# Patient Record
Sex: MALE | Race: WHITE | Employment: OTHER | URBAN - NONMETROPOLITAN AREA
[De-identification: names, ages, dates, MRNs, and addresses within clinical notes are randomized per-mention and may not be internally consistent; named-entity substitution may affect disease eponyms.]

---

## 2017-02-20 ENCOUNTER — APPOINTMENT (OUTPATIENT)
Dept: MRI IMAGING | Age: 72
DRG: 065 | End: 2017-02-20
Payer: MEDICARE

## 2017-02-20 ENCOUNTER — APPOINTMENT (OUTPATIENT)
Dept: GENERAL RADIOLOGY | Age: 72
DRG: 065 | End: 2017-02-20
Payer: MEDICARE

## 2017-02-20 ENCOUNTER — APPOINTMENT (OUTPATIENT)
Dept: CT IMAGING | Age: 72
DRG: 065 | End: 2017-02-20
Payer: MEDICARE

## 2017-02-20 ENCOUNTER — HOSPITAL ENCOUNTER (INPATIENT)
Age: 72
LOS: 2 days | Discharge: HOME | DRG: 065 | End: 2017-02-22
Attending: EMERGENCY MEDICINE | Admitting: FAMILY MEDICINE
Payer: MEDICARE

## 2017-02-20 DIAGNOSIS — I63.9 CEREBROVASCULAR ACCIDENT (CVA), UNSPECIFIED MECHANISM (HCC): Primary | ICD-10-CM

## 2017-02-20 LAB
ACETAMINOPHEN LEVEL: <15 UG/ML
ALBUMIN SERPL-MCNC: 4.3 G/DL (ref 3.5–5.2)
ALP BLD-CCNC: 95 U/L (ref 40–130)
ALT SERPL-CCNC: 18 U/L (ref 5–41)
AMPHETAMINE SCREEN, URINE: NEGATIVE
ANION GAP SERPL CALCULATED.3IONS-SCNC: 11 MMOL/L (ref 7–19)
AST SERPL-CCNC: 16 U/L (ref 5–40)
BACTERIA: NEGATIVE /HPF
BARBITURATE SCREEN URINE: NEGATIVE
BENZODIAZEPINE SCREEN, URINE: NEGATIVE
BILIRUB SERPL-MCNC: <0.2 MG/DL (ref 0.2–1.2)
BILIRUBIN URINE: NEGATIVE
BLOOD, URINE: ABNORMAL
BUN BLDV-MCNC: 26 MG/DL (ref 8–23)
CALCIUM SERPL-MCNC: 9.6 MG/DL (ref 8.8–10.2)
CANNABINOID SCREEN URINE: NEGATIVE
CHLORIDE BLD-SCNC: 101 MMOL/L (ref 98–111)
CLARITY: CLEAR
CO2: 26 MMOL/L (ref 22–29)
COCAINE METABOLITE SCREEN URINE: NEGATIVE
COLOR: YELLOW
CREAT SERPL-MCNC: 1.3 MG/DL (ref 0.5–1.2)
EPITHELIAL CELLS, UA: 0 /HPF (ref 0–5)
ETHANOL: <10 MG/DL (ref 0–0.08)
GFR NON-AFRICAN AMERICAN: 54
GLOBULIN: 2.8 G/DL
GLUCOSE BLD-MCNC: 138 MG/DL
GLUCOSE BLD-MCNC: 138 MG/DL (ref 74–109)
GLUCOSE BLD-MCNC: 85 MG/DL (ref 70–99)
GLUCOSE URINE: >=1000 MG/DL
HCT VFR BLD CALC: 43.9 % (ref 42–52)
HCT VFR BLD CALC: 44 % (ref 42–52)
HEMOGLOBIN: 14.7 G/DL (ref 14–18)
HYALINE CASTS: 0 /HPF (ref 0–8)
INR BLD: 1 (ref 0.88–1.18)
KETONES, URINE: NEGATIVE MG/DL
LEUKOCYTE ESTERASE, URINE: NEGATIVE
Lab: ABNORMAL
MCH RBC QN AUTO: 28.4 PG (ref 27–31)
MCHC RBC AUTO-ENTMCNC: 33.4 G/DL (ref 33–37)
MCV RBC AUTO: 84.9 FL (ref 80–94)
NITRITE, URINE: NEGATIVE
OPIATE SCREEN URINE: POSITIVE
P2Y12 RESULT: 129 PRU (ref 194–418)
PDW BLD-RTO: 14.5 % (ref 11.5–14.5)
PERFORMED ON: NORMAL
PH UA: 6
PLATELET # BLD: 200 K/UL (ref 130–400)
PLATELET # BLD: 212 K/UL (ref 130–400)
PMV BLD AUTO: 11 FL (ref 7.4–10.4)
POTASSIUM SERPL-SCNC: 4.7 MMOL/L (ref 3.5–5)
PROTEIN UA: NEGATIVE MG/DL
PROTHROMBIN TIME: 13.2 SEC (ref 12–14.6)
RBC # BLD: 5.18 M/UL (ref 4.7–6.1)
RBC UA: 0 /HPF (ref 0–4)
SALICYLATE, SERUM: <3 MG/DL (ref 3–10)
SODIUM BLD-SCNC: 138 MMOL/L (ref 136–145)
SPECIFIC GRAVITY UA: 1.03
TOTAL PROTEIN: 7.1 G/DL (ref 6.6–8.7)
UROBILINOGEN, URINE: 0.2 E.U./DL
WBC # BLD: 6.7 K/UL (ref 4.8–10.8)
WBC UA: 3 /HPF (ref 0–5)

## 2017-02-20 PROCEDURE — 85014 HEMATOCRIT: CPT

## 2017-02-20 PROCEDURE — 6370000000 HC RX 637 (ALT 250 FOR IP): Performed by: INTERNAL MEDICINE

## 2017-02-20 PROCEDURE — 80307 DRUG TEST PRSMV CHEM ANLYZR: CPT

## 2017-02-20 PROCEDURE — 36415 COLL VENOUS BLD VENIPUNCTURE: CPT

## 2017-02-20 PROCEDURE — 99284 EMERGENCY DEPT VISIT MOD MDM: CPT | Performed by: EMERGENCY MEDICINE

## 2017-02-20 PROCEDURE — 93005 ELECTROCARDIOGRAM TRACING: CPT

## 2017-02-20 PROCEDURE — 99223 1ST HOSP IP/OBS HIGH 75: CPT | Performed by: INTERNAL MEDICINE

## 2017-02-20 PROCEDURE — 85027 COMPLETE CBC AUTOMATED: CPT

## 2017-02-20 PROCEDURE — 85576 BLOOD PLATELET AGGREGATION: CPT

## 2017-02-20 PROCEDURE — 85049 AUTOMATED PLATELET COUNT: CPT

## 2017-02-20 PROCEDURE — 85610 PROTHROMBIN TIME: CPT

## 2017-02-20 PROCEDURE — G0480 DRUG TEST DEF 1-7 CLASSES: HCPCS

## 2017-02-20 PROCEDURE — 70450 CT HEAD/BRAIN W/O DYE: CPT

## 2017-02-20 PROCEDURE — 96374 THER/PROPH/DIAG INJ IV PUSH: CPT

## 2017-02-20 PROCEDURE — 71010 XR CHEST PORTABLE: CPT

## 2017-02-20 PROCEDURE — 80053 COMPREHEN METABOLIC PANEL: CPT

## 2017-02-20 PROCEDURE — 6360000002 HC RX W HCPCS: Performed by: EMERGENCY MEDICINE

## 2017-02-20 PROCEDURE — 82948 REAGENT STRIP/BLOOD GLUCOSE: CPT

## 2017-02-20 PROCEDURE — 70551 MRI BRAIN STEM W/O DYE: CPT

## 2017-02-20 PROCEDURE — 81001 URINALYSIS AUTO W/SCOPE: CPT

## 2017-02-20 PROCEDURE — 6370000000 HC RX 637 (ALT 250 FOR IP): Performed by: PHYSICIAN ASSISTANT

## 2017-02-20 PROCEDURE — 99285 EMERGENCY DEPT VISIT HI MDM: CPT

## 2017-02-20 PROCEDURE — 1210000000 HC MED SURG R&B

## 2017-02-20 RX ORDER — FLUOXETINE HYDROCHLORIDE 20 MG/1
40 CAPSULE ORAL DAILY
Status: DISCONTINUED | OUTPATIENT
Start: 2017-02-20 | End: 2017-02-22 | Stop reason: HOSPADM

## 2017-02-20 RX ORDER — DEXTROSE MONOHYDRATE 50 MG/ML
100 INJECTION, SOLUTION INTRAVENOUS PRN
Status: DISCONTINUED | OUTPATIENT
Start: 2017-02-20 | End: 2017-02-22 | Stop reason: HOSPADM

## 2017-02-20 RX ORDER — ASPIRIN 81 MG/1
81 TABLET, CHEWABLE ORAL DAILY
Status: DISCONTINUED | OUTPATIENT
Start: 2017-02-20 | End: 2017-02-21

## 2017-02-20 RX ORDER — NITROGLYCERIN 0.4 MG/1
0.4 TABLET SUBLINGUAL EVERY 5 MIN PRN
Status: DISCONTINUED | OUTPATIENT
Start: 2017-02-20 | End: 2017-02-22 | Stop reason: HOSPADM

## 2017-02-20 RX ORDER — TAMSULOSIN HYDROCHLORIDE 0.4 MG/1
0.4 CAPSULE ORAL DAILY
Status: DISCONTINUED | OUTPATIENT
Start: 2017-02-20 | End: 2017-02-22 | Stop reason: HOSPADM

## 2017-02-20 RX ORDER — INSULIN GLARGINE 100 [IU]/ML
30 INJECTION, SOLUTION SUBCUTANEOUS NIGHTLY
Status: DISCONTINUED | OUTPATIENT
Start: 2017-02-20 | End: 2017-02-22 | Stop reason: HOSPADM

## 2017-02-20 RX ORDER — NICOTINE POLACRILEX 4 MG
15 LOZENGE BUCCAL PRN
Status: DISCONTINUED | OUTPATIENT
Start: 2017-02-20 | End: 2017-02-22 | Stop reason: HOSPADM

## 2017-02-20 RX ORDER — CLOPIDOGREL BISULFATE 75 MG/1
75 TABLET ORAL DAILY
Status: DISCONTINUED | OUTPATIENT
Start: 2017-02-20 | End: 2017-02-22 | Stop reason: HOSPADM

## 2017-02-20 RX ORDER — FAMOTIDINE 20 MG/1
40 TABLET, FILM COATED ORAL 2 TIMES DAILY
Status: DISCONTINUED | OUTPATIENT
Start: 2017-02-20 | End: 2017-02-22 | Stop reason: HOSPADM

## 2017-02-20 RX ORDER — NALOXONE HYDROCHLORIDE 0.4 MG/ML
0.4 INJECTION, SOLUTION INTRAMUSCULAR; INTRAVENOUS; SUBCUTANEOUS ONCE
Status: COMPLETED | OUTPATIENT
Start: 2017-02-20 | End: 2017-02-20

## 2017-02-20 RX ORDER — DEXTROSE MONOHYDRATE 25 G/50ML
12.5 INJECTION, SOLUTION INTRAVENOUS PRN
Status: DISCONTINUED | OUTPATIENT
Start: 2017-02-20 | End: 2017-02-22 | Stop reason: HOSPADM

## 2017-02-20 RX ORDER — DULOXETIN HYDROCHLORIDE 60 MG/1
60 CAPSULE, DELAYED RELEASE ORAL NIGHTLY
Status: DISCONTINUED | OUTPATIENT
Start: 2017-02-20 | End: 2017-02-20

## 2017-02-20 RX ADMIN — ASPIRIN 81 MG 81 MG: 81 TABLET ORAL at 20:37

## 2017-02-20 RX ADMIN — FLUOXETINE 40 MG: 20 CAPSULE ORAL at 20:37

## 2017-02-20 RX ADMIN — TAMSULOSIN HYDROCHLORIDE 0.4 MG: 0.4 CAPSULE ORAL at 20:37

## 2017-02-20 RX ADMIN — CLOPIDOGREL BISULFATE 75 MG: 75 TABLET, FILM COATED ORAL at 20:37

## 2017-02-20 RX ADMIN — NALOXONE HYDROCHLORIDE 0.4 MG: 0.4 INJECTION, SOLUTION INTRAMUSCULAR; INTRAVENOUS; SUBCUTANEOUS at 16:10

## 2017-02-20 RX ADMIN — FAMOTIDINE 40 MG: 20 TABLET ORAL at 20:37

## 2017-02-21 PROBLEM — G54.0 THORACIC OUTLET SYNDROME: Status: ACTIVE | Noted: 2017-02-21

## 2017-02-21 PROBLEM — R41.0 CONFUSION: Status: ACTIVE | Noted: 2017-02-21

## 2017-02-21 LAB
ANION GAP SERPL CALCULATED.3IONS-SCNC: 15 MMOL/L (ref 7–19)
BUN BLDV-MCNC: 22 MG/DL (ref 8–23)
CALCIUM SERPL-MCNC: 8.9 MG/DL (ref 8.8–10.2)
CHLORIDE BLD-SCNC: 101 MMOL/L (ref 98–111)
CHOLESTEROL, TOTAL: 239 MG/DL (ref 160–199)
CO2: 23 MMOL/L (ref 22–29)
CREAT SERPL-MCNC: 1.1 MG/DL (ref 0.5–1.2)
GFR NON-AFRICAN AMERICAN: >60
GLUCOSE BLD-MCNC: 124 MG/DL (ref 70–99)
GLUCOSE BLD-MCNC: 201 MG/DL (ref 70–99)
GLUCOSE BLD-MCNC: 61 MG/DL (ref 74–109)
GLUCOSE BLD-MCNC: 69 MG/DL (ref 70–99)
GLUCOSE BLD-MCNC: 76 MG/DL (ref 70–99)
GLUCOSE BLD-MCNC: 81 MG/DL (ref 70–99)
HBA1C MFR BLD: 6.4 %
HCT VFR BLD CALC: 46.5 % (ref 42–52)
HDLC SERPL-MCNC: 29 MG/DL (ref 55–121)
HEMOGLOBIN: 14.7 G/DL (ref 14–18)
LDL CHOLESTEROL CALCULATED: 166 MG/DL
MCH RBC QN AUTO: 27.7 PG (ref 27–31)
MCHC RBC AUTO-ENTMCNC: 31.6 G/DL (ref 33–37)
MCV RBC AUTO: 87.7 FL (ref 80–94)
PDW BLD-RTO: 14.4 % (ref 11.5–14.5)
PERFORMED ON: ABNORMAL
PERFORMED ON: NORMAL
PERFORMED ON: NORMAL
PLATELET # BLD: 189 K/UL (ref 130–400)
PMV BLD AUTO: 10.8 FL (ref 7.4–10.4)
POTASSIUM SERPL-SCNC: 3.8 MMOL/L (ref 3.5–5)
RBC # BLD: 5.3 M/UL (ref 4.7–6.1)
SODIUM BLD-SCNC: 139 MMOL/L (ref 136–145)
TRIGL SERPL-MCNC: 222 MG/DL (ref 150–199)
TSH SERPL DL<=0.05 MIU/L-ACNC: 1.11 UIU/ML (ref 0.27–4.2)
WBC # BLD: 6.6 K/UL (ref 4.8–10.8)

## 2017-02-21 PROCEDURE — 36415 COLL VENOUS BLD VENIPUNCTURE: CPT

## 2017-02-21 PROCEDURE — 2580000003 HC RX 258: Performed by: INTERNAL MEDICINE

## 2017-02-21 PROCEDURE — 6370000000 HC RX 637 (ALT 250 FOR IP): Performed by: HOSPITALIST

## 2017-02-21 PROCEDURE — 6370000000 HC RX 637 (ALT 250 FOR IP): Performed by: PHYSICIAN ASSISTANT

## 2017-02-21 PROCEDURE — 6370000000 HC RX 637 (ALT 250 FOR IP): Performed by: INTERNAL MEDICINE

## 2017-02-21 PROCEDURE — 99223 1ST HOSP IP/OBS HIGH 75: CPT | Performed by: PSYCHIATRY & NEUROLOGY

## 2017-02-21 PROCEDURE — 82948 REAGENT STRIP/BLOOD GLUCOSE: CPT

## 2017-02-21 PROCEDURE — 93880 EXTRACRANIAL BILAT STUDY: CPT

## 2017-02-21 PROCEDURE — 93306 TTE W/DOPPLER COMPLETE: CPT

## 2017-02-21 PROCEDURE — 83036 HEMOGLOBIN GLYCOSYLATED A1C: CPT

## 2017-02-21 PROCEDURE — 1210000000 HC MED SURG R&B

## 2017-02-21 PROCEDURE — 85027 COMPLETE CBC AUTOMATED: CPT

## 2017-02-21 PROCEDURE — 80061 LIPID PANEL: CPT

## 2017-02-21 PROCEDURE — 6360000002 HC RX W HCPCS: Performed by: PSYCHIATRY & NEUROLOGY

## 2017-02-21 PROCEDURE — 99233 SBSQ HOSP IP/OBS HIGH 50: CPT | Performed by: HOSPITALIST

## 2017-02-21 PROCEDURE — 80048 BASIC METABOLIC PNL TOTAL CA: CPT

## 2017-02-21 PROCEDURE — 84443 ASSAY THYROID STIM HORMONE: CPT

## 2017-02-21 RX ORDER — ASPIRIN 81 MG/1
324 TABLET, CHEWABLE ORAL DAILY
Status: DISCONTINUED | OUTPATIENT
Start: 2017-02-22 | End: 2017-02-22 | Stop reason: HOSPADM

## 2017-02-21 RX ORDER — ALPRAZOLAM 1 MG/1
1 TABLET ORAL NIGHTLY PRN
Status: DISCONTINUED | OUTPATIENT
Start: 2017-02-22 | End: 2017-02-21

## 2017-02-21 RX ORDER — ALPRAZOLAM 1 MG/1
1 TABLET ORAL NIGHTLY PRN
Status: DISCONTINUED | OUTPATIENT
Start: 2017-02-21 | End: 2017-02-22 | Stop reason: HOSPADM

## 2017-02-21 RX ORDER — ATORVASTATIN CALCIUM 20 MG/1
20 TABLET, FILM COATED ORAL NIGHTLY
Status: DISCONTINUED | OUTPATIENT
Start: 2017-02-21 | End: 2017-02-22 | Stop reason: HOSPADM

## 2017-02-21 RX ADMIN — FLUOXETINE 40 MG: 20 CAPSULE ORAL at 08:48

## 2017-02-21 RX ADMIN — ALPRAZOLAM 1 MG: 1 TABLET ORAL at 22:17

## 2017-02-21 RX ADMIN — TAMSULOSIN HYDROCHLORIDE 0.4 MG: 0.4 CAPSULE ORAL at 08:48

## 2017-02-21 RX ADMIN — DEXTROSE MONOHYDRATE 12.5 G: 25 INJECTION, SOLUTION INTRAVENOUS at 04:35

## 2017-02-21 RX ADMIN — DEXTROSE MONOHYDRATE 12.5 G: 25 INJECTION, SOLUTION INTRAVENOUS at 11:32

## 2017-02-21 RX ADMIN — FAMOTIDINE 40 MG: 20 TABLET ORAL at 08:48

## 2017-02-21 RX ADMIN — ASPIRIN 81 MG 81 MG: 81 TABLET ORAL at 08:48

## 2017-02-21 RX ADMIN — CLOPIDOGREL BISULFATE 75 MG: 75 TABLET, FILM COATED ORAL at 08:48

## 2017-02-21 RX ADMIN — ATORVASTATIN CALCIUM 20 MG: 20 TABLET, FILM COATED ORAL at 21:17

## 2017-02-21 RX ADMIN — FAMOTIDINE 40 MG: 20 TABLET ORAL at 21:17

## 2017-02-21 RX ADMIN — ENOXAPARIN SODIUM 40 MG: 40 INJECTION SUBCUTANEOUS at 08:48

## 2017-02-21 RX ADMIN — INSULIN GLARGINE 30 UNITS: 100 INJECTION, SOLUTION SUBCUTANEOUS at 22:17

## 2017-02-21 RX ADMIN — INSULIN LISPRO 5 UNITS: 100 INJECTION, SOLUTION INTRAVENOUS; SUBCUTANEOUS at 23:36

## 2017-02-21 ASSESSMENT — PAIN SCALES - GENERAL: PAINLEVEL_OUTOF10: 0

## 2017-02-22 VITALS
WEIGHT: 265 LBS | OXYGEN SATURATION: 92 % | TEMPERATURE: 97.7 F | DIASTOLIC BLOOD PRESSURE: 87 MMHG | RESPIRATION RATE: 16 BRPM | HEIGHT: 75 IN | BODY MASS INDEX: 32.95 KG/M2 | SYSTOLIC BLOOD PRESSURE: 143 MMHG | HEART RATE: 85 BPM

## 2017-02-22 LAB
ANION GAP SERPL CALCULATED.3IONS-SCNC: 16 MMOL/L (ref 7–19)
BUN BLDV-MCNC: 22 MG/DL (ref 8–23)
CALCIUM SERPL-MCNC: 9 MG/DL (ref 8.8–10.2)
CHLORIDE BLD-SCNC: 100 MMOL/L (ref 98–111)
CO2: 23 MMOL/L (ref 22–29)
CREAT SERPL-MCNC: 1.2 MG/DL (ref 0.5–1.2)
GFR NON-AFRICAN AMERICAN: 59
GLUCOSE BLD-MCNC: 120 MG/DL (ref 70–99)
GLUCOSE BLD-MCNC: 234 MG/DL (ref 70–99)
GLUCOSE BLD-MCNC: 89 MG/DL (ref 74–109)
PERFORMED ON: ABNORMAL
PERFORMED ON: ABNORMAL
POTASSIUM SERPL-SCNC: 3.8 MMOL/L (ref 3.5–5)
SODIUM BLD-SCNC: 139 MMOL/L (ref 136–145)
TOTAL CK: 82 U/L (ref 39–308)

## 2017-02-22 PROCEDURE — 6360000002 HC RX W HCPCS: Performed by: PSYCHIATRY & NEUROLOGY

## 2017-02-22 PROCEDURE — 6370000000 HC RX 637 (ALT 250 FOR IP): Performed by: INTERNAL MEDICINE

## 2017-02-22 PROCEDURE — 6370000000 HC RX 637 (ALT 250 FOR IP): Performed by: HOSPITALIST

## 2017-02-22 PROCEDURE — G8997 SWALLOW GOAL STATUS: HCPCS

## 2017-02-22 PROCEDURE — 87070 CULTURE OTHR SPECIMN AEROBIC: CPT

## 2017-02-22 PROCEDURE — 36415 COLL VENOUS BLD VENIPUNCTURE: CPT

## 2017-02-22 PROCEDURE — 6370000000 HC RX 637 (ALT 250 FOR IP): Performed by: PHYSICIAN ASSISTANT

## 2017-02-22 PROCEDURE — G8978 MOBILITY CURRENT STATUS: HCPCS

## 2017-02-22 PROCEDURE — 82550 ASSAY OF CK (CPK): CPT

## 2017-02-22 PROCEDURE — 92610 EVALUATE SWALLOWING FUNCTION: CPT

## 2017-02-22 PROCEDURE — 82948 REAGENT STRIP/BLOOD GLUCOSE: CPT

## 2017-02-22 PROCEDURE — 87205 SMEAR GRAM STAIN: CPT

## 2017-02-22 PROCEDURE — 99233 SBSQ HOSP IP/OBS HIGH 50: CPT | Performed by: PSYCHIATRY & NEUROLOGY

## 2017-02-22 PROCEDURE — G8996 SWALLOW CURRENT STATUS: HCPCS

## 2017-02-22 PROCEDURE — G8979 MOBILITY GOAL STATUS: HCPCS

## 2017-02-22 PROCEDURE — 80048 BASIC METABOLIC PNL TOTAL CA: CPT

## 2017-02-22 PROCEDURE — G8980 MOBILITY D/C STATUS: HCPCS

## 2017-02-22 PROCEDURE — 99239 HOSP IP/OBS DSCHRG MGMT >30: CPT | Performed by: HOSPITALIST

## 2017-02-22 PROCEDURE — 97163 PT EVAL HIGH COMPLEX 45 MIN: CPT

## 2017-02-22 RX ORDER — LISINOPRIL 5 MG/1
5 TABLET ORAL DAILY
Status: DISCONTINUED | OUTPATIENT
Start: 2017-02-22 | End: 2017-02-22 | Stop reason: HOSPADM

## 2017-02-22 RX ORDER — ATORVASTATIN CALCIUM 20 MG/1
20 TABLET, FILM COATED ORAL NIGHTLY
Qty: 30 TABLET | Refills: 0 | Status: SHIPPED | OUTPATIENT
Start: 2017-02-22 | End: 2017-02-22

## 2017-02-22 RX ORDER — ASPIRIN 325 MG
325 TABLET ORAL DAILY
COMMUNITY
Start: 2017-02-22 | End: 2018-01-02 | Stop reason: ALTCHOICE

## 2017-02-22 RX ORDER — LISINOPRIL 5 MG/1
5 TABLET ORAL DAILY
Qty: 30 TABLET | Refills: 0 | Status: SHIPPED | OUTPATIENT
Start: 2017-02-22 | End: 2017-02-22

## 2017-02-22 RX ORDER — ATORVASTATIN CALCIUM 20 MG/1
20 TABLET, FILM COATED ORAL NIGHTLY
Qty: 30 TABLET | Refills: 0 | Status: ON HOLD | OUTPATIENT
Start: 2017-02-22 | End: 2017-03-27

## 2017-02-22 RX ORDER — LISINOPRIL 5 MG/1
5 TABLET ORAL DAILY
Qty: 30 TABLET | Refills: 0 | Status: ON HOLD | OUTPATIENT
Start: 2017-02-22 | End: 2017-03-28 | Stop reason: HOSPADM

## 2017-02-22 RX ADMIN — FLUOXETINE 40 MG: 20 CAPSULE ORAL at 09:39

## 2017-02-22 RX ADMIN — INSULIN LISPRO 6 UNITS: 100 INJECTION, SOLUTION INTRAVENOUS; SUBCUTANEOUS at 12:27

## 2017-02-22 RX ADMIN — TAMSULOSIN HYDROCHLORIDE 0.4 MG: 0.4 CAPSULE ORAL at 09:39

## 2017-02-22 RX ADMIN — FAMOTIDINE 40 MG: 20 TABLET ORAL at 09:39

## 2017-02-22 RX ADMIN — ASPIRIN 81 MG 324 MG: 81 TABLET ORAL at 09:39

## 2017-02-22 RX ADMIN — ENOXAPARIN SODIUM 40 MG: 40 INJECTION SUBCUTANEOUS at 09:39

## 2017-02-22 RX ADMIN — CLOPIDOGREL BISULFATE 75 MG: 75 TABLET, FILM COATED ORAL at 09:39

## 2017-02-23 LAB
EKG P AXIS: 26 DEGREES
EKG P-R INTERVAL: 154 MS
EKG Q-T INTERVAL: 432 MS
EKG QRS DURATION: 98 MS
EKG QTC CALCULATION (BAZETT): 435 MS
EKG T AXIS: 81 DEGREES

## 2017-02-24 LAB
CULTURE, RESPIRATORY: NORMAL
GRAM STAIN RESULT: NORMAL

## 2017-03-27 ENCOUNTER — HOSPITAL ENCOUNTER (OUTPATIENT)
Age: 72
Setting detail: OBSERVATION
Discharge: HOME | End: 2017-03-28
Attending: EMERGENCY MEDICINE | Admitting: HOSPITALIST
Payer: MEDICARE

## 2017-03-27 ENCOUNTER — APPOINTMENT (OUTPATIENT)
Dept: GENERAL RADIOLOGY | Age: 72
End: 2017-03-27
Payer: MEDICARE

## 2017-03-27 ENCOUNTER — APPOINTMENT (OUTPATIENT)
Dept: CT IMAGING | Age: 72
End: 2017-03-27
Payer: MEDICARE

## 2017-03-27 DIAGNOSIS — R55 SYNCOPE AND COLLAPSE: Primary | ICD-10-CM

## 2017-03-27 PROBLEM — N17.9 AKI (ACUTE KIDNEY INJURY) (HCC): Status: ACTIVE | Noted: 2017-03-27

## 2017-03-27 PROBLEM — I95.9 HYPOTENSION: Status: ACTIVE | Noted: 2017-03-27

## 2017-03-27 PROBLEM — R42 DIZZINESS: Status: ACTIVE | Noted: 2017-03-27

## 2017-03-27 PROBLEM — R26.89 IMBALANCE: Status: ACTIVE | Noted: 2017-03-27

## 2017-03-27 PROBLEM — I95.1 SYNCOPE DUE TO ORTHOSTATIC HYPOTENSION: Status: ACTIVE | Noted: 2017-03-27

## 2017-03-27 LAB
ALBUMIN SERPL-MCNC: 4.1 G/DL (ref 3.5–5.2)
ALP BLD-CCNC: 88 U/L (ref 40–130)
ALT SERPL-CCNC: 15 U/L (ref 5–41)
ANION GAP SERPL CALCULATED.3IONS-SCNC: 11 MMOL/L (ref 7–19)
APTT: 29.9 SEC (ref 26–36.2)
AST SERPL-CCNC: 17 U/L (ref 5–40)
BILIRUB SERPL-MCNC: 0.3 MG/DL (ref 0.2–1.2)
BILIRUBIN URINE: NEGATIVE
BLOOD, URINE: NEGATIVE
BUN BLDV-MCNC: 27 MG/DL (ref 8–23)
CALCIUM SERPL-MCNC: 9 MG/DL (ref 8.8–10.2)
CHLORIDE BLD-SCNC: 102 MMOL/L (ref 98–111)
CLARITY: CLEAR
CO2: 23 MMOL/L (ref 22–29)
COLOR: YELLOW
CREAT SERPL-MCNC: 1.5 MG/DL (ref 0.5–1.2)
GFR NON-AFRICAN AMERICAN: 46
GLOBULIN: 2.6 G/DL
GLUCOSE BLD-MCNC: 137 MG/DL (ref 70–99)
GLUCOSE BLD-MCNC: 144 MG/DL (ref 70–99)
GLUCOSE BLD-MCNC: 154 MG/DL (ref 74–109)
GLUCOSE URINE: >=1000 MG/DL
HBA1C MFR BLD: 6.1 %
HCT VFR BLD CALC: 42.9 % (ref 42–52)
HEMOGLOBIN: 13.6 G/DL (ref 14–18)
INR BLD: 1 (ref 0.88–1.18)
KETONES, URINE: NEGATIVE MG/DL
LEUKOCYTE ESTERASE, URINE: NEGATIVE
MCH RBC QN AUTO: 28 PG (ref 27–31)
MCHC RBC AUTO-ENTMCNC: 31.7 G/DL (ref 33–37)
MCV RBC AUTO: 88.3 FL (ref 80–94)
NITRITE, URINE: NEGATIVE
PDW BLD-RTO: 15.1 % (ref 11.5–14.5)
PERFORMED ON: ABNORMAL
PERFORMED ON: ABNORMAL
PERFORMED ON: NORMAL
PH UA: 5.5
PLATELET # BLD: 206 K/UL (ref 130–400)
PMV BLD AUTO: 11.1 FL (ref 7.4–10.4)
POC TROPONIN I: 0 NG/ML (ref 0–0.08)
POTASSIUM SERPL-SCNC: 4.8 MMOL/L (ref 3.5–5)
PROTEIN UA: NEGATIVE MG/DL
PROTHROMBIN TIME: 13.2 SEC (ref 12–14.6)
RBC # BLD: 4.86 M/UL (ref 4.7–6.1)
SODIUM BLD-SCNC: 136 MMOL/L (ref 136–145)
SPECIFIC GRAVITY UA: 1.03
TOTAL PROTEIN: 6.7 G/DL (ref 6.6–8.7)
UROBILINOGEN, URINE: 0.2 E.U./DL
WBC # BLD: 7.7 K/UL (ref 4.8–10.8)

## 2017-03-27 PROCEDURE — 70450 CT HEAD/BRAIN W/O DYE: CPT

## 2017-03-27 PROCEDURE — 6360000002 HC RX W HCPCS: Performed by: NURSE PRACTITIONER

## 2017-03-27 PROCEDURE — 99220 PR INITIAL OBSERVATION CARE/DAY 70 MINUTES: CPT | Performed by: HOSPITALIST

## 2017-03-27 PROCEDURE — 99284 EMERGENCY DEPT VISIT MOD MDM: CPT | Performed by: EMERGENCY MEDICINE

## 2017-03-27 PROCEDURE — G0378 HOSPITAL OBSERVATION PER HR: HCPCS

## 2017-03-27 PROCEDURE — 84484 ASSAY OF TROPONIN QUANT: CPT

## 2017-03-27 PROCEDURE — 71020 XR CHEST STANDARD TWO VW: CPT

## 2017-03-27 PROCEDURE — 83036 HEMOGLOBIN GLYCOSYLATED A1C: CPT

## 2017-03-27 PROCEDURE — 96372 THER/PROPH/DIAG INJ SC/IM: CPT

## 2017-03-27 PROCEDURE — 82948 REAGENT STRIP/BLOOD GLUCOSE: CPT

## 2017-03-27 PROCEDURE — 81003 URINALYSIS AUTO W/O SCOPE: CPT

## 2017-03-27 PROCEDURE — 6370000000 HC RX 637 (ALT 250 FOR IP): Performed by: NURSE PRACTITIONER

## 2017-03-27 PROCEDURE — 80053 COMPREHEN METABOLIC PANEL: CPT

## 2017-03-27 PROCEDURE — 99285 EMERGENCY DEPT VISIT HI MDM: CPT

## 2017-03-27 PROCEDURE — 36415 COLL VENOUS BLD VENIPUNCTURE: CPT

## 2017-03-27 PROCEDURE — 2580000003 HC RX 258: Performed by: NURSE PRACTITIONER

## 2017-03-27 PROCEDURE — 85730 THROMBOPLASTIN TIME PARTIAL: CPT

## 2017-03-27 PROCEDURE — 85027 COMPLETE CBC AUTOMATED: CPT

## 2017-03-27 PROCEDURE — 93005 ELECTROCARDIOGRAM TRACING: CPT

## 2017-03-27 PROCEDURE — 73560 X-RAY EXAM OF KNEE 1 OR 2: CPT

## 2017-03-27 PROCEDURE — 85610 PROTHROMBIN TIME: CPT

## 2017-03-27 PROCEDURE — 2580000003 HC RX 258: Performed by: HOSPITALIST

## 2017-03-27 RX ORDER — MELOXICAM 15 MG/1
15 TABLET ORAL DAILY
COMMUNITY
End: 2018-01-10 | Stop reason: SDUPTHER

## 2017-03-27 RX ORDER — ALPRAZOLAM 0.5 MG/1
0.5 TABLET ORAL 3 TIMES DAILY PRN
Status: DISCONTINUED | OUTPATIENT
Start: 2017-03-27 | End: 2017-03-28 | Stop reason: HOSPADM

## 2017-03-27 RX ORDER — OXYCODONE HCL 10 MG/1
20 TABLET, FILM COATED, EXTENDED RELEASE ORAL EVERY 12 HOURS
Status: DISCONTINUED | OUTPATIENT
Start: 2017-03-27 | End: 2017-03-28 | Stop reason: HOSPADM

## 2017-03-27 RX ORDER — HYDROCODONE BITARTRATE AND ACETAMINOPHEN 10; 325 MG/1; MG/1
1 TABLET ORAL EVERY 6 HOURS PRN
COMMUNITY
End: 2017-07-20 | Stop reason: SDUPTHER

## 2017-03-27 RX ORDER — GABAPENTIN 300 MG/1
300 CAPSULE ORAL 3 TIMES DAILY
COMMUNITY
End: 2017-11-17 | Stop reason: ALTCHOICE

## 2017-03-27 RX ORDER — TAMSULOSIN HYDROCHLORIDE 0.4 MG/1
0.4 CAPSULE ORAL 2 TIMES DAILY
Status: DISCONTINUED | OUTPATIENT
Start: 2017-03-27 | End: 2017-03-28 | Stop reason: HOSPADM

## 2017-03-27 RX ORDER — SODIUM CHLORIDE 9 MG/ML
INJECTION, SOLUTION INTRAVENOUS CONTINUOUS
Status: DISCONTINUED | OUTPATIENT
Start: 2017-03-27 | End: 2017-03-28

## 2017-03-27 RX ORDER — NICOTINE POLACRILEX 4 MG
15 LOZENGE BUCCAL PRN
Status: DISCONTINUED | OUTPATIENT
Start: 2017-03-27 | End: 2017-03-28 | Stop reason: HOSPADM

## 2017-03-27 RX ORDER — DEXTROSE MONOHYDRATE 25 G/50ML
12.5 INJECTION, SOLUTION INTRAVENOUS PRN
Status: DISCONTINUED | OUTPATIENT
Start: 2017-03-27 | End: 2017-03-28 | Stop reason: HOSPADM

## 2017-03-27 RX ORDER — INSULIN GLARGINE 100 [IU]/ML
20 INJECTION, SOLUTION SUBCUTANEOUS
Status: DISCONTINUED | OUTPATIENT
Start: 2017-03-27 | End: 2017-03-27

## 2017-03-27 RX ORDER — OXYCODONE HCL 20 MG/1
20 TABLET, FILM COATED, EXTENDED RELEASE ORAL EVERY 12 HOURS
COMMUNITY
End: 2017-08-25 | Stop reason: ALTCHOICE

## 2017-03-27 RX ORDER — DULOXETIN HYDROCHLORIDE 60 MG/1
60 CAPSULE, DELAYED RELEASE ORAL NIGHTLY
Status: DISCONTINUED | OUTPATIENT
Start: 2017-03-27 | End: 2017-03-28 | Stop reason: HOSPADM

## 2017-03-27 RX ORDER — SODIUM CHLORIDE 0.9 % (FLUSH) 0.9 %
10 SYRINGE (ML) INJECTION PRN
Status: DISCONTINUED | OUTPATIENT
Start: 2017-03-27 | End: 2017-03-28 | Stop reason: HOSPADM

## 2017-03-27 RX ORDER — INSULIN GLARGINE 100 [IU]/ML
80 INJECTION, SOLUTION SUBCUTANEOUS NIGHTLY
Status: DISCONTINUED | OUTPATIENT
Start: 2017-03-27 | End: 2017-03-28 | Stop reason: HOSPADM

## 2017-03-27 RX ORDER — AZITHROMYCIN 250 MG/1
250 TABLET, FILM COATED ORAL DAILY
Status: ON HOLD | COMMUNITY
End: 2017-03-27

## 2017-03-27 RX ORDER — MECLIZINE HYDROCHLORIDE 25 MG/1
25 TABLET ORAL 3 TIMES DAILY
Status: DISCONTINUED | OUTPATIENT
Start: 2017-03-27 | End: 2017-03-28 | Stop reason: HOSPADM

## 2017-03-27 RX ORDER — ONDANSETRON 2 MG/ML
4 INJECTION INTRAMUSCULAR; INTRAVENOUS EVERY 6 HOURS PRN
Status: DISCONTINUED | OUTPATIENT
Start: 2017-03-27 | End: 2017-03-28 | Stop reason: HOSPADM

## 2017-03-27 RX ORDER — SODIUM CHLORIDE 0.9 % (FLUSH) 0.9 %
10 SYRINGE (ML) INJECTION EVERY 12 HOURS SCHEDULED
Status: DISCONTINUED | OUTPATIENT
Start: 2017-03-27 | End: 2017-03-28 | Stop reason: HOSPADM

## 2017-03-27 RX ORDER — GLIPIZIDE 5 MG/1
10 TABLET ORAL
Status: DISCONTINUED | OUTPATIENT
Start: 2017-03-28 | End: 2017-03-27

## 2017-03-27 RX ORDER — INSULIN GLARGINE 100 [IU]/ML
60 INJECTION, SOLUTION SUBCUTANEOUS NIGHTLY
Status: DISCONTINUED | OUTPATIENT
Start: 2017-03-27 | End: 2017-03-27

## 2017-03-27 RX ORDER — FAMOTIDINE 20 MG/1
40 TABLET, FILM COATED ORAL 2 TIMES DAILY
Status: DISCONTINUED | OUTPATIENT
Start: 2017-03-27 | End: 2017-03-28 | Stop reason: HOSPADM

## 2017-03-27 RX ORDER — BUTALBITAL, ACETAMINOPHEN AND CAFFEINE 50; 325; 40 MG/1; MG/1; MG/1
1 TABLET ORAL DAILY
Status: DISCONTINUED | OUTPATIENT
Start: 2017-03-27 | End: 2017-03-28 | Stop reason: HOSPADM

## 2017-03-27 RX ORDER — DEXTROSE MONOHYDRATE 50 MG/ML
100 INJECTION, SOLUTION INTRAVENOUS PRN
Status: DISCONTINUED | OUTPATIENT
Start: 2017-03-27 | End: 2017-03-28 | Stop reason: HOSPADM

## 2017-03-27 RX ORDER — 0.9 % SODIUM CHLORIDE 0.9 %
500 INTRAVENOUS SOLUTION INTRAVENOUS ONCE
Status: COMPLETED | OUTPATIENT
Start: 2017-03-27 | End: 2017-03-27

## 2017-03-27 RX ORDER — OXYCODONE HYDROCHLORIDE AND ACETAMINOPHEN 5; 325 MG/1; MG/1
1 TABLET ORAL 2 TIMES DAILY PRN
COMMUNITY
End: 2017-08-25 | Stop reason: ALTCHOICE

## 2017-03-27 RX ORDER — GABAPENTIN 300 MG/1
300 CAPSULE ORAL 3 TIMES DAILY
Status: DISCONTINUED | OUTPATIENT
Start: 2017-03-27 | End: 2017-03-28 | Stop reason: HOSPADM

## 2017-03-27 RX ORDER — ACETAMINOPHEN 325 MG/1
650 TABLET ORAL EVERY 4 HOURS PRN
Status: DISCONTINUED | OUTPATIENT
Start: 2017-03-27 | End: 2017-03-28 | Stop reason: HOSPADM

## 2017-03-27 RX ORDER — NITROGLYCERIN 0.4 MG/1
0.4 TABLET SUBLINGUAL EVERY 5 MIN PRN
Status: DISCONTINUED | OUTPATIENT
Start: 2017-03-27 | End: 2017-03-28 | Stop reason: HOSPADM

## 2017-03-27 RX ORDER — CLOPIDOGREL BISULFATE 75 MG/1
75 TABLET ORAL DAILY
Status: DISCONTINUED | OUTPATIENT
Start: 2017-03-27 | End: 2017-03-28 | Stop reason: HOSPADM

## 2017-03-27 RX ORDER — INSULIN GLARGINE 100 [IU]/ML
INJECTION, SOLUTION SUBCUTANEOUS NIGHTLY
Status: ON HOLD | COMMUNITY
End: 2017-03-28 | Stop reason: HOSPADM

## 2017-03-27 RX ORDER — FLUOXETINE HYDROCHLORIDE 20 MG/1
40 CAPSULE ORAL DAILY
Status: DISCONTINUED | OUTPATIENT
Start: 2017-03-27 | End: 2017-03-28 | Stop reason: HOSPADM

## 2017-03-27 RX ORDER — ASPIRIN 325 MG
325 TABLET ORAL DAILY
Status: DISCONTINUED | OUTPATIENT
Start: 2017-03-27 | End: 2017-03-28 | Stop reason: HOSPADM

## 2017-03-27 RX ADMIN — SODIUM CHLORIDE: 9 INJECTION, SOLUTION INTRAVENOUS at 17:16

## 2017-03-27 RX ADMIN — GABAPENTIN 300 MG: 300 CAPSULE ORAL at 20:23

## 2017-03-27 RX ADMIN — FLUOXETINE 40 MG: 20 CAPSULE ORAL at 20:22

## 2017-03-27 RX ADMIN — TAMSULOSIN HYDROCHLORIDE 0.4 MG: 0.4 CAPSULE ORAL at 20:23

## 2017-03-27 RX ADMIN — Medication 10 ML: at 20:24

## 2017-03-27 RX ADMIN — INSULIN GLARGINE 80 UNITS: 100 INJECTION, SOLUTION SUBCUTANEOUS at 20:36

## 2017-03-27 RX ADMIN — DULOXETINE HYDROCHLORIDE 60 MG: 60 CAPSULE, DELAYED RELEASE ORAL at 20:23

## 2017-03-27 RX ADMIN — MECLIZINE HYDROCHLORIDE 25 MG: 25 TABLET ORAL at 20:23

## 2017-03-27 RX ADMIN — CLOPIDOGREL BISULFATE 75 MG: 75 TABLET, FILM COATED ORAL at 20:22

## 2017-03-27 RX ADMIN — FAMOTIDINE 40 MG: 20 TABLET ORAL at 20:23

## 2017-03-27 RX ADMIN — OXYCODONE HYDROCHLORIDE 20 MG: 10 TABLET, FILM COATED, EXTENDED RELEASE ORAL at 20:23

## 2017-03-27 RX ADMIN — METOPROLOL TARTRATE 12.5 MG: 25 TABLET ORAL at 20:23

## 2017-03-27 RX ADMIN — SODIUM CHLORIDE 500 ML: 9 INJECTION, SOLUTION INTRAVENOUS at 10:49

## 2017-03-27 RX ADMIN — ENOXAPARIN SODIUM 40 MG: 40 INJECTION SUBCUTANEOUS at 20:24

## 2017-03-27 RX ADMIN — ASPIRIN 325 MG ORAL TABLET 325 MG: 325 PILL ORAL at 20:22

## 2017-03-27 RX ADMIN — ALPRAZOLAM 0.5 MG: 0.5 TABLET ORAL at 22:11

## 2017-03-27 ASSESSMENT — ENCOUNTER SYMPTOMS: RESPIRATORY NEGATIVE: 1

## 2017-03-27 ASSESSMENT — PAIN SCALES - GENERAL
PAINLEVEL_OUTOF10: 6
PAINLEVEL_OUTOF10: 5

## 2017-03-28 VITALS
HEART RATE: 68 BPM | SYSTOLIC BLOOD PRESSURE: 118 MMHG | DIASTOLIC BLOOD PRESSURE: 72 MMHG | HEIGHT: 76 IN | TEMPERATURE: 98.2 F | RESPIRATION RATE: 16 BRPM | WEIGHT: 260 LBS | BODY MASS INDEX: 31.66 KG/M2 | OXYGEN SATURATION: 92 %

## 2017-03-28 LAB
ANION GAP SERPL CALCULATED.3IONS-SCNC: 13 MMOL/L (ref 7–19)
BUN BLDV-MCNC: 26 MG/DL (ref 8–23)
CALCIUM SERPL-MCNC: 8.6 MG/DL (ref 8.8–10.2)
CHLORIDE BLD-SCNC: 103 MMOL/L (ref 98–111)
CO2: 22 MMOL/L (ref 22–29)
CREAT SERPL-MCNC: 1.2 MG/DL (ref 0.5–1.2)
EKG P AXIS: 45 DEGREES
EKG P-R INTERVAL: 158 MS
EKG Q-T INTERVAL: 432 MS
EKG QRS DURATION: 94 MS
EKG QTC CALCULATION (BAZETT): 425 MS
EKG T AXIS: 81 DEGREES
GFR NON-AFRICAN AMERICAN: 59
GLUCOSE BLD-MCNC: 200 MG/DL (ref 70–99)
GLUCOSE BLD-MCNC: 202 MG/DL (ref 70–99)
GLUCOSE BLD-MCNC: 90 MG/DL (ref 70–99)
GLUCOSE BLD-MCNC: 97 MG/DL (ref 74–109)
PERFORMED ON: ABNORMAL
PERFORMED ON: ABNORMAL
PERFORMED ON: NORMAL
POTASSIUM SERPL-SCNC: 4.2 MMOL/L (ref 3.5–5)
SODIUM BLD-SCNC: 138 MMOL/L (ref 136–145)
VITAMIN B-12: 780 PG/ML (ref 211–946)

## 2017-03-28 PROCEDURE — 36415 COLL VENOUS BLD VENIPUNCTURE: CPT

## 2017-03-28 PROCEDURE — 80048 BASIC METABOLIC PNL TOTAL CA: CPT

## 2017-03-28 PROCEDURE — 96372 THER/PROPH/DIAG INJ SC/IM: CPT

## 2017-03-28 PROCEDURE — G8989 SELF CARE D/C STATUS: HCPCS

## 2017-03-28 PROCEDURE — 6360000002 HC RX W HCPCS: Performed by: NURSE PRACTITIONER

## 2017-03-28 PROCEDURE — G0378 HOSPITAL OBSERVATION PER HR: HCPCS

## 2017-03-28 PROCEDURE — 82607 VITAMIN B-12: CPT

## 2017-03-28 PROCEDURE — G8987 SELF CARE CURRENT STATUS: HCPCS

## 2017-03-28 PROCEDURE — 82948 REAGENT STRIP/BLOOD GLUCOSE: CPT

## 2017-03-28 PROCEDURE — G8988 SELF CARE GOAL STATUS: HCPCS

## 2017-03-28 PROCEDURE — 99217 PR OBSERVATION CARE DISCHARGE MANAGEMENT: CPT | Performed by: HOSPITALIST

## 2017-03-28 PROCEDURE — 97166 OT EVAL MOD COMPLEX 45 MIN: CPT

## 2017-03-28 PROCEDURE — 2580000003 HC RX 258: Performed by: NURSE PRACTITIONER

## 2017-03-28 PROCEDURE — 6370000000 HC RX 637 (ALT 250 FOR IP): Performed by: NURSE PRACTITIONER

## 2017-03-28 PROCEDURE — 99215 OFFICE O/P EST HI 40 MIN: CPT | Performed by: PSYCHIATRY & NEUROLOGY

## 2017-03-28 RX ORDER — MECLIZINE HYDROCHLORIDE 25 MG/1
25 TABLET ORAL 3 TIMES DAILY
Qty: 30 TABLET | Refills: 0 | Status: SHIPPED | OUTPATIENT
Start: 2017-03-28 | End: 2017-04-07

## 2017-03-28 RX ADMIN — MECLIZINE HYDROCHLORIDE 25 MG: 25 TABLET ORAL at 15:10

## 2017-03-28 RX ADMIN — BUTALBITAL, ACETAMINOPHEN, AND CAFFEINE 1 TABLET: 50; 325; 40 TABLET ORAL at 10:16

## 2017-03-28 RX ADMIN — INSULIN LISPRO 2 UNITS: 100 INJECTION, SOLUTION INTRAVENOUS; SUBCUTANEOUS at 13:00

## 2017-03-28 RX ADMIN — OXYCODONE HYDROCHLORIDE 20 MG: 10 TABLET, FILM COATED, EXTENDED RELEASE ORAL at 10:15

## 2017-03-28 RX ADMIN — ASPIRIN 325 MG ORAL TABLET 325 MG: 325 PILL ORAL at 10:16

## 2017-03-28 RX ADMIN — FAMOTIDINE 40 MG: 20 TABLET ORAL at 10:18

## 2017-03-28 RX ADMIN — CLOPIDOGREL BISULFATE 75 MG: 75 TABLET, FILM COATED ORAL at 10:16

## 2017-03-28 RX ADMIN — MECLIZINE HYDROCHLORIDE 25 MG: 25 TABLET ORAL at 10:17

## 2017-03-28 RX ADMIN — ENOXAPARIN SODIUM 40 MG: 40 INJECTION SUBCUTANEOUS at 10:14

## 2017-03-28 RX ADMIN — METOPROLOL TARTRATE 12.5 MG: 25 TABLET ORAL at 10:17

## 2017-03-28 RX ADMIN — Medication 10 ML: at 10:18

## 2017-03-28 RX ADMIN — TAMSULOSIN HYDROCHLORIDE 0.4 MG: 0.4 CAPSULE ORAL at 10:18

## 2017-03-28 RX ADMIN — FLUOXETINE 40 MG: 20 CAPSULE ORAL at 10:18

## 2017-03-28 RX ADMIN — GABAPENTIN 300 MG: 300 CAPSULE ORAL at 10:16

## 2017-03-28 RX ADMIN — GABAPENTIN 300 MG: 300 CAPSULE ORAL at 15:10

## 2017-03-28 ASSESSMENT — ENCOUNTER SYMPTOMS
EYES NEGATIVE: 1
GASTROINTESTINAL NEGATIVE: 1
ALLERGIC/IMMUNOLOGIC NEGATIVE: 1

## 2017-03-28 ASSESSMENT — PAIN SCALES - GENERAL
PAINLEVEL_OUTOF10: 2
PAINLEVEL_OUTOF10: 5

## 2017-07-20 RX ORDER — INSULIN GLARGINE 100 [IU]/ML
INJECTION, SOLUTION SUBCUTANEOUS
COMMUNITY
Start: 2017-06-08 | End: 2017-07-20 | Stop reason: SDUPTHER

## 2017-07-21 RX ORDER — INSULIN GLARGINE 100 [IU]/ML
80 INJECTION, SOLUTION SUBCUTANEOUS NIGHTLY
Qty: 5 PEN | Refills: 3 | Status: SHIPPED | OUTPATIENT
Start: 2017-07-21 | End: 2018-02-26 | Stop reason: SDUPTHER

## 2017-07-21 RX ORDER — ALPRAZOLAM 0.5 MG/1
0.5 TABLET ORAL NIGHTLY PRN
Qty: 30 TABLET | Refills: 2 | Status: SHIPPED | OUTPATIENT
Start: 2017-07-21 | End: 2017-07-21 | Stop reason: SDUPTHER

## 2017-07-21 RX ORDER — ALPRAZOLAM 0.5 MG/1
0.5 TABLET ORAL NIGHTLY PRN
Qty: 30 TABLET | Refills: 2 | Status: SHIPPED | OUTPATIENT
Start: 2017-07-21 | End: 2017-07-24 | Stop reason: SDUPTHER

## 2017-07-21 RX ORDER — HYDROCODONE BITARTRATE AND ACETAMINOPHEN 10; 325 MG/1; MG/1
1 TABLET ORAL EVERY 6 HOURS PRN
Qty: 90 TABLET | Refills: 0 | Status: SHIPPED | OUTPATIENT
Start: 2017-07-21 | End: 2017-07-21 | Stop reason: SDUPTHER

## 2017-07-21 RX ORDER — HYDROCODONE BITARTRATE AND ACETAMINOPHEN 10; 325 MG/1; MG/1
1 TABLET ORAL EVERY 6 HOURS PRN
Qty: 90 TABLET | Refills: 0 | Status: SHIPPED | OUTPATIENT
Start: 2017-07-21 | End: 2017-07-24 | Stop reason: SDUPTHER

## 2017-07-24 RX ORDER — HYDROCODONE BITARTRATE AND ACETAMINOPHEN 10; 325 MG/1; MG/1
1 TABLET ORAL EVERY 6 HOURS PRN
Qty: 90 TABLET | Refills: 0 | Status: SHIPPED | OUTPATIENT
Start: 2017-07-24 | End: 2017-09-11 | Stop reason: SDUPTHER

## 2017-07-24 RX ORDER — ALPRAZOLAM 0.5 MG/1
0.5 TABLET ORAL NIGHTLY PRN
Qty: 30 TABLET | Refills: 2 | Status: SHIPPED | OUTPATIENT
Start: 2017-07-24 | End: 2017-10-23

## 2017-08-13 ENCOUNTER — HOSPITAL ENCOUNTER (EMERGENCY)
Facility: HOSPITAL | Age: 72
Discharge: HOME | End: 2017-08-14
Attending: EMERGENCY MEDICINE | Admitting: EMERGENCY MEDICINE

## 2017-08-13 ENCOUNTER — APPOINTMENT (OUTPATIENT)
Dept: CT IMAGING | Facility: HOSPITAL | Age: 72
End: 2017-08-13

## 2017-08-13 DIAGNOSIS — IMO0002 WEAKNESS DUE TO CEREBROVASCULAR ACCIDENT: Primary | ICD-10-CM

## 2017-08-13 RX ORDER — TAMSULOSIN HYDROCHLORIDE 0.4 MG/1
1 CAPSULE ORAL 2 TIMES DAILY
COMMUNITY
End: 2022-12-16

## 2017-08-13 RX ORDER — MELOXICAM 15 MG/1
15 TABLET ORAL DAILY
COMMUNITY
End: 2017-08-17 | Stop reason: HOSPADM

## 2017-08-13 RX ORDER — FEXOFENADINE HCL 180 MG/1
180 TABLET ORAL DAILY
COMMUNITY
End: 2017-08-21 | Stop reason: HOSPADM

## 2017-08-13 RX ORDER — BUTALBITAL, ACETAMINOPHEN AND CAFFEINE 50; 325; 40 MG/1; MG/1; MG/1
1 TABLET ORAL 2 TIMES DAILY PRN
COMMUNITY
End: 2017-08-17 | Stop reason: HOSPADM

## 2017-08-13 RX ORDER — INSULIN GLARGINE 100 [IU]/ML
70 INJECTION, SOLUTION SUBCUTANEOUS NIGHTLY
COMMUNITY

## 2017-08-13 RX ORDER — GABAPENTIN 300 MG/1
300 CAPSULE ORAL 3 TIMES DAILY
COMMUNITY
End: 2020-01-27

## 2017-08-13 RX ORDER — DIPHENHYDRAMINE HCL 25 MG
25 CAPSULE ORAL EVERY 6 HOURS PRN
COMMUNITY
End: 2017-08-21 | Stop reason: HOSPADM

## 2017-08-13 RX ORDER — DULOXETIN HYDROCHLORIDE 60 MG/1
60 CAPSULE, DELAYED RELEASE ORAL DAILY
COMMUNITY
End: 2020-01-27

## 2017-08-13 RX ORDER — SODIUM CHLORIDE 0.9 % (FLUSH) 0.9 %
10 SYRINGE (ML) INJECTION AS NEEDED
Status: DISCONTINUED | OUTPATIENT
Start: 2017-08-13 | End: 2017-08-14 | Stop reason: HOSPADM

## 2017-08-13 RX ORDER — CLOPIDOGREL BISULFATE 75 MG/1
75 TABLET ORAL DAILY
COMMUNITY

## 2017-08-13 RX ORDER — FLUOXETINE HYDROCHLORIDE 20 MG/1
40 CAPSULE ORAL DAILY
COMMUNITY
End: 2017-08-17 | Stop reason: HOSPADM

## 2017-08-13 RX ORDER — ALPRAZOLAM 0.5 MG/1
0.5 TABLET ORAL NIGHTLY PRN
COMMUNITY
End: 2017-08-21 | Stop reason: HOSPADM

## 2017-08-13 RX ORDER — RANITIDINE 150 MG/1
300 TABLET ORAL NIGHTLY
COMMUNITY
End: 2020-09-16

## 2017-08-13 RX ORDER — ATORVASTATIN CALCIUM 40 MG/1
40 TABLET, FILM COATED ORAL DAILY
COMMUNITY
End: 2017-08-17 | Stop reason: HOSPADM

## 2017-08-13 RX ORDER — LEVETIRACETAM 500 MG/1
500 TABLET ORAL 2 TIMES DAILY
Status: ON HOLD | COMMUNITY
End: 2017-08-21

## 2017-08-13 RX ORDER — TRIAMCINOLONE ACETONIDE 55 UG/1
2 SPRAY, METERED NASAL DAILY
COMMUNITY
End: 2022-12-16

## 2017-08-14 ENCOUNTER — OFFICE VISIT (OUTPATIENT)
Dept: FAMILY MEDICINE CLINIC | Age: 72
End: 2017-08-14
Payer: MEDICARE

## 2017-08-14 ENCOUNTER — APPOINTMENT (OUTPATIENT)
Dept: MRI IMAGING | Facility: HOSPITAL | Age: 72
End: 2017-08-14

## 2017-08-14 ENCOUNTER — HOSPITAL ENCOUNTER (INPATIENT)
Facility: HOSPITAL | Age: 72
LOS: 3 days | Discharge: HOME | End: 2017-08-17
Attending: EMERGENCY MEDICINE | Admitting: FAMILY MEDICINE

## 2017-08-14 ENCOUNTER — APPOINTMENT (OUTPATIENT)
Dept: CT IMAGING | Facility: HOSPITAL | Age: 72
End: 2017-08-14

## 2017-08-14 ENCOUNTER — APPOINTMENT (OUTPATIENT)
Dept: GENERAL RADIOLOGY | Facility: HOSPITAL | Age: 72
End: 2017-08-14

## 2017-08-14 VITALS
OXYGEN SATURATION: 97 % | TEMPERATURE: 97 F | RESPIRATION RATE: 17 BRPM | WEIGHT: 260 LBS | SYSTOLIC BLOOD PRESSURE: 178 MMHG | BODY MASS INDEX: 32.33 KG/M2 | HEART RATE: 90 BPM | HEIGHT: 75 IN | DIASTOLIC BLOOD PRESSURE: 90 MMHG

## 2017-08-14 VITALS
HEART RATE: 54 BPM | OXYGEN SATURATION: 98 % | TEMPERATURE: 98.2 F | HEIGHT: 75 IN | SYSTOLIC BLOOD PRESSURE: 132 MMHG | WEIGHT: 260 LBS | BODY MASS INDEX: 32.33 KG/M2 | DIASTOLIC BLOOD PRESSURE: 74 MMHG

## 2017-08-14 DIAGNOSIS — Z78.9 DECREASED ACTIVITIES OF DAILY LIVING (ADL): ICD-10-CM

## 2017-08-14 DIAGNOSIS — I63.411 CEREBROVASCULAR ACCIDENT (CVA) DUE TO EMBOLISM OF RIGHT MIDDLE CEREBRAL ARTERY (HCC): ICD-10-CM

## 2017-08-14 DIAGNOSIS — I63.9 CEREBROVASCULAR ACCIDENT (CVA), UNSPECIFIED MECHANISM (HCC): Primary | ICD-10-CM

## 2017-08-14 DIAGNOSIS — R29.898 LEFT HAND WEAKNESS: ICD-10-CM

## 2017-08-14 DIAGNOSIS — R26.89 IMBALANCE: ICD-10-CM

## 2017-08-14 DIAGNOSIS — R42 DIZZINESS: ICD-10-CM

## 2017-08-14 DIAGNOSIS — R53.83 FATIGUE, UNSPECIFIED TYPE: ICD-10-CM

## 2017-08-14 DIAGNOSIS — R26.89 BALANCE PROBLEM: ICD-10-CM

## 2017-08-14 DIAGNOSIS — R00.2 PALPITATIONS: Primary | ICD-10-CM

## 2017-08-14 LAB
ABO GROUP BLD: NORMAL
ALBUMIN SERPL-MCNC: 3.9 G/DL (ref 3.5–5)
ALBUMIN SERPL-MCNC: 4.2 G/DL (ref 3.5–5)
ALBUMIN/GLOB SERPL: 1.4 G/DL (ref 1.1–2.5)
ALBUMIN/GLOB SERPL: 1.4 G/DL (ref 1.1–2.5)
ALP SERPL-CCNC: 88 U/L (ref 24–120)
ALP SERPL-CCNC: 92 U/L (ref 24–120)
ALT SERPL W P-5'-P-CCNC: 34 U/L (ref 0–54)
ALT SERPL W P-5'-P-CCNC: 40 U/L (ref 0–54)
ANION GAP SERPL CALCULATED.3IONS-SCNC: 10 MMOL/L (ref 4–13)
ANION GAP SERPL CALCULATED.3IONS-SCNC: 12 MMOL/L (ref 4–13)
APTT PPP: 27.5 SECONDS (ref 24.1–34.8)
APTT PPP: 28.6 SECONDS (ref 24.1–34.8)
AST SERPL-CCNC: 23 U/L (ref 7–45)
AST SERPL-CCNC: 24 U/L (ref 7–45)
BASOPHILS # BLD AUTO: 0.03 10*3/MM3 (ref 0–0.2)
BASOPHILS # BLD AUTO: 0.04 10*3/MM3 (ref 0–0.2)
BASOPHILS NFR BLD AUTO: 0.5 % (ref 0–2)
BASOPHILS NFR BLD AUTO: 0.7 % (ref 0–2)
BILIRUB SERPL-MCNC: 0.4 MG/DL (ref 0.1–1)
BILIRUB SERPL-MCNC: 0.6 MG/DL (ref 0.1–1)
BLD GP AB SCN SERPL QL: NEGATIVE
BUN BLD-MCNC: 18 MG/DL (ref 5–21)
BUN BLD-MCNC: 19 MG/DL (ref 5–21)
BUN/CREAT SERPL: 16.7 (ref 7–25)
BUN/CREAT SERPL: 17.5 (ref 7–25)
CALCIUM SPEC-SCNC: 9 MG/DL (ref 8.4–10.4)
CALCIUM SPEC-SCNC: 9.2 MG/DL (ref 8.4–10.4)
CHLORIDE SERPL-SCNC: 102 MMOL/L (ref 98–110)
CHLORIDE SERPL-SCNC: 103 MMOL/L (ref 98–110)
CK MB SERPL-CCNC: 4.88 NG/ML (ref 0–5)
CO2 SERPL-SCNC: 24 MMOL/L (ref 24–31)
CO2 SERPL-SCNC: 25 MMOL/L (ref 24–31)
CREAT BLD-MCNC: 1.03 MG/DL (ref 0.5–1.4)
CREAT BLD-MCNC: 1.14 MG/DL (ref 0.5–1.4)
DEPRECATED RDW RBC AUTO: 43.9 FL (ref 40–54)
DEPRECATED RDW RBC AUTO: 44.5 FL (ref 40–54)
EOSINOPHIL # BLD AUTO: 0.19 10*3/MM3 (ref 0–0.7)
EOSINOPHIL # BLD AUTO: 0.32 10*3/MM3 (ref 0–0.7)
EOSINOPHIL NFR BLD AUTO: 3.2 % (ref 0–4)
EOSINOPHIL NFR BLD AUTO: 4.9 % (ref 0–4)
ERYTHROCYTE [DISTWIDTH] IN BLOOD BY AUTOMATED COUNT: 14.4 % (ref 12–15)
ERYTHROCYTE [DISTWIDTH] IN BLOOD BY AUTOMATED COUNT: 14.7 % (ref 12–15)
GFR SERPL CREATININE-BSD FRML MDRD: 63 ML/MIN/1.73
GFR SERPL CREATININE-BSD FRML MDRD: 71 ML/MIN/1.73
GLOBULIN UR ELPH-MCNC: 2.8 GM/DL
GLOBULIN UR ELPH-MCNC: 3 GM/DL
GLUCOSE BLD-MCNC: 211 MG/DL (ref 70–100)
GLUCOSE BLD-MCNC: 297 MG/DL (ref 70–100)
GLUCOSE BLDC GLUCOMTR-MCNC: 186 MG/DL (ref 70–130)
GLUCOSE BLDC GLUCOMTR-MCNC: 205 MG/DL (ref 70–130)
HCT VFR BLD AUTO: 42.4 % (ref 40–52)
HCT VFR BLD AUTO: 45.1 % (ref 40–52)
HGB BLD-MCNC: 14.2 G/DL (ref 14–18)
HGB BLD-MCNC: 14.9 G/DL (ref 14–18)
HOLD SPECIMEN: NORMAL
IMM GRANULOCYTES # BLD: 0.01 10*3/MM3 (ref 0–0.03)
IMM GRANULOCYTES # BLD: 0.01 10*3/MM3 (ref 0–0.03)
IMM GRANULOCYTES NFR BLD: 0.2 % (ref 0–5)
IMM GRANULOCYTES NFR BLD: 0.2 % (ref 0–5)
INR PPP: 0.89 (ref 0.91–1.09)
INR PPP: 0.97 (ref 0.91–1.09)
LYMPHOCYTES # BLD AUTO: 1.75 10*3/MM3 (ref 0.72–4.86)
LYMPHOCYTES # BLD AUTO: 2.26 10*3/MM3 (ref 0.72–4.86)
LYMPHOCYTES NFR BLD AUTO: 29.8 % (ref 15–45)
LYMPHOCYTES NFR BLD AUTO: 34.3 % (ref 15–45)
MCH RBC QN AUTO: 27.5 PG (ref 28–32)
MCH RBC QN AUTO: 27.8 PG (ref 28–32)
MCHC RBC AUTO-ENTMCNC: 33 G/DL (ref 33–36)
MCHC RBC AUTO-ENTMCNC: 33.5 G/DL (ref 33–36)
MCV RBC AUTO: 83 FL (ref 82–95)
MCV RBC AUTO: 83.4 FL (ref 82–95)
MONOCYTES # BLD AUTO: 0.51 10*3/MM3 (ref 0.19–1.3)
MONOCYTES # BLD AUTO: 0.61 10*3/MM3 (ref 0.19–1.3)
MONOCYTES NFR BLD AUTO: 8.7 % (ref 4–12)
MONOCYTES NFR BLD AUTO: 9.3 % (ref 4–12)
NEUTROPHILS # BLD AUTO: 3.36 10*3/MM3 (ref 1.87–8.4)
NEUTROPHILS # BLD AUTO: 3.37 10*3/MM3 (ref 1.87–8.4)
NEUTROPHILS NFR BLD AUTO: 50.8 % (ref 39–78)
NEUTROPHILS NFR BLD AUTO: 57.4 % (ref 39–78)
NRBC BLD MANUAL-RTO: 0 /100 WBC (ref 0–0)
PLATELET # BLD AUTO: 154 10*3/MM3 (ref 130–400)
PLATELET # BLD AUTO: 173 10*3/MM3 (ref 130–400)
PMV BLD AUTO: 10.7 FL (ref 6–12)
PMV BLD AUTO: 11.2 FL (ref 6–12)
POTASSIUM BLD-SCNC: 4.2 MMOL/L (ref 3.5–5.3)
POTASSIUM BLD-SCNC: 4.6 MMOL/L (ref 3.5–5.3)
PROT SERPL-MCNC: 6.7 G/DL (ref 6.3–8.7)
PROT SERPL-MCNC: 7.2 G/DL (ref 6.3–8.7)
PROTHROMBIN TIME: 12.3 SECONDS (ref 11.9–14.6)
PROTHROMBIN TIME: 13.2 SECONDS (ref 11.9–14.6)
RBC # BLD AUTO: 5.11 10*6/MM3 (ref 4.8–5.9)
RBC # BLD AUTO: 5.41 10*6/MM3 (ref 4.8–5.9)
RH BLD: POSITIVE
SODIUM BLD-SCNC: 137 MMOL/L (ref 135–145)
SODIUM BLD-SCNC: 139 MMOL/L (ref 135–145)
TROPONIN I SERPL-MCNC: 0.04 NG/ML (ref 0–0.03)
WBC NRBC COR # BLD: 5.87 10*3/MM3 (ref 4.8–10.8)
WBC NRBC COR # BLD: 6.59 10*3/MM3 (ref 4.8–10.8)
WHOLE BLOOD HOLD SPECIMEN: NORMAL

## 2017-08-14 PROCEDURE — 70498 CT ANGIOGRAPHY NECK: CPT

## 2017-08-14 PROCEDURE — 99215 OFFICE O/P EST HI 40 MIN: CPT | Performed by: FAMILY MEDICINE

## 2017-08-14 PROCEDURE — 93005 ELECTROCARDIOGRAM TRACING: CPT | Performed by: EMERGENCY MEDICINE

## 2017-08-14 PROCEDURE — 93010 ELECTROCARDIOGRAM REPORT: CPT | Performed by: INTERNAL MEDICINE

## 2017-08-14 PROCEDURE — 4040F PNEUMOC VAC/ADMIN/RCVD: CPT | Performed by: FAMILY MEDICINE

## 2017-08-14 PROCEDURE — 82962 GLUCOSE BLOOD TEST: CPT

## 2017-08-14 PROCEDURE — 3017F COLORECTAL CA SCREEN DOC REV: CPT | Performed by: FAMILY MEDICINE

## 2017-08-14 PROCEDURE — G8427 DOCREV CUR MEDS BY ELIG CLIN: HCPCS | Performed by: FAMILY MEDICINE

## 2017-08-14 PROCEDURE — 85730 THROMBOPLASTIN TIME PARTIAL: CPT | Performed by: EMERGENCY MEDICINE

## 2017-08-14 PROCEDURE — 0 IOPAMIDOL PER 1 ML: Performed by: EMERGENCY MEDICINE

## 2017-08-14 PROCEDURE — 80053 COMPREHEN METABOLIC PANEL: CPT | Performed by: EMERGENCY MEDICINE

## 2017-08-14 PROCEDURE — 85025 COMPLETE CBC W/AUTO DIFF WBC: CPT | Performed by: EMERGENCY MEDICINE

## 2017-08-14 PROCEDURE — 1036F TOBACCO NON-USER: CPT | Performed by: FAMILY MEDICINE

## 2017-08-14 PROCEDURE — 99284 EMERGENCY DEPT VISIT MOD MDM: CPT

## 2017-08-14 PROCEDURE — 70551 MRI BRAIN STEM W/O DYE: CPT

## 2017-08-14 PROCEDURE — G8417 CALC BMI ABV UP PARAM F/U: HCPCS | Performed by: FAMILY MEDICINE

## 2017-08-14 PROCEDURE — 85610 PROTHROMBIN TIME: CPT | Performed by: EMERGENCY MEDICINE

## 2017-08-14 PROCEDURE — 70496 CT ANGIOGRAPHY HEAD: CPT

## 2017-08-14 PROCEDURE — 1123F ACP DISCUSS/DSCN MKR DOCD: CPT | Performed by: FAMILY MEDICINE

## 2017-08-14 PROCEDURE — G8598 ASA/ANTIPLAT THER USED: HCPCS | Performed by: FAMILY MEDICINE

## 2017-08-14 RX ORDER — SODIUM CHLORIDE 0.9 % (FLUSH) 0.9 %
10 SYRINGE (ML) INJECTION AS NEEDED
Status: DISCONTINUED | OUTPATIENT
Start: 2017-08-14 | End: 2017-08-17 | Stop reason: HOSPADM

## 2017-08-14 RX ORDER — ASPIRIN 325 MG
325 TABLET ORAL ONCE
Status: COMPLETED | OUTPATIENT
Start: 2017-08-14 | End: 2017-08-14

## 2017-08-14 RX ADMIN — ASPIRIN 325 MG: 325 TABLET, COATED ORAL at 22:33

## 2017-08-14 RX ADMIN — IOPAMIDOL 122 ML: 755 INJECTION, SOLUTION INTRAVENOUS at 21:54

## 2017-08-14 NOTE — ED PROVIDER NOTES
Subjective   HPI Comments: The patient is brought to the return tremor by the family with a complaint of left-sided weakness.  They said he was in [place] on Friday and had a stroke and was given TPA.  He is In the hospital for couple days then but they do not report any other testing.  They let him go home and since he is been home he is been weak on his left side.  He has weak legs and cannot walk.  He has areas where he cannot use his left hand they have noticed left facial droop.  This gets worse and better at times.  He was seen here last night and had a negative CT scan of his head at that time but is had another episode today and has seen his family physician and then sent back here.  They are very concerned something else still going on even though none of these studies have shown a bleed.    Patient is a 72 y.o. male presenting with neurologic complaint.   History provided by:  Patient, relative and spouse   used: No    Neurologic Problem   The patient's primary symptoms include focal weakness. This is a recurrent problem. The current episode started today. The neurological problem developed suddenly. The last time the patient was known to be well was 8/14/2017 3:00 PM.  The problem is unchanged. There was left-sided focality noted. Pertinent negatives include no abdominal pain, auditory change, aura, back pain, bladder incontinence, bowel incontinence, chest pain, confusion, diaphoresis, dizziness, fatigue, fever, headaches, light-headedness, nausea, neck pain, palpitations, shortness of breath, vertigo or vomiting. Past treatments include nothing. His past medical history is significant for a CVA. There is no history of a bleeding disorder, a clotting disorder, dementia, head trauma, liver disease, mood changes or seizures.       Review of Systems   Constitutional: Negative.  Negative for diaphoresis, fatigue and fever.   HENT: Negative.    Respiratory: Negative.  Negative for  shortness of breath.    Cardiovascular: Negative.  Negative for chest pain and palpitations.   Gastrointestinal: Negative.  Negative for abdominal pain, bowel incontinence, nausea and vomiting.   Genitourinary: Negative.  Negative for bladder incontinence.   Musculoskeletal: Negative.  Negative for back pain and neck pain.   Skin: Negative.    Neurological: Positive for focal weakness. Negative for dizziness, vertigo, light-headedness and headaches.   Hematological: Negative.    Psychiatric/Behavioral: Negative.  Negative for confusion.   All other systems reviewed and are negative.      Past Medical History:   Diagnosis Date   • Arthritis    • Depression    • Diabetes mellitus    • Hyperlipidemia    • Hypertension    • Myocardial infarction    • Stroke        No Known Allergies    Past Surgical History:   Procedure Laterality Date   • APPENDECTOMY     • BACK SURGERY     • CHOLECYSTECTOMY     • CORONARY STENT PLACEMENT      X8   • REPLACEMENT TOTAL KNEE         History reviewed. No pertinent family history.    Social History     Social History   • Marital status:      Spouse name: N/A   • Number of children: N/A   • Years of education: N/A     Social History Main Topics   • Smoking status: Never Smoker   • Smokeless tobacco: None   • Alcohol use Yes      Comment: OCCASIONALLY   • Drug use: No   • Sexual activity: Not Asked     Other Topics Concern   • None     Social History Narrative       Prior to Admission medications    Medication Sig Start Date End Date Taking? Authorizing Provider   ALPRAZolam (XANAX) 0.5 MG tablet Take 0.5 mg by mouth At Night As Needed for Anxiety.    Historical Provider, MD   atorvastatin (LIPITOR) 40 MG tablet Take 40 mg by mouth Daily.    Historical Provider, MD   butalbital-acetaminophen-caffeine (FIORICET, ESGIC) -40 MG per tablet Take 1 tablet by mouth 2 (Two) Times a Day As Needed for Headache.    Historical Provider, MD   clopidogrel (PLAVIX) 75 MG tablet Take 75 mg by  mouth Daily.    Historical Provider, MD   diphenhydrAMINE (BENADRYL) 25 mg capsule Take 25 mg by mouth Every 6 (Six) Hours As Needed for Itching.    Historical Provider, MD   DULoxetine (CYMBALTA) 60 MG capsule Take 60 mg by mouth Daily.    Historical Provider, MD   fexofenadine (ALLEGRA) 180 MG tablet Take 180 mg by mouth Daily.    Historical Provider, MD   FLUoxetine (PROzac) 20 MG capsule Take 40 mg by mouth Daily.    Historical Provider, MD   gabapentin (NEURONTIN) 300 MG capsule Take 300 mg by mouth 3 (Three) Times a Day.    Historical Provider, MD   insulin glargine (LANTUS) 100 UNIT/ML injection Inject 80 Units under the skin Every Night.    Historical Provider, MD   levETIRAcetam (KEPPRA) 500 MG tablet Take 500 mg by mouth 2 (Two) Times a Day.    Historical Provider, MD   meloxicam (MOBIC) 15 MG tablet Take 15 mg by mouth Daily.    Historical Provider, MD   metoprolol tartrate (LOPRESSOR) 25 MG tablet Take 25 mg by mouth 2 (Two) Times a Day.    Historical Provider, MD   raNITIdine (ZANTAC) 150 MG tablet Take 300 mg by mouth Every Night.    Historical Provider, MD   tamsulosin (FLOMAX) 0.4 MG capsule 24 hr capsule Take 1 capsule by mouth Every Night.    Historical Provider, MD   Triamcinolone Acetonide (NASACORT) 55 MCG/ACT nasal inhaler 2 sprays into each nostril Daily.    Historical Provider, MD       Medications   sodium chloride 0.9 % flush 10 mL (not administered)   aspirin tablet 325 mg (not administered)   iopamidol (ISOVUE-370) 76 % injection 150 mL (122 mL Intravenous Given 8/14/17 2154)       Vitals:    08/14/17 2217   BP: 115/50   Pulse: 58   Resp:    Temp:    SpO2: 93%         Objective   Physical Exam   Constitutional: He appears well-developed and well-nourished.   HENT:   Head: Normocephalic and atraumatic.   Mouth/Throat: Oropharynx is clear and moist.   Eyes: EOM are normal. Pupils are equal, round, and reactive to light.   Neck: Normal range of motion. Neck supple.   Cardiovascular: Normal  rate.    Pulmonary/Chest: Effort normal and breath sounds normal.   Abdominal: Soft. Bowel sounds are normal.   Musculoskeletal: Normal range of motion.   Neurological:   Patient is lethargic and does not answer questions but does seem to move his extremities as requested.  He has weakness on his left  as compared to the right and weakness along the left side of his face as compared to his right but not a huge difference.   Skin: Skin is warm and dry.   Psychiatric: He has a normal mood and affect. His behavior is normal.   The patient is somnolent.   Nursing note and vitals reviewed.      Critical Care  Performed by: OLGA MEJIAS JR  Authorized by: OLGA MEJIAS JR   Total critical care time: 30 minutes  Critical care time was exclusive of separately billable procedures and treating other patients.  Critical care was necessary to treat or prevent imminent or life-threatening deterioration of the following conditions: CNS failure or compromise.  Critical care was time spent personally by me on the following activities: blood draw for specimens, discussions with consultants, discussions with primary provider, interpretation of cardiac output measurements, evaluation of patient's response to treatment, examination of patient, obtaining history from patient or surrogate, ordering and performing treatments and interventions, ordering and review of laboratory studies, ordering and review of radiographic studies, pulse oximetry, re-evaluation of patient's condition and review of old charts.               Lab Results (last 24 hours)     Procedure Component Value Units Date/Time    CBC & Differential [[id number]] Collected:  08/13/17 2338    Specimen:  Blood Updated:  08/14/17 0006    Narrative:       The following orders were created for panel order CBC & Differential.  Procedure                               Abnormality         Status                     ---------                               -----------          ------                     CBC Auto Differential[[id number]]        Abnormal            Final result                 Please view results for these tests on the individual orders.    Comprehensive Metabolic Panel [[id number]]  (Abnormal) Collected:  08/13/17 2338    Specimen:  Blood Updated:  08/14/17 0009     Glucose 297 (H) mg/dL      BUN 19 mg/dL      Creatinine 1.14 mg/dL      Sodium 139 mmol/L      Potassium 4.2 mmol/L      Chloride 103 mmol/L      CO2 24.0 mmol/L      Calcium 9.0 mg/dL      Total Protein 6.7 g/dL      Albumin 3.90 g/dL      ALT (SGPT) 34 U/L      AST (SGOT) 24 U/L      Alkaline Phosphatase 88 U/L      Total Bilirubin 0.4 mg/dL      eGFR Non African Amer 63 mL/min/1.73      Globulin 2.8 gm/dL      A/G Ratio 1.4 g/dL      BUN/Creatinine Ratio 16.7     Anion Gap 12.0 mmol/L     Narrative:       The MDRD GFR formula is only valid for adults with stable renal function between ages 18 and 70.    Protime-INR [[id number]]  (Abnormal) Collected:  08/13/17 2338    Specimen:  Blood Updated:  08/14/17 0012     Protime 12.3 Seconds      INR 0.89 (L)    aPTT [[id number]]  (Normal) Collected:  08/13/17 2338    Specimen:  Blood Updated:  08/14/17 0012     PTT 28.6 seconds     Troponin [[id number]]  (Abnormal) Collected:  08/13/17 2338    Specimen:  Blood Updated:  08/14/17 0020     Troponin I 0.037 (H) ng/mL     CBC Auto Differential [[id number]]  (Abnormal) Collected:  08/13/17 2338    Specimen:  Blood Updated:  08/14/17 0006     WBC 5.87 10*3/mm3      RBC 5.11 10*6/mm3      Hemoglobin 14.2 g/dL      Hematocrit 42.4 %      MCV 83.0 fL      MCH 27.8 (L) pg      MCHC 33.5 g/dL      RDW 14.4 %      RDW-SD 43.9 fl      MPV 10.7 fL      Platelets 154 10*3/mm3      Neutrophil % 57.4 %      Lymphocyte % 29.8 %      Monocyte % 8.7 %      Eosinophil % 3.2 %      Basophil % 0.7 %      Immature Grans % 0.2 %      Neutrophils, Absolute 3.37 10*3/mm3      Lymphocytes, Absolute 1.75 10*3/mm3      Monocytes, Absolute 0.51  10*3/mm3      Eosinophils, Absolute 0.19 10*3/mm3      Basophils, Absolute 0.04 10*3/mm3      Immature Grans, Absolute 0.01 10*3/mm3      nRBC 0.0 /100 WBC     CK-MB [[id number]]  (Normal) Collected:  08/13/17 2338    Specimen:  Blood Updated:  08/14/17 0022     CKMB 4.88 ng/mL     Narrative:       CKMB Index not indicated    POC Glucose Fingerstick [[id number]]  (Abnormal) Collected:  08/14/17 1740    Specimen:  Blood Updated:  08/14/17 1755     Glucose 186 (H) mg/dL       : [id number] [employer] Crystal LMeter ID: [id number]       POC Glucose Fingerstick [[id number]]  (Abnormal) Collected:  08/14/17 1743    Specimen:  Blood Updated:  08/14/17 1755     Glucose 205 (H) mg/dL       : [id number] [employer] Crystal LMeter ID: [id number]       Protime-INR [[id number]]  (Normal) Collected:  08/14/17 1746    Specimen:  Blood Updated:  08/14/17 1816     Protime 13.2 Seconds      INR 0.97    aPTT [[id number]]  (Normal) Collected:  08/14/17 1746    Specimen:  Blood Updated:  08/14/17 1816     PTT 27.5 seconds     CBC & Differential [[id number]] Collected:  08/14/17 1746    Specimen:  Blood Updated:  08/14/17 1817    Narrative:       The following orders were created for panel order CBC & Differential.  Procedure                               Abnormality         Status                     ---------                               -----------         ------                     CBC Auto Differential[[id number]]        Abnormal            Final result                 Please view results for these tests on the individual orders.    Comprehensive Metabolic Panel [[id number]]  (Abnormal) Collected:  08/14/17 1746    Specimen:  Blood Updated:  08/14/17 1836     Glucose 211 (H) mg/dL      BUN 18 mg/dL      Creatinine 1.03 mg/dL      Sodium 137 mmol/L      Potassium 4.6 mmol/L      Chloride 102 mmol/L      CO2 25.0 mmol/L      Calcium 9.2 mg/dL      Total Protein 7.2 g/dL      Albumin 4.20 g/dL      ALT (SGPT) 40 U/L      AST (SGOT) 23 U/L       Alkaline Phosphatase 92 U/L      Total Bilirubin 0.6 mg/dL      eGFR Non African Amer 71 mL/min/1.73      Globulin 3.0 gm/dL      A/G Ratio 1.4 g/dL      BUN/Creatinine Ratio 17.5     Anion Gap 10.0 mmol/L     Narrative:       The MDRD GFR formula is only valid for adults with stable renal function between ages 18 and 70.    CBC Auto Differential [[id number]]  (Abnormal) Collected:  08/14/17 1746    Specimen:  Blood Updated:  08/14/17 1817     WBC 6.59 10*3/mm3      RBC 5.41 10*6/mm3      Hemoglobin 14.9 g/dL      Hematocrit 45.1 %      MCV 83.4 fL      MCH 27.5 (L) pg      MCHC 33.0 g/dL      RDW 14.7 %      RDW-SD 44.5 fl      MPV 11.2 fL      Platelets 173 10*3/mm3      Neutrophil % 50.8 %      Lymphocyte % 34.3 %      Monocyte % 9.3 %      Eosinophil % 4.9 (H) %      Basophil % 0.5 %      Immature Grans % 0.2 %      Neutrophils, Absolute 3.36 10*3/mm3      Lymphocytes, Absolute 2.26 10*3/mm3      Monocytes, Absolute 0.61 10*3/mm3      Eosinophils, Absolute 0.32 10*3/mm3      Basophils, Absolute 0.03 10*3/mm3      Immature Grans, Absolute 0.01 10*3/mm3           CT Angiogram Head With & Without Contrast   Final Result   Impression:    1. Negative CT angiogram of the level of the [place].            This report was finalized on 08/14/2017 22:17 by Dr. Aman Mcknight MD.      CT Angiogram Neck With & Without Contrast   Final Result   Impression:    1. 50% or less stenosis associated the right internal carotid artery.   2. 50% or less stenosis associated left internal carotid artery.   3. Flow is demonstrated in both vertebrals and they form the basilar   artery.       This report was finalized on 08/14/2017 22:12 by Dr. Aman Mcknight MD.      MRI Brain Without Contrast   Final Result   1. Ischemic infarction right side of the sergio       These results were called to Dr. Da Silva at approximately 7:50 PM    This report was finalized on 08/14/2017 19:50 by Dr. Aman Mcknight MD.          ED Course  ED  Course   Comment By Time   Patient is much more alert at the present time him much more talkative and appropriately responsive and moves all extremities.  His MRI did show a new acute stroke.  I spoke with Dr. Weiner and with Dr. Mckeon.  He is not a TPA candidate because of recent TPA and fluctuating symptoms.  I spoke with Dr. Calvo and we will put the patient the hospital for further care and stabilization.  We will add an aspirin tonight but the family tells me he takes aspirin every so often.  His CTAs do not show any occlusion of arteries that need acute intervention elsewhere that we did talk about that with the family.  I am a family affair aware all these findings.  He is admitted in stable condition at the present time. Per Da Silva Jr., MD 08/14 2230          MDM  Number of Diagnoses or Management Options  Cerebrovascular accident (CVA), unspecified mechanism: new and requires workup     Amount and/or Complexity of Data Reviewed  Clinical lab tests: ordered and reviewed  Tests in the radiology section of CPT®: ordered and reviewed  Tests in the medicine section of CPT®: ordered and reviewed  Decide to obtain previous medical records or to obtain history from someone other than the patient: yes  Review and summarize past medical records: yes  Discuss the patient with other providers: yes    Risk of Complications, Morbidity, and/or Mortality  Presenting problems: high  Diagnostic procedures: high  Management options: high    Critical Care  Total time providing critical care: 30-74 minutes    Patient Progress  Patient progress: stable      Final diagnoses:   Cerebrovascular accident (CVA), unspecified mechanism          Per Da Silva Jr., MD  08/14/17 4133

## 2017-08-14 NOTE — ED PROVIDER NOTES
Subjective   HPI Comments: For concern of the worsening weakness and left facial droop.  2 days ago patient had a CVA at an outside facility.  They were driving back from vacation when the wife noticed that patient was not acting appropriately and home unable to move his left side.  Patient was given TPA and admitted for further evaluation.  Patient improved while in the outside facility where he got to the point that he could shower on his own while standing.  He then traveled home today and around up approximately 2100 the family felt that patient had worsening facial droop.  He was not able to walk and needed assistance which led family to believe patient had another CVA.      History provided by:  Patient, relative and spouse      Review of Systems   Constitutional: Negative for activity change, appetite change, chills, diaphoresis, fatigue and fever.   HENT: Negative for congestion, ear pain, nosebleeds, postnasal drip and sinus pressure.    Eyes: Negative for photophobia and pain.   Respiratory: Negative for cough, chest tightness, shortness of breath and wheezing.    Cardiovascular: Negative for chest pain.   Gastrointestinal: Negative for abdominal pain, blood in stool, diarrhea, nausea and vomiting.   Endocrine: Negative for cold intolerance and heat intolerance.   Genitourinary: Negative for difficulty urinating, dysuria and flank pain.   Musculoskeletal: Negative for arthralgias, back pain, neck pain and neck stiffness.   Skin: Negative for color change and rash.   Neurological: Positive for facial asymmetry, weakness, numbness and headaches. Negative for dizziness, tremors, seizures, syncope, speech difficulty and light-headedness.   Hematological: Negative for adenopathy. Does not bruise/bleed easily.   Psychiatric/Behavioral: Negative for confusion and sleep disturbance. The patient is not nervous/anxious.        Past Medical History:   Diagnosis Date   • Arthritis    • Depression    • Diabetes  mellitus    • Hyperlipidemia    • Hypertension    • Myocardial infarction    • Stroke        No Known Allergies    Past Surgical History:   Procedure Laterality Date   • APPENDECTOMY     • BACK SURGERY     • CHOLECYSTECTOMY     • CORONARY STENT PLACEMENT      X8   • REPLACEMENT TOTAL KNEE         History reviewed. No pertinent family history.    Social History     Social History   • Marital status:      Spouse name: N/A   • Number of children: N/A   • Years of education: N/A     Social History Main Topics   • Smoking status: Never Smoker   • Smokeless tobacco: None   • Alcohol use Yes      Comment: OCCASIONALLY   • Drug use: No   • Sexual activity: Not Asked     Other Topics Concern   • None     Social History Narrative   • None           Objective   Physical Exam   Constitutional: He is oriented to person, place, and time. He appears well-developed and well-nourished. No distress.   HENT:   Head: Normocephalic and atraumatic.   Mouth/Throat: Oropharynx is clear and moist. No oropharyngeal exudate.   Eyes: Conjunctivae and EOM are normal. Pupils are equal, round, and reactive to light.   Neck: Normal range of motion. Neck supple. No JVD present.   Cardiovascular: Normal rate, regular rhythm and normal heart sounds.  Exam reveals no friction rub.    No murmur heard.  Pulmonary/Chest: Effort normal and breath sounds normal. He has no wheezes. He has no rales.   Abdominal: Soft. Bowel sounds are normal. He exhibits no distension. There is no tenderness. There is no rebound and no guarding.   Musculoskeletal: Normal range of motion. He exhibits no edema or tenderness.   Neurological: He is alert and oriented to person, place, and time. He is not disoriented. A cranial nerve deficit is present. No sensory deficit. He exhibits abnormal muscle tone. Coordination normal. GCS eye subscore is 4. GCS verbal subscore is 5. GCS motor subscore is 6.   Patient has obvious left facial droop.  Extraocular movement is intact.   Pupils are equal round and reactive.  Patient has good nose to finger coordination.  He is able to follow commands easily.  He speaks to me with little to no speech deficit or slurred.  Patient is able to hold both hands out right with palms up and no drift to the left side.  He is also able to hold the left leg up with no drift.  Patient has good flexion and extension at the ankle.  DTRs are intact.  Patient has weakness with flexion of the left arm and some weakness with the extension of that arm, but I am unsure as to what he looked like this morning and if this weakness is excessively new.  Based on exam patient does not appear to had a repeat CVA.  Overall he has some residual neurologic damage from the initial CVA, but there does not appear to be a significant change.   Skin: Skin is warm and dry. No rash noted.   Psychiatric: He has a normal mood and affect. His behavior is normal. Judgment and thought content normal.   Nursing note and vitals reviewed.      Procedures         ED Course  ED Course   Value Comment By Time   ECG 12 Lead Normal sinus rhythm with a rate of 92, normal axis, no acute ST elevations or depressions. Sarabjit Egan MD 08/14 0031   XR Chest 1 View Normal cardiac silhouette, clear lung fields bilaterally, no pneumonia or pneumothorax, no acute cardiopulmonary process. Sarabjit Egan MD 08/14 0031    CT of the head when compared with the MRI of his brain on 12/22/2015 does not show any acute changes.  There is no hemorrhage.   There is some age-related atrophy and chronic white matter ischemic changes with compensatory ventricular dilation. Sarabjit Egan MD 08/14 0037   Troponin I: (!) 0.037 (Reviewed) Sarabjit Egan MD 08/14 0138    Troponin level was 0.08 on his evaluation 2 days ago. Sarabjit Egan MD 08/14 0138      Lab Results (last 24 hours)     Procedure Component Value Units Date/Time    CBC & Differential [[id number]] Collected:  08/13/17 2989    Specimen:  Blood  Updated:  08/14/17 0006    Narrative:       The following orders were created for panel order CBC & Differential.  Procedure                               Abnormality         Status                     ---------                               -----------         ------                     CBC Auto Differential[[id number]]        Abnormal            Final result                 Please view results for these tests on the individual orders.    Comprehensive Metabolic Panel [[id number]]  (Abnormal) Collected:  08/13/17 2338    Specimen:  Blood Updated:  08/14/17 0009     Glucose 297 (H) mg/dL      BUN 19 mg/dL      Creatinine 1.14 mg/dL      Sodium 139 mmol/L      Potassium 4.2 mmol/L      Chloride 103 mmol/L      CO2 24.0 mmol/L      Calcium 9.0 mg/dL      Total Protein 6.7 g/dL      Albumin 3.90 g/dL      ALT (SGPT) 34 U/L      AST (SGOT) 24 U/L      Alkaline Phosphatase 88 U/L      Total Bilirubin 0.4 mg/dL      eGFR Non African Amer 63 mL/min/1.73      Globulin 2.8 gm/dL      A/G Ratio 1.4 g/dL      BUN/Creatinine Ratio 16.7     Anion Gap 12.0 mmol/L     Narrative:       The MDRD GFR formula is only valid for adults with stable renal function between ages 18 and 70.    Protime-INR [[id number]]  (Abnormal) Collected:  08/13/17 2338    Specimen:  Blood Updated:  08/14/17 0012     Protime 12.3 Seconds      INR 0.89 (L)    aPTT [[id number]]  (Normal) Collected:  08/13/17 2338    Specimen:  Blood Updated:  08/14/17 0012     PTT 28.6 seconds     Troponin [[id number]]  (Abnormal) Collected:  08/13/17 2338    Specimen:  Blood Updated:  08/14/17 0020     Troponin I 0.037 (H) ng/mL     CBC Auto Differential [[id number]]  (Abnormal) Collected:  08/13/17 2338    Specimen:  Blood Updated:  08/14/17 0006     WBC 5.87 10*3/mm3      RBC 5.11 10*6/mm3      Hemoglobin 14.2 g/dL      Hematocrit 42.4 %      MCV 83.0 fL      MCH 27.8 (L) pg      MCHC 33.5 g/dL      RDW 14.4 %      RDW-SD 43.9 fl      MPV 10.7 fL      Platelets 154 10*3/mm3       Neutrophil % 57.4 %      Lymphocyte % 29.8 %      Monocyte % 8.7 %      Eosinophil % 3.2 %      Basophil % 0.7 %      Immature Grans % 0.2 %      Neutrophils, Absolute 3.37 10*3/mm3      Lymphocytes, Absolute 1.75 10*3/mm3      Monocytes, Absolute 0.51 10*3/mm3      Eosinophils, Absolute 0.19 10*3/mm3      Basophils, Absolute 0.04 10*3/mm3      Immature Grans, Absolute 0.01 10*3/mm3      nRBC 0.0 /100 WBC     CK-MB [[id number]]  (Normal) Collected:  08/13/17 2338    Specimen:  Blood Updated:  08/14/17 0022     CKMB 4.88 ng/mL     Narrative:       CKMB Index not indicated                MDM  Number of Diagnoses or Management Options  Weakness due to cerebrovascular accident: new and requires workup  Diagnosis management comments: I do not feel that patient has had a new stroke.  I suspect that patient's overall weakness is a combination of the hospital stay, the long travel back, and his new level of function.  Outside of standing to take a shower while in the hospital prior to his discharge, patient had no other ambulatory evaluation actually did not know how weak he actually was following the CVA.  I think this led to the family being hypervigilant about his condition.  The lab work and imaging did not show any acute change.  I feel that patient needs to be evaluated by physical therapy in an attempt to try to get him some strength back in his lower extremity and the left arm.  I will refer him over to a physical therapy.  He will need to see his primary care we will which he is going to try to set up an appointment tomorrow.  He would prefer to go home tonight as with the family that I think this is appropriate.  Family and patient are in agreement with discharge planning.  Have them return to the ED if patient has any further issues or new complaints.       Amount and/or Complexity of Data Reviewed  Clinical lab tests: ordered and reviewed  Tests in the radiology section of CPT®: ordered and reviewed  Tests in  the medicine section of CPT®: ordered and reviewed  Review and summarize past medical records: yes  Independent visualization of images, tracings, or specimens: yes    Risk of Complications, Morbidity, and/or Mortality  Presenting problems: moderate  Diagnostic procedures: moderate  Management options: moderate    Patient Progress  Patient progress: stable      Final diagnoses:   Weakness due to cerebrovascular accident            Sarabjit Egan MD  08/14/17 1487

## 2017-08-15 ENCOUNTER — APPOINTMENT (OUTPATIENT)
Dept: CARDIOLOGY | Facility: HOSPITAL | Age: 72
End: 2017-08-15
Attending: FAMILY MEDICINE

## 2017-08-15 LAB
ALBUMIN SERPL-MCNC: 3.7 G/DL (ref 3.5–5)
ALBUMIN/GLOB SERPL: 1.3 G/DL (ref 1.1–2.5)
ALP SERPL-CCNC: 84 U/L (ref 24–120)
ALT SERPL W P-5'-P-CCNC: 39 U/L (ref 0–54)
ANION GAP SERPL CALCULATED.3IONS-SCNC: 10 MMOL/L (ref 4–13)
ARTICHOKE IGE QN: 164 MG/DL (ref 0–99)
AST SERPL-CCNC: 21 U/L (ref 7–45)
BASOPHILS # BLD AUTO: 0.04 10*3/MM3 (ref 0–0.2)
BASOPHILS NFR BLD AUTO: 0.6 % (ref 0–2)
BH CV ECHO MEAS - AI DEC SLOPE: 113 CM/SEC^2
BH CV ECHO MEAS - AI MAX PG: 15.7 MMHG
BH CV ECHO MEAS - AI MAX VEL: 198 CM/SEC
BH CV ECHO MEAS - AI P1/2T: 513.2 MSEC
BH CV ECHO MEAS - AO MAX PG (FULL): 4.8 MMHG
BH CV ECHO MEAS - AO MAX PG: 8 MMHG
BH CV ECHO MEAS - AO MEAN PG (FULL): 4 MMHG
BH CV ECHO MEAS - AO MEAN PG: 6 MMHG
BH CV ECHO MEAS - AO ROOT AREA (BSA CORRECTED): 1.5
BH CV ECHO MEAS - AO ROOT AREA: 10.8 CM^2
BH CV ECHO MEAS - AO ROOT DIAM: 3.7 CM
BH CV ECHO MEAS - AO V2 MAX: 141 CM/SEC
BH CV ECHO MEAS - AO V2 MEAN: 113 CM/SEC
BH CV ECHO MEAS - AO V2 VTI: 39.6 CM
BH CV ECHO MEAS - AVA(I,A): 2.5 CM^2
BH CV ECHO MEAS - AVA(I,D): 2.5 CM^2
BH CV ECHO MEAS - AVA(V,A): 2.6 CM^2
BH CV ECHO MEAS - AVA(V,D): 2.6 CM^2
BH CV ECHO MEAS - BSA(HAYCOCK): 2.5 M^2
BH CV ECHO MEAS - BSA: 2.4 M^2
BH CV ECHO MEAS - BZI_BMI: 31.4 KILOGRAMS/M^2
BH CV ECHO MEAS - BZI_METRIC_HEIGHT: 190.5 CM
BH CV ECHO MEAS - BZI_METRIC_WEIGHT: 113.9 KG
BH CV ECHO MEAS - CONTRAST EF 4CH: 52 ML/M^2
BH CV ECHO MEAS - EDV(CUBED): 166.4 ML
BH CV ECHO MEAS - EDV(MOD-SP4): 153 ML
BH CV ECHO MEAS - EDV(TEICH): 147.4 ML
BH CV ECHO MEAS - EF(CUBED): 74.2 %
BH CV ECHO MEAS - EF(MOD-SP4): 52 %
BH CV ECHO MEAS - EF(TEICH): 65.5 %
BH CV ECHO MEAS - ESV(CUBED): 42.9 ML
BH CV ECHO MEAS - ESV(MOD-SP4): 73.5 ML
BH CV ECHO MEAS - ESV(TEICH): 50.9 ML
BH CV ECHO MEAS - FS: 36.4 %
BH CV ECHO MEAS - IVS/LVPW: 1
BH CV ECHO MEAS - IVSD: 1.4 CM
BH CV ECHO MEAS - LA DIMENSION: 4.4 CM
BH CV ECHO MEAS - LA/AO: 1.2
BH CV ECHO MEAS - LV DIASTOLIC VOL/BSA (35-75): 63.3 ML/M^2
BH CV ECHO MEAS - LV MASS(C)D: 337.9 GRAMS
BH CV ECHO MEAS - LV MASS(C)DI: 139.8 GRAMS/M^2
BH CV ECHO MEAS - LV MAX PG: 3.2 MMHG
BH CV ECHO MEAS - LV MEAN PG: 2 MMHG
BH CV ECHO MEAS - LV SYSTOLIC VOL/BSA (12-30): 30.4 ML/M^2
BH CV ECHO MEAS - LV V1 MAX: 88.9 CM/SEC
BH CV ECHO MEAS - LV V1 MEAN: 59.3 CM/SEC
BH CV ECHO MEAS - LV V1 VTI: 24.3 CM
BH CV ECHO MEAS - LVIDD: 5.5 CM
BH CV ECHO MEAS - LVIDS: 3.5 CM
BH CV ECHO MEAS - LVLD AP4: 8.3 CM
BH CV ECHO MEAS - LVLS AP4: 7.6 CM
BH CV ECHO MEAS - LVOT AREA (M): 4.2 CM^2
BH CV ECHO MEAS - LVOT AREA: 4.2 CM^2
BH CV ECHO MEAS - LVOT DIAM: 2.3 CM
BH CV ECHO MEAS - LVPWD: 1.4 CM
BH CV ECHO MEAS - MV A MAX VEL: 71.6 CM/SEC
BH CV ECHO MEAS - MV DEC TIME: 0.26 SEC
BH CV ECHO MEAS - MV E MAX VEL: 81.6 CM/SEC
BH CV ECHO MEAS - MV E/A: 1.1
BH CV ECHO MEAS - SI(AO): 176.2 ML/M^2
BH CV ECHO MEAS - SI(CUBED): 51.1 ML/M^2
BH CV ECHO MEAS - SI(LVOT): 41.8 ML/M^2
BH CV ECHO MEAS - SI(MOD-SP4): 32.9 ML/M^2
BH CV ECHO MEAS - SI(TEICH): 40 ML/M^2
BH CV ECHO MEAS - SV(AO): 425.8 ML
BH CV ECHO MEAS - SV(CUBED): 123.5 ML
BH CV ECHO MEAS - SV(LVOT): 101 ML
BH CV ECHO MEAS - SV(MOD-SP4): 79.5 ML
BH CV ECHO MEAS - SV(TEICH): 96.6 ML
BILIRUB SERPL-MCNC: 0.4 MG/DL (ref 0.1–1)
BUN BLD-MCNC: 19 MG/DL (ref 5–21)
BUN/CREAT SERPL: 19.2 (ref 7–25)
CALCIUM SPEC-SCNC: 9 MG/DL (ref 8.4–10.4)
CHLORIDE SERPL-SCNC: 102 MMOL/L (ref 98–110)
CHOLEST SERPL-MCNC: 235 MG/DL (ref 130–200)
CO2 SERPL-SCNC: 23 MMOL/L (ref 24–31)
CREAT BLD-MCNC: 0.99 MG/DL (ref 0.5–1.4)
DEPRECATED RDW RBC AUTO: 43.9 FL (ref 40–54)
E/E' RATIO: 13.4
EOSINOPHIL # BLD AUTO: 0.25 10*3/MM3 (ref 0–0.7)
EOSINOPHIL NFR BLD AUTO: 3.9 % (ref 0–4)
ERYTHROCYTE [DISTWIDTH] IN BLOOD BY AUTOMATED COUNT: 14.4 % (ref 12–15)
GFR SERPL CREATININE-BSD FRML MDRD: 74 ML/MIN/1.73
GLOBULIN UR ELPH-MCNC: 2.8 GM/DL
GLUCOSE BLD-MCNC: 190 MG/DL (ref 70–100)
GLUCOSE BLDC GLUCOMTR-MCNC: 134 MG/DL (ref 70–130)
GLUCOSE BLDC GLUCOMTR-MCNC: 165 MG/DL (ref 70–130)
GLUCOSE BLDC GLUCOMTR-MCNC: 266 MG/DL (ref 70–130)
GLUCOSE BLDC GLUCOMTR-MCNC: 271 MG/DL (ref 70–130)
GLUCOSE BLDC GLUCOMTR-MCNC: 291 MG/DL (ref 70–130)
HBA1C MFR BLD: 7.1 %
HCT VFR BLD AUTO: 43.1 % (ref 40–52)
HDLC SERPL-MCNC: 24 MG/DL
HGB BLD-MCNC: 14.2 G/DL (ref 14–18)
IMM GRANULOCYTES # BLD: 0.01 10*3/MM3 (ref 0–0.03)
IMM GRANULOCYTES NFR BLD: 0.2 % (ref 0–5)
LDLC/HDLC SERPL: 6.21 {RATIO}
LEFT ATRIUM VOLUME INDEX: 24.8 ML/M2
LEFT ATRIUM VOLUME: 60 CM3
LYMPHOCYTES # BLD AUTO: 2.23 10*3/MM3 (ref 0.72–4.86)
LYMPHOCYTES NFR BLD AUTO: 35.1 % (ref 15–45)
MAGNESIUM SERPL-MCNC: 1.7 MG/DL (ref 1.4–2.2)
MCH RBC QN AUTO: 27.4 PG (ref 28–32)
MCHC RBC AUTO-ENTMCNC: 32.9 G/DL (ref 33–36)
MCV RBC AUTO: 83 FL (ref 82–95)
MONOCYTES # BLD AUTO: 0.61 10*3/MM3 (ref 0.19–1.3)
MONOCYTES NFR BLD AUTO: 9.6 % (ref 4–12)
NEUTROPHILS # BLD AUTO: 3.22 10*3/MM3 (ref 1.87–8.4)
NEUTROPHILS NFR BLD AUTO: 50.6 % (ref 39–78)
PHOSPHATE SERPL-MCNC: 3.9 MG/DL (ref 2.5–4.5)
PLATELET # BLD AUTO: 154 10*3/MM3 (ref 130–400)
PMV BLD AUTO: 11.2 FL (ref 6–12)
POTASSIUM BLD-SCNC: 4 MMOL/L (ref 3.5–5.3)
PROT SERPL-MCNC: 6.5 G/DL (ref 6.3–8.7)
RBC # BLD AUTO: 5.19 10*6/MM3 (ref 4.8–5.9)
SODIUM BLD-SCNC: 135 MMOL/L (ref 135–145)
TRIGL SERPL-MCNC: 310 MG/DL (ref 0–149)
TROPONIN I SERPL-MCNC: 0.02 NG/ML (ref 0–0.03)
TROPONIN I SERPL-MCNC: 0.05 NG/ML (ref 0–0.03)
WBC NRBC COR # BLD: 6.36 10*3/MM3 (ref 4.8–10.8)

## 2017-08-15 PROCEDURE — G8979 MOBILITY GOAL STATUS: HCPCS | Performed by: PHYSICAL THERAPIST

## 2017-08-15 PROCEDURE — 94762 N-INVAS EAR/PLS OXIMTRY CONT: CPT

## 2017-08-15 PROCEDURE — 80061 LIPID PANEL: CPT | Performed by: FAMILY MEDICINE

## 2017-08-15 PROCEDURE — C8929 TTE W OR WO FOL WCON,DOPPLER: HCPCS

## 2017-08-15 PROCEDURE — G8988 SELF CARE GOAL STATUS: HCPCS | Performed by: OCCUPATIONAL THERAPIST

## 2017-08-15 PROCEDURE — G8987 SELF CARE CURRENT STATUS: HCPCS | Performed by: OCCUPATIONAL THERAPIST

## 2017-08-15 PROCEDURE — 63710000001 INSULIN LISPRO (HUMAN) PER 5 UNITS: Performed by: FAMILY MEDICINE

## 2017-08-15 PROCEDURE — 97165 OT EVAL LOW COMPLEX 30 MIN: CPT | Performed by: OCCUPATIONAL THERAPIST

## 2017-08-15 PROCEDURE — 63710000001 INSULIN DETEMIR PER 5 UNITS: Performed by: FAMILY MEDICINE

## 2017-08-15 PROCEDURE — 84100 ASSAY OF PHOSPHORUS: CPT | Performed by: FAMILY MEDICINE

## 2017-08-15 PROCEDURE — 85025 COMPLETE CBC W/AUTO DIFF WBC: CPT | Performed by: FAMILY MEDICINE

## 2017-08-15 PROCEDURE — 97161 PT EVAL LOW COMPLEX 20 MIN: CPT

## 2017-08-15 PROCEDURE — 83735 ASSAY OF MAGNESIUM: CPT | Performed by: FAMILY MEDICINE

## 2017-08-15 PROCEDURE — 25010000002 PERFLUTREN (DEFINITY) 8.476 MG IN SODIUM CHLORIDE 10 ML INJECTION: Performed by: INTERNAL MEDICINE

## 2017-08-15 PROCEDURE — 99223 1ST HOSP IP/OBS HIGH 75: CPT | Performed by: PSYCHIATRY & NEUROLOGY

## 2017-08-15 PROCEDURE — 82962 GLUCOSE BLOOD TEST: CPT

## 2017-08-15 PROCEDURE — 80053 COMPREHEN METABOLIC PANEL: CPT | Performed by: FAMILY MEDICINE

## 2017-08-15 PROCEDURE — 93306 TTE W/DOPPLER COMPLETE: CPT | Performed by: INTERNAL MEDICINE

## 2017-08-15 PROCEDURE — 25010000002 THIAMINE PER 100 MG: Performed by: PSYCHIATRY & NEUROLOGY

## 2017-08-15 PROCEDURE — 84484 ASSAY OF TROPONIN QUANT: CPT | Performed by: FAMILY MEDICINE

## 2017-08-15 PROCEDURE — G8978 MOBILITY CURRENT STATUS: HCPCS | Performed by: PHYSICAL THERAPIST

## 2017-08-15 PROCEDURE — 83036 HEMOGLOBIN GLYCOSYLATED A1C: CPT | Performed by: FAMILY MEDICINE

## 2017-08-15 PROCEDURE — 25010000002 HEPARIN (PORCINE) PER 1000 UNITS: Performed by: FAMILY MEDICINE

## 2017-08-15 RX ORDER — NICOTINE POLACRILEX 4 MG
15 LOZENGE BUCCAL
Status: DISCONTINUED | OUTPATIENT
Start: 2017-08-15 | End: 2017-08-17 | Stop reason: HOSPADM

## 2017-08-15 RX ORDER — ATORVASTATIN CALCIUM 40 MG/1
40 TABLET, FILM COATED ORAL DAILY
Status: DISCONTINUED | OUTPATIENT
Start: 2017-08-15 | End: 2017-08-15

## 2017-08-15 RX ORDER — HEPARIN SODIUM 5000 [USP'U]/ML
5000 INJECTION, SOLUTION INTRAVENOUS; SUBCUTANEOUS EVERY 8 HOURS SCHEDULED
Status: DISCONTINUED | OUTPATIENT
Start: 2017-08-15 | End: 2017-08-17 | Stop reason: HOSPADM

## 2017-08-15 RX ORDER — SODIUM CHLORIDE 0.9 % (FLUSH) 0.9 %
1-10 SYRINGE (ML) INJECTION AS NEEDED
Status: DISCONTINUED | OUTPATIENT
Start: 2017-08-15 | End: 2017-08-17 | Stop reason: HOSPADM

## 2017-08-15 RX ORDER — GABAPENTIN 300 MG/1
300 CAPSULE ORAL 3 TIMES DAILY
Status: DISCONTINUED | OUTPATIENT
Start: 2017-08-15 | End: 2017-08-17 | Stop reason: HOSPADM

## 2017-08-15 RX ORDER — CLOPIDOGREL BISULFATE 75 MG/1
75 TABLET ORAL DAILY
Status: DISCONTINUED | OUTPATIENT
Start: 2017-08-15 | End: 2017-08-17 | Stop reason: HOSPADM

## 2017-08-15 RX ORDER — FAMOTIDINE 20 MG/1
40 TABLET, FILM COATED ORAL NIGHTLY
Status: DISCONTINUED | OUTPATIENT
Start: 2017-08-15 | End: 2017-08-17 | Stop reason: HOSPADM

## 2017-08-15 RX ORDER — LEVETIRACETAM 500 MG/1
500 TABLET ORAL EVERY 12 HOURS SCHEDULED
Status: DISCONTINUED | OUTPATIENT
Start: 2017-08-15 | End: 2017-08-17 | Stop reason: HOSPADM

## 2017-08-15 RX ORDER — ATORVASTATIN CALCIUM 40 MG/1
80 TABLET, FILM COATED ORAL DAILY
Status: DISCONTINUED | OUTPATIENT
Start: 2017-08-16 | End: 2017-08-15

## 2017-08-15 RX ORDER — TAMSULOSIN HYDROCHLORIDE 0.4 MG/1
0.4 CAPSULE ORAL NIGHTLY
Status: DISCONTINUED | OUTPATIENT
Start: 2017-08-15 | End: 2017-08-17 | Stop reason: HOSPADM

## 2017-08-15 RX ORDER — DEXTROSE MONOHYDRATE 25 G/50ML
25 INJECTION, SOLUTION INTRAVENOUS
Status: DISCONTINUED | OUTPATIENT
Start: 2017-08-15 | End: 2017-08-17 | Stop reason: HOSPADM

## 2017-08-15 RX ORDER — ASPIRIN 325 MG
325 TABLET ORAL DAILY
Status: DISCONTINUED | OUTPATIENT
Start: 2017-08-15 | End: 2017-08-16

## 2017-08-15 RX ORDER — HYDROCODONE BITARTRATE AND ACETAMINOPHEN 10; 325 MG/1; MG/1
1 TABLET ORAL EVERY 6 HOURS PRN
COMMUNITY
End: 2017-08-21 | Stop reason: HOSPADM

## 2017-08-15 RX ADMIN — METOPROLOL TARTRATE 25 MG: 25 TABLET, FILM COATED ORAL at 22:26

## 2017-08-15 RX ADMIN — INSULIN LISPRO 2 UNITS: 100 INJECTION, SOLUTION INTRAVENOUS; SUBCUTANEOUS at 09:02

## 2017-08-15 RX ADMIN — CLOPIDOGREL 75 MG: 75 TABLET, FILM COATED ORAL at 09:01

## 2017-08-15 RX ADMIN — ASPIRIN 325 MG: 325 TABLET, COATED ORAL at 09:01

## 2017-08-15 RX ADMIN — THIAMINE HYDROCHLORIDE 100 MG: 100 INJECTION, SOLUTION INTRAMUSCULAR; INTRAVENOUS at 22:26

## 2017-08-15 RX ADMIN — LEVETIRACETAM 500 MG: 500 TABLET, FILM COATED ORAL at 09:01

## 2017-08-15 RX ADMIN — HEPARIN SODIUM 5000 UNITS: 5000 INJECTION, SOLUTION INTRAVENOUS; SUBCUTANEOUS at 14:48

## 2017-08-15 RX ADMIN — INSULIN LISPRO 6 UNITS: 100 INJECTION, SOLUTION INTRAVENOUS; SUBCUTANEOUS at 18:10

## 2017-08-15 RX ADMIN — TAMSULOSIN HYDROCHLORIDE 0.4 MG: 0.4 CAPSULE ORAL at 22:22

## 2017-08-15 RX ADMIN — HEPARIN SODIUM 5000 UNITS: 5000 INJECTION, SOLUTION INTRAVENOUS; SUBCUTANEOUS at 22:26

## 2017-08-15 RX ADMIN — METOPROLOL TARTRATE 25 MG: 25 TABLET, FILM COATED ORAL at 09:01

## 2017-08-15 RX ADMIN — HEPARIN SODIUM 5000 UNITS: 5000 INJECTION, SOLUTION INTRAVENOUS; SUBCUTANEOUS at 09:01

## 2017-08-15 RX ADMIN — INSULIN DETEMIR 80 UNITS: 100 INJECTION, SOLUTION SUBCUTANEOUS at 22:27

## 2017-08-15 RX ADMIN — LEVETIRACETAM 500 MG: 500 TABLET, FILM COATED ORAL at 22:25

## 2017-08-15 RX ADMIN — INSULIN LISPRO 7 UNITS: 100 INJECTION, SOLUTION INTRAVENOUS; SUBCUTANEOUS at 22:26

## 2017-08-15 RX ADMIN — DESMOPRESSIN ACETATE 40 MG: 0.2 TABLET ORAL at 09:01

## 2017-08-15 RX ADMIN — GABAPENTIN 300 MG: 300 CAPSULE ORAL at 22:22

## 2017-08-15 RX ADMIN — INSULIN LISPRO 6 UNITS: 100 INJECTION, SOLUTION INTRAVENOUS; SUBCUTANEOUS at 12:43

## 2017-08-15 RX ADMIN — GABAPENTIN 300 MG: 300 CAPSULE ORAL at 10:49

## 2017-08-15 RX ADMIN — SODIUM CHLORIDE 5 ML: 9 INJECTION INTRAMUSCULAR; INTRAVENOUS; SUBCUTANEOUS at 15:07

## 2017-08-15 RX ADMIN — GABAPENTIN 300 MG: 300 CAPSULE ORAL at 18:09

## 2017-08-15 NOTE — H&P
[facility] Medicine Services  HISTORY AND PHYSICAL    Date of Admission: 8/14/2017  Primary Care Physician: Joao Vergara MD    Subjective     Chief Complaint: Left-sided weakness with facial droop    History of Present Illness  72-year-old male with past medical history of arthritis, depression, diabetes mellitus type II, hyperlipidemia, hypertension, myocardial infarction comes into the ER with worsening of left-sided weakness and facial droop.  Patient was recently visiting family at [place] where his initial symptoms started.  He started feeling lethargic as well as left-sided weakness with slurred speech.  His wife noticed his symptoms and immediately took him to nearby emergency department.  At the ER, patient was diagnosed with acute ischemic stroke and was started on TPA treatment.  The TPA patient's symptoms significantly improved and after 2 days of hospitalization he was discharged home with outpatient rehabilitation.  One day after his discharge he started noticing recurrence of his symptoms with some slurry speech.  Today patient started having worsening of his symptoms so he decided to come to the ER.  At the ER patient had an MRI which was positive for acute stroke of right sergio.  Neurology was consulted by ER physician and was decided that patient is not a candidate for TPA due to fluctuating symptoms and recent TPA treatment.  Patient received a dose of aspirin and currently on Plavix at home.  In the ER patient also received a CT angiogram of the head and neck which was negative for any acute disease.  Decision was made to admit patient for further evaluation by neurology.        Review of Systems     Otherwise complete ROS reviewed and negative except as mentioned in the HPI.      Past Medical History:   Past Medical History:   Diagnosis Date   • Arthritis    • Depression    • Diabetes mellitus    • Hyperlipidemia    • Hypertension    • Myocardial  infarction    • Stroke        Past Surgical History:  Past Surgical History:   Procedure Laterality Date   • APPENDECTOMY     • BACK SURGERY     • CHOLECYSTECTOMY     • CORONARY STENT PLACEMENT      X8   • REPLACEMENT TOTAL KNEE         Social History:  reports that he has never smoked. He does not have any smokeless tobacco history on file. He reports that he drinks alcohol. He reports that he does not use illicit drugs.    Family History: family history is not on file.       Allergies:  No Known Allergies    Medications:  Prior to Admission medications    Medication Sig Start Date End Date Taking? Authorizing Provider   ALPRAZolam (XANAX) 0.5 MG tablet Take 0.5 mg by mouth At Night As Needed for Anxiety.    Historical Provider, MD   atorvastatin (LIPITOR) 40 MG tablet Take 40 mg by mouth Daily.    Historical Provider, MD   butalbital-acetaminophen-caffeine (FIORICET, ESGIC) -40 MG per tablet Take 1 tablet by mouth 2 (Two) Times a Day As Needed for Headache.    Historical Provider, MD   clopidogrel (PLAVIX) 75 MG tablet Take 75 mg by mouth Daily.    Historical Provider, MD   diphenhydrAMINE (BENADRYL) 25 mg capsule Take 25 mg by mouth Every 6 (Six) Hours As Needed for Itching.    Historical Provider, MD   DULoxetine (CYMBALTA) 60 MG capsule Take 60 mg by mouth Daily.    Historical Provider, MD   fexofenadine (ALLEGRA) 180 MG tablet Take 180 mg by mouth Daily.    Historical Provider, MD   FLUoxetine (PROzac) 20 MG capsule Take 40 mg by mouth Daily.    Historical Provider, MD   gabapentin (NEURONTIN) 300 MG capsule Take 300 mg by mouth 3 (Three) Times a Day.    Historical Provider, MD   insulin glargine (LANTUS) 100 UNIT/ML injection Inject 80 Units under the skin Every Night.    Historical Provider, MD   levETIRAcetam (KEPPRA) 500 MG tablet Take 500 mg by mouth 2 (Two) Times a Day.    Historical Provider, MD   meloxicam (MOBIC) 15 MG tablet Take 15 mg by mouth Daily.    Historical Provider, MD   metoprolol  "tartrate (LOPRESSOR) 25 MG tablet Take 25 mg by mouth 2 (Two) Times a Day.    Historical Provider, MD   raNITIdine (ZANTAC) 150 MG tablet Take 300 mg by mouth Every Night.    Historical Provider, MD   tamsulosin (FLOMAX) 0.4 MG capsule 24 hr capsule Take 1 capsule by mouth Every Night.    Historical Provider, MD   Triamcinolone Acetonide (NASACORT) 55 MCG/ACT nasal inhaler 2 sprays into each nostril Daily.    Historical Provider, MD       Objective     Vital Signs: /88 (BP Location: Left arm, Patient Position: Lying)  Pulse 64  Temp 97.7 °F (36.5 °C) (Oral)   Resp 18  Ht 75\" (190.5 cm)  Wt 251 lb 3 oz (114 kg)  SpO2 93%  BMI 30.58 kg/m2  Physical Exam   Constitutional: He is oriented to person, place, and time. He appears well-developed and well-nourished.   HENT:   Head: Normocephalic and atraumatic.   Eyes: EOM are normal. Pupils are equal, round, and reactive to light.   Neck: Normal range of motion. Neck supple.   Cardiovascular: Normal rate, regular rhythm and normal heart sounds.    Pulmonary/Chest: Effort normal and breath sounds normal.   Abdominal: Soft. Bowel sounds are normal.   Neurological: He is alert and oriented to person, place, and time.   Left-sided motor deficit noted during examination.  Left upper extremity and lower extremity motor strength 4 out of 5.  Left facial droop noticed during cranial nerve exam.           Results Reviewed:  Lab Results (last 24 hours)     Procedure Component Value Units Date/Time    POC Glucose Fingerstick [[id number]]  (Abnormal) Collected:  08/14/17 1740    Specimen:  Blood Updated:  08/14/17 1755     Glucose 186 (H) mg/dL       : [id number] Sony Crystal LMeter ID: [id number]       POC Glucose Fingerstick [[id number]]  (Abnormal) Collected:  08/14/17 1743    Specimen:  Blood Updated:  08/14/17 1755     Glucose 205 (H) mg/dL       : [id number] Sony Crystal LMeter ID: [id number]       Protime-INR [[id number]]  (Normal) Collected:  08/14/17 " 1746    Specimen:  Blood Updated:  08/14/17 1816     Protime 13.2 Seconds      INR 0.97    aPTT [[id number]]  (Normal) Collected:  08/14/17 1746    Specimen:  Blood Updated:  08/14/17 1816     PTT 27.5 seconds     CBC & Differential [[id number]] Collected:  08/14/17 1746    Specimen:  Blood Updated:  08/14/17 1817    Narrative:       The following orders were created for panel order CBC & Differential.  Procedure                               Abnormality         Status                     ---------                               -----------         ------                     CBC Auto Differential[[id number]]        Abnormal            Final result                 Please view results for these tests on the individual orders.    CBC Auto Differential [[id number]]  (Abnormal) Collected:  08/14/17 1746    Specimen:  Blood Updated:  08/14/17 1817     WBC 6.59 10*3/mm3      RBC 5.41 10*6/mm3      Hemoglobin 14.9 g/dL      Hematocrit 45.1 %      MCV 83.4 fL      MCH 27.5 (L) pg      MCHC 33.0 g/dL      RDW 14.7 %      RDW-SD 44.5 fl      MPV 11.2 fL      Platelets 173 10*3/mm3      Neutrophil % 50.8 %      Lymphocyte % 34.3 %      Monocyte % 9.3 %      Eosinophil % 4.9 (H) %      Basophil % 0.5 %      Immature Grans % 0.2 %      Neutrophils, Absolute 3.36 10*3/mm3      Lymphocytes, Absolute 2.26 10*3/mm3      Monocytes, Absolute 0.61 10*3/mm3      Eosinophils, Absolute 0.32 10*3/mm3      Basophils, Absolute 0.03 10*3/mm3      Immature Grans, Absolute 0.01 10*3/mm3     Comprehensive Metabolic Panel [[id number]]  (Abnormal) Collected:  08/14/17 1746    Specimen:  Blood Updated:  08/14/17 1836     Glucose 211 (H) mg/dL      BUN 18 mg/dL      Creatinine 1.03 mg/dL      Sodium 137 mmol/L      Potassium 4.6 mmol/L      Chloride 102 mmol/L      CO2 25.0 mmol/L      Calcium 9.2 mg/dL      Total Protein 7.2 g/dL      Albumin 4.20 g/dL      ALT (SGPT) 40 U/L      AST (SGOT) 23 U/L      Alkaline Phosphatase 92 U/L      Total Bilirubin 0.6  mg/dL      eGFR Non African Amer 71 mL/min/1.73      Globulin 3.0 gm/dL      A/G Ratio 1.4 g/dL      BUN/Creatinine Ratio 17.5     Anion Gap 10.0 mmol/L     Narrative:       The MDRD GFR formula is only valid for adults with stable renal function between ages 18 and 70.    Green Top (Gel) [[id number]] Collected:  08/14/17 1746    Specimen:  Blood Updated:  08/14/17 1901     Extra Tube Hold for add-ons.      Auto resulted.       Lavender Top [[id number]] Collected:  08/14/17 1746    Specimen:  Blood Updated:  08/14/17 1901     Extra Tube hold for add-on      Auto resulted       [place] Draw [[id number]] Collected:  08/14/17 1746    Specimen:  Blood Updated:  08/14/17 1901    Narrative:       The following orders were created for panel order [place] Draw.  Procedure                               Abnormality         Status                     ---------                               -----------         ------                     Light Blue Top[[id number]]                                   Final result               Green Top (Gel)[[id number]]                                  Final result               Lavender Top[[id number]]                                     Final result               Red Top[[id number]]                                          Final result                 Please view results for these tests on the individual orders.    Light Blue Top [[id number]] Collected:  08/14/17 1746    Specimen:  Blood Updated:  08/14/17 1901     Extra Tube hold for add-on      Auto resulted       Red Top [[id number]] Collected:  08/14/17 1746    Specimen:  Blood Updated:  08/14/17 1901     Extra Tube Hold for add-ons.      Auto resulted.       POC Glucose Fingerstick [[id number]]  (Abnormal) Collected:  08/14/17 2346    Specimen:  Blood Updated:  08/15/17 0016     Glucose 134 (H) mg/dL       : [id number] María KristaMeter ID: [id number]           Imaging Results (last 24 hours)     Procedure Component Value Units Date/Time    MRI  Brain Without Contrast [[id number]] Collected:  08/14/17 1947     Updated:  08/14/17 1953    Narrative:       EXAMINATION:   MRI BRAIN WO CONTRAST-  8/14/2017 7:47 PM CDT     HISTORY: MRI BRAIN WO CONTRAST- 8/14/2017 7:03 AM CDT     HISTORY: left sided weakness     COMPARISON: 12/22/2015       TECHNIQUE: Multiplanar imaging of the brain was performed in a routine  fashion without contrast.     FINDINGS:   Diffusion: Restricted diffusion is noted in the right side of the sergio.  This is an ischemic infarction.     Midline structures: Nondisplaced.     Ventricles: Normal in configuration and symmetric in size.     Masses: No masses or mass effect.     Basilar cisterns: Maintained.     Extra axial space: No abnormal extra-axial fluid.     Gray-white matter signal: Increased MR signal the paraventricular white  matter is noted and at the juxtacortical gray-white junction consistent  with chronic microvascular ischemic change     Cerebellum: Normal.     Brainstem: Ischemic infarction is noted the right side of the sergio     Other: Proximal cervical spinal cord is normal. Bilateral globes and  orbits are normal in appearance. Normal cerebrovascular flow voids  noted. No abnormal signal in the mastoid air cells or paranasal sinuses.       Impression:       1. Ischemic infarction right side of the sergio     These results were called to Dr. Da Silva at approximately 7:50 PM   This report was finalized on 08/14/2017 19:50 by Dr. Aman Mcknight MD.    CT Angiogram Neck With & Without Contrast [[id number]] Collected:  08/14/17 2207     Updated:  08/14/17 2215    Narrative:       EXAMINATION:   CT ANGIOGRAM NECK W WO CONTRAST-  8/14/2017 10:07 PM CDT     HISTORY: CT ANGIOGRAM OF THE NECK 8/14/2017 9:11 PM CDT     HISTORY: cva     COMPARISON: None      DLP: 2217 mGy cm     In order to have a CT radiation dose as low as reasonably achievable,  Automated Exposure Control was utilized for adjustment of the mA and/or  KV according to  patient size.     TECHNIQUE: Following the uneventful administration of Isovue contrast,  serial helical tomographic images of the neck were obtained following  angiogram protocol. Multiplanar MIP and 3D reconstructions were provided  for review.       FINDINGS:      Angiogram:   The aortic arch and visualized great vessels are patent, without  evidence of aneurysm, dissection, vessel cutoff, or flow limiting  stenosis.      The right common carotid artery is visualized in its bifurcation is  unremarkable. Calcified plaque is present in the right internal carotid  artery there is 50% or less associated stenosis.     The left common carotid artery demonstrates normal bifurcation mild  plaque is present in the left internal carotid artery with 50% or less  associated stenosis.      Posteriorly, the vertebral arteries and visualized basilar artery  demonstrate no aneurysm, dissection, vessel cutoff, or flow-limiting  stenosis.      Other findings: The airway is patent. There is no mass or significant  lymphadenopathy. The lung apices are clear. Degenerative changes noted  cervical spine at the C5-6 level.. The visualized paranasal sinuses,  mastoid air cells and middle ear cavities are clear.       Impression:       Impression:   1. 50% or less stenosis associated the right internal carotid artery.  2. 50% or less stenosis associated left internal carotid artery.  3. Flow is demonstrated in both vertebrals and they form the basilar  artery.     This report was finalized on 08/14/2017 22:12 by Dr. Aman Mckinght MD.    CT Angiogram Head With & Without Contrast [[id number]] Collected:  08/14/17 2215     Updated:  08/14/17 2220    Narrative:       EXAMINATION:   CT ANGIOGRAM HEAD W WO CONTRAST-  8/14/2017 10:15 PM CDT     HISTORY: CT ANGIOGRAM HEAD W WO CONTRAST- 8/14/2017 9:11 PM CDT     HISTORY: CVA     COMPARISON: None      DOSE LENGTH PRODUCT: 2217 mGy cm. Automated exposure control was also  utilized to decrease  patient radiation dose.     TECHNIQUE: Following the uneventful administration of Isovue contrast,  serial helical tomographic images of the brain were obtained following  angiogram protocol. Multiplanar MIP and 3D reconstructions were also  obtained.      FINDINGS:      Angiogram:   The visualized extracranial carotid vasculature is patent bilaterally,  without evidence of aneurysm, dissection, vessel cutoff, or  flow-limiting stenosis. Bilateral intracranial portions of the ICA's  demonstrate no evidence of aneurysm, dissection, vessel cutoff, or  flow-limiting stenosis.      Posteriorly, the visualized vertebral arteries and basilar arteries  demonstrate no aneurysm, dissection, vessel cutoff, or flow-limiting  stenosis.      Within the brain, the [place] is intact, without abnormality.      Other findings:  The osseous calvarium is intact. The surrounding soft tissues are  unremarkable. The paranasal sinuses and bilateral mastoid air cells are  clear. The imaged portions of the bilateral globes and orbits are  unremarkable.        Impression:       Impression:   1. Negative CT angiogram of the level of the [place].         This report was finalized on 08/14/2017 22:17 by Dr. Aman Mcknight MD.          I have personally reviewed and interpreted the radiology studies and ECG obtained at time of admission.     Assessment / Plan     Assessment & Plan  Hospital Problem List     CVA (cerebral vascular accident)        Assessment:    1.  CVA-Ischemic infarction right side of the sergio  2.  Mildly elevated troponin  3.  Diabetes mellitus type II  4.  Hypertension  5.  Hyperlipidemia   6.  Depression    Plan:    -Admit to telemetry  -Continue cardiac monitoring  -Continuous pulse ox  -Follow-up neurology consult  -Follow-up echocardiogram  -Follow-up occupational therapy consult  -Follow-up physical therapy consult  -MRI noted for acute infarct at the right sergio  -CT angiogram of head and neck noted for  no acute pathology  -Continue aspirin and Plavix  -Continue statin  -Patient's troponin was mildly elevated yesterday-repeat troponin  -Follow-up a.m. lipid panel, hemoglobin A1c and BMP  -Continue to monitor vitals  -Not a candidate for TPA given recent TPA treatment and fluctuating symptoms  -Continue with neuro check  -Continue fall and aspiration precaution  -Maintain blood pressure and optimal range  -Strict blood glucose control  -Patient is on multiple antidepressants-will hold for now-reconfirm with PCP/psychiatrist before continuing  -Continue insulin therapy              Code Status: Full code     I discussed the patients findings and my recommendations with patient    Estimated length of stay 3 days    Mellissa Saravia MD   08/15/17   4:41 AM

## 2017-08-15 NOTE — CONSULTS
"    Neurology Consult Note    Patient:  Morris Yousif   1945  [id number]    Referring Provider: Mellissa Saravia MD   Reason for Consultation: Ischemic stroke of right sergio    History of present illness:     This is a 72 y.o. year old right handed male with a H/O DM. Hypertension, hyperlipidemia, and reportedly 2 previous strokes one in 2016.  Patient is a poor historian.  Records, history is obtained from reviewing the records at [facility] and [facility]    Family reported 3 previous episodes of left sided weakness associated with facial droop, lethargy. Of note patient is a chronic opioid user for chronic low back pain and records at [facility] indicate that he drinks alcohol 2 to 3 times a week.   .   Patient was seen at [facility] ED on 08/11/2017 with onset of dysarthria at noon that progressed to left sided weakness, left facial droop,  and inability to get out of the car. He was falling. Upon arrival he was given and NIH stroke scale of 10. Glucose was 144. Head CT showed SVID and no acute changes. He was  given TPA. And transferred to  [facility] [place] [facility] [place] [facility] where he was seen by Dr Jayy Porter who is a neuro interventionalist.    There at discharge on 08/13/2017 where he was diagnosed with \"Complex Partial Seizure versus acute ischemic stroke likely lacunar type s/p TPA.\"    Work up there included:  CT head: The study does not show an acute infarct, hemorrhage, mass, or hydrocephalus.  CT angio: No arterial occlusion or stenosis is noted.   CT perfusion: normal   \"MRI negative but cannot rule out a small lacunar infarct.\"    Echo complete, report pending at discharge  Tele NSR    HGB A1C 7.1    Patient discharged on Plavix, aspirin, and Lipitor as well as Keppra.       Sunday night that is August 13 while getting out of car from ride home from [place], son noted left facial droop and patient was diffusely weak including " "his left side.he was brought to ED here and Head CT did not show any changes. He was released at 3;30 AM and went home apparently better but this is not clear. He saw his primary care physician yesterday who noted the patient falling as he tried to walk out of his office and patient was brought back to ED here.  He was found to have a right pontine stroke on MRI and was admitted.     Of note patient has CAD and is s/p multiple coronary stents placed in the past. Per son, patient refuses to take Plavix as well as Lipitor and all lipid lowering agents so it is not clear what he actually took at the time of discharge.  He did refuse any lipid-lowering agent here at the hospital.      Per son the patient refuses to take Plavix as it causes diffuse pain so he will not take it.  So claimed to the son the patient refuses any lipid-lowering agent because it \"messes him up\"  Here at his laboratory studies indicate inadequate control for someone who is at risk for stroke and who has a history of heart disease:  Total Cholesterol 235 (H) mg/dL   Triglycerides 310 (H) mg/dL   HDL Cholesterol 24 (L) mg/dL   LDL Cholesterol  164 (H) mg/dL       Of note the patient has obstructive sleep apnea and uses his CPAP    Previous strokes/hospitalizations this year:    Per patient he was at [facility] twice with stroke before the 08/11/2017 event.     1. Records from [facility] show he was diagnosed with \"Acute ischemic stroke - thalamic\" at [facility] 02/20/2017  \"He was admitted with  altered mental status occurring when playing cards with friends in [facility], when one of his friends noticed he seemed confused and lethargic.  He had a similar episode 2 years ago. His examination appeared relatively non-focal with mild L  weakness and apraxia, no numbness, or ataxia\"    \"MRI Brain 2/20/17  Summary:  1. Non-hemorrhagic 1.2 cm acute lacunar infarct within the left thalamus.    Signed by Dr Avinash Montemayor  on 02/20/2017 17:18  CT Brain " "2/20/17  Impression  Mild cerebral and cerebellar volume loss with chronic microvascular disease and some chronic changes   but no definite evidence of acute intracranial process.  Signed by Dr Jef Diaz  on 02/20/2017 16:13    Carotid US 2/21/17  Impression   There is smooth plaque visualized in the bilateral internal carotid  artery(ies).   There is less than 50% stenosis in the right internal carotid artery.   There is less than 50% stenosis of the left internal carotid artery.   There is normal antegrade flow in the left vertebral artery(ies).   The right vertebral artery is not visualized and may be occluded.    ECHOCARDIOGRAM 2/21/17  1. Technically difficult study.  2. Normal left ventricular systolic function with an ejection fraction of 65%.  3. Left ventricular diastolic dysfunction.  4. Mild aortic insufficiency.  5. Moderate pulmonic insufficiency.     A1c 6.4 2/21/17\"    2. Readmitted to [facility] with Loss of consciousness on 03/27/2017 but had noted dizziness before with room spinning sensations. He had adjustment to meds of Flomax, and antihypertensives.    Considered neuropsych testing and treatment with Namenda.    Work up at [facility]:  CT head  3/27/2017  1. No acute intracranial abnormality identified. Mild to moderate atrophy with chronic small vessel ischemia.      He has had significant CAD and per records from Cardiology had multiple stents:  • Coronary angioplasty with stent placement jan 11 [place]   stent x 3   • Coronary angioplasty with stent placement   01/01/11   • Cardiac catheterization [id number]   primary stent placement to the first circumflex marginal branch, balloon angioplasty to the third left anterior descending diagonal branch, intracoronary nitroglycerin administration   • Cardiac craterization 12/12/12   with angioplasty, stent       Past Medical History:   Diagnosis Date   • Arthritis    • Depression    • Diabetes mellitus    • Hyperlipidemia    • Hypertension    • " Myocardial infarction    • Stroke        Past Surgical History:   Procedure Laterality Date   • APPENDECTOMY     • BACK SURGERY     • CHOLECYSTECTOMY     • CORONARY STENT PLACEMENT      X8   • REPLACEMENT TOTAL KNEE     · Bilateral hemilaminotomy foraminotomy at L3-L4.  · Bilateral hemilaminotomy, laminoforaminotomy, and partial facetectomy at L4-L5.  · Posterior lateral spinal fusion, L3-L4 and L4-L5.  · Placement of interspinous Aspen clamp, L3-L4 and L4-L5.    Prior to Admission medications    Medication Sig Start Date End Date Taking? Authorizing Provider   ALPRAZolam (XANAX) 0.5 MG tablet Take 0.5 mg by mouth At Night As Needed for Anxiety.    Historical Provider, MD   atorvastatin (LIPITOR) 40 MG tablet Take 40 mg by mouth Daily.    Historical Provider, MD   butalbital-acetaminophen-caffeine (FIORICET, ESGIC) -40 MG per tablet Take 1 tablet by mouth 2 (Two) Times a Day As Needed for Headache.    Historical Provider, MD   clopidogrel (PLAVIX) 75 MG tablet Take 75 mg by mouth Daily.    Historical Provider, MD   diphenhydrAMINE (BENADRYL) 25 mg capsule Take 25 mg by mouth Every 6 (Six) Hours As Needed for Itching.    Historical Provider, MD   DULoxetine (CYMBALTA) 60 MG capsule Take 60 mg by mouth Daily.    Historical Provider, MD   fexofenadine (ALLEGRA) 180 MG tablet Take 180 mg by mouth Daily.    Historical Provider, MD   FLUoxetine (PROzac) 20 MG capsule Take 40 mg by mouth Daily.    Historical Provider, MD   gabapentin (NEURONTIN) 300 MG capsule Take 300 mg by mouth 3 (Three) Times a Day.    Historical Provider, MD   insulin glargine (LANTUS) 100 UNIT/ML injection Inject 80 Units under the skin Every Night.    Historical Provider, MD   levETIRAcetam (KEPPRA) 500 MG tablet Take 500 mg by mouth 2 (Two) Times a Day.    Historical Provider, MD   meloxicam (MOBIC) 15 MG tablet Take 15 mg by mouth Daily.    Historical Provider, MD   metoprolol tartrate (LOPRESSOR) 25 MG tablet Take 25 mg by mouth 2 (Two)  Times a Day.    Historical Provider, MD   raNITIdine (ZANTAC) 150 MG tablet Take 300 mg by mouth Every Night.    Historical Provider, MD   tamsulosin (FLOMAX) 0.4 MG capsule 24 hr capsule Take 1 capsule by mouth Every Night.    Historical Provider, MD   Triamcinolone Acetonide (NASACORT) 55 MCG/ACT nasal inhaler 2 sprays into each nostril Daily.    Historical Provider, MD       Hospital scheduled medications:     aspirin 325 mg Oral Daily   atorvastatin 40 mg Oral Daily   clopidogrel 75 mg Oral Daily   famotidine 40 mg Oral Nightly   gabapentin 300 mg Oral TID   heparin (porcine) 5,000 Units Subcutaneous Q8H   insulin detemir 80 Units Subcutaneous Nightly   insulin lispro 0-9 Units Subcutaneous 4x Daily With Meals & Nightly   levETIRAcetam 500 mg Oral Q12H   metoprolol tartrate 25 mg Oral Q12H   tamsulosin 0.4 mg Oral Nightly     Hospital PRN medications:  dextrose  •  dextrose  •  glucagon (human recombinant)  •  sodium chloride  •  Insert peripheral IV **AND** sodium chloride    No Known Allergies    Social History     Social History   • Marital status:      Spouse name: N/A   • Number of children: N/A   • Years of education: N/A     Occupational History   • Not on file.     Social History Main Topics   • Smoking status: Never Smoker   • Smokeless tobacco: Not on file   • Alcohol use Yes      Comment: OCCASIONALLY   • Drug use: No   • Sexual activity: Not on file     Other Topics Concern   • Not on file     Social History Narrative     History reviewed. No pertinent family history.    Review of Systems  A 14 point review of systems was reviewed and was negative except for pain in the bilateral thighs that will cause him to fall.     Vital Signs   Temp:  [97.2 °F (36.2 °C)-98.6 °F (37 °C)] 97.8 °F (36.6 °C)  Heart Rate:  [51-64] 60  Resp:  [18-21] 20  BP: (103-163)/(49-88) 143/60    General Exam:  Head:  Normal cephalic, atraumatic  HEENT:  Neck supple  CVS:  Regular rate and rhythm.  No murmurs  Carotid  Examination:  No bruits  Lungs:  Clear to auscultation  Abdomen:  Non-tender, Non-distended  Extremities:  No signs of peripheral edema  Skin:  No rashes    Neurologic Exam:    Mental Status:    -Awake, Alert, Poor historian  -No aphasia  -No dysarthria  -Follows simple and complex commands    CN II:  Visual fields full.  Pupils equally reactive to light  CN III, IV, VI:  Extraocular Muscles full with no signs of nystagmus  CN V:  Facial sensory is symmetric with no .  CN VII:  Facial motor symmetric  CN VIII:  Gross hearing intact bilaterally  CN IX/X:  Palate elevates symmetrically  CN XI:  Shoulder shrug symmetric  CN XII:  Tongue is midline on protrusion    Motor: (strength out of 5:  1= minimal movement, 2 = movement in plane of gravity, 3 = movement against gravity, 4 = movement against some resistance, 5 = full strength)    -Right Upper Ext: Proximal: 5 Distal: 5  -Left Upper Ext: Proximal: 5 Distal: 5 except mild weakness of interossei    -Right Lower Ext: Proximal: 5 Distal: 5  -Left Lower Ext: Proximal: 5 Distal: 5    DTR:  -Right   Bicep: 2 Triceps: 2 Brachioradialis: diminished   Patella: 2 Ankle: 2 Neg Babinski  -Left   Bicep: 2 Triceps: 2 Brachioradialis: diminished   Patella: 2 Ankle: 2 Neg Babinski    Sensory:  -Intact to light touch    Coordination:  -Finger to nose intact  -Heel to shin intact  -No ataxia    Gait  -Not tested for safety reasons.       Results Review:  Lab Results (last 7 days)     Procedure Component Value Units Date/Time    POC Glucose Fingerstick [[id number]]  (Abnormal) Collected:  08/14/17 1740    Specimen:  Blood Updated:  08/14/17 1755     Glucose 186 (H) mg/dL       : [id number] [employer] MediCard LMeter ID: [id number]       POC Glucose Fingerstick [[id number]]  (Abnormal) Collected:  08/14/17 1743    Specimen:  Blood Updated:  08/14/17 1755     Glucose 205 (H) mg/dL       : [id number] [employer] LMeter ID: [id number]       Protime-INR [[id number]]  (Normal)  Collected:  08/14/17 1746    Specimen:  Blood Updated:  08/14/17 1816     Protime 13.2 Seconds      INR 0.97    aPTT [[id number]]  (Normal) Collected:  08/14/17 1746    Specimen:  Blood Updated:  08/14/17 1816     PTT 27.5 seconds     CBC & Differential [[id number]] Collected:  08/14/17 1746    Specimen:  Blood Updated:  08/14/17 1817    Narrative:       The following orders were created for panel order CBC & Differential.  Procedure                               Abnormality         Status                     ---------                               -----------         ------                     CBC Auto Differential[[id number]]        Abnormal            Final result                 Please view results for these tests on the individual orders.    CBC Auto Differential [[id number]]  (Abnormal) Collected:  08/14/17 1746    Specimen:  Blood Updated:  08/14/17 1817     WBC 6.59 10*3/mm3      RBC 5.41 10*6/mm3      Hemoglobin 14.9 g/dL      Hematocrit 45.1 %      MCV 83.4 fL      MCH 27.5 (L) pg      MCHC 33.0 g/dL      RDW 14.7 %      RDW-SD 44.5 fl      MPV 11.2 fL      Platelets 173 10*3/mm3      Neutrophil % 50.8 %      Lymphocyte % 34.3 %      Monocyte % 9.3 %      Eosinophil % 4.9 (H) %      Basophil % 0.5 %      Immature Grans % 0.2 %      Neutrophils, Absolute 3.36 10*3/mm3      Lymphocytes, Absolute 2.26 10*3/mm3      Monocytes, Absolute 0.61 10*3/mm3      Eosinophils, Absolute 0.32 10*3/mm3      Basophils, Absolute 0.03 10*3/mm3      Immature Grans, Absolute 0.01 10*3/mm3     Comprehensive Metabolic Panel [[id number]]  (Abnormal) Collected:  08/14/17 1746    Specimen:  Blood Updated:  08/14/17 1836     Glucose 211 (H) mg/dL      BUN 18 mg/dL      Creatinine 1.03 mg/dL      Sodium 137 mmol/L      Potassium 4.6 mmol/L      Chloride 102 mmol/L      CO2 25.0 mmol/L      Calcium 9.2 mg/dL      Total Protein 7.2 g/dL      Albumin 4.20 g/dL      ALT (SGPT) 40 U/L      AST (SGOT) 23 U/L      Alkaline Phosphatase 92 U/L       Total Bilirubin 0.6 mg/dL      eGFR Non African Amer 71 mL/min/1.73      Globulin 3.0 gm/dL      A/G Ratio 1.4 g/dL      BUN/Creatinine Ratio 17.5     Anion Gap 10.0 mmol/L       Auto resulted.       POC Glucose Fingerstick [[id number]]  (Abnormal) Collected:  08/14/17 2346    Specimen:  Blood Updated:  08/15/17 0016     Glucose 134 (H) mg/dL       : [id number] María KristaMeter ID: [id number]       CBC Auto Differential [[id number]]  (Abnormal) Collected:  08/15/17 0555    Specimen:  Blood Updated:  08/15/17 0624     WBC 6.36 10*3/mm3      RBC 5.19 10*6/mm3      Hemoglobin 14.2 g/dL      Hematocrit 43.1 %      MCV 83.0 fL      MCH 27.4 (L) pg      MCHC 32.9 (L) g/dL      RDW 14.4 %      RDW-SD 43.9 fl      MPV 11.2 fL      Platelets 154 10*3/mm3      Neutrophil % 50.6 %      Lymphocyte % 35.1 %      Monocyte % 9.6 %      Eosinophil % 3.9 %      Basophil % 0.6 %      Immature Grans % 0.2 %      Neutrophils, Absolute 3.22 10*3/mm3      Lymphocytes, Absolute 2.23 10*3/mm3      Monocytes, Absolute 0.61 10*3/mm3      Eosinophils, Absolute 0.25 10*3/mm3      Basophils, Absolute 0.04 10*3/mm3      Immature Grans, Absolute 0.01 10*3/mm3     Comprehensive Metabolic Panel [[id number]]  (Abnormal) Collected:  08/15/17 0555    Specimen:  Blood Updated:  08/15/17 0637     Glucose 190 (H) mg/dL      BUN 19 mg/dL      Creatinine 0.99 mg/dL      Sodium 135 mmol/L      Potassium 4.0 mmol/L      Chloride 102 mmol/L      CO2 23.0 (L) mmol/L      Calcium 9.0 mg/dL      Total Protein 6.5 g/dL      Albumin 3.70 g/dL      ALT (SGPT) 39 U/L      AST (SGOT) 21 U/L      Alkaline Phosphatase 84 U/L      Total Bilirubin 0.4 mg/dL      eGFR Non African Amer 74 mL/min/1.73      Globulin 2.8 gm/dL      A/G Ratio 1.3 g/dL      BUN/Creatinine Ratio 19.2     Anion Gap 10.0 mmol/L     Narrative:       The MDRD GFR formula is only valid for adults with stable renal function between ages 18 and 70.    Magnesium [[id number]]  (Normal) Collected:   08/15/17 0555    Specimen:  Blood Updated:  08/15/17 0637     Magnesium 1.7 mg/dL     Phosphorus [[id number]]  (Normal) Collected:  08/15/17 0555    Specimen:  Blood Updated:  08/15/17 0637     Phosphorus 3.9 mg/dL     Troponin [[id number]]  (Abnormal) Collected:  08/15/17 0555    Specimen:  Blood Updated:  08/15/17 0647     Troponin I 0.046 (H) ng/mL     Lipid Panel [[id number]]  (Abnormal) Collected:  08/15/17 0555    Specimen:  Blood Updated:  08/15/17 0648     Total Cholesterol 235 (H) mg/dL      Triglycerides 310 (H) mg/dL      HDL Cholesterol 24 (L) mg/dL      LDL Cholesterol  164 (H) mg/dL      LDL/HDL Ratio 6.21    POC Glucose Fingerstick [[id number]]  (Abnormal) Collected:  08/15/17 0724    Specimen:  Blood Updated:  08/15/17 0756     Glucose 165 (H) mg/dL       : [id number] Hard KatyMeter ID: [id number]       Hemoglobin A1c [[id number]] Collected:  08/15/17 0555    Specimen:  Blood Updated:  08/15/17 0817     Hemoglobin A1C 7.1 %     Narrative:       Less than 6.0           Non-Diabetic Range  6.0-7.0                 ADA Therapeutic Target  Greater than 7.0        Action Suggested          .  Imaging Results (last 24 hours)     Procedure Component Value Units Date/Time    MRI Brain Without Contrast [[id number]] Collected:  08/14/17 1947     Updated:  08/14/17 1953    Narrative:       EXAMINATION:   MRI BRAIN WO CONTRAST-  8/14/2017 7:47 PM CDT     HISTORY: MRI BRAIN WO CONTRAST- 8/14/2017 7:03 AM CDT     HISTORY: left sided weakness     COMPARISON: 12/22/2015       TECHNIQUE: Multiplanar imaging of the brain was performed in a routine  fashion without contrast.     FINDINGS:   Diffusion: Restricted diffusion is noted in the right side of the sergio.  This is an ischemic infarction.     Midline structures: Nondisplaced.     Ventricles: Normal in configuration and symmetric in size.     Masses: No masses or mass effect.     Basilar cisterns: Maintained.     Extra axial space: No abnormal extra-axial fluid.      Gray-white matter signal: Increased MR signal the paraventricular white  matter is noted and at the juxtacortical gray-white junction consistent  with chronic microvascular ischemic change     Cerebellum: Normal.     Brainstem: Ischemic infarction is noted the right side of the sergio     Other: Proximal cervical spinal cord is normal. Bilateral globes and  orbits are normal in appearance. Normal cerebrovascular flow voids  noted. No abnormal signal in the mastoid air cells or paranasal sinuses.       Impression:       1. Ischemic infarction right side of the sergio     These results were called to Dr. Da Silva at approximately 7:50 PM   This report was finalized on 08/14/2017 19:50 by Dr. Aman Mcknight MD.    CT Angiogram Neck With & Without Contrast [[id number]] Collected:  08/14/17 2207     Updated:  08/14/17 2215    Narrative:       EXAMINATION:   CT ANGIOGRAM NECK W WO CONTRAST-  8/14/2017 10:07 PM CDT     HISTORY: CT ANGIOGRAM OF THE NECK 8/14/2017 9:11 PM CDT     HISTORY: cva     COMPARISON: None      DLP: 2217 mGy cm     In order to have a CT radiation dose as low as reasonably achievable,  Automated Exposure Control was utilized for adjustment of the mA and/or  KV according to patient size.     TECHNIQUE: Following the uneventful administration of Isovue contrast,  serial helical tomographic images of the neck were obtained following  angiogram protocol. Multiplanar MIP and 3D reconstructions were provided  for review.       FINDINGS:      Angiogram:   The aortic arch and visualized great vessels are patent, without  evidence of aneurysm, dissection, vessel cutoff, or flow limiting  stenosis.      The right common carotid artery is visualized in its bifurcation is  unremarkable. Calcified plaque is present in the right internal carotid  artery there is 50% or less associated stenosis.     The left common carotid artery demonstrates normal bifurcation mild  plaque is present in the left internal carotid artery  with 50% or less  associated stenosis.      Posteriorly, the vertebral arteries and visualized basilar artery  demonstrate no aneurysm, dissection, vessel cutoff, or flow-limiting  stenosis.      Other findings: The airway is patent. There is no mass or significant  lymphadenopathy. The lung apices are clear. Degenerative changes noted  cervical spine at the C5-6 level.. The visualized paranasal sinuses,  mastoid air cells and middle ear cavities are clear.       Impression:       Impression:   1. 50% or less stenosis associated the right internal carotid artery.  2. 50% or less stenosis associated left internal carotid artery.  3. Flow is demonstrated in both vertebrals and they form the basilar  artery.     This report was finalized on 08/14/2017 22:12 by Dr. Aman Mcknight MD.    CT Angiogram Head With & Without Contrast [[id number]] Collected:  08/14/17 2215     Updated:  08/14/17 2220    Narrative:       EXAMINATION:   CT ANGIOGRAM HEAD W WO CONTRAST-  8/14/2017 10:15 PM CDT     HISTORY: CT ANGIOGRAM HEAD W WO CONTRAST- 8/14/2017 9:11 PM CDT     HISTORY: CVA     COMPARISON: None      DOSE LENGTH PRODUCT: 2217 mGy cm. Automated exposure control was also  utilized to decrease patient radiation dose.     TECHNIQUE: Following the uneventful administration of Isovue contrast,  serial helical tomographic images of the brain were obtained following  angiogram protocol. Multiplanar MIP and 3D reconstructions were also  obtained.      FINDINGS:      Angiogram:   The visualized extracranial carotid vasculature is patent bilaterally,  without evidence of aneurysm, dissection, vessel cutoff, or  flow-limiting stenosis. Bilateral intracranial portions of the ICA's  demonstrate no evidence of aneurysm, dissection, vessel cutoff, or  flow-limiting stenosis.      Posteriorly, the visualized vertebral arteries and basilar arteries  demonstrate no aneurysm, dissection, vessel cutoff, or flow-limiting  stenosis.      Within the  "brain, the [place] of [place] is intact, without abnormality.      Other findings:  The osseous calvarium is intact. The surrounding soft tissues are  unremarkable. The paranasal sinuses and bilateral mastoid air cells are  clear. The imaged portions of the bilateral globes and orbits are  unremarkable.        Impression:       Impression:   1. Negative CT angiogram of the level of the [place] of [place].         This report was finalized on 08/14/2017 22:17 by Dr. Aman Mcknight MD.              Impression    1. Right pontine stroke  2. Previous left thalamic ischemic event in Feb 2107.  This would not cause left sided weakness which was not his presentation (so wasn't one of the left sided weakness event).   3. Chronic opioid use which cn cause cognitive impairment at his age especially when taken with xanax.   4. Cognitive impairment  5. ? Seizures. Episodes of confusion and recurrent left sided weakness episodes does raise the issue of possible seizure.   6. Headaches. On Butalbital. Very poor choice for this individual.   Risk factors  For stroke  2. HTN  3. DM  4. Hyperlipidemia  5. LM--Will need to make sure he uses this daily.  Sleep apnea may be worsened with opioids plus xanax and his 2 to 3 times a week alcohol intake (if this is correct--obtained from records) .   6. Noncompliance with antiplatelet and lipid lowering meds due to intolerance (if son is correct)     7. H/O syncope due to orthostasis March 2017. Patient having bilateral lower extremity weakness and \"legs like jelly\" may be due to orthostasis.   8. Cerebral microangiopathy    Plan  1.  EEG  2.  Hemoglobin A1c goal of <7.   3.  Wife to bring home CPAP and start tonight.  4.  Telemetry  5.   Nutritionist to discuss diet with patient and wife  If he is not going to take lipid lowering agent he must be on a strict diet to help his diabetes and cholesterol.  6.  Orthostase patient for autonomic orthostasis  7. Check B12, folate, magnesium. TSH,   8. " Give thiamin 100 mg IV today and tomorrow  then PO thereafter.   9. D/C benadryl as this causes cognitive impairment.   10. . Will need to speak with family when they come to discuss the multiple factors contributing to this patient's recurrent events.     I discussed the patients findings and my recommendations with patient and family    Tamica Brewer MD  08/15/17  12:06 PM

## 2017-08-15 NOTE — PLAN OF CARE
Problem: Patient Care Overview (Adult)  Goal: Plan of Care Review  Outcome: Ongoing (interventions implemented as appropriate)    08/15/17 1107   Coping/Psychosocial Response Interventions   Plan Of Care Reviewed With patient   Patient Care Overview   Progress no change   Outcome Evaluation   Outcome Summary/Follow up Plan Physical therapy evaluation completed. Pt was CGA to min assist with gait secondary to one LOB. Pt was very impulsive and displayed decreased insight into safety and did not follow verbal cues for safety. Pt will benefit from continued skilled therapy for dynamic balance training in order to decrease his risk for falls. Therapy recommend pt to d/c home with OP PT at d/c.          Problem: Inpatient Physical Therapy  Goal: Gait Training Goal LTG- PT  Outcome: Ongoing (interventions implemented as appropriate)    08/15/17 1107   Gait Training PT LTG   Gait Training Goal PT LTG, Date Established 08/15/17   Gait Training Goal PT LTG, Time to Achieve by discharge   Gait Training Goal PT LTG, [place] Level independent   Gait Training Goal PT LTG, Distance to Achieve Pt to ambualte > 500 ft while displaying adequate dorsiflexion to clear foot on the left        Goal: Static Standing Balance Goal LTG- PT  Outcome: Ongoing (interventions implemented as appropriate)    08/15/17 1107 08/15/17 1503   Static Standing Balance PT LTG   Static Standing Balance PT LTG, Date Established 08/15/17 --    Static Standing Balance PT LTG, Time to Achieve by discharge --    Static Standing Balance PT LTG, [place] Level --  supervision required       Goal: Dynamic Standing Balance Goal LTG- PT  Outcome: Ongoing (interventions implemented as appropriate)    08/15/17 1107 08/15/17 1503   Dynamic Standing Balance PT LTG   Dynamic Standing Balance PT LTG, Date Established 08/15/17 --    Dynamic Standing Balance PT LTG, Time to Achieve by discharge --    Dynamic Standing Balance PT LTG, [place] Level contact  guard assist --    Dynamic Standing Balance PT LTG, Additional Goal --  Pt to step up and down onto a dynamic surface without LOB.

## 2017-08-15 NOTE — PLAN OF CARE
Problem: Patient Care Overview (Adult)  Goal: Plan of Care Review  Outcome: Ongoing (interventions implemented as appropriate)    08/15/17 3976   Coping/Psychosocial Response Interventions   Plan Of Care Reviewed With patient   Patient Care Overview   Progress progress toward functional goals as expected   Outcome Evaluation   Outcome Summary/Follow up Plan Pt still has slight facial droop on the left side. Speech hasn't been slurred. Left hand  is weaker than the right. Hasn't c/o any pain, no new neuro changes.        Goal: Adult Individualization and Mutuality  Outcome: Ongoing (interventions implemented as appropriate)  Goal: Discharge Needs Assessment  Outcome: Ongoing (interventions implemented as appropriate)    Problem: Stroke (Ischemic) (Adult)  Goal: Signs and Symptoms of Listed Potential Problems Will be Absent or Manageable (Stroke)  Outcome: Ongoing (interventions implemented as appropriate)    Problem: Fall Risk (Adult)  Goal: Absence of Falls  Outcome: Ongoing (interventions implemented as appropriate)

## 2017-08-15 NOTE — THERAPY EVALUATION
Acute Care - Occupational Therapy Initial Evaluation  [facility]     Patient Name: Morris Yousif  : 1945  MRN: [id number]  Today's Date: 8/15/2017  Onset of Illness/Injury or Date of Surgery Date: (P) 17  Date of Referral to OT: 08/15/17  Referring Physician: Dr. Saravia    Admit Date: 2017       ICD-10-CM ICD-9-CM   1. Cerebrovascular accident (CVA), unspecified mechanism I63.9 434.91     Patient Active Problem List   Diagnosis   • CVA (cerebral vascular accident)     Past Medical History:   Diagnosis Date   • Arthritis    • Depression    • Diabetes mellitus    • Hyperlipidemia    • Hypertension    • Myocardial infarction    • Stroke      Past Surgical History:   Procedure Laterality Date   • APPENDECTOMY     • BACK SURGERY     • CHOLECYSTECTOMY     • CORONARY STENT PLACEMENT      X8   • REPLACEMENT TOTAL KNEE            OT ASSESSMENT FLOWSHEET (last 72 hours)      OT Evaluation       08/15/17 1010 08/15/17 0806 17 2314 17 2310       Rehab Evaluation    Document Type (P)  evaluation  -RZ evaluation  -       Subjective Information  agree to therapy;complains of;weakness;dizziness;nausea/vomiting;numbness   chronic numbness/tingling bilat feet  -       General Information    Patient Profile Review (P)  yes  -RZ yes  -       Onset of Illness/Injury or Date of Surgery Date (P)  17  -RZ 17  -       Referring Physician  Dr. Saravia  -       General Observations  in supported sitting in bed eating breakfast  -       Pertinent History Of Current Problem  Pt. had recent CVA & was treated at an outside facility with TPA.  Family noticed decline in fxl performance & worsening facial droop.  MRI which was positive for acute stroke of right sergio.   -CH       Precautions/Limitations  fall precautions  -CH       Prior Level of Function  independent:;all household mobility;community mobility;ADL's;driving  -CH       Equipment Currently Used at Home  none  -CH  none  -GG      Plans/Goals Discussed With  patient;agreed upon  -       Risks Reviewed  patient:;LOB;increased discomfort  -       Benefits Reviewed  patient:;improve function;increase independence;increase strength;increase balance  -       Barriers to Rehab  medically complex  -       Living Environment    Lives With  spouse  - spouse  -      Living Arrangements  house  - house  -GG      Home Accessibility  stairs to enter home   walk in shower  - no concerns  -      Number of Stairs to Enter Home  2  -       Stair Railings at Home   none  -GG      Type of Financial/Environmental Concern   none  -GG      Transportation Available   car  -      Clinical Impression    Date of Referral to OT  08/15/17  -       OT Diagnosis  minor strength deficits, fall risk  -       Prognosis  good  -       Impairments Found (describe specific impairments)  gait, locomotion, and balance;muscle performance;motor function  -       Patient/Family Goals Statement  to return home, increase leg strength  -       Criteria for Skilled Therapeutic Interventions Met  yes;treatment indicated  -       Rehab Potential  good, to achieve stated therapy goals  -       Therapy Frequency  3-5 times/wk  -       Predicted Duration of Therapy Intervention (days/wks)  until DE  -       Anticipated Discharge Disposition  home with assist;home with outpatient services  -       Functional Level Prior    Ambulation    0-->independent  -GG     Transferring    0-->independent  -     Toileting    0-->independent  -GG     Bathing    0-->independent  -GG     Dressing    0-->independent  -GG     Eating    0-->independent  -     Communication    0-->understands/communicates without difficulty  -     Swallowing    0-->swallows foods/liquids without difficulty  -     Pain Assessment    Pain Assessment  No/denies pain  -       Vision Assessment/Intervention    Visual Impairment  WNL  -       Visual Impairment Comment  tracking,  visual perception appropriate, pt. verbalizes no visual disturbance  -       Cognitive Assessment/Intervention    Current Cognitive/Communication Assessment  functional  -       Orientation Status  oriented x 4  -       Follows Commands/Answers Questions  100% of the time;able to follow multi-step instructions  -       Personal Safety  at risk behaviors demonstrated;impulsive;decreased awareness, need for assist;decreased awareness, need for safety  -       Personal Safety Interventions  fall prevention program maintained;gait belt;muscle strengthening facilitated;nonskid shoes/slippers when out of bed;supervised activity  -       ROM (Range of Motion)    General ROM Detail  NO UE ROM deficits identified  -       MMT (Manual Muscle Testing)    General MMT Assessment Detail  L hand  strength slightly impaired 4/5, all other jts 5/5  -       Bed Mobility, Assessment/Treatment    Bed Mobility, Assistive Device  head of bed elevated  -       Bed Mob, Supine to Sit, [place]  supervision required  -       Transfer Assessment/Treatment    Transfers, Sit-Stand [place]  stand by assist;contact guard assist  -       Transfers, Stand-Sit [place]  stand by assist  -       Transfer, Safety Issues  balance decreased during turns  -       Transfer, Impairments  impaired balance  -       Transfer, Comment  1 episode of LOB during sit to stand to the R side   -       Functional Mobility    Functional Mobility- Ind. Level  supervision required;contact guard assist  -       Functional Mobility- Safety Issues  balance decreased during turns  -       Functional Mobility- Comment  HHA offered which pt. reported he did not require, noted decrease in safety & impulsivity  -       Upper Body Dressing Assessment/Training    UB Dressing Assess/Train, Clothing Type  doffing:;hospital gown;donning:  -       UB Dressing Assess/Train, Position  sitting  -       UB Dressing Assess/Train,  [place]  set up required  -       UB Dressing Assess/Train, Comment  Pt. able to untie but not able to tie gown at nape of neck  -       Lower Body Dressing Assessment/Training    LB Dressing Assess/Train, Clothing Type  donning:   sandals with velcro straps  -       LB Dressing Assess/Train, Position  supported sitting  -       LB Dressing Assess/Train, [place]  set up required  -       LB Dressing Assess/Train, Impairments  coordination impaired  -       LB Dressing Assess/Train, Comment  Pt. required extra time  -       Motor Skills/Interventions    Additional Documentation  Balance Skills Training (Group);Fine Motor Coordination Training (Group);Gross Motor Coordination Training (Group)  -       Balance Skills Training    Sitting-Level of Assistance  Independent  -       Sitting-Balance Support  Feet supported  -       Standing-Level of Assistance  Close supervision  -       Gait Balance-Level of Assistance  Contact guard;Close supervision  -       Gross Motor Coordination Training    Gross Motor Skill, Impairments Detail  Pt. performed finger to nose with eyes open WNL bilaterally.  No significant lag or decreased coordination at either UE  -CH       Fine Motor Coordination Training    Fine Motor Tool Usage  Pt. able to open all packages & feed self using bilaterally techniques to cut, spread, & scoop food.  Pt. able to sign name & manage straps on shoes.  -CH       Opposition  Right:;Left:;intact  -       Sensory Assessment/Intervention    Sensory Impairment  numbness;tingling   chronic in bilat feet 2' neuropathy  -CH       Light Touch  LUE;RUE  -CH       LUE Light Touch  WNL  -CH       RUE Light Touch  WNL  -CH       Deep Pressure  LUE;RUE  -CH       LUE Deep Pressure  WNL  -CH       RUE Deep Pressure  WNL  -CH       Pain Sensation  LUE;RUE  -CH       LUE Pain Sensation  WNL  -CH       RUE Pain Sensation  WNL  -CH       General Therapy Interventions    Planned Therapy  Interventions  strengthening;balance training;ADL retraining;home exercise program  -       Positioning and Restraints    Pre-Treatment Position  in bed  -       Post Treatment Position  chair  -CH       In Chair  sitting;call light within reach;encouraged to call for assist;notified [facility]  -         User Key  (r) = Recorded By, (t) = Taken By, (c) = Cosigned By    Initials Name Effective Dates     Rosette Chun OTR/L 08/02/16 -     KARINA Landon RN 08/02/16 -     CARIDAD Hudson, PT Student 05/08/17 -            Occupational Therapy Education     Title: PT OT SLP Therapies (Active)     Topic: Occupational Therapy (Active)     Point: ADL training (Done)    Description: Instruct learner(s) on proper safety adaptation and remediation techniques during self care or transfers.   Instruct in proper use of assistive devices.    Learning Progress Summary    Learner Readiness Method Response Comment Documented by Status   Patient Acceptance E VU,NR Purpose & benefits of OT eval & tx, HEP that would be beneficial for pt. recovery, safety during fxl mobility & ADLs, obs strength/balance/fxl deficits, benefits of activity, risks of prolonged immobility  08/15/17 1020 Done               Point: Home exercise program (Done)    Description: Instruct learner(s) on appropriate technique for monitoring, assisting and/or progressing therapeutic exercises/activities.    Learning Progress Summary    Learner Readiness Method Response Comment Documented by Status   Patient Acceptance E VU,NR Purpose & benefits of OT eval & tx, HEP that would be beneficial for pt. recovery, safety during fxl mobility & ADLs, obs strength/balance/fxl deficits, benefits of activity, risks of prolonged immobility  08/15/17 1020 Done                      User Key     Initials Effective Dates Name Provider Type Discipline     08/02/16 -  Rosette Chun, OTR/L Occupational Therapist OT                  OT Recommendation and  Plan  Anticipated Discharge Disposition: home with assist, home with outpatient services  Planned Therapy Interventions: strengthening, balance training, ADL retraining, home exercise program  Therapy Frequency: 3-5 times/wk  Plan of Care Review  Plan Of Care Reviewed With: patient  Progress: progress toward functional goals as expected  Outcome Summary/Follow up Plan: OT krystin completed.  Pt. demo's deficits in dynamic standing balance, L  strength, & impulsivity with decreased safety awareness.  Pt. requires CGA-S for ADLs & fxl mobility.  Mr. Yousif would benefit from cont'd OT tx to improve his indep and safety.  He is interested in outpatient therapy at [facility].            OT Goals       08/15/17 0806          Transfer Training OT LTG    Transfer Training OT LTG, Date Established 08/15/17  -      Transfer Training OT LTG, Time to Achieve by discharge  -      Transfer Training OT LTG, Activity Type all transfers  -      Transfer Training OT LTG, [place] Level conditional independence  -      Patient Education OT LTG    Patient Education OT LTG, Date Established 08/15/17  -      Patient Education OT LTG, Time to Achieve by discharge  -      Patient Education OT LTG, Education Type HEP;written program  -      Patient Education OT LTG, Education Understanding independent;verbalizes understanding  -      ADL OT LTG    ADL OT LTG, Date Established 08/15/17  -      ADL OT LTG, Time to Achieve by discharge  -      ADL OT LTG, Activity Type ADL skills  -      ADL OT LTG, [place] Level modified independent  -        User Key  (r) = Recorded By, (t) = Taken By, (c) = Cosigned By    Initials Name Provider Type     Rosette Chun, OTR/L Occupational Therapist                Outcome Measures       08/15/17 0806          How much help from another is currently needed...    Putting on and taking off regular lower body clothing? 3  -CH      Bathing (including washing, rinsing, and drying) 3  -CH       Toileting (which includes using toilet bed pan or urinal) 3  -CH      Putting on and taking off regular upper body clothing 4  -CH      Taking care of personal grooming (such as brushing teeth) 4  -CH      Eating meals 4  -CH      Score 21  -CH      Functional Assessment    Outcome Measure Options AM-PAC 6 Clicks Daily Activity (OT)  -        User Key  (r) = Recorded By, (t) = Taken By, (c) = Cosigned By    Initials Name Provider Type     Rosette Chun OTR/L Occupational Therapist          Time Calculation:        Therapy Charges for Today     Code Description Service Date Service Provider Modifiers Qty    [id number]  OT SELFCARE CURRENT 8/15/2017 Rosette Chun OTR/L GO, CJ 1    [id number]  OT SELFCARE PROJECTED 8/15/2017 GABRIELLE Damian/L GO, CI 1          OT G-codes  OT Professional Judgement Used?: Yes  OT Functional Scales Options: AM-PAC 6 Clicks Daily Activity (OT)  Score: 21  Functional Limitation: Self care  Self Care Current Status (): At least 20 percent but less than 40 percent impaired, limited or restricted  Self Care Goal Status (): At least 1 percent but less than 20 percent impaired, limited or restricted    GABRIELLE Vidal/KATTY  8/15/2017

## 2017-08-15 NOTE — SIGNIFICANT NOTE
Pt passed swallow screen. Attempted to see pt for cognitive-linguistic eval, however pt was sleeping and his wife requested that SLP not wake him. She stated that he did still have some trouble with word-finding which was residual from a stroke 6 months ago, but that he had not received any therapy. She was agreeable to SLP evaluating pt tomorrow.     08/15/17 1508   Time Calculation- SLP   SLP Start Time 1451   SLP Stop Time 1505   SLP Time Calculation (min) 14 min   SLP Non-Billable Time (min) 14 min   SLP Received On 08/15/17   Trev Goldsmith CCC-SLP 8/15/2017 3:10 PM

## 2017-08-15 NOTE — PLAN OF CARE
Problem: Patient Care Overview (Adult)  Goal: Plan of Care Review  Outcome: Ongoing (interventions implemented as appropriate)    08/15/17 0452   Coping/Psychosocial Response Interventions   Plan Of Care Reviewed With patient   Patient Care Overview   Progress no change   Outcome Evaluation   Outcome Summary/Follow up Plan Pt. admitted with CVA. Left side facial droop, some slurring speech. Left  is weaker than right slightly, though no arm drift. No neuro changes since admission.        Goal: Adult Individualization and Mutuality  Outcome: Ongoing (interventions implemented as appropriate)    Problem: Stroke (Ischemic) (Adult)  Goal: Signs and Symptoms of Listed Potential Problems Will be Absent or Manageable (Stroke)  Outcome: Ongoing (interventions implemented as appropriate)    Problem: Fall Risk (Adult)  Goal: Identify Related Risk Factors and Signs and Symptoms  Outcome: Outcome(s) achieved Date Met:  08/15/17  Goal: Absence of Falls  Outcome: Ongoing (interventions implemented as appropriate)

## 2017-08-15 NOTE — THERAPY EVALUATION
Acute Care - Physical Therapy Initial Evaluation  [facility]     Patient Name: Morris Yousif  : 1945  MRN: [id number]  Today's Date: 8/15/2017   Onset of Illness/Injury or Date of Surgery Date: 17  Date of Referral to PT: 08/15/17  Referring Physician: Dr. Saravia      Admit Date: 2017     Visit Dx:    ICD-10-CM ICD-9-CM   1. Cerebrovascular accident (CVA), unspecified mechanism I63.9 434.91   2. Left hand weakness M62.81 728.87   3. Balance problem R26.89 781.99     Patient Active Problem List   Diagnosis   • CVA (cerebral vascular accident)     Past Medical History:   Diagnosis Date   • Arthritis    • Depression    • Diabetes mellitus    • Hyperlipidemia    • Hypertension    • Myocardial infarction    • Stroke      Past Surgical History:   Procedure Laterality Date   • APPENDECTOMY     • BACK SURGERY     • CHOLECYSTECTOMY     • CORONARY STENT PLACEMENT      X8   • REPLACEMENT TOTAL KNEE            PT ASSESSMENT (last 72 hours)      PT Evaluation       08/15/17 1436 08/15/17 1010    Rehab Evaluation    Document Type therapy note (daily note)  -MS (r) RZ (t) MS (c) evaluation  -MS (r) RZ (t) MS (c)    Subjective Information  agree to therapy;nausea/vomiting;weakness   B feet numbness chronic, Weakness left   -MS (r) RZ (t) MS (c)    Patient Effort, Rehab Treatment  good  -MS (r) RZ (t) MS (c)    Symptoms Noted Comment  Pt is very IMPULSIVE! Does not follow verbal cues for safety and directions   -MS (r) RZ (t) MS (c)    General Information    Patient Profile Review  yes  -MS (r) RZ (t) MS (c)    Onset of Illness/Injury or Date of Surgery Date  17  -MS (r) RZ (t) MS (c)    Referring Physician  Dr. Saravia  -MS (r) RZ (t) MS (c)    General Observations  Pt standing in tee telemetry not donned   -MS (r) RZ (t) MS (c)    Pertinent History Of Current Problem  Recent hx of CVA and was treated at an outside facility. Pt has worsening of sx on  and came to [facility]. MRI: acute stroke of sergio.  "Angiogram 50% or less stenosis of R and L  internal carotid. Dx: CVA (ischemica) mildly elevated troponin, DM type 2, HTN, hyperlipidemia and depression   -MS (r) RZ (t) MS (c)    Precautions/Limitations  fall precautions  -MS (r) RZ (t) MS (c)    Prior Level of Function  independent:;community mobility;all household mobility;gait;transfer;bed mobility;driving   Pt reports multiple falls at home   -MS (r) RZ (t) MS (c)    Equipment Currently Used at Home  none   Pt declines use of cane  -MS (r) RZ (t) MS (c)    Plans/Goals Discussed With  patient;agreed upon  -MS (r) RZ (t) MS (c)    Risks Reviewed  patient:;LOB;nausea/vomiting;dizziness;increased discomfort  -MS (r) RZ (t) MS (c)    Benefits Reviewed  patient:;improve function;increase independence;increase strength;increase balance;increase knowledge  -MS (r) RZ (t) MS (c)    Barriers to Rehab  medically complex   impulsive   -MS (r) RZ (t) MS (c)    Living Environment    Lives With  spouse  -MS (r) RZ (t) MS (c)    Living Arrangements  house  -MS (r) RZ (t) MS (c)    Home Accessibility  stairs to enter home  -MS (r) RZ (t) MS (c)    Number of Stairs to Enter Home  2  -MS (r) RZ (t) MS (c)    Clinical Impression    Date of Referral to PT  08/15/17  -MS (r) RZ (t) MS (c)    PT Diagnosis  Pt is very impulsive with decreased insight into safety. Pt present with decreased  strength L UE and impaired dynamic balance.   -MS (r) RZ (t) MS (c)    Prognosis  Good  -MS (r) RZ (t) MS (c)    Functional Level At Time Of Evaluation  standing in hallway   -MS (r) RZ (t) MS (c)    Patient/Family Goals Statement  \" I just want to see the doctor so that I can go home\"  -MS (r) RZ (t) MS (c)    Criteria for Skilled Therapeutic Interventions Met  yes;treatment indicated  -MS (r) RZ (t) MS (c)    Pathology/Pathophysiology Noted (Describe Specifically for Each System)  endocrine/metabolic;neuromuscular  -MS (r) RZ (t) MS (c)    Impairments Found (describe specific impairments)  " gait, locomotion, and balance   impaired coordination   -MS (r) RZ (t) MS (c)    Rehab Potential  good, to achieve stated therapy goals  -MS (r) RZ (t) MS (c)    Predicted Duration of Therapy Intervention (days/wks)  until discharge  -MS (r) RZ (t) MS (c)    Pain Assessment    Pain Assessment  No/denies pain  -MS (r) RZ (t) MS (c)    Vision Assessment/Intervention    Visual Impairment  WNL  -MS (r) RZ (t) MS (c)    Visual Impairment Comment  visual tracking, scanning and convergence intact   -MS (r) RZ (t) MS (c)    Cognitive Assessment/Intervention    Current Cognitive/Communication Assessment  functional  -MS (r) RZ (t) MS (c)    Orientation Status  oriented x 4  -MS (r) RZ (t) MS (c)    Follows Commands/Answers Questions  100% of the time;able to follow multi-step instructions;able to follow single-step instructions  -MS (r) RZ (t) MS (c)    Personal Safety  at risk behaviors demonstrated;decreased awareness, need for safety;decreased awareness, need for assist;impulsive;mild impairment  -MS (r) RZ (t) MS (c)    Personal Safety Interventions  elopement precautions initiated;fall prevention program maintained;gait belt;muscle strengthening facilitated;nonskid shoes/slippers when out of bed;supervised activity;other (see comments)   Pt educated to call for assist and get up with staff  -MS (r) RZ (t) MS (c)    ROM (Range of Motion)    General ROM  no range of motion deficits identified  -MS (r) RZ (t) MS (c)    MMT (Manual Muscle Testing)    General MMT Assessment Detail  L  strength 4/5, L dorsiflexion 4/5. all other MMT 5/5  -MS (r) RZ (t) MS (c)    Bed Mobility, Assessment/Treatment    Bed Mobility, Comment  standing in tee at start of eval. sitting EOB with nsg at end of eval   -MS (r) RZ (t) MS (c)    Transfer Assessment/Treatment    Transfers, Sit-Stand [place]  stand by assist;contact guard assist  -MS (r) RZ (t) MS (c)    Transfers, Stand-Sit [place]  stand by assist  -MS (r) RZ (t) MS (c)     Transfer, Safety Issues  loses balance backward;balance decreased during turns;impulsivity   Impulsive. Does not follow verbal cues for safety   -MS (r) RZ (t) MS (c)    Transfer, Impairments  impaired balance  -MS (r) RZ (t) MS (c)    Gait Assessment/Treatment    Gait, [place] Level  contact guard assist;minimum assist (75% patient effort)   Min assist for one LOB  -MS (r) RZ (t) MS (c)    Gait, Distance (Feet)  350  -MS (r) RZ (t) MS (c)    Gait, Gait Pattern Analysis  swing-through gait  -MS (r) RZ (t) MS (c)    Gait, Gait Deviations  left:;toe-to-floor clearance decreased;decreased heel strike  -MS (r) RZ (t) MS (c)    Gait, Safety Issues  loses balance backward  -MS (r) RZ (t) MS (c)    Gait, Impairments  impaired balance;coordination impaired  -MS (r) RZ (t) MS (c)    Gait, Comment  Pt demonstrates decrease toe clearance with swing phase on  the L. He denied this when asked if he felt his foot catching,  -MS (r) RZ (t) MS (c)    Stairs Assessment/Treatment    Number of Stairs  10  -MS (r) RZ (t) MS (c)    Stairs, Handrail Location  right side (ascending)  -MS (r) RZ (t) MS (c)    Stairs, [place] Level  contact guard assist;verbal cues required  -MS (r) RZ (t) MS (c)    Stairs, Technique Used  step over step (ascending);step to step (descending)  -MS (r) RZ (t) MS (c)    Stairs, Impairments  impaired balance;coordination impaired  -MS (r) RZ (t) MS (c)    Motor Skills/Interventions    Additional Documentation  Balance Skills Training (Group)  -MS (r) RZ (t) MS (c)    Balance Skills Training    Sitting-Level of Assistance  Independent  -MS (r) RZ (t) MS (c)    Sitting-Balance Support  Feet supported  -MS (r) RZ (t) MS (c)    Standing-Level of Assistance  Close supervision  -MS (r) RZ (t) MS (c)    Static Standing Balance Support  No upper extremity supported  -MS (r) RZ (t) MS (c)    Standing-Balance Activities  Retrieve object from floor   LOB when reaching toward floor against PT verbal cues  -MS (r)  RZ (t) MS (c)    Gait Balance-Level of Assistance  Close supervision;Contact guard;Minimum assistance   min assist due to LOB   -MS (r) RZ (t) MS (c)    Gait Balance Activities  stepping over object;side-stepping;scanning environment R/L  -MS (r) RZ (t) MS (c)    Ankle Strategy Assessment (Balance Skills)  impaired   -MS (r) RZ (t) MS (c)    Fine Motor Coordination Training    Opposition  Right:;Left:;intact  -MS (r) RZ (t) MS (c)    Gross Motor Coordination Training    Gross Motor Skill, Impairments Detail  B FNF intact, L UE EDENILSON slowed. L LE EDENILSON slow and uncoordinated   -MS (r) RZ (t) MS (c)    Sensory Assessment/Intervention    Sensory Impairment  numbness   B feet   -MS (r) RZ (t) MS (c)    Light Touch  RLE;LLE;RUE;LUE  -MS (r) RZ (t) MS (c)    LUE Light Touch  WNL  -MS (r) RZ (t) MS (c)    RUE Light Touch  WNL  -MS (r) RZ (t) MS (c)    LLE Light Touch  WNL  -MS (r) RZ (t) MS (c)    RLE Light Touch  WNL  -MS (r) RZ (t) MS (c)    Sharp/Dull Discrimination  --  -MS (r) RZ (t) MS (c)    Deep Pressure  LLE;LUE  -MS (r) RZ (t) MS (c)    LUE Deep Pressure  WNL  -MS (r) RZ (t) MS (c)    RUE Deep Pressure  WNL  -MS (r) RZ (t) MS (c)    Positioning and Restraints    Pre-Treatment Position  other (comment)   standing in tee   -MS (r) RZ (t) MS (c)    Post Treatment Position  bed  -MS (r) RZ (t) MS (c)    In Bed  sitting EOB;notified nsg;with nsg   with [facility] staff preparing for his shower  -MS (r) RZ (t) MS (c)      08/15/17 0806 08/14/17 5417    Rehab Evaluation    Document Type evaluation  -CH     Subjective Information agree to therapy;complains of;weakness;dizziness;nausea/vomiting;numbness   chronic numbness/tingling bilat feet  -     General Information    Patient Profile Review yes  -CH     Onset of Illness/Injury or Date of Surgery Date 08/14/17  -     Referring Physician Dr. Saravia  -     General Observations in supported sitting in bed eating breakfast  -     Pertinent History Of Current Problem Pt. had  recent CVA & was treated at an outside facility with TPA.  Family noticed decline in fxl performance & worsening facial droop.  MRI which was positive for acute stroke of right sergio.   -     Precautions/Limitations fall precautions  -     Prior Level of Function independent:;all household mobility;community mobility;ADL's;driving  -     Equipment Currently Used at Home none  -     Plans/Goals Discussed With patient;agreed upon  -     Risks Reviewed patient:;LOB;increased discomfort  -     Benefits Reviewed patient:;improve function;increase independence;increase strength;increase balance  -     Barriers to Rehab medically complex  -     Living Environment    Lives With spouse  - spouse  -GG    Living Arrangements house  - house  -GG    Home Accessibility stairs to enter home   walk in shower  - no concerns  -    Number of Stairs to Enter Home 2  -     Stair Railings at Home  none  -    Type of Financial/Environmental Concern  none  -    Transportation Available  car  -    Pain Assessment    Pain Assessment No/denies pain  -     Vision Assessment/Intervention    Visual Impairment WNL  -     Visual Impairment Comment tracking, visual perception appropriate, pt. verbalizes no visual disturbance  -     Cognitive Assessment/Intervention    Current Cognitive/Communication Assessment functional  -     Orientation Status oriented x 4  -     Follows Commands/Answers Questions 100% of the time;able to follow multi-step instructions  -     Personal Safety at risk behaviors demonstrated;impulsive;decreased awareness, need for assist;decreased awareness, need for safety  -     Personal Safety Interventions fall prevention program maintained;gait belt;muscle strengthening facilitated;nonskid shoes/slippers when out of bed;supervised activity  -     ROM (Range of Motion)    General ROM Detail NO UE ROM deficits identified  -     MMT (Manual Muscle Testing)    General MMT Assessment  Detail L hand  strength slightly impaired 4/5, all other jts 5/5  -     Bed Mobility, Assessment/Treatment    Bed Mobility, Assistive Device head of bed elevated  -     Bed Mob, Supine to Sit, Love supervision required  -     Transfer Assessment/Treatment    Transfers, Sit-Stand Love stand by assist;contact guard assist  -     Transfers, Stand-Sit Love stand by assist  -     Transfer, Safety Issues balance decreased during turns  -     Transfer, Impairments impaired balance  -     Transfer, Comment 1 episode of LOB during sit to stand to the R side   -     Motor Skills/Interventions    Additional Documentation Balance Skills Training (Group);Fine Motor Coordination Training (Group);Gross Motor Coordination Training (Group)  -     Balance Skills Training    Sitting-Level of Assistance Independent  -     Sitting-Balance Support Feet supported  -     Standing-Level of Assistance Close supervision  -     Gait Balance-Level of Assistance Contact guard;Close supervision  -     Fine Motor Coordination Training    Fine Motor Tool Usage Pt. able to open all packages & feed self using bilaterally techniques to cut, spread, & scoop food.  Pt. able to sign name & manage straps on shoes.  -     Opposition Right:;Left:;intact  -     Gross Motor Coordination Training    Gross Motor Skill, Impairments Detail Pt. performed finger to nose with eyes open WNL bilaterally.  No significant lag or decreased coordination at either UE  -     Sensory Assessment/Intervention    Sensory Impairment numbness;tingling   chronic in bilat feet 2' neuropathy  -     Light Touch LUE;RUE  -CH     LUE Light Touch WNL  -     RUE Light Touch WNL  -     Deep Pressure LUE;RUE  -CH     LUE Deep Pressure WNL  -     RUE Deep Pressure WNL  -     Pain Sensation LUE;RUE  -CH     LUE Pain Sensation WNL  -     RUE Pain Sensation WNL  -     Positioning and Restraints    Pre-Treatment Position  in bed  -     Post Treatment Position chair  -     In Chair sitting;call light within reach;encouraged to call for assist;[place] [facility]  -       08/14/17 9050       General Information    Equipment Currently Used at Home none  -GG       User Key  (r) = Recorded By, (t) = Taken By, (c) = Cosigned By    Initials Name Provider Type    CH Rosette Chun, OTR/L Occupational Therapist    MS Lilliana Moctezuma, PT DPT Physical Therapist    KARINA Landon, RN Registered Nurse    CARIDAD Hudson, PT Student PT Student          Physical Therapy Education     Title: PT OT SLP Therapies (Active)     Topic: Physical Therapy (Active)     Point: Mobility training (Active)    Learning Progress Summary    Learner Readiness Method Response Comment Documented by Status   Patient Acceptance E NR,NL Patient educated in fall precautions. Pt educated in gait and balance training  08/15/17 1115 Active               Point: Precautions (Active)    Learning Progress Summary    Learner Readiness Method Response Comment Documented by Status   Patient Acceptance E NR,NL Patient educated in fall precautions. Pt educated in gait and balance training  08/15/17 1115 Active                      User Key     Initials Effective Dates Name Provider Type Discipline     05/08/17 -  Lety Hudson, PT Student PT Student PT                PT Recommendation and Plan  Anticipated Equipment Needs At Discharge: standard cane (encourage pt to use, he may decline )  Anticipated Discharge Disposition: home with outpatient services (OP PT to work on balance deficits )  Planned Therapy Interventions: gait training, balance training, transfer training, patient/family education  PT Frequency: daily, 2 times/day, per priority policy  Plan of Care Review  Plan Of Care Reviewed With: patient  Progress: no change  Outcome Summary/Follow up Plan: Physical therapy evaluation completed. Pt was CGA to min assist with gait secondary to one LOB. Pt was very  impulsive and displayed decreased insight into safety and did not follow verbal cues for safety. Pt will benefit from continued skilled therapy for dynamic balance training in order to decrease his risk for falls. Therapy recommend pt to d/c home with OP PT at d/c.           IP PT Goals       08/15/17 1503 08/15/17 1107       Gait Training PT LTG    Gait Training Goal PT LTG, Date Established  08/15/17  -MS (r) RZ (t) MS (c)     Gait Training Goal PT LTG, Time to Achieve  by discharge  -MS (r) RZ (t) MS (c)     Gait Training Goal PT LTG, [place] Level  independent  -MS (r) RZ (t) MS (c)     Gait Training Goal PT LTG, Distance to Achieve  Pt to ambualte > 500 ft while displaying adequate dorsiflexion to clear foot on the left   -MS (r) RZ (t) MS (c)     Static Standing Balance PT LTG    Static Standing Balance PT LTG, Date Established  08/15/17  -MS (r) RZ (t) MS (c)     Static Standing Balance PT LTG, Time to Achieve  by discharge  -MS (r) RZ (t) MS (c)     Static Standing Balance PT LTG, [place] Level supervision required  -MS (r) RZ (t) MS (c)      Dynamic Standing Balance PT LTG    Dynamic Standing Balance PT LTG, Date Established  08/15/17  -MS (r) RZ (t) MS (c)     Dynamic Standing Balance PT LTG, Time to Achieve  by discharge  -MS (r) RZ (t) MS (c)     Dynamic Standing Balance PT LTG, [place] Level  contact guard assist  -MS (r) RZ (t) MS (c)     Dynamic Standing Balance PT LTG, Additional Goal Pt to step up and down onto a dynamic surface without LOB.   -MS (r) RZ (t) MS (c)        User Key  (r) = Recorded By, (t) = Taken By, (c) = Cosigned By    Initials Name Provider Type    MS Lilliana Moctezuma, PT DPT Physical Therapist    CARIDAD Hudson, PT Student PT Student                Outcome Measures       08/15/17 1010 08/15/17 0806       How much help from another person do you currently need...    Turning from your back to your side while in flat bed without using bedrails? 4  -MS (r) RZ (t)  MS (c)      Moving from lying on back to sitting on the side of a flat bed without bedrails? 4  -MS (r) RZ (t) MS (c)      Moving to and from a bed to a chair (including a wheelchair)? 4  -MS (r) RZ (t) MS (c)      Standing up from a chair using your arms (e.g., wheelchair, bedside chair)? 4  -MS (r) RZ (t) MS (c)      Climbing 3-5 steps with a railing? 3  -MS (r) RZ (t) MS (c)      To walk in hospital room? 3  -MS (r) RZ (t) MS (c)      AM-PAC 6 Clicks Score 22  -MS (r) RZ (t)      How much help from another is currently needed...    Putting on and taking off regular lower body clothing?  3  -CH     Bathing (including washing, rinsing, and drying)  3  -CH     Toileting (which includes using toilet bed pan or urinal)  3  -CH     Putting on and taking off regular upper body clothing  4  -CH     Taking care of personal grooming (such as brushing teeth)  4  -CH     Eating meals  4  -CH     Score  21  -CH     Timed Up and Go (TUG)    TUG Test 1 11.7 seconds  -MS (r) RZ (t) MS (c)      TUG Test 2 12.8 seconds  -MS (r) RZ (t) MS (c)      Timed Up and Go Comments Pt was able to complete TUG with CGA to supervision. He was very impulsive during turning around object turing test. Pt was educated on TUG results and he is within noramtive values for a community dewling adult. Educated pt that with his history of multiple falls he may benefit from an AD. Pt declined at this time.   -MS (r) RZ (t) MS (c)      Functional Assessment    Outcome Measure Options AM-PAC 6 Clicks Basic Mobility (PT);Timed Up and Go (TUG)  -MS (r) RZ (t) MS (c) AM-PAC 6 Clicks Daily Activity (OT)  -CH       User Key  (r) = Recorded By, (t) = Taken By, (c) = Cosigned By    Initials Name Provider Type    CH Rosette Chun, OTR/L Occupational Therapist    MS Lilliana Moctezuma, PT DPT Physical Therapist    CARIDAD Hudson, PT Student PT Student           Time Calculation:         PT Charges       08/15/17 1114          Time Calculation    Start Time 1007   -MS (r) RZ (t) MS (c)      Stop Time 1037  -MS (r) RZ (t) MS (c)      Time Calculation (min) 30 min  -MS (r) RZ (t)      PT Received On 08/15/17  -MS (r) RZ (t) MS (c)      PT Goal Re-Cert Due Date 08/25/17  -MS (r) RZ (t) MS (c)        User Key  (r) = Recorded By, (t) = Taken By, (c) = Cosigned By    Initials Name Provider Type    MS Lilliana UMANZOR Jose Elias, PT DPT Physical Therapist    CARIDAD Hudson, PT Student PT Student          Therapy Charges for Today     Code Description Service Date Service Provider Modifiers Qty    [id number] HC PT EVAL LOW COMPLEXITY 2 8/15/2017 Lety Hudson, PT Student GP, KX 1          PT G-Codes  PT Professional Judgement Used?: Yes  Outcome Measure Options: AM-PAC 6 Clicks Basic Mobility (PT), Timed Up and Go (TUG)  Score: 22  Functional Limitation: Mobility: Walking and moving around  Mobility: Walking and Moving Around Current Status (): At least 20 percent but less than 40 percent impaired, limited or restricted  Mobility: Walking and Moving Around Goal Status (): At least 1 percent but less than 20 percent impaired, limited or restricted      Lety Hudson, PT Student  8/15/2017

## 2017-08-15 NOTE — PROGRESS NOTES
Discharge Planning Assessment   [facility]     Patient Name: Morris Yousif  MRN: [id number]  Today's Date: 8/15/2017    Admit Date: 8/14/2017          Discharge Needs Assessment       08/15/17 1533    Living Environment    Lives With spouse    Living Arrangements house    Home Accessibility no concerns    Transportation Available family or friend will provide;car    Living Environment    Primary Care Provided By spouse/significant other    Quality Of Family Relationships supportive;helpful;involved    Able to Return to Prior Living Arrangements yes    Discharge Needs Assessment    Concerns To Be Addressed no discharge needs identified;denies needs/concerns at this time    Readmission Within The Last 30 Days no previous admission in last 30 days    Equipment Currently Used at Home none    Equipment Needed After Discharge none    Discharge Planning Comments Patient lives with spouse and plans same at time of discharge. Patient does not want to use any DME (even a straight cane as recommended by therapy). Discharge planning disposition is home.             Discharge Plan     None        Discharge Placement     No information found                Demographic Summary     None            Functional Status     None            Psychosocial     None            Abuse/Neglect     None            Legal     None            Substance Abuse     None            Patient Forms     None          RASHEL Liao

## 2017-08-15 NOTE — PLAN OF CARE
Problem: Patient Care Overview (Adult)  Goal: Plan of Care Review  Outcome: Ongoing (interventions implemented as appropriate)    08/15/17 0806   Coping/Psychosocial Response Interventions   Plan Of Care Reviewed With patient   Patient Care Overview   Progress progress toward functional goals as expected   Outcome Evaluation   Outcome Summary/Follow up Plan OT manjital completed. Pt. demo's deficits in dynamic standing balance, L  strength, & impulsivity with decreased safety awareness. Pt. requires CGA-S for ADLs & fxl mobility. Mr. Yousif would benefit from cont'd OT tx to improve his indep and safety. He is interested in outpatient therapy at [facility].          Problem: Inpatient Occupational Therapy  Goal: Transfer Training Goal 1 LTG- OT  Outcome: Ongoing (interventions implemented as appropriate)    08/15/17 0806   Transfer Training OT LTG   Transfer Training OT LTG, Date Established 08/15/17   Transfer Training OT LTG, Time to Achieve by discharge   Transfer Training OT LTG, Activity Type all transfers   Transfer Training OT LTG, [place] Level conditional independence       Goal: Patient Education Goal LTG- OT  Outcome: Ongoing (interventions implemented as appropriate)    08/15/17 0806   Patient Education OT LTG   Patient Education OT LTG, Date Established 08/15/17   Patient Education OT LTG, Time to Achieve by discharge   Patient Education OT LTG, Education Type HEP;written program   Patient Education OT LTG, Education Understanding independent;verbalizes understanding       Goal: ADL Goal LTG- OT  Outcome: Ongoing (interventions implemented as appropriate)    08/15/17 0806   ADL OT LTG   ADL OT LTG, Date Established 08/15/17   ADL OT LTG, Time to Achieve by discharge   ADL OT LTG, Activity Type ADL skills   ADL OT LTG, [place] Level modified independent

## 2017-08-16 LAB
FOLATE SERPL-MCNC: 4.97 NG/ML
GLUCOSE BLDC GLUCOMTR-MCNC: 140 MG/DL (ref 70–130)
GLUCOSE BLDC GLUCOMTR-MCNC: 206 MG/DL (ref 70–130)
GLUCOSE BLDC GLUCOMTR-MCNC: 212 MG/DL (ref 70–130)
GLUCOSE BLDC GLUCOMTR-MCNC: 233 MG/DL (ref 70–130)
IRON 24H UR-MRATE: 67 MCG/DL (ref 42–180)
IRON SATN MFR SERPL: 21 % (ref 20–45)
TIBC SERPL-MCNC: 316 MCG/DL (ref 225–420)
TSH SERPL DL<=0.05 MIU/L-ACNC: 1.66 MIU/ML (ref 0.47–4.68)
VIT B12 BLD-MCNC: 340 PG/ML (ref 239–931)

## 2017-08-16 PROCEDURE — 25010000002 THIAMINE PER 100 MG: Performed by: PSYCHIATRY & NEUROLOGY

## 2017-08-16 PROCEDURE — 93005 ELECTROCARDIOGRAM TRACING: CPT | Performed by: FAMILY MEDICINE

## 2017-08-16 PROCEDURE — 82962 GLUCOSE BLOOD TEST: CPT

## 2017-08-16 PROCEDURE — 63710000001 INSULIN LISPRO (HUMAN) PER 5 UNITS: Performed by: FAMILY MEDICINE

## 2017-08-16 PROCEDURE — 93010 ELECTROCARDIOGRAM REPORT: CPT | Performed by: INTERNAL MEDICINE

## 2017-08-16 PROCEDURE — 63710000001 INSULIN DETEMIR PER 5 UNITS: Performed by: FAMILY MEDICINE

## 2017-08-16 PROCEDURE — 82746 ASSAY OF FOLIC ACID SERUM: CPT | Performed by: PSYCHIATRY & NEUROLOGY

## 2017-08-16 PROCEDURE — 25010000002 MAGNESIUM SULFATE 2 GM/50ML SOLUTION: Performed by: PSYCHIATRY & NEUROLOGY

## 2017-08-16 PROCEDURE — 84443 ASSAY THYROID STIM HORMONE: CPT | Performed by: PSYCHIATRY & NEUROLOGY

## 2017-08-16 PROCEDURE — 83540 ASSAY OF IRON: CPT | Performed by: PSYCHIATRY & NEUROLOGY

## 2017-08-16 PROCEDURE — 25010000002 HEPARIN (PORCINE) PER 1000 UNITS: Performed by: FAMILY MEDICINE

## 2017-08-16 PROCEDURE — 97110 THERAPEUTIC EXERCISES: CPT

## 2017-08-16 PROCEDURE — 99233 SBSQ HOSP IP/OBS HIGH 50: CPT | Performed by: PSYCHIATRY & NEUROLOGY

## 2017-08-16 PROCEDURE — 83550 IRON BINDING TEST: CPT | Performed by: PSYCHIATRY & NEUROLOGY

## 2017-08-16 PROCEDURE — 97535 SELF CARE MNGMENT TRAINING: CPT

## 2017-08-16 PROCEDURE — 97116 GAIT TRAINING THERAPY: CPT

## 2017-08-16 PROCEDURE — 25010000002 CYANOCOBALAMIN PER 1000 MCG: Performed by: PSYCHIATRY & NEUROLOGY

## 2017-08-16 PROCEDURE — 82607 VITAMIN B-12: CPT | Performed by: PSYCHIATRY & NEUROLOGY

## 2017-08-16 RX ORDER — NITROGLYCERIN 0.4 MG/1
0.4 TABLET SUBLINGUAL
Status: DISCONTINUED | OUTPATIENT
Start: 2017-08-16 | End: 2017-08-17 | Stop reason: HOSPADM

## 2017-08-16 RX ORDER — CYANOCOBALAMIN 1000 UG/ML
1000 INJECTION, SOLUTION INTRAMUSCULAR; SUBCUTANEOUS ONCE
Status: COMPLETED | OUTPATIENT
Start: 2017-08-16 | End: 2017-08-16

## 2017-08-16 RX ORDER — FOLIC ACID 1 MG/1
5 TABLET ORAL ONCE
Status: COMPLETED | OUTPATIENT
Start: 2017-08-16 | End: 2017-08-16

## 2017-08-16 RX ORDER — MAGNESIUM SULFATE HEPTAHYDRATE 40 MG/ML
2 INJECTION, SOLUTION INTRAVENOUS ONCE
Status: COMPLETED | OUTPATIENT
Start: 2017-08-16 | End: 2017-08-16

## 2017-08-16 RX ORDER — ASPIRIN 81 MG/1
81 TABLET ORAL DAILY
Status: DISCONTINUED | OUTPATIENT
Start: 2017-08-17 | End: 2017-08-17 | Stop reason: HOSPADM

## 2017-08-16 RX ORDER — NITROGLYCERIN 0.4 MG/1
TABLET SUBLINGUAL
Status: COMPLETED
Start: 2017-08-16 | End: 2017-08-16

## 2017-08-16 RX ADMIN — INSULIN LISPRO 4 UNITS: 100 INJECTION, SOLUTION INTRAVENOUS; SUBCUTANEOUS at 11:03

## 2017-08-16 RX ADMIN — ASPIRIN 325 MG: 325 TABLET, COATED ORAL at 08:47

## 2017-08-16 RX ADMIN — FOLIC ACID 5 MG: 1 TABLET ORAL at 21:23

## 2017-08-16 RX ADMIN — INSULIN LISPRO 4 UNITS: 100 INJECTION, SOLUTION INTRAVENOUS; SUBCUTANEOUS at 20:49

## 2017-08-16 RX ADMIN — MAGNESIUM SULFATE HEPTAHYDRATE 2 G: 40 INJECTION, SOLUTION INTRAVENOUS at 21:22

## 2017-08-16 RX ADMIN — METOPROLOL TARTRATE 25 MG: 25 TABLET, FILM COATED ORAL at 08:47

## 2017-08-16 RX ADMIN — METOPROLOL TARTRATE 25 MG: 25 TABLET, FILM COATED ORAL at 21:01

## 2017-08-16 RX ADMIN — GABAPENTIN 300 MG: 300 CAPSULE ORAL at 21:01

## 2017-08-16 RX ADMIN — HEPARIN SODIUM 5000 UNITS: 5000 INJECTION, SOLUTION INTRAVENOUS; SUBCUTANEOUS at 21:01

## 2017-08-16 RX ADMIN — LEVETIRACETAM 500 MG: 500 TABLET, FILM COATED ORAL at 08:47

## 2017-08-16 RX ADMIN — HEPARIN SODIUM 5000 UNITS: 5000 INJECTION, SOLUTION INTRAVENOUS; SUBCUTANEOUS at 13:57

## 2017-08-16 RX ADMIN — LEVETIRACETAM 500 MG: 500 TABLET, FILM COATED ORAL at 21:01

## 2017-08-16 RX ADMIN — GABAPENTIN 300 MG: 300 CAPSULE ORAL at 08:47

## 2017-08-16 RX ADMIN — NITROGLYCERIN 0.4 MG: 0.4 TABLET SUBLINGUAL at 23:58

## 2017-08-16 RX ADMIN — THIAMINE HYDROCHLORIDE 100 MG: 100 INJECTION, SOLUTION INTRAMUSCULAR; INTRAVENOUS at 08:47

## 2017-08-16 RX ADMIN — INSULIN DETEMIR 80 UNITS: 100 INJECTION, SOLUTION SUBCUTANEOUS at 20:49

## 2017-08-16 RX ADMIN — FAMOTIDINE 40 MG: 20 TABLET, FILM COATED ORAL at 21:01

## 2017-08-16 RX ADMIN — HEPARIN SODIUM 5000 UNITS: 5000 INJECTION, SOLUTION INTRAVENOUS; SUBCUTANEOUS at 06:35

## 2017-08-16 RX ADMIN — INSULIN LISPRO 4 UNITS: 100 INJECTION, SOLUTION INTRAVENOUS; SUBCUTANEOUS at 17:07

## 2017-08-16 RX ADMIN — TAMSULOSIN HYDROCHLORIDE 0.4 MG: 0.4 CAPSULE ORAL at 21:01

## 2017-08-16 RX ADMIN — CYANOCOBALAMIN 1000 MCG: 1000 INJECTION, SOLUTION INTRAMUSCULAR at 21:22

## 2017-08-16 RX ADMIN — CLOPIDOGREL 75 MG: 75 TABLET, FILM COATED ORAL at 08:47

## 2017-08-16 RX ADMIN — GABAPENTIN 300 MG: 300 CAPSULE ORAL at 16:14

## 2017-08-16 NOTE — THERAPY TREATMENT NOTE
Acute Care - Physical Therapy Treatment Note  [facility]     Patient Name: Morris Yousif  : 1945  MRN: [id number]  Today's Date: 2017  Onset of Illness/Injury or Date of Surgery Date: 17  Date of Referral to PT: 08/15/17  Referring Physician: Dr. Saravia    Admit Date: 2017    Visit Dx:    ICD-10-CM ICD-9-CM   1. Cerebrovascular accident (CVA), unspecified mechanism I63.9 434.91   2. Left hand weakness M62.81 728.87   3. Balance problem R26.89 781.99   4. Decreased activities of daily living (ADL) Z78.9 V49.89     Patient Active Problem List   Diagnosis   • CVA (cerebral vascular accident)               Adult Rehabilitation Note       17 1354 17 1014 08/15/17 1436    Rehab Assessment/Intervention    Discipline physical therapy assistant  - occupational therapy assistant  -TS physical therapist  -MS,RZ,MS2    Document Type therapy note (daily note)  - therapy note (daily note)  - therapy note (daily note)  -MS,RZ,MS2    Subjective Information agree to therapy;no complaints  - agree to therapy;no complaints  -TS     Treatment Not Performed   patient/family decline treatment, pt not feeling well  -MS,RZ,MS2    Treatment Not Performed, Comment   Pt has just returned from [place] and reports he is fatigued and feels SOB. Pt would like therapy to return later in PM  -MS,RZ,MS2    Precautions/Limitations fall precautions  - fall precautions  -TS     Recorded by [] Iris Daniel, PTA [TS] JUSTYN SotoA/L [MS,RZ,MS2] Lilliana Moctezuma, PT DPT (r) Lety Hudson, PT Student (t) Lilliana Moctezuma, PT DPT (c)    Pain Assessment    Pain Assessment No/denies pain  - No/denies pain  -TS     Recorded by [] Iris Daniel, PTA [TS] Maricel Sultana, CABRERA/L     Cognitive Assessment/Intervention    Personal Safety Interventions fall prevention program maintained;gait belt;nonskid shoes/slippers when out of bed  - fall prevention program maintained;gait belt;nonskid  shoes/slippers when out of bed  -TS     Recorded by [] Iris Daniel, PTA [TS] JUSTYN SotoA/L     Bed Mobility, Assessment/Treatment    Bed Mob, Supine to Sit, [place] independent  -AH independent  -TS     Bed Mob, Sit to Supine, [place] independent  -AH      Recorded by [] Iris Daniel PTA [TS] Maricel Sultana, CABRERA/L     Transfer Assessment/Treatment    Transfers, Sit-Stand [place] stand by assist  -AH stand by assist  -TS     Transfers, Stand-Sit [place] stand by assist  - stand by assist  -TS     Walk-In Shower Transfer, [place]  --   SBA  -TS     Walk-In Shower Transfer, Assist Device  --   grab bar  -TS     Recorded by [] Iris Daniel PTA [TS] JUSTYN SotoA/L     Gait Assessment/Treatment    Gait, [place] Level contact guard assist;stand by assist  -      Gait, Distance (Feet) 400  -      Gait, Comment one LOB with pt scissoring, pt able to correct  -      Recorded by [] Iris Daniel PTA      Functional Mobility    Functional Mobility- Ind. Level  contact guard assist   SBA  -TS     Functional Mobility- Comment  in room, in bathroom  -TS     Recorded by  [TS] RICK Soto/L     Upper Body Bathing Assessment/Training    UB Bathing Assess/Train Assistive Device  shower chair with back;hand-held shower head  -TS     UB Bathing Assess/Train, Position  sitting  -TS     UB Bathing Assess/Train, [place] Level  conditional independence  -TS     Recorded by  [TS] RICK Soto/L     Lower Body Bathing Assessment/Training    LB Bathing Assess/Train Assistive Device  hand-held shower head;shower chair with back  -TS     LB Bathing Assess/Train, Position  sitting;standing  -TS     LB Bathing Assess/Train, [place] Level  conditional independence  -TS     Recorded by  [TS] RICK Soto/L     Upper Body Dressing Assessment/Training    UB Dressing Assess/Train, Clothing Type   doffing:;donning:;hospital gown  -TS     UB Dressing Assess/Train, Position  sitting;standing  -TS     UB Dressing Assess/Train, [place]  independent;set up required  -TS     Recorded by  [] IVETT Soto     Lower Body Dressing Assessment/Training    LB Dressing Assess/Train, Clothing Type  doffing:;donning:;socks;shorts  -TS     LB Dressing Assess/Train, Position  sitting;standing  -TS     LB Dressing Assess/Train, [place]  set up required;verbal cues required;supervision required  -TS     Recorded by  [] IVETT Soto     Grooming Assessment/Training    Grooming Assess/Train, Position  sink side;standing  -TS     Grooming Assess/Train, [place]ep[place] Level  supervision required   SBA  -TS     Recorded by  [] IVETT Soto     Motor Skills/Interventions    Additional Documentation Balance Skills Training (Group)  -      Recorded by [] Iris Daniel PTA      Balance Skills Training    Gait Balance-Level of Assistance Close supervision;Contact guard  -      Gait Balance Support shelley bar  -      Gait Balance Activities backwards;side-stepping;tandem  -      Recorded by [] Iris Daniel PTA      Therapy Exercises    Bilateral Lower Extremities standing;AROM:;10 reps;heel raises;hip abduction/adduction;hip extension;hip flexion   marching  -      Recorded by [] Iris Daniel PTA      Positioning and Restraints    Pre-Treatment Position in bed  -AH in bed  -     Post Treatment Position bed  - bed  -TS     In Bed sitting EOB;call light within reach;encouraged to call for assist;with family/caregiver  - sitting EOB;call light within reach;encouraged to call for assist;side rails up x2  -TS     Recorded by [] Iris Daniel PTA [TS] IVETT Soto       User Key  (r) = Recorded By, (t) = Taken By, (c) = Cosigned By    Initials Name Effective Dates     Iris Daniel PTA 08/02/16 -      IVETT Soto 08/02/16 -      MS Lilliana UMANZOR Jose Elias, PT DPT 08/02/16 -     CARIDAD Hudson, PT Student 05/08/17 -                 IP PT Goals       08/15/17 1503 08/15/17 1107       Gait Training PT LTG    Gait Training Goal PT LTG, Date Established  08/15/17  -MS (r) RZ (t) MS (c)     Gait Training Goal PT LTG, Time to Achieve  by discharge  -MS (r) RZ (t) MS (c)     Gait Training Goal PT LTG, [place] Level  independent  -MS (r) RZ (t) MS (c)     Gait Training Goal PT LTG, Distance to Achieve  Pt to ambualte > 500 ft while displaying adequate dorsiflexion to clear foot on the left   -MS (r) RZ (t) MS (c)     Static Standing Balance PT LTG    Static Standing Balance PT LTG, Date Established  08/15/17  -MS (r) RZ (t) MS (c)     Static Standing Balance PT LTG, Time to Achieve  by discharge  -MS (r) RZ (t) MS (c)     Static Standing Balance PT LTG, [place] Level supervision required  -MS (r) RZ (t) MS (c)      Dynamic Standing Balance PT LTG    Dynamic Standing Balance PT LTG, Date Established  08/15/17  -MS (r) RZ (t) MS (c)     Dynamic Standing Balance PT LTG, Time to Achieve  by discharge  -MS (r) RZ (t) MS (c)     Dynamic Standing Balance PT LTG, [place] Level  contact guard assist  -MS (r) RZ (t) MS (c)     Dynamic Standing Balance PT LTG, Additional Goal Pt to step up and down onto a dynamic surface without LOB.   -MS (r) RZ (t) MS (c)        User Key  (r) = Recorded By, (t) = Taken By, (c) = Cosigned By    Initials Name Provider Type    MS Lilliana UMANZOR Jose Elias, PT DPT Physical Therapist    CARIDAD Hudson, PT Student PT Student          Physical Therapy Education     Title: PT OT SLP Therapies (Active)     Topic: Physical Therapy (Active)     Point: Mobility training (Done)    Learning Progress Summary    Learner Readiness Method Response Comment Documented by Status   Patient Acceptance E VU safety with trans AH 08/16/17 142 Done    Acceptance E NR,NL Patient educated in fall precautions. Pt educated in gait and balance  training  08/15/17 1115 Active               Point: Precautions (Active)    Learning Progress Summary    Learner Readiness Method Response Comment Documented by Status   Patient Acceptance E NR,NL Patient educated in fall precautions. Pt educated in gait and balance training  08/15/17 1115 Active                      User Key     Initials Effective Dates Name Provider Type Discipline     08/02/16 -  Iris Daniel, PTA Physical Therapy Assistant PT     05/08/17 -  Lety Hudson, PT Student PT Student PT                    PT Recommendation and Plan  Anticipated Equipment Needs At Discharge: standard cane (encourage pt to use, he may decline )  Anticipated Discharge Disposition: home with outpatient services (OP PT to work on balance deficits )  Planned Therapy Interventions: gait training, balance training, transfer training, patient/family education  PT Frequency: daily, 2 times/day, per priority policy  Plan of Care Review  Plan Of Care Reviewed With: patient  Progress: progress toward functional goals as expected  Outcome Summary/Follow up Plan: Pt trans sba, amb 400 feet cga-sba, pt did have one LOB with scissoring, pt was able to correct. Pt also performed balance exercises at [facility]. Pt would benefit from outpatient therapy          Outcome Measures       08/16/17 1400 08/16/17 1100 08/15/17 1010    How much help from another person do you currently need...    Turning from your back to your side while in flat bed without using bedrails? 4  -  4  -MS (r) RZ (t) MS (c)    Moving from lying on back to sitting on the side of a flat bed without bedrails? 4  -  4  -MS (r) RZ (t) MS (c)    Moving to and from a bed to a chair (including a wheelchair)? 4  -  4  -MS (r) RZ (t) MS (c)    Standing up from a chair using your arms (e.g., wheelchair, bedside chair)? 4  -  4  -MS (r) RZ (t) MS (c)    Climbing 3-5 steps with a railing? 3  -  3  -MS (r) RZ (t) MS (c)    To walk in hospital room? 3  -  3   -MS (r) RZ (t) MS (c)    AM-PAC 6 Clicks Score 22  -AH  22  -MS (r) RZ (t)    How much help from another is currently needed...    Putting on and taking off regular lower body clothing?  3  -TS     Bathing (including washing, rinsing, and drying)  3  -TS     Toileting (which includes using toilet bed pan or urinal)  4  -TS     Putting on and taking off regular upper body clothing  4  -TS     Taking care of personal grooming (such as brushing teeth)  4  -TS     Eating meals  4  -TS     Score  22  -TS     Timed Up and Go (TUG)    TUG Test 1   11.7 seconds  -MS (r) RZ (t) MS (c)    TUG Test 2   12.8 seconds  -MS (r) RZ (t) MS (c)    Timed Up and Go Comments   Pt was able to complete TUG with CGA to supervision. He was very impulsive during turning around object turing test. Pt was educated on TUG results and he is within noramtive values for a community dewling adult. Educated pt that with his history of multiple falls he may benefit from an AD. Pt declined at this time.   -MS (r) RZ (t) MS (c)    Functional Assessment    Outcome Measure Options AM-PAC 6 Clicks Basic Mobility (PT)  - AM-PAC 6 Clicks Daily Activity (OT)  -TS AM-PAC 6 Clicks Basic Mobility (PT);Timed Up and Go (TUG)  -MS (r) RZ (t) MS (c)      08/15/17 0806          How much help from another is currently needed...    Putting on and taking off regular lower body clothing? 3  -CH      Bathing (including washing, rinsing, and drying) 3  -CH      Toileting (which includes using toilet bed pan or urinal) 3  -CH      Putting on and taking off regular upper body clothing 4  -CH      Taking care of personal grooming (such as brushing teeth) 4  -CH      Eating meals 4  -CH      Score 21  -CH      Functional Assessment    Outcome Measure Options AM-PAC 6 Clicks Daily Activity (OT)  -        User Key  (r) = Recorded By, (t) = Taken By, (c) = Cosigned By    Initials Name Provider Type     Iris Daniel, PTA Physical Therapy Assistant    MARIA R Chun,  OTR/L Occupational Therapist    BORIS Sultana, CABRERA/L Occupational Therapy Assistant    MS Lilliana Moctezuma, PT DPT Physical Therapist    CARIDAD Hudson, PT Student PT Student           Time Calculation:         PT Charges       08/16/17 1432          Time Calculation    Start Time 1354  -AH      Stop Time 1420  -AH      Time Calculation (min) 26 min  -AH      Time Calculation- PT    Total Timed Code Minutes- PT 26 minute(s)  -AH        User Key  (r) = Recorded By, (t) = Taken By, (c) = Cosigned By    Initials Name Provider Type     Iris Daniel PTA Physical Therapy Assistant          Therapy Charges for Today     Code Description Service Date Service Provider Modifiers Qty    72276196825 HC GAIT TRAINING EA 15 MIN 8/16/2017 Iris Daniel PTA GP, KX 1    50483066434 HC PT THER PROC EA 15 MIN 8/16/2017 Iris Daniel PTA GP, KX 1          PT G-Codes  PT Professional Judgement Used?: Yes  Outcome Measure Options: AM-PAC 6 Clicks Basic Mobility (PT)  Score: 22  Functional Limitation: Mobility: Walking and moving around  Mobility: Walking and Moving Around Current Status (): At least 20 percent but less than 40 percent impaired, limited or restricted  Mobility: Walking and Moving Around Goal Status (): At least 1 percent but less than 20 percent impaired, limited or restricted    Iris Daneil PTA  8/16/2017

## 2017-08-16 NOTE — THERAPY TREATMENT NOTE
Acute Care - Occupational Therapy Treatment Note  [facility]     Patient Name: Morris Yousif  : 1945  MRN: [id number]  Today's Date: 2017  Onset of Illness/Injury or Date of Surgery Date: 17  Date of Referral to OT: 08/15/17  Referring Physician: Dr. Saravia      Admit Date: 2017    Visit Dx:     ICD-10-CM ICD-9-CM   1. Cerebrovascular accident (CVA), unspecified mechanism I63.9 434.91   2. Left hand weakness M62.81 728.87   3. Balance problem R26.89 781.99   4. Decreased activities of daily living (ADL) Z78.9 V49.89     Patient Active Problem List   Diagnosis   • CVA (cerebral vascular accident)             Adult Rehabilitation Note       17 1014 08/15/17 1436       Rehab Assessment/Intervention    Discipline occupational therapy assistant  -TS physical therapist  -MS,RZ,MS2     Document Type therapy note (daily note)  -TS therapy note (daily note)  -MS,RZ,MS2     Subjective Information agree to therapy;no complaints  -TS      Treatment Not Performed  patient/family decline treatment, pt not feeling well  -MS,RZ,MS2     Treatment Not Performed, Comment  Pt has just returned from [place] and reports he is fatigued and feels SOB. Pt would like therapy to return later in PM  -MS,RZ,MS2     Precautions/Limitations fall precautions  -TS      Recorded by [TS] Maricel Sultana CABRERA/L [MS,RZ,MS2] Lilliana Moctezuma, PT DPT (r) Lety Hudson, PT Student (t) Lilliana Moctezuma, PT DPT (c)     Pain Assessment    Pain Assessment No/denies pain  -TS      Recorded by [TS] Maricel uSltana, CABRERA/L      Cognitive Assessment/Intervention    Personal Safety Interventions fall prevention program maintained;gait belt;nonskid shoes/slippers when out of bed  -TS      Recorded by [TS] Maricel Sultana, CABRERA/L      Bed Mobility, Assessment/Treatment    Bed Mob, Supine to Sit, [place] independent  -TS      Recorded by [TS] Maricel Sultana CABRERA/L      Transfer Assessment/Treatment    Transfers,  Sit-Stand [place] stand by assist  -TS      Transfers, Stand-Sit [place] stand by assist  -TS      Walk-In Shower Transfer, [place] --   SBA  -TS      Walk-In Shower Transfer, Assist Device --   grab bar  -TS      Recorded by [TS] IVETT Soto      Functional Mobility    Functional Mobility- Ind. Level contact guard assist   SBA  -TS      Functional Mobility- Comment in room, in bathroom  -TS      Recorded by [TS] IVETT Soto      Upper Body Bathing Assessment/Training    UB Bathing Assess/Train Assistive Device shower chair with back;hand-held shower head  -TS      UB Bathing Assess/Train, Position sitting  -TS      UB Bathing Assess/Train, [place] Level conditional independence  -TS      Recorded by [TS] IVETT Soto      Lower Body Bathing Assessment/Training    LB Bathing Assess/Train Assistive Device hand-held shower head;shower chair with back  -TS      LB Bathing Assess/Train, Position sitting;standing  -TS      LB Bathing Assess/Train, [place] Level conditional independence  -TS      Recorded by [TS] IVETT Soto      Upper Body Dressing Assessment/Training    UB Dressing Assess/Train, Clothing Type doffing:;donning:;hospital gown  -TS      UB Dressing Assess/Train, Position sitting;standing  -TS      UB Dressing Assess/Train, [place] independent;set up required  -TS      Recorded by [TS] RICK Soto/L      Lower Body Dressing Assessment/Training    LB Dressing Assess/Train, Clothing Type doffing:;donning:;socks;shorts  -TS      LB Dressing Assess/Train, Position sitting;standing  -TS      LB Dressing Assess/Train, [place] set up required;verbal cues required;supervision required  -TS      Recorded by [TS] IVETT Soto      Grooming Assessment/Training    Grooming Assess/Train, Position sink side;standing  -TS      Grooming Assess/Train, [place] Level supervision required   SBA  -TS       Recorded by [TS] IVETT Soto      Positioning and Restraints    Pre-Treatment Position in bed  -TS      Post Treatment Position bed  -TS      In Bed sitting EOB;call light within reach;encouraged to call for assist;side rails up x2  -TS      Recorded by [TS] RICK Soto/L        User Key  (r) = Recorded By, (t) = Taken By, (c) = Cosigned By    Initials Name Effective Dates    TS IVETT Soto 08/02/16 -     MS Lilliana UMANZOR Jose Elias, PT DPT 08/02/16 -     RCHEYENNE Hudson, PT Student 05/08/17 -                 OT Goals       08/16/17 1123 08/15/17 0806       Transfer Training OT LTG    Transfer Training OT LTG, Date Established  08/15/17  -CH     Transfer Training OT LTG, Time to Achieve  by discharge  -     Transfer Training OT LTG, Activity Type  all transfers  -     Transfer Training OT LTG, [place] Level  conditional independence  -     Patient Education OT LTG    Patient Education OT LTG, Date Established  08/15/17  -CH     Patient Education OT LTG, Time to Achieve  by discharge  -     Patient Education OT LTG, Education Type  HEP;written program  -     Patient Education OT LTG, Education Understanding  independent;verbalizes understanding  -     ADL OT LTG    ADL OT LTG, Date Established  08/15/17  -     ADL OT LTG, Time to Achieve  by discharge  -     ADL OT LTG, Activity Type  ADL skills  -     ADL OT LTG, [place] Level  modified independent  -     ADL OT LTG, Date Goal Reviewed 08/16/17  -TS      ADL OT LTG, Outcome goal met  -TS        User Key  (r) = Recorded By, (t) = Taken By, (c) = Cosigned By    Initials Name Provider Type    CH Rosette Chun OTNOÉ/L Occupational Therapist     RICK Soto/L Occupational Therapy Assistant          Occupational Therapy Education     Title: PT OT SLP Therapies (Active)     Topic: Occupational Therapy (Active)     Point: ADL training (Done)    Description: Instruct learner(s) on proper  safety adaptation and remediation techniques during self care or transfers.   Instruct in proper use of assistive devices.    Learning Progress Summary    Learner Readiness Method Response Comment Documented by Status   Patient Acceptance E VU home safety, transfers, progression of POC  08/16/17 1123 Done    Acceptance E VU,NR Purpose & benefits of OT eval & tx, HEP that would be beneficial for pt. recovery, safety during fxl mobility & ADLs, obs strength/balance/fxl deficits, benefits of activity, risks of prolonged immobility  08/15/17 1020 Done               Point: Home exercise program (Done)    Description: Instruct learner(s) on appropriate technique for monitoring, assisting and/or progressing therapeutic exercises/activities.    Learning Progress Summary    Learner Readiness Method Response Comment Documented by Status   Patient Acceptance E VU,NR Purpose & benefits of OT eval & tx, HEP that would be beneficial for pt. recovery, safety during fxl mobility & ADLs, obs strength/balance/fxl deficits, benefits of activity, risks of prolonged immobility  08/15/17 1020 Done                      User Key     Initials Effective Dates Name Provider Type Discipline     08/02/16 -  Rosette Chun OTR/L Occupational Therapist OT     08/02/16 -  JUSTYN SotoA/L Occupational Therapy Assistant OT                  OT Recommendation and Plan  Anticipated Discharge Disposition: home with assist, home with outpatient services  Planned Therapy Interventions: strengthening, balance training, ADL retraining, home exercise program  Therapy Frequency: 3-5 times/wk  Plan of Care Review  Plan Of Care Reviewed With: patient  Progress: improving  Outcome Summary/Follow up Plan: Pt transfers SBA/S. Ambulates in room SBA. Completed TB shower with HH shower head and shower seat mod I. Pt S for LB dressinig and I with grooming at side sink. Pt would benefit from outpatient. Continue OT POC        Outcome Measures        08/16/17 1100 08/15/17 1010 08/15/17 0806    How much help from another person do you currently need...    Turning from your back to your side while in flat bed without using bedrails?  4  -MS (r) RZ (t) MS (c)     Moving from lying on back to sitting on the side of a flat bed without bedrails?  4  -MS (r) RZ (t) MS (c)     Moving to and from a bed to a chair (including a wheelchair)?  4  -MS (r) RZ (t) MS (c)     Standing up from a chair using your arms (e.g., wheelchair, bedside chair)?  4  -MS (r) RZ (t) MS (c)     Climbing 3-5 steps with a railing?  3  -MS (r) RZ (t) MS (c)     To walk in hospital room?  3  -MS (r) RZ (t) MS (c)     AM-PAC 6 Clicks Score  22  -MS (r) RZ (t)     How much help from another is currently needed...    Putting on and taking off regular lower body clothing? 3  -TS  3  -CH    Bathing (including washing, rinsing, and drying) 3  -TS  3  -CH    Toileting (which includes using toilet bed pan or urinal) 4  -TS  3  -CH    Putting on and taking off regular upper body clothing 4  -TS  4  -CH    Taking care of personal grooming (such as brushing teeth) 4  -TS  4  -CH    Eating meals 4  -TS  4  -CH    Score 22  -TS  21  -CH    Timed Up and Go (TUG)    TUG Test 1  11.7 seconds  -MS (r) RZ (t) MS (c)     TUG Test 2  12.8 seconds  -MS (r) RZ (t) MS (c)     Timed Up and Go Comments  Pt was able to complete TUG with CGA to supervision. He was very impulsive during turning around object turing test. Pt was educated on TUG results and he is within noramtive values for a community dewling adult. Educated pt that with his history of multiple falls he may benefit from an AD. Pt declined at this time.   -MS (r) RZ (t) MS (c)     Functional Assessment    Outcome Measure Options AM-PAC 6 Clicks Daily Activity (OT)  -TS AM-PAC 6 Clicks Basic Mobility (PT);Timed Up and Go (TUG)  -MS (r) RZ (t) MS (c) AM-[facility] 6 Clicks Daily Activity (OT)  -      User Key  (r) = Recorded By, (t) = Taken By, (c) = Cosigned By     Initials Name Provider Type     Rosette Chun, OTR/L Occupational Therapist    TS Maricel Sultana, CABRERA/L Occupational Therapy Assistant    MS Lilliana Moctezuma, PT DPT Physical Therapist    CARIDAD Hudson, PT Student PT Student           Time Calculation:         Time Calculation- OT       08/16/17 1125          Time Calculation- OT    OT Start Time 1014  -TS      OT Stop Time 1114  -TS      OT Time Calculation (min) 60 min  -TS      Total Timed Code Minutes- OT 60 minute(s)  -TS      OT Received On 08/16/17  -TS        User Key  (r) = Recorded By, (t) = Taken By, (c) = Cosigned By    Initials Name Provider Type     Maricel Sultana CABRERA/L Occupational Therapy Assistant           Therapy Charges for Today     Code Description Service Date Service Provider Modifiers Qty    99799180256 HC OT SELF CARE/MGMT/TRAIN EA 15 MIN 8/16/2017 JUSTYN SotoA/L GO, KX 4          OT G-codes  OT Professional Judgement Used?: Yes  OT Functional Scales Options: AM-PAC 6 Clicks Daily Activity (OT)  Score: 21  Functional Limitation: Self care  Self Care Current Status (): At least 20 percent but less than 40 percent impaired, limited or restricted  Self Care Goal Status (): At least 1 percent but less than 20 percent impaired, limited or restricted    RICK Sher/KATTY  8/16/2017

## 2017-08-16 NOTE — PLAN OF CARE
Problem: Patient Care Overview (Adult)  Goal: Plan of Care Review  Outcome: Ongoing (interventions implemented as appropriate)    08/16/17 0436   Coping/Psychosocial Response Interventions   Plan Of Care Reviewed With patient   Patient Care Overview   Progress no change   Outcome Evaluation   Outcome Summary/Follow up Plan A/O, [place], gait unsteady, slight left facial droop, left hand  moderate, voiding per bathroom w/ 1 assist, NIH 1, no c/o pain, overnight sleep study completed w/ CPAP, orthostatic BP performed x1, BS elevated, sinus to sinus bennett on telemetry, no new neuro changes, EKG ordered for today.          Problem: Stroke (Ischemic) (Adult)  Goal: Signs and Symptoms of Listed Potential Problems Will be Absent or Manageable (Stroke)  Outcome: Ongoing (interventions implemented as appropriate)    Problem: Fall Risk (Adult)  Goal: Absence of Falls  Outcome: Ongoing (interventions implemented as appropriate)

## 2017-08-16 NOTE — PLAN OF CARE
Problem: Patient Care Overview (Adult)  Goal: Plan of Care Review  Outcome: Ongoing (interventions implemented as appropriate)    08/16/17 1123   Coping/Psychosocial Response Interventions   Plan Of Care Reviewed With patient   Patient Care Overview   Progress improving   Outcome Evaluation   Outcome Summary/Follow up Plan Pt transfers SBA/S. Ambulates in room SBA. Completed TB shower with HH shower head and shower seat mod I. Pt S for LB dressinig and I with grooming at side sink. Pt would benefit from outpatient. Continue OT POC         Problem: Inpatient Occupational Therapy  Goal: ADL Goal LTG- OT  Outcome: Outcome(s) achieved Date Met:  08/16/17    08/15/17 0806 08/16/17 1123   ADL OT LTG   ADL OT LTG, Date Established 08/15/17 --    ADL OT LTG, Time to Achieve by discharge --    ADL OT LTG, Activity Type ADL skills --    ADL OT LTG, [place] Level modified independent --    ADL OT LTG, Date Goal Reviewed --  08/16/17   ADL OT LTG, Outcome --  goal met

## 2017-08-16 NOTE — PLAN OF CARE
Problem: Patient Care Overview (Adult)  Goal: Plan of Care Review  Outcome: Ongoing (interventions implemented as appropriate)    08/16/17 1430   Coping/Psychosocial Response Interventions   Plan Of Care Reviewed With patient   Patient Care Overview   Progress progress toward functional goals as expected   Outcome Evaluation   Outcome Summary/Follow up Plan Pt trans sba, amb 400 feet cga-sba, pt did have one LOB with scissoring, pt was able to correct. Pt also performed balance exercises at rail. Pt would benefit from outpatient therapy

## 2017-08-16 NOTE — PLAN OF CARE
Problem: Patient Care Overview (Adult)  Goal: Plan of Care Review  Outcome: Ongoing (interventions implemented as appropriate)    08/16/17 2320   Coping/Psychosocial Response Interventions   Plan Of Care Reviewed With patient   Patient Care Overview   Progress improving   Outcome Evaluation   Outcome Summary/Follow up Plan No c/o pain, no neuro changes, VSS. Patient rested well today.        Goal: Adult Individualization and Mutuality  Outcome: Ongoing (interventions implemented as appropriate)  Goal: Discharge Needs Assessment  Outcome: Ongoing (interventions implemented as appropriate)    Problem: Stroke (Ischemic) (Adult)  Goal: Signs and Symptoms of Listed Potential Problems Will be Absent or Manageable (Stroke)  Outcome: Ongoing (interventions implemented as appropriate)    Problem: Fall Risk (Adult)  Goal: Absence of Falls  Outcome: Ongoing (interventions implemented as appropriate)

## 2017-08-17 ENCOUNTER — APPOINTMENT (OUTPATIENT)
Dept: GENERAL RADIOLOGY | Facility: HOSPITAL | Age: 72
End: 2017-08-17

## 2017-08-17 ENCOUNTER — APPOINTMENT (OUTPATIENT)
Dept: CARDIOLOGY | Facility: HOSPITAL | Age: 72
End: 2017-08-17

## 2017-08-17 ENCOUNTER — APPOINTMENT (OUTPATIENT)
Dept: CT IMAGING | Facility: HOSPITAL | Age: 72
End: 2017-08-17

## 2017-08-17 ENCOUNTER — APPOINTMENT (OUTPATIENT)
Dept: NEUROLOGY | Facility: HOSPITAL | Age: 72
End: 2017-08-17
Attending: PSYCHIATRY & NEUROLOGY

## 2017-08-17 VITALS
RESPIRATION RATE: 18 BRPM | HEIGHT: 76 IN | WEIGHT: 249.44 LBS | HEART RATE: 74 BPM | TEMPERATURE: 97.7 F | SYSTOLIC BLOOD PRESSURE: 142 MMHG | BODY MASS INDEX: 30.37 KG/M2 | OXYGEN SATURATION: 97 % | DIASTOLIC BLOOD PRESSURE: 84 MMHG

## 2017-08-17 PROBLEM — I95.1 SYNCOPE DUE TO ORTHOSTATIC HYPOTENSION: Status: RESOLVED | Noted: 2017-03-27 | Resolved: 2017-08-17

## 2017-08-17 LAB
ALBUMIN SERPL-MCNC: 4.4 G/DL (ref 3.5–5)
ALBUMIN/GLOB SERPL: 1.5 G/DL (ref 1.1–2.5)
ALP SERPL-CCNC: 100 U/L (ref 24–120)
ALT SERPL W P-5'-P-CCNC: 41 U/L (ref 0–54)
ANION GAP SERPL CALCULATED.3IONS-SCNC: 10 MMOL/L (ref 4–13)
ANION GAP SERPL CALCULATED.3IONS-SCNC: 9 MMOL/L (ref 4–13)
AST SERPL-CCNC: 24 U/L (ref 7–45)
BH CV ECHO MEAS - BSA(HAYCOCK): 2.5 M^2
BH CV ECHO MEAS - BSA: 2.4 M^2
BH CV ECHO MEAS - BZI_BMI: 30.3 KILOGRAMS/M^2
BH CV ECHO MEAS - BZI_METRIC_HEIGHT: 193 CM
BH CV ECHO MEAS - BZI_METRIC_WEIGHT: 112.9 KG
BH CV STRESS BP STAGE 1: NORMAL
BH CV STRESS BP STAGE 2: NORMAL
BH CV STRESS BP STAGE 3: NORMAL
BH CV STRESS BP STAGE 4: NORMAL
BH CV STRESS BP STAGE 5: NORMAL
BH CV STRESS DOB - ATROPINE STAGE 3: 1
BH CV STRESS DOSE DOBUTAMINE STAGE 1: 10
BH CV STRESS DOSE DOBUTAMINE STAGE 2: 20
BH CV STRESS DOSE DOBUTAMINE STAGE 3: 30
BH CV STRESS DOSE DOBUTAMINE STAGE 4: 40
BH CV STRESS DURATION MIN STAGE 1: 3
BH CV STRESS DURATION MIN STAGE 2: 3
BH CV STRESS DURATION MIN STAGE 3: 3
BH CV STRESS DURATION MIN STAGE 4: 3
BH CV STRESS DURATION MIN STAGE 5: 1
BH CV STRESS DURATION SEC STAGE 1: 0
BH CV STRESS DURATION SEC STAGE 2: 0
BH CV STRESS DURATION SEC STAGE 3: 0
BH CV STRESS DURATION SEC STAGE 4: 0
BH CV STRESS DURATION SEC STAGE 5: 6
BH CV STRESS ECHO POST STRESS EJECTION FRACTION EF: 65 %
BH CV STRESS HR STAGE 1: 64
BH CV STRESS HR STAGE 2: 67
BH CV STRESS HR STAGE 3: 96
BH CV STRESS HR STAGE 4: 127
BH CV STRESS HR STAGE 5: 130
BH CV STRESS PROTOCOL 1: NORMAL
BH CV STRESS RECOVERY BP: NORMAL MMHG
BH CV STRESS RECOVERY HR: 95 BPM
BH CV STRESS STAGE 1: 1
BH CV STRESS STAGE 2: 2
BH CV STRESS STAGE 3: 3
BH CV STRESS STAGE 4: 4
BH CV STRESS STAGE 5: 5
BILIRUB SERPL-MCNC: 0.5 MG/DL (ref 0.1–1)
BUN BLD-MCNC: 15 MG/DL (ref 5–21)
BUN BLD-MCNC: 16 MG/DL (ref 5–21)
BUN/CREAT SERPL: 14.4 (ref 7–25)
BUN/CREAT SERPL: 16.2 (ref 7–25)
CALCIUM SPEC-SCNC: 9.3 MG/DL (ref 8.4–10.4)
CALCIUM SPEC-SCNC: 9.4 MG/DL (ref 8.4–10.4)
CHLORIDE SERPL-SCNC: 103 MMOL/L (ref 98–110)
CHLORIDE SERPL-SCNC: 103 MMOL/L (ref 98–110)
CK MB SERPL-CCNC: 3.2 NG/ML (ref 0–5)
CK SERPL-CCNC: 80 U/L (ref 0–203)
CO2 SERPL-SCNC: 25 MMOL/L (ref 24–31)
CO2 SERPL-SCNC: 29 MMOL/L (ref 24–31)
CREAT BLD-MCNC: 0.99 MG/DL (ref 0.5–1.4)
CREAT BLD-MCNC: 1.04 MG/DL (ref 0.5–1.4)
DEPRECATED RDW RBC AUTO: 42.4 FL (ref 40–54)
ERYTHROCYTE [DISTWIDTH] IN BLOOD BY AUTOMATED COUNT: 14.2 % (ref 12–15)
GFR SERPL CREATININE-BSD FRML MDRD: 70 ML/MIN/1.73
GFR SERPL CREATININE-BSD FRML MDRD: 74 ML/MIN/1.73
GLOBULIN UR ELPH-MCNC: 3 GM/DL
GLUCOSE BLD-MCNC: 157 MG/DL (ref 70–100)
GLUCOSE BLD-MCNC: 84 MG/DL (ref 70–100)
GLUCOSE BLDC GLUCOMTR-MCNC: 103 MG/DL (ref 70–130)
GLUCOSE BLDC GLUCOMTR-MCNC: 169 MG/DL (ref 70–130)
GLUCOSE BLDC GLUCOMTR-MCNC: 88 MG/DL (ref 70–130)
HCT VFR BLD AUTO: 45.3 % (ref 40–52)
HGB BLD-MCNC: 15.2 G/DL (ref 14–18)
LV EF 2D ECHO EST: 55 %
MAGNESIUM SERPL-MCNC: 2.2 MG/DL (ref 1.4–2.2)
MAXIMAL PREDICTED HEART RATE: 147 BPM
MCH RBC QN AUTO: 27.5 PG (ref 28–32)
MCHC RBC AUTO-ENTMCNC: 33.6 G/DL (ref 33–36)
MCV RBC AUTO: 82.1 FL (ref 82–95)
PERCENT MAX PREDICTED HR: 88.44 %
PHOSPHATE SERPL-MCNC: 3.5 MG/DL (ref 2.5–4.5)
PLATELET # BLD AUTO: 169 10*3/MM3 (ref 130–400)
PMV BLD AUTO: 10.9 FL (ref 6–12)
POTASSIUM BLD-SCNC: 4 MMOL/L (ref 3.5–5.3)
POTASSIUM BLD-SCNC: 4.3 MMOL/L (ref 3.5–5.3)
PROT SERPL-MCNC: 7.4 G/DL (ref 6.3–8.7)
RBC # BLD AUTO: 5.52 10*6/MM3 (ref 4.8–5.9)
SODIUM BLD-SCNC: 138 MMOL/L (ref 135–145)
SODIUM BLD-SCNC: 141 MMOL/L (ref 135–145)
STRESS BASELINE BP: NORMAL MMHG
STRESS BASELINE HR: 58 BPM
STRESS PERCENT HR: 104 %
STRESS POST EXERCISE DUR MIN: 13 MIN
STRESS POST EXERCISE DUR SEC: 6 SEC
STRESS POST PEAK BP: NORMAL MMHG
STRESS POST PEAK HR: 130 BPM
STRESS TARGET HR: 125 BPM
TROPONIN I SERPL-MCNC: <0.012 NG/ML (ref 0–0.03)
TROPONIN I SERPL-MCNC: <0.012 NG/ML (ref 0–0.03)
WBC NRBC COR # BLD: 6.38 10*3/MM3 (ref 4.8–10.8)

## 2017-08-17 PROCEDURE — 85027 COMPLETE CBC AUTOMATED: CPT | Performed by: FAMILY MEDICINE

## 2017-08-17 PROCEDURE — 71275 CT ANGIOGRAPHY CHEST: CPT

## 2017-08-17 PROCEDURE — 84484 ASSAY OF TROPONIN QUANT: CPT | Performed by: FAMILY MEDICINE

## 2017-08-17 PROCEDURE — 95816 EEG AWAKE AND DROWSY: CPT | Performed by: PSYCHIATRY & NEUROLOGY

## 2017-08-17 PROCEDURE — 99232 SBSQ HOSP IP/OBS MODERATE 35: CPT | Performed by: PSYCHIATRY & NEUROLOGY

## 2017-08-17 PROCEDURE — 25010000002 HEPARIN (PORCINE) PER 1000 UNITS: Performed by: FAMILY MEDICINE

## 2017-08-17 PROCEDURE — 82962 GLUCOSE BLOOD TEST: CPT

## 2017-08-17 PROCEDURE — 84100 ASSAY OF PHOSPHORUS: CPT | Performed by: FAMILY MEDICINE

## 2017-08-17 PROCEDURE — 93352 ADMIN ECG CONTRAST AGENT: CPT | Performed by: INTERNAL MEDICINE

## 2017-08-17 PROCEDURE — 0 IOPAMIDOL PER 1 ML: Performed by: INTERNAL MEDICINE

## 2017-08-17 PROCEDURE — C8928 TTE W OR W/O FOL W/CON,STRES: HCPCS

## 2017-08-17 PROCEDURE — 25010000002 DOBUTAMINE PER 250 MG: Performed by: INTERNAL MEDICINE

## 2017-08-17 PROCEDURE — 93350 STRESS TTE ONLY: CPT | Performed by: INTERNAL MEDICINE

## 2017-08-17 PROCEDURE — 82550 ASSAY OF CK (CPK): CPT | Performed by: FAMILY MEDICINE

## 2017-08-17 PROCEDURE — 95816 EEG AWAKE AND DROWSY: CPT

## 2017-08-17 PROCEDURE — 80053 COMPREHEN METABOLIC PANEL: CPT | Performed by: FAMILY MEDICINE

## 2017-08-17 PROCEDURE — 25010000002 PERFLUTREN 6.52 MG/ML SUSPENSION: Performed by: INTERNAL MEDICINE

## 2017-08-17 PROCEDURE — 93018 CV STRESS TEST I&R ONLY: CPT | Performed by: INTERNAL MEDICINE

## 2017-08-17 PROCEDURE — 71010 HC CHEST PA OR AP: CPT

## 2017-08-17 PROCEDURE — 25010000002 THIAMINE PER 100 MG: Performed by: PSYCHIATRY & NEUROLOGY

## 2017-08-17 PROCEDURE — 93017 CV STRESS TEST TRACING ONLY: CPT

## 2017-08-17 PROCEDURE — 82553 CREATINE MB FRACTION: CPT | Performed by: FAMILY MEDICINE

## 2017-08-17 PROCEDURE — 99222 1ST HOSP IP/OBS MODERATE 55: CPT | Performed by: INTERNAL MEDICINE

## 2017-08-17 PROCEDURE — 83735 ASSAY OF MAGNESIUM: CPT | Performed by: FAMILY MEDICINE

## 2017-08-17 RX ORDER — ASPIRIN 81 MG/1
81 TABLET ORAL DAILY
Start: 2017-08-17 | End: 2020-01-27

## 2017-08-17 RX ORDER — ASPIRIN 325 MG
325 TABLET ORAL ONCE
Status: DISCONTINUED | OUTPATIENT
Start: 2017-08-17 | End: 2017-08-17 | Stop reason: HOSPADM

## 2017-08-17 RX ORDER — ASPIRIN 325 MG
TABLET ORAL
Status: DISPENSED
Start: 2017-08-17 | End: 2017-08-17

## 2017-08-17 RX ORDER — SODIUM CHLORIDE 9 MG/ML
75 INJECTION, SOLUTION INTRAVENOUS CONTINUOUS
Status: DISCONTINUED | OUTPATIENT
Start: 2017-08-17 | End: 2017-08-17 | Stop reason: HOSPADM

## 2017-08-17 RX ORDER — DOBUTAMINE HYDROCHLORIDE 100 MG/100ML
10-50 INJECTION INTRAVENOUS
Status: DISCONTINUED | OUTPATIENT
Start: 2017-08-17 | End: 2017-08-17 | Stop reason: HOSPADM

## 2017-08-17 RX ORDER — HYDROCODONE BITARTRATE AND ACETAMINOPHEN 10; 325 MG/1; MG/1
1 TABLET ORAL ONCE
Status: COMPLETED | OUTPATIENT
Start: 2017-08-17 | End: 2017-08-17

## 2017-08-17 RX ADMIN — LEVETIRACETAM 500 MG: 500 TABLET, FILM COATED ORAL at 09:41

## 2017-08-17 RX ADMIN — HYDROCODONE BITARTRATE AND ACETAMINOPHEN 1 TABLET: 10; 325 TABLET ORAL at 17:15

## 2017-08-17 RX ADMIN — THIAMINE HYDROCHLORIDE 100 MG: 100 INJECTION, SOLUTION INTRAMUSCULAR; INTRAVENOUS at 11:28

## 2017-08-17 RX ADMIN — HEPARIN SODIUM 5000 UNITS: 5000 INJECTION, SOLUTION INTRAVENOUS; SUBCUTANEOUS at 14:36

## 2017-08-17 RX ADMIN — DOBUTAMINE IN DEXTROSE 40 MCG/KG/MIN: 100 INJECTION, SOLUTION INTRAVENOUS at 13:55

## 2017-08-17 RX ADMIN — ASPIRIN 81 MG: 81 TABLET ORAL at 09:41

## 2017-08-17 RX ADMIN — IOPAMIDOL 116 ML: 755 INJECTION, SOLUTION INTRAVENOUS at 01:30

## 2017-08-17 RX ADMIN — SODIUM CHLORIDE 75 ML/HR: 9 INJECTION, SOLUTION INTRAVENOUS at 11:19

## 2017-08-17 RX ADMIN — CLOPIDOGREL 75 MG: 75 TABLET, FILM COATED ORAL at 09:41

## 2017-08-17 RX ADMIN — PERFLUTREN 8.48 MG: 6.52 INJECTION, SUSPENSION INTRAVENOUS at 13:10

## 2017-08-17 RX ADMIN — HEPARIN SODIUM 5000 UNITS: 5000 INJECTION, SOLUTION INTRAVENOUS; SUBCUTANEOUS at 06:17

## 2017-08-17 RX ADMIN — ATROPINE SULFATE 1 MG: 0.1 INJECTION PARENTERAL at 13:56

## 2017-08-17 ASSESSMENT — ENCOUNTER SYMPTOMS
ANAL BLEEDING: 0
ABDOMINAL PAIN: 0
BLOOD IN STOOL: 0
COUGH: 0
SHORTNESS OF BREATH: 0
BACK PAIN: 0
ABDOMINAL DISTENTION: 0
RHINORRHEA: 0
CONSTIPATION: 0
CHEST TIGHTNESS: 0
RECTAL PAIN: 0
NAUSEA: 0
DIARRHEA: 0
FACIAL SWELLING: 0
CHOKING: 0
WHEEZING: 0
STRIDOR: 0

## 2017-08-17 NOTE — PLAN OF CARE
Problem: Patient Care Overview (Adult)  Goal: Plan of Care Review  Outcome: Ongoing (interventions implemented as appropriate)    08/17/17 7370   Coping/Psychosocial Response Interventions   Plan Of Care Reviewed With patient;spouse;family   Patient Care Overview   Progress no change   Outcome Evaluation   Outcome Summary/Follow up Plan Initial RDN assessment. Pt with good appetite, eating 100% of meals on Cardiac Consistent CHO diet. RDN provided approximately 1 hour of diet instruction with Pt, Pt's spouse, son, and sister. See RDN eduction/evaluation flowsheet for edu details. Provided RDN contact info if further questions arise.

## 2017-08-17 NOTE — PROGRESS NOTES
Neurology Progress Note      Chief Complaint: Transient left sided weakness    Subjective     Subjective:    Moved to unit overnight secondary to chest pain.  Chest pain is mid-chest and does not radiate.  No associated shortness of breath.  No further neuro symptoms.    Medications:  Current Facility-Administered Medications   Medication Dose Route Frequency Provider Last Rate Last Dose   • aspirin EC tablet 81 mg  81 mg Oral Daily Charbel Cabrales DO   81 mg at 08/17/17 0941   • aspirin tablet 325 mg  325 mg Oral Once Mellissa Saravia MD       • clopidogrel (PLAVIX) tablet 75 mg  75 mg Oral Daily Mellissa Saravia MD   75 mg at 08/17/17 0941   • dextrose (D50W) solution 25 g  25 g Intravenous Q15 Min PRN Mellissa Saravia MD       • dextrose (GLUTOSE) oral gel 15 g  15 g Oral Q15 Min PRN Mellissa Saravia MD       • famotidine (PEPCID) tablet 40 mg  40 mg Oral Nightly Mellissa Saravia MD   40 mg at 08/16/17 2101   • gabapentin (NEURONTIN) capsule 300 mg  300 mg Oral TID Mellissa Saravia MD   300 mg at 08/16/17 2101   • glucagon (human recombinant) (GLUCAGEN DIAGNOSTIC) injection 1 mg  1 mg Subcutaneous Q15 Min PRN Mellissa Saravia MD       • heparin (porcine) 5000 UNIT/ML injection 5,000 Units  5,000 Units Subcutaneous Q8H Mellissa Saravia MD   5,000 Units at 08/17/17 0617   • insulin detemir (LEVEMIR) injection 80 Units  80 Units Subcutaneous Nightly Mellissa Saravia MD   80 Units at 08/16/17 2049   • insulin lispro (humaLOG) injection 0-9 Units  0-9 Units Subcutaneous 4x Daily With Meals & Nightly Mellissa Saravia MD   4 Units at 08/16/17 2049   • levETIRAcetam (KEPPRA) tablet 500 mg  500 mg Oral Q12H Mellissa Saravia MD   500 mg at 08/17/17 0941   • metoprolol tartrate (LOPRESSOR) tablet 25 mg  25 mg Oral Q12H Mellissa Saravia MD   25 mg at 08/16/17 2101   • nitroglycerin (NITROSTAT) SL tablet 0.4 mg  0.4 mg Sublingual Q5 Min PRN Mellissa Saravia MD   0.4 mg at 08/16/17 2012   • sodium chloride 0.9 %  flush 1-10 mL  1-10 mL Intravenous PRN Mellissa Saravia MD       • sodium chloride 0.9 % flush 10 mL  10 mL Intravenous PRN Per Da Silva Jr., MD       • sodium chloride 0.9 % infusion  75 mL/hr Intravenous Continuous Charbel Cabrales DO       • tamsulosin (FLOMAX) 24 hr capsule 0.4 mg  0.4 mg Oral Nightly Mellissa Saravia MD   0.4 mg at 08/16/17 2101   • thiamine (B-1) 100 mg in sodium chloride 0.9 % 100 mL IVPB  100 mg Intravenous Daily Tamica Brewer  mL/hr at 08/16/17 0847 100 mg at 08/16/17 0847       Review of Systems:   -A 14 point review of systems is completed and is negative.    Objective     Objective      Vital Signs  Temp:  [97.6 °F (36.4 °C)-98.2 °F (36.8 °C)] 98 °F (36.7 °C)  Heart Rate:  [46-60] 55  Resp:  [14-20] 16  BP: (101-181)/(49-86) 101/76    Physical Exam:    HEENT:  Neck supple  CVS:  Regular rate and rhythm.  No murmurs  Carotid Examination:  No bruits  Lungs:  Clear to auscultation  Abdomen:  Non-tender, Non-distended  Extremities:  No signs of peripheral edema    Neurologic Exam:    -Awake, Alert, Oriented X 3  -No word finding difficulties  -No aphasia  -No dysarthria  -Follows simple and complex commands    Cranial nerves II through XII intact except occasional  intermittent left esotropia  No visual field cuts.      Motor: (strength out of 5:  1= minimal movement, 2 = movement in plane of gravity, 3 = movement against gravity, 4 = movement against some resistance, 5 = full strength)    -Right Upper Ext: Proximal: 5 Distal: 5  -Left Upper Ext: Proximal: 5 Distal: 5    -Right Lower Ext: Proximal: 5 Distal: 5  -Left Lower Ext: Proximal: 5 Distal: 5         Results Review:    I reviewed the patient's new clinical results.    Lab Results (last 24 hours)     Procedure Component Value Units Date/Time    POC Glucose Fingerstick [[id number]]  (Abnormal) Collected:  08/16/17 1023    Specimen:  Blood Updated:  08/16/17 1034     Glucose 206 (H) mg/dL       : [id number]  Salomon ShelbyMeter ID: [id number]       POC Glucose Fingerstick [[id number]]  (Abnormal) Collected:  08/16/17 1643    Specimen:  Blood Updated:  08/16/17 1656     Glucose 212 (H) mg/dL       : [id number] Salomon ShelbyMeter ID: [id number]       POC Glucose Fingerstick [[id number]]  (Abnormal) Collected:  08/16/17 2042    Specimen:  Blood Updated:  08/16/17 2112     Glucose 233 (H) mg/dL       : [id number] Emmanuel Vicente LMeter ID: [id number]       CBC (No Diff) [[id number]]  (Abnormal) Collected:  08/17/17 0011    Specimen:  Blood Updated:  08/17/17 0017     WBC 6.38 10*3/mm3      RBC 5.52 10*6/mm3      Hemoglobin 15.2 g/dL      Hematocrit 45.3 %      MCV 82.1 fL      MCH 27.5 (L) pg      MCHC 33.6 g/dL      RDW 14.2 %      RDW-SD 42.4 fl      MPV 10.9 fL      Platelets 169 10*3/mm3     POC Glucose Fingerstick [[id number]]  (Abnormal) Collected:  08/16/17 2351    Specimen:  Blood Updated:  08/17/17 0020     Glucose 169 (H) mg/dL       : [id number] Emmanuel Vicente LMmarquise ID: [id number]       Comprehensive Metabolic Panel [[id number]]  (Abnormal) Collected:  08/17/17 0011    Specimen:  Blood Updated:  08/17/17 0030     Glucose 157 (H) mg/dL      BUN 16 mg/dL      Creatinine 0.99 mg/dL      Sodium 138 mmol/L      Potassium 4.3 mmol/L      Chloride 103 mmol/L      CO2 25.0 mmol/L      Calcium 9.3 mg/dL      Total Protein 7.4 g/dL      Albumin 4.40 g/dL      ALT (SGPT) 41 U/L      AST (SGOT) 24 U/L      Alkaline Phosphatase 100 U/L      Total Bilirubin 0.5 mg/dL      eGFR Non African Amer 74 mL/min/1.73      Globulin 3.0 gm/dL      A/G Ratio 1.5 g/dL      BUN/Creatinine Ratio 16.2     Anion Gap 10.0 mmol/L     Narrative:       The MDRD GFR formula is only valid for adults with stable renal function between ages 18 and 70.    Phosphorus [[id number]]  (Normal) Collected:  08/17/17 0011    Specimen:  Blood Updated:  08/17/17 0030     Phosphorus 3.5 mg/dL     Troponin [[id number]]  (Normal) Collected:  08/17/17 0011     Specimen:  Blood Updated:  08/17/17 0044     Troponin I <0.012 ng/mL     CK [[id number]]  (Normal) Collected:  08/17/17 0011    Specimen:  Blood Updated:  08/17/17 0044     Creatine Kinase 80 U/L     CK-MB [[id number]]  (Normal) Collected:  08/17/17 0011    Specimen:  Blood Updated:  08/17/17 0044     CKMB 3.20 ng/mL     Narrative:       CKMB Index not indicated    Magnesium [[id number]]  (Normal) Collected:  08/17/17 0011    Specimen:  Blood Updated:  08/17/17 0051     Magnesium 2.2 mg/dL     POC Glucose Fingerstick [[id number]]  (Normal) Collected:  08/17/17 0620    Specimen:  Blood Updated:  08/17/17 0632     Glucose 88 mg/dL       : [id number] Farrar KaitlinMeter ID: [id number]       Basic Metabolic Panel [[id number]]  (Normal) Collected:  08/17/17 0515    Specimen:  Blood Updated:  08/17/17 0642     Glucose 84 mg/dL      BUN 15 mg/dL      Creatinine 1.04 mg/dL      Sodium 141 mmol/L      Potassium 4.0 mmol/L      Chloride 103 mmol/L      CO2 29.0 mmol/L      Calcium 9.4 mg/dL      eGFR Non African Amer 70 mL/min/1.73      BUN/Creatinine Ratio 14.4     Anion Gap 9.0 mmol/L     Narrative:       The MDRD GFR formula is only valid for adults with stable renal function between ages 18 and 70.    Troponin [[id number]]  (Normal) Collected:  08/17/17 0515    Specimen:  Blood Updated:  08/17/17 0649     Troponin I <0.012 ng/mL           Assessment/Plan     Hospital Problem List    Active Problems:    CVA (cerebral vascular accident)    Impression:  1.  Right pontine infarct  2.  Multiple risk factors which are not adequately controlled including diabetes, hyperlipidemia.  3.  Recurrent episodes of left-sided weakness that then resolves.  He did have a right pontine infarct that may have contributed to this but he has had multiple episodes in the past of this that is also resolved and it raised the issue of seizures.  He is on Keppra but states he has never had an EEG.  His alcohol use may lower his seizure threshold if  "in fact he is having seizures.   4.  The episodes of slurred speech and confusion that he sometimes experiences could be seizures but it could also be him drinking and mixing Xanax and opioids  What is of concern is that he will drink the alcohol (4 jiggers of whiskey) with water at the same time he is taking the Xanax and opioids.  He does use his CPAP but is quite possible that he is still not breathing over that as this can cause central sleep apnea  5. Cerebral microvascular disease  6. Chest pain      Plan:  1.  Patient should not mix Xanax and opioids and alcohol.  Ideally the opioids and Xanax were discontinued since he does not seem convinced to be doing this  2.  He should be followed and monitored by a sleep specialist to make sure that is having no issues with his CPAP and with central sleep apnea  3.  He appeared to not be open to taking statins and is not open to changing his diet.  He is aware that he needs to be but he also seems to be to need education about what is a \"good diet\"  4.  He seems to be tolerating the Plavix and aspirin.   5.  He has a normal level of B12 but going ahead and give him an injection as the alternative is to check a methylmalonic acid and see if that is abnormal and then treat him.  6.  He will definitely need a follow-up with neurology at both to assess his cognitive capabilities and the follow-up encouraged him in the right diet since he will not take a statin.  7.  EEG completed this morning  8.  Patient refusing rehab  9.  Chest pain workup by primary team    Jef Malagon MD  08/17/17  9:44 AM    "

## 2017-08-17 NOTE — PROGRESS NOTES
[facility] Medicine Services  INPATIENT PROGRESS NOTE    Length of Stay: 3  Date of Admission: 8/14/2017  Primary Care Physician: Joao Vergara MD    Subjective   Chief Complaint: chest pain   HPI   He developed some left-sided chest discomfort and left arm pain last night.  A cardiac alert was called and he ultimately ended up in the CCU.  All workup this far regarding this and negative.  Cardiology has seen the patient and currently recommends a dobutamine stress echocardiogram.  He sees Dr. Ethan tidwell at [facility] for history of coronary disease.    He currently has no discomfort.  He hopes to go home setting.    Review of Systems   All pertinent negatives and positives are as above. All other systems have been reviewed and are negative unless otherwise stated.     Objective    Temp:  [97.6 °F (36.4 °C)-98.2 °F (36.8 °C)] 98 °F (36.7 °C)  Heart Rate:  [46-60] 51  Resp:  [14-20] 16  BP: (104-181)/(49-86) 139/71  Physical Exam  Constitutional: He is oriented to person, place, and time. He appears well-developed and well-nourished.   Up in bed.  No distress. No family present. Discussed with his nurse, Morales.  Head: Normocephalic and atraumatic.   Eyes: Conjunctivae and EOM are normal. Pupils are equal, round, and reactive to light.   Neck: Neck supple. No JVD present.   Cardiovascular: Normal rate, regular rhythm, normal heart sounds and intact distal pulses.  Exam reveals no gallop and no friction rub.    No murmur heard. Telemetry currently shows NSR at 53.     Pulmonary/Chest: Effort normal and breath sounds normal. No respiratory distress. He has no wheezes. He has no rales. He exhibits no tenderness.   Abdominal: Soft. Bowel sounds are normal. He exhibits no distension. There is no tenderness. There is no rebound and no guarding.   Musculoskeletal: Normal range of motion. He exhibits no edema, tenderness or deformity.   Neurological: He is alert and oriented to  person, place, and time. He displays normal reflexes. No cranial nerve deficit. He exhibits normal muscle tone.   The left-sided facial droop seen yesterday is less noticeable today.  He does not speak with any dysarthria.  No aphasic problems.  He seems to have equal strength in all extremities today.   Skin: Skin is warm and dry. No rash noted.   Psychiatric: He has a normal mood and affect. His behavior is normal. Judgment and thought content normal.     Results Review:  I have reviewed the labs, radiology results, and diagnostic studies.    Laboratory Data:     Results from last 7 days  Lab Units 08/17/17  0011 08/15/17  0555 08/14/17  1746   WBC 10*3/mm3 6.38 6.36 6.59   HEMOGLOBIN g/dL 15.2 14.2 14.9   HEMATOCRIT % 45.3 43.1 45.1   PLATELETS 10*3/mm3 169 154 173          Results from last 7 days  Lab Units 08/17/17  0515 08/17/17  0011 08/15/17  0555 08/14/17  1746   SODIUM mmol/L 141 138 135 137   POTASSIUM mmol/L 4.0 4.3 4.0 4.6   CHLORIDE mmol/L 103 103 102 102   CO2 mmol/L 29.0 25.0 23.0* 25.0   BUN mg/dL 15 16 19 18   CREATININE mg/dL 1.04 0.99 0.99 1.03   CALCIUM mg/dL 9.4 9.3 9.0 9.2   BILIRUBIN mg/dL  --  0.5 0.4 0.6   ALK PHOS U/L  --  100 84 92   ALT (SGPT) U/L  --  41 39 40   AST (SGOT) U/L  --  24 21 23   GLUCOSE mg/dL 84 157* 190* 211*     Radiology Data:   Imaging Results (last 24 hours)     Procedure Component Value Units Date/Time    CT Angiogram Chest With Contrast [[id number]] Collected:  08/17/17 0707     Updated:  08/17/17 0712    Narrative:       CT ANGIOGRAM CHEST W CONTRAST- 8/17/2017 12:20 AM CDT      HISTORY: chest pain; I63.9-Cerebral infarction, unspecified;  M62.81-Muscle weakness (generalized); R26.89-Other abnormalities of gait  and mobility; Z78.9-Other specified health status      COMPARISON: Chest x-ray 08/16/2017.      DOSE LENGTH PRODUCT: 432 mGy cm. Automated exposure control was also  utilized to decrease patient radiation dose.     TECHNIQUE: Helical tomographic images of  the chest were obtained after  the administration of intravenous contrast following angiogram protocol.  Additionally, 3D and multiplanar reformatted images were provided.        FINDINGS:    Pulmonary arteries: There is adequate enhancement of the pulmonary  arteries to evaluate for central and segmental pulmonary emboli. There  are no filling defects within the main, lobar, segmental or visualized  subsegmental pulmonary arteries. The pulmonary arteries are within  normal limits for size.      Aorta and great vessels: There is atherosclerosis in the aorta without  aneurysm or dissection. The great vessels are normal in appearance.     Visualized neck base: The imaged portion of the base of the neck appears  unremarkable.      Lungs: Dependent changes are noted in bilateral lung bases. The lungs  are otherwise clear. There is no pleural effusion. The trachea and  bronchial tree are patent.      Heart: Coronary artery calcifications and stents are present. There is  mild, diffuse enlargement of the heart. There is no pericardial  effusion.      Mediastinum and lymph nodes: No enlarged mediastinal, hilar, or axillary  lymph nodes are present.      Skeletal and soft tissues: The osseous structures of the thorax and  surrounding soft tissues demonstrate no acute process.     Upper abdomen: The imaged portion of the upper abdomen demonstrates no  acute process.        Impression:       1. No evidence of pulmonary embolus or other acute cardiopulmonary  process.     A preliminary report was provided by [facility]. I  agree with the preliminary interpretation.        This report was finalized on 08/17/2017 07:09 by Dr. Horacio Marshall MD.    XR Chest 1 View [[id number]] Collected:  08/17/17 0709     Updated:  08/17/17 0713    Narrative:       XR CHEST 1 VW- 8/17/2017 12:07 AM CDT     HISTORY: chest pain; I63.9-Cerebral infarction, unspecified;  M62.81-Muscle weakness (generalized); R26.89-Other  abnormalities of gait  and mobility; Z78.9-Other specified health status       COMPARISON: 08/14/2017.     FINDINGS:   The lungs are clear. The heart is stable in size. A coronary artery  stent is visualized.      The osseous structures and surrounding soft tissues demonstrate no acute  abnormality.       Impression:       1. No radiographic evidence of acute cardiopulmonary process.        This report was finalized on 08/17/2017 07:09 by Dr. Horacio Marshall MD.        I have reviewed the patient current medications.     Assessment/Plan   Assessment:   1.  Recurrent left-sided weakness and facial drooping in the setting of recent right pontine stroke.  2.  Slight troponin elevation, normalized, likely secondary to cerebrovascular accident.  3.  Hyperlipidemia, intolerant of statins.  4.  Type II diabetes mellitus with hemoglobin A1c of 7.1.   5.  Systemic arterial hypertension.  6.  Obstructive sleep apnea with home CPAP.  7.  Alcohol intake.  8.  Diastolic dysfunction.  9.  Coronary artery disease with history of stent placement, Dr. Borja.   10. Chest pain.      Plan:   After his episode of chest discomfort last night he had a negative enzymes and EKG. CTA of the chest was negative.  Cardiology has seen him this morning and plan for a dobutamine stress echocardiogram.    No carotid disease seen on CTA.  Echocardiogram is normal with the exception of diastolic dysfunction.  No atrial fibrillation on telemetry.  His hemoglobin A1c is essentially at goal.  He does have a history of hyperlipidemia, but has been intolerant to multiple statins through the years.  Apparently, Dr. Vergara has been looking into him having Repatha as an outpatient, but this will require precertification from him.  His blood pressure has been much more well controlled today so far.  He needs to continue on his CPAP for his obstructive sleep apnea, his wife did bring it in.     Continue on aspirin and Plavix.  His aspirin is currently 325  mg.  I doubt that there is any added benefit to this and it has been decreased to 81 mg daily.       EEG done this morning.  He is on Keppra at present.     Continue physical and occupational therapy consultation.     Discharge Planning: I expect patient to be discharged to home with home health tomorrow.    Charbel Cabrales,    08/17/17   9:27 AM

## 2017-08-17 NOTE — PLAN OF CARE
Problem: Patient Care Overview (Adult)  Goal: Plan of Care Review  Outcome: Ongoing (interventions implemented as appropriate)    08/17/17 7590   Coping/Psychosocial Response Interventions   Plan Of Care Reviewed With patient;spouse   Patient Care Overview   Progress improving   Outcome Evaluation   Outcome Summary/Follow up Plan Prn pain med request once with relief noted for chronic back pain. Alert and orientated. GARCIA. Perrla. Slight left facial droop. Up with standy by assist. Family at bedside. NIH = 1. No neuro changes. Stress test performed today. Safety maintained.        Goal: Adult Individualization and Mutuality  Outcome: Ongoing (interventions implemented as appropriate)  Goal: Discharge Needs Assessment  Outcome: Ongoing (interventions implemented as appropriate)    Problem: Stroke (Ischemic) (Adult)  Goal: Signs and Symptoms of Listed Potential Problems Will be Absent or Manageable (Stroke)  Outcome: Ongoing (interventions implemented as appropriate)    Problem: Fall Risk (Adult)  Goal: Absence of Falls  Outcome: Ongoing (interventions implemented as appropriate)    Problem: Acute Coronary Syndrome (ACS) (Adult)  Goal: Signs and Symptoms of Listed Potential Problems Will be Absent or Manageable (Acute Coronary Syndrome)  Outcome: Ongoing (interventions implemented as appropriate)

## 2017-08-17 NOTE — PROGRESS NOTES
Neurology Progress Note      Chief Complaint: Transient left sided weakness    Subjective     Subjective:    Patient doing well and wants to go home. No further events.  He admits to drinking at least 4 jiggers of whiskey on the days that he drinks and he states he is not drinking as much anymore.  2 large glasses of whiskey and water may have this 2 times a week or habits at ×3 times a week.   He reports that he eats well and describes that he eats toasts sausage cotto potatoes and eggs for breakfast and for supper may have meat and gravy and mashed potatoes and I pointed out to him that those of the things that will contribute to have poor control of his diabetes as well as poor control of his lipids.     He does admit to poor memory.      Medications:  Current Facility-Administered Medications   Medication Dose Route Frequency Provider Last Rate Last Dose   • [START ON 8/17/2017] aspirin EC tablet 81 mg  81 mg Oral Daily Charbel Cabrales, DO       • clopidogrel (PLAVIX) tablet 75 mg  75 mg Oral Daily Mellissa Saravia MD   75 mg at 08/16/17 0847   • dextrose (D50W) solution 25 g  25 g Intravenous Q15 Min PRN Mellissa Saravia MD       • dextrose (GLUTOSE) oral gel 15 g  15 g Oral Q15 Min PRN Mellissa Saravia MD       • famotidine (PEPCID) tablet 40 mg  40 mg Oral Nightly Mellissa Saravia MD       • gabapentin (NEURONTIN) capsule 300 mg  300 mg Oral TID Mellissa Saravia MD   300 mg at 08/16/17 1614   • glucagon (human recombinant) (GLUCAGEN DIAGNOSTIC) injection 1 mg  1 mg Subcutaneous Q15 Min PRN Mellissa Saravia MD       • heparin (porcine) 5000 UNIT/ML injection 5,000 Units  5,000 Units Subcutaneous Q8H Mellissa Saravia MD   5,000 Units at 08/16/17 1357   • insulin detemir (LEVEMIR) injection 80 Units  80 Units Subcutaneous Nightly Mellissa Saravia MD   80 Units at 08/15/17 2227   • insulin lispro (humaLOG) injection 0-9 Units  0-9 Units Subcutaneous 4x Daily With Meals & Nightly Mellissa Saravia MD   4  Units at 08/16/17 1707   • levETIRAcetam (KEPPRA) tablet 500 mg  500 mg Oral Q12H Mellissa Saravia MD   500 mg at 08/16/17 0847   • metoprolol tartrate (LOPRESSOR) tablet 25 mg  25 mg Oral Q12H Mellissa Saravia MD   25 mg at 08/16/17 0847   • sodium chloride 0.9 % flush 1-10 mL  1-10 mL Intravenous PRN Mellissa Saravia MD       • sodium chloride 0.9 % flush 10 mL  10 mL Intravenous PRN Per Da Silva Jr., MD       • tamsulosin (FLOMAX) 24 hr capsule 0.4 mg  0.4 mg Oral Nightly Mellissa Saravia MD   0.4 mg at 08/15/17 2222   • thiamine (B-1) 100 mg in sodium chloride 0.9 % 100 mL IVPB  100 mg Intravenous Daily Tamica Brewer  mL/hr at 08/16/17 0847 100 mg at 08/16/17 0847       Review of Systems:   -A 14 point review of systems is completed and is negative.    Objective     Objective      Vital Signs  Temp:  [97.6 °F (36.4 °C)-98.3 °F (36.8 °C)] 97.6 °F (36.4 °C)  Heart Rate:  [58-72] 59  Resp:  [18-20] 18  BP: (122-168)/(52-83) 130/63    Physical Exam:    HEENT:  Neck supple  CVS:  Regular rate and rhythm.  No murmurs  Carotid Examination:  No bruits  Lungs:  Clear to auscultation  Abdomen:  Non-tender, Non-distended  Extremities:  No signs of peripheral edema    Neurologic Exam:    -Awake, Alert, Oriented X 3  -No word finding difficulties  -No aphasia  -No dysarthria  -Follows simple and complex commands    Cranial nerves II through XII intact except occasional  intermittent left esotropia    Motor: (strength out of 5:  1= minimal movement, 2 = movement in plane of gravity, 3 = movement against gravity, 4 = movement against some resistance, 5 = full strength)    -Right Upper Ext: Proximal: 5 Distal: 5  -Left Upper Ext: Proximal: 5 Distal: 5    -Right Lower Ext: Proximal: 5 Distal: 5  -Left Lower Ext: Proximal: 5 Distal: 5         Results Review:    I reviewed the patient's new clinical results.    Lab Results (last 24 hours)     Procedure Component Value Units Date/Time    POC Glucose  Fingerstick [[id number]]  (Abnormal) Collected:  08/15/17 2213    Specimen:  Blood Updated:  08/15/17 2235     Glucose 271 (H) mg/dL       : [id number] Patricio QuezadabethMeter ID: [id number]       Iron Profile [[id number]]  (Normal) Collected:  08/16/17 0450    Specimen:  Blood Updated:  08/16/17 0520     Iron 67 mcg/dL      TIBC 316 mcg/dL      Iron Saturation 21 %     TSH [[id number]]  (Normal) Collected:  08/16/17 0450    Specimen:  Blood Updated:  08/16/17 0543     TSH 1.660 mIU/mL     Vitamin B12 [[id number]]  (Normal) Collected:  08/16/17 0450    Specimen:  Blood Updated:  08/16/17 0618     Vitamin B-12 340 pg/mL     Folate [[id number]] Collected:  08/16/17 0450    Specimen:  Blood Updated:  08/16/17 0618     Folate 4.97 ng/mL     POC Glucose Fingerstick [[id number]]  (Abnormal) Collected:  08/16/17 0757    Specimen:  Blood Updated:  08/16/17 0808     Glucose 140 (H) mg/dL       : [id number] [employer] ShelbyMeter ID: [id number]       POC Glucose Fingerstick [[id number]]  (Abnormal) Collected:  08/16/17 1023    Specimen:  Blood Updated:  08/16/17 1034     Glucose 206 (H) mg/dL       : [id number] [employer] ShelbyMeter ID: [id number]       POC Glucose Fingerstick [[id number]]  (Abnormal) Collected:  08/16/17 1643    Specimen:  Blood Updated:  08/16/17 1656     Glucose 212 (H) mg/dL       : [id number] Rollings ShelbyMeter ID: [id number]             Assessment/Plan     Hospital Problem List    Active Problems:    CVA (cerebral vascular accident)    Impression:  1.  Right pontine infarct  2.  Multiple risk factors which are not adequately controlled including diabetes, hyperlipidemia.  3.  Recurrent episodes of left-sided weakness that then resolves.  He did have a right pontine infarct that may have contributed to this but he has had multiple episodes in the past of this that is also resolved and it raised the issue of seizures.  He is on Keppra but states he has never had an EEG.  His alcohol use may lower  "his seizure threshold if in fact he is having seizures.   4.  The episodes of slurred speech and confusion that he sometimes experiences could be seizures but it could also be him drinking and mixing Xanax and opioids  What is of concern is that he will drink the alcohol (4 jiggers of whiskey) with water at the same time he is taking the Xanax and opioids.  He does use his CPAP but is quite possible that he is still not breathing over that as this can cause central sleep apnea  5. Cerebral microvascular disease  6. Cognitive Impairment      Plan:  1.  Patient should not mix Xanax and opioids and alcohol.  Ideally the opioids and Xanax were discontinued since he does not seem convinced to be doing this  2.  He should be followed and monitored by a sleep specialist to make sure that is having no issues with his CPAP and with central sleep apnea  3.  He appeared to not be open to taking statins and is not open to changing his diet.  He is aware that he needs to be but he also seems to be to need education about what is a \"good diet\"  4.  He seems to be tolerating the Plavix and aspirin.   5.  He has a normal level of B12 but going ahead and give him an injection as the alternative is to check a methylmalonic acid and see if that is abnormal and then treat him.  6.  He will definitely need a follow-up with neurology at both to assess his cognitive capabilities and the follow-up encouraged him in the right diet since he will not take a statin.  7.  Await EEG.  8.  If EEG is okay the patient can be discharged to home.      ADDENDUM:    Wife and sister came back to visit after 9 PM  and had a lot of questions regarding his diagnosis and in particular his diet. Would be best if nutritionist provided education to wife. She will be here at 10 AM and would like to talk with dietician before patient gets discharged. .      Tamica Brewer MD  08/16/17  7:58 PM    "

## 2017-08-17 NOTE — SIGNIFICANT NOTE
Cardiac alert was called to room 333, CCU nurses and Dr. Saravia arrived at 2350. Pt was complaining of chest pain that radiated to back. EKG done at 2352. EKG showed bradycardia, otherwise normal. Blood sugar was 169 at 2350. At 2358, sublingual nitro was given, and pain went away at 0002. We did a repeat EKG that again, was normal other than bradycardia. Cynthia was called at 0005, he called back at 0008 and ordered us to get blood draws and a CT then transfer to CCU bed 12. Blood was drawn 0010 and we left for CT at 0014. After CT we brought patient to CCU bed 12. Had no complaints of pain from time nitro was given to when we arrived on unit

## 2017-08-17 NOTE — PLAN OF CARE
Problem: Patient Care Overview (Adult)  Goal: Plan of Care Review  Outcome: Ongoing (interventions implemented as appropriate)    08/17/17 0239   Coping/Psychosocial Response Interventions   Plan Of Care Reviewed With patient   Patient Care Overview   Progress declining   Outcome Evaluation   Outcome Summary/Follow up Plan Cardiac Alert called on patient r/t complaints of chest pain. See charting for interventions. Consult called to Dr. Marie. Patient moved to CCU to be monitored more closley. Patient alert and oriented.  R>L. Lt facial droop, baseline. Lungs clear on home CPap. Sinus bennett to NSR. BP WNL. Pulses palpable. No edema noticed. No complaints of chest pain noted. Will continue to monitor.          Problem: Stroke (Ischemic) (Adult)  Goal: Signs and Symptoms of Listed Potential Problems Will be Absent or Manageable (Stroke)  Outcome: Ongoing (interventions implemented as appropriate)    Problem: Fall Risk (Adult)  Goal: Absence of Falls  Outcome: Ongoing (interventions implemented as appropriate)    Problem: Acute Coronary Syndrome (ACS) (Adult)  Goal: Signs and Symptoms of Listed Potential Problems Will be Absent or Manageable (Acute Coronary Syndrome)  Outcome: Ongoing (interventions implemented as appropriate)

## 2017-08-17 NOTE — SIGNIFICANT NOTE
Attempted to see pt in CCU for a cognitive-linguistic eval, however pt had been moved back to floor. SLP then spoke with pt's wife who reported the pt was down for a stress test and may potentially be discharged home today.     08/17/17 1259   Time Calculation- SLP   SLP Start Time 1253   SLP Stop Time 1259   SLP Time Calculation (min) 6 min   SLP Non-Billable Time (min) 6 min   SLP Received On 08/17/17   Trev Goldsmith CCC-SLP 8/17/2017 1:00 PM

## 2017-08-17 NOTE — PROGRESS NOTES
Rapid response was called regarding patient having sudden onset of substernal chest pain.  Patient states it is 6 out of 10 intensity, pressure-like in quality, no alleviating factors, and pain increases with inspiration.  Patient states the pain started after receiving IV magnesium.  During examination states the pain is also radiating toward his back.    Vitals: Heart rate 90s, respiration 18, temperature afebrile, blood pressure right arm 180/90, left arm 130/85.  General: Alert and oriented ×3  CVS: S1, S2, normal heart sound no murmur  Respiration: Clear to auscultation  Abdomen: Soft, nontender, nondistended, positive bowel sound  Extremity: No edema    Assessment and plan:    #Sudden onset of chest pain radiating to the back rule out myocardial infarction versus aortic dissection  -Stat nitroglycerin sublingually  -Continue oxygen therapy with nasal cannula  -Discussed case with on-call cardiologist- recommended CTA and lab works to rule out aortic dissection versus myocardial infarction and will see patient in the a.m.  -Stat EKG-noted for no significant changes from prior EKGs  -Stat CTA-negative for any acute cardiopulmonary pathology-negative for PE-negative for aortic dissection  -Transfer patient to ICU  -Stat labs-troponin, electrolyte and other labs within normal limits  -We will continue to monitor patient for any acute changes

## 2017-08-17 NOTE — CONSULTS
[facility] HEART GROUP CONSULT NOTE    Referring Provider: Mellissa Saravia MD    Reason for Consultation: chest pain    Chief Complaint   Patient presents with   • Neuro Deficit(s)       Subjective .     History of present illness:  Morris Yousif is a 72 y.o. male with a known PMH significant for coronary artery disease (status post multiple stent placements, patient at [facility]), essential hypertension, hyperlipidemia, diabetes mellitus, and chronic back pain.  The patient also was recently diagnosed with CVA after an episode of left sided weakness and facial droop last week.  The patient was seen in [facility] when symptoms presented while traveling home from vacation.  The patient was administered TPA, flown to another hospital in the [place] area for higher level of care.  Subsequently, symptoms resolved, imaging was consistent for CVA and patient was discharged home after about 2 days there.  His symptoms returned after arriving home on his day of discharge, with facial droop and left sided weakness. He presented to [facility] ED (08.13.17) patient had negative CTA of Head and Neck for acute changes. He was discharged home. He presented back to ED next day with symptoms present again (08.14.17)  MRI obtained and consistent with findings from [place], right pontine stroke.  The patient was admitted to the hospital for further workup.  Last night during and after receiving a dose of IV Magnesium, the patient had sudden onset of left arm pain, originating at the IV site up into the shoulder and into his back.  He was given nitroglycerin, EKG was obtained with no acute changes, and troponin was checked with 2 negative results.  The patient was transferred to the CCU.  Since that episode no further left arm or back pain and no chest pain. We were asked to see the patient in consult due to the episode of chest pain.    History  Past Medical History:   Diagnosis Date   • Arthritis    •  Depression    • Diabetes mellitus    • Hyperlipidemia    • Hypertension    • Myocardial infarction    • Stroke    ,   Past Surgical History:   Procedure Laterality Date   • APPENDECTOMY     • BACK SURGERY     • CHOLECYSTECTOMY     • CORONARY STENT PLACEMENT      X8   • REPLACEMENT TOTAL KNEE     ,   History reviewed. No pertinent family history.,   Social History   Substance Use Topics   • Smoking status: Never Smoker   • Smokeless tobacco: None   • Alcohol use Yes      Comment: OCCASIONALLY   ,     Medications  Current Facility-Administered Medications   Medication Dose Route Frequency Provider Last Rate Last Dose   • aspirin EC tablet 81 mg  81 mg Oral Daily Charbel Cabrales DO       • aspirin tablet 325 mg  325 mg Oral Once Mellissa Saravia MD       • clopidogrel (PLAVIX) tablet 75 mg  75 mg Oral Daily Mellissa Saravia MD   75 mg at 08/16/17 0847   • dextrose (D50W) solution 25 g  25 g Intravenous Q15 Min PRN Mellissa Saravia MD       • dextrose (GLUTOSE) oral gel 15 g  15 g Oral Q15 Min PRN Mellissa Saravia MD       • famotidine (PEPCID) tablet 40 mg  40 mg Oral Nightly Mellissa Saravia MD   40 mg at 08/16/17 2101   • gabapentin (NEURONTIN) capsule 300 mg  300 mg Oral TID Mellissa Saravia MD   300 mg at 08/16/17 2101   • glucagon (human recombinant) (GLUCAGEN DIAGNOSTIC) injection 1 mg  1 mg Subcutaneous Q15 Min PRN Mellissa Saravia MD       • heparin (porcine) 5000 UNIT/ML injection 5,000 Units  5,000 Units Subcutaneous Q8H Mellissa Saravia MD   5,000 Units at 08/17/17 0617   • insulin detemir (LEVEMIR) injection 80 Units  80 Units Subcutaneous Nightly Mellissa Saravia MD   80 Units at 08/16/17 2049   • insulin lispro (humaLOG) injection 0-9 Units  0-9 Units Subcutaneous 4x Daily With Meals & Nightly Mellissa Saravia MD   4 Units at 08/16/17 2049   • levETIRAcetam (KEPPRA) tablet 500 mg  500 mg Oral Q12H Mellissa Saravia MD   500 mg at 08/16/17 2101   • metoprolol tartrate (LOPRESSOR) tablet 25 mg  25  mg Oral Q12H Mellissa Saravia MD   25 mg at 08/16/17 2101   • nitroglycerin (NITROSTAT) SL tablet 0.4 mg  0.4 mg Sublingual Q5 Min PRN Mellissa Saravia MD   0.4 mg at 08/16/17 2358   • sodium chloride 0.9 % flush 1-10 mL  1-10 mL Intravenous PRN Mellissa Saravia MD       • sodium chloride 0.9 % flush 10 mL  10 mL Intravenous PRN Per Da Silva Jr., MD       • tamsulosin (FLOMAX) 24 hr capsule 0.4 mg  0.4 mg Oral Nightly Mellissa Saravia MD   0.4 mg at 08/16/17 2101   • thiamine (B-1) 100 mg in sodium chloride 0.9 % 100 mL IVPB  100 mg Intravenous Daily Tamica Brewer  mL/hr at 08/16/17 0847 100 mg at 08/16/17 0847       Allergies:  Review of patient's allergies indicates no known allergies.    Review of Systems  Review of Systems   Constitution: Negative for chills, diaphoresis and fever.   Cardiovascular: Negative for chest pain, claudication, cyanosis, dyspnea on exertion, irregular heartbeat, leg swelling, near-syncope, orthopnea, palpitations, paroxysmal nocturnal dyspnea and syncope.   Respiratory: Negative for cough, hemoptysis, shortness of breath and wheezing.    Hematologic/Lymphatic: Negative for bleeding problem.   Musculoskeletal: Positive for back pain (chronic).   Gastrointestinal: Negative for bloating, abdominal pain, constipation, diarrhea and heartburn.   Neurological: Negative for dizziness, focal weakness (left sided weakness resolved), light-headedness and numbness.       Objective     Physical Exam:  Patient Vitals for the past 24 hrs:   BP Temp Temp src Pulse Resp SpO2 Height Weight   08/17/17 0805 139/71 98 °F (36.7 °C) Temporal Art 51 16 98 % - -   08/17/17 0715 126/63 - - 52 18 98 % - -   08/17/17 0635 129/57 - - (!) 49 - 100 % - -   08/17/17 0620 108/51 - - 51 - 98 % - -   08/17/17 0605 108/50 - - (!) 46 15 98 % - -   08/17/17 0550 104/49 - - (!) 47 - 97 % - -   08/17/17 0535 122/57 - - (!) 46 - 98 % - -   08/17/17 0520 114/71 - - (!) 49 - 96 % - -   08/17/17 0505  "112/58 - - (!) 46 14 97 % - -   08/17/17 0450 121/58 - - (!) 47 - 98 % - -   08/17/17 0435 112/52 - - (!) 46 - 99 % - -   08/17/17 0420 112/55 - - (!) 49 - 98 % - -   08/17/17 0405 119/63 - - (!) 49 - 99 % - -   08/17/17 0354 - 98.2 °F (36.8 °C) Temporal Art - - - - -   08/17/17 0350 105/55 - - (!) 48 - 97 % - -   08/17/17 0335 - - - (!) 48 - 97 % - -   08/17/17 0320 133/64 - - (!) 48 15 98 % - -   08/17/17 0305 169/75 - - (!) 48 - 98 % - -   08/17/17 0250 142/86 - - (!) 49 - 98 % - -   08/17/17 0235 143/73 - - (!) 49 - 99 % - -   08/17/17 0220 148/71 - - (!) 49 - 99 % - -   08/17/17 0205 155/75 - - (!) 47 - 95 % - -   08/17/17 0150 153/77 - - 52 14 100 % - -   08/17/17 0135 147/79 - - 50 - 98 % - -   08/17/17 0132 147/79 - - 51 - 97 % - -   08/17/17 0120 - - - 51 - 97 % - -   08/17/17 0112 - - - 52 - 97 % - -   08/17/17 0105 - - - 53 - 98 % - -   08/17/17 0050 - - - 53 - 98 % - -   08/17/17 0040 - - - 58 - 100 % - -   08/17/17 0035 - 97.9 °F (36.6 °C) Temporal Art - - - 74\" (188 cm) 250 lb 11.2 oz (114 kg)   08/16/17 2249 171/81 98 °F (36.7 °C) Oral 58 16 92 % - -   08/16/17 2048 (!) 181/83 98 °F (36.7 °C) Oral 58 18 96 % - -   08/16/17 1533 130/63 97.6 °F (36.4 °C) Oral 59 18 97 % - -   08/16/17 1200 130/69 98.1 °F (36.7 °C) Oral 60 20 97 % - -     Physical Exam   Constitutional: He is oriented to person, place, and time. He appears well-developed and well-nourished. He is active. No distress.   HENT:   Head: Normocephalic and atraumatic.   Right Ear: External ear normal.   Left Ear: External ear normal.   Nose: Nose normal.   Mouth/Throat: Oropharynx is clear and moist. No oropharyngeal exudate.   Eyes: Conjunctivae and EOM are normal. Pupils are equal, round, and reactive to light. Right eye exhibits no discharge. Left eye exhibits no discharge. No scleral icterus.   Neck: Normal range of motion. Neck supple. No thyromegaly present.   Cardiovascular: Regular rhythm, normal heart sounds and intact distal pulses.  " Bradycardia present.  Exam reveals no gallop and no friction rub.    No murmur heard.  Pulmonary/Chest: No accessory muscle usage. No respiratory distress.   Resting in bed with CPAP in use   Abdominal: Soft. Normal appearance and bowel sounds are normal. He exhibits no distension. There is no tenderness.   Musculoskeletal: Normal range of motion.   Neurological: He is alert and oriented to person, place, and time.   Skin: Skin is warm, dry and intact. No rash noted. He is not diaphoretic. No erythema. No pallor.   Psychiatric: He has a normal mood and affect. His behavior is normal. Judgment and thought content normal.   Vitals reviewed.      Results Review:   I reviewed the patient's new clinical results.    Lab Results (last 72 hours)     Procedure Component Value Units Date/Time    POC Glucose Fingerstick [[id number]]  (Abnormal) Collected:  08/14/17 1740    Specimen:  Blood Updated:  08/14/17 1755     Glucose 186 (H) mg/dL       : [id number] Sony Crystal LMeter ID: [id number]       POC Glucose Fingerstick [[id number]]  (Abnormal) Collected:  08/14/17 1743    Specimen:  Blood Updated:  08/14/17 1755     Glucose 205 (H) mg/dL       : [id number] Sony Crystal LMeter ID: [id number]       Protime-INR [[id number]]  (Normal) Collected:  08/14/17 1746    Specimen:  Blood Updated:  08/14/17 1816     Protime 13.2 Seconds      INR 0.97    aPTT [[id number]]  (Normal) Collected:  08/14/17 1746    Specimen:  Blood Updated:  08/14/17 1816     PTT 27.5 seconds     CBC & Differential [[id number]] Collected:  08/14/17 1746    Specimen:  Blood Updated:  08/14/17 1817    Narrative:       The following orders were created for panel order CBC & Differential.  Procedure                               Abnormality         Status                     ---------                               -----------         ------                     CBC Auto Differential[[id number]]        Abnormal            Final result                  Please view results for these tests on the individual orders.    CBC Auto Differential [[id number]]  (Abnormal) Collected:  08/14/17 1746    Specimen:  Blood Updated:  08/14/17 1817     WBC 6.59 10*3/mm3      RBC 5.41 10*6/mm3      Hemoglobin 14.9 g/dL      Hematocrit 45.1 %      MCV 83.4 fL      MCH 27.5 (L) pg      MCHC 33.0 g/dL      RDW 14.7 %      RDW-SD 44.5 fl      MPV 11.2 fL      Platelets 173 10*3/mm3      Neutrophil % 50.8 %      Lymphocyte % 34.3 %      Monocyte % 9.3 %      Eosinophil % 4.9 (H) %      Basophil % 0.5 %      Immature Grans % 0.2 %      Neutrophils, Absolute 3.36 10*3/mm3      Lymphocytes, Absolute 2.26 10*3/mm3      Monocytes, Absolute 0.61 10*3/mm3      Eosinophils, Absolute 0.32 10*3/mm3      Basophils, Absolute 0.03 10*3/mm3      Immature Grans, Absolute 0.01 10*3/mm3     Comprehensive Metabolic Panel [[id number]]  (Abnormal) Collected:  08/14/17 1746    Specimen:  Blood Updated:  08/14/17 1836     Glucose 211 (H) mg/dL      BUN 18 mg/dL      Creatinine 1.03 mg/dL      Sodium 137 mmol/L      Potassium 4.6 mmol/L      Chloride 102 mmol/L      CO2 25.0 mmol/L      Calcium 9.2 mg/dL      Total Protein 7.2 g/dL      Albumin 4.20 g/dL      ALT (SGPT) 40 U/L      AST (SGOT) 23 U/L      Alkaline Phosphatase 92 U/L      Total Bilirubin 0.6 mg/dL      eGFR Non African Amer 71 mL/min/1.73      Globulin 3.0 gm/dL      A/G Ratio 1.4 g/dL      BUN/Creatinine Ratio 17.5     Anion Gap 10.0 mmol/L     Narrative:       The MDRD GFR formula is only valid for adults with stable renal function between ages 18 and 70.    POC Glucose Fingerstick [[id number]]  (Abnormal) Collected:  08/14/17 2346    Specimen:  Blood Updated:  08/15/17 0016     Glucose 134 (H) mg/dL       : [id number] Daniel KristaMeter ID: [id number]       CBC Auto Differential [[id number]]  (Abnormal) Collected:  08/15/17 0555    Specimen:  Blood Updated:  08/15/17 0624     WBC 6.36 10*3/mm3      RBC 5.19 10*6/mm3      Hemoglobin 14.2  g/dL      Hematocrit 43.1 %      MCV 83.0 fL      MCH 27.4 (L) pg      MCHC 32.9 (L) g/dL      RDW 14.4 %      RDW-SD 43.9 fl      MPV 11.2 fL      Platelets 154 10*3/mm3      Neutrophil % 50.6 %      Lymphocyte % 35.1 %      Monocyte % 9.6 %      Eosinophil % 3.9 %      Basophil % 0.6 %      Immature Grans % 0.2 %      Neutrophils, Absolute 3.22 10*3/mm3      Lymphocytes, Absolute 2.23 10*3/mm3      Monocytes, Absolute 0.61 10*3/mm3      Eosinophils, Absolute 0.25 10*3/mm3      Basophils, Absolute 0.04 10*3/mm3      Immature Grans, Absolute 0.01 10*3/mm3     Comprehensive Metabolic Panel [[id number]]  (Abnormal) Collected:  08/15/17 0555    Specimen:  Blood Updated:  08/15/17 0637     Glucose 190 (H) mg/dL      BUN 19 mg/dL      Creatinine 0.99 mg/dL      Sodium 135 mmol/L      Potassium 4.0 mmol/L      Chloride 102 mmol/L      CO2 23.0 (L) mmol/L      Calcium 9.0 mg/dL      Total Protein 6.5 g/dL      Albumin 3.70 g/dL      ALT (SGPT) 39 U/L      AST (SGOT) 21 U/L      Alkaline Phosphatase 84 U/L      Total Bilirubin 0.4 mg/dL      eGFR Non African Amer 74 mL/min/1.73      Globulin 2.8 gm/dL      A/G Ratio 1.3 g/dL      BUN/Creatinine Ratio 19.2     Anion Gap 10.0 mmol/L     Narrative:       The MDRD GFR formula is only valid for adults with stable renal function between ages 18 and 70.    Magnesium [[id number]]  (Normal) Collected:  08/15/17 0555    Specimen:  Blood Updated:  08/15/17 0637     Magnesium 1.7 mg/dL     Phosphorus [[id number]]  (Normal) Collected:  08/15/17 0555    Specimen:  Blood Updated:  08/15/17 0637     Phosphorus 3.9 mg/dL     Troponin [[id number]]  (Abnormal) Collected:  08/15/17 0555    Specimen:  Blood Updated:  08/15/17 0647     Troponin I 0.046 (H) ng/mL     Lipid Panel [[id number]]  (Abnormal) Collected:  08/15/17 0555    Specimen:  Blood Updated:  08/15/17 0648     Total Cholesterol 235 (H) mg/dL      Triglycerides 310 (H) mg/dL      HDL Cholesterol 24 (L) mg/dL      LDL Cholesterol  164  (H) mg/dL      LDL/HDL Ratio 6.21    POC Glucose Fingerstick [[id number]]  (Abnormal) Collected:  08/15/17 0724    Specimen:  Blood Updated:  08/15/17 0756     Glucose 165 (H) mg/dL       : [id number] Hard KatyMeter ID: [id number]       Hemoglobin A1c [[id number]] Collected:  08/15/17 0555    Specimen:  Blood Updated:  08/15/17 0817     Hemoglobin A1C 7.1 %     Narrative:       Less than 6.0           Non-Diabetic Range  6.0-7.0                 ADA Therapeutic Target  Greater than 7.0        Action Suggested    POC Glucose Fingerstick [[id number]]  (Abnormal) Collected:  08/15/17 1214    Specimen:  Blood Updated:  08/15/17 1227     Glucose 266 (H) mg/dL       : [id number] Ariel AmandaMeter ID: [id number]       Troponin [[id number]]  (Normal) Collected:  08/15/17 1124    Specimen:  Blood Updated:  08/15/17 1244     Troponin I 0.020 ng/mL     POC Glucose Fingerstick [[id number]]  (Abnormal) Collected:  08/15/17 1700    Specimen:  Blood Updated:  08/15/17 1712     Glucose 291 (H) mg/dL       : [id number] Gabriele WhitneyMeter ID: [id number]       POC Glucose Fingerstick [[id number]]  (Abnormal) Collected:  08/15/17 2213    Specimen:  Blood Updated:  08/15/17 2235     Glucose 271 (H) mg/dL       : [id number] Patricio FergusonzabethMeter ID: [id number]       Iron Profile [[id number]]  (Normal) Collected:  08/16/17 0450    Specimen:  Blood Updated:  08/16/17 0520     Iron 67 mcg/dL      TIBC 316 mcg/dL      Iron Saturation 21 %     TSH [[id number]]  (Normal) Collected:  08/16/17 0450    Specimen:  Blood Updated:  08/16/17 0543     TSH 1.660 mIU/mL     Vitamin B12 [[id number]]  (Normal) Collected:  08/16/17 0450    Specimen:  Blood Updated:  08/16/17 0618     Vitamin B-12 340 pg/mL     Folate [[id number]] Collected:  08/16/17 0450    Specimen:  Blood Updated:  08/16/17 0618     Folate 4.97 ng/mL     POC Glucose Fingerstick [[id number]]  (Abnormal) Collected:  08/16/17 0757    Specimen:  Blood Updated:  08/16/17 0808      Glucose 140 (H) mg/dL       : [id number] Rollings ShelbyMeter ID: [id number]       POC Glucose Fingerstick [[id number]]  (Abnormal) Collected:  08/16/17 1023    Specimen:  Blood Updated:  08/16/17 1034     Glucose 206 (H) mg/dL       : [id number] Rollings ShelbyMeter ID: [id number]       POC Glucose Fingerstick [[id number]]  (Abnormal) Collected:  08/16/17 1643    Specimen:  Blood Updated:  08/16/17 1656     Glucose 212 (H) mg/dL       : [id number] Rollings ShelbyMeter ID: [id number]       POC Glucose Fingerstick [[id number]]  (Abnormal) Collected:  08/16/17 2042    Specimen:  Blood Updated:  08/16/17 2112     Glucose 233 (H) mg/dL       : [id number] Emmanuel Vicente LMeter ID: [id number]       CBC (No Diff) [[id number]]  (Abnormal) Collected:  08/17/17 0011    Specimen:  Blood Updated:  08/17/17 0017     WBC 6.38 10*3/mm3      RBC 5.52 10*6/mm3      Hemoglobin 15.2 g/dL      Hematocrit 45.3 %      MCV 82.1 fL      MCH 27.5 (L) pg      MCHC 33.6 g/dL      RDW 14.2 %      RDW-SD 42.4 fl      MPV 10.9 fL      Platelets 169 10*3/mm3     POC Glucose Fingerstick [[id number]]  (Abnormal) Collected:  08/16/17 2351    Specimen:  Blood Updated:  08/17/17 0020     Glucose 169 (H) mg/dL       : [id number] Emmanuel Vicente LMeter ID: [id number]       Comprehensive Metabolic Panel [[id number]]  (Abnormal) Collected:  08/17/17 0011    Specimen:  Blood Updated:  08/17/17 0030     Glucose 157 (H) mg/dL      BUN 16 mg/dL      Creatinine 0.99 mg/dL      Sodium 138 mmol/L      Potassium 4.3 mmol/L      Chloride 103 mmol/L      CO2 25.0 mmol/L      Calcium 9.3 mg/dL      Total Protein 7.4 g/dL      Albumin 4.40 g/dL      ALT (SGPT) 41 U/L      AST (SGOT) 24 U/L      Alkaline Phosphatase 100 U/L      Total Bilirubin 0.5 mg/dL      eGFR Non African Amer 74 mL/min/1.73      Globulin 3.0 gm/dL      A/G Ratio 1.5 g/dL      BUN/Creatinine Ratio 16.2     Anion Gap 10.0 mmol/L     Narrative:       The MDRD GFR formula is only valid for  adults with stable renal function between ages 18 and 70.    Phosphorus [[id number]]  (Normal) Collected:  08/17/17 0011    Specimen:  Blood Updated:  08/17/17 0030     Phosphorus 3.5 mg/dL     Troponin [[id number]]  (Normal) Collected:  08/17/17 0011    Specimen:  Blood Updated:  08/17/17 0044     Troponin I <0.012 ng/mL     CK [[id number]]  (Normal) Collected:  08/17/17 0011    Specimen:  Blood Updated:  08/17/17 0044     Creatine Kinase 80 U/L     CK-MB [[id number]]  (Normal) Collected:  08/17/17 0011    Specimen:  Blood Updated:  08/17/17 0044     CKMB 3.20 ng/mL     Narrative:       CKMB Index not indicated    Magnesium [[id number]]  (Normal) Collected:  08/17/17 0011    Specimen:  Blood Updated:  08/17/17 0051     Magnesium 2.2 mg/dL     POC Glucose Fingerstick [[id number]]  (Normal) Collected:  08/17/17 0620    Specimen:  Blood Updated:  08/17/17 0632     Glucose 88 mg/dL       : [id number] Farrar KaitlinMeter ID: [id number]       Basic Metabolic Panel [[id number]]  (Normal) Collected:  08/17/17 0515    Specimen:  Blood Updated:  08/17/17 0642     Glucose 84 mg/dL      BUN 15 mg/dL      Creatinine 1.04 mg/dL      Sodium 141 mmol/L      Potassium 4.0 mmol/L      Chloride 103 mmol/L      CO2 29.0 mmol/L      Calcium 9.4 mg/dL      eGFR Non African Amer 70 mL/min/1.73      BUN/Creatinine Ratio 14.4     Anion Gap 9.0 mmol/L     Narrative:       The MDRD GFR formula is only valid for adults with stable renal function between ages 18 and 70.    Troponin [[id number]]  (Normal) Collected:  08/17/17 0515    Specimen:  Blood Updated:  08/17/17 0649     Troponin I <0.012 ng/mL           2D echocardiogram Interpretation Summary 08.14.2017  · Left ventricular systolic function is normal. Estimated ejection fraction is 51-55%.  · Left ventricular diastolic dysfunction (grade II) consistent with pseudonormalization.  · Left atrial cavity size is mildly dilated.  · Mild aortic and pulmonic valve regurgitation is  present.  · There is no evidence of intracardiac mass or thrombus.  · There is no evidence of right to left shunt on bubble study.         Imaging Results (last 72 hours)     Procedure Component Value Units Date/Time    MRI Brain Without Contrast [[id number]] Collected:  08/14/17 1947     Updated:  08/14/17 1953    Narrative:       EXAMINATION:   MRI BRAIN WO CONTRAST-  8/14/2017 7:47 PM CDT     HISTORY: MRI BRAIN WO CONTRAST- 8/14/2017 7:03 AM CDT     HISTORY: left sided weakness     COMPARISON: 12/22/2015       TECHNIQUE: Multiplanar imaging of the brain was performed in a routine  fashion without contrast.     FINDINGS:   Diffusion: Restricted diffusion is noted in the right side of the sergio.  This is an ischemic infarction.     Midline structures: Nondisplaced.     Ventricles: Normal in configuration and symmetric in size.     Masses: No masses or mass effect.     Basilar cisterns: Maintained.     Extra axial space: No abnormal extra-axial fluid.     Gray-white matter signal: Increased MR signal the paraventricular white  matter is noted and at the juxtacortical gray-white junction consistent  with chronic microvascular ischemic change     Cerebellum: Normal.     Brainstem: Ischemic infarction is noted the right side of the sergio     Other: Proximal cervical spinal cord is normal. Bilateral globes and  orbits are normal in appearance. Normal cerebrovascular flow voids  noted. No abnormal signal in the mastoid air cells or paranasal sinuses.       Impression:       1. Ischemic infarction right side of the sergio     These results were called to Dr. Da Silva at approximately 7:50 PM   This report was finalized on 08/14/2017 19:50 by Dr. Aman Mcknight MD.    CT Angiogram Neck With & Without Contrast [[id number]] Collected:  08/14/17 2207     Updated:  08/14/17 2215    Narrative:       EXAMINATION:   CT ANGIOGRAM NECK W WO CONTRAST-  8/14/2017 10:07 PM CDT     HISTORY: CT ANGIOGRAM OF THE NECK 8/14/2017 9:11 PM CDT      HISTORY: cva     COMPARISON: None      DLP: 2217 mGy cm     In order to have a CT radiation dose as low as reasonably achievable,  Automated Exposure Control was utilized for adjustment of the mA and/or  KV according to patient size.     TECHNIQUE: Following the uneventful administration of Isovue contrast,  serial helical tomographic images of the neck were obtained following  angiogram protocol. Multiplanar MIP and 3D reconstructions were provided  for review.       FINDINGS:      Angiogram:   The aortic arch and visualized great vessels are patent, without  evidence of aneurysm, dissection, vessel cutoff, or flow limiting  stenosis.      The right common carotid artery is visualized in its bifurcation is  unremarkable. Calcified plaque is present in the right internal carotid  artery there is 50% or less associated stenosis.     The left common carotid artery demonstrates normal bifurcation mild  plaque is present in the left internal carotid artery with 50% or less  associated stenosis.      Posteriorly, the vertebral arteries and visualized basilar artery  demonstrate no aneurysm, dissection, vessel cutoff, or flow-limiting  stenosis.      Other findings: The airway is patent. There is no mass or significant  lymphadenopathy. The lung apices are clear. Degenerative changes noted  cervical spine at the C5-6 level.. The visualized paranasal sinuses,  mastoid air cells and middle ear cavities are clear.       Impression:       Impression:   1. 50% or less stenosis associated the right internal carotid artery.  2. 50% or less stenosis associated left internal carotid artery.  3. Flow is demonstrated in both vertebrals and they form the basilar  artery.     This report was finalized on 08/14/2017 22:12 by Dr. Aman Mcknight MD.    CT Angiogram Head With & Without Contrast [[id number]] Collected:  08/14/17 2215     Updated:  08/14/17 2220    Narrative:       EXAMINATION:   CT ANGIOGRAM HEAD W WO CONTRAST-  8/14/2017  10:15 PM CDT     HISTORY: CT ANGIOGRAM HEAD W WO CONTRAST- 8/14/2017 9:11 PM CDT     HISTORY: CVA     COMPARISON: None      DOSE LENGTH PRODUCT: 2217 mGy cm. Automated exposure control was also  utilized to decrease patient radiation dose.     TECHNIQUE: Following the uneventful administration of Isovue contrast,  serial helical tomographic images of the brain were obtained following  angiogram protocol. Multiplanar MIP and 3D reconstructions were also  obtained.      FINDINGS:      Angiogram:   The visualized extracranial carotid vasculature is patent bilaterally,  without evidence of aneurysm, dissection, vessel cutoff, or  flow-limiting stenosis. Bilateral intracranial portions of the ICA's  demonstrate no evidence of aneurysm, dissection, vessel cutoff, or  flow-limiting stenosis.      Posteriorly, the visualized vertebral arteries and basilar arteries  demonstrate no aneurysm, dissection, vessel cutoff, or flow-limiting  stenosis.      Within the brain, the [place] is intact, without abnormality.      Other findings:  The osseous calvarium is intact. The surrounding soft tissues are  unremarkable. The paranasal sinuses and bilateral mastoid air cells are  clear. The imaged portions of the bilateral globes and orbits are  unremarkable.        Impression:       Impression:   1. Negative CT angiogram of the level of the [place].         This report was finalized on 08/14/2017 22:17 by Dr. Aman Mcknight MD.    CT Angiogram Chest With Contrast [[id number]] Collected:  08/17/17 0707     Updated:  08/17/17 0712    Narrative:       CT ANGIOGRAM CHEST W CONTRAST- 8/17/2017 12:20 AM CDT      HISTORY: chest pain; I63.9-Cerebral infarction, unspecified;  M62.81-Muscle weakness (generalized); R26.89-Other abnormalities of gait  and mobility; Z78.9-Other specified health status      COMPARISON: Chest x-ray 08/16/2017.      DOSE LENGTH PRODUCT: 432 mGy cm. Automated exposure control was also  utilized to  decrease patient radiation dose.     TECHNIQUE: Helical tomographic images of the chest were obtained after  the administration of intravenous contrast following angiogram protocol.  Additionally, 3D and multiplanar reformatted images were provided.        FINDINGS:    Pulmonary arteries: There is adequate enhancement of the pulmonary  arteries to evaluate for central and segmental pulmonary emboli. There  are no filling defects within the main, lobar, segmental or visualized  subsegmental pulmonary arteries. The pulmonary arteries are within  normal limits for size.      Aorta and great vessels: There is atherosclerosis in the aorta without  aneurysm or dissection. The great vessels are normal in appearance.     Visualized neck base: The imaged portion of the base of the neck appears  unremarkable.      Lungs: Dependent changes are noted in bilateral lung bases. The lungs  are otherwise clear. There is no pleural effusion. The trachea and  bronchial tree are patent.      Heart: Coronary artery calcifications and stents are present. There is  mild, diffuse enlargement of the heart. There is no pericardial  effusion.      Mediastinum and lymph nodes: No enlarged mediastinal, hilar, or axillary  lymph nodes are present.      Skeletal and soft tissues: The osseous structures of the thorax and  surrounding soft tissues demonstrate no acute process.     Upper abdomen: The imaged portion of the upper abdomen demonstrates no  acute process.        Impression:       1. No evidence of pulmonary embolus or other acute cardiopulmonary  process.     A preliminary report was provided by [facility]. I  agree with the preliminary interpretation.        This report was finalized on 08/17/2017 07:09 by Dr. Horacio Marshall MD.    XR Chest 1 View [[id number]] Collected:  08/17/17 0709     Updated:  08/17/17 0713    Narrative:       XR CHEST 1 VW- 8/17/2017 12:07 AM CDT     HISTORY: chest pain; I63.9-Cerebral  infarction, unspecified;  M62.81-Muscle weakness (generalized); R26.89-Other abnormalities of gait  and mobility; Z78.9-Other specified health status       COMPARISON: 08/14/2017.     FINDINGS:   The lungs are clear. The heart is stable in size. A coronary artery  stent is visualized.      The osseous structures and surrounding soft tissues demonstrate no acute  abnormality.       Impression:       1. No radiographic evidence of acute cardiopulmonary process.        This report was finalized on 08/17/2017 07:09 by Dr. Horacio Marshall MD.          Assessment     1. Chest pain: negative enzymes and EKG with no STT changes   2. Right pontine stroke; per MRI imaging  3. Coronary artery disease; Follows with [facility] cardiology on aspirin and plavix  4. Essential hypertension: on beta blocker  5. Hyperlipidemia: intolerant of statins, has recently spoken with PCP regarding initiation of psk9  6. Diabetes Mellitus     Plan      - NPO effective now  - Dobutamine stress echo today  - Discussed with Dr. Marie    Cardiac alert was called last night with complaints of chest pain.  CTA of chest was obtained negative for PE, negative for aortic dissection, troponin negative as well as no acute STT changes.  Due to patient history of coronary artery disease and risk factor patient will have dobutamine stress echo today.  Thank you for the consultation, cardiology will gladly continue to follow.     PAVAN Lomeli  8/17/2017  8:52 AM      Please note this cardiology consultation note is the result of a face to face consultation with the patient, in addition to reviewing medical records at length by myself, PAVAN Lomeli.       Time: approximately 40 minutes

## 2017-08-17 NOTE — DISCHARGE SUMMARY
[facility]  DISCHARGE SUMMARY       Date of Admission: 8/14/2017  Date of Discharge:  8/17/2017  Primary Care Physician: Joao Vergara MD    Presenting Problem/History of Present Illness:  Left-sided weakness and facial droop.      Final Discharge Diagnoses:  1.  Recurrent left-sided weakness and facial drooping in the setting of recent right pontine stroke.  2.  Slight troponin elevation, normalized, likely secondary to cerebrovascular accident.  3.  Hyperlipidemia, intolerant of statins.  4.  Type II diabetes mellitus with hemoglobin A1c of 7.1.   5.  Systemic arterial hypertension.  6.  Obstructive sleep apnea with home CPAP.  7.  Alcohol intake.  8.  Diastolic dysfunction.  9.  Coronary artery disease with history of stent placement, Dr. Borja.   10. Chest pain with negative dobutamine stress echocardiogram.     Consults:   1. Dr. Keenan and Dr. Malagon with neurology.   2. Dr. Marie with cardiology.     Procedures Performed:   Dobutamine Stress Echocardiogram Interpretation Summary   · Left ventricular systolic function is normal. Estimated EF = 55%.  · Normal stress echo with no significant echocardiographic evidence for myocardial ischemia         Pertinent Test Results:   Imaging Results (last 7 days)     Procedure Component Value Units Date/Time    MRI Brain Without Contrast [[id number]] Collected:  08/14/17 1947     Updated:  08/14/17 1953    Narrative:       EXAMINATION:   MRI BRAIN WO CONTRAST-  8/14/2017 7:47 PM CDT     HISTORY: MRI BRAIN WO CONTRAST- 8/14/2017 7:03 AM CDT     HISTORY: left sided weakness     COMPARISON: 12/22/2015       TECHNIQUE: Multiplanar imaging of the brain was performed in a routine  fashion without contrast.     FINDINGS:   Diffusion: Restricted diffusion is noted in the right side of the sergio.  This is an ischemic infarction.     Midline structures: Nondisplaced.     Ventricles: Normal in configuration  and symmetric in size.     Masses: No masses or mass effect.     Basilar cisterns: Maintained.     Extra axial space: No abnormal extra-axial fluid.     Gray-white matter signal: Increased MR signal the paraventricular white  matter is noted and at the juxtacortical gray-white junction consistent  with chronic microvascular ischemic change     Cerebellum: Normal.     Brainstem: Ischemic infarction is noted the right side of the sergio     Other: Proximal cervical spinal cord is normal. Bilateral globes and  orbits are normal in appearance. Normal cerebrovascular flow voids  noted. No abnormal signal in the mastoid air cells or paranasal sinuses.       Impression:       1. Ischemic infarction right side of the sergio     These results were called to Dr. Da Silva at approximately 7:50 PM   This report was finalized on 08/14/2017 19:50 by Dr. Aman Mcknight MD.    CT Angiogram Neck With & Without Contrast [[id number]] Collected:  08/14/17 2207     Updated:  08/14/17 2215    Narrative:       EXAMINATION:   CT ANGIOGRAM NECK W WO CONTRAST-  8/14/2017 10:07 PM CDT     HISTORY: CT ANGIOGRAM OF THE NECK 8/14/2017 9:11 PM CDT     HISTORY: cva     COMPARISON: None      DLP: 2217 mGy cm     In order to have a CT radiation dose as low as reasonably achievable,  Automated Exposure Control was utilized for adjustment of the mA and/or  KV according to patient size.     TECHNIQUE: Following the uneventful administration of Isovue contrast,  serial helical tomographic images of the neck were obtained following  angiogram protocol. Multiplanar MIP and 3D reconstructions were provided  for review.       FINDINGS:      Angiogram:   The aortic arch and visualized great vessels are patent, without  evidence of aneurysm, dissection, vessel cutoff, or flow limiting  stenosis.      The right common carotid artery is visualized in its bifurcation is  unremarkable. Calcified plaque is present in the right internal carotid  artery there is 50% or  less associated stenosis.     The left common carotid artery demonstrates normal bifurcation mild  plaque is present in the left internal carotid artery with 50% or less  associated stenosis.      Posteriorly, the vertebral arteries and visualized basilar artery  demonstrate no aneurysm, dissection, vessel cutoff, or flow-limiting  stenosis.      Other findings: The airway is patent. There is no mass or significant  lymphadenopathy. The lung apices are clear. Degenerative changes noted  cervical spine at the C5-6 level.. The visualized paranasal sinuses,  mastoid air cells and middle ear cavities are clear.       Impression:       Impression:   1. 50% or less stenosis associated the right internal carotid artery.  2. 50% or less stenosis associated left internal carotid artery.  3. Flow is demonstrated in both vertebrals and they form the basilar  artery.     This report was finalized on 08/14/2017 22:12 by Dr. Aman Mcknight MD.    CT Angiogram Head With & Without Contrast [[id number]] Collected:  08/14/17 2215     Updated:  08/14/17 2220    Narrative:       EXAMINATION:   CT ANGIOGRAM HEAD W WO CONTRAST-  8/14/2017 10:15 PM CDT     HISTORY: CT ANGIOGRAM HEAD W WO CONTRAST- 8/14/2017 9:11 PM CDT     HISTORY: CVA     COMPARISON: None      DOSE LENGTH PRODUCT: 2217 mGy cm. Automated exposure control was also  utilized to decrease patient radiation dose.     TECHNIQUE: Following the uneventful administration of Isovue contrast,  serial helical tomographic images of the brain were obtained following  angiogram protocol. Multiplanar MIP and 3D reconstructions were also  obtained.      FINDINGS:      Angiogram:   The visualized extracranial carotid vasculature is patent bilaterally,  without evidence of aneurysm, dissection, vessel cutoff, or  flow-limiting stenosis. Bilateral intracranial portions of the ICA's  demonstrate no evidence of aneurysm, dissection, vessel cutoff, or  flow-limiting stenosis.      Posteriorly,  the visualized vertebral arteries and basilar arteries  demonstrate no aneurysm, dissection, vessel cutoff, or flow-limiting  stenosis.      Within the brain, the [place] is intact, without abnormality.      Other findings:  The osseous calvarium is intact. The surrounding soft tissues are  unremarkable. The paranasal sinuses and bilateral mastoid air cells are  clear. The imaged portions of the bilateral globes and orbits are  unremarkable.        Impression:       Impression:   1. Negative CT angiogram of the level of the [place].         This report was finalized on 08/14/2017 22:17 by Dr. Aman Mcknight MD.    CT Angiogram Chest With Contrast [[id number]] Collected:  08/17/17 0707     Updated:  08/17/17 0712    Narrative:       CT ANGIOGRAM CHEST W CONTRAST- 8/17/2017 12:20 AM CDT      HISTORY: chest pain; I63.9-Cerebral infarction, unspecified;  M62.81-Muscle weakness (generalized); R26.89-Other abnormalities of gait  and mobility; Z78.9-Other specified health status      COMPARISON: Chest x-ray 08/16/2017.      DOSE LENGTH PRODUCT: 432 mGy cm. Automated exposure control was also  utilized to decrease patient radiation dose.     TECHNIQUE: Helical tomographic images of the chest were obtained after  the administration of intravenous contrast following angiogram protocol.  Additionally, 3D and multiplanar reformatted images were provided.        FINDINGS:    Pulmonary arteries: There is adequate enhancement of the pulmonary  arteries to evaluate for central and segmental pulmonary emboli. There  are no filling defects within the main, lobar, segmental or visualized  subsegmental pulmonary arteries. The pulmonary arteries are within  normal limits for size.      Aorta and great vessels: There is atherosclerosis in the aorta without  aneurysm or dissection. The great vessels are normal in appearance.     Visualized neck base: The imaged portion of the base of the neck appears  unremarkable.       Lungs: Dependent changes are noted in bilateral lung bases. The lungs  are otherwise clear. There is no pleural effusion. The trachea and  bronchial tree are patent.      Heart: Coronary artery calcifications and stents are present. There is  mild, diffuse enlargement of the heart. There is no pericardial  effusion.      Mediastinum and lymph nodes: No enlarged mediastinal, hilar, or axillary  lymph nodes are present.      Skeletal and soft tissues: The osseous structures of the thorax and  surrounding soft tissues demonstrate no acute process.     Upper abdomen: The imaged portion of the upper abdomen demonstrates no  acute process.        Impression:       1. No evidence of pulmonary embolus or other acute cardiopulmonary  process.     A preliminary report was provided by [facility]. I  agree with the preliminary interpretation.        This report was finalized on 08/17/2017 07:09 by Dr. Horacio Marshall MD.    XR Chest 1 View [[id number]] Collected:  08/17/17 0709     Updated:  08/17/17 0713    Narrative:       XR CHEST 1 VW- 8/17/2017 12:07 AM CDT     HISTORY: chest pain; I63.9-Cerebral infarction, unspecified;  M62.81-Muscle weakness (generalized); R26.89-Other abnormalities of gait  and mobility; Z78.9-Other specified health status       COMPARISON: 08/14/2017.     FINDINGS:   The lungs are clear. The heart is stable in size. A coronary artery  stent is visualized.      The osseous structures and surrounding soft tissues demonstrate no acute  abnormality.       Impression:       1. No radiographic evidence of acute cardiopulmonary process.        This report was finalized on 08/17/2017 07:09 by Dr. Horacio Marshall MD.        ECHO Interpretation Summary   · Left ventricular systolic function is normal. Estimated ejection fraction is 51-55%.  · Left ventricular diastolic dysfunction (grade II) consistent with pseudonormalization.  · Left atrial cavity size is mildly dilated.  · Mild aortic and  pulmonic valve regurgitation is present.  · There is no evidence of intracardiac mass or thrombus.  · There is no evidence of right to left shunt on bubble study.     Lab Results (last 7 days)     Procedure Component Value Units Date/Time    POC Glucose Fingerstick [[id number]]  (Abnormal) Collected:  08/14/17 1740    Specimen:  Blood Updated:  08/14/17 1755     Glucose 186 (H) mg/dL       : [id number] Sony Crystal LMeter ID: [id number]       POC Glucose Fingerstick [[id number]]  (Abnormal) Collected:  08/14/17 1743    Specimen:  Blood Updated:  08/14/17 1755     Glucose 205 (H) mg/dL       : [id number] Sony Crystal LMeter ID: [id number]       Protime-INR [[id number]]  (Normal) Collected:  08/14/17 1746    Specimen:  Blood Updated:  08/14/17 1816     Protime 13.2 Seconds      INR 0.97    aPTT [[id number]]  (Normal) Collected:  08/14/17 1746    Specimen:  Blood Updated:  08/14/17 1816     PTT 27.5 seconds     CBC Auto Differential [[id number]]  (Abnormal) Collected:  08/14/17 1746    Specimen:  Blood Updated:  08/14/17 1817     WBC 6.59 10*3/mm3      RBC 5.41 10*6/mm3      Hemoglobin 14.9 g/dL      Hematocrit 45.1 %      MCV 83.4 fL      MCH 27.5 (L) pg      MCHC 33.0 g/dL      RDW 14.7 %      RDW-SD 44.5 fl      MPV 11.2 fL      Platelets 173 10*3/mm3      Neutrophil % 50.8 %      Lymphocyte % 34.3 %      Monocyte % 9.3 %      Eosinophil % 4.9 (H) %      Basophil % 0.5 %      Immature Grans % 0.2 %      Neutrophils, Absolute 3.36 10*3/mm3      Lymphocytes, Absolute 2.26 10*3/mm3      Monocytes, Absolute 0.61 10*3/mm3      Eosinophils, Absolute 0.32 10*3/mm3      Basophils, Absolute 0.03 10*3/mm3      Immature Grans, Absolute 0.01 10*3/mm3     Comprehensive Metabolic Panel [[id number]]  (Abnormal) Collected:  08/14/17 1746    Specimen:  Blood Updated:  08/14/17 1836     Glucose 211 (H) mg/dL      BUN 18 mg/dL      Creatinine 1.03 mg/dL      Sodium 137 mmol/L      Potassium 4.6 mmol/L      Chloride 102  mmol/L      CO2 25.0 mmol/L      Calcium 9.2 mg/dL      Total Protein 7.2 g/dL      Albumin 4.20 g/dL      ALT (SGPT) 40 U/L      AST (SGOT) 23 U/L      Alkaline Phosphatase 92 U/L      Total Bilirubin 0.6 mg/dL      eGFR Non African Amer 71 mL/min/1.73      Globulin 3.0 gm/dL      A/G Ratio 1.4 g/dL      BUN/Creatinine Ratio 17.5     Anion Gap 10.0 mmol/L     Narrative:       The MDRD GFR formula is only valid for adults with stable renal function between ages 18 and 70.    POC Glucose Fingerstick [[id number]]  (Abnormal) Collected:  08/14/17 2346    Specimen:  Blood Updated:  08/15/17 0016     Glucose 134 (H) mg/dL       : [id number] Daniel KristaMeter ID: [id number]       CBC Auto Differential [[id number]]  (Abnormal) Collected:  08/15/17 0555    Specimen:  Blood Updated:  08/15/17 0624     WBC 6.36 10*3/mm3      RBC 5.19 10*6/mm3      Hemoglobin 14.2 g/dL      Hematocrit 43.1 %      MCV 83.0 fL      MCH 27.4 (L) pg      MCHC 32.9 (L) g/dL      RDW 14.4 %      RDW-SD 43.9 fl      MPV 11.2 fL      Platelets 154 10*3/mm3      Neutrophil % 50.6 %      Lymphocyte % 35.1 %      Monocyte % 9.6 %      Eosinophil % 3.9 %      Basophil % 0.6 %      Immature Grans % 0.2 %      Neutrophils, Absolute 3.22 10*3/mm3      Lymphocytes, Absolute 2.23 10*3/mm3      Monocytes, Absolute 0.61 10*3/mm3      Eosinophils, Absolute 0.25 10*3/mm3      Basophils, Absolute 0.04 10*3/mm3      Immature Grans, Absolute 0.01 10*3/mm3     Comprehensive Metabolic Panel [[id number]]  (Abnormal) Collected:  08/15/17 0555    Specimen:  Blood Updated:  08/15/17 0637     Glucose 190 (H) mg/dL      BUN 19 mg/dL      Creatinine 0.99 mg/dL      Sodium 135 mmol/L      Potassium 4.0 mmol/L      Chloride 102 mmol/L      CO2 23.0 (L) mmol/L      Calcium 9.0 mg/dL      Total Protein 6.5 g/dL      Albumin 3.70 g/dL      ALT (SGPT) 39 U/L      AST (SGOT) 21 U/L      Alkaline Phosphatase 84 U/L      Total Bilirubin 0.4 mg/dL      eGFR Non  Amer 74  mL/min/1.73      Globulin 2.8 gm/dL      A/G Ratio 1.3 g/dL      BUN/Creatinine Ratio 19.2     Anion Gap 10.0 mmol/L     Narrative:       The MDRD GFR formula is only valid for adults with stable renal function between ages 18 and 70.    Magnesium [[id number]]  (Normal) Collected:  08/15/17 0555    Specimen:  Blood Updated:  08/15/17 0637     Magnesium 1.7 mg/dL     Phosphorus [[id number]]  (Normal) Collected:  08/15/17 0555    Specimen:  Blood Updated:  08/15/17 0637     Phosphorus 3.9 mg/dL     Troponin [[id number]]  (Abnormal) Collected:  08/15/17 0555    Specimen:  Blood Updated:  08/15/17 0647     Troponin I 0.046 (H) ng/mL     Lipid Panel [[id number]]  (Abnormal) Collected:  08/15/17 0555    Specimen:  Blood Updated:  08/15/17 0648     Total Cholesterol 235 (H) mg/dL      Triglycerides 310 (H) mg/dL      HDL Cholesterol 24 (L) mg/dL      LDL Cholesterol  164 (H) mg/dL      LDL/HDL Ratio 6.21    POC Glucose Fingerstick [[id number]]  (Abnormal) Collected:  08/15/17 0724    Specimen:  Blood Updated:  08/15/17 0756     Glucose 165 (H) mg/dL       : [id number] Hard KatyMeter ID: [id number]       Hemoglobin A1c [[id number]] Collected:  08/15/17 0555    Specimen:  Blood Updated:  08/15/17 0817     Hemoglobin A1C 7.1 %     Narrative:       Less than 6.0           Non-Diabetic Range  6.0-7.0                 ADA Therapeutic Target  Greater than 7.0        Action Suggested    POC Glucose Fingerstick [[id number]]  (Abnormal) Collected:  08/15/17 1214    Specimen:  Blood Updated:  08/15/17 1227     Glucose 266 (H) mg/dL       : [id number] Ariel AmandaMeter ID: [id number]       Troponin [[id number]]  (Normal) Collected:  08/15/17 1124    Specimen:  Blood Updated:  08/15/17 1244     Troponin I 0.020 ng/mL     POC Glucose Fingerstick [[id number]]  (Abnormal) Collected:  08/15/17 1700    Specimen:  Blood Updated:  08/15/17 1712     Glucose 291 (H) mg/dL       : [id number] Gab[facility]le [facility] ID: [id number]       POC  Glucose Fingerstick [[id number]]  (Abnormal) Collected:  08/15/17 2213    Specimen:  Blood Updated:  08/15/17 2235     Glucose 271 (H) mg/dL       : [id number] Patricio QuezadabethMeter ID: [id number]       Iron Profile [[id number]]  (Normal) Collected:  08/16/17 0450    Specimen:  Blood Updated:  08/16/17 0520     Iron 67 mcg/dL      TIBC 316 mcg/dL      Iron Saturation 21 %     TSH [[id number]]  (Normal) Collected:  08/16/17 0450    Specimen:  Blood Updated:  08/16/17 0543     TSH 1.660 mIU/mL     Vitamin B12 [[id number]]  (Normal) Collected:  08/16/17 0450    Specimen:  Blood Updated:  08/16/17 0618     Vitamin B-12 340 pg/mL     Folate [[id number]] Collected:  08/16/17 0450    Specimen:  Blood Updated:  08/16/17 0618     Folate 4.97 ng/mL     POC Glucose Fingerstick [[id number]]  (Abnormal) Collected:  08/16/17 0757    Specimen:  Blood Updated:  08/16/17 0808     Glucose 140 (H) mg/dL       : [id number] Rollings ShelbyMeter ID: [id number]       POC Glucose Fingerstick [[id number]]  (Abnormal) Collected:  08/16/17 1023    Specimen:  Blood Updated:  08/16/17 1034     Glucose 206 (H) mg/dL       : [id number] Rollings ShelbyMeter ID: [id number]       POC Glucose Fingerstick [[id number]]  (Abnormal) Collected:  08/16/17 1643    Specimen:  Blood Updated:  08/16/17 1656     Glucose 212 (H) mg/dL       : [id number] Rollings ShelbyMeter ID: [id number]       POC Glucose Fingerstick [[id number]]  (Abnormal) Collected:  08/16/17 2042    Specimen:  Blood Updated:  08/16/17 2112     Glucose 233 (H) mg/dL       : [id number] Emmanuel Elizalde ID: [id number]       CBC (No Diff) [[id number]]  (Abnormal) Collected:  08/17/17 0011    Specimen:  Blood Updated:  08/17/17 0017     WBC 6.38 10*3/mm3      RBC 5.52 10*6/mm3      Hemoglobin 15.2 g/dL      Hematocrit 45.3 %      MCV 82.1 fL      MCH 27.5 (L) pg      MCHC 33.6 g/dL      RDW 14.2 %      RDW-SD 42.4 fl      MPV 10.9 fL      Platelets 169 10*3/mm3     POC Glucose  Fingerstick [[id number]]  (Abnormal) Collected:  08/16/17 2351    Specimen:  Blood Updated:  08/17/17 0020     Glucose 169 (H) mg/dL       : [id number] Emmanuel Elizalde ID: [id number]       Comprehensive Metabolic Panel [[id number]]  (Abnormal) Collected:  08/17/17 0011    Specimen:  Blood Updated:  08/17/17 0030     Glucose 157 (H) mg/dL      BUN 16 mg/dL      Creatinine 0.99 mg/dL      Sodium 138 mmol/L      Potassium 4.3 mmol/L      Chloride 103 mmol/L      CO2 25.0 mmol/L      Calcium 9.3 mg/dL      Total Protein 7.4 g/dL      Albumin 4.40 g/dL      ALT (SGPT) 41 U/L      AST (SGOT) 24 U/L      Alkaline Phosphatase 100 U/L      Total Bilirubin 0.5 mg/dL      eGFR Non African Amer 74 mL/min/1.73      Globulin 3.0 gm/dL      A/G Ratio 1.5 g/dL      BUN/Creatinine Ratio 16.2     Anion Gap 10.0 mmol/L     Narrative:       The MDRD GFR formula is only valid for adults with stable renal function between ages 18 and 70.    Phosphorus [[id number]]  (Normal) Collected:  08/17/17 0011    Specimen:  Blood Updated:  08/17/17 0030     Phosphorus 3.5 mg/dL     Troponin [[id number]]  (Normal) Collected:  08/17/17 0011    Specimen:  Blood Updated:  08/17/17 0044     Troponin I <0.012 ng/mL     CK [[id number]]  (Normal) Collected:  08/17/17 0011    Specimen:  Blood Updated:  08/17/17 0044     Creatine Kinase 80 U/L     CK-MB [[id number]]  (Normal) Collected:  08/17/17 0011    Specimen:  Blood Updated:  08/17/17 0044     CKMB 3.20 ng/mL     Narrative:       CKMB Index not indicated    Magnesium [[id number]]  (Normal) Collected:  08/17/17 0011    Specimen:  Blood Updated:  08/17/17 0051     Magnesium 2.2 mg/dL     POC Glucose Fingerstick [[id number]]  (Normal) Collected:  08/17/17 0620    Specimen:  Blood Updated:  08/17/17 0632     Glucose 88 mg/dL       : [id number] VelazquezMeter ID: [id number]       Basic Metabolic Panel [[id number]]  (Normal) Collected:  08/17/17 0515    Specimen:  Blood Updated:  08/17/17 0642      Glucose 84 mg/dL      BUN 15 mg/dL      Creatinine 1.04 mg/dL      Sodium 141 mmol/L      Potassium 4.0 mmol/L      Chloride 103 mmol/L      CO2 29.0 mmol/L      Calcium 9.4 mg/dL      eGFR Non African Amer 70 mL/min/1.73      BUN/Creatinine Ratio 14.4     Anion Gap 9.0 mmol/L     Narrative:       The MDRD GFR formula is only valid for adults with stable renal function between ages 18 and 70.    Troponin [[id number]]  (Normal) Collected:  08/17/17 0515    Specimen:  Blood Updated:  08/17/17 0649     Troponin I <0.012 ng/mL     POC Glucose Fingerstick [[id number]]  (Normal) Collected:  08/17/17 1206    Specimen:  Blood Updated:  08/17/17 1221     Glucose 103 mg/dL       : [id number] Demarco Timmons DestinyMeter ID: [id number]           Hospital Course:  The patient is a 72 y.o. male who presented to [facility] with Recurrent left-sided weakness, slurred speech, and left facial droop after recently having a right pontine cerebrovascular accident while traveling home from a trip to [place].  Last week, he and his wife were coming home from a trip to [place] and had made it to [place], [place].  He developed left-sided weakness and they ultimately pulled off the highway to a smaller hospital there.  They decided to give him TPA and [facility] to [facility] in [place], [place].  He did well there and ultimately was able to be dismissed home.  Upon returning home, he had return of his symptoms and he thus presented back to the emergency department.  His symptoms slowly dissipated over the next 24 hours and he returned to baseline.  He has also been started on Keppra as a precaution.    No carotid disease seen on CTA.  Echocardiogram is normal with the exception of diastolic dysfunction.  No atrial fibrillation on telemetry.  His hemoglobin A1c is essentially at goal.  He does have a history of hyperlipidemia, but has been intolerant to multiple statins through the years.  Apparently,   "Ursula has been looking into him having Repatha as an outpatient, but this will require precertification from him.  His blood pressure has been much more well controlled recently.  He needs to continue on his CPAP for his obstructive sleep apnea, his wife did bring it in and he used it while here.      Continue on aspirin and Plavix.  He was aspirin 325 mg.  I doubt that there is any added benefit to this and it has been decreased to 81 mg daily.      The patient had been on Prozac and Cymbalta in conjunction with one another.  The patient's family wants to stop the Prozac.  He also takes Xanax and Norco at home and does have a history of drinking alcohol.  Neurology was very concerned about this and he has been counseled on the possible additive side effects of using these medications and alcoholic conjunction with one another.  He certainly needs to abstain from this at night given his history of obstructive sleep apnea.    He had an episode of chest discomfort last night with negative enzymes and EKG. CTA of the chest was negative.  Cardiology saw him this morning and set him up for a dobutamine stress echocardiogram, which was read as negative this afternoon.  He is currently asymptomatic.  He can follow with Dr. Borja, his regular cardiologist, after discharge.    He has been counseled on diet several times.  His wife, his sister, and his son wanted to talk to the dietitian again before going home today.  We have set that up.    Condition on Discharge: Good.     Physical Exam on Discharge:  /84 (BP Location: Right arm, Patient Position: Lying)  Pulse 74  Temp 97.7 °F (36.5 °C) (Oral)   Resp 18  Ht 76\" (193 cm)  Wt 249 lb 7 oz (113 kg)  SpO2 97%  BMI 30.36 kg/m2  Physical Exam  Constitutional: He is oriented to person, place, and time. He appears well-developed and well-nourished.   Up in bed.  No distress. His wife, son, and sister are present.   Head: Normocephalic and atraumatic.   Eyes: " Conjunctivae and EOM are normal. Pupils are equal, round, and reactive to light.   Neck: Neck supple. No JVD present.   Cardiovascular: Normal rate, regular rhythm, normal heart sounds and intact distal pulses.  Exam reveals no gallop and no friction rub.    No murmur heard. Telemetry continues to show NSR.      Pulmonary/Chest: Effort normal and breath sounds normal. No respiratory distress. He has no wheezes. He has no rales. He exhibits no tenderness.   Abdominal: Soft. Bowel sounds are normal. He exhibits no distension. There is no tenderness. There is no rebound and no guarding.   Musculoskeletal: Normal range of motion. He exhibits no edema, tenderness or deformity.   Neurological: He is alert and oriented to person, place, and time. He displays normal reflexes. No cranial nerve deficit. He exhibits normal muscle tone.   The left-sided facial droop seen yesterday is less noticeable today.  He does not speak with any dysarthria.  No aphasic problems.  He seems to have equal strength in all extremities today.   Skin: Skin is warm and dry. No rash noted.   Psychiatric: He has a normal mood and affect. His behavior is normal. Judgment and thought content normal.     Discharge Disposition:  Home or Self Care    Discharge Medications:   Morris Yousif   Home Medication Instructions JESU:[id number]    Printed on:08/17/17 9982   Medication Information                      ALPRAZolam (XANAX) 0.5 MG tablet  Take 0.5 mg by mouth At Night As Needed for Anxiety.             aspirin 81 MG EC tablet  Take 1 tablet by mouth Daily.             clopidogrel (PLAVIX) 75 MG tablet  Take 75 mg by mouth Daily.             diphenhydrAMINE (BENADRYL) 25 mg capsule  Take 25 mg by mouth Every 6 (Six) Hours As Needed for Itching.             DULoxetine (CYMBALTA) 60 MG capsule  Take 60 mg by mouth Daily.             fexofenadine (ALLEGRA) 180 MG tablet  Take 180 mg by mouth Daily.             gabapentin (NEURONTIN) 300 MG capsule  Take  300 mg by mouth 3 (Three) Times a Day.             HYDROcodone-acetaminophen (NORCO)  MG per tablet  Take 1 tablet by mouth Every 6 (Six) Hours As Needed for Moderate Pain .             insulin glargine (LANTUS) 100 UNIT/ML injection  Inject 80 Units under the skin Every Night.             levETIRAcetam (KEPPRA) 500 MG tablet  Take 500 mg by mouth 2 (Two) Times a Day.             metoprolol tartrate (LOPRESSOR) 25 MG tablet  Take 25 mg by mouth 2 (Two) Times a Day.             raNITIdine (ZANTAC) 150 MG tablet  Take 300 mg by mouth Every Night.             tamsulosin (FLOMAX) 0.4 MG capsule 24 hr capsule  Take 2 capsules by mouth Every Night.             Triamcinolone Acetonide (NASACORT) 55 MCG/ACT nasal inhaler  2 sprays into each nostril Daily.               Discharge Diet:   Diet Instructions     Diet: Consistent Carbohydrate, Cardiac; Thin       Discharge Diet:   Consistent Carbohydrate  Cardiac      Fluid Consistency:  Thin               Activity at Discharge:   Activity Instructions     Activity as Tolerated                   Follow-up Appointments:   1. elizabeth Vergara in 1 week. Need to talk further about Repatha.   2. Dr. Weiner in 1 week.   3. Dr. Borja as scheduled.     Test Results Pending at Discharge: None.     Charbel Cabrales DO  08/17/17  4:19 PM    Time: 35 minutes.

## 2017-08-18 NOTE — THERAPY DISCHARGE NOTE
Acute Care - Occupational Therapy Discharge Summary  [facility]     Patient Name: Morris Yousif  : 1945  MRN: [id number]    Today's Date: 2017  Onset of Illness/Injury or Date of Surgery Date: 17    Date of Referral to OT: 08/15/17  Referring Physician: Dr. Saravia      Admit Date: 2017        OT Recommendation and Plan    Visit Dx:    ICD-10-CM ICD-9-CM   1. Cerebrovascular accident (CVA), unspecified mechanism I63.9 434.91   2. Left hand weakness M62.81 728.87   3. Balance problem R26.89 781.99   4. Decreased activities of daily living (ADL) Z78.9 V49.89                     OT Goals       17 0729 17 1123 08/15/17 0806    Transfer Training OT LTG    Transfer Training OT LTG, Date Established   08/15/17  -    Transfer Training OT LTG, Time to Achieve   by discharge  -CH    Transfer Training OT LTG, Activity Type   all transfers  -    Transfer Training OT LTG, [place] Level   conditional independence  -CH    Transfer Training OT LTG, Date Goal Reviewed 17  -      Transfer Training OT LTG, Outcome goal not met  -      Transfer Training OT LTG, Reason Goal Not Met discharged from facility  -      Patient Education OT LTG    Patient Education OT LTG, Date Established   08/15/17  -    Patient Education OT LTG, Time to Achieve   by discharge  -    Patient Education OT LTG, Education Type   HEP;written program  -    Patient Education OT LTG, Education Understanding   independent;verbalizes understanding  -    Patient Education OT LTG, Date Goal Reviewed 17  -      Patient Education OT LTG Outcome goal not met  -      Patient Education OT LTG, Reason Goal Not Met discharged from facility  -      ADL OT LTG    ADL OT LTG, Date Established   08/15/17  -    ADL OT LTG, Time to Achieve   by discharge  -    ADL OT LTG, Activity Type   ADL skills  -CH    ADL OT LTG, [place] Level   modified independent  -    ADL OT LTG, Date Goal Reviewed   08/16/17  -TS     ADL OT LTG, Outcome  goal met  -TS       User Key  (r) = Recorded By, (t) = Taken By, (c) = Cosigned By    Initials Name Provider Type    MARIA R Chun OTR/L Occupational Therapist    RICK Byrd/L Occupational Therapy Assistant                Outcome Measures       08/16/17 1400 08/16/17 1100 08/15/17 1010    How much help from another person do you currently need...    Turning from your back to your side while in flat bed without using bedrails? 4  -AH  4  -MS (r) RZ (t) MS (c)    Moving from lying on back to sitting on the side of a flat bed without bedrails? 4  -AH  4  -MS (r) RZ (t) MS (c)    Moving to and from a bed to a chair (including a wheelchair)? 4  -AH  4  -MS (r) RZ (t) MS (c)    Standing up from a chair using your arms (e.g., wheelchair, bedside chair)? 4  -AH  4  -MS (r) RZ (t) MS (c)    Climbing 3-5 steps with a railing? 3  -AH  3  -MS (r) RZ (t) MS (c)    To walk in hospital room? 3  -AH  3  -MS (r) RZ (t) MS (c)    AM-PAC 6 Clicks Score 22  -AH  22  -MS (r) RZ (t)    How much help from another is currently needed...    Putting on and taking off regular lower body clothing?  3  -TS     Bathing (including washing, rinsing, and drying)  3  -TS     Toileting (which includes using toilet bed pan or urinal)  4  -TS     Putting on and taking off regular upper body clothing  4  -TS     Taking care of personal grooming (such as brushing teeth)  4  -TS     Eating meals  4  -TS     Score  22  -TS     Timed Up and Go (TUG)    TUG Test 1   11.7 seconds  -MS (r) RZ (t) MS (c)    TUG Test 2   12.8 seconds  -MS (r) RZ (t) MS (c)    Timed Up and Go Comments   Pt was able to complete TUG with CGA to supervision. He was very impulsive during turning around object turing test. Pt was educated on TUG results and he is within noramtive values for a community dewling adult. Educated pt that with his history of multiple falls he may benefit from an AD. Pt declined at this time.    -MS (r) RZ (t) MS (c)    Functional Assessment    Outcome Measure Options AM-PAC 6 Clicks Basic Mobility (PT)  - AM-PAC 6 Clicks Daily Activity (OT)  - AM-PAC 6 Clicks Basic Mobility (PT);Timed Up and Go (TUG)  -MS (r) RZ (t) MS (c)      08/15/17 0806          How much help from another is currently needed...    Putting on and taking off regular lower body clothing? 3  -CH      Bathing (including washing, rinsing, and drying) 3  -CH      Toileting (which includes using toilet bed pan or urinal) 3  -CH      Putting on and taking off regular upper body clothing 4  -CH      Taking care of personal grooming (such as brushing teeth) 4  -CH      Eating meals 4  -CH      Score 21  -CH      Functional Assessment    Outcome Measure Options AM-PAC 6 Clicks Daily Activity (OT)  -        User Key  (r) = Recorded By, (t) = Taken By, (c) = Cosigned By    Initials Name Provider Type     Iris Daniel, PTA Physical Therapy Assistant     Rosette hCun, OTR/L Occupational Therapist    TS RICK Soto/L Occupational Therapy Assistant    MS Lilliana Moctezuma, PT DPT Physical Therapist    RCHEYENNE Hudson, PT Student PT Student              OT Discharge Summary  Reason for Discharge: Discharge from facility  Outcomes Achieved: Refer to plan of care for updates on goals achieved  Discharge Destination: Home with assist      RICK Sehr/KATTY  8/18/2017

## 2017-08-18 NOTE — THERAPY DISCHARGE NOTE
Acute Care - Physical Therapy Discharge Summary  [facility]       Patient Name: Morris Yousif  : 1945  MRN: [id number]    Today's Date: 2017  Onset of Illness/Injury or Date of Surgery Date: 17    Date of Referral to PT: 08/15/17  Referring Physician: Dr. Saravia      Admit Date: 2017      PT Recommendation and Plan    Visit Dx:    ICD-10-CM ICD-9-CM   1. Cerebrovascular accident (CVA), unspecified mechanism I63.9 434.91   2. Left hand weakness M62.81 728.87   3. Balance problem R26.89 781.99   4. Decreased activities of daily living (ADL) Z78.9 V49.89             Outcome Measures       17 1400 17 1100 08/15/17 1010    How much help from another person do you currently need...    Turning from your back to your side while in flat bed without using bedrails? 4  -  4  -MS (r) RZ (t) MS (c)    Moving from lying on back to sitting on the side of a flat bed without bedrails? 4  -AH  4  -MS (r) RZ (t) MS (c)    Moving to and from a bed to a chair (including a wheelchair)? 4  -AH  4  -MS (r) RZ (t) MS (c)    Standing up from a chair using your arms (e.g., wheelchair, bedside chair)? 4  -  4  -MS (r) RZ (t) MS (c)    Climbing 3-5 steps with a railing? 3  -  3  -MS (r) RZ (t) MS (c)    To walk in hospital room? 3  -  3  -MS (r) RZ (t) MS (c)    AM-PAC 6 Clicks Score 22  -AH  22  -MS (r) RZ (t)    How much help from another is currently needed...    Putting on and taking off regular lower body clothing?  3  -TS     Bathing (including washing, rinsing, and drying)  3  -TS     Toileting (which includes using toilet bed pan or urinal)  4  -TS     Putting on and taking off regular upper body clothing  4  -TS     Taking care of personal grooming (such as brushing teeth)  4  -TS     Eating meals  4  -TS     Score  22  -TS     Timed Up and Go (TUG)    TUG Test 1   11.7 seconds  -MS (r) RZ (t) MS (c)    TUG Test 2   12.8 seconds  -MS (r) RZ (t) MS (c)    Timed Up and Go Comments   Pt was  able to complete TUG with CGA to supervision. He was very impulsive during turning around object turing test. Pt was educated on TUG results and he is within noramtive values for a community dewling adult. Educated pt that with his history of multiple falls he may benefit from an AD. Pt declined at this time.   -MS (r) RZ (t) MS (c)    Functional Assessment    Outcome Measure Options AM-PAC 6 Clicks Basic Mobility (PT)  - AM-PAC 6 Clicks Daily Activity (OT)  -TS AM-PAC 6 Clicks Basic Mobility (PT);Timed Up and Go (TUG)  -MS (r) RZ (t) MS (c)      User Key  (r) = Recorded By, (t) = Taken By, (c) = Cosigned By    Initials Name Provider Type     Iris Daniel, PTA Physical Therapy Assistant     Maricel Sultana, CABRERA/L Occupational Therapy Assistant    MS Lilliana Moctezuma, PT DPT Physical Therapist    RCHEYENNE Hudson, PT Student PT Student                      IP PT Goals       08/18/17 0938 08/15/17 1503 08/15/17 1107    Gait Training PT LTG    Gait Training Goal PT LTG, Date Established   08/15/17  -MS (r) RZ (t) MS (c)    Gait Training Goal PT LTG, Time to Achieve   by discharge  -MS (r) RZ (t) MS (c)    Gait Training Goal PT LTG, [place] Level   independent  -MS (r) RZ (t) MS (c)    Gait Training Goal PT LTG, Distance to Achieve   Pt to ambualte > 500 ft while displaying adequate dorsiflexion to clear foot on the left   -MS (r) RZ (t) MS (c)    Gait Training Goal PT LTG, Date Goal Reviewed 08/18/17  -NW      Gait Training Goal PT LTG, Outcome goal not met  -NW      Gait Training Goal PT LTG, Reason Goal Not Met discharged from facility  -NW      Static Standing Balance PT LTG    Static Standing Balance PT LTG, Date Established   08/15/17  -MS (r) RZ (t) MS (c)    Static Standing Balance PT LTG, Time to Achieve   by discharge  -MS (r) RZ (t) MS (c)    Static Standing Balance PT LTG, [place] Level  supervision required  -MS (r) RZ (t) MS (c)     Static Standing Balance PT LTG, Date Goal  Reviewed 08/18/17  -NW      Static Standing Balance PT LTG, Outcome goal met  -NW      Dynamic Standing Balance PT LTG    Dynamic Standing Balance PT LTG, Date Established   08/15/17  -MS (r) RZ (t) MS (c)    Dynamic Standing Balance PT LTG, Time to Achieve   by discharge  -MS (r) RZ (t) MS (c)    Dynamic Standing Balance PT LTG, [place] Level   contact guard assist  -MS (r) RZ (t) MS (c)    Dynamic Standing Balance PT LTG, Additional Goal  Pt to step up and down onto a dynamic surface without LOB.   -MS (r) RZ (t) MS (c)     Dynamic Standing Balance PT LTG, Date Goal Reviewed 08/18/17  -NW      Dynamic Standing Balance PT LTG, Outcome goal not met  -NW      Dynamic Standing Balance PT LTG, Reason Goal Not Met discharged from facility  -NW        User Key  (r) = Recorded By, (t) = Taken By, (c) = Cosigned By    Initials Name Provider Type    MS Lilliana Moctezuma, PT DPT Physical Therapist    NW Mirlande Sheets PTA Physical Therapy Assistant    CARIDAD Hudson, PT Student PT Student              PT Discharge Summary  Anticipated Discharge Disposition: home  Reason for Discharge: Discharge from facility  Outcomes Achieved: Refer to plan of care for updates on goals achieved  Discharge Destination: Home      Mirlande Sheets PTA   8/18/2017

## 2017-08-18 NOTE — PLAN OF CARE
Problem: Inpatient Occupational Therapy  Goal: Transfer Training Goal 1 LTG- OT  Outcome: Unable to achieve outcome(s) by discharge Date Met:  08/18/17    08/15/17 0806 08/18/17 0729   Transfer Training OT LTG   Transfer Training OT LTG, Date Established 08/15/17 --    Transfer Training OT LTG, Time to Achieve by discharge --    Transfer Training OT LTG, Activity Type all transfers --    Transfer Training OT LTG, [place] Level conditional independence --    Transfer Training OT LTG, Date Goal Reviewed --  08/18/17   Transfer Training OT LTG, Outcome --  goal not met   Transfer Training OT LTG, Reason Goal Not Met --  discharged from facility       Goal: Patient Education Goal LTG- OT  Outcome: Unable to achieve outcome(s) by discharge Date Met:  08/18/17    08/15/17 0806 08/18/17 0729   Patient Education OT LTG   Patient Education OT LTG, Date Established 08/15/17 --    Patient Education OT LTG, Time to Achieve by discharge --    Patient Education OT LTG, Education Type HEP;written program --    Patient Education OT LTG, Education Understanding independent;verbalizes understanding --    Patient Education OT LTG, Date Goal Reviewed --  08/18/17   Patient Education OT LTG Outcome --  goal not met   Patient Education OT LTG, Reason Goal Not Met --  discharged from facility

## 2017-08-18 NOTE — PLAN OF CARE
Problem: Inpatient Physical Therapy  Goal: Gait Training Goal LTG- PT  Outcome: Unable to achieve outcome(s) by discharge Date Met:  08/18/17    08/15/17 1107 08/18/17 0938   Gait Training PT LTG   Gait Training Goal PT LTG, Date Established 08/15/17 --    Gait Training Goal PT LTG, Time to Achieve by discharge --    Gait Training Goal PT LTG, [place] Level independent --    Gait Training Goal PT LTG, Distance to Achieve Pt to ambualte > 500 ft while displaying adequate dorsiflexion to clear foot on the left  --    Gait Training Goal PT LTG, Date Goal Reviewed --  08/18/17   Gait Training Goal PT LTG, Outcome --  goal not met   Gait Training Goal PT LTG, Reason Goal Not Met --  discharged from facility       Goal: Static Standing Balance Goal LTG- PT  Outcome: Outcome(s) achieved Date Met:  08/18/17    08/15/17 1107 08/15/17 1503 08/18/17 0938   Static Standing Balance PT LTG   Static Standing Balance PT LTG, Date Established 08/15/17 --  --    Static Standing Balance PT LTG, Time to Achieve by discharge --  --    Static Standing Balance PT LTG, [place] Level --  supervision required --    Static Standing Balance PT LTG, Date Goal Reviewed --  --  08/18/17   Static Standing Balance PT LTG, Outcome --  --  goal met       Goal: Dynamic Standing Balance Goal LTG- PT  Outcome: Unable to achieve outcome(s) by discharge Date Met:  08/18/17    08/15/17 1107 08/15/17 1503 08/18/17 0938   Dynamic Standing Balance PT LTG   Dynamic Standing Balance PT LTG, Date Established 08/15/17 --  --    Dynamic Standing Balance PT LTG, Time to Achieve by discharge --  --    Dynamic Standing Balance PT LTG, [place] Level contact guard assist --  --    Dynamic Standing Balance PT LTG, Additional Goal --  Pt to step up and down onto a dynamic surface without LOB.  --    Dynamic Standing Balance PT LTG, Date Goal Reviewed --  --  08/18/17   Dynamic Standing Balance PT LTG, Outcome --  --  goal not met   Dynamic Standing Balance  PT LTG, Reason Goal Not Met --  --  discharged from facility

## 2017-08-20 ENCOUNTER — APPOINTMENT (OUTPATIENT)
Dept: CT IMAGING | Facility: HOSPITAL | Age: 72
End: 2017-08-20

## 2017-08-20 ENCOUNTER — HOSPITAL ENCOUNTER (OUTPATIENT)
Facility: HOSPITAL | Age: 72
Setting detail: OBSERVATION
Discharge: HOME HEALTH | End: 2017-08-21
Attending: FAMILY MEDICINE | Admitting: FAMILY MEDICINE

## 2017-08-20 DIAGNOSIS — N28.9 RENAL INSUFFICIENCY: ICD-10-CM

## 2017-08-20 DIAGNOSIS — R26.89 BALANCE PROBLEM: ICD-10-CM

## 2017-08-20 DIAGNOSIS — G45.8 OTHER SPECIFIED TRANSIENT CEREBRAL ISCHEMIAS: Primary | ICD-10-CM

## 2017-08-20 DIAGNOSIS — R13.10 DYSPHAGIA, UNSPECIFIED TYPE: ICD-10-CM

## 2017-08-20 DIAGNOSIS — R00.1 BRADYCARDIA: ICD-10-CM

## 2017-08-20 DIAGNOSIS — R41.82 ALTERED MENTAL STATUS, UNSPECIFIED ALTERED MENTAL STATUS TYPE: ICD-10-CM

## 2017-08-20 PROBLEM — G45.9 TRANSIENT CEREBRAL ISCHEMIA: Status: ACTIVE | Noted: 2017-08-20

## 2017-08-20 LAB
ALBUMIN SERPL-MCNC: 4.4 G/DL (ref 3.5–5)
ALBUMIN/GLOB SERPL: 1.4 G/DL (ref 1.1–2.5)
ALP SERPL-CCNC: 87 U/L (ref 24–120)
ALT SERPL W P-5'-P-CCNC: 57 U/L (ref 0–54)
ANION GAP SERPL CALCULATED.3IONS-SCNC: 10 MMOL/L (ref 4–13)
AST SERPL-CCNC: 33 U/L (ref 7–45)
BASOPHILS # BLD AUTO: 0.05 10*3/MM3 (ref 0–0.2)
BASOPHILS NFR BLD AUTO: 0.7 % (ref 0–2)
BILIRUB SERPL-MCNC: 0.6 MG/DL (ref 0.1–1)
BILIRUB UR QL STRIP: NEGATIVE
BUN BLD-MCNC: 27 MG/DL (ref 5–21)
BUN/CREAT SERPL: 18.4 (ref 7–25)
CALCIUM SPEC-SCNC: 9.2 MG/DL (ref 8.4–10.4)
CHLORIDE SERPL-SCNC: 100 MMOL/L (ref 98–110)
CK MB SERPL-CCNC: 2.71 NG/ML (ref 0–5)
CK SERPL-CCNC: 65 U/L (ref 0–203)
CLARITY UR: CLEAR
CO2 SERPL-SCNC: 25 MMOL/L (ref 24–31)
COLOR UR: YELLOW
CREAT BLD-MCNC: 1.47 MG/DL (ref 0.5–1.4)
DEPRECATED RDW RBC AUTO: 44.3 FL (ref 40–54)
EOSINOPHIL # BLD AUTO: 0.32 10*3/MM3 (ref 0–0.7)
EOSINOPHIL NFR BLD AUTO: 4.5 % (ref 0–4)
ERYTHROCYTE [DISTWIDTH] IN BLOOD BY AUTOMATED COUNT: 14.5 % (ref 12–15)
ETHANOL UR QL: <0.01 %
GFR SERPL CREATININE-BSD FRML MDRD: 47 ML/MIN/1.73
GLOBULIN UR ELPH-MCNC: 3.1 GM/DL
GLUCOSE BLD-MCNC: 114 MG/DL (ref 70–100)
GLUCOSE BLDC GLUCOMTR-MCNC: 121 MG/DL (ref 70–130)
GLUCOSE UR STRIP-MCNC: NEGATIVE MG/DL
HCT VFR BLD AUTO: 45 % (ref 40–52)
HGB BLD-MCNC: 14.9 G/DL (ref 14–18)
HGB UR QL STRIP.AUTO: NEGATIVE
HOLD SPECIMEN: NORMAL
HOLD SPECIMEN: NORMAL
IMM GRANULOCYTES # BLD: 0.02 10*3/MM3 (ref 0–0.03)
IMM GRANULOCYTES NFR BLD: 0.3 % (ref 0–5)
KETONES UR QL STRIP: NEGATIVE
LEUKOCYTE ESTERASE UR QL STRIP.AUTO: NEGATIVE
LYMPHOCYTES # BLD AUTO: 2.9 10*3/MM3 (ref 0.72–4.86)
LYMPHOCYTES NFR BLD AUTO: 40.7 % (ref 15–45)
MCH RBC QN AUTO: 27.9 PG (ref 28–32)
MCHC RBC AUTO-ENTMCNC: 33.1 G/DL (ref 33–36)
MCV RBC AUTO: 84.1 FL (ref 82–95)
MONOCYTES # BLD AUTO: 0.7 10*3/MM3 (ref 0.19–1.3)
MONOCYTES NFR BLD AUTO: 9.8 % (ref 4–12)
MYOGLOBIN SERPL-MCNC: 65.7 NG/ML (ref 0–110)
NEUTROPHILS # BLD AUTO: 3.13 10*3/MM3 (ref 1.87–8.4)
NEUTROPHILS NFR BLD AUTO: 44 % (ref 39–78)
NITRITE UR QL STRIP: NEGATIVE
PH UR STRIP.AUTO: <=5 [PH] (ref 5–8)
PLATELET # BLD AUTO: 201 10*3/MM3 (ref 130–400)
PMV BLD AUTO: 11.2 FL (ref 6–12)
POTASSIUM BLD-SCNC: 4.8 MMOL/L (ref 3.5–5.3)
PROT SERPL-MCNC: 7.5 G/DL (ref 6.3–8.7)
PROT UR QL STRIP: NEGATIVE
RBC # BLD AUTO: 5.35 10*6/MM3 (ref 4.8–5.9)
SODIUM BLD-SCNC: 135 MMOL/L (ref 135–145)
SP GR UR STRIP: 1.01 (ref 1–1.03)
TROPONIN I SERPL-MCNC: <0.012 NG/ML (ref 0–0.03)
UROBILINOGEN UR QL STRIP: NORMAL
WBC NRBC COR # BLD: 7.12 10*3/MM3 (ref 4.8–10.8)
WHOLE BLOOD HOLD SPECIMEN: NORMAL
WHOLE BLOOD HOLD SPECIMEN: NORMAL

## 2017-08-20 PROCEDURE — 93005 ELECTROCARDIOGRAM TRACING: CPT | Performed by: FAMILY MEDICINE

## 2017-08-20 PROCEDURE — 85576 BLOOD PLATELET AGGREGATION: CPT | Performed by: FAMILY MEDICINE

## 2017-08-20 PROCEDURE — 82962 GLUCOSE BLOOD TEST: CPT

## 2017-08-20 PROCEDURE — 36415 COLL VENOUS BLD VENIPUNCTURE: CPT | Performed by: FAMILY MEDICINE

## 2017-08-20 PROCEDURE — G0378 HOSPITAL OBSERVATION PER HR: HCPCS

## 2017-08-20 PROCEDURE — 25010000002 HEPARIN (PORCINE) PER 1000 UNITS: Performed by: FAMILY MEDICINE

## 2017-08-20 PROCEDURE — 96361 HYDRATE IV INFUSION ADD-ON: CPT

## 2017-08-20 PROCEDURE — 81003 URINALYSIS AUTO W/O SCOPE: CPT | Performed by: FAMILY MEDICINE

## 2017-08-20 PROCEDURE — 99285 EMERGENCY DEPT VISIT HI MDM: CPT

## 2017-08-20 PROCEDURE — 96372 THER/PROPH/DIAG INJ SC/IM: CPT

## 2017-08-20 PROCEDURE — 83874 ASSAY OF MYOGLOBIN: CPT | Performed by: FAMILY MEDICINE

## 2017-08-20 PROCEDURE — 80053 COMPREHEN METABOLIC PANEL: CPT | Performed by: FAMILY MEDICINE

## 2017-08-20 PROCEDURE — 70450 CT HEAD/BRAIN W/O DYE: CPT

## 2017-08-20 PROCEDURE — 82553 CREATINE MB FRACTION: CPT | Performed by: FAMILY MEDICINE

## 2017-08-20 PROCEDURE — 82550 ASSAY OF CK (CPK): CPT | Performed by: FAMILY MEDICINE

## 2017-08-20 PROCEDURE — 84484 ASSAY OF TROPONIN QUANT: CPT | Performed by: FAMILY MEDICINE

## 2017-08-20 PROCEDURE — 93010 ELECTROCARDIOGRAM REPORT: CPT | Performed by: INTERNAL MEDICINE

## 2017-08-20 PROCEDURE — 96360 HYDRATION IV INFUSION INIT: CPT

## 2017-08-20 PROCEDURE — 80307 DRUG TEST PRSMV CHEM ANLYZR: CPT | Performed by: FAMILY MEDICINE

## 2017-08-20 PROCEDURE — 85025 COMPLETE CBC W/AUTO DIFF WBC: CPT | Performed by: FAMILY MEDICINE

## 2017-08-20 RX ORDER — SODIUM CHLORIDE 0.9 % (FLUSH) 0.9 %
1-10 SYRINGE (ML) INJECTION AS NEEDED
Status: DISCONTINUED | OUTPATIENT
Start: 2017-08-20 | End: 2017-08-21 | Stop reason: HOSPADM

## 2017-08-20 RX ORDER — ASPIRIN 81 MG/1
81 TABLET ORAL DAILY
Status: DISCONTINUED | OUTPATIENT
Start: 2017-08-21 | End: 2017-08-21 | Stop reason: HOSPADM

## 2017-08-20 RX ORDER — DEXTROSE MONOHYDRATE 25 G/50ML
25 INJECTION, SOLUTION INTRAVENOUS
Status: DISCONTINUED | OUTPATIENT
Start: 2017-08-20 | End: 2017-08-21 | Stop reason: HOSPADM

## 2017-08-20 RX ORDER — GABAPENTIN 300 MG/1
300 CAPSULE ORAL 3 TIMES DAILY
Status: DISCONTINUED | OUTPATIENT
Start: 2017-08-20 | End: 2017-08-21 | Stop reason: HOSPADM

## 2017-08-20 RX ORDER — CLOPIDOGREL BISULFATE 75 MG/1
75 TABLET ORAL DAILY
Status: DISCONTINUED | OUTPATIENT
Start: 2017-08-21 | End: 2017-08-21 | Stop reason: HOSPADM

## 2017-08-20 RX ORDER — NICOTINE POLACRILEX 4 MG
15 LOZENGE BUCCAL
Status: DISCONTINUED | OUTPATIENT
Start: 2017-08-20 | End: 2017-08-21 | Stop reason: HOSPADM

## 2017-08-20 RX ORDER — DULOXETIN HYDROCHLORIDE 30 MG/1
60 CAPSULE, DELAYED RELEASE ORAL DAILY
Status: DISCONTINUED | OUTPATIENT
Start: 2017-08-21 | End: 2017-08-21 | Stop reason: HOSPADM

## 2017-08-20 RX ORDER — SODIUM CHLORIDE 9 MG/ML
125 INJECTION, SOLUTION INTRAVENOUS CONTINUOUS
Status: DISCONTINUED | OUTPATIENT
Start: 2017-08-20 | End: 2017-08-21 | Stop reason: HOSPADM

## 2017-08-20 RX ORDER — FAMOTIDINE 20 MG/1
40 TABLET, FILM COATED ORAL NIGHTLY
Status: DISCONTINUED | OUTPATIENT
Start: 2017-08-20 | End: 2017-08-21 | Stop reason: HOSPADM

## 2017-08-20 RX ORDER — TAMSULOSIN HYDROCHLORIDE 0.4 MG/1
0.8 CAPSULE ORAL NIGHTLY
Status: DISCONTINUED | OUTPATIENT
Start: 2017-08-20 | End: 2017-08-21 | Stop reason: HOSPADM

## 2017-08-20 RX ORDER — HEPARIN SODIUM 5000 [USP'U]/ML
5000 INJECTION, SOLUTION INTRAVENOUS; SUBCUTANEOUS EVERY 8 HOURS SCHEDULED
Status: DISCONTINUED | OUTPATIENT
Start: 2017-08-20 | End: 2017-08-21 | Stop reason: HOSPADM

## 2017-08-20 RX ORDER — LEVETIRACETAM 500 MG/1
500 TABLET ORAL EVERY 12 HOURS SCHEDULED
Status: DISCONTINUED | OUTPATIENT
Start: 2017-08-20 | End: 2017-08-21

## 2017-08-20 RX ADMIN — HEPARIN SODIUM 5000 UNITS: 5000 INJECTION, SOLUTION INTRAVENOUS; SUBCUTANEOUS at 22:32

## 2017-08-20 RX ADMIN — SODIUM CHLORIDE 125 ML/HR: 9 INJECTION, SOLUTION INTRAVENOUS at 17:01

## 2017-08-20 NOTE — ED NOTES
Lab contacted regarding redraw.  Redraw needed due to machine error.  Request for phlebotomist.      Shea An RN  08/20/17 1800

## 2017-08-20 NOTE — ED PROVIDER NOTES
Subjective   Patient is a 72 y.o. male presenting with strokes.   Stroke   Presenting symptoms: confusion and weakness    Presenting symptoms: no headaches    Onset quality:  Unable to specify  Last known well:  Over 1 day ago  Timing:  Constant  Progression:  Worsening  Similar to previous episodes: yes    Associated symptoms: dizziness and nausea    Associated symptoms: no chest pain, no fever, no neck pain and no vomiting        Review of Systems   Constitutional: Negative for activity change, appetite change, chills, diaphoresis, fatigue and fever.   HENT: Negative for congestion, ear pain, nosebleeds, postnasal drip and sinus pressure.    Eyes: Negative for photophobia and pain.   Respiratory: Negative for cough, chest tightness, shortness of breath and wheezing.    Cardiovascular: Negative for chest pain.   Gastrointestinal: Positive for nausea. Negative for abdominal pain, blood in stool, diarrhea and vomiting.   Endocrine: Negative for cold intolerance and heat intolerance.   Genitourinary: Negative for difficulty urinating, dysuria and flank pain.   Musculoskeletal: Negative for arthralgias, back pain, neck pain and neck stiffness.   Skin: Negative for color change and rash.   Neurological: Positive for dizziness and weakness. Negative for headaches.   Hematological: Negative for adenopathy. Does not bruise/bleed easily.   Psychiatric/Behavioral: Positive for confusion. Negative for sleep disturbance. The patient is not nervous/anxious.        Past Medical History:   Diagnosis Date   • Arthritis    • Depression    • Diabetes mellitus    • Hyperlipidemia    • Hypertension    • Myocardial infarction    • Stroke        No Known Allergies    Past Surgical History:   Procedure Laterality Date   • APPENDECTOMY     • BACK SURGERY     • CARDIAC CATHETERIZATION     • CHOLECYSTECTOMY     • CORONARY STENT PLACEMENT      X8   • REPLACEMENT TOTAL KNEE         History reviewed. No pertinent family history.    Social  History     Social History   • Marital status:      Spouse name: N/A   • Number of children: N/A   • Years of education: N/A     Social History Main Topics   • Smoking status: Never Smoker   • Smokeless tobacco: None   • Alcohol use Yes      Comment: OCCASIONALLY   • Drug use: No   • Sexual activity: Defer     Other Topics Concern   • None     Social History Narrative           Objective   Physical Exam   Constitutional: He is oriented to person, place, and time. He appears well-developed and well-nourished. No distress.   HENT:   Head: Atraumatic.   Nose: Nose normal.   Mouth/Throat: Oropharynx is clear and moist.   Eyes: Conjunctivae are normal. Pupils are equal, round, and reactive to light. No scleral icterus.   Neck: Normal range of motion. Neck supple. No JVD present. No thyromegaly present.   Cardiovascular: Regular rhythm, normal heart sounds and intact distal pulses.    No murmur heard.  Pulmonary/Chest: Effort normal and breath sounds normal. No respiratory distress. He has no wheezes. He has no rales. He exhibits no tenderness.   Abdominal: Soft. Bowel sounds are normal. He exhibits no distension and no mass. There is no tenderness. There is no rebound and no guarding.   Musculoskeletal: Normal range of motion. He exhibits no edema.   Lymphadenopathy:     He has no cervical adenopathy.   Neurological: He is alert and oriented to person, place, and time. He has normal reflexes. A cranial nerve deficit is present. He exhibits abnormal muscle tone.   Left facial and left lower ext weakness     Skin: Skin is warm and dry. No rash noted. He is not diaphoretic. No erythema. No pallor.   Psychiatric: He has a normal mood and affect. His behavior is normal. Judgment and thought content normal. His speech is slurred. He is inattentive.   Nursing note and vitals reviewed.      Procedures         ED Course  ED Course   Comment By Time   Patient still with left sided weakness, family describe even some  variation in symptoms while in the er.  Specifically improving and the getting worse again.  His left facial droop looks worse than before he cam in. Asher Munson MD 08/20 4853                  MDM  Number of Diagnoses or Management Options  Altered mental status, unspecified altered mental status type: established and worsening  Bradycardia: new and requires workup  Other specified transient cerebral ischemias: established and worsening  Renal insufficiency: new and requires workup     Amount and/or Complexity of Data Reviewed  Clinical lab tests: ordered and reviewed  Tests in the radiology section of CPT®: ordered and reviewed  Decide to obtain previous medical records or to obtain history from someone other than the patient: yes  Obtain history from someone other than the patient: yes  Review and summarize past medical records: yes  Discuss the patient with other providers: yes  Independent visualization of images, tracings, or specimens: yes    Risk of Complications, Morbidity, and/or Mortality  Presenting problems: high  Diagnostic procedures: high  Management options: high    Patient Progress  Patient progress: stable      Final diagnoses:   Other specified transient cerebral ischemias   Bradycardia   Renal insufficiency   Altered mental status, unspecified altered mental status type            Asher Munson MD  08/20/17 4810

## 2017-08-20 NOTE — ED NOTES
Pt presents to ER with slurred speech & left sided facial drooping.  Family reports last normal yesterday.  Family further reports that pt has TPA last Friday.  Reports that pt has stroke in DR office this last week, which he was admitted in hospital for.  Family reports that pt has improved since left home.  Reports that these symptoms have been intermittent since yesterday.  Pt is alert & oriented x3, but drowsy.  Follows commands & answers questions approp.         Shea An RN  08/20/17 4768

## 2017-08-21 ENCOUNTER — APPOINTMENT (OUTPATIENT)
Dept: MRI IMAGING | Facility: HOSPITAL | Age: 72
End: 2017-08-21

## 2017-08-21 VITALS
WEIGHT: 241 LBS | TEMPERATURE: 98 F | DIASTOLIC BLOOD PRESSURE: 73 MMHG | OXYGEN SATURATION: 97 % | HEART RATE: 63 BPM | SYSTOLIC BLOOD PRESSURE: 144 MMHG | HEIGHT: 76 IN | RESPIRATION RATE: 20 BRPM | BODY MASS INDEX: 29.35 KG/M2

## 2017-08-21 LAB
ALBUMIN SERPL-MCNC: 3.8 G/DL (ref 3.5–5)
ALBUMIN/GLOB SERPL: 1.3 G/DL (ref 1.1–2.5)
ALP SERPL-CCNC: 86 U/L (ref 24–120)
ALT SERPL W P-5'-P-CCNC: 39 U/L (ref 0–54)
ANION GAP SERPL CALCULATED.3IONS-SCNC: 7 MMOL/L (ref 4–13)
ARTICHOKE IGE QN: 150 MG/DL (ref 0–99)
AST SERPL-CCNC: 30 U/L (ref 7–45)
BASOPHILS # BLD AUTO: 0.04 10*3/MM3 (ref 0–0.2)
BASOPHILS NFR BLD AUTO: 0.6 % (ref 0–2)
BILIRUB SERPL-MCNC: 0.6 MG/DL (ref 0.1–1)
BUN BLD-MCNC: 23 MG/DL (ref 5–21)
BUN/CREAT SERPL: 20.5 (ref 7–25)
CALCIUM SPEC-SCNC: 9 MG/DL (ref 8.4–10.4)
CHLORIDE SERPL-SCNC: 104 MMOL/L (ref 98–110)
CHOLEST SERPL-MCNC: 212 MG/DL (ref 130–200)
CO2 SERPL-SCNC: 28 MMOL/L (ref 24–31)
CREAT BLD-MCNC: 1.12 MG/DL (ref 0.5–1.4)
DEPRECATED RDW RBC AUTO: 43.6 FL (ref 40–54)
EOSINOPHIL # BLD AUTO: 0.25 10*3/MM3 (ref 0–0.7)
EOSINOPHIL NFR BLD AUTO: 4 % (ref 0–4)
ERYTHROCYTE [DISTWIDTH] IN BLOOD BY AUTOMATED COUNT: 14.3 % (ref 12–15)
GFR SERPL CREATININE-BSD FRML MDRD: 64 ML/MIN/1.73
GLOBULIN UR ELPH-MCNC: 2.9 GM/DL
GLUCOSE BLD-MCNC: 71 MG/DL (ref 70–100)
GLUCOSE BLDC GLUCOMTR-MCNC: 108 MG/DL (ref 70–130)
GLUCOSE BLDC GLUCOMTR-MCNC: 158 MG/DL (ref 70–130)
GLUCOSE BLDC GLUCOMTR-MCNC: 72 MG/DL (ref 70–130)
GLUCOSE BLDC GLUCOMTR-MCNC: 75 MG/DL (ref 70–130)
GLUCOSE BLDC GLUCOMTR-MCNC: 85 MG/DL (ref 70–130)
HCT VFR BLD AUTO: 43.7 % (ref 40–52)
HCT VFR BLD AUTO: 45 % (ref 40–52)
HDLC SERPL-MCNC: 23 MG/DL
HGB BLD-MCNC: 14.3 G/DL (ref 14–18)
IMM GRANULOCYTES # BLD: 0.02 10*3/MM3 (ref 0–0.03)
IMM GRANULOCYTES NFR BLD: 0.3 % (ref 0–5)
LDLC/HDLC SERPL: 6.08 {RATIO}
LYMPHOCYTES # BLD AUTO: 2.21 10*3/MM3 (ref 0.72–4.86)
LYMPHOCYTES NFR BLD AUTO: 35 % (ref 15–45)
MAGNESIUM SERPL-MCNC: 1.8 MG/DL (ref 1.4–2.2)
MCH RBC QN AUTO: 27.3 PG (ref 28–32)
MCHC RBC AUTO-ENTMCNC: 32.7 G/DL (ref 33–36)
MCV RBC AUTO: 83.4 FL (ref 82–95)
MONOCYTES # BLD AUTO: 0.55 10*3/MM3 (ref 0.19–1.3)
MONOCYTES NFR BLD AUTO: 8.7 % (ref 4–12)
NEUTROPHILS # BLD AUTO: 3.24 10*3/MM3 (ref 1.87–8.4)
NEUTROPHILS NFR BLD AUTO: 51.4 % (ref 39–78)
PA ADP PRP-ACNC: 143 PRU
PHOSPHATE SERPL-MCNC: 3.8 MG/DL (ref 2.5–4.5)
PLATELET # BLD AUTO: 168 10*3/MM3 (ref 130–400)
PLATELET # BLD AUTO: 201 10*3/MM3 (ref 130–400)
PMV BLD AUTO: 11 FL (ref 6–12)
POTASSIUM BLD-SCNC: 4.1 MMOL/L (ref 3.5–5.3)
PROT SERPL-MCNC: 6.7 G/DL (ref 6.3–8.7)
RBC # BLD AUTO: 5.24 10*6/MM3 (ref 4.8–5.9)
SODIUM BLD-SCNC: 139 MMOL/L (ref 135–145)
TRIGL SERPL-MCNC: 246 MG/DL (ref 0–149)
WBC NRBC COR # BLD: 6.31 10*3/MM3 (ref 4.8–10.8)

## 2017-08-21 PROCEDURE — 82962 GLUCOSE BLOOD TEST: CPT

## 2017-08-21 PROCEDURE — G8998 SWALLOW D/C STATUS: HCPCS | Performed by: SPEECH-LANGUAGE PATHOLOGIST

## 2017-08-21 PROCEDURE — 84100 ASSAY OF PHOSPHORUS: CPT | Performed by: FAMILY MEDICINE

## 2017-08-21 PROCEDURE — 80061 LIPID PANEL: CPT | Performed by: FAMILY MEDICINE

## 2017-08-21 PROCEDURE — 97161 PT EVAL LOW COMPLEX 20 MIN: CPT | Performed by: PHYSICAL THERAPIST

## 2017-08-21 PROCEDURE — G0378 HOSPITAL OBSERVATION PER HR: HCPCS

## 2017-08-21 PROCEDURE — 63710000001 INSULIN LISPRO (HUMAN) PER 5 UNITS: Performed by: FAMILY MEDICINE

## 2017-08-21 PROCEDURE — G8988 SELF CARE GOAL STATUS: HCPCS | Performed by: OCCUPATIONAL THERAPIST

## 2017-08-21 PROCEDURE — 25010000002 HEPARIN (PORCINE) PER 1000 UNITS: Performed by: FAMILY MEDICINE

## 2017-08-21 PROCEDURE — 80053 COMPREHEN METABOLIC PANEL: CPT | Performed by: FAMILY MEDICINE

## 2017-08-21 PROCEDURE — G8989 SELF CARE D/C STATUS: HCPCS | Performed by: OCCUPATIONAL THERAPIST

## 2017-08-21 PROCEDURE — 96361 HYDRATE IV INFUSION ADD-ON: CPT

## 2017-08-21 PROCEDURE — 99215 OFFICE O/P EST HI 40 MIN: CPT | Performed by: PSYCHIATRY & NEUROLOGY

## 2017-08-21 PROCEDURE — G8979 MOBILITY GOAL STATUS: HCPCS | Performed by: PHYSICAL THERAPIST

## 2017-08-21 PROCEDURE — G8987 SELF CARE CURRENT STATUS: HCPCS | Performed by: OCCUPATIONAL THERAPIST

## 2017-08-21 PROCEDURE — G8978 MOBILITY CURRENT STATUS: HCPCS | Performed by: PHYSICAL THERAPIST

## 2017-08-21 PROCEDURE — G8997 SWALLOW GOAL STATUS: HCPCS | Performed by: SPEECH-LANGUAGE PATHOLOGIST

## 2017-08-21 PROCEDURE — 93010 ELECTROCARDIOGRAM REPORT: CPT | Performed by: INTERNAL MEDICINE

## 2017-08-21 PROCEDURE — 93005 ELECTROCARDIOGRAM TRACING: CPT | Performed by: FAMILY MEDICINE

## 2017-08-21 PROCEDURE — 96372 THER/PROPH/DIAG INJ SC/IM: CPT

## 2017-08-21 PROCEDURE — 83735 ASSAY OF MAGNESIUM: CPT | Performed by: FAMILY MEDICINE

## 2017-08-21 PROCEDURE — 92610 EVALUATE SWALLOWING FUNCTION: CPT | Performed by: SPEECH-LANGUAGE PATHOLOGIST

## 2017-08-21 PROCEDURE — 97165 OT EVAL LOW COMPLEX 30 MIN: CPT | Performed by: OCCUPATIONAL THERAPIST

## 2017-08-21 PROCEDURE — 70551 MRI BRAIN STEM W/O DYE: CPT

## 2017-08-21 PROCEDURE — G8996 SWALLOW CURRENT STATUS: HCPCS | Performed by: SPEECH-LANGUAGE PATHOLOGIST

## 2017-08-21 PROCEDURE — G8980 MOBILITY D/C STATUS: HCPCS | Performed by: PHYSICAL THERAPIST

## 2017-08-21 PROCEDURE — 85025 COMPLETE CBC W/AUTO DIFF WBC: CPT | Performed by: FAMILY MEDICINE

## 2017-08-21 RX ORDER — LEVETIRACETAM 500 MG/1
1000 TABLET ORAL EVERY 12 HOURS SCHEDULED
Status: DISCONTINUED | OUTPATIENT
Start: 2017-08-21 | End: 2017-08-21 | Stop reason: HOSPADM

## 2017-08-21 RX ORDER — LEVETIRACETAM 500 MG/1
1000 TABLET ORAL 2 TIMES DAILY
Qty: 60 TABLET | Refills: 1 | Status: SHIPPED | OUTPATIENT
Start: 2017-08-21 | End: 2020-01-27

## 2017-08-21 RX ADMIN — GABAPENTIN 300 MG: 300 CAPSULE ORAL at 17:09

## 2017-08-21 RX ADMIN — METOPROLOL TARTRATE 25 MG: 25 TABLET, FILM COATED ORAL at 11:40

## 2017-08-21 RX ADMIN — LEVETIRACETAM 500 MG: 500 TABLET, FILM COATED ORAL at 11:42

## 2017-08-21 RX ADMIN — DULOXETINE HYDROCHLORIDE 60 MG: 30 CAPSULE, DELAYED RELEASE ORAL at 11:42

## 2017-08-21 RX ADMIN — INSULIN LISPRO 2 UNITS: 100 INJECTION, SOLUTION INTRAVENOUS; SUBCUTANEOUS at 17:09

## 2017-08-21 RX ADMIN — HEPARIN SODIUM 5000 UNITS: 5000 INJECTION, SOLUTION INTRAVENOUS; SUBCUTANEOUS at 06:59

## 2017-08-21 RX ADMIN — SODIUM CHLORIDE 125 ML/HR: 9 INJECTION, SOLUTION INTRAVENOUS at 02:05

## 2017-08-21 RX ADMIN — CLOPIDOGREL 75 MG: 75 TABLET, FILM COATED ORAL at 11:41

## 2017-08-21 RX ADMIN — HEPARIN SODIUM 5000 UNITS: 5000 INJECTION, SOLUTION INTRAVENOUS; SUBCUTANEOUS at 15:24

## 2017-08-21 RX ADMIN — ASPIRIN 81 MG: 81 TABLET ORAL at 11:42

## 2017-08-21 RX ADMIN — GABAPENTIN 300 MG: 300 CAPSULE ORAL at 11:42

## 2017-08-21 NOTE — H&P
[facility] Medicine Services  HISTORY AND PHYSICAL    Date of Admission: 8/20/2017  Primary Care Physician: Joao Vergara MD    Subjective     Chief Complaint: Left-sided weakness and facial droop    History of Present Illness  72-year-old male with past medical history arthritis, depression, diabetes mellitus type II, hyperlipidemia, hypertension, myocardial infarction, recent stroke status post TPA therapy comes into the ER with the recurrent left-sided weakness and facial droop.  Patient was recently admitted with similar symptoms about a week ago and was diagnosed with ischemic infarct right side of the sergio.  Patient was offered rehabilitation but he refused before discharge.  After last admission patient was discharged home.  He was brought in today by his family due to inability to walk and similar left-sided weakness that he had with his prior stroke.  Patient had a CT scan today at the ER showed no acute cortical infarction, hemorrhage, or mass.  During examination patient states his weakness has improved but did not resolve completely.  Patient has home medication of benzodiazepine and opioids.  Patient denied use of recent alcohol use while on benzodiazepine and or opioids.  Discussed case with neurologist Dr. Maalgon stated patient has no indication for acute neurological workup at the moment given that his symptoms are more likely from prior CVA rather needs outpatient follow-up and possible rehabilitation after discharge.  In the ER patient's renal panel was elevated from his baseline and he was found to be bradycardic. Although he denies any symptoms of bradycardia.  Denies any prior episodes of bradycardia.        Review of Systems     Otherwise complete ROS reviewed and negative except as mentioned in the HPI.      Past Medical History:   Past Medical History:   Diagnosis Date   • Arthritis    • Depression    • Diabetes mellitus    • Hyperlipidemia     • Hypertension    • Myocardial infarction    • Stroke        Past Surgical History:  Past Surgical History:   Procedure Laterality Date   • APPENDECTOMY     • BACK SURGERY     • CARDIAC CATHETERIZATION     • CHOLECYSTECTOMY     • CORONARY STENT PLACEMENT      X8   • REPLACEMENT TOTAL KNEE         Social History:  reports that he has never smoked. He does not have any smokeless tobacco history on file. He reports that he drinks alcohol. He reports that he does not use illicit drugs.    Family History: family history is not on file.      Allergies:  No Known Allergies    Medications:  Prior to Admission medications    Medication Sig Start Date End Date Taking? Authorizing Provider   ALPRAZolam (XANAX) 0.5 MG tablet Take 0.5 mg by mouth At Night As Needed for Anxiety.    Historical Provider, MD   aspirin 81 MG EC tablet Take 1 tablet by mouth Daily. 8/17/17   Charbel Cabrales,    clopidogrel (PLAVIX) 75 MG tablet Take 75 mg by mouth Daily.    Historical Provider, MD   diphenhydrAMINE (BENADRYL) 25 mg capsule Take 25 mg by mouth Every 6 (Six) Hours As Needed for Itching.    Historical Provider, MD   DULoxetine (CYMBALTA) 60 MG capsule Take 60 mg by mouth Daily.    Historical Provider, MD   fexofenadine (ALLEGRA) 180 MG tablet Take 180 mg by mouth Daily.    Historical Provider, MD   gabapentin (NEURONTIN) 300 MG capsule Take 300 mg by mouth 3 (Three) Times a Day.    Historical Provider, MD   HYDROcodone-acetaminophen (NORCO)  MG per tablet Take 1 tablet by mouth Every 6 (Six) Hours As Needed for Moderate Pain .    Historical Provider, MD   insulin glargine (LANTUS) 100 UNIT/ML injection Inject 80 Units under the skin Every Night.    Historical Provider, MD   levETIRAcetam (KEPPRA) 500 MG tablet Take 500 mg by mouth 2 (Two) Times a Day.    Historical Provider, MD   metoprolol tartrate (LOPRESSOR) 25 MG tablet Take 25 mg by mouth 2 (Two) Times a Day.    Historical Provider, MD   raNITIdine (ZANTAC) 150 MG tablet  "Take 300 mg by mouth Every Night.    Historical Provider, MD   tamsulosin (FLOMAX) 0.4 MG capsule 24 hr capsule Take 2 capsules by mouth Every Night.    Historical Provider, MD   Triamcinolone Acetonide (NASACORT) 55 MCG/ACT nasal inhaler 2 sprays into each nostril Daily.    Historical Provider, MD       Objective     Vital Signs: /57 (BP Location: Right arm, Patient Position: Lying)  Pulse 53  Temp 97.8 °F (36.6 °C) (Oral)   Resp 16  Ht 76\" (193 cm)  Wt 260 lb (118 kg)  SpO2 97%  BMI 31.65 kg/m2  Physical Exam   Constitutional: He is oriented to person, place, and time. He appears well-developed and well-nourished.   HENT:   Head: Normocephalic and atraumatic.   Eyes: EOM are normal. Pupils are equal, round, and reactive to light.   Neck: Normal range of motion. Neck supple.   Cardiovascular: Normal rate, regular rhythm and normal heart sounds.    Pulmonary/Chest: Effort normal and breath sounds normal.   Abdominal: Soft. Bowel sounds are normal.   Musculoskeletal: Normal range of motion.   Neurological: He is alert and oriented to person, place, and time.   Mild left-sided facial droop with left upper extremity motor strength of 4 out of 5 and left lower extremity motor strength of 4 out of 5.  No sensory deficit noted.   Skin: Skin is warm and dry.   Psychiatric: He has a normal mood and affect. His behavior is normal. Judgment and thought content normal.           Results Reviewed:  Lab Results (last 24 hours)     Procedure Component Value Units Date/Time    POC Glucose Fingerstick [[id number]]  (Normal) Collected:  08/20/17 1626    Specimen:  Blood Updated:  08/20/17 1637     Glucose 121 mg/dL       : [id number] Valerie CelestinaMeter ID: [id number]       Comprehensive Metabolic Panel [[id number]]  (Abnormal) Collected:  08/20/17 1644    Specimen:  Blood Updated:  08/20/17 1714     Glucose 114 (H) mg/dL      BUN 27 (H) mg/dL      Creatinine 1.47 (H) mg/dL      Sodium 135 mmol/L      Potassium " 4.8 mmol/L      Chloride 100 mmol/L      CO2 25.0 mmol/L      Calcium 9.2 mg/dL      Total Protein 7.5 g/dL      Albumin 4.40 g/dL      ALT (SGPT) 57 (H) U/L      AST (SGOT) 33 U/L      Alkaline Phosphatase 87 U/L      Total Bilirubin 0.6 mg/dL      eGFR Non African Amer 47 (L) mL/min/1.73      Globulin 3.1 gm/dL      A/G Ratio 1.4 g/dL      BUN/Creatinine Ratio 18.4     Anion Gap 10.0 mmol/L     Narrative:       The MDRD GFR formula is only valid for adults with stable renal function between ages 18 and 70.    CK-MB [[id number]]  (Normal) Collected:  08/20/17 1644    Specimen:  Blood Updated:  08/20/17 1727     CKMB 2.71 ng/mL     Narrative:       CKMB Index not indicated    CK [[id number]]  (Normal) Collected:  08/20/17 1644    Specimen:  Blood Updated:  08/20/17 1727     Creatine Kinase 65 U/L     Myoglobin, Serum [[id number]]  (Normal) Collected:  08/20/17 1644    Specimen:  Blood Updated:  08/20/17 1727     Myoglobin 65.7 ng/mL     Troponin [[id number]]  (Normal) Collected:  08/20/17 1644    Specimen:  Blood Updated:  08/20/17 1727     Troponin I <0.012 ng/mL     CBC & Differential [[id number]] Collected:  08/20/17 1644    Specimen:  Blood Updated:  08/20/17 1734    Narrative:       The following orders were created for panel order CBC & Differential.  Procedure                               Abnormality         Status                     ---------                               -----------         ------                     CBC Auto Differential[[id number]]        Abnormal            Final result                 Please view results for these tests on the individual orders.    CBC Auto Differential [[id number]]  (Abnormal) Collected:  08/20/17 1644    Specimen:  Blood Updated:  08/20/17 1734     WBC 7.12 10*3/mm3      RBC 5.35 10*6/mm3      Hemoglobin 14.9 g/dL      Hematocrit 45.0 %      MCV 84.1 fL      MCH 27.9 (L) pg      MCHC 33.1 g/dL      RDW 14.5 %      RDW-SD 44.3 fl      MPV 11.2 fL      Platelets 201 10*3/mm3       Neutrophil % 44.0 %      Lymphocyte % 40.7 %      Monocyte % 9.8 %      Eosinophil % 4.5 (H) %      Basophil % 0.7 %      Immature Grans % 0.3 %      Neutrophils, Absolute 3.13 10*3/mm3      Lymphocytes, Absolute 2.90 10*3/mm3      Monocytes, Absolute 0.70 10*3/mm3      Eosinophils, Absolute 0.32 10*3/mm3      Basophils, Absolute 0.05 10*3/mm3      Immature Grans, Absolute 0.02 10*3/mm3     [place] Draw [[id number]] Collected:  08/20/17 1644    Specimen:  Blood Updated:  08/20/17 1801    Narrative:       The following orders were created for panel order [place] Draw.  Procedure                               Abnormality         Status                     ---------                               -----------         ------                     Light Blue Top[[id number]]                                   Final result               Green Top (Gel)[[id number]]                                  Final result               Lavender Top[[id number]]                                     Final result               Red Top[[id number]]                                          Final result                 Please view results for these tests on the individual orders.    Light Blue Top [[id number]] Collected:  08/20/17 1644    Specimen:  Blood Updated:  08/20/17 1801     Extra Tube hold for add-on      Auto resulted       Green Top (Gel) [[id number]] Collected:  08/20/17 1644    Specimen:  Blood Updated:  08/20/17 1801     Extra Tube Hold for add-ons.      Auto resulted.       Lavender Top [[id number]] Collected:  08/20/17 1644    Specimen:  Blood Updated:  08/20/17 1801     Extra Tube hold for add-on      Auto resulted       Red Top [[id number]] Collected:  08/20/17 1644    Specimen:  Blood Updated:  08/20/17 1801     Extra Tube Hold for add-ons.      Auto resulted.       P2Y12 Platelet Inhibition [[id number]] Collected:  08/20/17 1856    Specimen:  Blood Updated:  08/20/17 1916    Urinalysis With / Culture If Indicated [[id number]]  (Normal)  Collected:  08/20/17 1911    Specimen:  Urine from Urine, Clean Catch Updated:  08/20/17 1919     Color, UA Yellow     Appearance, UA Clear     pH, UA <=5.0     Specific Gravity, UA 1.011     Glucose, UA Negative     Ketones, UA Negative     Bilirubin, UA Negative     Blood, UA Negative     Protein, UA Negative     Leuk Esterase, UA Negative     Nitrite, UA Negative     Urobilinogen, UA 0.2 E.U./dL    Narrative:       Urine microscopic not indicated.    Ethanol [[id number]]  (Normal) Collected:  08/20/17 1644    Specimen:  Blood Updated:  08/20/17 1928     Ethanol % <0.010 %     Narrative:       Not for legal purposes. Chain of Custody not followed.         Imaging Results (last 24 hours)     Procedure Component Value Units Date/Time    CT Head Without Contrast [[id number]] Collected:  08/20/17 1713     Updated:  08/20/17 1741    Narrative:       History:  72-year-old male evaluated for stroke. Strokelike symptoms.     Reference:  CT head 08/13/2017. MRI brain December 2015. MRI brain 08/14/2017.     Technique:  Unenhanced head/brain CT was performed. Automated exposure control was  utilized to limit radiation dose. Total exam DLP 1033 mGy-cm     Findings:  There is no acute cortical infarction, hemorrhage, or mass. Previous  described acute infarction the right aspect of the sergio is not apparent  on this study, probably obscured by beam hardening artifact. There is a  small remote cortical infarct involving the left frontal lobe. There is  also small remote cortical infarct involving the medial left parietal  lobe. There is a very small remote infarct involving the medial left  occipital lobe. These were all present on 2000 There are remote lacunar  infarctions in the right lentiform nucleus, external capsules, left  thalamus, and caudate heads. No hydrocephalus. The basal cisterns are  clear. There is no midline shift.     The calvarium is intact. The visualized paranasal sinuses and mastoid  air cells are clear.           Impression:       Impression:  No acute cortical infarction, hemorrhage, or mass. Remote infarctions as  discussed.  This report was finalized on 08/20/2017 17:38 by  Ed Coles, .          I have personally reviewed and interpreted the radiology studies and ECG obtained at time of admission.     Assessment / Plan     Assessment & Plan  Hospital Problem List     Transient cerebral ischemia        Assessment:    1.  Left-sided weakness with history of recent stroke  2.  Acute renal failure  3.  Bradycardia  4.  Elevated ALT  5.  Diabetes mellitus type II with hyperglycemia  6.  Coronary artery disease    Plan:    -Admit to telemetry for observation  -Continue cardiac monitoring  -Continuous pulse ox  -Follow-up neurology consult  -CT head in the ER noted for no acute findings  -Consider MRI of brain if indicated by neurology  -Continue aspirin and Plavix   -Failed dysphagia screening  -Keep nothing by mouth for now  -Follow-up speech and swallow consult  -Echocardiogram noted from recent admission from 08/15/2017  -Repeat echo if indicated by neurology   -Hold Norco and Xanax for better neurological evaluation  -Neuro checks  -Alcohol level noted to be negative  -Consider carotid Doppler if indicated by neurology  -Follow-up social work, physical therapy and occupational therapy for evaluation for rehabilitation  -Continue IV fluid  -Follow-up a.m. renal panel  -Consider nephrology consult if renal panel persistently elevated  -Hold renally toxic medication for now  -Monitor heart rate on telemetry  -If symptomatic consider atropine for symptomatically bradycardia  -Follow-up a.m. EKG  -Consider cardiology consult if bradycardia persists  -Hold cardiac medication if indicated for symptomatically bradycardia   -Will continue beta blocker at the moment given patient is asymptomatic and bradycardia is borderline  -Avoid hepatotoxic medications  -Follow-up a.m. ALT level  -Consider abdominal ultrasound if  persistently elevated or worsening of hepatic panel  -Strict glycemic control  -Continue sliding scale insulin  -Continue home dose of insulin  -Continue all cardiac medication          Code Status: Full code     I discussed the patients findings and my recommendations with patient    Estimated length of stay 2-3 days    Mellissa Saravia MD   08/20/17   8:30 PM

## 2017-08-21 NOTE — DISCHARGE SUMMARY
[facility] Medicine Services  DISCHARGE SUMMARY       Date of Admission: 8/20/2017  Date of Discharge:  8/21/2017  Primary Care Physician: Joao Vergara MD    Presenting Problem/History of Present Illness:  Other specified transient cerebral ischemias [G45.8]     Final Discharge Diagnoses:  Hospital Problem List     Transient cerebral ischemia          Consults:Neurology    Pertinent Test Results:     Findings: Normal awake and asleep EEG    MRI the head  1. Redemonstration of an area of ischemia in the right sergio, unchanged  from the previous exam.  2. Sequela of chronic microvascular ischemia.  3. No significant change when compared to the most recent examination  from one week prior.    Findings: CT scan of the head  There is no acute cortical infarction, hemorrhage, or mass. Previous  described acute infarction the right aspect of the sergio is not apparent  on this study, probably obscured by beam hardening artifact. There is a  small remote cortical infarct involving the left frontal lobe. There is  also small remote cortical infarct involving the medial left parietal  lobe. There is a very small remote infarct involving the medial left  occipital lobe. These were all present on 2000 There are remote lacunar  infarctions in the right lentiform nucleus, external capsules, left  thalamus, and caudate heads. No hydrocephalus. The basal cisterns are  clear. There is no midline shift.      The calvarium is intact. The visualized paranasal sinuses and mastoid  air cells are clear.    Chief Complaint on Day of Discharge: none    History of Present Illness on Day of Discharge:   72-year-old male with past medical history arthritis, depression, diabetes mellitus type II, hyperlipidemia, hypertension, myocardial infarction, recent stroke status post TPA therapy comes into the ER with the recurrent left-sided weakness and facial droop.  Patient was recently admitted with  "similar symptoms about a week ago and was diagnosed with ischemic infarct right side of the sergio.  Patient was offered rehabilitation but he refused before discharge.  After last admission patient was discharged home.  He was brought in today by his family due to inability to walk and similar left-sided weakness that he had with his prior stroke.  Patient had a CT scan today at the ER showed no acute cortical infarction, hemorrhage, or mass.  During examination patient states his weakness has improved but did not resolve completely.  Patient has home medication of benzodiazepine and opioids.  Patient denied use of recent alcohol use while on benzodiazepine and or opioids.  Discussed case with neurologist Dr. Malagon stated patient has no indication for acute neurological workup at the moment given that his symptoms are more likely from prior CVA rather needs outpatient follow-up and possible rehabilitation after discharge.  In the ER patient's renal panel was elevated from his baseline and he was found to be bradycardic. Although he denies any symptoms of bradycardia.  Denies any prior episodes of bradycardia.        Hospital Course:  The patient is a 72 y.o. male who [facility] to [facility] with possible CVA.   Patient was evaluated by Dr. Malagon.  Most study shows no acute changes.  Most the patient's symptoms had resolved.  Patient is back to his baseline.  Patient denies any chest pain, shortness breath today.  Vital sign has been stable, afebrile.  Patient was cleared by Dr. Graff to go home.  Patient follow with neurology clinic tomorrow.    Condition on Discharge:  stable    Physical Exam on Discharge:  /73 (BP Location: Right arm, Patient Position: Sitting)  Pulse 63  Temp 98 °F (36.7 °C) (Oral)   Resp 20  Ht 76\" (193 cm)  Wt 241 lb (109 kg)  SpO2 97%  BMI 29.34 kg/m2  Physical Exam   Constitutional: He is oriented to person, place, and time. He appears well-developed and " well-nourished.   HENT:   Head: Normocephalic and atraumatic.   Eyes: Conjunctivae and EOM are normal. Pupils are equal, round, and reactive to light.   Neck: Neck supple. No JVD present. No thyromegaly present.   Cardiovascular: Normal rate, regular rhythm, normal heart sounds and intact distal pulses.  Exam reveals no gallop and no friction rub.    No murmur heard.  Pulmonary/Chest: Effort normal and breath sounds normal. No respiratory distress. He has no wheezes. He has no rales. He exhibits no tenderness.   Abdominal: Soft. Bowel sounds are normal. He exhibits no distension. There is no tenderness. There is no rebound and no guarding.   Musculoskeletal: Normal range of motion. He exhibits no edema, tenderness or deformity.   Lymphadenopathy:     He has no cervical adenopathy.   Neurological: He is alert and oriented to person, place, and time. He displays normal reflexes. No cranial nerve deficit. He exhibits normal muscle tone.   Skin: Skin is warm and dry. No rash noted.   Psychiatric: He has a normal mood and affect. His behavior is normal. Judgment and thought content normal.         Discharge Disposition:  Home or Self Care    Discharge Medications:   Morris Yousif   Home Medication Instructions JESU:[id number]    Printed on:08/21/17 2309   Medication Information                      aspirin 81 MG EC tablet  Take 1 tablet by mouth Daily.             clopidogrel (PLAVIX) 75 MG tablet  Take 75 mg by mouth Daily.             DULoxetine (CYMBALTA) 60 MG capsule  Take 60 mg by mouth Daily.             gabapentin (NEURONTIN) 300 MG capsule  Take 300 mg by mouth 3 (Three) Times a Day.             insulin glargine (LANTUS) 100 UNIT/ML injection  Inject 80 Units under the skin Every Night.             levETIRAcetam (KEPPRA) 500 MG tablet  Take 2 tablets by mouth 2 (Two) Times a Day.             metoprolol tartrate (LOPRESSOR) 25 MG tablet  Take 25 mg by mouth 2 (Two) Times a Day.             raNITIdine (ZANTAC)  150 MG tablet  Take 300 mg by mouth Every Night.             tamsulosin (FLOMAX) 0.4 MG capsule 24 hr capsule  Take 2 capsules by mouth Every Night.             Triamcinolone Acetonide (NASACORT) 55 MCG/ACT nasal inhaler  2 sprays into each nostril Daily.                 Discharge Diet:   Diet Instructions     Advance Diet As Tolerated                     Activity at Discharge:   Activity Instructions     Activity as Tolerated                     Discharge Care Plan/Instructions:     Follow-up Appointments:   Future Appointments  Date Time Provider Department Center   8/22/2017 11:20 AM [facility] None   Patient to follow-up in neurology clinic tomorrow.    Test Results Pending at Discharge:     Abhi Bernal MD  08/21/17  5:34 PM    Time: Greater than 30 minutes

## 2017-08-21 NOTE — PLAN OF CARE
Problem: Patient Care Overview (Adult)  Goal: Plan of Care Review  Outcome: Ongoing (interventions implemented as appropriate)    08/21/17 1407   Coping/Psychosocial Response Interventions   Plan Of Care Reviewed With patient   Patient Care Overview   Progress improving   Outcome Evaluation   Outcome Summary/Follow up Plan Bedside swallow evaluation completed. Patient does have some left labial weakness but doess not appear to be impacting safety with swallow. No other overt s/s of aspition seen with the full range of consistencies. Rec 1) Ok for regular diet with thin liquids. 2) ST not warratned. Reconsult if any change.

## 2017-08-21 NOTE — THERAPY DISCHARGE NOTE
Acute Care - Physical Therapy Initial Eval/Discharge  [facility]     Patient Name: Morris Yousif  : 1945  MRN: [id number]  Today's Date: 2017   Onset of Illness/Injury or Date of Surgery Date: 17  Date of Referral to PT: 17  Referring Physician: Dr. Saravia      Admit Date: 2017    Visit Dx:    ICD-10-CM ICD-9-CM   1. Other specified transient cerebral ischemias G45.8 435.8   2. Bradycardia R00.1 427.89   3. Renal insufficiency N28.9 593.9   4. Altered mental status, unspecified altered mental status type R41.82 780.97   5. Dysphagia, unspecified type R13.10 787.20   6. Balance problem R26.89 781.99     Patient Active Problem List   Diagnosis   • CVA (cerebral vascular accident)   • Transient cerebral ischemia     Past Medical History:   Diagnosis Date   • Arthritis    • Depression    • Diabetes mellitus    • Hyperlipidemia    • Hypertension    • Myocardial infarction    • Stroke      Past Surgical History:   Procedure Laterality Date   • APPENDECTOMY     • BACK SURGERY     • CARDIAC CATHETERIZATION     • CHOLECYSTECTOMY     • CORONARY STENT PLACEMENT      X8   • REPLACEMENT TOTAL KNEE            PT ASSESSMENT (last 72 hours)      PT Evaluation       17 1030 17 1012    Rehab Evaluation    Document Type (P)  evaluation  -KW     Subjective Information (P)  no complaints  -KW     General Information    Equipment Currently Used at Home  none  -    Living Environment    Lives With  spouse  -    Living Arrangements  house  -    Home Accessibility  no concerns  -    Transportation Available  family or friend will provide;car  -    Pain Assessment    Pain Assessment (P)  No/denies pain  -KW       17 1008 17 0950    Rehab Evaluation    Document Type evaluation   see MAR  -MM evaluation  -MS    Subjective Information agree to therapy;complains of   hungry and thirsty  -MM agree to therapy;complains of   hungry  -MS    General Information    Patient Profile  Review yes  -MM yes  -MS    Onset of Illness/Injury or Date of Surgery Date 08/20/17  -MM 08/20/17  -MS    Referring Physician Dr. Saravia  -MM Dr. Saravia  -MS    General Observations pt lying in bed asleep, but easy to wake. No apparent distress  -MM pt laying in bed, asleep but easily woke with verbal stim. Pt in no apparent distress.  -MS    Pertinent History Of Current Problem TIA, L side weakness and facial droop, admit a week ago with diagnosis of ischemic infarct of R side of sergio  -MM TIA, L side weakness and facial droop, admit a week ago with diagnosis of ischemic infarct of R side of sergio  -MS    Precautions/Limitations other (see comments)   [place]  -MM --   [place]  -MS    Prior Level of Function independent:;all household mobility;community mobility;transfer;bed mobility;ADL's  -MM independent:;all household mobility;community mobility;ADL's  -MS    Equipment Currently Used at Home none  -MM     Plans/Goals Discussed With patient and family;agreed upon  -MM patient;agreed upon  -MS    Risks Reviewed patient and family:;LOB;nausea/vomiting;dizziness;change in vital signs;increased discomfort;increased drainage;lines disloged  -MM patient:;LOB;dizziness;increased discomfort  -MS    Benefits Reviewed patient and family:;improve function;increase independence;increase balance;increase strength;decrease pain;decrease risk of DVT;improve skin integrity;increase knowledge  -MM patient:;improve function;increase independence;increase strength;increase balance;increase knowledge  -MS    Barriers to Rehab previous functional deficit  -MM previous functional deficit  -MS    Living Environment    Lives With spouse  -MM     Living Arrangements house  -MM     Home Accessibility bed and bath on same level;stairs to enter home   walk in shower  -MM stairs to enter home   walk in shower  -MS    Number of Stairs to Enter Home 2  -MM 2  -MS    Stair Railings at Home none  -MM none  -MS    Clinical Impression    Date of  Referral to PT  08/20/17  -MS    Patient/Family Goals Statement  go home and stop having spells  -MS    Criteria for Skilled Therapeutic Interventions Met  current level of function same as previous level of function  -MS    Pain Assessment    Pain Assessment No/denies pain  -MM No/denies pain  -MS    Vision Assessment/Intervention    Visual Impairment WFL   occasionally blurriess  -MM WFL  -MS    Visual Impairment Comment  pt reports blurry vision at times, however he reports he can still read when he is having blurry vision.  -MS    Cognitive Assessment/Intervention    Current Cognitive/Communication Assessment functional  -MM functional  -MS    Orientation Status oriented x 4  -MM oriented x 4  -MS    Follows Commands/Answers Questions 100% of the time;able to follow single-step instructions;needs cueing  -MM able to follow single-step instructions;100% of the time;needs cueing;needs increased time;needs repetition  -MS    Personal Safety WNL/WFL  -MM WNL/WFL  -MS    Personal Safety Interventions fall prevention program maintained;gait belt;muscle strengthening facilitated;nonskid shoes/slippers when out of bed;supervised activity  -MM fall prevention program maintained;gait belt;nonskid shoes/slippers when out of bed;supervised activity  -MS    ROM (Range of Motion)    General ROM  no range of motion deficits identified  -MS    General ROM Detail BUE AROM WFL, see PT note for BLE  -MM     MMT (Manual Muscle Testing)    General MMT Assessment  no strength deficits identified  -MS    General MMT Assessment Detail BUE 4+/5, See PT note for BLE  -MM     Bed Mobility, Assessment/Treatment    Bed Mobility, Assistive Device  bed rails  -MS    Bed Mob, Supine to Sit, [place] conditional independence  -MM conditional independence  -MS    Bed Mob, Sit to Supine, [place] not tested   sitting EOB  -MM     Transfer Assessment/Treatment    Transfers, Sit-Stand [place] conditional independence  -MM independent   -MS    Transfers, Stand-Sit [place] conditional independence  -MM independent  -MS    Motor Skills/Interventions    Additional Documentation Fine Motor Coordination Training (Group);Gross Motor Coordination Training (Group)  -MM Balance Skills Training (Group);Fine Motor Coordination Training (Group);Gross Motor Coordination Training (Group)  -MS    Balance Skills Training    Sitting-Level of Assistance  Independent  -MS    Sitting-Balance Support  Feet supported  -MS    Standing-Level of Assistance  Independent  -MS    Standing Balance # of Minutes  pt unable to maintain tandem stance  -MS    Gait Balance-Level of Assistance  Distant supervision  -MS    Gait Balance # of Minutes  pt was able to walk forward, backward, side to side, around objects, over objects with slight loss of balance but corrected on his own  -MS    Fine Motor Coordination Training    Opposition Right:;Left:;intact  -MM Right:;Left:;intact  -MS    Gross Motor Coordination Training    Gross Motor Skill, Impairments Detail finger to nose intact BUE   -MM finger to nose and LE gross corrdination intact  -MS    Sensory Assessment/Intervention    Sensory Impairment  --   WNL  -MS    Light Touch LUE;RUE  -MM     LUE Light Touch WNL  -MM     RUE Light Touch WNL  -MM     Positioning and Restraints    Pre-Treatment Position in bed  -MM     Post Treatment Position bed  -MM bed  -MS    In Bed sitting EOB;notified nsg;call light within reach;encouraged to call for assist;with family/caregiver;side rails up x2  -MM sitting EOB;call light within reach;encouraged to call for assist;with family/caregiver;side rails up x2  -MS      08/20/17 2121 08/20/17 2118    General Information    Equipment Currently Used at Home  none  -AR    Living Environment    Lives With spouse  -AR     Living Arrangements house  -AR     Home Accessibility other (see comments)   pt wife wanting walker or something to help him walk, also interested in home health   -AR     Stair  Railings at Home none  -AR     Type of Financial/Environmental Concern none  -AR     Transportation Available car  -AR       User Key  (r) = Recorded By, (t) = Taken By, (c) = Cosigned By    Initials Name Provider Type    MS Lilliana Moctezuma, PT DPT Physical Therapist    EARLE Rob, MS CCC-SLP Speech and Language Pathologist    JUSTINA Landers, RN Registered Nurse    AMANDA Hong, LBW     MM Mateo Nava, OTR/L Occupational Therapist              PT Recommendation and Plan  Anticipated Discharge Disposition: home with assist (follow up with outpt PT if falls begin to occur)  PT Frequency: evaluation only  Plan of Care Review  Plan Of Care Reviewed With: patient  Progress: progress toward functional goals as expected  Outcome Summary/Follow up Plan: PT evaluation completed. The patient is at his previous baseline with slight balance impairement duirng high level gait activities. He is being discharged home today per Dr. Malagon. PT educated the patient on home safety.               Outcome Measures       08/21/17 1200 08/21/17 0950       How much help from another person do you currently need...    Turning from your back to your side while in flat bed without using bedrails?  4  -MS     Moving from lying on back to sitting on the side of a flat bed without bedrails?  4  -MS     Moving to and from a bed to a chair (including a wheelchair)?  4  -MS     Standing up from a chair using your arms (e.g., wheelchair, bedside chair)?  4  -MS     Climbing 3-5 steps with a railing?  3  -MS     To walk in hospital room?  4  -MS     AM-PAC 6 Clicks Score  23  -MS     How much help from another is currently needed...    Putting on and taking off regular lower body clothing? 4  -MM      Bathing (including washing, rinsing, and drying) 4  -MM      Toileting (which includes using toilet bed pan or urinal) 4  -MM      Putting on and taking off regular upper body clothing 4  -MM      Taking care of  personal grooming (such as brushing teeth) 4  -MM      Eating meals 4  -MM      Score 24  -MM      Functional Assessment    Outcome Measure Options AM-PAC 6 Clicks Daily Activity (OT)  -MM AM-PAC 6 Clicks Basic Mobility (PT)  -MS       User Key  (r) = Recorded By, (t) = Taken By, (c) = Cosigned By    Initials Name Provider Type    MS Lilliana Moctezuma, PT DPT Physical Therapist    MM Mateo Nava, OTR/L Occupational Therapist           Time Calculation:         PT Charges       08/21/17 1401          Time Calculation    Start Time 0950  -MS      Stop Time 1038  -MS      Time Calculation (min) 48 min  -MS      PT Received On 08/21/17  -MS        User Key  (r) = Recorded By, (t) = Taken By, (c) = Cosigned By    Initials Name Provider Type    MS Lilliana Moctezuma, PT DPT Physical Therapist          Therapy Charges for Today     Code Description Service Date Service Provider Modifiers Qty    38916249348 HC PT MOBILITY CURRENT 8/21/2017 Lilliana Moctezuma, PT DPT GP, CI 1    62731899428 HC PT MOBILITY PROJECTED 8/21/2017 Lilliana Moctezuma, PT DPT GP, CI 1    84069286940 HC PT MOBILITY DISCHARGE 8/21/2017 Lilliana Moctezuma, PT DPT GP, CI 1    93966682514 HC PT EVAL LOW COMPLEXITY 4 8/21/2017 Lilliana Moctezuma, PT DPT GP, KX 1          PT G-Codes  Outcome Measure Options: AM-PAC 6 Clicks Daily Activity (OT)  Score: 23  Functional Limitation: Mobility: Walking and moving around  Mobility: Walking and Moving Around Current Status (): At least 1 percent but less than 20 percent impaired, limited or restricted  Mobility: Walking and Moving Around Goal Status (): At least 1 percent but less than 20 percent impaired, limited or restricted  Mobility: Walking and Moving Around Discharge Status (): At least 1 percent but less than 20 percent impaired, limited or restricted    PT Discharge Summary  Anticipated Discharge Disposition: home with assist (follow up with outpt PT if falls begin to occur)  Reason for Discharge: At  baseline function  Outcomes Achieved: Refer to plan of care for updates on goals achieved  Discharge Destination: Home with assist    Lilliana Moctezuma, PT DPT  8/21/2017

## 2017-08-21 NOTE — PLAN OF CARE
Problem: Patient Care Overview (Adult)  Goal: Plan of Care Review  Outcome: Ongoing (interventions implemented as appropriate)    08/21/17 1400   Coping/Psychosocial Response Interventions   Plan Of Care Reviewed With patient   Patient Care Overview   Progress progress toward functional goals as expected   Outcome Evaluation   Outcome Summary/Follow up Plan PT evaluation completed. The patient is at his previous baseline with slight balance impairement duirng high level gait activities. He is being discharged home today per Dr. Malagon. PT educated the patient on home safety.

## 2017-08-21 NOTE — PLAN OF CARE
Problem: Patient Care Overview (Adult)  Goal: Plan of Care Review  Outcome: Ongoing (interventions implemented as appropriate)    08/21/17 1214   Coping/Psychosocial Response Interventions   Plan Of Care Reviewed With patient;family   Patient Care Overview   Progress progress toward functional goals as expected   Outcome Evaluation   Outcome Summary/Follow up Plan OT eval completed. Pt was independent in all mobility and ADLs tested this date. Skilled OT is not warranted at this time d/t independence. Pt educated on home safety, d/c needs/concerns and POC. From an OT standpoint pt is safe to d/c home with assist. OT to sign off at this time.

## 2017-08-21 NOTE — PROGRESS NOTES
Spoke with patient about plans for [facility] and he is agreeable to admission. Cinthia Enriquez RN, [facility]

## 2017-08-21 NOTE — CONSULTS
Visit to initiate conversation for Advance Care Planning.  Pt declines further information and states things ar fine the way they are.

## 2017-08-21 NOTE — PROGRESS NOTES
Discharge Planning Assessment  [facility]     Patient Name: Morris Yousif  MRN: [id number]  Today's Date: 8/21/2017    Admit Date: 8/20/2017          Discharge Needs Assessment       08/21/17 1012    Living Environment    Lives With spouse    Living Arrangements house    Home Accessibility no concerns    Transportation Available family or friend will provide;car    Living Environment    Primary Care Provided By spouse/significant other    Quality Of Family Relationships helpful;supportive;involved    Able to Return to Prior Living Arrangements yes    Discharge Needs Assessment    Concerns To Be Addressed patient refuses services;discharge planning concerns;home safety concerns    Readmission Within The Last 30 Days previous discharge plan unsuccessful   Patient is actually observation status so is not yet a readmit from last admission    Outpatient/Agency/Support Group Needs homecare agency (specify level of care)    Equipment Currently Used at Home none    Equipment Needed After Discharge walker, rolling    Discharge Planning Comments Patient lives with spouse and plan is same at time of discharge. Patient's wife has requested home health care and a walker at time of discharge. SW requested orders from Dr. Bernal. Will follow for orders / discharge and will make discharge planning arrangements.             Discharge Plan     None        Discharge Placement     No information found                Demographic Summary     None            Functional Status     None            Psychosocial     None            Abuse/Neglect     None            Legal     None            Substance Abuse     None            Patient Forms     None          RASHEL Liao

## 2017-08-21 NOTE — CONSULTS
Neurology Consult Note    Referring Provider: Dr. Kobi Bernal  Reason for Consultation: left sided weakness      History of present illness:      72-year-old male who was admitted overnight for yet another episode of slurred speech and left-sided weakness.  His wife and sister are at the bedside and assist in the history.  For further details of previous admissions please see the history and physical and discharge summary from his last admission as they are quite complete in their summary of his previous admissions as well.  The history is dated August 15 in the discharge summary is the 17th.  In brief this Vladimir has had multiple episodes of left-sided weakness.  He received TPA at an outside hospital.  He was readmitted on the 15th to our facility as a third admission in a one month time span.  His symptoms resolved within 24 hours.  He was found to have a right pontine infarct.  It seems somewhat subacute in its nature from a radiologic standpoint.  He was discharged home with no acute needs.  Yesterday he woke up and was having some difficulties with speech.  His left arm and leg became weak again.  This worsened throughout the day.  His wife also endorses that he had very little speech.  It was similar previous spells a little somewhat more severe.  This worsened throughout the day.  He arrived our emergency department last night.  Head CT without contrast was performed and unremarkable.  He was found to have some mild bradycardia.  He was admitted.  After being admitted to the floor his symptoms resolve.  This morning he remains in his baseline state.  Past Medical History:   Diagnosis Date   • Arthritis    • Depression    • Diabetes mellitus    • Hyperlipidemia    • Hypertension    • Myocardial infarction    • Stroke        No Known Allergies  No current facility-administered medications on file prior to encounter.      Current Outpatient Prescriptions on File Prior to Encounter   Medication Sig   • ALPRAZolam  (XANAX) 0.5 MG tablet Take 0.5 mg by mouth At Night As Needed for Anxiety.   • aspirin 81 MG EC tablet Take 1 tablet by mouth Daily.   • clopidogrel (PLAVIX) 75 MG tablet Take 75 mg by mouth Daily.   • diphenhydrAMINE (BENADRYL) 25 mg capsule Take 25 mg by mouth Every 6 (Six) Hours As Needed for Itching.   • DULoxetine (CYMBALTA) 60 MG capsule Take 60 mg by mouth Daily.   • fexofenadine (ALLEGRA) 180 MG tablet Take 180 mg by mouth Daily.   • gabapentin (NEURONTIN) 300 MG capsule Take 300 mg by mouth 3 (Three) Times a Day.   • HYDROcodone-acetaminophen (NORCO)  MG per tablet Take 1 tablet by mouth Every 6 (Six) Hours As Needed for Moderate Pain .   • insulin glargine (LANTUS) 100 UNIT/ML injection Inject 80 Units under the skin Every Night.   • levETIRAcetam (KEPPRA) 500 MG tablet Take 500 mg by mouth 2 (Two) Times a Day.   • metoprolol tartrate (LOPRESSOR) 25 MG tablet Take 25 mg by mouth 2 (Two) Times a Day.   • raNITIdine (ZANTAC) 150 MG tablet Take 300 mg by mouth Every Night.   • tamsulosin (FLOMAX) 0.4 MG capsule 24 hr capsule Take 2 capsules by mouth Every Night.   • Triamcinolone Acetonide (NASACORT) 55 MCG/ACT nasal inhaler 2 sprays into each nostril Daily.       Social History     Social History   • Marital status:      Spouse name: N/A   • Number of children: N/A   • Years of education: N/A     Occupational History   • Not on file.     Social History Main Topics   • Smoking status: Never Smoker   • Smokeless tobacco: Not on file   • Alcohol use Yes      Comment: OCCASIONALLY   • Drug use: No   • Sexual activity: Defer     Other Topics Concern   • Not on file     Social History Narrative     History reviewed. No pertinent family history.    Review of Systems  A 14 point review of systems was reviewed and was negative except for Left-sided weakness    Vital Signs   Temp:  [97.1 °F (36.2 °C)-98 °F (36.7 °C)] 97.4 °F (36.3 °C)  Heart Rate:  [45-58] 58  Resp:  [16-20] 20  BP: (103-138)/(48-78)  138/70    General Exam:  Head:  Normal cephalic, atraumatic  HEENT:  Neck supple  Fundoscopic Exam:  No signs of disc edema  CVS:  Regular rate and rhythm.  No murmurs  Carotid Examination:  No bruits  Lungs:  Clear to auscultation  Abdomen:  Non-tender, Non-distended  Extremities:  No signs of peripheral edema  Skin:  No rashes    Neurologic Exam:    Mental Status:    -Awake, Alert, Oriented X 3  -No word finding difficulties  -No aphasia  -No dysarthria  -Follows simple and complex commands    CN II:  Visual fields full.  Pupils equally reactive to light  CN III, IV, VI:  Extraocular Muscles full with no signs of nystagmus  CN V:  Facial sensory is symmetric with no asymetries.  CN VII:  Facial motor symmetric  CN VIII:  Gross hearing intact bilaterally  CN IX:  Palate elevates symmetrically  CN X:  Palate elevates symmetrically  CN XI:  Shoulder shrug symmetric  CN XII:  Tongue is midline on protrusion    Motor: (strength out of 5:  1= minimal movement, 2 = movement in plane of gravity, 3 = movement against gravity, 4 = movement against some resistance, 5 = full strength)    -Right Upper Ext: Proximal: 5 Distal: 5  -Left Upper Ext: Proximal: 5 Distal: 5    -Right Lower Ext: Proximal: 5 Distal: 5  -Left Lower Ext: Proximal: 5 Distal: 5    DTR:  -Right   Bicep: 2+ Tricep: 2+ Brachoradialis: 2+   Patella: 2+ Ankle: 2+ Neg Babinski  -Left   Bicep: 2+ Tricep: 2+ Brachoradialis: 2+   Patella: 2+ Ankle: 2+ Neg Babinski    Sensory:  -Intact to light touch, pinprick, temperature, pain, and proprioception    Coordination:  -Finger to nose intact  -Heel to shin intact  -No ataxia    Gait  -No signs of ataxia  -ambulates unassisted      Results Review:  Lab Results (last 24 hours)     Procedure Component Value Units Date/Time    POC Glucose Fingerstick [[id number]]  (Normal) Collected:  08/20/17 1626    Specimen:  Blood Updated:  08/20/17 1637     Glucose 121 mg/dL       : [id number] Valerie BruceiaMeter ID: [id number]        Comprehensive Metabolic Panel [[id number]]  (Abnormal) Collected:  08/20/17 1644    Specimen:  Blood Updated:  08/20/17 1714     Glucose 114 (H) mg/dL      BUN 27 (H) mg/dL      Creatinine 1.47 (H) mg/dL      Sodium 135 mmol/L      Potassium 4.8 mmol/L      Chloride 100 mmol/L      CO2 25.0 mmol/L      Calcium 9.2 mg/dL      Total Protein 7.5 g/dL      Albumin 4.40 g/dL      ALT (SGPT) 57 (H) U/L      AST (SGOT) 33 U/L      Alkaline Phosphatase 87 U/L      Total Bilirubin 0.6 mg/dL      eGFR Non African Amer 47 (L) mL/min/1.73      Globulin 3.1 gm/dL      A/G Ratio 1.4 g/dL      BUN/Creatinine Ratio 18.4     Anion Gap 10.0 mmol/L     Narrative:       The MDRD GFR formula is only valid for adults with stable renal function between ages 18 and 70.    CK-MB [[id number]]  (Normal) Collected:  08/20/17 1644    Specimen:  Blood Updated:  08/20/17 1727     CKMB 2.71 ng/mL     Narrative:       CKMB Index not indicated    CK [[id number]]  (Normal) Collected:  08/20/17 1644    Specimen:  Blood Updated:  08/20/17 1727     Creatine Kinase 65 U/L     Myoglobin, Serum [[id number]]  (Normal) Collected:  08/20/17 1644    Specimen:  Blood Updated:  08/20/17 1727     Myoglobin 65.7 ng/mL     Troponin [[id number]]  (Normal) Collected:  08/20/17 1644    Specimen:  Blood Updated:  08/20/17 1727     Troponin I <0.012 ng/mL     CBC & Differential [[id number]] Collected:  08/20/17 1644    Specimen:  Blood Updated:  08/20/17 1734    Narrative:       The following orders were created for panel order CBC & Differential.  Procedure                               Abnormality         Status                     ---------                               -----------         ------                     CBC Auto Differential[[id number]]        Abnormal            Final result                 Please view results for these tests on the individual orders.    CBC Auto Differential [[id number]]  (Abnormal) Collected:  08/20/17 1644    Specimen:  Blood Updated:   08/20/17 1734     WBC 7.12 10*3/mm3      RBC 5.35 10*6/mm3      Hemoglobin 14.9 g/dL      Hematocrit 45.0 %      MCV 84.1 fL      MCH 27.9 (L) pg      MCHC 33.1 g/dL      RDW 14.5 %      RDW-SD 44.3 fl      MPV 11.2 fL      Platelets 201 10*3/mm3      Neutrophil % 44.0 %      Lymphocyte % 40.7 %      Monocyte % 9.8 %      Eosinophil % 4.5 (H) %      Basophil % 0.7 %      Immature Grans % 0.3 %      Neutrophils, Absolute 3.13 10*3/mm3      Lymphocytes, Absolute 2.90 10*3/mm3      Monocytes, Absolute 0.70 10*3/mm3      Eosinophils, Absolute 0.32 10*3/mm3      Basophils, Absolute 0.05 10*3/mm3      Immature Grans, Absolute 0.02 10*3/mm3     [place] Draw [[id number]] Collected:  08/20/17 1644    Specimen:  Blood Updated:  08/20/17 1801    Narrative:       The following orders were created for panel order [place] Draw.  Procedure                               Abnormality         Status                     ---------                               -----------         ------                     Light Blue Top[[id number]]                                   Final result               Green Top (Gel)[[id number]]                                  Final result               Lavender Top[[id number]]                                     Final result               Red Top[[id number]]                                          Final result                 Please view results for these tests on the individual orders.    Light Blue Top [[id number]] Collected:  08/20/17 1644    Specimen:  Blood Updated:  08/20/17 1801     Extra Tube hold for add-on      Auto resulted       Green Top (Gel) [[id number]] Collected:  08/20/17 1644    Specimen:  Blood Updated:  08/20/17 1801     Extra Tube Hold for add-ons.      Auto resulted.       Lavender Top [[id number]] Collected:  08/20/17 1644    Specimen:  Blood Updated:  08/20/17 1801     Extra Tube hold for add-on      Auto resulted       Red Top [[id number]] Collected:  08/20/17 1644    Specimen:  Blood  Updated:  08/20/17 1801     Extra Tube Hold for add-ons.      Auto resulted.       Urinalysis With / Culture If Indicated [[id number]]  (Normal) Collected:  08/20/17 1911    Specimen:  Urine from Urine, Clean Catch Updated:  08/20/17 1919     Color, UA Yellow     Appearance, UA Clear     pH, UA <=5.0     Specific Gravity, UA 1.011     Glucose, UA Negative     Ketones, UA Negative     Bilirubin, UA Negative     Blood, UA Negative     Protein, UA Negative     Leuk Esterase, UA Negative     Nitrite, UA Negative     Urobilinogen, UA 0.2 E.U./dL    Narrative:       Urine microscopic not indicated.    Ethanol [[id number]]  (Normal) Collected:  08/20/17 1644    Specimen:  Blood Updated:  08/20/17 1928     Ethanol % <0.010 %     Narrative:       Not for legal purposes. Chain of Custody not followed.     POC Glucose Fingerstick [[id number]]  (Normal) Collected:  08/21/17 0126    Specimen:  Blood Updated:  08/21/17 0141     Glucose 75 mg/dL       : [id number] GuevarathiaMeter ID: [id number]       P2Y12 Platelet Inhibition [[id number]] Collected:  08/20/17 1856    Specimen:  Blood Updated:  08/21/17 0604     Hematocrit 45.0 %      Platelets 201 10*3/mm3      P2Y12 Reactivity Unit 143 PRU     Narrative:       PRU reference range 194-418 is for patients not receiving P2Y12 drug. Post Drug results: Lower PRU levels are associated with expected antiplatelet effect. Values may be below the stated reference range above. The post-drug PRU values reported are .        CBC Auto Differential [[id number]]  (Abnormal) Collected:  08/21/17 0516    Specimen:  Blood Updated:  08/21/17 0614     WBC 6.31 10*3/mm3      RBC 5.24 10*6/mm3      Hemoglobin 14.3 g/dL      Hematocrit 43.7 %      MCV 83.4 fL      MCH 27.3 (L) pg      MCHC 32.7 (L) g/dL      RDW 14.3 %      RDW-SD 43.6 fl      MPV 11.0 fL      Platelets 168 10*3/mm3      Neutrophil % 51.4 %      Lymphocyte % 35.0 %      Monocyte % 8.7 %      Eosinophil % 4.0 %      Basophil %  0.6 %      Immature Grans % 0.3 %      Neutrophils, Absolute 3.24 10*3/mm3      Lymphocytes, Absolute 2.21 10*3/mm3      Monocytes, Absolute 0.55 10*3/mm3      Eosinophils, Absolute 0.25 10*3/mm3      Basophils, Absolute 0.04 10*3/mm3      Immature Grans, Absolute 0.02 10*3/mm3     Comprehensive Metabolic Panel [[id number]]  (Abnormal) Collected:  08/21/17 0516    Specimen:  Blood Updated:  08/21/17 0624     Glucose 71 mg/dL      BUN 23 (H) mg/dL      Creatinine 1.12 mg/dL      Sodium 139 mmol/L      Potassium 4.1 mmol/L      Chloride 104 mmol/L      CO2 28.0 mmol/L      Calcium 9.0 mg/dL      Total Protein 6.7 g/dL      Albumin 3.80 g/dL      ALT (SGPT) 39 U/L      AST (SGOT) 30 U/L      Alkaline Phosphatase 86 U/L      Total Bilirubin 0.6 mg/dL      eGFR Non African Amer 64 mL/min/1.73      Globulin 2.9 gm/dL      A/G Ratio 1.3 g/dL      BUN/Creatinine Ratio 20.5     Anion Gap 7.0 mmol/L     Narrative:       The MDRD GFR formula is only valid for adults with stable renal function between ages 18 and 70.    Magnesium [[id number]]  (Normal) Collected:  08/21/17 0516    Specimen:  Blood Updated:  08/21/17 0624     Magnesium 1.8 mg/dL     Phosphorus [[id number]]  (Normal) Collected:  08/21/17 0516    Specimen:  Blood Updated:  08/21/17 0624     Phosphorus 3.8 mg/dL     Lipid Panel [[id number]]  (Abnormal) Collected:  08/21/17 0516    Specimen:  Blood Updated:  08/21/17 0635     Total Cholesterol 212 (H) mg/dL      Triglycerides 246 (H) mg/dL      HDL Cholesterol 23 (L) mg/dL      LDL Cholesterol  150 (H) mg/dL      LDL/HDL Ratio 6.08    POC Glucose Fingerstick [[id number]]  (Normal) Collected:  08/20/17 2231    Specimen:  Blood Updated:  08/21/17 0800     Glucose 85 mg/dL       : [id number] Emmanuel Vicente LMeter ID: [id number]       POC Glucose Fingerstick [[id number]]  (Normal) Collected:  08/21/17 0748    Specimen:  Blood Updated:  08/21/17 0800     Glucose 72 mg/dL       : [id number] Gabriele [facility] ID:  [id number]             .  Imaging Results (last 24 hours)     Procedure Component Value Units Date/Time    CT Head Without Contrast [[id number]] Collected:  08/20/17 1713     Updated:  08/20/17 1741    Narrative:       History:  72-year-old male evaluated for stroke. Strokelike symptoms.     Reference:  CT head 08/13/2017. MRI brain December 2015. MRI brain 08/14/2017.     Technique:  Unenhanced head/brain CT was performed. Automated exposure control was  utilized to limit radiation dose. Total exam DLP 1033 mGy-cm     Findings:  There is no acute cortical infarction, hemorrhage, or mass. Previous  described acute infarction the right aspect of the sergio is not apparent  on this study, probably obscured by beam hardening artifact. There is a  small remote cortical infarct involving the left frontal lobe. There is  also small remote cortical infarct involving the medial left parietal  lobe. There is a very small remote infarct involving the medial left  occipital lobe. These were all present on 2000 There are remote lacunar  infarctions in the right lentiform nucleus, external capsules, left  thalamus, and caudate heads. No hydrocephalus. The basal cisterns are  clear. There is no midline shift.     The calvarium is intact. The visualized paranasal sinuses and mastoid  air cells are clear.          Impression:       Impression:  No acute cortical infarction, hemorrhage, or mass. Remote infarctions as  discussed.  This report was finalized on 08/20/2017 17:38 by  Ed Coles, .        CT head reviewed by me and shows no acute findings      Impression    1.  Recurrent episodes of left sided weakness now resolved  2.  Acute Kidney Injury  3.  Bradycardia  4.  Statin intolerance  5.  Diabetes Mellitus type II  6.  Coronary Artery disease    Discussion: It is difficult to say the etiology behind these episodes.  The fact that they were identical in character to previous spells would be suggestive of exacerbation of his  previous stroke symptoms versus accomplish partial seizure with a Nelson's paralysis.  I am going to repeat an MR of the brain to make sure there are no new lesions.  In addition when increase his Keppra to 1000 mg twice per day.  Once these are completed he can be discharged home today.  He expresses understanding in this.  Moving Weeks if the spells would occur again in a similar fashion as long as they improved within 24 hours they can likely be managed as an outpatient.  I did inform both him and his wife that if a change in her care continuation performed that she presented medially back to the emergency department.    Plan    --Increase keppra to 1000mg Q12 (script electronically sent)  --repeat MRI Brain    I discussed the patients findings and my recommendations with patient and family    Jef Malagon MD  08/21/17  10:19 AM

## 2017-08-21 NOTE — THERAPY DISCHARGE NOTE
Acute Care - Speech Language Pathology   Swallow Eval/Discharge [facility]     Patient Name: Morris Yousif  : 1945  MRN: [id number]  Today's Date: 2017  Onset of Illness/Injury or Date of Surgery Date: 17            Admit Date: 2017    SPEECH-LANGUAGE PATHOLOGY EVALUATION - SWALLOW  Subjective: The patient was seen on this date for a Clinical Swallow evaluation.  Patient was alert and cooperative.    The patient's history is significant for failed dysphagia screening.   Objective: Textures given included thin liquid, nectar thick liquid, honey thick liquid, puree consistency and regular consistency.  Assessment: Difficulties were noted with none of the above consistencies, characterized by no overt s/s of aspiration.  SLP Findings:  Patient presents with functional swallow, without esophageal component.   Recommendations: Diet Textures: thin liquid, regular consistency food.  Medications should be taken whole with thin liquids. May have water and ice between meals after oral care, under staff or family supervision and with the recommended strategies for safe swallowing.   Recommended Strategies: Upright for PO and small bites and sips. Oral care before breakfast, after all meals and PRN.  Other Recommended Evaluations: n/a    Dysphagia therapy is not recommended.   Layne Rob, MS CCC-SLP 2017 2:17 PM    Visit Dx:    ICD-10-CM ICD-9-CM   1. Other specified transient cerebral ischemias G45.8 435.8   2. Bradycardia R00.1 427.89   3. Renal insufficiency N28.9 593.9   4. Altered mental status, unspecified altered mental status type R41.82 780.97   5. Dysphagia, unspecified type R13.10 787.20   6. Balance problem R26.89 781.99     Patient Active Problem List   Diagnosis   • CVA (cerebral vascular accident)   • Transient cerebral ischemia     Past Medical History:   Diagnosis Date   • Arthritis    • Depression    • Diabetes mellitus    • Hyperlipidemia    • Hypertension    • Myocardial  infarction    • Stroke      Past Surgical History:   Procedure Laterality Date   • APPENDECTOMY     • BACK SURGERY     • CARDIAC CATHETERIZATION     • CHOLECYSTECTOMY     • CORONARY STENT PLACEMENT      X8   • REPLACEMENT TOTAL KNEE            SWALLOW EVALUATION (last 72 hours)      Swallow Evaluation       08/21/17 1030                Rehab Evaluation    Document Type evaluation  -KW        Subjective Information no complaints  -KW        General Information    Patient Profile Review yes  -KW        Pertinent History Of Current Problem Failed dysphagia screening  -KW        Current Diet Limitations NPO  -KW        Precautions/Limitations, Vision WFL  -KW        Precautions/Limitations, Hearing WFL  -KW        Prior Level of Function- Communication functional in all spheres  -KW        Prior Level of Function- Swallowing no diet consistency restrictions  -KW        Plans/Goals Discussed With patient;family  -KW        Barriers to Rehab none identified  -KW        Clinical Impression    Patient's Goals For Discharge return to all previous roles/activities  -KW        Family Goals For Discharge patient able to return to all previous activities/roles  -KW        Criteria for Skilled Therapeutic Interventions Met no problems identified which require skilled intervention  -KW        FCM, Swallowing 7-->Level 7  -KW        Therapy Frequency evaluation only  -KW        SLP Diet Recommendation regular textures;thin liquids  -KW        Recommended Feeding/Eating Techniques maintain upright posture during/after eating for 30 mins;small sips/bites  -KW        SLP Rec. for Method of Medication Administration meds whole with thin liquid  -KW        Anticipated Discharge Disposition home  -KW        Pain Assessment    Pain Assessment No/denies pain  -KW        Oral Motor Structure and Function    Oral Motor Anatomy and Physiology patient demonstrates anatomy and physiology that is WNL  -KW        Dentition Assessment present and  adequate  -KW        Secretion Management WNL/WFL  -KW        Mucosal Quality moist, healthy  -KW        Velar Elevation WFL (within functional limits)  -KW        Volitional Swallow no difficulties initiating volitional swallow  -KW        Volitional Cough no difficulties initiating volitional cough  -KW        Oral Musculature General Assessment oral labial impairment  -KW        Oral Motor Assessment Comment mild left labial weakness  -KW        General Feeding/Swallowing Observations    Current Feeding Method NPO  -KW        Clinical Swallow Exam    Mode of Presentation fed by clinician;spoon;self fed;straw  -KW        Oral Phase Results intact oral phase without signs of dysfunction  -KW        Pharyngeal Phase Results no signs/symptoms of pharyngeal impairment  -        Summary of Clinical Exam Bedside swallow evaluation completed.  Patient does have some left labial weakness but doess not appear to be impacting safety with swallow.  No other overt s/s of aspition seen with the full range of consistencies. Rec 1) Ok for regular diet with thin liquids.  2) ST not warratned.  Reconsult if any change.  -KW        Swallow Recommendations    Recommended Diet regular textures;thin liquids  -          User Key  (r) = Recorded By, (t) = Taken By, (c) = Cosigned By    Initials Name Effective Dates     Layne Rob MS [facility]-SLP 08/02/16 -         EDUCATION  The patient has been educated in the following areas:   Dysphagia (Swallowing Impairment).    SLP Recommendation and Plan     SLP Diet Recommendation: regular textures, thin liquids  Recommended Feeding/Eating Techniques: maintain upright posture during/after eating for 30 mins, small sips/bites  SLP Rec. for Method of Medication Administration: meds whole with thin liquid        Criteria for Skilled Therapeutic Interventions Met: no problems identified which require skilled intervention  Anticipated Discharge Disposition: home     Therapy Frequency:  evaluation only             Plan of Care Review  Plan Of Care Reviewed With: patient  Progress: improving  Outcome Summary/Follow up Plan: Bedside swallow evaluation completed. Patient does have some left labial weakness but doess not appear to be impacting safety with swallow. No other overt s/s of aspition seen with the full range of consistencies. Rec 1) Ok for regular diet with thin liquids. 2) ST not warratned. Reconsult if any change.           SLP Outcome Measures (last 72 hours)      SLP Outcome Measures       08/21/17 1400          SLP Outcome Measures    Outcome Measure Used? Adult NOMS  -      FCM Scores    FCM Chosen Swallowing  -      Swallowing FCM Score 7  -KW        User Key  (r) = Recorded By, (t) = Taken By, (c) = Cosigned By    Initials Name Effective Dates    EARLE Rob MS CCC-RASHID 08/02/16 -            Time Calculation:         Time Calculation- SLP       08/21/17 1411          Time Calculation- SLP    SLP Start Time 1030  -KW      SLP Stop Time 1115  -KW      SLP Time Calculation (min) 45 min  -KW      SLP Received On 08/21/17  -        User Key  (r) = Recorded By, (t) = Taken By, (c) = Cosigned By    Initials Name Provider Type    EARLE Rob MS CCC-SLP Speech and Language Pathologist          Therapy Charges for Today     Code Description Service Date Service Provider Modifiers Qty    71721363885 HC ST SWALLOWING CURRENT STATUS 8/21/2017 Layne Rob MS CCC-SLP GN, CH 1    17163990455 HC ST SWALLOWING PROJECTED 8/21/2017 Layne Rob MS CCC-SLP GN, CH 1    59610750332 HC ST SWALLOWING DISCHARGE 8/21/2017 Layne Rob MS CCC-SLP GN, CH 1    55564290329 HC ST EVAL ORAL PHARYNG SWALLOW 3 8/21/2017 Layne Rob MS CCC-SLP GN, KX 1          SLP G-Codes  SLP NOMS Used?: Yes  Functional Limitations: Swallowing  Swallow Current Status (): 0 percent impaired, limited or restricted  Swallow Goal Status (): 0 percent impaired, limited or restricted  Swallow  Discharge Status (): 0 percent impaired, limited or restricted    SLP Discharge Summary  Anticipated Discharge Disposition: home  Reason for Discharge: At baseline function  Outcomes Achieved: Able to achieve all goals within established timeline  Discharge Destination: Home    Layne Rob MS CCC-SLP  8/21/2017

## 2017-08-21 NOTE — PLAN OF CARE
Problem: Patient Care Overview (Adult)  Goal: Plan of Care Review  Outcome: Ongoing (interventions implemented as appropriate)    08/21/17 0141   Coping/Psychosocial Response Interventions   Plan Of Care Reviewed With patient   Patient Care Overview   Progress no change       Goal: Adult Individualization and Mutuality  Outcome: Ongoing (interventions implemented as appropriate)  Goal: Discharge Needs Assessment  Outcome: Ongoing (interventions implemented as appropriate)    Problem: Stroke (Ischemic) (Adult)  Goal: Signs and Symptoms of Listed Potential Problems Will be Absent or Manageable (Stroke)  Outcome: Ongoing (interventions implemented as appropriate)

## 2017-08-21 NOTE — THERAPY DISCHARGE NOTE
Acute Care - Occupational Therapy Initial Eval/Discharge  [facility]     Patient Name: Morris Yousif  : 1945  MRN: [id number]  Today's Date: 2017  Onset of Illness/Injury or Date of Surgery Date: 17  Date of Referral to OT: 17  Referring Physician: Dr. Saravia      Admit Date: 2017       ICD-10-CM ICD-9-CM   1. Other specified transient cerebral ischemias G45.8 435.8   2. Bradycardia R00.1 427.89   3. Renal insufficiency N28.9 593.9   4. Altered mental status, unspecified altered mental status type R41.82 780.97     Patient Active Problem List   Diagnosis   • CVA (cerebral vascular accident)   • Transient cerebral ischemia     Past Medical History:   Diagnosis Date   • Arthritis    • Depression    • Diabetes mellitus    • Hyperlipidemia    • Hypertension    • Myocardial infarction    • Stroke      Past Surgical History:   Procedure Laterality Date   • APPENDECTOMY     • BACK SURGERY     • CARDIAC CATHETERIZATION     • CHOLECYSTECTOMY     • CORONARY STENT PLACEMENT      X8   • REPLACEMENT TOTAL KNEE            OT ASSESSMENT FLOWSHEET (last 72 hours)      OT Evaluation       17 1217 17 1012 17 1008 17 0950 17 2121    Rehab Evaluation    Document Type   evaluation   see MAR  -MM evaluation  -MS     Subjective Information   agree to therapy;complains of   hungry and thirsty  -MM agree to therapy;complains of   hungry  -MS     General Information    Patient Profile Review   yes  -MM yes  -MS     Onset of Illness/Injury or Date of Surgery Date   17  -MM 17  -MS     Referring Physician   Dr. Saravia  -MM Dr. Saravia  -MS     General Observations   pt lying in bed asleep, but easy to wake. No apparent distress  -MM pt laying in bed, asleep but easily woke with verbal stim. Pt in no apparent distress.  -MS     Pertinent History Of Current Problem   TIA, L side weakness and facial droop, admit a week ago with diagnosis of ischemic infarct of R side of  sergio  -MM TIA, L side weakness and facial droop, admit a week ago with diagnosis of ischemic infarct of R side of sergio  -MS     Precautions/Limitations   other (see comments)   [place]  -MM --   [place]  -MS     Prior Level of Function   independent:;all household mobility;community mobility;transfer;bed mobility;ADL's  -MM      Equipment Currently Used at Home  none  -KP none  -MM      Plans/Goals Discussed With   patient and family;agreed upon  -MM      Risks Reviewed   patient and family:;LOB;nausea/vomiting;dizziness;change in vital signs;increased discomfort;increased drainage;lines disloged  -MM      Benefits Reviewed   patient and family:;improve function;increase independence;increase balance;increase strength;decrease pain;decrease risk of DVT;improve skin integrity;increase knowledge  -MM      Barriers to Rehab   previous functional deficit  -MM      Living Environment    Lives With  spouse  -KP spouse  -MM  spouse  -AR    Living Arrangements  house  -KP house  -MM  house  -AR    Home Accessibility  no concerns  -KP bed and bath on same level;stairs to enter home   walk in shower  -MM stairs to enter home   walk in shower  -MS other (see comments)   pt wife wanting walker or something to help him walk, also interested in home health   -AR    Number of Stairs to Enter Home   2  -MM 2  -MS     Stair Railings at Home   none  -MM none  -MS none  -AR    Type of Financial/Environmental Concern     none  -AR    Transportation Available  family or friend will provide;car  -KP   car  -AR    Clinical Impression    Date of Referral to OT   08/20/17  -MM      Impairments Found (describe specific impairments)   gait, locomotion, and balance  -MM      Patient/Family Goals Statement   return home  -MM      Criteria for Skilled Therapeutic Interventions Met   no;no problems identified which require skilled intervention;current level of function same as previous level of function  -MM      Therapy Frequency   evaluation only  -MM       Anticipated Discharge Disposition home with assist  -MM  home with assist  -MM      Pain Assessment    Pain Assessment   No/denies pain  -MM      Vision Assessment/Intervention    Visual Impairment   WFL   occasionally blurriess  -MM      Cognitive Assessment/Intervention    Current Cognitive/Communication Assessment   functional  -MM functional  -MS     Orientation Status   oriented x 4  -MM oriented x 4  -MS     Follows Commands/Answers Questions   100% of the time;able to follow single-step instructions;needs cueing  -MM      Personal Safety   WNL/WFL  -MM      Personal Safety Interventions   fall prevention program maintained;gait belt;muscle strengthening facilitated;nonskid shoes/slippers when out of bed;supervised activity  -MM      ROM (Range of Motion)    General ROM Detail   BUE AROM WFL, see PT note for BLE  -MM      MMT (Manual Muscle Testing)    General MMT Assessment Detail   BUE 4+/5, See PT note for BLE  -MM      Bed Mobility, Assessment/Treatment    Bed Mob, Supine to Sit, [place]   conditional independence  -MM      Bed Mob, Sit to Supine, [place]   not tested   sitting EOB  -MM      Transfer Assessment/Treatment    Transfers, Sit-Stand [place]   conditional independence  -MM      Transfers, Stand-Sit [place]   conditional independence  -MM      Functional Mobility    Functional Mobility- Ind. Level   conditional independence  -MM      Functional Mobility- Comment   Pt ambulated down tee and back to room  -MM      Lower Body Dressing Assessment/Training    LB Dressing Assess/Train, Clothing Type   doffing:;donning:;slipper socks  -MM      LB Dressing Assess/Train, Position   edge of bed;sitting  -MM      LB Dressing Assess/Train, [place]   independent  -MM      Motor Skills/Interventions    Additional Documentation   Fine Motor Coordination Training (Group);Gross Motor Coordination Training (Group)  -MM      Gross Motor Coordination Training    Gross Motor Skill,  Impairments Detail   finger to nose intact BUE   -MM      Fine Motor Coordination Training    Opposition   Right:;Left:;intact  -MM      Sensory Assessment/Intervention    Light Touch   LUE;RUE  -MM      LUE Light Touch   WNL  -MM      RUE Light Touch   WNL  -MM      Positioning and Restraints    Pre-Treatment Position   in bed  -MM      Post Treatment Position   bed  -MM      In Bed   sitting EOB;notified nsg;call light within reach;encouraged to call for assist;with family/caregiver;side rails up x2  -MM        08/20/17 7363                General Information    Equipment Currently Used at Home none  -AR        Functional Level Prior    Ambulation 0-->independent  -AR        Transferring 0-->independent  -AR        Toileting 0-->independent  -AR        Bathing 0-->independent  -AR        Dressing 0-->independent  -AR        Eating 0-->independent  -AR        Communication 0-->understands/communicates without difficulty  -AR        Swallowing 0-->swallows foods/liquids without difficulty  -AR          User Key  (r) = Recorded By, (t) = Taken By, (c) = Cosigned By    Initials Name Effective Dates    MS Lilliana Moctezuma, PT DPT 08/02/16 -     AR Evangelina Landers, RN 08/02/16 -     AMANDA Hong BSJACKY 09/15/16 -     MM GABRIELLE Ho/KATTY 10/21/16 -           Occupational Therapy Education     Title: PT OT SLP Therapies (Done)     Topic: Occupational Therapy (Done)     Point: ADL training (Done)    Description: Instruct learner(s) on proper safety adaptation and remediation techniques during self care or transfers.   Instruct in proper use of assistive devices.    Learning Progress Summary    Learner Readiness Method Response Comment Documented by Status   Patient Acceptance E VU pt educated on OT role, benefits,POC, and d/c needs/concerns.  08/21/17 1214 Done                      User Key     Initials Effective Dates Name Provider Type Discipline     10/21/16 -  GABRIELLE Ho/L Occupational Therapist  OT                OT Recommendation and Plan  Anticipated Discharge Disposition: home with assist  Therapy Frequency: evaluation only  Plan of Care Review  Plan Of Care Reviewed With: patient, family  Progress: progress toward functional goals as expected  Outcome Summary/Follow up Plan: OT krystin completed. Pt was independent in all mobility and ADLs tested this date. Skilled OT is not warranted at this time d/t independence. Pt educated on home safety, d/c needs/concerns and POC. From an OT standpoint pt is safe to d/c home with assist. OT to sign off at this time.               Outcome Measures       08/21/17 1200          How much help from another is currently needed...    Putting on and taking off regular lower body clothing? 4  -MM      Bathing (including washing, rinsing, and drying) 4  -MM      Toileting (which includes using toilet bed pan or urinal) 4  -MM      Putting on and taking off regular upper body clothing 4  -MM      Taking care of personal grooming (such as brushing teeth) 4  -MM      Eating meals 4  -MM      Score 24  -MM      Functional Assessment    Outcome Measure Options AM-PAC 6 Clicks Daily Activity (OT)  -MM        User Key  (r) = Recorded By, (t) = Taken By, (c) = Cosigned By    Initials Name Provider Type    GABRIELLE Ramos/L Occupational Therapist          Time Calculation:         Time Calculation- OT       08/21/17 1217          Time Calculation- OT    OT Start Time 1008  -MM      OT Stop Time 1042   19 minutes chart review time  -MM      OT Time Calculation (min) 34 min  -MM      OT Received On 08/21/17  -MM        User Key  (r) = Recorded By, (t) = Taken By, (c) = Cosigned By    Initials Name Provider Type    GABRIELLE Ramos/L Occupational Therapist          Therapy Charges for Today     Code Description Service Date Service Provider Modifiers Qty    60590308544  OT SELFCARE CURRENT 8/21/2017 GABRIELLE Ho/L ,  1    [id number]  OT SELFCARE  PROJECTED 8/21/2017 Mateo Nava OTR/L GO, CH 1    07374421837 HC OT SELFCARE DISCHARGE 8/21/2017 GABRIELLE Ho/L GO, CH 1    27051980674 HC OT EVAL LOW COMPLEXITY 4 8/21/2017 Mateo Nava OTR/L GO, KX 1          OT G-codes  OT Professional Judgement Used?: Yes  OT Functional Scales Options: AM-PAC 6 Clicks Daily Activity (OT)  Score: 24  Functional Limitation: Self care  Self Care Current Status (): 0 percent impaired, limited or restricted  Self Care Goal Status (): 0 percent impaired, limited or restricted  Self Care Discharge Status (): 0 percent impaired, limited or restricted    OT Discharge Summary  Anticipated Discharge Disposition: home with assist  Reason for Discharge: Independent, At baseline function  Outcomes Achieved: Refer to plan of care for updates on goals achieved  Discharge Destination: Home with assist    GABRIELLE Kiran/KATTY  8/21/2017

## 2017-08-21 NOTE — PROGRESS NOTES
Continued Stay Note   [facility]     Patient Name: Morris Yousif  MRN: [id number]  Today's Date: 8/21/2017    Admit Date: 8/20/2017          Discharge Plan       08/21/17 1532    Case Management/Social Work Plan    Plan [facility] / Walker from [facility]    Additional Comments Received orders for home health and rolling walker. Patient has selected [facility] and [facility]. LYLE spoke to Cinthia Enriquez with [facility] to notify of referral. LYLE faxed referral for walker to [facility] at [phone]. LYLE requested that walker be delivered to patient's room.              Discharge Codes     None            RASHEL Liao

## 2017-08-21 NOTE — PLAN OF CARE
Problem: Patient Care Overview (Adult)  Goal: Plan of Care Review  Outcome: Outcome(s) achieved Date Met:  08/21/17 08/21/17 181   Coping/Psychosocial Response Interventions   Plan Of Care Reviewed With patient;spouse   Patient Care Overview   Progress improving   Outcome Evaluation   Outcome Summary/Follow up Plan Pt discharged home with wife via car. Dc instructions given to pt. Stable at discharge.        Goal: Adult Individualization and Mutuality  Outcome: Outcome(s) achieved Date Met:  08/21/17  Goal: Discharge Needs Assessment  Outcome: Outcome(s) achieved Date Met:  08/21/17    Problem: Stroke (Ischemic) (Adult)  Goal: Signs and Symptoms of Listed Potential Problems Will be Absent or Manageable (Stroke)  Outcome: Outcome(s) achieved Date Met:  08/21/17

## 2017-08-22 ENCOUNTER — OFFICE VISIT (OUTPATIENT)
Dept: NEUROLOGY | Facility: CLINIC | Age: 72
End: 2017-08-22

## 2017-08-22 VITALS
BODY MASS INDEX: 30.81 KG/M2 | SYSTOLIC BLOOD PRESSURE: 132 MMHG | DIASTOLIC BLOOD PRESSURE: 80 MMHG | HEIGHT: 76 IN | WEIGHT: 253 LBS | HEART RATE: 72 BPM

## 2017-08-22 DIAGNOSIS — I63.02 CEREBROVASCULAR ACCIDENT (CVA) DUE TO THROMBOSIS OF BASILAR ARTERY (HCC): Primary | ICD-10-CM

## 2017-08-22 DIAGNOSIS — G45.1 HEMISPHERIC CAROTID ARTERY SYNDROME: ICD-10-CM

## 2017-08-22 DIAGNOSIS — I67.9 CEREBROVASCULAR SMALL VESSEL DISEASE: ICD-10-CM

## 2017-08-22 PROCEDURE — 99214 OFFICE O/P EST MOD 30 MIN: CPT | Performed by: PHYSICIAN ASSISTANT

## 2017-08-23 NOTE — PROGRESS NOTES
Subjective   Morris Yousif is a 72 y.o. male is here today for follow-up.    Stroke   This is a recurrent problem. The current episode started 1 to 4 weeks ago. The problem occurs daily. The problem has been unchanged. Associated symptoms include arthralgias, fatigue and weakness. Pertinent negatives include no chills or fever. The symptoms are aggravated by exertion and stress. He has tried rest and relaxation (Adaptive measures, antiplatelet therapy, physical and speech therapy) for the symptoms. The treatment provided mild relief.       The following portions of the patient's history were reviewed and updated as appropriate: allergies, current medications, past family history, past medical history, past social history, past surgical history and problem list.    Review of Systems   Constitutional: Positive for fatigue. Negative for chills and fever.   HENT: Negative for drooling, nosebleeds and trouble swallowing.    Eyes: Negative.    Respiratory: Negative.    Cardiovascular: Negative.    Gastrointestinal: Negative.    Endocrine: Negative.    Genitourinary: Negative.    Musculoskeletal: Positive for arthralgias, back pain and gait problem.   Skin: Negative.    Allergic/Immunologic: Negative.    Neurological: Positive for speech difficulty and weakness. Negative for facial asymmetry.   Hematological: Negative.    Psychiatric/Behavioral: Positive for dysphoric mood and sleep disturbance. The patient is nervous/anxious.        Objective   Physical Exam   Constitutional: He is oriented to person, place, and time. Vital signs are normal. He appears well-developed and well-nourished. No distress.   HENT:   Head: Normocephalic and atraumatic.   Right Ear: Tympanic membrane, external ear and ear canal normal. Decreased hearing is noted.   Left Ear: Tympanic membrane, external ear and ear canal normal. Decreased hearing is noted.   Nose: Nose normal.   Mouth/Throat: Uvula is midline, oropharynx is clear and moist and  mucous membranes are normal.   Eyes: Conjunctivae, EOM and lids are normal. Pupils are equal, round, and reactive to light. No scleral icterus.   Neck: Normal range of motion and phonation normal. No spinous process tenderness and no muscular tenderness present. Carotid bruit is not present. Normal range of motion present. No thyroid mass and no thyromegaly present.   Cardiovascular: Normal rate, regular rhythm, S1 normal, S2 normal and normal heart sounds.    No murmur heard.  Pulmonary/Chest: Effort normal and breath sounds normal. No stridor. No respiratory distress. He has no wheezes. He has no rales.   Musculoskeletal: Normal range of motion. He exhibits no edema or tenderness.        Lumbar back: Normal.   Lymphadenopathy:     He has no cervical adenopathy.   Neurological: He is alert and oriented to person, place, and time. He displays no atrophy and no tremor. No cranial nerve deficit or sensory deficit. He exhibits normal muscle tone. Gait abnormal. Coordination normal. GCS eye subscore is 4. GCS verbal subscore is 5. GCS motor subscore is 6.   Reflex Scores:       Tricep reflexes are 2+ on the right side and 2+ on the left side.       Bicep reflexes are 2+ on the right side and 2+ on the left side.       Brachioradialis reflexes are 2+ on the right side and 2+ on the left side.       Patellar reflexes are 2+ on the right side and 2+ on the left side.       Achilles reflexes are 2+ on the right side and 2+ on the left side.  Mild dysarthria, mild left hemiapraxia, walks unassisted   Skin: Skin is warm and dry. No ecchymosis, no lesion and no rash noted. No cyanosis. Nails show no clubbing.   Psychiatric: He has a normal mood and affect. His speech is normal and behavior is normal. Judgment and thought content normal. He is not actively hallucinating. Cognition and memory are normal. He is attentive.   Nursing note and vitals reviewed.        Assessment/Plan   Morris was seen today for stroke.    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    We have told him that he can take his Benadryl at at bedtime if needed.    Reviewed recommendations for speech and physical therapy, reviewed safety issues and activity issues.    20 minutes of a 25 minute outpatient visit was spent in counseling and coordination of care.        EMR Dragon transcription disclaimer:  Much of this encounter note is an electronic transcription/translation of spoken language to printed text.  The electronic translation of spoken language may permit erroneous, or at times, nonsensical words or phrases to be inadvertently transcribed.  The author has reviewed the note for such errors, however some may still exist.

## 2017-08-25 ENCOUNTER — OFFICE VISIT (OUTPATIENT)
Dept: FAMILY MEDICINE CLINIC | Age: 72
End: 2017-08-25
Payer: MEDICARE

## 2017-08-25 VITALS
HEIGHT: 76 IN | HEART RATE: 64 BPM | TEMPERATURE: 98.2 F | RESPIRATION RATE: 20 BRPM | BODY MASS INDEX: 31.05 KG/M2 | WEIGHT: 255 LBS | DIASTOLIC BLOOD PRESSURE: 80 MMHG | OXYGEN SATURATION: 96 % | SYSTOLIC BLOOD PRESSURE: 128 MMHG

## 2017-08-25 DIAGNOSIS — I10 ESSENTIAL HYPERTENSION: ICD-10-CM

## 2017-08-25 DIAGNOSIS — F41.8 DEPRESSION WITH ANXIETY: ICD-10-CM

## 2017-08-25 DIAGNOSIS — G89.29 CHRONIC BILATERAL LOW BACK PAIN WITHOUT SCIATICA: ICD-10-CM

## 2017-08-25 DIAGNOSIS — Z79.4 TYPE 2 DIABETES MELLITUS WITH COMPLICATION, WITH LONG-TERM CURRENT USE OF INSULIN (HCC): ICD-10-CM

## 2017-08-25 DIAGNOSIS — F51.04 CHRONIC INSOMNIA: ICD-10-CM

## 2017-08-25 DIAGNOSIS — G45.1 HEMISPHERIC CAROTID ARTERY SYNDROME: Primary | ICD-10-CM

## 2017-08-25 DIAGNOSIS — I63.411 CEREBROVASCULAR ACCIDENT (CVA) DUE TO EMBOLISM OF RIGHT MIDDLE CEREBRAL ARTERY (HCC): ICD-10-CM

## 2017-08-25 DIAGNOSIS — M54.50 CHRONIC BILATERAL LOW BACK PAIN WITHOUT SCIATICA: ICD-10-CM

## 2017-08-25 DIAGNOSIS — E11.8 TYPE 2 DIABETES MELLITUS WITH COMPLICATION, WITH LONG-TERM CURRENT USE OF INSULIN (HCC): ICD-10-CM

## 2017-08-25 PROCEDURE — 99214 OFFICE O/P EST MOD 30 MIN: CPT | Performed by: FAMILY MEDICINE

## 2017-08-25 PROCEDURE — 1036F TOBACCO NON-USER: CPT | Performed by: FAMILY MEDICINE

## 2017-08-25 PROCEDURE — G8427 DOCREV CUR MEDS BY ELIG CLIN: HCPCS | Performed by: FAMILY MEDICINE

## 2017-08-25 PROCEDURE — G8598 ASA/ANTIPLAT THER USED: HCPCS | Performed by: FAMILY MEDICINE

## 2017-08-25 PROCEDURE — 1123F ACP DISCUSS/DSCN MKR DOCD: CPT | Performed by: FAMILY MEDICINE

## 2017-08-25 PROCEDURE — G8417 CALC BMI ABV UP PARAM F/U: HCPCS | Performed by: FAMILY MEDICINE

## 2017-08-25 PROCEDURE — 4040F PNEUMOC VAC/ADMIN/RCVD: CPT | Performed by: FAMILY MEDICINE

## 2017-08-25 PROCEDURE — 3017F COLORECTAL CA SCREEN DOC REV: CPT | Performed by: FAMILY MEDICINE

## 2017-08-25 PROCEDURE — 3046F HEMOGLOBIN A1C LEVEL >9.0%: CPT | Performed by: FAMILY MEDICINE

## 2017-08-25 RX ORDER — DULOXETIN HYDROCHLORIDE 30 MG/1
60 CAPSULE, DELAYED RELEASE ORAL NIGHTLY
Qty: 60 CAPSULE | Refills: 5 | Status: SHIPPED | OUTPATIENT
Start: 2017-08-25 | End: 2017-08-25 | Stop reason: SDUPTHER

## 2017-08-25 RX ORDER — DULOXETIN HYDROCHLORIDE 30 MG/1
30 CAPSULE, DELAYED RELEASE ORAL NIGHTLY
Qty: 30 CAPSULE | Refills: 5 | Status: SHIPPED | OUTPATIENT
Start: 2017-08-25 | End: 2017-08-25 | Stop reason: SDUPTHER

## 2017-08-25 RX ORDER — DULOXETIN HYDROCHLORIDE 30 MG/1
60 CAPSULE, DELAYED RELEASE ORAL NIGHTLY
Qty: 30 CAPSULE | Refills: 5 | Status: SHIPPED | OUTPATIENT
Start: 2017-08-25 | End: 2017-08-25 | Stop reason: SDUPTHER

## 2017-08-25 RX ORDER — CYCLOBENZAPRINE HCL 5 MG
5 TABLET ORAL 3 TIMES DAILY PRN
COMMUNITY
End: 2017-10-23

## 2017-08-25 RX ORDER — DULOXETIN HYDROCHLORIDE 30 MG/1
30 CAPSULE, DELAYED RELEASE ORAL NIGHTLY
Qty: 30 CAPSULE | Refills: 5 | Status: ON HOLD | OUTPATIENT
Start: 2017-08-25 | End: 2017-10-31 | Stop reason: ALTCHOICE

## 2017-08-25 RX ORDER — FLUOXETINE HYDROCHLORIDE 20 MG/1
20 CAPSULE ORAL DAILY
Qty: 30 CAPSULE | Refills: 0 | Status: SHIPPED | OUTPATIENT
Start: 2017-08-25 | End: 2017-09-11 | Stop reason: ALTCHOICE

## 2017-08-28 ENCOUNTER — TELEPHONE (OUTPATIENT)
Dept: FAMILY MEDICINE CLINIC | Age: 72
End: 2017-08-28

## 2017-08-30 ENCOUNTER — TELEPHONE (OUTPATIENT)
Dept: FAMILY MEDICINE CLINIC | Age: 72
End: 2017-08-30

## 2017-08-31 ENCOUNTER — TELEPHONE (OUTPATIENT)
Dept: FAMILY MEDICINE CLINIC | Age: 72
End: 2017-08-31

## 2017-08-31 DIAGNOSIS — I25.10 CORONARY ARTERY DISEASE INVOLVING NATIVE CORONARY ARTERY OF NATIVE HEART WITHOUT ANGINA PECTORIS: Primary | ICD-10-CM

## 2017-09-11 ENCOUNTER — OFFICE VISIT (OUTPATIENT)
Dept: FAMILY MEDICINE CLINIC | Age: 72
End: 2017-09-11
Payer: MEDICARE

## 2017-09-11 VITALS
TEMPERATURE: 97.5 F | HEART RATE: 80 BPM | OXYGEN SATURATION: 97 % | DIASTOLIC BLOOD PRESSURE: 90 MMHG | SYSTOLIC BLOOD PRESSURE: 152 MMHG | RESPIRATION RATE: 18 BRPM

## 2017-09-11 DIAGNOSIS — I10 ESSENTIAL HYPERTENSION: ICD-10-CM

## 2017-09-11 DIAGNOSIS — F33.0 MILD EPISODE OF RECURRENT MAJOR DEPRESSIVE DISORDER (HCC): ICD-10-CM

## 2017-09-11 DIAGNOSIS — I63.411 CEREBROVASCULAR ACCIDENT (CVA) DUE TO EMBOLISM OF RIGHT MIDDLE CEREBRAL ARTERY (HCC): ICD-10-CM

## 2017-09-11 DIAGNOSIS — Z79.4 TYPE 2 DIABETES MELLITUS WITH COMPLICATION, WITH LONG-TERM CURRENT USE OF INSULIN (HCC): Primary | ICD-10-CM

## 2017-09-11 DIAGNOSIS — E78.2 MIXED HYPERLIPIDEMIA: ICD-10-CM

## 2017-09-11 DIAGNOSIS — R27.0 ATAXIA: ICD-10-CM

## 2017-09-11 DIAGNOSIS — E11.8 TYPE 2 DIABETES MELLITUS WITH COMPLICATION, WITH LONG-TERM CURRENT USE OF INSULIN (HCC): Primary | ICD-10-CM

## 2017-09-11 PROBLEM — R41.0 CONFUSION: Status: RESOLVED | Noted: 2017-02-21 | Resolved: 2017-09-11

## 2017-09-11 PROCEDURE — G8598 ASA/ANTIPLAT THER USED: HCPCS | Performed by: FAMILY MEDICINE

## 2017-09-11 PROCEDURE — 99214 OFFICE O/P EST MOD 30 MIN: CPT | Performed by: FAMILY MEDICINE

## 2017-09-11 PROCEDURE — 3017F COLORECTAL CA SCREEN DOC REV: CPT | Performed by: FAMILY MEDICINE

## 2017-09-11 PROCEDURE — 3046F HEMOGLOBIN A1C LEVEL >9.0%: CPT | Performed by: FAMILY MEDICINE

## 2017-09-11 PROCEDURE — 1123F ACP DISCUSS/DSCN MKR DOCD: CPT | Performed by: FAMILY MEDICINE

## 2017-09-11 PROCEDURE — G8417 CALC BMI ABV UP PARAM F/U: HCPCS | Performed by: FAMILY MEDICINE

## 2017-09-11 PROCEDURE — 4040F PNEUMOC VAC/ADMIN/RCVD: CPT | Performed by: FAMILY MEDICINE

## 2017-09-11 PROCEDURE — G8427 DOCREV CUR MEDS BY ELIG CLIN: HCPCS | Performed by: FAMILY MEDICINE

## 2017-09-11 PROCEDURE — 1036F TOBACCO NON-USER: CPT | Performed by: FAMILY MEDICINE

## 2017-09-11 RX ORDER — HYDROCODONE BITARTRATE AND ACETAMINOPHEN 10; 325 MG/1; MG/1
1 TABLET ORAL EVERY 6 HOURS PRN
Qty: 90 TABLET | Refills: 0 | Status: SHIPPED | OUTPATIENT
Start: 2017-09-11 | End: 2017-11-16 | Stop reason: SDUPTHER

## 2017-09-25 ENCOUNTER — HOSPITAL ENCOUNTER (OUTPATIENT)
Dept: PHYSICAL THERAPY | Age: 72
Setting detail: THERAPIES SERIES
Discharge: HOME | End: 2017-09-25
Payer: MEDICARE

## 2017-09-25 PROCEDURE — G8982 BODY POS GOAL STATUS: HCPCS

## 2017-09-25 PROCEDURE — 97162 PT EVAL MOD COMPLEX 30 MIN: CPT

## 2017-09-25 PROCEDURE — G8981 BODY POS CURRENT STATUS: HCPCS

## 2017-09-25 ASSESSMENT — PAIN DESCRIPTION - ONSET: ONSET: ON-GOING

## 2017-09-25 ASSESSMENT — PAIN DESCRIPTION - DESCRIPTORS: DESCRIPTORS: CRAMPING;TIGHTNESS;ACHING

## 2017-09-25 ASSESSMENT — PAIN DESCRIPTION - LOCATION: LOCATION: BACK;OTHER (COMMENT)

## 2017-09-25 ASSESSMENT — PAIN SCALES - GENERAL: PAINLEVEL_OUTOF10: 6

## 2017-09-25 ASSESSMENT — PAIN DESCRIPTION - PAIN TYPE: TYPE: CHRONIC PAIN

## 2017-09-25 ASSESSMENT — PAIN DESCRIPTION - FREQUENCY: FREQUENCY: CONTINUOUS

## 2017-09-25 ASSESSMENT — PAIN DESCRIPTION - ORIENTATION: ORIENTATION: RIGHT;LEFT;LOWER

## 2017-09-28 ENCOUNTER — HOSPITAL ENCOUNTER (OUTPATIENT)
Dept: PHYSICAL THERAPY | Age: 72
Setting detail: THERAPIES SERIES
Discharge: HOME | End: 2017-09-28
Payer: MEDICARE

## 2017-09-28 PROCEDURE — G0283 ELEC STIM OTHER THAN WOUND: HCPCS

## 2017-09-28 PROCEDURE — 97110 THERAPEUTIC EXERCISES: CPT

## 2017-09-28 ASSESSMENT — PAIN SCALES - GENERAL: PAINLEVEL_OUTOF10: 10

## 2017-10-03 ENCOUNTER — HOSPITAL ENCOUNTER (OUTPATIENT)
Dept: PHYSICAL THERAPY | Age: 72
Setting detail: THERAPIES SERIES
Discharge: HOME | End: 2017-10-03
Payer: MEDICARE

## 2017-10-03 PROCEDURE — 97110 THERAPEUTIC EXERCISES: CPT

## 2017-10-03 PROCEDURE — G0283 ELEC STIM OTHER THAN WOUND: HCPCS

## 2017-10-03 ASSESSMENT — PAIN DESCRIPTION - DESCRIPTORS: DESCRIPTORS: SHOOTING

## 2017-10-03 ASSESSMENT — PAIN DESCRIPTION - FREQUENCY: FREQUENCY: CONTINUOUS

## 2017-10-03 ASSESSMENT — PAIN DESCRIPTION - LOCATION: LOCATION: BACK

## 2017-10-03 ASSESSMENT — PAIN DESCRIPTION - PAIN TYPE: TYPE: CHRONIC PAIN

## 2017-10-03 ASSESSMENT — PAIN SCALES - GENERAL: PAINLEVEL_OUTOF10: 10

## 2017-10-03 ASSESSMENT — PAIN DESCRIPTION - ORIENTATION: ORIENTATION: MID;RIGHT;LEFT

## 2017-10-05 ENCOUNTER — HOSPITAL ENCOUNTER (OUTPATIENT)
Dept: PHYSICAL THERAPY | Age: 72
Setting detail: THERAPIES SERIES
Discharge: HOME | End: 2017-10-05
Payer: MEDICARE

## 2017-10-05 PROCEDURE — G0283 ELEC STIM OTHER THAN WOUND: HCPCS

## 2017-10-05 PROCEDURE — 97110 THERAPEUTIC EXERCISES: CPT

## 2017-10-05 ASSESSMENT — PAIN DESCRIPTION - LOCATION: LOCATION: BACK

## 2017-10-05 ASSESSMENT — PAIN SCALES - GENERAL: PAINLEVEL_OUTOF10: 7

## 2017-10-05 ASSESSMENT — PAIN DESCRIPTION - PAIN TYPE: TYPE: CHRONIC PAIN

## 2017-10-05 NOTE — PROGRESS NOTES
Physical Therapy  Daily Treatment Note  Date: 10/5/2017  Patient Name: Mikki Rivera  MRN: [id number]     :   1945    Subjective:   General  Additional Pertinent Hx: 66 y/o M who presents s/p CVA with balance deficits. PMH includes LLIF, MI, DM  Response To Previous Treatment: Patient with no complaints from previous session. Referring Practitioner: Robin Stoddard MD  PT Visit Information  PT Insurance Information: Medicare (Primary), [facility] (Secondary)- no pre-certs req'd  Total # of Visits Approved:  (- anticipated)  Total # of Visits to Date: 4  Plan of Care/Certification Expiration Date: 17  Progress Note Due Date: 10/24/17  Subjective  Subjective: My back always hurts. States his balance is improving and we are helping his back. Pain Screening  Patient Currently in Pain: Yes  Pain Assessment  Pain Assessment: 0-10  Pain Level: 7 (7/10 to start and 4/10 to finish visit)  Pain Type: Chronic pain  Pain Location: Back  Vital Signs  Patient Currently in Pain: Yes       Treatment Activities:                    Exercises  Exercise 1: Box steps cw/ccw  x 10 ea  Exercise 2: Sidestepping on line  x 5 ea  Exercise 3: Cone weaves  x 5----NOt today  Exercise 4: Tandem gait on line  x 5  Exercise 5: SLS  x 3  with  3' ea--NOT today  Exercise 6: Tandem stance  x 15\" ea---NOT today  Exercise 7: Stance on green foam pad EO/EC  x 1' ea  Exercise 8: Stance on green foam pad (narrow JESSICA) x 1'  Exercise 9: Stance on green foam pad (with balance perturbations) 1 minute  Exercise 10: TA contraction (alone 5\" x 10), (UE, LE, UE/LE)  x 10  Exercise 11: Pelvic tilts  x 10  Exercise 12: Hooklying lumbar rotation  x 10  Exercise 13: Supine piriformis stretch  10\" x 4 ea  Exercise 14: Supine quadratus stretch  10\" x 4 ea  Exercise 15: Bridging  x 10  Exercise 16: SKTC  5\" x 5  Exercise 17: DKTC  5\" x 5  Exercise 18: Contract/relax HS stretch  x 4 ea  Exercise 19:  E-stim + HP to low back  (IFC) x 20'  Exercise 20:

## 2017-10-16 ENCOUNTER — OFFICE VISIT (OUTPATIENT)
Dept: FAMILY MEDICINE CLINIC | Age: 72
End: 2017-10-16
Payer: MEDICARE

## 2017-10-16 VITALS
RESPIRATION RATE: 18 BRPM | HEART RATE: 88 BPM | OXYGEN SATURATION: 98 % | SYSTOLIC BLOOD PRESSURE: 136 MMHG | DIASTOLIC BLOOD PRESSURE: 78 MMHG | HEIGHT: 76 IN | BODY MASS INDEX: 32.51 KG/M2 | TEMPERATURE: 98.1 F | WEIGHT: 267 LBS

## 2017-10-16 DIAGNOSIS — F51.04 CHRONIC INSOMNIA: ICD-10-CM

## 2017-10-16 DIAGNOSIS — R53.83 FATIGUE, UNSPECIFIED TYPE: ICD-10-CM

## 2017-10-16 DIAGNOSIS — M65.332 TRIGGER MIDDLE FINGER OF LEFT HAND: ICD-10-CM

## 2017-10-16 DIAGNOSIS — M54.50 CHRONIC BILATERAL LOW BACK PAIN WITHOUT SCIATICA: ICD-10-CM

## 2017-10-16 DIAGNOSIS — F33.42 RECURRENT MAJOR DEPRESSIVE DISORDER, IN FULL REMISSION (HCC): ICD-10-CM

## 2017-10-16 DIAGNOSIS — I63.411 CEREBROVASCULAR ACCIDENT (CVA) DUE TO EMBOLISM OF RIGHT MIDDLE CEREBRAL ARTERY (HCC): ICD-10-CM

## 2017-10-16 DIAGNOSIS — E78.2 MIXED HYPERLIPIDEMIA: ICD-10-CM

## 2017-10-16 DIAGNOSIS — Z79.4 TYPE 2 DIABETES MELLITUS WITH COMPLICATION, WITH LONG-TERM CURRENT USE OF INSULIN (HCC): Primary | ICD-10-CM

## 2017-10-16 DIAGNOSIS — G89.29 CHRONIC BILATERAL LOW BACK PAIN WITHOUT SCIATICA: ICD-10-CM

## 2017-10-16 DIAGNOSIS — R42 DIZZINESS: ICD-10-CM

## 2017-10-16 DIAGNOSIS — E11.8 TYPE 2 DIABETES MELLITUS WITH COMPLICATION, WITH LONG-TERM CURRENT USE OF INSULIN (HCC): Primary | ICD-10-CM

## 2017-10-16 DIAGNOSIS — R00.2 PALPITATIONS: ICD-10-CM

## 2017-10-16 DIAGNOSIS — G62.9 PERIPHERAL POLYNEUROPATHY: ICD-10-CM

## 2017-10-16 DIAGNOSIS — I25.10 CORONARY ARTERY DISEASE INVOLVING NATIVE CORONARY ARTERY OF NATIVE HEART WITHOUT ANGINA PECTORIS: ICD-10-CM

## 2017-10-16 DIAGNOSIS — I10 ESSENTIAL HYPERTENSION: ICD-10-CM

## 2017-10-16 PROBLEM — I95.9 HYPOTENSION: Status: RESOLVED | Noted: 2017-03-27 | Resolved: 2017-10-16

## 2017-10-16 PROBLEM — N17.9 AKI (ACUTE KIDNEY INJURY) (HCC): Status: RESOLVED | Noted: 2017-03-27 | Resolved: 2017-10-16

## 2017-10-16 PROCEDURE — G8417 CALC BMI ABV UP PARAM F/U: HCPCS | Performed by: FAMILY MEDICINE

## 2017-10-16 PROCEDURE — 20550 NJX 1 TENDON SHEATH/LIGAMENT: CPT | Performed by: FAMILY MEDICINE

## 2017-10-16 PROCEDURE — 1123F ACP DISCUSS/DSCN MKR DOCD: CPT | Performed by: FAMILY MEDICINE

## 2017-10-16 PROCEDURE — 1036F TOBACCO NON-USER: CPT | Performed by: FAMILY MEDICINE

## 2017-10-16 PROCEDURE — G8484 FLU IMMUNIZE NO ADMIN: HCPCS | Performed by: FAMILY MEDICINE

## 2017-10-16 PROCEDURE — G8427 DOCREV CUR MEDS BY ELIG CLIN: HCPCS | Performed by: FAMILY MEDICINE

## 2017-10-16 PROCEDURE — 4040F PNEUMOC VAC/ADMIN/RCVD: CPT | Performed by: FAMILY MEDICINE

## 2017-10-16 PROCEDURE — 3046F HEMOGLOBIN A1C LEVEL >9.0%: CPT | Performed by: FAMILY MEDICINE

## 2017-10-16 PROCEDURE — 99215 OFFICE O/P EST HI 40 MIN: CPT | Performed by: FAMILY MEDICINE

## 2017-10-16 PROCEDURE — G8598 ASA/ANTIPLAT THER USED: HCPCS | Performed by: FAMILY MEDICINE

## 2017-10-16 PROCEDURE — 3017F COLORECTAL CA SCREEN DOC REV: CPT | Performed by: FAMILY MEDICINE

## 2017-10-16 NOTE — PROGRESS NOTES
Am[facility] [address] [place], [place] [address] [facility]  [address] [facility] [address]  Dept: [phone]  Dept Fax: [phone]  Loc: [phone]    Fanny Robledo is a 67 y.o. male who presents today for his medical conditions/complaints as noted below. Fanny Robledo is here for 1 Month Follow-Up        HPI:   CC: Here today to discuss the following:    Diabetes Mellitus  Has been compliant with medications. No side effects of medications since last visit. No polyuria, polydipsia, or vision changes since last visit. No symptomatic episodes of hypoglycemia. Hypertension  Compliant with medications. No adverse effects from medication. No lightheadedness, palpitations, or chest pain. His insurance is still not responding to coverage for Repatha. Lower Back Pain, Chronic  Compliant with medications. No adverse effects from medication. Symptoms continue to be stable. Lower back pain is described as a dull ache and occasionally sharp and stabbing. No radiation. Relieved with his current pain medication. No numbness or weakness in lower extremities. Currently satisfied with treatment regimen as it provides some relief. States he has not taken any pain medication today. States overall his pain level has improved. Compliant with his current opioid pain reliever. He states he takes his medication as needed. His pain is typically moderate in nature but can become severe on occasion. he abstains from alcohol while taking his pain medication. he denies drowsiness and impairment on his medication. Coronary Artery Disease  Currently no active angina symptoms. No chest pain with exertion. No orthopnea. Depression  Compliant with medications. No adverse effects from medication. Depression symptoms are stable today. No suicidal or homicidal ideation. Energy, concentration, and sleep are stable. No issues with apathy.       Overall states that his Due    Diabetic foot exam  04/14/1954    Diabetic retinal exam  04/14/1954    Diabetic microalbuminuria test  04/14/1962    DTaP/Tdap/Td vaccine (1 - Tdap) 04/14/1963    Colon cancer screen colonoscopy  04/14/1994    Zostavax vaccine  04/14/2004    Pneumococcal low/med risk (1 of 2 - PCV13) 04/14/2009    Lipid screen  02/21/2018    Diabetic hemoglobin A1C test  03/27/2018    Flu vaccine  Completed       Subjective:      Review of Systems   Constitutional: Positive for fatigue. Negative for chills and fever. HENT: Negative for congestion. Eyes: Negative for pain. Respiratory: Negative for cough, chest tightness and shortness of breath. Cardiovascular: Positive for palpitations. Negative for chest pain and leg swelling. Gastrointestinal: Negative for abdominal pain, anal bleeding, constipation, diarrhea and nausea. Endocrine: Negative for cold intolerance, heat intolerance, polydipsia and polyphagia. Genitourinary: Negative for difficulty urinating, dysuria and enuresis. Musculoskeletal: Positive for back pain, joint swelling and myalgias. Negative for gait problem. Skin: Negative for rash. Neurological: Positive for weakness and headaches. Negative for dizziness, tremors, seizures, syncope, facial asymmetry, speech difficulty and numbness. Psychiatric/Behavioral: Negative. Negative for agitation, behavioral problems, confusion, decreased concentration, dysphoric mood and suicidal ideas. The patient is not nervous/anxious. See HPI for visit specific review of symptoms. All others negative      Objective:   /78 Comment: 2nd bp reading  Pulse 88   Temp 98.1 °F (36.7 °C) (Temporal)   Resp 18   Ht 6' 4\" (1.93 m)   Wt 267 lb (121.1 kg)   SpO2 98%   BMI 32.50 kg/m²   Physical Exam  Physical Exam   Constitutional: appears well-developed. does not appear ill. Eyes: Pupils are equal, round, and reactive to light. Neck: Normal range of motion. Neck supple. Cardiovascular: Normal rate and regular rhythm. Exam reveals no friction rub. No murmur heard. Pulmonary/Chest: Effort normal and breath sounds normal. No respiratory distress. He has no wheezes. no rales. Abdominal: Soft. Bowel sounds are normal. He exhibits no distension. There is no tenderness. There is no rebound and no guarding. Musculoskeletal: exhibits no edema. tenderness to palpation along the tendon of the left middle finger on the palmar surface. Middle finger catches when flexed. Neurological: alert. Psychiatric: normal mood and affect. behavior is normal.       No results found for this or any previous visit (from the past 672 hour(s)). No visits with results within 2 Month(s) from this visit.    Latest known visit with results is:   Admission on 03/27/2017, Discharged on 03/28/2017   Component Date Value    P-R Interval 03/28/2017 158     QRS Duration 03/28/2017 94     Q-T Interval 03/28/2017 432     QTc Calculation (Bazett) 03/28/2017 425     P Axis 03/28/2017 45     T Axis 03/28/2017 81     WBC 03/27/2017 7.7     RBC 03/27/2017 4.86     Hemoglobin 03/27/2017 13.6*    Hematocrit 03/27/2017 42.9     MCV 03/27/2017 88.3     MCH 03/27/2017 28.0     MCHC 03/27/2017 31.7*    RDW 03/27/2017 15.1*    Platelets 46/21/6340 206     MPV 03/27/2017 11.1*    Sodium 03/27/2017 136     Potassium 03/27/2017 4.8     Chloride 03/27/2017 102     CO2 03/27/2017 23     Anion Gap 03/27/2017 11     Glucose 03/27/2017 154*    BUN 03/27/2017 27*    CREATININE 03/27/2017 1.5*    GFR Non- 03/27/2017 46*    Calcium 03/27/2017 9.0     Total Protein 03/27/2017 6.7     Alb 03/27/2017 4.1     Total Bilirubin 03/27/2017 0.3     Alkaline Phosphatase 03/27/2017 88     ALT 03/27/2017 15     AST 03/27/2017 17     Globulin 03/27/2017 2.6     aPTT 03/27/2017 29.9     Protime 03/27/2017 13.2     INR 03/27/2017 1.00     Color, UA 03/27/2017 YELLOW     Clarity, UA 03/27/2017 Clear     Glucose, Ur 03/27/2017 >=1000*    Bilirubin Urine 03/27/2017 Negative     Ketones, Urine 03/27/2017 Negative     Specific Gravity, UA 03/27/2017 1.034     Blood, Urine 03/27/2017 Negative     pH, UA 03/27/2017 5.5     Protein, UA 03/27/2017 Negative     Urobilinogen, Urine 03/27/2017 0.2     Nitrite, Urine 03/27/2017 Negative     Leukocyte Esterase, Urine 03/27/2017 Negative     POC Troponin I 03/27/2017 0.00     Performed on 03/27/2017 i-Stat     POC Glucose 03/27/2017 144*    Performed on 03/27/2017 AccuChek     Sodium 03/28/2017 138     Potassium 03/28/2017 4.2     Chloride 03/28/2017 103     CO2 03/28/2017 22     Anion Gap 03/28/2017 13     Glucose 03/28/2017 97     BUN 03/28/2017 26*    CREATININE 03/28/2017 1.2     GFR Non- 03/28/2017 59*    Calcium 03/28/2017 8.6*    Hemoglobin A1C 03/27/2017 6.1*    POC Glucose 03/27/2017 137*    Performed on 03/27/2017 AccuChek     POC Glucose 03/28/2017 90     Performed on 03/28/2017 AccuChek     Vitamin B-12 03/28/2017 780     POC Glucose 03/28/2017 202*    Performed on 03/28/2017 AccuChek     POC Glucose 03/28/2017 200*    Performed on 03/28/2017 AccuChek              Assessment & Plan:      Reviewed report from Dr. Phoebe Hart MD neurology at [facility] with patient today. The following diagnoses and conditions are stable with no further orders unless indicated:  1. Type 2 diabetes mellitus with complication, with long-term current use of insulin ([facility])  Reviewed glucometer readings. Glucose average over the past 2 weeks was 148. No hypoglycemia. We'll check the following lab studies prior to next visit  - Hemoglobin A1C; Future  - Microalbumin / Creatinine Urine Ratio; Future    2. Cerebrovascular accident (CVA) due to embolism of right middle cerebral artery ([facility] [address]  Symptoms appear to be improving. His ataxia and balance issues have improved  Reviewed records from neurology in [place].  They recommended an event monitor. We'll arrange. Reviewing report from [facility] neurologist recommends:  - [address]; Future  - Protein, CSF; Future  - Glucose, CSF; Future  - CSF Cell Count with Differential; Future    - RPR Reflex to Titer and TPPA; Future    3. Chronic bilateral low back pain without sciatica  Continues to take hydrocodone sparingly. States did not have to take it today. Some days, however, he has to take it 3 times a day    Discussed risk and benefits of taking opioids. Risks include addiction and tolerance. If develops impairment or over sedation with medication, discontinue and contact me. Do not take medication with alcohol. Advised to take sparingly. Advised to keep medication hidden and locked up as risk of theft is high with these medications as well. 4. Mixed hyperlipidemia  We'll check the following lab studies. We'll see if his insurance will cover alternative medication to statins. Trying to get coverage for Repatha. - Lipid Panel; Future  - Comprehensive Metabolic Panel; Future    5. Essential hypertension  Controlled    6. Coronary artery disease involving native coronary artery of native heart without angina pectoris  No chest pain or palpitations today. 7. Trigger middle finger of left hand  Discussed risks and benefits of proceeding with tendon sheath injection. Risks include pain, bleeding, or infection and no guarantee on response was given. He agreed to proceed. Prepared the area of the palmar surface of the left hand with alcohol swab and injected 20mg of depomedrol and 1cc of 1% xylocaine into the tendon sheath of the left middle finger. He tolerated procedure well. Placed a  bandaid and advised to return if symptoms worsen or fail to respond. 8. Dizziness  Symptoms appear to have improved since discontinuing Prozac. Recommended physical therapy which he is currently participating in    9.  Recurrent major depressive disorder, in full

## 2017-10-17 ENCOUNTER — HOSPITAL ENCOUNTER (OUTPATIENT)
Dept: PHYSICAL THERAPY | Age: 72
Setting detail: THERAPIES SERIES
Discharge: HOME | End: 2017-10-17
Payer: MEDICARE

## 2017-10-17 PROBLEM — M65.332 TRIGGER MIDDLE FINGER OF LEFT HAND: Status: ACTIVE | Noted: 2017-10-17

## 2017-10-17 PROBLEM — F51.04 CHRONIC INSOMNIA: Status: ACTIVE | Noted: 2017-10-17

## 2017-10-17 PROBLEM — G62.9 PERIPHERAL POLYNEUROPATHY: Status: ACTIVE | Noted: 2017-10-17

## 2017-10-17 PROBLEM — R00.2 PALPITATIONS: Status: ACTIVE | Noted: 2017-10-17

## 2017-10-17 PROBLEM — F33.42 RECURRENT MAJOR DEPRESSIVE DISORDER, IN FULL REMISSION (HCC): Status: ACTIVE | Noted: 2017-10-17

## 2017-10-17 PROCEDURE — 97110 THERAPEUTIC EXERCISES: CPT

## 2017-10-17 PROCEDURE — G0283 ELEC STIM OTHER THAN WOUND: HCPCS

## 2017-10-17 RX ORDER — METHYLPREDNISOLONE ACETATE 80 MG/ML
20 INJECTION, SUSPENSION INTRA-ARTICULAR; INTRALESIONAL; INTRAMUSCULAR; SOFT TISSUE ONCE
Status: DISCONTINUED | OUTPATIENT
Start: 2017-10-17 | End: 2017-10-31 | Stop reason: ALTCHOICE

## 2017-10-17 ASSESSMENT — ENCOUNTER SYMPTOMS
CONSTIPATION: 0
COUGH: 0
DIARRHEA: 0
BACK PAIN: 1
NAUSEA: 0
SHORTNESS OF BREATH: 0
ANAL BLEEDING: 0
EYE PAIN: 0
ABDOMINAL PAIN: 0
CHEST TIGHTNESS: 0

## 2017-10-17 ASSESSMENT — PAIN DESCRIPTION - LOCATION: LOCATION: BACK

## 2017-10-17 ASSESSMENT — PAIN SCALES - GENERAL: PAINLEVEL_OUTOF10: 5

## 2017-10-17 ASSESSMENT — PAIN DESCRIPTION - PAIN TYPE: TYPE: CHRONIC PAIN

## 2017-10-17 NOTE — PROGRESS NOTES
Physical Therapy  Daily Treatment Note  Date: 10/17/2017  Patient Name: Gato Meeks  MRN: [id number]     :   1945    Subjective:   General  Additional Pertinent Hx: 66 y/o M who presents s/p CVA with balance deficits. PMH includes LLIF, MI, DM  Response To Previous Treatment: Patient with no complaints from previous session. Referring Practitioner: Maryann Laird MD  PT Visit Information  PT Insurance Information: Medicare (Primary), [facility] (Secondary)- no pre-certs req'd  Total # of Visits Approved:  (- anticipated)  Total # of Visits to Date: 5  Plan of Care/Certification Expiration Date: 17  Progress Note Due Date: 10/24/17  Subjective  Subjective: States \"I'm doing better. \"  Just back from trip to [place] and says he managed the 10 hr drive fairly well.   General Comment  Comments: pt scheduled for CT scan 10/25 then will followup with Dr Ysabel Blancas and says he is going to [facility] in December to followup from CVA  Pain Screening  Patient Currently in Pain: Yes  Pain Assessment  Pain Assessment: 0-10  Pain Level: 5 (5/10 to start, denies pain at end of IFC)  Pain Type: Chronic pain  Pain Location: Back  Vital Signs  Patient Currently in Pain: Yes       Treatment Activities:                                      Exercises  Exercise 1: Box steps cw/ccw  x 10 ea  Exercise 2: Sidestepping on line  x 5 ea  Exercise 3: Cone weaves  x 5----NOt today  Exercise 4: Tandem gait on line  x 4  Exercise 5: SLS  1 min ea leg, difficulty maintaining balance  Exercise 6: Tandem stance  x 1 min ea, mild-mod LOB  Exercise 7: Stance on green foam pad EO/EC  x 1' ea---NOT today  Exercise 8: Stance on green foam pad (narrow JESSICA) x 1'  Exercise 9: Stance on green foam pad (with balance perturbations) 1 minute---NOT today  Exercise 10: TA contraction (alone 5\" x 10), (UE, LE, UE/LE)  x 10  Exercise 11: Pelvic tilts  x 10  Exercise 12: Hooklying lumbar rotation  x 10  Exercise 13: Supine piriformis stretch  10\" x 4 Improve Oswestry score to less than 40% impairment.   Patient Goals   Patient goals : improve balance and reduce low back pain    Plan:    Plan  Times per week: 2x  Plan weeks: 4-6 weeks  Current Treatment Recommendations: Strengthening, ROM, Balance Training, Patient/Caregiver Education & Training, Safety Education & Training, Home Exercise Program, Modalities, Equipment Evaluation, Education, & procurement, Endurance Training  Timed Code Treatment Minutes: 62 Minutes     Therapy Time   Individual Concurrent Group Co-treatment   Time In 1304         Time Out 1421         Minutes 77         Timed Code Treatment Minutes: [id number], PTA         Electronically signed by Julia Gilbert PTA on 10/17/2017 at 2:21 PM

## 2017-10-19 ENCOUNTER — HOSPITAL ENCOUNTER (OUTPATIENT)
Dept: PHYSICAL THERAPY | Age: 72
Setting detail: THERAPIES SERIES
Discharge: HOME | End: 2017-10-19
Payer: MEDICARE

## 2017-10-19 PROCEDURE — 97530 THERAPEUTIC ACTIVITIES: CPT

## 2017-10-19 PROCEDURE — G0283 ELEC STIM OTHER THAN WOUND: HCPCS

## 2017-10-19 ASSESSMENT — PAIN DESCRIPTION - PAIN TYPE: TYPE: CHRONIC PAIN

## 2017-10-19 ASSESSMENT — PAIN DESCRIPTION - LOCATION: LOCATION: BACK

## 2017-10-19 ASSESSMENT — PAIN SCALES - GENERAL: PAINLEVEL_OUTOF10: 6

## 2017-10-19 NOTE — PROGRESS NOTES
Physical Therapy  Daily Treatment Note  Date: 10/19/2017  Patient Name: Brandon Naik  MRN: [id number]     :   1945    Subjective:   General  Additional Pertinent Hx: 66 y/o M who presents s/p CVA with balance deficits. PMH includes LLIF, MI, DM  Response To Previous Treatment: Patient with no complaints from previous session. Referring Practitioner: Savage Santos MD  PT Visit Information  PT Insurance Information: Medicare (Primary), [facility] (Secondary)- no pre-certs req'd  Total # of Visits Approved:  (8-12 anticipated)  Total # of Visits to Date: 6  Plan of Care/Certification Expiration Date: 17  Progress Note Due Date: 10/24/17  Subjective  Subjective: \"I did something I probably shouldn't have. Ruel Jerel Ruel Jerel I weedeated\". Pt explains that his back pain has been increased since then but is starting to come down.   General Comment  Comments: pt scheduled for CT scan 10/25 then will followup with Dr Kevin Bean and [facility]  Pain Screening  Patient Currently in Pain: Yes  Pain Assessment  Pain Assessment: 0-10  Pain Level: 6 (6/10 to start and denies pain at end of session)  Pain Type: Chronic pain  Pain Location: Back  Vital Signs  Patient Currently in Pain: Yes       Treatment Activities:              Exercises  Exercise 1: Box steps cw/ccw  x 10 ea---NOT today  Exercise 2: Sidestepping on line  x 5 ea---NOT today  Exercise 3: Cone weaves  x 5----NOt today  Exercise 4: Tandem gait on line  x 4---NOT today  Exercise 5: SLS  1 min ea leg, difficulty maintaining balance (signifcant bal loss and need for UE support)  Exercise 6: Tandem stance  x 1 min ea, mild LOB  Exercise 7: Stance on green foam pad EO/EC  x 1' ea---NOT today  Exercise 8: Stance on green foam pad (narrow JESSICA) x 1'  Exercise 9: Stance on green foam pad (with balance perturbations) 1 minute seemed most disturbed with pertebations to posterior  Exercise 10: TA contraction (alone 10\" x 10), (UE, goals : 4-6 weeks  Long term goal 1: Reduce muscle tension throughout lumbar spine  Long term goal 2: Improve HS length to at least 75 degrees bilaterally--progress  Long term goal 3: Improve Miller Balance score to at least 53/56. Long term goal 4: Improve Oswestry score to less than 40% impairment. Patient Goals   Patient goals : improve balance and reduce low back pain    Plan:    Plan  Times per week: 2x  Plan weeks: 4-6 weeks  Specific instructions for Next Treatment: ASSESS goals, discharge if pt/therapist in agreement.   Current Treatment Recommendations: Strengthening, ROM, Balance Training, Patient/Caregiver Education & Training, Safety Education & Training, Home Exercise Program, Modalities, Equipment Evaluation, Education, & procurement, Endurance Training  Timed Code Treatment Minutes: 54 Minutes (20 min for IFC)     Therapy Time   Individual Concurrent Group Co-treatment   Time In 1303         Time Out 1417         Minutes 74         Timed Code Treatment Minutes: 54 Minutes (20 min for IFC)       Isabel Verduzco PTA     Electronically signed by Isabel Verduzco PTA on 10/19/2017 at 2:20 PM

## 2017-10-23 ENCOUNTER — OFFICE VISIT (OUTPATIENT)
Dept: CARDIOLOGY | Age: 72
End: 2017-10-23
Payer: MEDICARE

## 2017-10-23 VITALS
BODY MASS INDEX: 32.03 KG/M2 | HEART RATE: 62 BPM | WEIGHT: 263 LBS | DIASTOLIC BLOOD PRESSURE: 82 MMHG | SYSTOLIC BLOOD PRESSURE: 130 MMHG | HEIGHT: 76 IN

## 2017-10-23 DIAGNOSIS — I25.10 ARTERIOSCLEROTIC HEART DISEASE: Primary | ICD-10-CM

## 2017-10-23 PROCEDURE — 1036F TOBACCO NON-USER: CPT | Performed by: INTERNAL MEDICINE

## 2017-10-23 PROCEDURE — G8417 CALC BMI ABV UP PARAM F/U: HCPCS | Performed by: INTERNAL MEDICINE

## 2017-10-23 PROCEDURE — 99213 OFFICE O/P EST LOW 20 MIN: CPT | Performed by: INTERNAL MEDICINE

## 2017-10-23 PROCEDURE — G8484 FLU IMMUNIZE NO ADMIN: HCPCS | Performed by: INTERNAL MEDICINE

## 2017-10-23 PROCEDURE — 4040F PNEUMOC VAC/ADMIN/RCVD: CPT | Performed by: INTERNAL MEDICINE

## 2017-10-23 PROCEDURE — G8598 ASA/ANTIPLAT THER USED: HCPCS | Performed by: INTERNAL MEDICINE

## 2017-10-23 PROCEDURE — G8427 DOCREV CUR MEDS BY ELIG CLIN: HCPCS | Performed by: INTERNAL MEDICINE

## 2017-10-23 PROCEDURE — 3017F COLORECTAL CA SCREEN DOC REV: CPT | Performed by: INTERNAL MEDICINE

## 2017-10-23 PROCEDURE — 1123F ACP DISCUSS/DSCN MKR DOCD: CPT | Performed by: INTERNAL MEDICINE

## 2017-10-23 NOTE — PATIENT INSTRUCTIONS
[place] at the [address] [facility] and [address] [facility] located on the first floor of [place] [address] through hospital main entrance and turn immediately to your left. Patient's contact number:  [phone] (home)      Date and Time:  10/31/17 arrive at noon for 1:30 procedure. You will be in the center for approximately 3-4 hours. You will need someone to drive you home after the procedure. Battery will last approximately three years. If for any reason you are unable to keep this appointment, please contact [facility], [phone], to reschedule. Implantable Loop Recorder        An implantable loop recorder is a small device that is implanted under the skin to help identify the causes of fainting. Syncope (or fainting) is a temporary loss of consciousness. Certain heart disorders can cause fainting, such as abnormal heartbeats called arrhythmias. An implantable loop recorder is a small device that is inserted under the skin below the collar bone (usually on the patients left side). The procedure to implant the device is simple. Local anesthetic is injected into the area. A small incision is made and device is inserted. The skin is then sutured closed. The device continuously records heart activity similar to an ECG for up to 2 years. If the patient experiences an episode of fainting the device is activated to save the recording before, during, and after the episode. The recordings can then be evaluated by a physician to help determine the cause of fainting.

## 2017-10-24 ENCOUNTER — HOSPITAL ENCOUNTER (OUTPATIENT)
Dept: PHYSICAL THERAPY | Age: 72
Setting detail: THERAPIES SERIES
Discharge: HOME | End: 2017-10-24
Payer: MEDICARE

## 2017-10-24 PROCEDURE — G8981 BODY POS CURRENT STATUS: HCPCS

## 2017-10-24 PROCEDURE — G8983 BODY POS D/C STATUS: HCPCS

## 2017-10-24 PROCEDURE — G8982 BODY POS GOAL STATUS: HCPCS

## 2017-10-24 PROCEDURE — G0283 ELEC STIM OTHER THAN WOUND: HCPCS

## 2017-10-24 PROCEDURE — 97110 THERAPEUTIC EXERCISES: CPT

## 2017-10-24 NOTE — PROGRESS NOTES
Physical Therapy  Daily Treatment Note  Date: 10/24/2017  Patient Name: Markel Hong  MRN: [id number]     :   1945    Subjective:   General  Chart Reviewed: Yes  Additional Pertinent Hx: 68 y/o M who presents s/p CVA with balance deficits. PMH includes LLIF, MI, DM  Response To Previous Treatment: Patient with no complaints from previous session. Family / Caregiver Present: No  Referring Practitioner: Shaila Herrera MD  PT Visit Information  PT Insurance Information: Medicare (Primary), [facility] (Secondary)- no pre-certs req'd  Total # of Visits to Date: 7  Plan of Care/Certification Expiration Date: 17  Progress Note Due Date: 17  Subjective  Subjective: \"The back is really bothering me today. \"  Pt has requested that today be his last visit.   Pain Screening  Patient Currently in Pain: Yes  Pain Assessment  Pain Assessment: 0-10 (2-3 at rest, 7-8/10 in standing)  Vital Signs  Patient Currently in Pain: Yes       Treatment Activities:                                      Exercises  Exercise 1: Box steps cw/ccw  x 10 ea---NOT today  Exercise 2: Sidestepping on line  x 5 ea---NOT today  Exercise 3: Cone weaves  x 5----NOt today  Exercise 4: Tandem gait on line  x 4---NOT today  Exercise 5: SLS  1 min ea leg, difficulty maintaining balance (signifcant bal loss and need for UE support)  Exercise 6: Tandem stance  x 1 min ea, mild LOB  Exercise 7: Stance on green foam pad EO/EC  x 1' ea---NOT today  Exercise 8: Stance on green foam pad (narrow JESSICA) x 1'  Exercise 9: Stance on green foam pad (with balance perturbations) 1 minute seemed most disturbed with pertebations to posterior  Exercise 10: TA contraction (alone 10\" x 10), (UE, LE, UE/LE)  x 10  Exercise 11: Pelvic tilts  x 10  Exercise 12: Hooklying lumbar rotation  x 10  Exercise 13: Supine piriformis stretch  10\" x 3 ea  Exercise 14: Supine quadratus stretch  10\" x 3 ea  Exercise 16: SKTC  5\" x 5  Exercise 17: DKTC  5\" x 5  Exercise 18:

## 2017-10-25 ENCOUNTER — HOSPITAL ENCOUNTER (OUTPATIENT)
Dept: CT IMAGING | Age: 72
Discharge: HOME | End: 2017-10-25
Payer: MEDICARE

## 2017-10-25 DIAGNOSIS — M54.16 LUMBAR RADICULOPATHY: ICD-10-CM

## 2017-10-25 PROCEDURE — 72131 CT LUMBAR SPINE W/O DYE: CPT

## 2017-10-26 ENCOUNTER — HOSPITAL ENCOUNTER (OUTPATIENT)
Dept: PHYSICAL THERAPY | Age: 72
Setting detail: THERAPIES SERIES
Discharge: HOME | End: 2017-10-26
Payer: MEDICARE

## 2017-10-26 NOTE — PROGRESS NOTES
[address] [facility], [address] [place] [address], [address] [place] [address], [address] [place]  The following was transcribed by Akilah You M.T. Mylene Gabriel : [id number], 67 y.o. Male        Office Visit:  10/23/2017    Chief Complaint   Patient presents with    1 Year Follow Up     pt states soa     Coronary Artery Disease     Reason for visit:   1. Follow up of coronary artery disease. 2.  Follow up for possible arrhythmia associated with stroke. History of Present Illness:   Mr. Mylene Gabriel had a stroke 6 to 8 weeks ago while on the way back here from [place]. He has been evaluated at [facility] in [place]. Among other things, a 30-day cardiac monitor has been recommended. The patient apparently has had multiple events. He has no chest discomfort or dyspnea and has not felt any palpitations, nor has he had any syncope or near syncope.           Patient Active Problem List   Diagnosis Code    DM [facility]diabetes mellitus) [facility] E11.9    HTN (hypertension) I10    CAD (coronary artery disease) I25.10    Fatigue R53.83    MI (myocardial infarction) I21.9    Hyperlipidemia E78.5    Preoperative cardiovascular examination Z01.810    Chronic bilateral low back pain without sciatica M54.5, G89.29    DDD (degenerative disc disease), lumbar M51.36    Spondylosis of lumbar region without myelopathy or radiculopathy M47.816    Acute ischemic stroke (HCC) I63.9    Thoracic outlet syndrome G54.0    Dizziness R42    Imbalance R26.89    Cerebrovascular accident (CVA) due to embolism of right middle cerebral artery (HCC) I63.411    Recurrent major depressive disorder, in full remission ([facility] F33.42    Trigger middle finger of left hand M65.332    Peripheral polyneuropathy (HCC) G62.9    Palpitations R00.2    Chronic insomnia F51.04     Past Medical History:   Diagnosis Date    Atherosclerotic heart disease     s/p [place], [place]ing [place] 3 [place]    CAD (coronary artery disease)     Cerebral artery occlusion with cerebral infarction ([facility]     Chronic bilateral low back pain without sciatica 11/10/2016    DDD (degenerative disc disease), lumbar 11/15/2016    Diabetes mellitus ([facility]     type II    Diabetes mellitus, type 2 ([address]     DM (diabetes mellitus) ([facility] 2011    Glaucoma     Hyperlipidemia     Cholesterol management per pcp.  Macular degeneration     MI (myocardial infarction)     MI (myocardial infarction)     Osteoarthritis     Spondylosis of lumbar region without myelopathy or radiculopathy 11/15/2016    TIA (transient ischemic attack)      Past Surgical History:   Procedure Laterality Date    CARDIAC CATHETERIZATION  12    with angioplasty, stent    CARDIAC CATHETERIZATION  3/29/15  MDL    stent to distal RCA.  EF 60%    CORONARY ANGIOPLASTY WITH STENT PLACEMENT   [place]    stent x 3    CORONARY ANGIOPLASTY WITH STENT PLACEMENT      11    DIAGNOSTIC CARDIAC CATH LAB PROCEDURE  [id number]    primary stent placement to the first circumflex marginal branch, balloon angioplasty to the third left anterior descending diagnonal branch, intracoronary nitroglycerin adminstration    LUMBAR FUSION Left 11/15/2016    LUMBAR INTERBODY FUSION LATERAL L3-4 L4-5 WITH LATERAL PLATE  performed by Nicholas Persaud MD at [facility] OR      Family History   Problem Relation Age of Onset    Coronary Art Dis Mother     Coronary Art Dis Sister    [address] [place] [address] Cancer Sister      uterine    Kidney Disease Sister     Coronary Art Dis Brother       in his 46s    Cancer Father      colon     Social History   Substance Use Topics    Smoking status: Never Smoker    Smokeless tobacco: Never Used    Alcohol use Yes      Comment: 2-3 x weekly wine or whisky and water      Allergies   Allergen Reactions    Statins Other (See Comments)     Intolerable leg cramps     Outpatient Prescriptions Marked as Taking for the 10/23/17 encounter (Office Visit) with BAYRON abnormalities. SKIN:  Warm, dry, intact. No dermatitis or ulcers. NEUROLOGIC:  Intact cranial nerves II through XII and no focal weakness. Assessment / Plan:   1. Coronary artery disease - this appears optimally managed on current therapy as listed which we will continue. 2.  Multiple strokes, rule out cardioembolic events - the source of his strokes has not been determined. They are therefore cryptogenic strokes. Accordingly, we will place a loop recorder as an outpatient in the near future. That way we will be able to tell if he has any episodes of paroxysmal atrial fibrillation which could be responsible for his neurologic events. 3.  The patient will return here in six months.       _____________________________________________________  Electronically Signed by:   C. Rosalene Kanner, M.D., F.AMoraCMoraC.    [facility]  _____________________________________________________  Copy:  Ramos Adkins MD

## 2017-10-30 NOTE — H&P
Travon Jeronimo MD Physician Signed   Progress Notes Date of Service: 10/26/2017 10:58 AM   Related encounter: Office Visit from 10/23/2017 in [facility] [address] Collapse All    []Hide copied text  []Chuckyver for attribution information  [address] [place], [address] [place] [address] Drive, [address] [place] [address], [address] [place]  The following was transcribed by Adarsh Nino M.T.      Holger Saucedo : [birth year]9, 67 y.o. Male         Office Visit:  10/23/2017          Chief Complaint   Patient presents with    1 Year Follow Up       pt states soa     Coronary Artery Disease      Reason for visit:   1. Follow up of coronary artery disease. 2.  Follow up for possible arrhythmia associated with stroke.         History of Present Illness:   Mr. Holger Saucedo had a stroke 6 to 8 weeks ago while on the way back here from [place]. He has been evaluated at [facility] in [address] [facility]. Among other things, a 30-day cardiac monitor has been recommended. The patient apparently has had multiple events.   He has no chest discomfort or dyspnea and has not felt any palpitations, nor has he had any syncope or near syncope.                Patient Active Problem List   Diagnosis Code    DM (diabetes mellitus) ([facility] E11.9    HTN (hypertension) I10    CAD (coronary artery disease) I25.10    Fatigue R53.83    MI (myocardial infarction) I21.9    Hyperlipidemia E78.5    Preoperative cardiovascular examination Z01.810    Chronic bilateral low back pain without sciatica M54.5, G89.29    DDD (degenerative disc disease), lumbar M51.36    Spondylosis of lumbar region without myelopathy or radiculopathy M47.816    Acute ischemic stroke ([address] I63.9    Thoracic outlet syndrome G54.0    Dizziness R42    Imbalance R26.89    Cerebrovascular accident (CVA) due to embolism of right middle cerebral artery ([address] I63.411    Recurrent major depressive disorder, in full remission ([facility]) F33.42    Trigger middle finger of left hand M65.332    Peripheral polyneuropathy ([facility]) G62.9    Palpitations R00.2    Chronic insomnia F51.04      Past Medical History        Past Medical History:   Diagnosis Date    Atherosclerotic heart disease       s/p MI, stenting x 3 jan 2011([place])    CAD (coronary artery disease)      Cerebral artery occlusion with cerebral infarction [facility])      Chronic bilateral low back pain without sciatica 11/10/2016    DDD (degenerative disc disease), lumbar 11/15/2016    Diabetes mellitus ([facility]       type II    Diabetes mellitus, type 2 ([facility]      DM (diabetes mellitus) ([facility] 7/12/2011    Glaucoma      Hyperlipidemia       Cholesterol management per pcp.  Macular degeneration      MI (myocardial infarction)      MI (myocardial infarction)      Osteoarthritis      Spondylosis of lumbar region without myelopathy or radiculopathy 11/15/2016    TIA (transient ischemic attack)           Past Surgical History         Past Surgical History:   Procedure Laterality Date    CARDIAC CATHETERIZATION   12/12/12     with angioplasty, stent    CARDIAC CATHETERIZATION   3/29/15  MDL     stent to distal RCA.  EF 60%    CORONARY ANGIOPLASTY WITH STENT PLACEMENT   jan 11 [place]     stent x 3    CORONARY ANGIOPLASTY WITH STENT PLACEMENT         01/01/11    DIAGNOSTIC CARDIAC CATH LAB PROCEDURE   [id number]     primary stent placement to the first circumflex marginal branch, balloon angioplasty to the third left anterior descending diagnonal branch, intracoronary nitroglycerin adminstration    LUMBAR FUSION Left 11/15/2016     LUMBAR INTERBODY FUSION LATERAL L3-4 L4-5 WITH LATERAL PLATE  performed by Dino Muse [facility] at [facility] OR         Family History          Family History   Problem Relation Age of Onset    Coronary Art Dis Mother      Coronary Art Dis Sister      Cancer Sister         uterine    Kidney Disease Sister      Coronary Art Dis Brother          in his 46s    Cancer Father         colon                 Social History    Substance Use Topics     Smoking status: Never Smoker     Smokeless tobacco: Never Used     Alcohol use Yes         Comment: 2-3 x weekly wine or whisky and water             Allergies   Allergen Reactions    Statins Other (See Comments)       Intolerable leg cramps      Active Medications          Outpatient Prescriptions Marked as Taking for the 10/23/17 encounter (Office Visit) with BAYRON Castelan MD   Medication Sig Dispense Refill    HYDROcodone-acetaminophen (NORCO)  MG per tablet Take 1 tablet by mouth every 6 hours as needed for Pain .  90 tablet 0    DULoxetine (CYMBALTA) 30 MG extended release capsule Take 1 capsule by mouth nightly 30 capsule 5    LANTUS SOLOSTAR 100 UNIT/ML injection pen Inject 80 Units into the skin nightly 5 Pen 3    ULTICARE SHORT PEN NEEDLES 31G X 8 MM MISC Inject 1 each as directed daily 100 each 3    metoprolol tartrate (LOPRESSOR) 25 MG tablet Take 0.5 tablets by mouth 2 times daily 60 tablet 3    meloxicam (MOBIC) 15 MG tablet Take 15 mg by mouth daily        gabapentin (NEURONTIN) 300 MG capsule Take 300 mg by mouth 3 times daily        aspirin 325 MG tablet Take 1 tablet by mouth daily        clopidogrel (PLAVIX) 75 MG tablet Take 1 tablet by mouth daily 1 tablet 0    tamsulosin (FLOMAX) 0.4 MG capsule Take 0.4 mg by mouth 2 times daily         butalbital-acetaminophen-caffeine (FIORICET, ESGIC) per tablet Take 1 tablet by mouth daily.        nitroGLYCERIN (NITROSTAT) 0.4 MG SL tablet Place 0.4 mg under the tongue every 5 minutes as needed.               I have reviewed and confirm the Past Medical History, Allergies, and Medications sections above and have updated the computerized patient record.      Data:       BP Readings from Last 3 Encounters:   10/23/17 130/82   10/16/17 136/78   17 (!) 152/90         Pulse Readings from Last 3 Encounters:   10/23/17 62 effort. CARDIOVASCULAR:   The heart's rhythm is regular with normal rate. No audible murmurs or gallop sounds. ABDOMEN:  The abdomen is soft without tenderness or masses. UPPER EXTREMITY EVALUATION:  Radial pulses palpable bilaterally. LOWER EXTREMITY EVALUATION:  Negative for peripheral edema. Femoral, popliteal, dorsalis pedis, and posterior tibialis pulses 2+ to palpation bilaterally. No cyanosis or clubbing. MUSCULOSKELETAL:  Normal muscle strength and tone. No atrophy or abnormalities. SKIN:  Warm, dry, intact. No dermatitis or ulcers. NEUROLOGIC:  Intact cranial nerves II through XII and no focal weakness.        Assessment / Plan:   1. Coronary artery disease - this appears optimally managed on current therapy as listed which we will continue. 2.  Multiple strokes, rule out cardioembolic events - the source of his strokes has not been determined. They are therefore cryptogenic strokes. Accordingly, we will place a loop recorder as an outpatient in the near future. That way we will be able to tell if he has any episodes of paroxysmal atrial fibrillation which could be responsible for his neurologic events. 3.  The patient will return here in six months.       _____________________________________________________  Electronically Signed by:   BAYRON Escalante M.D., F.A.C.C.    [facility]  _____________________________________________________  Copy:  Moshe Dodd MD         Revision History

## 2017-10-31 ENCOUNTER — HOSPITAL ENCOUNTER (OUTPATIENT)
Dept: CARDIAC CATH/INVASIVE PROCEDURES | Age: 72
Discharge: HOME | End: 2017-10-31
Attending: INTERNAL MEDICINE | Admitting: INTERNAL MEDICINE
Payer: MEDICARE

## 2017-10-31 VITALS
SYSTOLIC BLOOD PRESSURE: 137 MMHG | OXYGEN SATURATION: 97 % | BODY MASS INDEX: 31.66 KG/M2 | WEIGHT: 260 LBS | TEMPERATURE: 98 F | DIASTOLIC BLOOD PRESSURE: 61 MMHG | HEIGHT: 76 IN | RESPIRATION RATE: 25 BRPM | HEART RATE: 81 BPM

## 2017-10-31 PROBLEM — Z95.818 STATUS POST PLACEMENT OF IMPLANTABLE LOOP RECORDER: Status: ACTIVE | Noted: 2017-10-31

## 2017-10-31 PROCEDURE — 6360000002 HC RX W HCPCS

## 2017-10-31 PROCEDURE — C1764 EVENT RECORDER, CARDIAC: HCPCS

## 2017-10-31 PROCEDURE — 99024 POSTOP FOLLOW-UP VISIT: CPT | Performed by: INTERNAL MEDICINE

## 2017-10-31 PROCEDURE — 33282 PR IMPLANTATION PT-ACTIVATED CARDIAC EVENT RECORDER: CPT | Performed by: INTERNAL MEDICINE

## 2017-10-31 PROCEDURE — 2580000003 HC RX 258: Performed by: INTERNAL MEDICINE

## 2017-10-31 PROCEDURE — 33282 HC IMPANTABLE LOOP RECORDER: CPT | Performed by: INTERNAL MEDICINE

## 2017-10-31 PROCEDURE — 2500000003 HC RX 250 WO HCPCS

## 2017-10-31 RX ORDER — CHLORHEXIDINE GLUCONATE 4 G/100ML
SOLUTION TOPICAL ONCE
Status: DISCONTINUED | OUTPATIENT
Start: 2017-10-31 | End: 2017-10-31 | Stop reason: HOSPADM

## 2017-10-31 RX ORDER — SODIUM CHLORIDE 9 MG/ML
INJECTION, SOLUTION INTRAVENOUS CONTINUOUS
Status: DISCONTINUED | OUTPATIENT
Start: 2017-10-31 | End: 2017-10-31 | Stop reason: HOSPADM

## 2017-10-31 RX ADMIN — SODIUM CHLORIDE: 9 INJECTION, SOLUTION INTRAVENOUS at 13:46

## 2017-10-31 ASSESSMENT — PAIN DESCRIPTION - DESCRIPTORS: DESCRIPTORS: ACHING

## 2017-10-31 ASSESSMENT — PAIN DESCRIPTION - PAIN TYPE: TYPE: CHRONIC PAIN

## 2017-10-31 ASSESSMENT — PAIN DESCRIPTION - LOCATION: LOCATION: BACK

## 2017-10-31 ASSESSMENT — PAIN SCALES - GENERAL: PAINLEVEL_OUTOF10: 1

## 2017-10-31 NOTE — PROCEDURES
Operation: Loop implant    Patient: Estrada Gregory    : [birth year]    MRN: [id number]    Procedure date: 10/31/17    Indication:  cva/episodic atrial fibrillation    Complications: None noted    Estimated blood loss: Less than 5 mL     Procedure: Estrada Gregory was brought to the cardiac cath lab in a fasting state, was prepped and draped in the usual sterile fashion and 2%xylocaine was used for local anesthesia. A incision was made medial to the left breast and the insertion tool was used to insert the device. Hemostasis was obtained with manual compression and the wound was closed with steristrips. There were no apparent complications. Device data:  -[employer], Model # [id number], Serial [id number]    Conclusion:  1 Successful implantation loop recorder. Piper Schultz.  Loli Mcmahan M.D.

## 2017-10-31 NOTE — DISCHARGE SUMMARY
Medication List           Details   LANTUS SOLOSTAR 100 UNIT/ML injection pen Inject 80 Units into the skin nightly  Qty: 5 Pen, Refills: 3      metoprolol tartrate (LOPRESSOR) 25 MG tablet Take 0.5 tablets by mouth 2 times daily  Qty: 60 tablet, Refills: 3      meloxicam (MOBIC) 15 MG tablet Take 15 mg by mouth daily      aspirin 325 MG tablet Take 1 tablet by mouth daily      tamsulosin (FLOMAX) 0.4 MG capsule Take 0.4 mg by mouth 2 times daily       HYDROcodone-acetaminophen (NORCO)  MG per tablet Take 1 tablet by mouth every 6 hours as needed for Pain . Qty: 90 tablet, Refills: 0      ULTICARE SHORT PEN NEEDLES 31G X 8 MM MISC Inject 1 each as directed daily  Qty: 100 each, Refills: 3      gabapentin (NEURONTIN) 300 MG capsule Take 300 mg by mouth 3 times daily      clopidogrel (PLAVIX) 75 MG tablet Take 1 tablet by mouth daily  Qty: 1 tablet, Refills: 0    Associated Diagnoses: CAD (coronary artery disease)      butalbital-acetaminophen-caffeine (FIORICET, ESGIC) per tablet Take 1 tablet by mouth daily. nitroGLYCERIN (NITROSTAT) 0.4 MG SL tablet Place 0.4 mg under the tongue every 5 minutes as needed. Michael Paredes.  Nael Rincon MD

## 2017-10-31 NOTE — H&P
Note deleted by Pedro Eugene RN 10/31/2017  2:04 PM   Pedro Eugene RN Registered Nurse Deleted Cardiology  H&P Date of Service: 10/30/2017  1:44 PM         []Hide copied text            BAYRON Khalil MD Physician Signed    Progress Notes Date of Service: 10/26/2017 10:58 AM    Related encounter: Office Visit from 10/23/2017 in [facility] [address], [address] All Collapse All    []Hide copied text  []Hover for attribution information  [address] [facility], [address] [place] [address], [id number]  The following was transcribed by RONAK Reyes [employer] : , 67 y.o. Male         Office Visit:  10/23/2017             Chief Complaint   Patient presents with    1 Year Follow Up       pt states soa     Coronary Artery Disease      Reason for visit:   1.  Follow up of coronary artery disease.    2.  Follow up for possible arrhythmia associated with stroke.         History of Present Illness:   Mr. Clifton Phan had a stroke 6 to 8 weeks ago while on the way back here from [address] [place] [address]. [address] has been evaluated at [facility] in [facility].  Among other things, a 30-day cardiac monitor has been recommended.  The patient apparently has had multiple events. Theodora Santillan has no chest discomfort or dyspnea and has not felt any palpitations, nor has he had any syncope or near syncope.                   Patient Active Problem List   Diagnosis Code    DM (diabetes mellitus) ([facility] E11.9    HTN (hypertension) I10    CAD (coronary artery disease) I25.10    Fatigue R53.83    MI (myocardial infarction) I21.9    Hyperlipidemia E78.5    Preoperative cardiovascular examination Z01.810    Chronic bilateral low back pain without sciatica M54.5, G89.29    DDD (degenerative disc disease), lumbar M51.36    Spondylosis of lumbar region without myelopathy or radiculopathy M47.816    Acute ischemic stroke [facility]) I63.9    Thoracic  No jugular venous distention.  No carotid bruits.  No thyromegaly.            CHEST:  Clear bilateral breath sounds without wheezes or rhonchi.    RESPIRATORY:  The lungs clear to auscultation bilaterally with normal respiratory effort. CARDIOVASCULAR:   The heart's rhythm is regular with normal rate.  No audible murmurs or gallop sounds. ABDOMEN:  The abdomen is soft without tenderness or masses.        UPPER EXTREMITY EVALUATION:  Radial pulses palpable bilaterally.      LOWER EXTREMITY EVALUATION:  Negative for peripheral edema.  Femoral, popliteal, dorsalis pedis, and posterior tibialis pulses 2+ to palpation bilaterally.  No cyanosis or clubbing. MUSCULOSKELETAL:  Normal muscle strength and tone.  No atrophy or abnormalities. SKIN:  Warm, dry, intact.  No dermatitis or ulcers. NEUROLOGIC:  Intact cranial nerves II through XII and no focal weakness.        Assessment / Plan:   1.  Coronary artery disease - this appears optimally managed on current therapy as listed which we will continue.    2.  Multiple strokes, rule out cardioembolic events - the source of his strokes has not been determined. Serenity Roque are therefore cryptogenic strokes.  Accordingly, we will place a loop recorder as an outpatient in the near future.  That way we will be able to tell if he has any episodes of paroxysmal atrial fibrillation which could be responsible for his neurologic events. 3.  The patient will return here in six months.       _____________________________________________________  Electronically Signed by: Snehal Austin M.D., F.A.C.C. [facility]   _____________________________________________________  Copy: Brooke Cortes MD         Revision History                                                      Deleted by: Júnior Bean RN at 10/31/2017  2:04 PM   Chart Correction History                  Routing History                  Pt seen and examined. Will proceed with loop recorder

## 2017-11-07 ENCOUNTER — HOSPITAL ENCOUNTER (OUTPATIENT)
Dept: GENERAL RADIOLOGY | Age: 72
Setting detail: SPECIMEN
Discharge: HOME | End: 2017-11-07
Payer: MEDICARE

## 2017-11-07 VITALS
HEART RATE: 76 BPM | SYSTOLIC BLOOD PRESSURE: 121 MMHG | WEIGHT: 267 LBS | RESPIRATION RATE: 17 BRPM | TEMPERATURE: 97.6 F | DIASTOLIC BLOOD PRESSURE: 58 MMHG | HEIGHT: 76 IN | BODY MASS INDEX: 32.51 KG/M2 | OXYGEN SATURATION: 97 %

## 2017-11-07 DIAGNOSIS — E11.8 TYPE 2 DIABETES MELLITUS WITH COMPLICATION, WITH LONG-TERM CURRENT USE OF INSULIN (HCC): ICD-10-CM

## 2017-11-07 DIAGNOSIS — M54.50 CHRONIC BILATERAL LOW BACK PAIN WITHOUT SCIATICA: ICD-10-CM

## 2017-11-07 DIAGNOSIS — E78.2 MIXED HYPERLIPIDEMIA: ICD-10-CM

## 2017-11-07 DIAGNOSIS — Z79.4 TYPE 2 DIABETES MELLITUS WITH COMPLICATION, WITH LONG-TERM CURRENT USE OF INSULIN (HCC): ICD-10-CM

## 2017-11-07 DIAGNOSIS — I63.411 CEREBROVASCULAR ACCIDENT (CVA) DUE TO EMBOLISM OF RIGHT MIDDLE CEREBRAL ARTERY (HCC): ICD-10-CM

## 2017-11-07 DIAGNOSIS — G62.9 PERIPHERAL POLYNEUROPATHY: ICD-10-CM

## 2017-11-07 DIAGNOSIS — G89.29 CHRONIC BILATERAL LOW BACK PAIN WITHOUT SCIATICA: ICD-10-CM

## 2017-11-07 DIAGNOSIS — R53.83 FATIGUE, UNSPECIFIED TYPE: ICD-10-CM

## 2017-11-07 LAB
ALBUMIN SERPL-MCNC: 3.8 G/DL (ref 3.5–5.2)
ALP BLD-CCNC: 96 U/L (ref 40–130)
ALT SERPL-CCNC: 21 U/L (ref 5–41)
ANION GAP SERPL CALCULATED.3IONS-SCNC: 18 MMOL/L (ref 7–19)
APPEARANCE CSF: CLEAR
APTT: 30.4 SEC (ref 26–36.2)
AST SERPL-CCNC: 23 U/L (ref 5–40)
BILIRUB SERPL-MCNC: 0.4 MG/DL (ref 0.2–1.2)
BUN BLDV-MCNC: 20 MG/DL (ref 8–23)
CALCIUM SERPL-MCNC: 9.3 MG/DL (ref 8.8–10.2)
CHLORIDE BLD-SCNC: 104 MMOL/L (ref 98–111)
CHOLESTEROL, TOTAL: 227 MG/DL (ref 160–199)
CLOT EVALUATION CSF: NORMAL
CO2: 22 MMOL/L (ref 22–29)
COLOR CSF: COLORLESS
CREAT SERPL-MCNC: 1.2 MG/DL (ref 0.5–1.2)
GFR NON-AFRICAN AMERICAN: 59
GLUCOSE BLD-MCNC: 103 MG/DL (ref 74–109)
GLUCOSE, CSF: 60 MG/DL (ref 40–70)
HBA1C MFR BLD: 7.2 %
HCT VFR BLD CALC: 43.5 % (ref 42–52)
HDLC SERPL-MCNC: 30 MG/DL (ref 55–121)
HEMOGLOBIN: 14.3 G/DL (ref 14–18)
INR BLD: 0.98 (ref 0.88–1.18)
LDL CHOLESTEROL CALCULATED: 132 MG/DL
MCH RBC QN AUTO: 27.8 PG (ref 27–31)
MCHC RBC AUTO-ENTMCNC: 32.9 G/DL (ref 33–37)
MCV RBC AUTO: 84.6 FL (ref 80–94)
NO DIFFERENTIAL CSF: NORMAL
PDW BLD-RTO: 14.1 % (ref 11.5–14.5)
PLATELET # BLD: 159 K/UL (ref 130–400)
PMV BLD AUTO: 11.2 FL (ref 9.4–12.4)
POTASSIUM SERPL-SCNC: 4.5 MMOL/L (ref 3.5–5)
PROTEIN CSF: 62 MG/DL (ref 15–45)
PROTHROMBIN TIME: 12.9 SEC (ref 12–14.6)
RBC # BLD: 5.14 M/UL (ref 4.7–6.1)
RBC CSF: 3 /CUMM (ref 0–5)
SEDIMENTATION RATE, ERYTHROCYTE: 7 MM/HR (ref 0–15)
SODIUM BLD-SCNC: 144 MMOL/L (ref 136–145)
T4 FREE: 1.2 NG/DL (ref 0.9–1.7)
TOTAL PROTEIN: 7.1 G/DL (ref 6.6–8.7)
TRIGL SERPL-MCNC: 326 MG/DL (ref 0–149)
TSH SERPL DL<=0.05 MIU/L-ACNC: 1.38 UIU/ML (ref 0.27–4.2)
TUBE NUMBER CSF: NORMAL
VITAMIN B-12: 376 PG/ML (ref 211–946)
WBC # BLD: 7 K/UL (ref 4.8–10.8)
WBC CSF: 1 /CUMM (ref 0–8)

## 2017-11-07 PROCEDURE — 82784 ASSAY IGA/IGD/IGG/IGM EACH: CPT

## 2017-11-07 PROCEDURE — 62270 DX LMBR SPI PNXR: CPT

## 2017-11-07 PROCEDURE — 80053 COMPREHEN METABOLIC PANEL: CPT

## 2017-11-07 PROCEDURE — 89050 BODY FLUID CELL COUNT: CPT

## 2017-11-07 PROCEDURE — 85027 COMPLETE CBC AUTOMATED: CPT

## 2017-11-07 PROCEDURE — 87529 HSV DNA AMP PROBE: CPT

## 2017-11-07 PROCEDURE — 83916 OLIGOCLONAL BANDS: CPT

## 2017-11-07 PROCEDURE — 86618 LYME DISEASE ANTIBODY: CPT

## 2017-11-07 PROCEDURE — 80061 LIPID PANEL: CPT

## 2017-11-07 PROCEDURE — 82945 GLUCOSE OTHER FLUID: CPT

## 2017-11-07 PROCEDURE — 82042 OTHER SOURCE ALBUMIN QUAN EA: CPT

## 2017-11-07 PROCEDURE — 84443 ASSAY THYROID STIM HORMONE: CPT

## 2017-11-07 PROCEDURE — 83873 ASSAY OF CSF PROTEIN: CPT

## 2017-11-07 PROCEDURE — 86789 WEST NILE VIRUS ANTIBODY: CPT

## 2017-11-07 PROCEDURE — 86592 SYPHILIS TEST NON-TREP QUAL: CPT

## 2017-11-07 PROCEDURE — 85652 RBC SED RATE AUTOMATED: CPT

## 2017-11-07 PROCEDURE — 87205 SMEAR GRAM STAIN: CPT

## 2017-11-07 PROCEDURE — 87075 CULTR BACTERIA EXCEPT BLOOD: CPT

## 2017-11-07 PROCEDURE — 82607 VITAMIN B-12: CPT

## 2017-11-07 PROCEDURE — 82040 ASSAY OF SERUM ALBUMIN: CPT

## 2017-11-07 PROCEDURE — 85730 THROMBOPLASTIN TIME PARTIAL: CPT

## 2017-11-07 PROCEDURE — 87070 CULTURE OTHR SPECIMN AEROBIC: CPT

## 2017-11-07 PROCEDURE — 88112 CYTOPATH CELL ENHANCE TECH: CPT

## 2017-11-07 PROCEDURE — 84439 ASSAY OF FREE THYROXINE: CPT

## 2017-11-07 PROCEDURE — 36415 COLL VENOUS BLD VENIPUNCTURE: CPT

## 2017-11-07 PROCEDURE — 86788 WEST NILE VIRUS AB IGM: CPT

## 2017-11-07 PROCEDURE — 85610 PROTHROMBIN TIME: CPT

## 2017-11-07 PROCEDURE — 84157 ASSAY OF PROTEIN OTHER: CPT

## 2017-11-07 PROCEDURE — 83036 HEMOGLOBIN GLYCOSYLATED A1C: CPT

## 2017-11-07 ASSESSMENT — PAIN - FUNCTIONAL ASSESSMENT
PAIN_FUNCTIONAL_ASSESSMENT: 0-10
PAIN_FUNCTIONAL_ASSESSMENT: 0-10

## 2017-11-09 LAB — B BURGDORFERI AB,CSF: 0.28 LIV

## 2017-11-10 LAB
ALBUMIN CSF: 40 MG/DL (ref 0–35)
ALBUMIN INDEX: 10.1 RATIO (ref 0–9)
ALBUMIN, SERUM BY NEPHELOMETRY: 3980 MG/DL (ref 3500–5200)
HERPES SIMPLEX VIRUS BY PCR: NOT DETECTED
HSV SOURCE: NORMAL
IGG CSF: 4.6 MG/DL (ref 0–6)
IGG INDEX: 0.45 RATIO (ref 0.28–0.66)
IGG SYNTHESIS RATE CSF: <0 MG/D
IGG/ALBUMIN CSF: 0.12 RATIO (ref 0.09–0.25)
IGG: 1010 MG/DL (ref 768–1632)
MULTIPLE SCLEROSIS PANEL: ABNORMAL
OLIGOCLONAL BANDS CSF: NEGATIVE
OLIGOCLONAL BANDS NUMBER: 0 BANDS (ref 0–1)
RPR: NORMAL
WEST NILE IGG, CSF: 3.46 IV
WEST NILE IGM ANTIBODY CSF: 0.03 IV

## 2017-11-11 LAB — MYELIN BASIC PROTEIN, CSF: 3.87 NG/ML (ref 0–5.5)

## 2017-11-12 LAB — VDRL CSF SCREEN: NON REACTIVE

## 2017-11-14 LAB
ANAEROBIC CULTURE: NORMAL
CSF CULTURE: NORMAL
GRAM STAIN RESULT: NORMAL

## 2017-11-16 ENCOUNTER — OFFICE VISIT (OUTPATIENT)
Dept: FAMILY MEDICINE CLINIC | Age: 72
End: 2017-11-16
Payer: MEDICARE

## 2017-11-16 VITALS
OXYGEN SATURATION: 98 % | WEIGHT: 266 LBS | BODY MASS INDEX: 32.38 KG/M2 | HEART RATE: 58 BPM | DIASTOLIC BLOOD PRESSURE: 82 MMHG | SYSTOLIC BLOOD PRESSURE: 126 MMHG | RESPIRATION RATE: 18 BRPM | TEMPERATURE: 98.9 F

## 2017-11-16 DIAGNOSIS — E78.2 MIXED HYPERLIPIDEMIA: ICD-10-CM

## 2017-11-16 DIAGNOSIS — M54.50 CHRONIC BILATERAL LOW BACK PAIN WITHOUT SCIATICA: ICD-10-CM

## 2017-11-16 DIAGNOSIS — E11.8 TYPE 2 DIABETES MELLITUS WITH COMPLICATION, WITH LONG-TERM CURRENT USE OF INSULIN (HCC): Primary | ICD-10-CM

## 2017-11-16 DIAGNOSIS — J01.00 ACUTE NON-RECURRENT MAXILLARY SINUSITIS: ICD-10-CM

## 2017-11-16 DIAGNOSIS — A92.30: ICD-10-CM

## 2017-11-16 DIAGNOSIS — I10 ESSENTIAL HYPERTENSION: ICD-10-CM

## 2017-11-16 DIAGNOSIS — I25.10 CORONARY ARTERY DISEASE INVOLVING NATIVE CORONARY ARTERY OF NATIVE HEART WITHOUT ANGINA PECTORIS: ICD-10-CM

## 2017-11-16 DIAGNOSIS — G89.29 CHRONIC BILATERAL LOW BACK PAIN WITHOUT SCIATICA: ICD-10-CM

## 2017-11-16 DIAGNOSIS — G44.229 CHRONIC TENSION-TYPE HEADACHE, NOT INTRACTABLE: ICD-10-CM

## 2017-11-16 DIAGNOSIS — Z79.4 TYPE 2 DIABETES MELLITUS WITH COMPLICATION, WITH LONG-TERM CURRENT USE OF INSULIN (HCC): Primary | ICD-10-CM

## 2017-11-16 DIAGNOSIS — N40.0 BENIGN PROSTATIC HYPERPLASIA WITHOUT LOWER URINARY TRACT SYMPTOMS: ICD-10-CM

## 2017-11-16 PROCEDURE — G8427 DOCREV CUR MEDS BY ELIG CLIN: HCPCS | Performed by: FAMILY MEDICINE

## 2017-11-16 PROCEDURE — 99214 OFFICE O/P EST MOD 30 MIN: CPT | Performed by: FAMILY MEDICINE

## 2017-11-16 PROCEDURE — 4040F PNEUMOC VAC/ADMIN/RCVD: CPT | Performed by: FAMILY MEDICINE

## 2017-11-16 PROCEDURE — G8417 CALC BMI ABV UP PARAM F/U: HCPCS | Performed by: FAMILY MEDICINE

## 2017-11-16 PROCEDURE — G8484 FLU IMMUNIZE NO ADMIN: HCPCS | Performed by: FAMILY MEDICINE

## 2017-11-16 PROCEDURE — 96372 THER/PROPH/DIAG INJ SC/IM: CPT | Performed by: FAMILY MEDICINE

## 2017-11-16 PROCEDURE — 3017F COLORECTAL CA SCREEN DOC REV: CPT | Performed by: FAMILY MEDICINE

## 2017-11-16 PROCEDURE — 1123F ACP DISCUSS/DSCN MKR DOCD: CPT | Performed by: FAMILY MEDICINE

## 2017-11-16 PROCEDURE — 1036F TOBACCO NON-USER: CPT | Performed by: FAMILY MEDICINE

## 2017-11-16 PROCEDURE — 3045F PR MOST RECENT HEMOGLOBIN A1C LEVEL 7.0-9.0%: CPT | Performed by: FAMILY MEDICINE

## 2017-11-16 PROCEDURE — G8598 ASA/ANTIPLAT THER USED: HCPCS | Performed by: FAMILY MEDICINE

## 2017-11-16 RX ORDER — AZITHROMYCIN 250 MG/1
TABLET, FILM COATED ORAL
Qty: 1 PACKET | Refills: 0 | Status: SHIPPED | OUTPATIENT
Start: 2017-11-16 | End: 2017-11-26

## 2017-11-16 RX ORDER — BUTALBITAL, ACETAMINOPHEN AND CAFFEINE 50; 325; 40 MG/1; MG/1; MG/1
1 TABLET ORAL DAILY
Qty: 30 TABLET | Refills: 2 | Status: SHIPPED | OUTPATIENT
Start: 2017-11-16 | End: 2018-08-03 | Stop reason: SDUPTHER

## 2017-11-16 RX ORDER — KETOROLAC TROMETHAMINE 30 MG/ML
30 INJECTION, SOLUTION INTRAMUSCULAR; INTRAVENOUS ONCE
Status: COMPLETED | OUTPATIENT
Start: 2017-11-16 | End: 2017-11-16

## 2017-11-16 RX ORDER — HYDROCODONE BITARTRATE AND ACETAMINOPHEN 10; 325 MG/1; MG/1
1 TABLET ORAL EVERY 6 HOURS PRN
Qty: 90 TABLET | Refills: 0 | Status: SHIPPED | OUTPATIENT
Start: 2017-11-16 | End: 2018-01-02 | Stop reason: SDUPTHER

## 2017-11-16 RX ADMIN — KETOROLAC TROMETHAMINE 30 MG: 30 INJECTION, SOLUTION INTRAMUSCULAR; INTRAVENOUS at 16:55

## 2017-11-16 NOTE — PROGRESS NOTES
purulent nasal discharge. Benign Prostatic Hypertrophy  Tolerating medication without adverse effects. Symptoms of hesitancy, nocturia, and dribbling are adequately controlled. No hematuria or dysuria      HPI    Past Medical History:   Diagnosis Date    Acute ischemic stroke ([address] 2/20/2017    Atherosclerotic heart disease     s/p MI, stenting x 3 jan 2011([place])    CAD (coronary artery disease)     Cerebral artery occlusion with cerebral infarction [facility])     Cerebrovascular accident (CVA) due to embolism of right middle cerebral artery ([address] 8/14/2017    Chronic bilateral low back pain without sciatica 11/10/2016    DDD (degenerative disc disease), lumbar 11/15/2016    Diabetes mellitus ([address]     type II    Diabetes mellitus, type 2 ([address]     DM (diabetes mellitus) ([address] 7/12/2011    Glaucoma     Hyperlipidemia     Cholesterol management per pcp.  Macular degeneration     MI (myocardial infarction)     MI (myocardial infarction)     Osteoarthritis     Spondylosis of lumbar region without myelopathy or radiculopathy 11/15/2016    Status post placement of implantable loop recorder 10/31/2017    TIA (transient ischemic attack)       Past Surgical History:   Procedure Laterality Date    CARDIAC CATHETERIZATION  12/12/12    with angioplasty, stent    CARDIAC CATHETERIZATION  3/29/15  MDL    stent to distal RCA.  EF 60%    CORONARY ANGIOPLASTY WITH STENT PLACEMENT  jan 11 [place]    stent x 3    CORONARY ANGIOPLASTY WITH STENT PLACEMENT      01/01/11    DIAGNOSTIC CARDIAC CATH LAB PROCEDURE  [id number]    primary stent placement to the first circumflex marginal branch, balloon angioplasty to the third left anterior descending diagnonal branch, intracoronary nitroglycerin adminstration    LUMBAR FUSION Left 11/15/2016    LUMBAR INTERBODY FUSION LATERAL L3-4 L4-5 WITH LATERAL PLATE  performed by Cortney Brasher MD at [facility] OR       Family History   Problem Relation Age of Onset    Coronary Art Dis Mother     Coronary Art Dis Sister    Summer Reddish Cancer Sister      uterine    Kidney Disease Sister     Coronary Art Dis Brother       in his 46s    Cancer Father      colon       Social History   Substance Use Topics    Smoking status: Never Smoker    Smokeless tobacco: Never Used    Alcohol use Yes      Comment: 2-3 x weekly wine or whisky and water      Current Outpatient Prescriptions   Medication Sig Dispense Refill    HYDROcodone-acetaminophen (NORCO)  MG per tablet Take 1 tablet by mouth every 6 hours as needed for Pain . 90 tablet 0    butalbital-acetaminophen-caffeine (FIORICET, ESGIC) -40 MG per tablet Take 1 tablet by mouth daily 30 tablet 2    azithromycin (ZITHROMAX) 250 MG tablet Take 2 tabs (500 mg) on Day 1, and take 1 tab (250 mg) on days 2 through 5. 1 packet 0    LANTUS SOLOSTAR 100 UNIT/ML injection pen Inject 80 Units into the skin nightly 5 Pen 3    ULTICARE SHORT PEN NEEDLES 31G X 8 MM MISC Inject 1 each as directed daily 100 each 3    metoprolol tartrate (LOPRESSOR) 25 MG tablet Take 0.5 tablets by mouth 2 times daily 60 tablet 3    meloxicam (MOBIC) 15 MG tablet Take 15 mg by mouth daily      aspirin 325 MG tablet Take 1 tablet by mouth daily      clopidogrel (PLAVIX) 75 MG tablet Take 1 tablet by mouth daily 1 tablet 0    tamsulosin (FLOMAX) 0.4 MG capsule Take 0.4 mg by mouth 2 times daily       nitroGLYCERIN (NITROSTAT) 0.4 MG SL tablet Place 0.4 mg under the tongue every 5 minutes as needed.  pregabalin (LYRICA) 50 MG capsule Take 1 capsule by mouth 2 times daily . 60 capsule 5     No current facility-administered medications for this visit.       Allergies   Allergen Reactions    Statins Other (See Comments)     Intolerable leg cramps       Health Maintenance   Topic Date Due    Diabetic foot exam  1955    Diabetic retinal exam  1955    Diabetic microalbuminuria test  1962    DTaP/Tdap/Td vaccine (1 - Tdap) 1963    Colon cancer screen colonoscopy  04/14/1994    Diabetic hemoglobin A1C test  11/07/2018    Lipid screen  11/07/2018    Zostavax vaccine  Completed    Flu vaccine  Completed    Pneumococcal low/med risk  Completed       Subjective:      Review of Systems   Constitutional: Negative for chills and fever. HENT: Negative for congestion. Respiratory: Negative for cough, chest tightness and shortness of breath. Cardiovascular: Negative for chest pain, palpitations and leg swelling. Gastrointestinal: Negative for abdominal pain, anal bleeding, constipation, diarrhea and nausea. Genitourinary: Negative for difficulty urinating. Neurological: Positive for light-headedness and headaches. Psychiatric/Behavioral: Negative. See HPI for visit specific review of symptoms. All others negative      Objective:   /82   Pulse 58   Temp 98.9 °F (37.2 °C) (Temporal)   Resp 18   Wt 266 lb (120.7 kg)   SpO2 98%   BMI 32.38 kg/m²   Physical Exam   Constitutional: He appears well-developed. He does not appear ill. Eyes: Pupils are equal, round, and reactive to light. Neck: Normal range of motion. Neck supple. Cardiovascular: Normal rate and regular rhythm. Exam reveals no friction rub. No murmur heard. Pulmonary/Chest: Effort normal and breath sounds normal. No respiratory distress. He has no wheezes. He has no rales. Abdominal: Soft. Bowel sounds are normal. He exhibits no distension. There is no tenderness. There is no rebound and no guarding. Musculoskeletal: He exhibits no edema. Neurological: He is alert. Psychiatric: He has a normal mood and affect.  His behavior is normal.         Recent Results (from the past 672 hour(s))   Hemoglobin A1C    Collection Time: 11/07/17 10:25 AM   Result Value Ref Range    Hemoglobin A1C 7.2 (H) See comment %   T4, Free    Collection Time: 11/07/17 10:25 AM   Result Value Ref Range    T4 Free 1.2 0.9 - 1.7 ng/dL   Sedimentation Rate Collection Time: 11/07/17 10:25 AM   Result Value Ref Range    Sed Rate 7 0 - 15 mm/Hr   Lipid Panel    Collection Time: 11/07/17 10:25 AM   Result Value Ref Range    Cholesterol, Total 227 (H) 160 - 199 mg/dL    Triglycerides 326 (H) 0 - 149 mg/dL    HDL 30 (L) 55 - 121 mg/dL    LDL Calculated 132 <100 mg/dL   Comprehensive Metabolic Panel    Collection Time: 11/07/17 10:25 AM   Result Value Ref Range    Sodium 144 136 - 145 mmol/L    Potassium 4.5 3.5 - 5.0 mmol/L    Chloride 104 98 - 111 mmol/L    CO2 22 22 - 29 mmol/L    Anion Gap 18 7 - 19 mmol/L    Glucose 103 74 - 109 mg/dL    BUN 20 8 - 23 mg/dL    CREATININE 1.2 0.5 - 1.2 mg/dL    GFR Non- 59 (A) >60    Calcium 9.3 8.8 - 10.2 mg/dL    Total Protein 7.1 6.6 - 8.7 g/dL    Alb 3.8 3.5 - 5.2 g/dL    Total Bilirubin 0.4 0.2 - 1.2 mg/dL    Alkaline Phosphatase 96 40 - 130 U/L    ALT 21 5 - 41 U/L    AST 23 5 - 40 U/L   RPR Reflex to Titer and TPPA    Collection Time: 11/07/17 10:25 AM   Result Value Ref Range    RPR Non-reactive Non-reactive   TSH without Reflex    Collection Time: 11/07/17 10:25 AM   Result Value Ref Range    TSH 1.380 0.270 - 4.200 uIU/mL   Vitamin B12    Collection Time: 11/07/17 10:25 AM   Result Value Ref Range    Vitamin B-12 376 211 - 946 pg/mL   CBC    Collection Time: 11/07/17 10:25 AM   Result Value Ref Range    WBC 7.0 4.8 - 10.8 K/uL    RBC 5.14 4.70 - 6.10 M/uL    Hemoglobin 14.3 14.0 - 18.0 g/dL    Hematocrit 43.5 42.0 - 52.0 %    MCV 84.6 80.0 - 94.0 fL    MCH 27.8 27.0 - 31.0 pg    MCHC 32.9 (L) 33.0 - 37.0 g/dL    RDW 14.1 11.5 - 14.5 %    Platelets 122 007 - 401 K/uL    MPV 11.2 9.4 - 12.4 fL   Protime-INR    Collection Time: 11/07/17 10:25 AM   Result Value Ref Range    Protime 12.9 12.0 - 14.6 sec    INR 0.98 0.88 - 1.18   APTT    Collection Time: 11/07/17 10:25 AM   Result Value Ref Range    aPTT 30.4 26.0 - 36.2 sec   Protein, CSF    Collection Time: 11/07/17 11:45 AM   Result Value Ref Range    Protein, CSF 62 (H) 15 - 45 mg/dL   Glucose, CSF    Collection Time: 11/07/17 11:45 AM   Result Value Ref Range    Glucose, CSF 60 40 - 70 mg/dL   CSF Cell Count with Differential    Collection Time: 11/07/17 11:45 AM   Result Value Ref Range    Color, CSF Colorless Colorless    Appearance, CSF Clear Clear    Tube Number + CELL CT + DIFF-CSF Tube 2     Clot Evaluation CSF see below     WBC, CSF 1 0 - 8 /cumm    RBC, CSF 3 0 - 5 /cumm    No differential CSF see below    Spinal fluid culture    Collection Time: 11/07/17 11:45 AM   Result Value Ref Range    CSF Culture No growth after 7 days of incubation.      Gram Stain Result No WBCs or organisms seen     Anaerobic Culture No anaerobes isolated  7 days    Oligoclonal banding    Collection Time: 11/07/17 11:45 AM   Result Value Ref Range    Albumin, CSF 40 (H) 0 - 35 mg/dL    IgG, CSF 4.6 0.0 - 6.0 mg/dL    ALBUMIN, SERUM BY NEPHELOMETRY 3980 3500 - 5200 mg/dL    Igg 1010 768 - 1632 mg/dL    IgG Index 0.45 0.28 - 0.66 ratio    IgG Synthesis Rate, CSF <0.0 <=8.0 mg/d    IgG/Albumin CSF 0.12 0.09 - 0.25 ratio    Albumin Index 10.1 (H) 0.0 - 9.0 ratio    Oligoclonal Bands, CSF Negative Negative    Multiple Sclerosis Panel See Note     Oligoclonal Bands Number 0 0 - 1 Bands   Myelin Basic Protein, CSF    Collection Time: 11/07/17 11:45 AM   Result Value Ref Range    Myelin Basic Protein, CSF 3.87 0.00 - 5.50 ng/mL   Lyme Disease AB, CSF    Collection Time: 11/07/17 11:45 AM   Result Value Ref Range    B burgdorferi Ab,CSF 0.28 <=0.99 MACKENZIE   West Nile Virus, CSF    Collection Time: 11/07/17 11:45 AM   Result Value Ref Range    WEST NILE AB IGG CSF 3.46 (H) <=1.29 IV    WEST NILE AB IGM CSF 0.03 <=0.89 IV   VDRL CSF    Collection Time: 11/07/17 11:45 AM   Result Value Ref Range    VDRL, CSF Screen Non Reactive Non Reactive   Herpes simplex virus PCR    Collection Time: 11/07/17 11:45 AM   Result Value Ref Range    HERPES SIMPLEX VIRUS BY PCR Not Detected     HSV Source CSF Assessment & Plan: The following diagnoses and conditions are stable with no further orders unless indicated:  1. Coronary artery disease involving native coronary artery of native heart without angina pectoris  No chest pain or palpitations. Discussed cardiovascular risk reduction. 2. Chronic bilateral low back pain without sciatica  Discussed risk and benefits of taking opioids. Risks include addiction and tolerance. If develops impairment or over sedation with medication, discontinue and contact me. Do not take medication with alcohol. Advised to take sparingly. Advised to keep medication hidden and locked up as risk of theft is high with these medications as well. Given a Toradol injection today for the acute pain most likely triggered by his current sinus infection  - HYDROcodone-acetaminophen (NORCO)  MG per tablet; Take 1 tablet by mouth every 6 hours as needed for Pain . Dispense: 90 tablet; Refill: 0  - ketorolac (TORADOL) injection 30 mg; Infuse 1 mL intravenously once    3. Mixed hyperlipidemia  He is unable to tolerate statin therapy. We've tried Lipitor, Crestor, Zocor, Pravachol, Mevacor and all of them cause severe myalgia and generalized muscle weakness and wasting    4. Essential hypertension  Controlled    5. Type 2 diabetes mellitus with complication, with long-term current use of insulin (HCC)  A1c controlled encouraged ADA diet  - Comprehensive Metabolic Panel; Future  - Lipid Panel; Future  - Microalbumin / Creatinine Urine Ratio; Future  - Hemoglobin A1C; Future    6. Chronic tension-type headache, not intractable  Overall his headaches have been better controlled he is requesting a refill of Fioricet. In light of his sinus infection today hopefully the Toradol shot will improve his discomfort and the antibiotic will help resolve his sinus symptoms.     Recommend he continue with nasal steroids  - butalbital-acetaminophen-caffeine (FIORICET, ESGIC)

## 2017-11-16 NOTE — PATIENT INSTRUCTIONS
Check to see if you have gabapentin (neurontin) 300mg. See if you are taking it three times a day. If not, start.

## 2017-11-17 ENCOUNTER — TELEPHONE (OUTPATIENT)
Dept: FAMILY MEDICINE CLINIC | Age: 72
End: 2017-11-17

## 2017-11-17 RX ORDER — PREGABALIN 50 MG/1
50 CAPSULE ORAL 2 TIMES DAILY
Qty: 60 CAPSULE | Refills: 5 | Status: SHIPPED | OUTPATIENT
Start: 2017-11-17 | End: 2018-08-03 | Stop reason: CLARIF

## 2017-11-22 PROBLEM — A92.30: Status: ACTIVE | Noted: 2017-11-22

## 2017-11-22 ASSESSMENT — ENCOUNTER SYMPTOMS
ABDOMINAL PAIN: 0
ANAL BLEEDING: 0
CHEST TIGHTNESS: 0
SHORTNESS OF BREATH: 0
DIARRHEA: 0
NAUSEA: 0
CONSTIPATION: 0
COUGH: 0

## 2017-11-30 DIAGNOSIS — R00.2 PALPITATIONS: ICD-10-CM

## 2017-11-30 DIAGNOSIS — I63.411 CEREBROVASCULAR ACCIDENT (CVA) DUE TO EMBOLISM OF RIGHT MIDDLE CEREBRAL ARTERY (HCC): ICD-10-CM

## 2017-11-30 DIAGNOSIS — Z95.818 STATUS POST PLACEMENT OF IMPLANTABLE LOOP RECORDER: Primary | ICD-10-CM

## 2017-11-30 PROCEDURE — 93298 REM INTERROG DEV EVAL SCRMS: CPT | Performed by: NURSE PRACTITIONER

## 2017-11-30 PROCEDURE — 93299 PR REM INTERROG ICPMS/SCRMS <30 D TECH REVIEW: CPT | Performed by: NURSE PRACTITIONER

## 2017-12-13 ENCOUNTER — HOSPITAL ENCOUNTER (OUTPATIENT)
Dept: NEUROLOGY | Age: 72
Discharge: HOME | End: 2017-12-13
Payer: MEDICARE

## 2017-12-13 PROCEDURE — 95909 NRV CNDJ TST 5-6 STUDIES: CPT | Performed by: PSYCHIATRY & NEUROLOGY

## 2017-12-13 PROCEDURE — 95886 MUSC TEST DONE W/N TEST COMP: CPT | Performed by: PSYCHIATRY & NEUROLOGY

## 2017-12-13 PROCEDURE — 95909 NRV CNDJ TST 5-6 STUDIES: CPT

## 2017-12-13 PROCEDURE — 95886 MUSC TEST DONE W/N TEST COMP: CPT

## 2017-12-27 ENCOUNTER — TELEPHONE (OUTPATIENT)
Dept: FAMILY MEDICINE CLINIC | Age: 72
End: 2017-12-27

## 2017-12-29 NOTE — TELEPHONE ENCOUNTER
I agree. He needs an appmt next week to be seen and rechecked here in office with Dr Jaden Hagan or one of the NPs.

## 2017-12-29 NOTE — TELEPHONE ENCOUNTER
Noted. As long as symptoms fully resolved, there really is not anything we can do that this point.   If he does not have an upcoming follow up, it might not hurt to have him come in next week for follow up and make sure we have all his medications in order to prevent further strokes, etc.

## 2018-01-02 ENCOUNTER — OFFICE VISIT (OUTPATIENT)
Dept: FAMILY MEDICINE CLINIC | Age: 73
End: 2018-01-02
Payer: MEDICARE

## 2018-01-02 VITALS
RESPIRATION RATE: 16 BRPM | HEART RATE: 100 BPM | BODY MASS INDEX: 32.74 KG/M2 | SYSTOLIC BLOOD PRESSURE: 142 MMHG | TEMPERATURE: 97 F | OXYGEN SATURATION: 95 % | WEIGHT: 269 LBS | DIASTOLIC BLOOD PRESSURE: 82 MMHG

## 2018-01-02 DIAGNOSIS — I25.10 CORONARY ARTERY DISEASE INVOLVING NATIVE CORONARY ARTERY OF NATIVE HEART WITHOUT ANGINA PECTORIS: Primary | ICD-10-CM

## 2018-01-02 DIAGNOSIS — F41.8 DEPRESSION WITH ANXIETY: ICD-10-CM

## 2018-01-02 DIAGNOSIS — G89.29 CHRONIC BILATERAL LOW BACK PAIN WITHOUT SCIATICA: ICD-10-CM

## 2018-01-02 DIAGNOSIS — M54.50 CHRONIC BILATERAL LOW BACK PAIN WITHOUT SCIATICA: ICD-10-CM

## 2018-01-02 DIAGNOSIS — E78.2 MIXED HYPERLIPIDEMIA: ICD-10-CM

## 2018-01-02 DIAGNOSIS — N40.0 BENIGN PROSTATIC HYPERPLASIA WITHOUT LOWER URINARY TRACT SYMPTOMS: ICD-10-CM

## 2018-01-02 DIAGNOSIS — E11.8 TYPE 2 DIABETES MELLITUS WITH COMPLICATION, WITH LONG-TERM CURRENT USE OF INSULIN (HCC): ICD-10-CM

## 2018-01-02 DIAGNOSIS — I10 ESSENTIAL HYPERTENSION: ICD-10-CM

## 2018-01-02 DIAGNOSIS — Z79.4 TYPE 2 DIABETES MELLITUS WITH COMPLICATION, WITH LONG-TERM CURRENT USE OF INSULIN (HCC): ICD-10-CM

## 2018-01-02 DIAGNOSIS — G44.229 CHRONIC TENSION-TYPE HEADACHE, NOT INTRACTABLE: ICD-10-CM

## 2018-01-02 DIAGNOSIS — J01.00 ACUTE NON-RECURRENT MAXILLARY SINUSITIS: ICD-10-CM

## 2018-01-02 PROCEDURE — G8598 ASA/ANTIPLAT THER USED: HCPCS | Performed by: FAMILY MEDICINE

## 2018-01-02 PROCEDURE — 3017F COLORECTAL CA SCREEN DOC REV: CPT | Performed by: FAMILY MEDICINE

## 2018-01-02 PROCEDURE — 93299 PR REM INTERROG ICPMS/SCRMS <30 D TECH REVIEW: CPT | Performed by: CLINICAL NURSE SPECIALIST

## 2018-01-02 PROCEDURE — 1123F ACP DISCUSS/DSCN MKR DOCD: CPT | Performed by: FAMILY MEDICINE

## 2018-01-02 PROCEDURE — 1036F TOBACCO NON-USER: CPT | Performed by: FAMILY MEDICINE

## 2018-01-02 PROCEDURE — G8484 FLU IMMUNIZE NO ADMIN: HCPCS | Performed by: FAMILY MEDICINE

## 2018-01-02 PROCEDURE — G8427 DOCREV CUR MEDS BY ELIG CLIN: HCPCS | Performed by: FAMILY MEDICINE

## 2018-01-02 PROCEDURE — 3046F HEMOGLOBIN A1C LEVEL >9.0%: CPT | Performed by: FAMILY MEDICINE

## 2018-01-02 PROCEDURE — 99215 OFFICE O/P EST HI 40 MIN: CPT | Performed by: FAMILY MEDICINE

## 2018-01-02 PROCEDURE — 4040F PNEUMOC VAC/ADMIN/RCVD: CPT | Performed by: FAMILY MEDICINE

## 2018-01-02 PROCEDURE — 93298 REM INTERROG DEV EVAL SCRMS: CPT | Performed by: CLINICAL NURSE SPECIALIST

## 2018-01-02 PROCEDURE — G8417 CALC BMI ABV UP PARAM F/U: HCPCS | Performed by: FAMILY MEDICINE

## 2018-01-02 RX ORDER — AZELASTINE 1 MG/ML
2 SPRAY, METERED NASAL 2 TIMES DAILY
Qty: 1 BOTTLE | Refills: 3 | Status: SHIPPED | OUTPATIENT
Start: 2018-01-02 | End: 2018-01-02 | Stop reason: SDUPTHER

## 2018-01-02 RX ORDER — AZELASTINE 1 MG/ML
2 SPRAY, METERED NASAL 2 TIMES DAILY
Qty: 2 BOTTLE | Refills: 3 | Status: SHIPPED | OUTPATIENT
Start: 2018-01-02 | End: 2018-12-14 | Stop reason: SDUPTHER

## 2018-01-02 RX ORDER — AZITHROMYCIN 250 MG/1
TABLET, FILM COATED ORAL
Qty: 1 PACKET | Refills: 0 | Status: SHIPPED | OUTPATIENT
Start: 2018-01-02 | End: 2018-01-12

## 2018-01-02 RX ORDER — HYDROCODONE BITARTRATE AND ACETAMINOPHEN 10; 325 MG/1; MG/1
1 TABLET ORAL EVERY 6 HOURS PRN
Qty: 120 TABLET | Refills: 0 | Status: SHIPPED | OUTPATIENT
Start: 2018-01-02 | End: 2018-02-01

## 2018-01-02 NOTE — PROGRESS NOTES
[facility]  [address] [place] [address]  [address] [place] [address]  Dept: [phone]  Dept Fax: [phone]  Loc: [phone]    Carmelina Gallegos is a 67 y.o. male who presents today for his medical conditions/complaints as noted below. Carmelina Gallegos is here for Other (pt reports recent stroke) and Discuss Medications        HPI:   CC: Here today to discuss the following:    Was seen in [address] [place] [address] at his neurologist's office on December 27. Was given \"clean bill of health\". States 30 minutes after leaving the office he had another TIA. Symptoms were slurred speech and facial droop and transient confusion. Symptoms resolved and that he did not seek medical attention. They did call my office and he was instructed to go to the emergency department. Since that episode he has had no further spells. He has an implantable loop recorder which has been reported as \"normal\"  By his cardiologist.    He has been having some increased headaches and sinus pressure. No fever or chills. Has had postnasal drainage. Occasional cough. Continues to have severe lower back pain. Also has chronic cervical spine pain. Pain worse with flexion and extension of both the neck and lower back. No radiculopathy. Tries to take 1 hydrocodone per day but sometimes has to take 2. He will be going to [place] for the winter and is requesting enough to last him for the next 2 months. He was having adverse effects to the combination of Prozac and Cymbalta. We have since discontinued the Prozac and Cymbalta . No suicidal or homicidal ideation symptoms include apathy and irritability.  He is also having difficulty sleeping at night     HPI    Past Medical History:   Diagnosis Date    Acute ischemic stroke ([facility] 2/20/2017    Atherosclerotic heart disease     s/p MI, stenting x 3 jan 2011([place])    CAD (coronary artery disease)     Cerebral artery occlusion with cerebral infarction ([facility]     Constitutional: Negative for chills and fever. HENT: Negative for congestion. Respiratory: Negative for cough, chest tightness and shortness of breath. Cardiovascular: Negative for chest pain, palpitations and leg swelling. Gastrointestinal: Negative for abdominal pain, anal bleeding, constipation, diarrhea and nausea. Genitourinary: Negative for difficulty urinating. Psychiatric/Behavioral: Negative. See HPI for visit specific review of symptoms. All others negative      Objective:   BP (!) 142/82   Pulse 100   Temp 97 °F (36.1 °C)   Resp 16   Wt 269 lb (122 kg)   SpO2 95%   BMI 32.74 kg/m²   Physical Exam    Physical Exam   Constitutional: appears well-developed. does not appear ill. Eyes: Pupils are equal, round, and reactive to light. Neck: Normal range of motion. Neck supple. Cardiovascular: Normal rate and regular rhythm. Exam reveals no friction rub. No murmur heard. Pulmonary/Chest: Effort normal and breath sounds normal. No respiratory distress. He has no wheezes. no rales. Abdominal: Soft. Bowel sounds are normal. He exhibits no distension. There is no tenderness. There is no rebound and no guarding. Musculoskeletal: exhibits no edema. Neurological: alert. Psychiatric: normal mood and affect. behavior is normal.     No results found for this or any previous visit (from the past 672 hour(s)). Assessment & Plan: Today's office visit was 45 minutes face-to-face time. Reviewed his records from [address] [place] [address]. Discussed symptoms he developed shortly after leaving his neurologists office. Reviewed his current medications. He did not bring his medications in with him today. Discussed my concerns for polypharmacy. The need to discard any medications not on his medication list currently. Discussed management options for his chronic pain. Discussed treatments for his hyperlipidemia.  We have had issues with insurance coverage for the new cholesterol twice a day  · Nasal saline as needed for congestion and drainage     Days 4 - 10: STOP AFRIN  · Continue nasal steroid 1 spray each nostril twice a day  · Nasal saline as needed    If no relief after 10 days, contact us.     - azithromycin (ZITHROMAX) 250 MG tablet; Take 2 tabs (500 mg) on Day 1, and take 1 tab (250 mg) on days 2 through 5. Dispense: 1 packet; Refill: 0    8. Benign prostatic hyperplasia without lower urinary tract symptoms  Stable    9. Depression with anxiety  Discussed risks and benefits of this new medication. Discussed potential side effects. He voiced understanding.     - sertraline (ZOLOFT) 50 MG tablet; Take 1 tablet by mouth daily  Dispense: 30 tablet; Refill: 3        Return in about 3 months (around 4/2/2018) for Routine follow up. Discussed use, benefit, and side effects of prescribed medications. All patient questions answered. Pt voiced understanding. Reviewed health maintenance. Instructed to continue current medications, diet and exercise. Patient agreed with treatment plan. Follow up as directed.

## 2018-01-03 DIAGNOSIS — Z95.818 STATUS POST PLACEMENT OF IMPLANTABLE LOOP RECORDER: Primary | ICD-10-CM

## 2018-01-03 DIAGNOSIS — R00.2 PALPITATIONS: ICD-10-CM

## 2018-01-10 DIAGNOSIS — I25.10 CAD (CORONARY ARTERY DISEASE): ICD-10-CM

## 2018-01-10 RX ORDER — CLOPIDOGREL BISULFATE 75 MG/1
TABLET ORAL
Qty: 90 TABLET | Refills: 0 | Status: SHIPPED | OUTPATIENT
Start: 2018-01-10 | End: 2018-05-03 | Stop reason: SDUPTHER

## 2018-01-10 RX ORDER — TAMSULOSIN HYDROCHLORIDE 0.4 MG/1
CAPSULE ORAL
Qty: 180 CAPSULE | Refills: 0 | Status: SHIPPED | OUTPATIENT
Start: 2018-01-10 | End: 2018-02-26 | Stop reason: SDUPTHER

## 2018-01-10 RX ORDER — LISINOPRIL 20 MG/1
TABLET ORAL
Qty: 45 TABLET | Refills: 0 | Status: SHIPPED | OUTPATIENT
Start: 2018-01-10 | End: 2018-04-06 | Stop reason: SDUPTHER

## 2018-01-10 RX ORDER — MELOXICAM 15 MG/1
TABLET ORAL
Qty: 90 TABLET | Refills: 0 | Status: SHIPPED | OUTPATIENT
Start: 2018-01-10 | End: 2018-05-23 | Stop reason: SDUPTHER

## 2018-02-13 DIAGNOSIS — I63.411 CEREBROVASCULAR ACCIDENT (CVA) DUE TO EMBOLISM OF RIGHT MIDDLE CEREBRAL ARTERY (HCC): ICD-10-CM

## 2018-02-13 DIAGNOSIS — R00.2 PALPITATIONS: ICD-10-CM

## 2018-02-13 DIAGNOSIS — Z95.818 STATUS POST PLACEMENT OF IMPLANTABLE LOOP RECORDER: Primary | ICD-10-CM

## 2018-02-13 PROCEDURE — 93298 REM INTERROG DEV EVAL SCRMS: CPT | Performed by: CLINICAL NURSE SPECIALIST

## 2018-02-13 PROCEDURE — 93299 PR REM INTERROG ICPMS/SCRMS <30 D TECH REVIEW: CPT | Performed by: CLINICAL NURSE SPECIALIST

## 2018-03-05 ENCOUNTER — OFFICE VISIT (OUTPATIENT)
Dept: FAMILY MEDICINE CLINIC | Age: 73
End: 2018-03-05
Payer: MEDICARE

## 2018-03-05 VITALS
HEART RATE: 82 BPM | WEIGHT: 271 LBS | DIASTOLIC BLOOD PRESSURE: 80 MMHG | BODY MASS INDEX: 32.99 KG/M2 | SYSTOLIC BLOOD PRESSURE: 132 MMHG | TEMPERATURE: 97.2 F | OXYGEN SATURATION: 94 % | RESPIRATION RATE: 18 BRPM

## 2018-03-05 DIAGNOSIS — F41.8 DEPRESSION WITH ANXIETY: ICD-10-CM

## 2018-03-05 DIAGNOSIS — I10 ESSENTIAL HYPERTENSION: ICD-10-CM

## 2018-03-05 DIAGNOSIS — Z79.4 TYPE 2 DIABETES MELLITUS WITH COMPLICATION, WITH LONG-TERM CURRENT USE OF INSULIN (HCC): ICD-10-CM

## 2018-03-05 DIAGNOSIS — E11.8 TYPE 2 DIABETES MELLITUS WITH COMPLICATION, WITH LONG-TERM CURRENT USE OF INSULIN (HCC): ICD-10-CM

## 2018-03-05 DIAGNOSIS — I25.10 CORONARY ARTERY DISEASE INVOLVING NATIVE CORONARY ARTERY OF NATIVE HEART WITHOUT ANGINA PECTORIS: ICD-10-CM

## 2018-03-05 DIAGNOSIS — E11.42 TYPE 2 DIABETES MELLITUS WITH DIABETIC POLYNEUROPATHY, WITH LONG-TERM CURRENT USE OF INSULIN (HCC): ICD-10-CM

## 2018-03-05 DIAGNOSIS — M54.50 CHRONIC BILATERAL LOW BACK PAIN WITHOUT SCIATICA: ICD-10-CM

## 2018-03-05 DIAGNOSIS — G44.229 CHRONIC TENSION-TYPE HEADACHE, NOT INTRACTABLE: ICD-10-CM

## 2018-03-05 DIAGNOSIS — N40.0 BENIGN PROSTATIC HYPERPLASIA WITHOUT LOWER URINARY TRACT SYMPTOMS: ICD-10-CM

## 2018-03-05 DIAGNOSIS — E78.2 MIXED HYPERLIPIDEMIA: ICD-10-CM

## 2018-03-05 DIAGNOSIS — L30.9 DERMATITIS: ICD-10-CM

## 2018-03-05 DIAGNOSIS — Z79.4 TYPE 2 DIABETES MELLITUS WITH DIABETIC POLYNEUROPATHY, WITH LONG-TERM CURRENT USE OF INSULIN (HCC): ICD-10-CM

## 2018-03-05 DIAGNOSIS — B35.1 ONYCHOMYCOSIS: ICD-10-CM

## 2018-03-05 DIAGNOSIS — Z00.00 ANNUAL PHYSICAL EXAM: Primary | ICD-10-CM

## 2018-03-05 DIAGNOSIS — G89.29 CHRONIC BILATERAL LOW BACK PAIN WITHOUT SCIATICA: ICD-10-CM

## 2018-03-05 DIAGNOSIS — M65.332 TRIGGER MIDDLE FINGER OF LEFT HAND: ICD-10-CM

## 2018-03-05 LAB
ALBUMIN SERPL-MCNC: 4.2 G/DL (ref 3.5–5.2)
ALP BLD-CCNC: 88 U/L (ref 40–130)
ALT SERPL-CCNC: 18 U/L (ref 5–41)
ANION GAP SERPL CALCULATED.3IONS-SCNC: 10 MMOL/L (ref 7–19)
AST SERPL-CCNC: 17 U/L (ref 5–40)
BILIRUB SERPL-MCNC: 0.3 MG/DL (ref 0.2–1.2)
BUN BLDV-MCNC: 21 MG/DL (ref 8–23)
CALCIUM SERPL-MCNC: 9.2 MG/DL (ref 8.8–10.2)
CHLORIDE BLD-SCNC: 100 MMOL/L (ref 98–111)
CHOLESTEROL, TOTAL: 269 MG/DL (ref 160–199)
CO2: 27 MMOL/L (ref 22–29)
CREAT SERPL-MCNC: 1.1 MG/DL (ref 0.5–1.2)
CREATININE URINE: 158.3 MG/DL (ref 4.2–622)
GFR NON-AFRICAN AMERICAN: >60
GLUCOSE BLD-MCNC: 142 MG/DL (ref 74–109)
HBA1C MFR BLD: 7.2 %
HDLC SERPL-MCNC: 31 MG/DL (ref 55–121)
LDL CHOLESTEROL CALCULATED: 167 MG/DL
MICROALBUMIN UR-MCNC: 2.5 MG/DL (ref 0–19)
MICROALBUMIN/CREAT UR-RTO: 15.8 MG/G
POTASSIUM SERPL-SCNC: 4.1 MMOL/L (ref 3.5–5)
SODIUM BLD-SCNC: 137 MMOL/L (ref 136–145)
TOTAL PROTEIN: 7.4 G/DL (ref 6.6–8.7)
TRIGL SERPL-MCNC: 357 MG/DL (ref 0–149)

## 2018-03-05 PROCEDURE — 99214 OFFICE O/P EST MOD 30 MIN: CPT | Performed by: FAMILY MEDICINE

## 2018-03-05 PROCEDURE — 3017F COLORECTAL CA SCREEN DOC REV: CPT | Performed by: FAMILY MEDICINE

## 2018-03-05 PROCEDURE — G8598 ASA/ANTIPLAT THER USED: HCPCS | Performed by: FAMILY MEDICINE

## 2018-03-05 PROCEDURE — G8484 FLU IMMUNIZE NO ADMIN: HCPCS | Performed by: FAMILY MEDICINE

## 2018-03-05 PROCEDURE — 1036F TOBACCO NON-USER: CPT | Performed by: FAMILY MEDICINE

## 2018-03-05 PROCEDURE — G8427 DOCREV CUR MEDS BY ELIG CLIN: HCPCS | Performed by: FAMILY MEDICINE

## 2018-03-05 PROCEDURE — G8417 CALC BMI ABV UP PARAM F/U: HCPCS | Performed by: FAMILY MEDICINE

## 2018-03-05 PROCEDURE — 3045F PR MOST RECENT HEMOGLOBIN A1C LEVEL 7.0-9.0%: CPT | Performed by: FAMILY MEDICINE

## 2018-03-05 PROCEDURE — 1123F ACP DISCUSS/DSCN MKR DOCD: CPT | Performed by: FAMILY MEDICINE

## 2018-03-05 PROCEDURE — 4040F PNEUMOC VAC/ADMIN/RCVD: CPT | Performed by: FAMILY MEDICINE

## 2018-03-05 PROCEDURE — G0439 PPPS, SUBSEQ VISIT: HCPCS | Performed by: FAMILY MEDICINE

## 2018-03-05 RX ORDER — TRIAMCINOLONE ACETONIDE 1 MG/G
CREAM TOPICAL
Qty: 45 G | Refills: 1 | Status: SHIPPED | OUTPATIENT
Start: 2018-03-05 | End: 2018-08-03 | Stop reason: CLARIF

## 2018-03-05 RX ORDER — HYDROCODONE BITARTRATE AND ACETAMINOPHEN 10; 325 MG/1; MG/1
1 TABLET ORAL EVERY 6 HOURS PRN
Qty: 90 TABLET | Refills: 0 | Status: SHIPPED | OUTPATIENT
Start: 2018-03-05 | End: 2018-04-04

## 2018-03-05 RX ORDER — TERBINAFINE HYDROCHLORIDE 250 MG/1
250 TABLET ORAL DAILY
Qty: 30 TABLET | Refills: 2 | Status: SHIPPED | OUTPATIENT
Start: 2018-03-05 | End: 2018-05-03 | Stop reason: SDUPTHER

## 2018-03-05 ASSESSMENT — ENCOUNTER SYMPTOMS
COUGH: 0
DIARRHEA: 0
CONSTIPATION: 0
SHORTNESS OF BREATH: 0
ANAL BLEEDING: 0
BACK PAIN: 1
NAUSEA: 0
CHEST TIGHTNESS: 0
ABDOMINAL PAIN: 0

## 2018-03-05 ASSESSMENT — LIFESTYLE VARIABLES
HOW OFTEN DURING THE LAST YEAR HAVE YOU FAILED TO DO WHAT WAS NORMALLY EXPECTED FROM YOU BECAUSE OF DRINKING: 0
AUDIT-C TOTAL SCORE: 3
HOW OFTEN DO YOU HAVE A DRINK CONTAINING ALCOHOL: 3
HOW OFTEN DURING THE LAST YEAR HAVE YOU HAD A FEELING OF GUILT OR REMORSE AFTER DRINKING: 0
HOW OFTEN DURING THE LAST YEAR HAVE YOU NEEDED AN ALCOHOLIC DRINK FIRST THING IN THE MORNING TO GET YOURSELF GOING AFTER A NIGHT OF HEAVY DRINKING: 0
AUDIT TOTAL SCORE: 3
HOW OFTEN DURING THE LAST YEAR HAVE YOU BEEN UNABLE TO REMEMBER WHAT HAPPENED THE NIGHT BEFORE BECAUSE YOU HAD BEEN DRINKING: 0
HOW OFTEN DURING THE LAST YEAR HAVE YOU FOUND THAT YOU WERE NOT ABLE TO STOP DRINKING ONCE YOU HAD STARTED: 0
HAVE YOU OR SOMEONE ELSE BEEN INJURED AS A RESULT OF YOUR DRINKING: 0
HAS A RELATIVE, FRIEND, DOCTOR, OR ANOTHER HEALTH PROFESSIONAL EXPRESSED CONCERN ABOUT YOUR DRINKING OR SUGGESTED YOU CUT DOWN: 0
HOW MANY STANDARD DRINKS CONTAINING ALCOHOL DO YOU HAVE ON A TYPICAL DAY: 0
HOW OFTEN DO YOU HAVE SIX OR MORE DRINKS ON ONE OCCASION: 0

## 2018-03-05 ASSESSMENT — ANXIETY QUESTIONNAIRES: GAD7 TOTAL SCORE: 0

## 2018-03-05 ASSESSMENT — PATIENT HEALTH QUESTIONNAIRE - PHQ9: SUM OF ALL RESPONSES TO PHQ QUESTIONS 1-9: 0

## 2018-03-05 NOTE — PROGRESS NOTES
Coronary Art Dis Sister     Cancer Sister      uterine    Kidney Disease Sister     Coronary Art Dis Brother       in his 46s    Cancer Father      colon       CareTeam (Including outside providers/suppliers regularly involved in providing care):   Patient Care Team:  Cotrez Deng MD as PCP - General (Family Medicine)  BAYRON To MD (Cardiology)    Wt Readings from Last 3 Encounters:   18 271 lb (122.9 kg)   18 269 lb (122 kg)   17 266 lb (120.7 kg)     Vitals:    18 1309   BP: 132/80   Pulse: 82   Resp: 18   Temp: 97.2 °F (36.2 °C)   SpO2: 94%   Weight: 271 lb (122.9 kg)           The following problems were reviewed today and where indicated follow up appointments were made and/or referrals ordered. Risk Factor Screenings with Interventions     Fall Risk:  Timed Up and Go Test > 12 seconds?: no  2 or more falls in past year?: (!) yes  Fall with injury in past year?: no  Fall Risk Interventions:    · Home safety tips provided    Depression:  PHQ-2 Score: 0  Depression Interventions:  · None indicated    Anxiety:  Anxiety Score: 0  Anxiety Interventions:  · None indicated    Cognitive:   Words recalled: 2  Clock Drawing Test (CDT) Score: Normal  Cognitive Impairment Interventions:  · None indicated    Substance Abuse:  Social History     Social History Main Topics    Smoking status: Never Smoker    Smokeless tobacco: Never Used    Alcohol use Yes      Comment: 2-3 x weekly wine or whisky and water    Drug use: No    Sexual activity: Not on file     Audit Questionnaire: Screen for Alcohol Misuse  How often do you have a drink containing alcohol?: Two to three times a week  How many standard drinks containing alcohol do you have on a typical day when drinking?: One or two  How often do you have six or more drinks on one occasion?: Never  Audit-C Score: 3  During the past year, how often have you found that you were not able to stop drinking once you had started?: cardiologist if his hearing loss is worsening    Safety  Do you have working smoke detectors?: Yes  Have all throw rugs been removed or fastened?: (!) No  Do you have non-slip mats in all bathtubs?: (!) No  Do all of your stairways have a railing or banister?: Yes  Are your doorways, halls and stairs free of clutter?: Yes  Do you always fasten your seatbelt when you are in a car?: Yes  Safety Interventions:  · Home safety tips provided    ADLs  In the past 7 days, did you need help from others to perform any of the following everyday activities?: None  In the past 7 days, did you need help from others to take care of any of the following?: None  ADL Interventions:  · None indicated    Personalized Preventive Plan   Current Health Maintenance Status  Immunization History   Administered Date(s) Administered    Influenza Vaccine, unspecified formulation 10/01/2017    Influenza Virus Vaccine 12/22/2006, 10/29/2007, 10/14/2008, 10/09/2009, 10/13/2010, 10/24/2011, 09/20/2012, 09/23/2013, 11/04/2014, 11/03/2015    Influenza, High Dose 09/21/2016    Pneumococcal 13-valent Conjugate ([id number]) 11/03/2015    Pneumococcal Polysaccharide ([id number]) 10/14/2008, 10/09/2009    Zoster Live (Zostavax) 10/24/2012        Health Maintenance   Topic Date Due    Diabetic foot exam  04/14/1955    Diabetic retinal exam  04/14/1954    Diabetic microalbuminuria test  04/14/1962    DTaP/Tdap/Td vaccine (1 - Tdap) 04/14/1963    Shingles Vaccine (1 of 2 - 2 Dose Series) 04/14/1994    Colon cancer screen colonoscopy  04/14/1994    A1C test (Diabetic or Prediabetic)  11/07/2018    Lipid screen  11/07/2018    Potassium monitoring  11/07/2018    Creatinine monitoring  11/07/2018    Flu vaccine  Completed    Pneumococcal low/med risk  Completed       Recommendations for Preventive Services Due: see orders.   Recommended screening schedule for the next 5-10 years is provided to the patient in written form: see Patient

## 2018-03-05 NOTE — PATIENT INSTRUCTIONS
family can't agree on what should be in your living will. · You can change your living will at any time. Some people find that their wishes about end-of-life care change as their health changes. · In addition to making a living will, think about completing a medical power of  form. This form lets you name the person you want to make end-of-life treatment decisions for you (your \"health care agent\") if you're not able to. Many hospitals and nursing homes will give you the forms you need to complete a living will and a medical power of . · Your living will is used only if you can't make or communicate decisions for yourself anymore. If you become able to make decisions again, you can accept or refuse any treatment, no matter what you wrote in your living will. · Your state may offer an online registry. This is a place where you can store your living will online so the doctors and nurses who need to treat you can find it right away. What should you think about when creating a living will? Talk about your end-of-life wishes with your family members and your doctor. Let them know what you want. That way the people making decisions for you won't be surprised by your choices. Think about these questions as you make your living will:  · Do you know enough about life support methods that might be used? If not, talk to your doctor so you know what might be done if you can't breathe on your own, your heart stops, or you're unable to swallow. · What things would you still want to be able to do after you receive life-support methods? Would you want to be able to walk? To speak? To eat on your own? To live without the help of machines? · If you have a choice, where do you want to be cared for? In your home? At a hospital or nursing home? · Do you want certain Scientologist practices performed if you become very ill? · If you have a choice at the end of your life, where would you prefer to die? At home?  In a care agent is. Keep your forms in a safe place. But make sure that your loved ones know where the forms are. This could be in your desk where you keep other important papers. Make sure your doctor has a copy of your forms. Where can you learn more? Go to [email or web address] org and sign in to your [employer] account. Enter [id number] in the Ghost box to learn more about \"Learning About Durable Power of  for Health Care. \"     If you do not have an account, please click on the \"Sign Up Now\" link. Current as of: September 24, 2016  Content Version: 11.5  © 2417-5633 Healthwise, Incorporated. Care instructions adapted under license by [facility] ([facility]). If you have questions about a medical condition or this instruction, always ask your healthcare professional. [address] any warranty or liability for your use of this information.

## 2018-03-20 ENCOUNTER — TELEPHONE (OUTPATIENT)
Dept: FAMILY MEDICINE CLINIC | Age: 73
End: 2018-03-20

## 2018-03-21 DIAGNOSIS — H91.90 HEARING LOSS, UNSPECIFIED HEARING LOSS TYPE, UNSPECIFIED LATERALITY: Primary | ICD-10-CM

## 2018-03-22 ENCOUNTER — TELEPHONE (OUTPATIENT)
Dept: FAMILY MEDICINE CLINIC | Age: 73
End: 2018-03-22

## 2018-03-28 DIAGNOSIS — I63.411 CEREBROVASCULAR ACCIDENT (CVA) DUE TO EMBOLISM OF RIGHT MIDDLE CEREBRAL ARTERY (HCC): ICD-10-CM

## 2018-03-28 DIAGNOSIS — Z95.818 STATUS POST PLACEMENT OF IMPLANTABLE LOOP RECORDER: Primary | ICD-10-CM

## 2018-03-28 PROCEDURE — 93298 REM INTERROG DEV EVAL SCRMS: CPT | Performed by: CLINICAL NURSE SPECIALIST

## 2018-03-28 PROCEDURE — 93299 PR REM INTERROG ICPMS/SCRMS <30 D TECH REVIEW: CPT | Performed by: CLINICAL NURSE SPECIALIST

## 2018-04-06 RX ORDER — LISINOPRIL 20 MG/1
TABLET ORAL
Qty: 90 TABLET | Refills: 0 | Status: SHIPPED | OUTPATIENT
Start: 2018-04-06 | End: 2018-06-26 | Stop reason: SDUPTHER

## 2018-05-03 ENCOUNTER — OFFICE VISIT (OUTPATIENT)
Dept: FAMILY MEDICINE CLINIC | Age: 73
End: 2018-05-03
Payer: MEDICARE

## 2018-05-03 VITALS
DIASTOLIC BLOOD PRESSURE: 68 MMHG | SYSTOLIC BLOOD PRESSURE: 110 MMHG | WEIGHT: 270.5 LBS | TEMPERATURE: 97.8 F | RESPIRATION RATE: 18 BRPM | BODY MASS INDEX: 32.93 KG/M2 | HEART RATE: 59 BPM | OXYGEN SATURATION: 95 %

## 2018-05-03 DIAGNOSIS — F41.8 DEPRESSION WITH ANXIETY: ICD-10-CM

## 2018-05-03 DIAGNOSIS — E78.2 MIXED HYPERLIPIDEMIA: ICD-10-CM

## 2018-05-03 DIAGNOSIS — R06.02 SHORTNESS OF BREATH: ICD-10-CM

## 2018-05-03 DIAGNOSIS — G47.33 OSA ON CPAP: ICD-10-CM

## 2018-05-03 DIAGNOSIS — M54.50 CHRONIC BILATERAL LOW BACK PAIN WITHOUT SCIATICA: ICD-10-CM

## 2018-05-03 DIAGNOSIS — B35.1 ONYCHOMYCOSIS: ICD-10-CM

## 2018-05-03 DIAGNOSIS — G44.229 CHRONIC TENSION-TYPE HEADACHE, NOT INTRACTABLE: ICD-10-CM

## 2018-05-03 DIAGNOSIS — J01.00 ACUTE NON-RECURRENT MAXILLARY SINUSITIS: ICD-10-CM

## 2018-05-03 DIAGNOSIS — J30.89 CHRONIC NONSEASONAL ALLERGIC RHINITIS DUE TO POLLEN: ICD-10-CM

## 2018-05-03 DIAGNOSIS — Z91.81 AT HIGH RISK FOR FALLS: ICD-10-CM

## 2018-05-03 DIAGNOSIS — Z79.4 TYPE 2 DIABETES MELLITUS WITH COMPLICATION, WITH LONG-TERM CURRENT USE OF INSULIN (HCC): Primary | ICD-10-CM

## 2018-05-03 DIAGNOSIS — Z99.89 OSA ON CPAP: ICD-10-CM

## 2018-05-03 DIAGNOSIS — G89.29 CHRONIC BILATERAL LOW BACK PAIN WITHOUT SCIATICA: ICD-10-CM

## 2018-05-03 DIAGNOSIS — I25.10 CORONARY ARTERY DISEASE INVOLVING NATIVE CORONARY ARTERY OF NATIVE HEART WITHOUT ANGINA PECTORIS: ICD-10-CM

## 2018-05-03 DIAGNOSIS — E11.8 TYPE 2 DIABETES MELLITUS WITH COMPLICATION, WITH LONG-TERM CURRENT USE OF INSULIN (HCC): Primary | ICD-10-CM

## 2018-05-03 DIAGNOSIS — I10 ESSENTIAL HYPERTENSION: ICD-10-CM

## 2018-05-03 PROCEDURE — 96372 THER/PROPH/DIAG INJ SC/IM: CPT | Performed by: FAMILY MEDICINE

## 2018-05-03 PROCEDURE — 3017F COLORECTAL CA SCREEN DOC REV: CPT | Performed by: FAMILY MEDICINE

## 2018-05-03 PROCEDURE — G8417 CALC BMI ABV UP PARAM F/U: HCPCS | Performed by: FAMILY MEDICINE

## 2018-05-03 PROCEDURE — G8427 DOCREV CUR MEDS BY ELIG CLIN: HCPCS | Performed by: FAMILY MEDICINE

## 2018-05-03 PROCEDURE — 99214 OFFICE O/P EST MOD 30 MIN: CPT | Performed by: FAMILY MEDICINE

## 2018-05-03 PROCEDURE — 1123F ACP DISCUSS/DSCN MKR DOCD: CPT | Performed by: FAMILY MEDICINE

## 2018-05-03 PROCEDURE — 1036F TOBACCO NON-USER: CPT | Performed by: FAMILY MEDICINE

## 2018-05-03 PROCEDURE — G8598 ASA/ANTIPLAT THER USED: HCPCS | Performed by: FAMILY MEDICINE

## 2018-05-03 PROCEDURE — 3045F PR MOST RECENT HEMOGLOBIN A1C LEVEL 7.0-9.0%: CPT | Performed by: FAMILY MEDICINE

## 2018-05-03 PROCEDURE — 4040F PNEUMOC VAC/ADMIN/RCVD: CPT | Performed by: FAMILY MEDICINE

## 2018-05-03 PROCEDURE — 2022F DILAT RTA XM EVC RTNOPTHY: CPT | Performed by: FAMILY MEDICINE

## 2018-05-03 RX ORDER — TERBINAFINE HYDROCHLORIDE 250 MG/1
250 TABLET ORAL DAILY
Qty: 30 TABLET | Refills: 0 | Status: SHIPPED | OUTPATIENT
Start: 2018-05-03 | End: 2018-08-03 | Stop reason: CLARIF

## 2018-05-03 RX ORDER — DEXAMETHASONE SODIUM PHOSPHATE 10 MG/ML
10 INJECTION INTRAMUSCULAR; INTRAVENOUS ONCE
Status: COMPLETED | OUTPATIENT
Start: 2018-05-03 | End: 2018-05-03

## 2018-05-03 RX ORDER — CLOPIDOGREL BISULFATE 75 MG/1
TABLET ORAL
Qty: 90 TABLET | Refills: 0 | Status: SHIPPED | OUTPATIENT
Start: 2018-05-03 | End: 2018-08-03 | Stop reason: SDUPTHER

## 2018-05-03 RX ORDER — AZITHROMYCIN 250 MG/1
TABLET, FILM COATED ORAL
Qty: 1 PACKET | Refills: 0 | Status: SHIPPED | OUTPATIENT
Start: 2018-05-03 | End: 2018-08-03 | Stop reason: CLARIF

## 2018-05-03 RX ADMIN — DEXAMETHASONE SODIUM PHOSPHATE 10 MG: 10 INJECTION INTRAMUSCULAR; INTRAVENOUS at 11:58

## 2018-05-03 ASSESSMENT — ENCOUNTER SYMPTOMS
ANAL BLEEDING: 0
ABDOMINAL PAIN: 0
SHORTNESS OF BREATH: 0
SINUS PAIN: 1
SORE THROAT: 0
DIARRHEA: 0
BACK PAIN: 1
COUGH: 0
NAUSEA: 0
CONSTIPATION: 0
CHEST TIGHTNESS: 0
SINUS PRESSURE: 1

## 2018-05-10 DIAGNOSIS — Z95.818 STATUS POST PLACEMENT OF IMPLANTABLE LOOP RECORDER: Primary | ICD-10-CM

## 2018-05-10 DIAGNOSIS — I63.411 CEREBROVASCULAR ACCIDENT (CVA) DUE TO EMBOLISM OF RIGHT MIDDLE CEREBRAL ARTERY (HCC): ICD-10-CM

## 2018-05-10 PROCEDURE — 93298 REM INTERROG DEV EVAL SCRMS: CPT | Performed by: CLINICAL NURSE SPECIALIST

## 2018-05-10 PROCEDURE — 93299 PR REM INTERROG ICPMS/SCRMS <30 D TECH REVIEW: CPT | Performed by: CLINICAL NURSE SPECIALIST

## 2018-05-24 RX ORDER — MELOXICAM 15 MG/1
TABLET ORAL
Qty: 90 TABLET | Refills: 0 | Status: SHIPPED | OUTPATIENT
Start: 2018-05-24 | End: 2018-09-07 | Stop reason: SDUPTHER

## 2018-06-21 DIAGNOSIS — Z95.818 STATUS POST PLACEMENT OF IMPLANTABLE LOOP RECORDER: Primary | ICD-10-CM

## 2018-06-21 DIAGNOSIS — I63.411 CEREBROVASCULAR ACCIDENT (CVA) DUE TO EMBOLISM OF RIGHT MIDDLE CEREBRAL ARTERY (HCC): ICD-10-CM

## 2018-06-21 PROCEDURE — 93298 REM INTERROG DEV EVAL SCRMS: CPT | Performed by: CLINICAL NURSE SPECIALIST

## 2018-06-21 PROCEDURE — 93299 PR REM INTERROG ICPMS/SCRMS <30 D TECH REVIEW: CPT | Performed by: CLINICAL NURSE SPECIALIST

## 2018-06-30 ENCOUNTER — OFFICE VISIT (OUTPATIENT)
Dept: URGENT CARE | Age: 73
End: 2018-06-30
Payer: MEDICARE

## 2018-06-30 VITALS
HEART RATE: 64 BPM | HEIGHT: 76 IN | RESPIRATION RATE: 20 BRPM | TEMPERATURE: 97.8 F | DIASTOLIC BLOOD PRESSURE: 72 MMHG | BODY MASS INDEX: 32.51 KG/M2 | OXYGEN SATURATION: 97 % | WEIGHT: 267 LBS | SYSTOLIC BLOOD PRESSURE: 149 MMHG

## 2018-06-30 DIAGNOSIS — H66.004 RECURRENT ACUTE SUPPURATIVE OTITIS MEDIA OF RIGHT EAR WITHOUT SPONTANEOUS RUPTURE OF TYMPANIC MEMBRANE: Primary | ICD-10-CM

## 2018-06-30 DIAGNOSIS — J06.9 UPPER RESPIRATORY TRACT INFECTION, UNSPECIFIED TYPE: ICD-10-CM

## 2018-06-30 PROCEDURE — 3017F COLORECTAL CA SCREEN DOC REV: CPT | Performed by: FAMILY MEDICINE

## 2018-06-30 PROCEDURE — G8598 ASA/ANTIPLAT THER USED: HCPCS | Performed by: FAMILY MEDICINE

## 2018-06-30 PROCEDURE — 1036F TOBACCO NON-USER: CPT | Performed by: FAMILY MEDICINE

## 2018-06-30 PROCEDURE — G8428 CUR MEDS NOT DOCUMENT: HCPCS | Performed by: FAMILY MEDICINE

## 2018-06-30 PROCEDURE — 4040F PNEUMOC VAC/ADMIN/RCVD: CPT | Performed by: FAMILY MEDICINE

## 2018-06-30 PROCEDURE — 99213 OFFICE O/P EST LOW 20 MIN: CPT | Performed by: FAMILY MEDICINE

## 2018-06-30 PROCEDURE — 1123F ACP DISCUSS/DSCN MKR DOCD: CPT | Performed by: FAMILY MEDICINE

## 2018-06-30 PROCEDURE — G8417 CALC BMI ABV UP PARAM F/U: HCPCS | Performed by: FAMILY MEDICINE

## 2018-06-30 RX ORDER — AMOXICILLIN AND CLAVULANATE POTASSIUM 875; 125 MG/1; MG/1
1 TABLET, FILM COATED ORAL 2 TIMES DAILY
Qty: 20 TABLET | Refills: 0 | Status: SHIPPED | OUTPATIENT
Start: 2018-06-30 | End: 2018-07-10

## 2018-06-30 RX ORDER — OXYCODONE AND ACETAMINOPHEN 10; 325 MG/1; MG/1
TABLET ORAL
COMMUNITY
Start: 2018-06-05 | End: 2018-08-03 | Stop reason: CLARIF

## 2018-06-30 ASSESSMENT — ENCOUNTER SYMPTOMS
COUGH: 0
RHINORRHEA: 1
DIARRHEA: 0
SORE THROAT: 1

## 2018-07-12 ENCOUNTER — TELEPHONE (OUTPATIENT)
Dept: FAMILY MEDICINE CLINIC | Age: 73
End: 2018-07-12

## 2018-07-12 NOTE — TELEPHONE ENCOUNTER
Dr Patrizia Barth calling to discuss patient. He has been in the weight loss program at their office and is actually gaining weight. Pt has asked Dr Carloz Nagy if he would speak to you about changing some of the medications. He states that he has told patient that the only thing he can recommend at this point to help him lose weight is decrease insulin dose and try trulicity or [place], this would have to be ok'd by Dr Monica Bustamante. He is just wanting to let you know what they discussed in clinic.  And he said if you would like to talk please call him anytime, he gave me his personal cell to pass along if you would like it

## 2018-07-13 NOTE — TELEPHONE ENCOUNTER
I can discuss options with . Elizabeth Bee in the office. I can try something different for diabetes despite it being well controlled currently and having difficulty dosing the medication in the past. Complicating his medications has only led to more confusion in the past for him.

## 2018-07-26 DIAGNOSIS — E11.8 TYPE 2 DIABETES MELLITUS WITH COMPLICATION, WITH LONG-TERM CURRENT USE OF INSULIN (HCC): ICD-10-CM

## 2018-07-26 DIAGNOSIS — R53.83 FATIGUE, UNSPECIFIED TYPE: Primary | ICD-10-CM

## 2018-07-26 DIAGNOSIS — R53.83 FATIGUE, UNSPECIFIED TYPE: ICD-10-CM

## 2018-07-26 DIAGNOSIS — Z79.4 TYPE 2 DIABETES MELLITUS WITH COMPLICATION, WITH LONG-TERM CURRENT USE OF INSULIN (HCC): ICD-10-CM

## 2018-07-26 LAB
ALBUMIN SERPL-MCNC: 4.1 G/DL (ref 3.5–5.2)
ALP BLD-CCNC: 73 U/L (ref 40–130)
ALT SERPL-CCNC: 17 U/L (ref 5–41)
ANION GAP SERPL CALCULATED.3IONS-SCNC: 15 MMOL/L (ref 7–19)
AST SERPL-CCNC: 18 U/L (ref 5–40)
BASOPHILS ABSOLUTE: 0.1 K/UL (ref 0–0.2)
BASOPHILS RELATIVE PERCENT: 0.8 % (ref 0–1)
BILIRUB SERPL-MCNC: 0.3 MG/DL (ref 0.2–1.2)
BUN BLDV-MCNC: 19 MG/DL (ref 8–23)
CALCIUM SERPL-MCNC: 9 MG/DL (ref 8.8–10.2)
CHLORIDE BLD-SCNC: 100 MMOL/L (ref 98–111)
CHOLESTEROL, TOTAL: 219 MG/DL (ref 160–199)
CO2: 23 MMOL/L (ref 22–29)
CREAT SERPL-MCNC: 1.1 MG/DL (ref 0.5–1.2)
CREATININE URINE: 62.5 MG/DL (ref 4.2–622)
EOSINOPHILS ABSOLUTE: 0.2 K/UL (ref 0–0.6)
EOSINOPHILS RELATIVE PERCENT: 2.7 % (ref 0–5)
GFR NON-AFRICAN AMERICAN: >60
GLUCOSE BLD-MCNC: 105 MG/DL (ref 74–109)
HBA1C MFR BLD: 6.2 % (ref 4–6)
HCT VFR BLD CALC: 40.5 % (ref 42–52)
HDLC SERPL-MCNC: 29 MG/DL (ref 55–121)
HEMOGLOBIN: 12.9 G/DL (ref 14–18)
LDL CHOLESTEROL CALCULATED: 137 MG/DL
LYMPHOCYTES ABSOLUTE: 2 K/UL (ref 1.1–4.5)
LYMPHOCYTES RELATIVE PERCENT: 31.1 % (ref 20–40)
MCH RBC QN AUTO: 28.2 PG (ref 27–31)
MCHC RBC AUTO-ENTMCNC: 31.9 G/DL (ref 33–37)
MCV RBC AUTO: 88.4 FL (ref 80–94)
MICROALBUMIN UR-MCNC: <1.2 MG/DL (ref 0–19)
MICROALBUMIN/CREAT UR-RTO: NORMAL MG/G
MONOCYTES ABSOLUTE: 0.5 K/UL (ref 0–0.9)
MONOCYTES RELATIVE PERCENT: 8.2 % (ref 0–10)
NEUTROPHILS ABSOLUTE: 3.6 K/UL (ref 1.5–7.5)
NEUTROPHILS RELATIVE PERCENT: 56.9 % (ref 50–65)
PDW BLD-RTO: 14 % (ref 11.5–14.5)
PLATELET # BLD: 172 K/UL (ref 130–400)
PMV BLD AUTO: 11.3 FL (ref 9.4–12.4)
POTASSIUM SERPL-SCNC: 4.5 MMOL/L (ref 3.5–5)
RBC # BLD: 4.58 M/UL (ref 4.7–6.1)
SODIUM BLD-SCNC: 138 MMOL/L (ref 136–145)
TOTAL PROTEIN: 6.8 G/DL (ref 6.6–8.7)
TRIGL SERPL-MCNC: 265 MG/DL (ref 0–149)
WBC # BLD: 6.4 K/UL (ref 4.8–10.8)

## 2018-08-02 DIAGNOSIS — R00.2 PALPITATIONS: ICD-10-CM

## 2018-08-02 DIAGNOSIS — Z95.818 STATUS POST PLACEMENT OF IMPLANTABLE LOOP RECORDER: Primary | ICD-10-CM

## 2018-08-02 DIAGNOSIS — I63.411 CEREBROVASCULAR ACCIDENT (CVA) DUE TO EMBOLISM OF RIGHT MIDDLE CEREBRAL ARTERY (HCC): ICD-10-CM

## 2018-08-02 PROCEDURE — 93299 PR REM INTERROG ICPMS/SCRMS <30 D TECH REVIEW: CPT | Performed by: CLINICAL NURSE SPECIALIST

## 2018-08-02 PROCEDURE — 93298 REM INTERROG DEV EVAL SCRMS: CPT | Performed by: CLINICAL NURSE SPECIALIST

## 2018-08-03 ENCOUNTER — OFFICE VISIT (OUTPATIENT)
Dept: FAMILY MEDICINE CLINIC | Age: 73
End: 2018-08-03
Payer: MEDICARE

## 2018-08-03 VITALS
WEIGHT: 266 LBS | BODY MASS INDEX: 32.38 KG/M2 | TEMPERATURE: 96.8 F | HEART RATE: 70 BPM | RESPIRATION RATE: 18 BRPM | SYSTOLIC BLOOD PRESSURE: 138 MMHG | OXYGEN SATURATION: 95 % | DIASTOLIC BLOOD PRESSURE: 68 MMHG

## 2018-08-03 DIAGNOSIS — E11.8 TYPE 2 DIABETES MELLITUS WITH COMPLICATION, WITH LONG-TERM CURRENT USE OF INSULIN (HCC): ICD-10-CM

## 2018-08-03 DIAGNOSIS — G44.229 CHRONIC TENSION-TYPE HEADACHE, NOT INTRACTABLE: ICD-10-CM

## 2018-08-03 DIAGNOSIS — M79.642 PAIN IN BOTH HANDS: ICD-10-CM

## 2018-08-03 DIAGNOSIS — E78.2 MIXED HYPERLIPIDEMIA: ICD-10-CM

## 2018-08-03 DIAGNOSIS — F41.8 DEPRESSION WITH ANXIETY: ICD-10-CM

## 2018-08-03 DIAGNOSIS — G47.33 OSA ON CPAP: ICD-10-CM

## 2018-08-03 DIAGNOSIS — F33.42 RECURRENT MAJOR DEPRESSIVE DISORDER, IN FULL REMISSION (HCC): ICD-10-CM

## 2018-08-03 DIAGNOSIS — Z79.4 TYPE 2 DIABETES MELLITUS WITH COMPLICATION, WITH LONG-TERM CURRENT USE OF INSULIN (HCC): ICD-10-CM

## 2018-08-03 DIAGNOSIS — Z99.89 OSA ON CPAP: ICD-10-CM

## 2018-08-03 DIAGNOSIS — M79.641 PAIN IN BOTH HANDS: ICD-10-CM

## 2018-08-03 DIAGNOSIS — Z23 NEED FOR PROPHYLACTIC VACCINATION OR INOCULATION AGAINST DIPHTHERIA AND TETANUS: Primary | ICD-10-CM

## 2018-08-03 DIAGNOSIS — N40.0 BENIGN PROSTATIC HYPERPLASIA WITHOUT LOWER URINARY TRACT SYMPTOMS: ICD-10-CM

## 2018-08-03 DIAGNOSIS — G89.4 CHRONIC PAIN SYNDROME: ICD-10-CM

## 2018-08-03 DIAGNOSIS — I10 ESSENTIAL HYPERTENSION: ICD-10-CM

## 2018-08-03 DIAGNOSIS — I25.10 CORONARY ARTERY DISEASE INVOLVING NATIVE CORONARY ARTERY OF NATIVE HEART WITHOUT ANGINA PECTORIS: ICD-10-CM

## 2018-08-03 PROCEDURE — 99215 OFFICE O/P EST HI 40 MIN: CPT | Performed by: FAMILY MEDICINE

## 2018-08-03 PROCEDURE — G8417 CALC BMI ABV UP PARAM F/U: HCPCS | Performed by: FAMILY MEDICINE

## 2018-08-03 PROCEDURE — 3017F COLORECTAL CA SCREEN DOC REV: CPT | Performed by: FAMILY MEDICINE

## 2018-08-03 PROCEDURE — G8598 ASA/ANTIPLAT THER USED: HCPCS | Performed by: FAMILY MEDICINE

## 2018-08-03 PROCEDURE — G8427 DOCREV CUR MEDS BY ELIG CLIN: HCPCS | Performed by: FAMILY MEDICINE

## 2018-08-03 PROCEDURE — 4040F PNEUMOC VAC/ADMIN/RCVD: CPT | Performed by: FAMILY MEDICINE

## 2018-08-03 PROCEDURE — 1101F PT FALLS ASSESS-DOCD LE1/YR: CPT | Performed by: FAMILY MEDICINE

## 2018-08-03 PROCEDURE — 1036F TOBACCO NON-USER: CPT | Performed by: FAMILY MEDICINE

## 2018-08-03 PROCEDURE — 1123F ACP DISCUSS/DSCN MKR DOCD: CPT | Performed by: FAMILY MEDICINE

## 2018-08-03 PROCEDURE — 2022F DILAT RTA XM EVC RTNOPTHY: CPT | Performed by: FAMILY MEDICINE

## 2018-08-03 PROCEDURE — 3044F HG A1C LEVEL LT 7.0%: CPT | Performed by: FAMILY MEDICINE

## 2018-08-03 RX ORDER — TAMSULOSIN HYDROCHLORIDE 0.4 MG/1
CAPSULE ORAL
Qty: 180 CAPSULE | Refills: 3 | Status: SHIPPED | OUTPATIENT
Start: 2018-08-03 | End: 2019-01-22 | Stop reason: SDUPTHER

## 2018-08-03 RX ORDER — CLOPIDOGREL BISULFATE 75 MG/1
TABLET ORAL
Qty: 90 TABLET | Refills: 3 | Status: SHIPPED | OUTPATIENT
Start: 2018-08-03 | End: 2019-01-18 | Stop reason: SDUPTHER

## 2018-08-03 RX ORDER — BUTALBITAL, ACETAMINOPHEN AND CAFFEINE 50; 325; 40 MG/1; MG/1; MG/1
1 TABLET ORAL DAILY
Qty: 30 TABLET | Refills: 2 | Status: SHIPPED | OUTPATIENT
Start: 2018-08-03 | End: 2019-04-15

## 2018-08-03 RX ORDER — LISINOPRIL 20 MG/1
TABLET ORAL
Qty: 90 TABLET | Refills: 3 | Status: SHIPPED | OUTPATIENT
Start: 2018-08-03 | End: 2019-01-18 | Stop reason: SDUPTHER

## 2018-08-03 ASSESSMENT — ENCOUNTER SYMPTOMS
DIARRHEA: 0
CHEST TIGHTNESS: 0
ANAL BLEEDING: 0
SHORTNESS OF BREATH: 0
ABDOMINAL PAIN: 0
COUGH: 0
CONSTIPATION: 0
BACK PAIN: 1
NAUSEA: 0

## 2018-08-03 NOTE — PROGRESS NOTES
[place]  MEDICINE  [facility] [facility]  [address] [facility] [address]  Dept: [phone]  Dept Fax: [phone]  Loc: [phone]    Camille Mercado is a 75 y.o. male who presents today for his medical conditions/complaints as noted below. Camille Mercado is here for 3 Month Follow-Up and Discuss Medications (Mobic and zoloft)        HPI:   CC: Here today to discuss the following:     he had enrolled in a weight loss program and had failed to lose any significant weight. Diabetes Mellitus  Has been compliant with medications. No side effects of medications since last visit. No polyuria, polydipsia, or vision changes since last visit. No symptomatic episodes of hypoglycemia. Benign Prostatic Hypertrophy  Tolerating medication without adverse effects. Symptoms of hesitancy, nocturia, and dribbling are adequately controlled. No hematuria or dysuria    Depression with Anxiety  Compliant with medications. No adverse effects from medication. Depression and anxiety symptoms are stable today. No suicidal or homicidal ideation. Excessive worry, insomnia, and anxiousness are stable. Energy, concentration, and apathy are stable. Gastroesophageal Reflux Disease  Symptoms currently under control. Medication adequately controls his symptoms. No hematochezia or melena. Hypertension  Compliant with medications. No adverse effects from medication. No lightheadedness, palpitations, or chest pain. Complains of bilateral hand pain related to posture arthritis. Does experience some stiffness as well. No recent injuries. No numbness. Coronary Artery Disease  Currently no active angina symptoms. No chest pain with exertion. No orthopnea. He has not taken any nitroglycerin since his last visit    Migraine Headaches  Headaches are currently stable. Satisfied with current medication without side effects.      Allergies  Allergies are currently stable. His primary issue continues to be pain. He is currently seeing a pain specialist and receiving steroid injections. He is also receiving oxycodone from them. Continues to complain of neck shoulder lower back and hip discomfort. HPI    Past Medical History:   Diagnosis Date    Acute ischemic stroke ([address] 2/20/2017    Atherosclerotic heart disease     s/p MI, stenting x 3 jan 2011([place]    CAD (coronary artery disease)     Cerebral artery occlusion with cerebral infarction [facility])     Cerebrovascular accident (CVA) due to embolism of right middle cerebral artery ([address] 8/14/2017    Chronic bilateral low back pain without sciatica 11/10/2016    Chronic kidney disease     DDD (degenerative disc disease), lumbar 11/15/2016    Depression     Diabetes mellitus ([address]     type II    Diabetes mellitus, type 2 ([address]     DM (diabetes mellitus) ([address] 7/12/2011    Glaucoma     Headache     Hyperlipidemia     Cholesterol management per pcp.  Hypertension     Macular degeneration     MI (myocardial infarction)     MI (myocardial infarction)     Neuropathy ([address]     Osteoarthritis     Sleep apnea     Spondylosis of lumbar region without myelopathy or radiculopathy 11/15/2016    Status post placement of implantable loop recorder 10/31/2017    TIA (transient ischemic attack)       Past Surgical History:   Procedure Laterality Date    CARDIAC CATHETERIZATION  12/12/12    with angioplasty, stent    CARDIAC CATHETERIZATION  3/29/15  MDL    stent to distal RCA.  EF 60%    CORONARY ANGIOPLASTY WITH STENT PLACEMENT  jan 11 [place]    stent x 3    CORONARY ANGIOPLASTY WITH STENT PLACEMENT      01/01/11    DIAGNOSTIC CARDIAC CATH LAB PROCEDURE  [id number]    primary stent placement to the first circumflex marginal branch, balloon angioplasty to the third left anterior descending diagnonal branch, intracoronary nitroglycerin adminstration    LUMBAR FUSION Left 11/15/2016    LUMBAR INTERBODY FUSION LATERAL L3-4 L4-5 WITH LATERAL PLATE  performed by León Caba MD at [facility] History   Problem Relation Age of Onset    Coronary Art Dis Mother     Coronary Art Dis Sister    [facility] Cancer Sister         uterine    Kidney Disease Sister     Coronary Art Dis Brother          in his 46s    Cancer Father         colon       Social History   Substance Use Topics    Smoking status: Never Smoker    Smokeless tobacco: Never Used    Alcohol use Yes      Comment: 2-3 x weekly wine or whisky and water      Current Outpatient Prescriptions   Medication Sig Dispense Refill    clopidogrel (PLAVIX) 75 MG tablet TAKE 1 TABLET DAILY 90 tablet 3    butalbital-acetaminophen-caffeine (FIORICET, ESGIC) -40 MG per tablet Take 1 tablet by mouth daily 30 tablet 2    insulin glargine (LANTUS SOLOSTAR) 100 UNIT/ML injection pen INJECT 80 UNITS SUB-Q DAILY. 25 pen 3    lisinopril (PRINIVIL;ZESTRIL) 20 MG tablet TAKE 1/2 TABLET BY MOUTH DAILY 90 tablet 3    metoprolol tartrate (LOPRESSOR) 25 MG tablet TAKE 1/2 TABLET TWICE DAILY FOR HIGH BLOOD PRESSURE 90 tablet 3    tamsulosin (FLOMAX) 0.4 MG capsule TAKE 2 CAPSULES AT BEDTIME 180 capsule 3    diclofenac sodium (VOLTAREN) 1 % GEL Apply 4 g topically 4 times daily 5 Tube 0    meloxicam (MOBIC) 15 MG tablet TAKE 1 TABLET DAILY 90 tablet 0    sertraline (ZOLOFT) 50 MG tablet Take 1 tablet by mouth daily 30 tablet 3    ranitidine (ZANTAC) 300 MG tablet TAKE 1 TABLET TWICE DAILY FOR STOMACH. 180 tablet 3    azelastine (ASTELIN) 0.1 % nasal spray 2 sprays by Nasal route 2 times daily Use in each nostril as directed 2 Bottle 3    ULTICARE SHORT PEN NEEDLES 31G X 8 MM MISC Inject 1 each as directed daily 100 each 3    nitroGLYCERIN (NITROSTAT) 0.4 MG SL tablet Place 0.4 mg under the tongue every 5 minutes as needed. No current facility-administered medications for this visit.       Allergies   Allergen Reactions    Statins Other (See Comments) Auto Differential    Collection Time: 07/26/18 11:12 AM   Result Value Ref Range    WBC 6.4 4.8 - 10.8 K/uL    RBC 4.58 (L) 4.70 - 6.10 M/uL    Hemoglobin 12.9 (L) 14.0 - 18.0 g/dL    Hematocrit 40.5 (L) 42.0 - 52.0 %    MCV 88.4 80.0 - 94.0 fL    MCH 28.2 27.0 - 31.0 pg    MCHC 31.9 (L) 33.0 - 37.0 g/dL    RDW 14.0 11.5 - 14.5 %    Platelets 103 239 - 314 K/uL    MPV 11.3 9.4 - 12.4 fL    Neutrophils % 56.9 50.0 - 65.0 %    Lymphocytes % 31.1 20.0 - 40.0 %    Monocytes % 8.2 0.0 - 10.0 %    Eosinophils % 2.7 0.0 - 5.0 %    Basophils % 0.8 0.0 - 1.0 %    Neutrophils # 3.6 1.5 - 7.5 K/uL    Lymphocytes # 2.0 1.1 - 4.5 K/uL    Monocytes # 0.50 0.00 - 0.90 K/uL    Eosinophils # 0.20 0.00 - 0.60 K/uL    Basophils # 0.10 0.00 - 0.20 K/uL               Assessment & Plan: The following diagnoses and conditions are stable with no further orders unless indicated:  1. Chronic tension-type headache, not intractable  Headaches overall. Be stable. He continues to take Fioricet as needed. We'll continue with this medication at this point. Advised to discuss further pain treatment options with his pain specialist  - butalbital-acetaminophen-caffeine (FIORICET, ESGIC) -40 MG per tablet; Take 1 tablet by mouth daily  Dispense: 30 tablet; Refill: 2    2. Need for prophylactic vaccination or inoculation against diphtheria and tetanus    - Tdap (age 6y and older) IM ([address])    3. Depression with anxiety  Overall satisfied with Zoloft. Will continue current medication. No suicidal or homicidal ideation    4. Type 2 diabetes mellitus with complication, with long-term current use of insulin ([address]  Long discussion with the patient regarding his diabetes today. We had excellent improvement of his blood sugar and his A1c is now well controlled. He is requesting to be taken off of insulin at this point due to his concerns for his difficulty in losing weight.  I voiced to him my concern is he has multiple cardiovascular risk

## 2018-08-30 ENCOUNTER — TELEPHONE (OUTPATIENT)
Dept: FAMILY MEDICINE CLINIC | Age: 73
End: 2018-08-30

## 2018-09-07 DIAGNOSIS — F41.8 DEPRESSION WITH ANXIETY: ICD-10-CM

## 2018-09-07 DIAGNOSIS — Z79.4 TYPE 2 DIABETES MELLITUS WITH COMPLICATION, WITH LONG-TERM CURRENT USE OF INSULIN (HCC): ICD-10-CM

## 2018-09-07 DIAGNOSIS — E11.8 TYPE 2 DIABETES MELLITUS WITH COMPLICATION, WITH LONG-TERM CURRENT USE OF INSULIN (HCC): ICD-10-CM

## 2018-09-07 RX ORDER — MELOXICAM 15 MG/1
15 TABLET ORAL DAILY
Qty: 30 TABLET | Refills: 0 | Status: SHIPPED | OUTPATIENT
Start: 2018-09-07 | End: 2018-10-04 | Stop reason: SDUPTHER

## 2018-09-12 DIAGNOSIS — R00.2 PALPITATIONS: ICD-10-CM

## 2018-09-12 DIAGNOSIS — Z95.818 STATUS POST PLACEMENT OF IMPLANTABLE LOOP RECORDER: Primary | ICD-10-CM

## 2018-09-12 PROCEDURE — 93299 PR REM INTERROG ICPMS/SCRMS <30 D TECH REVIEW: CPT | Performed by: CLINICAL NURSE SPECIALIST

## 2018-09-12 PROCEDURE — 93298 REM INTERROG DEV EVAL SCRMS: CPT | Performed by: CLINICAL NURSE SPECIALIST

## 2018-10-09 ENCOUNTER — TELEPHONE (OUTPATIENT)
Dept: CARDIOLOGY | Age: 73
End: 2018-10-09

## 2018-10-16 DIAGNOSIS — E11.8 TYPE 2 DIABETES MELLITUS WITH COMPLICATION, WITH LONG-TERM CURRENT USE OF INSULIN (HCC): ICD-10-CM

## 2018-10-16 DIAGNOSIS — Z79.4 TYPE 2 DIABETES MELLITUS WITH COMPLICATION, WITH LONG-TERM CURRENT USE OF INSULIN (HCC): ICD-10-CM

## 2018-10-19 RX ORDER — PEN NEEDLE, DIABETIC 32GX 5/32"
NEEDLE, DISPOSABLE MISCELLANEOUS
Qty: 100 EACH | Refills: 3 | Status: SHIPPED | OUTPATIENT
Start: 2018-10-19 | End: 2019-01-18 | Stop reason: SDUPTHER

## 2018-10-19 RX ORDER — INSULIN GLARGINE 100 [IU]/ML
INJECTION, SOLUTION SUBCUTANEOUS
Qty: 24 PEN | Refills: 3 | Status: SHIPPED | OUTPATIENT
Start: 2018-10-19 | End: 2019-01-18 | Stop reason: SDUPTHER

## 2018-10-24 DIAGNOSIS — R00.2 PALPITATIONS: ICD-10-CM

## 2018-10-24 DIAGNOSIS — Z95.818 STATUS POST PLACEMENT OF IMPLANTABLE LOOP RECORDER: Primary | ICD-10-CM

## 2018-10-24 DIAGNOSIS — I63.411 CEREBROVASCULAR ACCIDENT (CVA) DUE TO EMBOLISM OF RIGHT MIDDLE CEREBRAL ARTERY (HCC): ICD-10-CM

## 2018-10-24 PROCEDURE — 93298 REM INTERROG DEV EVAL SCRMS: CPT | Performed by: NURSE PRACTITIONER

## 2018-10-24 PROCEDURE — 93299 PR REM INTERROG ICPMS/SCRMS <30 D TECH REVIEW: CPT | Performed by: NURSE PRACTITIONER

## 2018-11-01 DIAGNOSIS — Z79.4 TYPE 2 DIABETES MELLITUS WITH COMPLICATION, WITH LONG-TERM CURRENT USE OF INSULIN (HCC): ICD-10-CM

## 2018-11-01 DIAGNOSIS — E11.8 TYPE 2 DIABETES MELLITUS WITH COMPLICATION, WITH LONG-TERM CURRENT USE OF INSULIN (HCC): ICD-10-CM

## 2018-11-01 DIAGNOSIS — E78.2 MIXED HYPERLIPIDEMIA: ICD-10-CM

## 2018-11-01 DIAGNOSIS — I10 ESSENTIAL HYPERTENSION: ICD-10-CM

## 2018-11-01 LAB
ALBUMIN SERPL-MCNC: 3.9 G/DL (ref 3.5–5.2)
ALP BLD-CCNC: 82 U/L (ref 40–130)
ALT SERPL-CCNC: 17 U/L (ref 5–41)
ANION GAP SERPL CALCULATED.3IONS-SCNC: 12 MMOL/L (ref 7–19)
AST SERPL-CCNC: 15 U/L (ref 5–40)
BASOPHILS ABSOLUTE: 0 K/UL (ref 0–0.2)
BASOPHILS RELATIVE PERCENT: 0.8 % (ref 0–1)
BILIRUB SERPL-MCNC: <0.2 MG/DL (ref 0.2–1.2)
BUN BLDV-MCNC: 18 MG/DL (ref 8–23)
CALCIUM SERPL-MCNC: 9 MG/DL (ref 8.8–10.2)
CHLORIDE BLD-SCNC: 102 MMOL/L (ref 98–111)
CHOLESTEROL, TOTAL: 240 MG/DL (ref 160–199)
CO2: 24 MMOL/L (ref 22–29)
CREAT SERPL-MCNC: 1.1 MG/DL (ref 0.5–1.2)
EOSINOPHILS ABSOLUTE: 0.2 K/UL (ref 0–0.6)
EOSINOPHILS RELATIVE PERCENT: 3.1 % (ref 0–5)
GFR NON-AFRICAN AMERICAN: >60
GLUCOSE BLD-MCNC: 212 MG/DL (ref 74–109)
HBA1C MFR BLD: 7.3 % (ref 4–6)
HCT VFR BLD CALC: 42.7 % (ref 42–52)
HDLC SERPL-MCNC: 31 MG/DL (ref 55–121)
HEMOGLOBIN: 14.2 G/DL (ref 14–18)
LDL CHOLESTEROL CALCULATED: ABNORMAL MG/DL
LDL CHOLESTEROL DIRECT: 167 MG/DL
LYMPHOCYTES ABSOLUTE: 1.6 K/UL (ref 1.1–4.5)
LYMPHOCYTES RELATIVE PERCENT: 30.9 % (ref 20–40)
MCH RBC QN AUTO: 27.6 PG (ref 27–31)
MCHC RBC AUTO-ENTMCNC: 33.3 G/DL (ref 33–37)
MCV RBC AUTO: 83.1 FL (ref 80–94)
MONOCYTES ABSOLUTE: 0.4 K/UL (ref 0–0.9)
MONOCYTES RELATIVE PERCENT: 7.9 % (ref 0–10)
NEUTROPHILS ABSOLUTE: 3 K/UL (ref 1.5–7.5)
NEUTROPHILS RELATIVE PERCENT: 56.9 % (ref 50–65)
PDW BLD-RTO: 14 % (ref 11.5–14.5)
PLATELET # BLD: 158 K/UL (ref 130–400)
PMV BLD AUTO: 10.9 FL (ref 9.4–12.4)
POTASSIUM SERPL-SCNC: 4.6 MMOL/L (ref 3.5–5)
RBC # BLD: 5.14 M/UL (ref 4.7–6.1)
SODIUM BLD-SCNC: 138 MMOL/L (ref 136–145)
TOTAL PROTEIN: 6.6 G/DL (ref 6.6–8.7)
TRIGL SERPL-MCNC: 415 MG/DL (ref 0–149)
WBC # BLD: 5.2 K/UL (ref 4.8–10.8)

## 2018-11-06 ENCOUNTER — OFFICE VISIT (OUTPATIENT)
Dept: FAMILY MEDICINE CLINIC | Age: 73
End: 2018-11-06
Payer: MEDICARE

## 2018-11-06 VITALS
DIASTOLIC BLOOD PRESSURE: 66 MMHG | BODY MASS INDEX: 32.87 KG/M2 | HEART RATE: 61 BPM | SYSTOLIC BLOOD PRESSURE: 124 MMHG | TEMPERATURE: 97.5 F | OXYGEN SATURATION: 95 % | WEIGHT: 270 LBS

## 2018-11-06 DIAGNOSIS — E11.8 TYPE 2 DIABETES MELLITUS WITH COMPLICATION, WITH LONG-TERM CURRENT USE OF INSULIN (HCC): Primary | ICD-10-CM

## 2018-11-06 DIAGNOSIS — H91.90 HEARING LOSS, UNSPECIFIED HEARING LOSS TYPE, UNSPECIFIED LATERALITY: ICD-10-CM

## 2018-11-06 DIAGNOSIS — G89.29 CHRONIC MIDLINE LOW BACK PAIN WITHOUT SCIATICA: ICD-10-CM

## 2018-11-06 DIAGNOSIS — G47.33 OSA ON CPAP: ICD-10-CM

## 2018-11-06 DIAGNOSIS — F41.8 DEPRESSION WITH ANXIETY: ICD-10-CM

## 2018-11-06 DIAGNOSIS — N40.0 BENIGN PROSTATIC HYPERPLASIA WITHOUT LOWER URINARY TRACT SYMPTOMS: ICD-10-CM

## 2018-11-06 DIAGNOSIS — Z99.89 OSA ON CPAP: ICD-10-CM

## 2018-11-06 DIAGNOSIS — I10 ESSENTIAL HYPERTENSION: ICD-10-CM

## 2018-11-06 DIAGNOSIS — I25.10 CORONARY ARTERY DISEASE INVOLVING NATIVE CORONARY ARTERY OF NATIVE HEART WITHOUT ANGINA PECTORIS: ICD-10-CM

## 2018-11-06 DIAGNOSIS — G89.4 CHRONIC PAIN SYNDROME: ICD-10-CM

## 2018-11-06 DIAGNOSIS — E78.2 MIXED HYPERLIPIDEMIA: ICD-10-CM

## 2018-11-06 DIAGNOSIS — Z79.4 TYPE 2 DIABETES MELLITUS WITH COMPLICATION, WITH LONG-TERM CURRENT USE OF INSULIN (HCC): Primary | ICD-10-CM

## 2018-11-06 DIAGNOSIS — M54.50 CHRONIC MIDLINE LOW BACK PAIN WITHOUT SCIATICA: ICD-10-CM

## 2018-11-06 DIAGNOSIS — G44.229 CHRONIC TENSION-TYPE HEADACHE, NOT INTRACTABLE: ICD-10-CM

## 2018-11-06 DIAGNOSIS — G62.9 PERIPHERAL POLYNEUROPATHY: ICD-10-CM

## 2018-11-06 DIAGNOSIS — Z23 NEEDS FLU SHOT: ICD-10-CM

## 2018-11-06 PROCEDURE — G0008 ADMIN INFLUENZA VIRUS VAC: HCPCS | Performed by: FAMILY MEDICINE

## 2018-11-06 PROCEDURE — 99214 OFFICE O/P EST MOD 30 MIN: CPT | Performed by: FAMILY MEDICINE

## 2018-11-06 PROCEDURE — 90662 IIV NO PRSV INCREASED AG IM: CPT | Performed by: FAMILY MEDICINE

## 2018-11-06 NOTE — PROGRESS NOTES
cerebral infarction [facility])     Cerebrovascular accident (CVA) due to embolism of right middle cerebral artery [facility] 2017    Chronic bilateral low back pain without sciatica 11/10/2016    Chronic kidney disease     DDD (degenerative disc disease), lumbar 11/15/2016    Depression     Depression with anxiety     Diabetes mellitus ([facility]     type II    Diabetes mellitus, type 2 ([address]     DM (diabetes mellitus) ([facility] 2011    Glaucoma     Headache     Hyperlipidemia     Cholesterol management per pcp.  Hypertension     Macular degeneration     MI (myocardial infarction) [facility]     MI (myocardial infarction) [facility]     Neuropathy     Osteoarthritis     Sleep apnea     Spondylosis of lumbar region without myelopathy or radiculopathy 11/15/2016    Status post placement of implantable loop recorder 10/31/2017    TIA (transient ischemic attack)       Past Surgical History:   Procedure Laterality Date    CARDIAC CATHETERIZATION  12    with angioplasty, stent    CARDIAC CATHETERIZATION  3/29/15  MDL    stent to distal RCA.  EF 60%    CORONARY ANGIOPLASTY WITH STENT PLACEMENT   [place]    stent x 3    CORONARY ANGIOPLASTY WITH STENT PLACEMENT      11    DIAGNOSTIC CARDIAC CATH LAB PROCEDURE  [id number]    primary stent placement to the first circumflex marginal branch, balloon angioplasty to the third left anterior descending diagnonal branch, intracoronary nitroglycerin adminstration    LUMBAR FUSION Left 11/15/2016    LUMBAR INTERBODY FUSION LATERAL L3-4 L4-5 WITH LATERAL PLATE  performed by Shiv Frias MD at [facility] OR       Family History   Problem Relation Age of Onset    Coronary Art Dis Mother     Coronary Art Dis Sister     Cancer Sister         uterine    Kidney Disease Sister     Coronary Art Dis Brother          in his 46s    Cancer Father         colon       Social History   Substance Use Topics    Smoking status: Never Smoker    Smokeless tobacco: Never

## 2018-11-12 DIAGNOSIS — F41.8 DEPRESSION WITH ANXIETY: ICD-10-CM

## 2018-11-12 ASSESSMENT — ENCOUNTER SYMPTOMS
ABDOMINAL PAIN: 0
ANAL BLEEDING: 0
NAUSEA: 0
SINUS PAIN: 0
DIARRHEA: 0
CONSTIPATION: 0
SINUS PRESSURE: 0
SHORTNESS OF BREATH: 0
BACK PAIN: 1
CHEST TIGHTNESS: 0
COUGH: 0

## 2018-12-05 DIAGNOSIS — Z95.818 STATUS POST PLACEMENT OF IMPLANTABLE LOOP RECORDER: Primary | ICD-10-CM

## 2018-12-05 DIAGNOSIS — I63.411 CEREBROVASCULAR ACCIDENT (CVA) DUE TO EMBOLISM OF RIGHT MIDDLE CEREBRAL ARTERY (HCC): ICD-10-CM

## 2018-12-05 PROCEDURE — 93298 REM INTERROG DEV EVAL SCRMS: CPT | Performed by: NURSE PRACTITIONER

## 2018-12-05 PROCEDURE — 93299 PR REM INTERROG ICPMS/SCRMS <30 D TECH REVIEW: CPT | Performed by: NURSE PRACTITIONER

## 2018-12-14 ENCOUNTER — OFFICE VISIT (OUTPATIENT)
Dept: URGENT CARE | Age: 73
End: 2018-12-14
Payer: MEDICARE

## 2018-12-14 VITALS
HEIGHT: 76 IN | BODY MASS INDEX: 33.24 KG/M2 | WEIGHT: 273 LBS | HEART RATE: 60 BPM | RESPIRATION RATE: 20 BRPM | OXYGEN SATURATION: 98 % | SYSTOLIC BLOOD PRESSURE: 118 MMHG | TEMPERATURE: 97.1 F | DIASTOLIC BLOOD PRESSURE: 60 MMHG

## 2018-12-14 DIAGNOSIS — R52 BODY ACHES: ICD-10-CM

## 2018-12-14 DIAGNOSIS — J02.9 SORE THROAT: ICD-10-CM

## 2018-12-14 DIAGNOSIS — J01.90 ACUTE SINUSITIS, RECURRENCE NOT SPECIFIED, UNSPECIFIED LOCATION: Primary | ICD-10-CM

## 2018-12-14 LAB
INFLUENZA A ANTIBODY: NEGATIVE
INFLUENZA B ANTIBODY: NEGATIVE
S PYO AG THROAT QL: NORMAL

## 2018-12-14 PROCEDURE — 87804 INFLUENZA ASSAY W/OPTIC: CPT | Performed by: NURSE PRACTITIONER

## 2018-12-14 PROCEDURE — 99213 OFFICE O/P EST LOW 20 MIN: CPT | Performed by: NURSE PRACTITIONER

## 2018-12-14 PROCEDURE — 87880 STREP A ASSAY W/OPTIC: CPT | Performed by: NURSE PRACTITIONER

## 2018-12-14 PROCEDURE — 96372 THER/PROPH/DIAG INJ SC/IM: CPT | Performed by: NURSE PRACTITIONER

## 2018-12-14 RX ORDER — METHYLPREDNISOLONE 4 MG/1
TABLET ORAL
Qty: 1 KIT | Refills: 0 | Status: SHIPPED | OUTPATIENT
Start: 2018-12-14 | End: 2019-02-25 | Stop reason: ALTCHOICE

## 2018-12-14 RX ORDER — BENZONATATE 100 MG/1
100 CAPSULE ORAL 3 TIMES DAILY PRN
Qty: 21 CAPSULE | Refills: 0 | Status: SHIPPED | OUTPATIENT
Start: 2018-12-14 | End: 2018-12-21

## 2018-12-14 RX ORDER — DEXAMETHASONE SODIUM PHOSPHATE 10 MG/ML
5 INJECTION INTRAMUSCULAR; INTRAVENOUS ONCE
Status: COMPLETED | OUTPATIENT
Start: 2018-12-14 | End: 2018-12-14

## 2018-12-14 RX ORDER — OXYCODONE AND ACETAMINOPHEN 10; 325 MG/1; MG/1
TABLET ORAL
Refills: 0 | COMMUNITY
Start: 2018-11-27 | End: 2019-04-15 | Stop reason: DRUGHIGH

## 2018-12-14 RX ORDER — AMOXICILLIN AND CLAVULANATE POTASSIUM 875; 125 MG/1; MG/1
1 TABLET, FILM COATED ORAL 2 TIMES DAILY
Qty: 20 TABLET | Refills: 0 | Status: SHIPPED | OUTPATIENT
Start: 2018-12-14 | End: 2018-12-24

## 2018-12-14 RX ORDER — AZELASTINE 1 MG/ML
2 SPRAY, METERED NASAL 2 TIMES DAILY
Qty: 2 BOTTLE | Refills: 0 | Status: SHIPPED | OUTPATIENT
Start: 2018-12-14 | End: 2019-01-31 | Stop reason: SDUPTHER

## 2018-12-14 RX ADMIN — DEXAMETHASONE SODIUM PHOSPHATE 5 MG: 10 INJECTION INTRAMUSCULAR; INTRAVENOUS at 14:00

## 2018-12-14 ASSESSMENT — ENCOUNTER SYMPTOMS
SORE THROAT: 1
VOMITING: 0
NAUSEA: 0
ABDOMINAL PAIN: 0
SINUS PRESSURE: 1
DIARRHEA: 0
SHORTNESS OF BREATH: 0
CONSTIPATION: 0
RHINORRHEA: 0
COUGH: 1

## 2018-12-14 NOTE — PATIENT INSTRUCTIONS

## 2018-12-14 NOTE — PROGRESS NOTES
CATHETERIZATION  3/29/15  MDL    stent to distal RCA. EF 60%    CORONARY ANGIOPLASTY WITH STENT PLACEMENT   [place]    stent x 3    CORONARY ANGIOPLASTY WITH STENT PLACEMENT      11    DIAGNOSTIC CARDIAC CATH LAB PROCEDURE  [id number]    primary stent placement to the first circumflex marginal branch, balloon angioplasty to the third left anterior descending diagnonal branch, intracoronary nitroglycerin adminstration    LUMBAR FUSION Left 11/15/2016    LUMBAR INTERBODY FUSION LATERAL L3-4 L4-5 WITH LATERAL PLATE  performed by Vito Negrete MD at [facility] OR       Family History   Problem Relation Age of Onset    Coronary Art Dis Mother     Coronary Art Dis Sister     Cancer Sister         uterine    Kidney Disease Sister     Coronary Art Dis Brother          in his 46s    Cancer Father         colon       Social History   Substance Use Topics    Smoking status: Never Smoker    Smokeless tobacco: Never Used    Alcohol use Yes      Comment: 2-3 x weekly wine or whisky and water      Current Outpatient Prescriptions   Medication Sig Dispense Refill    methylPREDNISolone (MEDROL DOSEPACK) 4 MG tablet Take by mouth. 1 kit 0    amoxicillin-clavulanate (AUGMENTIN) 875-125 MG per tablet Take 1 tablet by mouth 2 times daily for 10 days 20 tablet 0    benzonatate (TESSALON) 100 MG capsule Take 1 capsule by mouth 3 times daily as needed for Cough 21 capsule 0    azelastine (ASTELIN) 0.1 % nasal spray 2 sprays by Nasal route 2 times daily 2 Bottle 0    sertraline (ZOLOFT) 50 MG tablet Take 1 tablet by mouth daily 90 tablet 3    zoster recombinant adjuvanted vaccine [facility]) 50 MCG/0.5ML SUSR injection 1 vaccine now and repeat in 2-6 months.  0.5 mL 1    LANTUS SOLOSTAR 100 UNIT/ML injection pen INJECT 80 UNITS INTO THE SKIN NIGHTLY 24 pen 3    BD PEN NEEDLE DANIELLE U/F 32G X 4 MM MISC ONE DAILY 100 each 3    meloxicam (MOBIC) 15 MG tablet TAKE 1 TABLET BY MOUTH EVERY DAY 30 tablet 3    clopidogrel symptoms may last for several weeks. Sometimes antibiotics are needed. Follow-up care is a key part of your treatment and safety. Be sure to make and go to all appointments, and call your doctor if you are having problems. It's also a good idea to know your test results and keep a list of the medicines you take. How can you care for yourself at home? · Take an over-the-counter pain medicine, such as acetaminophen (Tylenol), ibuprofen (Advil, Motrin), or naproxen (Aleve). Read and follow all instructions on the label. · If the doctor prescribed antibiotics, take them as directed. Do not stop taking them just because you feel better. You need to take the full course of antibiotics. · Be careful when taking over-the-counter cold or flu medicines and Tylenol at the same time. Many of these medicines have acetaminophen, which is Tylenol. Read the labels to make sure that you are not taking more than the recommended dose. Too much acetaminophen (Tylenol) can be harmful. · Breathe warm, moist air from a steamy shower, a hot bath, or a sink filled with hot water. Avoid cold, dry air. Using a humidifier in your home may help. Follow the directions for cleaning the machine. · Use saline (saltwater) nasal washes to help keep your nasal passages open and wash out mucus and bacteria. You can buy saline nose drops at a grocery store or drugstore. Or you can make your own at home by adding 1 teaspoon of salt and 1 teaspoon of baking soda to 2 cups of distilled water. If you make your own, fill a bulb syringe with the solution, insert the tip into your nostril, and squeeze gently. Delia Locks your nose. · Put a hot, wet towel or a warm gel pack on your face 3 or 4 times a day for 5 to 10 minutes each time. · Try a decongestant nasal spray like oxymetazoline (Afrin). Do not use it for more than 3 days in a row. Using it for more than 3 days can make your congestion worse. When should you call for help?   Call your doctor now or

## 2019-01-16 DIAGNOSIS — Z95.818 STATUS POST PLACEMENT OF IMPLANTABLE LOOP RECORDER: Primary | ICD-10-CM

## 2019-01-16 DIAGNOSIS — R00.2 PALPITATIONS: ICD-10-CM

## 2019-01-16 DIAGNOSIS — I63.411 CEREBROVASCULAR ACCIDENT (CVA) DUE TO EMBOLISM OF RIGHT MIDDLE CEREBRAL ARTERY (HCC): ICD-10-CM

## 2019-01-16 PROCEDURE — 93299 PR REM INTERROG ICPMS/SCRMS <30 D TECH REVIEW: CPT | Performed by: CLINICAL NURSE SPECIALIST

## 2019-01-16 PROCEDURE — 93298 REM INTERROG DEV EVAL SCRMS: CPT | Performed by: CLINICAL NURSE SPECIALIST

## 2019-01-18 DIAGNOSIS — I25.10 CORONARY ARTERY DISEASE INVOLVING NATIVE CORONARY ARTERY OF NATIVE HEART WITHOUT ANGINA PECTORIS: ICD-10-CM

## 2019-01-18 DIAGNOSIS — I10 ESSENTIAL HYPERTENSION: ICD-10-CM

## 2019-01-18 DIAGNOSIS — Z79.4 TYPE 2 DIABETES MELLITUS WITH COMPLICATION, WITH LONG-TERM CURRENT USE OF INSULIN (HCC): ICD-10-CM

## 2019-01-18 DIAGNOSIS — E11.8 TYPE 2 DIABETES MELLITUS WITH COMPLICATION, WITH LONG-TERM CURRENT USE OF INSULIN (HCC): ICD-10-CM

## 2019-01-18 RX ORDER — CLOPIDOGREL BISULFATE 75 MG/1
TABLET ORAL
Qty: 90 TABLET | Refills: 3 | Status: SHIPPED | OUTPATIENT
Start: 2019-01-18 | End: 2019-09-10 | Stop reason: SDUPTHER

## 2019-01-18 RX ORDER — MELOXICAM 15 MG/1
TABLET ORAL
Qty: 90 TABLET | Refills: 3 | Status: SHIPPED | OUTPATIENT
Start: 2019-01-18 | End: 2019-03-12 | Stop reason: ALTCHOICE

## 2019-01-18 RX ORDER — ISOPROPYL ALCOHOL 0.75 G/1
1 SWAB TOPICAL DAILY
Qty: 100 EACH | Refills: 3 | Status: SHIPPED | OUTPATIENT
Start: 2019-01-18 | End: 2019-04-15

## 2019-01-18 RX ORDER — LISINOPRIL 20 MG/1
TABLET ORAL
Qty: 90 TABLET | Refills: 3 | Status: SHIPPED | OUTPATIENT
Start: 2019-01-18 | End: 2019-04-15 | Stop reason: DRUGHIGH

## 2019-01-18 RX ORDER — BLOOD-GLUCOSE CONTROL, NORMAL
1 EACH MISCELLANEOUS DAILY
Qty: 3 EACH | Refills: 3 | Status: SHIPPED | OUTPATIENT
Start: 2019-01-18 | End: 2019-04-15

## 2019-01-22 DIAGNOSIS — I10 ESSENTIAL HYPERTENSION: ICD-10-CM

## 2019-01-22 DIAGNOSIS — N40.0 BENIGN PROSTATIC HYPERPLASIA WITHOUT LOWER URINARY TRACT SYMPTOMS: ICD-10-CM

## 2019-01-22 RX ORDER — TAMSULOSIN HYDROCHLORIDE 0.4 MG/1
CAPSULE ORAL
Qty: 180 CAPSULE | Refills: 3 | Status: SHIPPED | OUTPATIENT
Start: 2019-01-22 | End: 2019-09-10 | Stop reason: SDUPTHER

## 2019-01-22 RX ORDER — RANITIDINE 300 MG/1
TABLET ORAL
Qty: 180 TABLET | Refills: 3 | Status: SHIPPED | OUTPATIENT
Start: 2019-01-22 | End: 2019-04-15

## 2019-01-30 ENCOUNTER — TELEPHONE (OUTPATIENT)
Dept: FAMILY MEDICINE CLINIC | Age: 74
End: 2019-01-30

## 2019-01-30 DIAGNOSIS — J01.90 ACUTE SINUSITIS, RECURRENCE NOT SPECIFIED, UNSPECIFIED LOCATION: ICD-10-CM

## 2019-01-31 RX ORDER — AZELASTINE 1 MG/ML
2 SPRAY, METERED NASAL 2 TIMES DAILY
Qty: 3 BOTTLE | Refills: 3 | Status: SHIPPED | OUTPATIENT
Start: 2019-01-31 | End: 2019-04-15 | Stop reason: DRUGHIGH

## 2019-02-12 ENCOUNTER — TELEPHONE (OUTPATIENT)
Dept: CARDIOLOGY | Age: 74
End: 2019-02-12

## 2019-02-19 DIAGNOSIS — Z79.4 TYPE 2 DIABETES MELLITUS WITH COMPLICATION, WITH LONG-TERM CURRENT USE OF INSULIN (HCC): ICD-10-CM

## 2019-02-19 DIAGNOSIS — I10 ESSENTIAL HYPERTENSION: ICD-10-CM

## 2019-02-19 DIAGNOSIS — E11.8 TYPE 2 DIABETES MELLITUS WITH COMPLICATION, WITH LONG-TERM CURRENT USE OF INSULIN (HCC): ICD-10-CM

## 2019-02-19 DIAGNOSIS — E78.2 MIXED HYPERLIPIDEMIA: ICD-10-CM

## 2019-02-19 LAB
ALBUMIN SERPL-MCNC: 4.2 G/DL (ref 3.5–5.2)
ALP BLD-CCNC: 79 U/L (ref 40–130)
ALT SERPL-CCNC: 16 U/L (ref 5–41)
ANION GAP SERPL CALCULATED.3IONS-SCNC: 14 MMOL/L (ref 7–19)
AST SERPL-CCNC: 14 U/L (ref 5–40)
BASOPHILS ABSOLUTE: 0.1 K/UL (ref 0–0.2)
BASOPHILS RELATIVE PERCENT: 0.8 % (ref 0–1)
BILIRUB SERPL-MCNC: 0.3 MG/DL (ref 0.2–1.2)
BUN BLDV-MCNC: 21 MG/DL (ref 8–23)
CALCIUM SERPL-MCNC: 8.7 MG/DL (ref 8.8–10.2)
CHLORIDE BLD-SCNC: 103 MMOL/L (ref 98–111)
CHOLESTEROL, TOTAL: 197 MG/DL (ref 160–199)
CO2: 25 MMOL/L (ref 22–29)
CREAT SERPL-MCNC: 1.1 MG/DL (ref 0.5–1.2)
CREATININE URINE: 191.1 MG/DL (ref 4.2–622)
EOSINOPHILS ABSOLUTE: 0.2 K/UL (ref 0–0.6)
EOSINOPHILS RELATIVE PERCENT: 4 % (ref 0–5)
GFR NON-AFRICAN AMERICAN: >60
GLUCOSE BLD-MCNC: 158 MG/DL (ref 74–109)
HBA1C MFR BLD: 7.2 % (ref 4–6)
HCT VFR BLD CALC: 42.5 % (ref 42–52)
HDLC SERPL-MCNC: 27 MG/DL (ref 55–121)
HEMOGLOBIN: 13.5 G/DL (ref 14–18)
LDL CHOLESTEROL CALCULATED: 115 MG/DL
LYMPHOCYTES ABSOLUTE: 2.3 K/UL (ref 1.1–4.5)
LYMPHOCYTES RELATIVE PERCENT: 38.8 % (ref 20–40)
MCH RBC QN AUTO: 27.6 PG (ref 27–31)
MCHC RBC AUTO-ENTMCNC: 31.8 G/DL (ref 33–37)
MCV RBC AUTO: 86.7 FL (ref 80–94)
MICROALBUMIN UR-MCNC: 3.4 MG/DL (ref 0–19)
MICROALBUMIN/CREAT UR-RTO: 17.8 MG/G
MONOCYTES ABSOLUTE: 0.5 K/UL (ref 0–0.9)
MONOCYTES RELATIVE PERCENT: 8.7 % (ref 0–10)
NEUTROPHILS ABSOLUTE: 2.8 K/UL (ref 1.5–7.5)
NEUTROPHILS RELATIVE PERCENT: 47.2 % (ref 50–65)
PDW BLD-RTO: 13.8 % (ref 11.5–14.5)
PLATELET # BLD: 153 K/UL (ref 130–400)
PMV BLD AUTO: 11.7 FL (ref 9.4–12.4)
POTASSIUM SERPL-SCNC: 4.5 MMOL/L (ref 3.5–5)
RBC # BLD: 4.9 M/UL (ref 4.7–6.1)
SODIUM BLD-SCNC: 142 MMOL/L (ref 136–145)
TOTAL PROTEIN: 6.9 G/DL (ref 6.6–8.7)
TRIGL SERPL-MCNC: 277 MG/DL (ref 0–149)
WBC # BLD: 6 K/UL (ref 4.8–10.8)

## 2019-02-25 ENCOUNTER — OFFICE VISIT (OUTPATIENT)
Dept: FAMILY MEDICINE CLINIC | Age: 74
End: 2019-02-25
Payer: MEDICARE

## 2019-02-25 VITALS
HEART RATE: 64 BPM | TEMPERATURE: 97.2 F | BODY MASS INDEX: 33.72 KG/M2 | DIASTOLIC BLOOD PRESSURE: 84 MMHG | SYSTOLIC BLOOD PRESSURE: 128 MMHG | WEIGHT: 277 LBS | OXYGEN SATURATION: 99 %

## 2019-02-25 DIAGNOSIS — M79.89 LOCALIZED SWELLING OF BOTH LOWER EXTREMITIES: ICD-10-CM

## 2019-02-25 DIAGNOSIS — G47.33 OSA ON CPAP: ICD-10-CM

## 2019-02-25 DIAGNOSIS — I10 ESSENTIAL HYPERTENSION: ICD-10-CM

## 2019-02-25 DIAGNOSIS — G89.29 CHRONIC MIDLINE LOW BACK PAIN WITHOUT SCIATICA: ICD-10-CM

## 2019-02-25 DIAGNOSIS — M19.042 PRIMARY OSTEOARTHRITIS OF BOTH HANDS: Primary | ICD-10-CM

## 2019-02-25 DIAGNOSIS — E11.8 TYPE 2 DIABETES MELLITUS WITH COMPLICATION, WITH LONG-TERM CURRENT USE OF INSULIN (HCC): ICD-10-CM

## 2019-02-25 DIAGNOSIS — E78.2 MIXED HYPERLIPIDEMIA: ICD-10-CM

## 2019-02-25 DIAGNOSIS — Z99.89 OSA ON CPAP: ICD-10-CM

## 2019-02-25 DIAGNOSIS — M54.50 CHRONIC MIDLINE LOW BACK PAIN WITHOUT SCIATICA: ICD-10-CM

## 2019-02-25 DIAGNOSIS — Z79.4 TYPE 2 DIABETES MELLITUS WITH COMPLICATION, WITH LONG-TERM CURRENT USE OF INSULIN (HCC): ICD-10-CM

## 2019-02-25 DIAGNOSIS — N40.0 BENIGN PROSTATIC HYPERPLASIA WITHOUT LOWER URINARY TRACT SYMPTOMS: ICD-10-CM

## 2019-02-25 DIAGNOSIS — G44.229 CHRONIC TENSION-TYPE HEADACHE, NOT INTRACTABLE: ICD-10-CM

## 2019-02-25 DIAGNOSIS — I73.9 INTERMITTENT CLAUDICATION (HCC): ICD-10-CM

## 2019-02-25 DIAGNOSIS — R60.0 LOWER EXTREMITY EDEMA: ICD-10-CM

## 2019-02-25 DIAGNOSIS — F41.8 DEPRESSION WITH ANXIETY: ICD-10-CM

## 2019-02-25 DIAGNOSIS — I25.10 CORONARY ARTERY DISEASE INVOLVING NATIVE CORONARY ARTERY OF NATIVE HEART WITHOUT ANGINA PECTORIS: ICD-10-CM

## 2019-02-25 DIAGNOSIS — M19.041 PRIMARY OSTEOARTHRITIS OF BOTH HANDS: Primary | ICD-10-CM

## 2019-02-25 PROCEDURE — 1123F ACP DISCUSS/DSCN MKR DOCD: CPT | Performed by: FAMILY MEDICINE

## 2019-02-25 PROCEDURE — 99214 OFFICE O/P EST MOD 30 MIN: CPT | Performed by: FAMILY MEDICINE

## 2019-02-25 PROCEDURE — 3017F COLORECTAL CA SCREEN DOC REV: CPT | Performed by: FAMILY MEDICINE

## 2019-02-25 PROCEDURE — 3045F PR MOST RECENT HEMOGLOBIN A1C LEVEL 7.0-9.0%: CPT | Performed by: FAMILY MEDICINE

## 2019-02-25 PROCEDURE — 2022F DILAT RTA XM EVC RTNOPTHY: CPT | Performed by: FAMILY MEDICINE

## 2019-02-25 PROCEDURE — 4040F PNEUMOC VAC/ADMIN/RCVD: CPT | Performed by: FAMILY MEDICINE

## 2019-02-25 PROCEDURE — G8427 DOCREV CUR MEDS BY ELIG CLIN: HCPCS | Performed by: FAMILY MEDICINE

## 2019-02-25 PROCEDURE — 1101F PT FALLS ASSESS-DOCD LE1/YR: CPT | Performed by: FAMILY MEDICINE

## 2019-02-25 PROCEDURE — G8482 FLU IMMUNIZE ORDER/ADMIN: HCPCS | Performed by: FAMILY MEDICINE

## 2019-02-25 PROCEDURE — G8598 ASA/ANTIPLAT THER USED: HCPCS | Performed by: FAMILY MEDICINE

## 2019-02-25 PROCEDURE — 1036F TOBACCO NON-USER: CPT | Performed by: FAMILY MEDICINE

## 2019-02-25 PROCEDURE — G8417 CALC BMI ABV UP PARAM F/U: HCPCS | Performed by: FAMILY MEDICINE

## 2019-02-25 RX ORDER — FUROSEMIDE 40 MG/1
40 TABLET ORAL DAILY
Qty: 30 TABLET | Refills: 5 | Status: SHIPPED | OUTPATIENT
Start: 2019-02-25 | End: 2019-04-15 | Stop reason: DRUGHIGH

## 2019-02-25 RX ORDER — TIZANIDINE 2 MG/1
2 TABLET ORAL NIGHTLY PRN
Qty: 30 TABLET | Refills: 5 | Status: SHIPPED | OUTPATIENT
Start: 2019-02-25 | End: 2019-03-12 | Stop reason: SDUPTHER

## 2019-02-25 RX ORDER — SERTRALINE HYDROCHLORIDE 100 MG/1
100 TABLET, FILM COATED ORAL DAILY
Qty: 30 TABLET | Refills: 5 | Status: SHIPPED | OUTPATIENT
Start: 2019-02-25 | End: 2019-03-07 | Stop reason: SDUPTHER

## 2019-02-27 DIAGNOSIS — R42 DIZZINESS: ICD-10-CM

## 2019-02-27 DIAGNOSIS — Z95.818 STATUS POST PLACEMENT OF IMPLANTABLE LOOP RECORDER: Primary | ICD-10-CM

## 2019-02-27 DIAGNOSIS — R00.2 PALPITATIONS: ICD-10-CM

## 2019-02-27 PROCEDURE — 93298 REM INTERROG DEV EVAL SCRMS: CPT | Performed by: CLINICAL NURSE SPECIALIST

## 2019-02-27 PROCEDURE — 93299 PR REM INTERROG ICPMS/SCRMS <30 D TECH REVIEW: CPT | Performed by: CLINICAL NURSE SPECIALIST

## 2019-02-28 ENCOUNTER — APPOINTMENT (OUTPATIENT)
Dept: GENERAL RADIOLOGY | Age: 74
End: 2019-02-28
Payer: MEDICARE

## 2019-02-28 ENCOUNTER — APPOINTMENT (OUTPATIENT)
Dept: CT IMAGING | Age: 74
End: 2019-02-28
Payer: MEDICARE

## 2019-02-28 ENCOUNTER — TELEPHONE (OUTPATIENT)
Dept: CARDIOLOGY | Age: 74
End: 2019-02-28

## 2019-02-28 ENCOUNTER — HOSPITAL ENCOUNTER (OUTPATIENT)
Age: 74
Setting detail: OBSERVATION
Discharge: HOME | End: 2019-03-01
Attending: EMERGENCY MEDICINE | Admitting: INTERNAL MEDICINE
Payer: MEDICARE

## 2019-02-28 DIAGNOSIS — Z79.4 TYPE 2 DIABETES MELLITUS WITH DIABETIC POLYNEUROPATHY, WITH LONG-TERM CURRENT USE OF INSULIN (HCC): ICD-10-CM

## 2019-02-28 DIAGNOSIS — E11.42 TYPE 2 DIABETES MELLITUS WITH DIABETIC POLYNEUROPATHY, WITH LONG-TERM CURRENT USE OF INSULIN (HCC): ICD-10-CM

## 2019-02-28 DIAGNOSIS — I25.119 CORONARY ARTERY DISEASE INVOLVING NATIVE CORONARY ARTERY OF NATIVE HEART WITH ANGINA PECTORIS (HCC): ICD-10-CM

## 2019-02-28 DIAGNOSIS — I25.9 CHEST PAIN DUE TO MYOCARDIAL ISCHEMIA, UNSPECIFIED ISCHEMIC CHEST PAIN TYPE: Primary | ICD-10-CM

## 2019-02-28 PROBLEM — R07.89 CHEST PRESSURE: Status: ACTIVE | Noted: 2019-02-28

## 2019-02-28 PROBLEM — R07.9 CHEST PAIN: Status: ACTIVE | Noted: 2019-02-28

## 2019-02-28 LAB
ALBUMIN SERPL-MCNC: 4.3 G/DL (ref 3.5–5.2)
ALP BLD-CCNC: 90 U/L (ref 40–130)
ALT SERPL-CCNC: 18 U/L (ref 5–41)
ANION GAP SERPL CALCULATED.3IONS-SCNC: 12 MMOL/L (ref 7–19)
APTT: 30.9 SEC (ref 26–36.2)
AST SERPL-CCNC: 14 U/L (ref 5–40)
BASOPHILS ABSOLUTE: 0 K/UL (ref 0–0.2)
BASOPHILS RELATIVE PERCENT: 0.4 % (ref 0–1)
BILIRUB SERPL-MCNC: 0.3 MG/DL (ref 0.2–1.2)
BUN BLDV-MCNC: 18 MG/DL (ref 8–23)
CALCIUM SERPL-MCNC: 9.9 MG/DL (ref 8.8–10.2)
CHLORIDE BLD-SCNC: 98 MMOL/L (ref 98–111)
CO2: 25 MMOL/L (ref 22–29)
CREAT SERPL-MCNC: 1.1 MG/DL (ref 0.5–1.2)
D DIMER: 0.53 UG/ML FEU (ref 0–0.48)
EKG P AXIS: 49 DEGREES
EKG P-R INTERVAL: 142 MS
EKG Q-T INTERVAL: 364 MS
EKG QRS DURATION: 90 MS
EKG QTC CALCULATION (BAZETT): 399 MS
EKG T AXIS: 69 DEGREES
EOSINOPHILS ABSOLUTE: 0 K/UL (ref 0–0.6)
EOSINOPHILS RELATIVE PERCENT: 0.3 % (ref 0–5)
GFR NON-AFRICAN AMERICAN: >60
GLUCOSE BLD-MCNC: 146 MG/DL (ref 70–99)
GLUCOSE BLD-MCNC: 199 MG/DL
GLUCOSE BLD-MCNC: 199 MG/DL (ref 70–99)
GLUCOSE BLD-MCNC: 229 MG/DL (ref 70–99)
GLUCOSE BLD-MCNC: 348 MG/DL (ref 74–109)
HCT VFR BLD CALC: 43.7 % (ref 42–52)
HEMOGLOBIN: 14.4 G/DL (ref 14–18)
INR BLD: 1.11 (ref 0.88–1.18)
LYMPHOCYTES ABSOLUTE: 1.4 K/UL (ref 1.1–4.5)
LYMPHOCYTES RELATIVE PERCENT: 18.3 % (ref 20–40)
MCH RBC QN AUTO: 27.6 PG (ref 27–31)
MCHC RBC AUTO-ENTMCNC: 33 G/DL (ref 33–37)
MCV RBC AUTO: 83.9 FL (ref 80–94)
MONOCYTES ABSOLUTE: 0.5 K/UL (ref 0–0.9)
MONOCYTES RELATIVE PERCENT: 6.9 % (ref 0–10)
NEUTROPHILS ABSOLUTE: 5.4 K/UL (ref 1.5–7.5)
NEUTROPHILS RELATIVE PERCENT: 73.7 % (ref 50–65)
PDW BLD-RTO: 13.5 % (ref 11.5–14.5)
PERFORMED ON: ABNORMAL
PLATELET # BLD: 173 K/UL (ref 130–400)
PMV BLD AUTO: 11.2 FL (ref 9.4–12.4)
POTASSIUM SERPL-SCNC: 4.4 MMOL/L (ref 3.5–5)
PRO-BNP: 220 PG/ML (ref 0–900)
PROTHROMBIN TIME: 13.7 SEC (ref 12–14.6)
RBC # BLD: 5.21 M/UL (ref 4.7–6.1)
SODIUM BLD-SCNC: 135 MMOL/L (ref 136–145)
TOTAL PROTEIN: 7.2 G/DL (ref 6.6–8.7)
TROPONIN: 0.02 NG/ML (ref 0–0.03)
TROPONIN: <0.01 NG/ML (ref 0–0.03)
WBC # BLD: 7.4 K/UL (ref 4.8–10.8)

## 2019-02-28 PROCEDURE — 83880 ASSAY OF NATRIURETIC PEPTIDE: CPT

## 2019-02-28 PROCEDURE — 96360 HYDRATION IV INFUSION INIT: CPT

## 2019-02-28 PROCEDURE — 85730 THROMBOPLASTIN TIME PARTIAL: CPT

## 2019-02-28 PROCEDURE — 6370000000 HC RX 637 (ALT 250 FOR IP): Performed by: PHYSICIAN ASSISTANT

## 2019-02-28 PROCEDURE — G0378 HOSPITAL OBSERVATION PER HR: HCPCS

## 2019-02-28 PROCEDURE — 85610 PROTHROMBIN TIME: CPT

## 2019-02-28 PROCEDURE — 6370000000 HC RX 637 (ALT 250 FOR IP): Performed by: INTERNAL MEDICINE

## 2019-02-28 PROCEDURE — 99285 EMERGENCY DEPT VISIT HI MDM: CPT

## 2019-02-28 PROCEDURE — 71275 CT ANGIOGRAPHY CHEST: CPT

## 2019-02-28 PROCEDURE — 2580000003 HC RX 258: Performed by: PHYSICIAN ASSISTANT

## 2019-02-28 PROCEDURE — 84484 ASSAY OF TROPONIN QUANT: CPT

## 2019-02-28 PROCEDURE — 99284 EMERGENCY DEPT VISIT MOD MDM: CPT | Performed by: EMERGENCY MEDICINE

## 2019-02-28 PROCEDURE — 96361 HYDRATE IV INFUSION ADD-ON: CPT

## 2019-02-28 PROCEDURE — 80053 COMPREHEN METABOLIC PANEL: CPT

## 2019-02-28 PROCEDURE — 2580000003 HC RX 258: Performed by: INTERNAL MEDICINE

## 2019-02-28 PROCEDURE — 85379 FIBRIN DEGRADATION QUANT: CPT

## 2019-02-28 PROCEDURE — 85025 COMPLETE CBC W/AUTO DIFF WBC: CPT

## 2019-02-28 PROCEDURE — 71046 X-RAY EXAM CHEST 2 VIEWS: CPT

## 2019-02-28 PROCEDURE — 36415 COLL VENOUS BLD VENIPUNCTURE: CPT

## 2019-02-28 PROCEDURE — 6360000004 HC RX CONTRAST MEDICATION: Performed by: EMERGENCY MEDICINE

## 2019-02-28 PROCEDURE — 82948 REAGENT STRIP/BLOOD GLUCOSE: CPT

## 2019-02-28 PROCEDURE — 99220 PR INITIAL OBSERVATION CARE/DAY 70 MINUTES: CPT | Performed by: INTERNAL MEDICINE

## 2019-02-28 PROCEDURE — 93005 ELECTROCARDIOGRAM TRACING: CPT

## 2019-02-28 RX ORDER — SODIUM CHLORIDE 0.9 % (FLUSH) 0.9 %
10 SYRINGE (ML) INJECTION EVERY 12 HOURS SCHEDULED
Status: DISCONTINUED | OUTPATIENT
Start: 2019-02-28 | End: 2019-03-01 | Stop reason: HOSPADM

## 2019-02-28 RX ORDER — DEXTROSE MONOHYDRATE 50 MG/ML
100 INJECTION, SOLUTION INTRAVENOUS PRN
Status: DISCONTINUED | OUTPATIENT
Start: 2019-02-28 | End: 2019-03-01 | Stop reason: HOSPADM

## 2019-02-28 RX ORDER — TAMSULOSIN HYDROCHLORIDE 0.4 MG/1
0.4 CAPSULE ORAL 2 TIMES DAILY
Status: DISCONTINUED | OUTPATIENT
Start: 2019-02-28 | End: 2019-03-01 | Stop reason: HOSPADM

## 2019-02-28 RX ORDER — NITROGLYCERIN 0.4 MG/1
0.4 TABLET SUBLINGUAL EVERY 5 MIN PRN
Status: DISCONTINUED | OUTPATIENT
Start: 2019-02-28 | End: 2019-03-01 | Stop reason: HOSPADM

## 2019-02-28 RX ORDER — AZELASTINE 1 MG/ML
2 SPRAY, METERED NASAL 2 TIMES DAILY
Status: DISCONTINUED | OUTPATIENT
Start: 2019-02-28 | End: 2019-02-28 | Stop reason: CLARIF

## 2019-02-28 RX ORDER — OXYCODONE AND ACETAMINOPHEN 10; 325 MG/1; MG/1
1 TABLET ORAL 2 TIMES DAILY PRN
Status: DISCONTINUED | OUTPATIENT
Start: 2019-02-28 | End: 2019-03-01 | Stop reason: HOSPADM

## 2019-02-28 RX ORDER — SODIUM CHLORIDE 0.9 % (FLUSH) 0.9 %
10 SYRINGE (ML) INJECTION PRN
Status: DISCONTINUED | OUTPATIENT
Start: 2019-02-28 | End: 2019-03-01 | Stop reason: HOSPADM

## 2019-02-28 RX ORDER — BLOOD-GLUCOSE CONTROL, NORMAL
1 EACH MISCELLANEOUS DAILY
Status: DISCONTINUED | OUTPATIENT
Start: 2019-03-01 | End: 2019-02-28 | Stop reason: CLARIF

## 2019-02-28 RX ORDER — ONDANSETRON 2 MG/ML
4 INJECTION INTRAMUSCULAR; INTRAVENOUS EVERY 6 HOURS PRN
Status: DISCONTINUED | OUTPATIENT
Start: 2019-02-28 | End: 2019-03-01 | Stop reason: HOSPADM

## 2019-02-28 RX ORDER — NICOTINE POLACRILEX 4 MG
15 LOZENGE BUCCAL PRN
Status: DISCONTINUED | OUTPATIENT
Start: 2019-02-28 | End: 2019-03-01 | Stop reason: HOSPADM

## 2019-02-28 RX ORDER — FUROSEMIDE 40 MG/1
40 TABLET ORAL DAILY
Status: DISCONTINUED | OUTPATIENT
Start: 2019-03-01 | End: 2019-03-01 | Stop reason: HOSPADM

## 2019-02-28 RX ORDER — DEXTROSE MONOHYDRATE 25 G/50ML
12.5 INJECTION, SOLUTION INTRAVENOUS PRN
Status: DISCONTINUED | OUTPATIENT
Start: 2019-02-28 | End: 2019-03-01 | Stop reason: HOSPADM

## 2019-02-28 RX ORDER — LISINOPRIL 20 MG/1
20 TABLET ORAL DAILY
Status: DISCONTINUED | OUTPATIENT
Start: 2019-03-01 | End: 2019-03-01 | Stop reason: HOSPADM

## 2019-02-28 RX ORDER — CLOPIDOGREL BISULFATE 75 MG/1
75 TABLET ORAL DAILY
Status: DISCONTINUED | OUTPATIENT
Start: 2019-03-01 | End: 2019-03-01 | Stop reason: HOSPADM

## 2019-02-28 RX ORDER — ASPIRIN 81 MG/1
81 TABLET, CHEWABLE ORAL DAILY
Status: DISCONTINUED | OUTPATIENT
Start: 2019-02-28 | End: 2019-03-01 | Stop reason: HOSPADM

## 2019-02-28 RX ORDER — FAMOTIDINE 20 MG/1
40 TABLET, FILM COATED ORAL 2 TIMES DAILY
Status: DISCONTINUED | OUTPATIENT
Start: 2019-02-28 | End: 2019-03-01 | Stop reason: HOSPADM

## 2019-02-28 RX ORDER — 0.9 % SODIUM CHLORIDE 0.9 %
500 INTRAVENOUS SOLUTION INTRAVENOUS ONCE
Status: COMPLETED | OUTPATIENT
Start: 2019-02-28 | End: 2019-02-28

## 2019-02-28 RX ORDER — ISOPROPYL ALCOHOL 0.75 G/1
1 SWAB TOPICAL DAILY
Status: DISCONTINUED | OUTPATIENT
Start: 2019-03-01 | End: 2019-02-28 | Stop reason: CLARIF

## 2019-02-28 RX ORDER — TIZANIDINE 2 MG/1
2 TABLET ORAL NIGHTLY PRN
Status: DISCONTINUED | OUTPATIENT
Start: 2019-02-28 | End: 2019-03-01 | Stop reason: HOSPADM

## 2019-02-28 RX ORDER — ASPIRIN 81 MG/1
81 TABLET, CHEWABLE ORAL DAILY
Status: DISCONTINUED | OUTPATIENT
Start: 2019-03-01 | End: 2019-03-01 | Stop reason: HOSPADM

## 2019-02-28 RX ORDER — INSULIN GLARGINE 100 [IU]/ML
80 INJECTION, SOLUTION SUBCUTANEOUS NIGHTLY
Status: DISCONTINUED | OUTPATIENT
Start: 2019-02-28 | End: 2019-03-01 | Stop reason: HOSPADM

## 2019-02-28 RX ORDER — BUTALBITAL, ACETAMINOPHEN AND CAFFEINE 50; 325; 40 MG/1; MG/1; MG/1
1 TABLET ORAL DAILY
Status: DISCONTINUED | OUTPATIENT
Start: 2019-03-01 | End: 2019-03-01 | Stop reason: HOSPADM

## 2019-02-28 RX ORDER — SERTRALINE HYDROCHLORIDE 100 MG/1
100 TABLET, FILM COATED ORAL DAILY
Status: DISCONTINUED | OUTPATIENT
Start: 2019-03-01 | End: 2019-03-01 | Stop reason: HOSPADM

## 2019-02-28 RX ORDER — MELOXICAM 7.5 MG/1
15 TABLET ORAL DAILY
Status: DISCONTINUED | OUTPATIENT
Start: 2019-03-01 | End: 2019-03-01 | Stop reason: HOSPADM

## 2019-02-28 RX ADMIN — METOPROLOL TARTRATE 12.5 MG: 25 TABLET ORAL at 22:55

## 2019-02-28 RX ADMIN — LIDOCAINE HYDROCHLORIDE: 20 SOLUTION ORAL; TOPICAL at 11:47

## 2019-02-28 RX ADMIN — ASPIRIN 325 MG: 325 TABLET, COATED ORAL at 11:47

## 2019-02-28 RX ADMIN — NITROGLYCERIN 0.4 MG: 0.4 TABLET, ORALLY DISINTEGRATING SUBLINGUAL at 11:47

## 2019-02-28 RX ADMIN — Medication 10 ML: at 23:00

## 2019-02-28 RX ADMIN — IOPAMIDOL 90 ML: 755 INJECTION, SOLUTION INTRAVENOUS at 14:15

## 2019-02-28 RX ADMIN — TAMSULOSIN HYDROCHLORIDE 0.4 MG: 0.4 CAPSULE ORAL at 22:54

## 2019-02-28 RX ADMIN — OXYCODONE HYDROCHLORIDE AND ACETAMINOPHEN 1 TABLET: 10; 325 TABLET ORAL at 22:54

## 2019-02-28 RX ADMIN — INSULIN GLARGINE 80 UNITS: 100 INJECTION, SOLUTION SUBCUTANEOUS at 22:57

## 2019-02-28 RX ADMIN — SODIUM CHLORIDE 500 ML: 9 INJECTION, SOLUTION INTRAVENOUS at 13:49

## 2019-02-28 RX ADMIN — FAMOTIDINE 40 MG: 20 TABLET ORAL at 22:54

## 2019-02-28 ASSESSMENT — PAIN DESCRIPTION - PAIN TYPE: TYPE: CHRONIC PAIN

## 2019-02-28 ASSESSMENT — ENCOUNTER SYMPTOMS
COLOR CHANGE: 0
CHEST TIGHTNESS: 0
NAUSEA: 0
BACK PAIN: 1
CHOKING: 0
WHEEZING: 0
STRIDOR: 0
SHORTNESS OF BREATH: 0
ABDOMINAL PAIN: 0
DIARRHEA: 0
SHORTNESS OF BREATH: 1
CONSTIPATION: 0
DIARRHEA: 0
ANAL BLEEDING: 0
COUGH: 0
COUGH: 0
NAUSEA: 0
ABDOMINAL PAIN: 0
VOMITING: 0
CHEST TIGHTNESS: 1
APNEA: 0

## 2019-02-28 ASSESSMENT — PAIN SCALES - GENERAL
PAINLEVEL_OUTOF10: 2
PAINLEVEL_OUTOF10: 5
PAINLEVEL_OUTOF10: 2
PAINLEVEL_OUTOF10: 2

## 2019-03-01 ENCOUNTER — APPOINTMENT (OUTPATIENT)
Dept: NUCLEAR MEDICINE | Age: 74
End: 2019-03-01
Payer: MEDICARE

## 2019-03-01 VITALS
DIASTOLIC BLOOD PRESSURE: 70 MMHG | BODY MASS INDEX: 32.47 KG/M2 | TEMPERATURE: 97.2 F | WEIGHT: 266.6 LBS | HEIGHT: 76 IN | SYSTOLIC BLOOD PRESSURE: 123 MMHG | RESPIRATION RATE: 16 BRPM | OXYGEN SATURATION: 90 % | HEART RATE: 61 BPM

## 2019-03-01 LAB
ALBUMIN SERPL-MCNC: 3.8 G/DL (ref 3.5–5.2)
ALP BLD-CCNC: 77 U/L (ref 40–130)
ALT SERPL-CCNC: 16 U/L (ref 5–41)
ANION GAP SERPL CALCULATED.3IONS-SCNC: 14 MMOL/L (ref 7–19)
AST SERPL-CCNC: 15 U/L (ref 5–40)
BILIRUB SERPL-MCNC: <0.2 MG/DL (ref 0.2–1.2)
BUN BLDV-MCNC: 16 MG/DL (ref 8–23)
CALCIUM SERPL-MCNC: 9.4 MG/DL (ref 8.8–10.2)
CHLORIDE BLD-SCNC: 102 MMOL/L (ref 98–111)
CHOLESTEROL, TOTAL: 205 MG/DL (ref 160–199)
CO2: 25 MMOL/L (ref 22–29)
CREAT SERPL-MCNC: 1.1 MG/DL (ref 0.5–1.2)
EKG P AXIS: 37 DEGREES
EKG P AXIS: 41 DEGREES
EKG P-R INTERVAL: 118 MS
EKG P-R INTERVAL: 170 MS
EKG Q-T INTERVAL: 412 MS
EKG Q-T INTERVAL: 438 MS
EKG QRS DURATION: 100 MS
EKG QRS DURATION: 94 MS
EKG QTC CALCULATION (BAZETT): 412 MS
EKG QTC CALCULATION (BAZETT): 426 MS
EKG T AXIS: 76 DEGREES
EKG T AXIS: 84 DEGREES
GFR NON-AFRICAN AMERICAN: >60
GLUCOSE BLD-MCNC: 104 MG/DL (ref 70–99)
GLUCOSE BLD-MCNC: 130 MG/DL (ref 74–109)
GLUCOSE BLD-MCNC: 228 MG/DL (ref 70–99)
GLUCOSE BLD-MCNC: 93 MG/DL (ref 70–99)
HCT VFR BLD CALC: 41.6 % (ref 42–52)
HDLC SERPL-MCNC: 28 MG/DL (ref 55–121)
HEMOGLOBIN: 13.5 G/DL (ref 14–18)
LDL CHOLESTEROL CALCULATED: 137 MG/DL
MCH RBC QN AUTO: 27.5 PG (ref 27–31)
MCHC RBC AUTO-ENTMCNC: 32.5 G/DL (ref 33–37)
MCV RBC AUTO: 84.7 FL (ref 80–94)
PDW BLD-RTO: 13.7 % (ref 11.5–14.5)
PERFORMED ON: ABNORMAL
PERFORMED ON: ABNORMAL
PERFORMED ON: NORMAL
PLATELET # BLD: 139 K/UL (ref 130–400)
PMV BLD AUTO: 11.9 FL (ref 9.4–12.4)
POTASSIUM REFLEX MAGNESIUM: 3.9 MMOL/L (ref 3.5–5)
RBC # BLD: 4.91 M/UL (ref 4.7–6.1)
SODIUM BLD-SCNC: 141 MMOL/L (ref 136–145)
TOTAL PROTEIN: 6.6 G/DL (ref 6.6–8.7)
TRIGL SERPL-MCNC: 202 MG/DL (ref 0–149)
TROPONIN: 0.03 NG/ML (ref 0–0.03)
TROPONIN: 0.04 NG/ML (ref 0–0.03)
TROPONIN: 0.05 NG/ML (ref 0–0.03)
WBC # BLD: 7.3 K/UL (ref 4.8–10.8)

## 2019-03-01 PROCEDURE — 85027 COMPLETE CBC AUTOMATED: CPT

## 2019-03-01 PROCEDURE — 6360000002 HC RX W HCPCS: Performed by: INTERNAL MEDICINE

## 2019-03-01 PROCEDURE — 78452 HT MUSCLE IMAGE SPECT MULT: CPT

## 2019-03-01 PROCEDURE — 3430000000 HC RX DIAGNOSTIC RADIOPHARMACEUTICAL: Performed by: INTERNAL MEDICINE

## 2019-03-01 PROCEDURE — 93005 ELECTROCARDIOGRAM TRACING: CPT

## 2019-03-01 PROCEDURE — A9500 TC99M SESTAMIBI: HCPCS | Performed by: INTERNAL MEDICINE

## 2019-03-01 PROCEDURE — 2580000003 HC RX 258: Performed by: INTERNAL MEDICINE

## 2019-03-01 PROCEDURE — 6370000000 HC RX 637 (ALT 250 FOR IP): Performed by: INTERNAL MEDICINE

## 2019-03-01 PROCEDURE — 80053 COMPREHEN METABOLIC PANEL: CPT

## 2019-03-01 PROCEDURE — 96372 THER/PROPH/DIAG INJ SC/IM: CPT

## 2019-03-01 PROCEDURE — G0378 HOSPITAL OBSERVATION PER HR: HCPCS

## 2019-03-01 PROCEDURE — 84484 ASSAY OF TROPONIN QUANT: CPT

## 2019-03-01 PROCEDURE — 82948 REAGENT STRIP/BLOOD GLUCOSE: CPT

## 2019-03-01 PROCEDURE — 36415 COLL VENOUS BLD VENIPUNCTURE: CPT

## 2019-03-01 PROCEDURE — 99238 HOSP IP/OBS DSCHRG MGMT 30/<: CPT | Performed by: INTERNAL MEDICINE

## 2019-03-01 PROCEDURE — 80061 LIPID PANEL: CPT

## 2019-03-01 RX ORDER — FAMOTIDINE 40 MG/1
40 TABLET, FILM COATED ORAL 2 TIMES DAILY
Qty: 60 TABLET | Refills: 3 | Status: SHIPPED | OUTPATIENT
Start: 2019-03-01 | End: 2019-04-15

## 2019-03-01 RX ORDER — FLUTICASONE PROPIONATE 50 MCG
1 SPRAY, SUSPENSION (ML) NASAL DAILY
Status: DISCONTINUED | OUTPATIENT
Start: 2019-03-01 | End: 2019-03-01 | Stop reason: HOSPADM

## 2019-03-01 RX ORDER — ASPIRIN 81 MG/1
81 TABLET, CHEWABLE ORAL DAILY
Qty: 30 TABLET | Refills: 3 | Status: SHIPPED | OUTPATIENT
Start: 2019-03-02 | End: 2019-04-15 | Stop reason: ALTCHOICE

## 2019-03-01 RX ORDER — NITROGLYCERIN 0.4 MG/1
TABLET SUBLINGUAL
Qty: 25 TABLET | Refills: 3 | Status: SHIPPED | OUTPATIENT
Start: 2019-03-01 | End: 2019-09-10 | Stop reason: SDUPTHER

## 2019-03-01 RX ADMIN — Medication 10 ML: at 08:54

## 2019-03-01 RX ADMIN — TAMSULOSIN HYDROCHLORIDE 0.4 MG: 0.4 CAPSULE ORAL at 08:54

## 2019-03-01 RX ADMIN — TETRAKIS(2-METHOXYISOBUTYLISOCYANIDE)COPPER(I) TETRAFLUOROBORATE 10 MILLICURIE: 1 INJECTION, POWDER, LYOPHILIZED, FOR SOLUTION INTRAVENOUS at 14:57

## 2019-03-01 RX ADMIN — LISINOPRIL 20 MG: 20 TABLET ORAL at 08:54

## 2019-03-01 RX ADMIN — SERTRALINE HYDROCHLORIDE 100 MG: 100 TABLET ORAL at 08:54

## 2019-03-01 RX ADMIN — CLOPIDOGREL BISULFATE 75 MG: 75 TABLET ORAL at 08:54

## 2019-03-01 RX ADMIN — FAMOTIDINE 40 MG: 20 TABLET ORAL at 08:54

## 2019-03-01 RX ADMIN — TETRAKIS(2-METHOXYISOBUTYLISOCYANIDE)COPPER(I) TETRAFLUOROBORATE 30 MILLICURIE: 1 INJECTION, POWDER, LYOPHILIZED, FOR SOLUTION INTRAVENOUS at 14:57

## 2019-03-01 RX ADMIN — MELOXICAM 15 MG: 7.5 TABLET ORAL at 08:54

## 2019-03-01 RX ADMIN — BUTALBITAL, ACETAMINOPHEN, AND CAFFEINE 1 TABLET: 50; 325; 40 TABLET ORAL at 08:53

## 2019-03-01 RX ADMIN — REGADENOSON 0.4 MG: 0.08 INJECTION, SOLUTION INTRAVENOUS at 10:40

## 2019-03-01 RX ADMIN — FLUTICASONE PROPIONATE 1 SPRAY: 50 SPRAY, METERED NASAL at 11:58

## 2019-03-01 RX ADMIN — OXYCODONE HYDROCHLORIDE AND ACETAMINOPHEN 1 TABLET: 10; 325 TABLET ORAL at 11:58

## 2019-03-01 RX ADMIN — METOPROLOL TARTRATE 12.5 MG: 25 TABLET ORAL at 08:54

## 2019-03-01 RX ADMIN — ASPIRIN 81 MG 81 MG: 81 TABLET ORAL at 08:58

## 2019-03-01 RX ADMIN — FUROSEMIDE 40 MG: 40 TABLET ORAL at 08:53

## 2019-03-01 RX ADMIN — ENOXAPARIN SODIUM 40 MG: 40 INJECTION SUBCUTANEOUS at 08:54

## 2019-03-01 ASSESSMENT — PAIN DESCRIPTION - LOCATION: LOCATION: HEAD;BACK

## 2019-03-01 ASSESSMENT — PAIN DESCRIPTION - DESCRIPTORS: DESCRIPTORS: ACHING

## 2019-03-01 ASSESSMENT — PAIN DESCRIPTION - FREQUENCY: FREQUENCY: INTERMITTENT

## 2019-03-01 ASSESSMENT — PAIN SCALES - GENERAL
PAINLEVEL_OUTOF10: 0
PAINLEVEL_OUTOF10: 4
PAINLEVEL_OUTOF10: 2
PAINLEVEL_OUTOF10: 5

## 2019-03-01 ASSESSMENT — PAIN DESCRIPTION - PAIN TYPE: TYPE: CHRONIC PAIN

## 2019-03-01 ASSESSMENT — PAIN DESCRIPTION - ONSET: ONSET: GRADUAL

## 2019-03-01 ASSESSMENT — PAIN DESCRIPTION - PROGRESSION: CLINICAL_PROGRESSION: GRADUALLY IMPROVING

## 2019-03-04 ENCOUNTER — TELEPHONE (OUTPATIENT)
Dept: INTERNAL MEDICINE | Age: 74
End: 2019-03-04

## 2019-03-05 ENCOUNTER — TELEPHONE (OUTPATIENT)
Dept: CARDIOLOGY | Age: 74
End: 2019-03-05

## 2019-03-07 ENCOUNTER — OFFICE VISIT (OUTPATIENT)
Dept: FAMILY MEDICINE CLINIC | Age: 74
End: 2019-03-07
Payer: MEDICARE

## 2019-03-07 VITALS
RESPIRATION RATE: 16 BRPM | HEIGHT: 76 IN | WEIGHT: 276 LBS | HEART RATE: 66 BPM | BODY MASS INDEX: 33.61 KG/M2 | OXYGEN SATURATION: 97 % | SYSTOLIC BLOOD PRESSURE: 124 MMHG | DIASTOLIC BLOOD PRESSURE: 62 MMHG

## 2019-03-07 DIAGNOSIS — Z00.00 ROUTINE GENERAL MEDICAL EXAMINATION AT A HEALTH CARE FACILITY: Primary | ICD-10-CM

## 2019-03-07 DIAGNOSIS — F41.8 DEPRESSION WITH ANXIETY: ICD-10-CM

## 2019-03-07 PROCEDURE — G8598 ASA/ANTIPLAT THER USED: HCPCS | Performed by: FAMILY MEDICINE

## 2019-03-07 PROCEDURE — G8482 FLU IMMUNIZE ORDER/ADMIN: HCPCS | Performed by: FAMILY MEDICINE

## 2019-03-07 PROCEDURE — 4040F PNEUMOC VAC/ADMIN/RCVD: CPT | Performed by: FAMILY MEDICINE

## 2019-03-07 PROCEDURE — G0439 PPPS, SUBSEQ VISIT: HCPCS | Performed by: FAMILY MEDICINE

## 2019-03-07 RX ORDER — SERTRALINE HYDROCHLORIDE 100 MG/1
100 TABLET, FILM COATED ORAL DAILY
Qty: 90 TABLET | Refills: 2 | Status: SHIPPED | OUTPATIENT
Start: 2019-03-07 | End: 2019-03-12 | Stop reason: SDUPTHER

## 2019-03-07 ASSESSMENT — PATIENT HEALTH QUESTIONNAIRE - PHQ9
SUM OF ALL RESPONSES TO PHQ QUESTIONS 1-9: 2
SUM OF ALL RESPONSES TO PHQ QUESTIONS 1-9: 2

## 2019-03-07 ASSESSMENT — ANXIETY QUESTIONNAIRES: GAD7 TOTAL SCORE: 0

## 2019-03-07 ASSESSMENT — LIFESTYLE VARIABLES
HAS A RELATIVE, FRIEND, DOCTOR, OR ANOTHER HEALTH PROFESSIONAL EXPRESSED CONCERN ABOUT YOUR DRINKING OR SUGGESTED YOU CUT DOWN: 0
HOW OFTEN DO YOU HAVE A DRINK CONTAINING ALCOHOL: 1
HOW OFTEN DURING THE LAST YEAR HAVE YOU BEEN UNABLE TO REMEMBER WHAT HAPPENED THE NIGHT BEFORE BECAUSE YOU HAD BEEN DRINKING: 0
HOW OFTEN DURING THE LAST YEAR HAVE YOU FOUND THAT YOU WERE NOT ABLE TO STOP DRINKING ONCE YOU HAD STARTED: 0
HOW OFTEN DO YOU HAVE SIX OR MORE DRINKS ON ONE OCCASION: 0
HAVE YOU OR SOMEONE ELSE BEEN INJURED AS A RESULT OF YOUR DRINKING: 0
AUDIT TOTAL SCORE: 1
HOW OFTEN DURING THE LAST YEAR HAVE YOU FAILED TO DO WHAT WAS NORMALLY EXPECTED FROM YOU BECAUSE OF DRINKING: 0
HOW MANY STANDARD DRINKS CONTAINING ALCOHOL DO YOU HAVE ON A TYPICAL DAY: 0
HOW OFTEN DURING THE LAST YEAR HAVE YOU NEEDED AN ALCOHOLIC DRINK FIRST THING IN THE MORNING TO GET YOURSELF GOING AFTER A NIGHT OF HEAVY DRINKING: 0
HOW OFTEN DURING THE LAST YEAR HAVE YOU HAD A FEELING OF GUILT OR REMORSE AFTER DRINKING: 0
AUDIT-C TOTAL SCORE: 1

## 2019-03-12 ENCOUNTER — OFFICE VISIT (OUTPATIENT)
Dept: FAMILY MEDICINE CLINIC | Age: 74
End: 2019-03-12
Payer: MEDICARE

## 2019-03-12 VITALS
BODY MASS INDEX: 32.74 KG/M2 | WEIGHT: 269 LBS | SYSTOLIC BLOOD PRESSURE: 140 MMHG | DIASTOLIC BLOOD PRESSURE: 80 MMHG | OXYGEN SATURATION: 98 % | HEART RATE: 83 BPM | TEMPERATURE: 97 F

## 2019-03-12 DIAGNOSIS — R10.13 EPIGASTRIC PAIN: Primary | ICD-10-CM

## 2019-03-12 DIAGNOSIS — G89.29 CHRONIC MIDLINE LOW BACK PAIN WITHOUT SCIATICA: ICD-10-CM

## 2019-03-12 DIAGNOSIS — M54.50 CHRONIC MIDLINE LOW BACK PAIN WITHOUT SCIATICA: ICD-10-CM

## 2019-03-12 DIAGNOSIS — R10.13 EPIGASTRIC PAIN: ICD-10-CM

## 2019-03-12 DIAGNOSIS — F41.8 DEPRESSION WITH ANXIETY: ICD-10-CM

## 2019-03-12 LAB
ALBUMIN SERPL-MCNC: 4.4 G/DL (ref 3.5–5.2)
ALP BLD-CCNC: 94 U/L (ref 40–130)
ALT SERPL-CCNC: 20 U/L (ref 5–41)
AMYLASE: 26 U/L (ref 28–100)
ANION GAP SERPL CALCULATED.3IONS-SCNC: 12 MMOL/L (ref 7–19)
AST SERPL-CCNC: 21 U/L (ref 5–40)
BASOPHILS ABSOLUTE: 0.1 K/UL (ref 0–0.2)
BASOPHILS RELATIVE PERCENT: 0.8 % (ref 0–1)
BILIRUB SERPL-MCNC: 0.3 MG/DL (ref 0.2–1.2)
BUN BLDV-MCNC: 20 MG/DL (ref 8–23)
CALCIUM SERPL-MCNC: 9.5 MG/DL (ref 8.8–10.2)
CHLORIDE BLD-SCNC: 104 MMOL/L (ref 98–111)
CO2: 25 MMOL/L (ref 22–29)
CREAT SERPL-MCNC: 1.3 MG/DL (ref 0.5–1.2)
EOSINOPHILS ABSOLUTE: 0.2 K/UL (ref 0–0.6)
EOSINOPHILS RELATIVE PERCENT: 3.6 % (ref 0–5)
GFR NON-AFRICAN AMERICAN: 54
GLUCOSE BLD-MCNC: 143 MG/DL (ref 74–109)
HCT VFR BLD CALC: 46.2 % (ref 42–52)
HEMOGLOBIN: 14.6 G/DL (ref 14–18)
LIPASE: 20 U/L (ref 13–60)
LYMPHOCYTES ABSOLUTE: 2.3 K/UL (ref 1.1–4.5)
LYMPHOCYTES RELATIVE PERCENT: 35.4 % (ref 20–40)
MCH RBC QN AUTO: 27.5 PG (ref 27–31)
MCHC RBC AUTO-ENTMCNC: 31.6 G/DL (ref 33–37)
MCV RBC AUTO: 87 FL (ref 80–94)
MONOCYTES ABSOLUTE: 0.6 K/UL (ref 0–0.9)
MONOCYTES RELATIVE PERCENT: 9 % (ref 0–10)
NEUTROPHILS ABSOLUTE: 3.3 K/UL (ref 1.5–7.5)
NEUTROPHILS RELATIVE PERCENT: 51 % (ref 50–65)
PDW BLD-RTO: 13.5 % (ref 11.5–14.5)
PLATELET # BLD: 192 K/UL (ref 130–400)
PMV BLD AUTO: 11.2 FL (ref 9.4–12.4)
POTASSIUM SERPL-SCNC: 4.4 MMOL/L (ref 3.5–5)
RBC # BLD: 5.31 M/UL (ref 4.7–6.1)
SODIUM BLD-SCNC: 141 MMOL/L (ref 136–145)
TOTAL PROTEIN: 7.6 G/DL (ref 6.6–8.7)
WBC # BLD: 6.4 K/UL (ref 4.8–10.8)

## 2019-03-12 PROCEDURE — 99495 TRANSJ CARE MGMT MOD F2F 14D: CPT | Performed by: FAMILY MEDICINE

## 2019-03-12 RX ORDER — PANTOPRAZOLE SODIUM 40 MG/1
40 TABLET, DELAYED RELEASE ORAL
Qty: 30 TABLET | Refills: 5 | Status: SHIPPED | OUTPATIENT
Start: 2019-03-12 | End: 2019-04-15

## 2019-03-12 RX ORDER — SUCRALFATE 1 G/1
1 TABLET ORAL 4 TIMES DAILY
Qty: 60 TABLET | Refills: 5 | Status: SHIPPED | OUTPATIENT
Start: 2019-03-12 | End: 2019-04-15

## 2019-03-12 RX ORDER — SERTRALINE HYDROCHLORIDE 100 MG/1
100 TABLET, FILM COATED ORAL DAILY
Qty: 30 TABLET | Refills: 5 | Status: SHIPPED | OUTPATIENT
Start: 2019-03-12 | End: 2019-09-10 | Stop reason: SDUPTHER

## 2019-03-12 RX ORDER — TIZANIDINE 2 MG/1
2 TABLET ORAL NIGHTLY PRN
Qty: 30 TABLET | Refills: 5 | Status: SHIPPED | OUTPATIENT
Start: 2019-03-12 | End: 2019-08-06

## 2019-03-15 ENCOUNTER — HOSPITAL ENCOUNTER (OUTPATIENT)
Dept: GENERAL RADIOLOGY | Age: 74
Discharge: HOME | End: 2019-03-15
Payer: MEDICARE

## 2019-03-15 ENCOUNTER — HOSPITAL ENCOUNTER (OUTPATIENT)
Dept: CT IMAGING | Age: 74
Discharge: HOME | End: 2019-03-15
Payer: MEDICARE

## 2019-03-15 VITALS
WEIGHT: 269 LBS | OXYGEN SATURATION: 95 % | TEMPERATURE: 98.3 F | HEART RATE: 57 BPM | RESPIRATION RATE: 16 BRPM | DIASTOLIC BLOOD PRESSURE: 40 MMHG | BODY MASS INDEX: 32.76 KG/M2 | SYSTOLIC BLOOD PRESSURE: 101 MMHG | HEIGHT: 76 IN

## 2019-03-15 DIAGNOSIS — M54.5 LOW BACK PAIN, UNSPECIFIED BACK PAIN LATERALITY, UNSPECIFIED CHRONICITY, WITH SCIATICA PRESENCE UNSPECIFIED: ICD-10-CM

## 2019-03-15 PROCEDURE — 6360000004 HC RX CONTRAST MEDICATION

## 2019-03-15 PROCEDURE — 72265 MYELOGRAPHY L-S SPINE: CPT

## 2019-03-15 PROCEDURE — 72132 CT LUMBAR SPINE W/DYE: CPT

## 2019-03-15 RX ADMIN — IOPAMIDOL 15 ML: 408 INJECTION, SOLUTION INTRATHECAL at 13:00

## 2019-03-16 ASSESSMENT — ENCOUNTER SYMPTOMS
COUGH: 0
DIARRHEA: 0
BACK PAIN: 1
CHEST TIGHTNESS: 0
CONSTIPATION: 0
NAUSEA: 0
ABDOMINAL PAIN: 0
ANAL BLEEDING: 0
SHORTNESS OF BREATH: 0

## 2019-03-19 LAB
LV EF: 50 %
LVEF MODALITY: NORMAL

## 2019-04-15 ENCOUNTER — OFFICE VISIT (OUTPATIENT)
Dept: CARDIOLOGY | Age: 74
End: 2019-04-15
Payer: MEDICARE

## 2019-04-15 VITALS
DIASTOLIC BLOOD PRESSURE: 64 MMHG | SYSTOLIC BLOOD PRESSURE: 132 MMHG | HEIGHT: 76 IN | HEART RATE: 58 BPM | BODY MASS INDEX: 33.61 KG/M2 | WEIGHT: 276 LBS

## 2019-04-15 DIAGNOSIS — E78.2 MIXED HYPERLIPIDEMIA: ICD-10-CM

## 2019-04-15 DIAGNOSIS — I25.10 CORONARY ARTERY DISEASE INVOLVING NATIVE CORONARY ARTERY OF NATIVE HEART WITHOUT ANGINA PECTORIS: Primary | ICD-10-CM

## 2019-04-15 DIAGNOSIS — I10 ESSENTIAL HYPERTENSION: ICD-10-CM

## 2019-04-15 DIAGNOSIS — Z95.818 STATUS POST PLACEMENT OF IMPLANTABLE LOOP RECORDER: ICD-10-CM

## 2019-04-15 DIAGNOSIS — R06.02 CHRONIC SHORTNESS OF BREATH: ICD-10-CM

## 2019-04-15 PROCEDURE — 1123F ACP DISCUSS/DSCN MKR DOCD: CPT | Performed by: NURSE PRACTITIONER

## 2019-04-15 PROCEDURE — 99214 OFFICE O/P EST MOD 30 MIN: CPT | Performed by: NURSE PRACTITIONER

## 2019-04-15 PROCEDURE — 4040F PNEUMOC VAC/ADMIN/RCVD: CPT | Performed by: NURSE PRACTITIONER

## 2019-04-15 PROCEDURE — G8417 CALC BMI ABV UP PARAM F/U: HCPCS | Performed by: NURSE PRACTITIONER

## 2019-04-15 PROCEDURE — G8427 DOCREV CUR MEDS BY ELIG CLIN: HCPCS | Performed by: NURSE PRACTITIONER

## 2019-04-15 PROCEDURE — 3017F COLORECTAL CA SCREEN DOC REV: CPT | Performed by: NURSE PRACTITIONER

## 2019-04-15 PROCEDURE — 1036F TOBACCO NON-USER: CPT | Performed by: NURSE PRACTITIONER

## 2019-04-15 PROCEDURE — 93291 INTERROG DEV EVAL SCRMS IP: CPT | Performed by: NURSE PRACTITIONER

## 2019-04-15 PROCEDURE — G8598 ASA/ANTIPLAT THER USED: HCPCS | Performed by: NURSE PRACTITIONER

## 2019-04-15 RX ORDER — RANOLAZINE 500 MG/1
500 TABLET, EXTENDED RELEASE ORAL 2 TIMES DAILY
Qty: 60 TABLET | Refills: 5 | Status: SHIPPED | OUTPATIENT
Start: 2019-04-15 | End: 2019-04-19

## 2019-04-15 RX ORDER — AZELASTINE 1 MG/ML
1 SPRAY, METERED NASAL PRN
COMMUNITY
End: 2020-05-19

## 2019-04-15 RX ORDER — BUTALBITAL, ACETAMINOPHEN AND CAFFEINE 50; 325; 40 MG/1; MG/1; MG/1
1 TABLET ORAL PRN
Status: ON HOLD | COMMUNITY
End: 2019-06-13 | Stop reason: SDUPTHER

## 2019-04-15 RX ORDER — OXYCODONE AND ACETAMINOPHEN 10; 325 MG/1; MG/1
1 TABLET ORAL 3 TIMES DAILY PRN
Status: ON HOLD | COMMUNITY
End: 2019-06-06 | Stop reason: SDUPTHER

## 2019-04-15 RX ORDER — LISINOPRIL 20 MG/1
TABLET ORAL
Status: ON HOLD | COMMUNITY
End: 2019-06-13 | Stop reason: HOSPADM

## 2019-04-15 RX ORDER — FUROSEMIDE 40 MG/1
40 TABLET ORAL PRN
Status: ON HOLD | COMMUNITY
End: 2019-06-13 | Stop reason: HOSPADM

## 2019-04-15 NOTE — PATIENT INSTRUCTIONS
Start Ranexa 500 mg (1) tab twice daily. Call the office to report status of shortness of breath in 2 weeks at [phone]. Continue other current medications as prescribed. Continue to follow up with primary care provider for non cardiac medical problems. Call the office with any problems, questions or concerns at [phone]. Follow up as scheduled with your cardiologist. 6 months Dr. Denice Aguilar. The following educational material has been included in this after visit summary for your review: Life simple 7.     Additional instructions:  Coronary artery disease risk factors you can control: Smoking, high blood pressure, high cholesterol, diabetes, being overweight, lack of exercise and stress. Continue heart healthy diet. Take medications as directed. Exercise as tolerated. Strive for 15 minutes of exercise most days of the week. If asked to keep a blood pressure log, do so for 2 weeks. Call the office to report readings at [phone]. Blood pressure goal  is less than 120/70. Elevated blood pressure at 120-129/80 or less. High blood pressure at 130-139/80-89. If you are taking cholesterol lowering medications, it is recommended that lab work be checked annually. Always keep a current medication list. Bring your medications to every office visit. Life simple 7  1) Manage blood pressure - high blood pressure is a major risk factor for heart disease and stroke. Keeping blood pressure in health range reduces strain on your heart, arteries and kidneys. 2) Control cholesterol - contributes to plaque, which can clog arteries and lead to heart disease and stroke. When you control your cholesterol you are giving your arteries their best chance to remain clear. 3) Reduce blood sugar - most of the food we eat is turning into glucose or blood sugar that our body uses for energy. Over time, high levels of blood sugar can damage your heart, kidneys, eyes and nerves.   4) Get active - living an active life is one of the most rewarding gifts you can give yourself and those you love. Simply put, daily physical activity increases your length and quality of life. 5)  Eat better - A healthy diet is one of your best weapons for fighting cardiovascular disease. When you eat a heart healthy diet, you improve your chances for feeling good and staying healthy for life. 6)  Lose weight - when you shed extra fat an unnecessary pounds, you reduce the burden on your hear, lungs, blood vessels and skeleton. You give yourself the gift of active living, you lower your blood pressure and help yourself feel better. 7) Stop smoking - cigarette smokers have a higher risk of developing cardiovascular disease. If  You smoke, quitting is the best thing you can do for your health. Check American Heart Association on line for more information on Life's Simple 7 and tips for healthy living.     How to take:  NITROGLYCERIN (Nitrostat) 0.4 mg tablets, sublingual.  Nitroglycerin is in a group of drugs called nitrates. Nitroglycerin dilates (widens) blood vessels, making it easier for blood to flow through them and easier for the heart to pump. Dosing Guidelines for Nitroglycerin Tablets  · At the start of an angina (chest pain) attack, place one tablet under the tongue or between the cheek and gum. Do not swallow or chew the tablet; let it dissolve on its own. If necessary, a second and third tablet may be used, with five minutes between using each tablet. If you use a third tablet and your chest pain continues, it is time to seek immediate medical attention. Call 911 immediately and have someone drive you to the emergency room. You may be having a heart attack or other serious heart problem. · To prevent angina from exercise or stress, use 1 tablet 5 to 10 minutes before the activity.       Patient Education        A Healthy Heart: Care Instructions  Your Care Instructions    Heart disease occurs when a substance called plaque builds up in the vessels that supply oxygen-rich blood to your heart. This can narrow the blood vessels and reduce blood flow. A heart attack happens when blood flow is completely blocked. A high-fat diet, smoking, and other factors increase the risk of heart disease. Your doctor has found that you have a chance of having heart disease. You can do lots of things to keep your heart healthy. It may not be easy, but you can change your diet, exercise more, and quit smoking. These steps really work to lower your chance of heart disease. Follow-up care is a key part of your treatment and safety. Be sure to make and go to all appointments, and call your doctor if you are having problems. It's also a good idea to know your test results and keep a list of the medicines you take. How can you care for yourself at home? Diet    · Use less salt when you cook and eat. This helps lower your blood pressure. Taste food before salting. Add only a little salt when you think you need it. With time, your taste buds will adjust to less salt.     · Eat fewer snack items, fast foods, canned soups, and other high-salt, high-fat, processed foods.     · Read food labels and try to avoid saturated and trans fats. They increase your risk of heart disease by raising cholesterol levels.     · Limit the amount of solid fat-butter, margarine, and shortening-you eat. Use olive, peanut, or canola oil when you cook. Bake, broil, and steam foods instead of frying them.     · Eating fish can lower your risk for heart disease. Eat at least 2 servings of fish a week. [place], mackerel, herring, sardines, and chunk light tuna are very good choices. These fish contain omega-3 fatty acids.     · Eat a variety of fruit and vegetables every day. Dark green, deep orange, red, or yellow fruits and vegetables are especially good for you.  Examples include spinach, carrots, peaches, and berries.     · Foods high in fiber can reduce your cholesterol and

## 2019-04-15 NOTE — PROGRESS NOTES
[facility], [place]. [address] [address] [address] [facility]  [phone] office  [phone] fax      OFFICE VISIT:  4/15/2019    Tiny White - :     Reason For Visit:  Laly Mittal is a 75 y.o. male who is here for Follow-Up from Hospital (Patient presents for cardiology follow up and loop recorder check. Reports some dizziness and SOA.); Coronary Artery Disease; Hypertension; Hyperlipidemia; and Shortness of Breath    The patient presents today for cardiology follow up and loop recorder interrogation. The patient reports chronic shortness of breath. He states \"I have seen a lung doctor and was told all okay with my lungs. \"  The patient reports no angina. BP is well controlled on current regimen. The patient's PCP monitors cholesterol. Hazle Halls denies exertional chest pain, shortness of breath, orthopnea, paroxysmal nocturnal dyspnea, syncope, presyncope, sustained arrythmia, edema and fatigue. The patient denies numbness or weakness to suggest cerebrovascular accident or transient ischemic attack.       Tiny White has the following history as recorded in [facility]:    Patient Active Problem List   Diagnosis Code    Type 2 diabetes mellitus with diabetic polyneuropathy, with long-term current use of insulin (HCC) E11.42, Z79.4    HTN (hypertension) I10    CAD (coronary artery disease) I25.10    Fatigue R53.83    MI (myocardial infarction) ([address] I21.9    Hyperlipidemia E78.5    Chronic bilateral low back pain without sciatica M54.5, G89.29    DDD (degenerative disc disease), lumbar M51.36    Spondylosis of lumbar region without myelopathy or radiculopathy M47.816    Acute ischemic stroke (HCC) I63.9    Thoracic outlet syndrome G54.0    Dizziness R42    Imbalance R26.89    Cerebrovascular accident (CVA) due to embolism of right middle cerebral artery ([address] I63.411    Recurrent major depressive disorder, in full remission ([address] F33.42    Trigger middle finger of left hand M65.332    Peripheral polyneuropathy G62.9    Palpitations R00.2    Chronic insomnia F51.04    Status post placement of implantable loop recorder Z95.818    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    Dermatitis L30.9    Depression with anxiety F41.8    Benign prostatic hyperplasia without lower urinary tract symptoms N40.0    Chronic tension-type headache, not intractable G44.229    MOO on CPAP G47.33, Z99.89    Chest pain R07.9    Chest pressure R07.89    Chronic shortness of breath R06.02     Past Medical History:   Diagnosis Date    Acute ischemic stroke ([address] 2/20/2017    Atherosclerotic heart disease     s/p [place], [place]ing x 3 jan [place]    CAD (coronary artery disease)     Cerebral artery occlusion with cerebral infarction [facility])     Cerebrovascular accident (CVA) due to embolism of right middle cerebral artery ([address] 8/14/2017    Chronic bilateral low back pain without sciatica 11/10/2016    Chronic kidney disease     DDD (degenerative disc disease), lumbar 11/15/2016    Depression     Depression with anxiety     Diabetes mellitus ([address]     type II    Diabetes mellitus, type 2 ([address]     DM (diabetes mellitus) ([address] 7/12/2011    Glaucoma     Headache     Hyperlipidemia     Cholesterol management per pcp.      Hypertension     Macular degeneration     MI (myocardial infarction) ([address]     MI (myocardial infarction) ([address]     Neuromuscular disorder ([address]     Neuropathy     Osteoarthritis     Sleep apnea     Spondylosis of lumbar region without myelopathy or radiculopathy 11/15/2016    Status post placement of implantable loop recorder 10/31/2017    TIA (transient ischemic attack)      Past Surgical History:   Procedure Laterality Date    APPENDECTOMY      BACK SURGERY      CARDIAC CATHETERIZATION  12/12/12    with angioplasty, stent    CARDIAC CATHETERIZATION  3/29/15  MDL    stent to relief after 1 dose, call 911. 25 tablet 3    metoprolol tartrate (LOPRESSOR) 25 MG tablet TAKE 1/2 TABLET TWICE DAILY FOR HIGH BLOOD PRESSURE 90 tablet 3    tamsulosin (FLOMAX) 0.4 MG capsule TAKE 2 CAPSULES AT BEDTIME 180 capsule 3    insulin glargine (LANTUS SOLOSTAR) 100 UNIT/ML injection pen INJECT 80 UNITS INTO THE SKIN NIGHTLY 24 pen 3    clopidogrel (PLAVIX) 75 MG tablet TAKE 1 TABLET DAILY 90 tablet 3    zoster recombinant adjuvanted vaccine (SHINGRIX) 50 MCG/0.5ML SUSR injection 1 vaccine now and repeat in 2-6 months. 0.5 mL 1     No current facility-administered medications for this visit. Allergies: Statins and Crestor [rosuvastatin calcium]    Review of Systems  Constitutional - no appetite change, or unexpected weight change. No fever, chills or diaphoresis. No significant change in activity level or new onset of fatigue. HEENT - no significant rhinorrhea or epistaxis. No tinnitus or significant hearing loss. Eyes - no sudden vision change or amaurosis. No corneal arcus, xantholasma, subconjunctival hemorrhage or discharge. Respiratory - no significant wheezing, stridor, apnea or cough. No dyspnea on exertion or shortness of air. Cardiovascular - no exertional chest pain to suggest myocardial ischemia. No orthopnea or PND. No sensation of sustained arrythmia. No occurrence of slow heart rate. No palpitations. No claudication. No leg edema. Gastrointestinal - no abdominal swelling or pain. No blood in stool. No severe constipation, diarrhea, nausea, or vomiting. Genitourinary - no dysuria, frequency, or urgency. No flank pain or hematuria. Musculoskeletal - no back pain or myalgia. No problems with gait. Extremities - no clubbing, cyanosis or edema. Skin - no color change or rash. No pallor. No new surgical incision. Neurologic - no speech difficulty, facial asymmetry or lateralizing weakness. No seizures, presyncope or syncope.   No significant dizziness. Hematologic - no easy bruising or excessive bleeding. Psychiatric - no severe anxiety or insomnia. No confusion. All other review of systems are negative. Objective  Vital Signs - /64   Pulse 58   Ht 6' 4\" (1.93 m)   Wt 276 lb (125.2 kg)   BMI 33.60 kg/m²   General - Karon Arm is alert, cooperative, and pleasant. Well groomed. No acute distress. Body habitus - Body mass index is 33.6 kg/m². HEENT - Head is normocephalic. No circumoral cyanosis. Dentition is normal.  EYES -   Lids normal without ptosis. No discharge, edema or subconjunctival hemorrhage. Neck - Symmetrical without apparent mass or lymphadenopathy. Respiratory - Normal respiratory effort without use of accessory muscles. Ausculatation reveals vesicular breath sounds without crackles, wheezes, rub or rhonchi. Cardiovascular - No jugular venous distention. Auscultation reveals regular rate and rhythm. No audible clicks, gallop or rub. No murmur. No lower extremity varicosities. No carotid bruits. Abdominal -  No visible distention, mass or pulsations. Extremities - No clubbing or cyanosis. No statis dermatitis or ulcers. No edema. Musculoskeletal -   No Osler's nodes. No kyphosis or scoliosis. Gait is even and regular without limp or shuffle. Ambulates without assistance. Skin -  Warm and dry; no rash or pallor. No new surgical wound. Neurological - No focal neurological deficits. Thought processes coherent. No apparent tremor. Oriented to person, place and time. Psychiatric -  Appropriate affect and mood. Assessment:     Diagnosis Orders   1. Coronary artery disease involving native coronary artery of native heart without angina pectoris     2. Essential hypertension     3. Mixed hyperlipidemia     4. Status post placement of implantable loop recorder     5. Chronic shortness of breath       Data reviewed:  3/1/19 echo  Findings:   1.  Analysis of the stress and rest images reveals no obvious areas of   ischemia or infarction. 2. Analysis of the gated images reveals grossly normal left   ventricular function with a calculated ejection fraction of 50 %. Conclusions:   Grossly negative Cardiolyte for the presence of ischemia or infarction   with normal wall motion and ejection fraction at rest.     Loop recorder interrogation. Battery status good. No symptom, tachy, pause, desiree or AT/AF. Time corrected and device cleared. Stable CV status without symptoms of overt heart failure, arrhythmia or angina. Chronic SOA, will try adding Ranexa 500 mg (1) BID. Patient will call back in 2 weeks to report status of symptoms. CAD medical management includes Lopressor, Lisinopril and Plavix. BP well controlled. PCP follows lipids. Patient is compliant with medication regimen.     BP Readings from Last 3 Encounters:   04/15/19 132/64   03/15/19 (!) 101/40   03/12/19 (!) 140/80    Pulse Readings from Last 3 Encounters:   04/15/19 58   03/15/19 57   03/12/19 83        Wt Readings from Last 3 Encounters:   04/15/19 276 lb (125.2 kg)   03/15/19 269 lb (122 kg)   03/12/19 269 lb (122 kg)       Recent Results (from the past 1008 hour(s))   CBC Auto Differential    Collection Time: 03/12/19 11:17 AM   Result Value Ref Range    WBC 6.4 4.8 - 10.8 K/uL    RBC 5.31 4.70 - 6.10 M/uL    Hemoglobin 14.6 14.0 - 18.0 g/dL    Hematocrit 46.2 42.0 - 52.0 %    MCV 87.0 80.0 - 94.0 fL    MCH 27.5 27.0 - 31.0 pg    MCHC 31.6 (L) 33.0 - 37.0 g/dL    RDW 13.5 11.5 - 14.5 %    Platelets 605 081 - 973 K/uL    MPV 11.2 9.4 - 12.4 fL    Neutrophils % 51.0 50.0 - 65.0 %    Lymphocytes % 35.4 20.0 - 40.0 %    Monocytes % 9.0 0.0 - 10.0 %    Eosinophils % 3.6 0.0 - 5.0 %    Basophils % 0.8 0.0 - 1.0 %    Neutrophils # 3.3 1.5 - 7.5 K/uL    Lymphocytes # 2.3 1.1 - 4.5 K/uL    Monocytes # 0.60 0.00 - 0.90 K/uL    Eosinophils # 0.20 0.00 - 0.60 K/uL    Basophils # 0.10 0.00 - 0.20 K/uL   Lipase    Collection Time: 03/12/19 11:17 AM   Result Value Ref Range    Lipase 20 13 - 60 U/L   Comprehensive Metabolic Panel    Collection Time: 03/12/19 11:17 AM   Result Value Ref Range    Sodium 141 136 - 145 mmol/L    Potassium 4.4 3.5 - 5.0 mmol/L    Chloride 104 98 - 111 mmol/L    CO2 25 22 - 29 mmol/L    Anion Gap 12 7 - 19 mmol/L    Glucose 143 (H) 74 - 109 mg/dL    BUN 20 8 - 23 mg/dL    CREATININE 1.3 (H) 0.5 - 1.2 mg/dL    GFR Non-African American 54 (A) >60    Calcium 9.5 8.8 - 10.2 mg/dL    Total Protein 7.6 6.6 - 8.7 g/dL    Alb 4.4 3.5 - 5.2 g/dL    Total Bilirubin 0.3 0.2 - 1.2 mg/dL    Alkaline Phosphatase 94 40 - 130 U/L    ALT 20 5 - 41 U/L    AST 21 5 - 40 U/L   Amylase    Collection Time: 03/12/19 11:17 AM   Result Value Ref Range    Amylase 26 (L) 28 - 100 U/L     Plan  Previous cardiac history and records reviewed. Start Ranexa 500 mg (1) tab twice daily. Call the office to report status of shortness of breath in 2 weeks at [phone]. Continue other current medications as prescribed. Continue to follow up with primary care provider for non cardiac medical problems. Call the office with any problems, questions or concerns at [phone]. Follow up as scheduled with your cardiologist. 6 months Dr. Juvenal Mayo. The following educational material has been included in this after visit summary for your review: Life simple 7.     Additional instructions:  Coronary artery disease risk factors you can control: Smoking, high blood pressure, high cholesterol, diabetes, being overweight, lack of exercise and stress. Continue heart healthy diet. Take medications as directed. Exercise as tolerated. Strive for 15 minutes of exercise most days of the week. If asked to keep a blood pressure log, do so for 2 weeks. Call the office to report readings at [phone]. Blood pressure goal  is less than 120/70. Elevated blood pressure at 120-129/80 or less. High blood pressure at 130-139/80-89.   If you are taking cholesterol lowering medications, it is recommended that lab work be checked annually. Always keep a current medication list. Bring your medications to every office visit. Life simple 7  1) Manage blood pressure - high blood pressure is a major risk factor for heart disease and stroke. Keeping blood pressure in health range reduces strain on your heart, arteries and kidneys. 2) Control cholesterol - contributes to plaque, which can clog arteries and lead to heart disease and stroke. When you control your cholesterol you are giving your arteries their best chance to remain clear. 3) Reduce blood sugar - most of the food we eat is turning into glucose or blood sugar that our body uses for energy. Over time, high levels of blood sugar can damage your heart, kidneys, eyes and nerves. 4) Get active - living an active life is one of the most rewarding gifts you can give yourself and those you love. Simply put, daily physical activity increases your length and quality of life. 5)  Eat better - A healthy diet is one of your best weapons for fighting cardiovascular disease. When you eat a heart healthy diet, you improve your chances for feeling good and staying healthy for life. 6)  Lose weight - when you shed extra fat an unnecessary pounds, you reduce the burden on your hear, lungs, blood vessels and skeleton. You give yourself the gift of active living, you lower your blood pressure and help yourself feel better. 7) Stop smoking - cigarette smokers have a higher risk of developing cardiovascular disease. If  You smoke, quitting is the best thing you can do for your health. Check American Heart Association on line for more information on Life's Simple 7 and tips for healthy living.     How to take:  NITROGLYCERIN (Nitrostat) 0.4 mg tablets, sublingual.  Nitroglycerin is in a group of drugs called nitrates.  Nitroglycerin dilates (widens) blood vessels, making it easier for blood to flow through them and easier for the heart to pump. Dosing Guidelines for Nitroglycerin Tablets  · At the start of an angina (chest pain) attack, place one tablet under the tongue or between the cheek and gum. Do not swallow or chew the tablet; let it dissolve on its own. If necessary, a second and third tablet may be used, with five minutes between using each tablet. If you use a third tablet and your chest pain continues, it is time to seek immediate medical attention. Call 911 immediately and have someone drive you to the emergency room. You may be having a heart attack or other serious heart problem. · To prevent angina from exercise or stress, use 1 tablet 5 to 10 minutes before the activity.      TEGAN Bentley

## 2019-04-19 ENCOUNTER — TELEPHONE (OUTPATIENT)
Dept: CARDIOLOGY | Age: 74
End: 2019-04-19

## 2019-04-19 RX ORDER — ISOSORBIDE MONONITRATE 30 MG/1
30 TABLET, EXTENDED RELEASE ORAL DAILY
Qty: 30 TABLET | Refills: 5 | Status: SHIPPED | OUTPATIENT
Start: 2019-04-19 | End: 2019-09-10 | Stop reason: SDUPTHER

## 2019-04-19 NOTE — TELEPHONE ENCOUNTER
Patient's pharmacy left message that patient could not afford Ranexa, I tried to get a PA and it was denied.

## 2019-05-10 DIAGNOSIS — R42 DIZZINESS: ICD-10-CM

## 2019-05-10 DIAGNOSIS — I63.411 CEREBROVASCULAR ACCIDENT (CVA) DUE TO EMBOLISM OF RIGHT MIDDLE CEREBRAL ARTERY (HCC): ICD-10-CM

## 2019-05-10 DIAGNOSIS — Z95.818 STATUS POST PLACEMENT OF IMPLANTABLE LOOP RECORDER: Primary | ICD-10-CM

## 2019-05-20 ENCOUNTER — NURSE TRIAGE (OUTPATIENT)
Dept: OTHER | Facility: CLINIC | Age: 74
End: 2019-05-20

## 2019-05-20 NOTE — TELEPHONE ENCOUNTER
Reason for Disposition   MODERATE difficulty breathing (e.g., speaks in phrases, SOB even at rest, pulse 100-120) of new onset or worse than normal    Protocols used: BREATHING DIFFICULTY-ADULT-[place]

## 2019-05-20 NOTE — TELEPHONE ENCOUNTER
Call rec'd from [address] [place] [address] 2 weeks progressively worsening sob  Wheezing  Cannot walk from couch to chair r/t gut and chest pain.   10/10 with activity, at rest 4/10  Speaking in broken sentences  BP today 120/60, HR 72 just now taken by person at his home  Has not taken O2 sats -does not wear O2  Has an abscessed tooth-states root canal pending    Hx of CAD    States has not taken his lasix for past 2-3 months  On plavix    No LE swelling    recc go to ED now per triage guidelines, he says someone can take him to [place] he was just seen in the ED    Patient suddenly ended call with  \"just forget it, I'll take care of it\"

## 2019-05-24 ENCOUNTER — APPOINTMENT (OUTPATIENT)
Dept: GENERAL RADIOLOGY | Age: 74
DRG: 234 | End: 2019-05-24
Payer: MEDICARE

## 2019-05-24 ENCOUNTER — HOSPITAL ENCOUNTER (INPATIENT)
Age: 74
LOS: 13 days | Discharge: NURSING, REHAB OR LONG-TERM CARE | DRG: 234 | End: 2019-06-06
Attending: FAMILY MEDICINE | Admitting: INTERNAL MEDICINE
Payer: MEDICARE

## 2019-05-24 DIAGNOSIS — R07.89 OTHER CHEST PAIN: Primary | ICD-10-CM

## 2019-05-24 DIAGNOSIS — R77.8 ELEVATED TROPONIN: ICD-10-CM

## 2019-05-24 PROBLEM — R79.89 ELEVATED TROPONIN: Status: ACTIVE | Noted: 2019-05-24

## 2019-05-24 LAB
ALBUMIN SERPL-MCNC: 3.3 G/DL (ref 3.5–5.2)
ALP BLD-CCNC: 74 U/L (ref 40–130)
ALT SERPL-CCNC: 19 U/L (ref 5–41)
AMYLASE: 24 U/L (ref 28–100)
ANION GAP SERPL CALCULATED.3IONS-SCNC: 10 MMOL/L (ref 7–19)
APTT: 34.8 SEC (ref 26–36.2)
AST SERPL-CCNC: 15 U/L (ref 5–40)
BASOPHILS ABSOLUTE: 0.1 K/UL (ref 0–0.2)
BASOPHILS RELATIVE PERCENT: 0.7 % (ref 0–1)
BILIRUB SERPL-MCNC: 0.3 MG/DL (ref 0.2–1.2)
BILIRUBIN URINE: NEGATIVE
BLOOD, URINE: NEGATIVE
BUN BLDV-MCNC: 17 MG/DL (ref 8–23)
C-REACTIVE PROTEIN: 5.18 MG/DL (ref 0–0.5)
CALCIUM SERPL-MCNC: 9.1 MG/DL (ref 8.8–10.2)
CHLORIDE BLD-SCNC: 103 MMOL/L (ref 98–111)
CLARITY: CLEAR
CO2: 24 MMOL/L (ref 22–29)
COLOR: YELLOW
CREAT SERPL-MCNC: 1.1 MG/DL (ref 0.5–1.2)
D DIMER: 1.44 UG/ML FEU (ref 0–0.48)
EOSINOPHILS ABSOLUTE: 0.4 K/UL (ref 0–0.6)
EOSINOPHILS RELATIVE PERCENT: 5.1 % (ref 0–5)
GFR NON-AFRICAN AMERICAN: >60
GLUCOSE BLD-MCNC: 102 MG/DL (ref 70–99)
GLUCOSE BLD-MCNC: 178 MG/DL (ref 74–109)
GLUCOSE BLD-MCNC: 208 MG/DL (ref 70–99)
GLUCOSE URINE: NEGATIVE MG/DL
HCT VFR BLD CALC: 37.7 % (ref 42–52)
HEMOGLOBIN: 11.6 G/DL (ref 14–18)
INR BLD: 1.21 (ref 0.88–1.18)
KETONES, URINE: NEGATIVE MG/DL
LACTIC ACID: 1 MMOL/L (ref 0.5–1.9)
LEUKOCYTE ESTERASE, URINE: NEGATIVE
LIPASE: 18 U/L (ref 13–60)
LYMPHOCYTES ABSOLUTE: 1.6 K/UL (ref 1.1–4.5)
LYMPHOCYTES RELATIVE PERCENT: 22.8 % (ref 20–40)
MCH RBC QN AUTO: 26.7 PG (ref 27–31)
MCHC RBC AUTO-ENTMCNC: 30.8 G/DL (ref 33–37)
MCV RBC AUTO: 86.7 FL (ref 80–94)
MONOCYTES ABSOLUTE: 0.7 K/UL (ref 0–0.9)
MONOCYTES RELATIVE PERCENT: 10.5 % (ref 0–10)
NEUTROPHILS ABSOLUTE: 4.3 K/UL (ref 1.5–7.5)
NEUTROPHILS RELATIVE PERCENT: 60.5 % (ref 50–65)
NITRITE, URINE: NEGATIVE
PDW BLD-RTO: 14.2 % (ref 11.5–14.5)
PERFORMED ON: ABNORMAL
PERFORMED ON: ABNORMAL
PH UA: 7.5 (ref 5–8)
PLATELET # BLD: 189 K/UL (ref 130–400)
PMV BLD AUTO: 11.3 FL (ref 9.4–12.4)
POTASSIUM SERPL-SCNC: 5 MMOL/L (ref 3.5–5)
PRO-BNP: 2030 PG/ML (ref 0–1800)
PROTEIN UA: NEGATIVE MG/DL
PROTHROMBIN TIME: 14.7 SEC (ref 12–14.6)
RBC # BLD: 4.35 M/UL (ref 4.7–6.1)
SODIUM BLD-SCNC: 137 MMOL/L (ref 136–145)
SPECIFIC GRAVITY UA: 1.01 (ref 1–1.03)
T4 TOTAL: 6 UG/DL (ref 4.5–11.7)
TOTAL CK: 105 U/L (ref 39–308)
TOTAL PROTEIN: 7.1 G/DL (ref 6.6–8.7)
TROPONIN: 1.33 NG/ML (ref 0–0.03)
TROPONIN: 1.37 NG/ML (ref 0–0.03)
TROPONIN: 1.42 NG/ML (ref 0–0.03)
TROPONIN: 1.47 NG/ML (ref 0–0.03)
TSH SERPL DL<=0.05 MIU/L-ACNC: 1.81 UIU/ML (ref 0.27–4.2)
URINE REFLEX TO CULTURE: NORMAL
UROBILINOGEN, URINE: 0.2 E.U./DL
WBC # BLD: 7.1 K/UL (ref 4.8–10.8)

## 2019-05-24 PROCEDURE — 85610 PROTHROMBIN TIME: CPT

## 2019-05-24 PROCEDURE — 83690 ASSAY OF LIPASE: CPT

## 2019-05-24 PROCEDURE — 6370000000 HC RX 637 (ALT 250 FOR IP): Performed by: FAMILY MEDICINE

## 2019-05-24 PROCEDURE — 85025 COMPLETE CBC W/AUTO DIFF WBC: CPT

## 2019-05-24 PROCEDURE — 83605 ASSAY OF LACTIC ACID: CPT

## 2019-05-24 PROCEDURE — 6370000000 HC RX 637 (ALT 250 FOR IP): Performed by: INTERNAL MEDICINE

## 2019-05-24 PROCEDURE — 84436 ASSAY OF TOTAL THYROXINE: CPT

## 2019-05-24 PROCEDURE — 93005 ELECTROCARDIOGRAM TRACING: CPT

## 2019-05-24 PROCEDURE — 99223 1ST HOSP IP/OBS HIGH 75: CPT | Performed by: INTERNAL MEDICINE

## 2019-05-24 PROCEDURE — 99285 EMERGENCY DEPT VISIT HI MDM: CPT | Performed by: FAMILY MEDICINE

## 2019-05-24 PROCEDURE — 2100000000 HC CCU R&B

## 2019-05-24 PROCEDURE — 94640 AIRWAY INHALATION TREATMENT: CPT

## 2019-05-24 PROCEDURE — 81003 URINALYSIS AUTO W/O SCOPE: CPT

## 2019-05-24 PROCEDURE — 85730 THROMBOPLASTIN TIME PARTIAL: CPT

## 2019-05-24 PROCEDURE — 99285 EMERGENCY DEPT VISIT HI MDM: CPT

## 2019-05-24 PROCEDURE — 87040 BLOOD CULTURE FOR BACTERIA: CPT

## 2019-05-24 PROCEDURE — 6360000002 HC RX W HCPCS: Performed by: INTERNAL MEDICINE

## 2019-05-24 PROCEDURE — 82150 ASSAY OF AMYLASE: CPT

## 2019-05-24 PROCEDURE — 2580000003 HC RX 258: Performed by: INTERNAL MEDICINE

## 2019-05-24 PROCEDURE — 74022 RADEX COMPL AQT ABD SERIES: CPT

## 2019-05-24 PROCEDURE — 80053 COMPREHEN METABOLIC PANEL: CPT

## 2019-05-24 PROCEDURE — 83880 ASSAY OF NATRIURETIC PEPTIDE: CPT

## 2019-05-24 PROCEDURE — 82550 ASSAY OF CK (CPK): CPT

## 2019-05-24 PROCEDURE — 84484 ASSAY OF TROPONIN QUANT: CPT

## 2019-05-24 PROCEDURE — 84443 ASSAY THYROID STIM HORMONE: CPT

## 2019-05-24 PROCEDURE — 82948 REAGENT STRIP/BLOOD GLUCOSE: CPT

## 2019-05-24 PROCEDURE — 86140 C-REACTIVE PROTEIN: CPT

## 2019-05-24 PROCEDURE — 85379 FIBRIN DEGRADATION QUANT: CPT

## 2019-05-24 PROCEDURE — 36415 COLL VENOUS BLD VENIPUNCTURE: CPT

## 2019-05-24 RX ORDER — METOPROLOL SUCCINATE 25 MG/1
25 TABLET, EXTENDED RELEASE ORAL DAILY
Status: DISCONTINUED | OUTPATIENT
Start: 2019-05-24 | End: 2019-05-24

## 2019-05-24 RX ORDER — ISOSORBIDE MONONITRATE 30 MG/1
30 TABLET, EXTENDED RELEASE ORAL DAILY
Status: DISCONTINUED | OUTPATIENT
Start: 2019-05-24 | End: 2019-05-30

## 2019-05-24 RX ORDER — SODIUM CHLORIDE 0.9 % (FLUSH) 0.9 %
10 SYRINGE (ML) INJECTION PRN
Status: DISCONTINUED | OUTPATIENT
Start: 2019-05-24 | End: 2019-05-25

## 2019-05-24 RX ORDER — TAMSULOSIN HYDROCHLORIDE 0.4 MG/1
0.8 CAPSULE ORAL NIGHTLY
Status: DISCONTINUED | OUTPATIENT
Start: 2019-05-24 | End: 2019-05-30

## 2019-05-24 RX ORDER — FUROSEMIDE 40 MG/1
40 TABLET ORAL DAILY PRN
Status: DISCONTINUED | OUTPATIENT
Start: 2019-05-24 | End: 2019-05-30

## 2019-05-24 RX ORDER — ALBUTEROL SULFATE 2.5 MG/3ML
2.5 SOLUTION RESPIRATORY (INHALATION) EVERY 4 HOURS
Status: DISCONTINUED | OUTPATIENT
Start: 2019-05-24 | End: 2019-05-29

## 2019-05-24 RX ORDER — CLOPIDOGREL BISULFATE 75 MG/1
75 TABLET ORAL DAILY
Status: DISCONTINUED | OUTPATIENT
Start: 2019-05-25 | End: 2019-05-25

## 2019-05-24 RX ORDER — ASPIRIN 81 MG/1
324 TABLET, CHEWABLE ORAL ONCE
Status: COMPLETED | OUTPATIENT
Start: 2019-05-24 | End: 2019-05-24

## 2019-05-24 RX ORDER — BUTALBITAL, ACETAMINOPHEN AND CAFFEINE 50; 325; 40 MG/1; MG/1; MG/1
1 TABLET ORAL EVERY 4 HOURS PRN
Status: DISCONTINUED | OUTPATIENT
Start: 2019-05-24 | End: 2019-05-30

## 2019-05-24 RX ORDER — ASPIRIN 81 MG/1
81 TABLET, CHEWABLE ORAL DAILY
Status: DISCONTINUED | OUTPATIENT
Start: 2019-05-24 | End: 2019-05-30

## 2019-05-24 RX ORDER — LISINOPRIL 10 MG/1
10 TABLET ORAL 2 TIMES DAILY
Status: DISCONTINUED | OUTPATIENT
Start: 2019-05-24 | End: 2019-05-25

## 2019-05-24 RX ORDER — ACETAMINOPHEN 325 MG/1
650 TABLET ORAL EVERY 4 HOURS PRN
Status: DISCONTINUED | OUTPATIENT
Start: 2019-05-24 | End: 2019-05-30

## 2019-05-24 RX ORDER — TIZANIDINE 2 MG/1
4 TABLET ORAL NIGHTLY PRN
Status: DISCONTINUED | OUTPATIENT
Start: 2019-05-24 | End: 2019-05-30

## 2019-05-24 RX ORDER — SODIUM CHLORIDE 0.9 % (FLUSH) 0.9 %
10 SYRINGE (ML) INJECTION EVERY 12 HOURS SCHEDULED
Status: DISCONTINUED | OUTPATIENT
Start: 2019-05-24 | End: 2019-05-25

## 2019-05-24 RX ORDER — INSULIN GLARGINE 100 [IU]/ML
80 INJECTION, SOLUTION SUBCUTANEOUS NIGHTLY
Status: DISCONTINUED | OUTPATIENT
Start: 2019-05-24 | End: 2019-05-26

## 2019-05-24 RX ORDER — METOPROLOL SUCCINATE 25 MG/1
12.5 TABLET, EXTENDED RELEASE ORAL 2 TIMES DAILY
Status: DISCONTINUED | OUTPATIENT
Start: 2019-05-25 | End: 2019-05-30

## 2019-05-24 RX ORDER — OXYCODONE HYDROCHLORIDE AND ACETAMINOPHEN 5; 325 MG/1; MG/1
2 TABLET ORAL 2 TIMES DAILY PRN
Status: DISCONTINUED | OUTPATIENT
Start: 2019-05-24 | End: 2019-05-25

## 2019-05-24 RX ORDER — SERTRALINE HYDROCHLORIDE 100 MG/1
100 TABLET, FILM COATED ORAL DAILY
Status: DISCONTINUED | OUTPATIENT
Start: 2019-05-25 | End: 2019-05-30

## 2019-05-24 RX ORDER — ONDANSETRON 2 MG/ML
4 INJECTION INTRAMUSCULAR; INTRAVENOUS EVERY 6 HOURS PRN
Status: DISCONTINUED | OUTPATIENT
Start: 2019-05-24 | End: 2019-05-30

## 2019-05-24 RX ORDER — NITROGLYCERIN 0.4 MG/1
0.4 TABLET SUBLINGUAL EVERY 5 MIN PRN
Status: DISCONTINUED | OUTPATIENT
Start: 2019-05-24 | End: 2019-05-30

## 2019-05-24 RX ADMIN — ALBUTEROL SULFATE 2.5 MG: 2.5 SOLUTION RESPIRATORY (INHALATION) at 22:48

## 2019-05-24 RX ADMIN — TIZANIDINE 4 MG: 2 TABLET ORAL at 23:55

## 2019-05-24 RX ADMIN — ISOSORBIDE MONONITRATE 30 MG: 30 TABLET, EXTENDED RELEASE ORAL at 23:55

## 2019-05-24 RX ADMIN — TAMSULOSIN HYDROCHLORIDE 0.8 MG: 0.4 CAPSULE ORAL at 23:55

## 2019-05-24 RX ADMIN — ASPIRIN 81 MG 324 MG: 81 TABLET ORAL at 15:02

## 2019-05-24 RX ADMIN — LISINOPRIL 10 MG: 10 TABLET ORAL at 23:55

## 2019-05-24 RX ADMIN — ENOXAPARIN SODIUM 40 MG: 40 INJECTION SUBCUTANEOUS at 20:17

## 2019-05-24 RX ADMIN — Medication 10 ML: at 20:17

## 2019-05-24 RX ADMIN — LIDOCAINE HYDROCHLORIDE: 20 SOLUTION ORAL; TOPICAL at 14:18

## 2019-05-24 RX ADMIN — METOPROLOL SUCCINATE 25 MG: 25 TABLET, EXTENDED RELEASE ORAL at 20:17

## 2019-05-24 RX ADMIN — BUTALBITAL, ACETAMINOPHEN, AND CAFFEINE 1 TABLET: 50; 325; 40 TABLET ORAL at 23:55

## 2019-05-24 ASSESSMENT — PAIN SCALES - GENERAL
PAINLEVEL_OUTOF10: 0
PAINLEVEL_OUTOF10: 3
PAINLEVEL_OUTOF10: 0
PAINLEVEL_OUTOF10: 6

## 2019-05-24 ASSESSMENT — ENCOUNTER SYMPTOMS
SHORTNESS OF BREATH: 1
NAUSEA: 0
COUGH: 0
COLOR CHANGE: 0
WHEEZING: 0
ABDOMINAL PAIN: 1
SORE THROAT: 0
VOMITING: 0
BACK PAIN: 0
DIARRHEA: 0

## 2019-05-24 NOTE — ED PROVIDER NOTES
[facility]  eMERGENCY dEPARTMENT eNCOUnter      Pt Name: Ottoniel Stanley  MRN: [id number]  [place] [birth year]  Date of evaluation: 5/24/2019  Provider: Evette Chen MD    [address] [place] [address]       Chief Complaint   Patient presents with    Chest Pain         HISTORY OF PRESENT ILLNESS   (Location/Symptom, Timing/Onset,Context/Setting, Quality, Duration, Modifying Factors, Severity)  Note limiting factors. Ottoniel Stanley is a 75 y.o. male who presents to the emergency department      Patient presents today complaining of epigastric pain with difficulty breathing with ambulation and increased epigastric pain. He relates that he eats he feels as if that makes it difficult to breathe and it can make the epigastric pain worse. Review shows that 2 months ago the patient had a normal Lexiscan by Dr. Elyse Rinne. Patient reports that as long as he has seated in bed had a slightly upright position his discomfort is a 1 out of 10. NursingNotes were reviewed. REVIEW OF SYSTEMS    (2-9 systems for level 4, 10 or more for level 5)     Review of Systems   Constitutional: Positive for activity change and fatigue. Negative for appetite change, chills and fever. HENT: Negative for nosebleeds, postnasal drip and sore throat. Respiratory: Positive for shortness of breath. Negative for cough and wheezing. Cardiovascular: Positive for chest pain (Patient indicates the epigastric region is the area of his chest pain). Gastrointestinal: Positive for abdominal pain (The epigastric abdominal pain is what he refers to as his chest pain also). Negative for diarrhea, nausea and vomiting. Genitourinary: Negative for dysuria. Musculoskeletal: Negative for back pain. Skin: Negative for color change. Neurological: Negative for weakness and headaches.             PAST MEDICALHISTORY     Past Medical History:   Diagnosis Date    Acute ischemic stroke ([facility] 2/20/2017    Atherosclerotic heart disease     s/p MI, stenting x 3 jan 2011([place])    CAD (coronary artery disease)     Cerebral artery occlusion with cerebral infarction [facility])     Cerebrovascular accident (CVA) due to embolism of right middle cerebral artery ([address] 8/14/2017    Chronic bilateral low back pain without sciatica 11/10/2016    Chronic kidney disease     DDD (degenerative disc disease), lumbar 11/15/2016    Depression     Depression with anxiety     Diabetes mellitus ([address]     type II    Diabetes mellitus, type 2 ([address]     DM (diabetes mellitus) ([address] 7/12/2011    Glaucoma     Headache     Hyperlipidemia     Cholesterol management per pcp.  Hypertension     Macular degeneration     MI (myocardial infarction) ([address]     MI (myocardial infarction) ([address]     Neuromuscular disorder ([address]     Neuropathy     Osteoarthritis     Sleep apnea     Spondylosis of lumbar region without myelopathy or radiculopathy 11/15/2016    Status post placement of implantable loop recorder 10/31/2017    TIA (transient ischemic attack)          SURGICAL HISTORY       Past Surgical History:   Procedure Laterality Date    APPENDECTOMY      BACK SURGERY      CARDIAC CATHETERIZATION  12/12/12    with angioplasty, stent    CARDIAC CATHETERIZATION  3/29/15  MDL    stent to distal RCA.  EF 60%    CHOLECYSTECTOMY      COLONOSCOPY      CORONARY ANGIOPLASTY WITH STENT PLACEMENT  jan 11 [place]    stent x 3    CORONARY ANGIOPLASTY WITH STENT PLACEMENT      01/01/11    DIAGNOSTIC CARDIAC CATH LAB PROCEDURE  [id number]    primary stent placement to the first circumflex marginal branch, balloon angioplasty to the third left anterior descending diagnonal branch, intracoronary nitroglycerin adminstration    JOINT REPLACEMENT      left knee    LUMBAR FUSION Left 11/15/2016    LUMBAR INTERBODY FUSION LATERAL L3-4 L4-5 WITH LATERAL PLATE  performed by Lynn Oliiva MD at [address]     Previous Medications    AZELASTINE (ASTELIN) 0.1 % NASAL SPRAY    1 spray by Nasal route as needed for Rhinitis Use in each nostril as directed    BUTALBITAL-ACETAMINOPHEN-CAFFEINE (FIORICET, ESGIC) -40 MG PER TABLET    Take 1 tablet by mouth as needed for Headaches    CLOPIDOGREL (PLAVIX) 75 MG TABLET    TAKE 1 TABLET DAILY    FUROSEMIDE (LASIX) 40 MG TABLET    Take 40 mg by mouth as needed    INSULIN GLARGINE (LANTUS SOLOSTAR) 100 UNIT/ML INJECTION PEN    INJECT 80 UNITS INTO THE SKIN NIGHTLY    ISOSORBIDE MONONITRATE (IMDUR) 30 MG EXTENDED RELEASE TABLET    Take 1 tablet by mouth daily    LISINOPRIL (PRINIVIL;ZESTRIL) 20 MG TABLET    Take 1/2 tablet twice daily    METOPROLOL TARTRATE (LOPRESSOR) 25 MG TABLET    TAKE 1/2 TABLET TWICE DAILY FOR HIGH BLOOD PRESSURE    NITROGLYCERIN (NITROSTAT) 0.4 MG SL TABLET    up to max of 3 total doses. If no relief after 1 dose, call 911. OXYCODONE-ACETAMINOPHEN (PERCOCET)  MG PER TABLET    Take 1 tablet by mouth 2 times daily. SERTRALINE (ZOLOFT) 100 MG TABLET    Take 1 tablet by mouth daily    TAMSULOSIN (FLOMAX) 0.4 MG CAPSULE    TAKE 2 CAPSULES AT BEDTIME    TIZANIDINE (ZANAFLEX) 2 MG TABLET    Take 1 tablet by mouth nightly as needed (muscle spasm)    ZOSTER RECOMBINANT ADJUVANTED VACCINE (SHINGRIX) 50 MCG/0.5ML SUSR INJECTION    1 vaccine now and repeat in 2-6 months.        ALLERGIES     Statins and Crestor [rosuvastatin calcium]    FAMILY HISTORY       Family History   Problem Relation Age of Onset    Coronary Art Dis Mother     Coronary Art Dis Sister     Cancer Sister         uterine    Kidney Disease Sister     Coronary Art Dis Brother          in his 46s    Cancer Father         colon          SOCIAL HISTORY       Social History     Socioeconomic History    Marital status:      Spouse name: Leonora Low Number of children: Not on file    Years of education: Not on file    Highest education level: Not on file   Occupational History    Occupation:    Social Needs    Financial - Abnormal; Notable for the following components:    CRP 5.18 (*)     All other components within normal limits   D-DIMER, QUANTITATIVE - Abnormal; Notable for the following components:    D-Dimer, Quant 1.44 (*)     All other components within normal limits   CULTURE BLOOD #1   CULTURE BLOOD #2   APTT   CK   LIPASE   TSH WITHOUT REFLEX   T4   TROPONIN   LACTIC ACID, PLASMA   URINE RT REFLEX TO CULTURE       All other labs were within normal range or not returned as of this dictation. EMERGENCY DEPARTMENT COURSE and DIFFERENTIAL DIAGNOSIS/MDM:   Vitals:    Vitals:    05/24/19 1329 05/24/19 1333 05/24/19 1340 05/24/19 1437   BP: (!) 145/78 (!) 140/79  124/65   Pulse:  78  72   Resp:       Temp:   98.3 °F (36.8 °C)    SpO2: 95% 95%  93%   Weight:           MDM    Reassessment  2:32 PM the troponin is now come back positive the patient states the GI cocktail did relieve his symptoms he states he is having no pain at this time. In light of the reproducible exertional chest pain EKG changes and the positive troponin I have called Dr. Cloyde Spurling. 2:37 PM Dr. Cloyde Spurling wishes to admit the patient to the hospital, full report given patient is pain free at this time no further orders have been given. CONSULTS:  IP CONSULT TO CARDIOLOGY    PROCEDURES:  Unless otherwise noted below, none     Procedures    FINAL IMPRESSION      1. Other chest pain    2.  Elevated troponin          DISPOSITION/PLAN   DISPOSITION Admitted 05/24/2019 04:33:43 PM      PATIENT REFERRED TO:  Rita Ellis MD  [address] [facility]  [phone]            DISCHARGE MEDICATIONS:  New Prescriptions    No medications on file          (Please note that portions of this note were completed with a voice recognition program.  Efforts were made to edit thedictations but occasionally words are mis-transcribed.)    Gustavo Jaquez MD (electronically signed)  Attending Emergency Physician          Jeniffer Mercado MD  05/24/19 8545

## 2019-05-25 LAB
ALBUMIN SERPL-MCNC: 3 G/DL (ref 3.5–5.2)
ALP BLD-CCNC: 69 U/L (ref 40–130)
ALT SERPL-CCNC: 15 U/L (ref 5–41)
ANION GAP SERPL CALCULATED.3IONS-SCNC: 11 MMOL/L (ref 7–19)
AST SERPL-CCNC: 10 U/L (ref 5–40)
BILIRUB SERPL-MCNC: <0.2 MG/DL (ref 0.2–1.2)
BUN BLDV-MCNC: 17 MG/DL (ref 8–23)
CALCIUM SERPL-MCNC: 8.4 MG/DL (ref 8.8–10.2)
CHLORIDE BLD-SCNC: 102 MMOL/L (ref 98–111)
CHOLESTEROL, TOTAL: 157 MG/DL (ref 160–199)
CO2: 23 MMOL/L (ref 22–29)
CREAT SERPL-MCNC: 1.2 MG/DL (ref 0.5–1.2)
GFR NON-AFRICAN AMERICAN: 59
GLUCOSE BLD-MCNC: 104 MG/DL (ref 70–99)
GLUCOSE BLD-MCNC: 122 MG/DL (ref 70–99)
GLUCOSE BLD-MCNC: 127 MG/DL (ref 70–99)
GLUCOSE BLD-MCNC: 186 MG/DL (ref 70–99)
GLUCOSE BLD-MCNC: 326 MG/DL (ref 74–109)
HCT VFR BLD CALC: 33.6 % (ref 42–52)
HDLC SERPL-MCNC: 20 MG/DL (ref 55–121)
HEMOGLOBIN: 10.4 G/DL (ref 14–18)
LDL CHOLESTEROL CALCULATED: 116 MG/DL
LV EF: 38 %
LVEF MODALITY: NORMAL
MCH RBC QN AUTO: 26.5 PG (ref 27–31)
MCHC RBC AUTO-ENTMCNC: 31 G/DL (ref 33–37)
MCV RBC AUTO: 85.7 FL (ref 80–94)
PDW BLD-RTO: 14.2 % (ref 11.5–14.5)
PERFORMED ON: ABNORMAL
PLATELET # BLD: 181 K/UL (ref 130–400)
PMV BLD AUTO: 11.9 FL (ref 9.4–12.4)
POTASSIUM REFLEX MAGNESIUM: 4.7 MMOL/L (ref 3.5–5)
RBC # BLD: 3.92 M/UL (ref 4.7–6.1)
SODIUM BLD-SCNC: 136 MMOL/L (ref 136–145)
TOTAL PROTEIN: 6 G/DL (ref 6.6–8.7)
TRIGL SERPL-MCNC: 103 MG/DL (ref 0–149)
WBC # BLD: 7 K/UL (ref 4.8–10.8)

## 2019-05-25 PROCEDURE — C1894 INTRO/SHEATH, NON-LASER: HCPCS

## 2019-05-25 PROCEDURE — 99152 MOD SED SAME PHYS/QHP 5/>YRS: CPT | Performed by: INTERNAL MEDICINE

## 2019-05-25 PROCEDURE — B2151ZZ FLUOROSCOPY OF LEFT HEART USING LOW OSMOLAR CONTRAST: ICD-10-PCS | Performed by: THORACIC SURGERY (CARDIOTHORACIC VASCULAR SURGERY)

## 2019-05-25 PROCEDURE — 93459 L HRT ART/GRFT ANGIO: CPT | Performed by: INTERNAL MEDICINE

## 2019-05-25 PROCEDURE — 2100000000 HC CCU R&B

## 2019-05-25 PROCEDURE — 82948 REAGENT STRIP/BLOOD GLUCOSE: CPT

## 2019-05-25 PROCEDURE — 85027 COMPLETE CBC AUTOMATED: CPT

## 2019-05-25 PROCEDURE — 2709999900 HC NON-CHARGEABLE SUPPLY

## 2019-05-25 PROCEDURE — 36415 COLL VENOUS BLD VENIPUNCTURE: CPT

## 2019-05-25 PROCEDURE — 6360000004 HC RX CONTRAST MEDICATION: Performed by: INTERNAL MEDICINE

## 2019-05-25 PROCEDURE — C1760 CLOSURE DEV, VASC: HCPCS

## 2019-05-25 PROCEDURE — 6360000002 HC RX W HCPCS: Performed by: INTERNAL MEDICINE

## 2019-05-25 PROCEDURE — 93005 ELECTROCARDIOGRAM TRACING: CPT

## 2019-05-25 PROCEDURE — 94640 AIRWAY INHALATION TREATMENT: CPT

## 2019-05-25 PROCEDURE — 80053 COMPREHEN METABOLIC PANEL: CPT

## 2019-05-25 PROCEDURE — 4A023N7 MEASUREMENT OF CARDIAC SAMPLING AND PRESSURE, LEFT HEART, PERCUTANEOUS APPROACH: ICD-10-PCS | Performed by: THORACIC SURGERY (CARDIOTHORACIC VASCULAR SURGERY)

## 2019-05-25 PROCEDURE — 2580000003 HC RX 258: Performed by: INTERNAL MEDICINE

## 2019-05-25 PROCEDURE — 99233 SBSQ HOSP IP/OBS HIGH 50: CPT | Performed by: INTERNAL MEDICINE

## 2019-05-25 PROCEDURE — 6370000000 HC RX 637 (ALT 250 FOR IP): Performed by: INTERNAL MEDICINE

## 2019-05-25 PROCEDURE — C8929 TTE W OR WO FOL WCON,DOPPLER: HCPCS

## 2019-05-25 PROCEDURE — 80061 LIPID PANEL: CPT

## 2019-05-25 RX ORDER — MORPHINE SULFATE 2 MG/ML
2 INJECTION, SOLUTION INTRAMUSCULAR; INTRAVENOUS
Status: DISCONTINUED | OUTPATIENT
Start: 2019-05-25 | End: 2019-05-27

## 2019-05-25 RX ORDER — SODIUM CHLORIDE 0.9 % (FLUSH) 0.9 %
10 SYRINGE (ML) INJECTION EVERY 12 HOURS SCHEDULED
Status: DISCONTINUED | OUTPATIENT
Start: 2019-05-25 | End: 2019-05-25

## 2019-05-25 RX ORDER — CLINDAMYCIN HYDROCHLORIDE 300 MG/1
300 CAPSULE ORAL EVERY 8 HOURS
COMMUNITY
Start: 2019-05-22 | End: 2019-06-08

## 2019-05-25 RX ORDER — CLINDAMYCIN HYDROCHLORIDE 300 MG/1
300 CAPSULE ORAL EVERY 8 HOURS
Status: DISCONTINUED | OUTPATIENT
Start: 2019-05-25 | End: 2019-05-30

## 2019-05-25 RX ORDER — CLINDAMYCIN HYDROCHLORIDE 300 MG/1
300 CAPSULE ORAL EVERY 8 HOURS
Status: DISCONTINUED | OUTPATIENT
Start: 2019-05-25 | End: 2019-05-25

## 2019-05-25 RX ORDER — SODIUM CHLORIDE 0.9 % (FLUSH) 0.9 %
10 SYRINGE (ML) INJECTION PRN
Status: DISCONTINUED | OUTPATIENT
Start: 2019-05-25 | End: 2019-05-25

## 2019-05-25 RX ORDER — SODIUM CHLORIDE 0.9 % (FLUSH) 0.9 %
10 SYRINGE (ML) INJECTION PRN
Status: DISCONTINUED | OUTPATIENT
Start: 2019-05-25 | End: 2019-05-30

## 2019-05-25 RX ORDER — SODIUM CHLORIDE 9 MG/ML
INJECTION, SOLUTION INTRAVENOUS CONTINUOUS
Status: DISCONTINUED | OUTPATIENT
Start: 2019-05-25 | End: 2019-05-26 | Stop reason: ALTCHOICE

## 2019-05-25 RX ORDER — OXYCODONE HYDROCHLORIDE AND ACETAMINOPHEN 5; 325 MG/1; MG/1
2 TABLET ORAL 3 TIMES DAILY PRN
Status: DISCONTINUED | OUTPATIENT
Start: 2019-05-25 | End: 2019-05-30

## 2019-05-25 RX ORDER — LISINOPRIL 5 MG/1
5 TABLET ORAL 2 TIMES DAILY
Status: DISCONTINUED | OUTPATIENT
Start: 2019-05-25 | End: 2019-05-26

## 2019-05-25 RX ORDER — SODIUM CHLORIDE 0.9 % (FLUSH) 0.9 %
10 SYRINGE (ML) INJECTION EVERY 12 HOURS SCHEDULED
Status: DISCONTINUED | OUTPATIENT
Start: 2019-05-25 | End: 2019-05-30

## 2019-05-25 RX ADMIN — METOPROLOL SUCCINATE 12.5 MG: 25 TABLET, EXTENDED RELEASE ORAL at 20:53

## 2019-05-25 RX ADMIN — ALBUTEROL SULFATE 2.5 MG: 2.5 SOLUTION RESPIRATORY (INHALATION) at 22:30

## 2019-05-25 RX ADMIN — OXYCODONE HYDROCHLORIDE AND ACETAMINOPHEN 2 TABLET: 5; 325 TABLET ORAL at 23:35

## 2019-05-25 RX ADMIN — ALBUTEROL SULFATE 2.5 MG: 2.5 SOLUTION RESPIRATORY (INHALATION) at 18:48

## 2019-05-25 RX ADMIN — INSULIN GLARGINE 80 UNITS: 100 INJECTION, SOLUTION SUBCUTANEOUS at 20:56

## 2019-05-25 RX ADMIN — IOPAMIDOL 131 ML: 612 INJECTION, SOLUTION INTRAVENOUS at 11:22

## 2019-05-25 RX ADMIN — ISOSORBIDE MONONITRATE 30 MG: 30 TABLET, EXTENDED RELEASE ORAL at 09:45

## 2019-05-25 RX ADMIN — ALBUTEROL SULFATE 2.5 MG: 2.5 SOLUTION RESPIRATORY (INHALATION) at 14:45

## 2019-05-25 RX ADMIN — SODIUM CHLORIDE: 9 INJECTION, SOLUTION INTRAVENOUS at 08:00

## 2019-05-25 RX ADMIN — Medication 10 ML: at 08:00

## 2019-05-25 RX ADMIN — ASPIRIN 81 MG 81 MG: 81 TABLET ORAL at 09:43

## 2019-05-25 RX ADMIN — MORPHINE SULFATE 2 MG: 2 INJECTION, SOLUTION INTRAMUSCULAR; INTRAVENOUS at 21:02

## 2019-05-25 RX ADMIN — Medication 10 ML: at 21:12

## 2019-05-25 RX ADMIN — ALBUTEROL SULFATE 2.5 MG: 2.5 SOLUTION RESPIRATORY (INHALATION) at 02:23

## 2019-05-25 RX ADMIN — SERTRALINE HYDROCHLORIDE 100 MG: 100 TABLET ORAL at 09:44

## 2019-05-25 RX ADMIN — CLINDAMYCIN HYDROCHLORIDE 300 MG: 300 CAPSULE ORAL at 21:11

## 2019-05-25 RX ADMIN — TAMSULOSIN HYDROCHLORIDE 0.8 MG: 0.4 CAPSULE ORAL at 20:53

## 2019-05-25 RX ADMIN — OXYCODONE HYDROCHLORIDE AND ACETAMINOPHEN 2 TABLET: 5; 325 TABLET ORAL at 13:43

## 2019-05-25 RX ADMIN — METOPROLOL SUCCINATE 12.5 MG: 25 TABLET, EXTENDED RELEASE ORAL at 09:43

## 2019-05-25 RX ADMIN — ALBUTEROL SULFATE 2.5 MG: 2.5 SOLUTION RESPIRATORY (INHALATION) at 06:14

## 2019-05-25 RX ADMIN — INSULIN GLARGINE 60 UNITS: 100 INJECTION, SOLUTION SUBCUTANEOUS at 00:41

## 2019-05-25 RX ADMIN — TIZANIDINE 4 MG: 2 TABLET ORAL at 23:34

## 2019-05-25 RX ADMIN — LISINOPRIL 10 MG: 10 TABLET ORAL at 09:43

## 2019-05-25 RX ADMIN — OXYCODONE HYDROCHLORIDE AND ACETAMINOPHEN 2 TABLET: 5; 325 TABLET ORAL at 02:28

## 2019-05-25 RX ADMIN — LISINOPRIL 5 MG: 5 TABLET ORAL at 20:52

## 2019-05-25 ASSESSMENT — PAIN SCALES - GENERAL
PAINLEVEL_OUTOF10: 8
PAINLEVEL_OUTOF10: 0
PAINLEVEL_OUTOF10: 2
PAINLEVEL_OUTOF10: 7
PAINLEVEL_OUTOF10: 0
PAINLEVEL_OUTOF10: 0
PAINLEVEL_OUTOF10: 4
PAINLEVEL_OUTOF10: 5
PAINLEVEL_OUTOF10: 6
PAINLEVEL_OUTOF10: 0
PAINLEVEL_OUTOF10: 4

## 2019-05-25 ASSESSMENT — PAIN DESCRIPTION - PAIN TYPE
TYPE: CHRONIC PAIN
TYPE: ACUTE PAIN
TYPE: ACUTE PAIN

## 2019-05-25 ASSESSMENT — PAIN DESCRIPTION - LOCATION
LOCATION: BACK
LOCATION: CHEST
LOCATION: HEAD

## 2019-05-25 NOTE — PROCEDURES
Cardiac Risk Profile -       Risk Factor Yes / No / Unknown         Gender Male   Cigarette Use No   Family History of Cardiovascular Disease Yes: coronary artery disease,    Diabetes Mellitus yes   Hypercholesteremia yes   Hypertension yes       Prior Cardiac History -    1/2011 stents x 3 [facility])  5/4/2011  Cath  Patent stents in the mid LAD, stent to OM1, PTCA to the D2, normal LVFX  12/12/12  Cath patent stents in the mid LAD, PTCA to the D3, stent to distal LAD, normal LVFX  3/29/2015  Cath  Stent to the PLOM, normal LVFX  11/1/2019  Loop placed  3/1/19 lexiscan negative for myocardial ischemia, EF 50  %   5/24/19 ACS PARKER RISK Score 5 (angina, + troponin, CAD>50, age>65, plavix ), AUC indication 3, AUC score 9   5/25/19  Cath 50% distal LM, 100% mid LAD, 80% diag, 100% mid RCA, inferior hypokinesis, EF 45%      Indications for Cardiac Catheterization -  5/24/19 ACS PARKER RISK Score 5 (angina, + troponin, CAD>50, age>65, plavix ), AUC indication 3, AUC score 9 , Diagnostic Catheterization Appropriate Use Criteria Description, [facility] 2012;[id number]      Conscious Sedation Protocol Used During this Procedure -        Anesthesia: Moderate   Sedation: 2 mg Midazolam (Versed)  50 mcg Fentanyl   Start time: 10:59   Stop time: 11:17   ASA Class: III   Estimated blood loss < 5 ml           After obtaining informed written consent, the patient was brought to the catheterization laboratory where the right groin was prepared in the usual sterile fashion. The patient is monitored continuously with ECG, pulse oximetry, blood pressure monitoring and direct observation. Additionally, please review the \"[facility]ha Hemodynamic Procedure Report\", which is generated electronically through the [employer]. This report includes additional details regarding this procedure including, but not limited to:     1. Patient Data,   2. Admission,   3. Procedure,   4. Hemodynamics,   5. Vital Signs,   6.  Medications, including conscious sedation medications given during the procedure (as noted above),   7. Procedure Log,   8. Device Usage,   9. Signature Audit Spencer, and,   10. Signatures. It should be noted, that I sign the \"Signatures\" line electronically through the \"HPF Def Portals\" tab on my computer. 2% lidocaine was injected above the common femoral artery. Utilizing ultrasound assistance and the Seldinger technique, the common femoral artery was cannulated and bright red pulsatile blood returned. Using fluoroscopy guidance, the J-tip wire was placed in the common femoral artery. A 6-Fr sheath was inserted. Utilizing 6-British Portia catheters, selective coronary arteriography was performed followed by left ventriculography. At this stage, the equipment was removed. A right femoral arteriogram was performed to ensure patency of the vessel so that a vascular access closure device could be deployed. A 6-British Mynx vascular access closure device was then inserted. Hemostasis was achieved. A sterile dressing was applied. He was taken to his room in stable and satisfactory condition. The results of the procedure were explained to the family in the cardiac catheterization laboratories consult / waiting room      Complications:  none      Results:    Hemodynamics:      Site Pressure mmHg        Left ventricular pressure 96/8 mmHg   Left ventricular end-diastolic pressure (LVEDP) 26 mmHg   Aortic pressure 95/50 mmHg   Mean aortic pressure 71 mmHg       Angiography:    Coronary Arteries:    Left Main Coronary Artery:  A large vessel which arises from the left sinus of Valsalva. It divides into the left anterior descending coronary artery and the left circumflex. There is a 50% stenosis in the distal portion of this vessel. Left Anterior Coronary Artery:  The LAD is a moderate sized vessel with several diagonal branches.    There is a 100% stenosis in the mid portion of this vessel prior to the diagonal.  The diagonal vessel itself has a 80% stenosis. Left Circumflex Coronary Artery:  The LCx is a moderate sized vessel with several marginal branches. There is mild diffuse disease throughout the entire length of the vessel. Right Coronary Artery:  The RCA is a moderate sized dominant vessel which arises from the right sinus of Valsalva. There is a 100% stenosis in the mid portion of this vessel. The distal vessel fills from left to right collaterals      Internal Mammary Artery Angiography    Left internal mammary artery angiography:  The left MALCOLM is widely patent and ungrafted. It appears to be of suitable caliber for the use in bypass surgery in the future should the need arise. It was injected for surgical evaluation. Right internal mammary artery angiography:  The right MALCOLM is widely patent and ungrafted. It appears to be of suitable caliber for the use in bypass surgery in the future should the need arise. It was injected for surgical evaluation. Left Ventriculogram:  The left ventriculogram is obtained in the right oblique projection. There is inferior basilar and diaphragmatic akinesis. All other regional wall segments move appropriately. The estimated visual ejection fraction is 40%. There is no mitral regurgitation nor is there a pull back gradient seen across the aortic valve. Summary:      1. Successful femoral artery ultrasound  2. Successful femoral artery arteriogram  3. Supervision of the administration of moderate conscious sedation  4.  50% Left main coronary coronary artery stenosis  5.  100% occlusion of the mid LAD (after the diagonal), 80% diagonal, and 100% mid RCA, with left to right collaterals to the distal branches of the RCA  5. Left ventricular function is impaired in the inferior basilar and diaphragmatic segments with a visually estimate ejection of 40%. 6.  Patent right MALCOLM, un grafted  7. Patent left MALCOLM, un grafted      RECOMMENDATIONS:    1. Risk Factor Modification  2. On-going Medical Therapy  3.   Consideration for open heart surgery    Electronically signed by Samantha Mandel MD on 5/25/19

## 2019-05-25 NOTE — PROGRESS NOTES
[address]    Daily HOSPITAL Progress Note                            Date:  5/25/19  Patient: Herson Conner  Admission:  5/24/2019  1:26 PM  Admit DX: Elevated troponin [R74.8]  Age:  75 y.o., 1945        Date of Admission 5/24/2019  1:26 PM   Hospital Length of Stay  LOS: 1 day            I personally saw the patient and rounded with:  [facility] RN on 5/25/19      The observations documented in this note, including the assessment and plan are mine         Reason for initial evaluation or the patient's initial complaint    Chest pain      SUBJECTIVE:      Chief Complaint / Reason for the Visit   Follow up of:  Chest pain and CAD and systemic arterial hypertension    Family present and in room during examination:  Yes: wife      Specialty Problems        Cardiology Problems    CAD (coronary artery disease)        HTN (hypertension)        MI (myocardial infarction) ([address]        Acute ischemic stroke ([address]        Cerebrovascular accident (CVA) due to embolism of right middle cerebral artery ([address]              Current Status Today According to the patient:  \"ok\"    Subjective:  Mr. Herson Conner is generally feeling unchanged. Cath with diffuse disease and depressed LVFX    Mr. Herson Conner has the following cardiac complaints / symptoms today:    1. Chest pain, none today    2. CAD, cath results as noted    3.  Hypertension    The blood pressure for the lastr 36 hours has been:  Systolic (04WSW), OIK:524 , Min:96 , RVL:206    Diastolic (68TKM), NOL:75, Min:41, Max:104        Herson Conner is a 75 y.o. male with the following history as recorded in EpicCare:    Patient Active Problem List    Diagnosis Date Noted    Chest pressure 02/28/2019     Priority: High    Elevated troponin 05/24/2019     Priority: Low    Chronic shortness of breath 04/15/2019     Priority: Low    Chest pain 02/28/2019     Priority: Low    MOO on CPAP 05/03/2018     Priority: Low    Dermatitis 03/05/2018 Priority: Low    Depression with anxiety 03/05/2018     Priority: Low    Benign prostatic hyperplasia without lower urinary tract symptoms 03/05/2018     Priority: Low    Chronic tension-type headache, not intractable 03/05/2018     Priority: Low    West Nile virus seropositivity 11/22/2017     Priority: Low    Status post placement of implantable loop recorder 10/31/2017     Priority: Low    Recurrent major depressive disorder, in full remission ([facility] 10/17/2017     Priority: Low    Trigger middle finger of left hand 10/17/2017     Priority: Low    Peripheral polyneuropathy 10/17/2017     Priority: Low    Palpitations 10/17/2017     Priority: Low    Chronic insomnia 10/17/2017     Priority: Low    Cerebrovascular accident (CVA) due to embolism of right middle cerebral artery ([facility] 08/14/2017     Priority: Low    Dizziness 03/27/2017     Priority: Low    Imbalance 03/27/2017     Priority: Low    Thoracic outlet syndrome 02/21/2017     Priority: Low    Acute ischemic stroke ([facility] 02/20/2017     Priority: Low    DDD (degenerative disc disease), lumbar 11/15/2016     Priority: Low    Spondylosis of lumbar region without myelopathy or radiculopathy 11/15/2016     Priority: Low    Chronic bilateral low back pain without sciatica 11/10/2016     Priority: Low    Hyperlipidemia 07/23/2012     Priority: Low    MI (myocardial infarction) ([facility]      Priority: Low    Type 2 diabetes mellitus with diabetic polyneuropathy, with long-term current use of insulin ([facility] 07/12/2011     Priority: Low    HTN (hypertension) 07/12/2011     Priority: Low    CAD (coronary artery disease) 07/12/2011     Priority: Low    Fatigue 07/12/2011     Priority: Low     Current Facility-Administered Medications   Medication Dose Route Frequency Provider Last Rate Last Dose    0.9 % sodium chloride infusion   Intravenous Continuous Samantha Mandel MD 75 mL/hr at 05/25/19 0800      lisinopril (PRINIVIL;ZESTRIL) tablet 5 mg 5 mg Oral BID Jonatan Godoy MD        sodium chloride flush 0.9 % injection 10 mL  10 mL Intravenous 2 times per day Jonatan Godoy MD   10 mL at 05/25/19 0800    sodium chloride flush 0.9 % injection 10 mL  10 mL Intravenous PRN Jonatan Godoy MD        magnesium hydroxide (MILK OF MAGNESIA) 400 MG/5ML suspension 30 mL  30 mL Oral Daily PRN Jonatan Godoy MD        ondansetron [facility]) injection 4 mg  4 mg Intravenous Q6H PRN Jonatan Godoy MD        enoxaparin (LOVENOX) injection 40 mg  40 mg Subcutaneous Daily Jonatan Godoy MD   Stopped at 05/25/19 7568    aspirin chewable tablet 81 mg  81 mg Oral Daily Jonatan Godoy MD   81 mg at 05/25/19 0943    albuterol (PROVENTIL) nebulizer solution 2.5 mg  2.5 mg Nebulization Q4H Jonatan Godoy MD   2.5 mg at 05/25/19 6801    acetaminophen (TYLENOL) tablet 650 mg  650 mg Oral Q4H PRN Jonatan Godoy MD        nitroGLYCERIN (NITROSTAT) SL tablet 0.4 mg  0.4 mg Sublingual Q5 Min PRN Jonatan Godoy MD        metoprolol succinate (TOPROL XL) extended release tablet 12.5 mg  12.5 mg Oral BID Jonatan Godoy MD   12.5 mg at 05/25/19 0943    insulin glargine (LANTUS) injection vial 80 Units  80 Units Subcutaneous Nightly Jonatan Godoy MD   60 Units at 05/25/19 0041    tamsulosin (FLOMAX) capsule 0.8 mg  0.8 mg Oral Nightly Jonatan Godoy MD   0.8 mg at 05/24/19 2355    sertraline (ZOLOFT) tablet 100 mg  100 mg Oral Daily Jonatan Godoy MD   100 mg at 05/25/19 0944    tiZANidine (ZANAFLEX) tablet 4 mg  4 mg Oral Nightly PRN Jonatan Godoy MD   4 mg at 05/24/19 2355    butalbital-acetaminophen-caffeine (FIORICET, ESGIC) per tablet 1 tablet  1 tablet Oral Q4H PRN Jonatan Godoy MD   1 tablet at 05/24/19 2355    furosemide (LASIX) tablet 40 mg  40 mg Oral Daily PRN Jonatan Godoy MD        oxyCODONE-acetaminophen (PERCOCET) 5-325 MG per tablet 2 tablet  2 tablet Oral BID PRN Jonatan Godoy MD   2 tablet at 05/25/19 0228    isosorbide mononitrate (IMDUR) extended nitroglycerin adminstration    JOINT REPLACEMENT      left knee    LUMBAR FUSION Left 11/15/2016    LUMBAR INTERBODY FUSION LATERAL L3-4 L4-5 WITH LATERAL PLATE  performed by Johnathan Pérez MD at [facility] OR     Family History   Problem Relation Age of Onset    Coronary Art Dis Mother     Coronary Art Dis Sister     Cancer Sister         uterine    Kidney Disease Sister     Coronary Art Dis Brother          in his 46s    Cancer Father         colon     Social History     Tobacco Use    Smoking status: Never Smoker    Smokeless tobacco: Never Used   Substance Use Topics    Alcohol use: Yes     Alcohol/week: 1.2 oz     Types: 2 Shots of liquor per week          Review of Systems:    General:      Complaint / Symptom Yes / No / Description if Yes       Fatigue Yes:  chronic   Weight gain NA   Insomnia NA       Respiratory:        Complaint / Symptom Yes / No / Description if Yes       Cough No   Horseness NA       Cardiovascular:    Complaint / Symptom Yes / No / Description if Yes       Chest Pain No   Shortness of Air / Orthopnea Yes: chronic and stable   Presyncope / Syncope No   Palpitations No         Objective:    /74   Pulse 71   Temp 97.6 °F (36.4 °C) (Temporal)   Resp 17   Ht 6' 4\" (1.93 m)   Wt 256 lb 12.8 oz (116.5 kg)   SpO2 97%   BMI 31.26 kg/m² ,     Intake/Output Summary (Last 24 hours) at 2019 1147  Last data filed at 2019 1000  Gross per 24 hour   Intake 1310 ml   Output 1475 ml   Net -165 ml       GENERAL - well developed and well nourished, is an active participant in this examination  HEENT -  PERRLA, Hearing appears normal, conjunctiva and lids are normal, ears and nose appear normal  NECK - no thyromegaly, no JVD, trachea is in the midline  CARDIOVASCULAR - PMI is in the left mid line clavicular position, Normal S1 and S2 with a grade 1/6 systolic murmur. No S3 or S4    PULMONARY - No respiratory distress. scattered wheezes and rales.   Breath sounds in both  lung PLANS:    1. Continue present medications except for changes as noted above  2. Continue to monitor rhythm  3. Further orders per clinical course. 4. Consideration for OHS       Discussed with patient and spouse and nursing.     Electronically signed by Cecilia Vicente MD on 5/25/19        [facility]

## 2019-05-25 NOTE — PROGRESS NOTES
Re-assessment complete, patient has no signs of distress and is resting comfortably.   Chlorhexidine bath completed and bilateral groins shaved for heart cath in the AM.  Electronically signed by Sunshine Yanez RN on 5/25/2019 at 4:30 AM

## 2019-05-25 NOTE — PROGRESS NOTES
Pt transferred to cardiac cath lab on portable telemetry in sinus rhythm in 60's. Pt warm, dry, and denies chest pain. Pt still reports headache, but denies other needs or complaints. Pt transferred to cath table and cath lab monitors and care transferred to cath lab staff.

## 2019-05-25 NOTE — PROGRESS NOTES
4 Eyes Skin Assessment    Shira Shah is being assessed upon: Admission    I agree that I, David Gil, along with Arcelia Garcia RN (either 2 RN's or 1 LPN and 1 RN) have performed a thorough Head to Toe Skin Assessment on the patient. ALL assessment sites listed below have been assessed. Areas assessed by both nurses:     [x]   Head, Face, and Ears   [x]   Shoulders, Back, and Chest  [x]   Arms, Elbows, and Hands   [x]   Coccyx, Sacrum, and Ischium  [x]   Legs, Feet, and Heels    Does the Patient have Skin Breakdown?  No    Dylan Prevention initiated: No  Wound Care Orders initiated: No    WOC nurse consulted for Pressure Injury (Stage 3,4, Unstageable, DTI, NWPT, and Complex wounds) and New or Established Ostomies: No        Primary Nurse eSignature: David Gil RN on 5/24/2019 at 8:00 PM      Co-Signer eSignature: {Esignature:[id number]}

## 2019-05-25 NOTE — PROGRESS NOTES
Pt arrived to unit from ED and denies CP. Pt warm, dry, and VSS. MD notified of arrival to unit.  Wife notified via phone per pt's request.

## 2019-05-25 NOTE — H&P
[facility]      History and Physical           I personally saw the patient and rounded with:  Felipe Carvalho, on  19      The observations documented in this note, including the assessment and plan are mine              Date of Admission:  2019  1:26 PM    Date of Initially Being Seen / Consultation:  19    Cardiologist:  Samantha Mandel MD     Cardiology AttendinRachell Vu Attending: RAULITO     PCP:  Rita Ellis MD    Reason for Consultation or Admission / Chief Complaint:  Chest discomfort    SUBJECTIVE AND HISTORY OF PRESENT ILLNESS:    Source of the history:  Patient, family, previous inpatient and outpatient records in [address] is a 75 y.o. male who presents to [facility] ED /  with symptoms / signs / problem or diagnosis of chest discomfort. The symptoms began over the last several days. The chest discomfort began with eating and lasted several minutes. It was worse with activity. There was partial relief with rest.  The chest discomfort was described as pressure, fullness and tightness. It was not crushing. It was it located in the central chest with radiation to the back, throat and left shoulder. It was described as mild. There were no associated manifestations such as nausea, vomiting, diaphoresis, presyncope, syncope, dizzyness, weakness, cold sweats, or shortness of air. It was partially relieved with a GI cocktail. At times,it seems epigastic. When being examined this evening, he has had no symptoms of exertional chest discomfort, unusual or change in shortness of air, presyncope or syncope.         Family present:  Yes: wife      CARDIAC RISK PROFILE:    Risk Factor Yes / No / Unknown       Gender Male   Cigarette Use No   Family History of Cardiovascular Disease Yes: coronary artery disease,    Diabetes Mellitus yes   Hypercholesteremia yes   Hypertension yes          Cardiac Specific Problems:    Specialty Problems Cardiology Problems    CAD (coronary artery disease)        HTN (hypertension)        MI (myocardial infarction) ([address]        Acute ischemic stroke ([address]        Cerebrovascular accident (CVA) due to embolism of right middle cerebral artery ([address]                PRIOR CARDIAC PROBLEM LIST  (IF APPLICABLE):    5/7415 stents x 3 [facility] [place])  5/4/2011  Cath  Patent stents in the mid LAD, stent to OM1, PTCA to the D2, normal LVFX  12/12/12  Cath patent stents in the mid LAD, PTCA to the D3, stent to distal LAD, normal LVFX  3/29/2015  Cath  Stent to the PLOM, normal LVFX  11/1/2019  Loop placed  3/1/19 lexiscan negative for myocardial ischemia, EF 50  %   5/24/19 ACS PARKER RISK Score 5 (angina, + troponin, CAD>50, age>65, plavix ), AUC indication 3, AUC score 9       Past Medical History:    Past Medical History:   Diagnosis Date    Acute ischemic stroke ([address] 2/20/2017    Atherosclerotic heart disease     s/p MI, stenting x 3 jan 2011([place]    CAD (coronary artery disease)     Cerebral artery occlusion with cerebral infarction ([address]     Cerebrovascular accident (CVA) due to embolism of right middle cerebral artery ([address] 8/14/2017    Chronic bilateral low back pain without sciatica 11/10/2016    Chronic kidney disease     DDD (degenerative disc disease), lumbar 11/15/2016    Depression     Depression with anxiety     Diabetes mellitus ([address]     type II    Diabetes mellitus, type 2 ([address]     DM (diabetes mellitus) ([address] 7/12/2011    Glaucoma     Headache     Hyperlipidemia     Cholesterol management per pcp.      Hypertension     Macular degeneration     MI (myocardial infarction) ([address]     MI (myocardial infarction) ([address]     Neuromuscular disorder ([address]     Neuropathy     Osteoarthritis     Sleep apnea     Spondylosis of lumbar region without myelopathy or radiculopathy 11/15/2016    Status post placement of implantable loop recorder 10/31/2017    TIA (transient ischemic attack) Past Surgical History:    Past Surgical History:   Procedure Laterality Date    APPENDECTOMY      BACK SURGERY      CARDIAC CATHETERIZATION  12/12/12    with angioplasty, stent    CARDIAC CATHETERIZATION  3/29/15  MDL    stent to distal RCA. EF 60%    CHOLECYSTECTOMY      COLONOSCOPY      CORONARY ANGIOPLASTY WITH STENT PLACEMENT  jan 11 [place]    stent x 3    CORONARY ANGIOPLASTY WITH STENT PLACEMENT      01/01/11    DIAGNOSTIC CARDIAC CATH LAB PROCEDURE  [id number]    primary stent placement to the first circumflex marginal branch, balloon angioplasty to the third left anterior descending diagnonal branch, intracoronary nitroglycerin adminstration    JOINT REPLACEMENT      left knee    LUMBAR FUSION Left 11/15/2016    LUMBAR INTERBODY FUSION LATERAL L3-4 L4-5 WITH LATERAL PLATE  performed by Inga Balderrama MD at [address] Medications:   Prior to Admission medications    Medication Sig Start Date End Date Taking? Authorizing Provider   isosorbide mononitrate (IMDUR) 30 MG extended release tablet Take 1 tablet by mouth daily 4/19/19  Yes TEGAN Enriquez   azelastine (ASTELIN) 0.1 % nasal spray 1 spray by Nasal route as needed for Rhinitis Use in each nostril as directed   Yes Historical Provider, MD   butalbital-acetaminophen-caffeine (FIORICET, ESGIC) -40 MG per tablet Take 1 tablet by mouth as needed for Headaches   Yes Historical Provider, MD   lisinopril (PRINIVIL;ZESTRIL) 20 MG tablet Take 1/2 tablet twice daily   Yes Historical Provider, MD   oxyCODONE-acetaminophen (PERCOCET)  MG per tablet Take 1 tablet by mouth 2 times daily.    Yes Historical Provider, MD   sertraline (ZOLOFT) 100 MG tablet Take 1 tablet by mouth daily 3/12/19  Yes Vonnie Austin MD   metoprolol tartrate (LOPRESSOR) 25 MG tablet TAKE 1/2 TABLET TWICE DAILY FOR HIGH BLOOD PRESSURE 1/22/19  Yes Vonnie Austin MD   tamsulosin (FLOMAX) 0.4 MG capsule TAKE 2 CAPSULES AT BEDTIME 1/22/19  Yes Debby Turner Cameron Lemus MD   insulin glargine (LANTUS SOLOSTAR) 100 UNIT/ML injection pen INJECT 80 UNITS INTO THE SKIN NIGHTLY 1/18/19  Yes Ирина Ambrocio MD   clopidogrel (PLAVIX) 75 MG tablet TAKE 1 TABLET DAILY 1/18/19  Yes Ирина Ambrocio MD   zoster recombinant adjuvanted vaccine [facility]) 50 MCG/0.5ML SUSR injection 1 vaccine now and repeat in 2-6 months. 11/6/18  Yes Ирина Ambrocio MD   furosemide (LASIX) 40 MG tablet Take 40 mg by mouth as needed    Historical Provider, MD   tiZANidine (ZANAFLEX) 2 MG tablet Take 1 tablet by mouth nightly as needed (muscle spasm) 3/12/19   Ирина Ambrocio MD   nitroGLYCERIN (NITROSTAT) 0.4 MG SL tablet up to max of 3 total doses. If no relief after 1 dose, call 911. 3/1/19   Sally Rodas MD        Facility Administered Medications:    sodium chloride flush  10 mL Intravenous 2 times per day    enoxaparin  40 mg Subcutaneous Daily    aspirin  81 mg Oral Daily    albuterol  2.5 mg Nebulization Q4H    metoprolol succinate  25 mg Oral Daily       Allergies:  Statins and Crestor [rosuvastatin calcium]     Social History:       Social History     Socioeconomic History    Marital status:      Spouse name: Shari Garcia Number of children: Not on file    Years of education: Not on file    Highest education level: Not on file   Occupational History    Occupation:    Social Needs    Financial resource strain: Not on file    Food insecurity:     Worry: Not on file     Inability: Not on file   [employer] needs:     Medical: Not on file     Non-medical: Not on file   Tobacco Use    Smoking status: Never Smoker    Smokeless tobacco: Never Used   Substance and Sexual Activity    Alcohol use:  Yes     Alcohol/week: 1.2 oz     Types: 2 Shots of liquor per week    Drug use: No    Sexual activity: Not on file   Lifestyle    Physical activity:     Days per week: Not on file     Minutes per session: Not on file    Stress: Not on file   Relationships    Social connections:     Talks on phone: Not on file     Gets together: Not on file     Attends Rastafarian service: Not on file     Active member of club or organization: Not on file     Attends meetings of clubs or organizations: Not on file     Relationship status: Not on file    Intimate partner violence:     Fear of current or ex partner: Not on file     Emotionally abused: Not on file     Physically abused: Not on file     Forced sexual activity: Not on file   Other Topics Concern    Not on file   Social History Narrative    Not on file       Family History:     Family History   Problem Relation Age of Onset    Coronary Art Dis Mother     Coronary Art Dis Sister     Cancer Sister         uterine    Kidney Disease Sister     Coronary Art Dis Brother          in his 46s    Cancer Father         colon         REVIEW OF SYSTEMS:     Except as noted in the HPI, all other systems are negative        PHYSICAL EXAMINATION:     BP (!) 128/96   Pulse 82   Temp 98.3 °F (36.8 °C) (Temporal)   Resp 22   Ht 6' 4\" (1.93 m)   Wt 259 lb 9.6 oz (117.8 kg)   SpO2 96%   BMI 31.60 kg/m²     GENERAL - well developed and well nourished, in mild amount of generalized distress; is an active participant in this examination  HEENT -  PERRLA, Hearing appears normal, conjunctiva and lids are normal, ears and nose appear normal  NECK - no thyromegaly, no JVD, trachea is in the midline  CARDIOVASCULAR - PMI is in the left mid line clavicular position, Normal S1 and S2 with a grade 1/6 systolic murmur. No S3 or S4    PULMONARY - No respiratory distress. scattered wheezes and rales.   Breath sounds in both  lung fields are Decreased  ABDOMEN  - soft, non tender, no rebound, no hepatomegaly or splenomegaly  MUSCULOSKELETAL  - Prone/Supine, digitals and nails are without clubbing or cyanosis  EXTREMITIES - trace edema  NEUROLOGIC - cranial nerves, II-XII, are normal  SKIN - turgor is normal, no rash  PSYCHIATRIC - normal mood and affect, alert and orientated x 3, judgement and insight appear appropriate      LABORATORY EVALUATION & TESTING:    I have personally reviewed and interpreted the results of the following diagnostic testing      EKG and or Telemetry:  which was personally reviewed me:  Sinus rhythm,   Pulse Readings from Last 1 Encounters:   05/24/19 82    bpm,  without Acute changes    Troponin:  positive for  myocardial necrosis (  1.47); the creatinine is normal    CBC:   Recent Labs     05/24/19  1334   WBC 7.1   HGB 11.6*   HCT 37.7*   MCV 86.7        BMP:   Recent Labs     05/24/19  1334      K 5.0      CO2 24   BUN 17   CREATININE 1.1     Cardiac Enzymes:   Recent Labs     05/24/19  1334 05/24/19  1550 05/24/19  1753   CKTOTAL 105  --   --    TROPONINI 1.47* 1.37* 1.42*     PT/INR:   Recent Labs     05/24/19  1334   PROTIME 14.7*   INR 1.21*     APTT:   Recent Labs     05/24/19  1334   APTT 34.8     Liver Profile:  Lab Results   Component Value Date    AST 15 05/24/2019    ALT 19 05/24/2019    BILITOT 0.3 05/24/2019    ALKPHOS 74 05/24/2019    ALKPHOS 55 05/24/2011     Lab Results   Component Value Date    CHOL 205 03/01/2019    HDL 28 03/01/2019    TRIG 202 03/01/2019     TSH:  Lab Results   Component Value Date    TSH 1.810 05/24/2019     UA:   Lab Results   Component Value Date    COLORU YELLOW 05/24/2019    PHUR 7.5 05/24/2019    WBCUA 3 02/20/2017    RBCUA 0 02/20/2017    BACTERIA NEGATIVE 02/20/2017    CLARITYU Clear 05/24/2019    SPECGRAV 1.012 05/24/2019    LEUKOCYTESUR Negative 05/24/2019    UROBILINOGEN 0.2 05/24/2019    BILIRUBINUR Negative 05/24/2019    BLOODU Negative 05/24/2019    GLUCOSEU Negative 05/24/2019             ALL THE CARDIOLOGY PROBLEMS ARE LISTED ABOVE; HOWEVER, THE FOLLOWING SPECIFIC CARDIAC PROBLEMS WERE ADDRESSED AND TREATED DURING THE HOSPITAL VISIT TODAY: MEDICAL DECISION MAKING             Cardiac Specific Problem / Diagnosis  Discussion and Data Reviewed Diagnostic Procedures Ordered Management Options Selected           1. Presenting problem / symptom    Chest discomfort of ? etiology  are worsening   The chest discomfort is was exertional and does  appear to represent myocardial ischemia. Nonetheless, He has the following risk factors for the presence of coronary artery disease:    Risk Factor Yes / No / Unknown       Gender Male   Cigarette Use No   Family History of Cardiovascular Disease Yes: coronary artery disease,    Diabetes Mellitus yes   Hypercholesteremia yes   Hypertension yes         He also has the following cardiac history:    Specialty Problems        Cardiology Problems    CAD (coronary artery disease)        HTN (hypertension)        MI (myocardial infarction) ([address]        Ac[facility] ischemic stroke [address]        Cerebrovascular accident (CVA) due to embolism of right middle cerebral artery ([facility]           Yes: cardiac catheterization Continue current medications:     Yes:            2.  Coronary artery disease Prior evaluation   Review and summation of old records:    1/2011 stents x 3 [facility])  5/4/2011  Cath  Patent stents in the mid LAD, stent to OM1, PTCA to the D2, normal LVFX  12/12/12  Cath patent stents in the mid LAD, PTCA to the D3, stent to distal LAD, normal LVFX  3/29/2015  Cath  Stent to the PLOM, normal LVFX  11/1/2019  Loop placed  3/1/19 lexiscan negative for myocardial ischemia, EF 50  %   5/24/19 ACS PARKER RISK Score 5 (angina, + troponin, CAD>50, age>65, plavix ), AUC indication 3, AUC score 9    Yes: as per the cardiac catheterization Continue current medications:    Yes:            3. Systemic arterial hypertension Prior evaluation Systolic (62RNN), LMO:930 , Min:124 , HIZ:694    Diastolic (77SZX), NKM:02, Min:65, Max:96   No Continue current medications:       yes         DISCUSSION AND PLAN:    1. I had a detailed discussion with the patient and / or family regarding the historical points, physical examination findings, and any diagnostic results supporting the admission diagnosis. The patient was educated on care and need for admission. questions were invited and answered. Patient and / or family shows understanding of admission information and agrees to follow. 2. Continue present medications except for changes as noted above    3. Continue to monitor rhythm    4. Further orders per clinical course. Date of the Proposed Procedure:  05/25/19      Proposed Procedure:  Selective left heart and coronary arteriography with possible percutaneous coronary interventon, (femoral approach), 05/25/19      :  CIRA Levine MD    Indications:  5/24/19 ACS PARKER RISK Score 5 (angina, + troponin, CAD>50, age>65, plavix ), AUC indication 3, AUC score 9     American Society of Anesthesiologists (ASA) Classification:  III    Plan of Sedation:  Moderate Sedation    Mallampati Classification:  II    I have examined this patient on 05/24/19  in CCU # [id number] in the presence of Yahaira Wadsworth RN and find no interval changes since the original History and Physical / Consult as noted written by myself, on 05/24/19     With the constellation of symptoms and these findings, I recommend cardiac catheterization and possibility of percutaneous coronary intervention. I discussed with him  in detail  the risks, benefits and alternatives to this procedure. The risks mentioned to him include but are not limited to:  vascular complications in ~ 3%, stroke <1%, renal dysfunction <5%, myocardial infarction <1%, coronary dissection <1%, need for emergency open heart surgery <1, and death <1% . He appeared to understand, had no questions, and agreed to proceed with this plan.     Additionally, there are risks associated with moderate

## 2019-05-25 NOTE — PROGRESS NOTES
Medication list completed per wife and patient, he is on an antibiotic for an abscess tooth but they do not know which one. Wife said she would bring the bottle when she comes back, but will pass on to day shift to try and get accurate list from patient's two pharmacies.   Electronically signed by Oleg Lr RN on 5/24/2019 at 7:37 PM

## 2019-05-25 NOTE — PROGRESS NOTES
Per pt and wife, pt is being treated with antibiotic for tooth abscess and has appt with dentist on Thurs, May 30 to have root canal.  [place] to verify info and pt had Clindamycin 300 mg filled (prescribed by Dr. Janessa Damon) on 5/22. Sig: Take 1 capsule every 8 hours. Quant filled #21. Entered this info in home meds. Message sent to Dr. Leopoldo Patch to notify of tooth abscess and prescription info since pt needs CABG.

## 2019-05-25 NOTE — PROGRESS NOTES
Assumed care of patient at 200, patient was visiting with family with no needs expressed and showing no signs of distress. Assessment and admission completed and medications given as ordered. Explained to patient he was going to be NPO after midnight and we would prep him in the morning with a bath/shave, he states his understanding and has no questions about heart cath procedure, his consents were signed and placed in the soft chart. Call light is within reach and bed alarm is set, will continue to monitor.   Electronically signed by Radha Cole RN on 5/24/2019 at 11:41 PM

## 2019-05-26 LAB
ANION GAP SERPL CALCULATED.3IONS-SCNC: 9 MMOL/L (ref 7–19)
BUN BLDV-MCNC: 15 MG/DL (ref 8–23)
CALCIUM SERPL-MCNC: 8.5 MG/DL (ref 8.8–10.2)
CHLORIDE BLD-SCNC: 106 MMOL/L (ref 98–111)
CO2: 24 MMOL/L (ref 22–29)
CREAT SERPL-MCNC: 1.1 MG/DL (ref 0.5–1.2)
GFR NON-AFRICAN AMERICAN: >60
GLUCOSE BLD-MCNC: 105 MG/DL (ref 70–99)
GLUCOSE BLD-MCNC: 119 MG/DL (ref 74–109)
GLUCOSE BLD-MCNC: 135 MG/DL (ref 70–99)
GLUCOSE BLD-MCNC: 152 MG/DL (ref 70–99)
GLUCOSE BLD-MCNC: 209 MG/DL (ref 70–99)
HBA1C MFR BLD: 7 % (ref 4–6)
HCT VFR BLD CALC: 32.2 % (ref 42–52)
HEMOGLOBIN: 10.2 G/DL (ref 14–18)
MCH RBC QN AUTO: 27.3 PG (ref 27–31)
MCHC RBC AUTO-ENTMCNC: 31.7 G/DL (ref 33–37)
MCV RBC AUTO: 86.1 FL (ref 80–94)
PDW BLD-RTO: 14.2 % (ref 11.5–14.5)
PERFORMED ON: ABNORMAL
PLATELET # BLD: 186 K/UL (ref 130–400)
PMV BLD AUTO: 11.4 FL (ref 9.4–12.4)
POTASSIUM REFLEX MAGNESIUM: 4.6 MMOL/L (ref 3.5–5)
PRO-BNP: 1273 PG/ML (ref 0–1800)
RBC # BLD: 3.74 M/UL (ref 4.7–6.1)
SODIUM BLD-SCNC: 139 MMOL/L (ref 136–145)
TROPONIN: 1.17 NG/ML (ref 0–0.03)
WBC # BLD: 5.7 K/UL (ref 4.8–10.8)

## 2019-05-26 PROCEDURE — 99222 1ST HOSP IP/OBS MODERATE 55: CPT | Performed by: INTERNAL MEDICINE

## 2019-05-26 PROCEDURE — 83880 ASSAY OF NATRIURETIC PEPTIDE: CPT

## 2019-05-26 PROCEDURE — 6360000002 HC RX W HCPCS: Performed by: INTERNAL MEDICINE

## 2019-05-26 PROCEDURE — 6360000004 HC RX CONTRAST MEDICATION: Performed by: INTERNAL MEDICINE

## 2019-05-26 PROCEDURE — 94640 AIRWAY INHALATION TREATMENT: CPT

## 2019-05-26 PROCEDURE — 99223 1ST HOSP IP/OBS HIGH 75: CPT | Performed by: THORACIC SURGERY (CARDIOTHORACIC VASCULAR SURGERY)

## 2019-05-26 PROCEDURE — 80048 BASIC METABOLIC PNL TOTAL CA: CPT

## 2019-05-26 PROCEDURE — 36415 COLL VENOUS BLD VENIPUNCTURE: CPT

## 2019-05-26 PROCEDURE — 83036 HEMOGLOBIN GLYCOSYLATED A1C: CPT

## 2019-05-26 PROCEDURE — 2580000003 HC RX 258: Performed by: INTERNAL MEDICINE

## 2019-05-26 PROCEDURE — 6370000000 HC RX 637 (ALT 250 FOR IP): Performed by: INTERNAL MEDICINE

## 2019-05-26 PROCEDURE — 99233 SBSQ HOSP IP/OBS HIGH 50: CPT | Performed by: INTERNAL MEDICINE

## 2019-05-26 PROCEDURE — 82948 REAGENT STRIP/BLOOD GLUCOSE: CPT

## 2019-05-26 PROCEDURE — 84484 ASSAY OF TROPONIN QUANT: CPT

## 2019-05-26 PROCEDURE — 2700000000 HC OXYGEN THERAPY PER DAY

## 2019-05-26 PROCEDURE — 85027 COMPLETE CBC AUTOMATED: CPT

## 2019-05-26 PROCEDURE — 2100000000 HC CCU R&B

## 2019-05-26 RX ORDER — HYDROXYZINE HYDROCHLORIDE 10 MG/1
10 TABLET, FILM COATED ORAL 3 TIMES DAILY PRN
Status: DISCONTINUED | OUTPATIENT
Start: 2019-05-26 | End: 2019-05-26

## 2019-05-26 RX ORDER — HYDROXYZINE HYDROCHLORIDE 10 MG/1
10 TABLET, FILM COATED ORAL ONCE
Status: COMPLETED | OUTPATIENT
Start: 2019-05-26 | End: 2019-05-26

## 2019-05-26 RX ORDER — INSULIN GLARGINE 100 [IU]/ML
50 INJECTION, SOLUTION SUBCUTANEOUS NIGHTLY
Status: DISCONTINUED | OUTPATIENT
Start: 2019-05-26 | End: 2019-05-27

## 2019-05-26 RX ORDER — HYDROXYZINE HYDROCHLORIDE 25 MG/1
25 TABLET, FILM COATED ORAL 3 TIMES DAILY PRN
Status: DISCONTINUED | OUTPATIENT
Start: 2019-05-26 | End: 2019-05-30

## 2019-05-26 RX ORDER — LISINOPRIL 2.5 MG/1
2.5 TABLET ORAL DAILY
Status: DISCONTINUED | OUTPATIENT
Start: 2019-05-27 | End: 2019-05-27

## 2019-05-26 RX ORDER — FUROSEMIDE 10 MG/ML
40 INJECTION INTRAMUSCULAR; INTRAVENOUS ONCE
Status: COMPLETED | OUTPATIENT
Start: 2019-05-26 | End: 2019-05-26

## 2019-05-26 RX ORDER — DEXTROSE MONOHYDRATE 50 MG/ML
100 INJECTION, SOLUTION INTRAVENOUS PRN
Status: DISCONTINUED | OUTPATIENT
Start: 2019-05-26 | End: 2019-05-30

## 2019-05-26 RX ORDER — FUROSEMIDE 40 MG/1
40 TABLET ORAL 2 TIMES DAILY
Status: DISCONTINUED | OUTPATIENT
Start: 2019-05-26 | End: 2019-05-30

## 2019-05-26 RX ORDER — NICOTINE POLACRILEX 4 MG
15 LOZENGE BUCCAL PRN
Status: DISCONTINUED | OUTPATIENT
Start: 2019-05-26 | End: 2019-05-30

## 2019-05-26 RX ORDER — DEXTROSE MONOHYDRATE 25 G/50ML
12.5 INJECTION, SOLUTION INTRAVENOUS PRN
Status: DISCONTINUED | OUTPATIENT
Start: 2019-05-26 | End: 2019-05-30

## 2019-05-26 RX ADMIN — CLINDAMYCIN HYDROCHLORIDE 300 MG: 300 CAPSULE ORAL at 21:04

## 2019-05-26 RX ADMIN — INSULIN LISPRO 1 UNITS: 100 INJECTION, SOLUTION INTRAVENOUS; SUBCUTANEOUS at 21:22

## 2019-05-26 RX ADMIN — ALBUTEROL SULFATE 2.5 MG: 2.5 SOLUTION RESPIRATORY (INHALATION) at 06:15

## 2019-05-26 RX ADMIN — ISOSORBIDE MONONITRATE 30 MG: 30 TABLET, EXTENDED RELEASE ORAL at 10:16

## 2019-05-26 RX ADMIN — OXYCODONE HYDROCHLORIDE AND ACETAMINOPHEN 2 TABLET: 5; 325 TABLET ORAL at 21:10

## 2019-05-26 RX ADMIN — HYDROXYZINE HYDROCHLORIDE 25 MG: 25 TABLET, FILM COATED ORAL at 21:11

## 2019-05-26 RX ADMIN — ENOXAPARIN SODIUM 40 MG: 40 INJECTION SUBCUTANEOUS at 10:15

## 2019-05-26 RX ADMIN — ALBUTEROL SULFATE 2.5 MG: 2.5 SOLUTION RESPIRATORY (INHALATION) at 22:46

## 2019-05-26 RX ADMIN — ALBUTEROL SULFATE 2.5 MG: 2.5 SOLUTION RESPIRATORY (INHALATION) at 10:04

## 2019-05-26 RX ADMIN — HYDROXYZINE HYDROCHLORIDE 10 MG: 10 TABLET, FILM COATED ORAL at 12:21

## 2019-05-26 RX ADMIN — ALBUTEROL SULFATE 2.5 MG: 2.5 SOLUTION RESPIRATORY (INHALATION) at 14:08

## 2019-05-26 RX ADMIN — ASPIRIN 81 MG 81 MG: 81 TABLET ORAL at 10:17

## 2019-05-26 RX ADMIN — TIZANIDINE 4 MG: 2 TABLET ORAL at 21:11

## 2019-05-26 RX ADMIN — METOPROLOL SUCCINATE 12.5 MG: 25 TABLET, EXTENDED RELEASE ORAL at 21:00

## 2019-05-26 RX ADMIN — FUROSEMIDE 40 MG: 10 INJECTION, SOLUTION INTRAMUSCULAR; INTRAVENOUS at 17:25

## 2019-05-26 RX ADMIN — Medication 10 ML: at 08:00

## 2019-05-26 RX ADMIN — PERFLUTREN 2.2 MG: 6.52 INJECTION, SUSPENSION INTRAVENOUS at 09:06

## 2019-05-26 RX ADMIN — TAMSULOSIN HYDROCHLORIDE 0.8 MG: 0.4 CAPSULE ORAL at 21:02

## 2019-05-26 RX ADMIN — CLINDAMYCIN HYDROCHLORIDE 300 MG: 300 CAPSULE ORAL at 05:46

## 2019-05-26 RX ADMIN — METOPROLOL SUCCINATE 12.5 MG: 25 TABLET, EXTENDED RELEASE ORAL at 10:16

## 2019-05-26 RX ADMIN — SERTRALINE HYDROCHLORIDE 100 MG: 100 TABLET ORAL at 10:17

## 2019-05-26 RX ADMIN — SODIUM CHLORIDE: 9 INJECTION, SOLUTION INTRAVENOUS at 05:46

## 2019-05-26 RX ADMIN — HYDROXYZINE HYDROCHLORIDE 10 MG: 10 TABLET, FILM COATED ORAL at 10:18

## 2019-05-26 RX ADMIN — CLINDAMYCIN HYDROCHLORIDE 300 MG: 300 CAPSULE ORAL at 17:00

## 2019-05-26 RX ADMIN — INSULIN GLARGINE 50 UNITS: 100 INJECTION, SOLUTION SUBCUTANEOUS at 21:06

## 2019-05-26 RX ADMIN — LISINOPRIL 5 MG: 5 TABLET ORAL at 10:17

## 2019-05-26 RX ADMIN — ALBUTEROL SULFATE 2.5 MG: 2.5 SOLUTION RESPIRATORY (INHALATION) at 18:05

## 2019-05-26 ASSESSMENT — PAIN SCALES - GENERAL
PAINLEVEL_OUTOF10: 0
PAINLEVEL_OUTOF10: 6
PAINLEVEL_OUTOF10: 0
PAINLEVEL_OUTOF10: 0
PAINLEVEL_OUTOF10: 1

## 2019-05-26 ASSESSMENT — PAIN DESCRIPTION - LOCATION: LOCATION: BACK

## 2019-05-26 ASSESSMENT — PAIN DESCRIPTION - PAIN TYPE: TYPE: CHRONIC PAIN

## 2019-05-26 NOTE — CONSULTS
catheterization (12/12/12); Cardiac catheterization (3/29/15  [facility]); lumbar fusion (Left, 11/15/2016); Colonoscopy; back surgery; joint replacement; Appendectomy; and Cholecystectomy. Home Medications:   Prior to Admission medications    Medication Sig Start Date End Date Taking? Authorizing Provider   clindamycin (CLEOCIN) 300 MG capsule Take 300 mg by mouth every 8 hours Indications: Abscess For a quantity #21 prior to procedure scheduled for 5/30 for tooth abscess. 5/22/19  Yes Historical Provider, MD   isosorbide mononitrate (IMDUR) 30 MG extended release tablet Take 1 tablet by mouth daily 4/19/19  Yes TEGAN Cardozo   azelastine (ASTELIN) 0.1 % nasal spray 1 spray by Nasal route as needed for Rhinitis Use in each nostril as directed   Yes Historical Provider, MD   butalbital-acetaminophen-caffeine (FIORICET, ESGIC) -40 MG per tablet Take 1 tablet by mouth as needed for Headaches   Yes Historical Provider, MD   lisinopril (PRINIVIL;ZESTRIL) 20 MG tablet Take 1/2 tablet twice daily   Yes Historical Provider, MD   oxyCODONE-acetaminophen (PERCOCET)  MG per tablet Take 1 tablet by mouth 3 times daily as needed for Pain. Yes Historical Provider, MD   sertraline (ZOLOFT) 100 MG tablet Take 1 tablet by mouth daily 3/12/19  Yes Donis Lewis MD   metoprolol tartrate (LOPRESSOR) 25 MG tablet TAKE 1/2 TABLET TWICE DAILY FOR HIGH BLOOD PRESSURE 1/22/19  Yes Donis Lewis MD   tamsulosin (FLOMAX) 0.4 MG capsule TAKE 2 CAPSULES AT BEDTIME 1/22/19  Yes Donis Lewis MD   insulin glargine (LANTUS SOLOSTAR) 100 UNIT/ML injection pen INJECT 80 UNITS INTO THE SKIN NIGHTLY 1/18/19  Yes Donis Lewis MD   clopidogrel (PLAVIX) 75 MG tablet TAKE 1 TABLET DAILY 1/18/19  Yes Donis Lewis MD   zoster recombinant adjuvanted vaccine [facility]) 50 MCG/0.5ML SUSR injection 1 vaccine now and repeat in 2-6 months.  11/6/18  Yes Donis Lewis MD   furosemide (LASIX) 40 MG tablet Take 40 mg by mouth as needed    Historical Provider, MD   tiZANidine (ZANAFLEX) 2 MG tablet Take 1 tablet by mouth nightly as needed (muscle spasm) 3/12/19   Gerard Rojas MD   nitroGLYCERIN (NITROSTAT) 0.4 MG SL tablet up to max of 3 total doses. If no relief after 1 dose, call 911. 3/1/19   Cecilia Vicente MD        Facility Administered Medications:    furosemide  40 mg Oral BID    [START ON 5/27/2019] lisinopril  2.5 mg Oral Daily    sodium chloride flush  10 mL Intravenous 2 times per day    clindamycin  300 mg Oral Q8H    enoxaparin  40 mg Subcutaneous Daily    aspirin  81 mg Oral Daily    albuterol  2.5 mg Nebulization Q4H    metoprolol succinate  12.5 mg Oral BID    insulin glargine  80 Units Subcutaneous Nightly    tamsulosin  0.8 mg Oral Nightly    sertraline  100 mg Oral Daily    isosorbide mononitrate  30 mg Oral Daily       Allergies:  Statins and Crestor [rosuvastatin calcium]     Social History:       Social History     Socioeconomic History    Marital status:      Spouse name: Darlyn Lino Number of children: Not on file    Years of education: Not on file    Highest education level: Not on file   Occupational History    Occupation:    Social Needs    Financial resource strain: Not on file    Food insecurity:     Worry: Not on file     Inability: Not on file   MMIS needs:     Medical: Not on file     Non-medical: Not on file   Tobacco Use    Smoking status: Never Smoker    Smokeless tobacco: Never Used   Substance and Sexual Activity    Alcohol use:  Yes     Alcohol/week: 1.2 oz     Types: 2 Shots of liquor per week    Drug use: No    Sexual activity: Not on file   Lifestyle    Physical activity:     Days per week: Not on file     Minutes per session: Not on file    Stress: Not on file   Relationships    Social connections:     Talks on phone: Not on file     Gets together: Not on file     Attends Adventism service: Not on file     Active member of club or CBC:   Recent Labs     05/26/19 0515   WBC 5.7   HGB 10.2*   HCT 32.2*   MCV 86.1        BMP:   Recent Labs     05/26/19 0515      K 4.6      CO2 24   BUN 15   CREATININE 1.1     Cardiac Enzymes:   Recent Labs     05/24/19  1334  05/24/19  1753 05/24/19 2024 05/26/19  0515   CKTOTAL 105  --   --   --   --    TROPONINI 1.47*   < > 1.42* 1.33* 1.17*    < > = values in this interval not displayed.      PT/INR:   Recent Labs     05/24/19  1334   PROTIME 14.7*   INR 1.21*     APTT:   Recent Labs     05/24/19 1334   APTT 34.8     Liver Profile:  Lab Results   Component Value Date    AST 10 05/25/2019    ALT 15 05/25/2019    BILITOT <0.2 05/25/2019    ALKPHOS 69 05/25/2019    ALKPHOS 55 05/24/2011     Lab Results   Component Value Date    CHOL 157 05/25/2019    HDL 20 05/25/2019    TRIG 103 05/25/2019     TSH:  Lab Results   Component Value Date    TSH 1.810 05/24/2019     UA:   Lab Results   Component Value Date    COLORU YELLOW 05/24/2019    PHUR 7.5 05/24/2019    WBCUA 3 02/20/2017    RBCUA 0 02/20/2017    BACTERIA NEGATIVE 02/20/2017    CLARITYU Clear 05/24/2019    SPECGRAV 1.012 05/24/2019    LEUKOCYTESUR Negative 05/24/2019    UROBILINOGEN 0.2 05/24/2019    BILIRUBINUR Negative 05/24/2019    BLOODU Negative 05/24/2019    GLUCOSEU Negative 05/24/2019       Impression:    74 yo male with multiple medical comorbidities    S/P PTCA and stents in the past    Unstable angina    L main CAD, totally occluded mid LAD, totally occluded RCA    Mild-moderately depressed EF (35-40%)    No significant valvular disease    Plan:    Son knows Dr. Lesia Goldberg and would like for him to be the surgeon   I will continue to check on him and will take care of things until Dr. Steven Heaton returns on Wednesday     I have discussed the plan with the patient and his son and they are   in agreement        Analy Chavez MD  5/26/2019  6:44 PM

## 2019-05-26 NOTE — PROGRESS NOTES
[facility] consult called to Dr. Jayme Hwang. Advised that family was requesting to wait for Dr. Meryl Knight but that Dr. Juvenal Mayo wanted to make them aware.

## 2019-05-26 NOTE — PROGRESS NOTES
Order rcv'd for one time dose of atarax 10 mg to give in addition to the 10 mg given at 10 am as pt itching not resolved. Order also rcv'd to change previous 10 mg order to 25 mg TID prn.   See eMar.

## 2019-05-26 NOTE — PROGRESS NOTES
Telephone orders rcvd from Dr. James Mail to stop IV fluids. Pt oral intake sufficient and fluids were pre-cath.

## 2019-05-26 NOTE — PROGRESS NOTES
Pt reports itching on abdomen and legs. There is some slight redness and rash on abdomen and slightly on legs. Pt reports feeling terrible today--he denies pain, but says he just feels terrible all over and wants to \"get stuff over with\" so he can get up and move around. Pt refuses to get up to chair for breakfast.  Vital signs stable. Telephone orders rcv'd from Dr. Christopher Maravilla for atarax for itching/rash.

## 2019-05-26 NOTE — PROGRESS NOTES
Shift assessment completed. Pt reports that chest pain has subsided. Dr. Jeremías Salter on unit and was informed of chest pain and EKG. Dr. Jeremías Salter did speak with patient at bedside. Verified appropriate dose of Lovenox with Dr. Jeremías Salter.

## 2019-05-26 NOTE — PROGRESS NOTES
[address]    Daily HOSPITAL Progress Note                            Date:  5/26/19  Patient: Gildardo Arredondo  Admission:  5/24/2019  1:26 PM  Admit DX: Elevated troponin [R74.8]  Age:  75 y.o., 1945        Date of Admission 5/24/2019  1:26 PM   Hospital Length of Stay  LOS: 2 days            I personally saw the patient and rounded with:  Natalia Nicholson RN on 5/26/19      The observations documented in this note, including the assessment and plan are mine         Reason for initial evaluation or the patient's initial complaint    Chest and abd discomfort      SUBJECTIVE:      Chief Complaint / Reason for the Visit   Follow up of:  Chest and abdominal discomfort and CAD and systemic arterial hypertension    Family present and in room during examination:  Yes: son and daughter in law and wife      Specialty Problems        Cardiology Problems    ACS (acute coronary syndrome) ([address]        CAD (coronary artery disease)        HTN (hypertension)        MI (myocardial infarction) ([address]        Acute ischemic stroke ([address]        Cerebrovascular accident (CVA) due to embolism of right middle cerebral artery ([address]              Current Status Today According to the patient:  \"ok\"    Subjective:  Mr. Gildardo Arredondo is generally feeling unchanged. Son wishes for Dr. Karan Ronquillo to be consulted, locums on today, but will consult none the less    Mr. Gildardo Arredondo has the following cardiac complaints / symptoms today:    1. Chest and abd discomfort, stable    2. CAD, cath yesterday, 100% mid RCA and mid LAD    3.  Hypertension    The blood pressure for the lastr 36 hours has been:  Systolic (83DQI), SWX:334 , Min:99 , QSU:464    Diastolic (26FZK), ZBM:55, Min:48, Max:74        Gildardo Arredondo is a 75 y.o. male with the following history as recorded in [facility]:    Patient Active Problem List    Diagnosis Date Noted    ACS (acute coronary syndrome) [facility])      Priority: High    Chest pressure 02/28/2019 Priority: High    Elevated troponin 05/24/2019     Priority: Low    Chronic shortness of breath 04/15/2019     Priority: Low    Chest pain 02/28/2019     Priority: Low    MOO on CPAP 05/03/2018     Priority: Low    Dermatitis 03/05/2018     Priority: Low    Depression with anxiety 03/05/2018     Priority: Low    Benign prostatic hyperplasia without lower urinary tract symptoms 03/05/2018     Priority: Low    Chronic tension-type headache, not intractable 03/05/2018     Priority: Low    West Nile virus seropositivity 11/22/2017     Priority: Low    Status post placement of implantable loop recorder 10/31/2017     Priority: Low    Recurrent major depressive disorder, in full remission ([facility] 10/17/2017     Priority: Low    Trigger middle finger of left hand 10/17/2017     Priority: Low    Peripheral polyneuropathy 10/17/2017     Priority: Low    Palpitations 10/17/2017     Priority: Low    Chronic insomnia 10/17/2017     Priority: Low    Cerebrovascular accident (CVA) due to embolism of right middle cerebral artery ([facility] 08/14/2017     Priority: Low    Dizziness 03/27/2017     Priority: Low    Imbalance 03/27/2017     Priority: Low    Thoracic outlet syndrome 02/21/2017     Priority: Low    Acute ischemic stroke ([facility] 02/20/2017     Priority: Low    DDD (degenerative disc disease), lumbar 11/15/2016     Priority: Low    Spondylosis of lumbar region without myelopathy or radiculopathy 11/15/2016     Priority: Low    Chronic bilateral low back pain without sciatica 11/10/2016     Priority: Low    Hyperlipidemia 07/23/2012     Priority: Low    MI (myocardial infarction) ([facility]      Priority: Low    Type 2 diabetes mellitus with diabetic polyneuropathy, with long-term current use of insulin ([facility] 07/12/2011     Priority: Low    HTN (hypertension) 07/12/2011     Priority: Low    CAD (coronary artery disease) 07/12/2011     Priority: Low    Fatigue 07/12/2011     Priority: Low     Current Facility-Administered Medications   Medication Dose Route Frequency Provider Last Rate Last Dose    hydrOXYzine (ATARAX) tablet 25 mg  25 mg Oral TID PRN Aletha Helm MD        furosemide (LASIX) injection 40 mg  40 mg Intravenous Once Aletha Helm MD        furosemide (LASIX) tablet 40 mg  40 mg Oral BID Aletha Helm MD        [START ON 5/27/2019] lisinopril (PRINIVIL;ZESTRIL) tablet 2.5 mg  2.5 mg Oral Daily Aletha Helm MD        sodium chloride flush 0.9 % injection 10 mL  10 mL Intravenous 2 times per day Aletha Helm MD   10 mL at 05/26/19 0800    sodium chloride flush 0.9 % injection 10 mL  10 mL Intravenous PRN Aletha Helm MD        oxyCODONE-acetaminophen (PERCOCET) 5-325 MG per tablet 2 tablet  2 tablet Oral TID PRN Aletha Helm MD   2 tablet at 05/25/19 2335    clindamycin (CLEOCIN) capsule 300 mg  300 mg Oral Q8H Aletha Helm MD   300 mg at 05/26/19 0546    morphine (PF) injection 2 mg  2 mg Intravenous Q2H PRN Aletha Helm MD   2 mg at 05/25/19 2102    magnesium hydroxide (MILK OF MAGNESIA) 400 MG/5ML suspension 30 mL  30 mL Oral Daily PRN Aletha Helm MD        ondansetron [facility]) injection 4 mg  4 mg Intravenous Q6H PRN Aletha Helm MD        enoxaparin (LOVENOX) injection 40 mg  40 mg Subcutaneous Daily Aletha Helm MD   40 mg at 05/26/19 1015    aspirin chewable tablet 81 mg  81 mg Oral Daily Aletha Helm MD   81 mg at 05/26/19 1017    albuterol (PROVENTIL) nebulizer solution 2.5 mg  2.5 mg Nebulization Q4H Aletha Helm MD   2.5 mg at 05/26/19 1408    acetaminophen (TYLENOL) tablet 650 mg  650 mg Oral Q4H PRN Aletha Helm MD        nitroGLYCERIN (NITROSTAT) SL tablet 0.4 mg  0.4 mg Sublingual Q5 Min PRN Aletha Helm MD        metoprolol succinate (TOPROL XL) extended release tablet 12.5 mg  12.5 mg Oral BID Aletha Helm MD   12.5 mg at 05/26/19 1016    insulin glargine (LANTUS) injection vial 80 Units  80 Units Subcutaneous Nightly Aletha Helm MD   80 Units at 05/25/19 2056    tamsulosin (FLOMAX) capsule 0.8 mg  0.8 mg Oral Nightly Reino Fabiana, MD   0.8 mg at 05/25/19 2053    sertraline (ZOLOFT) tablet 100 mg  100 mg Oral Daily Reino Fabiana, MD   100 mg at 05/26/19 1017    tiZANidine (ZANAFLEX) tablet 4 mg  4 mg Oral Nightly PRN Nasho Fabiana, MD   4 mg at 05/25/19 2334    butalbital-acetaminophen-caffeine (FIORICET, ESGIC) per tablet 1 tablet  1 tablet Oral Q4H PRN Nasho Fabiana, MD   1 tablet at 05/24/19 5280    furosemide (LASIX) tablet 40 mg  40 mg Oral Daily PRN Reino Fabiana, MD        isosorbide mononitrate (IMDUR) extended release tablet 30 mg  30 mg Oral Daily Reino Fabiana, MD   30 mg at 05/26/19 1016     Allergies: Statins and Crestor [rosuvastatin calcium]  Past Medical History:   Diagnosis Date    Acute ischemic stroke ([address] 2/20/2017    Atherosclerotic heart disease     s/p MI, stenting x 3 jan 2011([place]    CAD (coronary artery disease)     Cerebral artery occlusion with cerebral infarction [facility])     Cerebrovascular accident (CVA) due to embolism of right middle cerebral artery ([address] 8/14/2017    CH[facility]congestive heart failure) [facility]     Chronic bilateral low back pain without sciatica 11/10/2016    Chronic kidney disease     DDD (degenerative disc disease), lumbar 11/15/2016    Depression     Depression with anxiety     Diabetes mellitus ([address]     type II    Diabetes mellitus, type 2 ([address]     DM (diabetes mellitus) ([address] 7/12/2011    Glaucoma     Headache     Hyperlipidemia     Cholesterol management per pcp.      Hypertension     Macular degeneration     MI (myocardial infarction) ([address]     MI (myocardial infarction) ([address]     Neuromuscular disorder ([address]     Neuropathy     Osteoarthritis     Sleep apnea     Spondylosis of lumbar region without myelopathy or radiculopathy 11/15/2016    Status post placement of implantable loop recorder 10/31/2017    TIA (transient ischemic attack)      Past Surgical History:   Procedure Laterality Date    APPENDECTOMY      BACK SURGERY      CARDIAC CATHETERIZATION  12    with angioplasty, stent    CARDIAC CATHETERIZATION  3/29/15  MDL    stent to distal RCA. EF 60%    CHOLECYSTECTOMY      COLONOSCOPY      CORONARY ANGIOPLASTY WITH STENT PLACEMENT      stent x 3    CORONARY ANGIOPLASTY WITH STENT PLACEMENT      11    DIAGNOSTIC CARDIAC CATH LAB PROCEDURE  [id number]    primary stent placement to the first circumflex marginal branch, balloon angioplasty to the third left anterior descending diagnonal branch, intracoronary nitroglycerin adminstration    JOINT REPLACEMENT      left knee    LUMBAR FUSION Left 11/15/2016    LUMBAR INTERBODY FUSION LATERAL L3-4 L4-5 WITH LATERAL PLATE  performed by Kristin Melo MD at [facility] OR     Family History   Problem Relation Age of Onset    Coronary Art Dis Mother     Coronary Art Dis Sister     Cancer Sister         uterine    Kidney Disease Sister     Coronary Art Dis Brother          in his 46s    Cancer Father         colon     Social History     Tobacco Use    Smoking status: Never Smoker    Smokeless tobacco: Never Used   Substance Use Topics    Alcohol use:  Yes     Alcohol/week: 1.2 oz     Types: 2 Shots of liquor per week          Review of Systems:    General:      Complaint / Symptom Yes / No / Description if Yes       Fatigue Yes:  chronic   Weight gain NA   Insomnia NA       Respiratory:        Complaint / Symptom Yes / No / Description if Yes       Cough No   Horseness NA       Cardiovascular:    Complaint / Symptom Yes / No / Description if Yes       Chest Pain No   Shortness of Air / Orthopnea Yes: chronic and stable   Presyncope / Syncope No   Palpitations No         Objective:    BP (!) 133/59   Pulse 75   Temp 97.7 °F (36.5 °C) (Temporal)   Resp 18   Ht 6' 4\" (1.93 m)   Wt 268 lb 8 oz (121.8 kg)   SpO2 96%   BMI 32.68 kg/m² ,     Intake/Output Summary (Last 24 hours) at 5/26/2019 1612  Last data filed at 5/26/2019 1400  Gross per 24 hour   Intake 2450 ml   Output 2475 ml   Net -25 ml       GENERAL - well developed and well nourished, is an active participant in this examination  HEENT -  PERRLA, Hearing appears normal, conjunctiva and lids are normal, ears and nose appear normal  NECK - no thyromegaly, no JVD, trachea is in the midline  CARDIOVASCULAR - PMI is in the left mid line clavicular position, Normal S1 and S2 with a grade 1/6 systolic murmur. No S3 or S4    PULMONARY - No respiratory distress. scattered wheezes and rales. Breath sounds in both  lung fields are Decreased  ABDOMEN  - soft, non tender, no rebound, no hepatomegaly or splenomegaly  MUSCULOSKELETAL  - Prone/Supine, digitals and nails are without clubbing or cyanosis  EXTREMITIES - trace edema  NEUROLOGIC - cranial nerves, II-XII, are normal  SKIN - turgor is normal, no rash  PSYCHIATRIC - normal mood and affect, alert and orientated x 3, judgement and insight appear appropriate      ASSESSMENT:    ALL THE CARDIOLOGY PROBLEMS ARE LISTED ABOVE; HOWEVER, THE FOLLOWING SPECIFIC CARDIAC PROBLEMS / CONDITIONS WERE ADDRESSED AND TREATED DURING THE OFFICE VISIT TODAY:                                                                                            MEDICAL DECISION MAKING                   Cardiac Specific Problem / Diagnosis   Discussion and Data Reviewed Diagnostic Procedures Ordered Management Options Selected                 1. ACS On admission Cath results as noted                     2.  Coronary artery disease Prior evaluation    Review and summation of old records:     1/2011 stents x 3 ([place])  5/4/2011  Cath  Patent stents in the mid LAD, stent to OM1, PTCA to the D2, normal LVFX  12/12/12  Cath patent stents in the mid LAD, PTCA to the D3, stent to distal LAD, normal LVFX  3/29/2015  Cath  Stent to the [facility], normal LVFX  11/1/2019  Loop placed  3/1/19 lexiscan negative for myocardial ischemia, EF 50  %   19 ACS PARKER RISK Score 5 (angina, + troponin, CAD>50, age>65, plavix ), AUC indication 3, AUC score 9      19  Cath 50% distal LM, 100% mid LAD, 80% diag, 100% mid RCA, inferior hypokinesis, EF 45% Yes: as per the cardiac catheterization Continue current medications:     Yes:                  3. Systemic arterial hypertension Prior evaluation The blood pressure for the lastr 36 hours has been:  Systolic (58SRS), FJU:676 , Min:99 , VX    Diastolic (96WLG), FTX:39, Min:48, Max:74          No Continue current medications:        yes         CONSIDERATIONS, THOUGHTS, AND PLANS:    1. Continue present medications except for changes as noted above  2. Continue to monitor rhythm  3. Further orders per clinical course. 4. meds adjusted  5. SOA but bnp is normal  6. 100% lesions in the mid RCA and LAD, inferior hypo with an EF of 40%  7. Consult to CVT           Discussed with patient and family and nursing.     Electronically signed by Tutu Sequeira MD on 19        [facility]

## 2019-05-27 LAB
EKG P AXIS: 16 DEGREES
EKG P AXIS: 21 DEGREES
EKG P AXIS: 26 DEGREES
EKG P AXIS: 39 DEGREES
EKG P-R INTERVAL: 130 MS
EKG P-R INTERVAL: 130 MS
EKG P-R INTERVAL: 134 MS
EKG P-R INTERVAL: 134 MS
EKG Q-T INTERVAL: 410 MS
EKG Q-T INTERVAL: 420 MS
EKG Q-T INTERVAL: 434 MS
EKG Q-T INTERVAL: 450 MS
EKG QRS DURATION: 104 MS
EKG QRS DURATION: 108 MS
EKG QTC CALCULATION (BAZETT): 439 MS
EKG QTC CALCULATION (BAZETT): 451 MS
EKG QTC CALCULATION (BAZETT): 458 MS
EKG QTC CALCULATION (BAZETT): 474 MS
EKG T AXIS: -18 DEGREES
EKG T AXIS: -21 DEGREES
EKG T AXIS: 13 DEGREES
EKG T AXIS: 29 DEGREES
GLUCOSE BLD-MCNC: 125 MG/DL (ref 70–99)
GLUCOSE BLD-MCNC: 143 MG/DL (ref 70–99)
GLUCOSE BLD-MCNC: 197 MG/DL (ref 70–99)
GLUCOSE BLD-MCNC: 210 MG/DL (ref 70–99)
GLUCOSE BLD-MCNC: 40 MG/DL (ref 70–99)
GLUCOSE BLD-MCNC: 58 MG/DL (ref 70–99)
GLUCOSE BLD-MCNC: 69 MG/DL (ref 70–99)
PERFORMED ON: ABNORMAL

## 2019-05-27 PROCEDURE — 2700000000 HC OXYGEN THERAPY PER DAY

## 2019-05-27 PROCEDURE — 99232 SBSQ HOSP IP/OBS MODERATE 35: CPT | Performed by: HOSPITALIST

## 2019-05-27 PROCEDURE — 6370000000 HC RX 637 (ALT 250 FOR IP): Performed by: INTERNAL MEDICINE

## 2019-05-27 PROCEDURE — 2100000000 HC CCU R&B

## 2019-05-27 PROCEDURE — 2580000003 HC RX 258: Performed by: INTERNAL MEDICINE

## 2019-05-27 PROCEDURE — 6360000002 HC RX W HCPCS: Performed by: INTERNAL MEDICINE

## 2019-05-27 PROCEDURE — 87081 CULTURE SCREEN ONLY: CPT

## 2019-05-27 PROCEDURE — 94640 AIRWAY INHALATION TREATMENT: CPT

## 2019-05-27 PROCEDURE — 82948 REAGENT STRIP/BLOOD GLUCOSE: CPT

## 2019-05-27 PROCEDURE — 99232 SBSQ HOSP IP/OBS MODERATE 35: CPT | Performed by: INTERNAL MEDICINE

## 2019-05-27 PROCEDURE — 99233 SBSQ HOSP IP/OBS HIGH 50: CPT | Performed by: THORACIC SURGERY (CARDIOTHORACIC VASCULAR SURGERY)

## 2019-05-27 RX ORDER — INSULIN GLARGINE 100 [IU]/ML
40 INJECTION, SOLUTION SUBCUTANEOUS NIGHTLY
Status: DISCONTINUED | OUTPATIENT
Start: 2019-05-28 | End: 2019-05-28

## 2019-05-27 RX ADMIN — ALBUTEROL SULFATE 2.5 MG: 2.5 SOLUTION RESPIRATORY (INHALATION) at 22:21

## 2019-05-27 RX ADMIN — FUROSEMIDE 40 MG: 40 TABLET ORAL at 09:14

## 2019-05-27 RX ADMIN — CLINDAMYCIN HYDROCHLORIDE 300 MG: 300 CAPSULE ORAL at 13:19

## 2019-05-27 RX ADMIN — LISINOPRIL 2.5 MG: 2.5 TABLET ORAL at 09:14

## 2019-05-27 RX ADMIN — TIZANIDINE 4 MG: 2 TABLET ORAL at 20:50

## 2019-05-27 RX ADMIN — BUTALBITAL, ACETAMINOPHEN, AND CAFFEINE 1 TABLET: 50; 325; 40 TABLET ORAL at 13:20

## 2019-05-27 RX ADMIN — ISOSORBIDE MONONITRATE 30 MG: 30 TABLET, EXTENDED RELEASE ORAL at 09:14

## 2019-05-27 RX ADMIN — SERTRALINE HYDROCHLORIDE 100 MG: 100 TABLET ORAL at 09:14

## 2019-05-27 RX ADMIN — ALBUTEROL SULFATE 2.5 MG: 2.5 SOLUTION RESPIRATORY (INHALATION) at 06:24

## 2019-05-27 RX ADMIN — TAMSULOSIN HYDROCHLORIDE 0.8 MG: 0.4 CAPSULE ORAL at 20:50

## 2019-05-27 RX ADMIN — CLINDAMYCIN HYDROCHLORIDE 300 MG: 300 CAPSULE ORAL at 05:59

## 2019-05-27 RX ADMIN — INSULIN GLARGINE 50 UNITS: 100 INJECTION, SOLUTION SUBCUTANEOUS at 20:45

## 2019-05-27 RX ADMIN — HYDROXYZINE HYDROCHLORIDE 25 MG: 25 TABLET, FILM COATED ORAL at 20:50

## 2019-05-27 RX ADMIN — INSULIN LISPRO 2 UNITS: 100 INJECTION, SOLUTION INTRAVENOUS; SUBCUTANEOUS at 13:20

## 2019-05-27 RX ADMIN — CLINDAMYCIN HYDROCHLORIDE 300 MG: 300 CAPSULE ORAL at 20:54

## 2019-05-27 RX ADMIN — METOPROLOL SUCCINATE 12.5 MG: 25 TABLET, EXTENDED RELEASE ORAL at 09:14

## 2019-05-27 RX ADMIN — ALBUTEROL SULFATE 2.5 MG: 2.5 SOLUTION RESPIRATORY (INHALATION) at 10:19

## 2019-05-27 RX ADMIN — ALBUTEROL SULFATE 2.5 MG: 2.5 SOLUTION RESPIRATORY (INHALATION) at 14:19

## 2019-05-27 RX ADMIN — Medication 10 ML: at 20:53

## 2019-05-27 RX ADMIN — METOPROLOL SUCCINATE 12.5 MG: 25 TABLET, EXTENDED RELEASE ORAL at 20:50

## 2019-05-27 RX ADMIN — OXYCODONE HYDROCHLORIDE AND ACETAMINOPHEN 2 TABLET: 5; 325 TABLET ORAL at 20:49

## 2019-05-27 RX ADMIN — Medication 10 ML: at 09:14

## 2019-05-27 RX ADMIN — ENOXAPARIN SODIUM 40 MG: 40 INJECTION SUBCUTANEOUS at 09:14

## 2019-05-27 RX ADMIN — ASPIRIN 81 MG 81 MG: 81 TABLET ORAL at 09:14

## 2019-05-27 RX ADMIN — FUROSEMIDE 40 MG: 40 TABLET ORAL at 17:00

## 2019-05-27 RX ADMIN — ALBUTEROL SULFATE 2.5 MG: 2.5 SOLUTION RESPIRATORY (INHALATION) at 18:10

## 2019-05-27 ASSESSMENT — PAIN SCALES - GENERAL
PAINLEVEL_OUTOF10: 0
PAINLEVEL_OUTOF10: 0
PAINLEVEL_OUTOF10: 1
PAINLEVEL_OUTOF10: 0
PAINLEVEL_OUTOF10: 4
PAINLEVEL_OUTOF10: 5
PAINLEVEL_OUTOF10: 0

## 2019-05-27 NOTE — PROGRESS NOTES
Bath completed and linen changed. Patient sitting up in chair. Denies any complaints or needs. Wife in room visiting.

## 2019-05-27 NOTE — PROGRESS NOTES
Progress Note        Subjective: No complaints    Objective:    Physical Examination:    BP (!) 131/56   Pulse 69   Temp 97.8 °F (36.6 °C) (Temporal)   Resp 16   Ht 6' 4\" (1.93 m)   Wt 268 lb 9.6 oz (121.8 kg)   SpO2 94%   BMI 32.70 kg/m²        General: in no acute distress, comfortable  HEENT: PERRLA, EOMI, anicteric sclerae, NC/AT, no nasal septal deviation, mucus membranes moist  Pulm: CTA b/l, no rales, rhonchi, wheezing  Cardio: S1S2+, regular rate and rhythm, no murmurs/rubs/gallops/thrills  Abdomen: S/NT/ND/NABS  Extrems: 5/5 strength throughout, no cyanosis, clubbing, edema  Skin: no rashes, lesions, ulcers  Neuro: alert and oriented to person, place, time, situation, cranial nerves 2-12 intact  Psych: pleasant, cooperative, recent and remote memory intact, no anxiety/hallucinations/agitation    Labs:   CBC:   Recent Labs     05/26/19  0515   WBC 5.7   HGB 10.2*   HCT 32.2*   MCV 86.1        BMP:   Recent Labs     05/26/19  0515      K 4.6      CO2 24   BUN 15   CREATININE 1.1     Liver Profile:  Lab Results   Component Value Date    AST 10 05/25/2019    ALT 15 05/25/2019    BILITOT <0.2 05/25/2019    ALKPHOS 69 05/25/2019    ALKPHOS 55 05/24/2011     Lab Results   Component Value Date    CHOL 157 05/25/2019    HDL 20 05/25/2019    TRIG 103 05/25/2019       Impression:  L main CAD    3v CAD    Stable angina    EF 35%    Multiple medical comorbidities    Tooth abscess (has been on cleocin for 10 days)    Plan:  Family wishes to wait for Dr. Karan Ronquillo to evaluate   Will get carotid US and vein mapping tomorrow   Have Oral surgery or dental evaluate abscess and clear for surgery   Continue current management   Dr. Karan Ronquillo to evaluate on Wednesday             Raúl Parish MD  5/27/2019  1:13 PM

## 2019-05-27 NOTE — CONSULTS
Hospital Medicine Consult    Patient:  Gina Mendieta  MRN: [id number]    Consulting Physician: Kendall Vences MD  Reason for Consult: Medical Management  Primacy Care Physician: Kvng Juarez MD    HISTORY OF PRESENT ILLNESS:   The patient is a 75 y.o. male w/ hx of IDDM, CAD, CHF, HTN, Hyperlipidemia who presented to [facility] back on 5/24 due to substernal epigastric pain with associated sob and worsens with exertion/walking. Pt was admitted and taken to the cath lab on 5/25 where he was found to have L Main disease w/ occluded LAD and RCA. No intervention performed and CT sx consulted for possible CABG. Pt currently feels well. Only complaint is itchy rash on his belly which began last night but he isn't sure if it was related to any one medicine. Has not worsened. Denies any chest pain, sob, abd pain, n/v/d, dysuria, or focal weakness at this time. Past Medical History:        Diagnosis Date    Acute ischemic stroke ([address] 2/20/2017    Atherosclerotic heart disease     s/p MI, stenting x 3 jan 2011([place])    CAD (coronary artery disease)     Cerebral artery occlusion with cerebral infarction [facility])     Cerebrovascular accident (CVA) due to embolism of right middle cerebral artery ([address] 8/14/2017    [facility]est[facility]     Chronic bilateral low back pain without sciatica 11/10/2016    Chronic kidney disease     DDD (degenerative disc disease), lumbar 11/15/2016    Depression     Depression with anxiety     Diabetes mellitus ([address]     type II    Diabetes mellitus, type 2 ([address]     DM (diabetes mellitus) ([address] 7/12/2011    Glaucoma     Headache     Hyperlipidemia     Cholesterol management per pcp.      Hypertension     Macular degeneration     MI (myocardial infarction) ([address]     MI (myocardial infarction) ([address]     Neuromuscular disorder ([address]     Neuropathy     Osteoarthritis     Sleep apnea     Spondylosis of lumbar region without myelopathy or radiculopathy 11/15/2016  Status post placement of implantable loop recorder 10/31/2017    TIA (transient ischemic attack)        Past Surgical History:        Procedure Laterality Date    APPENDECTOMY      BACK SURGERY      CARDIAC CATHETERIZATION  12/12/12    with angioplasty, stent    CARDIAC CATHETERIZATION  3/29/15  MDL    stent to distal RCA.  EF 60%    CHOLECYSTECTOMY      COLONOSCOPY      CORONARY ANGIOPLASTY WITH STENT PLACEMENT  jan 11 [place]    stent x 3    CORONARY ANGIOPLASTY WITH STENT PLACEMENT      01/01/11    DIAGNOSTIC CARDIAC CATH LAB PROCEDURE  [id number]    primary stent placement to the first circumflex marginal branch, balloon angioplasty to the third left anterior descending diagnonal branch, intracoronary nitroglycerin adminstration    JOINT REPLACEMENT      left knee    LUMBAR FUSION Left 11/15/2016    LUMBAR INTERBODY FUSION LATERAL L3-4 L4-5 WITH LATERAL PLATE  performed by Juani Echols [facility] at [facility] OR       Medications: Scheduled Meds:   furosemide  40 mg Oral BID    [START ON 5/27/2019] lisinopril  2.5 mg Oral Daily    insulin lispro  0-6 Units Subcutaneous TID WC    insulin lispro  0-3 Units Subcutaneous Nightly    sodium chloride flush  10 mL Intravenous 2 times per day    clindamycin  300 mg Oral Q8H    enoxaparin  40 mg Subcutaneous Daily    aspirin  81 mg Oral Daily    albuterol  2.5 mg Nebulization Q4H    metoprolol succinate  12.5 mg Oral BID    insulin glargine  80 Units Subcutaneous Nightly    tamsulosin  0.8 mg Oral Nightly    sertraline  100 mg Oral Daily    isosorbide mononitrate  30 mg Oral Daily     Continuous Infusions:   dextrose       PRN Meds:.hydrOXYzine, glucose, dextrose, glucagon (rDNA), dextrose, sodium chloride flush, oxyCODONE-acetaminophen, morphine, magnesium hydroxide, ondansetron, acetaminophen, nitroGLYCERIN, tiZANidine, butalbital-acetaminophen-caffeine, furosemide    Allergies:  Statins and Crestor [rosuvastatin calcium]    Social History:   TOBACCO: -----------------------------------------------------------------   XR Acute Abd Series Chest 1 VW [[id number]] Resulted: 05/24/19 1441     Order Status: Completed Updated: 05/24/19 1443     Narrative:       EXAMINATION:  XR ACUTE ABD SERIES CHEST 1 VW  5/24/2019 2:39 PM  HISTORY: Abdominal distention. Diabetes. Coronary artery disease. COMPARISON: 2/28/2019. CHEST X-RAY: There is no infiltrate or effusion. There is mild  bronchial wall thickening, stable. Heart size is upper limits of  normal. There may be at least one coronary stent. There is a loop  recorder. ABDOMEN IMAGES: There is moderate stool in the colon. There is no  small bowel distention. There has been prior cholecystectomy. There  has been prior lumbar surgery. There are degenerative changes of the  spine.      Impression:       1. No acute appearing infiltrate or effusion. Stable bronchial wall  thickening. 2. Heart size upper limits of normal. No CHF. Loop recorder. Probable  coronary stent. 3. Moderate stool in the colon.  The bowel gas pattern is normal.  Signed by Dr Leena Hamilton on 5/24/2019 2:41 PM         Echocardiogram complete 2D with doppler with color   Order: [id number]   Status:  Final result   Visible to patient:  No (Not Released) Next appt:  07/11/2019 at 01:00 PM in Family Medicine (SCHEDULE, [facility])   Details     Reading Physician Reading Date Result Priority   Kiara Syed MD 5/25/2019       Narrative     Transthoracic Echocardiography Report (TTE)     Demographics      Patient Name   NATHALIA Mcdermott of Study           05/25/2019      MRN            [id number]          Gender                  Male      Date of Birth  1945      Room Number             [id number]      Age            74 year(s)      Height:        76 inches       Referring Physician      Weight:        256 pounds      Sonographer      BSA:           2.46 m^2        Interpreting Physician Cici Laurent MD      BMI:           31.16

## 2019-05-28 ENCOUNTER — LAB REQUISITION (OUTPATIENT)
Dept: LAB | Facility: HOSPITAL | Age: 74
End: 2019-05-28

## 2019-05-28 DIAGNOSIS — Z00.00 ROUTINE GENERAL MEDICAL EXAMINATION AT A HEALTH CARE FACILITY: ICD-10-CM

## 2019-05-28 LAB
GLUCOSE BLD-MCNC: 136 MG/DL (ref 70–99)
GLUCOSE BLD-MCNC: 172 MG/DL (ref 70–99)
GLUCOSE BLD-MCNC: 190 MG/DL (ref 70–99)
GLUCOSE BLD-MCNC: 193 MG/DL (ref 70–99)
GLUCOSE BLD-MCNC: 279 MG/DL (ref 70–99)
HCT VFR BLD AUTO: 36.3 % (ref 37.5–51)
HCT VFR BLD CALC: 36.3 % (ref 42–52)
P2Y12 RESULT: 299 PRU (ref 194–418)
PA ADP PRP-ACNC: 299 PRU
PERFORMED ON: ABNORMAL
PLATELET # BLD AUTO: 241 10*3/MM3 (ref 140–450)
PLATELET # BLD: 241 K/UL (ref 130–400)

## 2019-05-28 PROCEDURE — 85049 AUTOMATED PLATELET COUNT: CPT

## 2019-05-28 PROCEDURE — 94640 AIRWAY INHALATION TREATMENT: CPT

## 2019-05-28 PROCEDURE — 85014 HEMATOCRIT: CPT

## 2019-05-28 PROCEDURE — 6360000002 HC RX W HCPCS: Performed by: INTERNAL MEDICINE

## 2019-05-28 PROCEDURE — 85576 BLOOD PLATELET AGGREGATION: CPT

## 2019-05-28 PROCEDURE — 2100000000 HC CCU R&B

## 2019-05-28 PROCEDURE — 2580000003 HC RX 258: Performed by: INTERNAL MEDICINE

## 2019-05-28 PROCEDURE — 6370000000 HC RX 637 (ALT 250 FOR IP): Performed by: INTERNAL MEDICINE

## 2019-05-28 PROCEDURE — 36415 COLL VENOUS BLD VENIPUNCTURE: CPT

## 2019-05-28 PROCEDURE — 82948 REAGENT STRIP/BLOOD GLUCOSE: CPT

## 2019-05-28 PROCEDURE — 99291 CRITICAL CARE FIRST HOUR: CPT | Performed by: HOSPITALIST

## 2019-05-28 PROCEDURE — 93880 EXTRACRANIAL BILAT STUDY: CPT

## 2019-05-28 PROCEDURE — 6370000000 HC RX 637 (ALT 250 FOR IP): Performed by: HOSPITALIST

## 2019-05-28 PROCEDURE — 93970 EXTREMITY STUDY: CPT

## 2019-05-28 PROCEDURE — 99232 SBSQ HOSP IP/OBS MODERATE 35: CPT | Performed by: INTERNAL MEDICINE

## 2019-05-28 RX ORDER — INSULIN GLARGINE 100 [IU]/ML
30 INJECTION, SOLUTION SUBCUTANEOUS EVERY MORNING
Status: DISCONTINUED | OUTPATIENT
Start: 2019-05-29 | End: 2019-05-30

## 2019-05-28 RX ORDER — INSULIN GLARGINE 100 [IU]/ML
20 INJECTION, SOLUTION SUBCUTANEOUS NIGHTLY
Status: DISCONTINUED | OUTPATIENT
Start: 2019-05-28 | End: 2019-05-30

## 2019-05-28 RX ADMIN — Medication 10 ML: at 21:08

## 2019-05-28 RX ADMIN — METOPROLOL SUCCINATE 12.5 MG: 25 TABLET, EXTENDED RELEASE ORAL at 08:51

## 2019-05-28 RX ADMIN — BUTALBITAL, ACETAMINOPHEN, AND CAFFEINE 1 TABLET: 50; 325; 40 TABLET ORAL at 20:28

## 2019-05-28 RX ADMIN — HYDROXYZINE HYDROCHLORIDE 25 MG: 25 TABLET, FILM COATED ORAL at 21:07

## 2019-05-28 RX ADMIN — CLINDAMYCIN HYDROCHLORIDE 300 MG: 300 CAPSULE ORAL at 13:51

## 2019-05-28 RX ADMIN — TAMSULOSIN HYDROCHLORIDE 0.8 MG: 0.4 CAPSULE ORAL at 21:07

## 2019-05-28 RX ADMIN — HYDROXYZINE HYDROCHLORIDE 25 MG: 25 TABLET, FILM COATED ORAL at 08:51

## 2019-05-28 RX ADMIN — OXYCODONE HYDROCHLORIDE AND ACETAMINOPHEN 2 TABLET: 5; 325 TABLET ORAL at 21:07

## 2019-05-28 RX ADMIN — ALBUTEROL SULFATE 2.5 MG: 2.5 SOLUTION RESPIRATORY (INHALATION) at 22:33

## 2019-05-28 RX ADMIN — FUROSEMIDE 40 MG: 40 TABLET ORAL at 17:10

## 2019-05-28 RX ADMIN — Medication 10 ML: at 08:55

## 2019-05-28 RX ADMIN — INSULIN LISPRO 2 UNITS: 100 INJECTION, SOLUTION INTRAVENOUS; SUBCUTANEOUS at 21:10

## 2019-05-28 RX ADMIN — ASPIRIN 81 MG 81 MG: 81 TABLET ORAL at 08:51

## 2019-05-28 RX ADMIN — METOPROLOL SUCCINATE 12.5 MG: 25 TABLET, EXTENDED RELEASE ORAL at 21:07

## 2019-05-28 RX ADMIN — ALBUTEROL SULFATE 2.5 MG: 2.5 SOLUTION RESPIRATORY (INHALATION) at 18:20

## 2019-05-28 RX ADMIN — CLINDAMYCIN HYDROCHLORIDE 300 MG: 300 CAPSULE ORAL at 06:08

## 2019-05-28 RX ADMIN — FUROSEMIDE 40 MG: 40 TABLET ORAL at 08:51

## 2019-05-28 RX ADMIN — CLINDAMYCIN HYDROCHLORIDE 300 MG: 300 CAPSULE ORAL at 21:07

## 2019-05-28 RX ADMIN — ISOSORBIDE MONONITRATE 30 MG: 30 TABLET, EXTENDED RELEASE ORAL at 08:51

## 2019-05-28 RX ADMIN — ALBUTEROL SULFATE 2.5 MG: 2.5 SOLUTION RESPIRATORY (INHALATION) at 07:09

## 2019-05-28 RX ADMIN — ENOXAPARIN SODIUM 40 MG: 40 INJECTION SUBCUTANEOUS at 08:54

## 2019-05-28 RX ADMIN — SERTRALINE HYDROCHLORIDE 100 MG: 100 TABLET ORAL at 08:51

## 2019-05-28 RX ADMIN — ALBUTEROL SULFATE 2.5 MG: 2.5 SOLUTION RESPIRATORY (INHALATION) at 10:30

## 2019-05-28 RX ADMIN — INSULIN GLARGINE 20 UNITS: 100 INJECTION, SOLUTION SUBCUTANEOUS at 21:10

## 2019-05-28 ASSESSMENT — PAIN SCALES - GENERAL
PAINLEVEL_OUTOF10: 0
PAINLEVEL_OUTOF10: 4
PAINLEVEL_OUTOF10: 0
PAINLEVEL_OUTOF10: 9

## 2019-05-28 NOTE — PROGRESS NOTES
Repeat blood sugar 58, pt stronger in appearance and speech. Continued with parker jolly. Will continue to monitor.

## 2019-05-28 NOTE — PROGRESS NOTES
Called to patients room per patient. \" I think my blood sugar is low\" pt was weak in appearance and diaphoretic. Blood sugar 40 per monitor. Dr. Smith Come in unit and was at bedside. Pt alert and able to drink 2 grape juices along with eating parker crackers and peanut butter.  Will monitor

## 2019-05-28 NOTE — PROGRESS NOTES
Follow-Up Consult Note    Subjective:   Critical Care Daily Progress Note: 5/27/2019 9:54 PM    Interval History:   MrMora Rhodes had adjustment of his insulin regimen yesterday. When seen this evening he stated that he was having low blood sugar, and that this has not happened in weeks. His RN gave him a cup of grape juice (highest sugar concentration we have) and thereafter got him a second cup to work on. He chugged the first. His FSBG, obtained just prior to the first FSBG was reported to be 40. He states that he was feeling a hot flash, and light headed, and sweaty, which is how he always feels when this happens. As per his RN he usually has his wife by the bedside and they snack together for a few hours. He did not have his wife hang out with him tonight however. She states she has had him the last few nights. Review of Systems  Denies: chills, weakness, fatigue, malaise, chest pain and nausea    Scheduled Meds:   furosemide  40 mg Oral BID    insulin lispro  0-6 Units Subcutaneous TID WC    insulin lispro  0-3 Units Subcutaneous Nightly    insulin glargine  50 Units Subcutaneous Nightly    sodium chloride flush  10 mL Intravenous 2 times per day    clindamycin  300 mg Oral Q8H    enoxaparin  40 mg Subcutaneous Daily    aspirin  81 mg Oral Daily    albuterol  2.5 mg Nebulization Q4H    metoprolol succinate  12.5 mg Oral BID    tamsulosin  0.8 mg Oral Nightly    sertraline  100 mg Oral Daily    isosorbide mononitrate  30 mg Oral Daily     Continuous Infusions:   dextrose       PRN Meds:hydrOXYzine, glucose, dextrose, glucagon (rDNA), dextrose, sodium chloride flush, oxyCODONE-acetaminophen, magnesium hydroxide, ondansetron, acetaminophen, nitroGLYCERIN, tiZANidine, butalbital-acetaminophen-caffeine, furosemide        Objective:     I/O last 3 completed shifts:   In: 1440 [P.O.:1440]  Out: 2200 [Urine:2200]  I/O this shift:  In: 240 [P.O.:240]  Out: 350 [Urine:350]    /61   Pulse 75   Temp 97.9 °F (36.6 °C) (Temporal)   Resp 20   Ht 6' 4\" (1.93 m)   Wt 268 lb 9.6 oz (121.8 kg)   SpO2 95%   BMI 32.70 kg/m²     PHYSICAL EXAM:  VSS as per above  GA: alert, NAD  Head: NC, AT  Neck: Supple, Trachea appears midline  COR: RRR, No M/R/G  ABD: normal Bowel sounds, No G/R/T  MSK: no wasting of msucle or fat stores, no edema  Skin: warm, nondiaphoretic  PSY: appropriate affect, answers and asks questions appropriately    LABS:   Results for Elnora Hatchet \"BILL\" (MRN [id number]) as of 5/27/2019 22:03   Ref. Range 5/26/2019 05:15   Sodium Latest Ref Range: 136 - 145 mmol/L 139   Potassium Latest Ref Range: 3.5 - 5.0 mmol/L 4.6   Chloride Latest Ref Range: 98 - 111 mmol/L 106   CO2 Latest Ref Range: 22 - 29 mmol/L 24   BUN Latest Ref Range: 8 - 23 mg/dL 15   Creatinine Latest Ref Range: 0.5 - 1.2 mg/dL 1.1   Anion Gap Latest Ref Range: 7 - 19 mmol/L 9   GFR Non- Latest Ref Range: >60  >60   Glucose Latest Ref Range: 74 - 109 mg/dL 119 (H)   Calcium Latest Ref Range: 8.8 - 10.2 mg/dL 8.5 (L)   Pro-BNP Latest Ref Range: 0 - 1,800 pg/mL 1,273   Troponin Latest Ref Range: 0.00 - 0.03 ng/mL 1.17 (HH)   WBC Latest Ref Range: 4.8 - 10.8 K/uL 5.7   RBC Latest Ref Range: 4.70 - 6.10 M/uL 3.74 (L)   Hemoglobin Quant Latest Ref Range: 14.0 - 18.0 g/dL 10.2 (L)   Hematocrit Latest Ref Range: 42.0 - 52.0 % 32.2 (L)   MCV Latest Ref Range: 80.0 - 94.0 fL 86.1   MCH Latest Ref Range: 27.0 - 31.0 pg 27.3   MCHC Latest Ref Range: 33.0 - 37.0 g/dL 31.7 (L)   MPV Latest Ref Range: 9.4 - 12.4 fL 11.4   RDW Latest Ref Range: 11.5 - 14.5 % 14.2   Platelet Count Latest Ref Range: 130 - 400 K/uL 186   Results for Elnora Hatchet \"BILL\" (MRN [id number]) as of 5/27/2019 22:03   Ref. Range 5/26/2019 19:28   Hemoglobin A1C Latest Ref Range: 4.0 - 6.0 % 7.0 (H)   Results for Elnora Hatchet \"BILL\" (MRN [id number]) as of 5/27/2019 22:03   Ref.  Range 5/26/2019 21:00 5/27/2019 09:16 5/27/2019 13:11 5/27/2019 17:01 5/27/2019 20:44

## 2019-05-28 NOTE — PROGRESS NOTES
Bilateral Carotid Study and Bilateral Lower Extremity Vein Mapping was performed on 05/28/2019    Less than 50% Stenosis was noted in Bilateral ICA's at this time. Antegrade flow noted in Bilateral Vertebral Arteries at this time. Right GSV Z1: 4.9MM  Left GSV Z1: 5.6mm  Right GSV Z2: 4.3mm  Left GSV Z2: 4.8mm  Right GSV Z3: 4.0mm  Left GSV Z3: 4.4mm  Right GSV Z4: 4.7mm  Left GSV Z4: 3.8mm  Right GSV Z5: 4.4mm  Left GSV Z5: 3.9mm  Right GSV Z6: 3.8mm  Left GSV Z6: 3.4mm  Right GSV Z7: 3.3mm  Left GSV Z7: 3.8mm  Right GSV Z8: 3.4mm  Left GSV Z8: 4.3mm    Final Reports Pending.

## 2019-05-28 NOTE — PROGRESS NOTES
Breakfast here. Sat self up on side of bed stated have been sitting on side of bed all morning.  Requested atarax for rash on ABD

## 2019-05-28 NOTE — PROGRESS NOTES
Follow up consult Note    Subjective:   Critical Care Daily Progress Note: 5/28/2019 6:18 PM    Interval History:   [id number]:   Mr. Daria Lamar was seen at the bedside this evening with his son and grandson present. His case was discussed with him and his family in detail. They are agreeable with dividing his lantus dose in to an AM and PM portion. He states that he has not had any further episodes of low blood sugar. [id number]:   Mr. Courtney Gregory had adjustment of his insulin regimen yesterday. When seen this evening he stated that he was having low blood sugar, and that this has not happened in weeks. His RN gave him a cup of grape juice (highest sugar concentration we have) and thereafter got him a second cup to work on. He chugged the first. His FSBG, obtained just prior to the first FSBG was reported to be 40. He states that he was feeling a hot flash, and light headed, and sweaty, which is how he always feels when this happens. As per his RN he usually has his wife by the bedside and they snack together for a few hours. He did not have his wife hang out with him tonight however. She states she has had him the last few nights. Review of Systems  Has dyspnea which is at his baseline. Denies any further near syncope or diaphoresis since yesterday evening. He denies: fevers, chills, night sweats, weakness, fatigue, malaise, chest pain, dyspnea, confusion, and nausea.     Scheduled Meds:   insulin glargine  40 Units Subcutaneous Nightly    furosemide  40 mg Oral BID    insulin lispro  0-6 Units Subcutaneous TID     insulin lispro  0-3 Units Subcutaneous Nightly    sodium chloride flush  10 mL Intravenous 2 times per day    clindamycin  300 mg Oral Q8H    enoxaparin  40 mg Subcutaneous Daily    aspirin  81 mg Oral Daily    albuterol  2.5 mg Nebulization Q4H    metoprolol succinate  12.5 mg Oral BID    tamsulosin  0.8 mg Oral Nightly    sertraline  100 mg Oral Daily    isosorbide mononitrate  30 mg Oral Daily     Continuous Infusions:   dextrose       PRN Meds:hydrOXYzine, glucose, dextrose, glucagon (rDNA), dextrose, sodium chloride flush, oxyCODONE-acetaminophen, magnesium hydroxide, ondansetron, acetaminophen, nitroGLYCERIN, tiZANidine, butalbital-acetaminophen-caffeine, furosemide        Objective:     I/O last 3 completed shifts: In: 1938 [P.O.:1560; I.V.:20]  Out: 2500 [Urine:2500]  No intake/output data recorded. /63   Pulse 77   Temp 98.6 °F (37 °C) (Temporal)   Resp 18   Ht 6' 4\" (1.93 m)   Wt 260 lb 6.4 oz (118.1 kg)   SpO2 96%   BMI 31.70 kg/m²     PHYSICAL EXAM:  VSS as per above, class 1 obesity  GA: alert, NAD  Head: NC, AT, No LAD  Neck: Supple, Trachea appears midline, no bruit, No LAD  PUL: CTA anterolaterally, No W/R/R nor rubs, no use of accessory MM, speaking in full sentences  COR: RRR, No M/R/G  ABD: normal Bowel sounds, No G/R/T  MSK: no wasting of msucle stores, no edema, increased fat stores  Skin: warm, nondiaphoretic, has markings from vein mapping  PSY: appropriate affect, answers and asks questions appropriately    LABS:  Results for Bia Keith \"BILL\" (MRN [id number]) as of 5/28/2019 18:22   Ref. Range 5/27/2019 13:11 5/27/2019 17:01 5/27/2019 20:44 5/27/2019 21:47  5/27/2019 22:04 5/27/2019 22:57 5/28/2019 03:49 5/28/2019 08:50  5/28/2019 12:57 5/28/2019 17:04   POC Glucose Latest Ref Range: 70 - 99 mg/dl 210 (H) 143 (H) 197 (H) 40 (LL)  58 (L) 69 (L) 172 (H) 136 (H)  190 (H) 193 (H)   Results for Bia Keith \"BILL\" (MRN [id number]) as of 5/28/2019 18:22   Ref.  Range 5/28/2019 09:42   Hematocrit Latest Ref Range: 42.0 - 52.0 % 36.3 (L)   Platelet Count Latest Ref Range: 130 - 400 K/uL 241       Assessment:     Principal Problem:    ACS (acute coronary syndrome) ([facility])  Active Problems:    Type 2 diabetes mellitus with diabetic polyneuropathy, with long-term current use of insulin (HCC)    HTN (hypertension)    Elevated troponin    Rash  Resolved Problems:    * No resolved hospital problems.  *      Plan:     DM Type 2 with Polyneuropathy and ongoing Hypoglycemic Episodes:  Hypoglycemic orders set to be continued  Lantus was decreased from 80units SQ QHS to 50units SQ QHS last night, changing to 30 units QAM and 20 units SQ QHS now  Low Dose ISS to be continued     Dental Infection:  Had been on Ampicillin before  Will continue on Clindamycin    Pruritic Rash:  Suspect Morphine >>> PCN > Clinda    ACS: worked up & managed by primary team and found to be multivessel CAD for which he has been referred to CT Sx, possible surgery on Thursday by Dr. Rita Merritt  HTN: well controlled on current cardiac medications by primary team        CC time of >45 minutes

## 2019-05-29 ENCOUNTER — OUTSIDE FACILITY SERVICE (OUTPATIENT)
Dept: PULMONOLOGY | Facility: CLINIC | Age: 74
End: 2019-05-29

## 2019-05-29 LAB
ABO/RH: NORMAL
ANTIBODY SCREEN: NORMAL
BLOOD CULTURE, ROUTINE: NORMAL
CULTURE, BLOOD 2: NORMAL
GLUCOSE BLD-MCNC: 144 MG/DL (ref 70–99)
GLUCOSE BLD-MCNC: 211 MG/DL (ref 70–99)
GLUCOSE BLD-MCNC: 259 MG/DL (ref 70–99)
GLUCOSE BLD-MCNC: 260 MG/DL (ref 70–99)
LV EF: 50 %
LVEF MODALITY: NORMAL
MRSA CULTURE ONLY: NORMAL
PERFORMED ON: ABNORMAL

## 2019-05-29 PROCEDURE — 86923 COMPATIBILITY TEST ELECTRIC: CPT

## 2019-05-29 PROCEDURE — 36415 COLL VENOUS BLD VENIPUNCTURE: CPT

## 2019-05-29 PROCEDURE — 82948 REAGENT STRIP/BLOOD GLUCOSE: CPT

## 2019-05-29 PROCEDURE — 94010 BREATHING CAPACITY TEST: CPT | Performed by: INTERNAL MEDICINE

## 2019-05-29 PROCEDURE — 99232 SBSQ HOSP IP/OBS MODERATE 35: CPT | Performed by: INTERNAL MEDICINE

## 2019-05-29 PROCEDURE — 99233 SBSQ HOSP IP/OBS HIGH 50: CPT | Performed by: THORACIC SURGERY (CARDIOTHORACIC VASCULAR SURGERY)

## 2019-05-29 PROCEDURE — 6360000002 HC RX W HCPCS: Performed by: INTERNAL MEDICINE

## 2019-05-29 PROCEDURE — 6360000002 HC RX W HCPCS: Performed by: HOSPITALIST

## 2019-05-29 PROCEDURE — 6370000000 HC RX 637 (ALT 250 FOR IP): Performed by: HOSPITALIST

## 2019-05-29 PROCEDURE — 94375 RESPIRATORY FLOW VOLUME LOOP: CPT

## 2019-05-29 PROCEDURE — 86900 BLOOD TYPING SEROLOGIC ABO: CPT

## 2019-05-29 PROCEDURE — 6370000000 HC RX 637 (ALT 250 FOR IP): Performed by: INTERNAL MEDICINE

## 2019-05-29 PROCEDURE — 86850 RBC ANTIBODY SCREEN: CPT

## 2019-05-29 PROCEDURE — 2580000003 HC RX 258: Performed by: INTERNAL MEDICINE

## 2019-05-29 PROCEDURE — 99291 CRITICAL CARE FIRST HOUR: CPT | Performed by: HOSPITALIST

## 2019-05-29 PROCEDURE — 2100000000 HC CCU R&B

## 2019-05-29 PROCEDURE — 94640 AIRWAY INHALATION TREATMENT: CPT

## 2019-05-29 PROCEDURE — 86901 BLOOD TYPING SEROLOGIC RH(D): CPT

## 2019-05-29 RX ORDER — SODIUM CHLORIDE 0.9 % (FLUSH) 0.9 %
10 SYRINGE (ML) INJECTION PRN
Status: CANCELLED | OUTPATIENT
Start: 2019-05-29

## 2019-05-29 RX ORDER — DOCUSATE SODIUM 100 MG/1
100 CAPSULE, LIQUID FILLED ORAL 2 TIMES DAILY
Status: DISCONTINUED | OUTPATIENT
Start: 2019-05-29 | End: 2019-05-30

## 2019-05-29 RX ORDER — CHLORHEXIDINE GLUCONATE 0.12 MG/ML
15 RINSE ORAL ONCE
Status: CANCELLED | OUTPATIENT
Start: 2019-05-29 | End: 2019-05-29

## 2019-05-29 RX ORDER — BISACODYL 10 MG
10 SUPPOSITORY, RECTAL RECTAL DAILY PRN
Status: DISCONTINUED | OUTPATIENT
Start: 2019-05-29 | End: 2019-05-30

## 2019-05-29 RX ORDER — CHLORHEXIDINE GLUCONATE 4 G/100ML
SOLUTION TOPICAL SEE ADMIN INSTRUCTIONS
Status: CANCELLED | OUTPATIENT
Start: 2019-05-29

## 2019-05-29 RX ORDER — ALBUTEROL SULFATE 2.5 MG/3ML
2.5 SOLUTION RESPIRATORY (INHALATION)
Status: DISCONTINUED | OUTPATIENT
Start: 2019-05-29 | End: 2019-05-30

## 2019-05-29 RX ORDER — SODIUM CHLORIDE 0.9 % (FLUSH) 0.9 %
10 SYRINGE (ML) INJECTION EVERY 12 HOURS SCHEDULED
Status: CANCELLED | OUTPATIENT
Start: 2019-05-29

## 2019-05-29 RX ADMIN — OXYCODONE HYDROCHLORIDE AND ACETAMINOPHEN 2 TABLET: 5; 325 TABLET ORAL at 04:24

## 2019-05-29 RX ADMIN — HYDROXYZINE HYDROCHLORIDE 25 MG: 25 TABLET, FILM COATED ORAL at 21:13

## 2019-05-29 RX ADMIN — METOPROLOL SUCCINATE 12.5 MG: 25 TABLET, EXTENDED RELEASE ORAL at 08:25

## 2019-05-29 RX ADMIN — DOCUSATE SODIUM 100 MG: 100 CAPSULE, LIQUID FILLED ORAL at 21:13

## 2019-05-29 RX ADMIN — ASPIRIN 81 MG 81 MG: 81 TABLET ORAL at 08:24

## 2019-05-29 RX ADMIN — CLINDAMYCIN HYDROCHLORIDE 300 MG: 300 CAPSULE ORAL at 14:30

## 2019-05-29 RX ADMIN — OXYCODONE HYDROCHLORIDE AND ACETAMINOPHEN 2 TABLET: 5; 325 TABLET ORAL at 21:13

## 2019-05-29 RX ADMIN — OXYCODONE HYDROCHLORIDE AND ACETAMINOPHEN 2 TABLET: 5; 325 TABLET ORAL at 12:02

## 2019-05-29 RX ADMIN — ALBUTEROL SULFATE 2.5 MG: 2.5 SOLUTION RESPIRATORY (INHALATION) at 06:45

## 2019-05-29 RX ADMIN — FUROSEMIDE 40 MG: 40 TABLET ORAL at 18:46

## 2019-05-29 RX ADMIN — TAMSULOSIN HYDROCHLORIDE 0.8 MG: 0.4 CAPSULE ORAL at 21:12

## 2019-05-29 RX ADMIN — ISOSORBIDE MONONITRATE 30 MG: 30 TABLET, EXTENDED RELEASE ORAL at 08:24

## 2019-05-29 RX ADMIN — INSULIN GLARGINE 30 UNITS: 100 INJECTION, SOLUTION SUBCUTANEOUS at 08:25

## 2019-05-29 RX ADMIN — METOPROLOL SUCCINATE 12.5 MG: 25 TABLET, EXTENDED RELEASE ORAL at 21:13

## 2019-05-29 RX ADMIN — ALBUTEROL SULFATE 2.5 MG: 2.5 SOLUTION RESPIRATORY (INHALATION) at 18:09

## 2019-05-29 RX ADMIN — INSULIN GLARGINE 10 UNITS: 100 INJECTION, SOLUTION SUBCUTANEOUS at 21:15

## 2019-05-29 RX ADMIN — CLINDAMYCIN HYDROCHLORIDE 300 MG: 300 CAPSULE ORAL at 21:13

## 2019-05-29 RX ADMIN — CLINDAMYCIN HYDROCHLORIDE 300 MG: 300 CAPSULE ORAL at 07:10

## 2019-05-29 RX ADMIN — ALBUTEROL SULFATE 2.5 MG: 2.5 SOLUTION RESPIRATORY (INHALATION) at 14:32

## 2019-05-29 RX ADMIN — SERTRALINE HYDROCHLORIDE 100 MG: 100 TABLET ORAL at 08:24

## 2019-05-29 RX ADMIN — ALBUTEROL SULFATE 2.5 MG: 2.5 SOLUTION RESPIRATORY (INHALATION) at 10:20

## 2019-05-29 RX ADMIN — FUROSEMIDE 40 MG: 40 TABLET ORAL at 08:24

## 2019-05-29 RX ADMIN — Medication 10 ML: at 21:14

## 2019-05-29 RX ADMIN — ENOXAPARIN SODIUM 40 MG: 40 INJECTION SUBCUTANEOUS at 08:24

## 2019-05-29 ASSESSMENT — PAIN SCALES - GENERAL
PAINLEVEL_OUTOF10: 0
PAINLEVEL_OUTOF10: 5
PAINLEVEL_OUTOF10: 9
PAINLEVEL_OUTOF10: 0
PAINLEVEL_OUTOF10: 0
PAINLEVEL_OUTOF10: 5

## 2019-05-29 NOTE — PROGRESS NOTES
Sheets changed to triple rinse r/t rash.    Electronically signed by Albert Nevarez RN on 5/28/2019 at 10:04 PM

## 2019-05-29 NOTE — PROGRESS NOTES
Subjective:   Critical Care Daily Progress Note: 5/29/2019 4:33 PM    Interval History:   [id number]:  Case was discussed in detail with Dr. Chilo Smith at the bedside along with family and patient, input appreciated, all questions were answered to the satisfaction of all parties. Mr. Mireya Gonzalez. \"Bill\" Meme Aguilar is a pleasant 75year old  [place] gentleman from home. He has inoperable disease to the RCA and LAD however a number of collaterals are providing circulation to the heart. He is planned for a salvage CABG procedure tomorrow at 07:30am. He denies any: fever, chills, night sweats, weakness, fatigue, maise, chest pain, dyspnea, diaphoresis, nausea, near syncope, and confusion. He does endorse having had pain all over, and relates that this is his usual pain and that he has had this for a long time. He state that is from his back and neck where he has had multiple interventions including two fusions. [id number]:   Mr. Chun Olivares was seen at the bedside this evening with his son and grandson present. His case was discussed with him and his family in detail. They are agreeable with dividing his lantus dose in to an AM and PM portion. He states that he has not had any further episodes of low blood sugar. [id number]:   Mr. Meme Aguilar had adjustment of his insulin regimen yesterday. When seen this evening he stated that he was having low blood sugar, and that this has not happened in weeks. His RN gave him a cup of grape juice (highest sugar concentration we have) and thereafter got him a second cup to work on. He chugged the first. His FSBG, obtained just prior to the first FSBG was reported to be 40. He states that he was feeling a hot flash, and light headed, and sweaty, which is how he always feels when this happens. As per his RN he usually has his wife by the bedside and they snack together for a few hours. He did not have his wife hang out with him tonight however.  She states she has had him the last few

## 2019-05-29 NOTE — PROGRESS NOTES
Progress Note        Subjective:  No angina overnight    Sitting up in chair resting    Complaining of back and neck pain    Objective:    Physical Examination:    BP (!) 91/43   Pulse 75   Temp 96.9 °F (36.1 °C) (Temporal)   Resp 20   Ht 6' 4\" (1.93 m)   Wt 260 lb 6.4 oz (118.1 kg)   SpO2 93%   BMI 31.70 kg/m²        General: in no acute distress, comfortable  HEENT: PERRLA, EOMI, anicteric sclerae, NC/AT, no nasal septal deviation, mucus membranes moist  Pulm: CTA b/l, no rales, rhonchi, wheezing  Cardio: S1S2+, regular rate and rhythm, no murmurs/rubs/gallops/thrills  Abdomen: S/NT/ND/NABS  Extrems: 5/5 strength throughout, no cyanosis, clubbing, edema  Skin: no rashes, lesions, ulcers  Neuro: alert and oriented to person, place, time, situation, cranial nerves 2-12 intact  Psych: pleasant, cooperative, recent and remote memory intact, no anxiety/hallucinations/agitation    Labs:   CBC:   Recent Labs     05/28/19  0942   HCT 36.3*        BMP: No results for input(s): NA, K, CL, CO2, PHOS, BUN, CREATININE in the last 72 hours.     Invalid input(s): CA  Liver Profile:  Lab Results   Component Value Date    AST 10 05/25/2019    ALT 15 05/25/2019    BILITOT <0.2 05/25/2019    ALKPHOS 69 05/25/2019    ALKPHOS 55 05/24/2011     Lab Results   Component Value Date    CHOL 157 05/25/2019    HDL 20 05/25/2019    TRIG 103 05/25/2019       Impression:    L main CAD    3v CAD including totally occluded mid LAD, mid RCA    Stable angina    Carotid duplex completed yesterday showing no hemodynamically significant stenoses    Vein mapping completed shows usable GSV's b/l    Plan:  Dr. Rita Merritt to evaluate this afternoon and he will speak with patient and family          Javi Woods MD  5/29/2019  10:32 AM

## 2019-05-29 NOTE — PROGRESS NOTES
As per Dr. Ly Adams. Pt  is comfortable now. Discussed Salvage CABG with Pt and family. He is high risk due to severe LV dysfunction. Mostly inoperable vessels. . All questions answered.  Plan to proceed tomorrow AM.

## 2019-05-29 NOTE — PROGRESS NOTES
Sitting up in chair eyes closed but open at voice. States woke up about 4 aching back and neck does this about 2 X a week.  Waiting for breakfast.

## 2019-05-29 NOTE — PROGRESS NOTES
PM assessment completed. POC reviewed with patient. VSS. Urinal and call light within reach.  Will continue to monitor  Electronically signed by Pan Obrien RN on 5/29/2019 at 6:54 PM

## 2019-05-30 ENCOUNTER — APPOINTMENT (OUTPATIENT)
Dept: GENERAL RADIOLOGY | Age: 74
DRG: 234 | End: 2019-05-30
Payer: MEDICARE

## 2019-05-30 ENCOUNTER — ANESTHESIA EVENT (OUTPATIENT)
Dept: OPERATING ROOM | Age: 74
DRG: 234 | End: 2019-05-30
Payer: MEDICARE

## 2019-05-30 ENCOUNTER — ANESTHESIA (OUTPATIENT)
Dept: OPERATING ROOM | Age: 74
DRG: 234 | End: 2019-05-30
Payer: MEDICARE

## 2019-05-30 VITALS
DIASTOLIC BLOOD PRESSURE: 62 MMHG | SYSTOLIC BLOOD PRESSURE: 130 MMHG | OXYGEN SATURATION: 100 % | RESPIRATION RATE: 12 BRPM | TEMPERATURE: 98.1 F

## 2019-05-30 LAB
ANION GAP SERPL CALCULATED.3IONS-SCNC: 11 MMOL/L (ref 7–19)
ANION GAP SERPL CALCULATED.3IONS-SCNC: 13 MMOL/L (ref 7–19)
ANION GAP SERPL CALCULATED.3IONS-SCNC: 14 MMOL/L (ref 7–19)
APTT: 31.6 SEC (ref 26–36.2)
APTT: 35.6 SEC (ref 26–36.2)
BASE EXCESS ARTERIAL: 0.8 MMOL/L (ref -2–2)
BASE EXCESS ARTERIAL: 1 (ref -3–3)
BASE EXCESS ARTERIAL: 1 (ref -3–3)
BASE EXCESS ARTERIAL: 3 (ref -3–3)
BASE EXCESS ARTERIAL: 3.5 MMOL/L (ref -2–2)
BASE EXCESS ARTERIAL: 4 (ref -3–3)
BASOPHILS ABSOLUTE: 0.1 K/UL (ref 0–0.2)
BASOPHILS RELATIVE PERCENT: 0.7 % (ref 0–1)
BUN BLDV-MCNC: 22 MG/DL (ref 8–23)
BUN BLDV-MCNC: 22 MG/DL (ref 8–23)
BUN BLDV-MCNC: 25 MG/DL (ref 8–23)
CALCIUM IONIZED: 1.06 MMOL/L (ref 1.1–1.3)
CALCIUM IONIZED: 1.07 MMOL/L (ref 1.1–1.3)
CALCIUM IONIZED: 1.12 MMOL/L (ref 1.1–1.3)
CALCIUM IONIZED: 1.15 MMOL/L (ref 1.1–1.3)
CALCIUM SERPL-MCNC: 9.2 MG/DL (ref 8.8–10.2)
CALCIUM SERPL-MCNC: 9.4 MG/DL (ref 8.8–10.2)
CALCIUM SERPL-MCNC: 9.5 MG/DL (ref 8.8–10.2)
CARBOXYHEMOGLOBIN ARTERIAL: 0 % (ref 0–5)
CARBOXYHEMOGLOBIN ARTERIAL: 0 % (ref 0–5)
CHLORIDE BLD-SCNC: 104 MMOL/L (ref 98–111)
CHLORIDE BLD-SCNC: 105 MMOL/L (ref 98–111)
CHLORIDE BLD-SCNC: 98 MMOL/L (ref 98–111)
CO2: 23 MMOL/L (ref 22–29)
CO2: 23 MMOL/L (ref 22–29)
CO2: 25 MEQ/L (ref 21–32)
CO2: 26 MEQ/L (ref 21–32)
CO2: 27 MMOL/L (ref 22–29)
CO2: 28 MEQ/L (ref 21–32)
CO2: 28 MEQ/L (ref 21–32)
CREAT SERPL-MCNC: 1.3 MG/DL (ref 0.5–1.2)
CREAT SERPL-MCNC: 1.3 MG/DL (ref 0.5–1.2)
CREAT SERPL-MCNC: 1.4 MG/DL (ref 0.5–1.2)
EOSINOPHILS ABSOLUTE: 0.4 K/UL (ref 0–0.6)
EOSINOPHILS RELATIVE PERCENT: 5.4 % (ref 0–5)
GFR NON-AFRICAN AMERICAN: 42
GFR NON-AFRICAN AMERICAN: 49
GFR NON-AFRICAN AMERICAN: 49
GFR NON-AFRICAN AMERICAN: 54
GFR NON-AFRICAN AMERICAN: 59
GLUCOSE BLD-MCNC: 111 MG/DL (ref 70–99)
GLUCOSE BLD-MCNC: 113 MG/DL (ref 70–99)
GLUCOSE BLD-MCNC: 132 MG/DL (ref 70–99)
GLUCOSE BLD-MCNC: 136 MG/DL (ref 70–99)
GLUCOSE BLD-MCNC: 137 MG/DL (ref 74–109)
GLUCOSE BLD-MCNC: 140 MG/DL (ref 74–109)
GLUCOSE BLD-MCNC: 151 MG/DL (ref 70–99)
GLUCOSE BLD-MCNC: 156 MG/DL (ref 70–99)
GLUCOSE BLD-MCNC: 163 MG/DL (ref 70–99)
GLUCOSE BLD-MCNC: 166 MG/DL (ref 70–99)
GLUCOSE BLD-MCNC: 167 MG/DL (ref 70–99)
GLUCOSE BLD-MCNC: 170 MG/DL (ref 70–99)
GLUCOSE BLD-MCNC: 172 MG/DL (ref 70–99)
GLUCOSE BLD-MCNC: 172 MG/DL (ref 74–109)
GLUCOSE BLD-MCNC: 197 MG/DL (ref 70–99)
HCO3 ARTERIAL: 25.3 MMOL/L (ref 22–26)
HCO3 ARTERIAL: 27.8 MMOL/L (ref 22–26)
HCT VFR BLD CALC: 34.2 % (ref 42–52)
HCT VFR BLD CALC: 37.1 % (ref 42–52)
HCT VFR BLD CALC: 40.8 % (ref 42–52)
HEMOGLOBIN, ART, EXTENDED: 11.3 G/DL (ref 14–18)
HEMOGLOBIN, ART, EXTENDED: 12.1 G/DL (ref 14–18)
HEMOGLOBIN: 10.9 G/DL (ref 14–18)
HEMOGLOBIN: 11 GM/DL (ref 12–18)
HEMOGLOBIN: 11.5 GM/DL (ref 12–18)
HEMOGLOBIN: 11.8 G/DL (ref 14–18)
HEMOGLOBIN: 12.7 G/DL (ref 14–18)
HEMOGLOBIN: 13 GM/DL (ref 12–18)
HEMOGLOBIN: 14.4 GM/DL (ref 12–18)
INR BLD: 1.22 (ref 0.88–1.18)
INR BLD: 1.46 (ref 0.88–1.18)
LYMPHOCYTES ABSOLUTE: 2.2 K/UL (ref 1.1–4.5)
LYMPHOCYTES RELATIVE PERCENT: 27 % (ref 20–40)
MAGNESIUM: 1.8 MG/DL (ref 1.6–2.4)
MAGNESIUM: 1.8 MG/DL (ref 1.6–2.4)
MCH RBC QN AUTO: 26.1 PG (ref 27–31)
MCH RBC QN AUTO: 27.3 PG (ref 27–31)
MCH RBC QN AUTO: 27.3 PG (ref 27–31)
MCHC RBC AUTO-ENTMCNC: 31.1 G/DL (ref 33–37)
MCHC RBC AUTO-ENTMCNC: 31.8 G/DL (ref 33–37)
MCHC RBC AUTO-ENTMCNC: 31.9 G/DL (ref 33–37)
MCV RBC AUTO: 84 FL (ref 80–94)
MCV RBC AUTO: 85.5 FL (ref 80–94)
MCV RBC AUTO: 85.9 FL (ref 80–94)
METHEMOGLOBIN ARTERIAL: 0 %
METHEMOGLOBIN ARTERIAL: 0.1 %
MONOCYTES ABSOLUTE: 0.6 K/UL (ref 0–0.9)
MONOCYTES RELATIVE PERCENT: 7.7 % (ref 0–10)
NEUTROPHILS ABSOLUTE: 4.7 K/UL (ref 1.5–7.5)
NEUTROPHILS RELATIVE PERCENT: 58.8 % (ref 50–65)
O2 CONTENT ARTERIAL: 16.1 ML/DL
O2 CONTENT ARTERIAL: 16.9 ML/DL
O2 SAT, ARTERIAL: 100 % (ref 93–100)
O2 SAT, ARTERIAL: 97.4 %
O2 SAT, ARTERIAL: 97.5 %
O2 THERAPY: ABNORMAL
O2 THERAPY: ABNORMAL
PCO2 ARTERIAL: 39 MM HG (ref 35–48)
PCO2 ARTERIAL: 39 MM HG (ref 35–48)
PCO2 ARTERIAL: 39 MMHG (ref 35–45)
PCO2 ARTERIAL: 40 MM HG (ref 35–48)
PCO2 ARTERIAL: 40 MMHG (ref 35–45)
PCO2 ARTERIAL: 42 MM HG (ref 35–48)
PDW BLD-RTO: 13.8 % (ref 11.5–14.5)
PDW BLD-RTO: 13.8 % (ref 11.5–14.5)
PDW BLD-RTO: 14 % (ref 11.5–14.5)
PERFORMED ON: ABNORMAL
PH ARTERIAL: 7.4 (ref 7.3–7.5)
PH ARTERIAL: 7.42 (ref 7.35–7.45)
PH ARTERIAL: 7.43 (ref 7.3–7.5)
PH ARTERIAL: 7.45 (ref 7.35–7.45)
PH ARTERIAL: 7.45 (ref 7.3–7.5)
PH ARTERIAL: 7.46 (ref 7.3–7.5)
PLATELET # BLD: 214 K/UL (ref 130–400)
PLATELET # BLD: 232 K/UL (ref 130–400)
PLATELET # BLD: 244 K/UL (ref 130–400)
PMV BLD AUTO: 11 FL (ref 9.4–12.4)
PMV BLD AUTO: 11 FL (ref 9.4–12.4)
PMV BLD AUTO: 11.1 FL (ref 9.4–12.4)
PO2 ARTERIAL: 144 MMHG (ref 80–100)
PO2 ARTERIAL: 240 MMHG (ref 80–100)
PO2 ARTERIAL: 297 MM HG (ref 83–108)
PO2 ARTERIAL: 330 MM HG (ref 83–108)
PO2 ARTERIAL: 545 MM HG (ref 83–108)
PO2 ARTERIAL: 597 MM HG (ref 83–108)
POC ANION GAP: 10
POC ANION GAP: 12
POC ANION GAP: 12
POC ANION GAP: 6
POC CHLORIDE: 101 MEQ/L (ref 99–110)
POC CHLORIDE: 102 MEQ/L (ref 99–110)
POC CHLORIDE: 103 MEQ/L (ref 99–110)
POC CHLORIDE: 107 MEQ/L (ref 99–110)
POC CREATININE: 1.2 MG/DL (ref 0.3–1.3)
POC CREATININE: 1.3 MG/DL (ref 0.3–1.3)
POC CREATININE: 1.4 MG/DL (ref 0.3–1.3)
POC CREATININE: 1.6 MG/DL (ref 0.3–1.3)
POC HEMATOCRIT: 32 % (ref 37–52)
POC HEMATOCRIT: 34 % (ref 37–52)
POC HEMATOCRIT: 38 % (ref 37–52)
POC HEMATOCRIT: 42 % (ref 37–52)
POC POTASSIUM: 4.1 MEQ/L (ref 3.5–5.1)
POC POTASSIUM: 4.3 MEQ/L (ref 3.5–5.1)
POC SAMPLE TYPE: ABNORMAL
POC SODIUM: 139 MEQ/L (ref 136–145)
POC SODIUM: 140 MEQ/L (ref 136–145)
POC SODIUM: 140 MEQ/L (ref 136–145)
POC SODIUM: 141 MEQ/L (ref 136–145)
POTASSIUM REFLEX MAGNESIUM: 4.4 MMOL/L (ref 3.5–5)
POTASSIUM REFLEX MAGNESIUM: 5 MMOL/L (ref 3.5–5)
POTASSIUM SERPL-SCNC: 4 MMOL/L (ref 3.5–5)
POTASSIUM, WHOLE BLOOD: 3.8
POTASSIUM, WHOLE BLOOD: 4.6
PROTHROMBIN TIME: 14.8 SEC (ref 12–14.6)
PROTHROMBIN TIME: 17.1 SEC (ref 12–14.6)
RBC # BLD: 4 M/UL (ref 4.7–6.1)
RBC # BLD: 4.32 M/UL (ref 4.7–6.1)
RBC # BLD: 4.86 M/UL (ref 4.7–6.1)
SODIUM BLD-SCNC: 138 MMOL/L (ref 136–145)
SODIUM BLD-SCNC: 138 MMOL/L (ref 136–145)
SODIUM BLD-SCNC: 142 MMOL/L (ref 136–145)
TCO2 ARTERIAL: 27 MMOL/L
TCO2 ARTERIAL: 28 MMOL/L
TCO2 ARTERIAL: 29 MMOL/L
TCO2 ARTERIAL: 29 MMOL/L
WBC # BLD: 13.1 K/UL (ref 4.8–10.8)
WBC # BLD: 16.8 K/UL (ref 4.8–10.8)
WBC # BLD: 8 K/UL (ref 4.8–10.8)

## 2019-05-30 PROCEDURE — 82800 BLOOD PH: CPT

## 2019-05-30 PROCEDURE — 82374 ASSAY BLOOD CARBON DIOXIDE: CPT

## 2019-05-30 PROCEDURE — 2580000003 HC RX 258: Performed by: THORACIC SURGERY (CARDIOTHORACIC VASCULAR SURGERY)

## 2019-05-30 PROCEDURE — 84132 ASSAY OF SERUM POTASSIUM: CPT

## 2019-05-30 PROCEDURE — 6370000000 HC RX 637 (ALT 250 FOR IP): Performed by: THORACIC SURGERY (CARDIOTHORACIC VASCULAR SURGERY)

## 2019-05-30 PROCEDURE — 6370000000 HC RX 637 (ALT 250 FOR IP): Performed by: HOSPITALIST

## 2019-05-30 PROCEDURE — 06BP4ZZ EXCISION OF RIGHT SAPHENOUS VEIN, PERCUTANEOUS ENDOSCOPIC APPROACH: ICD-10-PCS | Performed by: THORACIC SURGERY (CARDIOTHORACIC VASCULAR SURGERY)

## 2019-05-30 PROCEDURE — 80048 BASIC METABOLIC PNL TOTAL CA: CPT

## 2019-05-30 PROCEDURE — 2000000000 HC ICU R&B

## 2019-05-30 PROCEDURE — 3700000000 HC ANESTHESIA ATTENDED CARE: Performed by: THORACIC SURGERY (CARDIOTHORACIC VASCULAR SURGERY)

## 2019-05-30 PROCEDURE — 71045 X-RAY EXAM CHEST 1 VIEW: CPT

## 2019-05-30 PROCEDURE — 82803 BLOOD GASES ANY COMBINATION: CPT

## 2019-05-30 PROCEDURE — 85610 PROTHROMBIN TIME: CPT

## 2019-05-30 PROCEDURE — 37799 UNLISTED PX VASCULAR SURGERY: CPT

## 2019-05-30 PROCEDURE — 2580000003 HC RX 258: Performed by: NURSE ANESTHETIST, CERTIFIED REGISTERED

## 2019-05-30 PROCEDURE — 82330 ASSAY OF CALCIUM: CPT

## 2019-05-30 PROCEDURE — 85347 COAGULATION TIME ACTIVATED: CPT | Performed by: THORACIC SURGERY (CARDIOTHORACIC VASCULAR SURGERY)

## 2019-05-30 PROCEDURE — 83735 ASSAY OF MAGNESIUM: CPT

## 2019-05-30 PROCEDURE — 84295 ASSAY OF SERUM SODIUM: CPT

## 2019-05-30 PROCEDURE — 2720000010 HC SURG SUPPLY STERILE: Performed by: THORACIC SURGERY (CARDIOTHORACIC VASCULAR SURGERY)

## 2019-05-30 PROCEDURE — 02100Z9 BYPASS CORONARY ARTERY, ONE ARTERY FROM LEFT INTERNAL MAMMARY, OPEN APPROACH: ICD-10-PCS | Performed by: THORACIC SURGERY (CARDIOTHORACIC VASCULAR SURGERY)

## 2019-05-30 PROCEDURE — 32551 INSERTION OF CHEST TUBE: CPT

## 2019-05-30 PROCEDURE — 82948 REAGENT STRIP/BLOOD GLUCOSE: CPT

## 2019-05-30 PROCEDURE — 6360000002 HC RX W HCPCS: Performed by: HOSPITALIST

## 2019-05-30 PROCEDURE — 33518 CABG ARTERY-VEIN TWO: CPT | Performed by: THORACIC SURGERY (CARDIOTHORACIC VASCULAR SURGERY)

## 2019-05-30 PROCEDURE — 36415 COLL VENOUS BLD VENIPUNCTURE: CPT

## 2019-05-30 PROCEDURE — 02HV33Z INSERTION OF INFUSION DEVICE INTO SUPERIOR VENA CAVA, PERCUTANEOUS APPROACH: ICD-10-PCS | Performed by: THORACIC SURGERY (CARDIOTHORACIC VASCULAR SURGERY)

## 2019-05-30 PROCEDURE — 021109W BYPASS CORONARY ARTERY, TWO ARTERIES FROM AORTA WITH AUTOLOGOUS VENOUS TISSUE, OPEN APPROACH: ICD-10-PCS | Performed by: THORACIC SURGERY (CARDIOTHORACIC VASCULAR SURGERY)

## 2019-05-30 PROCEDURE — 99291 CRITICAL CARE FIRST HOUR: CPT | Performed by: HOSPITALIST

## 2019-05-30 PROCEDURE — 86923 COMPATIBILITY TEST ELECTRIC: CPT

## 2019-05-30 PROCEDURE — 6360000002 HC RX W HCPCS: Performed by: THORACIC SURGERY (CARDIOTHORACIC VASCULAR SURGERY)

## 2019-05-30 PROCEDURE — 3600000090 HC PERFUSION PUMP ON: Performed by: THORACIC SURGERY (CARDIOTHORACIC VASCULAR SURGERY)

## 2019-05-30 PROCEDURE — 94002 VENT MGMT INPAT INIT DAY: CPT

## 2019-05-30 PROCEDURE — 36620 INSERTION CATHETER ARTERY: CPT

## 2019-05-30 PROCEDURE — 85530 HEPARIN-PROTAMINE TOLERANCE: CPT | Performed by: THORACIC SURGERY (CARDIOTHORACIC VASCULAR SURGERY)

## 2019-05-30 PROCEDURE — L8612 AQUEOUS SHUNT PROSTHESIS: HCPCS | Performed by: THORACIC SURGERY (CARDIOTHORACIC VASCULAR SURGERY)

## 2019-05-30 PROCEDURE — 3700000001 HC ADD 15 MINUTES (ANESTHESIA): Performed by: THORACIC SURGERY (CARDIOTHORACIC VASCULAR SURGERY)

## 2019-05-30 PROCEDURE — 94640 AIRWAY INHALATION TREATMENT: CPT

## 2019-05-30 PROCEDURE — 3600000008 HC SURGERY OHS BASE: Performed by: THORACIC SURGERY (CARDIOTHORACIC VASCULAR SURGERY)

## 2019-05-30 PROCEDURE — C1751 CATH, INF, PER/CENT/MIDLINE: HCPCS | Performed by: THORACIC SURGERY (CARDIOTHORACIC VASCULAR SURGERY)

## 2019-05-30 PROCEDURE — 85730 THROMBOPLASTIN TIME PARTIAL: CPT

## 2019-05-30 PROCEDURE — C1894 INTRO/SHEATH, NON-LASER: HCPCS | Performed by: THORACIC SURGERY (CARDIOTHORACIC VASCULAR SURGERY)

## 2019-05-30 PROCEDURE — 2780000010 HC IMPLANT OTHER: Performed by: THORACIC SURGERY (CARDIOTHORACIC VASCULAR SURGERY)

## 2019-05-30 PROCEDURE — 82565 ASSAY OF CREATININE: CPT

## 2019-05-30 PROCEDURE — 2500000003 HC RX 250 WO HCPCS: Performed by: NURSE ANESTHETIST, CERTIFIED REGISTERED

## 2019-05-30 PROCEDURE — 85014 HEMATOCRIT: CPT

## 2019-05-30 PROCEDURE — 2580000003 HC RX 258: Performed by: HOSPITALIST

## 2019-05-30 PROCEDURE — 33508 ENDOSCOPIC VEIN HARVEST: CPT | Performed by: THORACIC SURGERY (CARDIOTHORACIC VASCULAR SURGERY)

## 2019-05-30 PROCEDURE — 33533 CABG ARTERIAL SINGLE: CPT | Performed by: THORACIC SURGERY (CARDIOTHORACIC VASCULAR SURGERY)

## 2019-05-30 PROCEDURE — 85025 COMPLETE CBC W/AUTO DIFF WBC: CPT

## 2019-05-30 PROCEDURE — 82810 BLOOD GASES O2 SAT ONLY: CPT

## 2019-05-30 PROCEDURE — 2700000000 HC OXYGEN THERAPY PER DAY

## 2019-05-30 PROCEDURE — B246ZZ4 ULTRASONOGRAPHY OF RIGHT AND LEFT HEART, TRANSESOPHAGEAL: ICD-10-PCS | Performed by: THORACIC SURGERY (CARDIOTHORACIC VASCULAR SURGERY)

## 2019-05-30 PROCEDURE — 85027 COMPLETE CBC AUTOMATED: CPT

## 2019-05-30 PROCEDURE — 82435 ASSAY OF BLOOD CHLORIDE: CPT

## 2019-05-30 PROCEDURE — 6370000000 HC RX 637 (ALT 250 FOR IP): Performed by: NURSE ANESTHETIST, CERTIFIED REGISTERED

## 2019-05-30 PROCEDURE — C1729 CATH, DRAINAGE: HCPCS | Performed by: THORACIC SURGERY (CARDIOTHORACIC VASCULAR SURGERY)

## 2019-05-30 PROCEDURE — 6360000002 HC RX W HCPCS: Performed by: NURSE ANESTHETIST, CERTIFIED REGISTERED

## 2019-05-30 PROCEDURE — 2709999900 HC NON-CHARGEABLE SUPPLY: Performed by: THORACIC SURGERY (CARDIOTHORACIC VASCULAR SURGERY)

## 2019-05-30 PROCEDURE — 3600000018 HC SURGERY OHS ADDTL 15MIN: Performed by: THORACIC SURGERY (CARDIOTHORACIC VASCULAR SURGERY)

## 2019-05-30 DEVICE — DISK-SHAPED STYLE, SILICONE (1 PER STERILE PKG)
Type: IMPLANTABLE DEVICE | Site: AORTA | Status: FUNCTIONAL
Brand: SCANLAN® RADIOMARK® GRAFT MARKERS

## 2019-05-30 DEVICE — Z INACTIVE USE 2535480 CLIP LIG M BLU TI HRT SHP WIRE HORZ 180 PER BX: Type: IMPLANTABLE DEVICE | Status: FUNCTIONAL

## 2019-05-30 DEVICE — CLIP INT SM TI EZ LD LIG SYS WECK HORZ: Type: IMPLANTABLE DEVICE | Status: FUNCTIONAL

## 2019-05-30 RX ORDER — 0.9 % SODIUM CHLORIDE 0.9 %
500 INTRAVENOUS SOLUTION INTRAVENOUS CONTINUOUS PRN
Status: DISCONTINUED | OUTPATIENT
Start: 2019-05-30 | End: 2019-05-31

## 2019-05-30 RX ORDER — MEPERIDINE HYDROCHLORIDE 50 MG/ML
25 INJECTION INTRAMUSCULAR; INTRAVENOUS; SUBCUTANEOUS
Status: COMPLETED | OUTPATIENT
Start: 2019-05-30 | End: 2019-05-30

## 2019-05-30 RX ORDER — OXYCODONE HYDROCHLORIDE 5 MG/1
5 TABLET ORAL EVERY 4 HOURS PRN
Status: DISCONTINUED | OUTPATIENT
Start: 2019-05-30 | End: 2019-05-31

## 2019-05-30 RX ORDER — MEPERIDINE HYDROCHLORIDE 50 MG/ML
12.5 INJECTION INTRAMUSCULAR; INTRAVENOUS; SUBCUTANEOUS EVERY 5 MIN PRN
Status: DISCONTINUED | OUTPATIENT
Start: 2019-05-30 | End: 2019-05-30 | Stop reason: HOSPADM

## 2019-05-30 RX ORDER — SODIUM CHLORIDE 0.9 % (FLUSH) 0.9 %
10 SYRINGE (ML) INJECTION PRN
Status: DISCONTINUED | OUTPATIENT
Start: 2019-05-30 | End: 2019-06-06 | Stop reason: HOSPADM

## 2019-05-30 RX ORDER — SCOLOPAMINE TRANSDERMAL SYSTEM 1 MG/1
1 PATCH, EXTENDED RELEASE TRANSDERMAL ONCE
Status: DISCONTINUED | OUTPATIENT
Start: 2019-05-30 | End: 2019-05-30 | Stop reason: HOSPADM

## 2019-05-30 RX ORDER — NICOTINE POLACRILEX 4 MG
15 LOZENGE BUCCAL PRN
Status: DISCONTINUED | OUTPATIENT
Start: 2019-05-30 | End: 2019-05-31 | Stop reason: SDUPTHER

## 2019-05-30 RX ORDER — SENNA PLUS 8.6 MG/1
1 TABLET ORAL 2 TIMES DAILY
Status: DISCONTINUED | OUTPATIENT
Start: 2019-05-30 | End: 2019-05-31

## 2019-05-30 RX ORDER — INSULIN GLARGINE 100 [IU]/ML
0.15 INJECTION, SOLUTION SUBCUTANEOUS NIGHTLY
Status: DISCONTINUED | OUTPATIENT
Start: 2019-05-31 | End: 2019-05-31

## 2019-05-30 RX ORDER — FENTANYL CITRATE 50 UG/ML
INJECTION, SOLUTION INTRAMUSCULAR; INTRAVENOUS PRN
Status: DISCONTINUED | OUTPATIENT
Start: 2019-05-30 | End: 2019-05-30 | Stop reason: SDUPTHER

## 2019-05-30 RX ORDER — MIDAZOLAM HYDROCHLORIDE 1 MG/ML
INJECTION INTRAMUSCULAR; INTRAVENOUS PRN
Status: DISCONTINUED | OUTPATIENT
Start: 2019-05-30 | End: 2019-05-30 | Stop reason: SDUPTHER

## 2019-05-30 RX ORDER — IPRATROPIUM BROMIDE AND ALBUTEROL SULFATE 2.5; .5 MG/3ML; MG/3ML
1 SOLUTION RESPIRATORY (INHALATION)
Status: DISCONTINUED | OUTPATIENT
Start: 2019-05-30 | End: 2019-06-06 | Stop reason: HOSPADM

## 2019-05-30 RX ORDER — DOBUTAMINE HYDROCHLORIDE 100 MG/100ML
INJECTION INTRAVENOUS CONTINUOUS PRN
Status: DISCONTINUED | OUTPATIENT
Start: 2019-05-30 | End: 2019-05-30 | Stop reason: SDUPTHER

## 2019-05-30 RX ORDER — ACETAMINOPHEN 325 MG/1
650 TABLET ORAL EVERY 4 HOURS PRN
Status: DISCONTINUED | OUTPATIENT
Start: 2019-05-30 | End: 2019-06-06 | Stop reason: HOSPADM

## 2019-05-30 RX ORDER — DOCUSATE SODIUM 100 MG/1
100 CAPSULE, LIQUID FILLED ORAL 2 TIMES DAILY
Status: DISCONTINUED | OUTPATIENT
Start: 2019-05-30 | End: 2019-06-06 | Stop reason: HOSPADM

## 2019-05-30 RX ORDER — FENTANYL CITRATE 50 UG/ML
50 INJECTION, SOLUTION INTRAMUSCULAR; INTRAVENOUS
Status: DISCONTINUED | OUTPATIENT
Start: 2019-05-30 | End: 2019-05-30 | Stop reason: HOSPADM

## 2019-05-30 RX ORDER — POTASSIUM CHLORIDE 29.8 MG/ML
10 INJECTION INTRAVENOUS PRN
Status: DISCONTINUED | OUTPATIENT
Start: 2019-05-30 | End: 2019-05-31

## 2019-05-30 RX ORDER — MIDAZOLAM HYDROCHLORIDE 1 MG/ML
2 INJECTION INTRAMUSCULAR; INTRAVENOUS
Status: DISCONTINUED | OUTPATIENT
Start: 2019-05-30 | End: 2019-05-30 | Stop reason: HOSPADM

## 2019-05-30 RX ORDER — PROMETHAZINE HYDROCHLORIDE 25 MG/ML
6.25 INJECTION, SOLUTION INTRAMUSCULAR; INTRAVENOUS
Status: DISCONTINUED | OUTPATIENT
Start: 2019-05-30 | End: 2019-05-30 | Stop reason: HOSPADM

## 2019-05-30 RX ORDER — PROTAMINE SULFATE 10 MG/ML
INJECTION, SOLUTION INTRAVENOUS PRN
Status: DISCONTINUED | OUTPATIENT
Start: 2019-05-30 | End: 2019-05-30 | Stop reason: SDUPTHER

## 2019-05-30 RX ORDER — HYDRALAZINE HYDROCHLORIDE 20 MG/ML
5 INJECTION INTRAMUSCULAR; INTRAVENOUS EVERY 10 MIN PRN
Status: DISCONTINUED | OUTPATIENT
Start: 2019-05-30 | End: 2019-05-30 | Stop reason: HOSPADM

## 2019-05-30 RX ORDER — ASPIRIN 81 MG/1
81 TABLET ORAL DAILY
Status: DISCONTINUED | OUTPATIENT
Start: 2019-05-30 | End: 2019-06-06 | Stop reason: HOSPADM

## 2019-05-30 RX ORDER — SODIUM CHLORIDE, SODIUM LACTATE, POTASSIUM CHLORIDE, CALCIUM CHLORIDE 600; 310; 30; 20 MG/100ML; MG/100ML; MG/100ML; MG/100ML
INJECTION, SOLUTION INTRAVENOUS CONTINUOUS
Status: DISCONTINUED | OUTPATIENT
Start: 2019-05-30 | End: 2019-05-30

## 2019-05-30 RX ORDER — SODIUM CHLORIDE 9 MG/ML
INJECTION, SOLUTION INTRAVENOUS CONTINUOUS PRN
Status: DISCONTINUED | OUTPATIENT
Start: 2019-05-30 | End: 2019-05-30 | Stop reason: SDUPTHER

## 2019-05-30 RX ORDER — ZOLPIDEM TARTRATE 5 MG/1
5 TABLET ORAL NIGHTLY PRN
Status: DISCONTINUED | OUTPATIENT
Start: 2019-05-31 | End: 2019-06-01

## 2019-05-30 RX ORDER — CLOPIDOGREL BISULFATE 75 MG/1
75 TABLET ORAL DAILY
Status: DISCONTINUED | OUTPATIENT
Start: 2019-05-31 | End: 2019-06-06 | Stop reason: HOSPADM

## 2019-05-30 RX ORDER — LIDOCAINE HYDROCHLORIDE 10 MG/ML
1 INJECTION, SOLUTION EPIDURAL; INFILTRATION; INTRACAUDAL; PERINEURAL
Status: DISCONTINUED | OUTPATIENT
Start: 2019-05-30 | End: 2019-05-30 | Stop reason: HOSPADM

## 2019-05-30 RX ORDER — FAMOTIDINE 20 MG/1
20 TABLET, FILM COATED ORAL 2 TIMES DAILY
Status: DISCONTINUED | OUTPATIENT
Start: 2019-05-30 | End: 2019-06-06 | Stop reason: HOSPADM

## 2019-05-30 RX ORDER — METOCLOPRAMIDE HYDROCHLORIDE 5 MG/ML
10 INJECTION INTRAMUSCULAR; INTRAVENOUS
Status: DISCONTINUED | OUTPATIENT
Start: 2019-05-30 | End: 2019-05-30 | Stop reason: HOSPADM

## 2019-05-30 RX ORDER — HEPARIN SODIUM 1000 [USP'U]/ML
INJECTION, SOLUTION INTRAVENOUS; SUBCUTANEOUS PRN
Status: DISCONTINUED | OUTPATIENT
Start: 2019-05-30 | End: 2019-05-30 | Stop reason: SDUPTHER

## 2019-05-30 RX ORDER — METHYLENE BLUE 10 MG/ML
INJECTION INTRAVENOUS PRN
Status: DISCONTINUED | OUTPATIENT
Start: 2019-05-30 | End: 2019-05-30 | Stop reason: ALTCHOICE

## 2019-05-30 RX ORDER — DEXTROSE MONOHYDRATE 25 G/50ML
12.5 INJECTION, SOLUTION INTRAVENOUS PRN
Status: DISCONTINUED | OUTPATIENT
Start: 2019-05-30 | End: 2019-05-31 | Stop reason: SDUPTHER

## 2019-05-30 RX ORDER — NITROGLYCERIN 20 MG/100ML
INJECTION INTRAVENOUS PRN
Status: DISCONTINUED | OUTPATIENT
Start: 2019-05-30 | End: 2019-05-30 | Stop reason: SDUPTHER

## 2019-05-30 RX ORDER — PROPOFOL 10 MG/ML
INJECTION, EMULSION INTRAVENOUS PRN
Status: DISCONTINUED | OUTPATIENT
Start: 2019-05-30 | End: 2019-05-30 | Stop reason: SDUPTHER

## 2019-05-30 RX ORDER — SODIUM CHLORIDE 0.9 % (FLUSH) 0.9 %
10 SYRINGE (ML) INJECTION EVERY 12 HOURS SCHEDULED
Status: DISCONTINUED | OUTPATIENT
Start: 2019-05-30 | End: 2019-06-06 | Stop reason: HOSPADM

## 2019-05-30 RX ORDER — OXYCODONE HYDROCHLORIDE 5 MG/1
10 TABLET ORAL EVERY 4 HOURS PRN
Status: DISCONTINUED | OUTPATIENT
Start: 2019-05-30 | End: 2019-05-31

## 2019-05-30 RX ORDER — SODIUM CHLORIDE 0.9 % (FLUSH) 0.9 %
10 SYRINGE (ML) INJECTION PRN
Status: DISCONTINUED | OUTPATIENT
Start: 2019-05-30 | End: 2019-05-30 | Stop reason: HOSPADM

## 2019-05-30 RX ORDER — ROCURONIUM BROMIDE 10 MG/ML
INJECTION, SOLUTION INTRAVENOUS PRN
Status: DISCONTINUED | OUTPATIENT
Start: 2019-05-30 | End: 2019-05-30 | Stop reason: SDUPTHER

## 2019-05-30 RX ORDER — PROTAMINE SULFATE 10 MG/ML
50 INJECTION, SOLUTION INTRAVENOUS
Status: ACTIVE | OUTPATIENT
Start: 2019-05-30 | End: 2019-05-30

## 2019-05-30 RX ORDER — SODIUM CHLORIDE 9 MG/ML
INJECTION, SOLUTION INTRAVENOUS CONTINUOUS
Status: DISCONTINUED | OUTPATIENT
Start: 2019-05-30 | End: 2019-06-01

## 2019-05-30 RX ORDER — DEXTROSE MONOHYDRATE 50 MG/ML
100 INJECTION, SOLUTION INTRAVENOUS PRN
Status: DISCONTINUED | OUTPATIENT
Start: 2019-05-30 | End: 2019-05-31 | Stop reason: SDUPTHER

## 2019-05-30 RX ORDER — ONDANSETRON 2 MG/ML
4 INJECTION INTRAMUSCULAR; INTRAVENOUS EVERY 8 HOURS PRN
Status: DISCONTINUED | OUTPATIENT
Start: 2019-05-30 | End: 2019-06-06 | Stop reason: HOSPADM

## 2019-05-30 RX ORDER — DIPHENHYDRAMINE HYDROCHLORIDE 50 MG/ML
12.5 INJECTION INTRAMUSCULAR; INTRAVENOUS
Status: DISCONTINUED | OUTPATIENT
Start: 2019-05-30 | End: 2019-05-30 | Stop reason: HOSPADM

## 2019-05-30 RX ORDER — SODIUM CHLORIDE 0.9 % (FLUSH) 0.9 %
10 SYRINGE (ML) INJECTION EVERY 12 HOURS SCHEDULED
Status: DISCONTINUED | OUTPATIENT
Start: 2019-05-30 | End: 2019-05-30 | Stop reason: HOSPADM

## 2019-05-30 RX ORDER — LABETALOL 20 MG/4 ML (5 MG/ML) INTRAVENOUS SYRINGE
5 EVERY 10 MIN PRN
Status: DISCONTINUED | OUTPATIENT
Start: 2019-05-30 | End: 2019-05-30 | Stop reason: HOSPADM

## 2019-05-30 RX ORDER — SUCCINYLCHOLINE CHLORIDE 20 MG/ML
INJECTION INTRAMUSCULAR; INTRAVENOUS PRN
Status: DISCONTINUED | OUTPATIENT
Start: 2019-05-30 | End: 2019-05-30 | Stop reason: SDUPTHER

## 2019-05-30 RX ORDER — LIDOCAINE HYDROCHLORIDE 10 MG/ML
INJECTION, SOLUTION EPIDURAL; INFILTRATION; INTRACAUDAL; PERINEURAL PRN
Status: DISCONTINUED | OUTPATIENT
Start: 2019-05-30 | End: 2019-05-30 | Stop reason: SDUPTHER

## 2019-05-30 RX ADMIN — SODIUM CHLORIDE: 9 INJECTION, SOLUTION INTRAVENOUS at 10:04

## 2019-05-30 RX ADMIN — DEXTROSE MONOHYDRATE 1.5 G: 5 INJECTION INTRAVENOUS at 07:50

## 2019-05-30 RX ADMIN — PHENYLEPHRINE HYDROCHLORIDE 100 MCG: 10 INJECTION INTRAVENOUS at 10:46

## 2019-05-30 RX ADMIN — FENTANYL CITRATE 100 MCG: 50 INJECTION INTRAMUSCULAR; INTRAVENOUS at 11:29

## 2019-05-30 RX ADMIN — Medication 10 ML: at 19:43

## 2019-05-30 RX ADMIN — FENTANYL CITRATE 100 MCG: 50 INJECTION INTRAMUSCULAR; INTRAVENOUS at 08:10

## 2019-05-30 RX ADMIN — OXYCODONE HYDROCHLORIDE 10 MG: 5 TABLET ORAL at 19:40

## 2019-05-30 RX ADMIN — HYDROMORPHONE HYDROCHLORIDE 1 MG: 1 INJECTION, SOLUTION INTRAMUSCULAR; INTRAVENOUS; SUBCUTANEOUS at 20:53

## 2019-05-30 RX ADMIN — HYDROMORPHONE HYDROCHLORIDE 0.5 MG: 1 INJECTION, SOLUTION INTRAMUSCULAR; INTRAVENOUS; SUBCUTANEOUS at 11:08

## 2019-05-30 RX ADMIN — HYDROMORPHONE HYDROCHLORIDE 0.5 MG: 1 INJECTION, SOLUTION INTRAMUSCULAR; INTRAVENOUS; SUBCUTANEOUS at 11:24

## 2019-05-30 RX ADMIN — Medication 10 ML: at 20:54

## 2019-05-30 RX ADMIN — MEPERIDINE HYDROCHLORIDE 25 MG: 50 INJECTION, SOLUTION INTRAMUSCULAR; INTRAVENOUS; SUBCUTANEOUS at 12:56

## 2019-05-30 RX ADMIN — SODIUM CHLORIDE 500 ML: 9 INJECTION, SOLUTION INTRAVENOUS at 17:39

## 2019-05-30 RX ADMIN — DEXTROSE MONOHYDRATE 1.5 G: 5 INJECTION INTRAVENOUS at 16:55

## 2019-05-30 RX ADMIN — MIDAZOLAM 2 MG: 1 INJECTION INTRAMUSCULAR; INTRAVENOUS at 07:32

## 2019-05-30 RX ADMIN — FENTANYL CITRATE 100 MCG: 50 INJECTION INTRAMUSCULAR; INTRAVENOUS at 08:12

## 2019-05-30 RX ADMIN — FENTANYL CITRATE 100 MCG: 50 INJECTION INTRAMUSCULAR; INTRAVENOUS at 08:18

## 2019-05-30 RX ADMIN — ROCURONIUM BROMIDE 50 MG: 10 INJECTION INTRAVENOUS at 07:58

## 2019-05-30 RX ADMIN — PHENYLEPHRINE HYDROCHLORIDE 100 MCG: 10 INJECTION INTRAVENOUS at 08:04

## 2019-05-30 RX ADMIN — PROPOFOL 50 MG: 10 INJECTION, EMULSION INTRAVENOUS at 07:31

## 2019-05-30 RX ADMIN — ROCURONIUM BROMIDE 20 MG: 10 INJECTION INTRAVENOUS at 11:05

## 2019-05-30 RX ADMIN — PROTAMINE SULFATE 250 MG: 10 INJECTION, SOLUTION INTRAVENOUS at 10:40

## 2019-05-30 RX ADMIN — SUCCINYLCHOLINE CHLORIDE 120 MG: 20 INJECTION, SOLUTION INTRAMUSCULAR; INTRAVENOUS; PARENTERAL at 07:24

## 2019-05-30 RX ADMIN — MIDAZOLAM 2 MG: 1 INJECTION INTRAMUSCULAR; INTRAVENOUS at 11:05

## 2019-05-30 RX ADMIN — HYDROMORPHONE HYDROCHLORIDE 1 MG: 1 INJECTION, SOLUTION INTRAMUSCULAR; INTRAVENOUS; SUBCUTANEOUS at 23:05

## 2019-05-30 RX ADMIN — Medication 8.6 MG: at 23:06

## 2019-05-30 RX ADMIN — DEXTROSE MONOHYDRATE 1.5 G: 5 INJECTION INTRAVENOUS at 23:06

## 2019-05-30 RX ADMIN — PHENYLEPHRINE HYDROCHLORIDE 100 MCG: 10 INJECTION INTRAVENOUS at 10:49

## 2019-05-30 RX ADMIN — SODIUM CHLORIDE: 9 INJECTION, SOLUTION INTRAVENOUS at 07:38

## 2019-05-30 RX ADMIN — FENTANYL CITRATE 50 MCG: 50 INJECTION INTRAMUSCULAR; INTRAVENOUS at 11:24

## 2019-05-30 RX ADMIN — DOCUSATE SODIUM 100 MG: 100 CAPSULE, LIQUID FILLED ORAL at 19:40

## 2019-05-30 RX ADMIN — FENTANYL CITRATE 100 MCG: 50 INJECTION INTRAMUSCULAR; INTRAVENOUS at 07:55

## 2019-05-30 RX ADMIN — HYDROMORPHONE HYDROCHLORIDE 1 MG: 1 INJECTION, SOLUTION INTRAMUSCULAR; INTRAVENOUS; SUBCUTANEOUS at 13:02

## 2019-05-30 RX ADMIN — PHENYLEPHRINE HYDROCHLORIDE 100 MCG: 10 INJECTION INTRAVENOUS at 08:02

## 2019-05-30 RX ADMIN — SODIUM CHLORIDE: 9 INJECTION, SOLUTION INTRAVENOUS at 11:54

## 2019-05-30 RX ADMIN — HYDROMORPHONE HYDROCHLORIDE 1 MG: 1 INJECTION, SOLUTION INTRAMUSCULAR; INTRAVENOUS; SUBCUTANEOUS at 16:07

## 2019-05-30 RX ADMIN — MIDAZOLAM 2 MG: 1 INJECTION INTRAMUSCULAR; INTRAVENOUS at 07:18

## 2019-05-30 RX ADMIN — PHENYLEPHRINE HYDROCHLORIDE 100 MCG: 10 INJECTION INTRAVENOUS at 10:41

## 2019-05-30 RX ADMIN — INSULIN LISPRO 1 UNITS: 100 INJECTION, SOLUTION INTRAVENOUS; SUBCUTANEOUS at 12:53

## 2019-05-30 RX ADMIN — HYDROMORPHONE HYDROCHLORIDE 1 MG: 1 INJECTION, SOLUTION INTRAMUSCULAR; INTRAVENOUS; SUBCUTANEOUS at 18:37

## 2019-05-30 RX ADMIN — ROCURONIUM BROMIDE 20 MG: 10 INJECTION INTRAVENOUS at 10:03

## 2019-05-30 RX ADMIN — LIDOCAINE HYDROCHLORIDE 50 MG: 10 INJECTION, SOLUTION EPIDURAL; INFILTRATION; INTRACAUDAL; PERINEURAL at 07:24

## 2019-05-30 RX ADMIN — HEPARIN SODIUM 35000 UNITS: 1000 INJECTION, SOLUTION INTRAVENOUS; SUBCUTANEOUS at 08:38

## 2019-05-30 RX ADMIN — NITROGLYCERIN 50 MCG: 20 INJECTION INTRAVENOUS at 08:20

## 2019-05-30 RX ADMIN — FAMOTIDINE 20 MG: 20 TABLET ORAL at 19:40

## 2019-05-30 RX ADMIN — ROCURONIUM BROMIDE 50 MG: 10 INJECTION INTRAVENOUS at 09:04

## 2019-05-30 RX ADMIN — INSULIN HUMAN 5 UNITS: 100 INJECTION, SOLUTION PARENTERAL at 09:28

## 2019-05-30 RX ADMIN — DOBUTAMINE HYDROCHLORIDE 5 MCG/KG/MIN: 100 INJECTION INTRAVENOUS at 10:38

## 2019-05-30 RX ADMIN — IPRATROPIUM BROMIDE AND ALBUTEROL SULFATE 1 AMPULE: .5; 3 SOLUTION RESPIRATORY (INHALATION) at 14:34

## 2019-05-30 RX ADMIN — ROCURONIUM BROMIDE 50 MG: 10 INJECTION INTRAVENOUS at 07:35

## 2019-05-30 RX ADMIN — MIDAZOLAM 2 MG: 1 INJECTION INTRAMUSCULAR; INTRAVENOUS at 10:19

## 2019-05-30 RX ADMIN — SODIUM CHLORIDE: 9 INJECTION, SOLUTION INTRAVENOUS at 07:16

## 2019-05-30 RX ADMIN — FENTANYL CITRATE 250 MCG: 50 INJECTION INTRAMUSCULAR; INTRAVENOUS at 07:32

## 2019-05-30 RX ADMIN — FENTANYL CITRATE 100 MCG: 50 INJECTION INTRAMUSCULAR; INTRAVENOUS at 08:03

## 2019-05-30 RX ADMIN — SODIUM CHLORIDE 3.18 UNITS/HR: 9 INJECTION, SOLUTION INTRAVENOUS at 14:17

## 2019-05-30 RX ADMIN — PHENYLEPHRINE HYDROCHLORIDE 100 MCG: 10 INJECTION INTRAVENOUS at 07:35

## 2019-05-30 RX ADMIN — Medication 10 ML: at 23:06

## 2019-05-30 RX ADMIN — PROPOFOL 120 MG: 10 INJECTION, EMULSION INTRAVENOUS at 07:24

## 2019-05-30 RX ADMIN — FENTANYL CITRATE 100 MCG: 50 INJECTION INTRAMUSCULAR; INTRAVENOUS at 11:08

## 2019-05-30 RX ADMIN — PHENYLEPHRINE HYDROCHLORIDE 100 MCG: 10 INJECTION INTRAVENOUS at 07:28

## 2019-05-30 ASSESSMENT — PAIN DESCRIPTION - LOCATION: LOCATION: STERNUM

## 2019-05-30 ASSESSMENT — PAIN SCALES - GENERAL
PAINLEVEL_OUTOF10: 8
PAINLEVEL_OUTOF10: 5
PAINLEVEL_OUTOF10: 8
PAINLEVEL_OUTOF10: 4
PAINLEVEL_OUTOF10: 0
PAINLEVEL_OUTOF10: 0
PAINLEVEL_OUTOF10: 6
PAINLEVEL_OUTOF10: 4
PAINLEVEL_OUTOF10: 6
PAINLEVEL_OUTOF10: 10
PAINLEVEL_OUTOF10: 1
PAINLEVEL_OUTOF10: 4
PAINLEVEL_OUTOF10: 4

## 2019-05-30 ASSESSMENT — PULMONARY FUNCTION TESTS
PIF_VALUE: 36.4
PIF_VALUE: 47.5
PIF_VALUE: 32.2
PIF_VALUE: .9
PIF_VALUE: 32.4
PIF_VALUE: 29.1
PIF_VALUE: 7.7
PIF_VALUE: 32.2
PIF_VALUE: 38.5
PIF_VALUE: 32.3
PIF_VALUE: 36.7

## 2019-05-30 ASSESSMENT — PAIN DESCRIPTION - PAIN TYPE: TYPE: ACUTE PAIN

## 2019-05-30 ASSESSMENT — PAIN SCALES - WONG BAKER: WONGBAKER_NUMERICALRESPONSE: 4

## 2019-05-30 ASSESSMENT — ENCOUNTER SYMPTOMS: SHORTNESS OF BREATH: 1

## 2019-05-30 ASSESSMENT — LIFESTYLE VARIABLES: SMOKING_STATUS: 0

## 2019-05-30 NOTE — PROGRESS NOTES
Ref Range & Units Status Collected Lab    pH, Arterial 7.450  7.350 - 7.450 Final 05/30/2019  2:46 PM [address] [place] [address] Lab   pCO2, Arterial 40.0  35.0 - 45.0 mmHg Final 05/30/2019  2:46 PM [facility] Lab   pO2, Arterial 240. 0High   80.0 - 100.0 mmHg Final 05/30/2019  2:46 PM [facility] Lab   HCO3, Arterial 27. 8High   22.0 - 26.0 mmol/L Final 05/30/2019  2:46 PM [facility] Excess, Arterial 3.5High   -2.0 - 2.0 mmol/L Final 05/30/2019  2:46 PM [address] [place] [address] Lab   Hemoglobin, Art, Extended 11.3Low   14.0 - 18.0 g/dL Final 05/30/2019  2:46 PM [address] [place] [address] Lab   O2 Sat, Arterial 97.4  >92 % Final 05/30/2019  2:46 PM [facility] Lab   Carboxyhgb, Arterial 0.0  0.0 - 5.0 % Final 05/30/2019  2:46 PM [facility] Lab        0.0-1.5   (Smokers 1.5-5.0)    Methemoglobin, Arterial 0.1  <1.5 % Final 05/30/2019  2:46 PM [address] [place] [address] Lab   O2 Content, Arterial 16.1  Not Established mL/dL Final 05/30/2019  2:46 PM [address] [place] [address] Lab   O2 Therapy Unknown   Final 05/30/2019  2:46 PM [address] [place] [address] Lab   Testing Performed By     [facility] Name Director Address Valid Date Range   [id number] - [phone] LAB Tana Cabral  Diego Yanes,[address]  [address] [address] 08/[birth year] 0733-Present   Lab and Collection     Blood gas, arterial - 5/30/2019   Result History     Pt on simv 10 700 +5 100%  Art line

## 2019-05-30 NOTE — PROGRESS NOTES
[address]    Daily HOSPITAL Progress Note                            Date:  5/29/19  Patient: Nelly Arce  Admission:  5/24/2019  1:26 PM  Admit DX: Elevated troponin [R74.8]  Age:  75 y.o., 1945        Date of Admission 5/24/2019  1:26 PM   Hospital Length of Stay  LOS: 5 days            I personally saw the patient and rounded with:  Betty MONTERO on 5/29/19      The observations documented in this note, including the assessment and plan are mine         Reason for initial evaluation or the patient's initial complaint    Chest and abdominal discomfort      SUBJECTIVE:      Chief Complaint / Reason for the Visit   Follow up of:  Chest and abdominal discomfort and CAD and systemic arterial hypertension    Family present and in room during examination:  Yes: wife and others      Specialty Problems        Cardiology Problems    * (Principal) ACS (acute coronary syndrome) ([address]        CAD (coronary artery disease)        HTN (hypertension)        MI (myocardial infarction) ([address]        Acute ischemic stroke ([address]        Cerebrovascular accident (CVA) due to embolism of right middle cerebral artery ([address]              Current Status Today According to the patient:  \"ok\"    Subjective:  Mr. Nelly Arce is generally feeling unchanged. For surgery tomorrow with Dr. Dick Guaman    Mr. Nelly Arce has the following cardiac complaints / symptoms today:    1. Chest and abdominal discomfort, discomfort was relieved with a GI cocktail in the ED    2. CAD, the inferior wall is akinetic and the anterior wall, which moves, is supplied by a very small LAD.     3. Hypertension    The blood pressure for the lastr 36 hours has been:  Systolic (31SBS), NCB:928 , Min:91 , OHL:645    Diastolic (57LYO), MXZ:93, Min:43, Max:80        Nelly Arce is a 75 y.o. male with the following history as recorded in [facility]:    Patient Active Problem List    Diagnosis Date Noted    ACS (acute coronary syndrome) [facility]      Priority: High    Chest pressure 02/28/2019     Priority: High    Rash      Priority: Low    Elevated troponin 05/24/2019     Priority: Low    Chronic shortness of breath 04/15/2019     Priority: Low    Chest pain 02/28/2019     Priority: Low    MOO on CPAP 05/03/2018     Priority: Low    Dermatitis 03/05/2018     Priority: Low    Depression with anxiety 03/05/2018     Priority: Low    Benign prostatic hyperplasia without lower urinary tract symptoms 03/05/2018     Priority: Low    Chronic tension-type headache, not intractable 03/05/2018     Priority: Low    West Nile virus seropositivity 11/22/2017     Priority: Low    Status post placement of implantable loop recorder 10/31/2017     Priority: Low    Recurrent major depressive disorder, in full remission ([facility] 10/17/2017     Priority: Low    Trigger middle finger of left hand 10/17/2017     Priority: Low    Peripheral polyneuropathy 10/17/2017     Priority: Low    Palpitations 10/17/2017     Priority: Low    Chronic insomnia 10/17/2017     Priority: Low    Cerebrovascular accident (CVA) due to embolism of right middle cerebral artery ([facility] 08/14/2017     Priority: Low    Dizziness 03/27/2017     Priority: Low    Imbalance 03/27/2017     Priority: Low    Thoracic outlet syndrome 02/21/2017     Priority: Low    Acute ischemic stroke ([facility] 02/20/2017     Priority: Low    DDD (degenerative disc disease), lumbar 11/15/2016     Priority: Low    Spondylosis of lumbar region without myelopathy or radiculopathy 11/15/2016     Priority: Low    Chronic bilateral low back pain without sciatica 11/10/2016     Priority: Low    Hyperlipidemia 07/23/2012     Priority: Low    MI (myocardial infarction) ([facility]      Priority: Low    Type 2 diabetes mellitus with diabetic polyneuropathy, with long-term current use of insulin ([facility] 07/12/2011     Priority: Low    HTN (hypertension) 07/12/2011     Priority: Low    CAD (coronary artery disease) tablet 2 tablet  2 tablet Oral TID PRN Diamond Ramon MD   2 tablet at 05/29/19 2113    clindamycin (CLEOCIN) capsule 300 mg  300 mg Oral Q8H Aletha Helm MD   300 mg at 05/29/19 2113    ondansetron (ZOFRAN) injection 4 mg  4 mg Intravenous Q6H PRN Aletha Helm MD        aspirin chewable tablet 81 mg  81 mg Oral Daily Aletha Helm MD   81 mg at 05/29/19 2015    acetaminophen (TYLENOL) tablet 650 mg  650 mg Oral Q4H PRN Aletha Helm MD        nitroGLYCERIN (NITROSTAT) SL tablet 0.4 mg  0.4 mg Sublingual Q5 Min PRN Aletha Helm MD        metoprolol succinate (TOPROL XL) extended release tablet 12.5 mg  12.5 mg Oral BID Aletha Helm MD   12.5 mg at 05/29/19 2113    tamsulosin (FLOMAX) capsule 0.8 mg  0.8 mg Oral Nightly Aletha Helm MD   0.8 mg at 05/29/19 2112    sertraline (ZOLOFT) tablet 100 mg  100 mg Oral Daily Aletha Helm MD   100 mg at 05/29/19 7702    tiZANidine (ZANAFLEX) tablet 4 mg  4 mg Oral Nightly PRN Aletha Helm MD   4 mg at 05/27/19 2050    butalbital-acetaminophen-caffeine (FIORICET, ESGIC) per tablet 1 tablet  1 tablet Oral Q4H PRN Aletha Helm MD   1 tablet at 05/28/19 2028    furosemide (LASIX) tablet 40 mg  40 mg Oral Daily PRN Aletha Helm MD        isosorbide mononitrate (IMDUR) extended release tablet 30 mg  30 mg Oral Daily Aletha Helm MD   30 mg at 05/29/19 6605     Allergies: Statins; Crestor [rosuvastatin calcium]; and Morphine  Past Medical History:   Diagnosis Date    [facility] 2/20/2017    Atherosclerotic heart disease     s/p MI, stenting x 3 jan 2011([place]    CAD (coronary artery disease)     Cerebral artery occlusion with cerebral infarction [facility])     Cerebrovascular accident (CVA) due to embolism of right middle cerebral artery ([facility] 8/14/2017    CHF (congestive heart failure) ([facility]     Chronic bilateral low back pain without sciatica 11/10/2016    Chronic kidney disease     DDD (degenerative disc disease), lumbar 11/15/2016    Depression     Depression with anxiety     Diabetes mellitus ([facility]     type II    [facility] mellitus[facility] 2 [facility]     DM (diabetes mellitus) ([facility] 2011    Glaucoma     Headache     Hyperlipidemia     Cholesterol management per pcp.  Hypertension     Macular degeneration     [facility]myocardial infarction) [facility]     MI [facility]myocardial infarction) [facility]     Neuromuscular disorder ([facility]     Neuropathy     Osteoarthritis     Sleep apnea     Spondylosis of lumbar region without myelopathy or radiculopathy 11/15/2016    Status post placement of implantable loop recorder 10/31/2017    TIA (transient ischemic attack)      Past Surgical History:   Procedure Laterality Date    APPENDECTOMY      BACK SURGERY      CARDIAC CATHETERIZATION  12    with angioplasty, stent    CARDIAC CATHETERIZATION  3/29/15  MDL    stent to distal RCA. EF 60%    CHOLECYSTECTOMY      COLONOSCOPY      CORONARY ANGIOPLASTY WITH STENT PLACEMENT   [place]    stent x 3    CORONARY ANGIOPLASTY WITH STENT PLACEMENT      11    DIAGNOSTIC CARDIAC CATH LAB PROCEDURE  [id number]    primary stent placement to the first circumflex marginal branch, balloon angioplasty to the third left anterior descending diagnonal branch, intracoronary nitroglycerin adminstration    JOINT REPLACEMENT      left knee    LUMBAR FUSION Left 11/15/2016    LUMBAR INTERBODY FUSION LATERAL L3-4 L4-5 WITH LATERAL PLATE  performed by Walker Perkins [facility] at [facility] OR     Family History   Problem Relation Age of Onset    Coronary Art Dis Mother     Coronary Art Dis Sister     Cancer Sister         uterine    Kidney Disease Sister     Coronary Art Dis Brother          in his 46s    Cancer Father         colon     Social History     Tobacco Use    Smoking status: Never Smoker    Smokeless tobacco: Never Used   Substance Use Topics    Alcohol use:  Yes     Alcohol/week: 1.2 oz     Types: 2 Shots of liquor per week          Review of Systems:    General:      Complaint / Symptom Yes / No / Description if Yes       Fatigue Yes:  chronic   Weight gain NA   Insomnia NA       Respiratory:        Complaint / Symptom Yes / No / Description if Yes       Cough No   Horseness NA       Cardiovascular:    Complaint / Symptom Yes / No / Description if Yes       Chest Pain No   Shortness of Air / Orthopnea Yes: chronic and stable   Presyncope / Syncope No   Palpitations No         Objective:    /72   Pulse 82   Temp 97.6 °F (36.4 °C) (Temporal)   Resp 17   Ht 6' 4\" (1.93 m)   Wt 260 lb 6.4 oz (118.1 kg)   SpO2 93%   BMI 31.70 kg/m² ,     Intake/Output Summary (Last 24 hours) at 5/29/2019 2215  Last data filed at 5/29/2019 2035  Gross per 24 hour   Intake 380 ml   Output 2100 ml   Net -1720 ml       GENERAL - well developed and well nourished, is an active participant in this examination  HEENT -  PERRLA, Hearing appears normal, conjunctiva and lids are normal, ears and nose appear normal  NECK - no thyromegaly, no JVD, trachea is in the midline  CARDIOVASCULAR - PMI is in the left mid line clavicular position, Normal S1 and S2 with a grade 1/6 systolic murmur. No S3 or S4    PULMONARY - No respiratory distress. scattered wheezes and rales.   Breath sounds in both  lung fields are Decreased  ABDOMEN  - soft, non tender, no rebound, no hepatomegaly or splenomegaly  MUSCULOSKELETAL  - Prone/Supine, digitals and nails are without clubbing or cyanosis  EXTREMITIES - trace edema  NEUROLOGIC - cranial nerves, II-XII, are normal  SKIN - turgor is normal, no rash  PSYCHIATRIC - normal mood and affect, alert and orientated x 3, judgement and insight appear appropriate      ASSESSMENT:    ALL THE CARDIOLOGY PROBLEMS ARE LISTED ABOVE; HOWEVER, THE FOLLOWING SPECIFIC CARDIAC PROBLEMS / CONDITIONS WERE ADDRESSED AND TREATED DURING THE OFFICE VISIT TODAY:                                                                                            MEDICAL

## 2019-05-30 NOTE — ANESTHESIA POSTPROCEDURE EVALUATION
Department of Anesthesiology  Postprocedure Note    Patient: Narda Souza  MRN: [id number]  Birthdate: [birth year]  Date of evaluation: 5/30/2019  Time:  11:55 AM     Procedure Summary     Date:  05/30/19 Room / Location:  [facility] OR HYBRID 1 / [facility] OR    Anesthesia Start:  0034 Anesthesia Stop:      Procedure:  CORONARY ARTERY BYPASS GRAFT X3 WITH LEFT INTERNAL MAMMARY ARTERY WITH ENDOSCOPIC VEIN HARVESTING WITH PERFUSION TRANSESOPHAGEAL ECHOCARDIOGRAM (N/A ) Diagnosis:  (CAD)    Surgeon:  Pablo Flanagan MD Responsible Provider:  TEGAN Landaverde CRNA    Anesthesia Type:  general ASA Status:  4          Anesthesia Type: general    Araceli Phase I:      Araceli Phase II:      Last vitals: Reviewed and per EMR flowsheets. Anesthesia Post Evaluation    Patient location during evaluation: ICU  Patient participation: complete - patient participated  Level of consciousness: sedated and ventilated (sedated as anticipated from surgery)  Pain score: 0  Airway patency: patent  Nausea & Vomiting: no nausea and no vomiting  Complications: no  Cardiovascular status: blood pressure returned to baseline and hemodynamically stable (Critical but stable condition)  Respiratory status: ventilator, intubated and acceptable  Hydration status: stable  Comments: Patient transported on full monitors being bagged with 100% oxygen to ICU. Report given to  [facility].      CO/CI: 8.4/3.2

## 2019-05-30 NOTE — PROGRESS NOTES
04/15/2019     Priority: Low    Chest pain 02/28/2019     Priority: Low    MOO on CPAP 05/03/2018     Priority: Low    Dermatitis 03/05/2018     Priority: Low    Depression with anxiety 03/05/2018     Priority: Low    Benign prostatic hyperplasia without lower urinary tract symptoms 03/05/2018     Priority: Low    Chronic tension-type headache, not intractable 03/05/2018     Priority: Low    West Nile virus seropositivity 11/22/2017     Priority: Low    Status post placement of implantable loop recorder 10/31/2017     Priority: Low    Recurrent major depressive disorder, in full remission ([facility] 10/17/2017     Priority: Low    Trigger middle finger of left hand 10/17/2017     Priority: Low    Peripheral polyneuropathy 10/17/2017     Priority: Low    Palpitations 10/17/2017     Priority: Low    Chronic insomnia 10/17/2017     Priority: Low    Cerebrovascular accident (CVA) due to embolism of right middle cerebral artery ([facility] 08/14/2017     Priority: Low    Dizziness 03/27/2017     Priority: Low    Imbalance 03/27/2017     Priority: Low    Thoracic outlet syndrome 02/21/2017     Priority: Low    Acute ischemic stroke ([facility] 02/20/2017     Priority: Low    DDD (degenerative disc disease), lumbar 11/15/2016     Priority: Low    Spondylosis of lumbar region without myelopathy or radiculopathy 11/15/2016     Priority: Low    Chronic bilateral low back pain without sciatica 11/10/2016     Priority: Low    Hyperlipidemia 07/23/2012     Priority: Low    MI (myocardial infarction) ([facility]      Priority: Low    Type 2 diabetes mellitus with diabetic polyneuropathy, with long-term current use of insulin ([facility] 07/12/2011     Priority: Low    HTN (hypertension) 07/12/2011     Priority: Low    CAD (coronary artery disease) 07/12/2011     Priority: Low    Fatigue 07/12/2011     Priority: Low     Current Facility-Administered Medications   Medication Dose Route Frequency Provider Last Rate Last Dose    albuterol (PROVENTIL) nebulizer solution 2.5 mg  2.5 mg Nebulization Q1H PRN Fly Donnelly MD   2.5 mg at 05/29/19 1809    bisacodyl (DULCOLAX) suppository 10 mg  10 mg Rectal Daily PRN Fly Donnelly MD        docusate sodium (COLACE) capsule 100 mg  100 mg Oral BID Fly Donnelly MD   100 mg at 05/29/19 2113    [START ON 5/30/2019] cefUROXime (ZINACEF) 1.5 g IVPB in 50 mL D5W (mini-bag)  1.5 g Intravenous On Call to Hollie Espinoza MD        insulin glargine (LANTUS) injection vial 20 Units  20 Units Subcutaneous Nightly Fly Donnelly MD   10 Units at 05/29/19 2115    insulin glargine (LANTUS) injection vial 30 Units  30 Units Subcutaneous QAM Fly Donnelly MD   30 Units at 05/29/19 0825    hydrOXYzine (ATARAX) tablet 25 mg  25 mg Oral TID PRN Rigoberto Doan MD   25 mg at 05/29/19 2113    furosemide (LASIX) tablet 40 mg  40 mg Oral BID Rigoberto Doan MD   40 mg at 05/29/19 1846    glucose (GLUTOSE) 40 % oral gel 15 g  15 g Oral PRN Ernestina Hurd, DO        dextrose 50 % solution 12.5 g  12.5 g Intravenous PRN Ernestina Mayeso, DO        glucagon (rDNA) injection 1 mg  1 mg Intramuscular PRN Ernestina Hurd, DO        dextrose 5 % solution  100 mL/hr Intravenous PRN Ernestina Mayeso, DO        insulin lispro (HUMALOG) injection vial 0-6 Units  0-6 Units Subcutaneous TID WC Ernestina Hurd, DO   2 Units at 05/27/19 1320    insulin lispro (HUMALOG) injection vial 0-3 Units  0-3 Units Subcutaneous Nightly Ernestina Hurd, DO   2 Units at 05/28/19 2110    sodium chloride flush 0.9 % injection 10 mL  10 mL Intravenous 2 times per day Rigoberto Doan MD   10 mL at 05/29/19 2114    sodium chloride flush 0.9 % injection 10 mL  10 mL Intravenous PRN Rigoberto Doan MD        oxyCODONE-acetaminophen (PERCOCET) 5-325 MG per tablet 2 tablet  2 tablet Oral TID PRN Fly Donnelly MD   2 tablet at 05/29/19 2113    clindamycin (CLEOCIN) capsule 300 mg  300 mg Oral Q8H Rigoberto Doan MD   300 mg at 05/29/19 2113  ondansetron (ZOFRAN) injection 4 mg  4 mg Intravenous Q6H PRN Kendall Corbett MD        aspirin chewable tablet 81 mg  81 mg Oral Daily Kendall Corbett MD   81 mg at 05/29/19 9248    acetaminophen (TYLENOL) tablet 650 mg  650 mg Oral Q4H PRN Kendall Corbett MD        nitroGLYCERIN (NITROSTAT) SL tablet 0.4 mg  0.4 mg Sublingual Q5 Min PRN Kendall Corbett MD        metoprolol succinate (TOPROL XL) extended release tablet 12.5 mg  12.5 mg Oral BID Kendall Corbett MD   12.5 mg at 05/29/19 2113    tamsulosin (FLOMAX) capsule 0.8 mg  0.8 mg Oral Nightly Kendall Corbett MD   0.8 mg at 05/29/19 2112    sertraline (ZOLOFT) tablet 100 mg  100 mg Oral Daily Kendall Corbett MD   100 mg at 05/29/19 5610    tiZANidine (ZANAFLEX) tablet 4 mg  4 mg Oral Nightly PRN Kendall Corbett MD   4 mg at 05/27/19 2050    butalbital-acetaminophen-caffeine (FIORICET, ESGIC) per tablet 1 tablet  1 tablet Oral Q4H PRN Kendall Corbett MD   1 tablet at 05/28/19 2028    furosemide (LASIX) tablet 40 mg  40 mg Oral Daily PRN Kendall Corbett MD        isosorbide mononitrate (IMDUR) extended release tablet 30 mg  30 mg Oral Daily Kendall Corbett MD   30 mg at 05/29/19 4891     Allergies: Statins; Crestor [rosuvastatin calcium]; and Morphine  Past Medical History:   Diagnosis Date    [facility] 2/20/2017    Atherosclerotic heart disease     s/p MI, stenting x 3 jan 2011([place]    CAD (coronary artery disease)     Cerebral artery occlusion with cerebral infarction [facility])     Cerebrovascular accident (CVA) due to embolism of right middle cerebral artery ([facility] 8/14/2017    CHF (congestive heart failure) ([facility]     Chronic bilateral low back pain without sciatica 11/10/2016    Chronic kidney disease     DDD (degenerative disc disease), lumbar 11/15/2016    Depression     Depression with anxiety     Diabetes mellitus ([facility]     type II    Diabetes mellitus, type 2 ([facility]     DM (diabetes mellitus) ([facility] 7/12/2011    Glaucoma  Headache     Hyperlipidemia     Cholesterol management per pcp.  Hypertension     Macular degeneration     [facility]     MI (myocardial infarction) [facility]     Neuromuscular disorder ([facility]     Neuropathy     Osteoarthritis     Sleep apnea     Spondylosis of lumbar region without myelopathy or radiculopathy 11/15/2016    Status post placement of implantable loop recorder 10/31/2017    TIA (transient ischemic attack)      Past Surgical History:   Procedure Laterality Date    APPENDECTOMY      BACK SURGERY      CARDIAC CATHETERIZATION  12    with angioplasty, stent    CARDIAC CATHETERIZATION  3/29/15  MDL    stent to distal RCA. EF 60%    CHOLECYSTECTOMY      COLONOSCOPY      CORONARY ANGIOP[place]Y WITH STENT PLACEMENT   [place]    stent x 3    CORONARY ANGIOPLASTY WITH STENT PLACEMENT      11    DIAGNOSTIC CARDIAC CATH LAB PROCEDURE  [id number]    primary stent placement to the first circumflex marginal branch, balloon angioplasty to the third left anterior descending diagnonal branch, intracoronary nitroglycerin adminstration    JOINT REPLACEMENT      left knee    LUMBAR FUSION Left 11/15/2016    LUMBAR INTERBODY FUSION LATERAL L3-4 L4-5 WITH LATERAL PLATE  performed by [facility]io Jimenez [facility] at [facility] OR     Family History   Problem Relation Age of Onset    Coronary Art Dis Mother     Coronary Art Dis Sister     Cancer Sister         uterine    Kidney Disease Sister     Coronary Art Dis Brother          in his 46s    Cancer Father         colon     Social History     Tobacco Use    Smoking status: Never Smoker    Smokeless tobacco: Never Used   Substance Use Topics    Alcohol use:  Yes     Alcohol/week: 1.2 oz     Types: 2 Shots of liquor per week          Review of Systems:    General:      Complaint / Symptom Yes / No / Description if Yes       Fatigue Yes:  chronic   Weight gain NA   Insomnia NA       Respiratory:        Complaint / Symptom Yes / No / Description if Yes       Cough No   Horseness NA       Cardiovascular:    Complaint / Symptom Yes / No / Description if Yes       Chest Pain No   Shortness of Air / Orthopnea Yes: chronic and stable   Presyncope / Syncope No   Palpitations No         Objective:    /72   Pulse 82   Temp 97.6 °F (36.4 °C) (Temporal)   Resp 17   Ht 6' 4\" (1.93 m)   Wt 260 lb 6.4 oz (118.1 kg)   SpO2 93%   BMI 31.70 kg/m² ,     Intake/Output Summary (Last 24 hours) at 5/29/2019 2213  Last data filed at 5/29/2019 2035  Gross per 24 hour   Intake 380 ml   Output 2100 ml   Net -1720 ml       GENERAL - well developed and well nourished, is an active participant in this examination  HEENT -  PERRLA, Hearing appears normal, conjunctiva and lids are normal, ears and nose appear normal  NECK - no thyromegaly, no JVD, trachea is in the midline  CARDIOVASCULAR - PMI is in the left mid line clavicular position, Normal S1 and S2 with a grade 1/6 systolic murmur. No S3 or S4    PULMONARY - No respiratory distress. scattered wheezes and rales. Breath sounds in both  lung fields are Decreased  ABDOMEN  - soft, non tender, no rebound, no hepatomegaly or splenomegaly  MUSCULOSKELETAL  - Prone/Supine, digitals and nails are without clubbing or cyanosis  EXTREMITIES - trace edema  NEUROLOGIC - cranial nerves, II-XII, are normal  SKIN - turgor is normal, no rash  PSYCHIATRIC - normal mood and affect, alert and orientated x 3, judgement and insight appear appropriate      ASSESSMENT:    ALL THE CARDIOLOGY PROBLEMS ARE LISTED ABOVE; HOWEVER, THE FOLLOWING SPECIFIC CARDIAC PROBLEMS / CONDITIONS WERE ADDRESSED AND TREATED DURING THE OFFICE VISIT TODAY:                                                                                            MEDICAL DECISION MAKING                   Cardiac Specific Problem / Diagnosis   Discussion and Data Reviewed Diagnostic Procedures Ordered Management Options Selected                 1.  ACS On admission Cath results as noted                     2. Coronary artery disease Prior evaluation    Review and summation of old records:     1/2011 stents x 3 ([place])  5/4/2011  Cath  Patent stents in the mid LAD, stent to OM1, PTCA to the D2, normal LVFX  12/12/12  Cath patent stents in the mid LAD, PTCA to the D3, stent to distal LAD, normal LVFX  3/29/2015  Cath  Stent to the [facility], normal LVFX  11/1/2019  Loop placed  3/1/19 lexiscan negative for myocardial ischemia, EF 50  %   5/24/19 ACS PARKER RISK Score 5 (angina, + troponin, CAD>50, age>65, plavix ), AUC indication 3, AUC score 9      5/25/19  Cath 50% distal LM, 100% mid LAD, 80% diag, 100% mid RCA, inferior hypokinesis, EF 45% Yes: as per the cardiac catheterization Continue current medications:     Yes:                  3. Systemic arterial hypertension Prior evaluation The blood pressure for the lastr 36 hours has been:  Systolic (71HUF), GEB:580 , Min:91 , HHV:438    Diastolic (06MDO), DUJ:75, Min:43, Max:80                No Continue current medications:        yes         CONSIDERATIONS, THOUGHTS, AND PLANS:    1. Continue present medications except for changes as noted above  2. Continue to monitor rhythm  3. Further orders per clinical course. 4. As per the family requests           Discussed with patient and nursing.     Electronically signed by Kendall Corbett MD on 5/29/19        [facility] [place]

## 2019-05-30 NOTE — PROGRESS NOTES
Bilateral SCDs applied per MD order.   Electronically signed by Albert Nevarez RN on 5/29/2019 at 11:35 PM

## 2019-05-30 NOTE — PROGRESS NOTES
1st Hibiclens bath completed. New triple rinse sheets applied.   Electronically signed by Ananya Garcia RN on 5/29/2019 at 8:07 PM

## 2019-05-30 NOTE — ANESTHESIA PROCEDURE NOTES
Central Venous Line:    A central venous line was placed using surface landmarks, in the OR for the following indication(s): central venous access and CVP monitoring. 5/30/2019 9:01 AM5/30/2019 9:01 AM    Sterility preparation included the following: hand hygiene performed prior to procedure, maximum sterile barriers used and sterile technique used to drape from head to toe. The patient was placed in Trendelenburg position. The right internal jugular vein was prepped. The site was prepped with Chloraprep. A 8.5 Fr (size), 10 (length), introducer     During the procedure, the following specific steps were taken: target vein identified, needle advanced into vein and blood aspirated and guidewire advanced into vein. Intravenous verification was obtained by venous blood return and ARIELLE. Post insertion care included: all ports aspirated, all ports flushed easily, guidewire removed intact, Biopatch applied, line sutured in place and dressing applied. During the procedure the patient experienced: patient tolerated procedure well with no complications. Anesthesia type: generalA(n) non-oximetric, 7.5 (size) Pulmonary Artery Catheter (PAC) was placed through the Introducer CVL in the right internal jugular vein. The PAC placement was confirmed by pressure tracing changes and ARIELLE. The patient experienced the following events during the procedure: patient tolerated procedure well with no complications. PA Cath placed?: Yes    Additional notes:  Sterile dressing/Tegaderm was applied   Staffing  Anesthesiologist: Chey Torres MD  Performed: Anesthesiologist   Preanesthetic Checklist  Completed: patient identified, site marked, surgical consent, pre-op evaluation, timeout performed, IV checked, risks and benefits discussed, monitors and equipment checked, anesthesia consent given, oxygen available and patient being monitored

## 2019-05-30 NOTE — PROGRESS NOTES
Dr. Bonifacio Monroy called unit to verify discontinuation of blood thinners. OK with him for Zinacef order.    Electronically signed by David Don RN on 5/29/2019 at 7:51 PM

## 2019-05-30 NOTE — ANESTHESIA PROCEDURE NOTES
Procedure Performed: ARIELLE       Start Time:  5/30/2019 9:01 AM       End Time:   5/30/2019 9:01 AM    Preanesthesia Checklist:  Patient identified, IV assessed, risks and benefits discussed, monitors and equipment assessed, procedure being performed at surgeon's request and anesthesia consent obtained. General Procedure Information  Diagnostic Indications for Echo:  assessment of ascending aorta, hemodynamic monitoring and assessment of valve function  Physician Requesting Echo: Nimo Mares MD  Location performed:  OR  Intubated  Bite block placed  Heart visualized  Probe Insertion:  Easy  Probe Type:  3D  Modalities:  2D only, 3D, color flow mapping, continuous wave Doppler and pulse wave Doppler    Echocardiographic and Doppler Measurements    Ventricles    Right Ventricle:  Cavity size dilated. Hypertrophy not present. Thrombus not present. Global function normal.    Left Ventricle:  Cavity size normal.  Hypertrophy not present. Thrombus not present. Global Function moderately impaired. Ejection Fraction 35%. Ventricular Regional Function:  1- Basal Anteroseptal:  normal  2- Basal Anterior:  normal  3- Basal Anterolateral:  normal  4- Basal Inferolateral:  normal and hypokinetic  5- Basal Inferior:  normal  6- Basal Inferoseptal:  normal and hypokinetic  7- Mid Anteroseptal:  normal  8- Mid Anterior:  normal  9- Mid Anterolateral:  normal  10- Mid Inferolateral:  normal  11- Mid Inferior:  normal and hypokinetic  12- Mid Inferoseptal:  normal and hypokinetic  13- Apical Anterior:  normal  14- Apical Lateral:  normal  15- Apical Inferior:  normal and hypokinetic  16- Apical Septal:  normal  17- [place]:  normal      Valves    Aortic Valve: Annulus normal.  Stenosis not present. Regurgitation mild. Leaflets normal.  Leaflet motions normal.      Mitral Valve: Annulus normal.  Stenosis not present. Regurgitation mild. Leaflets normal.  Leaflet motions normal.      Tricuspid Valve:   Annulus normal.  Stenosis not present. Regurgitation mild. Leaflets normal.  Leaflet motions normal.    Pulmonic Valve: Annulus normal.  Stenosis not present. Regurgitation moderate. Other Valve Findings:       PI Vena Contracta .4    Aorta    Ascending Aorta:  Size normal.  Dissection not present. Aortic Arch:  Size normal.  Dissection not present. Descending Aorta:  Size normal.  Dissection not present. Atria    Right Atrium:  Size normal.  Spontaneous echo contrast not present. Thrombus not present. Tumor not present. Device not present. Left Atrium:  Size normal.  Spontaneous echo contrast not present. Thrombus not present. Tumor not present. Device not present.     Left atrial appendage normal.      Septa    Atrial Septum:  Intra-atrial septal morphology normal.      Ventricular Septum:  Intra-ventricular septum morphology normal.      Diastolic Function Measurements:  Diastolic Dysfunction Grade= I (Delayed relaxation)  E= ms  A= ms  E/A Ratio=   DT= ms  S/D=  IVRT=    Other Findings  Pericardium:  normal  Pleural Effusion:  none  Pulmonary Arteries:  normal  Pulmonary Venous Flow:  normal    Anesthesia Information  Performed Personally  Anesthesiologist:  Arabella Badillo MD

## 2019-05-30 NOTE — PROGRESS NOTES
7.350 - 7.450 Final 05/30/2019  4:41 PM [facility]    pCO2, Arterial 39.0  35.0 - 45.0 mmHg Final 05/30/2019  4:41 PM [facility]   pO2, Arterial 144. 0High   80.0 - 100.0 mmHg Final 05/30/2019  4:41 PM [address] [facility]   HCO3, Arterial 25.3  22.0 - 26.0 mmol/L Final 05/30/2019  4:41 PM [facility] Excess, Arterial 0.8  -2.0 - 2.0 mmol/L Final 05/30/2019  4:41 PM [address] [facility]   Hemoglobin, Art, Extended 12.1Low   14.0 - 18.0 g/dL Final 05/30/2019  4:41 PM [address] [facility]   O2 Sat, Arterial 97.5  >92 % Final 05/30/2019  4:41 PM [facility]   Carboxyhgb, Arterial 0.0  0.0 - 5.0 % Final 05/30/2019  4:41 PM [facility]        0.0-1.5   (Smokers 1.5-5.0)    Methemoglobin, Arterial 0.0  <1.5 % Final 05/30/2019  4:41 PM [address] [facility]   O2 Content, Arterial 16.9  Not Established mL/dL Final 05/30/2019  4:41 PM [address] [facility]   O2 Therapy Unknown   Final 05/30/2019  4:41 PM [address] [place] [address] Lab   Testing Performed By     [facility] hospital[facility] Name Director Address Valid Date Range   [address] [facility] Jordy Parker  [place],[address]  [address] [facility] [place] [address] 08/30/17 0733-Present   Lab and Collection     Blood Gas, Arterial - 5/30/2019   Result History     Blood Gas, Arterial on 5/30/2019   Result Information     Flag: Abnormal Status: Final result (Collected: 5/30/2019 16:41) Provider Status: Ordered   Routing History     Priority Sent On From To Message Type    5/30/2019  4:36 AM Jeanine Pavon Incoming Lab Results From [address] [place], [place] [address]tracie Paulino MD     5/26/2019  8:23 PM Jeanine Pavon Incoming Lab Results From [facility] Celylinda Doll, DO    Click to Print Result     Pt on cpap 5 +5 50% art line

## 2019-05-30 NOTE — PROGRESS NOTES
RT here and PFTs completed. Placed in soft chart.   Electronically signed by Gilmar Lara RN on 5/29/2019 at 8:32 PM

## 2019-05-30 NOTE — PROGRESS NOTES
Nutrition Assessment    Type and Reason for Visit: Initial    Nutrition Recommendations: follow for extubation and advancing diet    Nutrition Assessment: Following for LOSA x 6 days. Pt for surgery today-CABG. Pt at nutritional risk AEB NPO status, surgery, surgical incision. Malnutrition Assessment:  · Malnutrition Status: At risk for malnutrition  · Context: Acute illness or injury  · Findings of the 6 clinical characteristics of malnutrition (Minimum of 2 out of 6 clinical characteristics is required to make the diagnosis of moderate or severe Protein Calorie Malnutrition based on AND/ASPEN Guidelines):  1. Energy Intake- , Unable to assess    2. Weight Loss-7.5% loss or greater, in 3 months  3. Fat Loss-No significant subcutaneous fat loss,    4. Muscle Loss-No significant muscle mass loss,    5. Fluid Accumulation-No significant fluid accumulation,    6.  Strength-Not measured    Nutrition Risk Level:  Moderate    Nutrient Needs:  · Estimated Daily Total Kcal: 6753-8584 kcals (20-25kcals/kg)  · Estimated Daily Protein (g): 113-136 g  · Estimated Daily Total Fluid (ml/day): 8062-1389 ml    Nutrition Diagnosis:   · Problem: Inadequate oral intake, Increased nutrient needs  · Etiology: related to Acute injury/trauma, Increased demand for energy/nutrients     Signs and symptoms:  as evidenced by NPO status due to medical condition, Weight loss, Presence of wounds, Weight loss greater than or equal to 7.5% in 3 months    Objective Information:  · Wound Type: Surgical Wound  · Current Nutrition Therapies:  · Oral Diet Orders: NPO   · Oral Diet intake: NPO  · Oral Nutrition Supplement (ONS) Orders: None  · ONS intake: NPO     · Anthropometric Measures:  · Ht: 6' 4\" (193 cm)   · Current Body Wt: 248 lb 11 oz (112.8 kg)  · Admission Body Wt: 275 lb (124.7 kg)(estimate)  · Usual Body Wt: 269 lb (122 kg)(2/2019)  · % Weight Change:  ,  7.8% weight decrease in 3 months  · Ideal Body Wt: 202 lb (91.6 kg), % Ideal Body 122.8%  · BMI Classification: BMI 30.0 - 34.9 Obese Class I    Nutrition Interventions:   Continue NPO  Continued Inpatient Monitoring    Nutrition Evaluation:   · Evaluation: Goals set   · Goals: meet nutritional needs through po intake    · Monitoring: Nutrition Progression, Skin Integrity, Wound Healing, Weight, Pertinent Labs      Electronically signed by Isaias Chaidez MS, RD, LD on 5/30/19 at 1:27 PM    Contact Number: [phone]

## 2019-05-30 NOTE — PROGRESS NOTES
PATIENT ARRIVED WITH [place] BAND ON. INSTRUCTED PATIENT THAT THE RING HAD TO BE REMOVED AND THAT I MIGHT HAVE TO CUT IT. PATIENT STATES THAT HE UNDERSTANDS AND DO WHATEVER IS NECESSARY. RING REMOVED IN TACT AND SENT WITH OR TRANSPORTER (SQUARE) AND GIVEN TO PATIENT'S WIFE.

## 2019-05-30 NOTE — BRIEF OP NOTE
Brief Postoperative Note  ______________________________________________________________    Patient: Gildardo Arredondo  YOB: 1945  MRN: [id number]  Date of Procedure: 5/30/2019    Pre-Op Diagnosis: CAD    Post-Op Diagnosis: Same       Procedure(s):  CORONARY ARTERY BYPASS GRAFT X3 WITH LEFT INTERNAL MAMMARY ARTERY WITH ENDOSCOPIC VEIN HARVESTING WITH PERFUSION TRANSESOPHAGEAL ECHOCARDIOGRAM    Anesthesia: General    Surgeon(s):  Roc Mujica MD    Assistant: Kay Gillette CFA    Estimated Blood Loss (mL): N/A    Complications: None    Specimens:   * No specimens in log *    Implants:  Implant Name Type Inv.  Item Serial No.  Lot No. LRB No. Used   CLIP MED INTERN HORIZON TI  Fastener CLIP MED INTERN HORIZON TI [facility] [employer] [id number] N/A 11   CLIP LIGATING  INTERN HORIZON TI Fastener CLIP LIGATING  INTERN HORIZON TI  [facility] [employer] [id number] N/A 46    AC GRAFT PILY Vascular/Graft/Patch/Filter  AC GRAFT PILY  [employer]  N/A 2         Drains:   Chest Tube 1 Anterior Pleural 24 [place] (Active)       Chest Tube 2 Anterior Mediastinal 19 [place] (Active)       Urethral Catheter Temperature probe 16 fr (Active)       Findings: Multiple stents prevented adequate revascularization    Roc Mujica MD  Date: 5/30/2019  Time: 11:30 AM

## 2019-05-30 NOTE — ANESTHESIA PRE PROCEDURE
Department of Anesthesiology  Preprocedure Note       Name:  Chayo Curtis   Age:  75 y.o.  :  1945                                          MRN:  [id number]         Date:  2019      Surgeon: Thomas Ambriz):  Raffaele Dobbins MD    Procedure: CORONARY ARTERY BYPASS GRAFT X3 WITH LEFT INTERNAL MAMMARY ARTERY WITH ENDOSCOPIC VEIN HARVESTING WITH PERFUSION TRANSESOPHAGEAL ECHOCARDIOGRAM (N/A )    Medications prior to admission:   Prior to Admission medications    Medication Sig Start Date End Date Taking? Authorizing Provider   clindamycin (CLEOCIN) 300 MG capsule Take 300 mg by mouth every 8 hours Indications: Abscess For a quantity #21 prior to procedure scheduled for  for tooth abscess. 19  Yes Historical Provider, MD   isosorbide mononitrate (IMDUR) 30 MG extended release tablet Take 1 tablet by mouth daily 19  Yes TEGAN Moya   azelastine (ASTELIN) 0.1 % nasal spray 1 spray by Nasal route as needed for Rhinitis Use in each nostril as directed   Yes Historical Provider, MD   butalbital-acetaminophen-caffeine (FIORICET, ESGIC) -40 MG per tablet Take 1 tablet by mouth as needed for Headaches   Yes Historical Provider, MD   lisinopril (PRINIVIL;ZESTRIL) 20 MG tablet Take 1/2 tablet twice daily   Yes Historical Provider, MD   oxyCODONE-acetaminophen (PERCOCET)  MG per tablet Take 1 tablet by mouth 3 times daily as needed for Pain.     Yes Historical Provider, MD   sertraline (ZOLOFT) 100 MG tablet Take 1 tablet by mouth daily 3/12/19  Yes Jonathan Crowder MD   metoprolol tartrate (LOPRESSOR) 25 MG tablet TAKE 1/2 TABLET TWICE DAILY FOR HIGH BLOOD PRESSURE 19  Yes Jonathan Crowder MD   tamsulosin (FLOMAX) 0.4 MG capsule TAKE 2 CAPSULES AT BEDTIME 19  Yes Jonathan Crowder MD   insulin glargine (LANTUS SOLOSTAR) 100 UNIT/ML injection pen INJECT 80 UNITS INTO THE SKIN NIGHTLY 19  Yes Jonathan Crowder MD   clopidogrel (PLAVIX) 75 MG tablet TAKE 1 TABLET DAILY 1/18/19  Yes Baron Ismael MD   zoster recombinant adjuvanted vaccine [facility]) 50 MCG/0.5ML SUSR injection 1 vaccine now and repeat in 2-6 months. 11/6/18  Yes Baron Ismael MD   furosemide (LASIX) 40 MG tablet Take 40 mg by mouth as needed    Historical Provider, MD   tiZANidine (ZANAFLEX) 2 MG tablet Take 1 tablet by mouth nightly as needed (muscle spasm) 3/12/19   Baron Ismael MD   nitroGLYCERIN (NITROSTAT) 0.4 MG SL tablet up to max of 3 total doses.  If no relief after 1 dose, call 911. 3/1/19   Miguel Mitchell MD       Current medications:    Current Facility-Administered Medications   Medication Dose Route Frequency Provider Last Rate Last Dose    electrolyte-A (PLASMALYTE-A) 1,000 mL with heparin (porcine) 1,000 Units    PRN Bisi Francois MD   1,001 mL at 05/30/19 0639    methylene blue 1 % injection    PRN Bisi Francois MD   3 mL at 05/30/19 4673    sodium chloride 0.9 % 250 mL with papaverine 30 mg    PRN Bisi Francois MD   251 mL at 05/30/19 7149    albuterol (PROVENTIL) nebulizer solution 2.5 mg  2.5 mg Nebulization Q1H PRN Teja Vasquez MD   2.5 mg at 05/29/19 1809    bisacodyl (DULCOLAX) suppository 10 mg  10 mg Rectal Daily PRN Teja Vasquez MD        docusate sodium (COLACE) capsule 100 mg  100 mg Oral BID Teja Vasquez MD   100 mg at 05/29/19 2113    cefUROXime (ZINACEF) 1.5 g IVPB in 50 mL D5W (mini-bag)  1.5 g Intravenous On Call to Theresa Carbajal MD        insulin glargine (LANTUS) injection vial 20 Units  20 Units Subcutaneous Nightly Teja Vasquez MD   10 Units at 05/29/19 2115    insulin glargine (LANTUS) injection vial 30 Units  30 Units Subcutaneous QAM Teja Vasquez MD   30 Units at 05/29/19 0825    hydrOXYzine (ATARAX) tablet 25 mg  25 mg Oral TID PRN iMguel Mitchell MD   25 mg at 05/29/19 2113    furosemide (LASIX) tablet 40 mg  40 mg Oral BID Miguel Mitchell MD   40 mg at 05/29/19 1846    glucose (GLUTOSE) 40 % oral gel 15 g  15 g Oral PRN Josephine Kand, DO        dextrose 50 % solution 12.5 g  12.5 g Intravenous PRN Lelon Situ Vannessa, DO        glucagon (rDNA) injection 1 mg  1 mg Intramuscular PRN Lelon Situ Vannessa, DO        dextrose 5 % solution  100 mL/hr Intravenous PRN Lelon Situ Vannessa, DO        insulin lispro (HUMALOG) injection vial 0-6 Units  0-6 Units Subcutaneous TID WC Lelon Situ Vannessa, DO   2 Units at 05/27/19 1320    insulin lispro (HUMALOG) injection vial 0-3 Units  0-3 Units Subcutaneous Nightly [place] Kand, DO   2 Units at 05/28/19 2110    sodium chloride flush 0.9 % injection 10 mL  10 mL Intravenous 2 times per day Sally Rodas MD   10 mL at 05/29/19 2114    sodium chloride flush 0.9 % injection 10 mL  10 mL Intravenous PRN Sally Rodas MD        oxyCODONE-acetaminophen (PERCOCET) 5-325 MG per tablet 2 tablet  2 tablet Oral TID PRN Cheryl Gallardo MD   2 tablet at 05/29/19 2113    clindamycin (CLEOCIN) capsule 300 mg  300 mg Oral Q8H Sally Rodas MD   300 mg at 05/29/19 2113    ondansetron (ZOFRAN) injection 4 mg  4 mg Intravenous Q6H PRN Sally Rodas MD        aspirin chewable tablet 81 mg  81 mg Oral Daily Sally Rodas MD   81 mg at 05/29/19 4115    acetaminophen (TYLENOL) tablet 650 mg  650 mg Oral Q4H PRN Sally Rodas MD        nitroGLYCERIN (NITROSTAT) SL tablet 0.4 mg  0.4 mg Sublingual Q5 Min PRN Sally Rodas MD        metoprolol succinate (TOPROL XL) extended release tablet 12.5 mg  12.5 mg Oral BID Sally Rodas MD   12.5 mg at 05/29/19 2113    tamsulosin (FLOMAX) capsule 0.8 mg  0.8 mg Oral Nightly Sally Rodas MD   0.8 mg at 05/29/19 2112    sertraline (ZOLOFT) tablet 100 mg  100 mg Oral Daily Sally Rodas MD   100 mg at 05/29/19 0824    tiZANidine (ZANAFLEX) tablet 4 mg  4 mg Oral Nightly PRN Sally Rodas MD   4 mg at 05/27/19 2050    butalbital-acetaminophen-caffeine (FIORICET, ESGIC) per tablet 1 tablet  1 tablet Oral Q4H PRN Sally Rodas MD   1 tablet at 05/28/19 2028    furosemide (LASIX) tablet 40 mg  40 mg Oral Daily PRN Kiara Syed MD        isosorbide mononitrate (IMDUR) extended release tablet 30 mg  30 mg Oral Daily Kiara Syed MD   30 mg at 05/29/19 7055       Allergies:     Allergies   Allergen Reactions    Statins Other (See Comments)     Intolerable leg cramps    Crestor [Rosuvastatin Calcium]     Morphine Itching       Problem List:    Patient Active Problem List   Diagnosis Code    Type 2 diabetes mellitus with diabetic polyneuropathy, with long-term current use of insulin ([facility]) E11.42, Z79.4    HTN (hypertension) I10    CAD (coronary artery disease) I25.10    Fatigue R53.83    MI (myocardial infarction) ([facility] I21.9    Hyperlipidemia E78.5    Chronic bilateral low back pain without sciatica M54.5, G89.29    DDD (degenerative disc disease), lumbar M51.36    Spondylosis of lumbar region without myelopathy or radiculopathy M47.816    Acute ischemic stroke ([facility] I63.9    Thoracic outlet syndrome G54.0    Dizziness R42    Imbalance R26.89    Cerebrovascular accident (CVA) due to embolism of right middle cerebral artery ([facility]) I63.411    Recurrent major depressive disorder, in full remission ([facility] F33.42    Trigger middle finger of left hand M65.332    Peripheral polyneuropathy G62.9    Palpitations R00.2    Chronic insomnia F51.04    Status post placement of implantable loop recorder Z95.818    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    Dermatitis L30.9    Depression with anxiety F41.8    Benign prostatic hyperplasia without lower urinary tract symptoms N40.0    Chronic tension-type headache, not intractable G44.229    MOO on CPAP G47.33, Z99.89    Chest pain R07.9    Chest pressure R07.89    Chronic shortness of breath R06.02    Elevated troponin R74.8    ACS (acute coronary syndrome) ([facility]) I24.9    Rash R21       Past Medical History:        Diagnosis Date    Acute ischemic stroke ([facility] 2/20/2017    Atherosclerotic heart disease s/p MI, stenting x 3 jan 2011([place])    CAD (coronary artery disease)     Cerebral artery occlusion with cerebral infarction [facility])     Cerebrovascular accident (CVA) due to embolism of right middle cerebral artery ([address] 8/14/2017    CHF (congestive heart failure) ([facility])     Chronic bilateral low back pain without sciatica 11/10/2016    Chronic kidney disease     DDD (degenerative disc disease), lumbar 11/15/2016    Depression     Depression with anxiety     Diabetes mellitus ([address]     type II    Diabetes mellitus, type 2 ([address]     DM (diabetes mellitus) ([address] 7/12/2011    Glaucoma     Headache     Hyperlipidemia     Cholesterol management per pcp.  Hypertension     Macular degeneration     MI (myocardial infarction) ([address]     MI (myocardial infarction) ([address]     Neuromuscular disorder ([address]     Neuropathy     Osteoarthritis     Sleep apnea     Spondylosis of lumbar region without myelopathy or radiculopathy 11/15/2016    Status post placement of implantable loop recorder 10/31/2017    TIA (transient ischemic attack)        Past Surgical History:        Procedure Laterality Date    APPENDECTOMY      BACK SURGERY      CARDIAC CATHETERIZATION  12/12/12    with angioplasty, stent    CARDIAC CATHETERIZATION  3/29/15  MDL    stent to distal RCA.  EF 60%    CHOLECYSTECTOMY      COLONOSCOPY      CORONARY ANGIOPLASTY WITH STENT PLACEMENT  jan 11 [place]    stent x 3    CORONARY ANGIOPLASTY WITH STENT PLACEMENT      01/01/11    DIAGNOSTIC CARDIAC CATH LAB PROCEDURE  833141    primary stent placement to the first circumflex marginal branch, balloon angioplasty to the third left anterior descending diagnonal branch, intracoronary nitroglycerin adminstration    JOINT REPLACEMENT      left knee    LUMBAR FUSION Left 11/15/2016    LUMBAR INTERBODY FUSION LATERAL L3-4 L4-5 WITH LATERAL PLATE  performed by Claudia Spaulding MD at [place] [address] History:    Social History     Tobacco Use    Smoking status: Never Smoker    Smokeless tobacco: Never Used   Substance Use Topics    Alcohol use: Yes     Alcohol/week: 1.2 oz     Types: 2 Shots of liquor per week                                Counseling given: Not Answered      Vital Signs (Current):   Vitals:    05/30/19 0402 05/30/19 0431 05/30/19 0502 05/30/19 0605   BP: 133/67  124/63 132/71   Pulse: 71  68 69   Resp: 16  17 16   Temp: 98 °F (36.7 °C)      TempSrc: Temporal      SpO2: 94%  94% 94%   Weight:  248 lb 11.2 oz (112.8 kg)     Height:  6' 4\" (1.93 m)                                                BP Readings from Last 3 Encounters:   05/30/19 132/71   04/15/19 132/64   03/15/19 (!) 101/40       NPO Status: Time of last liquid consumption: 2300                        Time of last solid consumption: 2000                        Date of last liquid consumption: 05/29/19                        Date of last solid food consumption: 05/29/19    BMI:   Wt Readings from Last 3 Encounters:   05/30/19 248 lb 11.2 oz (112.8 kg)   04/15/19 276 lb (125.2 kg)   03/15/19 269 lb (122 kg)     Body mass index is 30.27 kg/m².     CBC:   Lab Results   Component Value Date    WBC 8.0 05/30/2019    RBC 4.86 05/30/2019    HGB 12.7 05/30/2019    HCT 40.8 05/30/2019    HCT 41.6 05/25/2011    MCV 84.0 05/30/2019    RDW 13.8 05/30/2019     05/30/2019     05/25/2011       CMP:   Lab Results   Component Value Date     05/30/2019     05/25/2011    K 4.4 05/30/2019    K 4.0 05/25/2011    CL 98 05/30/2019     05/25/2011    CO2 27 05/30/2019    BUN 22 05/30/2019    CREATININE 1.3 05/30/2019    CREATININE 1.0 05/25/2011    LABGLOM 54 05/30/2019    GLUCOSE 172 05/30/2019    PROT 6.0 05/25/2019    PROT 6.4 12/12/2012    CALCIUM 9.4 05/30/2019    BILITOT <0.2 05/25/2019    ALKPHOS 69 05/25/2019    ALKPHOS 55 05/24/2011    AST 10 05/25/2019    ALT 15 05/25/2019       POC Tests:   Recent Labs     05/29/19 2112   POCGLU 260*       Coags:   Lab Results Component Value Date    PROTIME 14.8 05/30/2019    PROTIME 11.12 05/25/2011    INR 1.22 05/30/2019    APTT 35.6 05/30/2019       HCG (If Applicable): No results found for: PREGTESTUR, PREGSERUM, HCG, HCGQUANT     ABGs: No results found for: PHART, PO2ART, UEF4GOT, NUK1ZSW, BEART, H8ORKPHC     Type & Screen (If Applicable):  No results found for: [facility]    Anesthesia Evaluation  Patient summary reviewed and Nursing notes reviewed no history of anesthetic complications:   Airway: Mallampati: II  TM distance: >3 FB   Neck ROM: full  Mouth opening: > = 3 FB Dental: normal exam   (+) caps      Pulmonary:Negative Pulmonary ROS and normal exam  breath sounds clear to auscultation  (+) shortness of breath:  sleep apnea:      (-) not a current smoker          Patient did not smoke on day of surgery. Cardiovascular:    (+) hypertension:, past MI:, CAD:, CHF:,     (-)  angina    NYHA Classification: I  ECG reviewed  Rhythm: regular  Rate: normal           Beta Blocker:  Not on Beta Blocker      ROS comment:   Angiography:    Coronary Arteries:    Left Main Coronary Artery:  A large vessel which arises from the   left sinus of Valsalva.  It divides into the left anterior   descending coronary artery and the left circumflex.  There is a   50% stenosis in the distal portion of this vessel. Left Anterior Coronary Artery:  The LAD is a moderate sized   vessel with several diagonal branches.   There is a 100% stenosis   in the mid portion of this vessel prior to the diagonal.  The   diagonal vessel itself has a 80% stenosis. Left Circumflex Coronary Artery:  The LCx is a moderate sized   vessel with several marginal branches.   There is mild diffuse   disease throughout the entire length of the vessel.      Right Coronary Artery:  The RCA is a moderate sized dominant   vessel which arises from the right sinus of Valsalva.   There is   a 100% stenosis in the mid portion of this vessel.  The distal vessel fills from left to right collaterals      Internal Mammary Artery Angiography    Left internal mammary artery angiography:  The left MALCOLM is widely   patent and ungrafted.  It appears to be of suitable caliber for   the use in bypass surgery in the future should the need arise.    It was injected for surgical evaluation. Right internal mammary artery angiography:  The right MALCOLM is   widely patent and ungrafted.  It appears to be of suitable   caliber for the use in bypass surgery in the future should the   need arise.  It was injected for surgical evaluation. Left Ventriculogram:  The left ventriculogram is obtained in the   right oblique projection.  There is inferior basilar and   diaphragmatic akinesis.  All other regional wall segments move   appropriately. The estimated visual ejection fraction is 40%.    There is no mitral regurgitation nor is there a pull back   gradient seen across the aortic valve. Summary:      1.  Successful femoral artery ultrasound  2.  Successful femoral artery arteriogram  3.  Supervision of the administration of moderate conscious   sedation  4.  50% Left main coronary coronary artery stenosis  5.  100% occlusion of the mid LAD (after the diagonal), 80%   diagonal, and 100% mid RCA, with left to right collaterals to the   distal branches of the RCA  5.  Left ventricular function is impaired in the inferior basilar   and diaphragmatic segments with a visually estimate ejection of   40%.    6.  Patent right MALCOLM, un grafted  7.  Patent left MALCOLM, un grafted      RECOMMENDATIONS:    1.  Risk Factor Modification  2.  On-going Medical Therapy  3.  Consideration for open heart surgery    Electronically signed by Cesilia Alonzo MD on 5/25/19           Last Resulted: 05/29/19 13:31 Order Details View Encounter Lab and Collection Details Routing Result History      Scans on Order [id number]       Scan on 5/29/2019  1:31 PM by [facility] on 5/29/2019  1:31 PM by SCANNING, HPF      Findings      Mitral Valve   Structurally normal mitral valve.   Mild mitral regurgitation is present.      Aortic Valve   Aortic valve appears to be tricuspid.   Mild aortic regurgitation is noted.      Tricuspid Valve   Tricuspid valve was not well visualized.   Trivial tricuspid regurgitation.   Cannot estimate RVSP.      Pulmonic Valve   The pulmonic valve was not well visualized .   Mild pulmonic regurgitation present.      Left Atrium   Mildly dilated left atrium.      Left Ventricle   Mild concentric left ventricular hypertrophy noted.   Left ventricular size is mildly increased .   Left ventricular ejection fraction is visually estimated at 35-40%.   inferior akinesis   Lateral wall appears to be hypokinetic      Right Atrium   Normal right atrial dimension with no evidence of thrombus or mass noted.      Right Ventricle   Normal right ventricular size with preserved RV function.      Pericardial Effusion   No evidence of significant pericardial effusion is noted.      Miscellaneous   Aortic root dimension within normal limits.        Neuro/Psych:   Negative Neuro/Psych ROS  (+) CVA:, TIA, headaches:, psychiatric history:depression/anxiety    (-) seizures           GI/Hepatic/Renal: Neg GI/Hepatic/Renal ROS       (-) hiatal hernia and GERD       Endo/Other: Negative Endo/Other ROS   (+) DiabetesType II DM, , .          Pt had PAT visit. Abdominal:       Abdomen: soft. Vascular:                                      Anesthesia Plan      general     ASA 4       Induction: intravenous. BIS, arterial line, PA catheter and ARIELLE    Anesthetic plan and risks discussed with patient. Use of blood products discussed with patient whom. Plan discussed with CRNA.     Attending anesthesiologist reviewed and agrees with Pre Eval content              Yeison Quarles MD   5/30/2019

## 2019-05-31 ENCOUNTER — APPOINTMENT (OUTPATIENT)
Dept: GENERAL RADIOLOGY | Age: 74
DRG: 234 | End: 2019-05-31
Payer: MEDICARE

## 2019-05-31 PROBLEM — N18.30 STAGE 3 CHRONIC KIDNEY DISEASE (HCC): Chronic | Status: ACTIVE | Noted: 2019-05-31

## 2019-05-31 PROBLEM — I21.19 ACUTE MI INFERIOR LATERAL FIRST EPISODE CARE (HCC): Status: ACTIVE | Noted: 2019-05-31

## 2019-05-31 PROBLEM — Z99.89 OSA ON CPAP: Chronic | Status: ACTIVE | Noted: 2018-05-03

## 2019-05-31 PROBLEM — G47.33 OSA ON CPAP: Chronic | Status: ACTIVE | Noted: 2018-05-03

## 2019-05-31 PROBLEM — R07.89 CHEST PRESSURE: Status: RESOLVED | Noted: 2019-02-28 | Resolved: 2019-05-31

## 2019-05-31 LAB
ANION GAP SERPL CALCULATED.3IONS-SCNC: 12 MMOL/L (ref 7–19)
ANION GAP SERPL CALCULATED.3IONS-SCNC: 12 MMOL/L (ref 7–19)
BASE EXCESS ARTERIAL: -2.9 MMOL/L (ref -2–2)
BUN BLDV-MCNC: 28 MG/DL (ref 8–23)
BUN BLDV-MCNC: 30 MG/DL (ref 8–23)
CALCIUM SERPL-MCNC: 8.6 MG/DL (ref 8.8–10.2)
CALCIUM SERPL-MCNC: 9.1 MG/DL (ref 8.8–10.2)
CARBOXYHEMOGLOBIN ARTERIAL: 0.4 % (ref 0–5)
CHLORIDE BLD-SCNC: 102 MMOL/L (ref 98–111)
CHLORIDE BLD-SCNC: 98 MMOL/L (ref 98–111)
CO2: 21 MMOL/L (ref 22–29)
CO2: 22 MMOL/L (ref 22–29)
CREAT SERPL-MCNC: 1.2 MG/DL (ref 0.5–1.2)
CREAT SERPL-MCNC: 1.4 MG/DL (ref 0.5–1.2)
GFR NON-AFRICAN AMERICAN: 49
GFR NON-AFRICAN AMERICAN: 59
GLUCOSE BLD-MCNC: 176 MG/DL (ref 70–99)
GLUCOSE BLD-MCNC: 193 MG/DL (ref 70–99)
GLUCOSE BLD-MCNC: 197 MG/DL (ref 74–109)
GLUCOSE BLD-MCNC: 200 MG/DL (ref 70–99)
GLUCOSE BLD-MCNC: 214 MG/DL (ref 70–99)
GLUCOSE BLD-MCNC: 240 MG/DL (ref 70–99)
GLUCOSE BLD-MCNC: 244 MG/DL (ref 70–99)
GLUCOSE BLD-MCNC: 276 MG/DL (ref 74–109)
HBA1C MFR BLD: 7.2 % (ref 4–6)
HCO3 ARTERIAL: 22.6 MMOL/L (ref 22–26)
HCT VFR BLD CALC: 36.7 % (ref 42–52)
HEMOGLOBIN, ART, EXTENDED: 11.7 G/DL (ref 14–18)
HEMOGLOBIN: 11.4 G/DL (ref 14–18)
MAGNESIUM: 1.8 MG/DL (ref 1.6–2.4)
MCH RBC QN AUTO: 26.6 PG (ref 27–31)
MCHC RBC AUTO-ENTMCNC: 31.1 G/DL (ref 33–37)
MCV RBC AUTO: 85.7 FL (ref 80–94)
METHEMOGLOBIN ARTERIAL: 0 %
O2 CONTENT ARTERIAL: 15.9 ML/DL
O2 SAT, ARTERIAL: 95.9 %
O2 THERAPY: ABNORMAL
PCO2 ARTERIAL: 41 MMHG (ref 35–45)
PDW BLD-RTO: 14.4 % (ref 11.5–14.5)
PERFORMED ON: ABNORMAL
PH ARTERIAL: 7.35 (ref 7.35–7.45)
PLATELET # BLD: 209 K/UL (ref 130–400)
PMV BLD AUTO: 11.7 FL (ref 9.4–12.4)
PO2 ARTERIAL: 85 MMHG (ref 80–100)
POTASSIUM SERPL-SCNC: 4.9 MMOL/L (ref 3.5–5)
POTASSIUM SERPL-SCNC: 5.5 MMOL/L (ref 3.5–5)
POTASSIUM SERPL-SCNC: 5.6 MMOL/L (ref 3.5–5)
POTASSIUM, WHOLE BLOOD: 5.2
RBC # BLD: 4.28 M/UL (ref 4.7–6.1)
REASON FOR REJECTION: NORMAL
REJECTED TEST: NORMAL
SODIUM BLD-SCNC: 131 MMOL/L (ref 136–145)
SODIUM BLD-SCNC: 136 MMOL/L (ref 136–145)
WBC # BLD: 18.4 K/UL (ref 4.8–10.8)

## 2019-05-31 PROCEDURE — 85027 COMPLETE CBC AUTOMATED: CPT

## 2019-05-31 PROCEDURE — 99291 CRITICAL CARE FIRST HOUR: CPT | Performed by: HOSPITALIST

## 2019-05-31 PROCEDURE — 6360000002 HC RX W HCPCS: Performed by: NURSE PRACTITIONER

## 2019-05-31 PROCEDURE — 6360000002 HC RX W HCPCS: Performed by: HOSPITALIST

## 2019-05-31 PROCEDURE — 6360000002 HC RX W HCPCS: Performed by: THORACIC SURGERY (CARDIOTHORACIC VASCULAR SURGERY)

## 2019-05-31 PROCEDURE — 6370000000 HC RX 637 (ALT 250 FOR IP): Performed by: THORACIC SURGERY (CARDIOTHORACIC VASCULAR SURGERY)

## 2019-05-31 PROCEDURE — 37799 UNLISTED PX VASCULAR SURGERY: CPT

## 2019-05-31 PROCEDURE — 2000000000 HC ICU R&B

## 2019-05-31 PROCEDURE — 83036 HEMOGLOBIN GLYCOSYLATED A1C: CPT

## 2019-05-31 PROCEDURE — 82948 REAGENT STRIP/BLOOD GLUCOSE: CPT

## 2019-05-31 PROCEDURE — 6360000002 HC RX W HCPCS

## 2019-05-31 PROCEDURE — 84132 ASSAY OF SERUM POTASSIUM: CPT

## 2019-05-31 PROCEDURE — 2500000003 HC RX 250 WO HCPCS: Performed by: THORACIC SURGERY (CARDIOTHORACIC VASCULAR SURGERY)

## 2019-05-31 PROCEDURE — 80048 BASIC METABOLIC PNL TOTAL CA: CPT

## 2019-05-31 PROCEDURE — 2700000000 HC OXYGEN THERAPY PER DAY

## 2019-05-31 PROCEDURE — 71045 X-RAY EXAM CHEST 1 VIEW: CPT

## 2019-05-31 PROCEDURE — 2580000003 HC RX 258: Performed by: THORACIC SURGERY (CARDIOTHORACIC VASCULAR SURGERY)

## 2019-05-31 PROCEDURE — 94640 AIRWAY INHALATION TREATMENT: CPT

## 2019-05-31 PROCEDURE — 6370000000 HC RX 637 (ALT 250 FOR IP): Performed by: NURSE PRACTITIONER

## 2019-05-31 PROCEDURE — 93005 ELECTROCARDIOGRAM TRACING: CPT

## 2019-05-31 PROCEDURE — 82803 BLOOD GASES ANY COMBINATION: CPT

## 2019-05-31 PROCEDURE — 83735 ASSAY OF MAGNESIUM: CPT

## 2019-05-31 RX ORDER — CARVEDILOL 12.5 MG/1
12.5 TABLET ORAL 2 TIMES DAILY WITH MEALS
Status: DISCONTINUED | OUTPATIENT
Start: 2019-05-31 | End: 2019-06-06 | Stop reason: HOSPADM

## 2019-05-31 RX ORDER — DEXTROSE MONOHYDRATE 50 MG/ML
100 INJECTION, SOLUTION INTRAVENOUS PRN
Status: DISCONTINUED | OUTPATIENT
Start: 2019-05-31 | End: 2019-06-06 | Stop reason: HOSPADM

## 2019-05-31 RX ORDER — FENTANYL 25 UG/H
1 PATCH TRANSDERMAL
Status: DISCONTINUED | OUTPATIENT
Start: 2019-05-31 | End: 2019-06-01

## 2019-05-31 RX ORDER — AMIODARONE HYDROCHLORIDE 50 MG/ML
INJECTION, SOLUTION INTRAVENOUS
Status: COMPLETED
Start: 2019-05-31 | End: 2019-05-31

## 2019-05-31 RX ORDER — AMIODARONE HYDROCHLORIDE 50 MG/ML
INJECTION, SOLUTION INTRAVENOUS
Status: DISCONTINUED
Start: 2019-05-31 | End: 2019-06-01

## 2019-05-31 RX ORDER — OXYCODONE HYDROCHLORIDE AND ACETAMINOPHEN 5; 325 MG/1; MG/1
1 TABLET ORAL EVERY 4 HOURS PRN
Status: DISCONTINUED | OUTPATIENT
Start: 2019-05-31 | End: 2019-06-01

## 2019-05-31 RX ORDER — SODIUM POLYSTYRENE SULFONATE 15 G/60ML
30 SUSPENSION ORAL; RECTAL ONCE
Status: COMPLETED | OUTPATIENT
Start: 2019-05-31 | End: 2019-05-31

## 2019-05-31 RX ORDER — DILTIAZEM HYDROCHLORIDE 5 MG/ML
12.5 INJECTION INTRAVENOUS ONCE
Status: COMPLETED | OUTPATIENT
Start: 2019-05-31 | End: 2019-05-31

## 2019-05-31 RX ORDER — NICOTINE POLACRILEX 4 MG
15 LOZENGE BUCCAL PRN
Status: DISCONTINUED | OUTPATIENT
Start: 2019-05-31 | End: 2019-06-06 | Stop reason: HOSPADM

## 2019-05-31 RX ORDER — INSULIN GLARGINE 100 [IU]/ML
20 INJECTION, SOLUTION SUBCUTANEOUS NIGHTLY
Status: DISCONTINUED | OUTPATIENT
Start: 2019-05-31 | End: 2019-06-01

## 2019-05-31 RX ORDER — DEXTROSE MONOHYDRATE 25 G/50ML
12.5 INJECTION, SOLUTION INTRAVENOUS PRN
Status: DISCONTINUED | OUTPATIENT
Start: 2019-05-31 | End: 2019-06-06 | Stop reason: HOSPADM

## 2019-05-31 RX ADMIN — OXYCODONE HYDROCHLORIDE AND ACETAMINOPHEN 1 TABLET: 5; 325 TABLET ORAL at 09:59

## 2019-05-31 RX ADMIN — HYDROMORPHONE HYDROCHLORIDE 1 MG: 1 INJECTION, SOLUTION INTRAMUSCULAR; INTRAVENOUS; SUBCUTANEOUS at 23:42

## 2019-05-31 RX ADMIN — IPRATROPIUM BROMIDE AND ALBUTEROL SULFATE 1 AMPULE: .5; 3 SOLUTION RESPIRATORY (INHALATION) at 18:37

## 2019-05-31 RX ADMIN — SODIUM POLYSTYRENE SULFONATE 30 G: 15 SUSPENSION ORAL; RECTAL at 08:23

## 2019-05-31 RX ADMIN — Medication 10 ML: at 08:24

## 2019-05-31 RX ADMIN — INSULIN LISPRO 3 UNITS: 100 INJECTION, SOLUTION INTRAVENOUS; SUBCUTANEOUS at 21:34

## 2019-05-31 RX ADMIN — SODIUM CHLORIDE: 9 INJECTION, SOLUTION INTRAVENOUS at 08:19

## 2019-05-31 RX ADMIN — DEXTROSE MONOHYDRATE 1.5 G: 5 INJECTION INTRAVENOUS at 23:24

## 2019-05-31 RX ADMIN — IPRATROPIUM BROMIDE AND ALBUTEROL SULFATE 1 AMPULE: .5; 3 SOLUTION RESPIRATORY (INHALATION) at 10:47

## 2019-05-31 RX ADMIN — OXYCODONE HYDROCHLORIDE AND ACETAMINOPHEN 1 TABLET: 5; 325 TABLET ORAL at 19:05

## 2019-05-31 RX ADMIN — Medication 10 ML: at 20:22

## 2019-05-31 RX ADMIN — CARVEDILOL 12.5 MG: 12.5 TABLET, FILM COATED ORAL at 16:24

## 2019-05-31 RX ADMIN — INSULIN GLARGINE 20 UNITS: 100 INJECTION, SOLUTION SUBCUTANEOUS at 21:34

## 2019-05-31 RX ADMIN — INSULIN LISPRO 6 UNITS: 100 INJECTION, SOLUTION INTRAVENOUS; SUBCUTANEOUS at 19:14

## 2019-05-31 RX ADMIN — DEXTROSE MONOHYDRATE 150 MG: 50 INJECTION, SOLUTION INTRAVENOUS at 14:15

## 2019-05-31 RX ADMIN — HYDROMORPHONE HYDROCHLORIDE 1 MG: 1 INJECTION, SOLUTION INTRAMUSCULAR; INTRAVENOUS; SUBCUTANEOUS at 15:00

## 2019-05-31 RX ADMIN — AMIODARONE HYDROCHLORIDE 0.5 MG/MIN: 50 INJECTION, SOLUTION INTRAVENOUS at 21:38

## 2019-05-31 RX ADMIN — IPRATROPIUM BROMIDE AND ALBUTEROL SULFATE 1 AMPULE: .5; 3 SOLUTION RESPIRATORY (INHALATION) at 06:20

## 2019-05-31 RX ADMIN — DEXTROSE MONOHYDRATE 1.5 G: 5 INJECTION INTRAVENOUS at 08:19

## 2019-05-31 RX ADMIN — AMIODARONE HYDROCHLORIDE 150 MG: 50 INJECTION, SOLUTION INTRAVENOUS at 14:45

## 2019-05-31 RX ADMIN — FAMOTIDINE 20 MG: 20 TABLET ORAL at 21:34

## 2019-05-31 RX ADMIN — HYDROMORPHONE HYDROCHLORIDE 1 MG: 1 INJECTION, SOLUTION INTRAMUSCULAR; INTRAVENOUS; SUBCUTANEOUS at 04:01

## 2019-05-31 RX ADMIN — CARVEDILOL 12.5 MG: 12.5 TABLET, FILM COATED ORAL at 08:23

## 2019-05-31 RX ADMIN — CLOPIDOGREL BISULFATE 75 MG: 75 TABLET ORAL at 08:23

## 2019-05-31 RX ADMIN — DOCUSATE SODIUM 100 MG: 100 CAPSULE, LIQUID FILLED ORAL at 21:34

## 2019-05-31 RX ADMIN — Medication 10 ML: at 23:42

## 2019-05-31 RX ADMIN — DEXTROSE MONOHYDRATE 1.5 G: 5 INJECTION INTRAVENOUS at 16:24

## 2019-05-31 RX ADMIN — DOCUSATE SODIUM 100 MG: 100 CAPSULE, LIQUID FILLED ORAL at 08:23

## 2019-05-31 RX ADMIN — INSULIN LISPRO 6 UNITS: 100 INJECTION, SOLUTION INTRAVENOUS; SUBCUTANEOUS at 06:31

## 2019-05-31 RX ADMIN — HYDROMORPHONE HYDROCHLORIDE 1 MG: 1 INJECTION, SOLUTION INTRAMUSCULAR; INTRAVENOUS; SUBCUTANEOUS at 07:09

## 2019-05-31 RX ADMIN — Medication 10 ML: at 01:35

## 2019-05-31 RX ADMIN — HYDROMORPHONE HYDROCHLORIDE 1 MG: 1 INJECTION, SOLUTION INTRAMUSCULAR; INTRAVENOUS; SUBCUTANEOUS at 01:35

## 2019-05-31 RX ADMIN — AMIODARONE HYDROCHLORIDE 1 MG/MIN: 50 INJECTION, SOLUTION INTRAVENOUS at 15:41

## 2019-05-31 RX ADMIN — ASPIRIN 81 MG: 81 TABLET ORAL at 08:23

## 2019-05-31 RX ADMIN — INSULIN LISPRO 6 UNITS: 100 INJECTION, SOLUTION INTRAVENOUS; SUBCUTANEOUS at 11:55

## 2019-05-31 RX ADMIN — DILTIAZEM HYDROCHLORIDE 10 MG/HR: 5 INJECTION INTRAVENOUS at 19:06

## 2019-05-31 RX ADMIN — HYDROMORPHONE HYDROCHLORIDE 1 MG: 1 INJECTION, SOLUTION INTRAMUSCULAR; INTRAVENOUS; SUBCUTANEOUS at 20:22

## 2019-05-31 RX ADMIN — OXYCODONE HYDROCHLORIDE 10 MG: 5 TABLET ORAL at 00:41

## 2019-05-31 RX ADMIN — Medication 10 ML: at 21:34

## 2019-05-31 RX ADMIN — ZOLPIDEM TARTRATE 5 MG: 5 TABLET ORAL at 21:33

## 2019-05-31 RX ADMIN — DILTIAZEM HYDROCHLORIDE 12.5 MG: 5 INJECTION INTRAVENOUS at 19:06

## 2019-05-31 RX ADMIN — DEXTROSE MONOHYDRATE 150 MG: 50 INJECTION, SOLUTION INTRAVENOUS at 14:36

## 2019-05-31 RX ADMIN — HYDROMORPHONE HYDROCHLORIDE 1 MG: 1 INJECTION, SOLUTION INTRAMUSCULAR; INTRAVENOUS; SUBCUTANEOUS at 11:54

## 2019-05-31 RX ADMIN — OXYCODONE HYDROCHLORIDE 10 MG: 5 TABLET ORAL at 05:45

## 2019-05-31 RX ADMIN — OXYCODONE HYDROCHLORIDE AND ACETAMINOPHEN 1 TABLET: 5; 325 TABLET ORAL at 14:00

## 2019-05-31 RX ADMIN — FAMOTIDINE 20 MG: 20 TABLET ORAL at 08:23

## 2019-05-31 ASSESSMENT — PAIN SCALES - GENERAL
PAINLEVEL_OUTOF10: 8
PAINLEVEL_OUTOF10: 4
PAINLEVEL_OUTOF10: 9
PAINLEVEL_OUTOF10: 9
PAINLEVEL_OUTOF10: 2
PAINLEVEL_OUTOF10: 4
PAINLEVEL_OUTOF10: 10
PAINLEVEL_OUTOF10: 2
PAINLEVEL_OUTOF10: 2
PAINLEVEL_OUTOF10: 10
PAINLEVEL_OUTOF10: 9
PAINLEVEL_OUTOF10: 8
PAINLEVEL_OUTOF10: 7
PAINLEVEL_OUTOF10: 4
PAINLEVEL_OUTOF10: 10
PAINLEVEL_OUTOF10: 8
PAINLEVEL_OUTOF10: 10
PAINLEVEL_OUTOF10: 8
PAINLEVEL_OUTOF10: 4

## 2019-05-31 ASSESSMENT — PAIN SCALES - WONG BAKER
WONGBAKER_NUMERICALRESPONSE: 4
WONGBAKER_NUMERICALRESPONSE: 2

## 2019-05-31 NOTE — PROGRESS NOTES
Nutrition Assessment    Type and Reason for Visit: Reassess    Nutrition Assessment: Pt is improving from a nutritional standpoint AEB diet progression. Pt is at nutritional risk with inadequate oral intake. Will monitor intake and determine whether an ONS is appropriate. Malnutrition Assessment:  · Malnutrition Status: At risk for malnutrition  · Context: Acute illness or injury  · Findings of the 6 clinical characteristics of malnutrition (Minimum of 2 out of 6 clinical characteristics is required to make the diagnosis of moderate or severe Protein Calorie Malnutrition based on AND/ASPEN Guidelines):  1. Energy Intake-Less than or equal to 50% of estimated energy requirement, Unable to assess    2. Weight Loss-7.5% loss or greater, in 1 month  3. Fat Loss-No significant subcutaneous fat loss,    4. Muscle Loss-No significant muscle mass loss,    5. Fluid Accumulation-No significant fluid accumulation,    6.  Strength-Not measured    Nutrition Risk Level:  Moderate    Nutrient Needs:  · Estimated Daily Total Kcal: 8052-0833 kcals/day  · Estimated Daily Protein (g): 110-184 g/PRO/day  · Estimated Daily Total Fluid (ml/day): 1105-7295 mL/day    Nutrition Diagnosis:   · Problem: Inadequate oral intake  · Etiology: related to Acute injury/trauma     Signs and symptoms:  as evidenced by Intake 25-50%    Objective Information:  · Wound Type: Surgical Wound  · Current Nutrition Therapies:  · Oral Diet Orders: Carb Control 4 Carbs/Meal   · Oral Diet intake: 26-50%  · Anthropometric Measures:  · Ht: 6' 4\" (193 cm)   · Current Body Wt: 255 lb 8 oz (115.9 kg)  · % Weight Change:  7.6% loss in 1 month  · Ideal Body Wt: 202 lb (91.6 kg)   · BMI Classification: BMI 30.0 - 34.9 Obese Class I    Nutrition Interventions:   Continue current diet  Continued Inpatient Monitoring    Nutrition Evaluation:   · Evaluation: Goals set   · Goals: meet nutritional needs through po intake    · Monitoring: Nutrition Progression, Skin Integrity, Wound Healing, Weight, Pertinent Labs      Electronically signed by Lakisha Ogden RD, LD on 5/31/19 at 10:36 AM    Contact Number: [phone]

## 2019-05-31 NOTE — PROGRESS NOTES
/31/2019  5:01 AM - Jeanine Pavon Incoming Lab Results From [facility]     Component Value Ref Range & Units Status Collected Lab   pH, Arterial 7.350  7.350 - 7.450 Final 05/31/2019  4:59 AM [facility]   pCO2, Arterial 41.0  35.0 - 45.0 mmHg Final 05/31/2019  4:59 AM [facility] Lab   pO2, Arterial 85.0  80.0 - 100.0 mmHg Final 05/31/2019  4:59 AM [facility]   HCO3, Arterial 22.6  22.0 - 26.0 mmol/L Final 05/31/2019  4:59 AM [facility] Excess, Arterial -2.9Low   -2.0 - 2.0 mmol/L Final 05/31/2019  4:59 AM [facility]   Hemoglobin, Art, Extended 11.7Low   14.0 - 18.0 g/dL Final 05/31/2019  4:59 AM [facility]   O2 Sat, Arterial 95.9  >92 % Final 05/31/2019  4:59 AM [facility]   Carboxyhgb, Arterial 0.4  0.0 - 5.0 % Final 05/31/2019  4:59 AM [facility]        0.0-1.5   (Smokers 1.5-5.0)    Methemoglobin, Arterial 0.0  <1.5 % Final 05/31/2019  4:59 AM [facility]   O2 Content, Arterial 15.9  Not Established mL/dL Final 05/31/2019  4:59 AM [facility]   O2 Therapy Unknown   Final 05/31/2019  4:59 AM [facility]   Testing Performed By     Kathi Yanes Name Director Address Valid Date Range   [address] LAB Saira Nguyen  [place],[address]  [address] [place] [place] [address] 08/30/17 0733-Present     A- Line, 4 LPM  NC

## 2019-05-31 NOTE — PROGRESS NOTES
Subjective:   Critical Care Daily Progress Note: 5/31/2019 1:16 PM    Interval History:   [id number]:    \"Willy\" Durga High is a pleasant 75year old gent s/p CABG surgery. He has had much improved pain control since being started on a fentanyl patch by his sugical team. He has been up today and has been walking in the uni with help today. [id number]:  Mr. Fahad Gonzalez. \"Willy\" Durga High is a pleasant 75year old  [place] gentleman from home. He has had cardiothoracic surgery this AM, he states that he has been in pain during the last hour between his Dilaudid doses. His surgeon was seen this AM after the CT Sx when he arrived in the ICU bed 146. He related the procedure and findings in detail with me, advice and comanagement greatly appreciated. [id number]:  Case was discussed in detail with Dr. Taras Turner at the bedside along with family and patient, input appreciated, all questions were answered to the satisfaction of all parties. Mr. Fahad Gonzalez. \"Willy\" Durga High is a pleasant 75year old  [place] gentleman from home. He has inoperable disease to the RCA and LAD however a number of collaterals are providing circulation to the heart. He is planned for a salvage CABG procedure tomorrow at 07:30am. He denies any: fever, chills, night sweats, weakness, fatigue, maise, chest pain, dyspnea, diaphoresis, nausea, near syncope, and confusion. He does endorse having had pain all over, and relates that this is his usual pain and that he has had this for a long time. He state that is from his back and neck where he has had multiple interventions including two fusions.   [id number]:   Mr. Carmen Giordano was seen at the bedside this evening with his son and grandson present. His case was discussed with him and his family in detail. They are agreeable with dividing his lantus dose in to an AM and PM portion. He states that he has not had any further episodes of low blood sugar.    [id number]:   Mr. Durga High had adjustment of his insulin regimen yesterday. When seen this evening he stated that he was having low blood sugar, and that this has not happened in weeks. His RN gave him a cup of grape juice (highest sugar concentration we have) and thereafter got him a second cup to work on. He chugged the first. His FSBG, obtained just prior to the first FSBG was reported to be 40. He states that he was feeling a hot flash, and light headed, and sweaty, which is how he always feels when this happens. As per his RN he usually has his wife by the bedside and they snack together for a few hours. He did not have his wife hang out with him tonight however. She states she has had him the last few nights. Review of Systems  He endorses: weakness, cough, and chest pain. He denies: fever, chills, night sweats, malaise, dyspnea, diaphoresis, near syncope, and nausea. Scheduled Meds:   insulin lispro  0-18 Units Subcutaneous TID WC    insulin lispro  0-9 Units Subcutaneous Nightly    insulin glargine  20 Units Subcutaneous Nightly    fentaNYL  1 patch Transdermal Q72H    carvedilol  12.5 mg Oral BID     sodium chloride flush  10 mL Intravenous 2 times per day    docusate sodium  100 mg Oral BID    famotidine  20 mg Oral BID    aspirin  81 mg Oral Daily    clopidogrel  75 mg Oral Daily    ipratropium-albuterol  1 ampule Inhalation Q4H WA    cefUROXime (ZINACEF) IV  1.5 g Intravenous Q8H     Continuous Infusions:   dextrose      sodium chloride 75 mL/hr at 05/31/19 0819     PRN Meds:glucose, dextrose, glucagon (rDNA), dextrose, HYDROmorphone, oxyCODONE-acetaminophen, sodium chloride flush, acetaminophen, zolpidem, ondansetron        Objective:     I/O last 3 completed shifts: In: 3770.6 [I.V.:2770.6; Blood:400; IV Piggyback:600]  Out: 2441 [ICRVA:3297; Chest Tube:478]  I/O this shift: In: 26 [P.O.:120;  I.V.:300; IV Piggyback:50]  Out: 230 [Urine:230]    Blood pressure 136/69, pulse 99, temperature 97 °F (36.1 °C), temperature source Temporal,

## 2019-05-31 NOTE — PROGRESS NOTES
POST OP CARDIOTHORACIC SURGERY PROGRESS NOTE    Post op day 1    SUBJECTIVE:  Sitting up in chair, having increased complaints of pain. Extubated yesterday afternoon. /78   Pulse 102   Temp 99.5 °F (37.5 °C) (Temporal)   Resp 20   Ht 6' 4\" (1.93 m)   Wt 255 lb 8 oz (115.9 kg)   SpO2 95%   BMI 31.10 kg/m²   Average, Min, and Max for last 24 hours Vitals:  TEMPERATURE:  Temp  Av.1 °F (36.7 °C)  Min: 94.7 °F (34.8 °C)  Max: 99.5 °F (37.5 °C)  RESPIRATIONS RANGE: Resp  Av.3  Min: 1  Max: 41  PULSE RANGE: Pulse  Av.7  Min: 98  Max: 116  BLOOD PRESSURE RANGE:  Systolic (59QKF), OJJ:850 , Min:97 , AS   ; Diastolic (41RHF), OJP:31, Min:46, Max:105    PULSE OXIMETRY RANGE: SpO2  Av.3 %  Min: 94 %  Max: 100 %    I/O last 3 completed shifts: In: 3770.6 [I.V.:2770.6; Blood:400; IV Piggyback:600]  Out:  [EACYC:0633; Chest Tube:478]    CHEST: Clear bilateral breath sounds without wheezes or rhonchi. CARDIOVASCULAR: Normal HT with RRR. No murmurs, gallops or rubs. INCISION: Sternal incision with Mepilex dressing dry and intact. Right EVH incision - open to air. No drainage or erythema. DRAINS: MCT to 20 cm suction with 330 ml serosanguinous drainage since OR. No air leak. Left Pleural CT to 20 cm suction with 160 ml serosanguinous drainage since OR. No air leak.      LABS:  CBC:   Lab Results   Component Value Date    WBC 18.4 2019    RBC 4.28 2019    HGB 11.4 2019    HCT 36.7 2019    MCV 85.7 2019    MCH 26.6 2019    MCHC 31.1 2019    RDW 14.4 2019     2019    MPV 11.7 2019     BMP:    Lab Results   Component Value Date     2019    K 5.5 2019     2019    CO2 22 2019    BUN 28 2019    CREATININE 1.4 2019    CALCIUM 9.1 2019    LABGLOM 49 2019    GLUCOSE 197 2019     ABG:    pH, Arterial 7.350  7.350 - 7.450 05/31/2019  4:59 AM   pCO2, Arterial 41.0  35.0 - 45.0 mmHg 05/31/2019  4:59 AM   pO2, Arterial 85.0  80.0 - 100.0 mmHg 05/31/2019  4:59 AM   HCO3, Arterial 22.6  22.0 - 26.0 mmol/L 05/31/2019  4:59 AM   Base Excess, Arterial -2.9Low   -2.0 - 2.0 mmol/L 05/31/2019  4:59 AM   Hemoglobin, Art, Extended 11.7Low   14.0 - 18.0 g/dL 05/31/2019  4:59 AM   O2 Sat, Arterial 95.9  >92 % 05/31/2019  4:59 AM   Carboxyhgb, Arterial 0.4  0.0 - 5.0 % 05/31/2019  4:59 AM   Methemoglobin, Arterial 0.0  <1.5 % 05/31/2019  4:59 AM   O2 Content, Arterial 15.9  Not Established mL/dL 05/31/2019  4:59 AM   O2 Therapy Unknown    05/31/2019  4:59 AM       CHEST XRAY: Normal postoperative changes. ASSESSMENT:     1. Acute Coronary Syndrome with non-ST segment elevation MI Associated with Severe Left Main and Multivessel CAD - S/P 3V PACABG on 05/30/2019  2. Severe LV Systolic Dysfunction  3. Essential HTN  4. CKD, Stage 3  5. DM, Type 2  6. Mixed Hyperlipidemia  7. Chronic Back Pain on Chronic Narcotics  8. Obstructive Sleep Apnea on CPAP    PLAN:     1. D/C MCT and Left Pleural CT  2. Add Coreg  3. Adjust Pain Medication - Resume Home Pain Medications, Add Fentanyl Patch  4.  Keep in ICU       Electronically signed by TEGAN Milligan on 5/31/19 at 7:02 AM

## 2019-05-31 NOTE — PROGRESS NOTES
kg/m²     PHYSICAL EXAM:  VSS as per above, class 1 obesity  GA: alert, in moderate painful distress pre-dilaudid but sleeping comfortably after  Head: NC, AT  Neck: Supple, Trachea appears midline  PUL: CTA anterolaterally, No W/R/R nor rubs, no use of accessory MM, speaking in succinct answers  COR: RRR, No M/R/G, has pristine appearing dressing overlaying midline of anterior chest wall  ABD: normal Bowel sounds, No G/R/T  MSK: no wasting of muscle stores, no edema, increased fat stores, RLE has compressive bandaging overlying it  Skin: warm, nondiaphoretic, has markings from vein mapping seen on left leg still (right leg wrapped)  PSY: appropriate affect, answers questions appropriately     LABS:  Results for Lora Castillo \"BILL\" (MRN [id number]) as of 5/30/2019 21:27   Ref.  Range 5/30/2019 03:52   Sodium Latest Ref Range: 136 - 145 mmol/L 138   Potassium Latest Ref Range: 3.5 - 5.0 mmol/L 4.4   Chloride Latest Ref Range: 98 - 111 mmol/L 98   CO2 Latest Ref Range: 22 - 29 mmol/L 27   BUN Latest Ref Range: 8 - 23 mg/dL 22   Creatinine Latest Ref Range: 0.5 - 1.2 mg/dL 1.3 (H)   Anion Gap Latest Ref Range: 7 - 19 mmol/L 13   GFR Non- Latest Ref Range: >60  54 (A)   Magnesium Latest Ref Range: 1.6 - 2.4 mg/dL 1.8   Glucose Latest Ref Range: 74 - 109 mg/dL 172 (H)   Calcium Latest Ref Range: 8.8 - 10.2 mg/dL 9.4   WBC Latest Ref Range: 4.8 - 10.8 K/uL 8.0   RBC Latest Ref Range: 4.70 - 6.10 M/uL 4.86   Hemoglobin Quant Latest Ref Range: 14.0 - 18.0 g/dL 12.7 (L)   Hematocrit Latest Ref Range: 42.0 - 52.0 % 40.8 (L)   MCV Latest Ref Range: 80.0 - 94.0 fL 84.0   MCH Latest Ref Range: 27.0 - 31.0 pg 26.1 (L)   MCHC Latest Ref Range: 33.0 - 37.0 g/dL 31.1 (L)   MPV Latest Ref Range: 9.4 - 12.4 fL 11.0   RDW Latest Ref Range: 11.5 - 14.5 % 13.8   Platelet Count Latest Ref Range: 130 - 400 K/uL 244   Neutrophils % Latest Ref Range: 50.0 - 65.0 % 58.8   Lymphocyte % Latest Ref Range: 20.0 - 40.0 % 27.0 Monocytes % Latest Ref Range: 0.0 - 10.0 % 7.7   Eosinophils % Latest Ref Range: 0.0 - 5.0 % 5.4 (H)   Basophils % Latest Ref Range: 0.0 - 1.0 % 0.7   Neutrophils # Latest Ref Range: 1.5 - 7.5 K/uL 4.7   Lymphocytes # Latest Ref Range: 1.1 - 4.5 K/uL 2.2   Monocytes # Latest Ref Range: 0.00 - 0.90 K/uL 0.60   Eosinophils # Latest Ref Range: 0.00 - 0.60 K/uL 0.40   Basophils # Latest Ref Range: 0.00 - 0.20 K/uL 0.10   Prothrombin Time Latest Ref Range: 12.0 - 14.6 sec 14.8 (H)   INR Latest Ref Range: 0.88 - 1.18  1.22 (H)   aPTT Latest Ref Range: 26.0 - 36.2 sec 35.6   Results for Dimas Loera \"BILL\" (MRN [id number]) as of 5/30/2019 21:27   Ref.  Range 5/30/2019 17:56 5/30/2019 18:15   Sodium Latest Ref Range: 136 - 145 mmol/L 138    Potassium Latest Ref Range: 3.5 - 5.0 mmol/L 5.0    Chloride Latest Ref Range: 98 - 111 mmol/L 104    CO2 Latest Ref Range: 22 - 29 mmol/L 23    BUN Latest Ref Range: 8 - 23 mg/dL 25 (H)    Creatinine Latest Ref Range: 0.5 - 1.2 mg/dL 1.4 (H)    Anion Gap Latest Ref Range: 7 - 19 mmol/L 11    GFR Non- Latest Ref Range: >60  49 (A)    Glucose Latest Ref Range: 74 - 109 mg/dL 137 (H)    Calcium Latest Ref Range: 8.8 - 10.2 mg/dL 9.2    WBC Latest Ref Range: 4.8 - 10.8 K/uL  16.8 (H)   RBC Latest Ref Range: 4.70 - 6.10 M/uL  4.32 (L)   Hemoglobin Quant Latest Ref Range: 14.0 - 18.0 g/dL  11.8 (L)   Hematocrit Latest Ref Range: 42.0 - 52.0 %  37.1 (L)   MCV Latest Ref Range: 80.0 - 94.0 fL  85.9   MCH Latest Ref Range: 27.0 - 31.0 pg  27.3   MCHC Latest Ref Range: 33.0 - 37.0 g/dL  31.8 (L)   MPV Latest Ref Range: 9.4 - 12.4 fL  11.0   RDW Latest Ref Range: 11.5 - 14.5 %  14.0   Platelet Count Latest Ref Range: 130 - 400 K/uL  232         Assessment:     Principal Problem:    ACS (acute coronary syndrome) ([facility])  Active Problems:    Type 2 diabetes mellitus with diabetic polyneuropathy, with long-term current use of insulin ([facility])    HTN (hypertension)    Elevated troponin Rash  Resolved Problems:    * No resolved hospital problems.  *      Plan:     DM Type 2 with Polyneuropathy and ongoing Hypoglycemic Episodes:  Hypoglycemic orders set to be continued  Lantus was changed by CT Sx to 17units QHS  Low Dose ISS to be continued  Insulin GGT as per CT Sx   `  Dental Infection:  Had been on Ampicillin before  Then was on Clindamycin  Now on cefuroxime as per CT Sx     Pruritic Rash:  Suspect Morphine >>> PCN > Clinda     ACS: worked up & managed by primary team and found to be multivessel CAD for which he has been referred to CT Sx, surgery on [id number] y by Dr. Gemini Covington  HTN: well controlled on current cardiac medications by primary team  DVT PPx: Pharmacoprophylaxis as per CT Sx  PRN Biscadoyl Supp  straight dosed Docusate 100mg PO BiD  Senna 1 tab PO BiD  Dilaudid was increased by me from Q3h to Q2h as his pain control wears off within 2 hours  Plavix 75 as per CT Sx  Famotidine 20mg PO BiD as per CT Sx  Duoneb Q4h as per CT Sx  Tylenol and Demerol and Oxycodone as per CT Sx  Potassium Porotocol as per CT Sx  Ambien 5mg PO QHS PRN as per CT Sx  BMP and CBC PO QDay  Potassium level Q6h   Vent as per CT Sx     CC time of >30 minutes

## 2019-06-01 ENCOUNTER — APPOINTMENT (OUTPATIENT)
Dept: GENERAL RADIOLOGY | Age: 74
DRG: 234 | End: 2019-06-01
Payer: MEDICARE

## 2019-06-01 PROBLEM — Z95.1 S/P CABG X 3: Status: ACTIVE | Noted: 2019-06-01

## 2019-06-01 LAB
ANION GAP SERPL CALCULATED.3IONS-SCNC: 11 MMOL/L (ref 7–19)
BUN BLDV-MCNC: 28 MG/DL (ref 8–23)
CALCIUM SERPL-MCNC: 9 MG/DL (ref 8.8–10.2)
CHLORIDE BLD-SCNC: 99 MMOL/L (ref 98–111)
CO2: 22 MMOL/L (ref 22–29)
CREAT SERPL-MCNC: 1.2 MG/DL (ref 0.5–1.2)
GFR NON-AFRICAN AMERICAN: 59
GLUCOSE BLD-MCNC: 124 MG/DL (ref 70–99)
GLUCOSE BLD-MCNC: 182 MG/DL (ref 70–99)
GLUCOSE BLD-MCNC: 192 MG/DL (ref 70–99)
GLUCOSE BLD-MCNC: 226 MG/DL (ref 70–99)
GLUCOSE BLD-MCNC: 229 MG/DL (ref 74–109)
GLUCOSE BLD-MCNC: 243 MG/DL (ref 70–99)
HCT VFR BLD CALC: 32.3 % (ref 42–52)
HEMOGLOBIN: 10.3 G/DL (ref 14–18)
MCH RBC QN AUTO: 27.4 PG (ref 27–31)
MCHC RBC AUTO-ENTMCNC: 31.9 G/DL (ref 33–37)
MCV RBC AUTO: 85.9 FL (ref 80–94)
PDW BLD-RTO: 14.6 % (ref 11.5–14.5)
PERFORMED ON: ABNORMAL
PLATELET # BLD: 230 K/UL (ref 130–400)
PMV BLD AUTO: 11.6 FL (ref 9.4–12.4)
POTASSIUM SERPL-SCNC: 4.6 MMOL/L (ref 3.5–5)
RBC # BLD: 3.76 M/UL (ref 4.7–6.1)
SODIUM BLD-SCNC: 132 MMOL/L (ref 136–145)
WBC # BLD: 17.2 K/UL (ref 4.8–10.8)

## 2019-06-01 PROCEDURE — 2700000000 HC OXYGEN THERAPY PER DAY

## 2019-06-01 PROCEDURE — 6370000000 HC RX 637 (ALT 250 FOR IP): Performed by: NURSE PRACTITIONER

## 2019-06-01 PROCEDURE — 2500000003 HC RX 250 WO HCPCS: Performed by: THORACIC SURGERY (CARDIOTHORACIC VASCULAR SURGERY)

## 2019-06-01 PROCEDURE — 80048 BASIC METABOLIC PNL TOTAL CA: CPT

## 2019-06-01 PROCEDURE — 6360000002 HC RX W HCPCS: Performed by: THORACIC SURGERY (CARDIOTHORACIC VASCULAR SURGERY)

## 2019-06-01 PROCEDURE — 94640 AIRWAY INHALATION TREATMENT: CPT

## 2019-06-01 PROCEDURE — 2580000003 HC RX 258: Performed by: THORACIC SURGERY (CARDIOTHORACIC VASCULAR SURGERY)

## 2019-06-01 PROCEDURE — 6370000000 HC RX 637 (ALT 250 FOR IP): Performed by: THORACIC SURGERY (CARDIOTHORACIC VASCULAR SURGERY)

## 2019-06-01 PROCEDURE — 71045 X-RAY EXAM CHEST 1 VIEW: CPT

## 2019-06-01 PROCEDURE — 99232 SBSQ HOSP IP/OBS MODERATE 35: CPT | Performed by: HOSPITALIST

## 2019-06-01 PROCEDURE — 2000000000 HC ICU R&B

## 2019-06-01 PROCEDURE — 6360000002 HC RX W HCPCS: Performed by: NURSE PRACTITIONER

## 2019-06-01 PROCEDURE — 99024 POSTOP FOLLOW-UP VISIT: CPT | Performed by: THORACIC SURGERY (CARDIOTHORACIC VASCULAR SURGERY)

## 2019-06-01 PROCEDURE — 85027 COMPLETE CBC AUTOMATED: CPT

## 2019-06-01 PROCEDURE — 82948 REAGENT STRIP/BLOOD GLUCOSE: CPT

## 2019-06-01 RX ORDER — AMIODARONE HYDROCHLORIDE 200 MG/1
200 TABLET ORAL 2 TIMES DAILY
Status: DISCONTINUED | OUTPATIENT
Start: 2019-06-01 | End: 2019-06-06 | Stop reason: HOSPADM

## 2019-06-01 RX ORDER — TAMSULOSIN HYDROCHLORIDE 0.4 MG/1
0.4 CAPSULE ORAL DAILY
Status: DISCONTINUED | OUTPATIENT
Start: 2019-06-01 | End: 2019-06-06 | Stop reason: HOSPADM

## 2019-06-01 RX ORDER — TIZANIDINE 2 MG/1
2 TABLET ORAL NIGHTLY
Status: DISCONTINUED | OUTPATIENT
Start: 2019-06-01 | End: 2019-06-06 | Stop reason: HOSPADM

## 2019-06-01 RX ORDER — OXYCODONE AND ACETAMINOPHEN 10; 325 MG/1; MG/1
1 TABLET ORAL EVERY 4 HOURS PRN
Status: DISCONTINUED | OUTPATIENT
Start: 2019-06-01 | End: 2019-06-06 | Stop reason: HOSPADM

## 2019-06-01 RX ORDER — INSULIN GLARGINE 100 [IU]/ML
40 INJECTION, SOLUTION SUBCUTANEOUS NIGHTLY
Status: DISCONTINUED | OUTPATIENT
Start: 2019-06-01 | End: 2019-06-02

## 2019-06-01 RX ADMIN — TIZANIDINE 2 MG: 2 TABLET ORAL at 20:58

## 2019-06-01 RX ADMIN — Medication 10 ML: at 02:55

## 2019-06-01 RX ADMIN — DILTIAZEM HYDROCHLORIDE 10 MG/HR: 5 INJECTION INTRAVENOUS at 05:31

## 2019-06-01 RX ADMIN — Medication 10 ML: at 08:29

## 2019-06-01 RX ADMIN — DOCUSATE SODIUM 100 MG: 100 CAPSULE, LIQUID FILLED ORAL at 20:58

## 2019-06-01 RX ADMIN — ASPIRIN 81 MG: 81 TABLET ORAL at 08:17

## 2019-06-01 RX ADMIN — AMIODARONE HYDROCHLORIDE 0.5 MG/MIN: 50 INJECTION, SOLUTION INTRAVENOUS at 10:45

## 2019-06-01 RX ADMIN — AMIODARONE HYDROCHLORIDE 200 MG: 200 TABLET ORAL at 20:58

## 2019-06-01 RX ADMIN — IPRATROPIUM BROMIDE AND ALBUTEROL SULFATE 1 AMPULE: .5; 3 SOLUTION RESPIRATORY (INHALATION) at 10:06

## 2019-06-01 RX ADMIN — IPRATROPIUM BROMIDE AND ALBUTEROL SULFATE 1 AMPULE: .5; 3 SOLUTION RESPIRATORY (INHALATION) at 14:14

## 2019-06-01 RX ADMIN — HYDROMORPHONE HYDROCHLORIDE 1 MG: 1 INJECTION, SOLUTION INTRAMUSCULAR; INTRAVENOUS; SUBCUTANEOUS at 02:54

## 2019-06-01 RX ADMIN — INSULIN GLARGINE 40 UNITS: 100 INJECTION, SOLUTION SUBCUTANEOUS at 20:58

## 2019-06-01 RX ADMIN — OXYCODONE HYDROCHLORIDE AND ACETAMINOPHEN 1 TABLET: 5; 325 TABLET ORAL at 08:17

## 2019-06-01 RX ADMIN — Medication 10 ML: at 06:14

## 2019-06-01 RX ADMIN — INSULIN LISPRO 2 UNITS: 100 INJECTION, SOLUTION INTRAVENOUS; SUBCUTANEOUS at 20:59

## 2019-06-01 RX ADMIN — Medication 10 ML: at 20:58

## 2019-06-01 RX ADMIN — FAMOTIDINE 20 MG: 20 TABLET ORAL at 20:58

## 2019-06-01 RX ADMIN — OXYCODONE AND ACETAMINOPHEN 1 TABLET: 10; 325 TABLET ORAL at 17:23

## 2019-06-01 RX ADMIN — CLOPIDOGREL BISULFATE 75 MG: 75 TABLET ORAL at 08:17

## 2019-06-01 RX ADMIN — AMIODARONE HYDROCHLORIDE 200 MG: 200 TABLET ORAL at 10:46

## 2019-06-01 RX ADMIN — OXYCODONE AND ACETAMINOPHEN 1 TABLET: 10; 325 TABLET ORAL at 21:33

## 2019-06-01 RX ADMIN — FAMOTIDINE 20 MG: 20 TABLET ORAL at 08:17

## 2019-06-01 RX ADMIN — HYDROMORPHONE HYDROCHLORIDE 1 MG: 1 INJECTION, SOLUTION INTRAMUSCULAR; INTRAVENOUS; SUBCUTANEOUS at 06:13

## 2019-06-01 RX ADMIN — CARVEDILOL 12.5 MG: 12.5 TABLET, FILM COATED ORAL at 17:15

## 2019-06-01 RX ADMIN — INSULIN LISPRO 6 UNITS: 100 INJECTION, SOLUTION INTRAVENOUS; SUBCUTANEOUS at 08:27

## 2019-06-01 RX ADMIN — SERTRALINE HYDROCHLORIDE 50 MG: 50 TABLET ORAL at 10:46

## 2019-06-01 RX ADMIN — INSULIN LISPRO 3 UNITS: 100 INJECTION, SOLUTION INTRAVENOUS; SUBCUTANEOUS at 11:52

## 2019-06-01 RX ADMIN — TAMSULOSIN HYDROCHLORIDE 0.4 MG: 0.4 CAPSULE ORAL at 10:46

## 2019-06-01 RX ADMIN — OXYCODONE AND ACETAMINOPHEN 1 TABLET: 10; 325 TABLET ORAL at 11:35

## 2019-06-01 RX ADMIN — DOCUSATE SODIUM 100 MG: 100 CAPSULE, LIQUID FILLED ORAL at 08:17

## 2019-06-01 RX ADMIN — CARVEDILOL 12.5 MG: 12.5 TABLET, FILM COATED ORAL at 08:17

## 2019-06-01 RX ADMIN — OXYCODONE HYDROCHLORIDE AND ACETAMINOPHEN 1 TABLET: 5; 325 TABLET ORAL at 02:03

## 2019-06-01 RX ADMIN — IPRATROPIUM BROMIDE AND ALBUTEROL SULFATE 1 AMPULE: .5; 3 SOLUTION RESPIRATORY (INHALATION) at 06:28

## 2019-06-01 ASSESSMENT — PAIN DESCRIPTION - LOCATION: LOCATION: STERNUM;BACK

## 2019-06-01 ASSESSMENT — PAIN SCALES - GENERAL
PAINLEVEL_OUTOF10: 4
PAINLEVEL_OUTOF10: 2
PAINLEVEL_OUTOF10: 4
PAINLEVEL_OUTOF10: 5
PAINLEVEL_OUTOF10: 10
PAINLEVEL_OUTOF10: 4
PAINLEVEL_OUTOF10: 8
PAINLEVEL_OUTOF10: 6
PAINLEVEL_OUTOF10: 5
PAINLEVEL_OUTOF10: 4
PAINLEVEL_OUTOF10: 2
PAINLEVEL_OUTOF10: 5
PAINLEVEL_OUTOF10: 10

## 2019-06-01 ASSESSMENT — PAIN DESCRIPTION - PAIN TYPE: TYPE: SURGICAL PAIN;CHRONIC PAIN

## 2019-06-01 NOTE — PROGRESS NOTES
Subjective:   Critical Care Daily Progress Note: 6/1/2019 9:51 AM    Interval History:   [id number]:  Mr. Tom Perrin. \"Bill\" Caitlyn Steen is continuing to do very well. He was able to walk around most of the RN station today. He has been having decreased chest pain and decreased dyspnea. He states that he feels sickly and weak, but no longer has any fatigue. He does state that he feels constipated today, and was encourage to ask his RN for his PRN medications, which his wife stated he will do and he nodded the affirmative, he does not have any dyspnea, nor confusion, nor light headedness. [id number]:   Mr. Thomas"Willy\" Caitlyn Steen is a pleasant 75year old gent s/p CABG surgery. He has had much improved pain control since being started on a fentanyl patch by his sugical team. He has been up today and has been walking in the uni with help today. [id number]:  Mr. Jarret Toussaint. \"Bill\" Caitlyn Steen is a pleasant 75year old  [place] gentleman from home. He has had cardiothoracic surgery this AM, he states that he has been in pain during the last hour between his Dilaudid doses. His surgeon was seen this AM after the CT Sx when he arrived in the ICU bed 146. He related the procedure and findings in detail with me, advice and comanagement greatly appreciated.   [id number]:  Case was discussed in detail with Dr. Katherin Awad at the bedside along with family and patient, input appreciated, all questions were answered to the satisfaction of all parties. Mr. Jarret Toussaint. \"Bill\" Caitlyn Steen is a pleasant 75year old  [place] gentleman from home. He has inoperable disease to the RCA and LAD however a number of collaterals are providing circulation to the heart. He is planned for a salvage CABG procedure tomorrow at 07:30am. He denies any: fever, chills, night sweats, weakness, fatigue, maise, chest pain, dyspnea, diaphoresis, nausea, near syncope, and confusion.  He does endorse having had pain all over, and relates that this is his usual pain and that he has acetaminophen, ondansetron    Review of Systems  Please see interval history above    Objective:     I/O last 3 completed shifts: In: 2994 [P. O.:600; I.V.:2244; IV Piggyback:150]  Out: 6242 [Urine:1170]  I/O this shift:  In: -   Out: 125 [Urine:125]    /70   Pulse 75   Temp 97.6 °F (36.4 °C) (Temporal)   Resp 23   Ht 6' 4\" (1.93 m)   Wt 270 lb 4.8 oz (122.6 kg)   SpO2 90%   BMI 32.90 kg/m²     . Physical Exam   Constitutional: He appears well-developed and well-nourished. HENT:   Head: Normocephalic and atraumatic. Neck: Neck supple. No tracheal deviation present. RIJ in place   Cardiovascular: Normal rate and regular rhythm. Exam reveals no gallop and no friction rub. No murmur heard. Sternotomy scar appears to be very well healing and is pristine and clean and looks great, withOUT any significant redness NO drainage NO malodor    Pulmonary/Chest: Effort normal and breath sounds normal. No stridor. No respiratory distress. He has no wheezes. He has no rales. Abdominal: Bowel sounds are normal. There is no tenderness. There is no rebound and no guarding. Protuberant, less soft than before but NOT tense NOR firm feeling   Musculoskeletal: He exhibits no edema. Neurological: He is alert. Skin: Skin is warm and dry. He is not diaphoretic. Psychiatric: He has a normal mood and affect. His behavior is normal.   Vital Signs Reviewed as per above    LABS:  Results for Ramin Phillips \"BILL\" (MRN [id number]) as of 6/1/2019 09:58   Ref.  Range 6/1/2019 01:22 6/1/2019 03:00 6/1/2019 04:45 6/1/2019 08:25   Sodium Latest Ref Range: 136 - 145 mmol/L  132 (L)     Potassium Latest Ref Range: 3.5 - 5.0 mmol/L  4.6     Chloride Latest Ref Range: 98 - 111 mmol/L  99     CO2 Latest Ref Range: 22 - 29 mmol/L  22     BUN Latest Ref Range: 8 - 23 mg/dL  28 (H)     Creatinine Latest Ref Range: 0.5 - 1.2 mg/dL  1.2     Anion Gap Latest Ref Range: 7 - 19 mmol/L  11     GFR Non- Latest Ref Range: >60   59 (A)     Glucose Latest Ref Range: 74 - 109 mg/dL  229 (H)     POC Glucose Latest Ref Range: 70 - 99 mg/dl 243 (H)   226 (H)   Calcium Latest Ref Range: 8.8 - 10.2 mg/dL  9.0     WBC Latest Ref Range: 4.8 - 10.8 K/uL  17.2 (H)     RBC Latest Ref Range: 4.70 - 6.10 M/uL  3.76 (L)     Hemoglobin Quant Latest Ref Range: 14.0 - 18.0 g/dL  10.3 (L)     Hematocrit Latest Ref Range: 42.0 - 52.0 %  32.3 (L)     MCV Latest Ref Range: 80.0 - 94.0 fL  85.9     MCH Latest Ref Range: 27.0 - 31.0 pg  27.4     MCHC Latest Ref Range: 33.0 - 37.0 g/dL  31.9 (L)     MPV Latest Ref Range: 9.4 - 12.4 fL  11.6     RDW Latest Ref Range: 11.5 - 14.5 %  14.6 (H)     Platelet Count Latest Ref Range: 130 - 400 K/uL  230       DIAGNOSTICS:  CXR 1-View on [id number]:  Narrative   EXAMINATION: Chest one view 6/1/2019   HISTORY: Postop CABG   FINDINGS: Today's exam is compared to previous study of 5/31/2019. All   support lines have been removed except for a right IJ introducer. Lungs are hypoventilated with bibasilar atelectasis. Small effusions   are present. There is mild gastric distention improved from   yesterday's exam. A loop recorder projects over the left chest.       Impression   1.. Right IJ introducer remains in place. The remaining support lines   have been removed. 2. Expiratory chest. There is persistent pulmonary venous hypertension   with widening of the vascular pedicle. This is accentuated by the   expiratory nature of the film. 3. Small effusions.    Signed by Dr Iris Rocha on 6/1/2019 8:07 AM         Assessment:     Principal Problem:    Acute MI inferior lateral first episode care [facility])  Active Problems:    ACS (acute coronary syndrome) ([facility]    Type 2 diabetes mellitus with diabetic polyneuropathy, with long-term current use of insulin (HCC)    Essential hypertension    Coronary artery disease involving native coronary artery of native heart with unstable angina pectoris ([facility]    Mixed hyperlipidemia

## 2019-06-01 NOTE — PROGRESS NOTES
POST OP CARDIOTHORACIC SURGERY PROGRESS NOTE    Post op day 2    SUBJECTIVE:  OOB to chair, sleepy and complaining of feeling uncomfortable. /70   Pulse 75   Temp 97.6 °F (36.4 °C) (Temporal)   Resp 23   Ht 6' 4\" (1.93 m)   Wt 270 lb 4.8 oz (122.6 kg)   SpO2 90%   BMI 32.90 kg/m²   Average, Min, and Max for last 24 hours Vitals:  TEMPERATURE:  Temp  Av.7 °F (36.5 °C)  Min: 97.2 °F (36.2 °C)  Max: 98.4 °F (36.9 °C)  RESPIRATIONS RANGE: Resp  Av.7  Min: 15  Max: 28  PULSE RANGE: Pulse  Av.1  Min: 72  Max: 131  BLOOD PRESSURE RANGE:  Systolic (66MUD), EF , Min:90 , BOR:870   ; Diastolic (93FLH), VSQ:84, Min:57, Max:106    PULSE OXIMETRY RANGE: SpO2  Av.8 %  Min: 87 %  Max: 98 %    I/O last 3 completed shifts: In: 2994 [P. O.:600; I.V.:2244; IV Piggyback:150]  Out: 1170 [Urine:1170]    CHEST: lungs clear    CARDIOVASCULAR: regular rhythm, no murmurs    INCISION: no drainage, skin edges intact    DRAINS: none   LABS:  CBC:   Lab Results   Component Value Date    WBC 17.2 2019    RBC 3.76 2019    HGB 10.3 2019    HCT 32.3 2019    HCT 41.6 2011    MCV 85.9 2019    MCH 27.4 2019    MCHC 31.9 2019    RDW 14.6 2019     2019     2011    MPV 11.6 2019     BMP:    Lab Results   Component Value Date     2019     2011    K 4.6 2019    K 5.0 2019    K 4.0 2011    CL 99 2019     2011    CO2 22 2019    BUN 28 2019    LABALBU 3.0 2019    LABALBU 3.8 2011    CREATININE 1.2 2019    CREATININE 1.0 2011    CALCIUM 9.0 2019    LABGLOM 59 2019    GLUCOSE 229 2019       CHEST XRAY: stable, no significant effusion. ASSESSMENT: converted to NSR with IV amiodarone and IV cardizem. Slow progression.       PLAN: D/C mena cath, remove fentanyl patch, adjust po pain meds, switch to po amiodarone, increase ambulation. Monitor one more day in ICU.  Follow up labs in am.    Electronically signed by Yousif Maddox

## 2019-06-02 ENCOUNTER — APPOINTMENT (OUTPATIENT)
Dept: GENERAL RADIOLOGY | Age: 74
DRG: 234 | End: 2019-06-02
Payer: MEDICARE

## 2019-06-02 LAB
ANION GAP SERPL CALCULATED.3IONS-SCNC: 12 MMOL/L (ref 7–19)
BLOOD BANK DISPENSE STATUS: NORMAL
BLOOD BANK PRODUCT CODE: NORMAL
BPU ID: NORMAL
BUN BLDV-MCNC: 33 MG/DL (ref 8–23)
CALCIUM SERPL-MCNC: 8.9 MG/DL (ref 8.8–10.2)
CHLORIDE BLD-SCNC: 98 MMOL/L (ref 98–111)
CO2: 23 MMOL/L (ref 22–29)
CREAT SERPL-MCNC: 1.2 MG/DL (ref 0.5–1.2)
DESCRIPTION BLOOD BANK: NORMAL
EKG P AXIS: 30 DEGREES
EKG P-R INTERVAL: 138 MS
EKG Q-T INTERVAL: 344 MS
EKG QRS DURATION: 92 MS
EKG QTC CALCULATION (BAZETT): 414 MS
EKG T AXIS: 85 DEGREES
GFR NON-AFRICAN AMERICAN: 59
GLUCOSE BLD-MCNC: 138 MG/DL (ref 70–99)
GLUCOSE BLD-MCNC: 148 MG/DL (ref 70–99)
GLUCOSE BLD-MCNC: 157 MG/DL (ref 74–109)
GLUCOSE BLD-MCNC: 190 MG/DL (ref 70–99)
GLUCOSE BLD-MCNC: 267 MG/DL (ref 70–99)
HCT VFR BLD CALC: 29.7 % (ref 42–52)
HEMOGLOBIN: 9.2 G/DL (ref 14–18)
MCH RBC QN AUTO: 26.4 PG (ref 27–31)
MCHC RBC AUTO-ENTMCNC: 31 G/DL (ref 33–37)
MCV RBC AUTO: 85.3 FL (ref 80–94)
PDW BLD-RTO: 14.2 % (ref 11.5–14.5)
PERFORMED ON: ABNORMAL
PLATELET # BLD: 209 K/UL (ref 130–400)
PMV BLD AUTO: 11.4 FL (ref 9.4–12.4)
POTASSIUM SERPL-SCNC: 4 MMOL/L (ref 3.5–5)
RBC # BLD: 3.48 M/UL (ref 4.7–6.1)
SODIUM BLD-SCNC: 133 MMOL/L (ref 136–145)
WBC # BLD: 12.1 K/UL (ref 4.8–10.8)

## 2019-06-02 PROCEDURE — 6360000002 HC RX W HCPCS: Performed by: THORACIC SURGERY (CARDIOTHORACIC VASCULAR SURGERY)

## 2019-06-02 PROCEDURE — 2700000000 HC OXYGEN THERAPY PER DAY

## 2019-06-02 PROCEDURE — 85027 COMPLETE CBC AUTOMATED: CPT

## 2019-06-02 PROCEDURE — 36592 COLLECT BLOOD FROM PICC: CPT

## 2019-06-02 PROCEDURE — 80048 BASIC METABOLIC PNL TOTAL CA: CPT

## 2019-06-02 PROCEDURE — 2580000003 HC RX 258: Performed by: THORACIC SURGERY (CARDIOTHORACIC VASCULAR SURGERY)

## 2019-06-02 PROCEDURE — 94640 AIRWAY INHALATION TREATMENT: CPT

## 2019-06-02 PROCEDURE — 6370000000 HC RX 637 (ALT 250 FOR IP): Performed by: THORACIC SURGERY (CARDIOTHORACIC VASCULAR SURGERY)

## 2019-06-02 PROCEDURE — 6370000000 HC RX 637 (ALT 250 FOR IP): Performed by: NURSE PRACTITIONER

## 2019-06-02 PROCEDURE — 82948 REAGENT STRIP/BLOOD GLUCOSE: CPT

## 2019-06-02 PROCEDURE — 99232 SBSQ HOSP IP/OBS MODERATE 35: CPT | Performed by: HOSPITALIST

## 2019-06-02 PROCEDURE — 71045 X-RAY EXAM CHEST 1 VIEW: CPT

## 2019-06-02 PROCEDURE — 2140000000 HC CCU INTERMEDIATE R&B

## 2019-06-02 PROCEDURE — 99024 POSTOP FOLLOW-UP VISIT: CPT | Performed by: THORACIC SURGERY (CARDIOTHORACIC VASCULAR SURGERY)

## 2019-06-02 RX ORDER — FUROSEMIDE 40 MG/1
40 TABLET ORAL DAILY
Status: DISCONTINUED | OUTPATIENT
Start: 2019-06-02 | End: 2019-06-04

## 2019-06-02 RX ORDER — HEPARIN SODIUM 5000 [USP'U]/ML
5000 INJECTION, SOLUTION INTRAVENOUS; SUBCUTANEOUS EVERY 8 HOURS SCHEDULED
Status: DISCONTINUED | OUTPATIENT
Start: 2019-06-02 | End: 2019-06-06 | Stop reason: HOSPADM

## 2019-06-02 RX ORDER — INSULIN GLARGINE 100 [IU]/ML
50 INJECTION, SOLUTION SUBCUTANEOUS NIGHTLY
Status: DISCONTINUED | OUTPATIENT
Start: 2019-06-02 | End: 2019-06-06 | Stop reason: HOSPADM

## 2019-06-02 RX ORDER — POTASSIUM CHLORIDE 20 MEQ/1
20 TABLET, EXTENDED RELEASE ORAL DAILY
Status: DISCONTINUED | OUTPATIENT
Start: 2019-06-02 | End: 2019-06-06 | Stop reason: HOSPADM

## 2019-06-02 RX ADMIN — TAMSULOSIN HYDROCHLORIDE 0.4 MG: 0.4 CAPSULE ORAL at 09:23

## 2019-06-02 RX ADMIN — ASPIRIN 81 MG: 81 TABLET ORAL at 09:22

## 2019-06-02 RX ADMIN — AMIODARONE HYDROCHLORIDE 200 MG: 200 TABLET ORAL at 20:54

## 2019-06-02 RX ADMIN — FAMOTIDINE 20 MG: 20 TABLET ORAL at 09:22

## 2019-06-02 RX ADMIN — FUROSEMIDE 40 MG: 40 TABLET ORAL at 11:09

## 2019-06-02 RX ADMIN — AMIODARONE HYDROCHLORIDE 200 MG: 200 TABLET ORAL at 09:22

## 2019-06-02 RX ADMIN — INSULIN LISPRO 3 UNITS: 100 INJECTION, SOLUTION INTRAVENOUS; SUBCUTANEOUS at 08:37

## 2019-06-02 RX ADMIN — IPRATROPIUM BROMIDE AND ALBUTEROL SULFATE 1 AMPULE: .5; 3 SOLUTION RESPIRATORY (INHALATION) at 14:15

## 2019-06-02 RX ADMIN — IPRATROPIUM BROMIDE AND ALBUTEROL SULFATE 1 AMPULE: .5; 3 SOLUTION RESPIRATORY (INHALATION) at 10:24

## 2019-06-02 RX ADMIN — HEPARIN SODIUM 5000 UNITS: 5000 INJECTION INTRAVENOUS; SUBCUTANEOUS at 15:17

## 2019-06-02 RX ADMIN — HEPARIN SODIUM 5000 UNITS: 5000 INJECTION INTRAVENOUS; SUBCUTANEOUS at 20:55

## 2019-06-02 RX ADMIN — INSULIN GLARGINE 50 UNITS: 100 INJECTION, SOLUTION SUBCUTANEOUS at 20:55

## 2019-06-02 RX ADMIN — DOCUSATE SODIUM 100 MG: 100 CAPSULE, LIQUID FILLED ORAL at 09:23

## 2019-06-02 RX ADMIN — CLOPIDOGREL BISULFATE 75 MG: 75 TABLET ORAL at 09:22

## 2019-06-02 RX ADMIN — CARVEDILOL 12.5 MG: 12.5 TABLET, FILM COATED ORAL at 09:22

## 2019-06-02 RX ADMIN — Medication 10 ML: at 21:07

## 2019-06-02 RX ADMIN — OXYCODONE AND ACETAMINOPHEN 1 TABLET: 10; 325 TABLET ORAL at 11:09

## 2019-06-02 RX ADMIN — IPRATROPIUM BROMIDE AND ALBUTEROL SULFATE 1 AMPULE: .5; 3 SOLUTION RESPIRATORY (INHALATION) at 18:40

## 2019-06-02 RX ADMIN — SERTRALINE HYDROCHLORIDE 50 MG: 50 TABLET ORAL at 09:23

## 2019-06-02 RX ADMIN — IPRATROPIUM BROMIDE AND ALBUTEROL SULFATE 1 AMPULE: .5; 3 SOLUTION RESPIRATORY (INHALATION) at 06:34

## 2019-06-02 RX ADMIN — TIZANIDINE 2 MG: 2 TABLET ORAL at 20:55

## 2019-06-02 RX ADMIN — POTASSIUM CHLORIDE 20 MEQ: 20 TABLET, EXTENDED RELEASE ORAL at 11:09

## 2019-06-02 RX ADMIN — OXYCODONE AND ACETAMINOPHEN 1 TABLET: 10; 325 TABLET ORAL at 01:40

## 2019-06-02 RX ADMIN — DOCUSATE SODIUM 100 MG: 100 CAPSULE, LIQUID FILLED ORAL at 20:55

## 2019-06-02 RX ADMIN — Medication 10 ML: at 09:24

## 2019-06-02 RX ADMIN — OXYCODONE AND ACETAMINOPHEN 1 TABLET: 10; 325 TABLET ORAL at 19:36

## 2019-06-02 RX ADMIN — OXYCODONE AND ACETAMINOPHEN 1 TABLET: 10; 325 TABLET ORAL at 15:17

## 2019-06-02 RX ADMIN — FAMOTIDINE 20 MG: 20 TABLET ORAL at 20:55

## 2019-06-02 RX ADMIN — INSULIN LISPRO 3 UNITS: 100 INJECTION, SOLUTION INTRAVENOUS; SUBCUTANEOUS at 12:21

## 2019-06-02 RX ADMIN — INSULIN LISPRO 9 UNITS: 100 INJECTION, SOLUTION INTRAVENOUS; SUBCUTANEOUS at 18:01

## 2019-06-02 RX ADMIN — CARVEDILOL 12.5 MG: 12.5 TABLET, FILM COATED ORAL at 18:01

## 2019-06-02 ASSESSMENT — PAIN SCALES - GENERAL
PAINLEVEL_OUTOF10: 9
PAINLEVEL_OUTOF10: 2
PAINLEVEL_OUTOF10: 4
PAINLEVEL_OUTOF10: 3
PAINLEVEL_OUTOF10: 8
PAINLEVEL_OUTOF10: 8
PAINLEVEL_OUTOF10: 4
PAINLEVEL_OUTOF10: 2
PAINLEVEL_OUTOF10: 2
PAINLEVEL_OUTOF10: 4

## 2019-06-02 ASSESSMENT — PAIN SCALES - WONG BAKER
WONGBAKER_NUMERICALRESPONSE: 4
WONGBAKER_NUMERICALRESPONSE: 4
WONGBAKER_NUMERICALRESPONSE: 2

## 2019-06-02 ASSESSMENT — PAIN DESCRIPTION - PAIN TYPE
TYPE: CHRONIC PAIN;SURGICAL PAIN
TYPE: SURGICAL PAIN
TYPE: SURGICAL PAIN

## 2019-06-02 ASSESSMENT — PAIN DESCRIPTION - LOCATION: LOCATION: CHEST

## 2019-06-02 NOTE — PROGRESS NOTES
Subjective:   Critical Care Daily Progress Note: 6/2/2019 1:38 PM    Interval History:   [id number]:  Mr. Thomas"Terri" Taqueria Sepulveda is looking better each day. He has been increasingly able to ambulate and has had progressive decrease in the amount of pain medications he has needed. He responded well to the enema yesterday, and his surgeon has reportedly requested his transfer out of the MICU to the wards today. [id number]:  Mr. Mitzi Munguia \"Willy\" Taqueria Sepulveda is continuing to do very well. He was able to walk around most of the RN station today. He has been having decreased chest pain and decreased dyspnea. He states that he feels sickly and weak, but no longer has any fatigue. He does state that he feels constipated today, and was encourage to ask his RN for his PRN medications, which his wife stated he will do and he nodded the affirmative, he does not have any dyspnea, nor confusion, nor light headedness. [id number]:   Mr. Thomas"Willy\" Taqueria Sepulveda is a pleasant 75year old gent s/p CABG surgery. He has had much improved pain control since being started on a fentanyl patch by his sugical team. He has been up today and has been walking in the uni with help today.   [id number]:  Mr. Eriberto Thomas"Willy\" Taqueria Sepulveda is a pleasant 75year old  [place] gentleman from home. He has had cardiothoracic surgery this AM, he states that he has been in pain during the last hour between his Dilaudid doses. His surgeon was seen this AM after the CT Sx when he arrived in the ICU bed 146. He related the procedure and findings in detail with me, advice and comanagement greatly appreciated.   [id number]:  Case was discussed in detail with Dr. Karan Ronquillo at the bedside along with family and patient, input appreciated, all questions were answered to the satisfaction of all parties. Mr. Eriberto Thomas"Bill\" Taqueria Sepulveda is a pleasant 75year old  [place] gentleman from home.  He has inoperable disease to the RCA and LAD however a number of collaterals are providing circulation to the heart. He is planned for a salvage CABG procedure tomorrow at 07:30am. He denies any: fever, chills, night sweats, weakness, fatigue, maise, chest pain, dyspnea, diaphoresis, nausea, near syncope, and confusion. He does endorse having had pain all over, and relates that this is his usual pain and that he has had this for a long time. He state that is from his back and neck where he has had multiple interventions including two fusions.   [id number]:   Mr. Ernie Red was seen at the bedside this evening with his son and grandson present. His case was discussed with him and his family in detail. They are agreeable with dividing his lantus dose in to an AM and PM portion. He states that he has not had any further episodes of low blood sugar. [id number]:   Mr. Vicky Mckeon had adjustment of his insulin regimen yesterday. When seen this evening he stated that he was having low blood sugar, and that this has not happened in weeks. His RN gave him a cup of grape juice (highest sugar concentration we have) and thereafter got him a second cup to work on. He chugged the first. His FSBG, obtained just prior to the first FSBG was reported to be 40. He states that he was feeling a hot flash, and light headed, and sweaty, which is how he always feels when this happens. As per his RN he usually has his wife by the bedside and they snack together for a few hours. He did not have his wife hang out with him tonight however. She states she has had him the last few nights. Review of Systems  He denies: fever, chills, night sweats, weakness, fatigue, malaise, chest pain, dyspnea, diaphoresis, confusion, near syncope, and nausea.     Scheduled Meds:   insulin glargine  50 Units Subcutaneous Nightly    heparin (porcine)  5,000 Units Subcutaneous 3 times per day    furosemide  40 mg Oral Daily    potassium chloride  20 mEq Oral Daily    tamsulosin  0.4 mg Oral Daily    sertraline  50 mg Oral Daily    tiZANidine  2 mg Oral Nightly    amiodarone  200 mg Oral BID    insulin lispro  0-18 Units Subcutaneous TID     insulin lispro  0-9 Units Subcutaneous Nightly    carvedilol  12.5 mg Oral BID     sodium chloride flush  10 mL Intravenous 2 times per day    docusate sodium  100 mg Oral BID    famotidine  20 mg Oral BID    aspirin  81 mg Oral Daily    clopidogrel  75 mg Oral Daily    ipratropium-albuterol  1 ampule Inhalation Q4H WA     Continuous Infusions:   dextrose       PRN Meds:oxyCODONE-acetaminophen, glucose, dextrose, glucagon (rDNA), dextrose, sodium chloride flush, acetaminophen, ondansetron        Objective:     I/O last 3 completed shifts: In: 525 [P.O.:240; I.V.:285]  Out: 825 [Urine:825]  No intake/output data recorded. /75   Pulse 82   Temp 97.6 °F (36.4 °C) (Temporal)   Resp 21   Ht 6' 4\" (1.93 m)   Wt 271 lb 12.8 oz (123.3 kg)   SpO2 90%   BMI 33.08 kg/m²     Physical Exam   Constitutional: He appears well-developed and well-nourished. HENT:   Head: Normocephalic and atraumatic. Eyes: Right eye exhibits no discharge. Left eye exhibits no discharge. No scleral icterus. Neck: Neck supple. No tracheal deviation present. Dressing over former RIJ site   Cardiovascular: Normal rate and regular rhythm. Exam reveals no gallop and no friction rub. No murmur heard. sternotomy wound pristine and rapidly healing   Pulmonary/Chest: Effort normal and breath sounds normal. No stridor. No respiratory distress. He has no wheezes. He has no rales. Abdominal: Soft. Bowel sounds are normal. There is no tenderness. There is no rebound and no guarding. Moderate abdominal obesity   Musculoskeletal: He exhibits no edema or tenderness. Neurological: He is alert. Skin: Skin is warm and dry. He is not diaphoretic. Psychiatric: He has a normal mood and affect. His behavior is normal.   Vitals reviewed. LABS:   Results for Rina Rizzo \"BILL\" (MRN [id number]) as of 6/2/2019 13:48   Ref.  Range 6/2/2019 01:20 Sodium Latest Ref Range: 136 - 145 mmol/L 133 (L)   Potassium Latest Ref Range: 3.5 - 5.0 mmol/L 4.0   Chloride Latest Ref Range: 98 - 111 mmol/L 98   CO2 Latest Ref Range: 22 - 29 mmol/L 23   BUN Latest Ref Range: 8 - 23 mg/dL 33 (H)   Creatinine Latest Ref Range: 0.5 - 1.2 mg/dL 1.2   Anion Gap Latest Ref Range: 7 - 19 mmol/L 12   GFR Non- Latest Ref Range: >60  59 (A)   Glucose Latest Ref Range: 74 - 109 mg/dL 157 (H)   Calcium Latest Ref Range: 8.8 - 10.2 mg/dL 8.9   WBC Latest Ref Range: 4.8 - 10.8 K/uL 12.1 (H)   RBC Latest Ref Range: 4.70 - 6.10 M/uL 3.48 (L)   Hemoglobin Quant Latest Ref Range: 14.0 - 18.0 g/dL 9.2 (L)   Hematocrit Latest Ref Range: 42.0 - 52.0 % 29.7 (L)   MCV Latest Ref Range: 80.0 - 94.0 fL 85.3   MCH Latest Ref Range: 27.0 - 31.0 pg 26.4 (L)   MCHC Latest Ref Range: 33.0 - 37.0 g/dL 31.0 (L)   MPV Latest Ref Range: 9.4 - 12.4 fL 11.4   RDW Latest Ref Range: 11.5 - 14.5 % 14.2   Platelet Count Latest Ref Range: 130 - 400 K/uL 209         Assessment:     Principal Problem:    Acute MI inferior lateral first episode care [facility])  Active Problems:    ACS (acute coronary syndrome) (HCC)    Type 2 diabetes mellitus with diabetic polyneuropathy, with long-term current use of insulin (HCC)    Essential hypertension    Coronary artery disease involving native coronary artery of native heart with unstable angina pectoris (HCC)    Mixed hyperlipidemia    Chronic bilateral low back pain without sciatica    MOO on CPAP    Stage 3 chronic kidney disease (HCC)    S/P CABG x 3, LIMA- DIAG, A0- SVG- LAD, A0- SVG- Ramus, EF 30-35% (5/30/2019)  Resolved Problems:    Rash      Plan:     DM Type 2 with Polyneuropathy and ongoing Hypoglycemic Episodes:  Hypoglycemic orders set to be continued  Lantus was upped to 40 units BiD  High Dose ISS to be continued  ASA and Statin indicated as per DM, best to be dosed as per Cardiac Problems as more important  Medicine continuing to follow for DM management.      Constipation:  Enema yesterday to good effect      Care time of >25 minutes

## 2019-06-02 NOTE — PROGRESS NOTES
Right IJ dced as ordered. Head od bed decreased as far as patient could tolerate-up ~10 degrees only . Pressure held with sterile vaseline guaze & guaze 4x4 dressing until hemostasis achieved. Head of bed not elevated for 30 mins. No bleeding or complications observed.

## 2019-06-02 NOTE — PROGRESS NOTES
POST OP CARDIOTHORACIC SURGERY PROGRESS NOTE    Post op day 3    SUBJECTIVE:  Steadily improving, OOB has ambulated. BP (!) 110/55   Pulse 75   Temp 97.6 °F (36.4 °C) (Temporal)   Resp 15   Ht 6' 4\" (1.93 m)   Wt 271 lb 12.8 oz (123.3 kg)   SpO2 96%   BMI 33.08 kg/m²   Average, Min, and Max for last 24 hours Vitals:  TEMPERATURE:  Temp  Av.7 °F (36.5 °C)  Min: 97.4 °F (36.3 °C)  Max: 98 °F (36.7 °C)  RESPIRATIONS RANGE: Resp  Av.8  Min: 15  Max: 27  PULSE RANGE: Pulse  Av.8  Min: 71  Max: 91  BLOOD PRESSURE RANGE:  Systolic (97AJJ), KZJ:879 , Min:110 , PKF:777   ; Diastolic (58TKG), CTU:09, Min:55, Max:81    PULSE OXIMETRY RANGE: SpO2  Av.9 %  Min: 90 %  Max: 98 %    I/O last 3 completed shifts: In: 525 [P.O.:240; I.V.:285]  Out: 825 [Urine:825]    CHEST: lungs clear    CARDIOVASCULAR: regular rhythm, no murmurs    INCISION: no drainage, skin edges intact    DRAINS: none    LABS:  CBC:   Lab Results   Component Value Date    WBC 12.1 2019    RBC 3.48 2019    HGB 9.2 2019    HCT 29.7 2019    HCT 41.6 2011    MCV 85.3 2019    MCH 26.4 2019    MCHC 31.0 2019    RDW 14.2 2019     2019     2011    MPV 11.4 2019     BMP:    Lab Results   Component Value Date     2019     2011    K 4.0 2019    K 5.0 2019    K 4.0 2011    CL 98 2019     2011    CO2 23 2019    BUN 33 2019    LABALBU 3.0 2019    LABALBU 3.8 2011    CREATININE 1.2 2019    CREATININE 1.0 2011    CALCIUM 8.9 2019    LABGLOM 59 2019    GLUCOSE 157 2019       CHEST XRAY: stable    ASSESSMENT: progressing well, remaining in NSR. PLAN: Increase ambulation, evaluation for rehab placement, transfer to telemetry floor. DVT prophylaxis. Begin daily lasix. Will need to resume lisinopril upon discharge for impaired LVF.  Dr. Darnell Wilcox to resume the care of his patient tomorrow am.      Electronically signed by Shena Gay

## 2019-06-02 NOTE — PROGRESS NOTES
Patient has a history of sleep apnea and uses CPAP at night. CPAP machine requested from family to bring from home for patient use while in Hospital. Family agreed to bring CPAP.  Electronically signed by Guero Wolfe RN on 6/2/2019 at 5:28 PM

## 2019-06-03 ENCOUNTER — APPOINTMENT (OUTPATIENT)
Dept: GENERAL RADIOLOGY | Age: 74
DRG: 234 | End: 2019-06-03
Payer: MEDICARE

## 2019-06-03 LAB
ALBUMIN SERPL-MCNC: 2.7 G/DL (ref 3.5–5.2)
ALP BLD-CCNC: 65 U/L (ref 40–130)
ALT SERPL-CCNC: 31 U/L (ref 5–41)
ANION GAP SERPL CALCULATED.3IONS-SCNC: 11 MMOL/L (ref 7–19)
AST SERPL-CCNC: 25 U/L (ref 5–40)
BILIRUB SERPL-MCNC: 0.3 MG/DL (ref 0.2–1.2)
BUN BLDV-MCNC: 37 MG/DL (ref 8–23)
CALCIUM SERPL-MCNC: 8.8 MG/DL (ref 8.8–10.2)
CHLORIDE BLD-SCNC: 100 MMOL/L (ref 98–111)
CO2: 24 MMOL/L (ref 22–29)
CREAT SERPL-MCNC: 1.2 MG/DL (ref 0.5–1.2)
GFR NON-AFRICAN AMERICAN: 59
GLUCOSE BLD-MCNC: 102 MG/DL (ref 74–109)
GLUCOSE BLD-MCNC: 152 MG/DL (ref 70–99)
GLUCOSE BLD-MCNC: 160 MG/DL (ref 70–99)
GLUCOSE BLD-MCNC: 162 MG/DL (ref 70–99)
GLUCOSE BLD-MCNC: 189 MG/DL (ref 70–99)
HCT VFR BLD CALC: 27.4 % (ref 42–52)
HEMOGLOBIN: 8.7 G/DL (ref 14–18)
MCH RBC QN AUTO: 27.3 PG (ref 27–31)
MCHC RBC AUTO-ENTMCNC: 31.8 G/DL (ref 33–37)
MCV RBC AUTO: 85.9 FL (ref 80–94)
PDW BLD-RTO: 14.1 % (ref 11.5–14.5)
PERFORMED ON: ABNORMAL
PLATELET # BLD: 188 K/UL (ref 130–400)
PMV BLD AUTO: 11.8 FL (ref 9.4–12.4)
POTASSIUM SERPL-SCNC: 4.1 MMOL/L (ref 3.5–5)
RBC # BLD: 3.19 M/UL (ref 4.7–6.1)
SODIUM BLD-SCNC: 135 MMOL/L (ref 136–145)
TOTAL PROTEIN: 5.9 G/DL (ref 6.6–8.7)
WBC # BLD: 8.6 K/UL (ref 4.8–10.8)

## 2019-06-03 PROCEDURE — 97116 GAIT TRAINING THERAPY: CPT

## 2019-06-03 PROCEDURE — 6370000000 HC RX 637 (ALT 250 FOR IP): Performed by: THORACIC SURGERY (CARDIOTHORACIC VASCULAR SURGERY)

## 2019-06-03 PROCEDURE — 2700000000 HC OXYGEN THERAPY PER DAY

## 2019-06-03 PROCEDURE — 2580000003 HC RX 258: Performed by: THORACIC SURGERY (CARDIOTHORACIC VASCULAR SURGERY)

## 2019-06-03 PROCEDURE — 85027 COMPLETE CBC AUTOMATED: CPT

## 2019-06-03 PROCEDURE — 99232 SBSQ HOSP IP/OBS MODERATE 35: CPT | Performed by: INTERNAL MEDICINE

## 2019-06-03 PROCEDURE — 97165 OT EVAL LOW COMPLEX 30 MIN: CPT

## 2019-06-03 PROCEDURE — 71045 X-RAY EXAM CHEST 1 VIEW: CPT

## 2019-06-03 PROCEDURE — 82948 REAGENT STRIP/BLOOD GLUCOSE: CPT

## 2019-06-03 PROCEDURE — 94640 AIRWAY INHALATION TREATMENT: CPT

## 2019-06-03 PROCEDURE — 6360000002 HC RX W HCPCS: Performed by: THORACIC SURGERY (CARDIOTHORACIC VASCULAR SURGERY)

## 2019-06-03 PROCEDURE — 97162 PT EVAL MOD COMPLEX 30 MIN: CPT

## 2019-06-03 PROCEDURE — 99024 POSTOP FOLLOW-UP VISIT: CPT | Performed by: THORACIC SURGERY (CARDIOTHORACIC VASCULAR SURGERY)

## 2019-06-03 PROCEDURE — 2140000000 HC CCU INTERMEDIATE R&B

## 2019-06-03 PROCEDURE — 97530 THERAPEUTIC ACTIVITIES: CPT

## 2019-06-03 PROCEDURE — 36415 COLL VENOUS BLD VENIPUNCTURE: CPT

## 2019-06-03 PROCEDURE — 80053 COMPREHEN METABOLIC PANEL: CPT

## 2019-06-03 RX ADMIN — CARVEDILOL 12.5 MG: 12.5 TABLET, FILM COATED ORAL at 08:28

## 2019-06-03 RX ADMIN — INSULIN LISPRO 3 UNITS: 100 INJECTION, SOLUTION INTRAVENOUS; SUBCUTANEOUS at 08:39

## 2019-06-03 RX ADMIN — OXYCODONE AND ACETAMINOPHEN 1 TABLET: 10; 325 TABLET ORAL at 13:49

## 2019-06-03 RX ADMIN — ASPIRIN 81 MG: 81 TABLET ORAL at 08:28

## 2019-06-03 RX ADMIN — DOCUSATE SODIUM 100 MG: 100 CAPSULE, LIQUID FILLED ORAL at 08:28

## 2019-06-03 RX ADMIN — FUROSEMIDE 40 MG: 40 TABLET ORAL at 08:28

## 2019-06-03 RX ADMIN — CLOPIDOGREL BISULFATE 75 MG: 75 TABLET ORAL at 08:28

## 2019-06-03 RX ADMIN — SERTRALINE HYDROCHLORIDE 50 MG: 50 TABLET ORAL at 08:28

## 2019-06-03 RX ADMIN — Medication 10 ML: at 21:31

## 2019-06-03 RX ADMIN — INSULIN LISPRO 2 UNITS: 100 INJECTION, SOLUTION INTRAVENOUS; SUBCUTANEOUS at 21:33

## 2019-06-03 RX ADMIN — TIZANIDINE 2 MG: 2 TABLET ORAL at 21:31

## 2019-06-03 RX ADMIN — IPRATROPIUM BROMIDE AND ALBUTEROL SULFATE 1 AMPULE: .5; 3 SOLUTION RESPIRATORY (INHALATION) at 10:49

## 2019-06-03 RX ADMIN — IPRATROPIUM BROMIDE AND ALBUTEROL SULFATE 1 AMPULE: .5; 3 SOLUTION RESPIRATORY (INHALATION) at 15:14

## 2019-06-03 RX ADMIN — HEPARIN SODIUM 5000 UNITS: 5000 INJECTION INTRAVENOUS; SUBCUTANEOUS at 06:17

## 2019-06-03 RX ADMIN — HEPARIN SODIUM 5000 UNITS: 5000 INJECTION INTRAVENOUS; SUBCUTANEOUS at 15:18

## 2019-06-03 RX ADMIN — OXYCODONE AND ACETAMINOPHEN 1 TABLET: 10; 325 TABLET ORAL at 08:28

## 2019-06-03 RX ADMIN — INSULIN LISPRO 3 UNITS: 100 INJECTION, SOLUTION INTRAVENOUS; SUBCUTANEOUS at 13:49

## 2019-06-03 RX ADMIN — FAMOTIDINE 20 MG: 20 TABLET ORAL at 08:28

## 2019-06-03 RX ADMIN — Medication 10 ML: at 08:41

## 2019-06-03 RX ADMIN — INSULIN GLARGINE 50 UNITS: 100 INJECTION, SOLUTION SUBCUTANEOUS at 21:32

## 2019-06-03 RX ADMIN — DOCUSATE SODIUM 100 MG: 100 CAPSULE, LIQUID FILLED ORAL at 21:31

## 2019-06-03 RX ADMIN — POTASSIUM CHLORIDE 20 MEQ: 20 TABLET, EXTENDED RELEASE ORAL at 08:28

## 2019-06-03 RX ADMIN — OXYCODONE AND ACETAMINOPHEN 1 TABLET: 10; 325 TABLET ORAL at 18:02

## 2019-06-03 RX ADMIN — OXYCODONE AND ACETAMINOPHEN 1 TABLET: 10; 325 TABLET ORAL at 04:08

## 2019-06-03 RX ADMIN — IPRATROPIUM BROMIDE AND ALBUTEROL SULFATE 1 AMPULE: .5; 3 SOLUTION RESPIRATORY (INHALATION) at 19:00

## 2019-06-03 RX ADMIN — AMIODARONE HYDROCHLORIDE 200 MG: 200 TABLET ORAL at 08:28

## 2019-06-03 RX ADMIN — HEPARIN SODIUM 5000 UNITS: 5000 INJECTION INTRAVENOUS; SUBCUTANEOUS at 21:33

## 2019-06-03 RX ADMIN — FAMOTIDINE 20 MG: 20 TABLET ORAL at 21:31

## 2019-06-03 RX ADMIN — TAMSULOSIN HYDROCHLORIDE 0.4 MG: 0.4 CAPSULE ORAL at 08:28

## 2019-06-03 RX ADMIN — CARVEDILOL 12.5 MG: 12.5 TABLET, FILM COATED ORAL at 18:03

## 2019-06-03 RX ADMIN — AMIODARONE HYDROCHLORIDE 200 MG: 200 TABLET ORAL at 21:31

## 2019-06-03 RX ADMIN — IPRATROPIUM BROMIDE AND ALBUTEROL SULFATE 1 AMPULE: .5; 3 SOLUTION RESPIRATORY (INHALATION) at 07:18

## 2019-06-03 RX ADMIN — INSULIN LISPRO 3 UNITS: 100 INJECTION, SOLUTION INTRAVENOUS; SUBCUTANEOUS at 18:05

## 2019-06-03 RX ADMIN — OXYCODONE AND ACETAMINOPHEN 1 TABLET: 10; 325 TABLET ORAL at 22:02

## 2019-06-03 ASSESSMENT — PAIN SCALES - GENERAL
PAINLEVEL_OUTOF10: 6
PAINLEVEL_OUTOF10: 3
PAINLEVEL_OUTOF10: 8
PAINLEVEL_OUTOF10: 8
PAINLEVEL_OUTOF10: 6
PAINLEVEL_OUTOF10: 0
PAINLEVEL_OUTOF10: 8
PAINLEVEL_OUTOF10: 6
PAINLEVEL_OUTOF10: 2
PAINLEVEL_OUTOF10: 8
PAINLEVEL_OUTOF10: 3
PAINLEVEL_OUTOF10: 3
PAINLEVEL_OUTOF10: 7

## 2019-06-03 ASSESSMENT — PAIN DESCRIPTION - LOCATION
LOCATION: CHEST

## 2019-06-03 ASSESSMENT — PAIN DESCRIPTION - ORIENTATION
ORIENTATION: MID

## 2019-06-03 ASSESSMENT — PAIN DESCRIPTION - PAIN TYPE
TYPE: SURGICAL PAIN
TYPE: ACUTE PAIN
TYPE: SURGICAL PAIN
TYPE: SURGICAL PAIN

## 2019-06-03 ASSESSMENT — PAIN DESCRIPTION - DESCRIPTORS: DESCRIPTORS: ACHING

## 2019-06-03 ASSESSMENT — PAIN SCALES - WONG BAKER: WONGBAKER_NUMERICALRESPONSE: 2

## 2019-06-03 NOTE — PROGRESS NOTES
Progress Note  6/3/2019 3:48 PM  Subjective:   Admit Date: 5/24/2019  PCP: Elfego Jenkins MD    CC: dizziness    Subjective: pt seen and examined, main complaint is he gets dizzy when he gets up to walk sometimes, states this was happening prior to hospitalization as well. Denies any active chest pain but states still sore from surgery. No n/v. Poor appetite. ROS: 14 point review of systems is negative except as specifically addressed above.     DIET CARB CONTROL; Carb Control: 4 carb choices (60 gms)/meal    Intake/Output Summary (Last 24 hours) at 6/3/2019 1548  Last data filed at 6/3/2019 1325  Gross per 24 hour   Intake 1300 ml   Output 620 ml   Net 680 ml     Medications:   dextrose       Current Facility-Administered Medications   Medication Dose Route Frequency Provider Last Rate Last Dose    insulin glargine (LANTUS) injection vial 50 Units  50 Units Subcutaneous Nightly Manjit Rose MD   50 Units at 06/02/19 2055    heparin (porcine) injection 5,000 Units  5,000 Units Subcutaneous 3 times per day Manjit Rose MD   5,000 Units at 06/03/19 1518    furosemide (LASIX) tablet 40 mg  40 mg Oral Daily Mnajit Rose MD   40 mg at 06/03/19 0828    potassium chloride (KLOR-CON M) extended release tablet 20 mEq  20 mEq Oral Daily Manjit Rose MD   20 mEq at 06/03/19 0828    tamsulosin (FLOMAX) capsule 0.4 mg  0.4 mg Oral Daily Manjit Rose MD   0.4 mg at 06/03/19 4046    sertraline (ZOLOFT) tablet 50 mg  50 mg Oral Daily Manjit Rose MD   50 mg at 06/03/19 2835    tiZANidine (ZANAFLEX) tablet 2 mg  2 mg Oral Nightly Manjit Rose MD   2 mg at 06/02/19 2055    amiodarone (CORDARONE) tablet 200 mg  200 mg Oral BID Manjit Rose MD   200 mg at 06/03/19 0828    oxyCODONE-acetaminophen (PERCOCET)  MG per tablet 1 tablet  1 tablet Oral Q4H PRN Manjit Rose MD   1 tablet at 06/03/19 1349    glucose (GLUTOSE) 40 % oral gel 15 g  15 g Oral PRN Manjit Cross, MD        dextrose 50 % solution 12.5 g  12.5 g Intravenous PRN Kasi Leigh MD        glucagon (rDNA) injection 1 mg  1 mg Intramuscular PRN Kasi Leigh MD        dextrose 5 % solution  100 mL/hr Intravenous PRN Kasi Leigh MD        insulin lispro (HUMALOG) injection vial 0-18 Units  0-18 Units Subcutaneous TID  Kasi Leigh MD   3 Units at 06/03/19 1349    insulin lispro (HUMALOG) injection vial 0-9 Units  0-9 Units Subcutaneous Nightly Kasi Leigh MD   2 Units at 06/01/19 2059    carvedilol (COREG) tablet 12.5 mg  12.5 mg Oral BID  Kasi Leigh MD   12.5 mg at 06/03/19 0828    sodium chloride flush 0.9 % injection 10 mL  10 mL Intravenous 2 times per day Kasi Legih MD   10 mL at 06/03/19 0841    sodium chloride flush 0.9 % injection 10 mL  10 mL Intravenous PRN Kasi Leigh MD   10 mL at 06/01/19 9346    acetaminophen (TYLENOL) tablet 650 mg  650 mg Oral Q4H PRN Kasi Leigh MD        docusate sodium (COLACE) capsule 100 mg  100 mg Oral BID Kasi Leigh MD   100 mg at 06/03/19 0828    ondansetron (ZOFRAN) injection 4 mg  4 mg Intravenous Q8H PRN Kasi Leigh MD        famotidine (PEPCID) tablet 20 mg  20 mg Oral BID Kasi Leigh MD   20 mg at 06/03/19 4454    aspirin EC tablet 81 mg  81 mg Oral Daily Kasi Leigh MD   81 mg at 06/03/19 1851    clopidogrel (PLAVIX) tablet 75 mg  75 mg Oral Daily Kasi Leigh MD   75 mg at 06/03/19 0828    ipratropium-albuterol (DUONEB) nebulizer solution 1 ampule  1 ampule Inhalation Q4H WA Kasi Leigh MD   1 ampule at 06/03/19 1514        Labs:     Recent Labs     06/01/19  0300 06/02/19  0120 06/03/19  0151   WBC 17.2* 12.1* 8.6   RBC 3.76* 3.48* 3.19*   HGB 10.3* 9.2* 8.7*   HCT 32.3* 29.7* 27.4*   MCV 85.9 85.3 85.9   MCH 27.4 26.4* 27.3   MCHC 31.9* 31.0* 31.8*    209 188     Recent Labs     06/01/19  0300 06/02/19  0120 06/03/19  0151   * 133* 135*   K 4.6 4.0 4. 1   ANIONGAP 11 12 11   CL 99 98 100   CO2 22 23 24   BUN 28* 33* 37*   CREATININE 1.2 1.2 1.2   GLUCOSE 229* 157* 102   CALCIUM 9.0 8.9 8.8     Recent Labs     05/31/19  2125   MG 1.8     Recent Labs     06/03/19  0151   AST 25   ALT 31   BILITOT 0.3   ALKPHOS 65     FLP:    Lab Results   Component Value Date    TRIG 103 05/25/2019    HDL 20 05/25/2019    LDLCALC 116 05/25/2019    LDLDIRECT 167 11/01/2018     TSH:    Lab Results   Component Value Date    TSH 1.810 05/24/2019     Troponin T: No results for input(s): TROPONINI in the last 72 hours. INR: No results for input(s): INR in the last 72 hours. Objective:   Vitals: /64   Pulse 74   Temp 97.3 °F (36.3 °C) (Temporal)   Resp 16   Ht 6' 4\" (1.93 m)   Wt 269 lb 12.8 oz (122.4 kg)   SpO2 94%   BMI 32.84 kg/m²   24HR INTAKE/OUTPUT:      Intake/Output Summary (Last 24 hours) at 6/3/2019 1548  Last data filed at 6/3/2019 1325  Gross per 24 hour   Intake 1300 ml   Output 620 ml   Net 680 ml     General appearance: NAD, alert and cooperative with exam  HEENT: atraumatic, PERRLA, EOMI, anicteric, trachea midline  Lungs: no increased work of breathing, CTAB, no wheezing/rhonchi/rales  Heart: RRR, +s1/s2, no rub. Sternotomy incision clean/dry and healing  Abdomen: soft, nt/nd, +BS, no rebound/gaurding  Extremities: atraumatic, no edema, no cyanosis   Neurologic: AAOx3, no focal deficits  Skin: no rashes  Psych: Normal affect      Assessment and Plan:   Principal Problem:    ACS (acute coronary syndrome) (HCC) - s/p CABG, care per CT sx     Active Problems:    Type 2 diabetes mellitus with diabetic polyneuropathy, with long-term current use of insulin (HCC) - sugars well controlled, insulins seem to have been adjusted recently per CTsx.  Hypoglycemia protocol in place       HTN (hypertension) - bp soft/stable on current regimen      Abd rash - resolved     Dental lesion - s/p tx        Advance Directive: Full Code    Pt now out of icu and doing well, sugars well controlled, medicine will sign off at this time, please call if any questions      Electronically signed by Karlene Fraser DO on 6/3/2019 at 3:48 PM

## 2019-06-03 NOTE — PROGRESS NOTES
Attempted with help from [address] [place] [address] to take pt to shower. After a few steps pt became very dizzy and needed to sit back down. Pt states this has been happening and was occurring even before admission. Pt states the \"spell\" comes and goes. Pt stated that he did not want to attempt getting to the shower at this time. We will check back later to see if pt feels he able to shower.

## 2019-06-03 NOTE — PROGRESS NOTES
Physical Therapy    Facility/Department: [facility] PROGRESSIVE CARE  Initial Assessment    NAME: Staci Balderrama  :   MRN: [id number]    Date of Service: 6/3/2019    Discharge Recommendations:  Continue to assess pending progress        Assessment   Body structures, Functions, Activity limitations: Decreased functional mobility ; Decreased ROM; Decreased strength;Decreased endurance;Decreased balance  Assessment: Pt MAY BENEFIT FROM FURTHER REHAB @ D/C.  REQUIRES PT FOLLOW UP: Yes  Activity Tolerance  Activity Tolerance: Patient Tolerated treatment well       Patient Diagnosis(es): The primary encounter diagnosis was Other chest pain. A diagnosis of Elevated troponin was also pertinent to this visit. has a past medical history of Acute ischemic stroke ([address] Atherosclerotic heart disease, CAD (coronary artery disease), Cerebral artery occlusion with cerebral infarction [facility]), Cerebrovascular accident (CVA) due to embolism of right middle cerebral artery ([address] CHF (congestive heart failure) ([address] Chronic bilateral low back pain without sciatica, Chronic kidney disease, DDD (degenerative disc disease), lumbar, Depression, Depression with anxiety, Diabetes mellitus ([address] Diabetes mellitus, type 2 ([address] DM (diabetes mellitus) ([address] Glaucoma, Headache, Hyperlipidemia, Hypertension, Macular degeneration, MI (myocardial infarction) ([address] MI (myocardial infarction) ([address] Neuromuscular disorder ([address] Neuropathy, Osteoarthritis, Sleep apnea, Spondylosis of lumbar region without myelopathy or radiculopathy, Status post placement of implantable loop recorder, and TIA (transient ischemic attack). has a past surgical history that includes Coronary angioplasty with stent ( [place]); Coronary angioplasty with stent; Diagnostic Cardiac Cath Lab Procedure ([id number]); Cardiac catheterization (12); Cardiac catheterization (3/29/15  [facility]); lumbar fusion (Left, 11/15/2016);  Colonoscopy; back surgery; joint replacement; Appendectomy; Cholecystectomy; and Coronary artery bypass graft (N/A, 5/30/2019).     Restrictions  Restrictions/Precautions  Restrictions/Precautions: Fall Risk, Cardiac  Position Activity Restriction  Sternal Precautions: No Pushing, No Pulling, 5# Lifting Restrictions  Vision/Hearing  Vision: Within Functional Limits  Hearing: Within functional limits     Subjective  General  Diagnosis: CABG  Subjective  Subjective: Pt UP IN CHAIR, WILLING TO WALK  Pain Screening  Patient Currently in Pain: Yes(Pain meds given)  Pain Assessment  Pain Assessment: 0-10  Pain Level: 6  Pain Type: Acute pain  Pain Location: Chest  Vital Signs  Patient Currently in Pain: Yes  Oxygen Therapy  SpO2: 97 %(with ambulation)  O2 Device: None (Room air)  Patient Observation  Observations: NSG/AIDE AWARE OF PT ON RA       Orientation  Orientation  Overall Orientation Status: Within Normal Limits  Social/Functional History  Social/Functional History  Lives With: Spouse  Type of Home: House  ADL Assistance: Independent  Homemaking Assistance: Independent  Homemaking Responsibilities: Yes  Ambulation Assistance: Independent  Transfer Assistance: Independent  Cognition   Cognition  Overall Cognitive Status: WNL    Objective          AROM RLE (degrees)  RLE AROM: WNL  AROM LLE (degrees)  LLE AROM : WNL  Strength RLE  Comment: GROSSLY +4/5  Strength LLE  Comment: GROSSLY +4/5        Bed mobility  Supine to Sit: (NT: UP IN CHAIR)  Transfers  Sit to Stand: Minimal Assistance  Bed to Chair: Contact guard assistance;Minimal assistance  Ambulation 1  Device: Rolling Walker  Assistance: Contact guard assistance;Minimal assistance  Quality of Gait: MINIMAL DYSPNEA  Gait Deviations: Slow Daija;Decreased step length  Distance: 125'  Comments: 2-3 BRIEF STANDING BREAKS     Balance  Sitting - Dynamic: Good  Standing - Dynamic: Good;-        Plan   Plan  Times per week: AT LEAST 6-7  Current Treatment Recommendations: Strengthening, Balance Training, Functional Mobility Training, Transfer Training, Endurance Training, Patient/Caregiver Education & Training, Safety Education & Training, Gait Training  Safety Devices  Type of devices: Left in chair, Call light within reach    G-Code       OutComes Score                                                  AM-PAC Score             Goals  Short term goals  Time Frame for Short term goals: 14 DAYS  Short term goal 1: BED MOB MOD IND  Short term goal 2: TRANSFERS SBA  Short term goal 3: ' AAD (IF NEEDED) SBA       Therapy Time   Individual Concurrent Group Co-treatment   Time In           Time Out           Minutes                   Shaji Cunningham, PT

## 2019-06-03 NOTE — CARE COORDINATION
[facility]  Notification of Admission Decision      [] Patient has been accepted for admit to [facility] on :       Please write discharge orders and summary prior to discharge. [x] Patient acceptance to Rehab pending the following : Insurance approval    [] Eval in progress       [] Patient determined to be ineligible for services at [facility] because : We recommend you consider        Thank you for your referral, we appreciate you.     Electronically Signed by Aria Church Admissions Coordinator 6/3/2019 4:32 PM

## 2019-06-03 NOTE — CONSULTS
Spoke to patient and his wife this am about post op care,diet,medications and cardiac rehab. Informed pt that after his doctors release him in about 6 weeks he is a candidate for cardiac rehab. Pt and his wife talked like he would be interested in going through the program.  Gave wife all the educational material and ask that she give us a call when patient was ready for cardiac rehab. Both verbalized understanding.

## 2019-06-03 NOTE — PROGRESS NOTES
stent; Diagnostic Cardiac Cath Lab Procedure (893432); Cardiac catheterization (12/12/12); Cardiac catheterization (3/29/15  MDL); lumbar fusion (Left, 11/15/2016); Colonoscopy; back surgery; joint replacement; Appendectomy; Cholecystectomy; and Coronary artery bypass graft (N/A, 5/30/2019). Treatment Diagnosis: s/p CABG      Restrictions  Restrictions/Precautions  Restrictions/Precautions: Fall Risk, Cardiac  Position Activity Restriction  Sternal Precautions: No Pushing, No Pulling, 5# Lifting Restrictions  Sternal Precautions: yes    Subjective   General  Chart Reviewed: Yes  Patient assessed for rehabilitation services?: Yes  Family / Caregiver Present: No  Diagnosis: s/p CABG  General Comment  Comments: Pt. states he is very tired sitting up in chair. Pain Assessment  Pain Assessment: 0-10  Pain Level: 8  Pain Type: Surgical pain  Pain Orientation: Mid  Pain Descriptors: Aching  Oxygen Therapy  SpO2: 94 %  O2 Device: None (Room air)  Patient Observation  Observations: Patient sp02in Mid 90s pre and post gait on RA  Social/Functional History  Social/Functional History  Lives With: Spouse  Type of Home: House  Bathroom Toilet: Standard  ADL Assistance: Independent  Homemaking Assistance: Independent  Homemaking Responsibilities: Yes  Ambulation Assistance: Independent  Transfer Assistance: Independent       Objective   Vision: Within Functional Limits  Hearing: Within functional limits    Orientation  Overall Orientation Status: Within Normal Limits        ADL  Feeding: Independent  Grooming: Independent  UE Bathing: Minimal assistance  LE Bathing: Moderate assistance  UE Dressing: Minimal assistance  LE Dressing: Moderate assistance  Toileting:  Moderate assistance           Transfers  Stand Step Transfers: Minimal assistance  Sit to stand: Minimal assistance     Cognition  Overall Cognitive Status: WNL                 LUE AROM (degrees)  LUE AROM : WFL  RUE AROM (degrees)  RUE AROM : Exceptions  RUE General AROM: R shld to 75 deg flexion  LUE Strength  Gross LUE Strength: WFL  RUE Strength  Gross RUE Strength: WFL                   Plan   Plan  Times per week: 3-5x/week  Times per day: Daily    G-Code     OutComes Score                                                  AM-PAC Score             Goals  Short term goals  Time Frame for Short term goals: 1 week  Short term goal 1: CGA with toilet tfers including sit-stand   Short term goal 2: CGA with clothing mgmt for shorts in standing  Short term goal 3: Supervision with seated LB dsg  Long term goals  Long term goal 1: Return to PLOF       Therapy Time   Individual Concurrent Group Co-treatment   Time In           Time Out           Minutes                   Nell Frederick OTR/L

## 2019-06-03 NOTE — PROGRESS NOTES
Pt was up to bathroom with PCA when PCA called out for nurse. I went into pt's room and pt was very dizzy. We got pt a walker and got pt to the bathroom. Pt had already had an accident on the floor and we where unable to measure. VS taken, they are WDL but on the lower side. Pt sat on the toilet for a few minutes and had no more dizzy spells. My PCA and I then stood pt up and assisted him to bed. He did have another episode of dizziness. Pt in bed now resting. Will relay to dayshift. Pt's wife did state he has dizzy episodes at home from time to time. Will continue to monitor.

## 2019-06-03 NOTE — PROGRESS NOTES
POST OP CARDIOTHORACIC SURGERY PROGRESS NOTE    Post op day 4    SUBJECTIVE:  Awake and alert. Lightheaded episode last night. Has these at home as well. Remains in sinus rhythm    /68   Pulse 75   Temp 97 °F (36.1 °C) (Temporal)   Resp 20   Ht 6' 4\" (1.93 m)   Wt 269 lb 12.8 oz (122.4 kg)   SpO2 98%   BMI 32.84 kg/m²   Average, Min, and Max for last 24 hours Vitals:  TEMPERATURE:  Temp  Av.2 °F (36.2 °C)  Min: 96.7 °F (35.9 °C)  Max: 97.6 °F (36.4 °C)  RESPIRATIONS RANGE: Resp  Av.4  Min: 15  Max: 26  PULSE RANGE: Pulse  Av.5  Min: 73  Max: 98  BLOOD PRESSURE RANGE:  Systolic (76RPX), GLA:793 , Min:99 , QLN:364   ; Diastolic (12VAF), QOL:38, Min:55, Max:84    PULSE OXIMETRY RANGE: SpO2  Av.2 %  Min: 89 %  Max: 100 %    I/O last 3 completed shifts:   In: 1760 [P.O.:1760]  Out: 1220 [Urine:1220]    CHEST: lungs clear    CARDIOVASCULAR: regular rhythm, no murmurs    INCISION: no drainage, skin edges intact    DRAINS:     LABS:  CBC with Differential:    Lab Results   Component Value Date    WBC 8.6 2019    RBC 3.19 2019    HGB 8.7 2019    HCT 27.4 2019    HCT 41.6 2011     2019     2011    MCV 85.9 2019    MCH 27.3 2019    MCHC 31.8 2019    RDW 14.1 2019    LYMPHOPCT 27.0 2019    MONOPCT 7.7 2019    EOSPCT 1.7 2011    BASOPCT 0.7 2019    MONOSABS 0.60 2019    LYMPHSABS 2.2 2019    EOSABS 0.40 2019    BASOSABS 0.10 2019     BMP:    Lab Results   Component Value Date     2019     2011    K 4.1 2019    K 5.0 2019    K 4.0 2011     2019     2011    CO2 24 2019    BUN 37 2019    LABALBU 2.7 2019    LABALBU 3.8 2011    CREATININE 1.2 2019    CREATININE 1.0 2011    CALCIUM 8.8 2019    LABGLOM 59 2019    GLUCOSE 102 2019       CHEST XRAY: normal postoperative chest xray    ASSESSMENT: normal postoperative course    PLAN: Remove pacing wires. Rehab consult.  Shower    Electronically signed by Mihaela Hernandez MD on 6/3/19 at 6:43 AM

## 2019-06-04 LAB
ALBUMIN SERPL-MCNC: 2.9 G/DL (ref 3.5–5.2)
ALP BLD-CCNC: 71 U/L (ref 40–130)
ALT SERPL-CCNC: 35 U/L (ref 5–41)
ANION GAP SERPL CALCULATED.3IONS-SCNC: 12 MMOL/L (ref 7–19)
AST SERPL-CCNC: 26 U/L (ref 5–40)
BILIRUB SERPL-MCNC: 0.3 MG/DL (ref 0.2–1.2)
BUN BLDV-MCNC: 38 MG/DL (ref 8–23)
CALCIUM SERPL-MCNC: 8.6 MG/DL (ref 8.8–10.2)
CHLORIDE BLD-SCNC: 97 MMOL/L (ref 98–111)
CO2: 24 MMOL/L (ref 22–29)
CREAT SERPL-MCNC: 1.4 MG/DL (ref 0.5–1.2)
GFR NON-AFRICAN AMERICAN: 49
GLUCOSE BLD-MCNC: 106 MG/DL (ref 70–99)
GLUCOSE BLD-MCNC: 142 MG/DL (ref 74–109)
GLUCOSE BLD-MCNC: 218 MG/DL (ref 70–99)
GLUCOSE BLD-MCNC: 231 MG/DL (ref 70–99)
GLUCOSE BLD-MCNC: 235 MG/DL (ref 70–99)
PERFORMED ON: ABNORMAL
POTASSIUM SERPL-SCNC: 4 MMOL/L (ref 3.5–5)
SODIUM BLD-SCNC: 133 MMOL/L (ref 136–145)
TOTAL PROTEIN: 6.2 G/DL (ref 6.6–8.7)

## 2019-06-04 PROCEDURE — 6370000000 HC RX 637 (ALT 250 FOR IP): Performed by: THORACIC SURGERY (CARDIOTHORACIC VASCULAR SURGERY)

## 2019-06-04 PROCEDURE — 80053 COMPREHEN METABOLIC PANEL: CPT

## 2019-06-04 PROCEDURE — 36415 COLL VENOUS BLD VENIPUNCTURE: CPT

## 2019-06-04 PROCEDURE — 94640 AIRWAY INHALATION TREATMENT: CPT

## 2019-06-04 PROCEDURE — 2140000000 HC CCU INTERMEDIATE R&B

## 2019-06-04 PROCEDURE — 82948 REAGENT STRIP/BLOOD GLUCOSE: CPT

## 2019-06-04 PROCEDURE — 99024 POSTOP FOLLOW-UP VISIT: CPT | Performed by: THORACIC SURGERY (CARDIOTHORACIC VASCULAR SURGERY)

## 2019-06-04 PROCEDURE — 97530 THERAPEUTIC ACTIVITIES: CPT

## 2019-06-04 PROCEDURE — 6360000002 HC RX W HCPCS: Performed by: THORACIC SURGERY (CARDIOTHORACIC VASCULAR SURGERY)

## 2019-06-04 PROCEDURE — 2700000000 HC OXYGEN THERAPY PER DAY

## 2019-06-04 PROCEDURE — 97116 GAIT TRAINING THERAPY: CPT

## 2019-06-04 PROCEDURE — 2580000003 HC RX 258: Performed by: THORACIC SURGERY (CARDIOTHORACIC VASCULAR SURGERY)

## 2019-06-04 RX ADMIN — FAMOTIDINE 20 MG: 20 TABLET ORAL at 21:55

## 2019-06-04 RX ADMIN — INSULIN GLARGINE 50 UNITS: 100 INJECTION, SOLUTION SUBCUTANEOUS at 21:57

## 2019-06-04 RX ADMIN — HEPARIN SODIUM 5000 UNITS: 5000 INJECTION INTRAVENOUS; SUBCUTANEOUS at 06:05

## 2019-06-04 RX ADMIN — CARVEDILOL 12.5 MG: 12.5 TABLET, FILM COATED ORAL at 17:33

## 2019-06-04 RX ADMIN — TIZANIDINE 2 MG: 2 TABLET ORAL at 21:55

## 2019-06-04 RX ADMIN — HEPARIN SODIUM 5000 UNITS: 5000 INJECTION INTRAVENOUS; SUBCUTANEOUS at 21:57

## 2019-06-04 RX ADMIN — IPRATROPIUM BROMIDE AND ALBUTEROL SULFATE 1 AMPULE: .5; 3 SOLUTION RESPIRATORY (INHALATION) at 07:41

## 2019-06-04 RX ADMIN — INSULIN LISPRO 3 UNITS: 100 INJECTION, SOLUTION INTRAVENOUS; SUBCUTANEOUS at 21:57

## 2019-06-04 RX ADMIN — INSULIN LISPRO 6 UNITS: 100 INJECTION, SOLUTION INTRAVENOUS; SUBCUTANEOUS at 11:29

## 2019-06-04 RX ADMIN — IPRATROPIUM BROMIDE AND ALBUTEROL SULFATE 1 AMPULE: .5; 3 SOLUTION RESPIRATORY (INHALATION) at 19:47

## 2019-06-04 RX ADMIN — POTASSIUM CHLORIDE 20 MEQ: 20 TABLET, EXTENDED RELEASE ORAL at 09:19

## 2019-06-04 RX ADMIN — OXYCODONE AND ACETAMINOPHEN 1 TABLET: 10; 325 TABLET ORAL at 06:05

## 2019-06-04 RX ADMIN — OXYCODONE AND ACETAMINOPHEN 1 TABLET: 10; 325 TABLET ORAL at 22:37

## 2019-06-04 RX ADMIN — AMIODARONE HYDROCHLORIDE 200 MG: 200 TABLET ORAL at 21:55

## 2019-06-04 RX ADMIN — OXYCODONE AND ACETAMINOPHEN 1 TABLET: 10; 325 TABLET ORAL at 10:19

## 2019-06-04 RX ADMIN — IPRATROPIUM BROMIDE AND ALBUTEROL SULFATE 1 AMPULE: .5; 3 SOLUTION RESPIRATORY (INHALATION) at 15:09

## 2019-06-04 RX ADMIN — OXYCODONE AND ACETAMINOPHEN 1 TABLET: 10; 325 TABLET ORAL at 02:03

## 2019-06-04 RX ADMIN — OXYCODONE AND ACETAMINOPHEN 1 TABLET: 10; 325 TABLET ORAL at 18:34

## 2019-06-04 RX ADMIN — CARVEDILOL 12.5 MG: 12.5 TABLET, FILM COATED ORAL at 09:19

## 2019-06-04 RX ADMIN — ASPIRIN 81 MG: 81 TABLET ORAL at 09:19

## 2019-06-04 RX ADMIN — TAMSULOSIN HYDROCHLORIDE 0.4 MG: 0.4 CAPSULE ORAL at 09:19

## 2019-06-04 RX ADMIN — Medication 10 ML: at 09:20

## 2019-06-04 RX ADMIN — OXYCODONE AND ACETAMINOPHEN 1 TABLET: 10; 325 TABLET ORAL at 14:26

## 2019-06-04 RX ADMIN — CLOPIDOGREL BISULFATE 75 MG: 75 TABLET ORAL at 09:19

## 2019-06-04 RX ADMIN — FAMOTIDINE 20 MG: 20 TABLET ORAL at 09:19

## 2019-06-04 RX ADMIN — IPRATROPIUM BROMIDE AND ALBUTEROL SULFATE 1 AMPULE: .5; 3 SOLUTION RESPIRATORY (INHALATION) at 10:51

## 2019-06-04 RX ADMIN — AMIODARONE HYDROCHLORIDE 200 MG: 200 TABLET ORAL at 09:19

## 2019-06-04 RX ADMIN — INSULIN LISPRO 6 UNITS: 100 INJECTION, SOLUTION INTRAVENOUS; SUBCUTANEOUS at 17:34

## 2019-06-04 RX ADMIN — DOCUSATE SODIUM 100 MG: 100 CAPSULE, LIQUID FILLED ORAL at 21:55

## 2019-06-04 RX ADMIN — DOCUSATE SODIUM 100 MG: 100 CAPSULE, LIQUID FILLED ORAL at 09:19

## 2019-06-04 RX ADMIN — HEPARIN SODIUM 5000 UNITS: 5000 INJECTION INTRAVENOUS; SUBCUTANEOUS at 14:26

## 2019-06-04 RX ADMIN — Medication 10 ML: at 22:02

## 2019-06-04 RX ADMIN — SERTRALINE HYDROCHLORIDE 50 MG: 50 TABLET ORAL at 09:19

## 2019-06-04 ASSESSMENT — PAIN DESCRIPTION - PAIN TYPE
TYPE: SURGICAL PAIN
TYPE: CHRONIC PAIN;SURGICAL PAIN
TYPE: SURGICAL PAIN

## 2019-06-04 ASSESSMENT — PAIN SCALES - WONG BAKER
WONGBAKER_NUMERICALRESPONSE: 6
WONGBAKER_NUMERICALRESPONSE: 8
WONGBAKER_NUMERICALRESPONSE: 2
WONGBAKER_NUMERICALRESPONSE: 2

## 2019-06-04 ASSESSMENT — PAIN DESCRIPTION - LOCATION
LOCATION: CHEST
LOCATION: INCISION
LOCATION: CHEST

## 2019-06-04 ASSESSMENT — PAIN SCALES - GENERAL
PAINLEVEL_OUTOF10: 7
PAINLEVEL_OUTOF10: 10
PAINLEVEL_OUTOF10: 8
PAINLEVEL_OUTOF10: 4
PAINLEVEL_OUTOF10: 10
PAINLEVEL_OUTOF10: 5
PAINLEVEL_OUTOF10: 7
PAINLEVEL_OUTOF10: 2
PAINLEVEL_OUTOF10: 5
PAINLEVEL_OUTOF10: 0
PAINLEVEL_OUTOF10: 10
PAINLEVEL_OUTOF10: 5
PAINLEVEL_OUTOF10: 3

## 2019-06-04 ASSESSMENT — PAIN DESCRIPTION - ORIENTATION
ORIENTATION: MID
ORIENTATION: PROXIMAL;DISTAL
ORIENTATION: MID

## 2019-06-04 ASSESSMENT — PAIN DESCRIPTION - DESCRIPTORS: DESCRIPTORS: ACHING

## 2019-06-04 NOTE — PROGRESS NOTES
Nutrition Education    Type and Reason for Visit: Reassess    Nutrition Assessment:  Education given tofamily for CABG-in the beginning. Very receptive. Pt remains at risk with wounds and decreased po intake after surgery. · Verbally reviewed following information with patient and family  · Written educational materials provided. CABG-in the beginning  · Contact name and number provided. · Refer to Patient Education activity for more details.     Electronically signed by Aleta Boxer, MS, RD, LD on 6/4/19 at 11:08 AM    Contact Number: [phone]

## 2019-06-04 NOTE — PROGRESS NOTES
Occupational Therapy  Facility/Department: [facility] 7 PROGRESSIVE CARE  Daily Treatment Note  NAME: Nupur Manuel  :   MRN: [id number]    Date of Service: 2019    Discharge Recommendations:          Assessment   Assessment: Pt. able to stand from chair CGA, walk over 100 feet with r.w. with CGA with needed rest breaks. Patient Diagnosis(es): The primary encounter diagnosis was Other chest pain. A diagnosis of Elevated troponin was also pertinent to this visit. has a past medical history of Acute ischemic stroke ([address] Atherosclerotic heart disease, CAD (coronary artery disease), Cerebral artery occlusion with cerebral infarction [facility]), Cerebrovascular accident (CVA) due to embolism of right middle cerebral artery ([address] CHF (congestive heart failure) ([address] Chronic bilateral low back pain without sciatica, Chronic kidney disease, DDD (degenerative disc disease), lumbar, Depression, Depression with anxiety, Diabetes mellitus ([address] Diabetes mellitus, type 2 ([address] DM (diabetes mellitus) ([address] Glaucoma, Headache, Hyperlipidemia, Hypertension, Macular degeneration, MI (myocardial infarction) ([address] MI (myocardial infarction) ([address] Neuromuscular disorder ([address] Neuropathy, Osteoarthritis, Sleep apnea, Spondylosis of lumbar region without myelopathy or radiculopathy, Status post placement of implantable loop recorder, and TIA (transient ischemic attack). has a past surgical history that includes Coronary angioplasty with stent ( [place]); Coronary angioplasty with stent; Diagnostic Cardiac Cath Lab Procedure (075698); Cardiac catheterization (12); Cardiac catheterization (3/29/15  MDL); lumbar fusion (Left, 11/15/2016); Colonoscopy; back surgery; joint replacement; Appendectomy; Cholecystectomy; and Coronary artery bypass graft (N/A, 2019).     Restrictions  Restrictions/Precautions  Restrictions/Precautions: Fall Risk, Cardiac  Position Activity Restriction  Sternal Precautions: No Pushing, No Pulling, 5# Lifting Restrictions  Sternal Precautions: yes  Subjective   General  Chart Reviewed: Yes  Patient assessed for rehabilitation services?: Yes  Family / Caregiver Present: No  Diagnosis: s/p CABG  General Comment  Comments: Pt. states he is very tired sitting up in chair.   Pain Assessment  Pain Assessment: Faces  Francis-Baker Pain Rating: Hurts even more  Vital Signs  Patient Currently in Pain: Yes   Orientation     Objective                   Transfers  Stand Step Transfers: Contact guard assistance  Sit to stand: Contact guard assistance                                                                 Plan   Plan  Times per week: 3-5x/week  Times per day: Daily  G-Code     OutComes Score                                                  AM-PAC Score             Goals  Short term goals  Time Frame for Short term goals: 1 week  Short term goal 1: CGA with toilet tfers including sit-stand   Short term goal 2: CGA with clothing mgmt for shorts in standing  Short term goal 3: Supervision with seated LB dsg  Long term goals  Long term goal 1: Return to PLOF       Therapy Time   Individual Concurrent Group Co-treatment   Time In           Time Out           Minutes                   Efrain Byrnes OT

## 2019-06-04 NOTE — PROGRESS NOTES
06/04/19 1547   Restrictions/Precautions   Restrictions/Precautions Fall Risk;Cardiac   Position Activity Restriction   Sternal Precautions No Pushing; No Pulling;5# Lifting Restrictions   Sternal Precautions yes   General   Chart Reviewed Yes   Subjective   Subjective Patient in chair agrees to walk   Pain Screening   Patient Currently in Pain No   Vital Signs   Level of Consciousness 0   Oxygen Therapy   SpO2 99 %   O2 Device Nasal cannula   O2 Flow Rate (L/min) 2 L/min   Patient Observation   Observations SpO2 at 99% 2L NC pre-gait. Gait on RA . SpO2  dropped to mid 80s but recovered to 96% within 1 minute. Transfers   Sit to Stand Minimal Assistance   Stand to sit Contact guard assistance   Bed to Chair Contact guard assistance   Ambulation   Ambulation? Yes   Ambulation 1   Device Rolling Walker   Assistance Contact guard assistance   Quality of Gait Slow but steady No LOB   Gait Deviations Slow Daija;Decreased step length   Distance 120'   Comments Patient took couple brief stand  breaks due to fatigue   Balance   Standing - Static Good   Standing - Dynamic Good;-   Short term goals   Time Frame for Short term goals 14 DAYS   Short term goal 1 BED MOB MOD IND   Short term goal 2 TRANSFERS SBA   Short term goal 3 ' AAD (IF NEEDED) SBA   Conditions Requiring Skilled Therapeutic Intervention   Body structures, Functions, Activity limitations Decreased functional mobility ; Decreased ROM; Decreased strength;Decreased endurance;Decreased balance   Activity Tolerance   Activity Tolerance Patient limited by fatigue   Physical Therapy    Electronically signed by Shiv Card PTA on 6/4/2019 at 3:57 PM

## 2019-06-04 NOTE — PROCEDURES
GAMAL[facility]CAT[facility] OF [facility] [address], [address] [facility]                               PULMONARY FUNCTION    PATIENT NAME: Mahesh Light                     :          MED REC NO:   [id number]                              ROOM:       [facility]  ACCOUNT NO:   [de-identified]                           ADMIT DATE: 2019  PROVIDER:     Melody Kim MD      DATE OF PROCEDURE:  2019    FLOW VOLUME LOOP    IMPRESSION:  1. Spirometry shows moderate restrictive physiology.   2.  Peak expiratory flow is normal.        Musa Early MD    D: 2019 16:06:00      T: 2019 16:42:20     [id number]  Job#: [id number]     Doc#: [id number]    CC:

## 2019-06-04 NOTE — PROGRESS NOTES
Nutrition Assessment    Type and Reason for Visit: Reassess    Nutrition Recommendations: follow for increasing diet, questions re: diet instruction    Nutrition Assessment: Education given tofamily for CABG-in the beginning. Very receptive. Pt remains at risk with wounds and decreased po intake after surgery. Malnutrition Assessment:  · Malnutrition Status: At risk for malnutrition  · Context: Acute illness or injury  · Findings of the 6 clinical characteristics of malnutrition (Minimum of 2 out of 6 clinical characteristics is required to make the diagnosis of moderate or severe Protein Calorie Malnutrition based on AND/ASPEN Guidelines):  1. Energy Intake-Less than or equal to 50% of estimated energy requirement, Unable to assess    2. Weight Loss-7.5% loss or greater, in 1 month  3. Fat Loss-No significant subcutaneous fat loss,    4. Muscle Loss-No significant muscle mass loss,    5. Fluid Accumulation-No significant fluid accumulation, Extremities  6.  Strength-Not measured    Nutrition Risk Level:  Moderate    Nutrient Needs:  · Estimated Daily Total Kcal: 5303-0188 kcals/day  · Estimated Daily Protein (g): 110-184 g/PRO/day  · Estimated Daily Total Fluid (ml/day): 1255-6996 mL/day    Nutrition Diagnosis:   · Problem: Inadequate oral intake, Increased nutrient needs  · Etiology: related to Acute injury/trauma, Increased demand for energy/nutrients     Signs and symptoms:  as evidenced by Intake 0-25%, Presence of wounds    Objective Information:  · Nutrition-Focused Physical Findings: well nourished appearing gentleman  · Wound Type: Surgical Wound  · Current Nutrition Therapies:  · Oral Diet Orders: Carb Control 4 Carbs/Meal   · Oral Diet intake: 1-25%  · Oral Nutrition Supplement (ONS) Orders: None  · ONS intake: NPO     · nthropometric Measures:  · Ht: 6' 4\" (193 cm)   · Current Body Wt: 271 lb 6 oz (123.1 kg)  · Admission Body Wt: 275 lb (124.7 kg)(estimate)  · Usual Body Wt: 269 lb (122 kg)(2/2019)  · % Weight Change:  ,  7.6% loss in 1 month  · Ideal Body Wt: 202 lb (91.6 kg), % Ideal Body 134.4%  · BMI Classification: BMI 30.0 - 34.9 Obese Class I    Nutrition Interventions:   Continue current diet  Continued Inpatient Monitoring, Education Initiated, Education Completed    Nutrition Evaluation:   · Evaluation: Progressing toward goals   · Goals: meet nutritional needs through po intake    · Monitoring: Meal Intake, Diet Tolerance, Skin Integrity, Wound Healing, Pertinent Labs, Weight, Diarrhea, Patient/Family Education      Electronically signed by Camilo Hill, MS, RD, LD on 6/4/19 at 11:08 AM    Contact Number: [phone]

## 2019-06-04 NOTE — CARE COORDINATION
Spoke with pt and spouse present at bedside to discuss dc referrals. Pt is currently attempting 8th AR and awaiting insurance approval through [facility]. Pt and spouse are aware of the difficulties acquiring precerts through [facility] and are agreeable to SNF rehab referrals if denied for 8th AR. Pt resides in [place] and would like to refer locally, if necessary. SW will continue to follow and assist with further dc needs and or referrals as necessary.

## 2019-06-04 NOTE — CARE COORDINATION
Insurance denied 8th floor Rehab and recommended SNF level of care. IRENE spoke with pt and son present at bedside who requested referrals to the local [facility] rehabs. IRENE made referrals to [facility] awaiting offers in order for  ept to choose and request precert for SNF services.        IRENE called [employer] RN Lanre Johnson to decline P2P and inform will pursue SNF rehab services for pt at [facility]. [phone] [id number]

## 2019-06-04 NOTE — PROGRESS NOTES
06/04/19 1015   Restrictions/Precautions   Restrictions/Precautions Fall Risk;Cardiac   Position Activity Restriction   Sternal Precautions No Pushing; No Pulling;5# Lifting Restrictions   Sternal Precautions yes   General   Chart Reviewed Yes   Subjective   Subjective Patient up in chair agrees to therapy   General Comment   Comments Patient stood for gait no dizziness returned to room safely   Pain Screening   Patient Currently in Pain Yes   Pain Assessment   Pain Level 8   Pain Location Incision  (Simultaneous filing. User may not have seen previous data.)   Pain Type Surgical pain  (Simultaneous filing. User may not have seen previous data.)   Pain Assessment 0-10  (Simultaneous filing. User may not have seen previous data.)   Pain Orientation Proximal;Distal   Pain Descriptors Aching   Vital Signs   Level of Consciousness 0   Oxygen Therapy   O2 Flow Rate (L/min) 2 L/min   Patient Observation   Observations Spo2 pre gait 98 on 2L   Gait on RA    Spo2 90  post gait  NC placed back on patient   Orientation   Overall Orientation Status WNL   Transfers   Sit to Stand Contact guard assistance   Stand to sit Contact guard assistance   Bed to Chair Contact guard assistance;Minimal assistance   Ambulation   Ambulation? Yes   Ambulation 1   Device Rolling Walker   Other Apparatus Wheelchair follow   Assistance Contact guard assistance   Quality of Gait Slow but steady No LOB   Gait Deviations Slow Daija;Decreased step length   Distance 200'   Comments Patient returned to chair   Balance   Standing - Static Good   Standing - Dynamic Good;-   Short term goals   Time Frame for Short term goals 14 DAYS   Short term goal 1 BED MOB MOD IND   Short term goal 2 TRANSFERS SBA   Short term goal 3 ' AAD (IF NEEDED) SBA   Conditions Requiring Skilled Therapeutic Intervention   Body structures, Functions, Activity limitations Decreased functional mobility ; Decreased ROM; Decreased strength;Decreased endurance;Decreased balance   Activity Tolerance   Activity Tolerance Patient Tolerated treatment well   Safety Devices   Type of devices Left in chair;Call light within reach     Electronically signed by Amber Hendrix PTA on 6/4/2019 at 10:24 AM

## 2019-06-04 NOTE — CARE COORDINATION
The [address]  at [facility]  Notification of Admission Decision      [] Patient has been accepted for admit to [facility] on :       Please write discharge orders and summary prior to discharge. [] Patient acceptance to Rehab pending the following :    [] Eval in progress       [x] Patient determined to be ineligible for services at [facility] because : Insurance denied and recommended SNF level of care. We recommend you consider: SNF of patient/family preference        Thank you for your referral, we appreciate you.     Electronically Signed by Drea Francisco Admissions Coordinator 6/4/2019 3:03 PM

## 2019-06-05 LAB
ANION GAP SERPL CALCULATED.3IONS-SCNC: 13 MMOL/L (ref 7–19)
BUN BLDV-MCNC: 34 MG/DL (ref 8–23)
CALCIUM SERPL-MCNC: 8.6 MG/DL (ref 8.8–10.2)
CHLORIDE BLD-SCNC: 102 MMOL/L (ref 98–111)
CO2: 22 MMOL/L (ref 22–29)
CREAT SERPL-MCNC: 1.2 MG/DL (ref 0.5–1.2)
GFR NON-AFRICAN AMERICAN: 59
GLUCOSE BLD-MCNC: 106 MG/DL (ref 70–99)
GLUCOSE BLD-MCNC: 128 MG/DL (ref 74–109)
GLUCOSE BLD-MCNC: 171 MG/DL (ref 70–99)
GLUCOSE BLD-MCNC: 174 MG/DL (ref 70–99)
GLUCOSE BLD-MCNC: 174 MG/DL (ref 70–99)
PERFORMED ON: ABNORMAL
POTASSIUM SERPL-SCNC: 4.9 MMOL/L (ref 3.5–5)
SODIUM BLD-SCNC: 137 MMOL/L (ref 136–145)

## 2019-06-05 PROCEDURE — 6370000000 HC RX 637 (ALT 250 FOR IP): Performed by: THORACIC SURGERY (CARDIOTHORACIC VASCULAR SURGERY)

## 2019-06-05 PROCEDURE — 2140000000 HC CCU INTERMEDIATE R&B

## 2019-06-05 PROCEDURE — 6360000002 HC RX W HCPCS: Performed by: THORACIC SURGERY (CARDIOTHORACIC VASCULAR SURGERY)

## 2019-06-05 PROCEDURE — 80048 BASIC METABOLIC PNL TOTAL CA: CPT

## 2019-06-05 PROCEDURE — 97530 THERAPEUTIC ACTIVITIES: CPT

## 2019-06-05 PROCEDURE — 94640 AIRWAY INHALATION TREATMENT: CPT

## 2019-06-05 PROCEDURE — 36415 COLL VENOUS BLD VENIPUNCTURE: CPT

## 2019-06-05 PROCEDURE — 2580000003 HC RX 258: Performed by: THORACIC SURGERY (CARDIOTHORACIC VASCULAR SURGERY)

## 2019-06-05 PROCEDURE — 82948 REAGENT STRIP/BLOOD GLUCOSE: CPT

## 2019-06-05 PROCEDURE — 99024 POSTOP FOLLOW-UP VISIT: CPT | Performed by: THORACIC SURGERY (CARDIOTHORACIC VASCULAR SURGERY)

## 2019-06-05 PROCEDURE — 97535 SELF CARE MNGMENT TRAINING: CPT

## 2019-06-05 PROCEDURE — 97116 GAIT TRAINING THERAPY: CPT

## 2019-06-05 RX ADMIN — IPRATROPIUM BROMIDE AND ALBUTEROL SULFATE 1 AMPULE: .5; 3 SOLUTION RESPIRATORY (INHALATION) at 11:09

## 2019-06-05 RX ADMIN — INSULIN LISPRO 3 UNITS: 100 INJECTION, SOLUTION INTRAVENOUS; SUBCUTANEOUS at 17:04

## 2019-06-05 RX ADMIN — IPRATROPIUM BROMIDE AND ALBUTEROL SULFATE 1 AMPULE: .5; 3 SOLUTION RESPIRATORY (INHALATION) at 07:15

## 2019-06-05 RX ADMIN — INSULIN LISPRO 2 UNITS: 100 INJECTION, SOLUTION INTRAVENOUS; SUBCUTANEOUS at 20:00

## 2019-06-05 RX ADMIN — HEPARIN SODIUM 5000 UNITS: 5000 INJECTION INTRAVENOUS; SUBCUTANEOUS at 22:39

## 2019-06-05 RX ADMIN — TIZANIDINE 2 MG: 2 TABLET ORAL at 19:59

## 2019-06-05 RX ADMIN — DOCUSATE SODIUM 100 MG: 100 CAPSULE, LIQUID FILLED ORAL at 20:00

## 2019-06-05 RX ADMIN — ASPIRIN 81 MG: 81 TABLET ORAL at 08:34

## 2019-06-05 RX ADMIN — DOCUSATE SODIUM 100 MG: 100 CAPSULE, LIQUID FILLED ORAL at 08:34

## 2019-06-05 RX ADMIN — OXYCODONE AND ACETAMINOPHEN 1 TABLET: 10; 325 TABLET ORAL at 10:40

## 2019-06-05 RX ADMIN — MAGNESIUM HYDROXIDE 30 ML: 400 SUSPENSION ORAL at 10:39

## 2019-06-05 RX ADMIN — CARVEDILOL 12.5 MG: 12.5 TABLET, FILM COATED ORAL at 16:02

## 2019-06-05 RX ADMIN — OXYCODONE AND ACETAMINOPHEN 1 TABLET: 10; 325 TABLET ORAL at 16:02

## 2019-06-05 RX ADMIN — HEPARIN SODIUM 5000 UNITS: 5000 INJECTION INTRAVENOUS; SUBCUTANEOUS at 13:03

## 2019-06-05 RX ADMIN — CLOPIDOGREL BISULFATE 75 MG: 75 TABLET ORAL at 08:34

## 2019-06-05 RX ADMIN — OXYCODONE AND ACETAMINOPHEN 1 TABLET: 10; 325 TABLET ORAL at 06:40

## 2019-06-05 RX ADMIN — AMIODARONE HYDROCHLORIDE 200 MG: 200 TABLET ORAL at 19:59

## 2019-06-05 RX ADMIN — INSULIN GLARGINE 50 UNITS: 100 INJECTION, SOLUTION SUBCUTANEOUS at 20:00

## 2019-06-05 RX ADMIN — IPRATROPIUM BROMIDE AND ALBUTEROL SULFATE 1 AMPULE: .5; 3 SOLUTION RESPIRATORY (INHALATION) at 15:12

## 2019-06-05 RX ADMIN — OXYCODONE AND ACETAMINOPHEN 1 TABLET: 10; 325 TABLET ORAL at 20:00

## 2019-06-05 RX ADMIN — TAMSULOSIN HYDROCHLORIDE 0.4 MG: 0.4 CAPSULE ORAL at 08:34

## 2019-06-05 RX ADMIN — Medication 10 ML: at 22:39

## 2019-06-05 RX ADMIN — FAMOTIDINE 20 MG: 20 TABLET ORAL at 19:59

## 2019-06-05 RX ADMIN — FAMOTIDINE 20 MG: 20 TABLET ORAL at 08:34

## 2019-06-05 RX ADMIN — INSULIN LISPRO 3 UNITS: 100 INJECTION, SOLUTION INTRAVENOUS; SUBCUTANEOUS at 13:02

## 2019-06-05 RX ADMIN — POTASSIUM CHLORIDE 20 MEQ: 20 TABLET, EXTENDED RELEASE ORAL at 08:34

## 2019-06-05 RX ADMIN — OXYCODONE AND ACETAMINOPHEN 1 TABLET: 10; 325 TABLET ORAL at 02:40

## 2019-06-05 RX ADMIN — SERTRALINE HYDROCHLORIDE 50 MG: 50 TABLET ORAL at 08:34

## 2019-06-05 RX ADMIN — Medication 10 ML: at 08:35

## 2019-06-05 RX ADMIN — CARVEDILOL 12.5 MG: 12.5 TABLET, FILM COATED ORAL at 08:34

## 2019-06-05 RX ADMIN — HEPARIN SODIUM 5000 UNITS: 5000 INJECTION INTRAVENOUS; SUBCUTANEOUS at 06:40

## 2019-06-05 RX ADMIN — AMIODARONE HYDROCHLORIDE 200 MG: 200 TABLET ORAL at 08:34

## 2019-06-05 RX ADMIN — IPRATROPIUM BROMIDE AND ALBUTEROL SULFATE 1 AMPULE: .5; 3 SOLUTION RESPIRATORY (INHALATION) at 19:28

## 2019-06-05 ASSESSMENT — PAIN DESCRIPTION - DESCRIPTORS
DESCRIPTORS: ACHING
DESCRIPTORS: ACHING

## 2019-06-05 ASSESSMENT — PAIN SCALES - GENERAL
PAINLEVEL_OUTOF10: 8
PAINLEVEL_OUTOF10: 7
PAINLEVEL_OUTOF10: 10
PAINLEVEL_OUTOF10: 8
PAINLEVEL_OUTOF10: 10
PAINLEVEL_OUTOF10: 8
PAINLEVEL_OUTOF10: 7
PAINLEVEL_OUTOF10: 5
PAINLEVEL_OUTOF10: 8

## 2019-06-05 ASSESSMENT — PAIN DESCRIPTION - LOCATION
LOCATION: CHEST
LOCATION: CHEST

## 2019-06-05 ASSESSMENT — PAIN DESCRIPTION - PAIN TYPE
TYPE: SURGICAL PAIN
TYPE: SURGICAL PAIN

## 2019-06-05 NOTE — PROGRESS NOTES
POST OP CARDIOTHORACIC SURGERY PROGRESS NOTE    Post op day 6    SUBJECTIVE:  Feeling a little better today. NO BM No shower yet. Feels unsteady to get up    /74   Pulse 75   Temp 98.3 °F (36.8 °C) (Temporal)   Resp 14   Ht 6' 4\" (1.93 m)   Wt 272 lb (123.4 kg)   SpO2 95%   BMI 33.11 kg/m²   Average, Min, and Max for last 24 hours Vitals:  TEMPERATURE:  Temp  Av.4 °F (36.3 °C)  Min: 97 °F (36.1 °C)  Max: 98.3 °F (36.8 °C)  RESPIRATIONS RANGE: Resp  Av.7  Min: 14  Max: 20  PULSE RANGE: Pulse  Av.2  Min: 68  Max: 81  BLOOD PRESSURE RANGE:  Systolic (67PVQ), YRV:780 , Min:120 , BHH:912   ; Diastolic (84ZCL), UAR:69, Min:60, Max:86    PULSE OXIMETRY RANGE: SpO2  Av.5 %  Min: 93 %  Max: 99 %    I/O last 3 completed shifts:   In: 710 [P.O.:710]  Out: 1075 [Urine:1075]    CHEST: lungs clear    CARDIOVASCULAR: regular rhythm, no murmurs    INCISION: no drainage, skin edges intact    DRAINS:     LABS:  BMP:    Lab Results   Component Value Date     2019     2011    K 4.9 2019    K 5.0 2019    K 4.0 2011     2019     2011    CO2 22 2019    BUN 34 2019    LABALBU 2.9 2019    LABALBU 3.8 2011    CREATININE 1.2 2019    CREATININE 1.0 2011    CALCIUM 8.6 2019    LABGLOM 59 2019    GLUCOSE 128 2019       CHEST XRAY: none    ASSESSMENT: normal postoperative course    PLAN: OK to SNF when accepted    Electronically signed by Gilma Corrigan MD on 19 at 9:02 AM

## 2019-06-05 NOTE — PROGRESS NOTES
11/15/2016); Colonoscopy; back surgery; joint replacement; Appendectomy; Cholecystectomy; and Coronary artery bypass graft (N/A, 5/30/2019). Restrictions  Restrictions/Precautions  Restrictions/Precautions: Fall Risk, Cardiac  Position Activity Restriction  Sternal Precautions: No Pushing, No Pulling, 5# Lifting Restrictions  Sternal Precautions: yes  Subjective   General  Chart Reviewed: Yes  Patient assessed for rehabilitation services?: Yes  Response to previous treatment: Patient with no complaints from previous session  Family / Caregiver Present: No  Diagnosis: s/p CABG  General Comment  Comments: Pt. states he is very tired sitting up in chair.   Pain Assessment  Pain Assessment: 0-10  Pain Level: 5  Pain Type: Surgical pain  Pain Location: Chest  Pain Descriptors: Aching  Vital Signs  Patient Currently in Pain: Yes   Orientation     Objective    ADL  Grooming: Independent  UE Bathing: Contact guard assistance  LE Bathing: Minimal assistance  UE Dressing: Contact guard assistance  LE Dressing: Minimal assistance;Maximum assistance(Max assist to julisa compression stockings.)              Transfers  Stand Step Transfers: Contact guard assistance  Sit to stand: Contact guard assistance  Stand to sit: Contact guard assistance                                                                 Plan   Plan  Times per week: 3-5x/week  Times per day: Daily  G-Code     OutComes Score                                                  AM-PAC Score             Goals  Short term goals  Time Frame for Short term goals: 1 week  Short term goal 1: CGA with toilet tfers including sit-stand   Short term goal 2: CGA with clothing mgmt for shorts in standing  Short term goal 3: Supervision with seated LB dsg  Long term goals  Long term goal 1: Return to PLOF       Therapy Time   Individual Concurrent Group Co-treatment   Time In           Time Out           Minutes                   NIKKO Lopez

## 2019-06-05 NOTE — PROGRESS NOTES
Physical Therapy  Molly Shafer  [id number]     06/05/19 1144   Subjective   Subjective Pt states he is willing to walk   General Comment   Comments pt standing in room with nursing checking wound   Transfers   Stand to sit Stand by assistance;Contact guard assistance   Ambulation   Ambulation? Yes   Ambulation 1   Surface level tile   Device Rolling Walker   Assistance Contact guard assistance   Quality of Gait slow, slightly dizzy   Gait Deviations Slow Daija   Distance 125'   Comments vc's to keep looking up during amb   Short term goals   Time Frame for Short term goals 14 DAYS   Short term goal 1 BED MOB MOD IND   Short term goal 2 TRANSFERS SBA   Short term goal 3 ' AAD (IF NEEDED) SBA   Conditions Requiring Skilled Therapeutic Intervention   Body structures, Functions, Activity limitations Decreased functional mobility ; Decreased ROM; Decreased strength;Decreased endurance;Decreased balance   Assessment Pt able to walk to nurses station and back to room. Found pt's gown to be wet and helped him change after getting to recliner, pt fatiqued after walking.     Activity Tolerance   Activity Tolerance Patient limited by fatigue;Patient limited by endurance   Electronically signed by Dustin Waite PTA on 6/5/2019 at 11:50 AM

## 2019-06-05 NOTE — CARE COORDINATION
Spoke with pt and spouse present at [facility] report have chosen [facility]. SW confirmed with Mariangel Bustillos in admissions with [facility] who is to request the precert his AM. Awaiting further notice from pt insurance. Pt's spouse signed the SNF acceptance list and SW placed in pt's soft chart.      Danette English WW:[phone]  F:[phone]

## 2019-06-05 NOTE — CARE COORDINATION
Attempted to advance the precert through [facility] for pt SNF services, but rep states his computer crashed and would have to contact  back. Rep did state the precert was currently still under review and was attempting to check for requested documentation when network crashed. [address]     Rep Riley Mao returned call and stated requesting further documentation including therapy, physician's notes, current meds list and a facesheet.  notified Jennifer Robb with [facility] who states she received the same documentation request after lunch and has faxed the information. Jennifer Robb reports having a fax confirmation and will contact [facility] to inform and advance precert.

## 2019-06-05 NOTE — PROGRESS NOTES
Physical Therapy  Shira Shah  [id number]     06/05/19 1512   General   Missed reason Patient declined   Subjective   Subjective Pt refused stating he is too tired to walk, that he just finished with a shower and is worn out.     Electronically signed by Serita Aschoff, PTA on 6/5/2019 at 3:14 PM

## 2019-06-05 NOTE — PLAN OF CARE
Nutrition Problem: Inadequate oral intake  Intervention: Food and/or Nutrient Delivery: Continue current diet  Nutritional Goals: meet nutritional needs through po intake
Nutrition Problem: Inadequate oral intake, Increased nutrient needs  Intervention: Food and/or Nutrient Delivery: Continue NPO  Nutritional Goals: meet nutritional needs through po intake
Nutrition Problem: Inadequate oral intake, Increased nutrient needs  Intervention: Food and/or Nutrient Delivery: Continue current diet  Nutritional Goals: meet nutritional needs through po intake
Problem: Falls - Risk of:  Goal: Will remain free from falls  Description  Will remain free from falls  Outcome: Met This Shift  Goal: Absence of physical injury  Description  Absence of physical injury  Outcome: Met This Shift     Problem: Pain:  Goal: Pain level will decrease  Description  Pain level will decrease  Outcome: Ongoing  Goal: Control of acute pain  Description  Control of acute pain  Outcome: Ongoing  Goal: Control of chronic pain  Description  Control of chronic pain  Outcome: Ongoing     Problem: Discharge Planning:  Goal: Participates in care planning  Description  Participates in care planning  Outcome: Ongoing  Goal: Discharged to appropriate level of care  Description  Discharged to appropriate level of care  Outcome: Ongoing     Problem: Cardiac Output - Decreased:  Goal: Hemodynamic stability will improve  Description  Hemodynamic stability will improve  Outcome: Ongoing     Problem: Fluid Volume - Imbalance:  Goal: Absence of imbalanced fluid volume signs and symptoms  Description  Absence of imbalanced fluid volume signs and symptoms  Outcome: Ongoing     Problem: Pain:  Goal: Recognizes and communicates pain  Description  Recognizes and communicates pain  Outcome: Ongoing  Goal: Control of acute pain  Description  Control of acute pain  Outcome: Ongoing  Goal: Control of chronic pain  Description  Control of chronic pain  Outcome: Ongoing     Problem: Serum Glucose Level - Abnormal:  Goal: Ability to maintain appropriate glucose levels will improve to within specified parameters  Description  Ability to maintain appropriate glucose levels will improve to within specified parameters  Outcome: Ongoing
Problem: Falls - Risk of:  Goal: Will remain free from falls  Description  Will remain free from falls  Outcome: Ongoing  Goal: Absence of physical injury  Description  Absence of physical injury  Outcome: Ongoing     Problem: Pain:  Goal: Pain level will decrease  Description  Pain level will decrease  Outcome: Ongoing  Goal: Control of acute pain  Description  Control of acute pain  Outcome: Ongoing  Goal: Control of chronic pain  Description  Control of chronic pain  Outcome: Ongoing     Problem: Discharge Planning:  Goal: Participates in care planning  Description  Participates in care planning  Outcome: Ongoing  Goal: Discharged to appropriate level of care  Description  Discharged to appropriate level of care  Outcome: Ongoing     Problem: Cardiac Output - Decreased:  Goal: Hemodynamic stability will improve  Description  Hemodynamic stability will improve  Outcome: Ongoing     Problem: Fluid Volume - Imbalance:  Goal: Absence of imbalanced fluid volume signs and symptoms  Description  Absence of imbalanced fluid volume signs and symptoms  Outcome: Ongoing     Problem: Pain:  Goal: Recognizes and communicates pain  Description  Recognizes and communicates pain  Outcome: Ongoing  Goal: Control of acute pain  Description  Control of acute pain  Outcome: Ongoing  Goal: Control of chronic pain  Description  Control of chronic pain  Outcome: Ongoing     Problem: Serum Glucose Level - Abnormal:  Goal: Ability to maintain appropriate glucose levels will improve to within specified parameters  Description  Ability to maintain appropriate glucose levels will improve to within specified parameters  Outcome: Ongoing     Problem: Nutrition  Goal: Optimal nutrition therapy  Description  Nutrition Problem: Inadequate oral intake  Intervention: Food and/or Nutrient Delivery: Continue current diet  Nutritional Goals: meet nutritional needs through po intake        Outcome: Ongoing
Problem: Falls - Risk of:  Goal: Will remain free from falls  Description  Will remain free from falls  Outcome: Ongoing  Goal: Absence of physical injury  Description  Absence of physical injury  Outcome: Ongoing     Problem: Pain:  Goal: Pain level will decrease  Description  Pain level will decrease  Outcome: Ongoing  Goal: Control of acute pain  Description  Control of acute pain  Outcome: Ongoing  Goal: Control of chronic pain  Description  Control of chronic pain  Outcome: Ongoing     Problem: Discharge Planning:  Goal: Participates in care planning  Description  Participates in care planning  Outcome: Ongoing  Goal: Discharged to appropriate level of care  Description  Discharged to appropriate level of care  Outcome: Ongoing     Problem: Cardiac Output - Decreased:  Goal: Hemodynamic stability will improve  Description  Hemodynamic stability will improve  Outcome: Ongoing     Problem: Fluid Volume - Imbalance:  Goal: Absence of imbalanced fluid volume signs and symptoms  Description  Absence of imbalanced fluid volume signs and symptoms  Outcome: Ongoing     Problem: Pain:  Goal: Recognizes and communicates pain  Description  Recognizes and communicates pain  Outcome: Ongoing  Goal: Control of acute pain  Description  Control of acute pain  Outcome: Ongoing  Goal: Control of chronic pain  Description  Control of chronic pain  Outcome: Ongoing     Problem: Serum Glucose Level - Abnormal:  Goal: Ability to maintain appropriate glucose levels will improve to within specified parameters  Description  Ability to maintain appropriate glucose levels will improve to within specified parameters  Outcome: Ongoing     Problem: Nutrition  Goal: Optimal nutrition therapy  Description  Nutrition Problem: Inadequate oral intake  Intervention: Food and/or Nutrient Delivery: Continue current diet  Nutritional Goals: meet nutritional needs through po intake        Outcome: Ongoing
Problem: Pain:  Goal: Pain level will decrease  Description  Pain level will decrease  Outcome: Ongoing
5/31/2019 0316 by Caitlin Donnelly RN  Outcome: Ongoing  5/30/2019 1328 by Monty Kapadia MS, RD, LD  Outcome: Ongoing

## 2019-06-06 VITALS
HEIGHT: 76 IN | DIASTOLIC BLOOD PRESSURE: 82 MMHG | OXYGEN SATURATION: 93 % | TEMPERATURE: 97.1 F | WEIGHT: 270.8 LBS | SYSTOLIC BLOOD PRESSURE: 134 MMHG | BODY MASS INDEX: 32.98 KG/M2 | HEART RATE: 76 BPM | RESPIRATION RATE: 16 BRPM

## 2019-06-06 LAB
GLUCOSE BLD-MCNC: 204 MG/DL (ref 70–99)
GLUCOSE BLD-MCNC: 99 MG/DL (ref 70–99)
PERFORMED ON: ABNORMAL
PERFORMED ON: NORMAL

## 2019-06-06 PROCEDURE — 6370000000 HC RX 637 (ALT 250 FOR IP): Performed by: THORACIC SURGERY (CARDIOTHORACIC VASCULAR SURGERY)

## 2019-06-06 PROCEDURE — 94640 AIRWAY INHALATION TREATMENT: CPT

## 2019-06-06 PROCEDURE — 97110 THERAPEUTIC EXERCISES: CPT

## 2019-06-06 PROCEDURE — 82948 REAGENT STRIP/BLOOD GLUCOSE: CPT

## 2019-06-06 PROCEDURE — 2580000003 HC RX 258: Performed by: THORACIC SURGERY (CARDIOTHORACIC VASCULAR SURGERY)

## 2019-06-06 PROCEDURE — 97116 GAIT TRAINING THERAPY: CPT

## 2019-06-06 PROCEDURE — 6360000002 HC RX W HCPCS: Performed by: THORACIC SURGERY (CARDIOTHORACIC VASCULAR SURGERY)

## 2019-06-06 PROCEDURE — 99024 POSTOP FOLLOW-UP VISIT: CPT | Performed by: THORACIC SURGERY (CARDIOTHORACIC VASCULAR SURGERY)

## 2019-06-06 PROCEDURE — 97530 THERAPEUTIC ACTIVITIES: CPT

## 2019-06-06 RX ORDER — CARVEDILOL 12.5 MG/1
12.5 TABLET ORAL 2 TIMES DAILY WITH MEALS
Qty: 60 TABLET | Refills: 3 | Status: ON HOLD | OUTPATIENT
Start: 2019-06-06 | End: 2019-06-13 | Stop reason: HOSPADM

## 2019-06-06 RX ORDER — AMIODARONE HYDROCHLORIDE 200 MG/1
200 TABLET ORAL 2 TIMES DAILY
Qty: 30 TABLET | Refills: 3 | Status: ON HOLD | OUTPATIENT
Start: 2019-06-06 | End: 2019-06-13 | Stop reason: HOSPADM

## 2019-06-06 RX ORDER — OXYCODONE AND ACETAMINOPHEN 10; 325 MG/1; MG/1
1 TABLET ORAL 3 TIMES DAILY PRN
Qty: 9 TABLET | Refills: 0 | Status: ON HOLD | OUTPATIENT
Start: 2019-06-06 | End: 2019-06-13

## 2019-06-06 RX ADMIN — OXYCODONE AND ACETAMINOPHEN 1 TABLET: 10; 325 TABLET ORAL at 04:14

## 2019-06-06 RX ADMIN — ASPIRIN 81 MG: 81 TABLET ORAL at 08:12

## 2019-06-06 RX ADMIN — OXYCODONE AND ACETAMINOPHEN 1 TABLET: 10; 325 TABLET ORAL at 12:45

## 2019-06-06 RX ADMIN — FAMOTIDINE 20 MG: 20 TABLET ORAL at 08:12

## 2019-06-06 RX ADMIN — IPRATROPIUM BROMIDE AND ALBUTEROL SULFATE 1 AMPULE: .5; 3 SOLUTION RESPIRATORY (INHALATION) at 10:54

## 2019-06-06 RX ADMIN — SERTRALINE HYDROCHLORIDE 50 MG: 50 TABLET ORAL at 08:12

## 2019-06-06 RX ADMIN — AMIODARONE HYDROCHLORIDE 200 MG: 200 TABLET ORAL at 08:12

## 2019-06-06 RX ADMIN — HEPARIN SODIUM 5000 UNITS: 5000 INJECTION INTRAVENOUS; SUBCUTANEOUS at 13:55

## 2019-06-06 RX ADMIN — POTASSIUM CHLORIDE 20 MEQ: 20 TABLET, EXTENDED RELEASE ORAL at 08:12

## 2019-06-06 RX ADMIN — CLOPIDOGREL BISULFATE 75 MG: 75 TABLET ORAL at 08:12

## 2019-06-06 RX ADMIN — TAMSULOSIN HYDROCHLORIDE 0.4 MG: 0.4 CAPSULE ORAL at 08:12

## 2019-06-06 RX ADMIN — OXYCODONE AND ACETAMINOPHEN 1 TABLET: 10; 325 TABLET ORAL at 00:06

## 2019-06-06 RX ADMIN — IPRATROPIUM BROMIDE AND ALBUTEROL SULFATE 1 AMPULE: .5; 3 SOLUTION RESPIRATORY (INHALATION) at 07:08

## 2019-06-06 RX ADMIN — Medication 10 ML: at 08:12

## 2019-06-06 RX ADMIN — OXYCODONE AND ACETAMINOPHEN 1 TABLET: 10; 325 TABLET ORAL at 08:19

## 2019-06-06 RX ADMIN — INSULIN LISPRO 6 UNITS: 100 INJECTION, SOLUTION INTRAVENOUS; SUBCUTANEOUS at 11:55

## 2019-06-06 RX ADMIN — DOCUSATE SODIUM 100 MG: 100 CAPSULE, LIQUID FILLED ORAL at 08:12

## 2019-06-06 RX ADMIN — CARVEDILOL 12.5 MG: 12.5 TABLET, FILM COATED ORAL at 08:12

## 2019-06-06 RX ADMIN — OXYCODONE AND ACETAMINOPHEN 1 TABLET: 10; 325 TABLET ORAL at 16:55

## 2019-06-06 ASSESSMENT — PAIN SCALES - GENERAL
PAINLEVEL_OUTOF10: 7
PAINLEVEL_OUTOF10: 3
PAINLEVEL_OUTOF10: 7
PAINLEVEL_OUTOF10: 8
PAINLEVEL_OUTOF10: 7
PAINLEVEL_OUTOF10: 3
PAINLEVEL_OUTOF10: 7
PAINLEVEL_OUTOF10: 7

## 2019-06-06 ASSESSMENT — PAIN DESCRIPTION - PAIN TYPE: TYPE: SURGICAL PAIN

## 2019-06-06 ASSESSMENT — PAIN DESCRIPTION - DESCRIPTORS: DESCRIPTORS: ACHING;SORE

## 2019-06-06 ASSESSMENT — PAIN DESCRIPTION - LOCATION: LOCATION: CHEST

## 2019-06-06 NOTE — DISCHARGE SUMMARY
[facility] [address], [address] [facility]                               DISCHARGE SUMMARY    PATIENT NAME: Lora Castillo                     :          MED REC NO:   [id number]                              ROOM:       [facility]  ACCOUNT NO:   [de-identified]                           ADMIT DATE: 2019  PROVIDER:     Pablo Camp MD                  [facility] DATE: 2019    TRANSFER SUMMARY    PRIMARY DISCHARGE DIAGNOSES:  1. Acute coronary syndrome with acute inferior wall myocardial  infarction. 2.  Severe left main and multivessel coronary artery disease. 3.  Status post multivessel stenting with multivessel coronary artery  occlusion. 4.  Severe left ventricular dysfunction. OPERATIVE PROCEDURE:  On 2019, three-vessel coronary artery bypass  grafting. ADMITTING PHYSICIAN:  Dr. Ivania Dutton. HISTORY AND HOSPITAL COURSE:  The patient is a 70-year-old obese male  with a known history of coronary disease, having undergone multivessel  PCI and stenting in the past.  He presented to the emergency room with a  several-day history of stuttering, inferior wall myocardial infarction. Cardiac catheterization demonstrated a left ventricle that showed severe  LV dysfunction as compared to a normal ventricle just 3 months ago. The  inferior wall was mostly akinetic. The apical wall was akinetic. The  anterior wall was hypokinetic. Ejection fraction was 25%. Left main  artery had a 50% distal stenosis. He had multiple stents in the LAD all  the way down to the mid vessel. The mid and distal vessels were  occluded. The circumflex system had a high-grade stenosis as well  including the diagonal branch. The RCA was totally occluded at the  midpoint. The distal vessel was filled by collaterals, but the inferior  wall was totally akinetic.   At the time of operation, the patient was  found to have an extensive inferolateral wall infarction involving the  occluded RCA. The distal vessels were quite tiny. Most of them were  \"full-metal jackets\" making nearly impossible to perform adequate  revascularization. This was \"a salvage case. \"  Fortunately, the patient  did well after surgery initially. He stayed 3 days in the ICU because  he was quite weak and he also has chronic pain syndrome, which made  control of his pain so much difficult and challenging. From a  hemodynamic standpoint, he has done well. He has other issues with  atrial fibrillation, chronic pain, and chronic constipation. He has  chronic syncopal episodes, which is not a new diagnosis. This made his  postoperative course somewhat prolonged. Probably, by the 6th  postoperative day, the decision was made to transfer him to a nursing  home for further convalescence. He was turned down by the rehab program  here. Therefore, the patient is being transferred to [facility] for further convalescence. DISCHARGE MEDICATIONS:  See medication reconciliation sheet. DIET:  As tolerated. ACTIVITY:  As tolerated. FOLLOWUP:  We will see him back in the office in 4 weeks for a surgical  followup.         Joel Jeronimo MD    D: 06/05/2019 10:01:15      T: 06/05/2019 11:04:59     [email or web address]  Job#: [id number]     Doc#: [id number]    CC:

## 2019-06-06 NOTE — PROGRESS NOTES
POST OP CARDIOTHORACIC SURGERY PROGRESS NOTE    Post op day 7    SUBJECTIVE:  Had shower yesterday, Had BM. Feeling much better. Awaiting transfer to SNF    /62   Pulse 66   Temp 97.2 °F (36.2 °C) (Temporal)   Resp 18   Ht 6' 4\" (1.93 m)   Wt 270 lb 12.8 oz (122.8 kg)   SpO2 94%   BMI 32.96 kg/m²   Average, Min, and Max for last 24 hours Vitals:  TEMPERATURE:  Temp  Av.1 °F (36.2 °C)  Min: 96.7 °F (35.9 °C)  Max: 97.5 °F (36.4 °C)  RESPIRATIONS RANGE: Resp  Av  Min: 16  Max: 20  PULSE RANGE: Pulse  Av.8  Min: 63  Max: 72  BLOOD PRESSURE RANGE:  Systolic (68EDP), KTV:457 , Min:105 , SNT:855   ; Diastolic (36LCB), CXM:29, Min:57, Max:82    PULSE OXIMETRY RANGE: SpO2  Av.3 %  Min: 94 %  Max: 98 %    I/O last 3 completed shifts: In: 12 [P.O.:728]  Out: 500 [Urine:500]    CHEST: lungs clear    CARDIOVASCULAR: regular rhythm, no murmurs    INCISION: no drainage, skin edges intact    DRAINS:     LABS:  none    CHEST XRAY: none    ASSESSMENT: normal postoperative course    PLAN: Clinically stable.  Awaiting acceptance to [facility]    Electronically signed by Sami Wang MD on 19 at 8:46 AM

## 2019-06-06 NOTE — CARE COORDINATION
Informed by Regino Cabot with [facility] that [facility] has approved the pt for rehab services. SW informed the pt and spouse present at bedside and spouse intends to transport to the facility with the assistance of her son who is also present in the room. RN/NILSON Goodwin informed the charge WINSTON Moore and pt WINSTON Garcia. Dr Karan Ronquillo has already entered the DC order and summary.  Pt is expected to dc and admit to [facility] prior to [facility]

## 2019-06-06 NOTE — PROGRESS NOTES
Physical Therapy  Georgianayane Roberts  [id number]     06/06/19 9786   Subjective   Subjective Patient up in chair and agrees to work with therapy. Transfers   Sit to Stand Minimal Assistance;Contact guard assistance   Stand to sit Contact guard assistance   Ambulation   Ambulation? Yes   Ambulation 1   Surface level tile   Device Rolling Walker   Assistance Contact guard assistance   Quality of Gait slow, slightly dizzy   Distance 75'   Exercises   Hip Flexion 10   Knee Long Arc Quad 10   Ankle Pumps 10   Comments B LE ex's and cardiac routine    Other Activities   Comment Patient did well with therapy. Patient had x1 standing rest break secondary to feeling dizzy. Patient returned to recliner, positioned for comfort with all needs in reach. Short term goals   Time Frame for Short term goals 14 DAYS   Short term goal 1 BED MOB MOD IND   Short term goal 2 TRANSFERS SBA   Short term goal 3 ' AAD (IF NEEDED) SBA   Activity Tolerance   Activity Tolerance Patient Tolerated treatment well   Safety Devices   Type of devices Call light within reach; Left in chair   Electronically signed by Pam Jackson PTA on 6/6/2019 at 9:48 AM

## 2019-06-06 NOTE — PROGRESS NOTES
Occupational Therapy  Facility/Department: [facility] 7 PROGRESSIVE CARE  Daily Treatment Note  NAME: Conner Roberts  : 4710  MRN: [id number]    Date of Service: 2019    Discharge Recommendations:          Assessment   Performance deficits / Impairments: Decreased ADL status; Decreased functional mobility ; Decreased endurance;Decreased strength;Decreased balance  Assessment: Pt tolerated tx well, pt not feeling well but willing to participate with therapy. Treatment Diagnosis: s/p CABG  Prognosis: Good  REQUIRES OT FOLLOW UP: Yes  Activity Tolerance  Activity Tolerance: Patient Tolerated treatment well         Patient Diagnosis(es): The primary encounter diagnosis was Other chest pain. A diagnosis of Elevated troponin was also pertinent to this visit. has a past medical history of Acute ischemic stroke ([facility] [address] Atherosclerotic heart disease, CAD (coronary artery disease), Cerebral artery occlusion with cerebral infarction [facility]), Cerebrovascular accident (CVA) due to embolism of right middle cerebral artery ([address] CHF (congestive heart failure) ([address] Chronic bilateral low back pain without sciatica, Chronic kidney disease, DDD (degenerative disc disease), lumbar, Depression, Depression with anxiety, Diabetes mellitus ([address] Diabetes mellitus, type 2 ([address] DM (diabetes mellitus) ([address] Glaucoma, Headache, Hyperlipidemia, Hypertension, Macular degeneration, MI (myocardial infarction) ([address] MI (myocardial infarction) ([address] Neuromuscular disorder ([address] Neuropathy, Osteoarthritis, Sleep apnea, Spondylosis of lumbar region without myelopathy or radiculopathy, Status post placement of implantable loop recorder, and TIA (transient ischemic attack). has a past surgical history that includes Coronary angioplasty with stent ( [place]); Coronary angioplasty with stent; Diagnostic Cardiac Cath Lab Procedure (003039); Cardiac catheterization (12);  Cardiac catheterization (3/29/15  [facility]); lumbar fusion (Left, 11/15/2016); Colonoscopy; back surgery; joint replacement; Appendectomy; Cholecystectomy; and Coronary artery bypass graft (N/A, 5/30/2019). Restrictions  Restrictions/Precautions  Restrictions/Precautions: Fall Risk, Cardiac  Position Activity Restriction  Sternal Precautions: No Pushing, No Pulling, 5# Lifting Restrictions  Sternal Precautions: yes  Subjective   General  Chart Reviewed: Yes  Patient assessed for rehabilitation services?: Yes  Response to previous treatment: Patient with no complaints from previous session  Family / Caregiver Present: No  Diagnosis: s/p CABG  General Comment  Comments: Pt. states he is very tired sitting up in chair. Pain Assessment  Pain Assessment: 0-10  Pain Level: 7  Pain Type: Surgical pain  Pain Location: Chest  Pain Descriptors: Aching; Sore  Vital Signs  Patient Currently in Pain: Yes   Orientation     Objective             Balance  Standing Balance: Contact guard assistance     Transfers  Stand Step Transfers: Contact guard assistance  Sit to stand: Contact guard assistance  Stand to sit: Contact guard assistance                                                                 Plan   Plan  Times per week: 3-5x/week  Times per day: Daily  G-Code     OutComes Score                                                  AM-PAC Score             Goals  Short term goals  Time Frame for Short term goals: 1 week  Short term goal 1: CGA with toilet tfers including sit-stand   Short term goal 2: CGA with clothing mgmt for shorts in standing  Short term goal 3: Supervision with seated LB dsg  Long term goals  Long term goal 1: Return to PLOF       Therapy Time   Individual Concurrent Group Co-treatment   Time In           Time Out           Minutes                   NIKKO Mackay

## 2019-06-06 NOTE — CARE COORDINATION
Spoke with rep at [facility] who reports has received requested documentation from yesterday and currently awaiting physician review for approval.

## 2019-06-07 ENCOUNTER — TELEPHONE (OUTPATIENT)
Dept: INTERNAL MEDICINE | Age: 74
End: 2019-06-07

## 2019-06-08 ENCOUNTER — APPOINTMENT (OUTPATIENT)
Dept: CT IMAGING | Age: 74
DRG: 280 | End: 2019-06-08
Payer: MEDICARE

## 2019-06-08 ENCOUNTER — APPOINTMENT (OUTPATIENT)
Dept: GENERAL RADIOLOGY | Age: 74
DRG: 280 | End: 2019-06-08
Payer: MEDICARE

## 2019-06-08 ENCOUNTER — HOSPITAL ENCOUNTER (INPATIENT)
Age: 74
LOS: 6 days | Discharge: HOME | DRG: 280 | End: 2019-06-14
Attending: EMERGENCY MEDICINE | Admitting: FAMILY MEDICINE
Payer: MEDICARE

## 2019-06-08 DIAGNOSIS — Z95.1 HX OF CABG: ICD-10-CM

## 2019-06-08 DIAGNOSIS — E87.5 HYPERKALEMIA: ICD-10-CM

## 2019-06-08 DIAGNOSIS — N28.9 RENAL INSUFFICIENCY: ICD-10-CM

## 2019-06-08 DIAGNOSIS — R07.89 OTHER CHEST PAIN: Primary | ICD-10-CM

## 2019-06-08 DIAGNOSIS — I50.21 ACUTE SYSTOLIC CONGESTIVE HEART FAILURE (HCC): ICD-10-CM

## 2019-06-08 DIAGNOSIS — G44.229 CHRONIC TENSION-TYPE HEADACHE, NOT INTRACTABLE: ICD-10-CM

## 2019-06-08 DIAGNOSIS — K59.00 CONSTIPATION, UNSPECIFIED CONSTIPATION TYPE: ICD-10-CM

## 2019-06-08 DIAGNOSIS — I21.4 NSTEMI (NON-ST ELEVATED MYOCARDIAL INFARCTION) (HCC): ICD-10-CM

## 2019-06-08 DIAGNOSIS — D64.9 ANEMIA, UNSPECIFIED TYPE: ICD-10-CM

## 2019-06-08 LAB
ALBUMIN SERPL-MCNC: 2.9 G/DL (ref 3.5–5.2)
ALP BLD-CCNC: 68 U/L (ref 40–130)
ALT SERPL-CCNC: 23 U/L (ref 5–41)
ANION GAP SERPL CALCULATED.3IONS-SCNC: 11 MMOL/L (ref 7–19)
APTT: 33.1 SEC (ref 26–36.2)
APTT: 49.8 SEC (ref 26–36.2)
AST SERPL-CCNC: 14 U/L (ref 5–40)
BASOPHILS ABSOLUTE: 0 K/UL (ref 0–0.2)
BASOPHILS RELATIVE PERCENT: 0.4 % (ref 0–1)
BILIRUB SERPL-MCNC: <0.2 MG/DL (ref 0.2–1.2)
BUN BLDV-MCNC: 32 MG/DL (ref 8–23)
CALCIUM SERPL-MCNC: 8.5 MG/DL (ref 8.8–10.2)
CHLORIDE BLD-SCNC: 98 MMOL/L (ref 98–111)
CO2: 25 MMOL/L (ref 22–29)
CREAT SERPL-MCNC: 1.7 MG/DL (ref 0.5–1.2)
EOSINOPHILS ABSOLUTE: 0.3 K/UL (ref 0–0.6)
EOSINOPHILS RELATIVE PERCENT: 4.1 % (ref 0–5)
GFR NON-AFRICAN AMERICAN: 39
GLUCOSE BLD-MCNC: 119 MG/DL (ref 70–99)
GLUCOSE BLD-MCNC: 152 MG/DL (ref 70–99)
GLUCOSE BLD-MCNC: 167 MG/DL (ref 74–109)
HCT VFR BLD CALC: 29.5 % (ref 42–52)
HEMOGLOBIN: 9.3 G/DL (ref 14–18)
INR BLD: 1.23 (ref 0.88–1.18)
LIPASE: 34 U/L (ref 13–60)
LYMPHOCYTES ABSOLUTE: 1.7 K/UL (ref 1.1–4.5)
LYMPHOCYTES RELATIVE PERCENT: 21.2 % (ref 20–40)
MCH RBC QN AUTO: 27.1 PG (ref 27–31)
MCHC RBC AUTO-ENTMCNC: 31.5 G/DL (ref 33–37)
MCV RBC AUTO: 86 FL (ref 80–94)
MONOCYTES ABSOLUTE: 0.8 K/UL (ref 0–0.9)
MONOCYTES RELATIVE PERCENT: 10.6 % (ref 0–10)
NEUTROPHILS ABSOLUTE: 5 K/UL (ref 1.5–7.5)
NEUTROPHILS RELATIVE PERCENT: 63.1 % (ref 50–65)
PDW BLD-RTO: 14.9 % (ref 11.5–14.5)
PERFORMED ON: ABNORMAL
PERFORMED ON: ABNORMAL
PLATELET # BLD: 266 K/UL (ref 130–400)
PMV BLD AUTO: 11.5 FL (ref 9.4–12.4)
POTASSIUM SERPL-SCNC: 5.2 MMOL/L (ref 3.5–5)
PRO-BNP: 4456 PG/ML (ref 0–1800)
PROTHROMBIN TIME: 14.9 SEC (ref 12–14.6)
RBC # BLD: 3.43 M/UL (ref 4.7–6.1)
SODIUM BLD-SCNC: 134 MMOL/L (ref 136–145)
TOTAL PROTEIN: 6.2 G/DL (ref 6.6–8.7)
TROPONIN: 0.94 NG/ML (ref 0–0.03)
TROPONIN: 0.99 NG/ML (ref 0–0.03)
TROPONIN: 1.07 NG/ML (ref 0–0.03)
WBC # BLD: 7.9 K/UL (ref 4.8–10.8)

## 2019-06-08 PROCEDURE — 6360000002 HC RX W HCPCS: Performed by: INTERNAL MEDICINE

## 2019-06-08 PROCEDURE — 96374 THER/PROPH/DIAG INJ IV PUSH: CPT

## 2019-06-08 PROCEDURE — 99223 1ST HOSP IP/OBS HIGH 75: CPT | Performed by: HOSPITALIST

## 2019-06-08 PROCEDURE — 2580000003 HC RX 258: Performed by: HOSPITALIST

## 2019-06-08 PROCEDURE — 6370000000 HC RX 637 (ALT 250 FOR IP): Performed by: EMERGENCY MEDICINE

## 2019-06-08 PROCEDURE — 83880 ASSAY OF NATRIURETIC PEPTIDE: CPT

## 2019-06-08 PROCEDURE — 99285 EMERGENCY DEPT VISIT HI MDM: CPT

## 2019-06-08 PROCEDURE — 99221 1ST HOSP IP/OBS SF/LOW 40: CPT | Performed by: INTERNAL MEDICINE

## 2019-06-08 PROCEDURE — 83690 ASSAY OF LIPASE: CPT

## 2019-06-08 PROCEDURE — 82948 REAGENT STRIP/BLOOD GLUCOSE: CPT

## 2019-06-08 PROCEDURE — 93005 ELECTROCARDIOGRAM TRACING: CPT

## 2019-06-08 PROCEDURE — 6360000002 HC RX W HCPCS: Performed by: EMERGENCY MEDICINE

## 2019-06-08 PROCEDURE — 74018 RADEX ABDOMEN 1 VIEW: CPT

## 2019-06-08 PROCEDURE — 71045 X-RAY EXAM CHEST 1 VIEW: CPT

## 2019-06-08 PROCEDURE — 74176 CT ABD & PELVIS W/O CONTRAST: CPT

## 2019-06-08 PROCEDURE — 85610 PROTHROMBIN TIME: CPT

## 2019-06-08 PROCEDURE — 2100000000 HC CCU R&B

## 2019-06-08 PROCEDURE — 6370000000 HC RX 637 (ALT 250 FOR IP): Performed by: INTERNAL MEDICINE

## 2019-06-08 PROCEDURE — 99291 CRITICAL CARE FIRST HOUR: CPT | Performed by: EMERGENCY MEDICINE

## 2019-06-08 PROCEDURE — 36415 COLL VENOUS BLD VENIPUNCTURE: CPT

## 2019-06-08 PROCEDURE — 85730 THROMBOPLASTIN TIME PARTIAL: CPT

## 2019-06-08 PROCEDURE — 2500000003 HC RX 250 WO HCPCS: Performed by: EMERGENCY MEDICINE

## 2019-06-08 PROCEDURE — 84484 ASSAY OF TROPONIN QUANT: CPT

## 2019-06-08 PROCEDURE — 6370000000 HC RX 637 (ALT 250 FOR IP): Performed by: HOSPITALIST

## 2019-06-08 PROCEDURE — 80053 COMPREHEN METABOLIC PANEL: CPT

## 2019-06-08 PROCEDURE — 85025 COMPLETE CBC W/AUTO DIFF WBC: CPT

## 2019-06-08 RX ORDER — TAMSULOSIN HYDROCHLORIDE 0.4 MG/1
0.4 CAPSULE ORAL DAILY
Status: DISCONTINUED | OUTPATIENT
Start: 2019-06-08 | End: 2019-06-14 | Stop reason: HOSPADM

## 2019-06-08 RX ORDER — POTASSIUM CHLORIDE 7.45 MG/ML
10 INJECTION INTRAVENOUS PRN
Status: DISCONTINUED | OUTPATIENT
Start: 2019-06-08 | End: 2019-06-14 | Stop reason: HOSPADM

## 2019-06-08 RX ORDER — SODIUM CHLORIDE 0.9 % (FLUSH) 0.9 %
10 SYRINGE (ML) INJECTION EVERY 12 HOURS SCHEDULED
Status: DISCONTINUED | OUTPATIENT
Start: 2019-06-08 | End: 2019-06-14 | Stop reason: SDUPTHER

## 2019-06-08 RX ORDER — SERTRALINE HYDROCHLORIDE 100 MG/1
100 TABLET, FILM COATED ORAL DAILY
Status: DISCONTINUED | OUTPATIENT
Start: 2019-06-09 | End: 2019-06-14 | Stop reason: HOSPADM

## 2019-06-08 RX ORDER — SODIUM PHOSPHATE, DIBASIC AND SODIUM PHOSPHATE, MONOBASIC 7; 19 G/133ML; G/133ML
1 ENEMA RECTAL DAILY PRN
Status: DISCONTINUED | OUTPATIENT
Start: 2019-06-08 | End: 2019-06-14 | Stop reason: HOSPADM

## 2019-06-08 RX ORDER — OXYCODONE AND ACETAMINOPHEN 10; 325 MG/1; MG/1
1 TABLET ORAL EVERY 4 HOURS PRN
Status: DISCONTINUED | OUTPATIENT
Start: 2019-06-08 | End: 2019-06-14 | Stop reason: HOSPADM

## 2019-06-08 RX ORDER — ACETAMINOPHEN 650 MG/1
650 SUPPOSITORY RECTAL EVERY 4 HOURS PRN
Status: DISCONTINUED | OUTPATIENT
Start: 2019-06-08 | End: 2019-06-14 | Stop reason: HOSPADM

## 2019-06-08 RX ORDER — AZELASTINE 1 MG/ML
2 SPRAY, METERED NASAL 2 TIMES DAILY
Status: DISCONTINUED | OUTPATIENT
Start: 2019-06-08 | End: 2019-06-08

## 2019-06-08 RX ORDER — BUTALBITAL, ACETAMINOPHEN AND CAFFEINE 50; 325; 40 MG/1; MG/1; MG/1
1 TABLET ORAL PRN
Status: DISCONTINUED | OUTPATIENT
Start: 2019-06-08 | End: 2019-06-14 | Stop reason: HOSPADM

## 2019-06-08 RX ORDER — HEPARIN SODIUM 1000 [USP'U]/ML
2000 INJECTION, SOLUTION INTRAVENOUS; SUBCUTANEOUS PRN
Status: DISCONTINUED | OUTPATIENT
Start: 2019-06-08 | End: 2019-06-10

## 2019-06-08 RX ORDER — ONDANSETRON 2 MG/ML
4 INJECTION INTRAMUSCULAR; INTRAVENOUS EVERY 6 HOURS PRN
Status: DISCONTINUED | OUTPATIENT
Start: 2019-06-08 | End: 2019-06-14 | Stop reason: HOSPADM

## 2019-06-08 RX ORDER — HEPARIN SODIUM 1000 [USP'U]/ML
4000 INJECTION, SOLUTION INTRAVENOUS; SUBCUTANEOUS ONCE
Status: COMPLETED | OUTPATIENT
Start: 2019-06-08 | End: 2019-06-08

## 2019-06-08 RX ORDER — SODIUM CHLORIDE 0.9 % (FLUSH) 0.9 %
10 SYRINGE (ML) INJECTION PRN
Status: DISCONTINUED | OUTPATIENT
Start: 2019-06-08 | End: 2019-06-14 | Stop reason: SDUPTHER

## 2019-06-08 RX ORDER — FUROSEMIDE 10 MG/ML
20 INJECTION INTRAMUSCULAR; INTRAVENOUS 2 TIMES DAILY
Status: DISCONTINUED | OUTPATIENT
Start: 2019-06-08 | End: 2019-06-08

## 2019-06-08 RX ORDER — FUROSEMIDE 10 MG/ML
40 INJECTION INTRAMUSCULAR; INTRAVENOUS ONCE
Status: COMPLETED | OUTPATIENT
Start: 2019-06-08 | End: 2019-06-08

## 2019-06-08 RX ORDER — AMIODARONE HYDROCHLORIDE 200 MG/1
200 TABLET ORAL 2 TIMES DAILY
Status: DISCONTINUED | OUTPATIENT
Start: 2019-06-08 | End: 2019-06-11

## 2019-06-08 RX ORDER — CARVEDILOL 6.25 MG/1
6.25 TABLET ORAL 2 TIMES DAILY WITH MEALS
Status: DISCONTINUED | OUTPATIENT
Start: 2019-06-08 | End: 2019-06-13 | Stop reason: SDUPTHER

## 2019-06-08 RX ORDER — INSULIN GLARGINE 100 [IU]/ML
80 INJECTION, SOLUTION SUBCUTANEOUS NIGHTLY
Status: DISCONTINUED | OUTPATIENT
Start: 2019-06-08 | End: 2019-06-14 | Stop reason: HOSPADM

## 2019-06-08 RX ORDER — DOCUSATE SODIUM 100 MG/1
100 CAPSULE, LIQUID FILLED ORAL 3 TIMES DAILY
Status: DISCONTINUED | OUTPATIENT
Start: 2019-06-08 | End: 2019-06-10

## 2019-06-08 RX ORDER — MAGNESIUM SULFATE 1 G/100ML
1 INJECTION INTRAVENOUS PRN
Status: DISCONTINUED | OUTPATIENT
Start: 2019-06-08 | End: 2019-06-14 | Stop reason: HOSPADM

## 2019-06-08 RX ORDER — LACTULOSE 10 G/15ML
30 SOLUTION ORAL EVERY 6 HOURS SCHEDULED
Status: DISCONTINUED | OUTPATIENT
Start: 2019-06-08 | End: 2019-06-10

## 2019-06-08 RX ORDER — NITROGLYCERIN 20 MG/100ML
5 INJECTION INTRAVENOUS CONTINUOUS
Status: DISCONTINUED | OUTPATIENT
Start: 2019-06-08 | End: 2019-06-10

## 2019-06-08 RX ORDER — FUROSEMIDE 10 MG/ML
40 INJECTION INTRAMUSCULAR; INTRAVENOUS 4 TIMES DAILY
Status: DISCONTINUED | OUTPATIENT
Start: 2019-06-08 | End: 2019-06-11

## 2019-06-08 RX ORDER — HEPARIN SODIUM 1000 [USP'U]/ML
4000 INJECTION, SOLUTION INTRAVENOUS; SUBCUTANEOUS PRN
Status: DISCONTINUED | OUTPATIENT
Start: 2019-06-08 | End: 2019-06-10

## 2019-06-08 RX ORDER — POTASSIUM CHLORIDE 20 MEQ/1
40 TABLET, EXTENDED RELEASE ORAL PRN
Status: DISCONTINUED | OUTPATIENT
Start: 2019-06-08 | End: 2019-06-14 | Stop reason: HOSPADM

## 2019-06-08 RX ORDER — HEPARIN SODIUM 10000 [USP'U]/100ML
8.15 INJECTION, SOLUTION INTRAVENOUS CONTINUOUS
Status: DISCONTINUED | OUTPATIENT
Start: 2019-06-08 | End: 2019-06-10

## 2019-06-08 RX ADMIN — AMIODARONE HYDROCHLORIDE 200 MG: 200 TABLET ORAL at 20:26

## 2019-06-08 RX ADMIN — Medication 10 ML: at 20:26

## 2019-06-08 RX ADMIN — HEPARIN SODIUM AND DEXTROSE 8.15 UNITS/KG/HR: 10000; 5 INJECTION INTRAVENOUS at 15:36

## 2019-06-08 RX ADMIN — NITROGLYCERIN 5 MCG/MIN: 20 INJECTION INTRAVENOUS at 16:14

## 2019-06-08 RX ADMIN — Medication 60 UNITS: at 21:01

## 2019-06-08 RX ADMIN — FUROSEMIDE 40 MG: 10 INJECTION, SOLUTION INTRAMUSCULAR; INTRAVENOUS at 15:35

## 2019-06-08 RX ADMIN — CARVEDILOL 6.25 MG: 6.25 TABLET, FILM COATED ORAL at 20:54

## 2019-06-08 RX ADMIN — FUROSEMIDE 40 MG: 10 INJECTION, SOLUTION INTRAMUSCULAR; INTRAVENOUS at 20:26

## 2019-06-08 RX ADMIN — TAMSULOSIN HYDROCHLORIDE 0.4 MG: 0.4 CAPSULE ORAL at 20:25

## 2019-06-08 RX ADMIN — HEPARIN SODIUM 4000 UNITS: 1000 INJECTION INTRAVENOUS; SUBCUTANEOUS at 15:35

## 2019-06-08 RX ADMIN — MUPIROCIN: 20 OINTMENT TOPICAL at 20:25

## 2019-06-08 RX ADMIN — LACTULOSE 30 G: 20 SOLUTION ORAL at 20:25

## 2019-06-08 RX ADMIN — DOCUSATE SODIUM 100 MG: 100 CAPSULE, LIQUID FILLED ORAL at 20:26

## 2019-06-08 RX ADMIN — HEPARIN SODIUM 2000 UNITS: 1000 INJECTION INTRAVENOUS; SUBCUTANEOUS at 21:33

## 2019-06-08 ASSESSMENT — ENCOUNTER SYMPTOMS
ABDOMINAL PAIN: 0
CONSTIPATION: 1
DIARRHEA: 0
ABDOMINAL DISTENTION: 1
BLOOD IN STOOL: 0
CHEST TIGHTNESS: 1
SHORTNESS OF BREATH: 1
VOMITING: 0
COUGH: 0
WHEEZING: 0
BACK PAIN: 0
ABDOMINAL DISTENTION: 0

## 2019-06-08 ASSESSMENT — PAIN SCALES - GENERAL
PAINLEVEL_OUTOF10: 0
PAINLEVEL_OUTOF10: 2

## 2019-06-08 ASSESSMENT — PAIN DESCRIPTION - LOCATION: LOCATION: CHEST

## 2019-06-08 NOTE — H&P
[facility] - History & Physical      PCP: Jeaneth Sims MD    Date of Admission: 6/8/2019    Date of Service: 6/8/2019    Chief Complaint:  Chest pain     History Of Present Illness: The patient is a 75 y.o. male with PMH on systolic CHF, CAD,DM. HTN, HLD, MOO  s/p CABG in 5/30/19  who presented to emergency department with crushing chest pain that started earlier today when he was at [facility].   He has pain that radiated down his left arm in his left eye the neck and was described as crushing. One nitroglycerin relieved the pain. The patient no longer has any pain he's resting comfortably at this time he does have a distended abdomen he has chronic constipation issues is not her bowel movement 3 days. Patient is found have a positive troponin at this time. Patient also had 324 mg of aspirin by EMS currently PT ches pain resolved, on heparin nitro gtt, case discussed with Dr. Fady Finch will admit to [facility], also abdominal distention no BM x7 days, generalized edema h/o MOO not using CPAP       Past Medical History:        Diagnosis Date    Acute ischemic stroke ([address] 2/20/2017    Atherosclerotic heart disease     s/p MI, stenting x 3 jan 2011([place])    CAD (coronary artery disease)     Cerebral artery occlusion with cerebral infarction [facility])     Cerebrovascular accident (CVA) due to embolism of right middle cerebral artery ([address] 8/14/2017    CHF (congestive heart failure) (HCC)     Chronic bilateral low back pain without sciatica 11/10/2016    Chronic kidney disease     DDD (degenerative disc disease), lumbar 11/15/2016    Depression     Depression with anxiety     Diabetes mellitus ([address]     type II    Diabetes mellitus, type 2 ([address]     DM (diabetes mellitus) ([address] 7/12/2011    Glaucoma     Headache     Hyperlipidemia     Cholesterol management per pcp.      Hypertension     Macular degeneration     MI (myocardial infarction) ([address]     MI (myocardial abdomen with and without contrast is   recommended to include MRCP. 4. Atherosclerotic disease. Signed by Dr Delia Thrasher on 6/8/2019 4:03 PM      XR CHEST PORTABLE   Final Result   Findings are suggestive of pulmonary vascular congestion   and pulmonary edema with bilateral pleural effusions. Signed by Dr Delia Thrasher on 6/8/2019 2:55 PM      XR ABDOMEN (KUB) (SINGLE AP VIEW)   Final Result   No evidence of bowel obstruction. Signed by Dr Lin Code on 6/8/2019 2:56 PM        CBC:  Recent Labs     06/08/19  1424   WBC 7.9   HGB 9.3*   HCT 29.5*        BMP:  Recent Labs     06/08/19  1424   *   K 5.2*   CL 98   CO2 25   BUN 32*   CREATININE 1.7*   CALCIUM 8.5*     Recent Labs     06/08/19  1424   AST 14   ALT 23   BILITOT <0.2   ALKPHOS 68     Coag Panel:   Recent Labs     06/08/19  1424   INR 1.23*   PROTIME 14.9*   APTT 33.1     Cardiac Enzymes:   Recent Labs     06/08/19  1424   TROPONINI 1.07*     ABGs:  Lab Results   Component Value Date    PHART 7.350 05/31/2019    PO2ART 85.0 05/31/2019    GWZ7OWH 41.0 05/31/2019     Urinalysis:  Lab Results   Component Value Date    NITRU Negative 05/24/2019    WBCUA 3 02/20/2017    BACTERIA NEGATIVE 02/20/2017    RBCUA 0 02/20/2017    BLOODU Negative 05/24/2019    SPECGRAV 1.012 05/24/2019    GLUCOSEU Negative 05/24/2019       Active Hospital Problems    Diagnosis Date Noted    Chest pain [R07.9] 02/28/2019       Assessment and Plan: Active Problems:    Chest pain  Resolved Problems:    * No resolved hospital problems.  *    Chest pain post CABG 8 days ago relieved by nitro with elevated troponin and non-ST elevation MI with plan to troponin  cardiology was already consulted continue heparin and nitroglycerin drip    Acute on chronic systolic heart failure admitted to the hospital and started IV Lasix I's and O's and daily weights will be counseled with Dr. Deanna Reyes cardiology  Abdominal pain with abnormal distention KUB and CT unremarkable patient

## 2019-06-08 NOTE — ED PROVIDER NOTES
[facility]  eMERGENCY dEPARTMENT eNCOUnter      Pt Name: Carloz Madrid  MRN: [id number]  [place] [birth year]  Date of evaluation: 6/8/2019  Provider: Melisa Mercado MD    [address] [place] [address]       Chief Complaint   Patient presents with    Chest Pain     started during PT at [facility]; CABG on 5/30/2019, pt states that the pain was radiating down left arm and left side of neck, pain is described as \"crushing\"; HISTORY OF PRESENT ILLNESS   (Location/Symptom, Timing/Onset,Context/Setting, Quality, Duration, Modifying Factors, Severity)  Note limiting factors. Carloz Madrid is a 75 y.o. male who presents to the emergency department with crushing chest pain that started earlier today when he was at [facility]. He status post CABG on 5/30 with Dr. Darnell Wilcox. He has pain that radiated down his left arm in his left eye the neck and was described as crushing. One nitroglycerin relieved the pain. The patient no longer has any pain he's resting comfortably at this time he does have a distended abdomen he has chronic constipation issues is not her bowel movement 3 days. Patient is found have a positive troponin at this time. Patient also had 324 mg of aspirin by EMS. The history is provided by the patient, a relative and medical records. NursingNotes were reviewed. REVIEW OF SYSTEMS    (2-9 systems for level 4, 10 or more for level 5)     Review of Systems   Constitutional: Negative for fever. Respiratory: Positive for shortness of breath. Cardiovascular: Positive for chest pain and leg swelling. Gastrointestinal: Positive for abdominal distention and constipation. Genitourinary: Negative for dysuria. Neurological: Negative for syncope. Psychiatric/Behavioral: Negative for confusion. A complete review of systems was performed and is negative except as noted above in the HPI.        PAST MEDICAL HISTORY     Past Medical History:   Diagnosis Date    Acute ischemic stroke ([address] 2/20/2017    Atherosclerotic heart disease     s/p MI, stenting x 3 jan 2011([place])    CAD (coronary artery disease)     Cerebral artery occlusion with cerebral infarction [facility])     Cerebrovascular accident (CVA) due to embolism of right middle cerebral artery ([address] 8/14/2017    CHF (congestive heart failure) ([facility])     Chronic bilateral low back pain without sciatica 11/10/2016    Chronic kidney disease     DDD (degenerative disc disease), lumbar 11/15/2016    Depression     Depression with anxiety     Diabetes mellitus ([address]     type II    Diabetes mellitus, type 2 ([address]     DM (diabetes mellitus) ([address] 7/12/2011    Glaucoma     Headache     Hyperlipidemia     Cholesterol management per pcp.  Hypertension     Macular degeneration     MI (myocardial infarction) ([address]     MI (myocardial infarction) ([address]     Neuromuscular disorder ([address]     Neuropathy     Osteoarthritis     Sleep apnea     Spondylosis of lumbar region without myelopathy or radiculopathy 11/15/2016    Status post placement of implantable loop recorder 10/31/2017    TIA (transient ischemic attack)          SURGICAL HISTORY       Past Surgical History:   Procedure Laterality Date    APPENDECTOMY      BACK SURGERY      CARDIAC CATHETERIZATION  12/12/12    with angioplasty, stent    CARDIAC CATHETERIZATION  3/29/15  [facility]    stent to distal RCA.  EF 60%    CHOLECYSTECTOMY      COLONOSCOPY      CORONARY ANGIOPLASTY WITH STENT PLACEMENT  jan 11 [place]    stent x 3    CORONARY ANGIOPLASTY WITH STENT PLACEMENT      01/01/11    CORONARY ARTERY BYPASS GRAFT N/A 5/30/2019    CORONARY ARTERY BYPASS GRAFT X3 WITH LEFT INTERNAL MAMMARY ARTERY WITH ENDOSCOPIC VEIN HARVESTING WITH PERFUSION TRANSESOPHAGEAL ECHOCARDIOGRAM performed by Makenzie Fung MD at [address] [place] [address] CATH LAB PROCEDURE  [id number]    primary stent placement to the first circumflex marginal branch, balloon angioplasty to the third left anterior descending diagnonal branch, intracoronary nitroglycerin adminstration    JOINT REPLACEMENT      left knee    LUMBAR FUSION Left 11/15/2016    LUMBAR INTERBODY FUSION LATERAL L3-4 L4-5 WITH LATERAL PLATE  performed by Valentino Gal, MD at [facility]       Previous Medications    AMIODARONE (CORDARONE) 200 MG TABLET    Take 1 tablet by mouth 2 times daily    AZELASTINE (ASTELIN) 0.1 % NASAL SPRAY    1 spray by Nasal route as needed for Rhinitis Use in each nostril as directed    BUTALBITAL-ACETAMINOPHEN-CAFFEINE (FIORICET, ESGIC) -40 MG PER TABLET    Take 1 tablet by mouth as needed for Headaches    CARVEDILOL (COREG) 12.5 MG TABLET    Take 1 tablet by mouth 2 times daily (with meals)    CLOPIDOGREL (PLAVIX) 75 MG TABLET    TAKE 1 TABLET DAILY    FUROSEMIDE (LASIX) 40 MG TABLET    Take 40 mg by mouth as needed    INSULIN GLARGINE (LANTUS SOLOSTAR) 100 UNIT/ML INJECTION PEN    INJECT 80 UNITS INTO THE SKIN NIGHTLY    ISOSORBIDE MONONITRATE (IMDUR) 30 MG EXTENDED RELEASE TABLET    Take 1 tablet by mouth daily    LISINOPRIL (PRINIVIL;ZESTRIL) 20 MG TABLET    Take 1/2 tablet twice daily    NITROGLYCERIN (NITROSTAT) 0.4 MG SL TABLET    up to max of 3 total doses. If no relief after 1 dose, call 911. OXYCODONE-ACETAMINOPHEN (PERCOCET)  MG PER TABLET    Take 1 tablet by mouth 3 times daily as needed for Pain for up to 3 days. SERTRALINE (ZOLOFT) 100 MG TABLET    Take 1 tablet by mouth daily    TAMSULOSIN (FLOMAX) 0.4 MG CAPSULE    TAKE 2 CAPSULES AT BEDTIME    TIZANIDINE (ZANAFLEX) 2 MG TABLET    Take 1 tablet by mouth nightly as needed (muscle spasm)    ZOSTER RECOMBINANT ADJUVANTED VACCINE (SHINGRIX) 50 MCG/0.5ML SUSR INJECTION    1 vaccine now and repeat in 2-6 months.        ALLERGIES     Statins; Crestor [rosuvastatin calcium]; and Morphine    FAMILY HISTORY       Family History   Problem Relation Age of Onset    Coronary Art Dis Mother     Coronary Art Dis well-nourished. Large gentleman sitting up in bed tired in no acute distress denies any pain at this time   HENT:   Head: Normocephalic and atraumatic. Eyes: Pupils are equal, round, and reactive to light. EOM are normal.   Neck: Normal range of motion. Neck supple. Cardiovascular: Normal rate and regular rhythm. Well-healed midline scar. Patient has orthopnea. Cannot lay flat. Pulmonary/Chest: Effort normal. No respiratory distress. Decreased at bases    Abdominal: Soft. He exhibits distension (tympanitic ). Musculoskeletal: Normal range of motion. He exhibits edema. Healing scars mild redness   Feet:   Right Foot: amputated  Neurological: He is alert and oriented to person, place, and time. Skin: Skin is warm and dry. Psychiatric: Thought content normal.   Calm tired    Nursing note and vitals reviewed. DIAGNOSTIC RESULTS     EKG: All EKG's are interpreted by the Emergency Department Physician who either signs or Co-signs this chart in the absence of a cardiologist.    EKG sinus 73 PVCs no obvious acute ischemia. RADIOLOGY:   Non-plain film images such as CT, Ultrasound and MRI are read by the radiologist. Lodema Minor images are visualized and preliminarily interpreted by the emergency physician with the below findings:      Interpretation per the Radiologist below, if available at the time of this note:    CT ABDOMEN PELVIS WO CONTRAST Additional Contrast? None   Final Result   Impression:   1. No acute findings in the abdomen or pelvis when allowing for   limitations of significant motion as well as lack of IV contrast.   2. Cardiomegaly with bilateral pleural effusions. Subcutaneous soft   tissue edema dependently in the posterior aspect of the pelvis and   proximal thighs. 3. Questionable lesion extending from the pancreatic body for which   nonemergent follow-up MRI abdomen with and without contrast is   recommended to include MRCP. 4. Atherosclerotic disease.    Signed by Dr Clement Martinez on 6/8/2019 4:03 PM      XR CHEST PORTABLE   Final Result   Findings are suggestive of pulmonary vascular congestion   and pulmonary edema with bilateral pleural effusions. Signed by Dr Clement Martinez on 6/8/2019 2:55 PM      XR ABDOMEN (KUB) (SINGLE AP VIEW)   Final Result   No evidence of bowel obstruction. Signed by Dr Clement Martinez on 6/8/2019 2:56 PM            ED BEDSIDE ULTRASOUND:   Performed by ED Physician - none    LABS:  Labs Reviewed   CBC WITH AUTO DIFFERENTIAL - Abnormal; Notable for the following components:       Result Value    RBC 3.43 (*)     Hemoglobin 9.3 (*)     Hematocrit 29.5 (*)     MCHC 31.5 (*)     RDW 14.9 (*)     Monocytes % 10.6 (*)     All other components within normal limits   COMPREHENSIVE METABOLIC PANEL - Abnormal; Notable for the following components:    Sodium 134 (*)     Potassium 5.2 (*)     Glucose 167 (*)     BUN 32 (*)     CREATININE 1.7 (*)     GFR Non-African American 39 (*)     Calcium 8.5 (*)     Total Protein 6.2 (*)     Alb 2.9 (*)     All other components within normal limits    Narrative:     CALL  Tian  KLED tel. ,  Chemistry results called to and read back by Jenise/RN/ER, 06/08/2019 15:01,  by Geraldine Padilla - Abnormal; Notable for the following components:    Protime 14.9 (*)     INR 1.23 (*)     All other components within normal limits   TROPONIN - Abnormal; Notable for the following components:    Troponin 1.07 (*)     All other components within normal limits    Narrative:     CALL  Tian  KLED tel. ,  Chemistry results called to and read back by Jenise/RN/ER, 06/08/2019 15:01,  by Jasmine Otto [id number] - Abnormal; Notable for the following components:    Pro-BNP 4,456 (*)     All other components within normal limits   LIPASE   APTT   TROPONIN   APTT   APTT   APTT       All other labs were within normal range or not returned as of this dictation.     EMERGENCY DEPARTMENT COURSE and DIFFERENTIALDIAGNOSIS/MDM:   Vitals: consult. As above. Guarded prognosis. Discussed with family. Amount and/or Complexity of Data Reviewed  Clinical lab tests: ordered and reviewed  Tests in the radiology section of CPT®: reviewed and ordered  Discuss the patient with other providers: yes  Independent visualization of images, tracings, or specimens: yes    Risk of Complications, Morbidity, and/or Mortality  Presenting problems: high  Management options: high    Critical Care  Total time providing critical care: 30-74 minutes    Patient Progress  Patient progress: improved    CRITICAL CARE TIME   Total Critical Care time was 35 minutes, excluding separately reportable procedures. There was a high probability of clinically significant/life threatening deterioration in the patient's condition which required my urgent intervention. CONSULTS:  IP CONSULT TO CARDIOLOGY    PROCEDURES:  Unless otherwise notedbelow, none     Procedures    FINAL IMPRESSION     1. Other chest pain    2. NSTEMI (non-ST elevated myocardial infarction) ([facility]    3. Hx of CABG    4. Acute systolic congestive heart failure ([facility]    5. Anemia, unspecified type    6. Constipation, unspecified constipation type    7. Hyperkalemia    8. Renal insufficiency          DISPOSITION/PLAN   DISPOSITION Decision To Admit 06/08/2019 04:20:45 PM      PATIENT REFERRED TO:  No follow-up provider specified.     DISCHARGE MEDICATIONS:  New Prescriptions    No medications on file          (Please note that portions of this note were completed with a voice recognition program.  Efforts were made to edit the dictations butoccasionally words are mis-transcribed.)    Ariel Flanagan MD (electronically signed)  AttendingEmergency Physician         Ariel Flanagan MD  06/08/19 [id number]

## 2019-06-08 NOTE — ED NOTES
Bed: 01-A  Expected date:   Expected time:   Means of arrival:   Comments:  EMS Chas Garcia, [place]  06/08/19 0888

## 2019-06-08 NOTE — FLOWSHEET NOTE
06/08/19 1820   Encounter Summary   Services provided to: Patient and family together   Referral/Consult From: Rounding;Nurse   Support System Family members;Friends/neighbors; Bahai/jessika community   Complexity of Encounter Low   Length of Encounter 15 minutes   Spiritual/Presybeterian   Type Spiritual support   Assessment Approachable   Intervention Active listening   Outcome Refused/declined  (visitor in room stated his  had just left)

## 2019-06-09 LAB
ANION GAP SERPL CALCULATED.3IONS-SCNC: 12 MMOL/L (ref 7–19)
ANION GAP SERPL CALCULATED.3IONS-SCNC: 12 MMOL/L (ref 7–19)
APTT: 46.3 SEC (ref 26–36.2)
APTT: 50.9 SEC (ref 26–36.2)
APTT: 53 SEC (ref 26–36.2)
APTT: 54 SEC (ref 26–36.2)
BUN BLDV-MCNC: 27 MG/DL (ref 8–23)
BUN BLDV-MCNC: 31 MG/DL (ref 8–23)
CALCIUM SERPL-MCNC: 8.6 MG/DL (ref 8.8–10.2)
CALCIUM SERPL-MCNC: 8.8 MG/DL (ref 8.8–10.2)
CHLORIDE BLD-SCNC: 98 MMOL/L (ref 98–111)
CHLORIDE BLD-SCNC: 98 MMOL/L (ref 98–111)
CO2: 27 MMOL/L (ref 22–29)
CO2: 28 MMOL/L (ref 22–29)
CREAT SERPL-MCNC: 1.6 MG/DL (ref 0.5–1.2)
CREAT SERPL-MCNC: 1.7 MG/DL (ref 0.5–1.2)
EKG P AXIS: 67 DEGREES
EKG P AXIS: 76 DEGREES
EKG P-R INTERVAL: 116 MS
EKG P-R INTERVAL: 152 MS
EKG Q-T INTERVAL: 442 MS
EKG Q-T INTERVAL: 442 MS
EKG QRS DURATION: 94 MS
EKG QRS DURATION: 96 MS
EKG QTC CALCULATION (BAZETT): 461 MS
EKG QTC CALCULATION (BAZETT): 464 MS
EKG T AXIS: -52 DEGREES
EKG T AXIS: 5 DEGREES
GFR NON-AFRICAN AMERICAN: 39
GFR NON-AFRICAN AMERICAN: 42
GLUCOSE BLD-MCNC: 128 MG/DL (ref 70–99)
GLUCOSE BLD-MCNC: 147 MG/DL (ref 70–99)
GLUCOSE BLD-MCNC: 217 MG/DL (ref 70–99)
GLUCOSE BLD-MCNC: 64 MG/DL (ref 74–109)
GLUCOSE BLD-MCNC: 72 MG/DL (ref 70–99)
GLUCOSE BLD-MCNC: 75 MG/DL (ref 70–99)
GLUCOSE BLD-MCNC: 85 MG/DL (ref 74–109)
GLUCOSE BLD-MCNC: 92 MG/DL (ref 70–99)
HCT VFR BLD CALC: 32.2 % (ref 42–52)
HEMOGLOBIN: 9.9 G/DL (ref 14–18)
LV EF: 28 %
LVEF MODALITY: NORMAL
MCH RBC QN AUTO: 26.3 PG (ref 27–31)
MCHC RBC AUTO-ENTMCNC: 30.7 G/DL (ref 33–37)
MCV RBC AUTO: 85.4 FL (ref 80–94)
PDW BLD-RTO: 14.8 % (ref 11.5–14.5)
PERFORMED ON: ABNORMAL
PERFORMED ON: NORMAL
PLATELET # BLD: 309 K/UL (ref 130–400)
PMV BLD AUTO: 11.4 FL (ref 9.4–12.4)
POTASSIUM REFLEX MAGNESIUM: 5 MMOL/L (ref 3.5–5)
POTASSIUM SERPL-SCNC: 4.5 MMOL/L (ref 3.5–5)
RBC # BLD: 3.77 M/UL (ref 4.7–6.1)
SODIUM BLD-SCNC: 137 MMOL/L (ref 136–145)
SODIUM BLD-SCNC: 138 MMOL/L (ref 136–145)
TROPONIN: 0.89 NG/ML (ref 0–0.03)
TROPONIN: 0.97 NG/ML (ref 0–0.03)
WBC # BLD: 8.2 K/UL (ref 4.8–10.8)

## 2019-06-09 PROCEDURE — 2100000000 HC CCU R&B

## 2019-06-09 PROCEDURE — 99232 SBSQ HOSP IP/OBS MODERATE 35: CPT | Performed by: HOSPITALIST

## 2019-06-09 PROCEDURE — C8923 2D TTE W OR W/O FOL W/CON,CO: HCPCS

## 2019-06-09 PROCEDURE — 82948 REAGENT STRIP/BLOOD GLUCOSE: CPT

## 2019-06-09 PROCEDURE — 36415 COLL VENOUS BLD VENIPUNCTURE: CPT

## 2019-06-09 PROCEDURE — 6370000000 HC RX 637 (ALT 250 FOR IP): Performed by: HOSPITALIST

## 2019-06-09 PROCEDURE — 99233 SBSQ HOSP IP/OBS HIGH 50: CPT | Performed by: INTERNAL MEDICINE

## 2019-06-09 PROCEDURE — 84484 ASSAY OF TROPONIN QUANT: CPT

## 2019-06-09 PROCEDURE — 80048 BASIC METABOLIC PNL TOTAL CA: CPT

## 2019-06-09 PROCEDURE — 2580000003 HC RX 258: Performed by: HOSPITALIST

## 2019-06-09 PROCEDURE — 85730 THROMBOPLASTIN TIME PARTIAL: CPT

## 2019-06-09 PROCEDURE — 85027 COMPLETE CBC AUTOMATED: CPT

## 2019-06-09 PROCEDURE — 6360000002 HC RX W HCPCS: Performed by: INTERNAL MEDICINE

## 2019-06-09 PROCEDURE — 6370000000 HC RX 637 (ALT 250 FOR IP): Performed by: INTERNAL MEDICINE

## 2019-06-09 PROCEDURE — 6360000002 HC RX W HCPCS: Performed by: EMERGENCY MEDICINE

## 2019-06-09 RX ORDER — FLUTICASONE PROPIONATE 50 MCG
1 SPRAY, SUSPENSION (ML) NASAL DAILY
Status: DISCONTINUED | OUTPATIENT
Start: 2019-06-09 | End: 2019-06-14 | Stop reason: HOSPADM

## 2019-06-09 RX ADMIN — OXYCODONE AND ACETAMINOPHEN 1 TABLET: 10; 325 TABLET ORAL at 11:40

## 2019-06-09 RX ADMIN — OXYCODONE AND ACETAMINOPHEN 1 TABLET: 10; 325 TABLET ORAL at 15:45

## 2019-06-09 RX ADMIN — DOCUSATE SODIUM 100 MG: 100 CAPSULE, LIQUID FILLED ORAL at 15:45

## 2019-06-09 RX ADMIN — SERTRALINE HYDROCHLORIDE 100 MG: 100 TABLET ORAL at 08:28

## 2019-06-09 RX ADMIN — HEPARIN SODIUM 2000 UNITS: 1000 INJECTION INTRAVENOUS; SUBCUTANEOUS at 04:14

## 2019-06-09 RX ADMIN — FUROSEMIDE 40 MG: 10 INJECTION, SOLUTION INTRAMUSCULAR; INTRAVENOUS at 23:58

## 2019-06-09 RX ADMIN — CARVEDILOL 6.25 MG: 6.25 TABLET, FILM COATED ORAL at 18:02

## 2019-06-09 RX ADMIN — OXYCODONE AND ACETAMINOPHEN 1 TABLET: 10; 325 TABLET ORAL at 21:02

## 2019-06-09 RX ADMIN — FUROSEMIDE 40 MG: 10 INJECTION, SOLUTION INTRAMUSCULAR; INTRAVENOUS at 12:21

## 2019-06-09 RX ADMIN — AMIODARONE HYDROCHLORIDE 200 MG: 200 TABLET ORAL at 21:02

## 2019-06-09 RX ADMIN — DOCUSATE SODIUM 100 MG: 100 CAPSULE, LIQUID FILLED ORAL at 21:02

## 2019-06-09 RX ADMIN — LACTULOSE 30 G: 20 SOLUTION ORAL at 18:01

## 2019-06-09 RX ADMIN — LACTULOSE 15 G: 20 SOLUTION ORAL at 11:41

## 2019-06-09 RX ADMIN — Medication 55 UNITS: at 21:02

## 2019-06-09 RX ADMIN — OXYCODONE AND ACETAMINOPHEN 1 TABLET: 10; 325 TABLET ORAL at 03:02

## 2019-06-09 RX ADMIN — Medication 10 ML: at 08:28

## 2019-06-09 RX ADMIN — Medication 10 ML: at 21:02

## 2019-06-09 RX ADMIN — INSULIN LISPRO 2 UNITS: 100 INJECTION, SOLUTION INTRAVENOUS; SUBCUTANEOUS at 21:02

## 2019-06-09 RX ADMIN — MUPIROCIN: 20 OINTMENT TOPICAL at 08:28

## 2019-06-09 RX ADMIN — HEPARIN SODIUM AND DEXTROSE 10.15 UNITS/KG/HR: 10000; 5 INJECTION INTRAVENOUS at 12:21

## 2019-06-09 RX ADMIN — TAMSULOSIN HYDROCHLORIDE 0.4 MG: 0.4 CAPSULE ORAL at 08:28

## 2019-06-09 RX ADMIN — FUROSEMIDE 40 MG: 10 INJECTION, SOLUTION INTRAMUSCULAR; INTRAVENOUS at 18:01

## 2019-06-09 RX ADMIN — FUROSEMIDE 40 MG: 10 INJECTION, SOLUTION INTRAMUSCULAR; INTRAVENOUS at 08:27

## 2019-06-09 RX ADMIN — AMIODARONE HYDROCHLORIDE 200 MG: 200 TABLET ORAL at 08:28

## 2019-06-09 RX ADMIN — CARVEDILOL 6.25 MG: 6.25 TABLET, FILM COATED ORAL at 08:28

## 2019-06-09 RX ADMIN — DOCUSATE SODIUM 100 MG: 100 CAPSULE, LIQUID FILLED ORAL at 08:28

## 2019-06-09 RX ADMIN — LACTULOSE 30 G: 20 SOLUTION ORAL at 06:23

## 2019-06-09 ASSESSMENT — PAIN SCALES - GENERAL
PAINLEVEL_OUTOF10: 6
PAINLEVEL_OUTOF10: 5
PAINLEVEL_OUTOF10: 7
PAINLEVEL_OUTOF10: 0
PAINLEVEL_OUTOF10: 0
PAINLEVEL_OUTOF10: 4
PAINLEVEL_OUTOF10: 5
PAINLEVEL_OUTOF10: 5
PAINLEVEL_OUTOF10: 0

## 2019-06-09 ASSESSMENT — PAIN DESCRIPTION - PAIN TYPE: TYPE: ACUTE PAIN

## 2019-06-09 ASSESSMENT — PAIN DESCRIPTION - LOCATION: LOCATION: ABDOMEN

## 2019-06-09 ASSESSMENT — PAIN DESCRIPTION - ONSET: ONSET: GRADUAL

## 2019-06-09 NOTE — PROGRESS NOTES
Cardiology Progress Note Burt Echeverria MD      Patient:  Herson Conner  [id number]    Patient Active Problem List    Diagnosis Date Noted    ACS (acute coronary syndrome) [facility])      Priority: High    Elevated troponin      Priority: Low    S/P CABG x 3, LIMA- DIAG, A0- SVG- LAD, A0- SVG- Ramus, EF 30-35% (5/30/2019) 06/01/2019     Priority: Low    Acute MI inferior lateral first episode care ([facility] 05/31/2019     Priority: Low    Stage 3 chronic kidney disease ([facility] [address] 05/31/2019     Priority: Low    Chronic shortness of breath 04/15/2019     Priority: Low    Chest pain 02/28/2019     Priority: Low     Overview Note:     1/2011 stents x 3 [facility] [place])  5/4/2011  Cath  Patent stents in the mid LAD, stent to OM1, PTCA to the D2, normal LVFX  12/12/12  Cath patent stents in the mid LAD, PTCA to the D3, stent to distal LAD, normal LVFX  3/29/2015  Cath  Stent to the PLOM, normal LVFX  11/1/2019  Loop placed  3/1/19 lexiscan negative for myocardial ischemia, EF 50  %   5/24/19 ACS PARKER RISK Score 5 (angina, + troponin, CAD>50, age>65, plavix ), AUC indication 3, AUC score 9   5/25/19  Cath 50% distal LM, 100% mid LAD, 80% diag, 100% mid RCA, inferior hypokinesis, EF 45%              MOO on CPAP 05/03/2018     Priority: Low    Dermatitis 03/05/2018     Priority: Low    Depression with anxiety 03/05/2018     Priority: Low    Benign prostatic hyperplasia without lower urinary tract symptoms 03/05/2018     Priority: Low    Chronic tension-type headache, not intractable 03/05/2018     Priority: Low    West Nile virus seropositivity 11/22/2017     Priority: Low    Status post placement of implantable loop recorder 10/31/2017     Priority: Low    Recurrent major depressive disorder, in full remission ([facility] [address] 10/17/2017     Priority: Low    Trigger middle finger of left hand 10/17/2017     Priority: Low    Peripheral polyneuropathy 10/17/2017     Priority: Low    Palpitations 10/17/2017     Priority: Low    Chronic insomnia distal LAD restenosis and moderate left main disease, status post CABG 5/30/2019 with LIMA to 1st diagonal, SVG to LAD and SVG to ramus intermedius, ejection fraction 25% with severe inferior hypokinesis. 2. CK D stage III. 3. Diabetes mellitus. 4. Post CABG atrial fibrillation. 5. Chronic severe constipation      Subjective:  Mr. Marquis Peterson has diuresed over 4 L on IV Lasix but still feels shortness of breath. He has significant discomfort from his abdomen due to severe constipation. Objective:   /66   Pulse 67   Temp 98.3 °F (36.8 °C) (Temporal)   Resp 18   Ht 6' 4\" (1.93 m)   Wt 267 lb 11.2 oz (121.4 kg)   SpO2 96%   BMI 32.59 kg/m²       Intake/Output Summary (Last 24 hours) at 6/9/2019 1636  Last data filed at 6/9/2019 1400  Gross per 24 hour   Intake 785.02 ml   Output 6050 ml   Net -5264.98 ml       Prior to Admission medications    Medication Sig Start Date End Date Taking? Authorizing Provider   amiodarone (CORDARONE) 200 MG tablet Take 1 tablet by mouth 2 times daily 6/6/19  Yes Sami Wang MD   carvedilol (COREG) 12.5 MG tablet Take 1 tablet by mouth 2 times daily (with meals) 6/6/19  Yes Sami Wang MD   oxyCODONE-acetaminophen (PERCOCET)  MG per tablet Take 1 tablet by mouth 3 times daily as needed for Pain for up to 3 days.  6/6/19 6/9/19 Yes Nohemi Moreira MD   isosorbide mononitrate (IMDUR) 30 MG extended release tablet Take 1 tablet by mouth daily 4/19/19  Yes TEGAN Cardozo   azelastine (ASTELIN) 0.1 % nasal spray 1 spray by Nasal route as needed for Rhinitis Use in each nostril as directed   Yes Historical Provider, MD   butalbital-acetaminophen-caffeine (FIORICET, ESGIC) -40 MG per tablet Take 1 tablet by mouth as needed for Headaches   Yes Historical Provider, MD   furosemide (LASIX) 40 MG tablet Take 40 mg by mouth as needed   Yes Historical Provider, MD   lisinopril (PRINIVIL;ZESTRIL) 20 MG tablet Take 1/2 tablet twice daily   Yes Historical Provider, MD   sertraline (ZOLOFT) 100 MG tablet Take 1 tablet by mouth daily 3/12/19  Yes Emma Juarez MD   nitroGLYCERIN (NITROSTAT) 0.4 MG SL tablet up to max of 3 total doses. If no relief after 1 dose, call 911. 3/1/19  Yes Kiara Syed MD   tamsulosin (FLOMAX) 0.4 MG capsule TAKE 2 CAPSULES AT BEDTIME 1/22/19  Yes Emma Juarez MD   insulin glargine (LANTUS SOLOSTAR) 100 UNIT/ML injection pen INJECT 80 UNITS INTO THE SKIN NIGHTLY 1/18/19  Yes Emma Juarez MD   clopidogrel (PLAVIX) 75 MG tablet TAKE 1 TABLET DAILY 1/18/19  Yes Emma Juarez MD   tiZANidine (ZANAFLEX) 2 MG tablet Take 1 tablet by mouth nightly as needed (muscle spasm) 3/12/19   Emma Juarez MD   zoster recombinant adjuvanted vaccine [facility]) 50 MCG/0.5ML SUSR injection 1 vaccine now and repeat in 2-6 months. 11/6/18   Emma Juarez MD        mupirocin   Topical Daily    sodium chloride flush  10 mL Intravenous 2 times per day    insulin lispro  0-12 Units Subcutaneous TID WC    insulin lispro  0-6 Units Subcutaneous Nightly    amiodarone  200 mg Oral BID    insulin glargine  80 Units Subcutaneous Nightly    sertraline  100 mg Oral Daily    tamsulosin  0.4 mg Oral Daily    lactulose  30 g Oral 4 times per day    docusate sodium  100 mg Oral TID    furosemide  40 mg Intravenous 4x Daily    carvedilol  6.25 mg Oral BID WC       TELEMETRY: Sinus     Physical Exam:      Physical Exam    JVD noted  1-2+ edema  No carotid bruits  S1 and S2 of normal intensity, no significant systolic or diastolic murmurs appreciated.  No gallop noted  Diminished air entry at the bases with crepitations noted  Abdomen is soft and slightly tender with no palpable organomegaly, no abnormal pulsations are felt, no bruits are audible  Neurological status is intact  No deformities        Lab Data:  CBC:   Recent Labs     06/08/19  1424 06/09/19  0256   WBC 7.9 8.2   HGB 9.3* 9.9*   HCT 29.5* 32.2*   MCV 86.0 85.4    309     BMP: Recent Labs     06/08/19  1424 06/09/19  0256   * 138   K 5.2* 5.0   CL 98 98   CO2 25 28   BUN 32* 31*   CREATININE 1.7* 1.7*     LIVER PROFILE:   Recent Labs     06/08/19  1424   AST 14   ALT 23   LIPASE 34   BILITOT <0.2   ALKPHOS 68     PT/INR:   Recent Labs     06/08/19  1424   PROTIME 14.9*   INR 1.23*     APTT:   Recent Labs     06/08/19  2022 06/09/19  0256 06/09/19  0918   APTT 49.8* 46.3* 54.0*     BNP:  No results for input(s): BNP in the last 72 hours. CK, CKMB, Troponin: @LABRCNT (CKTOTAL:3, CKMB:3, TROPONINI:3)@    IMAGING:  Ct Abdomen Pelvis Wo Contrast Additional Contrast? None    Result Date: 6/8/2019  EXAMINATION: CT ABDOMEN PELVIS WO CONTRAST 6/8/2019 3:57 PM HISTORY: Abdominal distention Comparison: Abdominal KUB 6/8/2019 Technique: Sequential imaging was performed from the lung bases through the pubic symphysis without the use of IV contrast. Sagittal and coronal reformations were made from the original source data and reviewed. Automated exposure control was utilized for radiation dose reduction. Radiation dose: DLP 1701 mGy-cm Findings: Images are degraded by significant motion. There has been prior median sternotomy. Small pleural effusions are present bilaterally with associated compressive atelectasis. The heart is enlarged. Coronary artery calcifications are present. The blood pool is hypodense relative to the myocardium, suggesting anemia. There is trace pericardial fluid. Evaluation is limited by a lack of IV contrast. Allowing for these limitations, the liver, spleen, and adrenal glands are grossly normal in appearance. The gallbladder is surgically absent. There is a questionable lesion extending from the mid pancreatic body measuring 2.7 cm in size. The kidneys have a normal noncontrast appearance. The ureters are decompressed bilaterally. The abdominal aorta appears normal in caliber. There are scattered atherosclerotic calcifications within the aorta and its branch vessels. No pathologically enlarged central mesenteric or retroperitoneal lymph nodes are appreciated. A small hiatal hernia is present. The stomach and small bowel do not appear overly dilated. There are a few scattered colonic diverticula without evidence of acute diverticulitis. Large bowel is otherwise grossly normal in appearance. No free air or free fluid is seen in the abdomen. The urinary bladder is grossly normal in appearance. No free fluid is seen in the pelvis. No pathologically enlarged pelvic or inguinal lymph nodes are identified. Fluid is noted dependently in the subcutaneous soft tissues of the posterior pelvis and upper thighs. Review of the visualized osseous structures demonstrates no acute or aggressive lesions. Postsurgical changes are noted in the lumbar spine. Impression: 1. No acute findings in the abdomen or pelvis when allowing for limitations of significant motion as well as lack of IV contrast. 2. Cardiomegaly with bilateral pleural effusions. Subcutaneous soft tissue edema dependently in the posterior aspect of the pelvis and proximal thighs. 3. Questionable lesion extending from the pancreatic body for which nonemergent follow-up MRI abdomen with and without contrast is recommended to include MRCP. 4. Atherosclerotic disease. Signed by Dr Severa Oz on 6/8/2019 4:03 PM    Xr Abdomen (kub) (single Ap View)    Result Date: 6/8/2019  EXAMINATION: XR ABDOMEN (KUB) (SINGLE AP VIEW) 6/8/2019 2:55 PM HISTORY: Abdominal distention COMPARISON: None FINDINGS: The bowel gas pattern is nonspecific and nonobstructive. A moderate amount of stool is noted in the left colon. The entire abdomen is not included on this exam. Surgical clips are seen in the right upper quadrant of the abdomen. There has been previous fusion of the lumbar spine. No pathologic calcifications are seen. No evidence of bowel obstruction.  Signed by Dr Severa Oz on 6/8/2019 2:56 PM    Xr Acute Abd Series Chest 1 Vw    Result Date: 5/24/2019  EXAMINATION:  XR ACUTE ABD SERIES CHEST 1 VW  5/24/2019 2:39 PM HISTORY: Abdominal distention. Diabetes. Coronary artery disease. COMPARISON: 2/28/2019. CHEST X-RAY: There is no infiltrate or effusion. There is mild bronchial wall thickening, stable. Heart size is upper limits of normal. There may be at least one coronary stent. There is a loop recorder. ABDOMEN IMAGES: There is moderate stool in the colon. There is no small bowel distention. There has been prior cholecystectomy. There has been prior lumbar surgery. There are degenerative changes of the spine. 1. No acute appearing infiltrate or effusion. Stable bronchial wall thickening. 2. Heart size upper limits of normal. No CHF. Loop recorder. Probable coronary stent. 3. Moderate stool in the colon. The bowel gas pattern is normal. Signed by Dr Yomaira Darling on 5/24/2019 2:41 PM    Xr Chest Portable    Result Date: 6/8/2019  EXAMINATION: XR CHEST PORTABLE 6/8/2019 2:54 PM HISTORY: Chest pain COMPARISON: 6/3/2019 FINDINGS: The heart and mediastinal contours are stable. There is consolidation in the left lung base. Small pleural effusions are noted bilaterally. Diffuse increased interstitial markings are noted. Pulmonary vasculature is indistinct. There is no appreciable pneumothorax. A loop recorder device is present. There has been prior median sternotomy. Findings are suggestive of pulmonary vascular congestion and pulmonary edema with bilateral pleural effusions. Signed by Dr Wilmer Altman on 6/8/2019 2:55 PM    Xr Chest Portable    Result Date: 6/3/2019  EXAMINATION: XR CHEST PORTABLE 6/3/2019 7:22 AM HISTORY: XR CHEST PORTABLE 6/3/2019 5:00 AM HISTORY: Postop COMPARISON: June 2, 2019. FINDINGS: The right lung is clear. Left diaphragm is indistinct most likely atelectasis left lower lobe. Linear atelectasis left upper lung is again noted. . Cardiac silhouettes moderately enlarged this is unchanged.  Wires are present previous median sternotomy. . The osseous structures and surrounding soft tissues demonstrate no acute abnormality. 1. Postsurgical change and persistent atelectasis left lower lobe. This is unchanged from June 2. Signed by Dr Radha Rodriguez on 6/3/2019 7:23 AM    Xr Chest Portable    Result Date: 6/2/2019  XR CHEST PORTABLE 6/2/2019 5:00 AM HISTORY: Postop CABG Comparison: 6/1/2019 Findings: Lines and tubes are stable in position. No new opacities or pneumothoraces are visualized in the chest. The cardiomediastinal silhouette and pulmonary vascularity are unchanged. No acute osseous or soft tissue abnormality is noted. Impression: 1. No significant interval change since previous exam. Signed by Dr Monalisa Quarles on 6/2/2019 7:14 AM    Xr Chest Portable    Result Date: 6/1/2019  EXAMINATION: Chest one view 6/1/2019 HISTORY: Postop CABG FINDINGS: Today's exam is compared to previous study of 5/31/2019. All support lines have been removed except for a right IJ introducer. Lungs are hypoventilated with bibasilar atelectasis. Small effusions are present. There is mild gastric distention improved from yesterday's exam. A loop recorder projects over the left chest.    1.. Right IJ introducer remains in place. The remaining support lines have been removed. 2. Expiratory chest. There is persistent pulmonary venous hypertension with widening of the vascular pedicle. This is accentuated by the expiratory nature of the film. 3. Small effusions. Signed by Dr Monalisa Quarles on 6/1/2019 8:07 AM    Xr Chest Portable    Result Date: 5/31/2019  EXAMINATION: Chest one view 5/31/2019 HISTORY: Postop CABG FINDINGS: Today's exam is compared to previous study of one day earlier. A Fenton-Srikanth catheter and mediastinal drains remain in place. The patient has been extubated. There is gaseous distention of the stomach. There is persistent pulmonary venous hypertension with widening of the vascular pedicle. There is a poor inspiratory effort.     1.. Expiratory chest. There is persistent pulmonary venous hypertension. 2. Patient extubated. 3. Developing gaseous distention of the stomach. Signed by Dr Beverly Franco on 5/31/2019 6:40 AM    Xr Chest Portable    Result Date: 5/30/2019  EXAMINATION: Chest one view 5/30/2019 HISTORY: Postop CABG FINDINGS: Today's exam is compared to previous study of 6 days earlier. The lungs are expiratory causing accentuation of bronchovascular markings and cardiac silhouette. There is mild cardiomegaly with suspected mild pulmonary venous hypertension with widening of the vascular pedicle. An endotracheal tube, Lattimore-Srikanth catheter and mediastinal drains are all well-positioned. There is no pneumothorax or significant effusion. 1.. Expiratory chest. Pulmonary venous hypertension is also suspected with widening of the vascular pedicle. 2. Multiple life support lines in place with no complications and well-positioned. Signed by Dr Beverly Franco on 5/30/2019 12:33 PM    Vl Dup Carotid Bilateral    Result Date: 5/28/2019  Vascular Carotid Procedure  Demographics   Patient Name   Orestes Sawyer  Age                 75                 D   Patient Number [id number]        Gender              Male   Visit Number   [id number]     Interpreting        Thu Sauceda MD                               Physician   Date of Birth  1945    Referring Physician Delia Mccoy   Accession      [id number]     [facility] RVT  Number  Procedure Type of Study:   Cerebral:Carotid, VL CAROTID BILATERAL. Indications for Study:Pre-op CABG. Risk Factors   - The patient's risk factor(s) include: diabetes mellitus, dyslipidemia     and arterial hypertension.   - The patient's last creatinine was 1.2 mg/dl. - The patient's risk factor(s) include: Prior CVA, , Allergies   - Statins.   Impression   There is smooth plaque visualized in the bilateral internal carotid artery(ies). There is no stenosis in the right internal carotid artery. There is no stenosis of the left internal carotid artery. There is normal antegrade flow in the bilateral vertebral artery(ies). Signature   ----------------------------------------------------------------  Electronically signed by Deon Burton MD(Interpreting  physician) on 05/28/2019 03:37 PM  ----------------------------------------------------------------  Velocities are measured in cm/s ; Diameters are measured in mm Carotid Right Measurements +------------+-------+-------+--------+-------+------------+---------------+ ! Location    ! PSV    ! EDV    ! Angle   ! RI     !%Stenosis   ! Tortuosity     ! +------------+-------+-------+--------+-------+------------+---------------+ ! Prox CCA    !99     !14.9   !60      !0.85   !            !               ! +------------+-------+-------+--------+-------+------------+---------------+ ! Mid CCA     !70.4   !13.5   !60      !0.81   !            !               ! +------------+-------+-------+--------+-------+------------+---------------+ ! Prox ICA    ! 53.9   !11.7   !60      !0.78   !            !               ! +------------+-------+-------+--------+-------+------------+---------------+ ! Mid ICA     ! 89.1   !18.8   !60      !0.79   !            !               ! +------------+-------+-------+--------+-------+------------+---------------+ ! Dist ICA    ! 89.7   !19.9   !60      !0.78   !            !               ! +------------+-------+-------+--------+-------+------------+---------------+ ! Prox ECA    !117    !       !61      !       !            !               ! +------------+-------+-------+--------+-------+------------+---------------+ ! Vertebral   !66.8   !11.7   !60      !0.82   !            !               ! +------------+-------+-------+--------+-------+------------+---------------+   - There is antegrade vertebral flow noted on the right side.    - Additional Measurements:ICAPSV/CCAPSV 0.91.ICAEDV/CCAEDV 1.34. Carotid Left Measurements +------------+-------+-------+--------+-------+------------+---------------+ ! Location    ! PSV    ! EDV    ! Angle   ! RI     !%Stenosis   ! Tortuosity     ! +------------+-------+-------+--------+-------+------------+---------------+ ! Prox CCA    !105    !13.4   !60      !0.87   !            !               ! +------------+-------+-------+--------+-------+------------+---------------+ ! Mid CCA     !90.4   !14.9   !60      !0.84   !            !               ! +------------+-------+-------+--------+-------+------------+---------------+ ! Prox ICA    ! 65.7   !15.8   !60      !0.76   !            !               ! +------------+-------+-------+--------+-------+------------+---------------+ ! Mid ICA     ! 88.5   !22.3   !60      !0.75   !            !               ! +------------+-------+-------+--------+-------+------------+---------------+ ! Dist ICA    !76.2   !17.6   ! 60      !0.77   !            !               ! +------------+-------+-------+--------+-------+------------+---------------+ ! Prox ECA    !141    !       !61      !       !            !               ! +------------+-------+-------+--------+-------+------------+---------------+ ! Vertebral   !78     !12.9   !60      !0.83   !            !               ! +------------+-------+-------+--------+-------+------------+---------------+   - There is antegrade vertebral flow noted on the left side. - Additional Measurements:ICAPSV/CCAPSV 0.84. ICAEDV/CCAEDV 1.66.     Vl Vein Mapping Lower Bilateral    Result Date: 5/28/2019  Vascular Lower Extremity Vein Mapping Procedure  Demographics   Patient Name   Filippo Abraham  Age                 75                 D   Patient Number [id number]        Gender              Male   Visit Number   [id number]     Yamilet Erickson MD                               Physician   Date of Birth  1945    Referring Physician Dane Hall +--------------------------------------++--------+-----+----+--------+-----+ ! GSV High Calf                         !!3.8     !     !    !3.4     !     ! +--------------------------------------++--------+-----+----+--------+-----+ ! GSV Mid Calf                          !!3.3     !     !    !3.8     !     ! +--------------------------------------++--------+-----+----+--------+-----+ ! GSV Ankle                             ! !3.4     !     !    !4.3     !     ! +--------------------------------------++--------+-----+----+--------+-----+        Assessment and Plan: This is a 75y.o. year old male with past medical history ofDM, CKD 3,  extensive coronary artery disease  With multiple PCI's to LAD, RCA and OM presenting with late presentation of inferior wall MI 5/24/2019 found to have an occluded large dominant RCA not intervened on,, intermediate left main and diffuse severe mid to distal LAD restenosis, s/p CABG 5/30/19 with LIMA to D1, SVG-LAD, SVG-RI, EF 25%, post-op AF, d/tyler to N/H, not on lasix presenting with CP with NTG response, volume overload and severe constipation. 1. He continues to be short of breath despite diuresing over 5 L. His creatinine however has remained stable suggestive of significant volume overload. Review of his echocardiogram does show severe LV dysfunction with EF 25%. His inferior wall still thickens and likely is viable. His lateral wall appears akinetic. He will need evaluation of his RCA for viability as this is a very large vessel. Troponins remain positive. 2. Continue diuresis and monitor renal functions. 3. Patient needs help in regards to constipation and likely will need disimpaction.         Reginald Ruiz MD 6/9/2019 4:36 PM

## 2019-06-09 NOTE — PROGRESS NOTES
Pt's BG on AM labs 64. Accuchek 72. Pt asymptomatic at this time, two orange juices provided to prevent further decrease. Will monitor.  Electronically signed by Sandra Hernandez RN on 6/9/2019 at 4:30 AM

## 2019-06-09 NOTE — CONSULTS
Priority: Low    Peripheral polyneuropathy 10/17/2017     Priority: Low    Palpitations 10/17/2017     Priority: Low    Chronic insomnia 10/17/2017     Priority: Low    Cerebrovascular accident (CVA) due to embolism of right middle cerebral artery ([facility] [address] 08/14/2017     Priority: Low    Dizziness 03/27/2017     Priority: Low    Imbalance 03/27/2017     Priority: Low    Thoracic outlet syndrome 02/21/2017     Priority: Low     Overview Note:     L sided on exam 2/21/17      Acute ischemic stroke ([facility] [address] 02/20/2017     Priority: Low    DDD (degenerative disc disease), lumbar 11/15/2016     Priority: Low    Spondylosis of lumbar region without myelopathy or radiculopathy 11/15/2016     Priority: Low    Chronic bilateral low back pain without sciatica 11/10/2016     Priority: Low    Mixed hyperlipidemia 07/23/2012     Priority: Low    MI (myocardial infarction) ([facility] [address]      Priority: Low    Type 2 diabetes mellitus with diabetic polyneuropathy, with long-term current use of insulin ([facility] [address] 07/12/2011     Priority: Low    Essential hypertension 07/12/2011     Priority: Low    Coronary artery disease involving native coronary artery of native heart with unstable angina pectoris ([facility] [address] 07/12/2011     Priority: Low     1. Coronary artery disease, status post multiple PCI's to LAD January 2011, RCA December 2012, RPL March 2015 presenting 5/24/2019 with inferior wall MI with late presentation involving a large dominant RCA with distal occlusion, not intervened on with severe mid to distal LAD restenosis and moderate left main disease, status post CABG 5/30/2019 with LIMA to 1st diagonal, SVG to LAD and SVG to ramus intermedius, ejection fraction 25% with severe inferior hypokinesis. 2. CK D stage III. 3. Diabetes mellitus. 4. Post CABG atrial fibrillation.   5. Chronic severe constipation    PRESENTATION: Omero Ventura is a 75y.o. year old male who presents with numerous crushing chest pain during cardiac rehabilitation at the nursing home. The pain persisted and did diminish with nitroglycerin. He feels significantly better after diuresis however. He has not moved his bowels in 3-4 days. History troponins initially were 1.07, repeat 0.99. ProBNP 4000 456. EKG with good R-wave in inferior leads with small Q waves, low voltage    He was discharged on when necessary Lasix. His legs have been swelling. REVIEW OF SYSTEMS:  Review of Systems   Constitutional: Negative for activity change, diaphoresis and fatigue. HENT: Negative for hearing loss, nosebleeds and tinnitus. Eyes: Negative for visual disturbance. Respiratory: Positive for chest tightness and shortness of breath. Negative for cough and wheezing. Cardiovascular: Positive for chest pain and leg swelling. Negative for palpitations. Gastrointestinal: Positive for constipation. Negative for abdominal distention, abdominal pain, blood in stool, diarrhea and vomiting. Endocrine: Negative for cold intolerance, heat intolerance, polydipsia, polyphagia and polyuria. Genitourinary: Negative for difficulty urinating, flank pain and hematuria. Musculoskeletal: Negative for arthralgias, back pain, joint swelling and myalgias. Skin: Negative for pallor and rash. Neurological: Negative for dizziness, seizures, syncope and headaches. Psychiatric/Behavioral: Negative for behavioral problems and dysphoric mood. The patient is not nervous/anxious. All other systems reviewed and are negative.       Past Medical History:      Diagnosis Date    Acute ischemic stroke ([facility] [address] 2/20/2017    Atherosclerotic heart disease     s/p MI, stenting x 3 jan 2011([place]    CAD (coronary artery disease)     Cerebral artery occlusion with cerebral infarction [facility])     Cerebrovascular accident (CVA) due to embolism of right middle cerebral artery ([facility] [address] 8/14/2017    CHF (congestive heart failure) ([facility])     Chronic bilateral low back pain without sciatica 11/10/2016    Chronic kidney disease     DDD (degenerative disc disease), lumbar 11/15/2016    Depression     Depression with anxiety     Diabetes mellitus ([facility] [address]     type II    Diabetes mellitus, type 2 ([facility] [address]     DM (diabetes mellitus) ([facility] [address] 7/12/2011    Glaucoma     Headache     Hyperlipidemia     Cholesterol management per pcp.  Hypertension     Macular degeneration     MI (myocardial infarction) ([facility] [address]     MI (myocardial infarction) ([facility] [address]     Neuromuscular disorder ([facility] [address]     Neuropathy     Osteoarthritis     Sleep apnea     Spondylosis of lumbar region without myelopathy or radiculopathy 11/15/2016    Status post placement of implantable loop recorder 10/31/2017    TIA (transient ischemic attack)        Past Surgical History:      Procedure Laterality Date    APPENDECTOMY      BACK SURGERY      CARDIAC CATHETERIZATION  12/12/12    with angioplasty, stent    CARDIAC CATHETERIZATION  3/29/15  [facility]    stent to distal RCA.  EF 60%    CHOLECYSTECTOMY      COLONOSCOPY      CORONARY ANGIOPLASTY WITH STENT PLACEMENT  jan 11 [place]    stent x 3    CORONARY ANGIOPLASTY WITH STENT PLACEMENT      01/01/11    CORONARY ARTERY BYPASS GRAFT N/A 5/30/2019    CORONARY ARTERY BYPASS GRAFT X3 WITH LEFT INTERNAL MAMMARY ARTERY WITH ENDOSCOPIC VEIN HARVESTING WITH PERFUSION TRANSESOPHAGEAL ECHOCARDIOGRAM performed by Alexi Fung MD at [address] [place] [address] CATH LAB PROCEDURE  [id number]    primary stent placement to the first circumflex marginal branch, balloon angioplasty to the third left anterior descending diagnonal branch, intracoronary nitroglycerin adminstration    JOINT REPLACEMENT      left knee    LUMBAR FUSION Left 11/15/2016    LUMBAR INTERBODY FUSION LATERAL L3-4 L4-5 WITH LATERAL PLATE  performed by Gloria Juares MD at [facility] OR       Allergies:  Statins; Crestor [rosuvastatin calcium]; and Morphine    Past Social History:  Social History     Socioeconomic History    Marital status:      Spouse name: Karissa Ponce Number of children: Not on file    Years of education: Not on file    Highest education level: Not on file   Occupational History    Occupation:    Social Needs    Financial resource strain: Not on file    Food insecurity:     Worry: Not on file     Inability: Not on file   Hum needs:     Medical: Not on file     Non-medical: Not on file   Tobacco Use    Smoking status: Never Smoker    Smokeless tobacco: Never Used   Substance and Sexual Activity    Alcohol use: Yes     Alcohol/week: 1.2 oz     Types: 2 Shots of liquor per week    Drug use: No    Sexual activity: Not on file   Lifestyle    Physical activity:     Days per week: Not on file     Minutes per session: Not on file    Stress: Not on file   Relationships    Social connections:     Talks on phone: Not on file     Gets together: Not on file     Attends Cheondo[employer] service: Not on file     Active member of club or organization: Not on file     Attends meetings of clubs or organizations: Not on file     Relationship status: Not on file    Intimate partner violence:     Fear of current or ex partner: Not on file     Emotionally abused: Not on file     Physically abused: Not on file     Forced sexual activity: Not on file   Other Topics Concern    Not on file   Social History Narrative    Not on file       Family History:       Problem Relation Age of Onset    Coronary Art Dis Mother     Coronary Art Dis Sister     Cancer Sister         uterine    Kidney Disease Sister     Coronary Art Dis Brother          in his 46s    Cancer Father         colon       Home Meds:  Prior to Admission medications    Medication Sig Start Date End Date Taking?  Authorizing Provider   amiodarone (CORDARONE) 200 MG tablet Take 1 tablet by mouth 2 times daily 19  Yes Pablo Flanagan MD   carvedilol (COREG) 12.5 MG tablet Take 1 tablet by mouth 2 times daily (with meals) 19  Yes Veta Osgood ON 6/9/2019] sertraline  100 mg Oral Daily    tamsulosin  0.4 mg Oral Daily    lactulose  30 g Oral 4 times per day    docusate sodium  100 mg Oral TID    furosemide  40 mg Intravenous 4x Daily       Current Infused Meds:   heparin (porcine) 8.149 Units/kg/hr (06/08/19 1536)    nitroGLYCERIN 5 mcg/min (06/08/19 1614)       Physical Exam:  Vitals:    06/08/19 1902   BP: 126/66   Pulse: 72   Resp: 18   Temp:    SpO2: 96%       Intake/Output Summary (Last 24 hours) at 6/8/2019 1941  Last data filed at 6/8/2019 1818  Gross per 24 hour   Intake --   Output 1300 ml   Net -1300 ml     Estimated body mass index is 32.93 kg/m² as calculated from the following:    Height as of this encounter: 6' 4\" (1.93 m). Weight as of this encounter: 270 lb 8 oz (122.7 kg). Physical Exam      Blood pressure 121/74, pulse 71 bpm   JVP of 10 cm  2+ pitting edema in both lower extremities  Systolic murmur appreciated. No gallop noted    Bi basilar rales noted with diminished air entry  Abdomen tense, bowel sounds present  Neurologically intact. Appropriate mood and affect  No deformities      Labs:  Recent Labs     06/08/19  1424   WBC 7.9   HGB 9.3*          Recent Labs     06/08/19  1424   *   K 5.2*   CL 98   CO2 25   BUN 32*   CREATININE 1.7*   LABGLOM 39*   CALCIUM 8.5*       CK, CKMB, Troponin: @LABRCNT (CKTOTAL:3, CKMB:3, TROPONINI:3)@    Last 3 BNP:          IMAGING:  Ct Abdomen Pelvis Wo Contrast Additional Contrast? None    Result Date: 6/8/2019  EXAMINATION: CT ABDOMEN PELVIS WO CONTRAST 6/8/2019 3:57 PM HISTORY: Abdominal distention Comparison: Abdominal KUB 6/8/2019 Technique: Sequential imaging was performed from the lung bases through the pubic symphysis without the use of IV contrast. Sagittal and coronal reformations were made from the original source data and reviewed. Automated exposure control was utilized for radiation dose reduction.  Radiation dose: DLP 1701 mGy-cm Findings: Images are Questionable lesion extending from the pancreatic body for which nonemergent follow-up MRI abdomen with and without contrast is recommended to include MRCP. 4. Atherosclerotic disease. Signed by Dr Guillermina Gonzales on 6/8/2019 4:03 PM    Xr Abdomen (kub) (single Ap View)    Result Date: 6/8/2019  EXAMINATION: XR ABDOMEN (KUB) (SINGLE AP VIEW) 6/8/2019 2:55 PM HISTORY: Abdominal distention COMPARISON: None FINDINGS: The bowel gas pattern is nonspecific and nonobstructive. A moderate amount of stool is noted in the left colon. The entire abdomen is not included on this exam. Surgical clips are seen in the right upper quadrant of the abdomen. There has been previous fusion of the lumbar spine. No pathologic calcifications are seen. No evidence of bowel obstruction. Signed by Dr Guillermina Gonzales on 6/8/2019 2:56 PM    Xr Acute Abd Series Chest 1 Vw    Result Date: 5/24/2019  EXAMINATION:  XR ACUTE ABD SERIES CHEST 1 VW  5/24/2019 2:39 PM HISTORY: Abdominal distention. Diabetes. Coronary artery disease. COMPARISON: 2/28/2019. CHEST X-RAY: There is no infiltrate or effusion. There is mild bronchial wall thickening, stable. Heart size is upper limits of normal. There may be at least one coronary stent. There is a loop recorder. ABDOMEN IMAGES: There is moderate stool in the colon. There is no small bowel distention. There has been prior cholecystectomy. There has been prior lumbar surgery. There are degenerative changes of the spine. 1. No acute appearing infiltrate or effusion. Stable bronchial wall thickening. 2. Heart size upper limits of normal. No CHF. Loop recorder. Probable coronary stent. 3. Moderate stool in the colon. The bowel gas pattern is normal. Signed by Dr Alex Laird on 5/24/2019 2:41 PM    Xr Chest Portable    Result Date: 6/8/2019  EXAMINATION: XR CHEST PORTABLE 6/8/2019 2:54 PM HISTORY: Chest pain COMPARISON: 6/3/2019 FINDINGS: The heart and mediastinal contours are stable.  There is consolidation in the left lung base. Small pleural effusions are noted bilaterally. Diffuse increased interstitial markings are noted. Pulmonary vasculature is indistinct. There is no appreciable pneumothorax. A loop recorder device is present. There has been prior median sternotomy. Findings are suggestive of pulmonary vascular congestion and pulmonary edema with bilateral pleural effusions. Signed by Dr Sung Reis on 6/8/2019 2:55 PM    Xr Chest Portable    Result Date: 6/3/2019  EXAMINATION: XR CHEST PORTABLE 6/3/2019 7:22 AM HISTORY: XR CHEST PORTABLE 6/3/2019 5:00 AM HISTORY: Postop COMPARISON: June 2, 2019. FINDINGS: The right lung is clear. Left diaphragm is indistinct most likely atelectasis left lower lobe. Linear atelectasis left upper lung is again noted. . Cardiac silhouettes moderately enlarged this is unchanged. Wires are present previous median sternotomy. . The osseous structures and surrounding soft tissues demonstrate no acute abnormality. 1. Postsurgical change and persistent atelectasis left lower lobe. This is unchanged from June 2. Signed by Dr Giselle Toledo on 6/3/2019 7:23 AM    Xr Chest Portable    Result Date: 6/2/2019  XR CHEST PORTABLE 6/2/2019 5:00 AM HISTORY: Postop CABG Comparison: 6/1/2019 Findings: Lines and tubes are stable in position. No new opacities or pneumothoraces are visualized in the chest. The cardiomediastinal silhouette and pulmonary vascularity are unchanged. No acute osseous or soft tissue abnormality is noted. Impression: 1. No significant interval change since previous exam. Signed by Dr Luiz Wallis on 6/2/2019 7:14 AM    Xr Chest Portable    Result Date: 6/1/2019  EXAMINATION: Chest one view 6/1/2019 HISTORY: Postop CABG FINDINGS: Today's exam is compared to previous study of 5/31/2019. All support lines have been removed except for a right IJ introducer. Lungs are hypoventilated with bibasilar atelectasis. Small effusions are present.  There is mild gastric distention improved from yesterday's exam. A loop recorder projects over the left chest.    1.. Right IJ introducer remains in place. The remaining support lines have been removed. 2. Expiratory chest. There is persistent pulmonary venous hypertension with widening of the vascular pedicle. This is accentuated by the expiratory nature of the film. 3. Small effusions. Signed by Dr Deven Banegas on 6/1/2019 8:07 AM    Xr Chest Portable    Result Date: 5/31/2019  EXAMINATION: Chest one view 5/31/2019 HISTORY: Postop CABG FINDINGS: Today's exam is compared to previous study of one day earlier. A Clifton-Srikanth catheter and mediastinal drains remain in place. The patient has been extubated. There is gaseous distention of the stomach. There is persistent pulmonary venous hypertension with widening of the vascular pedicle. There is a poor inspiratory effort. 1.. Expiratory chest. There is persistent pulmonary venous hypertension. 2. Patient extubated. 3. Developing gaseous distention of the stomach. Signed by Dr Deven Banegas on 5/31/2019 6:40 AM    Xr Chest Portable    Result Date: 5/30/2019  EXAMINATION: Chest one view 5/30/2019 HISTORY: Postop CABG FINDINGS: Today's exam is compared to previous study of 6 days earlier. The lungs are expiratory causing accentuation of bronchovascular markings and cardiac silhouette. There is mild cardiomegaly with suspected mild pulmonary venous hypertension with widening of the vascular pedicle. An endotracheal tube, Clifton-Srikanth catheter and mediastinal drains are all well-positioned. There is no pneumothorax or significant effusion. 1.. Expiratory chest. Pulmonary venous hypertension is also suspected with widening of the vascular pedicle. 2. Multiple life support lines in place with no complications and well-positioned.  Signed by Dr Deven Banegas on 5/30/2019 12:33 PM    Vl Dup Carotid Bilateral    Result Date: 5/28/2019  Vascular Carotid Procedure  Demographics   Patient Name   Shamar Copeland  Age 75                 D   Patient Number [id number]        Gender              Male   Visit Number   [id number]     Interpreting        Rg Grove MD                               Physician   Date of Birth  1945    Referring Physician Maryam Purdy   Accession      [id number]     Nisha T  Number  Procedure Type of Study:   Cerebral:Carotid, VL CAROTID BILATERAL. Indications for Study:Pre-op CABG. Risk Factors   - The patient's risk factor(s) include: diabetes mellitus, dyslipidemia     and arterial hypertension.   - The patient's last creatinine was 1.2 mg/dl. - The patient's risk factor(s) include: Prior CVA, , Allergies   - Statins. Impression   There is smooth plaque visualized in the bilateral internal carotid  artery(ies). There is no stenosis in the right internal carotid artery. There is no stenosis of the left internal carotid artery. There is normal antegrade flow in the bilateral vertebral artery(ies). Signature   ----------------------------------------------------------------  Electronically signed by Rg Grove MD(Interpreting  physician) on 05/28/2019 03:37 PM  ----------------------------------------------------------------  Velocities are measured in cm/s ; Diameters are measured in mm Carotid Right Measurements +------------+-------+-------+--------+-------+------------+---------------+ ! Location    ! PSV    ! EDV    ! Angle   ! RI     !%Stenosis   ! Tortuosity     ! +------------+-------+-------+--------+-------+------------+---------------+ ! Prox CCA    !99     !14.9   !60      !0.85   !            !               ! +------------+-------+-------+--------+-------+------------+---------------+ ! Mid CCA     !70.4   !13.5   !60      !0.81   !            !               ! +------------+-------+-------+--------+-------+------------+---------------+ ! Prox ICA    ! 53.9   !11.7   ! 61 !0.78   !            !               ! +------------+-------+-------+--------+-------+------------+---------------+ ! Mid ICA     ! 89.1   !18.8   !60      !0.79   !            !               ! +------------+-------+-------+--------+-------+------------+---------------+ ! Dist ICA    ! 89.7   !19.9   !60      !0.78   !            !               ! +------------+-------+-------+--------+-------+------------+---------------+ ! Prox ECA    !117    !       !61      !       !            !               ! +------------+-------+-------+--------+-------+------------+---------------+ ! Vertebral   !66.8   !11.7   !60      !0.82   !            !               ! +------------+-------+-------+--------+-------+------------+---------------+   - There is antegrade vertebral flow noted on the right side. - Additional Measurements:ICAPSV/CCAPSV 0.91. ICAEDV/CCAEDV 1.34. Carotid Left Measurements +------------+-------+-------+--------+-------+------------+---------------+ ! Location    ! PSV    ! EDV    ! Angle   ! RI     !%Stenosis   ! Tortuosity     ! +------------+-------+-------+--------+-------+------------+---------------+ ! Prox CCA    !105    !13.4   !60      !0.87   !            !               ! +------------+-------+-------+--------+-------+------------+---------------+ ! Mid CCA     !90.4   !14.9   !60      !0.84   !            !               ! +------------+-------+-------+--------+-------+------------+---------------+ ! Prox ICA    ! 65.7   !15.8   !60      !0.76   !            !               ! +------------+-------+-------+--------+-------+------------+---------------+ ! Mid ICA     ! 88.5   !22.3   !60      !0.75   !            !               ! +------------+-------+-------+--------+-------+------------+---------------+ ! Dist ICA    !76.2   !17.6   ! 60      !0.77   !            !               ! +------------+-------+-------+--------+-------+------------+---------------+ ! Prox ECA    !141    !       !61      !       ! !               ! +------------+-------+-------+--------+-------+------------+---------------+ ! Vertebral   !78     !12.9   !60      !0.83   !            !               ! +------------+-------+-------+--------+-------+------------+---------------+   - There is antegrade vertebral flow noted on the left side. - Additional Measurements:ICAPSV/CCAPSV 0.84. ICAEDV/CCAEDV 1.66. Vl Vein Mapping Lower Bilateral    Result Date: 5/28/2019  Vascular Lower Extremity Vein Mapping Procedure  Demographics   Patient Name   Jacy Holguin  Age                 75                 D   Patient Number [id number]        Gender              Male   Visit Number   [id number]     Interpreting        Tosha Mathew MD                               Physician   Date of Birth  1945    Referring Physician Macie Peterson   Accession      [id number]     [facility]  Number  Procedure Type of Study:   [address] [address]. Indications for Study:Pre-op CABG. Risk Factors   - The patient's risk factor(s) include: diabetes mellitus, dyslipidemia     and arterial hypertension.   - The patient's last creatinine was 1.2 mg/dl. Allergies   - Statins. Impression   Usable greater Saphenous vein conduit in both lower extremities. The veins have been marked on the skin. Sizes are noted above. Signature   ----------------------------------------------------------------  Electronically signed by Tosha Mathew MD(Interpreting  physician) on 05/28/2019 03:36 PM  ----------------------------------------------------------------  Velocities are measured in cm/s ; Diameters are measured in mm +--------------------------------------++--------+-----+----+--------+-----+ ! Superficial - Great Saphenous Vein    ! ! Right   ! ! Left!        !     ! +--------------------------------------++--------+-----+----+--------+-----+ ! Location !!Diameter! Depth! !Diameter! Depth! +--------------------------------------++--------+-----+----+--------+-----+ ! GSV 2 cm Distal to Junction           ! !0.9     !     !    !5.6     !     ! +--------------------------------------++--------+-----+----+--------+-----+ ! GSV High Thigh                        ! !4.3     !     !    !4.8     !     ! +--------------------------------------++--------+-----+----+--------+-----+ ! GSV Mid Thigh                         !!4       !     !    !4.4     !     ! +--------------------------------------++--------+-----+----+--------+-----+ ! GSV Low Thigh                         ! !4.7     !     !    !3.8     !     ! +--------------------------------------++--------+-----+----+--------+-----+ ! GSV Knee                              !!4.4     !     !    !3.9     !     ! +--------------------------------------++--------+-----+----+--------+-----+ ! GSV High Calf                         !!3.8     !     !    !3.4     !     ! +--------------------------------------++--------+-----+----+--------+-----+ ! GSV Mid Calf                          !!3.3     !     !    !3.8     !     ! +--------------------------------------++--------+-----+----+--------+-----+ ! GSV Ankle                             ! !3.4     !     !    !4.3     !     ! +--------------------------------------++--------+-----+----+--------+-----+          Assessment and Plan: This is a 75y.o. year old male with past medical history ofDM, CKD 3,  extensive coronary artery disease  With multiple PCI's to LAD, RCA and OM presenting with late presentation of inferior wall MI 5/24/2019 found to have an occluded large dominant RCA not intervened on,, intermediate left main and diffuse severe mid to distal LAD restenosis, s/p CABG 5/30/19 with LIMA to D1, SVG-LAD, SVG-RI, EF 25%, post-op AF, d/tyler to N/H, not on lasix presenting with CP with NTG response, volume overload and severe constipation.     1. RCA not bypassed with initial collaterals to RPDA with good R-waves inferiorly on EKG. RCA large dominant vessel. Inferior wall was severely hypokinetic. Can look for viability in this area. Patient however does not do well with stents by history. 2. Troponins downtrending and likely reflect CABG/MI. 3. Volume overload. Continue diuresis. F/U creat. ECHO f/u.  4. Add Coreg 6.25 BID; Allergic to statins. 5. Manage constipation.       Electronically signed by Roger Azul MD on 6/8/2019 at 7:41 PM

## 2019-06-09 NOTE — PROGRESS NOTES
Repeat BG 92. Pt remains asymptomatic but parker crackers and peanut butter provided to hold pt until breakfast. Will monitor.  Electronically signed by Zandra Lesch, RN on 6/9/2019 at 6:42 AM

## 2019-06-09 NOTE — PROGRESS NOTES
Pt has been up to bathroom x 2 today. 1 extra large loose stool and 1 medium loose stool noted. Pt stated after 2nd BM still some abdominal fullness, but feels like less than this AM. Will cont to monitor. Pt is still receiving stool softener and laxative as scheduled.  Electronically signed by Alena Milligan RN on 6/9/2019 at 5:07 PM

## 2019-06-09 NOTE — PROGRESS NOTES
Pt was up to bathroom when Dr Nickie Dobbins rounded. Spoke with Dr Nickie Dobbins re: SOA , feeling a little sick at stomach at present, passing flatus , but no BM. Dr Nickie Dobbins had suggested checking for impaction, but pt had large loose resulting BM and stated felt much better afterward and now up in chair. Will cont to monitor and check for impaction if no further results and pt has further c/o abdominal pain.  Electronically signed by David Key RN on 6/9/2019 at 3:01 PM

## 2019-06-09 NOTE — PROGRESS NOTES
ml      General appearance: alert and cooperative with exam  HEENT: atraumatic, eyes with clear conjunctiva and normal lids  Lungs: no increased work of breathing, diminished breath sounds bilaterally  Heart: S1, S2 normal  Abdomen: soft, non-tender; bowel sounds normal; no masses,  no organomegaly  Extremities:atraumatic, no cyanosis no edema no calf tenderness   Neurologic:non focal    Skin: no rashes, nodules. Medications:      heparin (porcine) 10.149 Units/kg/hr (06/09/19 0414)    nitroGLYCERIN 5 mcg/min (06/08/19 2000)      mupirocin   Topical Daily    sodium chloride flush  10 mL Intravenous 2 times per day    insulin lispro  0-12 Units Subcutaneous TID WC    insulin lispro  0-6 Units Subcutaneous Nightly    amiodarone  200 mg Oral BID    insulin glargine  80 Units Subcutaneous Nightly    sertraline  100 mg Oral Daily    tamsulosin  0.4 mg Oral Daily    lactulose  30 g Oral 4 times per day    docusate sodium  100 mg Oral TID    furosemide  40 mg Intravenous 4x Daily    carvedilol  6.25 mg Oral BID WC     heparin (porcine), heparin (porcine), sodium chloride flush, ondansetron, potassium chloride **OR** potassium alternative oral replacement **OR** potassium chloride, potassium chloride, magnesium sulfate, fleet, fleet, acetaminophen, butalbital-acetaminophen-caffeine, oxyCODONE-acetaminophen  DIET CARB CONTROL;         Lab and other Data:     Recent Labs     06/08/19  1424 06/09/19  0256   WBC 7.9 8.2   HGB 9.3* 9.9*    309     Recent Labs     06/08/19  1424 06/09/19  0256   * 138   K 5.2* 5.0   CL 98 98   CO2 25 28   BUN 32* 31*   CREATININE 1.7* 1.7*   GLUCOSE 167* 64*     Recent Labs     06/08/19  1424   AST 14   ALT 23   BILITOT <0.2   ALKPHOS 68     Troponin T:   Recent Labs     06/08/19  1658 06/08/19 2023 06/09/19  0256   TROPONINI 0.99* 0.94* 0.97*     Pro-BNP: No results for input(s): BNP in the last 72 hours.   INR:   Recent Labs     06/08/19  1424   INR 1.23* 1/2011 stents x 3 [facility])  5/4/2011  Cath  Patent stents in the mid LAD, stent to OM1, PTCA to the D2, normal LVFX  12/12/12  Cath patent stents in the mid LAD, PTCA to the D3, stent to distal LAD, normal LVFX  3/29/2015  Cath  Stent to the PLOM, normal LVFX  11/1/2019  Loop placed  3/1/19 lexiscan negative for myocardial ischemia, EF 50  %   5/24/19 ACS PARKER RISK Score 5 (angina, + troponin, CAD>50, age>65, plavix ), AUC indication 3, AUC score 9   5/25/19  Cath 50% distal LM, 100% mid LAD, 80% diag, 100% mid RCA, inferior hypokinesis, EF 45%              MOO on CPAP 05/03/2018    Dermatitis 03/05/2018    Depression with anxiety 03/05/2018    Benign prostatic hyperplasia without lower urinary tract symptoms 03/05/2018    Chronic tension-type headache, not intractable 03/05/2018    West Nile virus seropositivity 11/22/2017    Status post placement of implantable loop recorder 10/31/2017    Recurrent major depressive disorder, in full remission ([address] 10/17/2017    Trigger middle finger of left hand 10/17/2017    Peripheral polyneuropathy 10/17/2017    Palpitations 10/17/2017    Chronic insomnia 10/17/2017    Cerebrovascular accident (CVA) due to embolism of right middle cerebral artery ([address] 08/14/2017    Dizziness 03/27/2017    Imbalance 03/27/2017    Thoracic outlet syndrome 02/21/2017     L sided on exam 2/21/17      Acute ischemic stroke ([address] 02/20/2017    DDD (degenerative disc disease), lumbar 11/15/2016    Spondylosis of lumbar region without myelopathy or radiculopathy 11/15/2016    Chronic bilateral low back pain without sciatica 11/10/2016    Mixed hyperlipidemia 07/23/2012    MI (myocardial infarction) ([address]     Type 2 diabetes mellitus with diabetic polyneuropathy, with long-term current use of insulin ([address] 07/12/2011    Essential hypertension 07/12/2011    Coronary artery disease involving native coronary artery of native heart with unstable angina pectoris ([address] 07/12/2011 Assessment/plan: Active Problems:    Chest pain  Resolved Problems:    * No resolved hospital problems. *      Plan:       Chest pain post CABG 8 days ago relieved by nitro with elevated troponin and non-ST elevation MI with plan to troponin  cardiology was already consulted continue heparin and nitroglycerin drip     Acute on chronic systolic heart failure admitted to the hospital and started IV Lasix I's and O's and daily weights will be counseled with Dr. Alva Prabhakar cardiology  Abdominal pain with abnormal distention KUB and CT unremarkable patient had no bowel movement for 7 days.  Enemas  Pancreatic mass found on the CAT scan for all resolved patient as we cannot give the patient accomplished with his creatinine 1.7  History of diabetes continue sliding scale for now  DVT prophylaxis already on heparin    6/9/19  Appreciate cardiology, continue coreg, lasix, amiodaron,heparin, nitro gtt, repeat echo, needs to use CPAP for MOO, good control of bp dm, pancreatic mass MRI O/p, acute on chronic systolic CHF, NSTEMI /troponin leak post CABG, still no bm       Celestine Tatum MD  Hospitalist Service  6/9/2019  8:38 AM

## 2019-06-09 NOTE — PROGRESS NOTES
Pt c/o abdominal pain. States \" I think I just need to have a BM. I am passing gas. \" Percocet 10mg PO given. Will monitor for relief. Have suggested pt up to chair and move around some. May help with BM. Pt agrees.  Electronically signed by Fran Jacobsen RN on 6/9/2019 at 11:56 AM

## 2019-06-09 NOTE — PROGRESS NOTES
Pt c/o feeling SOA. Sats 98% on RA. Lungs unchanged. CTA , but diminished in LLL. Pt has had 1175ml  UOP so far this shift. HR sinus 67. No murmur noted. O2 2L placed for comfort.  Electronically signed by Afia Horta RN on 6/9/2019 at 1:13 PM

## 2019-06-09 NOTE — PROGRESS NOTES
4 Eyes Skin Assessment    Gildardo Arredondo is being assessed upon: Admission    I agree that Roxann Clinton, along with Ashley Schmitz (either 2 RN's or 1 LPN and 1 RN) have performed a thorough Head to Toe Skin Assessment on the patient. ALL assessment sites listed below have been assessed. Areas assessed by both nurses:     [x]   Head, Face, and Ears   [x]   Shoulders, Back, and Chest  [x]   Arms, Elbows, and Hands   [x]   Coccyx, Sacrum, and Ischium  [x]   Legs, Feet, and Heels    Does the Patient have Skin Breakdown? Yes, wound(s) noted upon assessment. It is the responsibility of the Primary Nurse to assure that the following documentation, preventions, orders, and consults are complete on the above noted wound(s): Wound LDA initiated. LDA Flowsheet Documentation includes the Rachel-wound, Wound Assessment, Measurements, Dressing Treatment, Drainage, and Color. Incision on chest from CABG.    Dylan Prevention initiated: Yes  Wound Care Orders initiated: No    WOC nurse consulted for Pressure Injury (Stage 3,4, Unstageable, DTI, NWPT, and Complex wounds) and New or Established Ostomies: No        Primary Nurse eSignature: Yang Shafer RN on 6/8/2019 at 7:37 PM      Co-Signer eSignature: {Esignature:[id number]}

## 2019-06-10 ENCOUNTER — LAB REQUISITION (OUTPATIENT)
Dept: LAB | Facility: HOSPITAL | Age: 74
End: 2019-06-10

## 2019-06-10 DIAGNOSIS — Z00.00 ENCOUNTER FOR GENERAL ADULT MEDICAL EXAMINATION WITHOUT ABNORMAL FINDINGS: ICD-10-CM

## 2019-06-10 PROBLEM — K59.09 CHRONIC CONSTIPATION: Status: ACTIVE | Noted: 2019-06-10

## 2019-06-10 LAB
ANION GAP SERPL CALCULATED.3IONS-SCNC: 15 MMOL/L (ref 7–19)
ANION GAP SERPL CALCULATED.3IONS-SCNC: 15 MMOL/L (ref 7–19)
APTT: 45.6 SEC (ref 26–36.2)
APTT: 53.3 SEC (ref 26–36.2)
BUN BLDV-MCNC: 22 MG/DL (ref 8–23)
BUN BLDV-MCNC: 25 MG/DL (ref 8–23)
CALCIUM SERPL-MCNC: 8.8 MG/DL (ref 8.8–10.2)
CALCIUM SERPL-MCNC: 8.9 MG/DL (ref 8.8–10.2)
CHLORIDE BLD-SCNC: 93 MMOL/L (ref 98–111)
CHLORIDE BLD-SCNC: 94 MMOL/L (ref 98–111)
CO2: 27 MMOL/L (ref 22–29)
CO2: 27 MMOL/L (ref 22–29)
CREAT SERPL-MCNC: 1.3 MG/DL (ref 0.5–1.2)
CREAT SERPL-MCNC: 1.5 MG/DL (ref 0.5–1.2)
GFR NON-AFRICAN AMERICAN: 46
GFR NON-AFRICAN AMERICAN: 54
GLUCOSE BLD-MCNC: 126 MG/DL (ref 74–109)
GLUCOSE BLD-MCNC: 143 MG/DL (ref 74–109)
GLUCOSE BLD-MCNC: 162 MG/DL (ref 70–99)
GLUCOSE BLD-MCNC: 208 MG/DL (ref 70–99)
GLUCOSE BLD-MCNC: 82 MG/DL (ref 70–99)
GLUCOSE BLD-MCNC: 92 MG/DL (ref 70–99)
HCT VFR BLD CALC: 33.4 % (ref 42–52)
HEMOGLOBIN: 10.5 G/DL (ref 14–18)
MCH RBC QN AUTO: 26.6 PG (ref 27–31)
MCHC RBC AUTO-ENTMCNC: 31.4 G/DL (ref 33–37)
MCV RBC AUTO: 84.8 FL (ref 80–94)
PDW BLD-RTO: 14.7 % (ref 11.5–14.5)
PERFORMED ON: ABNORMAL
PERFORMED ON: ABNORMAL
PERFORMED ON: NORMAL
PERFORMED ON: NORMAL
PLATELET # BLD: 330 K/UL (ref 130–400)
PMV BLD AUTO: 10.9 FL (ref 9.4–12.4)
POTASSIUM SERPL-SCNC: 4.3 MMOL/L (ref 3.5–5)
POTASSIUM SERPL-SCNC: 4.5 MMOL/L (ref 3.5–5)
RBC # BLD: 3.94 M/UL (ref 4.7–6.1)
SODIUM BLD-SCNC: 135 MMOL/L (ref 136–145)
SODIUM BLD-SCNC: 136 MMOL/L (ref 136–145)
WBC # BLD: 9.2 K/UL (ref 4.8–10.8)

## 2019-06-10 PROCEDURE — 82948 REAGENT STRIP/BLOOD GLUCOSE: CPT

## 2019-06-10 PROCEDURE — 6360000002 HC RX W HCPCS: Performed by: INTERNAL MEDICINE

## 2019-06-10 PROCEDURE — 80048 BASIC METABOLIC PNL TOTAL CA: CPT

## 2019-06-10 PROCEDURE — 6370000000 HC RX 637 (ALT 250 FOR IP): Performed by: HOSPITALIST

## 2019-06-10 PROCEDURE — 6360000002 HC RX W HCPCS: Performed by: EMERGENCY MEDICINE

## 2019-06-10 PROCEDURE — 99233 SBSQ HOSP IP/OBS HIGH 50: CPT | Performed by: INTERNAL MEDICINE

## 2019-06-10 PROCEDURE — 85730 THROMBOPLASTIN TIME PARTIAL: CPT

## 2019-06-10 PROCEDURE — 85027 COMPLETE CBC AUTOMATED: CPT

## 2019-06-10 PROCEDURE — 36415 COLL VENOUS BLD VENIPUNCTURE: CPT

## 2019-06-10 PROCEDURE — 2100000000 HC CCU R&B

## 2019-06-10 PROCEDURE — 6370000000 HC RX 637 (ALT 250 FOR IP): Performed by: INTERNAL MEDICINE

## 2019-06-10 PROCEDURE — 2580000003 HC RX 258: Performed by: HOSPITALIST

## 2019-06-10 RX ORDER — ISOSORBIDE MONONITRATE 30 MG/1
30 TABLET, EXTENDED RELEASE ORAL 2 TIMES DAILY
Status: DISCONTINUED | OUTPATIENT
Start: 2019-06-10 | End: 2019-06-14 | Stop reason: HOSPADM

## 2019-06-10 RX ORDER — DEXTROSE MONOHYDRATE 25 G/50ML
12.5 INJECTION, SOLUTION INTRAVENOUS PRN
Status: DISCONTINUED | OUTPATIENT
Start: 2019-06-10 | End: 2019-06-14 | Stop reason: HOSPADM

## 2019-06-10 RX ORDER — DOCUSATE SODIUM 100 MG/1
100 CAPSULE, LIQUID FILLED ORAL 2 TIMES DAILY
Status: DISCONTINUED | OUTPATIENT
Start: 2019-06-10 | End: 2019-06-14 | Stop reason: HOSPADM

## 2019-06-10 RX ORDER — DEXTROSE MONOHYDRATE 50 MG/ML
100 INJECTION, SOLUTION INTRAVENOUS PRN
Status: DISCONTINUED | OUTPATIENT
Start: 2019-06-10 | End: 2019-06-14 | Stop reason: HOSPADM

## 2019-06-10 RX ORDER — NICOTINE POLACRILEX 4 MG
15 LOZENGE BUCCAL PRN
Status: DISCONTINUED | OUTPATIENT
Start: 2019-06-10 | End: 2019-06-14 | Stop reason: HOSPADM

## 2019-06-10 RX ADMIN — OXYCODONE AND ACETAMINOPHEN 1 TABLET: 10; 325 TABLET ORAL at 02:14

## 2019-06-10 RX ADMIN — HEPARIN SODIUM 3.6 UNITS: 1000 INJECTION INTRAVENOUS; SUBCUTANEOUS at 11:30

## 2019-06-10 RX ADMIN — Medication 80 UNITS: at 20:32

## 2019-06-10 RX ADMIN — OXYCODONE AND ACETAMINOPHEN 1 TABLET: 10; 325 TABLET ORAL at 08:56

## 2019-06-10 RX ADMIN — INSULIN LISPRO 2 UNITS: 100 INJECTION, SOLUTION INTRAVENOUS; SUBCUTANEOUS at 20:33

## 2019-06-10 RX ADMIN — FUROSEMIDE 40 MG: 10 INJECTION, SOLUTION INTRAMUSCULAR; INTRAVENOUS at 05:59

## 2019-06-10 RX ADMIN — TAMSULOSIN HYDROCHLORIDE 0.4 MG: 0.4 CAPSULE ORAL at 08:56

## 2019-06-10 RX ADMIN — Medication 10 ML: at 23:30

## 2019-06-10 RX ADMIN — SERTRALINE HYDROCHLORIDE 100 MG: 100 TABLET ORAL at 08:56

## 2019-06-10 RX ADMIN — OXYCODONE AND ACETAMINOPHEN 1 TABLET: 10; 325 TABLET ORAL at 23:29

## 2019-06-10 RX ADMIN — CARVEDILOL 6.25 MG: 6.25 TABLET, FILM COATED ORAL at 08:56

## 2019-06-10 RX ADMIN — CARVEDILOL 6.25 MG: 6.25 TABLET, FILM COATED ORAL at 16:21

## 2019-06-10 RX ADMIN — HEPARIN SODIUM AND DEXTROSE 10.14 UNITS/KG/HR: 10000; 5 INJECTION INTRAVENOUS at 10:45

## 2019-06-10 RX ADMIN — ISOSORBIDE MONONITRATE 30 MG: 30 TABLET, EXTENDED RELEASE ORAL at 13:18

## 2019-06-10 RX ADMIN — DOCUSATE SODIUM 100 MG: 100 CAPSULE, LIQUID FILLED ORAL at 20:32

## 2019-06-10 RX ADMIN — MUPIROCIN: 20 OINTMENT TOPICAL at 09:00

## 2019-06-10 RX ADMIN — INSULIN LISPRO 2 UNITS: 100 INJECTION, SOLUTION INTRAVENOUS; SUBCUTANEOUS at 18:43

## 2019-06-10 RX ADMIN — AMIODARONE HYDROCHLORIDE 200 MG: 200 TABLET ORAL at 20:39

## 2019-06-10 RX ADMIN — FUROSEMIDE 40 MG: 10 INJECTION, SOLUTION INTRAMUSCULAR; INTRAVENOUS at 13:18

## 2019-06-10 RX ADMIN — FLUTICASONE PROPIONATE 1 SPRAY: 50 SPRAY, METERED NASAL at 08:57

## 2019-06-10 RX ADMIN — FUROSEMIDE 40 MG: 10 INJECTION, SOLUTION INTRAMUSCULAR; INTRAVENOUS at 19:38

## 2019-06-10 RX ADMIN — ISOSORBIDE MONONITRATE 30 MG: 30 TABLET, EXTENDED RELEASE ORAL at 20:32

## 2019-06-10 RX ADMIN — AMIODARONE HYDROCHLORIDE 200 MG: 200 TABLET ORAL at 08:56

## 2019-06-10 RX ADMIN — FUROSEMIDE 40 MG: 10 INJECTION, SOLUTION INTRAMUSCULAR; INTRAVENOUS at 23:29

## 2019-06-10 RX ADMIN — LACTULOSE 30 G: 20 SOLUTION ORAL at 05:59

## 2019-06-10 RX ADMIN — OXYCODONE AND ACETAMINOPHEN 1 TABLET: 10; 325 TABLET ORAL at 16:21

## 2019-06-10 ASSESSMENT — PAIN SCALES - GENERAL
PAINLEVEL_OUTOF10: 8
PAINLEVEL_OUTOF10: 0
PAINLEVEL_OUTOF10: 0
PAINLEVEL_OUTOF10: 4
PAINLEVEL_OUTOF10: 3
PAINLEVEL_OUTOF10: 0
PAINLEVEL_OUTOF10: 5
PAINLEVEL_OUTOF10: 6
PAINLEVEL_OUTOF10: 5
PAINLEVEL_OUTOF10: 5
PAINLEVEL_OUTOF10: 7
PAINLEVEL_OUTOF10: 3

## 2019-06-10 NOTE — PROGRESS NOTES
Cardiology Progress Note Mor Reyes MD      Patient:  Tony Meyer  [id number]    Patient Active Problem List    Diagnosis Date Noted    ACS (acute coronary syndrome) [facility])      Priority: High    Chronic constipation 06/10/2019     Priority: Low    Elevated troponin      Priority: Low    S/P CABG x 3, LIMA- DIAG, A0- SVG- LAD, A0- SVG- Ramus, EF 30-35% (5/30/2019) 06/01/2019     Priority: Low    Acute MI inferior lateral first episode care ([facility] [address] 05/31/2019     Priority: Low    Stage 3 chronic kidney disease ([facility] [address] 05/31/2019     Priority: Low    Chronic shortness of breath 04/15/2019     Priority: Low    Chest pain 02/28/2019     Priority: Low     Overview Note:     1/2011 stents x 3 [facility])  5/4/2011  Cath  Patent stents in the mid LAD, stent to OM1, PTCA to the D2, normal LVFX  12/12/12  Cath patent stents in the mid LAD, PTCA to the D3, stent to distal LAD, normal LVFX  3/29/2015  Cath  Stent to the PLOM, normal LVFX  11/1/2019  Loop placed  3/1/19 lexiscan negative for myocardial ischemia, EF 50  %   5/24/19 ACS PARKER RISK Score 5 (angina, + troponin, CAD>50, age>65, plavix ), AUC indication 3, AUC score 9   5/25/19  Cath 50% distal LM, 100% mid LAD, 80% diag, 100% mid RCA, inferior hypokinesis, EF 45%              MOO on CPAP 05/03/2018     Priority: Low    Dermatitis 03/05/2018     Priority: Low    Depression with anxiety 03/05/2018     Priority: Low    Benign prostatic hyperplasia without lower urinary tract symptoms 03/05/2018     Priority: Low    Chronic tension-type headache, not intractable 03/05/2018     Priority: Low    West Nile virus seropositivity 11/22/2017     Priority: Low    Status post placement of implantable loop recorder 10/31/2017     Priority: Low    Recurrent major depressive disorder, in full remission ([facility] [address] 10/17/2017     Priority: Low    Trigger middle finger of left hand 10/17/2017     Priority: Low    Peripheral polyneuropathy 10/17/2017     Priority: Low    Palpitations 10/17/2017     Priority: Low    Chronic insomnia 10/17/2017     Priority: Low    Cerebrovascular accident (CVA) due to embolism of right middle cerebral artery ([address] 08/14/2017     Priority: Low    Dizziness 03/27/2017     Priority: Low    Imbalance 03/27/2017     Priority: Low    Thoracic outlet syndrome 02/21/2017     Priority: Low     Overview Note:     L sided on exam 2/21/17      Acute ischemic stroke ([facility] [address] 02/20/2017     Priority: Low    DDD (degenerative disc disease), lumbar 11/15/2016     Priority: Low    Spondylosis of lumbar region without myelopathy or radiculopathy 11/15/2016     Priority: Low    Chronic bilateral low back pain without sciatica 11/10/2016     Priority: Low    Mixed hyperlipidemia 07/23/2012     Priority: Low    MI (myocardial infarction) ([facility] [address]      Priority: Low    Type 2 diabetes mellitus with diabetic polyneuropathy, with long-term current use of insulin ([facility] [address] 07/12/2011     Priority: Low    Essential hypertension 07/12/2011     Priority: Low    Coronary artery disease involving native coronary artery of native heart with unstable angina pectoris ([facility] [address] 07/12/2011     Priority: Low       Admit Date:  6/8/2019    Admission Problem List: Present on Admission:   Chest pain   Benign prostatic hyperplasia without lower urinary tract symptoms   Chronic bilateral low back pain without sciatica   Essential hypertension   MOO on CPAP   Stage 3 chronic kidney disease (HCC)   Type 2 diabetes mellitus with diabetic polyneuropathy, with long-term current use of insulin ([address]   Coronary artery disease involving native coronary artery of native heart with unstable angina pectoris ([address]   Chronic constipation      Cardiac Specific Data:  Specialty Problems        Cardiology Problems    ACS (acute coronary syndrome) ([facility] [address]        Coronary artery disease involving native coronary artery of native heart with unstable angina pectoris ([address]        Essential hypertension MI (myocardial infarction) ([facility] [address]        Acute ischemic stroke [facility])        Cerebrovascular accident (CVA) due to embolism of right middle cerebral artery ([facility] [address]        Acute MI inferior lateral first episode care ([facility] [address]            1. Coronary artery disease, status post multiple PCI's to LAD January 2011, RCA December 2012, RPL March 2015 presenting 5/24/2019 with inferior wall MI with late presentation involving a large dominant RCA with distal occlusion, not intervened on with severe mid to distal LAD restenosis and moderate left main disease, status post CABG 5/30/2019 with LIMA to 1st diagonal, SVG to LAD and SVG to ramus intermedius, ejection fraction 25% with severe inferior hypokinesis. 2. CK D stage III. 3. Diabetes mellitus. 4. Post CABG atrial fibrillation. 5. Chronic severe constipation      Subjective:  Mr. Keyonna Ware is feeling better today. He did move his bowels yesterday. His breathing is better. He diuresed a total of 9 L. His renal functions remained stable despite the significant amount of diuresis. Objective:   /73   Pulse 71   Temp 96.9 °F (36.1 °C) (Temporal)   Resp 17   Ht 6' 4\" (1.93 m)   Wt 267 lb 11.2 oz (121.4 kg)   SpO2 95%   BMI 32.59 kg/m²       Intake/Output Summary (Last 24 hours) at 6/10/2019 1735  Last data filed at 6/10/2019 1600  Gross per 24 hour   Intake 738.24 ml   Output 5850 ml   Net -5111.76 ml       Prior to Admission medications    Medication Sig Start Date End Date Taking?  Authorizing Provider   amiodarone (CORDARONE) 200 MG tablet Take 1 tablet by mouth 2 times daily 6/6/19  Yes Ruth Bradshaw MD   carvedilol (COREG) 12.5 MG tablet Take 1 tablet by mouth 2 times daily (with meals) 6/6/19  Yes Ruth Bradshaw MD   isosorbide mononitrate (IMDUR) 30 MG extended release tablet Take 1 tablet by mouth daily 4/19/19  Yes TEGAN Hernandez   azelastine (ASTELIN) 0.1 % nasal spray 1 spray by Nasal route as needed for Rhinitis Use in each nostril as directed   Yes Historical Provider, MD   butalbital-acetaminophen-caffeine (FIORICET, ESGIC) -40 MG per tablet Take 1 tablet by mouth as needed for Headaches   Yes Historical Provider, MD   furosemide (LASIX) 40 MG tablet Take 40 mg by mouth as needed   Yes Historical Provider, MD   lisinopril (PRINIVIL;ZESTRIL) 20 MG tablet Take 1/2 tablet twice daily   Yes Historical Provider, MD   sertraline (ZOLOFT) 100 MG tablet Take 1 tablet by mouth daily 3/12/19  Yes Len Hadley MD   nitroGLYCERIN (NITROSTAT) 0.4 MG SL tablet up to max of 3 total doses. If no relief after 1 dose, call 911. 3/1/19  Yes Jignesh Jung MD   tamsulosin (FLOMAX) 0.4 MG capsule TAKE 2 CAPSULES AT BEDTIME 1/22/19  Yes Len Hadley MD   insulin glargine (LANTUS SOLOSTAR) 100 UNIT/ML injection pen INJECT 80 UNITS INTO THE SKIN NIGHTLY 1/18/19  Yes Len Hadley MD   clopidogrel (PLAVIX) 75 MG tablet TAKE 1 TABLET DAILY 1/18/19  Yes Len Hadley MD   tiZANidine (ZANAFLEX) 2 MG tablet Take 1 tablet by mouth nightly as needed (muscle spasm) 3/12/19   Len Hadley MD   zoster recombinant adjuvanted vaccine [facility]) 50 MCG/0.5ML SUSR injection 1 vaccine now and repeat in 2-6 months.  11/6/18   Len Hadley MD        docusate sodium  100 mg Oral BID    isosorbide mononitrate  30 mg Oral BID    fluticasone  1 spray Each Nostril Daily    mupirocin   Topical Daily    sodium chloride flush  10 mL Intravenous 2 times per day    insulin lispro  0-12 Units Subcutaneous TID WC    insulin lispro  0-6 Units Subcutaneous Nightly    amiodarone  200 mg Oral BID    insulin glargine  80 Units Subcutaneous Nightly    sertraline  100 mg Oral Daily    tamsulosin  0.4 mg Oral Daily    furosemide  40 mg Intravenous 4x Daily    carvedilol  6.25 mg Oral BID WC       TELEMETRY: Sinus     Physical Exam:      Physical Exam    Mild JVD  No edema  No carotid bruits  S1 and S2 of normal intensity, no significant systolic or diastolic murmurs appreciated. No gallop noted  Good air entry bilaterally with no crepitations or wheezes  Abdomen is soft and less tender with no palpable organomegaly, no abnormal pulsations are felt, no bruits are audible  Neurological status is intact  No deformities        Lab Data:  CBC:   Recent Labs     06/08/19  1424 06/09/19  0256 06/10/19  0435   WBC 7.9 8.2 9.2   HGB 9.3* 9.9* 10.5*   HCT 29.5* 32.2* 33.4*   MCV 86.0 85.4 84.8    309 330     BMP:   Recent Labs     06/09/19  1608 06/10/19  0435 06/10/19  1654    136 135*   K 4.5 4.5 4.3   CL 98 94* 93*   CO2 27 27 27   BUN 27* 25* 22   CREATININE 1.6* 1.5* 1.3*     LIVER PROFILE:   Recent Labs     06/08/19  1424   AST 14   ALT 23   LIPASE 34   BILITOT <0.2   ALKPHOS 68     PT/INR:   Recent Labs     06/08/19  1424   PROTIME 14.9*   INR 1.23*     APTT:   Recent Labs     06/09/19  2252 06/10/19  0435 06/10/19  1056   APTT 50.9* 53.3* 45.6*     BNP:  No results for input(s): BNP in the last 72 hours. CK, CKMB, Troponin: @LABRCNT (CKTOTAL:3, CKMB:3, TROPONINI:3)@    IMAGING:  Ct Abdomen Pelvis Wo Contrast Additional Contrast? None    Result Date: 6/8/2019  EXAMINATION: CT ABDOMEN PELVIS WO CONTRAST 6/8/2019 3:57 PM HISTORY: Abdominal distention Comparison: Abdominal KUB 6/8/2019 Technique: Sequential imaging was performed from the lung bases through the pubic symphysis without the use of IV contrast. Sagittal and coronal reformations were made from the original source data and reviewed. Automated exposure control was utilized for radiation dose reduction. Radiation dose: DLP 1701 mGy-cm Findings: Images are degraded by significant motion. There has been prior median sternotomy. Small pleural effusions are present bilaterally with associated compressive atelectasis. The heart is enlarged. Coronary artery calcifications are present. The blood pool is hypodense relative to the myocardium, suggesting anemia. There is trace pericardial fluid.  Evaluation is limited by a lack of IV contrast. Allowing for these limitations, the liver, spleen, and adrenal glands are grossly normal in appearance. The gallbladder is surgically absent. There is a questionable lesion extending from the mid pancreatic body measuring 2.7 cm in size. The kidneys have a normal noncontrast appearance. The ureters are decompressed bilaterally. The abdominal aorta appears normal in caliber. There are scattered atherosclerotic calcifications within the aorta and its branch vessels. No pathologically enlarged central mesenteric or retroperitoneal lymph nodes are appreciated. A small hiatal hernia is present. The stomach and small bowel do not appear overly dilated. There are a few scattered colonic diverticula without evidence of acute diverticulitis. Large bowel is otherwise grossly normal in appearance. No free air or free fluid is seen in the abdomen. The urinary bladder is grossly normal in appearance. No free fluid is seen in the pelvis. No pathologically enlarged pelvic or inguinal lymph nodes are identified. Fluid is noted dependently in the subcutaneous soft tissues of the posterior pelvis and upper thighs. Review of the visualized osseous structures demonstrates no acute or aggressive lesions. Postsurgical changes are noted in the lumbar spine. Impression: 1. No acute findings in the abdomen or pelvis when allowing for limitations of significant motion as well as lack of IV contrast. 2. Cardiomegaly with bilateral pleural effusions. Subcutaneous soft tissue edema dependently in the posterior aspect of the pelvis and proximal thighs. 3. Questionable lesion extending from the pancreatic body for which nonemergent follow-up MRI abdomen with and without contrast is recommended to include MRCP. 4. Atherosclerotic disease.  Signed by Dr Patricia Hatfield on 6/8/2019 4:03 PM    Xr Abdomen (kub) (single Ap View)    Result Date: 6/8/2019  EXAMINATION: XR ABDOMEN (KUB) (SINGLE AP VIEW) 6/8/2019 2:55 PM HISTORY: Abdominal distention COMPARISON: None FINDINGS: The bowel gas pattern is nonspecific and nonobstructive. A moderate amount of stool is noted in the left colon. The entire abdomen is not included on this exam. Surgical clips are seen in the right upper quadrant of the abdomen. There has been previous fusion of the lumbar spine. No pathologic calcifications are seen. No evidence of bowel obstruction. Signed by Dr Ricarda Blancas on 6/8/2019 2:56 PM    Xr Acute Abd Series Chest 1 Vw    Result Date: 5/24/2019  EXAMINATION:  XR ACUTE ABD SERIES CHEST 1 VW  5/24/2019 2:39 PM HISTORY: Abdominal distention. Diabetes. Coronary artery disease. COMPARISON: 2/28/2019. CHEST X-RAY: There is no infiltrate or effusion. There is mild bronchial wall thickening, stable. Heart size is upper limits of normal. There may be at least one coronary stent. There is a loop recorder. ABDOMEN IMAGES: There is moderate stool in the colon. There is no small bowel distention. There has been prior cholecystectomy. There has been prior lumbar surgery. There are degenerative changes of the spine. 1. No acute appearing infiltrate or effusion. Stable bronchial wall thickening. 2. Heart size upper limits of normal. No CHF. Loop recorder. Probable coronary stent. 3. Moderate stool in the colon. The bowel gas pattern is normal. Signed by Dr Lisa Vera on 5/24/2019 2:41 PM    Xr Chest Portable    Result Date: 6/8/2019  EXAMINATION: XR CHEST PORTABLE 6/8/2019 2:54 PM HISTORY: Chest pain COMPARISON: 6/3/2019 FINDINGS: The heart and mediastinal contours are stable. There is consolidation in the left lung base. Small pleural effusions are noted bilaterally. Diffuse increased interstitial markings are noted. Pulmonary vasculature is indistinct. There is no appreciable pneumothorax. A loop recorder device is present. There has been prior median sternotomy.     Findings are suggestive of pulmonary vascular congestion and pulmonary edema with bilateral pleural effusions. Signed by Dr Severa Oz on 6/8/2019 2:55 PM    Xr Chest Portable    Result Date: 6/3/2019  EXAMINATION: XR CHEST PORTABLE 6/3/2019 7:22 AM HISTORY: XR CHEST PORTABLE 6/3/2019 5:00 AM HISTORY: Postop COMPARISON: June 2, 2019. FINDINGS: The right lung is clear. Left diaphragm is indistinct most likely atelectasis left lower lobe. Linear atelectasis left upper lung is again noted. . Cardiac silhouettes moderately enlarged this is unchanged. Wires are present previous median sternotomy. . The osseous structures and surrounding soft tissues demonstrate no acute abnormality. 1. Postsurgical change and persistent atelectasis left lower lobe. This is unchanged from June 2. Signed by Dr Radha Rodriguez on 6/3/2019 7:23 AM    Xr Chest Portable    Result Date: 6/2/2019  XR CHEST PORTABLE 6/2/2019 5:00 AM HISTORY: Postop CABG Comparison: 6/1/2019 Findings: Lines and tubes are stable in position. No new opacities or pneumothoraces are visualized in the chest. The cardiomediastinal silhouette and pulmonary vascularity are unchanged. No acute osseous or soft tissue abnormality is noted. Impression: 1. No significant interval change since previous exam. Signed by Dr Monalisa Quarles on 6/2/2019 7:14 AM    Xr Chest Portable    Result Date: 6/1/2019  EXAMINATION: Chest one view 6/1/2019 HISTORY: Postop CABG FINDINGS: Today's exam is compared to previous study of 5/31/2019. All support lines have been removed except for a right IJ introducer. Lungs are hypoventilated with bibasilar atelectasis. Small effusions are present. There is mild gastric distention improved from yesterday's exam. A loop recorder projects over the left chest.    1.. Right IJ introducer remains in place. The remaining support lines have been removed. 2. Expiratory chest. There is persistent pulmonary venous hypertension with widening of the vascular pedicle. This is accentuated by the expiratory nature of the film.  3. Small effusions. Signed by Dr Zeynep Flanagan on 6/1/2019 8:07 AM    Xr Chest Portable    Result Date: 5/31/2019  EXAMINATION: Chest one view 5/31/2019 HISTORY: Postop CABG FINDINGS: Today's exam is compared to previous study of one day earlier. A Murphy-Srikanth catheter and mediastinal drains remain in place. The patient has been extubated. There is gaseous distention of the stomach. There is persistent pulmonary venous hypertension with widening of the vascular pedicle. There is a poor inspiratory effort. 1.. Expiratory chest. There is persistent pulmonary venous hypertension. 2. Patient extubated. 3. Developing gaseous distention of the stomach. Signed by Dr Zeynep Flanagan on 5/31/2019 6:40 AM    Xr Chest Portable    Result Date: 5/30/2019  EXAMINATION: Chest one view 5/30/2019 HISTORY: Postop CABG FINDINGS: Today's exam is compared to previous study of 6 days earlier. The lungs are expiratory causing accentuation of bronchovascular markings and cardiac silhouette. There is mild cardiomegaly with suspected mild pulmonary venous hypertension with widening of the vascular pedicle. An endotracheal tube, Murphy-Srikanth catheter and mediastinal drains are all well-positioned. There is no pneumothorax or significant effusion. 1.. Expiratory chest. Pulmonary venous hypertension is also suspected with widening of the vascular pedicle. 2. Multiple life support lines in place with no complications and well-positioned.  Signed by Dr Zeynep Flanagan on 5/30/2019 12:33 PM    Vl Dup Carotid Bilateral    Result Date: 5/28/2019  Vascular Carotid Procedure  Demographics   Patient Name   Carver Dancer  Age                 75                 D   Patient Number [id number]        Gender              Male   Visit Number   [id number]     Interpreting        Louise Jaime MD                               Physician   Date of Birth  1945    Referring Physician Lidya Nichols   Accession [id number]     [facility]  [facility]  Procedure Type of Study:   Cerebral:Carotid, VL CAROTID BILATERAL. Indications for Study:Pre-op CABG. Risk Factors   - The patient's risk factor(s) include: diabetes mellitus, dyslipidemia     and arterial hypertension.   - The patient's last creatinine was 1.2 mg/dl. - The patient's risk factor(s) include: Prior CVA, , Allergies   - Statins. Impression   There is smooth plaque visualized in the bilateral internal carotid  artery(ies). There is no stenosis in the right internal carotid artery. There is no stenosis of the left internal carotid artery. There is normal antegrade flow in the bilateral vertebral artery(ies). Signature   ----------------------------------------------------------------  Electronically signed by Romayne People MD(Interpreting  physician) on 05/28/2019 03:37 PM  ----------------------------------------------------------------  Velocities are measured in cm/s ; Diameters are measured in mm Carotid Right Measurements +------------+-------+-------+--------+-------+------------+---------------+ ! Location    ! PSV    ! EDV    ! Angle   ! RI     !%Stenosis   ! Tortuosity     ! +------------+-------+-------+--------+-------+------------+---------------+ ! Prox CCA    !99     !14.9   !60      !0.85   !            !               ! +------------+-------+-------+--------+-------+------------+---------------+ ! Mid CCA     !70.4   !13.5   !60      !0.81   !            !               ! +------------+-------+-------+--------+-------+------------+---------------+ ! Prox ICA    ! 53.9   !11.7   !60      !0.78   !            !               ! +------------+-------+-------+--------+-------+------------+---------------+ ! Mid ICA     ! 89.1   !18.8   !60      !0.79   !            !               ! +------------+-------+-------+--------+-------+------------+---------------+ ! Dist ICA    ! 89.7   !19.9   !60      !0.78   !            ! ! +------------+-------+-------+--------+-------+------------+---------------+ ! Prox ECA    !117    !       !61      !       !            !               ! +------------+-------+-------+--------+-------+------------+---------------+ ! Vertebral   !66.8   !11.7   !60      !0.82   !            !               ! +------------+-------+-------+--------+-------+------------+---------------+   - There is antegrade vertebral flow noted on the right side. - Additional Measurements:ICAPSV/CCAPSV 0.91. ICAEDV/CCAEDV 1.34. Carotid Left Measurements +------------+-------+-------+--------+-------+------------+---------------+ ! Location    ! PSV    ! EDV    ! Angle   ! RI     !%Stenosis   ! Tortuosity     ! +------------+-------+-------+--------+-------+------------+---------------+ ! Prox CCA    !105    !13.4   !60      !0.87   !            !               ! +------------+-------+-------+--------+-------+------------+---------------+ ! Mid CCA     !90.4   !14.9   !60      !0.84   !            !               ! +------------+-------+-------+--------+-------+------------+---------------+ ! Prox ICA    ! 65.7   !15.8   !60      !0.76   !            !               ! +------------+-------+-------+--------+-------+------------+---------------+ ! Mid ICA     ! 88.5   !22.3   !60      !0.75   !            !               ! +------------+-------+-------+--------+-------+------------+---------------+ ! Dist ICA    !76.2   !17.6   ! 60      !0.77   !            !               ! +------------+-------+-------+--------+-------+------------+---------------+ ! Prox ECA    !141    !       !61      !       !            !               ! +------------+-------+-------+--------+-------+------------+---------------+ ! Vertebral   !78     !12.9   !60      !0.83   !            !               ! +------------+-------+-------+--------+-------+------------+---------------+   - There is antegrade vertebral flow noted on the left side.    - Additional Measurements:ICAPSV/CCAPSV 0.84. ICAEDV/CCAEDV 1.66. Vl Vein Mapping Lower Bilateral    Result Date: 5/28/2019  Vascular Lower Extremity Vein Mapping Procedure  Demographics   Patient Name   Margoth Olivares  Age                 75                 D   Patient Number [id number]        Gender              Male   Visit Number   [id number]     Interpreting        Jackie Dalal MD                               Physician   Date of Birth  1945    Referring Physician [facility]   Accession      [id number]     [facility]  [facility]  Procedure Type of Study:   [address] [address]. Indications for Study:Pre-op CABG. Risk Factors   - The patient's risk factor(s) include: diabetes mellitus, dyslipidemia     and arterial hypertension.   - The patient's last creatinine was 1.2 mg/dl. Allergies   - Statins. Impression   Usable greater Saphenous vein conduit in both lower extremities. The veins have been marked on the skin. Sizes are noted above. Signature   ----------------------------------------------------------------  Electronically signed by Jackie Dalal MD(Interpreting  physician) on 05/28/2019 03:36 PM  ----------------------------------------------------------------  Velocities are measured in cm/s ; Diameters are measured in mm +--------------------------------------++--------+-----+----+--------+-----+ ! Superficial - Great Saphenous Vein    ! ! Right   ! ! Left!        !     ! +--------------------------------------++--------+-----+----+--------+-----+ ! Location                              ! !Diameter! Depth! !Diameter! Depth! +--------------------------------------++--------+-----+----+--------+-----+ ! GSV 2 cm Distal to Junction           ! !0.9     !     !    !5.6     !     ! +--------------------------------------++--------+-----+----+--------+-----+ ! GSV High Thigh                        ! !4.3 !     !    !4.8     !     ! +--------------------------------------++--------+-----+----+--------+-----+ ! GSV Mid Thigh                         !!4       !     !    !4.4     !     ! +--------------------------------------++--------+-----+----+--------+-----+ ! GSV Low Thigh                         ! !4.7     !     !    !3.8     !     ! +--------------------------------------++--------+-----+----+--------+-----+ ! GSV Knee                              !!4.4     !     !    !3.9     !     ! +--------------------------------------++--------+-----+----+--------+-----+ ! GSV High Calf                         !!3.8     !     !    !3.4     !     ! +--------------------------------------++--------+-----+----+--------+-----+ ! GSV Mid Calf                          !!3.3     !     !    !3.8     !     ! +--------------------------------------++--------+-----+----+--------+-----+ ! GSV Ankle                             ! !3.4     !     !    !4.3     !     ! +--------------------------------------++--------+-----+----+--------+-----+        Assessment and Plan: This is a 66 y. o. year old male with past medical history ofDM, CKD 3,  extensive coronary artery disease  With multiple PCI's to LAD, RCA and OM presenting with late presentation of inferior wall MI 5/24/2019 found to have an occluded large dominant RCA not intervened on,, intermediate left main and diffuse severe mid to distal LAD restenosis, s/p CABG 5/30/19 with LIMA to D1, SVG-LAD, SVG-RI, EF 25%, post-op AF, d/tyler to N/H, not on lasix presenting with CP with NTG response, volume overload and severe constipation. 1.  Continues to diurese significantly with improvement in renal functions. Maintain Lasix at current dosage. Plan for thallium viability study tomorrow. If significant viability noted in the RCA territory will consider possible re-intervention. 2.  Can discontinue IV nitroglycerin and IV heparin. Switch to Imdur 30 mg twice daily.   3.  Maintain bowel regimen.         Riley Dutton MD 6/10/2019 5:35 PM

## 2019-06-10 NOTE — PROGRESS NOTES
HOSPITAL MEDICINE  - PROGRESS NOTE    Admit Date: 6/8/2019       Chief Complaint: CABG, chest pain with elevated troponin, heart failure, constipation follow-up    Subjective:   Resting comfortably in bed in the CCU. Patient remains on heparin and nitroglycerin drips. Patient denies any chest pain. Patient states he has had 5 relatively loose bowel movements since admission. Shortness of breath or cough. No nausea vomiting. No fevers or chills. No other complaints today. Hospital course:  75 y. o. male with PMH on systolic CHF, CAD,DM. HTN, HLD, MOO  s/p CABG in 5/30/19  who presented to emergency department with crushing chest pain that started earlier in the day when he was at [address] [place] [address] home.   Pain radiated down his left arm and into left neck and was described as crushing. One nitroglycerin relieved the pain. Distended abdomen on admission with chronic constipation issues. No bowel movement in previous 3 days. Patient also found with positive troponin. Patient administered 324 mg of aspirin by EMS. On heparin nitro gtt, cardiology following.         ROS:  Pertinent items are noted in HPI. 14 point reveiw of systems otherwise negative.           Data:   Scheduled Meds: Reviewed  Continuous Infusions:   heparin (porcine) 10.149 Units/kg/hr (06/09/19 2330)    nitroGLYCERIN 2.5 mcg/min (06/10/19 1008)       Intake/Output Summary (Last 24 hours) at 6/10/2019 1045  Last data filed at 6/10/2019 0949  Gross per 24 hour   Intake 967.26 ml   Output 6660 ml   Net -5692.74 ml     Recent Labs     06/08/19  1424 06/09/19  0256 06/10/19  0435   WBC 7.9 8.2 9.2   HGB 9.3* 9.9* 10.5*    309 330     Recent Labs     06/09/19  0256 06/09/19  1608 06/10/19  0435    137 136   K 5.0 4.5 4.5   CL 98 98 94*   CO2 28 27 27   BUN 31* 27* 25*   CREATININE 1.7* 1.6* 1.5*   GLUCOSE 64* 85 143*     Recent Labs     06/08/19  1424   AST 14   ALT 23 BILITOT <0.2   ALKPHOS 68     Troponin T:   Recent Labs     06/08/19  2023 06/09/19  0256 06/09/19  0918   TROPONINI 0.94* 0.97* 0.89*     Pro-BNP: No results for input(s): BNP in the last 72 hours. INR:   Recent Labs     06/08/19  1424   INR 1.23*     ABGs:   Lab Results   Component Value Date    PHART 7.350 05/31/2019    PO2ART 85.0 05/31/2019    ZTO2IXE 41.0 05/31/2019     CT abdomen/pelvis:  Impression   Impression:   1. No acute findings in the abdomen or pelvis when allowing for   limitations of significant motion as well as lack of IV contrast.   2. Cardiomegaly with bilateral pleural effusions. Subcutaneous soft   tissue edema dependently in the posterior aspect of the pelvis and   proximal thighs. 3. Questionable lesion extending from the pancreatic body for which   nonemergent follow-up MRI abdomen with and without contrast is   recommended to include MRCP. 4. Atherosclerotic disease. Signed by Dr Sindy Dominguez on 6/8/2019 4:03 PM         Objective:   Vitals: /69   Pulse 79   Temp 97.9 °F (36.6 °C) (Temporal)   Resp 17   Ht 6' 4\" (1.93 m)   Wt 267 lb 11.2 oz (121.4 kg)   SpO2 92%   BMI 32.59 kg/m²   General appearance: No apparent distress, appears stated age and cooperative. HEENT: Normal cephalic, atraumatic without obvious deformity. Pupils equal, round, and reactive to light. Extra ocular muscles intact. Conjunctivae/corneas clear. Neck: Supple, with full range of motion. No jugular venous distention. Trachea midline. No thyroid tenderness. No masses palpated. Respiratory:  Normal respiratory effort. Clear to auscultation, bilaterally without Rales/Wheezes/Rhonchi. Mildly diminished bilateral bases. Cardiovascular: Regular rate and rhythm with normal S1/S2 without murmurs, rubs or gallops. CABG incision healing well. Abdomen: Soft, non-tender, non-distended with normal bowel sounds. No hepatosplenomegaly. Musculoskeletal: No clubbing, cyanosis.   Trace edema bilaterally. Full range of motion without deformity. Skin: Skin color, texture, turgor normal.  No rashes or lesions. Neurologic:  Neurovascularly intact without any focal sensory/motor deficits. Cranial nerves: II-XII intact, grossly non-focal.  Psychiatric: Alert and oriented, thought content appropriate, normal insight      Assessment & Plan: Active Hospital Problems    Diagnosis Date Noted    Chronic constipation [K59.09] 06/10/2019    Stage 3 chronic kidney disease ([facility] [N18.3] 05/31/2019    Chest pain [R07.9] 02/28/2019    MOO on CPAP [G47.33, Z99.89] 05/03/2018    Benign prostatic hyperplasia without lower urinary tract symptoms [N40.0] 03/05/2018    Chronic bilateral low back pain without sciatica [M54.5, G89.29] 11/10/2016    Essential hypertension [I10] 07/12/2011    Type 2 diabetes mellitus with diabetic polyneuropathy, with long-term current use of insulin ([facility] [E11.42, Z79.4] 07/12/2011    Coronary artery disease involving native coronary artery of native heart with unstable angina pectoris ([facility] [I25.110] 07/12/2011        Continue heparin drip for now, wean off per cardiology. Wean nitro drip to off as tolerated. DC lactulose and Colace since patient is having multiple loose bowel movements. Repeat BMP and CBC in a.m. Home medications resumed as clinically indicated. Okay to PCU.  following in regards to discharge back to [place] for ongoing rehabilitation. See all orders. Further condition per clinical course. Condition stable.       Magdy Zhang DO

## 2019-06-10 NOTE — PROGRESS NOTES
Pt transferred from room 739 to room 703 in chair with RN assisting move. Pt denies pain. Placed back to bedside monitors.  Electronically signed by Kamar Vann RN on 6/9/2019 at 7:24 PM

## 2019-06-10 NOTE — PROGRESS NOTES
Patient resting at this time. VSS. Call light within reach.  Monitor  Electronically signed by Albert Nevarez RN on 6/10/2019 at 3:49 AM

## 2019-06-11 ENCOUNTER — APPOINTMENT (OUTPATIENT)
Dept: NUCLEAR MEDICINE | Age: 74
DRG: 280 | End: 2019-06-11
Payer: MEDICARE

## 2019-06-11 PROBLEM — Z51.5 PALLIATIVE CARE PATIENT: Status: ACTIVE | Noted: 2019-06-11

## 2019-06-11 LAB
ALBUMIN SERPL-MCNC: 3.1 G/DL (ref 3.5–5.2)
ALP BLD-CCNC: 82 U/L (ref 40–130)
ALT SERPL-CCNC: 26 U/L (ref 5–41)
ANION GAP SERPL CALCULATED.3IONS-SCNC: 15 MMOL/L (ref 7–19)
AST SERPL-CCNC: 17 U/L (ref 5–40)
BACTERIA: ABNORMAL /HPF
BASOPHILS ABSOLUTE: 0 K/UL (ref 0–0.2)
BASOPHILS RELATIVE PERCENT: 0.3 % (ref 0–1)
BILIRUB SERPL-MCNC: <0.2 MG/DL (ref 0.2–1.2)
BILIRUBIN URINE: NEGATIVE
BLOOD, URINE: ABNORMAL
BUN BLDV-MCNC: 20 MG/DL (ref 8–23)
CALCIUM SERPL-MCNC: 9.1 MG/DL (ref 8.8–10.2)
CHLORIDE BLD-SCNC: 95 MMOL/L (ref 98–111)
CLARITY: ABNORMAL
CO2: 28 MMOL/L (ref 22–29)
COLOR: YELLOW
CREAT SERPL-MCNC: 1.5 MG/DL (ref 0.5–1.2)
EOSINOPHILS ABSOLUTE: 0.3 K/UL (ref 0–0.6)
EOSINOPHILS RELATIVE PERCENT: 3.1 % (ref 0–5)
EPITHELIAL CELLS, UA: ABNORMAL /HPF
GFR NON-AFRICAN AMERICAN: 46
GLUCOSE BLD-MCNC: 121 MG/DL (ref 70–99)
GLUCOSE BLD-MCNC: 170 MG/DL (ref 70–99)
GLUCOSE BLD-MCNC: 187 MG/DL (ref 70–99)
GLUCOSE BLD-MCNC: 97 MG/DL (ref 74–109)
GLUCOSE URINE: NEGATIVE MG/DL
HCT VFR BLD CALC: 35.2 % (ref 42–52)
HEMOGLOBIN: 10.8 G/DL (ref 14–18)
KETONES, URINE: NEGATIVE MG/DL
LEUKOCYTE ESTERASE, URINE: ABNORMAL
LYMPHOCYTES ABSOLUTE: 2.2 K/UL (ref 1.1–4.5)
LYMPHOCYTES RELATIVE PERCENT: 22.6 % (ref 20–40)
MCH RBC QN AUTO: 25.9 PG (ref 27–31)
MCHC RBC AUTO-ENTMCNC: 30.7 G/DL (ref 33–37)
MCV RBC AUTO: 84.4 FL (ref 80–94)
MONOCYTES ABSOLUTE: 0.9 K/UL (ref 0–0.9)
MONOCYTES RELATIVE PERCENT: 9.1 % (ref 0–10)
NEUTROPHILS ABSOLUTE: 6.3 K/UL (ref 1.5–7.5)
NEUTROPHILS RELATIVE PERCENT: 64.3 % (ref 50–65)
NITRITE, URINE: NEGATIVE
PDW BLD-RTO: 14.8 % (ref 11.5–14.5)
PERFORMED ON: ABNORMAL
PH UA: 6 (ref 5–8)
PLATELET # BLD: 319 K/UL (ref 130–400)
PMV BLD AUTO: 10.4 FL (ref 9.4–12.4)
POTASSIUM SERPL-SCNC: 4.2 MMOL/L (ref 3.5–5)
PRO-BNP: 2993 PG/ML (ref 0–1800)
PROTEIN UA: 100 MG/DL
RBC # BLD: 4.17 M/UL (ref 4.7–6.1)
RBC UA: ABNORMAL /HPF (ref 0–2)
SODIUM BLD-SCNC: 138 MMOL/L (ref 136–145)
SPECIFIC GRAVITY UA: 1.02 (ref 1–1.03)
TOTAL PROTEIN: 7.1 G/DL (ref 6.6–8.7)
UROBILINOGEN, URINE: 1 E.U./DL
WBC # BLD: 9.9 K/UL (ref 4.8–10.8)
WBC UA: ABNORMAL /HPF (ref 0–5)

## 2019-06-11 PROCEDURE — 80053 COMPREHEN METABOLIC PANEL: CPT

## 2019-06-11 PROCEDURE — 6370000000 HC RX 637 (ALT 250 FOR IP): Performed by: HOSPITALIST

## 2019-06-11 PROCEDURE — 81001 URINALYSIS AUTO W/SCOPE: CPT

## 2019-06-11 PROCEDURE — 87186 SC STD MICRODIL/AGAR DIL: CPT

## 2019-06-11 PROCEDURE — 87086 URINE CULTURE/COLONY COUNT: CPT

## 2019-06-11 PROCEDURE — 99232 SBSQ HOSP IP/OBS MODERATE 35: CPT | Performed by: INTERNAL MEDICINE

## 2019-06-11 PROCEDURE — 36415 COLL VENOUS BLD VENIPUNCTURE: CPT

## 2019-06-11 PROCEDURE — 83880 ASSAY OF NATRIURETIC PEPTIDE: CPT

## 2019-06-11 PROCEDURE — 2100000000 HC CCU R&B

## 2019-06-11 PROCEDURE — 6370000000 HC RX 637 (ALT 250 FOR IP): Performed by: INTERNAL MEDICINE

## 2019-06-11 PROCEDURE — 78451 HT MUSCLE IMAGE SPECT SING: CPT

## 2019-06-11 PROCEDURE — 6360000002 HC RX W HCPCS: Performed by: INTERNAL MEDICINE

## 2019-06-11 PROCEDURE — 82948 REAGENT STRIP/BLOOD GLUCOSE: CPT

## 2019-06-11 PROCEDURE — 2580000003 HC RX 258: Performed by: HOSPITALIST

## 2019-06-11 PROCEDURE — 85025 COMPLETE CBC W/AUTO DIFF WBC: CPT

## 2019-06-11 RX ORDER — THALLOUS CHLORIDE TL-201 1 MCI/ML
3 INJECTION, POWDER, LYOPHILIZED, FOR SOLUTION INTRAVENOUS
Status: COMPLETED | OUTPATIENT
Start: 2019-06-11 | End: 2019-06-12

## 2019-06-11 RX ORDER — AMIODARONE HYDROCHLORIDE 200 MG/1
200 TABLET ORAL DAILY
Status: DISCONTINUED | OUTPATIENT
Start: 2019-06-12 | End: 2019-06-14 | Stop reason: HOSPADM

## 2019-06-11 RX ADMIN — DOCUSATE SODIUM 100 MG: 100 CAPSULE, LIQUID FILLED ORAL at 20:00

## 2019-06-11 RX ADMIN — ISOSORBIDE MONONITRATE 30 MG: 30 TABLET, EXTENDED RELEASE ORAL at 20:00

## 2019-06-11 RX ADMIN — AMIODARONE HYDROCHLORIDE 200 MG: 200 TABLET ORAL at 09:06

## 2019-06-11 RX ADMIN — CARVEDILOL 6.25 MG: 6.25 TABLET, FILM COATED ORAL at 19:59

## 2019-06-11 RX ADMIN — FLUTICASONE PROPIONATE 1 SPRAY: 50 SPRAY, METERED NASAL at 09:07

## 2019-06-11 RX ADMIN — OXYCODONE AND ACETAMINOPHEN 1 TABLET: 10; 325 TABLET ORAL at 23:31

## 2019-06-11 RX ADMIN — CARVEDILOL 6.25 MG: 6.25 TABLET, FILM COATED ORAL at 09:06

## 2019-06-11 RX ADMIN — Medication 10 ML: at 09:14

## 2019-06-11 RX ADMIN — SERTRALINE HYDROCHLORIDE 100 MG: 100 TABLET ORAL at 09:05

## 2019-06-11 RX ADMIN — TAMSULOSIN HYDROCHLORIDE 0.4 MG: 0.4 CAPSULE ORAL at 09:06

## 2019-06-11 RX ADMIN — OXYCODONE AND ACETAMINOPHEN 1 TABLET: 10; 325 TABLET ORAL at 14:44

## 2019-06-11 RX ADMIN — OXYCODONE AND ACETAMINOPHEN 1 TABLET: 10; 325 TABLET ORAL at 09:13

## 2019-06-11 RX ADMIN — FUROSEMIDE 40 MG: 10 INJECTION, SOLUTION INTRAMUSCULAR; INTRAVENOUS at 05:31

## 2019-06-11 RX ADMIN — ISOSORBIDE MONONITRATE 30 MG: 30 TABLET, EXTENDED RELEASE ORAL at 09:05

## 2019-06-11 RX ADMIN — FUROSEMIDE 60 MG: 40 TABLET ORAL at 19:59

## 2019-06-11 RX ADMIN — Medication 80 UNITS: at 20:00

## 2019-06-11 RX ADMIN — Medication 10 ML: at 20:05

## 2019-06-11 ASSESSMENT — PAIN SCALES - GENERAL
PAINLEVEL_OUTOF10: 2
PAINLEVEL_OUTOF10: 9
PAINLEVEL_OUTOF10: 7
PAINLEVEL_OUTOF10: 2

## 2019-06-11 NOTE — PROGRESS NOTES
HOSPITAL MEDICINE  - PROGRESS NOTE    Admit Date: 6/8/2019       Chief Complaint: CABG, chest pain with elevated troponin, heart failure, constipation follow-up    Subjective:   Resting comfortably in chair at bedside in the CCU. Patient off heparin and nitroglycerin drips. Patient with mild chest pain earlier but now resolved. Patient states he continues to have bowel movements. No Shortness of breath or cough. No nausea vomiting. No fevers or chills. No other complaints today. Hospital course:  75 y. o. male with PMH on systolic CHF, CAD,DM. HTN, HLD, MOO  s/p CABG in 5/30/19  who presented to emergency department with crushing chest pain that started earlier in the day when he was at [address] [place] [address] home.   Pain radiated down his left arm and into left neck and was described as crushing. One nitroglycerin relieved the pain. Distended abdomen on admission with chronic constipation issues. No bowel movement in previous 3 days. Patient also found with positive troponin. Patient administered 324 mg of aspirin by EMS. On heparin nitro gtt, cardiology following.         ROS:  Pertinent items are noted in HPI. 14 point reveiw of systems otherwise negative.           Data:   Scheduled Meds: Reviewed  Continuous Infusions:   dextrose         Intake/Output Summary (Last 24 hours) at 6/11/2019 1333  Last data filed at 6/11/2019 1000  Gross per 24 hour   Intake 310.55 ml   Output 4300 ml   Net -3989.45 ml     Recent Labs     06/09/19  0256 06/10/19  0435 06/11/19  0614   WBC 8.2 9.2 9.9   HGB 9.9* 10.5* 10.8*    330 319     Recent Labs     06/10/19  0435 06/10/19  1654 06/11/19  0614    135* 138   K 4.5 4.3 4.2   CL 94* 93* 95*   CO2 27 27 28   BUN 25* 22 20   CREATININE 1.5* 1.3* 1.5*   GLUCOSE 143* 126* 97     Recent Labs     06/08/19  1424 06/11/19  0614   AST 14 17   ALT 23 26   BILITOT <0.2 <0.2   ALKPHOS 68 82     Troponin T:   Recent Labs     06/08/19  2023 06/09/19  0256 06/09/19  0918   TROPONINI 0.94* 0.97* 0.89*     Pro-BNP: No results for input(s): BNP in the last 72 hours. INR:   Recent Labs     06/08/19  1424   INR 1.23*     ABGs:   Lab Results   Component Value Date    PHART 7.350 05/31/2019    PO2ART 85.0 05/31/2019    COQ9MBI 41.0 05/31/2019     CT abdomen/pelvis:  Impression   Impression:   1. No acute findings in the abdomen or pelvis when allowing for   limitations of significant motion as well as lack of IV contrast.   2. Cardiomegaly with bilateral pleural effusions. Subcutaneous soft   tissue edema dependently in the posterior aspect of the pelvis and   proximal thighs. 3. Questionable lesion extending from the pancreatic body for which   nonemergent follow-up MRI abdomen with and without contrast is   recommended to include MRCP. 4. Atherosclerotic disease. Signed by Dr Tsering Perez on 6/8/2019 4:03 PM         Objective:   Vitals: BP (!) 81/46   Pulse 79   Temp 97.3 °F (36.3 °C) (Temporal)   Resp 17   Ht 6' 4\" (1.93 m)   Wt 251 lb (113.9 kg)   SpO2 93%   BMI 30.55 kg/m²   General appearance: No apparent distress, appears stated age and cooperative. HEENT: Normal cephalic, atraumatic without obvious deformity. Pupils equal, round, and reactive to light. Extra ocular muscles intact. Conjunctivae/corneas clear. Neck: Supple, with full range of motion. No jugular venous distention. Trachea midline. No thyroid tenderness. No masses palpated. Respiratory:  Normal respiratory effort. Clear to auscultation, bilaterally without Rales/Wheezes/Rhonchi. Mildly diminished bilateral bases. Cardiovascular: Regular rate and rhythm with normal S1/S2 without murmurs, rubs or gallops. CABG incision healing well. Abdomen: Soft, non-tender, non-distended with normal bowel sounds. No hepatosplenomegaly. Musculoskeletal: No clubbing, cyanosis. Trace edema bilaterally. Full range of motion without deformity.   Skin: Skin color, texture, turgor normal.  No rashes or lesions. Neurologic:  Neurovascularly intact without any focal sensory/motor deficits. Cranial nerves: II-XII intact, grossly non-focal.  Psychiatric: Alert and oriented, thought content appropriate, normal insight      Assessment & Plan: Active Hospital Problems    Diagnosis Date Noted    Palliative care patient [Z51.5] 06/11/2019    Chronic constipation [K59.09] 06/10/2019    Stage 3 chronic kidney disease ([facility] [N18.3] 05/31/2019    Chest pain [R07.9] 02/28/2019    MOO on CPAP [G47.33, Z99.89] 05/03/2018    Benign prostatic hyperplasia without lower urinary tract symptoms [N40.0] 03/05/2018    Chronic bilateral low back pain without sciatica [M54.5, G89.29] 11/10/2016    Essential hypertension [I10] 07/12/2011    Type 2 diabetes mellitus with diabetic polyneuropathy, with long-term current use of [facility] [E11.42, Z79.4] 07/12/2011    Coronary artery disease involving native coronary artery of native heart with unstable angina pectoris ([facility] [I25.110] 07/12/2011        Cardiology following,thallium viability study today. Heparin and nitro drips discontinued. Lactulose and Colace discontined. Repeat BMP and CBC in a.m. Home medications resumed as clinically indicated. Okay to PCU, awaiting bed availability on PCU.  following in regards to discharge back to [place] for ongoing rehabilitation. See all orders. Further condition per clinical course. Condition stable.       Magdy Zhang DO

## 2019-06-11 NOTE — PROGRESS NOTES
Spoke to nuclear med regarding rest to rest thallium study to be done today. Nuclear med staff stated that there is not a consent that needs to be signed for this procedure.

## 2019-06-11 NOTE — PROGRESS NOTES
Cardiology Progress Note Chelita Saunders MD      Patient:  Ottoniel Stanley  [id number]    Patient Active Problem List    Diagnosis Date Noted    ACS (acute coronary syndrome) [facility])      Priority: High    Palliative care patient 06/11/2019     Priority: Low    Chronic constipation 06/10/2019     Priority: Low    Elevated troponin      Priority: Low    S/P CABG x 3, LIMA- DIAG, A0- SVG- LAD, A0- SVG- Ramus, EF 30-35% (5/30/2019) 06/01/2019     Priority: Low    Acute MI inferior lateral first episode care ([facility] [address] 05/31/2019     Priority: Low    Stage 3 chronic kidney disease ([facility] [address] 05/31/2019     Priority: Low    Chronic shortness of breath 04/15/2019     Priority: Low    Chest pain 02/28/2019     Priority: Low     Overview Note:     1/2011 stents x 3 [facility] AND [facility])  5/4/2011  Cath  Patent stents in the mid LAD, stent to OM1, PTCA to the D2, normal LVFX  12/12/12  Cath patent stents in the mid LAD, PTCA to the D3, stent to distal LAD, normal LVFX  3/29/2015  Cath  Stent to the PLOM, normal LVFX  11/1/2019  Loop placed  3/1/19 lexiscan negative for myocardial ischemia, EF 50  %   5/24/19 ACS PARKER RISK Score 5 (angina, + troponin, CAD>50, age>65, plavix ), AUC indication 3, AUC score 9   5/25/19  Cath 50% distal LM, 100% mid LAD, 80% diag, 100% mid RCA, inferior hypokinesis, EF 45%              MOO on CPAP 05/03/2018     Priority: Low    Dermatitis 03/05/2018     Priority: Low    Depression with anxiety 03/05/2018     Priority: Low    Benign prostatic hyperplasia without lower urinary tract symptoms 03/05/2018     Priority: Low    Chronic tension-type headache, not intractable 03/05/2018     Priority: Low    West Nile virus seropositivity 11/22/2017     Priority: Low    Status post placement of implantable loop recorder 10/31/2017     Priority: Low    Recurrent major depressive disorder, in full remission ([facility] [address] 10/17/2017     Priority: Low    Trigger middle finger of left hand 10/17/2017     Priority: Low    Peripheral native heart with unstable angina pectoris (HCC)        Essential hypertension        MI (myocardial infarction) ([facility] [address]        Acute ischemic stroke [facility])        Cerebrovascular accident (CVA) due to embolism of right middle cerebral artery ([facility] [address]        Acute MI inferior lateral first episode care ([facility] [address]            1. Coronary artery disease, status post multiple PCI's to LAD January 2011, RCA December 2012, RPL March 2015 presenting 5/24/2019 with inferior wall MI with late presentation involving a large dominant RCA with distal occlusion, not intervened on with severe mid to distal LAD restenosis and moderate left main disease, status post CABG 5/30/2019 with LIMA to 1st diagonal, SVG to LAD and SVG to ramus intermedius, ejection fraction 25% with severe inferior hypokinesis. 2. CK D stage III. 3. Diabetes mellitus. 4. Post CABG atrial fibrillation. 5. Chronic severe constipation         Subjective:  Mr. Leslie Rangel continues to diurese significantly. He is -12 L since admission. His renal functions of however remains stable with actual decrease in creatinine. He is consuming water at bedside however. He reports some chest pain that is localized on the right costochondral junction. He has not moved his bowels today. Objective:   BP (!) 81/46   Pulse 79   Temp 97.3 °F (36.3 °C) (Temporal)   Resp 17   Ht 6' 4\" (1.93 m)   Wt 251 lb (113.9 kg)   SpO2 93%   BMI 30.55 kg/m²       Intake/Output Summary (Last 24 hours) at 6/11/2019 1622  Last data filed at 6/11/2019 1430  Gross per 24 hour   Intake 670.55 ml   Output 3350 ml   Net -2679.45 ml       Prior to Admission medications    Medication Sig Start Date End Date Taking?  Authorizing Provider   amiodarone (CORDARONE) 200 MG tablet Take 1 tablet by mouth 2 times daily 6/6/19  Yes Alli Reyna MD   carvedilol (COREG) 12.5 MG tablet Take 1 tablet by mouth 2 times daily (with meals) 6/6/19  Yes Alli Reyna MD   isosorbide mononitrate (IMDUR) 30 MG extended release tablet Take 1 tablet by mouth daily 4/19/19  Yes Esth Plymouth, APRN   azelastine (ASTELIN) 0.1 % nasal spray 1 spray by Nasal route as needed for Rhinitis Use in each nostril as directed   Yes Historical Provider, MD   butalbital-acetaminophen-caffeine (FIORICET, ESGIC) -40 MG per tablet Take 1 tablet by mouth as needed for Headaches   Yes Historical Provider, MD   furosemide (LASIX) 40 MG tablet Take 40 mg by mouth as needed   Yes Historical Provider, MD   lisinopril (PRINIVIL;ZESTRIL) 20 MG tablet Take 1/2 tablet twice daily   Yes Historical Provider, MD   sertraline (ZOLOFT) 100 MG tablet Take 1 tablet by mouth daily 3/12/19  Yes Guillermina Khan MD   nitroGLYCERIN (NITROSTAT) 0.4 MG SL tablet up to max of 3 total doses. If no relief after 1 dose, call 911. 3/1/19  Yes Aldair Martin MD   tamsulosin (FLOMAX) 0.4 MG capsule TAKE 2 CAPSULES AT BEDTIME 1/22/19  Yes Guillermina Khan MD   insulin glargine (LANTUS SOLOSTAR) 100 UNIT/ML injection pen INJECT 80 UNITS INTO THE SKIN NIGHTLY 1/18/19  Yes Guillermina Khan MD   clopidogrel (PLAVIX) 75 MG tablet TAKE 1 TABLET DAILY 1/18/19  Yes Guillermina Khan MD   tiZANidine (ZANAFLEX) 2 MG tablet Take 1 tablet by mouth nightly as needed (muscle spasm) 3/12/19   Guillermina Khan MD   zoster recombinant adjuvanted vaccine [facility]) 50 MCG/0.5ML SUSR injection 1 vaccine now and repeat in 2-6 months.  11/6/18   Guillermina Khan MD        [START ON 6/12/2019] amiodarone  200 mg Oral Daily    furosemide  60 mg Oral BID    docusate sodium  100 mg Oral BID    isosorbide mononitrate  30 mg Oral BID    fluticasone  1 spray Each Nostril Daily    mupirocin   Topical Daily    sodium chloride flush  10 mL Intravenous 2 times per day    insulin lispro  0-12 Units Subcutaneous TID WC    insulin lispro  0-6 Units Subcutaneous Nightly    insulin glargine  80 Units Subcutaneous Nightly    sertraline  100 mg Oral Daily    tamsulosin  0.4 mg Oral Daily    carvedilol  6.25 mg Oral BID WC       TELEMETRY: Sinus     Physical Exam:      Physical Exam    JVP is down  1+ edema  No carotid bruits  S1 and S2 of normal intensity, no significant systolic or diastolic murmurs appreciated. No gallop noted  Good air entry bilaterally with no crepitations or wheezes  Abdomen is soft and mild tenderness with no palpable organomegaly, no abnormal pulsations are felt, no bruits are audible  Neurological status is intact  No deformities        Lab Data:  CBC:   Recent Labs     06/09/19  0256 06/10/19  0435 06/11/19  0614   WBC 8.2 9.2 9.9   HGB 9.9* 10.5* 10.8*   HCT 32.2* 33.4* 35.2*   MCV 85.4 84.8 84.4    330 319     BMP:   Recent Labs     06/10/19  0435 06/10/19  1654 06/11/19  0614    135* 138   K 4.5 4.3 4.2   CL 94* 93* 95*   CO2 27 27 28   BUN 25* 22 20   CREATININE 1.5* 1.3* 1.5*     LIVER PROFILE:   Recent Labs     06/11/19  0614   AST 17   ALT 26   BILITOT <0.2   ALKPHOS 82     PT/INR: No results for input(s): PROTIME, INR in the last 72 hours. APTT:   Recent Labs     06/09/19  2252 06/10/19  0435 06/10/19  1056   APTT 50.9* 53.3* 45.6*     BNP:  No results for input(s): BNP in the last 72 hours. CK, CKMB, Troponin: @LABRCNT (CKTOTAL:3, CKMB:3, TROPONINI:3)@    IMAGING:  Ct Abdomen Pelvis Wo Contrast Additional Contrast? None    Result Date: 6/8/2019  EXAMINATION: CT ABDOMEN PELVIS WO CONTRAST 6/8/2019 3:57 PM HISTORY: Abdominal distention Comparison: Abdominal KUB 6/8/2019 Technique: Sequential imaging was performed from the lung bases through the pubic symphysis without the use of IV contrast. Sagittal and coronal reformations were made from the original source data and reviewed. Automated exposure control was utilized for radiation dose reduction. Radiation dose: DLP 1701 mGy-cm Findings: Images are degraded by significant motion. There has been prior median sternotomy. Small pleural effusions are present bilaterally with associated compressive atelectasis. The heart is enlarged. Coronary artery calcifications are present. The blood pool is hypodense relative to the myocardium, suggesting anemia. There is trace pericardial fluid. Evaluation is limited by a lack of IV contrast. Allowing for these limitations, the liver, spleen, and adrenal glands are grossly normal in appearance. The gallbladder is surgically absent. There is a questionable lesion extending from the mid pancreatic body measuring 2.7 cm in size. The kidneys have a normal noncontrast appearance. The ureters are decompressed bilaterally. The abdominal aorta appears normal in caliber. There are scattered atherosclerotic calcifications within the aorta and its branch vessels. No pathologically enlarged central mesenteric or retroperitoneal lymph nodes are appreciated. A small hiatal hernia is present. The stomach and small bowel do not appear overly dilated. There are a few scattered colonic diverticula without evidence of acute diverticulitis. Large bowel is otherwise grossly normal in appearance. No free air or free fluid is seen in the abdomen. The urinary bladder is grossly normal in appearance. No free fluid is seen in the pelvis. No pathologically enlarged pelvic or inguinal lymph nodes are identified. Fluid is noted dependently in the subcutaneous soft tissues of the posterior pelvis and upper thighs. Review of the visualized osseous structures demonstrates no acute or aggressive lesions. Postsurgical changes are noted in the lumbar spine. Impression: 1. No acute findings in the abdomen or pelvis when allowing for limitations of significant motion as well as lack of IV contrast. 2. Cardiomegaly with bilateral pleural effusions. Subcutaneous soft tissue edema dependently in the posterior aspect of the pelvis and proximal thighs. 3. Questionable lesion extending from the pancreatic body for which nonemergent follow-up MRI abdomen with and without contrast is recommended to include MRCP.  4. Atherosclerotic disease. Signed by Dr Jessica Plascencia on 6/8/2019 4:03 PM    Xr Abdomen (kub) (single Ap View)    Result Date: 6/8/2019  EXAMINATION: XR ABDOMEN (KUB) (SINGLE AP VIEW) 6/8/2019 2:55 PM HISTORY: Abdominal distention COMPARISON: None FINDINGS: The bowel gas pattern is nonspecific and nonobstructive. A moderate amount of stool is noted in the left colon. The entire abdomen is not included on this exam. Surgical clips are seen in the right upper quadrant of the abdomen. There has been previous fusion of the lumbar spine. No pathologic calcifications are seen. No evidence of bowel obstruction. Signed by Dr Jessica Plascencia on 6/8/2019 2:56 PM    Xr Acute Abd Series Chest 1 Vw    Result Date: 5/24/2019  EXAMINATION:  XR ACUTE ABD SERIES CHEST 1 VW  5/24/2019 2:39 PM HISTORY: Abdominal distention. Diabetes. Coronary artery disease. COMPARISON: 2/28/2019. CHEST X-RAY: There is no infiltrate or effusion. There is mild bronchial wall thickening, stable. Heart size is upper limits of normal. There may be at least one coronary stent. There is a loop recorder. ABDOMEN IMAGES: There is moderate stool in the colon. There is no small bowel distention. There has been prior cholecystectomy. There has been prior lumbar surgery. There are degenerative changes of the spine. 1. No acute appearing infiltrate or effusion. Stable bronchial wall thickening. 2. Heart size upper limits of normal. No CHF. Loop recorder. Probable coronary stent. 3. Moderate stool in the colon. The bowel gas pattern is normal. Signed by Dr Oliverio Hoff on 5/24/2019 2:41 PM    Xr Chest Portable    Result Date: 6/8/2019  EXAMINATION: XR CHEST PORTABLE 6/8/2019 2:54 PM HISTORY: Chest pain COMPARISON: 6/3/2019 FINDINGS: The heart and mediastinal contours are stable. There is consolidation in the left lung base. Small pleural effusions are noted bilaterally. Diffuse increased interstitial markings are noted. Pulmonary vasculature is indistinct. There is no appreciable pneumothorax. A loop recorder device is present. There has been prior median sternotomy. Findings are suggestive of pulmonary vascular congestion and pulmonary edema with bilateral pleural effusions. Signed by Dr Thao Delgado on 6/8/2019 2:55 PM    Xr Chest Portable    Result Date: 6/3/2019  EXAMINATION: XR CHEST PORTABLE 6/3/2019 7:22 AM HISTORY: XR CHEST PORTABLE 6/3/2019 5:00 AM HISTORY: Postop COMPARISON: June 2, 2019. FINDINGS: The right lung is clear. Left diaphragm is indistinct most likely atelectasis left lower lobe. Linear atelectasis left upper lung is again noted. . Cardiac silhouettes moderately enlarged this is unchanged. Wires are present previous median sternotomy. . The osseous structures and surrounding soft tissues demonstrate no acute abnormality. 1. Postsurgical change and persistent atelectasis left lower lobe. This is unchanged from June 2. Signed by Dr Monse Morgan on 6/3/2019 7:23 AM    Xr Chest Portable    Result Date: 6/2/2019  XR CHEST PORTABLE 6/2/2019 5:00 AM HISTORY: Postop CABG Comparison: 6/1/2019 Findings: Lines and tubes are stable in position. No new opacities or pneumothoraces are visualized in the chest. The cardiomediastinal silhouette and pulmonary vascularity are unchanged. No acute osseous or soft tissue abnormality is noted. Impression: 1. No significant interval change since previous exam. Signed by Dr Chantelle Melton on 6/2/2019 7:14 AM    Xr Chest Portable    Result Date: 6/1/2019  EXAMINATION: Chest one view 6/1/2019 HISTORY: Postop CABG FINDINGS: Today's exam is compared to previous study of 5/31/2019. All support lines have been removed except for a right IJ introducer. Lungs are hypoventilated with bibasilar atelectasis. Small effusions are present. There is mild gastric distention improved from yesterday's exam. A loop recorder projects over the left chest.    1.. Right IJ introducer remains in place.  The remaining support lines have been removed. 2. Expiratory chest. There is persistent pulmonary venous hypertension with widening of the vascular pedicle. This is accentuated by the expiratory nature of the film. 3. Small effusions. Signed by Dr Lory Tellez on 6/1/2019 8:07 AM    Xr Chest Portable    Result Date: 5/31/2019  EXAMINATION: Chest one view 5/31/2019 HISTORY: Postop CABG FINDINGS: Today's exam is compared to previous study of one day earlier. A [place]-Srik[place] catheter and mediastinal drains remain in place. The patient has been extubated. There is gaseous distention of the stomach. There is persistent pulmonary venous hypertension with widening of the vascular pedicle. There is a poor inspiratory effort. 1.. Expiratory chest. There is persistent pulmonary venous hypertension. 2. Patient extubated. 3. Developing gaseous distention of the stomach. Signed by Dr Lory Tellez on 5/31/2019 6:40 AM    Xr Chest Portable    Result Date: 5/30/2019  EXAMINATION: Chest one view 5/30/2019 HISTORY: Postop CABG FINDINGS: Today's exam is compared to previous study of 6 days earlier. The lungs are expiratory causing accentuation of bronchovascular markings and cardiac silhouette. There is mild cardiomegaly with suspected mild pulmonary venous hypertension with widening of the vascular pedicle. An endotracheal tube, [place]-Srikanth catheter and mediastinal drains are all well-positioned. There is no pneumothorax or significant effusion. 1.. Expiratory chest. Pulmonary venous hypertension is also suspected with widening of the vascular pedicle. 2. Multiple life support lines in place with no complications and well-positioned.  Signed by Dr Lory Tellez on 5/30/2019 12:33 PM    Vl Dup Carotid Bilateral    Result Date: 5/28/2019  Vascular Carotid Procedure  Demographics   Patient Name   Esperanza Wood  Age                 75                 D   Patient Number [id number]        Gender              Male   Visit Number   [id number]     Yamilet Hahn !0.79   !            !               ! +------------+-------+-------+--------+-------+------------+---------------+ ! Dist ICA    ! 89.7   !19.9   !60      !0.78   !            !               ! +------------+-------+-------+--------+-------+------------+---------------+ ! Prox ECA    !117    !       !61      !       !            !               ! +------------+-------+-------+--------+-------+------------+---------------+ ! Vertebral   !66.8   !11.7   !60      !0.82   !            !               ! +------------+-------+-------+--------+-------+------------+---------------+   - There is antegrade vertebral flow noted on the right side. - Additional Measurements:ICAPSV/CCAPSV 0.91. ICAEDV/CCAEDV 1.34. Carotid Left Measurements +------------+-------+-------+--------+-------+------------+---------------+ ! Location    ! PSV    ! EDV    ! Angle   ! RI     !%Stenosis   ! Tortuosity     ! +------------+-------+-------+--------+-------+------------+---------------+ ! Prox CCA    !105    !13.4   !60      !0.87   !            !               ! +------------+-------+-------+--------+-------+------------+---------------+ ! Mid CCA     !90.4   !14.9   !60      !0.84   !            !               ! +------------+-------+-------+--------+-------+------------+---------------+ ! Prox ICA    ! 65.7   !15.8   !60      !0.76   !            !               ! +------------+-------+-------+--------+-------+------------+---------------+ ! Mid ICA     ! 88.5   !22.3   !60      !0.75   !            !               ! +------------+-------+-------+--------+-------+------------+---------------+ ! Dist ICA    !76.2   !17.6   ! 60      !0.77   !            !               ! +------------+-------+-------+--------+-------+------------+---------------+ ! Prox ECA    !141    !       !61      !       !            !               ! +------------+-------+-------+--------+-------+------------+---------------+ ! Vertebral   !78     !12.9   !60      !0.83   ! !               ! +------------+-------+-------+--------+-------+------------+---------------+   - There is antegrade vertebral flow noted on the left side. - Additional Measurements:ICAPSV/CCAPSV 0.84. ICAEDV/CCAEDV 1.66. Vl Vein Mapping Lower Bilateral    Result Date: 5/28/2019  Vascular Lower Extremity Vein Mapping Procedure  Demographics   Patient Name   Carver Dancer  Age                 75                 D   Patient Number [id number]        Gender              Male   Visit Number   [id number]     Interpreting        Louise Jaime MD                               Physician   Date of Birth  1945    Referring Physician Lidya Nichols   Accession      [id number]     Mauricearabellaviraj RVT  Number  Procedure Type of Study:   [address] [address]. Indications for Study:Pre-op CABG. Risk Factors   - The patient's risk factor(s) include: diabetes mellitus, dyslipidemia     and arterial hypertension.   - The patient's last creatinine was 1.2 mg/dl. Allergies   - Statins. Impression   Usable greater Saphenous vein conduit in both lower extremities. The veins have been marked on the skin. Sizes are noted above. Signature   ----------------------------------------------------------------  Electronically signed by Louise Jaime MD(Interpreting  physician) on 05/28/2019 03:36 PM  ----------------------------------------------------------------  Velocities are measured in cm/s ; Diameters are measured in mm +--------------------------------------++--------+-----+----+--------+-----+ ! Superficial - Great Saphenous Vein    ! ! Right   ! ! Left!        !     ! +--------------------------------------++--------+-----+----+--------+-----+ ! Location                              ! !Diameter! Depth! !Diameter! Depth! +--------------------------------------++--------+-----+----+--------+-----+ ! GSV 2 cm Distal to Junction           ! !0.9     !     !    !5.6     !     ! +--------------------------------------++--------+-----+----+--------+-----+ ! GSV High Thigh                        ! !4.3     !     !    !4.8     !     ! +--------------------------------------++--------+-----+----+--------+-----+ ! GSV Mid Thigh                         !!4       !     !    !4.4     !     ! +--------------------------------------++--------+-----+----+--------+-----+ ! GSV Low Thigh                         ! !4.7     !     !    !3.8     !     ! +--------------------------------------++--------+-----+----+--------+-----+ ! GSV Knee                              !!4.4     !     !    !3.9     !     ! +--------------------------------------++--------+-----+----+--------+-----+ ! GSV High Calf                         !!3.8     !     !    !3.4     !     ! +--------------------------------------++--------+-----+----+--------+-----+ ! GSV Mid Calf                          !!3.3     !     !    !3.8     !     ! +--------------------------------------++--------+-----+----+--------+-----+ ! GSV Ankle                             ! !3.4     !     !    !4.3     !     ! +--------------------------------------++--------+-----+----+--------+-----+        Assessment and Plan: This is a 66 y. o. year old male with past medical history ofDM, CKD 3,  extensive coronary artery disease  With multiple PCI's to LAD, RCA and OM presenting with late presentation of inferior wall MI 5/24/2019 found to have an occluded large dominant RCA not intervened on,, intermediate left main and diffuse severe mid to distal LAD restenosis, s/p CABG 5/30/19 with LIMA to D1, SVG-LAD, SVG-RI, EF 25%, post-op AF, d/tyler to N/H, not on lasix presenting with CP with NTG response, volume overload and severe constipation. 1. Has diuresed -12 L. Creatinine however lower at 1.5. At this time I would change him to oral Lasix 60 mg twice daily. Will plan for rest stress thallium viability study.   2. Complaints of dysuria with Sykes catheter. He does not wish to remove it due to convenience however. Have requested a UA and urine culture. 3. Reduce amiodarone to 200 mg once daily.         Reginald Ruiz MD 6/11/2019 4:22 PM

## 2019-06-11 NOTE — PROGRESS NOTES
Palliative Care: Met with pt to initiate Palliative Care, pt says he had a heart attack and open heart surgery, ayla godoy says 5/30/19 CABG. Past Medical History:        Past Medical History:   Diagnosis Date    Acute ischemic stroke ([address] 2/20/2017    Atherosclerotic heart disease     s/p MI, stenting x 3 jan 2011([place])    CAD (coronary artery disease)     Cerebral artery occlusion with cerebral infarction [facility])     Cerebrovascular accident (CVA) due to embolism of right middle cerebral artery ([address] 8/14/2017    CHF (congestive heart failure) (formerly [facility])     Chronic bilateral low back pain without sciatica 11/10/2016    Chronic kidney disease     DDD (degenerative disc disease), lumbar 11/15/2016    Depression     Depression with anxiety     Diabetes mellitus ([address]     type II    Diabetes mellitus, type 2 ([address]     DM (diabetes mellitus) ([address] 7/12/2011    Glaucoma     Headache     Hyperlipidemia     Cholesterol management per pcp.  Hypertension     Macular degeneration     MI (myocardial infarction) ([address]     MI (myocardial infarction) ([address]     Neuromuscular disorder ([address]     Neuropathy     Osteoarthritis     Palliative care patient 06/11/2019    Sleep apnea     Spondylosis of lumbar region without myelopathy or radiculopathy 11/15/2016    Status post placement of implantable loop recorder 10/31/2017    TIA (transient ischemic attack)        Advance Directives:    Pt says he does not have an AD/LW. Pain/Other Symptoms:    Pt is having pain and receiving pain medication. Activity:        Pt to return to activity as tolerated. Psychological/Spiritual:      Pt says he attends [facility]. Patient/Family Discussion:      Pt has a wife, three boys, and grandchildren. Plan:    Plans to return home if able. Patients goal, what they hope for:    Goal is to return home.     Provided spiritual care with sustaining presence, nurtured hope, and the Lord's prayer. Pt expressed gratitude for spiritual care.         Electronically signed by Jed Etienne on 6/11/2019 at 10:31 AM

## 2019-06-12 LAB
ANION GAP SERPL CALCULATED.3IONS-SCNC: 14 MMOL/L (ref 7–19)
BASOPHILS ABSOLUTE: 0 K/UL (ref 0–0.2)
BASOPHILS RELATIVE PERCENT: 0.3 % (ref 0–1)
BUN BLDV-MCNC: 23 MG/DL (ref 8–23)
CALCIUM SERPL-MCNC: 8.9 MG/DL (ref 8.8–10.2)
CHLORIDE BLD-SCNC: 93 MMOL/L (ref 98–111)
CO2: 28 MMOL/L (ref 22–29)
CREAT SERPL-MCNC: 1.5 MG/DL (ref 0.5–1.2)
EOSINOPHILS ABSOLUTE: 0.3 K/UL (ref 0–0.6)
EOSINOPHILS RELATIVE PERCENT: 2.5 % (ref 0–5)
GFR NON-AFRICAN AMERICAN: 46
GLUCOSE BLD-MCNC: 113 MG/DL (ref 74–109)
GLUCOSE BLD-MCNC: 147 MG/DL (ref 70–99)
GLUCOSE BLD-MCNC: 162 MG/DL (ref 70–99)
GLUCOSE BLD-MCNC: 163 MG/DL (ref 70–99)
HCT VFR BLD CALC: 31 % (ref 42–52)
HEMOGLOBIN: 9.7 G/DL (ref 14–18)
LYMPHOCYTES ABSOLUTE: 1.9 K/UL (ref 1.1–4.5)
LYMPHOCYTES RELATIVE PERCENT: 16.4 % (ref 20–40)
MCH RBC QN AUTO: 25.9 PG (ref 27–31)
MCHC RBC AUTO-ENTMCNC: 31.3 G/DL (ref 33–37)
MCV RBC AUTO: 82.9 FL (ref 80–94)
MONOCYTES ABSOLUTE: 0.9 K/UL (ref 0–0.9)
MONOCYTES RELATIVE PERCENT: 7.7 % (ref 0–10)
NEUTROPHILS ABSOLUTE: 8.4 K/UL (ref 1.5–7.5)
NEUTROPHILS RELATIVE PERCENT: 72.5 % (ref 50–65)
PDW BLD-RTO: 14.8 % (ref 11.5–14.5)
PERFORMED ON: ABNORMAL
PLATELET # BLD: 275 K/UL (ref 130–400)
PMV BLD AUTO: 10.8 FL (ref 9.4–12.4)
POTASSIUM SERPL-SCNC: 4.1 MMOL/L (ref 3.5–5)
RBC # BLD: 3.74 M/UL (ref 4.7–6.1)
SODIUM BLD-SCNC: 135 MMOL/L (ref 136–145)
WBC # BLD: 11.6 K/UL (ref 4.8–10.8)

## 2019-06-12 PROCEDURE — 6370000000 HC RX 637 (ALT 250 FOR IP): Performed by: HOSPITALIST

## 2019-06-12 PROCEDURE — 85025 COMPLETE CBC W/AUTO DIFF WBC: CPT

## 2019-06-12 PROCEDURE — 2140000000 HC CCU INTERMEDIATE R&B

## 2019-06-12 PROCEDURE — 97165 OT EVAL LOW COMPLEX 30 MIN: CPT

## 2019-06-12 PROCEDURE — 6370000000 HC RX 637 (ALT 250 FOR IP): Performed by: INTERNAL MEDICINE

## 2019-06-12 PROCEDURE — 82948 REAGENT STRIP/BLOOD GLUCOSE: CPT

## 2019-06-12 PROCEDURE — 2580000003 HC RX 258: Performed by: HOSPITALIST

## 2019-06-12 PROCEDURE — 99232 SBSQ HOSP IP/OBS MODERATE 35: CPT | Performed by: INTERNAL MEDICINE

## 2019-06-12 PROCEDURE — 3430000000 HC RX DIAGNOSTIC RADIOPHARMACEUTICAL: Performed by: INTERNAL MEDICINE

## 2019-06-12 PROCEDURE — 2500000003 HC RX 250 WO HCPCS: Performed by: INTERNAL MEDICINE

## 2019-06-12 PROCEDURE — 2580000003 HC RX 258: Performed by: INTERNAL MEDICINE

## 2019-06-12 PROCEDURE — 80048 BASIC METABOLIC PNL TOTAL CA: CPT

## 2019-06-12 PROCEDURE — 97530 THERAPEUTIC ACTIVITIES: CPT

## 2019-06-12 PROCEDURE — 97116 GAIT TRAINING THERAPY: CPT

## 2019-06-12 PROCEDURE — 97162 PT EVAL MOD COMPLEX 30 MIN: CPT

## 2019-06-12 PROCEDURE — A9505 TL201 THALLIUM: HCPCS | Performed by: INTERNAL MEDICINE

## 2019-06-12 PROCEDURE — 36415 COLL VENOUS BLD VENIPUNCTURE: CPT

## 2019-06-12 RX ORDER — THALLOUS CHLORIDE TL-201 1 MCI/ML
1 INJECTION, POWDER, LYOPHILIZED, FOR SOLUTION INTRAVENOUS
Status: COMPLETED | OUTPATIENT
Start: 2019-06-12 | End: 2019-06-12

## 2019-06-12 RX ADMIN — INSULIN LISPRO 2 UNITS: 100 INJECTION, SOLUTION INTRAVENOUS; SUBCUTANEOUS at 18:25

## 2019-06-12 RX ADMIN — ISOSORBIDE MONONITRATE 30 MG: 30 TABLET, EXTENDED RELEASE ORAL at 21:18

## 2019-06-12 RX ADMIN — OXYCODONE AND ACETAMINOPHEN 1 TABLET: 10; 325 TABLET ORAL at 09:06

## 2019-06-12 RX ADMIN — DOCUSATE SODIUM 100 MG: 100 CAPSULE, LIQUID FILLED ORAL at 21:17

## 2019-06-12 RX ADMIN — OXYCODONE AND ACETAMINOPHEN 1 TABLET: 10; 325 TABLET ORAL at 14:34

## 2019-06-12 RX ADMIN — CARVEDILOL 6.25 MG: 6.25 TABLET, FILM COATED ORAL at 09:05

## 2019-06-12 RX ADMIN — AMIODARONE HYDROCHLORIDE 200 MG: 200 TABLET ORAL at 09:05

## 2019-06-12 RX ADMIN — Medication 10 ML: at 09:04

## 2019-06-12 RX ADMIN — VALPROATE SODIUM 250 MG: 100 INJECTION, SOLUTION INTRAVENOUS at 11:00

## 2019-06-12 RX ADMIN — Medication 60 UNITS: at 21:17

## 2019-06-12 RX ADMIN — OXYCODONE AND ACETAMINOPHEN 1 TABLET: 10; 325 TABLET ORAL at 05:08

## 2019-06-12 RX ADMIN — ISOSORBIDE MONONITRATE 30 MG: 30 TABLET, EXTENDED RELEASE ORAL at 09:05

## 2019-06-12 RX ADMIN — THALLOUS CHLORIDE TL-201 3 MILLICURIE: 1 INJECTION, POWDER, LYOPHILIZED, FOR SOLUTION INTRAVENOUS at 14:11

## 2019-06-12 RX ADMIN — SACUBITRIL AND VALSARTAN 1 TABLET: 49; 51 TABLET, FILM COATED ORAL at 21:18

## 2019-06-12 RX ADMIN — INSULIN LISPRO 1 UNITS: 100 INJECTION, SOLUTION INTRAVENOUS; SUBCUTANEOUS at 21:16

## 2019-06-12 RX ADMIN — TAMSULOSIN HYDROCHLORIDE 0.4 MG: 0.4 CAPSULE ORAL at 09:05

## 2019-06-12 RX ADMIN — CARVEDILOL 6.25 MG: 6.25 TABLET, FILM COATED ORAL at 18:24

## 2019-06-12 RX ADMIN — FUROSEMIDE 60 MG: 40 TABLET ORAL at 09:04

## 2019-06-12 RX ADMIN — Medication 10 ML: at 21:19

## 2019-06-12 RX ADMIN — SERTRALINE HYDROCHLORIDE 100 MG: 100 TABLET ORAL at 09:05

## 2019-06-12 RX ADMIN — FUROSEMIDE 60 MG: 40 TABLET ORAL at 18:24

## 2019-06-12 RX ADMIN — THALLOUS CHLORIDE TL-201 1 MILLICURIE: 1 INJECTION, POWDER, LYOPHILIZED, FOR SOLUTION INTRAVENOUS at 14:12

## 2019-06-12 RX ADMIN — OXYCODONE AND ACETAMINOPHEN 1 TABLET: 10; 325 TABLET ORAL at 22:53

## 2019-06-12 ASSESSMENT — PAIN DESCRIPTION - PAIN TYPE
TYPE: ACUTE PAIN
TYPE: ACUTE PAIN

## 2019-06-12 ASSESSMENT — PAIN SCALES - GENERAL
PAINLEVEL_OUTOF10: 7
PAINLEVEL_OUTOF10: 2
PAINLEVEL_OUTOF10: 7
PAINLEVEL_OUTOF10: 7
PAINLEVEL_OUTOF10: 2

## 2019-06-12 ASSESSMENT — PAIN SCALES - WONG BAKER
WONGBAKER_NUMERICALRESPONSE: 2
WONGBAKER_NUMERICALRESPONSE: 4

## 2019-06-12 ASSESSMENT — PAIN DESCRIPTION - LOCATION
LOCATION: CHEST
LOCATION: CHEST

## 2019-06-12 ASSESSMENT — PAIN DESCRIPTION - DESCRIPTORS: DESCRIPTORS: ACHING

## 2019-06-12 NOTE — PROGRESS NOTES
06/12/19 1512   General   Missed reason Patient declined   Subjective   Subjective Pt states he is too cold and tired to get out of the chair and work with therapy   Physical Therapy  Db Mallory  [id number]  Electronically signed by Alyssa Leyva PTA on 6/12/2019 at 3:14 PM

## 2019-06-12 NOTE — PROGRESS NOTES
Cardiology Progress Note Yesy Mckeon MD      Patient:  Jesus Dey  [id number]    Patient Active Problem List    Diagnosis Date Noted    ACS (acute coronary syndrome) [facility])      Priority: High    Palliative care patient 06/11/2019     Priority: Low    Chronic constipation 06/10/2019     Priority: Low    Elevated troponin      Priority: Low    S/P CABG x 3, LIMA- DIAG, A0- SVG- LAD, A0- SVG- Ramus, EF 30-35% (5/30/2019) 06/01/2019     Priority: Low    Acute MI inferior lateral first episode care ([facility] [address] 05/31/2019     Priority: Low    Stage 3 chronic kidney disease ([facility] [address] 05/31/2019     Priority: Low    Chronic shortness of breath 04/15/2019     Priority: Low    Chest pain 02/28/2019     Priority: Low     Overview Note:     1/2011 stents x 3 [facility])  5/4/2011  Cath  Patent stents in the mid LAD, stent to OM1, PTCA to the D2, normal LVFX  12/12/12  Cath patent stents in the mid LAD, PTCA to the D3, stent to distal LAD, normal LVFX  3/29/2015  Cath  Stent to the PLOM, normal LVFX  11/1/2019  Loop placed  3/1/19 lexiscan negative for myocardial ischemia, EF 50  %   5/24/19 ACS PARKER RISK Score 5 (angina, + troponin, CAD>50, age>65, plavix ), AUC indication 3, AUC score 9   5/25/19  Cath 50% distal LM, 100% mid LAD, 80% diag, 100% mid RCA, inferior hypokinesis, EF 45%              MOO on CPAP 05/03/2018     Priority: Low    Dermatitis 03/05/2018     Priority: Low    Depression with anxiety 03/05/2018     Priority: Low    Benign prostatic hyperplasia without lower urinary tract symptoms 03/05/2018     Priority: Low    Chronic tension-type headache, not intractable 03/05/2018     Priority: Low    West Nile virus seropositivity 11/22/2017     Priority: Low    Status post placement of implantable loop recorder 10/31/2017     Priority: Low    Recurrent major depressive disorder, in full remission ([facility] [address] 10/17/2017     Priority: Low    Trigger middle finger of left hand 10/17/2017     Priority: Low    Peripheral polyneuropathy 10/17/2017     Priority: Low    Palpitations 10/17/2017     Priority: Low    Chronic insomnia 10/17/2017     Priority: Low    Cerebrovascular accident (CVA) due to embolism of right middle cerebral artery ([address] 08/14/2017     Priority: Low    Dizziness 03/27/2017     Priority: Low    Imbalance 03/27/2017     Priority: Low    Thoracic outlet syndrome 02/21/2017     Priority: Low     Overview Note:     L sided on exam 2/21/17      Acute ischemic stroke ([address] 02/20/2017     Priority: Low    DDD (degenerative disc disease), lumbar 11/15/2016     Priority: Low    Spondylosis of lumbar region without myelopathy or radiculopathy 11/15/2016     Priority: Low    Chronic bilateral low back pain without sciatica 11/10/2016     Priority: Low    Mixed hyperlipidemia 07/23/2012     Priority: Low    MI (myocardial infarction) ([address]      Priority: Low    Type 2 diabetes mellitus with diabetic polyneuropathy, with long-term current use of insulin ([address] 07/12/2011     Priority: Low    Essential hypertension 07/12/2011     Priority: Low    Coronary artery disease involving native coronary artery of native heart with unstable angina pectoris ([address] 07/12/2011     Priority: Low       Admit Date:  6/8/2019    Admission Problem List: Present on Admission:   Chest pain   Benign prostatic hyperplasia without lower urinary tract symptoms   Chronic bilateral low back pain without sciatica   Essential hypertension   MOO on CPAP   Stage 3 chronic kidney disease (HCC)   Type 2 diabetes mellitus with diabetic polyneuropathy, with long-term current use of insulin ([address]   Coronary artery disease involving native coronary artery of native heart with unstable angina pectoris ([address]   Chronic constipation   Palliative care patient      Cardiac Specific Data:  Specialty Problems        Cardiology Problems    ACS (acute coronary syndrome) ([address]        Coronary artery disease involving native coronary artery of native heart with unstable angina pectoris (HCC)        Essential hypertension        MI (myocardial infarction) ([facility] [address]        Acute ischemic stroke [facility])        Cerebrovascular accident (CVA) due to embolism of right middle cerebral artery ([facility] [address]        Acute MI inferior lateral first episode care ([facility] [address]            1. Coronary artery disease, status post multiple PCI's to LAD January 2011, RCA December 2012, RPL March 2015 presenting 5/24/2019 with inferior wall MI with late presentation involving a large dominant RCA with distal occlusion, not intervened on with severe mid to distal LAD restenosis and moderate left main disease, status post CABG 5/30/2019 with LIMA to 1st diagonal, SVG to LAD and SVG to ramus intermedius, ejection fraction 25% with severe inferior hypokinesis. Thallium viability study with large inferolateral, predominantly lateral infarct with no significant reversibility. 2. CK D stage III. 3. Diabetes mellitus. 4. Post CABG atrial fibrillation. 5. Chronic severe constipation      Subjective:  Mr. Caitlyn Steen is doing better, still diuresing -1.5 L on oral Lasix. He has not moved his bowels today. Objective:   /64   Pulse 88   Temp 98.1 °F (36.7 °C) (Temporal)   Resp 16   Ht 6' 4\" (1.93 m)   Wt 256 lb 12.8 oz (116.5 kg)   SpO2 90%   BMI 31.26 kg/m²       Intake/Output Summary (Last 24 hours) at 6/12/2019 1756  Last data filed at 6/12/2019 1546  Gross per 24 hour   Intake 360 ml   Output 1300 ml   Net -940 ml       Prior to Admission medications    Medication Sig Start Date End Date Taking?  Authorizing Provider   amiodarone (CORDARONE) 200 MG tablet Take 1 tablet by mouth 2 times daily 6/6/19  Yes Iveth Rivera MD   carvedilol (COREG) 12.5 MG tablet Take 1 tablet by mouth 2 times daily (with meals) 6/6/19  Yes Iveth Rivera MD   isosorbide mononitrate (IMDUR) 30 MG extended release tablet Take 1 tablet by mouth daily 4/19/19  Yes TEGAN Zuluaga   azelastine (ASTELIN) 0.1 % nasal spray 1 spray by Nasal route as needed for Rhinitis Use in each nostril as directed   Yes Historical Provider, MD   butalbital-acetaminophen-caffeine (FIORICET, ESGIC) -40 MG per tablet Take 1 tablet by mouth as needed for Headaches   Yes Historical Provider, MD   furosemide (LASIX) 40 MG tablet Take 40 mg by mouth as needed   Yes Historical Provider, MD   lisinopril (PRINIVIL;ZESTRIL) 20 MG tablet Take 1/2 tablet twice daily   Yes Historical Provider, MD   sertraline (ZOLOFT) 100 MG tablet Take 1 tablet by mouth daily 3/12/19  Yes Vonnie Austin MD   nitroGLYCERIN (NITROSTAT) 0.4 MG SL tablet up to max of 3 total doses. If no relief after 1 dose, call 911. 3/1/19  Yes Judah Arango MD   tamsulosin (FLOMAX) 0.4 MG capsule TAKE 2 CAPSULES AT BEDTIME 1/22/19  Yes Vonnie Austin MD   insulin glargine (LANTUS SOLOSTAR) 100 UNIT/ML injection pen INJECT 80 UNITS INTO THE SKIN NIGHTLY 1/18/19  Yes Vonnie Austin MD   clopidogrel (PLAVIX) 75 MG tablet TAKE 1 TABLET DAILY 1/18/19  Yes Vonnie Austin MD   tiZANidine (ZANAFLEX) 2 MG tablet Take 1 tablet by mouth nightly as needed (muscle spasm) 3/12/19   Vonnie Austin MD   zoster recombinant adjuvanted vaccine [facility]) 50 MCG/0.5ML SUSR injection 1 vaccine now and repeat in 2-6 months.  11/6/18   Vonnie Austin MD        amiodarone  200 mg Oral Daily    furosemide  60 mg Oral BID    docusate sodium  100 mg Oral BID    isosorbide mononitrate  30 mg Oral BID    fluticasone  1 spray Each Nostril Daily    mupirocin   Topical Daily    sodium chloride flush  10 mL Intravenous 2 times per day    insulin lispro  0-12 Units Subcutaneous TID WC    insulin lispro  0-6 Units Subcutaneous Nightly    insulin glargine  80 Units Subcutaneous Nightly    sertraline  100 mg Oral Daily    tamsulosin  0.4 mg Oral Daily    carvedilol  6.25 mg Oral BID WC       TELEMETRY: Sinus     Physical Exam:      Physical Exam    No JVD  No edema  No carotid bruits  S1 and S2 of normal intensity, no significant systolic or diastolic murmurs appreciated. No gallop noted  Good air entry bilaterally with no crepitations or wheezes  Abdomen is soft and nontender with no palpable organomegaly, no abnormal pulsations are felt, no bruits are audible  Neurological status is intact  No deformities        Lab Data:  CBC:   Recent Labs     06/10/19  0435 06/11/19  0614 06/12/19  0111   WBC 9.2 9.9 11.6*   HGB 10.5* 10.8* 9.7*   HCT 33.4* 35.2* 31.0*   MCV 84.8 84.4 82.9    319 275     BMP:   Recent Labs     06/10/19  1654 06/11/19  0614 06/12/19  0111   * 138 135*   K 4.3 4.2 4.1   CL 93* 95* 93*   CO2 27 28 28   BUN 22 20 23   CREATININE 1.3* 1.5* 1.5*     LIVER PROFILE:   Recent Labs     06/11/19  0614   AST 17   ALT 26   BILITOT <0.2   ALKPHOS 82     PT/INR: No results for input(s): PROTIME, INR in the last 72 hours. APTT:   Recent Labs     06/09/19  2252 06/10/19  0435 06/10/19  1056   APTT 50.9* 53.3* 45.6*     BNP:  No results for input(s): BNP in the last 72 hours. CK, CKMB, Troponin: @LABRCNT (CKTOTAL:3, CKMB:3, TROPONINI:3)@    IMAGING:  Ct Abdomen Pelvis Wo Contrast Additional Contrast? None    Result Date: 6/8/2019  EXAMINATION: CT ABDOMEN PELVIS WO CONTRAST 6/8/2019 3:57 PM HISTORY: Abdominal distention Comparison: Abdominal KUB 6/8/2019 Technique: Sequential imaging was performed from the lung bases through the pubic symphysis without the use of IV contrast. Sagittal and coronal reformations were made from the original source data and reviewed. Automated exposure control was utilized for radiation dose reduction. Radiation dose: DLP 1701 mGy-cm Findings: Images are degraded by significant motion. There has been prior median sternotomy. Small pleural effusions are present bilaterally with associated compressive atelectasis. The heart is enlarged. Coronary artery calcifications are present.  The blood pool is hypodense relative to the myocardium, suggesting 6/8/2019  EXAMINATION: XR ABDOMEN (KUB) (SINGLE AP VIEW) 6/8/2019 2:55 PM HISTORY: Abdominal distention COMPARISON: None FINDINGS: The bowel gas pattern is nonspecific and nonobstructive. A moderate amount of stool is noted in the left colon. The entire abdomen is not included on this exam. Surgical clips are seen in the right upper quadrant of the abdomen. There has been previous fusion of the lumbar spine. No pathologic calcifications are seen. No evidence of bowel obstruction. Signed by Dr Aric Ceja on 6/8/2019 2:56 PM    Xr Acute Abd Series Chest 1 Vw    Result Date: 5/24/2019  EXAMINATION:  XR ACUTE ABD SERIES CHEST 1 VW  5/24/2019 2:39 PM HISTORY: Abdominal distention. Diabetes. Coronary artery disease. COMPARISON: 2/28/2019. CHEST X-RAY: There is no infiltrate or effusion. There is mild bronchial wall thickening, stable. Heart size is upper limits of normal. There may be at least one coronary stent. There is a loop recorder. ABDOMEN IMAGES: There is moderate stool in the colon. There is no small bowel distention. There has been prior cholecystectomy. There has been prior lumbar surgery. There are degenerative changes of the spine. 1. No acute appearing infiltrate or effusion. Stable bronchial wall thickening. 2. Heart size upper limits of normal. No CHF. Loop recorder. Probable coronary stent. 3. Moderate stool in the colon. The bowel gas pattern is normal. Signed by Dr Amaris Narvaez on 5/24/2019 2:41 PM    Xr Chest Portable    Result Date: 6/8/2019  EXAMINATION: XR CHEST PORTABLE 6/8/2019 2:54 PM HISTORY: Chest pain COMPARISON: 6/3/2019 FINDINGS: The heart and mediastinal contours are stable. There is consolidation in the left lung base. Small pleural effusions are noted bilaterally. Diffuse increased interstitial markings are noted. Pulmonary vasculature is indistinct. There is no appreciable pneumothorax. A loop recorder device is present. There has been prior median sternotomy.     Findings are suggestive of pulmonary vascular congestion and pulmonary edema with bilateral pleural effusions. Signed by Dr Delia Thrasher on 6/8/2019 2:55 PM    Xr Chest Portable    Result Date: 6/3/2019  EXAMINATION: XR CHEST PORTABLE 6/3/2019 7:22 AM HISTORY: XR CHEST PORTABLE 6/3/2019 5:00 AM HISTORY: Postop COMPARISON: June 2, 2019. FINDINGS: The right lung is clear. Left diaphragm is indistinct most likely atelectasis left lower lobe. Linear atelectasis left upper lung is again noted. . Cardiac silhouettes moderately enlarged this is unchanged. Wires are present previous median sternotomy. . The osseous structures and surrounding soft tissues demonstrate no acute abnormality. 1. Postsurgical change and persistent atelectasis left lower lobe. This is unchanged from June 2. Signed by Dr Odalis Linares on 6/3/2019 7:23 AM    Xr Chest Portable    Result Date: 6/2/2019  XR CHEST PORTABLE 6/2/2019 5:00 AM HISTORY: Postop CABG Comparison: 6/1/2019 Findings: Lines and tubes are stable in position. No new opacities or pneumothoraces are visualized in the chest. The cardiomediastinal silhouette and pulmonary vascularity are unchanged. No acute osseous or soft tissue abnormality is noted. Impression: 1. No significant interval change since previous exam. Signed by Dr Eva Silverio on 6/2/2019 7:14 AM    Xr Chest Portable    Result Date: 6/1/2019  EXAMINATION: Chest one view 6/1/2019 HISTORY: Postop CABG FINDINGS: Today's exam is compared to previous study of 5/31/2019. All support lines have been removed except for a right IJ introducer. Lungs are hypoventilated with bibasilar atelectasis. Small effusions are present. There is mild gastric distention improved from yesterday's exam. A loop recorder projects over the left chest.    1.. Right IJ introducer remains in place. The remaining support lines have been removed. 2. Expiratory chest. There is persistent pulmonary venous hypertension with widening of the vascular pedicle.  This is accentuated by the expiratory nature of the film. 3. Small effusions. Signed by Dr Jessy Freedman on 6/1/2019 8:07 AM    Xr Chest Portable    Result Date: 5/31/2019  EXAMINATION: Chest one view 5/31/2019 HISTORY: Postop CABG FINDINGS: Today's exam is compared to previous study of one day earlier. A Providence-Srikanth catheter and mediastinal drains remain in place. The patient has been extubated. There is gaseous distention of the stomach. There is persistent pulmonary venous hypertension with widening of the vascular pedicle. There is a poor inspiratory effort. 1.. Expiratory chest. There is persistent pulmonary venous hypertension. 2. Patient extubated. 3. Developing gaseous distention of the stomach. Signed by Dr Jessy Freedman on 5/31/2019 6:40 AM    Xr Chest Portable    Result Date: 5/30/2019  EXAMINATION: Chest one view 5/30/2019 HISTORY: Postop CABG FINDINGS: Today's exam is compared to previous study of 6 days earlier. The lungs are expiratory causing accentuation of bronchovascular markings and cardiac silhouette. There is mild cardiomegaly with suspected mild pulmonary venous hypertension with widening of the vascular pedicle. An endotracheal tube, Providence-Srikanth catheter and mediastinal drains are all well-positioned. There is no pneumothorax or significant effusion. 1.. Expiratory chest. Pulmonary venous hypertension is also suspected with widening of the vascular pedicle. 2. Multiple life support lines in place with no complications and well-positioned.  Signed by Dr Jessy Freedman on 5/30/2019 12:33 PM    Vl Dup Carotid Bilateral    Result Date: 5/28/2019  Vascular Carotid Procedure  Demographics   Patient Name   Avril Connors  Age                 75                 D   Patient Number [id number]        Gender              Male   Visit Number   [id number]     Interpreting        Tanya Naik MD                               Physician   Date of Birth  1945    Referring Physician Kaleb Gibson [place]   Accession      [id number]     2017 [facility] RVT  Number  Procedure Type of Study:   Cerebral:Carotid, VL CAROTID BILATERAL. Indications for Study:Pre-op CABG. Risk Factors   - The patient's risk factor(s) include: diabetes mellitus, dyslipidemia     and arterial hypertension.   - The patient's last creatinine was 1.2 mg/dl. - The patient's risk factor(s) include: Prior CVA, , Allergies   - Statins. Impression   There is smooth plaque visualized in the bilateral internal carotid  artery(ies). There is no stenosis in the right internal carotid artery. There is no stenosis of the left internal carotid artery. There is normal antegrade flow in the bilateral vertebral artery(ies). Signature   ----------------------------------------------------------------  Electronically signed by Deejay Padgett MD(Interpreting  physician) on 05/28/2019 03:37 PM  ----------------------------------------------------------------  Velocities are measured in cm/s ; Diameters are measured in mm Carotid Right Measurements +------------+-------+-------+--------+-------+------------+---------------+ ! Location    ! PSV    ! EDV    ! Angle   ! RI     !%Stenosis   ! Tortuosity     ! +------------+-------+-------+--------+-------+------------+---------------+ ! Prox CCA    !99     !14.9   !60      !0.85   !            !               ! +------------+-------+-------+--------+-------+------------+---------------+ ! Mid CCA     !70.4   !13.5   !60      !0.81   !            !               ! +------------+-------+-------+--------+-------+------------+---------------+ ! Prox ICA    ! 53.9   !11.7   !60      !0.78   !            !               ! +------------+-------+-------+--------+-------+------------+---------------+ ! Mid ICA     ! 89.1   !18.8   !60      !0.79   !            !               ! +------------+-------+-------+--------+-------+------------+---------------+ ! Dist ICA !89.7   !19.9   !60      !0.78   !            !               ! +------------+-------+-------+--------+-------+------------+---------------+ ! Prox ECA    !117    !       !61      !       !            !               ! +------------+-------+-------+--------+-------+------------+---------------+ ! Vertebral   !66.8   !11.7   !60      !0.82   !            !               ! +------------+-------+-------+--------+-------+------------+---------------+   - There is antegrade vertebral flow noted on the right side. - Additional Measurements:ICAPSV/CCAPSV 0.91. ICAEDV/CCAEDV 1.34. Carotid Left Measurements +------------+-------+-------+--------+-------+------------+---------------+ ! Location    ! PSV    ! EDV    ! Angle   ! RI     !%Stenosis   ! Tortuosity     ! +------------+-------+-------+--------+-------+------------+---------------+ ! Prox CCA    !105    !13.4   !60      !0.87   !            !               ! +------------+-------+-------+--------+-------+------------+---------------+ ! Mid CCA     !90.4   !14.9   !60      !0.84   !            !               ! +------------+-------+-------+--------+-------+------------+---------------+ ! Prox ICA    ! 65.7   !15.8   !60      !0.76   !            !               ! +------------+-------+-------+--------+-------+------------+---------------+ ! Mid ICA     ! 88.5   !22.3   !60      !0.75   !            !               ! +------------+-------+-------+--------+-------+------------+---------------+ ! Dist ICA    !76.2   !17.6   ! 60      !0.77   !            !               ! +------------+-------+-------+--------+-------+------------+---------------+ ! Prox ECA    !141    !       !61      !       !            !               ! +------------+-------+-------+--------+-------+------------+---------------+ ! Vertebral   !78     !12.9   !60      !0.83   !            !               ! +------------+-------+-------+--------+-------+------------+---------------+   - There is antegrade vertebral flow noted on the left side. - Additional Measurements:ICAPSV/CCAPSV 0.84. ICAEDV/CCAEDV 1.66. Vl Vein Mapping Lower Bilateral    Result Date: 5/28/2019  Vascular Lower Extremity Vein Mapping Procedure  Demographics   Patient Name   Dianne Shah  Age                 75                 D   Patient Number [id number]        Gender              Male   Visit Number   [id number]     Interpreting        Carmine Severe MD                               Physician   Date of Birth  1945    Referring Physician Abril Ortega   Accession      [id number]     [facility] RVT  Number  Procedure Type of Study:   [address] [address]. Indications for Study:Pre-op CABG. Risk Factors   - The patient's risk factor(s) include: diabetes mellitus, dyslipidemia     and arterial hypertension.   - The patient's last creatinine was 1.2 mg/dl. Allergies   - Statins. Impression   Usable greater Saphenous vein conduit in both lower extremities. The veins have been marked on the skin. Sizes are noted above. Signature   ----------------------------------------------------------------  Electronically signed by Carmine Severe MD(Interpreting  physician) on 05/28/2019 03:36 PM  ----------------------------------------------------------------  Velocities are measured in cm/s ; Diameters are measured in mm +--------------------------------------++--------+-----+----+--------+-----+ ! Superficial - Great Saphenous Vein    ! ! Right   ! ! Left!        !     ! +--------------------------------------++--------+-----+----+--------+-----+ ! Location                              ! !Diameter! Depth! !Diameter! Depth! +--------------------------------------++--------+-----+----+--------+-----+ ! GSV 2 cm Distal to Junction           ! !0.9     !     !    !5.6     !     ! +--------------------------------------++--------+-----+----+--------+-----+ !GSV High Thigh                        ! !4.3     !     !    !4.8     !     ! +--------------------------------------++--------+-----+----+--------+-----+ ! GSV Mid Thigh                         !!4       !     !    !4.4     !     ! +--------------------------------------++--------+-----+----+--------+-----+ ! GSV Low Thigh                         ! !4.7     !     !    !3.8     !     ! +--------------------------------------++--------+-----+----+--------+-----+ ! GSV Knee                              !!4.4     !     !    !3.9     !     ! +--------------------------------------++--------+-----+----+--------+-----+ ! GSV High Calf                         !!3.8     !     !    !3.4     !     ! +--------------------------------------++--------+-----+----+--------+-----+ ! GSV Mid Calf                          !!3.3     !     !    !3.8     !     ! +--------------------------------------++--------+-----+----+--------+-----+ ! GSV Ankle                             ! !3.4     !     !    !4.3     !     ! +--------------------------------------++--------+-----+----+--------+-----+        Assessment and Plan: This is a 66 y. o. year old male with past medical history ofDM, CKD 3,  extensive coronary artery disease  With multiple PCI's to LAD, RCA and OM presenting with late presentation of inferior wall MI 5/24/2019 found to have an occluded large dominant RCA not intervened on,, intermediate left main and diffuse severe mid to distal LAD restenosis, s/p CABG 5/30/19 with LIMA to D1, SVG-LAD, SVG-RI, EF 25%, post-op AF, d/tyler to N/H, not on lasix presenting with CP with NTG response, volume overload and severe constipation. 1. Thallium viability study reviewed. There is a large inferolateral, predominantly lateral defect with no significant reversibility consistent with infarction and no significant viability in this territory. He would not benefit from any revascularization attempt to his RCA based on this study.  He will need aggressive heart failure management. We'll start Entresto 49/51 mg today.     Lana Thompson MD 6/12/2019 5:56 PM

## 2019-06-12 NOTE — CARE COORDINATION
Spoke with pt who reports intentions to return to [facility] for rehab. Pt states \"ready to get going(with therapy), so he can get home! \"     SW spoke with CCU WINSTON Petersen who states ready for pt to transfer to PCU, but the unit lacks bed availability. SW informed would request the insurance precert in an attempt to have the pt ready for dc when stable. WINSTON Petersen entered PT/OT orders for the pt precert. SW contacted Tracey Pallas with [facility] to request beginning the precert.

## 2019-06-12 NOTE — PROGRESS NOTES
Pt just moved to 723 from CCU. He is up in chair and tells me he does not feel well. States he is in pian. Pt is grimacing and restless. Questioned where and rating. Pt states he has pain equal to 7 in his shoulders and back. This nurse requested from Pt nurse,pain meds per pt request.Pt asked if I would pour his 7up into a cup for him. PCA provided warm blankets per pt request.  No other needs at this time voiced.   Electronically signed by Bety Naidu RN on 6/12/2019 at 2:51 PM

## 2019-06-12 NOTE — PROGRESS NOTES
HOSPITAL MEDICINE  - PROGRESS NOTE    Admit Date: 6/8/2019       Chief Complaint: CABG, chest pain with elevated troponin, heart failure, constipation follow-up    Subjective:   Resting comfortably in bed in the CCU. Patient off heparin and nitroglycerin drips. Patient with no more chest pain since yesterday morning. Patient states he continues to have bowel movements. No Shortness of breath or cough. No nausea vomiting. No fevers or chills. Does state that he is having headaches \"wax and wane\" and severe at time. No other complaints today. Hospital course:  75 y. o. male with PMH on systolic CHF, CAD,DM. HTN, HLD, MOO  s/p CABG in 5/30/19  who presented to emergency department with crushing chest pain that started earlier in the day when he was at [address] [place] [address] home.   Pain radiated down his left arm and into left neck and was described as crushing. One nitroglycerin relieved the pain. Distended abdomen on admission with chronic constipation issues. No bowel movement in previous 3 days. Patient also found with positive troponin. Patient administered 324 mg of aspirin by EMS. Off heparin and nitro drips, cardiology following.         ROS:  Pertinent items are noted in HPI. 14 point reveiw of systems otherwise negative.           Data:   Scheduled Meds: Reviewed  Continuous Infusions:   dextrose         Intake/Output Summary (Last 24 hours) at 6/12/2019 0927  Last data filed at 6/12/2019 0508  Gross per 24 hour   Intake 600 ml   Output 1375 ml   Net -775 ml     Recent Labs     06/10/19  0435 06/11/19  0614 06/12/19  0111   WBC 9.2 9.9 11.6*   HGB 10.5* 10.8* 9.7*    319 275     Recent Labs     06/10/19  1654 06/11/19  0614 06/12/19  0111   * 138 135*   K 4.3 4.2 4.1   CL 93* 95* 93*   CO2 27 28 28   BUN 22 20 23   CREATININE 1.3* 1.5* 1.5*   GLUCOSE 126* 97 113*     Recent Labs     06/11/19  0614   AST 17   ALT 26   BILITOT <0.2 ALKPHOS 82     Troponin T:   No results for input(s): TROPONINI in the last 72 hours. Pro-BNP: No results for input(s): BNP in the last 72 hours. INR:   No results for input(s): INR in the last 72 hours. ABGs:   Lab Results   Component Value Date    PHART 7.350 05/31/2019    PO2ART 85.0 05/31/2019    XZM2NCS 41.0 05/31/2019     CT abdomen/pelvis:  Impression   Impression:   1. No acute findings in the abdomen or pelvis when allowing for   limitations of significant motion as well as lack of IV contrast.   2. Cardiomegaly with bilateral pleural effusions. Subcutaneous soft   tissue edema dependently in the posterior aspect of the pelvis and   proximal thighs. 3. Questionable lesion extending from the pancreatic body for which   nonemergent follow-up MRI abdomen with and without contrast is   recommended to include MRCP. 4. Atherosclerotic disease. Signed by Dr Jessica Plascencia on 6/8/2019 4:03 PM         Objective:   Vitals: BP (!) 150/61   Pulse 70   Temp 97.7 °F (36.5 °C) (Temporal)   Resp 15   Ht 6' 4\" (1.93 m)   Wt 253 lb (114.8 kg)   SpO2 93%   BMI 30.80 kg/m²   General appearance: No apparent distress, appears stated age and cooperative. HEENT: Normal cephalic, atraumatic without obvious deformity. Pupils equal, round, and reactive to light. Extra ocular muscles intact. Conjunctivae/corneas clear. Neck: Supple, with full range of motion. No jugular venous distention. Trachea midline. No thyroid tenderness. No masses palpated. Respiratory:  Normal respiratory effort. Clear to auscultation, bilaterally without Rales/Wheezes/Rhonchi. Mildly diminished bilateral bases. Cardiovascular: Regular rate and rhythm with normal S1/S2 without murmurs, rubs or gallops. CABG incision healing well. Abdomen: Soft, non-tender, non-distended with normal bowel sounds. No hepatosplenomegaly. Musculoskeletal: No clubbing, cyanosis. Trace edema bilaterally.   Full range of motion without deformity. Skin: Skin color, texture, turgor normal.  No rashes or lesions. Neurologic:  Neurovascularly intact without any focal sensory/motor deficits. Cranial nerves: II-XII intact, grossly non-focal.  Psychiatric: Alert and oriented, thought content appropriate, normal insight      Assessment & Plan: Active Hospital Problems    Diagnosis Date Noted    Palliative care patient [Z51.5] 06/11/2019    Chronic constipation [K59.09] 06/10/2019    Stage 3 chronic kidney disease ([facility] [N18.3] 05/31/2019    Chest pain [R07.9] 02/28/2019    MOO on CPAP [G47.33, Z99.89] 05/03/2018    Benign prostatic hyperplasia without lower urinary tract symptoms [N40.0] 03/05/2018    Chronic bilateral low back pain without sciatica [M54.5, G89.29] 11/10/2016    Essential hypertension [I10] 07/12/2011    Type 2 diabetes mellitus with diabetic polyneuropathy, with long-term current use of insulin ([facility] [E11.42, Z79.4] 07/12/2011    Coronary artery disease involving native coronary artery of native heart with unstable angina pectoris (HCC) [I25.110] 07/12/2011        Depacon 250 mg IVPB times one for headache. Cardiology following. Heparin and nitro drips discontinued. Lactulose and Colace discontined. Repeat BMP and CBC in a.m. Home medications resumed as clinically indicated. Okay to PCU, awaiting bed availability on PCU.  following in regards to discharge back to [facility] for ongoing rehabilitation, PT/OT evals ordered since will need another Pre-Cert to return to [facility]. See all orders. Further condition per clinical course. Condition stable.       Magdy Zhang DO

## 2019-06-12 NOTE — PROGRESS NOTES
Waiting for PCU bed on tele monitor. Stood at side of bed back care than to chair. Tolerated all activity well.

## 2019-06-12 NOTE — PROGRESS NOTES
Laly Molina received from CCU to room # 723 . Mental Status: Patient is oriented and alert. Vitals:    06/12/19 1421   BP: 119/64   Pulse: 88   Resp: 16   Temp: 98.1 °F (36.7 °C)   SpO2: 90%     Placed on cardiac monitor: Yes. Box # 479. Belongings: None, Glasses with patient at bedside . Family at bedside No.  Oriented Patient to room. Call light within reach. Yes. Transfer was: Well tolerated by patient. .    Electronically signed by Loyda Ware RN on 6/12/2019 at 2:22 PM

## 2019-06-12 NOTE — PROGRESS NOTES
([address] [place] [address]); Coronary angioplasty with stent; Diagnostic Cardiac Cath Lab Procedure (143262); Cardiac catheterization (12/12/12); Cardiac catheterization (3/29/15  [place]); lumbar fusion (Left, 11/15/2016); Colonoscopy; back surgery; joint replacement; Appendectomy; Cholecystectomy; and Coronary artery bypass graft (N/A, 5/30/2019). Treatment Diagnosis: Chest pain, Heart failure      Restrictions  Restrictions/Precautions  Restrictions/Precautions: Fall Risk  Position Activity Restriction  Sternal Precautions: No Pushing, No Pulling, 8# Lifting Restrictions    Subjective   General  Chart Reviewed: Yes  Patient assessed for rehabilitation services?: Yes  Additional Pertinent Hx: CABG on 5-30-19. Went to [facility] for rehab but began medical decline and readmitted to hospital  Family / Caregiver Present: No  Diagnosis: Chest pain, heart failure  Pain Assessment  Pain Assessment: Faces  Francis-Baker Pain Rating: Hurts a little bit  Pain Type: Acute pain  Pain Location: Chest  Pain Descriptors: Aching  Social/Functional History  Social/Functional History  Lives With: Spouse(Was admitted to hospital from [address] [place] [address])  Type of Home: House  ADL Assistance: Needs assistance  Ambulation Assistance: Needs assistance  Transfer Assistance: Needs assistance  Additional Comments: HAS BEEN ABLE TO AMB WITH ASSIST, NEEDS HELP WITH ADL'S DUE TO PAIN SINCE SURGERY. WAS INDEPENDENT PRIOR TO CABG ADMIT.        Objective   Vision: Within Functional Limits    Orientation  Overall Orientation Status: Within Functional Limits        ADL  Feeding: Independent  Grooming: Independent  UE Bathing: Supervision  LE Bathing: Minimal assistance  UE Dressing: Supervision  LE Dressing: Minimal assistance  Toileting: Minimal assistance           Transfers  Stand Step Transfers: Contact guard assistance(Hand held assist but without assistive device)  Sit to stand: Minimal assistance     Cognition  Overall Cognitive Status: [facility] LUE AROM (degrees)  LUE AROM : WFL  RUE AROM (degrees)  RUE AROM : WFL  LUE Strength  Gross LUE Strength: Exceptions to [facility]  L Shoulder Flex: 4-/5  L Shoulder ABduction: 4-/5  L Elbow Flex: 4-/5  L Elbow Ext: 4-/5  RUE Strength  Gross RUE Strength: Exceptions to [facility]  R Shoulder Flex: 4-/5  R Shoulder ABduction: 4-/5  R Elbow Flex: 4-/5  R Elbow Ext: 4-/5                   Plan   Plan  Times per week: 3-5x/week  Times per day: Daily    G-Code     OutComes Score                                                  AM-PAC Score             Goals  Short term goals  Time Frame for Short term goals: 1 week  Short term goal 1: Supervision with toilet tfers  Short term goal 2: Supervision with clothing mgmt.  in standing  Short term goal 3: Supervision with light ambulatory ADLs without excessive fatigue  Long term goals  Long term goal 1: Return to PLOF       Therapy Time   Individual Concurrent Group Co-treatment   Time In           Time Out           Minutes                   JULIANNE Eid/L

## 2019-06-12 NOTE — PROGRESS NOTES
Transport here and taken patient to [facility]. I will take belongings to PCU room 723.

## 2019-06-12 NOTE — PROGRESS NOTES
Physical Therapy    Facility/Department: [facility] CORONARY CARE UNIT  Initial Assessment    NAME: Carloz Madrid  :   MRN: [id number]    Date of Service: 2019    Discharge Recommendations:  Continue to assess pending progress, Patient would benefit from continued therapy after discharge        Assessment   Body structures, Functions, Activity limitations: Decreased functional mobility ; Decreased strength;Decreased balance;Decreased endurance  Assessment: WILL PROGRESS WITH GT DISTANCE AS TOLERATED. REQUIRES PT FOLLOW UP: Yes  Activity Tolerance  Activity Tolerance: Patient Tolerated treatment well;Patient limited by endurance       Patient Diagnosis(es): The primary encounter diagnosis was Other chest pain. Diagnoses of NSTEMI (non-ST elevated myocardial infarction) ([facility] Hx of CABG, Acute systolic congestive heart failure ([facility] Anemia, unspecified type, Constipation, unspecified constipation type, Hyperkalemia, and Renal insufficiency were also pertinent to this visit. has a past medical history of Acute ischemic stroke ([facility] Atherosclerotic heart disease, CAD (coronary artery disease), Cerebral artery occlusion with cerebral infarction [facility]), Cerebrovascular accident (CVA) due to embolism of right middle cerebral artery ([facility] CHF (congestive heart failure) ([facility] Chronic bilateral low back pain without sciatica, Chronic kidney disease, DDD (degenerative disc disease), lumbar, Depression, Depression with anxiety, Diabetes mellitus ([address] Diabetes mellitus, type 2 ([address] DM (diabetes mellitus) ([address] Glaucoma, Headache, Hyperlipidemia, Hypertension, Macular degeneration, MI (myocardial infarction) ([address] MI (myocardial infarction) ([address] Neuromuscular disorder ([facility] Neuropathy, Osteoarthritis, Palliative care patient, Sleep apnea, Spondylosis of lumbar region without myelopathy or radiculopathy, Status post placement of implantable loop recorder, and TIA (transient ischemic attack).    has a past surgical history that includes Coronary angioplasty with stent (jan 11 [place]); Coronary angioplasty with stent; Diagnostic Cardiac Cath Lab Procedure ([id number]); Cardiac catheterization (12/12/12); Cardiac catheterization (3/29/15  [facility]); lumbar fusion (Left, 11/15/2016); Colonoscopy; back surgery; joint replacement; Appendectomy; Cholecystectomy; and Coronary artery bypass graft (N/A, 5/30/2019). Restrictions  Restrictions/Precautions  Restrictions/Precautions: Fall Risk  Vision/Hearing  Vision: Within Functional Limits  Hearing: Within functional limits     Subjective  General  Diagnosis: CHEST PAIN, RECENT CABG 5/30  Subjective  Subjective: Pt UP IN RECLINER, WILLING TO WALK  Pain Assessment  Pain Assessment: Faces  Francis-Baker Pain Rating: Hurts little more  Pain Type: Acute pain  Pain Location: Chest       Orientation  Orientation  Overall Orientation Status: Within Normal Limits  Social/Functional History  Social/Functional History  Lives With: Spouse(HAS BEEN AT [facility] SINCE CABG)  Type of Home: House  Additional Comments: HAS BEEN ABLE TO AMB WITH ASSIST, NEEDS HELP WITH ADL'S DUE TO PAIN SINCE SURGERY. WAS INDEPENDENT PRIOR TO CABG ADMIT.   Cognition   Cognition  Overall Cognitive Status: WNL    Objective          AROM RLE (degrees)  RLE AROM: WFL  AROM LLE (degrees)  LLE AROM : WFL  Strength RLE  Comment: GROSSLY +4/5  Strength LLE  Comment: GROSSLY +4/5        Bed mobility  Supine to Sit: (NT: UP IN CHAIR)  Transfers  Sit to Stand: Contact guard assistance;Minimal Assistance  Bed to Chair: Contact guard assistance;Minimal assistance  Ambulation  Ambulation?: Yes  Ambulation 1  Device: Hand-Held Assist  Assistance: Minimal assistance  Quality of Gait: SLIGHTLY UNSTEADY, LE'S WEAK  Gait Deviations: Decreased step length  Distance: 125'     Balance  Sitting - Dynamic: Good  Standing - Dynamic: Fair;+        Plan   Plan  Times per week: AT LEAST 6-7  Current Treatment Recommendations: Strengthening, Balance Training, Functional Mobility Training, Transfer Training, Gait Training, Patient/Caregiver Education & Training, Safety Education & Training  Safety Devices  Type of devices: Left in chair, Call light within reach    G-Code       OutComes Score                                                  AM-PAC Score             Goals  Short term goals  Time Frame for Short term goals: 14 DAYS  Short term goal 1: BED MOB SUPERVISION  Short term goal 2: TRANSFERS SUPERVISION  Short term goal 3: ' SUPERVISION       Therapy Time   Individual Concurrent Group Co-treatment   Time In           Time Out           Minutes                   Ronny Barker, PT

## 2019-06-13 PROBLEM — I25.5 ISCHEMIC CARDIOMYOPATHY: Status: ACTIVE | Noted: 2019-06-13

## 2019-06-13 LAB
ANION GAP SERPL CALCULATED.3IONS-SCNC: 14 MMOL/L (ref 7–19)
BASOPHILS ABSOLUTE: 0 K/UL (ref 0–0.2)
BASOPHILS RELATIVE PERCENT: 0.2 % (ref 0–1)
BUN BLDV-MCNC: 23 MG/DL (ref 8–23)
CALCIUM SERPL-MCNC: 9 MG/DL (ref 8.8–10.2)
CHLORIDE BLD-SCNC: 94 MMOL/L (ref 98–111)
CO2: 28 MMOL/L (ref 22–29)
CREAT SERPL-MCNC: 1.3 MG/DL (ref 0.5–1.2)
EOSINOPHILS ABSOLUTE: 0.2 K/UL (ref 0–0.6)
EOSINOPHILS RELATIVE PERCENT: 1.4 % (ref 0–5)
GFR NON-AFRICAN AMERICAN: 54
GLUCOSE BLD-MCNC: 116 MG/DL (ref 74–109)
GLUCOSE BLD-MCNC: 139 MG/DL (ref 70–99)
GLUCOSE BLD-MCNC: 152 MG/DL (ref 70–99)
GLUCOSE BLD-MCNC: 155 MG/DL (ref 70–99)
GLUCOSE BLD-MCNC: 209 MG/DL (ref 70–99)
HCT VFR BLD CALC: 33.2 % (ref 42–52)
HEMOGLOBIN: 10.3 G/DL (ref 14–18)
LYMPHOCYTES ABSOLUTE: 2.5 K/UL (ref 1.1–4.5)
LYMPHOCYTES RELATIVE PERCENT: 14.7 % (ref 20–40)
MCH RBC QN AUTO: 25.9 PG (ref 27–31)
MCHC RBC AUTO-ENTMCNC: 31 G/DL (ref 33–37)
MCV RBC AUTO: 83.6 FL (ref 80–94)
MONOCYTES ABSOLUTE: 1.3 K/UL (ref 0–0.9)
MONOCYTES RELATIVE PERCENT: 7.8 % (ref 0–10)
NEUTROPHILS ABSOLUTE: 12.6 K/UL (ref 1.5–7.5)
NEUTROPHILS RELATIVE PERCENT: 75.2 % (ref 50–65)
ORGANISM: ABNORMAL
PDW BLD-RTO: 14.6 % (ref 11.5–14.5)
PERFORMED ON: ABNORMAL
PLATELET # BLD: 275 K/UL (ref 130–400)
PMV BLD AUTO: 10.5 FL (ref 9.4–12.4)
POTASSIUM SERPL-SCNC: 4.2 MMOL/L (ref 3.5–5)
RBC # BLD: 3.97 M/UL (ref 4.7–6.1)
SODIUM BLD-SCNC: 136 MMOL/L (ref 136–145)
URINE CULTURE, ROUTINE: ABNORMAL
URINE CULTURE, ROUTINE: ABNORMAL
WBC # BLD: 16.8 K/UL (ref 4.8–10.8)

## 2019-06-13 PROCEDURE — 97530 THERAPEUTIC ACTIVITIES: CPT

## 2019-06-13 PROCEDURE — 2580000003 HC RX 258: Performed by: FAMILY MEDICINE

## 2019-06-13 PROCEDURE — 80048 BASIC METABOLIC PNL TOTAL CA: CPT

## 2019-06-13 PROCEDURE — 99233 SBSQ HOSP IP/OBS HIGH 50: CPT | Performed by: FAMILY MEDICINE

## 2019-06-13 PROCEDURE — 2580000003 HC RX 258: Performed by: HOSPITALIST

## 2019-06-13 PROCEDURE — 6360000002 HC RX W HCPCS: Performed by: FAMILY MEDICINE

## 2019-06-13 PROCEDURE — 99232 SBSQ HOSP IP/OBS MODERATE 35: CPT | Performed by: INTERNAL MEDICINE

## 2019-06-13 PROCEDURE — 97535 SELF CARE MNGMENT TRAINING: CPT

## 2019-06-13 PROCEDURE — 36415 COLL VENOUS BLD VENIPUNCTURE: CPT

## 2019-06-13 PROCEDURE — 2140000000 HC CCU INTERMEDIATE R&B

## 2019-06-13 PROCEDURE — 97116 GAIT TRAINING THERAPY: CPT

## 2019-06-13 PROCEDURE — 6370000000 HC RX 637 (ALT 250 FOR IP): Performed by: EMERGENCY MEDICINE

## 2019-06-13 PROCEDURE — 6370000000 HC RX 637 (ALT 250 FOR IP): Performed by: INTERNAL MEDICINE

## 2019-06-13 PROCEDURE — 82948 REAGENT STRIP/BLOOD GLUCOSE: CPT

## 2019-06-13 PROCEDURE — 85025 COMPLETE CBC W/AUTO DIFF WBC: CPT

## 2019-06-13 PROCEDURE — 6370000000 HC RX 637 (ALT 250 FOR IP): Performed by: HOSPITALIST

## 2019-06-13 RX ORDER — CARVEDILOL 12.5 MG/1
12.5 TABLET ORAL 2 TIMES DAILY WITH MEALS
Status: DISCONTINUED | OUTPATIENT
Start: 2019-06-14 | End: 2019-06-14 | Stop reason: HOSPADM

## 2019-06-13 RX ORDER — FLUTICASONE PROPIONATE 50 MCG
1 SPRAY, SUSPENSION (ML) NASAL DAILY
Qty: 1 BOTTLE | Refills: 0 | Status: ON HOLD | OUTPATIENT
Start: 2019-06-13 | End: 2021-10-16

## 2019-06-13 RX ORDER — LACTULOSE 10 G/15ML
20 SOLUTION ORAL ONCE
Status: COMPLETED | OUTPATIENT
Start: 2019-06-13 | End: 2019-06-13

## 2019-06-13 RX ORDER — OXYCODONE AND ACETAMINOPHEN 10; 325 MG/1; MG/1
1 TABLET ORAL 3 TIMES DAILY PRN
Qty: 9 TABLET | Refills: 0 | Status: SHIPPED | OUTPATIENT
Start: 2019-06-13 | End: 2019-06-16

## 2019-06-13 RX ORDER — CARVEDILOL 6.25 MG/1
6.25 TABLET ORAL 2 TIMES DAILY WITH MEALS
Qty: 60 TABLET | Refills: 0 | Status: SHIPPED | OUTPATIENT
Start: 2019-06-13 | End: 2019-06-14 | Stop reason: HOSPADM

## 2019-06-13 RX ORDER — PSEUDOEPHEDRINE HCL 30 MG
100 TABLET ORAL 2 TIMES DAILY
Qty: 60 CAPSULE | Refills: 0 | Status: SHIPPED | OUTPATIENT
Start: 2019-06-13 | End: 2019-10-23

## 2019-06-13 RX ORDER — AMIODARONE HYDROCHLORIDE 200 MG/1
200 TABLET ORAL DAILY
Qty: 30 TABLET | Refills: 0 | Status: SHIPPED | OUTPATIENT
Start: 2019-06-13 | End: 2019-07-16 | Stop reason: ALTCHOICE

## 2019-06-13 RX ORDER — SODIUM PHOSPHATE, DIBASIC AND SODIUM PHOSPHATE, MONOBASIC 7; 19 G/133ML; G/133ML
1 ENEMA RECTAL DAILY PRN
Qty: 1 BOTTLE | Refills: 0 | COMMUNITY
Start: 2019-06-13 | End: 2019-07-02 | Stop reason: ALTCHOICE

## 2019-06-13 RX ORDER — FUROSEMIDE 20 MG/1
60 TABLET ORAL 2 TIMES DAILY
Qty: 60 TABLET | Refills: 3 | Status: SHIPPED | OUTPATIENT
Start: 2019-06-13 | End: 2019-07-02 | Stop reason: SDUPTHER

## 2019-06-13 RX ORDER — CARVEDILOL 6.25 MG/1
6.25 TABLET ORAL ONCE
Status: COMPLETED | OUTPATIENT
Start: 2019-06-13 | End: 2019-06-13

## 2019-06-13 RX ORDER — BUTALBITAL, ACETAMINOPHEN AND CAFFEINE 50; 325; 40 MG/1; MG/1; MG/1
1 TABLET ORAL PRN
Qty: 3 TABLET | Refills: 0 | Status: ON HOLD | OUTPATIENT
Start: 2019-06-13 | End: 2021-10-16

## 2019-06-13 RX ADMIN — Medication 60 UNITS: at 20:53

## 2019-06-13 RX ADMIN — SERTRALINE HYDROCHLORIDE 100 MG: 100 TABLET ORAL at 10:57

## 2019-06-13 RX ADMIN — ISOSORBIDE MONONITRATE 30 MG: 30 TABLET, EXTENDED RELEASE ORAL at 20:56

## 2019-06-13 RX ADMIN — OXYCODONE AND ACETAMINOPHEN 1 TABLET: 10; 325 TABLET ORAL at 18:21

## 2019-06-13 RX ADMIN — MUPIROCIN: 20 OINTMENT TOPICAL at 13:55

## 2019-06-13 RX ADMIN — MEROPENEM 1 G: 1 INJECTION, POWDER, FOR SOLUTION INTRAVENOUS at 19:17

## 2019-06-13 RX ADMIN — INSULIN LISPRO 2 UNITS: 100 INJECTION, SOLUTION INTRAVENOUS; SUBCUTANEOUS at 20:53

## 2019-06-13 RX ADMIN — FUROSEMIDE 60 MG: 40 TABLET ORAL at 18:21

## 2019-06-13 RX ADMIN — OXYCODONE AND ACETAMINOPHEN 1 TABLET: 10; 325 TABLET ORAL at 04:07

## 2019-06-13 RX ADMIN — ISOSORBIDE MONONITRATE 30 MG: 30 TABLET, EXTENDED RELEASE ORAL at 10:57

## 2019-06-13 RX ADMIN — MEROPENEM 1 G: 1 INJECTION, POWDER, FOR SOLUTION INTRAVENOUS at 10:58

## 2019-06-13 RX ADMIN — DOCUSATE SODIUM 100 MG: 100 CAPSULE, LIQUID FILLED ORAL at 10:57

## 2019-06-13 RX ADMIN — TAMSULOSIN HYDROCHLORIDE 0.4 MG: 0.4 CAPSULE ORAL at 10:57

## 2019-06-13 RX ADMIN — OXYCODONE AND ACETAMINOPHEN 1 TABLET: 10; 325 TABLET ORAL at 11:18

## 2019-06-13 RX ADMIN — FLUTICASONE PROPIONATE 1 SPRAY: 50 SPRAY, METERED NASAL at 13:55

## 2019-06-13 RX ADMIN — SACUBITRIL AND VALSARTAN 1 TABLET: 49; 51 TABLET, FILM COATED ORAL at 10:57

## 2019-06-13 RX ADMIN — DOCUSATE SODIUM 100 MG: 100 CAPSULE, LIQUID FILLED ORAL at 20:56

## 2019-06-13 RX ADMIN — CARVEDILOL 6.25 MG: 6.25 TABLET, FILM COATED ORAL at 13:54

## 2019-06-13 RX ADMIN — SACUBITRIL AND VALSARTAN 1 TABLET: 49; 51 TABLET, FILM COATED ORAL at 20:56

## 2019-06-13 RX ADMIN — LACTULOSE 20 G: 20 SOLUTION ORAL at 13:54

## 2019-06-13 RX ADMIN — INSULIN LISPRO 2 UNITS: 100 INJECTION, SOLUTION INTRAVENOUS; SUBCUTANEOUS at 13:58

## 2019-06-13 RX ADMIN — Medication 10 ML: at 10:58

## 2019-06-13 RX ADMIN — AMIODARONE HYDROCHLORIDE 200 MG: 200 TABLET ORAL at 10:58

## 2019-06-13 RX ADMIN — CARVEDILOL 6.25 MG: 6.25 TABLET, FILM COATED ORAL at 10:57

## 2019-06-13 RX ADMIN — FUROSEMIDE 60 MG: 40 TABLET ORAL at 10:57

## 2019-06-13 RX ADMIN — INSULIN LISPRO 2 UNITS: 100 INJECTION, SOLUTION INTRAVENOUS; SUBCUTANEOUS at 18:25

## 2019-06-13 ASSESSMENT — PAIN DESCRIPTION - LOCATION
LOCATION: BACK;BUTTOCKS;CHEST
LOCATION: SHOULDER
LOCATION: BACK

## 2019-06-13 ASSESSMENT — PAIN SCALES - GENERAL
PAINLEVEL_OUTOF10: 5
PAINLEVEL_OUTOF10: 5
PAINLEVEL_OUTOF10: 8
PAINLEVEL_OUTOF10: 8
PAINLEVEL_OUTOF10: 5
PAINLEVEL_OUTOF10: 10

## 2019-06-13 ASSESSMENT — PAIN DESCRIPTION - DESCRIPTORS: DESCRIPTORS: ACHING

## 2019-06-13 ASSESSMENT — PAIN DESCRIPTION - PAIN TYPE: TYPE: ACUTE PAIN

## 2019-06-13 ASSESSMENT — PAIN DESCRIPTION - ORIENTATION: ORIENTATION: RIGHT

## 2019-06-13 NOTE — PROGRESS NOTES
Occupational Therapy  Facility/Department: [facility] PROGRESSIVE CARE  Daily Treatment Note  NAME: Nupur Manuel  :   MRN: [id number]    Date of Service: 2019    Discharge Recommendations:          Assessment   Performance deficits / Impairments: Decreased functional mobility ; Decreased ADL status; Decreased endurance;Decreased strength;Decreased balance  Assessment: Pt tolerated tx well, will progress as tolerated. Treatment Diagnosis: Chest pain, Heart failure  Prognosis: Good  REQUIRES OT FOLLOW UP: Yes  Activity Tolerance  Activity Tolerance: Patient Tolerated treatment well         Patient Diagnosis(es): The primary encounter diagnosis was Other chest pain. Diagnoses of NSTEMI (non-ST elevated myocardial infarction) ([facility] Hx of CABG, Acute systolic congestive heart failure ([facility] Anemia, unspecified type, Constipation, unspecified constipation type, Hyperkalemia, Renal insufficiency, and Chronic tension-type headache, not intractable were also pertinent to this visit. has a past medical history of Acute ischemic stroke ([facility] Atherosclerotic heart disease, CAD (coronary artery disease), Cerebral artery occlusion with cerebral infarction [facility]), Cerebrovascular accident (CVA) due to embolism of right middle cerebral artery ([facility] CHF (congestive heart failure) ([facility] Chronic bilateral low back pain without sciatica, Chronic kidney disease, DDD (degenerative disc disease), lumbar, Depression, Depression with anxiety, Diabetes mellitus ([address] Diabetes mellitus, type 2 ([address] DM (diabetes mellitus) ([address] Glaucoma, Headache, Hyperlipidemia, Hypertension, Macular degeneration, MI (myocardial infarction) ([address] MI (myocardial infarction) ([address] Neuromuscular disorder ([facility] Neuropathy, Osteoarthritis, Palliative care patient, Sleep apnea, Spondylosis of lumbar region without myelopathy or radiculopathy, Status post placement of implantable loop recorder, and TIA (transient ischemic attack).    has a past surgical history that includes Coronary angioplasty with stent (jan 6 [place]); Coronary angioplasty with stent; Diagnostic Cardiac Cath Lab Procedure ([id number]); Cardiac catheterization (12/12/12); Cardiac catheterization (3/29/15  [place]); lumbar fusion (Left, 11/15/2016); Colonoscopy; back surgery; joint replacement; Appendectomy; Cholecystectomy; and Coronary artery bypass graft (N/A, 5/30/2019). Restrictions  Restrictions/Precautions  Restrictions/Precautions: Fall Risk  Position Activity Restriction  Sternal Precautions: No Pushing, No Pulling, 8# Lifting Restrictions  Subjective   General  Chart Reviewed: Yes  Patient assessed for rehabilitation services?: Yes  Additional Pertinent Hx: CABG on 5-30-19. Went to [facility] for rehab but began medical decline and readmitted to hospital  Response to previous treatment: Patient with no complaints from previous session  Family / Caregiver Present: Yes  Diagnosis: Chest pain, heart failure  Pain Assessment  Pain Assessment: 0-10  Pain Level: 5  Pain Type: Acute pain  Pain Location: Shoulder  Pain Orientation: Right  Pain Descriptors: Aching  Vital Signs  Patient Currently in Pain: Yes   Orientation     Objective    ADL  UE Dressing: Contact guard assistance        Balance  Sitting Balance: Supervision  Standing Balance: Contact guard assistance  Bed mobility  Supine to Sit: Minimal assistance  Transfers  Stand Step Transfers: Minimal assistance  Sit to stand: Minimal assistance  Stand to sit: Contact guard assistance                                                                 Plan   Plan  Times per week: 3-5x/week  Times per day: Daily  G-Code     OutComes Score                                                  AM-PAC Score             Goals  Short term goals  Time Frame for Short term goals: 1 week  Short term goal 1: Supervision with toilet tfers  Short term goal 2: Supervision with clothing mgmt.  in standing  Short term goal 3: Supervision with light ambulatory ADLs without excessive fatigue  Long term goals  Long term goal 1: Return to PLOF       Therapy Time   Individual Concurrent Group Co-treatment   Time In           Time Out           Minutes                   NIKKO Salcedo

## 2019-06-13 NOTE — DISCHARGE SUMMARY
Nupur Manuel  :  [id number]  MRN:  [id number]    Admit date:  2019  Discharge date:      Admitting Physician:  Ameya Singh DO    Advance Directive: Full Code    Consults: cardiology    Primary Care Physician:  Jere Pradhan MD    Discharge Diagnoses:  Principal Problem:    Coronary artery disease involving native coronary artery of native heart with unstable angina pectoris [facility])  Active Problems:    Type 2 diabetes mellitus with diabetic polyneuropathy, with long-term current use of insulin ([facility] [address]    Essential hypertension    Chronic bilateral low back pain without sciatica    Benign prostatic hyperplasia without lower urinary tract symptoms    MOO on CPAP    Chest pain    Stage 3 chronic kidney disease ([facility] [address]    Chronic constipation    Palliative care patient    Ischemic cardiomyopathy  Resolved Problems:    * No resolved hospital problems. *      Significant Diagnostic Studies:   Ct Abdomen Pelvis Wo Contrast Additional Contrast? None    Result Date: 2019  EXAMINATION: CT ABDOMEN PELVIS WO CONTRAST 2019 3:57 PM HISTORY: Abdominal distention Comparison: Abdominal KUB 2019 Technique: Sequential imaging was performed from the lung bases through the pubic symphysis without the use of IV contrast. Sagittal and coronal reformations were made from the original source data and reviewed. Automated exposure control was utilized for radiation dose reduction. Radiation dose: DLP 1701 mGy-cm Findings: Images are degraded by significant motion. There has been prior median sternotomy. Small pleural effusions are present bilaterally with associated compressive atelectasis. The heart is enlarged. Coronary artery calcifications are present. The blood pool is hypodense relative to the myocardium, suggesting anemia. There is trace pericardial fluid. Evaluation is limited by a lack of IV contrast. Allowing for these limitations, the liver, spleen, and adrenal glands are grossly normal in appearance.  The gallbladder is surgically absent. There is a questionable lesion extending from the mid pancreatic body measuring 2.7 cm in size. The kidneys have a normal noncontrast appearance. The ureters are decompressed bilaterally. The abdominal aorta appears normal in caliber. There are scattered atherosclerotic calcifications within the aorta and its branch vessels. No pathologically enlarged central mesenteric or retroperitoneal lymph nodes are appreciated. A small hiatal hernia is present. The stomach and small bowel do not appear overly dilated. There are a few scattered colonic diverticula without evidence of acute diverticulitis. Large bowel is otherwise grossly normal in appearance. No free air or free fluid is seen in the abdomen. The urinary bladder is grossly normal in appearance. No free fluid is seen in the pelvis. No pathologically enlarged pelvic or inguinal lymph nodes are identified. Fluid is noted dependently in the subcutaneous soft tissues of the posterior pelvis and upper thighs. Review of the visualized osseous structures demonstrates no acute or aggressive lesions. Postsurgical changes are noted in the lumbar spine. Impression: 1. No acute findings in the abdomen or pelvis when allowing for limitations of significant motion as well as lack of IV contrast. 2. Cardiomegaly with bilateral pleural effusions. Subcutaneous soft tissue edema dependently in the posterior aspect of the pelvis and proximal thighs. 3. Questionable lesion extending from the pancreatic body for which nonemergent follow-up MRI abdomen with and without contrast is recommended to include MRCP. 4. Atherosclerotic disease. Signed by Dr Erika Curtis on 6/8/2019 4:03 PM    Xr Abdomen (kub) (single Ap View)    Result Date: 6/8/2019  EXAMINATION: XR ABDOMEN (KUB) (SINGLE AP VIEW) 6/8/2019 2:55 PM HISTORY: Abdominal distention COMPARISON: None FINDINGS: The bowel gas pattern is nonspecific and nonobstructive.  A moderate amount of stool is noted in the left colon. The entire abdomen is not included on this exam. Surgical clips are seen in the right upper quadrant of the abdomen. There has been previous fusion of the lumbar spine. No pathologic calcifications are seen. No evidence of bowel obstruction. Signed by Dr Angie Mckeon on 6/8/2019 2:56 PM    Xr Chest Portable    Result Date: 6/8/2019  EXAMINATION: XR CHEST PORTABLE 6/8/2019 2:54 PM HISTORY: Chest pain COMPARISON: 6/3/2019 FINDINGS: The heart and mediastinal contours are stable. There is consolidation in the left lung base. Small pleural effusions are noted bilaterally. Diffuse increased interstitial markings are noted. Pulmonary vasculature is indistinct. There is no appreciable pneumothorax. A loop recorder device is present. There has been prior median sternotomy. Findings are suggestive of pulmonary vascular congestion and pulmonary edema with bilateral pleural effusions. Signed by Dr Angie Mckeon on 6/8/2019 2:55 PM      Pertinent Labs:   CBC:   Recent Labs     06/11/19  0614 06/12/19  0111 06/13/19  0032   WBC 9.9 11.6* 16.8*   HGB 10.8* 9.7* 10.3*    275 275     BMP:    Recent Labs     06/11/19  0614 06/12/19  0111 06/13/19  0032    135* 136   K 4.2 4.1 4.2   CL 95* 93* 94*   CO2 28 28 28   BUN 20 23 23   CREATININE 1.5* 1.5* 1.3*   GLUCOSE 97 113* 116*     INR: No results for input(s): INR in the last 72 hours. ABGs:No results for input(s): PH, PO2, PCO2, HCO3, BE, O2SAT in the last 72 hours. Lactic Acid:No results for input(s): LACTA in the last 72 hours. Procedures: None performed. Hospital Course:   6-8: Presents to ED with chest pressure from SNF, radiation to left neck and left upper extremity. Recent 3 vessel CABG 5-30, discharged to SNF 6-5. Relieved by nitroglycerin. Troponin elevated. Lower extremity edema. Admitted to CCU on heparin and nitroglycerin drips. Cardiology consulted. Ulen he was volume overloaded and diuresed. Carvedilol started.   6-9: 5 L of fluid output but remains short of breath. Remains constipated. 6-10: Resting comfortably, constipation resolved. Started on Imdur, weaned from nitroglycerin and heparin drips. 6-11: Mild chest pain earlier in the day. Changed to oral diuresis. Amiodarone dose reduced to 200 mg daily. Thallium viability study. 6-12: Headaches, no further chest pain. Depacon given x1. Thallium viability study shows no reversibility/viability, cardiology suggests aggressive heart failure management. Started on Lysbeth . 6-13: Urine culture growing ESBL Escherichia coli, placed on meropenem x7 days and discharged to [facility].     Physical Exam:  Vitals: /64   Pulse 83   Temp 97.4 °F (36.3 °C) (Temporal)   Resp 20   Ht 6' 4\" (1.93 m)   Wt 255 lb 6.4 oz (115.8 kg)   SpO2 93%   BMI 31.09 kg/m²   24HR INTAKE/OUTPUT:      Intake/Output Summary (Last 24 hours) at 6/13/2019 1037  Last data filed at 6/13/2019 0847  Gross per 24 hour   Intake 1230 ml   Output 1100 ml   Net 130 ml     General appearance: alert and cooperative with exam  HEENT: atraumatic, eyes with clear conjunctiva and normal lids, pupils and irises normal, external ears and nose are normal, lips normal. Neck without masses, lympadenopathy, bruit, thyroid normal  Lungs: no increased work of breathing, clear to auscultation bilaterally without rales, rhonchi or wheezes  Heart: regular rate and rhythm, S1, S2 normal, no murmur, click, rub or gallop  Abdomen: soft, non-tender; bowel sounds normal; no masses,  no organomegaly  Extremities: edema 1+ bilaterally, modified Armida's negative  Neurologic: no focal neurologic deficits, normal sensation, alert and oriented, affect and mood appropriate  Skin: no jaundice, rashes, or nodules    Discharge Medications:       Jovi Willard \"Bill\"   Home Medication Instructions [id number]    Printed on:06/13/19 1037   Medication Information                      amiodarone (CORDARONE) 200 MG tablet  Take 1 tablet by mouth daily azelastine (ASTELIN) 0.1 % nasal spray  1 spray by Nasal route as needed for Rhinitis Use in each nostril as directed             butalbital-acetaminophen-caffeine (FIORICET, ESGIC) -40 MG per tablet  Take 1 tablet by mouth as needed for Headaches             carvedilol (COREG) 6.25 MG tablet  Take 1 tablet by mouth 2 times daily (with meals)             clopidogrel (PLAVIX) 75 MG tablet  TAKE 1 TABLET DAILY             docusate sodium (COLACE, DULCOLAX) 100 MG CAPS  Take 100 mg by mouth 2 times daily             fluticasone (FLONASE) 50 MCG/ACT nasal spray  1 spray by Each Nostril route daily             furosemide (LASIX) 20 MG tablet  Take 3 tablets by mouth 2 times daily             insulin glargine (LANTUS SOLOSTAR) 100 UNIT/ML injection pen  INJECT 80 UNITS INTO THE SKIN NIGHTLY             isosorbide mononitrate (IMDUR) 30 MG extended release tablet  Take 1 tablet by mouth daily             meropenem (MERREM) infusion  Infuse 1,000 mg intravenously every 8 hours for 7 days Compound per protocol. mupirocin (BACTROBAN) 2 % ointment  Apply topically 3 times daily. nitroGLYCERIN (NITROSTAT) 0.4 MG SL tablet  up to max of 3 total doses. If no relief after 1 dose, call 911. oxyCODONE-acetaminophen (PERCOCET)  MG per tablet  Take 1 tablet by mouth 3 times daily as needed for Pain for up to 3 days.              sacubitril-valsartan (ENTRESTO) 49-51 MG per tablet  Take 1 tablet by mouth 2 times daily             sertraline (ZOLOFT) 100 MG tablet  Take 1 tablet by mouth daily             Sodium Phosphates (FLEET) 7-19 GM/118ML  Place 1 enema rectally daily as needed (constipation)             tamsulosin (FLOMAX) 0.4 MG capsule  TAKE 2 CAPSULES AT BEDTIME             tiZANidine (ZANAFLEX) 2 MG tablet  Take 1 tablet by mouth nightly as needed (muscle spasm)             zoster recombinant adjuvanted vaccine (SHINGRIX) 50 MCG/0.5ML SUSR injection  1 vaccine now and repeat in 2-6 months. Discharge Instructions: Follow up with Guillermina Khan MD in 7 days. Take medications as directed. Resume activity as tolerated. Diet: DIET CARB CONTROL;     Disposition: Patient is medically stable and will be discharged Skilled nursing facility. Time spent on discharge less than 30 minutes.     Signed:  Bhaskar Roblero DO

## 2019-06-13 NOTE — CARE COORDINATION
IRENE contacted Marcel Cope with [employer] to inquire about a precert update. Marcel Cope reports [facility] states having all necessary information and is still under review. Marcel Cope also requested settings for the pt CPAP. The DME is the pt's for home use and [facility] does not follow to assist. IRENE informed Marcel Cope and she is to contact the supplier to obtain the settings.

## 2019-06-13 NOTE — PROGRESS NOTES
[facility] just called and reported urine culture ESBL + ecoli. Message sent to Dr Gabi Lozada.    Electronically signed by Reji Blackwell RN on 6/13/2019 at 7:43 AM

## 2019-06-13 NOTE — PROGRESS NOTES
Cardiology Progress Note Berto Kat MD      Patient:  Bryan Ambrose  [id number]    Patient Active Problem List    Diagnosis Date Noted    ACS (acute coronary syndrome) [facility])      Priority: High    Ischemic cardiomyopathy 06/13/2019     Priority: Low    Palliative care patient 06/11/2019     Priority: Low    Chronic constipation 06/10/2019     Priority: Low    Elevated troponin      Priority: Low    S/P CABG x 3, LIMA- DIAG, A0- SVG- LAD, A0- SVG- Ramus, EF 30-35% (5/30/2019) 06/01/2019     Priority: Low    Acute MI inferior lateral first episode care ([facility] 05/31/2019     Priority: Low    Stage 3 chronic kidney disease ([facility] 05/31/2019     Priority: Low    Chronic shortness of breath 04/15/2019     Priority: Low    Chest pain 02/28/2019     Priority: Low     Overview Note:     1/2011 stents x 3 [facility])  5/4/2011  Cath  Patent stents in the mid LAD, stent to OM1, PTCA to the D2, normal LVFX  12/12/12  Cath patent stents in the mid LAD, PTCA to the D3, stent to distal LAD, normal LVFX  3/29/2015  Cath  Stent to the PLOM, normal LVFX  11/1/2019  Loop placed  3/1/19 lexiscan negative for myocardial ischemia, EF 50  %   5/24/19 ACS PARKER RISK Score 5 (angina, + troponin, CAD>50, age>65, plavix ), AUC indication 3, AUC score 9   5/25/19  Cath 50% distal LM, 100% mid LAD, 80% diag, 100% mid RCA, inferior hypokinesis, EF 45%              MOO on CPAP 05/03/2018     Priority: Low    Dermatitis 03/05/2018     Priority: Low    Depression with anxiety 03/05/2018     Priority: Low    Benign prostatic hyperplasia without lower urinary tract symptoms 03/05/2018     Priority: Low    Chronic tension-type headache, not intractable 03/05/2018     Priority: Low    West Nile virus seropositivity 11/22/2017     Priority: Low    Status post placement of implantable loop recorder 10/31/2017     Priority: Low    [facility] 10/17/2017     Priority: Low    Trigger middle finger of left hand 10/17/2017     Priority: Low    Peripheral polyneuropathy 10/17/2017     Priority: Low    Palpitations 10/17/2017     Priority: Low    Chronic insomnia 10/17/2017     Priority: Low    Cerebrovascular accident (CVA) due to embolism of right middle cerebral artery ([address] 08/14/2017     Priority: Low    Dizziness 03/27/2017     Priority: Low    Imbalance 03/27/2017     Priority: Low    Thoracic outlet syndrome 02/21/2017     Priority: Low     Overview Note:     L sided on exam 2/21/17      Acute ischemic stroke ([address] 02/20/2017     Priority: Low    DDD (degenerative disc disease), lumbar 11/15/2016     Priority: Low    Spondylosis of lumbar region without myelopathy or radiculopathy 11/15/2016     Priority: Low    Chronic bilateral low back pain without sciatica 11/10/2016     Priority: Low    Mixed hyperlipidemia 07/23/2012     Priority: Low    MI (myocardial infarction) ([address]      Priority: Low    Type 2 diabetes mellitus with diabetic polyneuropathy, with long-term current use of insulin ([address] 07/12/2011     Priority: Low    Essential hypertension 07/12/2011     Priority: Low    Coronary artery disease involving native coronary artery of native heart with unstable angina pectoris ([address] 07/12/2011     Priority: Low       Admit Date:  6/8/2019    Admission Problem List: Present on Admission:   Chest pain   Benign prostatic hyperplasia without lower urinary tract symptoms   Chronic bilateral low back pain without sciatica   Essential hypertension   MOO on CPAP   Stage 3 chronic kidney disease (HCC)   Type 2 diabetes mellitus with diabetic polyneuropathy, with long-term current use of insulin ([address]   Coronary artery disease involving native coronary artery of native heart with unstable angina pectoris ([address]   Chronic constipation   Palliative care patient   Ischemic cardiomyopathy      Cardiac Specific Data:  Specialty Problems        Cardiology Problems    ACS (acute coronary butalbital-acetaminophen-caffeine (FIORICET, ESGIC) -40 MG per tablet Take 1 tablet by mouth as needed for Headaches 6/13/19 6/16/19 Yes Génesis Arriaga DO   amiodarone (CORDARONE) 200 MG tablet Take 1 tablet by mouth daily 6/13/19  Yes Génesis Arriaga DO   carvedilol (COREG) 6.25 MG tablet Take 1 tablet by mouth 2 times daily (with meals) 6/13/19  Yes Génesis Arriaga DO   sacubitril-valsartan (ENTRESTO) 49-51 MG per tablet Take 1 tablet by mouth 2 times daily 6/13/19  Yes Génesis Arriaga DO   fluticasone [facility]) 50 MCG/ACT nasal spray 1 spray by Each Nostril route daily 6/13/19  Yes Génesis Arriaga DO   mupirocin (BACTROBAN) 2 % ointment Apply topically 3 times daily. 6/13/19 6/20/19 Yes Génesis Arriaga DO   furosemide (LASIX) 20 MG tablet Take 3 tablets by mouth 2 times daily 6/13/19  Yes Génesis Arriaga DO   docusate sodium (COLACE, DULCOLAX) 100 MG CAPS Take 100 mg by mouth 2 times daily 6/13/19  Yes Génesis Arriaga DO   Sodium Phosphates (FLEET) 7-19 GM/118ML Place 1 enema rectally daily as needed (constipation) 6/13/19  Yes Génesis Arriaga DO   meropenem (MERREM) infusion Infuse 1,000 mg intravenously every 8 hours for 7 days Compound per protocol. 6/13/19 6/20/19 Yes Génesis Arriaga DO   isosorbide mononitrate (IMDUR) 30 MG extended release tablet Take 1 tablet by mouth daily 4/19/19  Yes TEGAN Guerrier   azelastine (ASTELIN) 0.1 % nasal spray 1 spray by Nasal route as needed for Rhinitis Use in each nostril as directed   Yes Historical Provider, MD   sertraline (ZOLOFT) 100 MG tablet Take 1 tablet by mouth daily 3/12/19  Yes India Choi MD   nitroGLYCERIN (NITROSTAT) 0.4 MG SL tablet up to max of 3 total doses.  If no relief after 1 dose, call 911. 3/1/19  Yes Lillie Caceres MD   tamsulosin (FLOMAX) 0.4 MG capsule TAKE 2 CAPSULES AT BEDTIME 1/22/19  Yes India Choi MD   insulin glargine (LANTUS SOLOSTAR) 100 UNIT/ML injection pen INJECT 80 UNITS INTO THE SKIN NIGHTLY 1/18/19  Yes India Choi MD   clopidogrel (PLAVIX) 75 MG tablet TAKE 1 TABLET DAILY 1/18/19  Yes Len Hadley MD   tiZANidine (ZANAFLEX) 2 MG tablet Take 1 tablet by mouth nightly as needed (muscle spasm) 3/12/19   Len Hadley MD   zoster recombinant adjuvanted vaccine [facility]) 50 MCG/0.5ML SUSR injection 1 vaccine now and repeat in 2-6 months. 11/6/18   Len Hadley MD        meropenem  1 g Intravenous Q8H    carvedilol  6.25 mg Oral Once    [START ON 6/14/2019] carvedilol  12.5 mg Oral BID WC    lactulose  20 g Oral Once    sacubitril-valsartan  1 tablet Oral BID    amiodarone  200 mg Oral Daily    furosemide  60 mg Oral BID    docusate sodium  100 mg Oral BID    isosorbide mononitrate  30 mg Oral BID    fluticasone  1 spray Each Nostril Daily    mupirocin   Topical Daily    sodium chloride flush  10 mL Intravenous 2 times per day    insulin lispro  0-12 Units Subcutaneous TID WC    insulin lispro  0-6 Units Subcutaneous Nightly    insulin glargine  80 Units Subcutaneous Nightly    sertraline  100 mg Oral Daily    tamsulosin  0.4 mg Oral Daily       TELEMETRY: Sinus     Physical Exam:      Physical Exam    JVP around 8 cm  Trace edema  No carotid bruits  S1 and S2 of normal intensity, no significant systolic or diastolic murmurs appreciated.  No gallop noted  Good air entry bilaterally with no crepitations or wheezes  Chest pain localized to right upper back on palpation but not clearly reproducible  Abdomen is soft and nontender with no palpable organomegaly, no abnormal pulsations are felt, no bruits are audible  Neurological status is intact  No deformities        Lab Data:  CBC:   Recent Labs     06/11/19  0614 06/12/19  0111 06/13/19  0032   WBC 9.9 11.6* 16.8*   HGB 10.8* 9.7* 10.3*   HCT 35.2* 31.0* 33.2*   MCV 84.4 82.9 83.6    275 275     BMP:   Recent Labs     06/11/19  0614 06/12/19  0111 06/13/19  0032    135* 136   K 4.2 4.1 4.2   CL 95* 93* 94*   CO2 28 28 28   BUN 20 23 23   CREATININE 1.5* 1.5* 1.3* LIVER PROFILE:   Recent Labs     06/11/19  0614   AST 17   ALT 26   BILITOT <0.2   ALKPHOS 82     PT/INR: No results for input(s): PROTIME, INR in the last 72 hours. APTT: No results for input(s): APTT in the last 72 hours. BNP:  No results for input(s): BNP in the last 72 hours. CK, CKMB, Troponin: @LABRCNT (CKTOTAL:3, CKMB:3, TROPONINI:3)@    IMAGING:  Ct Abdomen Pelvis Wo Contrast Additional Contrast? None    Result Date: 6/8/2019  EXAMINATION: CT ABDOMEN PELVIS WO CONTRAST 6/8/2019 3:57 PM HISTORY: Abdominal distention Comparison: Abdominal KUB 6/8/2019 Technique: Sequential imaging was performed from the lung bases through the pubic symphysis without the use of IV contrast. Sagittal and coronal reformations were made from the original source data and reviewed. Automated exposure control was utilized for radiation dose reduction. Radiation dose: DLP 1701 mGy-cm Findings: Images are degraded by significant motion. There has been prior median sternotomy. Small pleural effusions are present bilaterally with associated compressive atelectasis. The heart is enlarged. Coronary artery calcifications are present. The blood pool is hypodense relative to the myocardium, suggesting anemia. There is trace pericardial fluid. Evaluation is limited by a lack of IV contrast. Allowing for these limitations, the liver, spleen, and adrenal glands are grossly normal in appearance. The gallbladder is surgically absent. There is a questionable lesion extending from the mid pancreatic body measuring 2.7 cm in size. The kidneys have a normal noncontrast appearance. The ureters are decompressed bilaterally. The abdominal aorta appears normal in caliber. There are scattered atherosclerotic calcifications within the aorta and its branch vessels. No pathologically enlarged central mesenteric or retroperitoneal lymph nodes are appreciated. A small hiatal hernia is present. The stomach and small bowel do not appear overly dilated. There are a few scattered colonic diverticula without evidence of acute diverticulitis. Large bowel is otherwise grossly normal in appearance. No free air or free fluid is seen in the abdomen. The urinary bladder is grossly normal in appearance. No free fluid is seen in the pelvis. No pathologically enlarged pelvic or inguinal lymph nodes are identified. Fluid is noted dependently in the subcutaneous soft tissues of the posterior pelvis and upper thighs. Review of the visualized osseous structures demonstrates no acute or aggressive lesions. Postsurgical changes are noted in the lumbar spine. Impression: 1. No acute findings in the abdomen or pelvis when allowing for limitations of significant motion as well as lack of IV contrast. 2. Cardiomegaly with bilateral pleural effusions. Subcutaneous soft tissue edema dependently in the posterior aspect of the pelvis and proximal thighs. 3. Questionable lesion extending from the pancreatic body for which nonemergent follow-up MRI abdomen with and without contrast is recommended to include MRCP. 4. Atherosclerotic disease. Signed by Dr Doc Manrique on 6/8/2019 4:03 PM    Xr Abdomen (kub) (single Ap View)    Result Date: 6/8/2019  EXAMINATION: XR ABDOMEN (KUB) (SINGLE AP VIEW) 6/8/2019 2:55 PM HISTORY: Abdominal distention COMPARISON: None FINDINGS: The bowel gas pattern is nonspecific and nonobstructive. A moderate amount of stool is noted in the left colon. The entire abdomen is not included on this exam. Surgical clips are seen in the right upper quadrant of the abdomen. There has been previous fusion of the lumbar spine. No pathologic calcifications are seen. No evidence of bowel obstruction. Signed by Dr Doc Manrique on 6/8/2019 2:56 PM    Xr Acute Abd Series Chest 1 Vw    Result Date: 5/24/2019  EXAMINATION:  XR ACUTE ABD SERIES CHEST 1 VW  5/24/2019 2:39 PM HISTORY: Abdominal distention. Diabetes. Coronary artery disease. COMPARISON: 2/28/2019.  CHEST X-RAY: There is no infiltrate or effusion. There is mild bronchial wall thickening, stable. Heart size is upper limits of normal. There may be at least one coronary stent. There is a loop recorder. ABDOMEN IMAGES: There is moderate stool in the colon. There is no small bowel distention. There has been prior cholecystectomy. There has been prior lumbar surgery. There are degenerative changes of the spine. 1. No acute appearing infiltrate or effusion. Stable bronchial wall thickening. 2. Heart size upper limits of normal. No CHF. Loop recorder. Probable coronary stent. 3. Moderate stool in the colon. The bowel gas pattern is normal. Signed by Dr Kelby Murray on 5/24/2019 2:41 PM    Xr Chest Portable    Result Date: 6/8/2019  EXAMINATION: XR CHEST PORTABLE 6/8/2019 2:54 PM HISTORY: Chest pain COMPARISON: 6/3/2019 FINDINGS: The heart and mediastinal contours are stable. There is consolidation in the left lung base. Small pleural effusions are noted bilaterally. Diffuse increased interstitial markings are noted. Pulmonary vasculature is indistinct. There is no appreciable pneumothorax. A loop recorder device is present. There has been prior median sternotomy. Findings are suggestive of pulmonary vascular congestion and pulmonary edema with bilateral pleural effusions. Signed by Dr Dalila Sanchez on 6/8/2019 2:55 PM    Xr Chest Portable    Result Date: 6/3/2019  EXAMINATION: XR CHEST PORTABLE 6/3/2019 7:22 AM HISTORY: XR CHEST PORTABLE 6/3/2019 5:00 AM HISTORY: Postop COMPARISON: June 2, 2019. FINDINGS: The right lung is clear. Left diaphragm is indistinct most likely atelectasis left lower lobe. Linear atelectasis left upper lung is again noted. . Cardiac silhouettes moderately enlarged this is unchanged. Wires are present previous median sternotomy. . The osseous structures and surrounding soft tissues demonstrate no acute abnormality. 1. Postsurgical change and persistent atelectasis left lower lobe.  This is unchanged from June 2. Signed by Dr Monse Morgan on 6/3/2019 7:23 AM    Xr Chest Portable    Result Date: 6/2/2019  XR CHEST PORTABLE 6/2/2019 5:00 AM HISTORY: Postop CABG Comparison: 6/1/2019 Findings: Lines and tubes are stable in position. No new opacities or pneumothoraces are visualized in the chest. The cardiomediastinal silhouette and pulmonary vascularity are unchanged. No acute osseous or soft tissue abnormality is noted. Impression: 1. No significant interval change since previous exam. Signed by Dr Chantelle Melton on 6/2/2019 7:14 AM    Xr Chest Portable    Result Date: 6/1/2019  EXAMINATION: Chest one view 6/1/2019 HISTORY: Postop CABG FINDINGS: Today's exam is compared to previous study of 5/31/2019. All support lines have been removed except for a right IJ introducer. Lungs are hypoventilated with bibasilar atelectasis. Small effusions are present. There is mild gastric distention improved from yesterday's exam. A loop recorder projects over the left chest.    1.. Right IJ introducer remains in place. The remaining support lines have been removed. 2. Expiratory chest. There is persistent pulmonary venous hypertension with widening of the vascular pedicle. This is accentuated by the expiratory nature of the film. 3. Small effusions. Signed by Dr Chantelel Melton on 6/1/2019 8:07 AM    Xr Chest Portable    Result Date: 5/31/2019  EXAMINATION: Chest one view 5/31/2019 HISTORY: Postop CABG FINDINGS: Today's exam is compared to previous study of one day earlier. A Lilly-Srikanth catheter and mediastinal drains remain in place. The patient has been extubated. There is gaseous distention of the stomach. There is persistent pulmonary venous hypertension with widening of the vascular pedicle. There is a poor inspiratory effort. 1.. Expiratory chest. There is persistent pulmonary venous hypertension. 2. Patient extubated. 3. Developing gaseous distention of the stomach.  Signed by Dr Chantelle Melton on 5/31/2019 6:40 AM    Xr Chest Portable    Result Date: 5/30/2019  EXAMINATION: Chest one view 5/30/2019 HISTORY: Postop CABG FINDINGS: Today's exam is compared to previous study of 6 days earlier. The lungs are expiratory causing accentuation of bronchovascular markings and cardiac silhouette. There is mild cardiomegaly with suspected mild pulmonary venous hypertension with widening of the vascular pedicle. An endotracheal tube, [place]-Srikanth catheter and mediastinal drains are all well-positioned. There is no pneumothorax or significant effusion. 1.. Expiratory chest. Pulmonary venous hypertension is also suspected with widening of the vascular pedicle. 2. Multiple life support lines in place with no complications and well-positioned. Signed by Dr Beverly Franco on 5/30/2019 12:33 PM    Vl Dup Carotid Bilateral    Result Date: 5/28/2019  Vascular Carotid Procedure  Demographics   Patient Name   Orestes Sawyer  Age                 75                 D   Patient Number [id number]        Gender              Male   Visit Number   [id number]     Interpreting        Thu Sauceda MD                               Physician   Date of Birth  1945    Referring Physician Delia Mccoy   Accession      [id number]     [facility]  Number  Procedure Type of Study:   Cerebral:Carotid, VL CAROTID BILATERAL. Indications for Study:Pre-op CABG. Risk Factors   - The patient's risk factor(s) include: diabetes mellitus, dyslipidemia     and arterial hypertension.   - The patient's last creatinine was 1.2 mg/dl. - The patient's risk factor(s) include: Prior CVA, , Allergies   - Statins. Impression   There is smooth plaque visualized in the bilateral internal carotid  artery(ies). There is no stenosis in the right internal carotid artery. There is no stenosis of the left internal carotid artery. There is normal antegrade flow in the bilateral vertebral artery(ies). Signature   ----------------------------------------------------------------  Electronically signed by Ino COWANInterpreting  physician) on 05/28/2019 03:37 PM  ----------------------------------------------------------------  Velocities are measured in cm/s ; Diameters are measured in mm Carotid Right Measurements +------------+-------+-------+--------+-------+------------+---------------+ ! Location    ! PSV    ! EDV    ! Angle   ! RI     !%Stenosis   ! Tortuosity     ! +------------+-------+-------+--------+-------+------------+---------------+ ! Prox CCA    !99     !14.9   !60      !0.85   !            !               ! +------------+-------+-------+--------+-------+------------+---------------+ ! Mid CCA     !70.4   !13.5   !60      !0.81   !            !               ! +------------+-------+-------+--------+-------+------------+---------------+ ! Prox ICA    ! 53.9   !11.7   !60      !0.78   !            !               ! +------------+-------+-------+--------+-------+------------+---------------+ ! Mid ICA     ! 89.1   !18.8   !60      !0.79   !            !               ! +------------+-------+-------+--------+-------+------------+---------------+ ! Dist ICA    ! 89.7   !19.9   !60      !0.78   !            !               ! +------------+-------+-------+--------+-------+------------+---------------+ ! Prox ECA    !117    !       !61      !       !            !               ! +------------+-------+-------+--------+-------+------------+---------------+ ! Vertebral   !66.8   !11.7   !60      !0.82   !            !               ! +------------+-------+-------+--------+-------+------------+---------------+   - There is antegrade vertebral flow noted on the right side. - Additional Measurements:ICAPSV/CCAPSV 0.91. ICAEDV/CCAEDV 1.34. Carotid Left Measurements +------------+-------+-------+--------+-------+------------+---------------+ ! Location    ! PSV    ! EDV    ! Angle   ! RI     !%Stenosis   ! Tortuosity     ! +------------+-------+-------+--------+-------+------------+---------------+ ! Prox CCA    !105    !13.4   !60      !0.87   !            !               ! +------------+-------+-------+--------+-------+------------+---------------+ ! Mid CCA     !90.4   !14.9   !60      !0.84   !            !               ! +------------+-------+-------+--------+-------+------------+---------------+ ! Prox ICA    ! 65.7   !15.8   !60      !0.76   !            !               ! +------------+-------+-------+--------+-------+------------+---------------+ ! Mid ICA     ! 88.5   !22.3   !60      !0.75   !            !               ! +------------+-------+-------+--------+-------+------------+---------------+ ! Dist ICA    !76.2   !17.6   ! 60      !0.77   !            !               ! +------------+-------+-------+--------+-------+------------+---------------+ ! Prox ECA    !141    !       !61      !       !            !               ! +------------+-------+-------+--------+-------+------------+---------------+ ! Vertebral   !78     !12.9   !60      !0.83   !            !               ! +------------+-------+-------+--------+-------+------------+---------------+   - There is antegrade vertebral flow noted on the left side. - Additional Measurements:ICAPSV/CCAPSV 0.84. ICAEDV/CCAEDV 1.66. Nm Myocardial Spect Single    Result Date: 6/12/2019  Thallium viability Nuclear Medicine Report Date of Procedure: 6/12/2019 The patient was injected with 3 millicuries (mCi) of thallium 201 After 24 hours the patient was re-injected with 1 millicuries (mCi) of thallium 201. Repeat gated images were then performed per standard protocol. Findings: 1. Analysis of the the rest and redistribution images reveals a large fixed lateral and inferior defect with no significant re-uptake. Impression: There is a large predominantly lateral and inferior infarct with no significant redistribution suggesting no viability in this territory.  Suggest: Medical management Signed by Dr Berto Kat on 6/12/2019 8:15 PM    Vl Vein Mapping Lower Bilateral    Result Date: 5/28/2019  Vascular Lower Extremity Vein Mapping Procedure  Demographics   Patient Name   Liday Turner  Age                 75                 D   Patient Number [id number]        Gender              Male   Visit Number   [id number]     Interpreting        Ino Eden MD                               Physician   Date of Birth  1945    Referring Physician Kendra James   Accession      [id number]     [facility]  Number  Procedure Type of Study:   [address] [address]. Indications for Study:Pre-op CABG. Risk Factors   - The patient's risk factor(s) include: diabetes mellitus, dyslipidemia     and arterial hypertension.   - The patient's last creatinine was 1.2 mg/dl. Allergies   - Statins. Impression   Usable greater Saphenous vein conduit in both lower extremities. The veins have been marked on the skin. Sizes are noted above. Signature   ----------------------------------------------------------------  Electronically signed by Ino Eden MD(Interpreting  physician) on 05/28/2019 03:36 PM  ----------------------------------------------------------------  Velocities are measured in cm/s ; Diameters are measured in mm +--------------------------------------++--------+-----+----+--------+-----+ ! Superficial - Great Saphenous Vein    ! ! Right   ! ! Left!        !     ! +--------------------------------------++--------+-----+----+--------+-----+ ! Location                              ! !Diameter! Depth! !Diameter! Depth! +--------------------------------------++--------+-----+----+--------+-----+ ! GSV 2 cm Distal to Junction           ! !0.9     !     !    !5.6     !     ! +--------------------------------------++--------+-----+----+--------+-----+ ! GSV High Thigh !! 4.3     !     !    !4.8     !     ! +--------------------------------------++--------+-----+----+--------+-----+ ! GSV Mid Thigh                         !!4       !     !    !4.4     !     ! +--------------------------------------++--------+-----+----+--------+-----+ ! GSV Low Thigh                         ! !4.7     !     !    !3.8     !     ! +--------------------------------------++--------+-----+----+--------+-----+ ! GSV Knee                              !!4.4     !     !    !3.9     !     ! +--------------------------------------++--------+-----+----+--------+-----+ ! GSV High Calf                         !!3.8     !     !    !3.4     !     ! +--------------------------------------++--------+-----+----+--------+-----+ ! GSV Mid Calf                          !!3.3     !     !    !3.8     !     ! +--------------------------------------++--------+-----+----+--------+-----+ ! GSV Ankle                             ! !3.4     !     !    !4.3     !     ! +--------------------------------------++--------+-----+----+--------+-----+        Assessment and Plan: This is a 66 y. o. year old male with past medical history of DM, CKD 3,  extensive coronary artery disease  With multiple PCI's to LAD, RCA and OM presenting with late presentation of inferior wall MI 5/24/2019 found to have an occluded large dominant RCA not intervened on,, intermediate left main and diffuse severe mid to distal LAD restenosis, s/p CABG 5/30/19 with LIMA to D1, SVG-LAD, SVG-RI, EF 25%, post-op AF, d/tyler to N/H, not on lasix presenting with CP with NTG response, volume overload and severe constipation with no viability demonstrated in large lateral and inferior infarct. 1. We'll optimize heart failure medications. Increase Coreg to 12.5 mg twice daily. Continue Entresto 49/50 on milligrams twice daily. 2. Will need follow-up echo in 3 months. If EF does not improve, he will require ICD placement.             Grant Caraballo MD 6/13/2019 12:52 PM

## 2019-06-13 NOTE — PLAN OF CARE
Problem: Falls - Risk of:  Goal: Will remain free from falls  Description  Will remain free from falls  Outcome: Ongoing  Goal: Absence of physical injury  Description  Absence of physical injury  Outcome: Ongoing     Problem: Pain:  Goal: Pain level will decrease  Description  Pain level will decrease  Outcome: Ongoing  Goal: Control of acute pain  Description  Control of acute pain  Outcome: Ongoing     Problem: Cardiac:  Goal: Ability to maintain vital signs within normal range will improve  Description  Ability to maintain vital signs within normal range will improve  Outcome: Ongoing  Goal: Cardiovascular alteration will improve  Description  Cardiovascular alteration will improve  Outcome: Ongoing

## 2019-06-13 NOTE — PROGRESS NOTES
Have called clinical house. Spoke with [employer]. Unable to get IV access.   Electronically signed by Kiana Akins RN on 6/13/2019 at 6:51 PM

## 2019-06-14 ENCOUNTER — TELEPHONE (OUTPATIENT)
Dept: CARDIOLOGY | Age: 74
End: 2019-06-14

## 2019-06-14 VITALS
SYSTOLIC BLOOD PRESSURE: 100 MMHG | RESPIRATION RATE: 16 BRPM | DIASTOLIC BLOOD PRESSURE: 47 MMHG | BODY MASS INDEX: 31.22 KG/M2 | WEIGHT: 256.4 LBS | OXYGEN SATURATION: 91 % | HEART RATE: 80 BPM | TEMPERATURE: 97.4 F | HEIGHT: 76 IN

## 2019-06-14 PROBLEM — B96.29 URINARY TRACT INFECTION DUE TO EXTENDED-SPECTRUM BETA LACTAMASE (ESBL) PRODUCING ESCHERICHIA COLI: Status: ACTIVE | Noted: 2019-06-14

## 2019-06-14 PROBLEM — Z16.12 URINARY TRACT INFECTION DUE TO EXTENDED-SPECTRUM BETA LACTAMASE (ESBL) PRODUCING ESCHERICHIA COLI: Status: ACTIVE | Noted: 2019-06-14

## 2019-06-14 PROBLEM — N39.0 URINARY TRACT INFECTION DUE TO EXTENDED-SPECTRUM BETA LACTAMASE (ESBL) PRODUCING ESCHERICHIA COLI: Status: ACTIVE | Noted: 2019-06-14

## 2019-06-14 LAB
GLUCOSE BLD-MCNC: 179 MG/DL (ref 70–99)
GLUCOSE BLD-MCNC: 81 MG/DL (ref 70–99)
PERFORMED ON: ABNORMAL
PERFORMED ON: NORMAL

## 2019-06-14 PROCEDURE — 99238 HOSP IP/OBS DSCHRG MGMT 30/<: CPT | Performed by: FAMILY MEDICINE

## 2019-06-14 PROCEDURE — 6370000000 HC RX 637 (ALT 250 FOR IP): Performed by: INTERNAL MEDICINE

## 2019-06-14 PROCEDURE — C1751 CATH, INF, PER/CENT/MIDLINE: HCPCS

## 2019-06-14 PROCEDURE — 76937 US GUIDE VASCULAR ACCESS: CPT

## 2019-06-14 PROCEDURE — 2580000003 HC RX 258: Performed by: HOSPITALIST

## 2019-06-14 PROCEDURE — 82948 REAGENT STRIP/BLOOD GLUCOSE: CPT

## 2019-06-14 PROCEDURE — 2580000003 HC RX 258: Performed by: FAMILY MEDICINE

## 2019-06-14 PROCEDURE — 36569 INSJ PICC 5 YR+ W/O IMAGING: CPT

## 2019-06-14 PROCEDURE — 02HV33Z INSERTION OF INFUSION DEVICE INTO SUPERIOR VENA CAVA, PERCUTANEOUS APPROACH: ICD-10-PCS | Performed by: FAMILY MEDICINE

## 2019-06-14 PROCEDURE — 97530 THERAPEUTIC ACTIVITIES: CPT

## 2019-06-14 PROCEDURE — 6360000002 HC RX W HCPCS: Performed by: FAMILY MEDICINE

## 2019-06-14 PROCEDURE — 6370000000 HC RX 637 (ALT 250 FOR IP): Performed by: HOSPITALIST

## 2019-06-14 RX ORDER — SODIUM CHLORIDE 0.9 % (FLUSH) 0.9 %
10 SYRINGE (ML) INJECTION EVERY 12 HOURS SCHEDULED
Status: DISCONTINUED | OUTPATIENT
Start: 2019-06-14 | End: 2019-06-14 | Stop reason: HOSPADM

## 2019-06-14 RX ORDER — LIDOCAINE HYDROCHLORIDE 10 MG/ML
5 INJECTION, SOLUTION EPIDURAL; INFILTRATION; INTRACAUDAL; PERINEURAL ONCE
Status: DISCONTINUED | OUTPATIENT
Start: 2019-06-14 | End: 2019-06-14 | Stop reason: HOSPADM

## 2019-06-14 RX ORDER — SODIUM CHLORIDE 0.9 % (FLUSH) 0.9 %
10 SYRINGE (ML) INJECTION PRN
Status: DISCONTINUED | OUTPATIENT
Start: 2019-06-14 | End: 2019-06-14 | Stop reason: HOSPADM

## 2019-06-14 RX ORDER — CARVEDILOL 12.5 MG/1
12.5 TABLET ORAL 2 TIMES DAILY WITH MEALS
Qty: 60 TABLET | Refills: 3 | Status: SHIPPED | OUTPATIENT
Start: 2019-06-14 | End: 2019-07-02

## 2019-06-14 RX ADMIN — TAMSULOSIN HYDROCHLORIDE 0.4 MG: 0.4 CAPSULE ORAL at 10:01

## 2019-06-14 RX ADMIN — MUPIROCIN: 20 OINTMENT TOPICAL at 10:00

## 2019-06-14 RX ADMIN — OXYCODONE AND ACETAMINOPHEN 1 TABLET: 10; 325 TABLET ORAL at 00:24

## 2019-06-14 RX ADMIN — INSULIN LISPRO 2 UNITS: 100 INJECTION, SOLUTION INTRAVENOUS; SUBCUTANEOUS at 11:32

## 2019-06-14 RX ADMIN — ISOSORBIDE MONONITRATE 30 MG: 30 TABLET, EXTENDED RELEASE ORAL at 10:01

## 2019-06-14 RX ADMIN — OXYCODONE AND ACETAMINOPHEN 1 TABLET: 10; 325 TABLET ORAL at 04:53

## 2019-06-14 RX ADMIN — Medication 10 ML: at 10:00

## 2019-06-14 RX ADMIN — OXYCODONE AND ACETAMINOPHEN 1 TABLET: 10; 325 TABLET ORAL at 12:53

## 2019-06-14 RX ADMIN — SACUBITRIL AND VALSARTAN 1 TABLET: 49; 51 TABLET, FILM COATED ORAL at 10:00

## 2019-06-14 RX ADMIN — CARVEDILOL 12.5 MG: 12.5 TABLET, FILM COATED ORAL at 10:01

## 2019-06-14 RX ADMIN — MEROPENEM 1 G: 1 INJECTION, POWDER, FOR SOLUTION INTRAVENOUS at 09:59

## 2019-06-14 RX ADMIN — AMIODARONE HYDROCHLORIDE 200 MG: 200 TABLET ORAL at 10:01

## 2019-06-14 RX ADMIN — DOCUSATE SODIUM 100 MG: 100 CAPSULE, LIQUID FILLED ORAL at 10:00

## 2019-06-14 RX ADMIN — SERTRALINE HYDROCHLORIDE 100 MG: 100 TABLET ORAL at 10:00

## 2019-06-14 RX ADMIN — FLUTICASONE PROPIONATE 1 SPRAY: 50 SPRAY, METERED NASAL at 09:59

## 2019-06-14 RX ADMIN — MEROPENEM 1 G: 1 INJECTION, POWDER, FOR SOLUTION INTRAVENOUS at 00:24

## 2019-06-14 RX ADMIN — FUROSEMIDE 60 MG: 40 TABLET ORAL at 10:00

## 2019-06-14 ASSESSMENT — PAIN SCALES - GENERAL
PAINLEVEL_OUTOF10: 7
PAINLEVEL_OUTOF10: 8
PAINLEVEL_OUTOF10: 6
PAINLEVEL_OUTOF10: 8

## 2019-06-14 ASSESSMENT — PAIN DESCRIPTION - PAIN TYPE: TYPE: ACUTE PAIN

## 2019-06-14 ASSESSMENT — PAIN DESCRIPTION - LOCATION: LOCATION: BACK;SHOULDER

## 2019-06-14 ASSESSMENT — PAIN DESCRIPTION - ORIENTATION: ORIENTATION: RIGHT;LEFT

## 2019-06-14 NOTE — PROGRESS NOTES
Nutrition Assessment    Type and Reason for Visit: Initial    Nutrition Recommendations: continue current POC    Nutrition Assessment: Following for LOS x 6 days. Aware pt to be discharged today. PO intake has improved with intake now %. Malnutrition Assessment:  · Malnutrition Status: No malnutrition  · Context: Acute illness or injury  · Findings of the 6 clinical characteristics of malnutrition (Minimum of 2 out of 6 clinical characteristics is required to make the diagnosis of moderate or severe Protein Calorie Malnutrition based on AND/ASPEN Guidelines):  1. Energy Intake- ,      2. Weight Loss- ,    3. Fat Loss-No significant subcutaneous fat loss,    4. Muscle Loss-No significant muscle mass loss,    5. Fluid Accumulation-No significant fluid accumulation, Extremities  6.  Strength-Not measured    Nutrition Risk Level:  Moderate    Nutrition Diagnosis:   · Problem: Inadequate oral intake  · Etiology: related to Increased demand for energy/nutrients     Signs and symptoms:  as evidenced by Presence of wounds(%)    Objective Information:  · Nutrition-Focused Physical Findings:    · Wound Type: Surgical Wound  · Current Nutrition Therapies:  · Oral Diet Orders: Carb Control 4 Carbs/Meal   · Oral Diet intake: 1-25%, %  · Oral Nutrition Supplement (ONS) Orders: None    · Anthropometric Measures:  · Ht: 6' 4\" (193 cm)   · Current Body Wt: 256 lb (116.1 kg)  · Admission Body Wt: 270 lb (122.5 kg)(stated)  · Ideal Body Wt: 202 lb (91.6 kg), % Ideal Body 126.7%  · BMI Classification: BMI 30.0 - 34.9 Obese Class I    Nutrition Interventions:   Continue current diet  Continued Inpatient Monitoring    Nutrition Evaluation:   · Evaluation: Goals set   · Goals: meet nutritional needs    · Monitoring: Meal Intake, Diet Tolerance, Skin Integrity, Weight, Pertinent Labs      Electronically signed by Belynda Goldmann, MS, RD, LD on 6/14/19 at 1:24 PM    Contact Number: [phone]

## 2019-06-14 NOTE — DISCHARGE SUMMARY
Carloz Madrid  :    MRN:  [id number]    Admit date:  2019  Discharge date:      Admitting Physician:  Isaac Whaley DO    Advance Directive: Full Code    Consults: cardiology    Primary Care Physician:  Mode Nelson MD    Discharge Diagnoses:  Principal Problem:    Coronary artery disease involving native coronary artery of native heart with unstable angina pectoris [facility])  Active Problems:    Type 2 diabetes mellitus with diabetic polyneuropathy, with long-term current use of insulin ([facility] [address]    Essential hypertension    Chronic bilateral low back pain without sciatica    Benign prostatic hyperplasia without lower urinary tract symptoms    MOO on CPAP    Chest pain    Stage 3 chronic kidney disease ([facility] [address]    Chronic constipation    Palliative care patient    Ischemic cardiomyopathy    Urinary tract infection due to extended-spectrum beta lactamase (ESBL) producing Escherichia coli  Resolved Problems:    * No resolved hospital problems. *      Significant Diagnostic Studies:   Ct Abdomen Pelvis Wo Contrast Additional Contrast? None    Result Date: 2019  EXAMINATION: CT ABDOMEN PELVIS WO CONTRAST 2019 3:57 PM HISTORY: Abdominal distention Comparison: Abdominal KUB 2019 Technique: Sequential imaging was performed from the lung bases through the pubic symphysis without the use of IV contrast. Sagittal and coronal reformations were made from the original source data and reviewed. Automated exposure control was utilized for radiation dose reduction. Radiation dose: DLP 1701 mGy-cm Findings: Images are degraded by significant motion. There has been prior median sternotomy. Small pleural effusions are present bilaterally with associated compressive atelectasis. The heart is enlarged. Coronary artery calcifications are present. The blood pool is hypodense relative to the myocardium, suggesting anemia. There is trace pericardial fluid.  Evaluation is limited by a lack of IV contrast. Allowing for these limitations, the liver, spleen, and adrenal glands are grossly normal in appearance. The gallbladder is surgically absent. There is a questionable lesion extending from the mid pancreatic body measuring 2.7 cm in size. The kidneys have a normal noncontrast appearance. The ureters are decompressed bilaterally. The abdominal aorta appears normal in caliber. There are scattered atherosclerotic calcifications within the aorta and its branch vessels. No pathologically enlarged central mesenteric or retroperitoneal lymph nodes are appreciated. A small hiatal hernia is present. The stomach and small bowel do not appear overly dilated. There are a few scattered colonic diverticula without evidence of acute diverticulitis. Large bowel is otherwise grossly normal in appearance. No free air or free fluid is seen in the abdomen. The urinary bladder is grossly normal in appearance. No free fluid is seen in the pelvis. No pathologically enlarged pelvic or inguinal lymph nodes are identified. Fluid is noted dependently in the subcutaneous soft tissues of the posterior pelvis and upper thighs. Review of the visualized osseous structures demonstrates no acute or aggressive lesions. Postsurgical changes are noted in the lumbar spine. Impression: 1. No acute findings in the abdomen or pelvis when allowing for limitations of significant motion as well as lack of IV contrast. 2. Cardiomegaly with bilateral pleural effusions. Subcutaneous soft tissue edema dependently in the posterior aspect of the pelvis and proximal thighs. 3. Questionable lesion extending from the pancreatic body for which nonemergent follow-up MRI abdomen with and without contrast is recommended to include MRCP. 4. Atherosclerotic disease.  Signed by Dr Filipe Caban on 6/8/2019 4:03 PM    Xr Abdomen (kub) (single Ap View)    Result Date: 6/8/2019  EXAMINATION: XR ABDOMEN (KUB) (SINGLE AP VIEW) 6/8/2019 2:55 PM HISTORY: Abdominal distention COMPARISON: None FINDINGS: The bowel gas pattern is nonspecific and nonobstructive. A moderate amount of stool is noted in the left colon. The entire abdomen is not included on this exam. Surgical clips are seen in the right upper quadrant of the abdomen. There has been previous fusion of the lumbar spine. No pathologic calcifications are seen. No evidence of bowel obstruction. Signed by Dr Severa Oz on 6/8/2019 2:56 PM    Xr Chest Portable    Result Date: 6/8/2019  EXAMINATION: XR CHEST PORTABLE 6/8/2019 2:54 PM HISTORY: Chest pain COMPARISON: 6/3/2019 FINDINGS: The heart and mediastinal contours are stable. There is consolidation in the left lung base. Small pleural effusions are noted bilaterally. Diffuse increased interstitial markings are noted. Pulmonary vasculature is indistinct. There is no appreciable pneumothorax. A loop recorder device is present. There has been prior median sternotomy. Findings are suggestive of pulmonary vascular congestion and pulmonary edema with bilateral pleural effusions. Signed by Dr Severa Oz on 6/8/2019 2:55 PM      Pertinent Labs:   CBC:   Recent Labs     06/12/19  0111 06/13/19  0032   WBC 11.6* 16.8*   HGB 9.7* 10.3*    275     BMP:    Recent Labs     06/12/19  0111 06/13/19  0032   * 136   K 4.1 4.2   CL 93* 94*   CO2 28 28   BUN 23 23   CREATININE 1.5* 1.3*   GLUCOSE 113* 116*     INR: No results for input(s): INR in the last 72 hours. ABGs:No results for input(s): PH, PO2, PCO2, HCO3, BE, O2SAT in the last 72 hours. Lactic Acid:No results for input(s): LACTA in the last 72 hours. Procedures: None performed. Hospital Course:   6-8: Presents to ED with chest pressure from SNF, radiation to left neck and left upper extremity. Recent 3 vessel CABG 5-30, discharged to SNF 6-5. Relieved by nitroglycerin. Troponin elevated. Lower extremity edema. Admitted to CCU on heparin and nitroglycerin drips. Cardiology consulted. [place] he was volume overloaded and diuresed. Carvedilol started. 6-9: 5 L of fluid output but remains short of breath. Remains constipated. 6-10: Resting comfortably, constipation resolved. Started on Imdur, weaned from nitroglycerin and heparin drips. 6-11: Mild chest pain earlier in the day. Changed to oral diuresis. Amiodarone dose reduced to 200 mg daily. Thallium viability study. 6-12: Headaches, no further chest pain. Depacon given x1. Thallium viability study shows no reversibility/viability, cardiology suggests aggressive heart failure management. Started on [employer] [place]. 6-13: Urine culture growing ESBL Escherichia coli, placed on meropenem x7 days and discharged to [facility] but precertification was not completed. 6-14: Stable, insurance precertification completed. Discharged to [facility].     Physical Exam:  Vitals: BP (!) 100/58   Pulse 74   Temp 97 °F (36.1 °C)   Resp 16   Ht 6' 4\" (1.93 m)   Wt 256 lb 6.4 oz (116.3 kg)   SpO2 90%   BMI 31.21 kg/m²   24HR INTAKE/OUTPUT:      Intake/Output Summary (Last 24 hours) at 6/14/2019 6182  Last data filed at 6/14/2019 0533  Gross per 24 hour   Intake 1048 ml   Output 1325 ml   Net -277 ml     General appearance: alert and cooperative with exam  HEENT: atraumatic, eyes with clear conjunctiva and normal lids, pupils and irises normal, external ears and nose are normal, lips normal. Neck without masses, lympadenopathy, bruit, thyroid normal  Lungs: no increased work of breathing, clear to auscultation bilaterally without rales, rhonchi or wheezes  Heart: regular rate and rhythm, S1, S2 normal, no murmur, click, rub or gallop  Abdomen: soft, non-tender; bowel sounds normal; no masses,  no organomegaly  Extremities: edema 1+ bilaterally, modified Armida's negative  Neurologic: no focal neurologic deficits, normal sensation, alert and oriented, affect and mood appropriate  Skin: no jaundice, rashes, or nodules    Discharge Medications:       Carlota Bailey \"Bill\"   Home Medication Instructions [facility]:[id number]    Printed on:06/14/19 3216   Medication Information                      amiodarone (CORDARONE) 200 MG tablet  Take 1 tablet by mouth daily             azelastine (ASTELIN) 0.1 % nasal spray  1 spray by Nasal route as needed for Rhinitis Use in each nostril as directed             butalbital-acetaminophen-caffeine (FIORICET, ESGIC) -40 MG per tablet  Take 1 tablet by mouth as needed for Headaches             carvedilol (COREG) 12.5 MG tablet  Take 1 tablet by mouth 2 times daily (with meals)             clopidogrel (PLAVIX) 75 MG tablet  TAKE 1 TABLET DAILY             docusate sodium (COLACE, DULCOLAX) 100 MG CAPS  Take 100 mg by mouth 2 times daily             fluticasone (FLONASE) 50 MCG/ACT nasal spray  1 spray by Each Nostril route daily             furosemide (LASIX) 20 MG tablet  Take 3 tablets by mouth 2 times daily             insulin glargine (LANTUS SOLOSTAR) 100 UNIT/ML injection pen  INJECT 80 UNITS INTO THE SKIN NIGHTLY             isosorbide mononitrate (IMDUR) 30 MG extended release tablet  Take 1 tablet by mouth daily             meropenem (MERREM) infusion  Infuse 1,000 mg intravenously every 8 hours for 6 days Compound per protocol. mupirocin (BACTROBAN) 2 % ointment  Apply topically 3 times daily. nitroGLYCERIN (NITROSTAT) 0.4 MG SL tablet  up to max of 3 total doses. If no relief after 1 dose, call 911. oxyCODONE-acetaminophen (PERCOCET)  MG per tablet  Take 1 tablet by mouth 3 times daily as needed for Pain for up to 3 days.              sacubitril-valsartan (ENTRESTO) 49-51 MG per tablet  Take 1 tablet by mouth 2 times daily             sertraline (ZOLOFT) 100 MG tablet  Take 1 tablet by mouth daily             Sodium Phosphates (FLEET) 7-19 GM/118ML  Place 1 enema rectally daily as needed (constipation)             tamsulosin (FLOMAX) 0.4 MG capsule  TAKE 2 CAPSULES AT BEDTIME             tiZANidine (ZANAFLEX) 2 MG tablet  Take 1 tablet by mouth nightly as needed (muscle spasm)             zoster recombinant adjuvanted vaccine [facility]) 50 MCG/0.5ML SUSR injection  1 vaccine now and repeat in 2-6 months. Discharge Instructions: Follow up with Philip Arriaga MD in 7 days. Take medications as directed. Resume activity as tolerated. Diet: DIET CARB CONTROL;     Disposition: Patient is medically stable and will be discharged Skilled nursing facility. Time spent on discharge less than 30 minutes.     Signed:  Logan Bates DO

## 2019-06-14 NOTE — PROGRESS NOTES
ischemic attack). has a past surgical history that includes Coronary angioplasty with stent (jan 6 [place]); Coronary angioplasty with stent; Diagnostic Cardiac Cath Lab Procedure (813820); Cardiac catheterization (12/12/12); Cardiac catheterization (3/29/15  [facility]); lumbar fusion (Left, 11/15/2016); Colonoscopy; back surgery; joint replacement; Appendectomy; Cholecystectomy; and Coronary artery bypass graft (N/A, 5/30/2019). Restrictions  Restrictions/Precautions  Restrictions/Precautions: Fall Risk  Position Activity Restriction  Sternal Precautions: No Pushing, No Pulling, 8# Lifting Restrictions  Subjective   General  Chart Reviewed: Yes  Patient assessed for rehabilitation services?: Yes  Additional Pertinent Hx: CABG on 5-30-19. Went to [facility] for rehab but began medical decline and readmitted to hospital  Response to previous treatment: Patient with no complaints from previous session  Family / Caregiver Present: No  Diagnosis: Chest pain, heart failure  Pain Assessment  Pain Assessment: 0-10  Pain Level: 7  Pain Type: Acute pain  Pain Location: Back; Shoulder  Pain Orientation: Right;Left  Vital Signs  Patient Currently in Pain: Yes   Orientation     Objective                                                                Type of ROM/Therapeutic Exercise  Type of ROM/Therapeutic Exercise: AROM  Comment: gentle ROM ex with no shoulder elevation past 90 degrees. Plan   Plan  Times per week: 3-5x/week  Times per day: Daily  G-Code     OutComes Score                                                  AM-PAC Score             Goals  Short term goals  Time Frame for Short term goals: 1 week  Short term goal 1: Supervision with toilet tfers  Short term goal 2: Supervision with clothing mgmt.  in standing  Short term goal 3: Supervision with light ambulatory ADLs without excessive fatigue  Long term goals  Long term goal 1: Return to PLOF       Therapy Time   Individual Concurrent Group Co-treatment   Time In           Time Out           Minutes                   NIKKO Dowd

## 2019-06-14 NOTE — PROGRESS NOTES
Hospitalist Progress Note  6/14/2019 8:05 AM  Subjective:   Admit Date: 6/8/2019  PCP: Max Pillai MD    Chief Complaint: chest pressure    Subjective: Patient feeling well, denies any new problems. Urine culture growing ESBL Escherichia coli. Accepted to [address] [place] [address] and discharge orders were written but insurance precertification had not been completed so the patient stayed overnight. History is otherwise unchanged. Cumulative Hospital History:   6-8: Presents to ED with chest pressure from SNF, radiation to left neck and left upper extremity. Recent 3 vessel CABG 5-30, discharged to SNF 6-5. Relieved by nitroglycerin. Troponin elevated. Lower extremity edema. Admitted to CCU on heparin and nitroglycerin drips. Cardiology consulted. [place] he was volume overloaded and diuresed. Carvedilol started. 6-9: 5 L of fluid output but remains short of breath. Remains constipated. 6-10: Resting comfortably, constipation resolved. Started on Imdur, weaned from nitroglycerin and heparin drips. 6-11: Mild chest pain earlier in the day. Changed to oral diuresis. Amiodarone dose reduced to 200 mg daily. Thallium viability study. 6-12: Headaches, no further chest pain. Depacon given x1. Thallium viability study shows no reversibility/viability, cardiology suggests aggressive heart failure management. Started on [place]. 6-13: Urine culture growing ESBL Escherichia coli, placed on meropenem x7 days and discharged to [facility]. ROS: 14 point review of systems is negative except as specifically addressed above. DIET CARB CONTROL;     Intake/Output Summary (Last 24 hours) at 6/14/2019 0805  Last data filed at 6/14/2019 0533  Gross per 24 hour   Intake 1048 ml   Output 1325 ml   Net -277 ml     Medications:   dextrose       Current Facility-Administered Medications   Medication Dose Route Frequency Provider Last Rate Last Dose    meropenem (MERREM) 1 g in sodium chloride 0.9 % 100 mL IVPB (mini-bag)  1 g Intravenous Q8H Doneabbey Jackson DO   Stopped at 06/14/19 0126    carvedilol (COREG) tablet 12.5 mg  12.5 mg Oral BID  Dahlia Darling MD        sacubitril-valsartan (ENTRESTO) 49-51 MG per tablet 1 tablet  1 tablet Oral BID Dahlia Darling MD   1 tablet at 06/13/19 2056    amiodarone (CORDARONE) tablet 200 mg  200 mg Oral Daily Dahlia Darling MD   200 mg at 06/13/19 1058    furosemide (LASIX) tablet 60 mg  60 mg Oral BID Dahlia Darling MD   60 mg at 06/13/19 1821    glucose (GLUTOSE) 40 % oral gel 15 g  15 g Oral PRN Magdy D Vicente, DO        dextrose 50 % IV solution  12.5 g Intravenous PRN Magdy D Vicente, DO        glucagon (rDNA) injection 1 mg  1 mg Intramuscular PRN Magdy D Vicente, DO        dextrose 5 % solution  100 mL/hr Intravenous PRN Magdy D Vicente, DO        docusate sodium (COLACE) capsule 100 mg  100 mg Oral BID Dahlia Darling MD   100 mg at 06/13/19 2056    isosorbide mononitrate (IMDUR) extended release tablet 30 mg  30 mg Oral BID Dahlia Darling MD   30 mg at 06/13/19 2056    fluticasone (FLONASE) 50 MCG/ACT nasal spray 1 spray  1 spray Each Nostril Daily Dahlia Darling MD   1 spray at 06/13/19 1355    mupirocin (BACTROBAN) 2 % ointment   Topical Daily Marielena Gordon MD        sodium chloride flush 0.9 % injection 10 mL  10 mL Intravenous 2 times per day Alba Villegas MD   10 mL at 06/13/19 1058    sodium chloride flush 0.9 % injection 10 mL  10 mL Intravenous PRN Alba Villegas MD        ondansetron (ZOFRAN) injection 4 mg  4 mg Intravenous Q6H PRN Alba Villegas MD        potassium chloride (KLOR-CON M) extended release tablet 40 mEq  40 mEq Oral PRN Alba Villegas MD        Or    potassium bicarb-citric acid (EFFER-K) effervescent tablet 40 mEq  40 mEq Oral PRN Alba Villegas MD        Or    potassium chloride 10 mEq/100 mL IVPB (Peripheral Line)  10 mEq Intravenous PRN Alba Villegas MD        potassium chloride 10 mEq/100 mL IVPB (Peripheral Line)  10 mEq Intravenous PRN Alba Villegas MD        magnesium sulfate 1 g in dextrose 5% 100 mL IVPB  1 g Intravenous PRN MD Lety Fowler rectal enema 1 enema  1 enema Rectal Daily PRN MD Lety Fowler rectal enema 1 enema  1 enema Rectal Daily PRN Meeta Reyes MD        acetaminophen (TYLENOL) suppository 650 mg  650 mg Rectal Q4H PRN Meeta Reyes MD        insulin lispro (HUMALOG) injection vial 0-12 Units  0-12 Units Subcutaneous TID WC Meeta Reyes MD   2 Units at 06/13/19 1825    insulin lispro (HUMALOG) injection vial 0-6 Units  0-6 Units Subcutaneous Nightly Meeta Reyes MD   2 Units at 06/13/19 2053    butalbital-acetaminophen-caffeine (FIORICET, ESGIC) per tablet 1 tablet  1 tablet Oral PRN Meeta Reyes MD        insulin glargine (LANTUS) injection vial 80 Units  80 Units Subcutaneous Nightly Meeta Reyes MD   60 Units at 06/13/19 2053    oxyCODONE-acetaminophen (PERCOCET)  MG per tablet 1 tablet  1 tablet Oral Q4H PRN Meeta Reyes MD   1 tablet at 06/14/19 0453    sertraline (ZOLOFT) tablet 100 mg  100 mg Oral Daily Meeta Reyes MD   100 mg at 06/13/19 1057    tamsulosin (FLOMAX) capsule 0.4 mg  0.4 mg Oral Daily Meeta Reyes MD   0.4 mg at 06/13/19 1057        Labs:     Recent Labs     06/12/19  0111 06/13/19  0032   WBC 11.6* 16.8*   RBC 3.74* 3.97*   HGB 9.7* 10.3*   HCT 31.0* 33.2*   MCV 82.9 83.6   MCH 25.9* 25.9*   MCHC 31.3* 31.0*    275     Recent Labs     06/12/19  0111 06/13/19  0032   * 136   K 4.1 4.2   ANIONGAP 14 14   CL 93* 94*   CO2 28 28   BUN 23 23   CREATININE 1.5* 1.3*   GLUCOSE 113* 116*   CALCIUM 8.9 9.0     No results for input(s): MG, PHOS in the last 72 hours. No results for input(s): AST, ALT, ALB, BILITOT, ALKPHOS, ALB in the last 72 hours. ABGs:No results for input(s): PH, PO2, PCO2, HCO3, BE, O2SAT in the last 72 hours. Troponin T: No results for input(s): TROPONINI in the last 72 hours. INR: No results for input(s): INR in the last 72 hours.   Lactic Acid: No results for input(s): LACTA in the last 72 hours. Objective:   Vitals: /64   Pulse 83   Temp 97.4 °F (36.3 °C) (Temporal)   Resp 20   Ht 6' 4\" (1.93 m)   Wt 255 lb 6.4 oz (115.8 kg)   SpO2 93%   BMI 31.09 kg/m²   24HR INTAKE/OUTPUT:       Intake/Output Summary (Last 24 hours) at 6/13/2019 1037  Last data filed at 6/13/2019 0847      Gross per 24 hour   Intake 1230 ml   Output 1100 ml   Net 130 ml      General appearance: alert and cooperative with exam  HEENT: atraumatic, eyes with clear conjunctiva and normal lids, pupils and irises normal, external ears and nose are normal, lips normal. Neck without masses, lympadenopathy, bruit, thyroid normal  Lungs: no increased work of breathing, clear to auscultation bilaterally without rales, rhonchi or wheezes  Heart: regular rate and rhythm, S1, S2 normal, no murmur, click, rub or gallop  Abdomen: soft, non-tender; bowel sounds normal; no masses,  no organomegaly  Extremities: edema 1+ bilaterally, modified Armida's negative  Neurologic: no focal neurologic deficits, normal sensation, alert and oriented, affect and mood appropriate  Skin: no jaundice, rashes, or nodules    Assessment and Plan:   Principal Problem:    Coronary artery disease involving native coronary artery of native heart with unstable angina pectoris (HCC)  Active Problems:    Type 2 diabetes mellitus with diabetic polyneuropathy, with long-term current use of insulin (HCC)    Essential hypertension    Chronic bilateral low back pain without sciatica    Benign prostatic hyperplasia without lower urinary tract symptoms    MOO on CPAP    Chest pain    Stage 3 chronic kidney disease (HCC)    Chronic constipation    Palliative care patient    Ischemic cardiomyopathy  Resolved Problems:    * No resolved hospital problems. *    Day #1 meropenem culture-appropriate treatment for ESBL urinary tract infection. Heart failure management as per cardiology.     Medically stable for discharge but awaiting insurance precertification. Advance Directive: Full Code    DVT prophylaxis: SCDs    Discharge planning:  To SNF      DO Chester Sommer [facility]

## 2019-06-17 ENCOUNTER — TELEPHONE (OUTPATIENT)
Dept: CARDIOLOGY | Age: 74
End: 2019-06-17

## 2019-06-17 ENCOUNTER — TELEPHONE (OUTPATIENT)
Dept: INTERNAL MEDICINE | Age: 74
End: 2019-06-17

## 2019-06-17 ENCOUNTER — LAB REQUISITION (OUTPATIENT)
Dept: LAB | Facility: HOSPITAL | Age: 74
End: 2019-06-17

## 2019-06-17 DIAGNOSIS — I63.411 CEREBROVASCULAR ACCIDENT (CVA) DUE TO EMBOLISM OF RIGHT MIDDLE CEREBRAL ARTERY (HCC): ICD-10-CM

## 2019-06-17 DIAGNOSIS — Z00.00 ENCOUNTER FOR GENERAL ADULT MEDICAL EXAMINATION WITHOUT ABNORMAL FINDINGS: ICD-10-CM

## 2019-06-17 DIAGNOSIS — Z95.818 STATUS POST PLACEMENT OF IMPLANTABLE LOOP RECORDER: Primary | ICD-10-CM

## 2019-06-17 LAB
ALBUMIN SERPL-MCNC: 2.9 G/DL (ref 3.5–5)
ALBUMIN/GLOB SERPL: 1 G/DL (ref 1.1–2.5)
ALP SERPL-CCNC: 68 U/L (ref 24–120)
ALT SERPL W P-5'-P-CCNC: 26 U/L (ref 0–54)
ANION GAP SERPL CALCULATED.3IONS-SCNC: 7 MMOL/L (ref 4–13)
AST SERPL-CCNC: 21 U/L (ref 7–45)
BASOPHILS # BLD AUTO: 0.05 10*3/MM3 (ref 0–0.2)
BASOPHILS NFR BLD AUTO: 0.9 % (ref 0–2)
BILIRUB SERPL-MCNC: 0.3 MG/DL (ref 0.1–1)
BUN BLD-MCNC: 27 MG/DL (ref 5–21)
BUN/CREAT SERPL: 23.1 (ref 7–25)
CALCIUM SPEC-SCNC: 8.3 MG/DL (ref 8.4–10.4)
CHLORIDE SERPL-SCNC: 99 MMOL/L (ref 98–110)
CO2 SERPL-SCNC: 29 MMOL/L (ref 24–31)
CREAT BLD-MCNC: 1.17 MG/DL (ref 0.5–1.4)
DEPRECATED RDW RBC AUTO: 42.5 FL (ref 40–54)
EOSINOPHIL # BLD AUTO: 0.32 10*3/MM3 (ref 0–0.7)
EOSINOPHIL NFR BLD AUTO: 6 % (ref 0–4)
ERYTHROCYTE [DISTWIDTH] IN BLOOD BY AUTOMATED COUNT: 14.4 % (ref 12–15)
GFR SERPL CREATININE-BSD FRML MDRD: 61 ML/MIN/1.73
GLOBULIN UR ELPH-MCNC: 2.8 GM/DL
GLUCOSE BLD-MCNC: 72 MG/DL (ref 70–100)
HCT VFR BLD AUTO: 30.9 % (ref 40–52)
HGB BLD-MCNC: 9.9 G/DL (ref 14–18)
IMM GRANULOCYTES # BLD AUTO: 0.02 10*3/MM3 (ref 0–0.05)
IMM GRANULOCYTES NFR BLD AUTO: 0.4 % (ref 0–5)
LYMPHOCYTES # BLD AUTO: 1.9 10*3/MM3 (ref 0.72–4.86)
LYMPHOCYTES NFR BLD AUTO: 35.8 % (ref 15–45)
MCH RBC QN AUTO: 26.1 PG (ref 28–32)
MCHC RBC AUTO-ENTMCNC: 32 G/DL (ref 33–36)
MCV RBC AUTO: 81.3 FL (ref 82–95)
MONOCYTES # BLD AUTO: 0.77 10*3/MM3 (ref 0.19–1.3)
MONOCYTES NFR BLD AUTO: 14.5 % (ref 4–12)
NEUTROPHILS # BLD AUTO: 2.24 10*3/MM3 (ref 1.87–8.4)
NEUTROPHILS NFR BLD AUTO: 42.4 % (ref 39–78)
NRBC BLD AUTO-RTO: 0 /100 WBC (ref 0–0.2)
PLATELET # BLD AUTO: 290 10*3/MM3 (ref 130–400)
PMV BLD AUTO: 10.6 FL (ref 6–12)
POTASSIUM BLD-SCNC: 4.2 MMOL/L (ref 3.5–5.3)
PROT SERPL-MCNC: 5.7 G/DL (ref 6.3–8.7)
RBC # BLD AUTO: 3.8 10*6/MM3 (ref 4.8–5.9)
SODIUM BLD-SCNC: 135 MMOL/L (ref 135–145)
WBC NRBC COR # BLD: 5.3 10*3/MM3 (ref 4.8–10.8)

## 2019-06-17 PROCEDURE — 93298 REM INTERROG DEV EVAL SCRMS: CPT | Performed by: CLINICAL NURSE SPECIALIST

## 2019-06-17 PROCEDURE — 36415 COLL VENOUS BLD VENIPUNCTURE: CPT | Performed by: NURSE PRACTITIONER

## 2019-06-17 PROCEDURE — 93299 PR REM INTERROG ICPMS/SCRMS <30 D TECH REVIEW: CPT | Performed by: CLINICAL NURSE SPECIALIST

## 2019-06-17 PROCEDURE — 85025 COMPLETE CBC W/AUTO DIFF WBC: CPT | Performed by: NURSE PRACTITIONER

## 2019-06-17 PROCEDURE — 80053 COMPREHEN METABOLIC PANEL: CPT | Performed by: NURSE PRACTITIONER

## 2019-06-17 NOTE — TELEPHONE ENCOUNTER
SKILLED NURSING FACILITY:   INITIAL CALL POST-HOSPITAL DISCHARGE    SNF: [address] [facility]    PHONE NUMBER: [phone]     PATIENT CARE SERVICES:    - THERAPY: PT/OT     - OTHER: IV therapy     ANTICIPATED LENGTH OF STAY: 3-6 weeks     Per [facility] staff, Naseem Nunes, Mr. Troy Aden was admitted Friday for short term rehab. Over the weekend his blood pressure was low. Today it is back up to 136/76. He is getting IV therapy. Physical and Occupational therapy are working with him for strengthening. His expected length of stay is 3-6 weeks.

## 2019-06-17 NOTE — TELEPHONE ENCOUNTER
Spoke with Bee Pena at [facility] [place] and gave orders to reduce Coreg. Med list updated.

## 2019-06-19 ENCOUNTER — TELEPHONE (OUTPATIENT)
Dept: INTERNAL MEDICINE | Age: 74
End: 2019-06-19

## 2019-06-19 NOTE — TELEPHONE ENCOUNTER
Per [employer], nursing staff at [facility], patient continues to participate in therapy, but has room precautions in place. No d/c plans at this time.

## 2019-06-23 PROCEDURE — 81003 URINALYSIS AUTO W/O SCOPE: CPT | Performed by: NURSE PRACTITIONER

## 2019-06-24 ENCOUNTER — LAB REQUISITION (OUTPATIENT)
Dept: LAB | Facility: HOSPITAL | Age: 74
End: 2019-06-24

## 2019-06-24 ENCOUNTER — TELEPHONE (OUTPATIENT)
Dept: FAMILY MEDICINE CLINIC | Age: 74
End: 2019-06-24

## 2019-06-24 DIAGNOSIS — I24.9 ACUTE ISCHEMIC HEART DISEASE (HCC): ICD-10-CM

## 2019-06-24 DIAGNOSIS — I25.110 ATHEROSCLEROTIC HEART DISEASE OF NATIVE CORONARY ARTERY WITH UNSTABLE ANGINA PECTORIS (HCC): ICD-10-CM

## 2019-06-24 DIAGNOSIS — I25.110 CORONARY ARTERY DISEASE INVOLVING NATIVE CORONARY ARTERY OF NATIVE HEART WITH UNSTABLE ANGINA PECTORIS (HCC): Primary | ICD-10-CM

## 2019-06-24 DIAGNOSIS — I24.9 ACS (ACUTE CORONARY SYNDROME) (HCC): ICD-10-CM

## 2019-06-24 DIAGNOSIS — I21.19 ACUTE MI INFERIOR LATERAL FIRST EPISODE CARE (HCC): ICD-10-CM

## 2019-06-24 DIAGNOSIS — N39.0 URINARY TRACT INFECTION: ICD-10-CM

## 2019-06-24 LAB
BILIRUB UR QL STRIP: NEGATIVE
CLARITY UR: CLEAR
COLOR UR: YELLOW
GLUCOSE UR STRIP-MCNC: NEGATIVE MG/DL
HGB UR QL STRIP.AUTO: NEGATIVE
KETONES UR QL STRIP: NEGATIVE
LEUKOCYTE ESTERASE UR QL STRIP.AUTO: NEGATIVE
NITRITE UR QL STRIP: NEGATIVE
PH UR STRIP.AUTO: 7 [PH] (ref 5–8)
PROT UR QL STRIP: ABNORMAL
SP GR UR STRIP: 1.01 (ref 1–1.03)
UROBILINOGEN UR QL STRIP: ABNORMAL

## 2019-06-26 ENCOUNTER — TELEPHONE (OUTPATIENT)
Dept: INTERNAL MEDICINE | Age: 74
End: 2019-06-26

## 2019-06-26 NOTE — TELEPHONE ENCOUNTER
[facility] Transitions Initial Follow Up Call    Outreach made within 2 business days of discharge: Yes    Patient: Joyce  Patient : [id number]   MRN: [id number]   Reason for Admission:   Coronary artery disease involving native coronary artery of native heart with unstable angina pectoris [facility])  Active Problems:    Type 2 diabetes mellitus with diabetic polyneuropathy, with long-term current use of insulin (HCC)    Essential hypertension    Chronic bilateral low back pain without sciatica    Benign prostatic hyperplasia without lower urinary tract symptoms    MOO on CPAP    Chest pain    Stage 3 chronic kidney disease (HCC)    Chronic constipation    Palliative care patient    Ischemic cardiomyopathy    Urinary tract infection due to extended-spectrum beta lactamase (ESBL) producing Escherichia coli  Resolved Problems:    * No resolved hospital problems. *     [address]:    Admit date:  2019              Discharge Date: 19      [facility]ior Discharge Date: 19     Spoke with: patient    Discharge department/facility: [facility]    TCM Interactive Patient Contact:  Was patient able to fill all prescriptions: Yes  Was patient instructed to bring all medications to the follow-up visit: Yes  Is patient taking all medications as directed in the discharge summary? Yes  Does patient understand their discharge instructions: Yes  Does patient have questions or concerns that need addressed prior to 7-14 day follow up office visit: no    Per patient he is pretty weak still, but doing ok. He reports that his appetite is ok and he has been resting [place] lot\". No issues with bowels or bladder at this time. Patient advised to call office if he has any questions or concerns.      Scheduled appointment with PCP within 7-14 days    Follow Up  Future Appointments   Date Time Provider Johanna Moody   2019  2:15 PM MD MARYSOL Guzman [facility]   2019  1:00 PM Yolie To Buffy Rodriguez MD Heart & Lung [facility]   7/11/2019  1:00 PM SCHEDULE, [facility]  AWV LPN [facility]   7/16/2019  8:15 AM TEGAN Vang [facility] Cardio [facility]   11/5/2019  1:30 PM TEGAN Cleary LPS Cardio [facility]       April D Marcio Lawton LPN

## 2019-07-02 ENCOUNTER — OFFICE VISIT (OUTPATIENT)
Dept: FAMILY MEDICINE CLINIC | Age: 74
End: 2019-07-02
Payer: MEDICARE

## 2019-07-02 VITALS
DIASTOLIC BLOOD PRESSURE: 68 MMHG | SYSTOLIC BLOOD PRESSURE: 112 MMHG | BODY MASS INDEX: 30.8 KG/M2 | TEMPERATURE: 97.8 F | HEART RATE: 76 BPM | OXYGEN SATURATION: 100 % | WEIGHT: 253 LBS

## 2019-07-02 DIAGNOSIS — M47.816 SPONDYLOSIS OF LUMBAR REGION WITHOUT MYELOPATHY OR RADICULOPATHY: ICD-10-CM

## 2019-07-02 DIAGNOSIS — Z99.89 OSA ON CPAP: ICD-10-CM

## 2019-07-02 DIAGNOSIS — F41.8 DEPRESSION WITH ANXIETY: ICD-10-CM

## 2019-07-02 DIAGNOSIS — G47.33 OSA ON CPAP: ICD-10-CM

## 2019-07-02 DIAGNOSIS — I10 ESSENTIAL HYPERTENSION: ICD-10-CM

## 2019-07-02 DIAGNOSIS — Z79.4 TYPE 2 DIABETES MELLITUS WITH COMPLICATION, WITH LONG-TERM CURRENT USE OF INSULIN (HCC): ICD-10-CM

## 2019-07-02 DIAGNOSIS — Z95.1 S/P CABG X 3: ICD-10-CM

## 2019-07-02 DIAGNOSIS — E11.8 TYPE 2 DIABETES MELLITUS WITH COMPLICATION, WITH LONG-TERM CURRENT USE OF INSULIN (HCC): ICD-10-CM

## 2019-07-02 DIAGNOSIS — I21.19 ACUTE MI INFERIOR LATERAL FIRST EPISODE CARE (HCC): ICD-10-CM

## 2019-07-02 DIAGNOSIS — I25.110 CORONARY ARTERY DISEASE INVOLVING NATIVE CORONARY ARTERY OF NATIVE HEART WITH UNSTABLE ANGINA PECTORIS (HCC): ICD-10-CM

## 2019-07-02 DIAGNOSIS — M79.89 SWELLING OF RIGHT EXTREMITY: Primary | ICD-10-CM

## 2019-07-02 PROCEDURE — 99495 TRANSJ CARE MGMT MOD F2F 14D: CPT | Performed by: FAMILY MEDICINE

## 2019-07-02 RX ORDER — FUROSEMIDE 20 MG/1
60 TABLET ORAL DAILY
Qty: 90 TABLET | Refills: 5
Start: 2019-07-02 | End: 2019-08-07

## 2019-07-02 RX ORDER — CARVEDILOL 3.12 MG/1
3.12 TABLET ORAL 2 TIMES DAILY WITH MEALS
Qty: 60 TABLET | Refills: 5
Start: 2019-07-02 | End: 2019-07-16 | Stop reason: SDUPTHER

## 2019-07-02 NOTE — PROGRESS NOTES
Diagnostic Catheterization   Appropriate Use Criteria Description, [facility] 2012;[id number]        HPI    Past Medical History:   Diagnosis Date    Acute ischemic stroke ([address] 2/20/2017    Atherosclerotic heart disease     s/p MI, stenting x 3 jan 2011([place])    CAD (coronary artery disease)     Cerebral artery occlusion with cerebral infarction [facility])     Cerebrovascular accident (CVA) due to embolism of right middle cerebral artery ([address] 8/14/2017    CHF (congestive heart failure) ([facility])     Chronic bilateral low back pain without sciatica 11/10/2016    Chronic kidney disease     DDD (degenerative disc disease), lumbar 11/15/2016    Depression     Depression with anxiety     Diabetes mellitus ([address]     type II    Diabetes mellitus, type 2 ([address]     DM (diabetes mellitus) ([address] 7/12/2011    Glaucoma     Headache     Hyperlipidemia     Cholesterol management per pcp.  Hypertension     Macular degeneration     MI (myocardial infarction) ([address]     MI (myocardial infarction) ([address]     Neuromuscular disorder ([address]     Neuropathy     Osteoarthritis     Palliative care patient 06/11/2019    Sleep apnea     Spondylosis of lumbar region without myelopathy or radiculopathy 11/15/2016    Status post placement of implantable loop recorder 10/31/2017    TIA (transient ischemic attack)       Past Surgical History:   Procedure Laterality Date    APPENDECTOMY      BACK SURGERY      CARDIAC CATHETERIZATION  12/12/12    with angioplasty, stent    CARDIAC CATHETERIZATION  3/29/15  MDL    stent to distal RCA.  EF 60%    CHOLECYSTECTOMY      COLONOSCOPY      CORONARY ANGIOPLASTY WITH STENT PLACEMENT  jan 11 [place]    stent x 3    CORONARY ANGIOPLASTY WITH STENT PLACEMENT      01/01/11    CORONARY ARTERY BYPASS GRAFT N/A 5/30/2019    CORONARY ARTERY BYPASS GRAFT X3 WITH LEFT INTERNAL MAMMARY ARTERY WITH ENDOSCOPIC VEIN HARVESTING WITH PERFUSION TRANSESOPHAGEAL ECHOCARDIOGRAM performed by Chely Collins Temp 97.8 °F (36.6 °C)   Wt 253 lb (114.8 kg)   SpO2 100%   BMI 30.80 kg/m²   Physical Exam   Constitutional: He appears well-developed. He does not appear ill. Eyes: Pupils are equal, round, and reactive to light. Neck: Normal range of motion. Neck supple. Cardiovascular: Normal rate and regular rhythm. Exam reveals no friction rub. No murmur heard. Pulmonary/Chest: Effort normal and breath sounds normal. No respiratory distress. He has no wheezes. He has no rales. Abdominal: Soft. Bowel sounds are normal. He exhibits no distension. There is no tenderness. There is no rebound and no guarding. Musculoskeletal: He exhibits no edema. Neurological: He is alert. Psychiatric: He has a normal mood and affect.  His behavior is normal.   Skin: Soft tissue mass palpated right inner thigh no surrounding erythema tenderness or warmth      Recent Results (from the past 672 hour(s))   POCT Glucose    Collection Time: 06/05/19  4:17 PM   Result Value Ref Range    POC Glucose 171 (H) 70 - 99 mg/dl    Performed on AccuChek    POCT Glucose    Collection Time: 06/05/19  7:22 PM   Result Value Ref Range    POC Glucose 174 (H) 70 - 99 mg/dl    Performed on AccuChek    POCT Glucose    Collection Time: 06/06/19  7:39 AM   Result Value Ref Range    POC Glucose 99 70 - 99 mg/dl    Performed on AccuChek    POCT Glucose    Collection Time: 06/06/19 11:29 AM   Result Value Ref Range    POC Glucose 204 (H) 70 - 99 mg/dl    Performed on AccuChek    CBC Auto Differential    Collection Time: 06/08/19  2:24 PM   Result Value Ref Range    WBC 7.9 4.8 - 10.8 K/uL    RBC 3.43 (L) 4.70 - 6.10 M/uL    Hemoglobin 9.3 (L) 14.0 - 18.0 g/dL    Hematocrit 29.5 (L) 42.0 - 52.0 %    MCV 86.0 80.0 - 94.0 fL    MCH 27.1 27.0 - 31.0 pg    MCHC 31.5 (L) 33.0 - 37.0 g/dL    RDW 14.9 (H) 11.5 - 14.5 %    Platelets 745 027 - 857 K/uL    MPV 11.5 9.4 - 12.4 fL    Neutrophils % 63.1 50.0 - 65.0 %    Lymphocytes % 21.2 20.0 - 40.0 %    Monocytes % 10.6 (H) 0.0 - 10.0 %    Eosinophils % 4.1 0.0 - 5.0 %    Basophils % 0.4 0.0 - 1.0 %    Neutrophils # 5.0 1.5 - 7.5 K/uL    Lymphocytes # 1.7 1.1 - 4.5 K/uL    Monocytes # 0.80 0.00 - 0.90 K/uL    Eosinophils # 0.30 0.00 - 0.60 K/uL    Basophils # 0.00 0.00 - 0.20 K/uL   Comprehensive Metabolic Panel    Collection Time: 06/08/19  2:24 PM   Result Value Ref Range    Sodium 134 (L) 136 - 145 mmol/L    Potassium 5.2 (H) 3.5 - 5.0 mmol/L    Chloride 98 98 - 111 mmol/L    CO2 25 22 - 29 mmol/L    Anion Gap 11 7 - 19 mmol/L    Glucose 167 (H) 74 - 109 mg/dL    BUN 32 (H) 8 - 23 mg/dL    CREATININE 1.7 (H) 0.5 - 1.2 mg/dL    GFR Non-African American 39 (A) >60    Calcium 8.5 (L) 8.8 - 10.2 mg/dL    Total Protein 6.2 (L) 6.6 - 8.7 g/dL    Alb 2.9 (L) 3.5 - 5.2 g/dL    Total Bilirubin <0.2 0.2 - 1.2 mg/dL    Alkaline Phosphatase 68 40 - 130 U/L    ALT 23 5 - 41 U/L    AST 14 5 - 40 U/L   Protime-INR    Collection Time: 06/08/19  2:24 PM   Result Value Ref Range    Protime 14.9 (H) 12.0 - 14.6 sec    INR 1.23 (H) 0.88 - 1.18   Troponin    Collection Time: 06/08/19  2:24 PM   Result Value Ref Range    Troponin 1.07 (HH) 0.00 - 0.03 ng/mL   Lipase    Collection Time: 06/08/19  2:24 PM   Result Value Ref Range    Lipase 34 13 - 60 U/L   APTT    Collection Time: 06/08/19  2:24 PM   Result Value Ref Range    aPTT 33.1 26.0 - 36.2 sec   Brain Natriuretic Peptide    Collection Time: 06/08/19  2:24 PM   Result Value Ref Range    Pro-BNP 4,456 (H) 0 - 1,800 pg/mL   EKG 12 Lead    Collection Time: 06/08/19  2:25 PM   Result Value Ref Range    P-R Interval 152 ms    QRS Duration 96 ms    Q-T Interval 442 ms    QTc Calculation (Bazett) 464 ms    P Axis 67 degrees    T Axis 5 degrees   EKG 12 Lead    Collection Time: 06/08/19  4:56 PM   Result Value Ref Range    P-R Interval 116 ms    QRS Duration 94 ms    Q-T Interval 442 ms    QTc Calculation (Bazett) 461 ms    P Axis 76 degrees    T Axis -52 degrees   Troponin    Collection Time: Result Value Ref Range    POC Glucose 92 70 - 99 mg/dl    Performed on AccuChThromboVision    Troponin    Collection Time: 06/09/19  9:18 AM   Result Value Ref Range    Troponin 0.89 (HH) 0.00 - 0.03 ng/mL   APTT    Collection Time: 06/09/19  9:18 AM   Result Value Ref Range    aPTT 54.0 (H) 26.0 - 36.2 sec   POCT Glucose    Collection Time: 06/09/19 11:45 AM   Result Value Ref Range    POC Glucose 128 (H) 70 - 99 mg/dl    Performed on AccuChek    ECHO 2D WO COLOR DOPPLER COMPLETE    Collection Time: 06/09/19 12:21 PM   Result Value Ref Range    Left Ventricular Ejection Fraction 28     LVEF MODALITY ECHO    APTT    Collection Time: 06/09/19  4:08 PM   Result Value Ref Range    aPTT 53.0 (H) 26.0 - 36.2 sec   BASIC METABOLIC PANEL    Collection Time: 06/09/19  4:08 PM   Result Value Ref Range    Sodium 137 136 - 145 mmol/L    Potassium 4.5 3.5 - 5.0 mmol/L    Chloride 98 98 - 111 mmol/L    CO2 27 22 - 29 mmol/L    Anion Gap 12 7 - 19 mmol/L    Glucose 85 74 - 109 mg/dL    BUN 27 (H) 8 - 23 mg/dL    CREATININE 1.6 (H) 0.5 - 1.2 mg/dL    GFR Non-African American 42 (A) >60    Calcium 8.6 (L) 8.8 - 10.2 mg/dL   POCT Glucose    Collection Time: 06/09/19  5:55 PM   Result Value Ref Range    POC Glucose 147 (H) 70 - 99 mg/dl    Performed on AccuChek    POCT Glucose    Collection Time: 06/09/19  9:01 PM   Result Value Ref Range    POC Glucose 217 (H) 70 - 99 mg/dl    Performed on AccuChek    APTT    Collection Time: 06/09/19 10:52 PM   Result Value Ref Range    aPTT 50.9 (H) 26.0 - 36.2 sec   CBC    Collection Time: 06/10/19  4:35 AM   Result Value Ref Range    WBC 9.2 4.8 - 10.8 K/uL    RBC 3.94 (L) 4.70 - 6.10 M/uL    Hemoglobin 10.5 (L) 14.0 - 18.0 g/dL    Hematocrit 33.4 (L) 42.0 - 52.0 %    MCV 84.8 80.0 - 94.0 fL    MCH 26.6 (L) 27.0 - 31.0 pg    MCHC 31.4 (L) 33.0 - 37.0 g/dL    RDW 14.7 (H) 11.5 - 14.5 %    Platelets 642 518 - 453 K/uL    MPV 10.9 9.4 - 12.4 fL   Basic Metabolic Panel    Collection Time: 06/10/19  4:35 AM Result Value Ref Range    Sodium 136 136 - 145 mmol/L    Potassium 4.5 3.5 - 5.0 mmol/L    Chloride 94 (L) 98 - 111 mmol/L    CO2 27 22 - 29 mmol/L    Anion Gap 15 7 - 19 mmol/L    Glucose 143 (H) 74 - 109 mg/dL    BUN 25 (H) 8 - 23 mg/dL    CREATININE 1.5 (H) 0.5 - 1.2 mg/dL    GFR Non- 46 (A) >60    Calcium 8.9 8.8 - 10.2 mg/dL   APTT    Collection Time: 06/10/19  4:35 AM   Result Value Ref Range    aPTT 53.3 (H) 26.0 - 36.2 sec   POCT Glucose    Collection Time: 06/10/19  8:40 AM   Result Value Ref Range    POC Glucose 82 70 - 99 mg/dl    Performed on AccuChek    APTT    Collection Time: 06/10/19 10:56 AM   Result Value Ref Range    aPTT 45.6 (H) 26.0 - 36.2 sec   POCT Glucose    Collection Time: 06/10/19 12:45 PM   Result Value Ref Range    POC Glucose 92 70 - 99 mg/dl    Performed on AccuChek    Basic Metabolic Panel    Collection Time: 06/10/19  4:54 PM   Result Value Ref Range    Sodium 135 (L) 136 - 145 mmol/L    Potassium 4.3 3.5 - 5.0 mmol/L    Chloride 93 (L) 98 - 111 mmol/L    CO2 27 22 - 29 mmol/L    Anion Gap 15 7 - 19 mmol/L    Glucose 126 (H) 74 - 109 mg/dL    BUN 22 8 - 23 mg/dL    CREATININE 1.3 (H) 0.5 - 1.2 mg/dL    GFR Non-African American 54 (A) >60    Calcium 8.8 8.8 - 10.2 mg/dL   POCT Glucose    Collection Time: 06/10/19  5:44 PM   Result Value Ref Range    POC Glucose 162 (H) 70 - 99 mg/dl    Performed on AccuChek    POCT Glucose    Collection Time: 06/10/19  8:24 PM   Result Value Ref Range    POC Glucose 208 (H) 70 - 99 mg/dl    Performed on AccuChek    CBC Auto Differential    Collection Time: 06/11/19  6:14 AM   Result Value Ref Range    WBC 9.9 4.8 - 10.8 K/uL    RBC 4.17 (L) 4.70 - 6.10 M/uL    Hemoglobin 10.8 (L) 14.0 - 18.0 g/dL    Hematocrit 35.2 (L) 42.0 - 52.0 %    MCV 84.4 80.0 - 94.0 fL    MCH 25.9 (L) 27.0 - 31.0 pg    MCHC 30.7 (L) 33.0 - 37.0 g/dL    RDW 14.8 (H) 11.5 - 14.5 %    Platelets 528 144 - 831 K/uL    MPV 10.4 9.4 - 12.4 fL    Neutrophils % 64.3

## 2019-07-03 ENCOUNTER — HOSPITAL ENCOUNTER (OUTPATIENT)
Dept: ULTRASOUND IMAGING | Age: 74
Discharge: HOME | End: 2019-07-03
Payer: MEDICARE

## 2019-07-03 DIAGNOSIS — M79.89 SWELLING OF RIGHT EXTREMITY: ICD-10-CM

## 2019-07-03 PROCEDURE — 76881 US COMPL JOINT R-T W/IMG: CPT

## 2019-07-03 ASSESSMENT — ENCOUNTER SYMPTOMS
CHEST TIGHTNESS: 0
SHORTNESS OF BREATH: 0
DIARRHEA: 0
CONSTIPATION: 0
ANAL BLEEDING: 0
NAUSEA: 0
ABDOMINAL PAIN: 0
COUGH: 0
BACK PAIN: 1

## 2019-07-09 ENCOUNTER — OFFICE VISIT (OUTPATIENT)
Dept: CARDIOTHORACIC SURGERY | Age: 74
End: 2019-07-09

## 2019-07-09 VITALS
DIASTOLIC BLOOD PRESSURE: 75 MMHG | WEIGHT: 254 LBS | HEART RATE: 80 BPM | BODY MASS INDEX: 30.93 KG/M2 | SYSTOLIC BLOOD PRESSURE: 127 MMHG | OXYGEN SATURATION: 98 % | HEIGHT: 76 IN

## 2019-07-09 DIAGNOSIS — I25.110 ATHEROSCLEROSIS OF NATIVE CORONARY ARTERY OF NATIVE HEART WITH UNSTABLE ANGINA PECTORIS (HCC): Primary | ICD-10-CM

## 2019-07-09 DIAGNOSIS — Z95.1 POSTSURGICAL AORTOCORONARY BYPASS STATUS: ICD-10-CM

## 2019-07-09 DIAGNOSIS — I25.5 ISCHEMIC CARDIOMYOPATHY: ICD-10-CM

## 2019-07-09 PROCEDURE — 99024 POSTOP FOLLOW-UP VISIT: CPT | Performed by: THORACIC SURGERY (CARDIOTHORACIC VASCULAR SURGERY)

## 2019-07-09 NOTE — PROGRESS NOTES
[address], [place] [address]  Phone: [phone]  Fax: [phone]      Office Visit:  19    Nallely Robb - : [id number]    Reason For Visit:  Eliza Fraser is a 75 y.o. male who is here for Post Discharge Call Back Program      History of Present Illness:      I had the pleasure of seeing Nallely Robb in the office today. As you know, he is a 75 y.o. male who presented with a several day history of a stuttering inferior wall myocardial infarction. And cardiac catheterization that demonstrated severe multivessel disease. He also had profound LV dysfunction with the appearance of a dilated ischemic cardiomyopathy. I took the patient to the operating room on 2019 where I performed a \"salvage\" bypass operation placing 3 bypass grafts to heavily diseased targets. At the time of operation I did note an acute inferior wall infarction as the entire inferior wall was hemorrhagic and not viable. . Portion of the patient did fairly well during the postoperative course and was discharged to a nursing facility 7 days after operation. 2 days later he presented to the emergency room with chest pain and was admitted to the hospitalist service. Was initial thought to go back with PCI and stent is occluded right coronary artery but after a myocardial scan showing that the inferior wall was nonviable the decision was made not to revascularize the inferior wall which was now akinetic. The patient was placed on appropriate medical management and discharged home over the last 3 weeks he is continued to improve gradually did have 3 small episodes of angina symptoms treated with nitroglycerin and resolved quickly. At this time, there is been no symptoms of congestive heart failure.  .     .    ALLERGIES: Statins; Crestor [rosuvastatin calcium]; and Morphine    Current Outpatient Medications   Medication Sig Dispense Refill   

## 2019-07-16 ENCOUNTER — OFFICE VISIT (OUTPATIENT)
Dept: CARDIOLOGY | Age: 74
End: 2019-07-16
Payer: MEDICARE

## 2019-07-16 VITALS
HEIGHT: 76 IN | BODY MASS INDEX: 31.29 KG/M2 | SYSTOLIC BLOOD PRESSURE: 110 MMHG | WEIGHT: 257 LBS | HEART RATE: 66 BPM | DIASTOLIC BLOOD PRESSURE: 58 MMHG

## 2019-07-16 DIAGNOSIS — I50.22 CHRONIC SYSTOLIC CONGESTIVE HEART FAILURE (HCC): ICD-10-CM

## 2019-07-16 DIAGNOSIS — E78.2 MIXED HYPERLIPIDEMIA: Chronic | ICD-10-CM

## 2019-07-16 DIAGNOSIS — I25.5 ISCHEMIC CARDIOMYOPATHY: ICD-10-CM

## 2019-07-16 DIAGNOSIS — I25.10 CORONARY ARTERY DISEASE INVOLVING NATIVE CORONARY ARTERY OF NATIVE HEART WITHOUT ANGINA PECTORIS: Primary | ICD-10-CM

## 2019-07-16 DIAGNOSIS — I10 ESSENTIAL HYPERTENSION: ICD-10-CM

## 2019-07-16 DIAGNOSIS — Z95.1 S/P CABG X 3: ICD-10-CM

## 2019-07-16 DIAGNOSIS — Z95.818 STATUS POST PLACEMENT OF IMPLANTABLE LOOP RECORDER: ICD-10-CM

## 2019-07-16 LAB
ANION GAP SERPL CALCULATED.3IONS-SCNC: 11 MMOL/L (ref 7–19)
BUN BLDV-MCNC: 15 MG/DL (ref 8–23)
CALCIUM SERPL-MCNC: 8.9 MG/DL (ref 8.8–10.2)
CHLORIDE BLD-SCNC: 102 MMOL/L (ref 98–111)
CO2: 27 MMOL/L (ref 22–29)
CREAT SERPL-MCNC: 1.3 MG/DL (ref 0.5–1.2)
GFR NON-AFRICAN AMERICAN: 54
GLUCOSE BLD-MCNC: 169 MG/DL (ref 74–109)
POTASSIUM SERPL-SCNC: 4.3 MMOL/L (ref 3.5–5)
PRO-BNP: 2152 PG/ML (ref 0–1800)
SODIUM BLD-SCNC: 140 MMOL/L (ref 136–145)

## 2019-07-16 PROCEDURE — 3017F COLORECTAL CA SCREEN DOC REV: CPT | Performed by: CLINICAL NURSE SPECIALIST

## 2019-07-16 PROCEDURE — 4040F PNEUMOC VAC/ADMIN/RCVD: CPT | Performed by: CLINICAL NURSE SPECIALIST

## 2019-07-16 PROCEDURE — G8598 ASA/ANTIPLAT THER USED: HCPCS | Performed by: CLINICAL NURSE SPECIALIST

## 2019-07-16 PROCEDURE — 1036F TOBACCO NON-USER: CPT | Performed by: CLINICAL NURSE SPECIALIST

## 2019-07-16 PROCEDURE — 99214 OFFICE O/P EST MOD 30 MIN: CPT | Performed by: CLINICAL NURSE SPECIALIST

## 2019-07-16 PROCEDURE — 1123F ACP DISCUSS/DSCN MKR DOCD: CPT | Performed by: CLINICAL NURSE SPECIALIST

## 2019-07-16 PROCEDURE — 93291 INTERROG DEV EVAL SCRMS IP: CPT | Performed by: CLINICAL NURSE SPECIALIST

## 2019-07-16 PROCEDURE — G8427 DOCREV CUR MEDS BY ELIG CLIN: HCPCS | Performed by: CLINICAL NURSE SPECIALIST

## 2019-07-16 PROCEDURE — G8417 CALC BMI ABV UP PARAM F/U: HCPCS | Performed by: CLINICAL NURSE SPECIALIST

## 2019-07-16 RX ORDER — CARVEDILOL 3.12 MG/1
3.12 TABLET ORAL 2 TIMES DAILY WITH MEALS
Qty: 180 TABLET | Refills: 3 | Status: SHIPPED | OUTPATIENT
Start: 2019-07-16 | End: 2019-09-10 | Stop reason: SDUPTHER

## 2019-07-16 ASSESSMENT — ENCOUNTER SYMPTOMS
SHORTNESS OF BREATH: 1
NAUSEA: 0
CHEST TIGHTNESS: 0
ABDOMINAL PAIN: 0
EYE REDNESS: 0
COUGH: 0
WHEEZING: 0
FACIAL SWELLING: 0
VOMITING: 0

## 2019-07-16 NOTE — PROGRESS NOTES
infarction) ([facility]     Neuromuscular disorder ([facility]     Neuropathy     Osteoarthritis     Palliative care patient 2019    Sleep apnea     Spondylosis of lumbar region without myelopathy or radiculopathy 11/15/2016    Status post placement of implantable loop recorder 10/31/2017    TIA (transient ischemic attack)      Past Surgical History:   Procedure Laterality Date    APPENDECTOMY      BACK SURGERY      CARDIAC CATHETERIZATION  12    with angioplasty, stent    CARDIAC CATHETERIZATION  3/29/15  MDL    stent to distal RCA. EF 60%    CHOLECYSTECTOMY      COLONOSCOPY      [place]ONARY [place] x [place]    CORONARY ANGIOPLASTY WITH STENT PLACEMENT      11    CORONARY ARTERY BYPASS GRAFT N/A 2019    CORONARY ARTERY BYPASS GRAFT X3 WITH LEFT INTERNAL MAMMARY ARTERY WITH ENDOSCOPIC VEIN HARVESTING WITH PERFUSION TRANSESOPHAGEAL ECHOCARDIOGRAM performed by Amna Collins MD at [address] [place] [address] CATH LAB PROCEDURE  [id number]    primary stent placement to the first circumflex marginal branch, balloon angioplasty to the third left anterior descending diagnonal branch, intracoronary nitroglycerin adminstration    JOINT REPLACEMENT      left knee    LUMBAR FUSION Left 11/15/2016    LUMBAR INTERBODY FUSION LATERAL L3-4 L4-5 WITH LATERAL PLATE  performed by Yeni Pal MD at [facility] OR     Family History   Problem Relation Age of Onset    Coronary Art Dis Mother     Coronary Art Dis Sister     Cancer Sister         uterine    Kidney Disease Sister     Coronary Art Dis Brother          in his 46s    Cancer Father         colon     Social History     Tobacco Use    Smoking status: Never Smoker    Smokeless tobacco: Never Used   Substance Use Topics    Alcohol use:  Yes     Alcohol/week: 2.0 standard drinks     Types: 2 Shots of liquor per week      Current Outpatient Medications   Medication Sig Dispense Refill    carvedilol (COREG) 3.125 MG tablet Take 1 tablet by mouth 2 times daily (with meals) 180 tablet 3    sacubitril-valsartan (ENTRESTO) 49-51 MG per tablet Take 1 tablet by mouth 2 times daily 60 tablet 5    furosemide (LASIX) 20 MG tablet Take 3 tablets by mouth daily 90 tablet 5    insulin glargine (LANTUS SOLOSTAR) 100 UNIT/ML injection pen INJECT 70 UNITS INTO THE SKIN NIGHTLY 24 pen 3    fluticasone (FLONASE) 50 MCG/ACT nasal spray 1 spray by Each Nostril route daily 1 Bottle 0    docusate sodium (COLACE, DULCOLAX) 100 MG CAPS Take 100 mg by mouth 2 times daily 60 capsule 0    isosorbide mononitrate (IMDUR) 30 MG extended release tablet Take 1 tablet by mouth daily 30 tablet 5    azelastine (ASTELIN) 0.1 % nasal spray 1 spray by Nasal route as needed for Rhinitis Use in each nostril as directed      sertraline (ZOLOFT) 100 MG tablet Take 1 tablet by mouth daily 30 tablet 5    tiZANidine (ZANAFLEX) 2 MG tablet Take 1 tablet by mouth nightly as needed (muscle spasm) 30 tablet 5    nitroGLYCERIN (NITROSTAT) 0.4 MG SL tablet up to max of 3 total doses. If no relief after 1 dose, call 911. 25 tablet 3    tamsulosin (FLOMAX) 0.4 MG capsule TAKE 2 CAPSULES AT BEDTIME 180 capsule 3    clopidogrel (PLAVIX) 75 MG tablet TAKE 1 TABLET DAILY 90 tablet 3    zoster recombinant adjuvanted vaccine (SHINGRIX) 50 MCG/0.5ML SUSR injection 1 vaccine now and repeat in 2-6 months. 0.5 mL 1    butalbital-acetaminophen-caffeine (FIORICET, ESGIC) -40 MG per tablet Take 1 tablet by mouth as needed for Headaches 3 tablet 0     No current facility-administered medications for this visit. Allergies: Statins; Crestor [rosuvastatin calcium]; and Morphine    Review of Systems  Review of Systems   Constitutional: Positive for fatigue. Negative for activity change, diaphoresis, fever and unexpected weight change. HENT: Negative for facial swelling and nosebleeds. Eyes: Negative for redness and visual disturbance.    Respiratory: rash noted. CABG incision well healed without sign of infection   Psychiatric: He has a normal mood and affect. His behavior is normal. Judgment normal.   Nursing note and vitals reviewed. Data:  Echo 6/19  Summary   Limited study s/p CABG for LV function.   Mild to moderate left ventricular enlargement.   LV systolic function is severely depressed at 25%.   Inferior wall is severely hypokinetic but still thickens.   Lateral wall is akinetic.   Restrictive filling pattern   RV normal in size but not fully visualized.   Mild left atrial enlargement   Mild normal right atrial size   Aortic valve opens normally with mild aortic regurgitation.   Mitral valve opens well with no significant stenosis or regurgitation   noted. Lab Results   Component Value Date    CHOL 157 (L) 05/25/2019    TRIG 103 05/25/2019    HDL 20 (L) 05/25/2019    LDLCALC 116 05/25/2019    LDLDIRECT 167 (H) 11/01/2018     Lab Results   Component Value Date    ALT 26 06/11/2019    AST 17 06/11/2019     Assessment:     Diagnosis Orders   1. Coronary artery disease involving native coronary artery of native heart without angina pectoris  [facility]   2. Chronic systolic congestive heart failure (HCC)  Basic Metabolic Panel    Brain Natriuretic Peptide    ECHO Complete 2D W Doppler W Color   3. Essential hypertension  carvedilol (COREG) 3.125 MG tablet   4. Ischemic cardiomyopathy     5. Mixed hyperlipidemia     6. S/P CABG x 3, LIMA- DIAG, A0- SVG- LAD, A0- SVG- Ramus, EF 30-35% (5/30/2019)     7. Status post placement of implantable loop recorder       CAD-stable without angina. Is post recent CABG x3 continue aspirin and Plavix last lifelong. Continue beta-blocker, restart Entresto. Refer for cardiac rehab    Chronic systolic heart failure NYHA class II, stage C-EF 25%- appears euvolemic. We discussed the importance of daily weights, reporting weight gain of 3 pounds or more in 24 hours or 5 pounds in a week. Take an extra Lasix for weight gain. Restart Entresto 49/51 mg twice a day. Lab for BNP, BMP today    Hypertension-well-controlled on current regimen    Hyperlipidemia-intolerant to statins, consider Repatha in future    Loop recorder interrogated  Adequate battery status  Arrhythmias/episodes:  Device cleared  Please see the scanned interrogation report    Stable cardiovascular status. No evidence of overt heart failure,angina or dysrhythmia. Plan    Orders Placed This Encounter   Procedures    Basic Metabolic Panel     Standing Status:   Future     Number of Occurrences:   1     Standing Expiration Date:   7/15/2020    Brain Natriuretic Peptide     Standing Status:   Future     Number of Occurrences:   1     Standing Expiration Date:   7/16/2020   [facility]     Referral Priority:   Routine     Referral Type:   Eval and Treat     Referral Reason:   Specialty Services Required     Requested Specialty:   Cardiac Rehabilitation     Number of Visits Requested:   1    ECHO Complete 2D W Doppler W Color     Schedule mid Oct     Standing Status:   Future     Standing Expiration Date:   7/16/2020     Scheduling Instructions:      Limited Echo only- assess EF     Order Specific Question:   Reason for exam:     Answer:   systolic chf     Return in about 3 months (around 10/16/2019) for Dr. Nicole Conrad. Stop Amiodarone  Restart Entresto 49/51mg twice a day  Repeat Echo when you return from trip in Oct  Cardiac Rehab  Lab- BMP, BNP today    OK to take an extra Lasix for weight gain over 3lbs in 24 hours. If weight is not improving by the next day, call the office.    - Weigh daily and report weight gain of 3lbs or more in 24hrs or 5lbs in one week. - Call for increasing shortness of breath or increasing swelling in feet and legs.     (This could mean you are retaining too much fluid)  - 2000mg low sodium diet  - Fluid restriction of 1500ml per day (about 6 cups of fluid per day)    Call with

## 2019-07-16 NOTE — PATIENT INSTRUCTIONS
heart failure worse.     · You may be taking some of the following medicine. ? Beta-blockers can slow heart rate, decrease blood pressure, and improve your condition. Taking a beta-blocker may lower your chance of needing to be hospitalized. ? Angiotensin-converting enzyme inhibitors (ACEIs) reduce the heart's workload, lower blood pressure, and reduce swelling. Taking an ACEI may lower your chance of needing to be hospitalized again. ? Angiotensin II receptor blockers (ARBs) work like ACEIs. Your doctor may prescribe them instead of ACEIs. ? Diuretics, also called water pills, reduce swelling. ? Potassium supplements replace this important mineral, which is sometimes lost with diuretics. ? Aspirin and other blood thinners prevent blood clots, which can cause a stroke or heart attack.    You will get more details on the specific medicines your doctor prescribes. Diet    · Your doctor may suggest that you limit sodium to 2,000 milligrams (mg) a day or less. That is less than 1 teaspoon of salt a day, including all the salt you eat in cooking or in packaged foods. People get most of their sodium from processed foods. Fast food and restaurant meals also tend to be very high in sodium.     · Ask your doctor how much liquid you can drink each day. You may have to limit liquids.    Weight    · Weigh yourself without clothing at the same time each day. Record your weight. Call your doctor if you have a sudden weight gain, such as more than 2 to 3 pounds in a day or 5 pounds in a week. (Your doctor may suggest a different range of weight gain.) A sudden weight gain may mean that your heart failure is getting worse.    Activity level    · Start light exercise (if your doctor says it is okay). Even if you can only do a small amount, exercise will help you get stronger, have more energy, and manage your weight and your stress. Walking is an easy way to get exercise.  Start out by walking a little more than you did

## 2019-07-18 ENCOUNTER — TELEPHONE (OUTPATIENT)
Dept: CARDIOLOGY | Age: 74
End: 2019-07-18

## 2019-07-23 ENCOUNTER — HOSPITAL ENCOUNTER (OUTPATIENT)
Dept: CARDIAC REHAB | Age: 74
Setting detail: THERAPIES SERIES
Discharge: HOME | End: 2019-07-23
Payer: MEDICARE

## 2019-07-24 ENCOUNTER — HOSPITAL ENCOUNTER (OUTPATIENT)
Dept: CARDIAC REHAB | Age: 74
Setting detail: THERAPIES SERIES
Discharge: HOME | End: 2019-07-24
Payer: MEDICARE

## 2019-07-24 PROCEDURE — 93798 PHYS/QHP OP CAR RHAB W/ECG: CPT

## 2019-07-26 ENCOUNTER — HOSPITAL ENCOUNTER (OUTPATIENT)
Dept: CARDIAC REHAB | Age: 74
Setting detail: THERAPIES SERIES
Discharge: HOME | End: 2019-07-26
Payer: MEDICARE

## 2019-07-26 PROCEDURE — 93798 PHYS/QHP OP CAR RHAB W/ECG: CPT

## 2019-07-29 ENCOUNTER — HOSPITAL ENCOUNTER (OUTPATIENT)
Dept: CARDIAC REHAB | Age: 74
Setting detail: THERAPIES SERIES
Discharge: HOME | End: 2019-07-29
Payer: MEDICARE

## 2019-07-29 PROCEDURE — 93798 PHYS/QHP OP CAR RHAB W/ECG: CPT

## 2019-07-31 ENCOUNTER — HOSPITAL ENCOUNTER (OUTPATIENT)
Dept: CARDIAC REHAB | Age: 74
Setting detail: THERAPIES SERIES
Discharge: HOME | End: 2019-07-31
Payer: MEDICARE

## 2019-07-31 PROCEDURE — 93798 PHYS/QHP OP CAR RHAB W/ECG: CPT

## 2019-08-02 ENCOUNTER — HOSPITAL ENCOUNTER (OUTPATIENT)
Dept: CARDIAC REHAB | Age: 74
Setting detail: THERAPIES SERIES
Discharge: HOME | End: 2019-08-02
Payer: MEDICARE

## 2019-08-02 PROCEDURE — 93798 PHYS/QHP OP CAR RHAB W/ECG: CPT

## 2019-08-05 ENCOUNTER — HOSPITAL ENCOUNTER (OUTPATIENT)
Dept: CARDIAC REHAB | Age: 74
Setting detail: THERAPIES SERIES
Discharge: HOME | End: 2019-08-05
Payer: MEDICARE

## 2019-08-05 ENCOUNTER — NURSE TRIAGE (OUTPATIENT)
Dept: OTHER | Facility: CLINIC | Age: 74
End: 2019-08-05

## 2019-08-05 PROCEDURE — 93798 PHYS/QHP OP CAR RHAB W/ECG: CPT

## 2019-08-06 ENCOUNTER — HOSPITAL ENCOUNTER (OUTPATIENT)
Dept: GENERAL RADIOLOGY | Age: 74
Discharge: HOME | End: 2019-08-06
Payer: MEDICARE

## 2019-08-06 ENCOUNTER — OFFICE VISIT (OUTPATIENT)
Dept: CARDIOLOGY | Age: 74
End: 2019-08-06
Payer: MEDICARE

## 2019-08-06 ENCOUNTER — HOSPITAL ENCOUNTER (OUTPATIENT)
Dept: INFUSION THERAPY | Age: 74
Setting detail: INFUSION SERIES
Discharge: HOME | End: 2019-08-06
Payer: MEDICARE

## 2019-08-06 VITALS
SYSTOLIC BLOOD PRESSURE: 109 MMHG | RESPIRATION RATE: 20 BRPM | DIASTOLIC BLOOD PRESSURE: 66 MMHG | OXYGEN SATURATION: 98 % | TEMPERATURE: 97.2 F | HEART RATE: 62 BPM

## 2019-08-06 VITALS
SYSTOLIC BLOOD PRESSURE: 102 MMHG | OXYGEN SATURATION: 96 % | HEART RATE: 62 BPM | BODY MASS INDEX: 32.76 KG/M2 | HEIGHT: 76 IN | WEIGHT: 269 LBS | DIASTOLIC BLOOD PRESSURE: 64 MMHG

## 2019-08-06 DIAGNOSIS — I10 ESSENTIAL HYPERTENSION: Chronic | ICD-10-CM

## 2019-08-06 DIAGNOSIS — I25.5 ISCHEMIC CARDIOMYOPATHY: ICD-10-CM

## 2019-08-06 DIAGNOSIS — I25.10 CORONARY ARTERY DISEASE INVOLVING NATIVE CORONARY ARTERY OF NATIVE HEART WITHOUT ANGINA PECTORIS: ICD-10-CM

## 2019-08-06 DIAGNOSIS — R06.02 SOB (SHORTNESS OF BREATH): ICD-10-CM

## 2019-08-06 DIAGNOSIS — I50.22 CHRONIC SYSTOLIC HEART FAILURE (HCC): Primary | ICD-10-CM

## 2019-08-06 LAB
ALBUMIN SERPL-MCNC: 4 G/DL (ref 3.5–5.2)
ALP BLD-CCNC: 94 U/L (ref 40–130)
ALT SERPL-CCNC: 12 U/L (ref 5–41)
ANION GAP SERPL CALCULATED.3IONS-SCNC: 16 MMOL/L (ref 7–19)
AST SERPL-CCNC: 15 U/L (ref 5–40)
BILIRUB SERPL-MCNC: <0.2 MG/DL (ref 0.2–1.2)
BUN BLDV-MCNC: 23 MG/DL (ref 8–23)
CALCIUM SERPL-MCNC: 9.2 MG/DL (ref 8.8–10.2)
CHLORIDE BLD-SCNC: 102 MMOL/L (ref 98–111)
CO2: 23 MMOL/L (ref 22–29)
CREAT SERPL-MCNC: 1.5 MG/DL (ref 0.5–1.2)
GFR NON-AFRICAN AMERICAN: 46
GLUCOSE BLD-MCNC: 106 MG/DL (ref 74–109)
HCT VFR BLD CALC: 38.6 % (ref 42–52)
HEMOGLOBIN: 11.9 G/DL (ref 14–18)
MCH RBC QN AUTO: 25.2 PG (ref 27–31)
MCHC RBC AUTO-ENTMCNC: 30.8 G/DL (ref 33–37)
MCV RBC AUTO: 81.8 FL (ref 80–94)
PDW BLD-RTO: 17.1 % (ref 11.5–14.5)
PLATELET # BLD: 164 K/UL (ref 130–400)
PMV BLD AUTO: 11.7 FL (ref 9.4–12.4)
POTASSIUM SERPL-SCNC: 4.5 MMOL/L (ref 3.5–5)
PRO-BNP: 2283 PG/ML (ref 0–1800)
RBC # BLD: 4.72 M/UL (ref 4.7–6.1)
SODIUM BLD-SCNC: 141 MMOL/L (ref 136–145)
TOTAL PROTEIN: 7.3 G/DL (ref 6.6–8.7)
WBC # BLD: 6.4 K/UL (ref 4.8–10.8)

## 2019-08-06 PROCEDURE — 96374 THER/PROPH/DIAG INJ IV PUSH: CPT

## 2019-08-06 PROCEDURE — 6360000002 HC RX W HCPCS: Performed by: NURSE PRACTITIONER

## 2019-08-06 PROCEDURE — 71046 X-RAY EXAM CHEST 2 VIEWS: CPT

## 2019-08-06 PROCEDURE — 99214 OFFICE O/P EST MOD 30 MIN: CPT | Performed by: NURSE PRACTITIONER

## 2019-08-06 PROCEDURE — 93291 INTERROG DEV EVAL SCRMS IP: CPT | Performed by: NURSE PRACTITIONER

## 2019-08-06 RX ORDER — FUROSEMIDE 10 MG/ML
80 INJECTION INTRAMUSCULAR; INTRAVENOUS ONCE
Status: COMPLETED | OUTPATIENT
Start: 2019-08-06 | End: 2019-08-06

## 2019-08-06 RX ADMIN — FUROSEMIDE 80 MG: 10 INJECTION, SOLUTION INTRAMUSCULAR; INTRAVENOUS at 14:05

## 2019-08-06 NOTE — PATIENT INSTRUCTIONS
heart disease and stroke. Keeping blood pressure in health range reduces strain on your heart, arteries and kidneys. 2) Control cholesterol - contributes to plaque, which can clog arteries and lead to heart disease and stroke. When you control your cholesterol you are giving your arteries their best chance to remain clear. 3) Reduce blood sugar - most of the food we eat is turning into glucose or blood sugar that our body uses for energy. Over time, high levels of blood sugar can damage your heart, kidneys, eyes and nerves. 4) Get active - living an active life is one of the most rewarding gifts you can give yourself and those you love. Simply put, daily physical activity increases your length and quality of life. 5)  Eat better - A healthy diet is one of your best weapons for fighting cardiovascular disease. When you eat a heart healthy diet, you improve your chances for feeling good and staying healthy for life. 6)  Lose weight - when you shed extra fat an unnecessary pounds, you reduce the burden on your hear, lungs, blood vessels and skeleton. You give yourself the gift of active living, you lower your blood pressure and help yourself feel better. 7) Stop smoking - cigarette smokers have a higher risk of developing cardiovascular disease. If  You smoke, quitting is the best thing you can do for your health. Check American Heart Association on line for more information on Life's Simple 7 and tips for healthy living.       Patient Education        Avoiding Triggers With Heart Failure: Care Instructions  Your Care Instructions    Triggers are anything that make your heart failure flare up. A flare-up is also called \"sudden heart failure\" or \"acute heart failure. \" When you have a flare-up, fluid builds up in your lungs, and you have problems breathing. You might need to go to the hospital. By watching for changes in your condition and avoiding triggers, you can prevent heart failure flare-ups.   Follow-up

## 2019-08-07 ENCOUNTER — TELEPHONE (OUTPATIENT)
Dept: CARDIOLOGY | Age: 74
End: 2019-08-07

## 2019-08-07 ENCOUNTER — HOSPITAL ENCOUNTER (OUTPATIENT)
Dept: CARDIAC REHAB | Age: 74
Setting detail: THERAPIES SERIES
Discharge: HOME | End: 2019-08-07
Payer: MEDICARE

## 2019-08-07 DIAGNOSIS — I25.5 ISCHEMIC CARDIOMYOPATHY: Primary | ICD-10-CM

## 2019-08-07 PROCEDURE — 93798 PHYS/QHP OP CAR RHAB W/ECG: CPT

## 2019-08-07 RX ORDER — FUROSEMIDE 40 MG/1
40 TABLET ORAL DAILY
COMMUNITY
End: 2019-09-10 | Stop reason: SDUPTHER

## 2019-08-07 NOTE — TELEPHONE ENCOUNTER
Pt wife called stating you wanted to know what dose his Lasix is that he takes. Lasix 40 mg qd. And Dr. Clement Fong wrote script.

## 2019-08-09 ENCOUNTER — HOSPITAL ENCOUNTER (OUTPATIENT)
Dept: CARDIAC REHAB | Age: 74
Setting detail: THERAPIES SERIES
Discharge: HOME | End: 2019-08-09
Payer: MEDICARE

## 2019-08-09 PROBLEM — I50.22 CHRONIC SYSTOLIC HEART FAILURE (HCC): Status: ACTIVE | Noted: 2019-08-09

## 2019-08-09 PROCEDURE — 93798 PHYS/QHP OP CAR RHAB W/ECG: CPT

## 2019-08-09 NOTE — PROGRESS NOTES
middle cerebral artery ([facility] [address] 8/14/2017    CHF (congestive heart failure) (HCC)     Chronic bilateral low back pain without sciatica 11/10/2016    Chronic kidney disease     DDD (degenerative disc disease), lumbar 11/15/2016    Depression     Depression with anxiety     Diabetes mellitus ([facility] [address]     type II    Diabetes mellitus, type 2 ([address]     DM (diabetes mellitus) ([facility] [address] 7/12/2011    Glaucoma     Headache     Hyperlipidemia     Cholesterol management per pcp.  Hypertension     Macular degeneration     MI (myocardial infarction) ([facility] [address]     MI (myocardial infarction) ([facility] [address] [facility]     Neuromuscular disorder ([facility] [address]     Neuropathy     Osteoarthritis     Palliative care patient 06/11/2019    Sleep apnea     Spondylosis of lumbar region without myelopathy or radiculopathy 11/15/2016    Status post placement of implantable loop recorder 10/31/2017    TIA (transient ischemic attack)      Past Surgical History:   Procedure Laterality Date    APPENDECTOMY      BACK SURGERY      CARDIAC CATHETERIZATION  12/12/12    with angioplasty, stent    CARDIAC CATHETERIZATION  3/29/15  MDL    stent to distal RCA.  EF 60%    CHOLECYSTECTOMY      COLONOSCOPY      CORONARY ANGIOPLASTY WITH STENT PLACEMENT  jan 11 [place]    stent x 3    CORONARY ANGIOPLASTY WITH STENT PLACEMENT      01/01/11    CORONARY ARTERY BYPASS GRAFT N/A 5/30/2019    CORONARY ARTERY BYPASS GRAFT X3 WITH LEFT INTERNAL MAMMARY ARTERY WITH ENDOSCOPIC VEIN HARVESTING WITH PERFUSION TRANSESOPHAGEAL ECHOCARDIOGRAM performed by Amna Collins MD at [address] [place] [address] CATH LAB PROCEDURE  [id number]    primary stent placement to the first circumflex marginal branch, balloon angioplasty to the third left anterior descending diagnonal branch, intracoronary nitroglycerin adminstration    JOINT REPLACEMENT      left knee    LUMBAR FUSION Left 11/15/2016    LUMBAR INTERBODY FUSION LATERAL L3-4 L4-5 WITH LATERAL PLATE  performed by [address], MD at [facility] OR     Family History   Problem Relation Age of Onset    Coronary Art Dis Mother     Coronary Art Dis Sister     Cancer Sister         uterine    Kidney Disease Sister     Coronary Art Dis Brother          in his 46s    Cancer Father         colon     Social History     Tobacco Use    Smoking status: Never Smoker    Smokeless tobacco: Never Used   Substance Use Topics    Alcohol use: Yes     Alcohol/week: 2.0 standard drinks     Types: 2 Shots of liquor per week      Current Outpatient Medications   Medication Sig Dispense Refill    carvedilol (COREG) 3.125 MG tablet Take 1 tablet by mouth 2 times daily (with meals) 180 tablet 3    sacubitril-valsartan (ENTRESTO) 49-51 MG per tablet Take 1 tablet by mouth 2 times daily 60 tablet 5    insulin glargine (LANTUS SOLOSTAR) 100 UNIT/ML injection pen INJECT 70 UNITS INTO THE SKIN NIGHTLY 24 pen 3    fluticasone (FLONASE) 50 MCG/ACT nasal spray 1 spray by Each Nostril route daily 1 Bottle 0    docusate sodium (COLACE, DULCOLAX) 100 MG CAPS Take 100 mg by mouth 2 times daily 60 capsule 0    isosorbide mononitrate (IMDUR) 30 MG extended release tablet Take 1 tablet by mouth daily 30 tablet 5    azelastine (ASTELIN) 0.1 % nasal spray 1 spray by Nasal route as needed for Rhinitis Use in each nostril as directed      sertraline (ZOLOFT) 100 MG tablet Take 1 tablet by mouth daily 30 tablet 5    nitroGLYCERIN (NITROSTAT) 0.4 MG SL tablet up to max of 3 total doses. If no relief after 1 dose, call 911. 25 tablet 3    tamsulosin (FLOMAX) 0.4 MG capsule TAKE 2 CAPSULES AT BEDTIME 180 capsule 3    clopidogrel (PLAVIX) 75 MG tablet TAKE 1 TABLET DAILY 90 tablet 3    zoster recombinant adjuvanted vaccine (SHINGRIX) 50 MCG/0.5ML SUSR injection 1 vaccine now and repeat in 2-6 months.  0.5 mL 1    furosemide (LASIX) 40 MG tablet Take 40 mg by mouth daily Take 80 mg x next 3 days starting on 2019  then resume normal dosing      normocephalic. No circumoral cyanosis. Dentition is normal.  EYES -   Lids normal without ptosis. No discharge, edema or subconjunctival hemorrhage. Neck - Symmetrical without apparent mass or lymphadenopathy. Respiratory - Normal respiratory effort without use of accessory muscles. Ausculatation reveals vesicular breath sounds without crackles, wheezes, rub or rhonchi. Lung sounds very diminished bases. Cardiovascular - No jugular venous distention. Auscultation reveals regular rate and rhythm. No audible clicks, gallop or rub. No murmur. No lower extremity varicosities. No carotid bruits. Abdominal -  No visible distention, mass or pulsations. Extremities - No clubbing or cyanosis. No statis dermatitis or ulcers. 1+ pitting bilateral lower extremity edema. Musculoskeletal -   No Osler's nodes. No kyphosis or scoliosis. Gait is even and regular without limp or shuffle. Ambulates without assistance. Skin -  Warm and dry; no rash or pallor. No new surgical wound. Neurological - No focal neurological deficits. Thought processes coherent. No apparent tremor. Oriented to person, place and time. Psychiatric -  Appropriate affect and mood. Assessment:     Diagnosis Orders   1. Chronic systolic heart failure ([facility] [address]     2. Ischemic cardiomyopathy  CBC    Comprehensive Metabolic Panel    Brain Natriuretic Peptide   3. Coronary artery disease involving native coronary artery of native heart without angina pectoris  CBC    Comprehensive Metabolic Panel    Brain Natriuretic Peptide   4. Essential hypertension  CBC    Comprehensive Metabolic Panel    Brain Natriuretic Peptide   5. SOB (shortness of breath)  XR CHEST STANDARD (2 VW)     Loop recorder interrogation completed. Battery status good. No symptom, tachy or AT/AF episodes. 1 pause - d/t undersensing.   1 desiree - 11 seconds 7/28/19 - 40 bpm 1:39 AM.    Current GDMT for NYHA class II stage C heart failure includes Entresto, Coreg, keep a blood pressure log, do so for 2 weeks. Call the office to report readings at [phone]. Blood pressure goal  is less than 120/70. Elevated blood pressure at 120-129/80 or less. High blood pressure at 130-139/80-89. If you are taking cholesterol lowering medications, it is recommended that lab work be checked annually. Always keep a current medication list. Bring your medications to every office visit.       Rina Spence, APRN

## 2019-08-12 ENCOUNTER — HOSPITAL ENCOUNTER (OUTPATIENT)
Dept: CARDIAC REHAB | Age: 74
Setting detail: THERAPIES SERIES
Discharge: HOME | End: 2019-08-12
Payer: MEDICARE

## 2019-08-12 PROCEDURE — 93798 PHYS/QHP OP CAR RHAB W/ECG: CPT

## 2019-08-14 ENCOUNTER — HOSPITAL ENCOUNTER (OUTPATIENT)
Dept: CARDIAC REHAB | Age: 74
Setting detail: THERAPIES SERIES
Discharge: HOME | End: 2019-08-14
Payer: MEDICARE

## 2019-08-14 ENCOUNTER — OFFICE VISIT (OUTPATIENT)
Dept: CARDIOLOGY | Age: 74
End: 2019-08-14
Payer: MEDICARE

## 2019-08-14 VITALS
SYSTOLIC BLOOD PRESSURE: 110 MMHG | HEIGHT: 76 IN | HEART RATE: 70 BPM | DIASTOLIC BLOOD PRESSURE: 68 MMHG | BODY MASS INDEX: 31.78 KG/M2 | WEIGHT: 261 LBS

## 2019-08-14 DIAGNOSIS — Z95.818 STATUS POST PLACEMENT OF IMPLANTABLE LOOP RECORDER: Primary | ICD-10-CM

## 2019-08-14 DIAGNOSIS — I25.5 ISCHEMIC CARDIOMYOPATHY: ICD-10-CM

## 2019-08-14 DIAGNOSIS — I63.411 CEREBROVASCULAR ACCIDENT (CVA) DUE TO EMBOLISM OF RIGHT MIDDLE CEREBRAL ARTERY (HCC): ICD-10-CM

## 2019-08-14 DIAGNOSIS — I50.22 CHRONIC SYSTOLIC HEART FAILURE (HCC): Primary | ICD-10-CM

## 2019-08-14 DIAGNOSIS — I25.10 CORONARY ARTERY DISEASE INVOLVING NATIVE CORONARY ARTERY OF NATIVE HEART WITHOUT ANGINA PECTORIS: ICD-10-CM

## 2019-08-14 DIAGNOSIS — E78.2 MIXED HYPERLIPIDEMIA: Chronic | ICD-10-CM

## 2019-08-14 DIAGNOSIS — Z95.1 S/P CABG X 3: ICD-10-CM

## 2019-08-14 PROCEDURE — G8417 CALC BMI ABV UP PARAM F/U: HCPCS | Performed by: NURSE PRACTITIONER

## 2019-08-14 PROCEDURE — 93798 PHYS/QHP OP CAR RHAB W/ECG: CPT

## 2019-08-14 PROCEDURE — 3017F COLORECTAL CA SCREEN DOC REV: CPT | Performed by: NURSE PRACTITIONER

## 2019-08-14 PROCEDURE — G8598 ASA/ANTIPLAT THER USED: HCPCS | Performed by: NURSE PRACTITIONER

## 2019-08-14 PROCEDURE — 1123F ACP DISCUSS/DSCN MKR DOCD: CPT | Performed by: NURSE PRACTITIONER

## 2019-08-14 PROCEDURE — G8427 DOCREV CUR MEDS BY ELIG CLIN: HCPCS | Performed by: NURSE PRACTITIONER

## 2019-08-14 PROCEDURE — 99214 OFFICE O/P EST MOD 30 MIN: CPT | Performed by: NURSE PRACTITIONER

## 2019-08-14 PROCEDURE — 4040F PNEUMOC VAC/ADMIN/RCVD: CPT | Performed by: NURSE PRACTITIONER

## 2019-08-14 PROCEDURE — 1036F TOBACCO NON-USER: CPT | Performed by: NURSE PRACTITIONER

## 2019-08-15 NOTE — PROGRESS NOTES
[facility], [place]. [address] [place] [address] [address] [facility]  [phone] office  [phone] fax      OFFICE VISIT:  2019    Ramu Lion - :  Follow-up (Patient is here for a 1 week follow up on CHF, patients edema and SOA is some better, he is on the 2nd day of taking Lasix 40 mg twice daily,but complains of chest pain off and on, and is taking Nitro and it eases the pain.) and Congestive Heart Failure  History:   Diagnosis Orders   1. Chronic systolic heart failure (HCC)  Brain Natriuretic Peptide   2. S/P CABG x 3, LIMA- DIAG, A0- SVG- LAD, A0- SVG- Ramus, EF 30-35% (2019)     3. Ischemic cardiomyopathy     4. Mixed hyperlipidemia     5. Coronary artery disease involving native coronary artery of native heart without angina pectoris       The patient presents today for cardiology follow up regarding systolic heart failure. The patient was seen last week. He was sent to infusion center for Lasix 80 mg IVP. Weight is down 8 pounds on office scales. Patient is obviously less short of breath today and reports feeling much better. He reports taking Lasix 40 mg BID stating \"just one a day will not keep the fluid off. \". The patient denies symptoms to suggest myocardial ischemia, decompensated systolic heart failure or arrhythmia. BP is well controlled on current regimen. The patient's PCP monitors cholesterol. [place] Jhon denies exertional chest pain, shortness of breath, orthopnea, paroxysmal nocturnal dyspnea, syncope, presyncope, sustained arrythmia, edema and fatigue. The patient denies numbness or weakness to suggest cerebrovascular accident or transient ischemic attack.       Ramu Lion has the following history as recorded in [facility]:    Patient Active Problem List   Diagnosis Code    Type 2 diabetes mellitus with diabetic polyneuropathy, with long-term current use of insulin ([facility] E11.42, Collection Time: 08/06/19  2:00 PM   Result Value Ref Range    WBC 6.4 4.8 - 10.8 K/uL    RBC 4.72 4.70 - 6.10 M/uL    Hemoglobin 11.9 (L) 14.0 - 18.0 g/dL    Hematocrit 38.6 (L) 42.0 - 52.0 %    MCV 81.8 80.0 - 94.0 fL    MCH 25.2 (L) 27.0 - 31.0 pg    MCHC 30.8 (L) 33.0 - 37.0 g/dL    RDW 17.1 (H) 11.5 - 14.5 %    Platelets 912 021 - 358 K/uL    MPV 11.7 9.4 - 12.4 fL       Plan  Continue GDMT for systolic heart failure. Previous cardiac history and records reviewed. Check BMP and BNP in one week. Continue current medications as directed. BP goal - 120/80. If asked, keep BP log for 2 weeks and call office at [phone] to report to nurse triage. Continue to follow up with primary care provider for non cardiac medical problems. Call the office with any problems, questions or concerns at [phone]. Follow up as scheduled with your cardiologist. One month. Educational included in patient instructions.      TEGAN Chavira

## 2019-08-16 ENCOUNTER — HOSPITAL ENCOUNTER (OUTPATIENT)
Dept: CARDIAC REHAB | Age: 74
Setting detail: THERAPIES SERIES
Discharge: HOME | End: 2019-08-16
Payer: MEDICARE

## 2019-08-16 PROCEDURE — 93798 PHYS/QHP OP CAR RHAB W/ECG: CPT

## 2019-08-19 ENCOUNTER — HOSPITAL ENCOUNTER (OUTPATIENT)
Dept: CARDIAC REHAB | Age: 74
Setting detail: THERAPIES SERIES
Discharge: HOME | End: 2019-08-19
Payer: MEDICARE

## 2019-08-19 PROCEDURE — 93798 PHYS/QHP OP CAR RHAB W/ECG: CPT

## 2019-08-20 DIAGNOSIS — I25.5 ISCHEMIC CARDIOMYOPATHY: ICD-10-CM

## 2019-08-20 DIAGNOSIS — I50.22 CHRONIC SYSTOLIC HEART FAILURE (HCC): ICD-10-CM

## 2019-08-20 LAB
ANION GAP SERPL CALCULATED.3IONS-SCNC: 14 MMOL/L (ref 7–19)
BUN BLDV-MCNC: 24 MG/DL (ref 8–23)
CALCIUM SERPL-MCNC: 9.2 MG/DL (ref 8.8–10.2)
CHLORIDE BLD-SCNC: 102 MMOL/L (ref 98–111)
CO2: 24 MMOL/L (ref 22–29)
CREAT SERPL-MCNC: 1.2 MG/DL (ref 0.5–1.2)
GFR NON-AFRICAN AMERICAN: 59
GLUCOSE BLD-MCNC: 80 MG/DL (ref 74–109)
POTASSIUM SERPL-SCNC: 4.6 MMOL/L (ref 3.5–5)
PRO-BNP: 1381 PG/ML (ref 0–1800)
SODIUM BLD-SCNC: 140 MMOL/L (ref 136–145)

## 2019-08-21 DIAGNOSIS — R00.2 PALPITATIONS: ICD-10-CM

## 2019-08-21 DIAGNOSIS — Z95.818 STATUS POST PLACEMENT OF IMPLANTABLE LOOP RECORDER: Primary | ICD-10-CM

## 2019-08-21 PROCEDURE — 93298 REM INTERROG DEV EVAL SCRMS: CPT | Performed by: NURSE PRACTITIONER

## 2019-08-21 PROCEDURE — 93299 PR REM INTERROG ICPMS/SCRMS <30 D TECH REVIEW: CPT | Performed by: NURSE PRACTITIONER

## 2019-08-22 ENCOUNTER — TELEPHONE (OUTPATIENT)
Dept: CARDIOLOGY | Age: 74
End: 2019-08-22

## 2019-09-10 ENCOUNTER — OFFICE VISIT (OUTPATIENT)
Dept: FAMILY MEDICINE CLINIC | Age: 74
End: 2019-09-10
Payer: MEDICARE

## 2019-09-10 VITALS
HEIGHT: 76 IN | HEART RATE: 72 BPM | DIASTOLIC BLOOD PRESSURE: 74 MMHG | TEMPERATURE: 97.4 F | OXYGEN SATURATION: 98 % | BODY MASS INDEX: 31.42 KG/M2 | RESPIRATION RATE: 20 BRPM | SYSTOLIC BLOOD PRESSURE: 112 MMHG | WEIGHT: 258 LBS

## 2019-09-10 DIAGNOSIS — J20.9 ACUTE BRONCHITIS, UNSPECIFIED ORGANISM: Primary | ICD-10-CM

## 2019-09-10 DIAGNOSIS — M77.11 LATERAL EPICONDYLITIS OF RIGHT ELBOW: ICD-10-CM

## 2019-09-10 DIAGNOSIS — E11.42 TYPE 2 DIABETES MELLITUS WITH DIABETIC POLYNEUROPATHY, WITH LONG-TERM CURRENT USE OF INSULIN (HCC): ICD-10-CM

## 2019-09-10 DIAGNOSIS — I10 ESSENTIAL HYPERTENSION: Chronic | ICD-10-CM

## 2019-09-10 DIAGNOSIS — N40.0 BENIGN PROSTATIC HYPERPLASIA WITHOUT LOWER URINARY TRACT SYMPTOMS: ICD-10-CM

## 2019-09-10 DIAGNOSIS — F41.8 DEPRESSION WITH ANXIETY: ICD-10-CM

## 2019-09-10 DIAGNOSIS — I25.10 CORONARY ARTERY DISEASE INVOLVING NATIVE CORONARY ARTERY OF NATIVE HEART WITHOUT ANGINA PECTORIS: ICD-10-CM

## 2019-09-10 DIAGNOSIS — Z79.4 TYPE 2 DIABETES MELLITUS WITH DIABETIC POLYNEUROPATHY, WITH LONG-TERM CURRENT USE OF INSULIN (HCC): ICD-10-CM

## 2019-09-10 PROCEDURE — 2022F DILAT RTA XM EVC RTNOPTHY: CPT | Performed by: NURSE PRACTITIONER

## 2019-09-10 PROCEDURE — 99214 OFFICE O/P EST MOD 30 MIN: CPT | Performed by: NURSE PRACTITIONER

## 2019-09-10 PROCEDURE — G8417 CALC BMI ABV UP PARAM F/U: HCPCS | Performed by: NURSE PRACTITIONER

## 2019-09-10 PROCEDURE — 4040F PNEUMOC VAC/ADMIN/RCVD: CPT | Performed by: NURSE PRACTITIONER

## 2019-09-10 PROCEDURE — 1036F TOBACCO NON-USER: CPT | Performed by: NURSE PRACTITIONER

## 2019-09-10 PROCEDURE — 1123F ACP DISCUSS/DSCN MKR DOCD: CPT | Performed by: NURSE PRACTITIONER

## 2019-09-10 PROCEDURE — G8427 DOCREV CUR MEDS BY ELIG CLIN: HCPCS | Performed by: NURSE PRACTITIONER

## 2019-09-10 PROCEDURE — G8598 ASA/ANTIPLAT THER USED: HCPCS | Performed by: NURSE PRACTITIONER

## 2019-09-10 PROCEDURE — 3017F COLORECTAL CA SCREEN DOC REV: CPT | Performed by: NURSE PRACTITIONER

## 2019-09-10 PROCEDURE — 3045F PR MOST RECENT HEMOGLOBIN A1C LEVEL 7.0-9.0%: CPT | Performed by: NURSE PRACTITIONER

## 2019-09-10 RX ORDER — CEFDINIR 300 MG/1
300 CAPSULE ORAL 2 TIMES DAILY
Qty: 14 CAPSULE | Refills: 0 | Status: SHIPPED | OUTPATIENT
Start: 2019-09-10 | End: 2019-09-17

## 2019-09-10 RX ORDER — CLOPIDOGREL BISULFATE 75 MG/1
TABLET ORAL
Qty: 90 TABLET | Refills: 2 | Status: SHIPPED | OUTPATIENT
Start: 2019-09-10 | End: 2021-10-29 | Stop reason: SDUPTHER

## 2019-09-10 RX ORDER — SERTRALINE HYDROCHLORIDE 100 MG/1
100 TABLET, FILM COATED ORAL DAILY
Qty: 90 TABLET | Refills: 2 | Status: SHIPPED | OUTPATIENT
Start: 2019-09-10

## 2019-09-10 RX ORDER — FUROSEMIDE 40 MG/1
40 TABLET ORAL DAILY
Qty: 90 TABLET | Refills: 2 | Status: ON HOLD | OUTPATIENT
Start: 2019-09-10 | End: 2019-11-01

## 2019-09-10 RX ORDER — CARVEDILOL 3.12 MG/1
3.12 TABLET ORAL 2 TIMES DAILY WITH MEALS
Qty: 180 TABLET | Refills: 2 | Status: SHIPPED | OUTPATIENT
Start: 2019-09-10 | End: 2021-06-08

## 2019-09-10 RX ORDER — NITROGLYCERIN 0.4 MG/1
TABLET SUBLINGUAL
Qty: 30 TABLET | Refills: 2 | Status: SHIPPED | OUTPATIENT
Start: 2019-09-10

## 2019-09-10 RX ORDER — TAMSULOSIN HYDROCHLORIDE 0.4 MG/1
CAPSULE ORAL
Qty: 180 CAPSULE | Refills: 2 | Status: SHIPPED | OUTPATIENT
Start: 2019-09-10 | End: 2020-05-19

## 2019-09-10 RX ORDER — ISOSORBIDE MONONITRATE 30 MG/1
30 TABLET, EXTENDED RELEASE ORAL DAILY
Qty: 90 TABLET | Refills: 2 | Status: SHIPPED | OUTPATIENT
Start: 2019-09-10 | End: 2019-10-23

## 2019-09-10 ASSESSMENT — ENCOUNTER SYMPTOMS
WHEEZING: 0
ABDOMINAL PAIN: 0
NAUSEA: 0
COUGH: 1
CHEST TIGHTNESS: 0
DIARRHEA: 0
SORE THROAT: 0
SHORTNESS OF BREATH: 1

## 2019-09-10 NOTE — PROGRESS NOTES
pen INJECT 70 UNITS INTO THE SKIN NIGHTLY (Patient taking differently: Inject 80 Units into the skin nightly INJECT 70 UNITS INTO THE SKIN NIGHTLY) 24 pen 3    butalbital-acetaminophen-caffeine (FIORICET, ESGIC) -40 MG per tablet Take 1 tablet by mouth as needed for Headaches 3 tablet 0    fluticasone (FLONASE) 50 MCG/ACT nasal spray 1 spray by Each Nostril route daily 1 Bottle 0    docusate sodium (COLACE, DULCOLAX) 100 MG CAPS Take 100 mg by mouth 2 times daily 60 capsule 0    azelastine (ASTELIN) 0.1 % nasal spray 1 spray by Nasal route as needed for Rhinitis Use in each nostril as directed      zoster recombinant adjuvanted vaccine (SHINGRIX) 50 MCG/0.5ML SUSR injection 1 vaccine now and repeat in 2-6 months. 0.5 mL 1     No current facility-administered medications for this visit. Allergies   Allergen Reactions    Statins Other (See Comments)     Intolerable leg cramps    Crestor [Rosuvastatin Calcium]     Morphine Itching          Past Surgical History:   Procedure Laterality Date    APPENDECTOMY      BACK SURGERY      CARDIAC CATHETERIZATION  12/12/12    with angioplasty, stent    CARDIAC CATHETERIZATION  3/29/15  MDL    stent to distal RCA.  EF 60%    CHOLECYSTECTOMY      COLONOSCOPY      CORONARY ANGIOPLASTY WITH STENT PLACEMENT  jan 11 [place]    stent x 3    CORONARY ANGIOPLASTY WITH STENT PLACEMENT      01/01/11    CORONARY ARTERY BYPASS GRAFT N/A 5/30/2019    CORONARY ARTERY BYPASS GRAFT X3 WITH LEFT INTERNAL MAMMARY ARTERY WITH ENDOSCOPIC VEIN HARVESTING WITH PERFUSION TRANSESOPHAGEAL ECHOCARDIOGRAM performed by Gwynda Fleischer, MD at [facility] CATH LAB PROCEDURE  [id number]    primary stent placement to the first circumflex marginal branch, balloon angioplasty to the third left anterior descending diagnonal branch, intracoronary nitroglycerin adminstration    JOINT REPLACEMENT      left knee    LUMBAR FUSION Left 11/15/2016    LUMBAR INTERBODY FUSION

## 2019-09-11 ENCOUNTER — HOSPITAL ENCOUNTER (OUTPATIENT)
Dept: CARDIAC REHAB | Age: 74
Setting detail: THERAPIES SERIES
Discharge: HOME | End: 2019-09-11
Payer: MEDICARE

## 2019-09-11 PROCEDURE — 93798 PHYS/QHP OP CAR RHAB W/ECG: CPT

## 2019-09-13 ENCOUNTER — HOSPITAL ENCOUNTER (OUTPATIENT)
Dept: CARDIAC REHAB | Age: 74
Setting detail: THERAPIES SERIES
Discharge: HOME | End: 2019-09-13
Payer: MEDICARE

## 2019-09-13 PROCEDURE — 93798 PHYS/QHP OP CAR RHAB W/ECG: CPT

## 2019-09-16 ENCOUNTER — HOSPITAL ENCOUNTER (OUTPATIENT)
Dept: CARDIAC REHAB | Age: 74
Setting detail: THERAPIES SERIES
Discharge: HOME | End: 2019-09-16
Payer: MEDICARE

## 2019-09-16 PROCEDURE — 93798 PHYS/QHP OP CAR RHAB W/ECG: CPT

## 2019-09-17 ENCOUNTER — TELEPHONE (OUTPATIENT)
Dept: FAMILY MEDICINE CLINIC | Age: 74
End: 2019-09-17

## 2019-09-17 DIAGNOSIS — R05.9 COUGH: Primary | ICD-10-CM

## 2019-09-18 ENCOUNTER — TELEPHONE (OUTPATIENT)
Dept: FAMILY MEDICINE CLINIC | Age: 74
End: 2019-09-18

## 2019-09-18 ENCOUNTER — HOSPITAL ENCOUNTER (OUTPATIENT)
Dept: CARDIAC REHAB | Age: 74
Setting detail: THERAPIES SERIES
Discharge: HOME | End: 2019-09-18
Payer: MEDICARE

## 2019-09-18 ENCOUNTER — HOSPITAL ENCOUNTER (OUTPATIENT)
Dept: GENERAL RADIOLOGY | Age: 74
Discharge: HOME | End: 2019-09-18
Payer: MEDICARE

## 2019-09-18 DIAGNOSIS — R05.9 COUGH: ICD-10-CM

## 2019-09-18 PROCEDURE — 71046 X-RAY EXAM CHEST 2 VIEWS: CPT

## 2019-09-18 PROCEDURE — 93798 PHYS/QHP OP CAR RHAB W/ECG: CPT

## 2019-09-20 ENCOUNTER — HOSPITAL ENCOUNTER (OUTPATIENT)
Dept: CARDIAC REHAB | Age: 74
Setting detail: THERAPIES SERIES
Discharge: HOME | End: 2019-09-20
Payer: MEDICARE

## 2019-09-20 ENCOUNTER — APPOINTMENT (OUTPATIENT)
Dept: GENERAL RADIOLOGY | Age: 74
End: 2019-09-20
Payer: MEDICARE

## 2019-09-20 ENCOUNTER — OFFICE VISIT (OUTPATIENT)
Dept: FAMILY MEDICINE CLINIC | Age: 74
End: 2019-09-20
Payer: MEDICARE

## 2019-09-20 ENCOUNTER — TELEPHONE (OUTPATIENT)
Dept: CARDIOLOGY | Age: 74
End: 2019-09-20

## 2019-09-20 ENCOUNTER — HOSPITAL ENCOUNTER (OUTPATIENT)
Age: 74
Setting detail: OBSERVATION
Discharge: HOME | End: 2019-09-21
Attending: HOSPITALIST | Admitting: INTERNAL MEDICINE
Payer: MEDICARE

## 2019-09-20 VITALS
OXYGEN SATURATION: 99 % | SYSTOLIC BLOOD PRESSURE: 98 MMHG | TEMPERATURE: 97.9 F | WEIGHT: 266 LBS | HEART RATE: 94 BPM | BODY MASS INDEX: 32.39 KG/M2 | RESPIRATION RATE: 18 BRPM | DIASTOLIC BLOOD PRESSURE: 60 MMHG | HEIGHT: 76 IN

## 2019-09-20 DIAGNOSIS — Z23 NEED FOR VACCINATION: Primary | ICD-10-CM

## 2019-09-20 DIAGNOSIS — R07.9 CHEST PAIN, UNSPECIFIED TYPE: Primary | ICD-10-CM

## 2019-09-20 DIAGNOSIS — E11.42 TYPE 2 DIABETES MELLITUS WITH DIABETIC POLYNEUROPATHY, WITH LONG-TERM CURRENT USE OF INSULIN (HCC): ICD-10-CM

## 2019-09-20 DIAGNOSIS — J20.9 ACUTE BRONCHITIS, UNSPECIFIED ORGANISM: ICD-10-CM

## 2019-09-20 DIAGNOSIS — Z79.4 TYPE 2 DIABETES MELLITUS WITH HYPERGLYCEMIA, WITH LONG-TERM CURRENT USE OF INSULIN (HCC): ICD-10-CM

## 2019-09-20 DIAGNOSIS — R07.89 CHEST WALL PAIN: ICD-10-CM

## 2019-09-20 DIAGNOSIS — E11.65 TYPE 2 DIABETES MELLITUS WITH HYPERGLYCEMIA, WITH LONG-TERM CURRENT USE OF INSULIN (HCC): ICD-10-CM

## 2019-09-20 DIAGNOSIS — Z79.4 TYPE 2 DIABETES MELLITUS WITH DIABETIC POLYNEUROPATHY, WITH LONG-TERM CURRENT USE OF INSULIN (HCC): ICD-10-CM

## 2019-09-20 DIAGNOSIS — I10 ESSENTIAL HYPERTENSION: ICD-10-CM

## 2019-09-20 DIAGNOSIS — M77.11 LATERAL EPICONDYLITIS OF RIGHT ELBOW: ICD-10-CM

## 2019-09-20 LAB
ALBUMIN SERPL-MCNC: 4 G/DL (ref 3.5–5.2)
ALP BLD-CCNC: 89 U/L (ref 40–130)
ALT SERPL-CCNC: 15 U/L (ref 5–41)
ANION GAP SERPL CALCULATED.3IONS-SCNC: 14 MMOL/L (ref 7–19)
ANISOCYTOSIS: ABNORMAL
AST SERPL-CCNC: 13 U/L (ref 5–40)
BANDED NEUTROPHILS RELATIVE PERCENT: 1 % (ref 0–5)
BASOPHILS ABSOLUTE: 0 K/UL (ref 0–0.2)
BASOPHILS RELATIVE PERCENT: 0 % (ref 0–1)
BILIRUB SERPL-MCNC: <0.2 MG/DL (ref 0.2–1.2)
BUN BLDV-MCNC: 25 MG/DL (ref 8–23)
CALCIUM SERPL-MCNC: 9.3 MG/DL (ref 8.8–10.2)
CHLORIDE BLD-SCNC: 100 MMOL/L (ref 98–111)
CO2: 22 MMOL/L (ref 22–29)
CREAT SERPL-MCNC: 1.3 MG/DL (ref 0.5–1.2)
EOSINOPHILS ABSOLUTE: 0 K/UL (ref 0–0.6)
EOSINOPHILS RELATIVE PERCENT: 0 % (ref 0–5)
GFR NON-AFRICAN AMERICAN: 54
GLUCOSE BLD-MCNC: 388 MG/DL (ref 74–109)
HCT VFR BLD CALC: 42.1 % (ref 42–52)
HEMOGLOBIN: 13.1 G/DL (ref 14–18)
HYPOCHROMIA: ABNORMAL
IMMATURE GRANULOCYTES #: 0 K/UL
LYMPHOCYTES ABSOLUTE: 0.6 K/UL (ref 1.1–4.5)
LYMPHOCYTES RELATIVE PERCENT: 11 % (ref 20–40)
MCH RBC QN AUTO: 24.5 PG (ref 27–31)
MCHC RBC AUTO-ENTMCNC: 31.1 G/DL (ref 33–37)
MCV RBC AUTO: 78.8 FL (ref 80–94)
MICROCYTES: ABNORMAL
MONOCYTES ABSOLUTE: 0.1 K/UL (ref 0–0.9)
MONOCYTES RELATIVE PERCENT: 2 % (ref 0–10)
NEUTROPHILS ABSOLUTE: 4.9 K/UL (ref 1.5–7.5)
NEUTROPHILS RELATIVE PERCENT: 86 % (ref 50–65)
PDW BLD-RTO: 18.4 % (ref 11.5–14.5)
PLATELET # BLD: 152 K/UL (ref 130–400)
PLATELET SLIDE REVIEW: ADEQUATE
POTASSIUM SERPL-SCNC: 4.7 MMOL/L (ref 3.5–5)
RBC # BLD: 5.34 M/UL (ref 4.7–6.1)
SODIUM BLD-SCNC: 136 MMOL/L (ref 136–145)
TOTAL PROTEIN: 7.5 G/DL (ref 6.6–8.7)
TROPONIN: <0.01 NG/ML (ref 0–0.03)
WBC # BLD: 5.6 K/UL (ref 4.8–10.8)

## 2019-09-20 PROCEDURE — 1123F ACP DISCUSS/DSCN MKR DOCD: CPT | Performed by: NURSE PRACTITIONER

## 2019-09-20 PROCEDURE — 84484 ASSAY OF TROPONIN QUANT: CPT

## 2019-09-20 PROCEDURE — 3017F COLORECTAL CA SCREEN DOC REV: CPT | Performed by: NURSE PRACTITIONER

## 2019-09-20 PROCEDURE — 6370000000 HC RX 637 (ALT 250 FOR IP)

## 2019-09-20 PROCEDURE — 85025 COMPLETE CBC W/AUTO DIFF WBC: CPT

## 2019-09-20 PROCEDURE — G8417 CALC BMI ABV UP PARAM F/U: HCPCS | Performed by: NURSE PRACTITIONER

## 2019-09-20 PROCEDURE — G0008 ADMIN INFLUENZA VIRUS VAC: HCPCS | Performed by: NURSE PRACTITIONER

## 2019-09-20 PROCEDURE — 4040F PNEUMOC VAC/ADMIN/RCVD: CPT | Performed by: NURSE PRACTITIONER

## 2019-09-20 PROCEDURE — 93798 PHYS/QHP OP CAR RHAB W/ECG: CPT

## 2019-09-20 PROCEDURE — 1036F TOBACCO NON-USER: CPT | Performed by: NURSE PRACTITIONER

## 2019-09-20 PROCEDURE — 96372 THER/PROPH/DIAG INJ SC/IM: CPT | Performed by: NURSE PRACTITIONER

## 2019-09-20 PROCEDURE — 3045F PR MOST RECENT HEMOGLOBIN A1C LEVEL 7.0-9.0%: CPT | Performed by: NURSE PRACTITIONER

## 2019-09-20 PROCEDURE — G8598 ASA/ANTIPLAT THER USED: HCPCS | Performed by: NURSE PRACTITIONER

## 2019-09-20 PROCEDURE — 99285 EMERGENCY DEPT VISIT HI MDM: CPT

## 2019-09-20 PROCEDURE — 99214 OFFICE O/P EST MOD 30 MIN: CPT | Performed by: NURSE PRACTITIONER

## 2019-09-20 PROCEDURE — 71045 X-RAY EXAM CHEST 1 VIEW: CPT

## 2019-09-20 PROCEDURE — 93005 ELECTROCARDIOGRAM TRACING: CPT | Performed by: NURSE PRACTITIONER

## 2019-09-20 PROCEDURE — 90653 IIV ADJUVANT VACCINE IM: CPT | Performed by: NURSE PRACTITIONER

## 2019-09-20 PROCEDURE — G8427 DOCREV CUR MEDS BY ELIG CLIN: HCPCS | Performed by: NURSE PRACTITIONER

## 2019-09-20 PROCEDURE — 36415 COLL VENOUS BLD VENIPUNCTURE: CPT

## 2019-09-20 PROCEDURE — 6370000000 HC RX 637 (ALT 250 FOR IP): Performed by: NURSE PRACTITIONER

## 2019-09-20 PROCEDURE — 80053 COMPREHEN METABOLIC PANEL: CPT

## 2019-09-20 PROCEDURE — 2022F DILAT RTA XM EVC RTNOPTHY: CPT | Performed by: NURSE PRACTITIONER

## 2019-09-20 RX ORDER — LEVOFLOXACIN 500 MG/1
500 TABLET, FILM COATED ORAL DAILY
Qty: 7 TABLET | Refills: 0 | Status: ON HOLD | OUTPATIENT
Start: 2019-09-20 | End: 2019-09-21 | Stop reason: HOSPADM

## 2019-09-20 RX ORDER — PREDNISONE 20 MG/1
TABLET ORAL
Qty: 12 TABLET | Refills: 0 | Status: SHIPPED | OUTPATIENT
Start: 2019-09-20 | End: 2019-09-26 | Stop reason: ALTCHOICE

## 2019-09-20 RX ORDER — ASPIRIN 81 MG/1
324 TABLET, CHEWABLE ORAL ONCE
Status: COMPLETED | OUTPATIENT
Start: 2019-09-20 | End: 2019-09-20

## 2019-09-20 RX ORDER — ASPIRIN 81 MG/1
TABLET, CHEWABLE ORAL
Status: COMPLETED
Start: 2019-09-20 | End: 2019-09-20

## 2019-09-20 RX ORDER — DEXAMETHASONE SODIUM PHOSPHATE 100 MG/10ML
10 INJECTION INTRAMUSCULAR; INTRAVENOUS ONCE
Status: COMPLETED | OUTPATIENT
Start: 2019-09-20 | End: 2019-09-20

## 2019-09-20 RX ORDER — NITROGLYCERIN 0.4 MG/1
0.4 TABLET SUBLINGUAL EVERY 5 MIN PRN
Status: DISCONTINUED | OUTPATIENT
Start: 2019-09-20 | End: 2019-09-21 | Stop reason: HOSPADM

## 2019-09-20 RX ADMIN — ASPIRIN 81 MG 324 MG: 81 TABLET ORAL at 23:29

## 2019-09-20 RX ADMIN — ASPIRIN 324 MG: 81 TABLET, CHEWABLE ORAL at 23:29

## 2019-09-20 RX ADMIN — LIDOCAINE HYDROCHLORIDE: 20 SOLUTION ORAL; TOPICAL at 23:05

## 2019-09-20 RX ADMIN — DEXAMETHASONE SODIUM PHOSPHATE 10 MG: 100 INJECTION INTRAMUSCULAR; INTRAVENOUS at 15:04

## 2019-09-20 RX ADMIN — NITROGLYCERIN 0.4 MG: 0.4 TABLET, ORALLY DISINTEGRATING SUBLINGUAL at 23:29

## 2019-09-20 ASSESSMENT — PAIN DESCRIPTION - DESCRIPTORS: DESCRIPTORS: BURNING

## 2019-09-20 ASSESSMENT — ENCOUNTER SYMPTOMS
VOMITING: 0
NAUSEA: 0
COUGH: 0
SORE THROAT: 0
SORE THROAT: 0
WHEEZING: 0
ABDOMINAL PAIN: 0
CONSTIPATION: 0
ABDOMINAL PAIN: 0
SHORTNESS OF BREATH: 0
DIARRHEA: 0
SHORTNESS OF BREATH: 1
COUGH: 1
DIARRHEA: 0
TROUBLE SWALLOWING: 0
NAUSEA: 0
CHEST TIGHTNESS: 1
RHINORRHEA: 0

## 2019-09-20 ASSESSMENT — PAIN DESCRIPTION - LOCATION: LOCATION: CHEST

## 2019-09-20 ASSESSMENT — PAIN DESCRIPTION - PAIN TYPE: TYPE: ACUTE PAIN

## 2019-09-20 ASSESSMENT — PAIN SCALES - GENERAL: PAINLEVEL_OUTOF10: 5

## 2019-09-20 NOTE — PROGRESS NOTES
Purnima Covington is a 75 y.o. male who presents today for  Chief Complaint   Patient presents with    Follow-up    Congestion    Otalgia       HPI:  He was seen on 9/10 for acute bronchitis, treated with cefdinir which he has completed. No significant improvement. He continues to have cough and congestion with occasional purulent sputum. He has had shortness of breath and chest tightness with occasional wheeze. No significant sinus symptoms. No fever or chills. Chest x-ray obtained 2 days ago was negative. Pressure has been stable. Blood sugar has been stable, fasting in the 90s. He is taking Lantus 80 units every night. He mentions occasional left lateral chest pain, worse with positional changes particularly when getting out of bed. He has had similar pain on the right side. No significant exertional pain. He does have a history of CAD. Lateral epicondylitis is stable, possibly a little bigger. No redness or warmth. No pain other when he accidentally hits his arm. Review of Systems   Constitutional: Negative for chills and fever. HENT: Negative for congestion, ear pain and sore throat. Respiratory: Positive for cough, chest tightness and shortness of breath. Negative for wheezing. Cardiovascular: Negative for chest pain. Gastrointestinal: Negative for abdominal pain, diarrhea and nausea. Musculoskeletal: Positive for arthralgias (R elbow lateral epicondylitis). Negative for myalgias. Skin: Negative for rash.        Past Medical History:   Diagnosis Date    Acute ischemic stroke ([facility] [address] [address] 2/20/2017    Atherosclerotic heart disease     s/p MI, stenting x 3 jan [place]    CAD (coronary artery disease)     Cerebral artery occlusion with cerebral infarction [facility])     Cerebrovascular accident (CVA) due to embolism of right middle cerebral artery ([facility] [address] 8/14/2017    CH[facility]congestive heart failure) [facility])     Chronic bilateral low back pain without sciatica 11/10/2016    INJECT 70 UNITS INTO THE SKIN NIGHTLY (Patient taking differently: Inject 80 Units into the skin nightly INJECT 70 UNITS INTO THE SKIN NIGHTLY) 24 pen 3    butalbital-acetaminophen-caffeine (FIORICET, ESGIC) -40 MG per tablet Take 1 tablet by mouth as needed for Headaches 3 tablet 0    fluticasone (FLONASE) 50 MCG/ACT nasal spray 1 spray by Each Nostril route daily 1 Bottle 0    docusate sodium (COLACE, DULCOLAX) 100 MG CAPS Take 100 mg by mouth 2 times daily 60 capsule 0    azelastine (ASTELIN) 0.1 % nasal spray 1 spray by Nasal route as needed for Rhinitis Use in each nostril as directed      zoster recombinant adjuvanted vaccine (SHINGRIX) 50 MCG/0.5ML SUSR injection 1 vaccine now and repeat in 2-6 months. 0.5 mL 1     Current Facility-Administered Medications   Medication Dose Route Frequency Provider Last Rate Last Dose    dexamethasone (DECADRON) injection 10 mg  10 mg Intramuscular Once TEGAN Velazquez           Allergies   Allergen Reactions    Statins Other (See Comments)     Intolerable leg cramps    Crestor [Rosuvastatin Calcium]     Morphine Itching          Past Surgical History:   Procedure Laterality Date    APPENDECTOMY      BACK SURGERY      CARDIAC CATHETERIZATION  12/12/12    with angioplasty, stent    CARDIAC CATHETERIZATION  3/29/15  MDL    stent to distal RCA.  EF 60%    CHOLECYSTECTOMY      COLONOSCOPY      CORONARY ANGIOPLASTY WITH STENT PLACEMENT  jan 11 [place]    stent x 3    CORONARY ANGIOPLASTY WITH STENT PLACEMENT      01/01/11    CORONARY ARTERY BYPASS GRAFT N/A 5/30/2019    CORONARY ARTERY BYPASS GRAFT X3 WITH LEFT INTERNAL MAMMARY ARTERY WITH ENDOSCOPIC VEIN HARVESTING WITH PERFUSION TRANSESOPHAGEAL ECHOCARDIOGRAM performed by Jane Morrell MD at [address] [place] [address] CATH LAB PROCEDURE  [id number]    primary stent placement to the first circumflex marginal branch, balloon angioplasty to the third left anterior descending diagnonal branch,

## 2019-09-20 NOTE — TELEPHONE ENCOUNTER
Date of Surgery: TBD    Cardiologist: Dr. Clemente Castelan    Procedure: Invasive procedure    Surgeon: Dr. Blanka Mustafa    Last Office Visit: 8-14-19  Reason for office visit and medical concerns addressed at this office visit: CHF, Cabg x 3, Hyperlipidemia, ischemic cardiomyopathy, CAD, CKD, DM, TIA, loop, MI, HTN    Testing Performed and Date of Service:  2 D echo 6-8-19, Cath 5-29-19 Dr. Kushal Sánchez recommends open heart. Does the patient have a stent? If so, what type? Yes ROCKY last one 2015    Current Medications: Plavix, zoloft, nitro, imdur, coreg, lasix, entresto, lantus, fioricet, flonase,     Is the patient currently taking an anticoagulant? Plavix     Additional Notes: med hold for Plavix x 7 days.

## 2019-09-21 VITALS
WEIGHT: 258 LBS | DIASTOLIC BLOOD PRESSURE: 83 MMHG | RESPIRATION RATE: 20 BRPM | HEART RATE: 81 BPM | BODY MASS INDEX: 31.42 KG/M2 | HEIGHT: 76 IN | SYSTOLIC BLOOD PRESSURE: 141 MMHG | TEMPERATURE: 97.7 F | OXYGEN SATURATION: 96 %

## 2019-09-21 LAB
ANION GAP SERPL CALCULATED.3IONS-SCNC: 13 MMOL/L (ref 7–19)
BASOPHILS ABSOLUTE: 0 K/UL (ref 0–0.2)
BASOPHILS RELATIVE PERCENT: 0.4 % (ref 0–1)
BUN BLDV-MCNC: 20 MG/DL (ref 8–23)
CALCIUM SERPL-MCNC: 8.8 MG/DL (ref 8.8–10.2)
CHLORIDE BLD-SCNC: 104 MMOL/L (ref 98–111)
CO2: 22 MMOL/L (ref 22–29)
CREAT SERPL-MCNC: 1 MG/DL (ref 0.5–1.2)
EOSINOPHILS ABSOLUTE: 0 K/UL (ref 0–0.6)
EOSINOPHILS RELATIVE PERCENT: 0.4 % (ref 0–5)
GFR NON-AFRICAN AMERICAN: >60
GLUCOSE BLD-MCNC: 139 MG/DL (ref 74–109)
GLUCOSE BLD-MCNC: 235 MG/DL (ref 70–99)
HBA1C MFR BLD: 6.8 % (ref 4–6)
HCT VFR BLD CALC: 39.7 % (ref 42–52)
HEMOGLOBIN: 12.4 G/DL (ref 14–18)
IMMATURE GRANULOCYTES #: 0 K/UL
LV EF: 33 %
LVEF MODALITY: NORMAL
LYMPHOCYTES ABSOLUTE: 1.4 K/UL (ref 1.1–4.5)
LYMPHOCYTES RELATIVE PERCENT: 18.1 % (ref 20–40)
MCH RBC QN AUTO: 24.8 PG (ref 27–31)
MCHC RBC AUTO-ENTMCNC: 31.2 G/DL (ref 33–37)
MCV RBC AUTO: 79.6 FL (ref 80–94)
MONOCYTES ABSOLUTE: 0.7 K/UL (ref 0–0.9)
MONOCYTES RELATIVE PERCENT: 9.5 % (ref 0–10)
NEUTROPHILS ABSOLUTE: 5.4 K/UL (ref 1.5–7.5)
NEUTROPHILS RELATIVE PERCENT: 71.3 % (ref 50–65)
PDW BLD-RTO: 18.3 % (ref 11.5–14.5)
PERFORMED ON: ABNORMAL
PLATELET # BLD: 144 K/UL (ref 130–400)
PMV BLD AUTO: 11.7 FL (ref 9.4–12.4)
POTASSIUM REFLEX MAGNESIUM: 3.9 MMOL/L (ref 3.5–5)
RBC # BLD: 4.99 M/UL (ref 4.7–6.1)
SODIUM BLD-SCNC: 139 MMOL/L (ref 136–145)
TROPONIN: <0.01 NG/ML (ref 0–0.03)
TROPONIN: <0.01 NG/ML (ref 0–0.03)
WBC # BLD: 7.6 K/UL (ref 4.8–10.8)

## 2019-09-21 PROCEDURE — 85025 COMPLETE CBC W/AUTO DIFF WBC: CPT

## 2019-09-21 PROCEDURE — 83036 HEMOGLOBIN GLYCOSYLATED A1C: CPT

## 2019-09-21 PROCEDURE — 84484 ASSAY OF TROPONIN QUANT: CPT

## 2019-09-21 PROCEDURE — 96372 THER/PROPH/DIAG INJ SC/IM: CPT

## 2019-09-21 PROCEDURE — 36415 COLL VENOUS BLD VENIPUNCTURE: CPT

## 2019-09-21 PROCEDURE — 82948 REAGENT STRIP/BLOOD GLUCOSE: CPT

## 2019-09-21 PROCEDURE — G0378 HOSPITAL OBSERVATION PER HR: HCPCS

## 2019-09-21 PROCEDURE — 93005 ELECTROCARDIOGRAM TRACING: CPT | Performed by: NURSE PRACTITIONER

## 2019-09-21 PROCEDURE — 6360000004 HC RX CONTRAST MEDICATION: Performed by: INTERNAL MEDICINE

## 2019-09-21 PROCEDURE — 6360000002 HC RX W HCPCS: Performed by: HOSPITALIST

## 2019-09-21 PROCEDURE — C8929 TTE W OR WO FOL WCON,DOPPLER: HCPCS

## 2019-09-21 PROCEDURE — 99999 PR OFFICE/OUTPT VISIT,PROCEDURE ONLY: CPT | Performed by: NURSE PRACTITIONER

## 2019-09-21 PROCEDURE — 2500000003 HC RX 250 WO HCPCS: Performed by: HOSPITALIST

## 2019-09-21 PROCEDURE — 6370000000 HC RX 637 (ALT 250 FOR IP): Performed by: HOSPITALIST

## 2019-09-21 PROCEDURE — 80048 BASIC METABOLIC PNL TOTAL CA: CPT

## 2019-09-21 PROCEDURE — 96374 THER/PROPH/DIAG INJ IV PUSH: CPT

## 2019-09-21 RX ORDER — SODIUM CHLORIDE 0.9 % (FLUSH) 0.9 %
10 SYRINGE (ML) INJECTION EVERY 12 HOURS SCHEDULED
Status: DISCONTINUED | OUTPATIENT
Start: 2019-09-21 | End: 2019-09-21 | Stop reason: HOSPADM

## 2019-09-21 RX ORDER — MAGNESIUM SULFATE 1 G/100ML
1 INJECTION INTRAVENOUS PRN
Status: DISCONTINUED | OUTPATIENT
Start: 2019-09-21 | End: 2019-09-21 | Stop reason: HOSPADM

## 2019-09-21 RX ORDER — CARVEDILOL 3.12 MG/1
3.12 TABLET ORAL 2 TIMES DAILY WITH MEALS
Status: DISCONTINUED | OUTPATIENT
Start: 2019-09-21 | End: 2019-09-21 | Stop reason: HOSPADM

## 2019-09-21 RX ORDER — DEXTROSE MONOHYDRATE 50 MG/ML
100 INJECTION, SOLUTION INTRAVENOUS PRN
Status: DISCONTINUED | OUTPATIENT
Start: 2019-09-21 | End: 2019-09-21 | Stop reason: HOSPADM

## 2019-09-21 RX ORDER — SODIUM CHLORIDE 0.9 % (FLUSH) 0.9 %
10 SYRINGE (ML) INJECTION PRN
Status: DISCONTINUED | OUTPATIENT
Start: 2019-09-21 | End: 2019-09-21 | Stop reason: HOSPADM

## 2019-09-21 RX ORDER — POTASSIUM CHLORIDE 20 MEQ/1
40 TABLET, EXTENDED RELEASE ORAL PRN
Status: DISCONTINUED | OUTPATIENT
Start: 2019-09-21 | End: 2019-09-21 | Stop reason: HOSPADM

## 2019-09-21 RX ORDER — NICOTINE POLACRILEX 4 MG
15 LOZENGE BUCCAL PRN
Status: DISCONTINUED | OUTPATIENT
Start: 2019-09-21 | End: 2019-09-21 | Stop reason: HOSPADM

## 2019-09-21 RX ORDER — ACETAMINOPHEN 325 MG/1
650 TABLET ORAL EVERY 4 HOURS PRN
Status: DISCONTINUED | OUTPATIENT
Start: 2019-09-21 | End: 2019-09-21 | Stop reason: HOSPADM

## 2019-09-21 RX ORDER — CLOPIDOGREL BISULFATE 75 MG/1
75 TABLET ORAL DAILY
Status: DISCONTINUED | OUTPATIENT
Start: 2019-09-21 | End: 2019-09-21 | Stop reason: HOSPADM

## 2019-09-21 RX ORDER — DEXTROSE MONOHYDRATE 25 G/50ML
12.5 INJECTION, SOLUTION INTRAVENOUS PRN
Status: DISCONTINUED | OUTPATIENT
Start: 2019-09-21 | End: 2019-09-21 | Stop reason: HOSPADM

## 2019-09-21 RX ORDER — ONDANSETRON 2 MG/ML
4 INJECTION INTRAMUSCULAR; INTRAVENOUS EVERY 6 HOURS PRN
Status: DISCONTINUED | OUTPATIENT
Start: 2019-09-21 | End: 2019-09-21 | Stop reason: HOSPADM

## 2019-09-21 RX ORDER — POTASSIUM CHLORIDE 7.45 MG/ML
10 INJECTION INTRAVENOUS PRN
Status: DISCONTINUED | OUTPATIENT
Start: 2019-09-21 | End: 2019-09-21 | Stop reason: HOSPADM

## 2019-09-21 RX ORDER — HEPARIN SODIUM 5000 [USP'U]/ML
5000 INJECTION, SOLUTION INTRAVENOUS; SUBCUTANEOUS EVERY 8 HOURS SCHEDULED
Status: DISCONTINUED | OUTPATIENT
Start: 2019-09-21 | End: 2019-09-21 | Stop reason: HOSPADM

## 2019-09-21 RX ADMIN — HEPARIN SODIUM 5000 UNITS: 5000 INJECTION, SOLUTION INTRAVENOUS; SUBCUTANEOUS at 08:21

## 2019-09-21 RX ADMIN — PERFLUTREN 1.65 MG: 6.52 INJECTION, SUSPENSION INTRAVENOUS at 11:20

## 2019-09-21 RX ADMIN — INSULIN LISPRO 4 UNITS: 100 INJECTION, SOLUTION INTRAVENOUS; SUBCUTANEOUS at 08:33

## 2019-09-21 RX ADMIN — FAMOTIDINE 20 MG: 10 INJECTION, SOLUTION INTRAVENOUS at 08:21

## 2019-09-21 RX ADMIN — CARVEDILOL 3.12 MG: 3.12 TABLET, FILM COATED ORAL at 08:21

## 2019-09-21 RX ADMIN — SACUBITRIL AND VALSARTAN 1 TABLET: 49; 51 TABLET, FILM COATED ORAL at 08:21

## 2019-09-21 RX ADMIN — CLOPIDOGREL BISULFATE 75 MG: 75 TABLET ORAL at 08:21

## 2019-09-21 NOTE — PLAN OF CARE
Problem: Falls - Risk of:  Goal: Will remain free from falls  Outcome: Ongoing  Goal: Absence of physical injury  Outcome: Ongoing     Problem: Cardiac Output - Decreased:  Goal: Hemodynamic stability will improve  Outcome: Ongoing     Problem: Pain:  Goal: Pain level will decrease  Outcome: Ongoing  Goal: Control of acute pain  Outcome: Ongoing  Goal: Control of chronic pain  Outcome: Ongoing

## 2019-09-21 NOTE — H&P
10/31/2017    TIA (transient ischemic attack)        Past Surgical History:        Procedure Laterality Date    APPENDECTOMY      BACK SURGERY      CARDIAC CATHETERIZATION  12/12/12    with angioplasty, stent    CARDIAC CATHETERIZATION  3/29/15  MDL    stent to distal RCA. EF 60%    CHOLECYSTECTOMY      COLONOSCOPY      CORONARY ANGIOPLASTY WITH STENT PLACEMENT  jan 11 [place]    stent x 3    CORONARY ANGIOPLASTY WITH STENT PLACEMENT      01/01/11    CORONARY ARTERY BYPASS GRAFT N/A 5/30/2019    CORONARY ARTERY BYPASS GRAFT X3 WITH LEFT INTERNAL MAMMARY ARTERY WITH ENDOSCOPIC VEIN HARVESTING WITH PERFUSION TRANSESOPHAGEAL ECHOCARDIOGRAM performed by Gwynda Fleischer, MD at [facility] CATH LAB PROCEDURE  [id number]    primary stent placement to the first circumflex marginal branch, balloon angioplasty to the third left anterior descending diagnonal branch, intracoronary nitroglycerin adminstration    JOINT REPLACEMENT      left knee    LUMBAR FUSION Left 11/15/2016    LUMBAR INTERBODY FUSION LATERAL L3-4 L4-5 WITH LATERAL PLATE  performed by Vito Espino MD at [facility] Medications:  Prior to Admission medications    Medication Sig Start Date End Date Taking? Authorizing Provider   levofloxacin (LEVAQUIN) 500 MG tablet Take 1 tablet by mouth daily for 7 days 9/20/19 9/27/19 Yes TEGAN Eduardo   predniSONE (DELTASONE) 20 MG tablet Take 3 tablets by mouth daily  x 2 days, 2 tabs daily x 2 days, then 1 tab daily x 2 days. 9/20/19  Yes TEGAN Khan   tamsulosin (FLOMAX) 0.4 MG capsule TAKE 2 CAPSULES AT BEDTIME 9/10/19  Yes TEGAN Khan   sertraline (ZOLOFT) 100 MG tablet Take 1 tablet by mouth daily 9/10/19  Yes TEGAN Eduardo   nitroGLYCERIN (NITROSTAT) 0.4 MG SL tablet up to max of 3 total doses.  If no relief after 1 dose, call 911. 9/10/19  Yes TEGAN Eduardo   isosorbide mononitrate (IMDUR) 30 MG extended release appropriate, normal insight. Diagnostic Data:  Imaging:   XR CHEST PORTABLE    (Results Pending)     CBC:  Recent Labs     09/20/19 2255   WBC 5.6   HGB 13.1*   HCT 42.1        BMP:  Recent Labs     09/20/19 2255      K 4.7      CO2 22   BUN 25*   CREATININE 1.3*   CALCIUM 9.3     Recent Labs     09/20/19  2255   AST 13   ALT 15   BILITOT <0.2   ALKPHOS 89     Coag Panel: No results for input(s): INR, PROTIME, APTT in the last 72 hours. Cardiac Enzymes:   Recent Labs     09/20/19 2255 09/21/19  0055   TROPONINI <0.01 <0.01     ABGs:  Lab Results   Component Value Date    PHART 7.350 05/31/2019    PO2ART 85.0 05/31/2019    IDY1GJW 41.0 05/31/2019     Urinalysis:  Lab Results   Component Value Date    NITRU Negative 06/11/2019    WBCUA 31-50 06/11/2019    BACTERIA 4+ 06/11/2019    RBCUA 21-30 06/11/2019    BLOODU LARGE 06/11/2019    SPECGRAV 1.022 06/11/2019    GLUCOSEU Negative 06/11/2019       Active Hospital Problems    Diagnosis Date Noted    Ischemic cardiomyopathy [I25.5] 06/13/2019    S/P CABG x 3, LIMA- DIAG, A0- SVG- LAD, A0- SVG- Ramus, EF 30-35% (5/30/2019) [Z95.1] 06/01/2019    Chest pain [R07.9] 02/28/2019    MOO on CPAP [G47.33, Z99.89] 05/03/2018    Type 2 diabetes mellitus with diabetic polyneuropathy, with long-term current use of insulin ([facility] [address] [E11.42, Z79.4] 07/12/2011    Essential hypertension [I10] 07/12/2011       Assessment and Plan:  Principal Problem:    Chest pain  Active Problems:    Type 2 diabetes mellitus with diabetic polyneuropathy, with long-term current use of insulin ([facility])    Essential hypertension    MOO on CPAP    S/P CABG x 3, LIMA- DIAG, A0- SVG- LAD, A0- SVG- Ramus, EF 30-35% (5/30/2019)    Ischemic cardiomyopathy  Resolved Problems:    * No resolved hospital problems.  *    - NPO, initial 2 trops negative will obtain one more, telemetry  - obtain echo, cards consult, nitro prn  - ASA, cont entresto and coreg  - SSI    DVT prophylaxis with

## 2019-09-21 NOTE — PLAN OF CARE
Problem: Falls - Risk of:  Goal: Will remain free from falls  9/21/2019 1638 by Coy Cowan RN  Outcome: Completed  9/21/2019 1611 by Coy Cowan RN  Outcome: Ongoing  Goal: Absence of physical injury  9/21/2019 1638 by Coy Cowan RN  Outcome: Completed  9/21/2019 1611 by Coy Cowan RN  Outcome: Ongoing     Problem: Cardiac Output - Decreased:  Goal: Hemodynamic stability will improve  9/21/2019 1638 by Coy Cowan RN  Outcome: Completed  9/21/2019 1611 by Coy Cowan RN  Outcome: Ongoing     Problem: Pain:  Goal: Pain level will decrease  9/21/2019 1638 by Coy Cowan RN  Outcome: Completed  9/21/2019 1611 by Coy Cowan RN  Outcome: Ongoing  Goal: Control of acute pain  9/21/2019 1638 by Coy Cowan RN  Outcome: Completed  9/21/2019 1611 by Coy Cowan RN  Outcome: Ongoing  Goal: Control of chronic pain  9/21/2019 1638 by Coy Cowan RN  Outcome: Completed  9/21/2019 1611 by Coy Cowan RN  Outcome: Ongoing

## 2019-09-21 NOTE — ED PROVIDER NOTES
The patient is not nervous/anxious. A complete review of systems was performed and is negative except as noted above in the HPI. PAST MEDICAL HISTORY     Past Medical History:   Diagnosis Date    Acute ischemic stroke ([address] 2/20/2017    Atherosclerotic heart disease     s/p MI, stenting x 3 jan 2011([place])    CAD (coronary artery disease)     Cerebral artery occlusion with cerebral infarction [facility])     Cerebrovascular accident (CVA) due to embolism of right middle cerebral artery ([address] 8/14/2017    CH[facility]congest[facility] failure) ([facility]     Chronic bilateral low back pain without sciatica 11/10/2016    Chronic kidney disease     DDD (degenerative disc disease), lumbar 11/15/2016    Depression     Depression with anxiety     Diabetes mellitus ([address]     type II    Diabetes mellitus, type 2 ([address]     DM (diabetes mellitus) ([address] 7/12/2011    Glaucoma     Headache     Hyperlipidemia     Cholesterol management per pcp.  Hypertension     Macular degeneration     MI (myocardial infarction) ([address]     MI (myocardial infarction) ([address]     Neuromuscular disorder ([address]     Neuropathy     Osteoarthritis     Palliative care patient 06/11/2019    Sleep apnea     Spondylosis of lumbar region without myelopathy or radiculopathy 11/15/2016    Status post placement of implantable loop recorder 10/31/2017    TIA (transient ischemic attack)          SURGICAL HISTORY       Past Surgical History:   Procedure Laterality Date    APPENDECTOMY      BACK SURGERY      CARDIAC CATHETERIZATION  12/12/12    with angioplasty, stent    CARDIAC CATHETERIZATION  3/29/15  MDL    stent to distal RCA.  EF 60%    CHOLECYSTECTOMY      COLONOSCOPY      CORONARY ANGIOPLASTY WITH STENT PLACEMENT  jan 11 [place]    stent x 3    CORONARY ANGIOPLASTY WITH STENT PLACEMENT      01/01/11    CORONARY ARTERY BYPASS GRAFT N/A 5/30/2019    CORONARY ARTERY BYPASS GRAFT X3 WITH LEFT INTERNAL MAMMARY ARTERY WITH (ZOLOFT) 100 MG TABLET    Take 1 tablet by mouth daily    TAMSULOSIN (FLOMAX) 0.4 MG CAPSULE    TAKE 2 CAPSULES AT BEDTIME    ZOSTER RECOMBINANT ADJUVANTED VACCINE (SHINGRIX) 50 MCG/0.5ML SUSR INJECTION    1 vaccine now and repeat in 2-6 months. ALLERGIES     Statins; Crestor [rosuvastatin calcium]; and Morphine    FAMILY HISTORY       Family History   Problem Relation Age of Onset    Coronary Art Dis Mother     Coronary Art Dis Sister     Cancer Sister         uterine    Kidney Disease Sister     Coronary Art Dis Brother          in his 46s    Cancer Father         colon          SOCIAL HISTORY       Social History     Socioeconomic History    Marital status:      Spouse name: Byron Vickers Number of children: None    Years of education: None    Highest education level: None   Occupational History    Occupation:    Social Needs    Financial resource strain: None    Food insecurity:     Worry: None     Inability: None    Transportation needs:     Medical: None     Non-medical: None   Tobacco Use    Smoking status: Never Smoker    Smokeless tobacco: Never Used   Substance and Sexual Activity    Alcohol use:  Yes     Alcohol/week: 2.0 standard drinks     Types: 2 Shots of liquor per week    Drug use: No    Sexual activity: None   Lifestyle    Physical activity:     Days per week: None     Minutes per session: None    Stress: None   Relationships    Social connections:     Talks on phone: None     Gets together: None     Attends Jainism service: None     Active member of club or organization: None     Attends meetings of clubs or organizations: None     Relationship status: None    Intimate partner violence:     Fear of current or ex partner: None     Emotionally abused: None     Physically abused: None     Forced sexual activity: None   Other Topics Concern    None   Social History Narrative    None       SCREENINGS             PHYSICAL EXAM    (up to 7 for level 4, 8 Lymphocytes % 11.0 (*)     Lymphocytes Absolute 0.6 (*)     Anisocytosis 1+ (*)     Microcytes 1+ (*)     Hypochromia 1+ (*)     All other components within normal limits   COMPREHENSIVE METABOLIC PANEL - Abnormal; Notable for the following components:    Glucose 388 (*)     BUN 25 (*)     CREATININE 1.3 (*)     GFR Non- 54 (*)     All other components within normal limits   TROPONIN   TROPONIN       All other labs were within normal range or not returned as of this dictation. EMERGENCY DEPARTMENT COURSE and DIFFERENTIALDIAGNOSIS/MDM:   Vitals:    Vitals:    09/20/19 2310 09/20/19 2325 09/20/19 2332 09/20/19 2338   BP: (!) 145/77 138/79 137/80 132/75   Pulse: 97 95 95 95   Resp: 20 19 11 18   Temp:       TempSrc:       SpO2: 93% 93% 93% 92%   Weight:       Height:           MDM  Number of Diagnoses or Management Options  Diagnosis management comments: With patient's history of coronary disease spoke with hospitalist Dr. Landry Groves, is agreeable with plan to admit. Patient is resting comfortably and is stable. He denies any chest pain at present. Had complete relief with nitroglycerin. He was given aspirin and will be admitted. Blood sugar is elevated secondary to steroid therapy. Patient reports his blood pressure normally runs in the lower 100s. Amount and/or Complexity of Data Reviewed  Clinical lab tests: ordered and reviewed  Tests in the radiology section of CPT®: ordered and reviewed    Patient Progress  Patient progress: stable        CONSULTS:  IP CONSULT TO CARDIOLOGY    PROCEDURES:  Unless otherwise notedbelow, none     Procedures    FINAL IMPRESSION     1. Chest pain, unspecified type    2.  Type 2 diabetes mellitus with hyperglycemia, with long-term current use of insulin ([facility])          DISPOSITION/PLAN   DISPOSITION        PATIENT REFERRED TO:  @FUP@    DISCHARGE MEDICATIONS:  New Prescriptions    No medications on file          (Please note that portions of this note were completed with a voice recognition program.  Efforts were made to edit the dictations butoccasionally words are mis-transcribed.)    TEGAN Hanks (electronically signed)  [facility], TEGAN  09/21/19 2648

## 2019-09-21 NOTE — ED NOTES
Pt rates pain a 0 after receiving nitro tab.  States \"now I just feel full\"     Kasi Pacheco RN  09/20/19 7297

## 2019-09-21 NOTE — ED NOTES
PCU full. Pt will stay in [facility].       Vi Cuba RN  09/21/19 149 Amado Yanes RN  09/21/19 [id number]

## 2019-09-21 NOTE — ED NOTES
Patient placed on cardiac monitor, continuous pulse oximeter, and NIBP monitor. Monitor alarms on.        P[address] 23, RN  09/20/19 9607

## 2019-09-22 LAB
EKG P AXIS: 41 DEGREES
EKG P AXIS: 60 DEGREES
EKG P-R INTERVAL: 170 MS
EKG P-R INTERVAL: 176 MS
EKG Q-T INTERVAL: 366 MS
EKG Q-T INTERVAL: 386 MS
EKG QRS DURATION: 112 MS
EKG QRS DURATION: 114 MS
EKG QTC CALCULATION (BAZETT): 432 MS
EKG QTC CALCULATION (BAZETT): 440 MS
EKG T AXIS: -84 DEGREES
EKG T AXIS: -85 DEGREES

## 2019-09-22 PROCEDURE — 93010 ELECTROCARDIOGRAM REPORT: CPT | Performed by: INTERNAL MEDICINE

## 2019-09-23 ENCOUNTER — HOSPITAL ENCOUNTER (OUTPATIENT)
Dept: CARDIAC REHAB | Age: 74
Setting detail: THERAPIES SERIES
Discharge: HOME | End: 2019-09-23
Payer: MEDICARE

## 2019-09-23 PROCEDURE — 93798 PHYS/QHP OP CAR RHAB W/ECG: CPT

## 2019-09-24 ENCOUNTER — TELEPHONE (OUTPATIENT)
Dept: INTERNAL MEDICINE | Age: 74
End: 2019-09-24

## 2019-09-24 DIAGNOSIS — E11.8 TYPE 2 DIABETES MELLITUS WITH COMPLICATION, WITH LONG-TERM CURRENT USE OF INSULIN (HCC): ICD-10-CM

## 2019-09-24 DIAGNOSIS — Z79.4 TYPE 2 DIABETES MELLITUS WITH COMPLICATION, WITH LONG-TERM CURRENT USE OF INSULIN (HCC): ICD-10-CM

## 2019-09-24 NOTE — TELEPHONE ENCOUNTER
Time Provider [place]   9/25/2019  2:30 PM [facility] REHAB PHASE II PROVIDER CLASS [facility]   9/26/2019  2:15 PM TEGAN Pavon [facility]   9/27/2019  2:30 PM [facility] CARDIAC REHAB PHASE II PROVIDER CLASS [facility]   9/30/2019  2:30 PM [facility] CARDIAC REHAB PHASE II PROVIDER CLASS [facility]   10/2/2019  2:30 PM [facility] CARDIAC REHAB PHASE II PROVIDER CLASS [facility]   10/4/2019  2:30 PM [facility] CARDIAC REHAB PHASE II PROVIDER CLASS [facility]   10/7/2019  2:30 PM [facility] CARDIAC REHAB PHASE II PROVIDER CLASS [facility]   10/9/2019  2:30 PM [facility] CARDIAC REHAB PHASE II PROVIDER CLASS [facility]   10/11/2019  2:30 PM [facility] CARDIAC REHAB PHASE II PROVIDER CLASS [facility]   10/14/2019  2:30 PM [facility] CARDIAC REHAB PHASE II PROVIDER CLASS [facility]   10/15/2019 11:45 AM Aren Wen MD LPS [facility]   10/16/2019  2:30 PM [facility] CARDIAC REHAB PHASE II PROVIDER CLASS [facility]   10/18/2019  2:30 PM [facility] CARDIAC REHAB PHASE II PROVIDER CLASS [facility]   10/21/2019  2:30 PM [facility] CARDIAC REHAB PHASE II PROVIDER CLASS [facility]   10/23/2019  8:30 AM Natalie Serrato MD LPS Cardio [facility]   10/23/2019  2:30 PM [facility] CARDIAC REHAB PHASE II PROVIDER CLASS [facility]       Lucretia Trujillo, AMELIAN

## 2019-09-25 ENCOUNTER — HOSPITAL ENCOUNTER (OUTPATIENT)
Dept: CARDIAC REHAB | Age: 74
Setting detail: THERAPIES SERIES
Discharge: HOME | End: 2019-09-25
Payer: MEDICARE

## 2019-09-25 PROCEDURE — 93798 PHYS/QHP OP CAR RHAB W/ECG: CPT

## 2019-09-26 ENCOUNTER — OFFICE VISIT (OUTPATIENT)
Dept: CARDIOLOGY | Age: 74
End: 2019-09-26
Payer: MEDICARE

## 2019-09-26 VITALS
SYSTOLIC BLOOD PRESSURE: 102 MMHG | HEART RATE: 64 BPM | HEIGHT: 76 IN | WEIGHT: 263 LBS | DIASTOLIC BLOOD PRESSURE: 62 MMHG | BODY MASS INDEX: 32.03 KG/M2

## 2019-09-26 DIAGNOSIS — I50.22 CHRONIC SYSTOLIC HEART FAILURE (HCC): Primary | ICD-10-CM

## 2019-09-26 DIAGNOSIS — E11.8 TYPE 2 DIABETES MELLITUS WITH COMPLICATION, WITH LONG-TERM CURRENT USE OF INSULIN (HCC): ICD-10-CM

## 2019-09-26 DIAGNOSIS — Z79.4 TYPE 2 DIABETES MELLITUS WITH COMPLICATION, WITH LONG-TERM CURRENT USE OF INSULIN (HCC): ICD-10-CM

## 2019-09-26 DIAGNOSIS — I25.10 CORONARY ARTERY DISEASE INVOLVING NATIVE CORONARY ARTERY OF NATIVE HEART WITHOUT ANGINA PECTORIS: ICD-10-CM

## 2019-09-26 DIAGNOSIS — I25.5 ISCHEMIC CARDIOMYOPATHY: ICD-10-CM

## 2019-09-26 DIAGNOSIS — E78.2 MIXED HYPERLIPIDEMIA: Chronic | ICD-10-CM

## 2019-09-26 DIAGNOSIS — Z95.1 S/P CABG X 3: ICD-10-CM

## 2019-09-26 DIAGNOSIS — I10 ESSENTIAL HYPERTENSION: Chronic | ICD-10-CM

## 2019-09-26 PROCEDURE — G8417 CALC BMI ABV UP PARAM F/U: HCPCS | Performed by: NURSE PRACTITIONER

## 2019-09-26 PROCEDURE — G8598 ASA/ANTIPLAT THER USED: HCPCS | Performed by: NURSE PRACTITIONER

## 2019-09-26 PROCEDURE — 1123F ACP DISCUSS/DSCN MKR DOCD: CPT | Performed by: NURSE PRACTITIONER

## 2019-09-26 PROCEDURE — G8427 DOCREV CUR MEDS BY ELIG CLIN: HCPCS | Performed by: NURSE PRACTITIONER

## 2019-09-26 PROCEDURE — 1036F TOBACCO NON-USER: CPT | Performed by: NURSE PRACTITIONER

## 2019-09-26 PROCEDURE — 4040F PNEUMOC VAC/ADMIN/RCVD: CPT | Performed by: NURSE PRACTITIONER

## 2019-09-26 PROCEDURE — 99214 OFFICE O/P EST MOD 30 MIN: CPT | Performed by: NURSE PRACTITIONER

## 2019-09-26 PROCEDURE — 3017F COLORECTAL CA SCREEN DOC REV: CPT | Performed by: NURSE PRACTITIONER

## 2019-09-26 RX ORDER — CEFPROZIL 250 MG/1
250 TABLET, FILM COATED ORAL 2 TIMES DAILY
Qty: 20 TABLET | Refills: 0 | Status: SHIPPED | OUTPATIENT
Start: 2019-09-26 | End: 2019-10-06

## 2019-09-26 NOTE — PATIENT INSTRUCTIONS
your weight and condition  · Weigh yourself without clothing at the same time each day. Record your weight. Call your doctor if you have sudden weight gain, such as more than 2 to 3 pounds in a day or 5 pounds in a week. (Your doctor may suggest a different range of weight gain.) A sudden weight gain may mean that your heart failure is getting worse. · Keep a daily record of your symptoms. Write down any changes in how you feel, such as new shortness of breath, cough, or problems eating. Also record if your ankles are more swollen than usual and if you feel more tired than usual. Note anything that you ate or did that could have triggered these changes. Limit sodium  Sodium causes your body to hold on to extra water. This may cause your heart failure symptoms to get worse. People get most of their sodium from processed foods. Fast food and restaurant meals also tend to be very high in sodium. · Your doctor may suggest that you limit sodium to 2,000 milligrams (mg) a day or less. That is less than 1 teaspoon of salt a day, including all the salt you eat in cooking or in packaged foods. · Read food labels on cans and food packages. They tell you how much sodium you get in one serving. Check the serving size. If you eat more than one serving, you are getting more sodium. · Be aware that sodium can come in forms other than salt, including monosodium glutamate (MSG), sodium citrate, and sodium bicarbonate (baking soda). MSG is often added to Asian food. You can sometimes ask for food without MSG or salt. · Slowly reducing salt will help you adjust to the taste. Take the salt shaker off the table. · Flavor your food with garlic, lemon juice, onion, vinegar, herbs, and spices instead of salt. Do not use soy sauce, steak sauce, onion salt, garlic salt, mustard, or ketchup on your food, unless it is labeled \"low-sodium\" or \"low-salt. \"  · Make your own salad dressings, sauces, and ketchup without adding salt.   · Use have a new irregular or rapid heartbeat.    Call your doctor now or seek immediate medical care if:    · You have new or increased shortness of breath.     · You are dizzy or lightheaded, or you feel like you may faint.     · You have sudden weight gain, such as more than 2 to 3 pounds in a day or 5 pounds in a week. (Your doctor may suggest a different range of weight gain.)     · You have increased swelling in your legs, ankles, or feet.     · You are suddenly so tired or weak that you cannot do your usual activities.    Watch closely for changes in your health, and be sure to contact your doctor if you develop new symptoms. Where can you learn more? Go to https://TIDAL PETROLEUMpeBoombocx Productionseb.[employer]k. org and sign in to your [employer] account. Enter J390 in the Signifyd box to learn more about \"Avoiding Triggers With Heart Failure: Care Instructions. \"     If you do not have an account, please click on the \"Sign Up Now\" link. Current as of: July 22, 2018  Content Version: 12.1  © 5222-4397 [employer]. Care instructions adapted under license by [facility] ([facility]). If you have questions about a medical condition or this instruction, always ask your healthcare professional. [place] [address] any warranty or liability for your use of this information. Patient Education        Bursitis of the Elbow: Care Instructions  Your Care Instructions  Bursitis is pain and swelling of the bursae. These are sacs of fluid that help your joints move smoothly. Olecranon bursitis is a type of bursitis that affects the back of the elbow. This is sometimes called Jaime elbow because the bump that develops looks like the cartoon character Jaime's elbow. Injury, overuse, or prolonged pressure on your elbow can cause this form of bursitis. Sometimes it happens when people have arthritis. It also can occur for unknown reasons. Treatment may include draining fluid from the bursa with a needle.  If from the area. ? A fever.    Watch closely for changes in your health, and be sure to contact your doctor if:    · You do not get better as expected. Where can you learn more? Go to https://Purplepesulemaeb."[employer]". org and sign in to your [employer] account. Enter  in the [facility] box to learn more about \"Bursitis of the Elbow: Care Instructions. \"     If you do not have an account, please click on the \"Sign Up Now\" link. Current as of: September 20, 2018  Content Version: 12.1  © 1039-7578 [employer]. Care instructions adapted under license by [facility] ([facility]). If you have questions about a medical condition or this instruction, always ask your healthcare professional. [address] any warranty or liability for your use of this information.

## 2019-09-26 NOTE — PROGRESS NOTES
[facility], [place]. [address], [address], [address] [facility]  [phone] office  [phone] fax      OFFICE VISIT:  2019    Denae Kilpatrick - :   Reason For Visit:  Noman Torres is a 75 y.o. male who is here for 1 Month Follow-Up (pt has SOB); Congestive Heart Failure; and Coronary Artery Disease    History:   Diagnosis Orders   1. Chronic systolic heart failure ([address]     2. Ischemic cardiomyopathy     3. S/P CABG x 3, LIMA- DIAG, A0- SVG- LAD, A0- SVG- Ramus, EF 30-35% (2019)     4. Coronary artery disease involving native coronary artery of native heart without angina pectoris     5. Mixed hyperlipidemia     6. Essential hypertension       The patient presents today for cardiology follow up regarding systolic heart failure. He presented to ED on 19 for chest pain. He reports \"I just had it all day and go scared and went to the hospital.\" Serial troponin negative for ischemia. Lab and CXR reports reviewed - all WNL. Patient continues on low sodium diet and daily weight log. Weight is down 3 pounds. He continues in cardiac rehab. Reports some fatigue. No further episodes of chest pain. Patient reports having symptoms of sinus infection. He reports \"I was given an antibiotic prescription by my doctor. But, they told me in the ER that antibiotic would not be good for my heart. \"  Patient could not recall the antibiotic name. Today, the patient denies symptoms to suggest myocardial ischemia, heart failure or arrhythmia. BP is well controlled on current regimen. The patient's PCP monitors cholesterol. Dimitry Haileelarissa denies exertional chest pain, shortness of breath, orthopnea, paroxysmal nocturnal dyspnea, syncope, presyncope, sensed arrhythmia and edema. The patient denies numbness or weakness to suggest cerebrovascular accident or transient ischemic attack. + fatigue.   Denae Kilpatrick has the following (FLONASE) 50 MCG/ACT nasal spray 1 spray by Each Nostril route daily 1 Bottle 0    docusate sodium (COLACE, DULCOLAX) 100 MG CAPS Take 100 mg by mouth 2 times daily 60 capsule 0    azelastine (ASTELIN) 0.1 % nasal spray 1 spray by Nasal route as needed for Rhinitis Use in each nostril as directed      zoster recombinant adjuvanted vaccine [facility]) 50 MCG/0.5ML SUSR injection 1 vaccine now and repeat in 2-6 months. 0.5 mL 1     No current facility-administered medications for this visit. Allergies: Statins; Crestor [rosuvastatin calcium]; and Morphine    Review of Systems  Constitutional - no appetite change, or unexpected weight change. No fever, chills or diaphoresis. + fatigue. HEENT - no significant rhinorrhea or epistaxis. No tinnitus or significant hearing loss. Eyes - no sudden vision change or amaurosis. No corneal arcus, xantholasma, subconjunctival hemorrhage or discharge. Respiratory - no significant wheezing, stridor, apnea or cough. No dyspnea on exertion or shortness of air. Cardiovascular - no exertional chest pain to suggest myocardial ischemia. No orthopnea or PND. No sensation of sustained arrythmia. No occurrence of slow heart rate. No palpitations. No claudication. Gastrointestinal - no abdominal swelling or pain. No blood in stool. No severe constipation, diarrhea, nausea, or vomiting. Genitourinary - no dysuria, frequency, or urgency. No flank pain or hematuria. Musculoskeletal - no back pain or myalgia. No problems with gait. Extremities - no clubbing, cyanosis or extremity edema. Skin - no color change or rash. No pallor. No new surgical incision. Neurologic - no speech difficulty, facial asymmetry or lateralizing weakness. No seizures, presyncope or syncope. No significant dizziness. Hematologic - no easy bruising or excessive bleeding. Psychiatric - no severe anxiety or insomnia. No confusion.    All other review of systems are (H) 0.5 - 1.2 mg/dL    GFR Non-African American 54 (A) >60    Calcium 9.3 8.8 - 10.2 mg/dL    Total Protein 7.5 6.6 - 8.7 g/dL    Alb 4.0 3.5 - 5.2 g/dL    Total Bilirubin <0.2 0.2 - 1.2 mg/dL    Alkaline Phosphatase 89 40 - 130 U/L    ALT 15 5 - 41 U/L    AST 13 5 - 40 U/L   Troponin    Collection Time: 09/20/19 10:55 PM   Result Value Ref Range    Troponin <0.01 0.00 - 0.03 ng/mL   EKG 12 Lead    Collection Time: 09/21/19 12:49 AM   Result Value Ref Range    P-R Interval 170 ms    QRS Duration 112 ms    Q-T Interval 386 ms    QTc Calculation (Bazett) 440 ms    P Axis 41 degrees    T Axis -84 degrees   Troponin    Collection Time: 09/21/19 12:55 AM   Result Value Ref Range    Troponin <0.01 0.00 - 0.03 ng/mL   Hemoglobin A1c    Collection Time: 09/21/19  7:42 AM   Result Value Ref Range    Hemoglobin A1C 6.8 (H) 4.0 - 6.0 %   Troponin    Collection Time: 09/21/19  7:42 AM   Result Value Ref Range    Troponin <0.01 0.00 - 0.03 ng/mL   POCT Glucose    Collection Time: 09/21/19  8:27 AM   Result Value Ref Range    POC Glucose 235 (H) 70 - 99 mg/dl    Performed on [employer]    Basic Metabolic Panel w/ Reflex to MG    Collection Time: 09/21/19  1:15 PM   Result Value Ref Range    Sodium 139 136 - 145 mmol/L    Potassium reflex Magnesium 3.9 3.5 - 5.0 mmol/L    Chloride 104 98 - 111 mmol/L    CO2 22 22 - 29 mmol/L    Anion Gap 13 7 - 19 mmol/L    Glucose 139 (H) 74 - 109 mg/dL    BUN 20 8 - 23 mg/dL    CREATININE 1.0 0.5 - 1.2 mg/dL    GFR Non-African American >60 >60    Calcium 8.8 8.8 - 10.2 mg/dL   CBC Auto Differential    Collection Time: 09/21/19  1:15 PM   Result Value Ref Range    WBC 7.6 4.8 - 10.8 K/uL    RBC 4.99 4.70 - 6.10 M/uL    Hemoglobin 12.4 (L) 14.0 - 18.0 g/dL    Hematocrit 39.7 (L) 42.0 - 52.0 %    MCV 79.6 (L) 80.0 - 94.0 fL    MCH 24.8 (L) 27.0 - 31.0 pg    MCHC 31.2 (L) 33.0 - 37.0 g/dL    RDW 18.3 (H) 11.5 - 14.5 %    Platelets 574 139 - 408 K/uL    MPV 11.7 9.4 - 12.4 fL    Neutrophils % 71.3 (H) 50.0 - 65.0 %    Lymphocytes % 18.1 (L) 20.0 - 40.0 %    Monocytes % 9.5 0.0 - 10.0 %    Eosinophils % 0.4 0.0 - 5.0 %    Basophils % 0.4 0.0 - 1.0 %    Neutrophils Absolute 5.4 1.5 - 7.5 K/uL    Immature Granulocytes # 0.0 K/uL    Lymphocytes Absolute 1.4 1.1 - 4.5 K/uL    Monocytes Absolute 0.70 0.00 - 0.90 K/uL    Eosinophils Absolute 0.00 0.00 - 0.60 K/uL    Basophils Absolute 0.00 0.00 - 0.20 K/uL     Plan  Previous cardiac history and records reviewed. Continue current medical management. Continue low sodium diet and daily weight log. Continue cardiac rehab. Prescribed Cefzil 250 mg BID foro 10 days - sinusitis. Continue other current medications as directed. Continue to follow up with primary care provider for non cardiac medical problems. Call the office with any problems, questions or concerns at [phone]. Cardiology follow up: Dr. Flori King 10/23/19. Educational included in patient instructions. Avoid heart failure triggers.      Earnestine Hart, APRN

## 2019-09-27 ENCOUNTER — HOSPITAL ENCOUNTER (OUTPATIENT)
Dept: CARDIAC REHAB | Age: 74
Setting detail: THERAPIES SERIES
Discharge: HOME | End: 2019-09-27
Payer: MEDICARE

## 2019-09-27 PROCEDURE — 93798 PHYS/QHP OP CAR RHAB W/ECG: CPT

## 2019-09-30 ENCOUNTER — HOSPITAL ENCOUNTER (OUTPATIENT)
Dept: CARDIAC REHAB | Age: 74
Setting detail: THERAPIES SERIES
Discharge: HOME | End: 2019-09-30
Payer: MEDICARE

## 2019-09-30 ENCOUNTER — TELEPHONE (OUTPATIENT)
Dept: FAMILY MEDICINE CLINIC | Age: 74
End: 2019-09-30

## 2019-09-30 PROCEDURE — 93798 PHYS/QHP OP CAR RHAB W/ECG: CPT

## 2019-10-01 ENCOUNTER — HOSPITAL ENCOUNTER (OUTPATIENT)
Dept: GENERAL RADIOLOGY | Age: 74
Discharge: HOME | End: 2019-10-01
Payer: MEDICARE

## 2019-10-01 ENCOUNTER — OFFICE VISIT (OUTPATIENT)
Dept: FAMILY MEDICINE CLINIC | Age: 74
End: 2019-10-01
Payer: MEDICARE

## 2019-10-01 VITALS
HEART RATE: 56 BPM | SYSTOLIC BLOOD PRESSURE: 80 MMHG | WEIGHT: 263 LBS | BODY MASS INDEX: 32.03 KG/M2 | DIASTOLIC BLOOD PRESSURE: 50 MMHG | OXYGEN SATURATION: 98 % | TEMPERATURE: 97 F | HEIGHT: 76 IN

## 2019-10-01 DIAGNOSIS — Z86.73 HISTORY OF STROKE: ICD-10-CM

## 2019-10-01 DIAGNOSIS — I95.9 HYPOTENSION, UNSPECIFIED HYPOTENSION TYPE: ICD-10-CM

## 2019-10-01 DIAGNOSIS — M54.2 NECK PAIN: Primary | ICD-10-CM

## 2019-10-01 DIAGNOSIS — E11.8 TYPE 2 DIABETES MELLITUS WITH COMPLICATION, WITH LONG-TERM CURRENT USE OF INSULIN (HCC): ICD-10-CM

## 2019-10-01 DIAGNOSIS — I25.10 CORONARY ARTERY DISEASE INVOLVING NATIVE CORONARY ARTERY OF NATIVE HEART WITHOUT ANGINA PECTORIS: ICD-10-CM

## 2019-10-01 DIAGNOSIS — M54.2 NECK PAIN: ICD-10-CM

## 2019-10-01 DIAGNOSIS — I10 ESSENTIAL HYPERTENSION: ICD-10-CM

## 2019-10-01 DIAGNOSIS — Z79.4 TYPE 2 DIABETES MELLITUS WITH COMPLICATION, WITH LONG-TERM CURRENT USE OF INSULIN (HCC): ICD-10-CM

## 2019-10-01 PROCEDURE — G8427 DOCREV CUR MEDS BY ELIG CLIN: HCPCS | Performed by: FAMILY MEDICINE

## 2019-10-01 PROCEDURE — G8598 ASA/ANTIPLAT THER USED: HCPCS | Performed by: FAMILY MEDICINE

## 2019-10-01 PROCEDURE — 3017F COLORECTAL CA SCREEN DOC REV: CPT | Performed by: FAMILY MEDICINE

## 2019-10-01 PROCEDURE — 3044F HG A1C LEVEL LT 7.0%: CPT | Performed by: FAMILY MEDICINE

## 2019-10-01 PROCEDURE — 1123F ACP DISCUSS/DSCN MKR DOCD: CPT | Performed by: FAMILY MEDICINE

## 2019-10-01 PROCEDURE — 1036F TOBACCO NON-USER: CPT | Performed by: FAMILY MEDICINE

## 2019-10-01 PROCEDURE — 99214 OFFICE O/P EST MOD 30 MIN: CPT | Performed by: FAMILY MEDICINE

## 2019-10-01 PROCEDURE — G8417 CALC BMI ABV UP PARAM F/U: HCPCS | Performed by: FAMILY MEDICINE

## 2019-10-01 PROCEDURE — 72040 X-RAY EXAM NECK SPINE 2-3 VW: CPT

## 2019-10-01 PROCEDURE — 2022F DILAT RTA XM EVC RTNOPTHY: CPT | Performed by: FAMILY MEDICINE

## 2019-10-01 PROCEDURE — G8482 FLU IMMUNIZE ORDER/ADMIN: HCPCS | Performed by: FAMILY MEDICINE

## 2019-10-01 PROCEDURE — 4040F PNEUMOC VAC/ADMIN/RCVD: CPT | Performed by: FAMILY MEDICINE

## 2019-10-01 PROCEDURE — 96372 THER/PROPH/DIAG INJ SC/IM: CPT | Performed by: FAMILY MEDICINE

## 2019-10-01 RX ORDER — DEXAMETHASONE SODIUM PHOSPHATE 10 MG/ML
10 INJECTION INTRAMUSCULAR; INTRAVENOUS ONCE
Status: COMPLETED | OUTPATIENT
Start: 2019-10-01 | End: 2019-10-01

## 2019-10-01 RX ORDER — MIDODRINE HYDROCHLORIDE 5 MG/1
5 TABLET ORAL 3 TIMES DAILY
Qty: 90 TABLET | Refills: 3 | Status: SHIPPED | OUTPATIENT
Start: 2019-10-01 | End: 2019-10-23

## 2019-10-01 RX ADMIN — DEXAMETHASONE SODIUM PHOSPHATE 10 MG: 10 INJECTION INTRAMUSCULAR; INTRAVENOUS at 16:11

## 2019-10-02 ENCOUNTER — HOSPITAL ENCOUNTER (OUTPATIENT)
Dept: CARDIAC REHAB | Age: 74
Setting detail: THERAPIES SERIES
Discharge: HOME | End: 2019-10-02
Payer: MEDICARE

## 2019-10-02 PROCEDURE — 93798 PHYS/QHP OP CAR RHAB W/ECG: CPT

## 2019-10-04 ENCOUNTER — HOSPITAL ENCOUNTER (OUTPATIENT)
Dept: CARDIAC REHAB | Age: 74
Setting detail: THERAPIES SERIES
Discharge: HOME | End: 2019-10-04
Payer: MEDICARE

## 2019-10-04 PROCEDURE — 93798 PHYS/QHP OP CAR RHAB W/ECG: CPT

## 2019-10-07 ENCOUNTER — HOSPITAL ENCOUNTER (OUTPATIENT)
Dept: CARDIAC REHAB | Age: 74
Setting detail: THERAPIES SERIES
Discharge: HOME | End: 2019-10-07
Payer: MEDICARE

## 2019-10-07 PROCEDURE — 93798 PHYS/QHP OP CAR RHAB W/ECG: CPT

## 2019-10-09 ENCOUNTER — HOSPITAL ENCOUNTER (OUTPATIENT)
Dept: CARDIAC REHAB | Age: 74
Setting detail: THERAPIES SERIES
Discharge: HOME | End: 2019-10-09
Payer: MEDICARE

## 2019-10-09 PROCEDURE — 93798 PHYS/QHP OP CAR RHAB W/ECG: CPT

## 2019-10-11 ENCOUNTER — HOSPITAL ENCOUNTER (OUTPATIENT)
Dept: CARDIAC REHAB | Age: 74
Setting detail: THERAPIES SERIES
Discharge: HOME | End: 2019-10-11
Payer: MEDICARE

## 2019-10-11 PROCEDURE — 93798 PHYS/QHP OP CAR RHAB W/ECG: CPT

## 2019-10-14 ENCOUNTER — HOSPITAL ENCOUNTER (OUTPATIENT)
Dept: CARDIAC REHAB | Age: 74
Setting detail: THERAPIES SERIES
Discharge: HOME | End: 2019-10-14
Payer: MEDICARE

## 2019-10-14 PROCEDURE — 93798 PHYS/QHP OP CAR RHAB W/ECG: CPT

## 2019-10-14 RX ORDER — ISOSORBIDE MONONITRATE 30 MG/1
TABLET, EXTENDED RELEASE ORAL
Qty: 90 TABLET | Refills: 1 | Status: ON HOLD | OUTPATIENT
Start: 2019-10-14 | End: 2021-06-22

## 2019-10-14 ASSESSMENT — ENCOUNTER SYMPTOMS
BACK PAIN: 1
ANAL BLEEDING: 0
SHORTNESS OF BREATH: 0
ABDOMINAL PAIN: 0
CHEST TIGHTNESS: 0
CONSTIPATION: 0
COUGH: 0
DIARRHEA: 0
NAUSEA: 0

## 2019-10-16 ENCOUNTER — HOSPITAL ENCOUNTER (OUTPATIENT)
Dept: CARDIAC REHAB | Age: 74
Setting detail: THERAPIES SERIES
Discharge: HOME | End: 2019-10-16
Payer: MEDICARE

## 2019-10-16 PROCEDURE — 93798 PHYS/QHP OP CAR RHAB W/ECG: CPT

## 2019-10-18 ENCOUNTER — HOSPITAL ENCOUNTER (OUTPATIENT)
Dept: CARDIAC REHAB | Age: 74
Setting detail: THERAPIES SERIES
Discharge: HOME | End: 2019-10-18
Payer: MEDICARE

## 2019-10-18 PROCEDURE — 93798 PHYS/QHP OP CAR RHAB W/ECG: CPT

## 2019-10-21 ENCOUNTER — HOSPITAL ENCOUNTER (OUTPATIENT)
Dept: CARDIAC REHAB | Age: 74
Setting detail: THERAPIES SERIES
Discharge: HOME | End: 2019-10-21
Payer: MEDICARE

## 2019-10-21 PROCEDURE — 93798 PHYS/QHP OP CAR RHAB W/ECG: CPT

## 2019-10-23 ENCOUNTER — TELEPHONE (OUTPATIENT)
Dept: CARDIOLOGY | Age: 74
End: 2019-10-23

## 2019-10-23 ENCOUNTER — HOSPITAL ENCOUNTER (OUTPATIENT)
Dept: CARDIAC REHAB | Age: 74
Setting detail: THERAPIES SERIES
Discharge: HOME | End: 2019-10-23
Payer: MEDICARE

## 2019-10-23 ENCOUNTER — OFFICE VISIT (OUTPATIENT)
Dept: CARDIOLOGY | Age: 74
End: 2019-10-23
Payer: MEDICARE

## 2019-10-23 VITALS
SYSTOLIC BLOOD PRESSURE: 102 MMHG | HEIGHT: 76 IN | HEART RATE: 58 BPM | DIASTOLIC BLOOD PRESSURE: 52 MMHG | BODY MASS INDEX: 32.51 KG/M2 | WEIGHT: 267 LBS

## 2019-10-23 DIAGNOSIS — Z95.818 STATUS POST PLACEMENT OF IMPLANTABLE LOOP RECORDER: ICD-10-CM

## 2019-10-23 DIAGNOSIS — R42 DIZZINESS: ICD-10-CM

## 2019-10-23 DIAGNOSIS — R07.9 CHEST PAIN, UNSPECIFIED TYPE: Primary | ICD-10-CM

## 2019-10-23 PROCEDURE — 4040F PNEUMOC VAC/ADMIN/RCVD: CPT | Performed by: NURSE PRACTITIONER

## 2019-10-23 PROCEDURE — 99214 OFFICE O/P EST MOD 30 MIN: CPT | Performed by: NURSE PRACTITIONER

## 2019-10-23 PROCEDURE — 93798 PHYS/QHP OP CAR RHAB W/ECG: CPT

## 2019-10-23 PROCEDURE — 3017F COLORECTAL CA SCREEN DOC REV: CPT | Performed by: NURSE PRACTITIONER

## 2019-10-23 PROCEDURE — 1123F ACP DISCUSS/DSCN MKR DOCD: CPT | Performed by: NURSE PRACTITIONER

## 2019-10-23 PROCEDURE — G8427 DOCREV CUR MEDS BY ELIG CLIN: HCPCS | Performed by: NURSE PRACTITIONER

## 2019-10-23 PROCEDURE — 93285 PRGRMG DEV EVAL SCRMS IP: CPT | Performed by: NURSE PRACTITIONER

## 2019-10-23 PROCEDURE — G8482 FLU IMMUNIZE ORDER/ADMIN: HCPCS | Performed by: NURSE PRACTITIONER

## 2019-10-23 PROCEDURE — G8598 ASA/ANTIPLAT THER USED: HCPCS | Performed by: NURSE PRACTITIONER

## 2019-10-23 PROCEDURE — G8417 CALC BMI ABV UP PARAM F/U: HCPCS | Performed by: NURSE PRACTITIONER

## 2019-10-23 PROCEDURE — 1036F TOBACCO NON-USER: CPT | Performed by: NURSE PRACTITIONER

## 2019-10-25 ENCOUNTER — HOSPITAL ENCOUNTER (OUTPATIENT)
Dept: CARDIAC REHAB | Age: 74
Setting detail: THERAPIES SERIES
Discharge: HOME | End: 2019-10-25
Payer: MEDICARE

## 2019-10-25 PROCEDURE — 93798 PHYS/QHP OP CAR RHAB W/ECG: CPT

## 2019-10-28 ENCOUNTER — HOSPITAL ENCOUNTER (OUTPATIENT)
Dept: CARDIAC REHAB | Age: 74
Setting detail: THERAPIES SERIES
Discharge: HOME | End: 2019-10-28
Payer: MEDICARE

## 2019-10-28 PROCEDURE — 93798 PHYS/QHP OP CAR RHAB W/ECG: CPT

## 2019-10-29 RX ORDER — CHLORHEXIDINE GLUCONATE 4 G/100ML
SOLUTION TOPICAL ONCE
Status: CANCELLED | OUTPATIENT
Start: 2019-10-29 | End: 2019-10-29

## 2019-10-30 ENCOUNTER — HOSPITAL ENCOUNTER (OUTPATIENT)
Dept: CARDIAC REHAB | Age: 74
Setting detail: THERAPIES SERIES
Discharge: HOME | End: 2019-10-30
Payer: MEDICARE

## 2019-10-30 PROCEDURE — 93798 PHYS/QHP OP CAR RHAB W/ECG: CPT

## 2019-11-01 ENCOUNTER — APPOINTMENT (OUTPATIENT)
Dept: GENERAL RADIOLOGY | Age: 74
End: 2019-11-01
Attending: INTERNAL MEDICINE
Payer: MEDICARE

## 2019-11-01 ENCOUNTER — HOSPITAL ENCOUNTER (OUTPATIENT)
Dept: CARDIAC CATH/INVASIVE PROCEDURES | Age: 74
Setting detail: OBSERVATION
Discharge: HOME | End: 2019-11-02
Attending: INTERNAL MEDICINE | Admitting: INTERNAL MEDICINE
Payer: MEDICARE

## 2019-11-01 LAB
ALBUMIN SERPL-MCNC: 4.1 G/DL (ref 3.5–5.2)
ALP BLD-CCNC: 71 U/L (ref 40–130)
ALT SERPL-CCNC: 15 U/L (ref 5–41)
ANION GAP SERPL CALCULATED.3IONS-SCNC: 14 MMOL/L (ref 7–19)
AST SERPL-CCNC: 16 U/L (ref 5–40)
BASOPHILS ABSOLUTE: 0 K/UL (ref 0–0.2)
BASOPHILS RELATIVE PERCENT: 0.7 % (ref 0–1)
BILIRUB SERPL-MCNC: <0.2 MG/DL (ref 0.2–1.2)
BUN BLDV-MCNC: 31 MG/DL (ref 8–23)
CALCIUM SERPL-MCNC: 9 MG/DL (ref 8.8–10.2)
CHLORIDE BLD-SCNC: 104 MMOL/L (ref 98–111)
CO2: 25 MMOL/L (ref 22–29)
CREAT SERPL-MCNC: 1.7 MG/DL (ref 0.5–1.2)
EOSINOPHILS ABSOLUTE: 0.2 K/UL (ref 0–0.6)
EOSINOPHILS RELATIVE PERCENT: 3.6 % (ref 0–5)
GFR NON-AFRICAN AMERICAN: 39
GLUCOSE BLD-MCNC: 212 MG/DL (ref 70–99)
GLUCOSE BLD-MCNC: 80 MG/DL (ref 74–109)
HCT VFR BLD CALC: 42 % (ref 42–52)
HEMOGLOBIN: 13.3 G/DL (ref 14–18)
IMMATURE GRANULOCYTES #: 0 K/UL
LYMPHOCYTES ABSOLUTE: 1.9 K/UL (ref 1.1–4.5)
LYMPHOCYTES RELATIVE PERCENT: 33.5 % (ref 20–40)
MCH RBC QN AUTO: 25.7 PG (ref 27–31)
MCHC RBC AUTO-ENTMCNC: 31.7 G/DL (ref 33–37)
MCV RBC AUTO: 81.1 FL (ref 80–94)
MONOCYTES ABSOLUTE: 0.6 K/UL (ref 0–0.9)
MONOCYTES RELATIVE PERCENT: 11.6 % (ref 0–10)
NEUTROPHILS ABSOLUTE: 2.8 K/UL (ref 1.5–7.5)
NEUTROPHILS RELATIVE PERCENT: 50.4 % (ref 50–65)
PDW BLD-RTO: 18.1 % (ref 11.5–14.5)
PERFORMED ON: ABNORMAL
PLATELET # BLD: 145 K/UL (ref 130–400)
PMV BLD AUTO: 11.8 FL (ref 9.4–12.4)
POTASSIUM REFLEX MAGNESIUM: 4.3 MMOL/L (ref 3.5–5)
RBC # BLD: 5.18 M/UL (ref 4.7–6.1)
SODIUM BLD-SCNC: 143 MMOL/L (ref 136–145)
TOTAL PROTEIN: 7.2 G/DL (ref 6.6–8.7)
WBC # BLD: 5.5 K/UL (ref 4.8–10.8)

## 2019-11-01 PROCEDURE — 71045 X-RAY EXAM CHEST 1 VIEW: CPT

## 2019-11-01 PROCEDURE — 6360000002 HC RX W HCPCS: Performed by: INTERNAL MEDICINE

## 2019-11-01 PROCEDURE — 2580000003 HC RX 258: Performed by: INTERNAL MEDICINE

## 2019-11-01 PROCEDURE — C1898 LEAD, PMKR, OTHER THAN TRANS: HCPCS

## 2019-11-01 PROCEDURE — 36415 COLL VENOUS BLD VENIPUNCTURE: CPT

## 2019-11-01 PROCEDURE — 82948 REAGENT STRIP/BLOOD GLUCOSE: CPT

## 2019-11-01 PROCEDURE — 71046 X-RAY EXAM CHEST 2 VIEWS: CPT

## 2019-11-01 PROCEDURE — 6360000004 HC RX CONTRAST MEDICATION: Performed by: INTERNAL MEDICINE

## 2019-11-01 PROCEDURE — 99152 MOD SED SAME PHYS/QHP 5/>YRS: CPT | Performed by: INTERNAL MEDICINE

## 2019-11-01 PROCEDURE — 99152 MOD SED SAME PHYS/QHP 5/>YRS: CPT

## 2019-11-01 PROCEDURE — 33286 RMVL SUBQ CAR RHYTHM MNTR: CPT | Performed by: INTERNAL MEDICINE

## 2019-11-01 PROCEDURE — 6370000000 HC RX 637 (ALT 250 FOR IP): Performed by: INTERNAL MEDICINE

## 2019-11-01 PROCEDURE — C1895 LEAD, AICD, ENDO DUAL COIL: HCPCS

## 2019-11-01 PROCEDURE — C1721 AICD, DUAL CHAMBER: HCPCS

## 2019-11-01 PROCEDURE — 99153 MOD SED SAME PHYS/QHP EA: CPT

## 2019-11-01 PROCEDURE — 6360000002 HC RX W HCPCS

## 2019-11-01 PROCEDURE — 2500000003 HC RX 250 WO HCPCS

## 2019-11-01 PROCEDURE — G0378 HOSPITAL OBSERVATION PER HR: HCPCS

## 2019-11-01 PROCEDURE — 33249 INSJ/RPLCMT DEFIB W/LEAD(S): CPT

## 2019-11-01 PROCEDURE — C1894 INTRO/SHEATH, NON-LASER: HCPCS

## 2019-11-01 PROCEDURE — 85025 COMPLETE CBC W/AUTO DIFF WBC: CPT

## 2019-11-01 PROCEDURE — 80053 COMPREHEN METABOLIC PANEL: CPT

## 2019-11-01 PROCEDURE — 33286 RMVL SUBQ CAR RHYTHM MNTR: CPT

## 2019-11-01 PROCEDURE — 2709999900 HC NON-CHARGEABLE SUPPLY

## 2019-11-01 PROCEDURE — 33249 INSJ/RPLCMT DEFIB W/LEAD(S): CPT | Performed by: INTERNAL MEDICINE

## 2019-11-01 RX ORDER — CHLORHEXIDINE GLUCONATE 4 G/100ML
SOLUTION TOPICAL ONCE
Status: COMPLETED | OUTPATIENT
Start: 2019-11-01 | End: 2019-11-01

## 2019-11-01 RX ORDER — FUROSEMIDE 40 MG/1
40 TABLET ORAL 2 TIMES DAILY
Status: DISCONTINUED | OUTPATIENT
Start: 2019-11-01 | End: 2019-11-02 | Stop reason: HOSPADM

## 2019-11-01 RX ORDER — OXYCODONE AND ACETAMINOPHEN 10; 325 MG/1; MG/1
1 TABLET ORAL EVERY 4 HOURS PRN
Status: ON HOLD | COMMUNITY
End: 2021-10-16

## 2019-11-01 RX ORDER — SERTRALINE HYDROCHLORIDE 100 MG/1
100 TABLET, FILM COATED ORAL DAILY
Status: DISCONTINUED | OUTPATIENT
Start: 2019-11-02 | End: 2019-11-02 | Stop reason: HOSPADM

## 2019-11-01 RX ORDER — CLOPIDOGREL BISULFATE 75 MG/1
75 TABLET ORAL DAILY
Status: DISCONTINUED | OUTPATIENT
Start: 2019-11-01 | End: 2019-11-02 | Stop reason: HOSPADM

## 2019-11-01 RX ORDER — OXYCODONE AND ACETAMINOPHEN 10; 325 MG/1; MG/1
1 TABLET ORAL EVERY 4 HOURS PRN
Status: DISCONTINUED | OUTPATIENT
Start: 2019-11-01 | End: 2019-11-02 | Stop reason: HOSPADM

## 2019-11-01 RX ORDER — BUTALBITAL, ACETAMINOPHEN AND CAFFEINE 50; 325; 40 MG/1; MG/1; MG/1
1 TABLET ORAL EVERY 6 HOURS PRN
Status: DISCONTINUED | OUTPATIENT
Start: 2019-11-01 | End: 2019-11-02 | Stop reason: HOSPADM

## 2019-11-01 RX ORDER — CARVEDILOL 6.25 MG/1
3.12 TABLET ORAL 2 TIMES DAILY WITH MEALS
Status: DISCONTINUED | OUTPATIENT
Start: 2019-11-01 | End: 2019-11-02 | Stop reason: HOSPADM

## 2019-11-01 RX ORDER — ISOSORBIDE MONONITRATE 30 MG/1
30 TABLET, EXTENDED RELEASE ORAL DAILY
Status: DISCONTINUED | OUTPATIENT
Start: 2019-11-02 | End: 2019-11-02 | Stop reason: HOSPADM

## 2019-11-01 RX ORDER — ACETAMINOPHEN 325 MG/1
650 TABLET ORAL EVERY 4 HOURS PRN
Status: DISCONTINUED | OUTPATIENT
Start: 2019-11-01 | End: 2019-11-02 | Stop reason: HOSPADM

## 2019-11-01 RX ORDER — INSULIN GLARGINE 100 [IU]/ML
80 INJECTION, SOLUTION SUBCUTANEOUS NIGHTLY
Status: DISCONTINUED | OUTPATIENT
Start: 2019-11-01 | End: 2019-11-02 | Stop reason: HOSPADM

## 2019-11-01 RX ORDER — SODIUM CHLORIDE 0.9 % (FLUSH) 0.9 %
10 SYRINGE (ML) INJECTION EVERY 12 HOURS SCHEDULED
Status: DISCONTINUED | OUTPATIENT
Start: 2019-11-01 | End: 2019-11-02 | Stop reason: HOSPADM

## 2019-11-01 RX ORDER — CLOPIDOGREL BISULFATE 75 MG/1
75 TABLET ORAL ONCE
Status: DISCONTINUED | OUTPATIENT
Start: 2019-11-01 | End: 2019-11-01

## 2019-11-01 RX ORDER — FUROSEMIDE 40 MG/1
80 TABLET ORAL DAILY
COMMUNITY
End: 2020-07-13 | Stop reason: ALTCHOICE

## 2019-11-01 RX ORDER — IODIXANOL 320 MG/ML
50 INJECTION, SOLUTION INTRAVASCULAR
Status: COMPLETED | OUTPATIENT
Start: 2019-11-01 | End: 2019-11-01

## 2019-11-01 RX ORDER — SODIUM CHLORIDE 0.9 % (FLUSH) 0.9 %
10 SYRINGE (ML) INJECTION PRN
Status: DISCONTINUED | OUTPATIENT
Start: 2019-11-01 | End: 2019-11-02 | Stop reason: HOSPADM

## 2019-11-01 RX ORDER — TAMSULOSIN HYDROCHLORIDE 0.4 MG/1
0.4 CAPSULE ORAL DAILY
Status: DISCONTINUED | OUTPATIENT
Start: 2019-11-01 | End: 2019-11-02 | Stop reason: HOSPADM

## 2019-11-01 RX ORDER — SODIUM CHLORIDE 9 MG/ML
INJECTION, SOLUTION INTRAVENOUS CONTINUOUS
Status: DISCONTINUED | OUTPATIENT
Start: 2019-11-01 | End: 2019-11-02 | Stop reason: HOSPADM

## 2019-11-01 RX ORDER — FLUTICASONE PROPIONATE 50 MCG
1 SPRAY, SUSPENSION (ML) NASAL NIGHTLY
Status: DISCONTINUED | OUTPATIENT
Start: 2019-11-01 | End: 2019-11-02 | Stop reason: HOSPADM

## 2019-11-01 RX ADMIN — SODIUM CHLORIDE: 9 INJECTION, SOLUTION INTRAVENOUS at 09:19

## 2019-11-01 RX ADMIN — FUROSEMIDE 40 MG: 40 TABLET ORAL at 18:15

## 2019-11-01 RX ADMIN — TAMSULOSIN HYDROCHLORIDE 0.4 MG: 0.4 CAPSULE ORAL at 18:15

## 2019-11-01 RX ADMIN — CHLORHEXIDINE GLUCONATE: 213 SOLUTION TOPICAL at 09:20

## 2019-11-01 RX ADMIN — Medication 10 ML: at 20:36

## 2019-11-01 RX ADMIN — FLUTICASONE PROPIONATE 1 SPRAY: 50 SPRAY, METERED NASAL at 20:36

## 2019-11-01 RX ADMIN — ACETAMINOPHEN 650 MG: 325 TABLET ORAL at 20:36

## 2019-11-01 RX ADMIN — DEXTROSE MONOHYDRATE 3 G: 50 INJECTION, SOLUTION INTRAVENOUS at 18:15

## 2019-11-01 RX ADMIN — Medication 55 UNITS: at 20:46

## 2019-11-01 RX ADMIN — IODIXANOL 40 ML: 320 INJECTION, SOLUTION INTRAVASCULAR at 12:04

## 2019-11-01 RX ADMIN — CLOPIDOGREL BISULFATE 75 MG: 75 TABLET ORAL at 18:15

## 2019-11-01 RX ADMIN — CARVEDILOL 3.12 MG: 6.25 TABLET, FILM COATED ORAL at 18:15

## 2019-11-01 RX ADMIN — SACUBITRIL AND VALSARTAN 1 TABLET: 49; 51 TABLET, FILM COATED ORAL at 20:37

## 2019-11-01 ASSESSMENT — PAIN SCALES - GENERAL
PAINLEVEL_OUTOF10: 4
PAINLEVEL_OUTOF10: 7
PAINLEVEL_OUTOF10: 8
PAINLEVEL_OUTOF10: 0

## 2019-11-01 ASSESSMENT — ENCOUNTER SYMPTOMS
ABDOMINAL PAIN: 0
BACK PAIN: 0
COUGH: 0
DIARRHEA: 0
BLOOD IN STOOL: 0
ABDOMINAL DISTENTION: 0
SHORTNESS OF BREATH: 1
WHEEZING: 0
VOMITING: 0

## 2019-11-01 ASSESSMENT — PAIN DESCRIPTION - DESCRIPTORS: DESCRIPTORS: DULL;NAGGING;ACHING

## 2019-11-01 ASSESSMENT — PAIN DESCRIPTION - ORIENTATION
ORIENTATION: LEFT
ORIENTATION: LOWER
ORIENTATION: LEFT

## 2019-11-01 ASSESSMENT — PAIN DESCRIPTION - LOCATION
LOCATION: CHEST
LOCATION: BACK
LOCATION: CHEST

## 2019-11-01 ASSESSMENT — PAIN DESCRIPTION - PAIN TYPE
TYPE: CHRONIC PAIN
TYPE: SURGICAL PAIN
TYPE: SURGICAL PAIN

## 2019-11-01 ASSESSMENT — PAIN DESCRIPTION - ONSET: ONSET: ON-GOING

## 2019-11-02 VITALS
SYSTOLIC BLOOD PRESSURE: 118 MMHG | BODY MASS INDEX: 32.93 KG/M2 | OXYGEN SATURATION: 96 % | TEMPERATURE: 98.4 F | DIASTOLIC BLOOD PRESSURE: 60 MMHG | RESPIRATION RATE: 18 BRPM | HEIGHT: 76 IN | HEART RATE: 74 BPM | WEIGHT: 270.4 LBS

## 2019-11-02 PROCEDURE — 6370000000 HC RX 637 (ALT 250 FOR IP): Performed by: INTERNAL MEDICINE

## 2019-11-02 PROCEDURE — 2580000003 HC RX 258: Performed by: INTERNAL MEDICINE

## 2019-11-02 PROCEDURE — G0378 HOSPITAL OBSERVATION PER HR: HCPCS

## 2019-11-02 PROCEDURE — 6360000002 HC RX W HCPCS: Performed by: INTERNAL MEDICINE

## 2019-11-02 RX ADMIN — SODIUM CHLORIDE: 9 INJECTION, SOLUTION INTRAVENOUS at 01:57

## 2019-11-02 RX ADMIN — SERTRALINE HYDROCHLORIDE 100 MG: 100 TABLET ORAL at 09:12

## 2019-11-02 RX ADMIN — OXYCODONE HYDROCHLORIDE AND ACETAMINOPHEN 1 TABLET: 10; 325 TABLET ORAL at 00:08

## 2019-11-02 RX ADMIN — TAMSULOSIN HYDROCHLORIDE 0.4 MG: 0.4 CAPSULE ORAL at 09:12

## 2019-11-02 RX ADMIN — Medication 10 ML: at 09:12

## 2019-11-02 RX ADMIN — SACUBITRIL AND VALSARTAN 1 TABLET: 49; 51 TABLET, FILM COATED ORAL at 09:11

## 2019-11-02 RX ADMIN — CLOPIDOGREL BISULFATE 75 MG: 75 TABLET ORAL at 09:12

## 2019-11-02 RX ADMIN — DEXTROSE MONOHYDRATE 3 G: 50 INJECTION, SOLUTION INTRAVENOUS at 01:57

## 2019-11-02 RX ADMIN — CARVEDILOL 3.12 MG: 6.25 TABLET, FILM COATED ORAL at 09:12

## 2019-11-02 RX ADMIN — ISOSORBIDE MONONITRATE 30 MG: 30 TABLET, EXTENDED RELEASE ORAL at 09:12

## 2019-11-02 RX ADMIN — OXYCODONE HYDROCHLORIDE AND ACETAMINOPHEN 1 TABLET: 10; 325 TABLET ORAL at 04:03

## 2019-11-02 ASSESSMENT — PAIN DESCRIPTION - PAIN TYPE
TYPE: SURGICAL PAIN

## 2019-11-02 ASSESSMENT — PAIN SCALES - GENERAL
PAINLEVEL_OUTOF10: 0
PAINLEVEL_OUTOF10: 5
PAINLEVEL_OUTOF10: 3
PAINLEVEL_OUTOF10: 7

## 2019-11-02 ASSESSMENT — PAIN DESCRIPTION - LOCATION
LOCATION: CHEST

## 2019-11-02 ASSESSMENT — PAIN DESCRIPTION - ORIENTATION
ORIENTATION: LEFT

## 2019-11-11 ENCOUNTER — OFFICE VISIT (OUTPATIENT)
Dept: CARDIOLOGY | Age: 74
End: 2019-11-11
Payer: MEDICARE

## 2019-11-11 VITALS
BODY MASS INDEX: 34.1 KG/M2 | HEART RATE: 80 BPM | WEIGHT: 280 LBS | HEIGHT: 76 IN | OXYGEN SATURATION: 91 % | SYSTOLIC BLOOD PRESSURE: 122 MMHG | DIASTOLIC BLOOD PRESSURE: 70 MMHG

## 2019-11-11 DIAGNOSIS — I25.10 CORONARY ARTERY DISEASE INVOLVING NATIVE CORONARY ARTERY OF NATIVE HEART WITHOUT ANGINA PECTORIS: ICD-10-CM

## 2019-11-11 DIAGNOSIS — I10 ESSENTIAL HYPERTENSION: Primary | ICD-10-CM

## 2019-11-11 DIAGNOSIS — I10 ESSENTIAL HYPERTENSION: ICD-10-CM

## 2019-11-11 DIAGNOSIS — I25.5 ISCHEMIC CARDIOMYOPATHY: ICD-10-CM

## 2019-11-11 LAB
ANION GAP SERPL CALCULATED.3IONS-SCNC: 17 MMOL/L (ref 7–19)
BUN BLDV-MCNC: 38 MG/DL (ref 8–23)
CALCIUM SERPL-MCNC: 8.8 MG/DL (ref 8.8–10.2)
CHLORIDE BLD-SCNC: 103 MMOL/L (ref 98–111)
CO2: 22 MMOL/L (ref 22–29)
CREAT SERPL-MCNC: 1.7 MG/DL (ref 0.5–1.2)
GFR NON-AFRICAN AMERICAN: 39
GLUCOSE BLD-MCNC: 190 MG/DL (ref 74–109)
POTASSIUM SERPL-SCNC: 4.6 MMOL/L (ref 3.5–5)
PRO-BNP: 1552 PG/ML (ref 0–1800)
SODIUM BLD-SCNC: 142 MMOL/L (ref 136–145)

## 2019-11-11 PROCEDURE — 99024 POSTOP FOLLOW-UP VISIT: CPT | Performed by: NURSE PRACTITIONER

## 2019-11-11 PROCEDURE — 1036F TOBACCO NON-USER: CPT | Performed by: NURSE PRACTITIONER

## 2019-11-11 PROCEDURE — G8417 CALC BMI ABV UP PARAM F/U: HCPCS | Performed by: NURSE PRACTITIONER

## 2019-11-11 PROCEDURE — 93289 INTERROG DEVICE EVAL HEART: CPT | Performed by: NURSE PRACTITIONER

## 2019-11-11 PROCEDURE — G8427 DOCREV CUR MEDS BY ELIG CLIN: HCPCS | Performed by: NURSE PRACTITIONER

## 2019-11-11 PROCEDURE — 4040F PNEUMOC VAC/ADMIN/RCVD: CPT | Performed by: NURSE PRACTITIONER

## 2019-11-11 PROCEDURE — 3017F COLORECTAL CA SCREEN DOC REV: CPT | Performed by: NURSE PRACTITIONER

## 2019-11-11 PROCEDURE — G8598 ASA/ANTIPLAT THER USED: HCPCS | Performed by: NURSE PRACTITIONER

## 2019-11-11 PROCEDURE — G8482 FLU IMMUNIZE ORDER/ADMIN: HCPCS | Performed by: NURSE PRACTITIONER

## 2019-11-11 PROCEDURE — 1123F ACP DISCUSS/DSCN MKR DOCD: CPT | Performed by: NURSE PRACTITIONER

## 2019-11-11 RX ORDER — SPIRONOLACTONE 25 MG/1
25 TABLET ORAL DAILY
Qty: 90 TABLET | Refills: 1 | Status: SHIPPED | OUTPATIENT
Start: 2019-11-11 | End: 2020-05-19

## 2019-11-16 ENCOUNTER — APPOINTMENT (OUTPATIENT)
Dept: CT IMAGING | Age: 74
DRG: 690 | End: 2019-11-16
Payer: MEDICARE

## 2019-11-16 ENCOUNTER — APPOINTMENT (OUTPATIENT)
Dept: GENERAL RADIOLOGY | Age: 74
DRG: 690 | End: 2019-11-16
Payer: MEDICARE

## 2019-11-16 ENCOUNTER — HOSPITAL ENCOUNTER (INPATIENT)
Age: 74
LOS: 2 days | Discharge: HOME | DRG: 690 | End: 2019-11-18
Attending: EMERGENCY MEDICINE | Admitting: HOSPITALIST
Payer: MEDICARE

## 2019-11-16 ENCOUNTER — OFFICE VISIT (OUTPATIENT)
Dept: URGENT CARE | Age: 74
End: 2019-11-16
Payer: MEDICARE

## 2019-11-16 VITALS
TEMPERATURE: 99.2 F | SYSTOLIC BLOOD PRESSURE: 127 MMHG | HEART RATE: 56 BPM | DIASTOLIC BLOOD PRESSURE: 74 MMHG | OXYGEN SATURATION: 98 % | HEIGHT: 76 IN | RESPIRATION RATE: 16 BRPM | BODY MASS INDEX: 32.88 KG/M2 | WEIGHT: 270 LBS

## 2019-11-16 DIAGNOSIS — Z95.0 PACEMAKER: ICD-10-CM

## 2019-11-16 DIAGNOSIS — R53.1 WEAKNESS GENERALIZED: ICD-10-CM

## 2019-11-16 DIAGNOSIS — J96.01 ACUTE RESPIRATORY FAILURE WITH HYPOXIA (HCC): Primary | ICD-10-CM

## 2019-11-16 DIAGNOSIS — N39.0 URINARY TRACT INFECTION WITHOUT HEMATURIA, SITE UNSPECIFIED: ICD-10-CM

## 2019-11-16 DIAGNOSIS — R50.9 FEVER, UNSPECIFIED FEVER CAUSE: Primary | ICD-10-CM

## 2019-11-16 DIAGNOSIS — Z86.19 HISTORY OF ESBL E. COLI INFECTION: ICD-10-CM

## 2019-11-16 LAB
ALBUMIN SERPL-MCNC: 3.8 G/DL (ref 3.5–5.2)
ALP BLD-CCNC: 71 U/L (ref 40–130)
ALT SERPL-CCNC: 11 U/L (ref 5–41)
ANION GAP SERPL CALCULATED.3IONS-SCNC: 15 MMOL/L (ref 7–19)
AST SERPL-CCNC: 15 U/L (ref 5–40)
BACTERIA: ABNORMAL /HPF
BASE EXCESS ARTERIAL: 1.6 MMOL/L (ref -2–2)
BASOPHILS ABSOLUTE: 0 K/UL (ref 0–0.2)
BASOPHILS RELATIVE PERCENT: 0.4 % (ref 0–1)
BILIRUB SERPL-MCNC: 0.6 MG/DL (ref 0.2–1.2)
BILIRUBIN URINE: NEGATIVE
BLOOD, URINE: ABNORMAL
BUN BLDV-MCNC: 31 MG/DL (ref 8–23)
CALCIUM SERPL-MCNC: 9 MG/DL (ref 8.8–10.2)
CARBOXYHEMOGLOBIN ARTERIAL: 0.8 % (ref 0–5)
CHLORIDE BLD-SCNC: 96 MMOL/L (ref 98–111)
CLARITY: ABNORMAL
CO2: 23 MMOL/L (ref 22–29)
COLOR: YELLOW
CREAT SERPL-MCNC: 1.5 MG/DL (ref 0.5–1.2)
EOSINOPHILS ABSOLUTE: 0 K/UL (ref 0–0.6)
EOSINOPHILS RELATIVE PERCENT: 0.3 % (ref 0–5)
EPITHELIAL CELLS, UA: 0 /HPF (ref 0–5)
GFR NON-AFRICAN AMERICAN: 46
GLUCOSE BLD-MCNC: 205 MG/DL (ref 74–109)
GLUCOSE URINE: NEGATIVE MG/DL
HCO3 ARTERIAL: 24.2 MMOL/L (ref 22–26)
HCT VFR BLD CALC: 37.9 % (ref 42–52)
HEMOGLOBIN, ART, EXTENDED: 12.1 G/DL (ref 14–18)
HEMOGLOBIN: 12.4 G/DL (ref 14–18)
HYALINE CASTS: 7 /HPF (ref 0–8)
IMMATURE GRANULOCYTES #: 0 K/UL
INFLUENZA A ANTIBODY: NEGATIVE
INFLUENZA B ANTIBODY: NEGATIVE
KETONES, URINE: NEGATIVE MG/DL
LACTIC ACID: 0.9 MMOL/L (ref 0.5–1.9)
LEUKOCYTE ESTERASE, URINE: ABNORMAL
LYMPHOCYTES ABSOLUTE: 0.7 K/UL (ref 1.1–4.5)
LYMPHOCYTES RELATIVE PERCENT: 9.3 % (ref 20–40)
MCH RBC QN AUTO: 26.2 PG (ref 27–31)
MCHC RBC AUTO-ENTMCNC: 32.7 G/DL (ref 33–37)
MCV RBC AUTO: 80.1 FL (ref 80–94)
METHEMOGLOBIN ARTERIAL: 0.1 %
MONOCYTES ABSOLUTE: 0.9 K/UL (ref 0–0.9)
MONOCYTES RELATIVE PERCENT: 11 % (ref 0–10)
NEUTROPHILS ABSOLUTE: 6.2 K/UL (ref 1.5–7.5)
NEUTROPHILS RELATIVE PERCENT: 78.6 % (ref 50–65)
NITRITE, URINE: POSITIVE
O2 CONTENT ARTERIAL: 15.5 ML/DL
O2 SAT, ARTERIAL: 90.9 %
O2 THERAPY: ABNORMAL
PCO2 ARTERIAL: 31 MMHG (ref 35–45)
PDW BLD-RTO: 17.4 % (ref 11.5–14.5)
PH ARTERIAL: 7.5 (ref 7.35–7.45)
PH UA: 5.5 (ref 5–8)
PLATELET # BLD: 136 K/UL (ref 130–400)
PMV BLD AUTO: 11.6 FL (ref 9.4–12.4)
PO2 ARTERIAL: 54 MMHG (ref 80–100)
POTASSIUM REFLEX MAGNESIUM: 4.3 MMOL/L (ref 3.5–5)
POTASSIUM, WHOLE BLOOD: 4.2
PRO-BNP: 3250 PG/ML (ref 0–1800)
PROTEIN UA: 30 MG/DL
RAPID INFLUENZA  B AGN: NEGATIVE
RAPID INFLUENZA A AGN: NEGATIVE
RBC # BLD: 4.73 M/UL (ref 4.7–6.1)
RBC UA: 3 /HPF (ref 0–4)
S PYO AG THROAT QL: NORMAL
SODIUM BLD-SCNC: 134 MMOL/L (ref 136–145)
SPECIFIC GRAVITY UA: 1.02 (ref 1–1.03)
TOTAL PROTEIN: 7.2 G/DL (ref 6.6–8.7)
TROPONIN: 0.02 NG/ML (ref 0–0.03)
URINE REFLEX TO CULTURE: YES
UROBILINOGEN, URINE: 1 E.U./DL
WBC # BLD: 7.9 K/UL (ref 4.8–10.8)
WBC UA: 138 /HPF (ref 0–5)

## 2019-11-16 PROCEDURE — 87040 BLOOD CULTURE FOR BACTERIA: CPT

## 2019-11-16 PROCEDURE — 87804 INFLUENZA ASSAY W/OPTIC: CPT

## 2019-11-16 PROCEDURE — 80053 COMPREHEN METABOLIC PANEL: CPT

## 2019-11-16 PROCEDURE — 87186 SC STD MICRODIL/AGAR DIL: CPT

## 2019-11-16 PROCEDURE — 82803 BLOOD GASES ANY COMBINATION: CPT

## 2019-11-16 PROCEDURE — 36600 WITHDRAWAL OF ARTERIAL BLOOD: CPT

## 2019-11-16 PROCEDURE — 2580000003 HC RX 258: Performed by: EMERGENCY MEDICINE

## 2019-11-16 PROCEDURE — 6360000004 HC RX CONTRAST MEDICATION: Performed by: EMERGENCY MEDICINE

## 2019-11-16 PROCEDURE — 87804 INFLUENZA ASSAY W/OPTIC: CPT | Performed by: NURSE PRACTITIONER

## 2019-11-16 PROCEDURE — 87086 URINE CULTURE/COLONY COUNT: CPT

## 2019-11-16 PROCEDURE — 36415 COLL VENOUS BLD VENIPUNCTURE: CPT

## 2019-11-16 PROCEDURE — 96365 THER/PROPH/DIAG IV INF INIT: CPT

## 2019-11-16 PROCEDURE — 85025 COMPLETE CBC W/AUTO DIFF WBC: CPT

## 2019-11-16 PROCEDURE — 6370000000 HC RX 637 (ALT 250 FOR IP): Performed by: EMERGENCY MEDICINE

## 2019-11-16 PROCEDURE — 83880 ASSAY OF NATRIURETIC PEPTIDE: CPT

## 2019-11-16 PROCEDURE — 81001 URINALYSIS AUTO W/SCOPE: CPT

## 2019-11-16 PROCEDURE — 84132 ASSAY OF SERUM POTASSIUM: CPT

## 2019-11-16 PROCEDURE — 87880 STREP A ASSAY W/OPTIC: CPT | Performed by: NURSE PRACTITIONER

## 2019-11-16 PROCEDURE — 71045 X-RAY EXAM CHEST 1 VIEW: CPT

## 2019-11-16 PROCEDURE — 99285 EMERGENCY DEPT VISIT HI MDM: CPT

## 2019-11-16 PROCEDURE — 6360000002 HC RX W HCPCS: Performed by: EMERGENCY MEDICINE

## 2019-11-16 PROCEDURE — 1210000000 HC MED SURG R&B

## 2019-11-16 PROCEDURE — 71275 CT ANGIOGRAPHY CHEST: CPT

## 2019-11-16 PROCEDURE — 84484 ASSAY OF TROPONIN QUANT: CPT

## 2019-11-16 PROCEDURE — 94640 AIRWAY INHALATION TREATMENT: CPT

## 2019-11-16 PROCEDURE — 83605 ASSAY OF LACTIC ACID: CPT

## 2019-11-16 RX ORDER — ONDANSETRON 2 MG/ML
4 INJECTION INTRAMUSCULAR; INTRAVENOUS EVERY 6 HOURS PRN
Status: DISCONTINUED | OUTPATIENT
Start: 2019-11-16 | End: 2019-11-18 | Stop reason: HOSPADM

## 2019-11-16 RX ORDER — SODIUM CHLORIDE 0.9 % (FLUSH) 0.9 %
10 SYRINGE (ML) INJECTION PRN
Status: DISCONTINUED | OUTPATIENT
Start: 2019-11-16 | End: 2019-11-18 | Stop reason: SDUPTHER

## 2019-11-16 RX ORDER — NALOXONE HYDROCHLORIDE 0.4 MG/ML
0.4 INJECTION, SOLUTION INTRAMUSCULAR; INTRAVENOUS; SUBCUTANEOUS PRN
Status: DISCONTINUED | OUTPATIENT
Start: 2019-11-16 | End: 2019-11-18 | Stop reason: HOSPADM

## 2019-11-16 RX ORDER — ISOSORBIDE MONONITRATE 30 MG/1
30 TABLET, EXTENDED RELEASE ORAL DAILY
Status: DISCONTINUED | OUTPATIENT
Start: 2019-11-16 | End: 2019-11-18 | Stop reason: HOSPADM

## 2019-11-16 RX ORDER — SPIRONOLACTONE 25 MG/1
25 TABLET ORAL DAILY
Status: DISCONTINUED | OUTPATIENT
Start: 2019-11-17 | End: 2019-11-18 | Stop reason: HOSPADM

## 2019-11-16 RX ORDER — SERTRALINE HYDROCHLORIDE 100 MG/1
100 TABLET, FILM COATED ORAL DAILY
Status: DISCONTINUED | OUTPATIENT
Start: 2019-11-17 | End: 2019-11-18 | Stop reason: HOSPADM

## 2019-11-16 RX ORDER — CLOPIDOGREL BISULFATE 75 MG/1
75 TABLET ORAL DAILY
Status: DISCONTINUED | OUTPATIENT
Start: 2019-11-16 | End: 2019-11-18 | Stop reason: HOSPADM

## 2019-11-16 RX ORDER — FLUTICASONE PROPIONATE 50 MCG
1 SPRAY, SUSPENSION (ML) NASAL DAILY
Status: DISCONTINUED | OUTPATIENT
Start: 2019-11-17 | End: 2019-11-18 | Stop reason: HOSPADM

## 2019-11-16 RX ORDER — SODIUM CHLORIDE 0.9 % (FLUSH) 0.9 %
10 SYRINGE (ML) INJECTION EVERY 12 HOURS SCHEDULED
Status: DISCONTINUED | OUTPATIENT
Start: 2019-11-16 | End: 2019-11-18 | Stop reason: SDUPTHER

## 2019-11-16 RX ORDER — TAMSULOSIN HYDROCHLORIDE 0.4 MG/1
0.8 CAPSULE ORAL DAILY
Status: DISCONTINUED | OUTPATIENT
Start: 2019-11-16 | End: 2019-11-18 | Stop reason: HOSPADM

## 2019-11-16 RX ORDER — METHYLPREDNISOLONE SODIUM SUCCINATE 125 MG/2ML
125 INJECTION, POWDER, LYOPHILIZED, FOR SOLUTION INTRAMUSCULAR; INTRAVENOUS ONCE
Status: DISCONTINUED | OUTPATIENT
Start: 2019-11-16 | End: 2019-11-16

## 2019-11-16 RX ORDER — ACETAMINOPHEN 325 MG/1
650 TABLET ORAL EVERY 4 HOURS PRN
Status: DISCONTINUED | OUTPATIENT
Start: 2019-11-16 | End: 2019-11-18 | Stop reason: HOSPADM

## 2019-11-16 RX ORDER — NITROGLYCERIN 0.4 MG/1
0.4 TABLET SUBLINGUAL EVERY 5 MIN PRN
Status: DISCONTINUED | OUTPATIENT
Start: 2019-11-16 | End: 2019-11-18 | Stop reason: HOSPADM

## 2019-11-16 RX ORDER — BUTALBITAL, ACETAMINOPHEN AND CAFFEINE 50; 325; 40 MG/1; MG/1; MG/1
1 TABLET ORAL EVERY 6 HOURS PRN
Status: DISCONTINUED | OUTPATIENT
Start: 2019-11-16 | End: 2019-11-18 | Stop reason: HOSPADM

## 2019-11-16 RX ORDER — OXYCODONE AND ACETAMINOPHEN 10; 325 MG/1; MG/1
1 TABLET ORAL EVERY 4 HOURS PRN
Status: DISCONTINUED | OUTPATIENT
Start: 2019-11-16 | End: 2019-11-18 | Stop reason: HOSPADM

## 2019-11-16 RX ORDER — CARVEDILOL 3.12 MG/1
3.12 TABLET ORAL 2 TIMES DAILY WITH MEALS
Status: DISCONTINUED | OUTPATIENT
Start: 2019-11-16 | End: 2019-11-18 | Stop reason: HOSPADM

## 2019-11-16 RX ORDER — IPRATROPIUM BROMIDE AND ALBUTEROL SULFATE 2.5; .5 MG/3ML; MG/3ML
1 SOLUTION RESPIRATORY (INHALATION) ONCE
Status: COMPLETED | OUTPATIENT
Start: 2019-11-16 | End: 2019-11-16

## 2019-11-16 RX ORDER — AZELASTINE 1 MG/ML
1 SPRAY, METERED NASAL PRN
Status: DISCONTINUED | OUTPATIENT
Start: 2019-11-16 | End: 2019-11-16 | Stop reason: RX

## 2019-11-16 RX ORDER — FUROSEMIDE 40 MG/1
40 TABLET ORAL 2 TIMES DAILY
Status: DISCONTINUED | OUTPATIENT
Start: 2019-11-16 | End: 2019-11-18 | Stop reason: HOSPADM

## 2019-11-16 RX ORDER — POLYETHYLENE GLYCOL 3350 17 G/17G
17 POWDER, FOR SOLUTION ORAL DAILY PRN
Status: DISCONTINUED | OUTPATIENT
Start: 2019-11-16 | End: 2019-11-18 | Stop reason: HOSPADM

## 2019-11-16 RX ORDER — INSULIN GLARGINE 100 [IU]/ML
80 INJECTION, SOLUTION SUBCUTANEOUS NIGHTLY
Status: DISCONTINUED | OUTPATIENT
Start: 2019-11-16 | End: 2019-11-18 | Stop reason: HOSPADM

## 2019-11-16 RX ADMIN — MEROPENEM 1 G: 1 INJECTION, POWDER, FOR SOLUTION INTRAVENOUS at 19:33

## 2019-11-16 RX ADMIN — IOPAMIDOL 90 ML: 755 INJECTION, SOLUTION INTRAVENOUS at 19:01

## 2019-11-16 RX ADMIN — IPRATROPIUM BROMIDE AND ALBUTEROL SULFATE 1 AMPULE: .5; 3 SOLUTION RESPIRATORY (INHALATION) at 20:39

## 2019-11-16 ASSESSMENT — ENCOUNTER SYMPTOMS
SINUS PRESSURE: 0
COUGH: 1
ABDOMINAL PAIN: 0
CHOKING: 0
NAUSEA: 0
CONSTIPATION: 0
EYE DISCHARGE: 0
SHORTNESS OF BREATH: 1
BLOOD IN STOOL: 0
FACIAL SWELLING: 0
VOICE CHANGE: 0
APNEA: 0
DIARRHEA: 0
SORE THROAT: 0

## 2019-11-17 LAB
ANION GAP SERPL CALCULATED.3IONS-SCNC: 15 MMOL/L (ref 7–19)
BASOPHILS ABSOLUTE: 0 K/UL (ref 0–0.2)
BASOPHILS RELATIVE PERCENT: 0.5 % (ref 0–1)
BUN BLDV-MCNC: 32 MG/DL (ref 8–23)
CALCIUM SERPL-MCNC: 8.5 MG/DL (ref 8.8–10.2)
CHLORIDE BLD-SCNC: 99 MMOL/L (ref 98–111)
CO2: 24 MMOL/L (ref 22–29)
CREAT SERPL-MCNC: 1.4 MG/DL (ref 0.5–1.2)
EOSINOPHILS ABSOLUTE: 0.1 K/UL (ref 0–0.6)
EOSINOPHILS RELATIVE PERCENT: 1 % (ref 0–5)
GFR NON-AFRICAN AMERICAN: 49
GLUCOSE BLD-MCNC: 131 MG/DL (ref 70–99)
GLUCOSE BLD-MCNC: 156 MG/DL (ref 70–99)
GLUCOSE BLD-MCNC: 198 MG/DL (ref 70–99)
GLUCOSE BLD-MCNC: 212 MG/DL (ref 74–109)
GLUCOSE BLD-MCNC: 237 MG/DL (ref 70–99)
GLUCOSE BLD-MCNC: 243 MG/DL (ref 70–99)
HCT VFR BLD CALC: 35.2 % (ref 42–52)
HEMOGLOBIN: 10.9 G/DL (ref 14–18)
IMMATURE GRANULOCYTES #: 0 K/UL
LYMPHOCYTES ABSOLUTE: 1.2 K/UL (ref 1.1–4.5)
LYMPHOCYTES RELATIVE PERCENT: 19 % (ref 20–40)
MCH RBC QN AUTO: 25.4 PG (ref 27–31)
MCHC RBC AUTO-ENTMCNC: 31 G/DL (ref 33–37)
MCV RBC AUTO: 82.1 FL (ref 80–94)
MONOCYTES ABSOLUTE: 0.9 K/UL (ref 0–0.9)
MONOCYTES RELATIVE PERCENT: 14.4 % (ref 0–10)
NEUTROPHILS ABSOLUTE: 3.9 K/UL (ref 1.5–7.5)
NEUTROPHILS RELATIVE PERCENT: 64.8 % (ref 50–65)
PDW BLD-RTO: 17.8 % (ref 11.5–14.5)
PERFORMED ON: ABNORMAL
PLATELET # BLD: 113 K/UL (ref 130–400)
PMV BLD AUTO: 11.9 FL (ref 9.4–12.4)
POTASSIUM REFLEX MAGNESIUM: 4 MMOL/L (ref 3.5–5)
RBC # BLD: 4.29 M/UL (ref 4.7–6.1)
SODIUM BLD-SCNC: 138 MMOL/L (ref 136–145)
WBC # BLD: 6.1 K/UL (ref 4.8–10.8)

## 2019-11-17 PROCEDURE — 2580000003 HC RX 258: Performed by: EMERGENCY MEDICINE

## 2019-11-17 PROCEDURE — 80048 BASIC METABOLIC PNL TOTAL CA: CPT

## 2019-11-17 PROCEDURE — 6360000002 HC RX W HCPCS: Performed by: HOSPITALIST

## 2019-11-17 PROCEDURE — 6370000000 HC RX 637 (ALT 250 FOR IP): Performed by: HOSPITALIST

## 2019-11-17 PROCEDURE — 94660 CPAP INITIATION&MGMT: CPT

## 2019-11-17 PROCEDURE — 2580000003 HC RX 258: Performed by: HOSPITALIST

## 2019-11-17 PROCEDURE — 82043 UR ALBUMIN QUANTITATIVE: CPT

## 2019-11-17 PROCEDURE — 82948 REAGENT STRIP/BLOOD GLUCOSE: CPT

## 2019-11-17 PROCEDURE — 85025 COMPLETE CBC W/AUTO DIFF WBC: CPT

## 2019-11-17 PROCEDURE — 1210000000 HC MED SURG R&B

## 2019-11-17 PROCEDURE — 36415 COLL VENOUS BLD VENIPUNCTURE: CPT

## 2019-11-17 PROCEDURE — 6370000000 HC RX 637 (ALT 250 FOR IP): Performed by: FAMILY MEDICINE

## 2019-11-17 PROCEDURE — 82570 ASSAY OF URINE CREATININE: CPT

## 2019-11-17 PROCEDURE — 81003 URINALYSIS AUTO W/O SCOPE: CPT

## 2019-11-17 PROCEDURE — 2580000003 HC RX 258: Performed by: FAMILY MEDICINE

## 2019-11-17 PROCEDURE — 6360000002 HC RX W HCPCS: Performed by: EMERGENCY MEDICINE

## 2019-11-17 RX ORDER — SODIUM CHLORIDE 0.9 % (FLUSH) 0.9 %
10 SYRINGE (ML) INJECTION EVERY 12 HOURS SCHEDULED
Status: DISCONTINUED | OUTPATIENT
Start: 2019-11-17 | End: 2019-11-18 | Stop reason: HOSPADM

## 2019-11-17 RX ORDER — LANOLIN ALCOHOL/MO/W.PET/CERES
9 CREAM (GRAM) TOPICAL NIGHTLY PRN
Status: DISCONTINUED | OUTPATIENT
Start: 2019-11-17 | End: 2019-11-18 | Stop reason: HOSPADM

## 2019-11-17 RX ORDER — DEXTROSE MONOHYDRATE 50 MG/ML
100 INJECTION, SOLUTION INTRAVENOUS PRN
Status: DISCONTINUED | OUTPATIENT
Start: 2019-11-17 | End: 2019-11-18 | Stop reason: HOSPADM

## 2019-11-17 RX ORDER — DEXTROSE MONOHYDRATE 25 G/50ML
12.5 INJECTION, SOLUTION INTRAVENOUS PRN
Status: DISCONTINUED | OUTPATIENT
Start: 2019-11-17 | End: 2019-11-17 | Stop reason: SDUPTHER

## 2019-11-17 RX ORDER — DEXTROSE MONOHYDRATE 50 MG/ML
100 INJECTION, SOLUTION INTRAVENOUS PRN
Status: DISCONTINUED | OUTPATIENT
Start: 2019-11-17 | End: 2019-11-17 | Stop reason: SDUPTHER

## 2019-11-17 RX ORDER — NICOTINE POLACRILEX 4 MG
15 LOZENGE BUCCAL PRN
Status: DISCONTINUED | OUTPATIENT
Start: 2019-11-17 | End: 2019-11-18 | Stop reason: HOSPADM

## 2019-11-17 RX ORDER — CARBOXYMETHYLCELLULOSE SODIUM 10 MG/ML
2 GEL OPHTHALMIC 3 TIMES DAILY
Status: DISCONTINUED | OUTPATIENT
Start: 2019-11-17 | End: 2019-11-18 | Stop reason: HOSPADM

## 2019-11-17 RX ORDER — DEXTROSE MONOHYDRATE 25 G/50ML
12.5 INJECTION, SOLUTION INTRAVENOUS PRN
Status: DISCONTINUED | OUTPATIENT
Start: 2019-11-17 | End: 2019-11-18 | Stop reason: HOSPADM

## 2019-11-17 RX ORDER — NICOTINE POLACRILEX 4 MG
15 LOZENGE BUCCAL PRN
Status: DISCONTINUED | OUTPATIENT
Start: 2019-11-17 | End: 2019-11-17 | Stop reason: SDUPTHER

## 2019-11-17 RX ORDER — LANOLIN ALCOHOL/MO/W.PET/CERES
6 CREAM (GRAM) TOPICAL ONCE
Status: COMPLETED | OUTPATIENT
Start: 2019-11-17 | End: 2019-11-17

## 2019-11-17 RX ORDER — LIDOCAINE HYDROCHLORIDE 10 MG/ML
5 INJECTION, SOLUTION EPIDURAL; INFILTRATION; INTRACAUDAL; PERINEURAL ONCE
Status: DISCONTINUED | OUTPATIENT
Start: 2019-11-17 | End: 2019-11-18 | Stop reason: HOSPADM

## 2019-11-17 RX ORDER — SODIUM CHLORIDE 0.9 % (FLUSH) 0.9 %
10 SYRINGE (ML) INJECTION PRN
Status: DISCONTINUED | OUTPATIENT
Start: 2019-11-17 | End: 2019-11-18 | Stop reason: HOSPADM

## 2019-11-17 RX ADMIN — OXYCODONE HYDROCHLORIDE AND ACETAMINOPHEN 1 TABLET: 10; 325 TABLET ORAL at 00:28

## 2019-11-17 RX ADMIN — FUROSEMIDE 40 MG: 40 TABLET ORAL at 17:19

## 2019-11-17 RX ADMIN — OXYCODONE HYDROCHLORIDE AND ACETAMINOPHEN 1 TABLET: 10; 325 TABLET ORAL at 10:45

## 2019-11-17 RX ADMIN — ENOXAPARIN SODIUM 40 MG: 40 INJECTION SUBCUTANEOUS at 00:27

## 2019-11-17 RX ADMIN — Medication 80 UNITS: at 00:29

## 2019-11-17 RX ADMIN — SERTRALINE HYDROCHLORIDE 100 MG: 100 TABLET ORAL at 08:30

## 2019-11-17 RX ADMIN — OXYCODONE HYDROCHLORIDE AND ACETAMINOPHEN 1 TABLET: 10; 325 TABLET ORAL at 06:07

## 2019-11-17 RX ADMIN — MEROPENEM 1 G: 1 INJECTION, POWDER, FOR SOLUTION INTRAVENOUS at 05:01

## 2019-11-17 RX ADMIN — POLYETHYLENE GLYCOL 3350 17 G: 17 POWDER, FOR SOLUTION ORAL at 21:30

## 2019-11-17 RX ADMIN — CLOPIDOGREL BISULFATE 75 MG: 75 TABLET ORAL at 00:28

## 2019-11-17 RX ADMIN — Medication 80 UNITS: at 21:31

## 2019-11-17 RX ADMIN — MELATONIN 9 MG: at 20:51

## 2019-11-17 RX ADMIN — FUROSEMIDE 40 MG: 40 TABLET ORAL at 00:28

## 2019-11-17 RX ADMIN — CARVEDILOL 3.12 MG: 3.12 TABLET, FILM COATED ORAL at 08:30

## 2019-11-17 RX ADMIN — INSULIN LISPRO 6 UNITS: 100 INJECTION, SOLUTION INTRAVENOUS; SUBCUTANEOUS at 17:20

## 2019-11-17 RX ADMIN — TAMSULOSIN HYDROCHLORIDE 0.8 MG: 0.4 CAPSULE ORAL at 00:28

## 2019-11-17 RX ADMIN — SACUBITRIL AND VALSARTAN 1 TABLET: 49; 51 TABLET, FILM COATED ORAL at 20:51

## 2019-11-17 RX ADMIN — FLUTICASONE PROPIONATE 1 SPRAY: 50 SPRAY, METERED NASAL at 08:30

## 2019-11-17 RX ADMIN — MEROPENEM 1 G: 1 INJECTION, POWDER, FOR SOLUTION INTRAVENOUS at 20:50

## 2019-11-17 RX ADMIN — CARBOXYMETHYLCELLULOSE SODIUM 2 DROP: 10 GEL OPHTHALMIC at 20:49

## 2019-11-17 RX ADMIN — ACETAMINOPHEN 650 MG: 325 TABLET ORAL at 16:53

## 2019-11-17 RX ADMIN — SACUBITRIL AND VALSARTAN 1 TABLET: 49; 51 TABLET, FILM COATED ORAL at 08:30

## 2019-11-17 RX ADMIN — ISOSORBIDE MONONITRATE 30 MG: 30 TABLET, EXTENDED RELEASE ORAL at 20:51

## 2019-11-17 RX ADMIN — Medication 10 ML: at 20:51

## 2019-11-17 RX ADMIN — CLOPIDOGREL BISULFATE 75 MG: 75 TABLET ORAL at 20:51

## 2019-11-17 RX ADMIN — OXYCODONE HYDROCHLORIDE AND ACETAMINOPHEN 1 TABLET: 10; 325 TABLET ORAL at 20:51

## 2019-11-17 RX ADMIN — MEROPENEM 1 G: 1 INJECTION, POWDER, FOR SOLUTION INTRAVENOUS at 12:05

## 2019-11-17 RX ADMIN — CARVEDILOL 3.12 MG: 3.12 TABLET, FILM COATED ORAL at 17:19

## 2019-11-17 RX ADMIN — FUROSEMIDE 40 MG: 40 TABLET ORAL at 08:30

## 2019-11-17 RX ADMIN — CARBOXYMETHYLCELLULOSE SODIUM 2 DROP: 10 GEL OPHTHALMIC at 17:19

## 2019-11-17 RX ADMIN — SPIRONOLACTONE 25 MG: 25 TABLET ORAL at 08:30

## 2019-11-17 RX ADMIN — MELATONIN 6 MG: at 04:57

## 2019-11-17 RX ADMIN — Medication 10 ML: at 09:03

## 2019-11-17 RX ADMIN — INSULIN LISPRO 3 UNITS: 100 INJECTION, SOLUTION INTRAVENOUS; SUBCUTANEOUS at 08:30

## 2019-11-17 RX ADMIN — CARVEDILOL 3.12 MG: 3.12 TABLET, FILM COATED ORAL at 00:28

## 2019-11-17 RX ADMIN — TAMSULOSIN HYDROCHLORIDE 0.8 MG: 0.4 CAPSULE ORAL at 20:50

## 2019-11-17 RX ADMIN — ENOXAPARIN SODIUM 40 MG: 40 INJECTION SUBCUTANEOUS at 21:30

## 2019-11-17 RX ADMIN — SACUBITRIL AND VALSARTAN 1 TABLET: 49; 51 TABLET, FILM COATED ORAL at 00:28

## 2019-11-17 ASSESSMENT — PAIN SCALES - GENERAL
PAINLEVEL_OUTOF10: 0
PAINLEVEL_OUTOF10: 7
PAINLEVEL_OUTOF10: 2
PAINLEVEL_OUTOF10: 6
PAINLEVEL_OUTOF10: 0
PAINLEVEL_OUTOF10: 2
PAINLEVEL_OUTOF10: 8
PAINLEVEL_OUTOF10: 0
PAINLEVEL_OUTOF10: 7
PAINLEVEL_OUTOF10: 7

## 2019-11-17 ASSESSMENT — ENCOUNTER SYMPTOMS
EYE REDNESS: 0
COUGH: 0
ABDOMINAL PAIN: 0
COLOR CHANGE: 0
SHORTNESS OF BREATH: 0
TROUBLE SWALLOWING: 0
VOMITING: 0
SORE THROAT: 0
WHEEZING: 0
NAUSEA: 0

## 2019-11-17 ASSESSMENT — PAIN DESCRIPTION - DESCRIPTORS: DESCRIPTORS: ACHING

## 2019-11-17 ASSESSMENT — PAIN DESCRIPTION - LOCATION: LOCATION: HEAD

## 2019-11-18 ENCOUNTER — APPOINTMENT (OUTPATIENT)
Dept: ULTRASOUND IMAGING | Age: 74
DRG: 690 | End: 2019-11-18
Payer: MEDICARE

## 2019-11-18 VITALS
WEIGHT: 265.13 LBS | BODY MASS INDEX: 32.29 KG/M2 | TEMPERATURE: 98 F | RESPIRATION RATE: 18 BRPM | HEART RATE: 82 BPM | DIASTOLIC BLOOD PRESSURE: 66 MMHG | HEIGHT: 76 IN | OXYGEN SATURATION: 96 % | SYSTOLIC BLOOD PRESSURE: 114 MMHG

## 2019-11-18 LAB
ALBUMIN SERPL-MCNC: 3.5 G/DL (ref 3.5–5.2)
ALP BLD-CCNC: 56 U/L (ref 40–130)
ALT SERPL-CCNC: 9 U/L (ref 5–41)
ANION GAP SERPL CALCULATED.3IONS-SCNC: 13 MMOL/L (ref 7–19)
AST SERPL-CCNC: 10 U/L (ref 5–40)
BASOPHILS ABSOLUTE: 0 K/UL (ref 0–0.2)
BASOPHILS RELATIVE PERCENT: 0.7 % (ref 0–1)
BILIRUB SERPL-MCNC: 0.3 MG/DL (ref 0.2–1.2)
BILIRUBIN URINE: NEGATIVE
BLOOD, URINE: NEGATIVE
BUN BLDV-MCNC: 34 MG/DL (ref 8–23)
CALCIUM SERPL-MCNC: 8.5 MG/DL (ref 8.8–10.2)
CHLORIDE BLD-SCNC: 101 MMOL/L (ref 98–111)
CLARITY: CLEAR
CO2: 24 MMOL/L (ref 22–29)
COLOR: YELLOW
CREAT SERPL-MCNC: 1.4 MG/DL (ref 0.5–1.2)
CREATININE URINE: 76.8 MG/DL (ref 4.2–622)
EOSINOPHILS ABSOLUTE: 0.2 K/UL (ref 0–0.6)
EOSINOPHILS RELATIVE PERCENT: 3.3 % (ref 0–5)
GFR NON-AFRICAN AMERICAN: 49
GLUCOSE BLD-MCNC: 105 MG/DL (ref 70–99)
GLUCOSE BLD-MCNC: 110 MG/DL (ref 74–109)
GLUCOSE BLD-MCNC: 119 MG/DL (ref 70–99)
GLUCOSE URINE: NEGATIVE MG/DL
HCT VFR BLD CALC: 33.7 % (ref 42–52)
HEMOGLOBIN: 10.6 G/DL (ref 14–18)
IMMATURE GRANULOCYTES #: 0 K/UL
KETONES, URINE: NEGATIVE MG/DL
LEUKOCYTE ESTERASE, URINE: NEGATIVE
LYMPHOCYTES ABSOLUTE: 0.7 K/UL (ref 1.1–4.5)
LYMPHOCYTES RELATIVE PERCENT: 14.4 % (ref 20–40)
MCH RBC QN AUTO: 25.5 PG (ref 27–31)
MCHC RBC AUTO-ENTMCNC: 31.5 G/DL (ref 33–37)
MCV RBC AUTO: 81 FL (ref 80–94)
MICROALBUMIN UR-MCNC: 4.8 MG/DL (ref 0–19)
MICROALBUMIN/CREAT UR-RTO: 62.5 MG/G
MONOCYTES ABSOLUTE: 0.7 K/UL (ref 0–0.9)
MONOCYTES RELATIVE PERCENT: 15 % (ref 0–10)
NEUTROPHILS ABSOLUTE: 3 K/UL (ref 1.5–7.5)
NEUTROPHILS RELATIVE PERCENT: 66.2 % (ref 50–65)
NITRITE, URINE: NEGATIVE
ORGANISM: ABNORMAL
PARATHYROID HORMONE INTACT: 74.3 PG/ML (ref 15–65)
PDW BLD-RTO: 17.5 % (ref 11.5–14.5)
PERFORMED ON: ABNORMAL
PERFORMED ON: ABNORMAL
PH UA: 5.5 (ref 5–8)
PLATELET # BLD: 140 K/UL (ref 130–400)
PMV BLD AUTO: 12 FL (ref 9.4–12.4)
POTASSIUM REFLEX MAGNESIUM: 3.9 MMOL/L (ref 3.5–5)
POTASSIUM SERPL-SCNC: 3.9 MMOL/L (ref 3.5–5)
PROTEIN UA: NEGATIVE MG/DL
RBC # BLD: 4.16 M/UL (ref 4.7–6.1)
SODIUM BLD-SCNC: 138 MMOL/L (ref 136–145)
SPECIFIC GRAVITY UA: 1.02 (ref 1–1.03)
TOTAL PROTEIN: 5.9 G/DL (ref 6.6–8.7)
URINE CULTURE, ROUTINE: ABNORMAL
URINE CULTURE, ROUTINE: ABNORMAL
UROBILINOGEN, URINE: 0.2 E.U./DL
VITAMIN D 25-HYDROXY: 13 NG/ML
WBC # BLD: 4.6 K/UL (ref 4.8–10.8)

## 2019-11-18 PROCEDURE — 6370000000 HC RX 637 (ALT 250 FOR IP): Performed by: FAMILY MEDICINE

## 2019-11-18 PROCEDURE — 82948 REAGENT STRIP/BLOOD GLUCOSE: CPT

## 2019-11-18 PROCEDURE — 83970 ASSAY OF PARATHORMONE: CPT

## 2019-11-18 PROCEDURE — 6370000000 HC RX 637 (ALT 250 FOR IP): Performed by: INTERNAL MEDICINE

## 2019-11-18 PROCEDURE — 6360000002 HC RX W HCPCS: Performed by: EMERGENCY MEDICINE

## 2019-11-18 PROCEDURE — 85025 COMPLETE CBC W/AUTO DIFF WBC: CPT

## 2019-11-18 PROCEDURE — 6370000000 HC RX 637 (ALT 250 FOR IP): Performed by: HOSPITALIST

## 2019-11-18 PROCEDURE — 76770 US EXAM ABDO BACK WALL COMP: CPT

## 2019-11-18 PROCEDURE — 2580000003 HC RX 258: Performed by: FAMILY MEDICINE

## 2019-11-18 PROCEDURE — 80053 COMPREHEN METABOLIC PANEL: CPT

## 2019-11-18 PROCEDURE — 36415 COLL VENOUS BLD VENIPUNCTURE: CPT

## 2019-11-18 PROCEDURE — 82306 VITAMIN D 25 HYDROXY: CPT

## 2019-11-18 PROCEDURE — 2580000003 HC RX 258: Performed by: EMERGENCY MEDICINE

## 2019-11-18 RX ORDER — ERGOCALCIFEROL 1.25 MG/1
50000 CAPSULE ORAL WEEKLY
Status: DISCONTINUED | OUTPATIENT
Start: 2019-11-18 | End: 2019-11-18 | Stop reason: HOSPADM

## 2019-11-18 RX ORDER — NITROFURANTOIN 25; 75 MG/1; MG/1
100 CAPSULE ORAL ONCE
Status: COMPLETED | OUTPATIENT
Start: 2019-11-18 | End: 2019-11-18

## 2019-11-18 RX ORDER — NITROFURANTOIN 25; 75 MG/1; MG/1
100 CAPSULE ORAL 2 TIMES DAILY
Qty: 28 CAPSULE | Refills: 0 | Status: SHIPPED | OUTPATIENT
Start: 2019-11-18 | End: 2019-12-02

## 2019-11-18 RX ADMIN — OXYCODONE HYDROCHLORIDE AND ACETAMINOPHEN 1 TABLET: 10; 325 TABLET ORAL at 10:12

## 2019-11-18 RX ADMIN — FLUTICASONE PROPIONATE 1 SPRAY: 50 SPRAY, METERED NASAL at 10:01

## 2019-11-18 RX ADMIN — FUROSEMIDE 40 MG: 40 TABLET ORAL at 10:04

## 2019-11-18 RX ADMIN — NITROFURANTOIN MONOHYDRATE/MACROCRYSTALLINE 100 MG: 25; 75 CAPSULE ORAL at 10:00

## 2019-11-18 RX ADMIN — ERGOCALCIFEROL 50000 UNITS: 1.25 CAPSULE ORAL at 10:00

## 2019-11-18 RX ADMIN — CARBOXYMETHYLCELLULOSE SODIUM 2 DROP: 10 GEL OPHTHALMIC at 10:02

## 2019-11-18 RX ADMIN — OXYCODONE HYDROCHLORIDE AND ACETAMINOPHEN 1 TABLET: 10; 325 TABLET ORAL at 01:21

## 2019-11-18 RX ADMIN — CARVEDILOL 3.12 MG: 3.12 TABLET, FILM COATED ORAL at 10:01

## 2019-11-18 RX ADMIN — SPIRONOLACTONE 25 MG: 25 TABLET ORAL at 10:01

## 2019-11-18 RX ADMIN — SACUBITRIL AND VALSARTAN 1 TABLET: 49; 51 TABLET, FILM COATED ORAL at 10:01

## 2019-11-18 RX ADMIN — BUTALBITAL, ACETAMINOPHEN, AND CAFFEINE 1 TABLET: 50; 325; 40 TABLET ORAL at 02:37

## 2019-11-18 RX ADMIN — MEROPENEM 1 G: 1 INJECTION, POWDER, FOR SOLUTION INTRAVENOUS at 02:38

## 2019-11-18 RX ADMIN — Medication 10 ML: at 10:00

## 2019-11-18 RX ADMIN — SERTRALINE HYDROCHLORIDE 100 MG: 100 TABLET ORAL at 10:01

## 2019-11-18 ASSESSMENT — PAIN SCALES - GENERAL
PAINLEVEL_OUTOF10: 7
PAINLEVEL_OUTOF10: 6
PAINLEVEL_OUTOF10: 0
PAINLEVEL_OUTOF10: 0
PAINLEVEL_OUTOF10: 9

## 2019-11-21 LAB
BLOOD CULTURE, ROUTINE: NORMAL
CULTURE, BLOOD 2: NORMAL

## 2019-11-25 ENCOUNTER — OFFICE VISIT (OUTPATIENT)
Dept: CARDIOLOGY | Age: 74
End: 2019-11-25
Payer: MEDICARE

## 2019-11-25 VITALS
HEIGHT: 76 IN | HEART RATE: 90 BPM | BODY MASS INDEX: 32.15 KG/M2 | DIASTOLIC BLOOD PRESSURE: 64 MMHG | WEIGHT: 264 LBS | SYSTOLIC BLOOD PRESSURE: 100 MMHG

## 2019-11-25 DIAGNOSIS — Z95.810 AICD (AUTOMATIC CARDIOVERTER/DEFIBRILLATOR) PRESENT: ICD-10-CM

## 2019-11-25 DIAGNOSIS — I50.22 CHRONIC SYSTOLIC HEART FAILURE (HCC): Primary | ICD-10-CM

## 2019-11-25 DIAGNOSIS — N39.0 COMPLICATED UTI (URINARY TRACT INFECTION): ICD-10-CM

## 2019-11-25 DIAGNOSIS — I25.5 ISCHEMIC CARDIOMYOPATHY: ICD-10-CM

## 2019-11-25 DIAGNOSIS — I50.22 CHRONIC SYSTOLIC HEART FAILURE (HCC): ICD-10-CM

## 2019-11-25 DIAGNOSIS — Z95.1 S/P CABG X 3: ICD-10-CM

## 2019-11-25 DIAGNOSIS — I25.10 CORONARY ARTERY DISEASE INVOLVING NATIVE CORONARY ARTERY OF NATIVE HEART WITHOUT ANGINA PECTORIS: ICD-10-CM

## 2019-11-25 PROBLEM — N18.30 STAGE 3 CHRONIC KIDNEY DISEASE (HCC): Chronic | Status: RESOLVED | Noted: 2019-05-31 | Resolved: 2019-11-25

## 2019-11-25 LAB
ANION GAP SERPL CALCULATED.3IONS-SCNC: 17 MMOL/L (ref 7–19)
BUN BLDV-MCNC: 27 MG/DL (ref 8–23)
CALCIUM SERPL-MCNC: 9.2 MG/DL (ref 8.8–10.2)
CHLORIDE BLD-SCNC: 102 MMOL/L (ref 98–111)
CO2: 21 MMOL/L (ref 22–29)
CREAT SERPL-MCNC: 1.3 MG/DL (ref 0.5–1.2)
GFR NON-AFRICAN AMERICAN: 54
GLUCOSE BLD-MCNC: 115 MG/DL (ref 74–109)
POTASSIUM SERPL-SCNC: 4.4 MMOL/L (ref 3.5–5)
PRO-BNP: 880 PG/ML (ref 0–1800)
SODIUM BLD-SCNC: 140 MMOL/L (ref 136–145)

## 2019-11-25 PROCEDURE — 99024 POSTOP FOLLOW-UP VISIT: CPT | Performed by: CLINICAL NURSE SPECIALIST

## 2019-11-25 PROCEDURE — 4040F PNEUMOC VAC/ADMIN/RCVD: CPT | Performed by: CLINICAL NURSE SPECIALIST

## 2019-11-25 PROCEDURE — G8417 CALC BMI ABV UP PARAM F/U: HCPCS | Performed by: CLINICAL NURSE SPECIALIST

## 2019-11-25 PROCEDURE — G8427 DOCREV CUR MEDS BY ELIG CLIN: HCPCS | Performed by: CLINICAL NURSE SPECIALIST

## 2019-11-25 PROCEDURE — 1036F TOBACCO NON-USER: CPT | Performed by: CLINICAL NURSE SPECIALIST

## 2019-11-25 PROCEDURE — G8482 FLU IMMUNIZE ORDER/ADMIN: HCPCS | Performed by: CLINICAL NURSE SPECIALIST

## 2019-11-25 PROCEDURE — 1123F ACP DISCUSS/DSCN MKR DOCD: CPT | Performed by: CLINICAL NURSE SPECIALIST

## 2019-11-25 PROCEDURE — G8598 ASA/ANTIPLAT THER USED: HCPCS | Performed by: CLINICAL NURSE SPECIALIST

## 2019-11-25 PROCEDURE — 93283 PRGRMG EVAL IMPLANTABLE DFB: CPT | Performed by: CLINICAL NURSE SPECIALIST

## 2019-11-25 PROCEDURE — 3017F COLORECTAL CA SCREEN DOC REV: CPT | Performed by: CLINICAL NURSE SPECIALIST

## 2019-11-25 PROCEDURE — 1111F DSCHRG MED/CURRENT MED MERGE: CPT | Performed by: CLINICAL NURSE SPECIALIST

## 2019-11-25 ASSESSMENT — ENCOUNTER SYMPTOMS
COUGH: 0
EYE REDNESS: 0
NAUSEA: 0
ABDOMINAL PAIN: 0
CHEST TIGHTNESS: 0
WHEEZING: 0
VOMITING: 0
FACIAL SWELLING: 0
SHORTNESS OF BREATH: 0

## 2019-11-27 ENCOUNTER — TELEPHONE (OUTPATIENT)
Dept: CARDIOLOGY | Age: 74
End: 2019-11-27

## 2019-12-19 ENCOUNTER — OFFICE VISIT (OUTPATIENT)
Dept: URGENT CARE | Age: 74
End: 2019-12-19
Payer: MEDICARE

## 2019-12-19 VITALS
RESPIRATION RATE: 16 BRPM | SYSTOLIC BLOOD PRESSURE: 106 MMHG | OXYGEN SATURATION: 97 % | HEART RATE: 81 BPM | DIASTOLIC BLOOD PRESSURE: 72 MMHG | TEMPERATURE: 96.7 F | WEIGHT: 260 LBS | HEIGHT: 76 IN | BODY MASS INDEX: 31.66 KG/M2

## 2019-12-19 DIAGNOSIS — H65.93 FLUID LEVEL BEHIND TYMPANIC MEMBRANE OF BOTH EARS: Primary | ICD-10-CM

## 2019-12-19 PROCEDURE — G8417 CALC BMI ABV UP PARAM F/U: HCPCS | Performed by: NURSE PRACTITIONER

## 2019-12-19 PROCEDURE — G8427 DOCREV CUR MEDS BY ELIG CLIN: HCPCS | Performed by: NURSE PRACTITIONER

## 2019-12-19 PROCEDURE — 99211 OFF/OP EST MAY X REQ PHY/QHP: CPT | Performed by: NURSE PRACTITIONER

## 2020-01-23 ENCOUNTER — TELEPHONE (OUTPATIENT)
Dept: CARDIOLOGY | Age: 75
End: 2020-01-23

## 2020-01-23 NOTE — TELEPHONE ENCOUNTER
Date of Surgery: 2-3-20    Cardiologist: Dr. Annabel Marti    Procedure: Spinal cord stimalator    Surgeon: Dr. Itzel Rudolph    Last Office Visit: 11-25-19  Reason for office visit and medical concerns addressed at this office visit: CHF, CAD, Ischemic cardiomyopathy, Cabg x 3, TIA, loop, HTN, Hyperlipidemia, DM, CKD    Testing Performed and Date of Service:  Cath 5-25-19  CABG x 3 5-30-19    Does the patient have a stent? If so, what type? Not within last year    Current Medications: Plavix, Spironolactone, Percocet, Lasix, Imdur, Insulin, Flomax, Zoloft, Nitrostat, plavix, coreg, entresto, fioricet, flonase, astelin,     Is the patient currently taking an anticoagulant?   Plavix    Additional Notes: Med hold x 5 days on Plavix

## 2020-01-24 ENCOUNTER — TELEPHONE (OUTPATIENT)
Dept: CARDIOLOGY | Age: 75
End: 2020-01-24

## 2020-01-27 ENCOUNTER — APPOINTMENT (OUTPATIENT)
Dept: PREADMISSION TESTING | Facility: HOSPITAL | Age: 75
End: 2020-01-27

## 2020-01-27 ENCOUNTER — HOSPITAL ENCOUNTER (OUTPATIENT)
Dept: GENERAL RADIOLOGY | Facility: HOSPITAL | Age: 75
Discharge: HOME | End: 2020-01-27
Admitting: ORTHOPAEDIC SURGERY

## 2020-01-27 ENCOUNTER — OFFICE VISIT (OUTPATIENT)
Dept: CARDIOLOGY | Age: 75
End: 2020-01-27
Payer: MEDICARE

## 2020-01-27 VITALS
DIASTOLIC BLOOD PRESSURE: 65 MMHG | HEART RATE: 84 BPM | HEIGHT: 75 IN | OXYGEN SATURATION: 97 % | BODY MASS INDEX: 33.03 KG/M2 | WEIGHT: 265.65 LBS | SYSTOLIC BLOOD PRESSURE: 117 MMHG

## 2020-01-27 VITALS
WEIGHT: 265 LBS | HEART RATE: 78 BPM | SYSTOLIC BLOOD PRESSURE: 120 MMHG | BODY MASS INDEX: 32.27 KG/M2 | HEIGHT: 76 IN | DIASTOLIC BLOOD PRESSURE: 78 MMHG

## 2020-01-27 LAB
ALBUMIN SERPL-MCNC: 4.1 G/DL (ref 3.5–5.2)
ALBUMIN/GLOB SERPL: 1.4 G/DL
ALP SERPL-CCNC: 74 U/L (ref 39–117)
ALT SERPL W P-5'-P-CCNC: 15 U/L (ref 1–41)
ANION GAP SERPL CALCULATED.3IONS-SCNC: 9 MMOL/L (ref 5–15)
APTT PPP: 32 SECONDS (ref 24.1–35)
AST SERPL-CCNC: 15 U/L (ref 1–40)
BACTERIA UR QL AUTO: ABNORMAL /HPF
BILIRUB SERPL-MCNC: 0.2 MG/DL (ref 0.2–1.2)
BILIRUB UR QL STRIP: NEGATIVE
BUN BLD-MCNC: 26 MG/DL (ref 8–23)
BUN/CREAT SERPL: 18.2 (ref 7–25)
CALCIUM SPEC-SCNC: 9 MG/DL (ref 8.6–10.5)
CHLORIDE SERPL-SCNC: 102 MMOL/L (ref 98–107)
CLARITY UR: ABNORMAL
CO2 SERPL-SCNC: 26 MMOL/L (ref 22–29)
COLOR UR: YELLOW
CREAT BLD-MCNC: 1.43 MG/DL (ref 0.76–1.27)
DEPRECATED RDW RBC AUTO: 48 FL (ref 37–54)
ERYTHROCYTE [DISTWIDTH] IN BLOOD BY AUTOMATED COUNT: 16.2 % (ref 12.3–15.4)
GFR SERPL CREATININE-BSD FRML MDRD: 48 ML/MIN/1.73
GLOBULIN UR ELPH-MCNC: 3 GM/DL
GLUCOSE BLD-MCNC: 231 MG/DL (ref 65–99)
GLUCOSE UR STRIP-MCNC: NEGATIVE MG/DL
HCT VFR BLD AUTO: 39.5 % (ref 37.5–51)
HGB BLD-MCNC: 13.1 G/DL (ref 13–17.7)
HGB UR QL STRIP.AUTO: NEGATIVE
HYALINE CASTS UR QL AUTO: ABNORMAL /LPF
INR PPP: 0.97 (ref 0.91–1.09)
KETONES UR QL STRIP: NEGATIVE
LEUKOCYTE ESTERASE UR QL STRIP.AUTO: ABNORMAL
MCH RBC QN AUTO: 26.9 PG (ref 26.6–33)
MCHC RBC AUTO-ENTMCNC: 33.2 G/DL (ref 31.5–35.7)
MCV RBC AUTO: 81.1 FL (ref 79–97)
NITRITE UR QL STRIP: POSITIVE
PH UR STRIP.AUTO: <=5 [PH] (ref 5–8)
PLATELET # BLD AUTO: 144 10*3/MM3 (ref 140–450)
PMV BLD AUTO: 10.6 FL (ref 6–12)
POTASSIUM BLD-SCNC: 4.5 MMOL/L (ref 3.5–5.2)
PROT SERPL-MCNC: 7.1 G/DL (ref 6–8.5)
PROT UR QL STRIP: NEGATIVE
PROTHROMBIN TIME: 13.2 SECONDS (ref 11.9–14.6)
RBC # BLD AUTO: 4.87 10*6/MM3 (ref 4.14–5.8)
RBC # UR: ABNORMAL /HPF
REF LAB TEST METHOD: ABNORMAL
SODIUM BLD-SCNC: 137 MMOL/L (ref 136–145)
SP GR UR STRIP: 1.01 (ref 1–1.03)
SQUAMOUS #/AREA URNS HPF: ABNORMAL /HPF
UROBILINOGEN UR QL STRIP: ABNORMAL
WBC NRBC COR # BLD: 6.98 10*3/MM3 (ref 3.4–10.8)
WBC UR QL AUTO: ABNORMAL /HPF

## 2020-01-27 PROCEDURE — 80053 COMPREHEN METABOLIC PANEL: CPT | Performed by: ORTHOPAEDIC SURGERY

## 2020-01-27 PROCEDURE — 99024 POSTOP FOLLOW-UP VISIT: CPT | Performed by: CLINICAL NURSE SPECIALIST

## 2020-01-27 PROCEDURE — 85610 PROTHROMBIN TIME: CPT | Performed by: ORTHOPAEDIC SURGERY

## 2020-01-27 PROCEDURE — 36415 COLL VENOUS BLD VENIPUNCTURE: CPT

## 2020-01-27 PROCEDURE — 85730 THROMBOPLASTIN TIME PARTIAL: CPT | Performed by: ORTHOPAEDIC SURGERY

## 2020-01-27 PROCEDURE — 1123F ACP DISCUSS/DSCN MKR DOCD: CPT | Performed by: CLINICAL NURSE SPECIALIST

## 2020-01-27 PROCEDURE — G8427 DOCREV CUR MEDS BY ELIG CLIN: HCPCS | Performed by: CLINICAL NURSE SPECIALIST

## 2020-01-27 PROCEDURE — 4040F PNEUMOC VAC/ADMIN/RCVD: CPT | Performed by: CLINICAL NURSE SPECIALIST

## 2020-01-27 PROCEDURE — G8482 FLU IMMUNIZE ORDER/ADMIN: HCPCS | Performed by: CLINICAL NURSE SPECIALIST

## 2020-01-27 PROCEDURE — 87088 URINE BACTERIA CULTURE: CPT | Performed by: ORTHOPAEDIC SURGERY

## 2020-01-27 PROCEDURE — 81001 URINALYSIS AUTO W/SCOPE: CPT | Performed by: ORTHOPAEDIC SURGERY

## 2020-01-27 PROCEDURE — 71045 X-RAY EXAM CHEST 1 VIEW: CPT

## 2020-01-27 PROCEDURE — 93283 PRGRMG EVAL IMPLANTABLE DFB: CPT | Performed by: CLINICAL NURSE SPECIALIST

## 2020-01-27 PROCEDURE — 1036F TOBACCO NON-USER: CPT | Performed by: CLINICAL NURSE SPECIALIST

## 2020-01-27 PROCEDURE — G8417 CALC BMI ABV UP PARAM F/U: HCPCS | Performed by: CLINICAL NURSE SPECIALIST

## 2020-01-27 PROCEDURE — 3017F COLORECTAL CA SCREEN DOC REV: CPT | Performed by: CLINICAL NURSE SPECIALIST

## 2020-01-27 PROCEDURE — 87086 URINE CULTURE/COLONY COUNT: CPT | Performed by: ORTHOPAEDIC SURGERY

## 2020-01-27 PROCEDURE — 93010 ELECTROCARDIOGRAM REPORT: CPT | Performed by: INTERNAL MEDICINE

## 2020-01-27 PROCEDURE — 85027 COMPLETE CBC AUTOMATED: CPT | Performed by: ORTHOPAEDIC SURGERY

## 2020-01-27 PROCEDURE — 87186 SC STD MICRODIL/AGAR DIL: CPT | Performed by: ORTHOPAEDIC SURGERY

## 2020-01-27 PROCEDURE — 93005 ELECTROCARDIOGRAM TRACING: CPT

## 2020-01-27 RX ORDER — FUROSEMIDE 80 MG
80 TABLET ORAL DAILY
COMMUNITY
End: 2022-12-16 | Stop reason: DRUGHIGH

## 2020-01-27 RX ORDER — OXYCODONE AND ACETAMINOPHEN 10; 325 MG/1; MG/1
1 TABLET ORAL EVERY 6 HOURS PRN
COMMUNITY
End: 2020-02-03 | Stop reason: HOSPADM

## 2020-01-27 RX ORDER — DIPHENHYDRAMINE HCL 25 MG
50 TABLET ORAL 2 TIMES DAILY
COMMUNITY
End: 2021-06-08

## 2020-01-27 RX ORDER — SERTRALINE HYDROCHLORIDE 100 MG/1
100 TABLET, FILM COATED ORAL DAILY
COMMUNITY

## 2020-01-27 RX ORDER — NITROGLYCERIN 0.4 MG/1
0.4 TABLET SUBLINGUAL
COMMUNITY

## 2020-01-27 RX ORDER — DIPHENHYDRAMINE HCL 25 MG
50 TABLET ORAL NIGHTLY PRN
COMMUNITY
End: 2022-12-16

## 2020-01-27 ASSESSMENT — ENCOUNTER SYMPTOMS
SHORTNESS OF BREATH: 0
NAUSEA: 0
WHEEZING: 0
VOMITING: 0
EYE REDNESS: 0
COUGH: 0
FACIAL SWELLING: 0
BACK PAIN: 1
CHEST TIGHTNESS: 0
ABDOMINAL PAIN: 0

## 2020-01-27 NOTE — PATIENT INSTRUCTIONS
maru in about 3 months (around 4/27/2020) for APRN. Start the process for Repatha- injectable cholesterol med  Check cholesterol fasting    OK to take an extra Lasix for weight gain over 3lbs in 24 hours. If weight is not improving by the next day, call the office.    - Weigh daily and report weight gain of 3lbs or more in 24hrs or 5lbs in one week. - Call for increasing shortness of breath or increasing swelling in feet and legs. (This could mean you are retaining too much fluid)  - 2000mg low sodium diet  - Fluid restriction of 1500ml per day (about 6 cups of fluid per day)    Patient Education        Heart Failure: Care Instructions  Your Care Instructions    Heart failure occurs when your heart does not pump as much blood as the body needs. Failure does not mean that the heart has stopped pumping but rather that it is not pumping as well as it should. Over time, this causes fluid buildup in your lungs and other parts of your body. Fluid buildup can cause shortness of breath, fatigue, swollen ankles, and other problems. By taking medicines regularly, reducing sodium (salt) in your diet, checking your weight every day, and making lifestyle changes, you can feel better and live longer. Follow-up care is a key part of your treatment and safety. Be sure to make and go to all appointments, and call your doctor if you are having problems. It's also a good idea to know your test results and keep a list of the medicines you take. How can you care for yourself at home? Medicines    · Be safe with medicines. Take your medicines exactly as prescribed.  Call your doctor if you think you are having a problem with your medicine.     · Do not take any vitamins, over-the-counter medicine, or herbal products without talking to your doctor first. Cipriano Ignaciooe not take ibuprofen (Advil or Motrin) and naproxen (Aleve) without talking to your doctor first. They could make your heart failure worse.     · You may take some of the following medicine. ? Angiotensin-converting enzyme inhibitors (ACEIs) or angiotensin II receptor blockers (ARBs) reduce the heart's workload, lower blood pressure, and reduce swelling. Taking an ACEI or ARB may lower your chance of needing to be hospitalized. ? Beta-blockers can slow heart rate, decrease blood pressure, and improve your condition. Taking a beta-blocker may lower your chance of needing to be hospitalized. ? Diuretics, also called water pills, reduce swelling.    You will get more details on the specific medicines your doctor prescribes. Diet    · Your doctor may suggest that you limit sodium. Your doctor can tell you how much sodium is right for you. An example is less than 3,000 mg a day. This includes all the salt you eat in cooking or in packaged foods. People get most of their sodium from processed foods. Fast food and restaurant meals also tend to be very high in sodium.     · Ask your doctor how much liquid you can drink each day. You may have to limit liquids.    Weight    · Weigh yourself without clothing at the same time each day. Record your weight. Call your doctor if you have a sudden weight gain, such as more than 2 to 3 pounds in a day or 5 pounds in a week. (Your doctor may suggest a different range of weight gain.) A sudden weight gain may mean that your heart failure is getting worse.    Activity level    · Start light exercise (if your doctor says it is okay). Even if you can only do a small amount, exercise will help you get stronger, have more energy, and manage your weight and your stress. Walking is an easy way to get exercise. Start out by walking a little more than you did before. Bit by bit, increase the amount you walk.     · When you exercise, watch for signs that your heart is working too hard. You are pushing yourself too hard if you cannot talk while you are exercising.  If you become short of breath or dizzy or have chest pain, stop, sit down, and rest.     · If you Care Instructions. \"     If you do not have an account, please click on the \"Sign Up Now\" link. Current as of: April 9, 2019  Content Version: 12.3  © 4568-0709 [employer]. Care instructions adapted under license by [facility] ([facility]). If you have questions about a medical condition or this instruction, always ask your healthcare professional. [address] any warranty or liability for your use of this information.

## 2020-01-27 NOTE — DISCHARGE INSTRUCTIONS
DAY OF SURGERY INSTRUCTIONS        YOUR SURGEON: ***    PROCEDURE: ***thoracic laminectomy , insertion spinal cord stimulator     DATE OF SURGERY: ***2/3/2020    ARRIVAL TIME: AS DIRECTED BY OFFICE    YOU MAY TAKE THE FOLLOWING MEDICATION(S) THE MORNING OF SURGERY WITH A SIP OF WATER: ***percocet, metoprolol (do not take valsartan 24 hours prior to surgery per anesthesia)      ALL OTHER HOME MEDICATION CHECK WITH YOUR PHYSICIAN                MANAGING PAIN AFTER SURGERY    We know you are probably wondering what your pain will be like after surgery.  Following surgery it is unrealistic to expect you will not have pain.   Pain is how our bodies let us know that something is wrong or cautions us to be careful.  That said, our goal is to make your pain tolerable.    Methods we may use to treat your pain include (oral or IV medications, PCAs, epidurals, nerve blocks, etc.)   While some procedures require IV pain medications for a short time after surgery, transitioning to pain medications by mouth allows for better management of pain.   Your nurse will encourage you to take oral pain medications whenever possible.  IV medications work almost immediately, but only last a short while.  Taking medications by mouth allows for a more constant level of medication in your blood stream for a longer period of time.      Once your pain is out of control it is harder to get back under control.  It is important you are aware when your next dose of pain medication is due.  If you are admitted, your nurse may write the time of your next dose on the white board in your room to help you remember.      We are interested in your pain and encourage you to inform us about aggravating factors during your visit.   Many times a simple repositioning every few hours can make a big difference.    If your physician says it is okay, do not let your pain prevent you from getting out of bed. Be sure to call your nurse for assistance  prior to getting up so you do not fall.      Before surgery, please decide your tolerable pain goal.  These faces help describe the pain ratings we use on a 0-10 scale.   Be prepared to tell us your goal and whether or not you take pain or anxiety medications at home.          BEFORE YOU COME TO THE HOSPITAL  (Pre-op instructions)  • Do not eat, drink, smoke or chew gum after midnight the night before surgery.  This also includes no mints.  • Morning of surgery take only the medicines you have been instructed with a sip of water unless otherwise instructed  by your physician.  • Do not shave, wear makeup or dark nail polish.  • Remove all jewelry including rings.  • Leave anything you consider valuable at home.  • Leave your suitcase in the car until after your surgery.  • Bring the following with you if applicable:  o Picture ID and insurance, Medicare or Medicaid cards  o Co-pay/deductible required by insurance (cash, check, credit card)  o Copy of advance directive, living will or power-of- documents if not brought to PAT  o CPAP or BIPAP mask and tubing  o Relaxation aids ( book, magazine), etc.  o Hearing aids                        ON THE DAY OF SURGERY  · On the day of surgery check in at registration located at the main entrance of the hospital.   ? You will be registered and given a beeper with instructions where to wait in the main lobby.  ? When your beeper lights up and vibrates a member of the Outpatient Surgery staff will meet you at the double doors under the stair steps and escort you to your preoperative room.   · You may have cloth compression devices placed on your legs. These help to prevent blood clots and reduce swelling in your legs.  · An IV may be inserted into one of your veins.  · In the operating room, you may be given one or more of the following:  ? A medicine to help you relax (sedative).  ? A medicine to numb the area (local anesthetic).  ? A medicine to make you fall asleep  "(general anesthetic).  ? A medicine that is injected into an area of your body to numb everything below the injection site (regional anesthetic).  · Your surgical site will be marked or identified.  · You may be given an antibiotic through your IV to help prevent infection.  Contact a health care provider if you:  · Develop a fever of more than 100.4°F (38°C) or other feelings of illness during the 48 hours before your surgery.  · Have symptoms that get worse.  Have questions or concerns about your surgery    General Anesthesia/Surgery, Adult  General anesthesia is the use of medicines to make a person \"go to sleep\" (unconscious) for a medical procedure. General anesthesia must be used for certain procedures, and is often recommended for procedures that:  · Last a long time.  · Require you to be still or in an unusual position.  · Are major and can cause blood loss.  The medicines used for general anesthesia are called general anesthetics. As well as making you unconscious for a certain amount of time, these medicines:  · Prevent pain.  · Control your blood pressure.  · Relax your muscles.  Tell a health care provider about:  · Any allergies you have.  · All medicines you are taking, including vitamins, herbs, eye drops, creams, and over-the-counter medicines.  · Any problems you or family members have had with anesthetic medicines.  · Types of anesthetics you have had in the past.  · Any blood disorders you have.  · Any surgeries you have had.  · Any medical conditions you have.  · Any recent upper respiratory, chest, or ear infections.  · Any history of:  ? Heart or lung conditions, such as heart failure, sleep apnea, asthma, or chronic obstructive pulmonary disease (COPD).  ?  service.  ? Depression or anxiety.  · Any tobacco or drug use, including marijuana or alcohol use.  · Whether you are pregnant or may be pregnant.  What are the risks?  Generally, this is a safe procedure. However, problems may " occur, including:  · Allergic reaction.  · Lung and heart problems.  · Inhaling food or liquid from the stomach into the lungs (aspiration).  · Nerve injury.  · Air in the bloodstream, which can lead to stroke.  · Extreme agitation or confusion (delirium) when you wake up from the anesthetic.  · Waking up during your procedure and being unable to move. This is rare.  These problems are more likely to develop if you are having a major surgery or if you have an advanced or serious medical condition. You can prevent some of these complications by answering all of your health care provider's questions thoroughly and by following all instructions before your procedure.  General anesthesia can cause side effects, including:  · Nausea or vomiting.  · A sore throat from the breathing tube.  · Hoarseness.  · Wheezing or coughing.  · Shaking chills.  · Tiredness.  · Body aches.  · Anxiety.  · Sleepiness or drowsiness.  · Confusion or agitation.  RISKS AND COMPLICATIONS OF SURGERY  Your health care provider will discuss possible risks and complications with you before surgery. Common risks and complications include:    · Problems due to the use of anesthetics.  · Blood loss and replacement (does not apply to minor surgical procedures).  · Temporary increase in pain due to surgery.  · Uncorrected pain or problems that the surgery was meant to correct.  · Infection.  · New damage.    What happens before the procedure?    Medicines  Ask your health care provider about:  · Changing or stopping your regular medicines. This is especially important if you are taking diabetes medicines or blood thinners.  · Taking medicines such as aspirin and ibuprofen. These medicines can thin your blood. Do not take these medicines unless your health care provider tells you to take them.  · Taking over-the-counter medicines, vitamins, herbs, and supplements. Do not take these during the week before your procedure unless your health care provider  approves them.  General instructions  · Starting 3-6 weeks before the procedure, do not use any products that contain nicotine or tobacco, such as cigarettes and e-cigarettes. If you need help quitting, ask your health care provider.  · If you brush your teeth on the morning of the procedure, make sure to spit out all of the toothpaste.  · Tell your health care provider if you become ill or develop a cold, cough, or fever.  · If instructed by your health care provider, bring your sleep apnea device with you on the day of your surgery (if applicable).  · Ask your health care provider if you will be going home the same day, the following day, or after a longer hospital stay.  ? Plan to have someone take you home from the hospital or clinic.  ? Plan to have a responsible adult care for you for at least 24 hours after you leave the hospital or clinic. This is important.  What happens during the procedure?  · You will be given anesthetics through both of the following:  ? A mask placed over your nose and mouth.  ? An IV in one of your veins.  · You may receive a medicine to help you relax (sedative).  · After you are unconscious, a breathing tube may be inserted down your throat to help you breathe. This will be removed before you wake up.  · An anesthesia specialist will stay with you throughout your procedure. He or she will:  ? Keep you comfortable and safe by continuing to give you medicines and adjusting the amount of medicine that you get.  ? Monitor your blood pressure, pulse, and oxygen levels to make sure that the anesthetics do not cause any problems.  The procedure may vary among health care providers and hospitals.  What happens after the procedure?  · Your blood pressure, temperature, heart rate, breathing rate, and blood oxygen level will be monitored until the medicines you were given have worn off.  · You will wake up in a recovery area. You may wake up slowly.  · If you feel anxious or agitated, you may  be given medicine to help you calm down.  · If you will be going home the same day, your health care provider may check to make sure you can walk, drink, and urinate.  · Your health care provider will treat any pain or side effects you have before you go home.  · Do not drive for 24 hours if you were given a sedative.  Summary  · General anesthesia is used to keep you still and prevent pain during a procedure.  · It is important to tell your healthcare provider about your medical history and any surgeries you have had, and previous experience with anesthesia.  · Follow your healthcare provider’s instructions about when to stop eating, drinking, or taking certain medicines before your procedure.  · Plan to have someone take you home from the hospital or clinic.  This information is not intended to replace advice given to you by your health care provider. Make sure you discuss any questions you have with your health care provider.  Document Released: 03/26/2009 Document Revised: 08/03/2018 Document Reviewed: 08/03/2018  [employer] Interactive Patient Education © 2019 [employer]       Fall Prevention in Hospitals, Adult  As a hospital patient, your condition and the treatments you receive can increase your risk for falls. Some additional risk factors for falls in a hospital include:  · Being in an unfamiliar environment.  · Being on bed rest.  · Your surgery.  · Taking certain medicines.  · Your tubing requirements, such as intravenous (IV) therapy or catheters.  It is important that you learn how to decrease fall risks while at the hospital. Below are important tips that can help prevent falls.  SAFETY TIPS FOR PREVENTING FALLS  Talk about your risk of falling.  · Ask your health care provider why you are at risk for falling. Is it your medicine, illness, tubing placement, or something else?  · Make a plan with your health care provider to keep you safe from falls.  · Ask your health care provider or pharmacist about  side effects of your medicines. Some medicines can make you dizzy or affect your coordination.  Ask for help.  · Ask for help before getting out of bed. You may need to press your call button.  · Ask for assistance in getting safely to the toilet.  · Ask for a walker or cane to be put at your bedside. Ask that most of the side rails on your bed be placed up before your health care provider leaves the room.  · Ask family or friends to sit with you.  · Ask for things that are out of your reach, such as your glasses, hearing aids, telephone, bedside table, or call button.  Follow these tips to avoid falling:  · Stay lying or seated, rather than standing, while waiting for help.  · Wear rubber-soled slippers or shoes whenever you walk in the hospital.  · Avoid quick, sudden movements.  ¨ Change positions slowly.  ¨ Sit on the side of your bed before standing.  ¨ Stand up slowly and wait before you start to walk.  · Let your health care provider know if there is a spill on the floor.  · Pay careful attention to the medical equipment, electrical cords, and tubes around you.  · When you need help, use your call button by your bed or in the bathroom. Wait for one of your health care providers to help you.  · If you feel dizzy or unsure of your footing, return to bed and wait for assistance.  · Avoid being distracted by the TV, telephone, or another person in your room.  · Do not lean or support yourself on rolling objects, such as IV poles or bedside tables.     This information is not intended to replace advice given to you by your health care provider. Make sure you discuss any questions you have with your health care provider.     Document Released: 12/15/2001 Document Revised: 01/08/2016 Document Reviewed: 08/25/2013  [employer] Interactive Patient Education ©2016 [employer]       Psychiatric  CHG 4% Patient Instruction Sheet    Chlorhexidine Before Surgery  Chlorhexidine gluconate (CHG) is a germ-killing  (antiseptic) solution that is used to clean the skin. It gets rid of the bacteria that normally live on the skin. Cleaning your skin with CHG before surgery helps lower the risk for infection after surgery.    How to use CHG solution  · You will take 2 showers, one shower the night before surgery, the second shower the morning of surgery before coming to the hospital.  · Use CHG only as told by your health care provider, and follow the instructions on the label.  · Use CHG solution while taking a shower. Follow these steps when using CHG solution (unless your health care provider gives you different instructions):  1. Start the shower.  2. Use your normal soap and shampoo to wash your face and hair.  3. Turn off the shower or move out of the shower stream.  4. Pour the CHG onto a clean washcloth. Do not use any type of brush or rough-edged sponge.  5. Starting at your neck, lather your body down to your toes. Make sure you:  6. Pay special attention to the part of your body where you will be having surgery. Scrub this area for at least 1 minute.  7. Use the full amount of CHG as directed. Usually, this is one half bottle for each shower.  8. Do not use CHG on your head or face. If the solution gets into your ears or eyes, rinse them well with water.  9. Avoid your genital area.  10. Avoid any areas of skin that have broken skin, cuts, or scrapes.  11. Scrub your back and under your arms. Make sure to wash skin folds.  12. Let the lather sit on your skin for 1-2 minutes or as long as told by your health care  provider.  13. Thoroughly rinse your entire body in the shower. Make sure that all body creases and crevices are rinsed well.  14. Dry off with a clean towel. Do not put any substances on your body afterward, such as powder, lotion, or perfume.  15. Put on clean clothes or pajamas.  16. If it is the night before your surgery, sleep in clean sheets.    What are the risks?  Risks of using CHG include:  · A skin  reaction.  · Hearing loss, if CHG gets in your ears.  · Eye injury, if CHG gets in your eyes and is not rinsed out.  · The CHG product catching fire.  Make sure that you avoid smoking and flames after applying CHG to your skin.  Do not use CHG:  · If you have a chlorhexidine allergy or have previously reacted to chlorhexidine.  · On babies younger than 2 months of age.      On the day of surgery, when you are taken to your room in Outpatient Surgery you will be given a CHG prepackaged cloth to wipe the site for your surgery.  How to use CHG prepackaged cloths  · Follow the instructions on the label.  · Use the CHG cloth on clean, dry skin. Follow these steps when using a CHG cloth (unless your health care provider gives you different instructions):  1. Using the CHG cloth, vigorously scrub the part of your body where you will be having surgery. Scrub using a back-and-forth motion for 3 minutes. The area on your body should be completely wet with CHG when you are finished scrubbing.  2. Do not rinse. Discard the cloth and let the area air-dry for 1 minute. Do not put any substances on your body afterward, such as powder, lotion, or perfume.  Contact a health care provider if:  · Your skin gets irritated after scrubbing.  · You have questions about using your solution or cloth.  Get help right away if:  · Your eyes become very red or swollen.  · Your eyes itch badly.  · Your skin itches badly and is red or swollen.  · Your hearing changes.  · You have trouble seeing.  · You have swelling or tingling in your mouth or throat.  · You have trouble breathing.  · You swallow any chlorhexidine.  Summary  · Chlorhexidine gluconate (CHG) is a germ-killing (antiseptic) solution that is used to clean the skin. Cleaning your skin with CHG before surgery helps lower the risk for infection after surgery.  · You may be given CHG to use at home. It may be in a bottle or in a prepackaged cloth to use on your skin. Carefully follow  your health care provider's instructions and the instructions on the product label.  · Do not use CHG if you have a chlorhexidine allergy.  · Contact your health care provider if your skin gets irritated after scrubbing.  This information is not intended to replace advice given to you by your health care provider. Make sure you discuss any questions you have with your health care provider.  Document Released: 09/11/2013 Document Revised: 11/15/2018 Document Reviewed: 11/15/2018  [employer] Interactive Patient Education © 2019 [employer]          PATIENT/FAMILY/RESPONSIBLE PARTY VERBALIZES UNDERSTANDING OF ABOVE EDUCATION.  COPY OF PAIN SCALE GIVEN AND REVIEWED WITH VERBALIZED UNDERSTANDING.

## 2020-01-27 NOTE — PROGRESS NOTES
SOB (shortness of breath) 04/15/2019    Chest pain 02/28/2019    MOO on CPAP 05/03/2018    Dermatitis 03/05/2018    Depression with anxiety 03/05/2018    Benign prostatic hyperplasia without lower urinary tract symptoms 03/05/2018    Chronic tension-type headache, not intractable 03/05/2018    West Nile virus seropositivity 11/22/2017    Status post placement of implantable loop recorder 10/31/2017    Recurrent major depressive disorder, in full remission ([address] 10/17/2017    Trigger middle finger of left hand 10/17/2017    Peripheral polyneuropathy 10/17/2017    Palpitations 10/17/2017    Chronic insomnia 10/17/2017    Cerebrovascular accident (CVA) due to embolism of right middle cerebral artery ([address] 08/14/2017    Dizziness 03/27/2017    Imbalance 03/27/2017    Thoracic outlet syndrome 02/21/2017    Acute ischemic stroke ([address] 02/20/2017    DDD (degenerative disc disease), lumbar 11/15/2016    Spondylosis of lumbar region without myelopathy or radiculopathy 11/15/2016    Chronic bilateral low back pain without sciatica 11/10/2016    Mixed hyperlipidemia 07/23/2012    MI (myocardial infarction) ([address]     Type 2 diabetes mellitus with diabetic polyneuropathy, with long-term current use of insulin ([address] 07/12/2011    Essential hypertension 07/12/2011    Coronary artery disease involving native coronary artery of native heart without angina pectoris 07/12/2011     Past Medical History:   Diagnosis Date    Acute ischemic stroke ([address] 2/20/2017    Atherosclerotic heart disease     s/p MI, stenting x 3 jan 2011([place])    CAD (coronary artery disease)     Cerebral artery occlusion with cerebral infarction ([address]     Cerebrovascular accident (CVA) due to embolism of right middle cerebral artery ([address] 8/14/2017    CHF (congestive heart failure) [facility]     Chronic bilateral low back pain without sciatica 11/10/2016    Chronic kidney disease     DDD (degenerative disc disease), lumbar 11/15/2016    Depression     Depression with anxiety     Diabetes mellitus ([address]     type II    Diabetes mellitus, type 2 ([address]     DM (diabetes mellitus) ([address] 7/12/2011    ESBL (extended spectrum beta-lactamase) producing bacteria infection 11/16/2019    urine    Glaucoma     Headache     Hyperlipidemia     Cholesterol management per pcp.  Hypertension     Macular degeneration     MI (myocardial infarction) ([address]     [facility]     Neuromuscular disorder ([facility]     Neuropathy     MOO on CPAP     Osteoarthritis     Palliative care patient 06/11/2019    Sleep apnea     Spondylosis of lumbar region without myelopathy or radiculopathy 11/15/2016    Status post placement of implantable loop recorder 10/31/2017    TIA (transient ischemic attack)      Past Surgical History:   Procedure Laterality Date    APPENDECTOMY      at age 4    BACK SURGERY      2016 (fusion), 2017    BREAST SURGERY  05/2019    CARDIAC CATHETERIZATION  12/12/12    with angioplasty, stent    CARDIAC CATHETERIZATION  3/29/15  [facility]DL    stent to distal RCA.  EF 60%    CHOLECYSTECTOMY, LAPAROSCOPIC      2012    COLONOSCOPY      2010    CORONARY ANGIOPLASTY WITH STENT PLACEMENT  jan 11 [place]    stent x 3    CORONARY ANGIOPLASTY WITH STENT PLACEMENT      01/01/11    CORONARY ARTERY BYPASS GRAFT N/A 5/30/2019    CORONARY ARTERY BYPASS GRAFT X3 WITH LEFT INTERNAL MAMMARY ARTERY WITH ENDOSCOPIC VEIN HARVESTING WITH PERFUSION TRANSESOPHAGEAL ECHOCARDIOGRAM performed by Elif Caldera MD at [address] [place] [address] CATH LAB PROCEDURE  [id number]    primary stent placement to the first circumflex marginal branch, balloon angioplasty to the third left anterior descending diagnonal branch, intracoronary nitroglycerin adminstration    JOINT REPLACEMENT      left knee, ~2016    LUMBAR FUSION Left 11/15/2016    LUMBAR INTERBODY FUSION LATERAL L3-4 L4-5 WITH LATERAL PLATE  performed by Imelda Tan MD at nervous/anxious. Objective  Vital Signs - /78   Pulse 78   Ht 6' 4\" (1.93 m)   Wt 265 lb (120.2 kg)   BMI 32.26 kg/m²    Wt Readings from Last 3 Encounters:   01/27/20 265 lb (120.2 kg)   12/19/19 260 lb (117.9 kg)   11/25/19 264 lb (119.7 kg)      Physical Exam  Vitals signs and nursing note reviewed. Constitutional:       General: He is not in acute distress. Appearance: Normal appearance. He is well-developed. He is not diaphoretic. HENT:      Head: Normocephalic and atraumatic. Right Ear: Hearing and external ear normal.      Left Ear: Hearing and external ear normal.      Nose: Nose normal.   Eyes:      General:         Right eye: No discharge. Left eye: No discharge. Pupils: Pupils are equal, round, and reactive to light. Neck:      Musculoskeletal: Neck supple. No muscular tenderness. Thyroid: No thyromegaly. Vascular: No carotid bruit or JVD. Trachea: No tracheal deviation. Cardiovascular:      Rate and Rhythm: Normal rate and regular rhythm. Heart sounds: Normal heart sounds. No murmur. No friction rub. No gallop. Comments: No carotid bruit  Pulmonary:      Effort: Pulmonary effort is normal. No respiratory distress. Breath sounds: Normal breath sounds. No wheezing or rales. Abdominal:      Palpations: Abdomen is soft. Tenderness: There is no abdominal tenderness. Musculoskeletal:         General: Swelling present. Right lower leg: Edema (trace) present. Left lower leg: Left lower leg edema: trace. Comments: Normal gait and station   Skin:     General: Skin is warm and dry. Findings: No rash. Neurological:      General: No focal deficit present. Mental Status: He is alert and oriented to person, place, and time. Cranial Nerves: No cranial nerve deficit.    Psychiatric:         Mood and Affect: Mood normal.         Behavior: Behavior normal.         Judgment: Judgment normal.

## 2020-01-29 LAB — BACTERIA SPEC AEROBE CULT: ABNORMAL

## 2020-02-03 ENCOUNTER — ANESTHESIA EVENT (OUTPATIENT)
Dept: PERIOP | Facility: HOSPITAL | Age: 75
End: 2020-02-03

## 2020-02-03 ENCOUNTER — HOSPITAL ENCOUNTER (OUTPATIENT)
Facility: HOSPITAL | Age: 75
Setting detail: SURGERY ADMIT
Discharge: HOME | End: 2020-02-03
Attending: ORTHOPAEDIC SURGERY | Admitting: ORTHOPAEDIC SURGERY

## 2020-02-03 ENCOUNTER — ANESTHESIA (OUTPATIENT)
Dept: PERIOP | Facility: HOSPITAL | Age: 75
End: 2020-02-03

## 2020-02-03 ENCOUNTER — APPOINTMENT (OUTPATIENT)
Dept: GENERAL RADIOLOGY | Facility: HOSPITAL | Age: 75
End: 2020-02-03

## 2020-02-03 VITALS
SYSTOLIC BLOOD PRESSURE: 95 MMHG | TEMPERATURE: 97 F | OXYGEN SATURATION: 93 % | HEART RATE: 90 BPM | RESPIRATION RATE: 18 BRPM | DIASTOLIC BLOOD PRESSURE: 48 MMHG

## 2020-02-03 DIAGNOSIS — G89.29 CHRONIC LOW BACK PAIN WITH SCIATICA, SCIATICA LATERALITY UNSPECIFIED, UNSPECIFIED BACK PAIN LATERALITY: Primary | ICD-10-CM

## 2020-02-03 DIAGNOSIS — M54.40 CHRONIC LOW BACK PAIN WITH SCIATICA, SCIATICA LATERALITY UNSPECIFIED, UNSPECIFIED BACK PAIN LATERALITY: Primary | ICD-10-CM

## 2020-02-03 LAB
ABO GROUP BLD: NORMAL
BLD GP AB SCN SERPL QL: NEGATIVE
GLUCOSE BLDC GLUCOMTR-MCNC: 117 MG/DL (ref 70–130)
GLUCOSE BLDC GLUCOMTR-MCNC: 129 MG/DL (ref 70–130)
RH BLD: POSITIVE
T&S EXPIRATION DATE: NORMAL

## 2020-02-03 PROCEDURE — 25010000002 FENTANYL CITRATE (PF) 250 MCG/5ML SOLUTION: Performed by: NURSE ANESTHETIST, CERTIFIED REGISTERED

## 2020-02-03 PROCEDURE — 86850 RBC ANTIBODY SCREEN: CPT | Performed by: ORTHOPAEDIC SURGERY

## 2020-02-03 PROCEDURE — 25010000002 ONDANSETRON PER 1 MG: Performed by: NURSE ANESTHETIST, CERTIFIED REGISTERED

## 2020-02-03 PROCEDURE — 76000 FLUOROSCOPY <1 HR PHYS/QHP: CPT

## 2020-02-03 PROCEDURE — C9290 INJ, BUPIVACAINE LIPOSOME: HCPCS | Performed by: ORTHOPAEDIC SURGERY

## 2020-02-03 PROCEDURE — 82962 GLUCOSE BLOOD TEST: CPT

## 2020-02-03 PROCEDURE — C1820 GENERATOR NEURO RECHG BAT SY: HCPCS | Performed by: ORTHOPAEDIC SURGERY

## 2020-02-03 PROCEDURE — 86901 BLOOD TYPING SEROLOGIC RH(D): CPT | Performed by: ORTHOPAEDIC SURGERY

## 2020-02-03 PROCEDURE — 25010000002 METOCLOPRAMIDE PER 10 MG: Performed by: ANESTHESIOLOGY

## 2020-02-03 PROCEDURE — 72070 X-RAY EXAM THORAC SPINE 2VWS: CPT

## 2020-02-03 PROCEDURE — 86900 BLOOD TYPING SEROLOGIC ABO: CPT | Performed by: ORTHOPAEDIC SURGERY

## 2020-02-03 PROCEDURE — L8689 EXTERNAL RECHARG SYS INTERN: HCPCS | Performed by: ORTHOPAEDIC SURGERY

## 2020-02-03 PROCEDURE — C1778 LEAD, NEUROSTIMULATOR: HCPCS | Performed by: ORTHOPAEDIC SURGERY

## 2020-02-03 PROCEDURE — C1787 PATIENT PROGR, NEUROSTIM: HCPCS | Performed by: ORTHOPAEDIC SURGERY

## 2020-02-03 PROCEDURE — 25010000003 CEFAZOLIN PER 500 MG: Performed by: ORTHOPAEDIC SURGERY

## 2020-02-03 PROCEDURE — 25010000002 PROPOFOL 10 MG/ML EMULSION: Performed by: NURSE ANESTHETIST, CERTIFIED REGISTERED

## 2020-02-03 PROCEDURE — 25010000003 BUPIVACAINE LIPOSOME 1.3 % SUSPENSION: Performed by: ORTHOPAEDIC SURGERY

## 2020-02-03 DEVICE — IMPLANTABLE PULSE GENERATOR KIT
Type: IMPLANTABLE DEVICE | Site: SPINE THORACIC | Status: FUNCTIONAL
Brand: PRECISION™ MONTAGE™ MRI

## 2020-02-03 DEVICE — ANCHOR
Type: IMPLANTABLE DEVICE | Status: FUNCTIONAL
Brand: CLIK™ X

## 2020-02-03 DEVICE — 50CM 2X8 SURGICAL LEAD KIT
Type: IMPLANTABLE DEVICE | Status: FUNCTIONAL
Brand: ARTISAN™

## 2020-02-03 RX ORDER — EPHEDRINE SULFATE 50 MG/ML
INJECTION INTRAVENOUS AS NEEDED
Status: DISCONTINUED | OUTPATIENT
Start: 2020-02-03 | End: 2020-02-04 | Stop reason: SURG

## 2020-02-03 RX ORDER — SODIUM CHLORIDE, SODIUM LACTATE, POTASSIUM CHLORIDE, CALCIUM CHLORIDE 600; 310; 30; 20 MG/100ML; MG/100ML; MG/100ML; MG/100ML
100 INJECTION, SOLUTION INTRAVENOUS CONTINUOUS
Status: DISCONTINUED | OUTPATIENT
Start: 2020-02-03 | End: 2020-02-03 | Stop reason: HOSPADM

## 2020-02-03 RX ORDER — LABETALOL HYDROCHLORIDE 5 MG/ML
5 INJECTION, SOLUTION INTRAVENOUS
Status: DISCONTINUED | OUTPATIENT
Start: 2020-02-03 | End: 2020-02-03 | Stop reason: HOSPADM

## 2020-02-03 RX ORDER — SODIUM CHLORIDE, SODIUM LACTATE, POTASSIUM CHLORIDE, CALCIUM CHLORIDE 600; 310; 30; 20 MG/100ML; MG/100ML; MG/100ML; MG/100ML
100 INJECTION, SOLUTION INTRAVENOUS CONTINUOUS PRN
Status: DISCONTINUED | OUTPATIENT
Start: 2020-02-03 | End: 2020-02-03 | Stop reason: HOSPADM

## 2020-02-03 RX ORDER — PHENYLEPHRINE HCL IN 0.9% NACL 0.8MG/10ML
SYRINGE (ML) INTRAVENOUS AS NEEDED
Status: DISCONTINUED | OUTPATIENT
Start: 2020-02-03 | End: 2020-02-04 | Stop reason: SURG

## 2020-02-03 RX ORDER — SODIUM CHLORIDE 0.9 % (FLUSH) 0.9 %
10 SYRINGE (ML) INJECTION AS NEEDED
Status: DISCONTINUED | OUTPATIENT
Start: 2020-02-03 | End: 2020-02-03 | Stop reason: HOSPADM

## 2020-02-03 RX ORDER — BUTALBITAL, ACETAMINOPHEN AND CAFFEINE 50; 325; 40 MG/1; MG/1; MG/1
1 TABLET ORAL EVERY 4 HOURS PRN
COMMUNITY
End: 2022-12-16

## 2020-02-03 RX ORDER — METOCLOPRAMIDE HYDROCHLORIDE 5 MG/ML
5 INJECTION INTRAMUSCULAR; INTRAVENOUS
Status: DISCONTINUED | OUTPATIENT
Start: 2020-02-03 | End: 2020-02-03 | Stop reason: HOSPADM

## 2020-02-03 RX ORDER — ACETAMINOPHEN 500 MG
1000 TABLET ORAL ONCE
Status: COMPLETED | OUTPATIENT
Start: 2020-02-03 | End: 2020-02-03

## 2020-02-03 RX ORDER — ROCURONIUM BROMIDE 10 MG/ML
INJECTION, SOLUTION INTRAVENOUS AS NEEDED
Status: DISCONTINUED | OUTPATIENT
Start: 2020-02-03 | End: 2020-02-04 | Stop reason: SURG

## 2020-02-03 RX ORDER — IPRATROPIUM BROMIDE AND ALBUTEROL SULFATE 2.5; .5 MG/3ML; MG/3ML
3 SOLUTION RESPIRATORY (INHALATION) ONCE AS NEEDED
Status: DISCONTINUED | OUTPATIENT
Start: 2020-02-03 | End: 2020-02-03 | Stop reason: HOSPADM

## 2020-02-03 RX ORDER — OXYCODONE AND ACETAMINOPHEN 10; 325 MG/1; MG/1
1 TABLET ORAL EVERY 4 HOURS PRN
Qty: 120 TABLET | Refills: 0
Start: 2020-02-03 | End: 2022-12-16

## 2020-02-03 RX ORDER — FENTANYL CITRATE 50 UG/ML
25 INJECTION, SOLUTION INTRAMUSCULAR; INTRAVENOUS AS NEEDED
Status: DISCONTINUED | OUTPATIENT
Start: 2020-02-03 | End: 2020-02-03 | Stop reason: HOSPADM

## 2020-02-03 RX ORDER — FENTANYL CITRATE 50 UG/ML
INJECTION, SOLUTION INTRAMUSCULAR; INTRAVENOUS AS NEEDED
Status: DISCONTINUED | OUTPATIENT
Start: 2020-02-03 | End: 2020-02-04 | Stop reason: SURG

## 2020-02-03 RX ORDER — LIDOCAINE HYDROCHLORIDE 10 MG/ML
0.5 INJECTION, SOLUTION EPIDURAL; INFILTRATION; INTRACAUDAL; PERINEURAL ONCE AS NEEDED
Status: DISCONTINUED | OUTPATIENT
Start: 2020-02-03 | End: 2020-02-03 | Stop reason: HOSPADM

## 2020-02-03 RX ORDER — CARVEDILOL 3.12 MG/1
3.12 TABLET ORAL 2 TIMES DAILY WITH MEALS
COMMUNITY
End: 2022-12-16

## 2020-02-03 RX ORDER — OXYCODONE HYDROCHLORIDE AND ACETAMINOPHEN 5; 325 MG/1; MG/1
1 TABLET ORAL ONCE AS NEEDED
Status: DISCONTINUED | OUTPATIENT
Start: 2020-02-03 | End: 2020-02-03 | Stop reason: HOSPADM

## 2020-02-03 RX ORDER — OXYCODONE AND ACETAMINOPHEN 10; 325 MG/1; MG/1
2 TABLET ORAL ONCE AS NEEDED
Status: CANCELLED | OUTPATIENT
Start: 2020-02-03

## 2020-02-03 RX ORDER — SODIUM CHLORIDE 9 MG/ML
INJECTION, SOLUTION INTRAVENOUS AS NEEDED
Status: DISCONTINUED | OUTPATIENT
Start: 2020-02-03 | End: 2020-02-03 | Stop reason: HOSPADM

## 2020-02-03 RX ORDER — IBUPROFEN 600 MG/1
600 TABLET ORAL ONCE AS NEEDED
Status: DISCONTINUED | OUTPATIENT
Start: 2020-02-03 | End: 2020-02-03 | Stop reason: HOSPADM

## 2020-02-03 RX ORDER — OXYCODONE HCL 20 MG/1
20 TABLET, FILM COATED, EXTENDED RELEASE ORAL ONCE
Status: COMPLETED | OUTPATIENT
Start: 2020-02-03 | End: 2020-02-03

## 2020-02-03 RX ORDER — PROPOFOL 10 MG/ML
VIAL (ML) INTRAVENOUS AS NEEDED
Status: DISCONTINUED | OUTPATIENT
Start: 2020-02-03 | End: 2020-02-04 | Stop reason: SURG

## 2020-02-03 RX ORDER — METOCLOPRAMIDE HYDROCHLORIDE 5 MG/ML
5 INJECTION INTRAMUSCULAR; INTRAVENOUS ONCE AS NEEDED
Status: COMPLETED | OUTPATIENT
Start: 2020-02-03 | End: 2020-02-03

## 2020-02-03 RX ORDER — LIDOCAINE HYDROCHLORIDE 20 MG/ML
INJECTION, SOLUTION INFILTRATION; PERINEURAL AS NEEDED
Status: DISCONTINUED | OUTPATIENT
Start: 2020-02-03 | End: 2020-02-04 | Stop reason: SURG

## 2020-02-03 RX ORDER — BUPIVACAINE HYDROCHLORIDE AND EPINEPHRINE 2.5; 5 MG/ML; UG/ML
INJECTION, SOLUTION INFILTRATION; PERINEURAL AS NEEDED
Status: DISCONTINUED | OUTPATIENT
Start: 2020-02-03 | End: 2020-02-03 | Stop reason: HOSPADM

## 2020-02-03 RX ORDER — ISOSORBIDE MONONITRATE 30 MG/1
30 TABLET, EXTENDED RELEASE ORAL DAILY
COMMUNITY
End: 2022-12-16 | Stop reason: DRUGHIGH

## 2020-02-03 RX ORDER — OXYCODONE AND ACETAMINOPHEN 7.5; 325 MG/1; MG/1
1 TABLET ORAL EVERY 4 HOURS PRN
Status: DISCONTINUED | OUTPATIENT
Start: 2020-02-03 | End: 2020-02-03 | Stop reason: HOSPADM

## 2020-02-03 RX ORDER — ONDANSETRON 2 MG/ML
INJECTION INTRAMUSCULAR; INTRAVENOUS AS NEEDED
Status: DISCONTINUED | OUTPATIENT
Start: 2020-02-03 | End: 2020-02-04 | Stop reason: SURG

## 2020-02-03 RX ORDER — SODIUM CHLORIDE 0.9 % (FLUSH) 0.9 %
10 SYRINGE (ML) INJECTION EVERY 12 HOURS SCHEDULED
Status: DISCONTINUED | OUTPATIENT
Start: 2020-02-03 | End: 2020-02-03 | Stop reason: HOSPADM

## 2020-02-03 RX ORDER — ONDANSETRON 2 MG/ML
4 INJECTION INTRAMUSCULAR; INTRAVENOUS ONCE AS NEEDED
Status: DISCONTINUED | OUTPATIENT
Start: 2020-02-03 | End: 2020-02-03 | Stop reason: HOSPADM

## 2020-02-03 RX ORDER — SODIUM CHLORIDE 0.9 % (FLUSH) 0.9 %
3-10 SYRINGE (ML) INJECTION AS NEEDED
Status: DISCONTINUED | OUTPATIENT
Start: 2020-02-03 | End: 2020-02-03 | Stop reason: HOSPADM

## 2020-02-03 RX ORDER — SODIUM CHLORIDE 0.9 % (FLUSH) 0.9 %
3 SYRINGE (ML) INJECTION EVERY 12 HOURS SCHEDULED
Status: DISCONTINUED | OUTPATIENT
Start: 2020-02-03 | End: 2020-02-03 | Stop reason: HOSPADM

## 2020-02-03 RX ORDER — SODIUM CHLORIDE, SODIUM LACTATE, POTASSIUM CHLORIDE, CALCIUM CHLORIDE 600; 310; 30; 20 MG/100ML; MG/100ML; MG/100ML; MG/100ML
1000 INJECTION, SOLUTION INTRAVENOUS CONTINUOUS
Status: DISCONTINUED | OUTPATIENT
Start: 2020-02-03 | End: 2020-02-03 | Stop reason: HOSPADM

## 2020-02-03 RX ORDER — MAGNESIUM HYDROXIDE 1200 MG/15ML
LIQUID ORAL AS NEEDED
Status: DISCONTINUED | OUTPATIENT
Start: 2020-02-03 | End: 2020-02-03 | Stop reason: HOSPADM

## 2020-02-03 RX ORDER — NALOXONE HCL 0.4 MG/ML
0.4 VIAL (ML) INJECTION AS NEEDED
Status: DISCONTINUED | OUTPATIENT
Start: 2020-02-03 | End: 2020-02-03 | Stop reason: HOSPADM

## 2020-02-03 RX ADMIN — Medication 160 MCG: at 14:03

## 2020-02-03 RX ADMIN — METOCLOPRAMIDE 5 MG: 5 INJECTION, SOLUTION INTRAMUSCULAR; INTRAVENOUS at 11:34

## 2020-02-03 RX ADMIN — LIDOCAINE HYDROCHLORIDE 100 MG: 20 INJECTION, SOLUTION INFILTRATION; PERINEURAL at 13:40

## 2020-02-03 RX ADMIN — EPHEDRINE SULFATE 20 MG: 50 INJECTION INTRAVENOUS at 14:08

## 2020-02-03 RX ADMIN — ROCURONIUM BROMIDE 25 MG: 10 INJECTION INTRAVENOUS at 13:40

## 2020-02-03 RX ADMIN — CEFAZOLIN 3 G: 1 INJECTION, POWDER, FOR SOLUTION INTRAMUSCULAR; INTRAVENOUS at 14:02

## 2020-02-03 RX ADMIN — ONDANSETRON HYDROCHLORIDE 4 MG: 2 SOLUTION INTRAMUSCULAR; INTRAVENOUS at 14:54

## 2020-02-03 RX ADMIN — VASOPRESSIN 1 ML: 20 INJECTION INTRAVENOUS at 13:46

## 2020-02-03 RX ADMIN — Medication 160 MCG: at 13:59

## 2020-02-03 RX ADMIN — VASOPRESSIN 1 ML: 20 INJECTION INTRAVENOUS at 13:55

## 2020-02-03 RX ADMIN — PROPOFOL 150 MG: 10 INJECTION, EMULSION INTRAVENOUS at 13:40

## 2020-02-03 RX ADMIN — SODIUM CHLORIDE, POTASSIUM CHLORIDE, SODIUM LACTATE AND CALCIUM CHLORIDE 1000 ML: 600; 310; 30; 20 INJECTION, SOLUTION INTRAVENOUS at 10:01

## 2020-02-03 RX ADMIN — VASOPRESSIN 1 ML: 20 INJECTION INTRAVENOUS at 14:15

## 2020-02-03 RX ADMIN — OXYCODONE HYDROCHLORIDE 20 MG: 20 TABLET, FILM COATED, EXTENDED RELEASE ORAL at 09:39

## 2020-02-03 RX ADMIN — SODIUM CHLORIDE, POTASSIUM CHLORIDE, SODIUM LACTATE AND CALCIUM CHLORIDE: 600; 310; 30; 20 INJECTION, SOLUTION INTRAVENOUS at 14:00

## 2020-02-03 RX ADMIN — ACETAMINOPHEN 1000 MG: 500 TABLET, FILM COATED ORAL at 11:34

## 2020-02-03 RX ADMIN — VASOPRESSIN 1 ML: 20 INJECTION INTRAVENOUS at 14:28

## 2020-02-03 RX ADMIN — FENTANYL CITRATE 100 MCG: 50 INJECTION INTRAMUSCULAR; INTRAVENOUS at 13:38

## 2020-02-03 NOTE — OP NOTE
THORACIC LAMINECTOMY WITH PLACEMENT OF DORSAL COLUMN STIMULATOR  Procedure Note    Morris Yousif  2/3/2020    Pre-op Diagnosis:     1.  Status post bilateral hemilaminotomy decompression, PSF with interspinous device L3 to L5, Dr. Vitor Pack, 05/13/2014.    2.  Status post left LLIF with instrumentation L3 to L5, Dr. Vitor Pack, 11/15/2016.    3.  Chronic back pain.    4.  Residual bilateral buttock and thigh radiculopathy.   5.  Residual neurogenic claudication.    6.  Probable pseudoarthrosis L3-4, L4-5.    7.  Degenerative disk disease L5-S1.    8.  Facet arthropathy L5-S1.    9.  Retrolisthesis L5-S1.    10.  Central and bilateral foraminal stenosis L5-S1.   11.  Residual foraminal stenosis L4-5.     Post-op Diagnosis:    same    Procedure/CPT® Codes:    1.  Thoracic laminectomy T8-9  2.  Insertion of spinal cord stimulator paddle lead  3.  Insertion of spinal cord stimulator battery pack pulse generator  4.  Use of fluoroscopy  5.  Initial programming of spinal cord stimulator system    Anesthesia: General    Surgeon: QUINTEN Acosta MD    Assistant: Wilbert Albarado PA-C, Tiera Dickinson CST    Estimated Blood Loss: Minimal    Complications: None    Condition: Stable to PACU.    Indications:    The patient is a 74-year-old who sees Dr. Chucky Abrams for medical issues.  He presented to the office with a history of 2 prior lumbar procedures performed by my partner Dr. Vitor Pack.  Unfortunately he continued to have chronic back pain along with residual bilateral buttock and thigh radiculopathy.  He also was noted to have symptoms consistent with residual neurogenic claudication.  Imaging studies revealed possible pseudoarthroses at L3-4 and L4-5 along with degenerative disc disease, facet arthropathy, and retrolisthesis at L5-S1 causing central and bilateral foraminal stenosis.  In lieu of performing more extensive lumbar surgery, the patient was ultimately referred to pain management for the placement of a  trial spinal cord stimulator.  This helped with his pain enough that he requested a permanent stimulator be placed.    Risks, benefits, and potential complications were discussed with the patient.  The patient appeared well informed and wished to proceed.  We specifically discussed the risk of infection, blood loss, nerve root injury, CSF leak, spinal cord injury, and the possibility of incomplete resolution of symptoms.  I also discussed the possible risk of implant failure or migration, as well as the need for a battery replacement at some point in the future.    Operative Procedure:    After obtaining informed consent and verifying the correct operative site, the patient was brought to the operating room.  A general anesthetic was provided by the anesthesia service with the assistance of an endotracheal tube.  Once this was appropriately positioned and secured, the patient was carefully rotated into the prone position on a Vamsi frame.  All bony processes were well-padded.  The thoracolumbar spine was then prepped and draped in the usual sterile fashion.  A surgical timeout was taken to confirm this was the correct patient, we were working at the correct levels, and that preoperative antibiotics were given in a timely fashion.    Using fluoroscopy for guidance, a midline incision was created with a 10 blade scalpel over the lower thoracic spine to facilitate the placement of the paddle lead.  Dissection was carried through subcutaneous tissues using Bovie cautery.  The fascia was divided in line with the incision on either side of the spinous processes.  The inner laminar space was then exposed and self-retaining retractors were placed for continuous exposure.  A rongeur was used to remove the interspinous ligament and to begin the laminectomy.  The laminectomy was completed using Kerrisons.  A forward angled curette was used to free up the undersurface of the remaining lamina releasing the ligamentum flavum.   This was then resected using Kerrisons as well revealing the central spinal canal.  At this point, a dissecting tool was passed through the epidural space and the planned trajectory for the paddle lead.  It passed without difficulty.  Bleeding in the epidural space was controlled using thrombin with Gelfoam powder.    The spinal cord stimulator paddle lead was then opened sterilely and delivered to the field.  This paddle lead implant was then passed into the epidural space to the desired location.  The location was determined based on the patient's trialing as recommended by the company representative in the room.  We confirmed location with fluoroscopic guidance.  At this point soft tissue anchors were used to tie down the paddle lead wires to the thoracic fascia.  This wound was then irrigated and packed while attention was turned to the placement of the battery pack pulse generator    A second incision was created horizontally over the right posterior flank using a 10 blade scalpel.  A pocket was then created with Bovie cautery for placement of the pulse generator battery pack.  A soft tissue tunneling device was then used from the thoracic midline incision over to the posterior flank incision connecting the incisions.  Through the soft tissue tunnel I passed the stimulator wires from the thoracic incision down to the battery pack incision.  The tunneling device was then removed.    At this point, we connected the paddle lead wires to the battery pack pulse generator.  The battery pack was then tucked into the pocket created over the posterior flank.  The company representation in the room then tested to ensure that the connection was appropriate.  We then used the appropriate torque limiting screwdriver to tighten the leads in position.  Excess wire was coiled behind the battery pack pulse generator and this was all gently placed into the created pocket.  Once again, we ensured that the impedance was  appropriately functioning.    Both incisions were then copiously irrigated with saline solution.  Closure of the fascia was accomplished using a #1 Vicryl.  Immediate subcutaneous tissues were closed with a 2-0 Vicryl.  Skin closure was then augmented using Mastisol and Steri-Strips.    The wounds were then sterilely dressed.  The patient was then carefully rotated supine onto a hospital gurney, extubated, and sent to the recovery room in good stable condition.  The patient tolerated the procedure well.  There were no complications.  We estimated blood loss to be minimal.    Wilbert Albarado PA-C provided critical assistance during the procedure.  His assistance was medically necessary in order to allow the procedure to occur in the most safe and efficient manner.    Once the patient was in the recovery room, we performed the initial programming of the device to ensure appropriate pain coverage was going to be accomplished with the implant. With the patient awake and responsive, combinations of parameters were tested and assessed to determine optimal settings for stimulation.  We specifically assessed alternating electrode polarities, pulse amplitude and duration, as well as train spacing and cycling across more than 8 electrode contacts.  After testing the system, it appeared the device was functioning appropriately.    QUINTEN Acosta MD     Date: 2/3/2020  Time: 3:01 PM

## 2020-02-03 NOTE — ANESTHESIA PROCEDURE NOTES
Airway  Urgency: elective    Date/Time: 2/3/2020 1:42 PM  Airway not difficult    General Information and Staff    Patient location during procedure: OR  CRNA: Saundra Jean CRNA    Indications and Patient Condition  Indications for airway management: airway protection    Preoxygenated: yes  Mask difficulty assessment: 1 - vent by mask    Final Airway Details  Final airway type: endotracheal airway      Successful airway: ETT  Cuffed: yes   Successful intubation technique: direct laryngoscopy  Facilitating devices/methods: intubating stylet and cricoid pressure  Endotracheal tube insertion site: oral  Blade: Maldonado  Blade size: 2  ETT size (mm): 7.5  Cormack-Lehane Classification: grade IIa - partial view of glottis  Placement verified by: chest auscultation and capnometry   Cuff volume (mL): 8  Measured from: lips  ETT/EBT  to lips (cm): 23  Number of attempts at approach: 1  Assessment: lips, teeth, and gum same as pre-op

## 2020-02-03 NOTE — DISCHARGE INSTRUCTIONS

## 2020-02-04 NOTE — ANESTHESIA POSTPROCEDURE EVALUATION
Patient: Morris Yousif    Procedure Summary     Date:  02/03/20 Room / Location:   [facility] OR  /  [facility] OR    Anesthesia Start:  1337 Anesthesia Stop:  1525    Procedure:  THORACIC LAMINECTOMY, INSERTION SPINAL CORD STIMULATOR (N/A Spine Thoracic) Diagnosis:  (g89.29)    Surgeon:  DEMETRIUS Acosta MD Provider:  Saundra Jean CRNA    Anesthesia Type:  general ASA Status:  3          Anesthesia Type: general    Vitals  Vitals Value Taken Time   /53 2/3/2020  4:03 PM   Temp 97 °F (36.1 °C) 2/3/2020  4:00 PM   Pulse 87 2/3/2020  4:05 PM   Resp 13 2/3/2020  4:00 PM   SpO2 92 % 2/3/2020  4:05 PM   Vitals shown include unvalidated device data.        Post Anesthesia Care and Evaluation    Patient location during evaluation: PACU  Patient participation: complete - patient participated  Level of consciousness: awake and alert  Pain management: adequate  Airway patency: patent  Anesthetic complications: No anesthetic complications    Cardiovascular status: acceptable  Respiratory status: acceptable  Hydration status: acceptable    Comments: Blood pressure 95/48, pulse 90, temperature 97 °F (36.1 °C), temperature source Temporal, resp. rate 18, SpO2 93 %.    Pt discharged from PACU based on laura score >8

## 2020-02-10 ENCOUNTER — HOSPITAL ENCOUNTER (OUTPATIENT)
Dept: ULTRASOUND IMAGING | Age: 75
Discharge: HOME | End: 2020-02-10
Payer: MEDICARE

## 2020-02-10 LAB
ALBUMIN SERPL-MCNC: 4 G/DL (ref 3.5–5.2)
ALP BLD-CCNC: 74 U/L (ref 40–130)
ALT SERPL-CCNC: 13 U/L (ref 5–41)
ANION GAP SERPL CALCULATED.3IONS-SCNC: 14 MMOL/L (ref 7–19)
AST SERPL-CCNC: 17 U/L (ref 5–40)
BASOPHILS ABSOLUTE: 0.1 K/UL (ref 0–0.2)
BASOPHILS RELATIVE PERCENT: 0.9 % (ref 0–1)
BILIRUB SERPL-MCNC: 0.3 MG/DL (ref 0.2–1.2)
BILIRUBIN URINE: NEGATIVE
BLOOD, URINE: NEGATIVE
BUN BLDV-MCNC: 32 MG/DL (ref 8–23)
CALCIUM SERPL-MCNC: 9.3 MG/DL (ref 8.8–10.2)
CHLORIDE BLD-SCNC: 98 MMOL/L (ref 98–111)
CLARITY: CLEAR
CO2: 27 MMOL/L (ref 22–29)
COLOR: YELLOW
CREAT SERPL-MCNC: 1.5 MG/DL (ref 0.5–1.2)
CREATININE URINE: 78.7 MG/DL (ref 4.2–622)
EOSINOPHILS ABSOLUTE: 0.2 K/UL (ref 0–0.6)
EOSINOPHILS RELATIVE PERCENT: 3.3 % (ref 0–5)
GFR NON-AFRICAN AMERICAN: 46
GLUCOSE BLD-MCNC: 76 MG/DL (ref 74–109)
GLUCOSE URINE: NEGATIVE MG/DL
HCT VFR BLD CALC: 41.8 % (ref 42–52)
HEMOGLOBIN: 13.1 G/DL (ref 14–18)
IMMATURE GRANULOCYTES #: 0 K/UL
KETONES, URINE: NEGATIVE MG/DL
LEUKOCYTE ESTERASE, URINE: NEGATIVE
LYMPHOCYTES ABSOLUTE: 2.2 K/UL (ref 1.1–4.5)
LYMPHOCYTES RELATIVE PERCENT: 32.1 % (ref 20–40)
MAGNESIUM: 2 MG/DL (ref 1.6–2.4)
MCH RBC QN AUTO: 26.6 PG (ref 27–31)
MCHC RBC AUTO-ENTMCNC: 31.3 G/DL (ref 33–37)
MCV RBC AUTO: 84.8 FL (ref 80–94)
MONOCYTES ABSOLUTE: 0.6 K/UL (ref 0–0.9)
MONOCYTES RELATIVE PERCENT: 8.7 % (ref 0–10)
NEUTROPHILS ABSOLUTE: 3.8 K/UL (ref 1.5–7.5)
NEUTROPHILS RELATIVE PERCENT: 54.6 % (ref 50–65)
NITRITE, URINE: NEGATIVE
PARATHYROID HORMONE INTACT: 79.5 PG/ML (ref 15–65)
PDW BLD-RTO: 16.5 % (ref 11.5–14.5)
PH UA: 5.5 (ref 5–8)
PLATELET # BLD: 153 K/UL (ref 130–400)
PMV BLD AUTO: 11.6 FL (ref 9.4–12.4)
POTASSIUM SERPL-SCNC: 4.6 MMOL/L (ref 3.5–5)
PROTEIN PROTEIN: 7 MG/DL (ref 15–45)
PROTEIN UA: NEGATIVE MG/DL
RBC # BLD: 4.93 M/UL (ref 4.7–6.1)
SODIUM BLD-SCNC: 139 MMOL/L (ref 136–145)
SPECIFIC GRAVITY UA: 1.01 (ref 1–1.03)
TOTAL PROTEIN: 7.3 G/DL (ref 6.6–8.7)
URIC ACID, SERUM: 8.5 MG/DL (ref 3.4–7)
UROBILINOGEN, URINE: 0.2 E.U./DL
WBC # BLD: 6.9 K/UL (ref 4.8–10.8)

## 2020-02-10 PROCEDURE — 76770 US EXAM ABDO BACK WALL COMP: CPT

## 2020-02-12 LAB
C3 COMPLEMENT: 130 MG/DL (ref 88–201)
C4 COMPLEMENT: 35 MG/DL (ref 10–40)

## 2020-02-13 LAB
ANA IGG, ELISA: NORMAL
MYELOPEROXIDASE AB: 1 AU/ML (ref 0–19)
SERINE PROTEASE 3 AB: 2 AU/ML (ref 0–19)

## 2020-02-14 LAB
ALBUMIN SERPL-MCNC: 3.87 G/DL (ref 3.75–5.01)
ALPHA-1-GLOBULIN: 0.36 G/DL (ref 0.19–0.46)
ALPHA-2-GLOBULIN: 0.86 G/DL (ref 0.48–1.05)
BETA GLOBULIN: 0.74 G/DL (ref 0.48–1.1)
GAMMA GLOBULIN: 0.97 G/DL (ref 0.62–1.51)
IGA: 253 MG/DL (ref 68–408)
IGG: 1020 MG/DL (ref 768–1632)
IGM: 200 MG/DL (ref 35–263)
IMMUNOFIXATION REFLEX: NORMAL
SPE/IFE INTERPRETATION: NORMAL
TOTAL PROTEIN: 6.8 G/DL (ref 6.3–8.2)

## 2020-04-21 ENCOUNTER — TELEPHONE (OUTPATIENT)
Dept: CARDIOLOGY | Age: 75
End: 2020-04-21

## 2020-04-29 ENCOUNTER — TELEPHONE (OUTPATIENT)
Dept: CARDIOLOGY | Age: 75
End: 2020-04-29

## 2020-05-16 NOTE — PROGRESS NOTES
Cardiology Progress Note Marleen Snellen, MD      Patient:  Janes Search  [id number]    Patient Active Problem List    Diagnosis Date Noted    ACS (acute coronary syndrome) [facility])      Priority: High    Chronic constipation 06/10/2019     Priority: Low    Elevated troponin      Priority: Low    S/P CABG x 3, LIMA- DIAG, A0- SVG- LAD, A0- SVG- Ramus, EF 30-35% (5/30/2019) 06/01/2019     Priority: Low    Acute MI inferior lateral first episode care ([facility] [address] 05/31/2019     Priority: Low    Stage 3 chronic kidney disease ([facility] [address] 05/31/2019     Priority: Low    Chronic shortness of breath 04/15/2019     Priority: Low    Chest pain 02/28/2019     Priority: Low     Overview Note:     1/2011 stents x 3 [facility])  5/4/2011  Cath  Patent stents in the mid LAD, stent to OM1, PTCA to the D2, normal LVFX  12/12/12  Cath patent stents in the mid LAD, PTCA to the D3, stent to distal LAD, normal LVFX  3/29/2015  Cath  Stent to the PLOM, normal LVFX  11/1/2019  Loop placed  3/1/19 lexiscan negative for myocardial ischemia, EF 50  %   5/24/19 ACS PARKER RISK Score 5 (angina, + troponin, CAD>50, age>65, plavix ), AUC indication 3, AUC score 9   5/25/19  Cath 50% distal LM, 100% mid LAD, 80% diag, 100% mid RCA, inferior hypokinesis, EF 45%              MOO on CPAP 05/03/2018     Priority: Low    Dermatitis 03/05/2018     Priority: Low    Depression with anxiety 03/05/2018     Priority: Low    Benign prostatic hyperplasia without lower urinary tract symptoms 03/05/2018     Priority: Low    Chronic tension-type headache, not intractable 03/05/2018     Priority: Low    West Nile virus seropositivity 11/22/2017     Priority: Low    Status post placement of implantable loop recorder 10/31/2017     Priority: Low    Recurrent major depressive disorder, [facility] full [facility] ([facility] 10/17/2017     Priority: Low    Trigger middle finger of left hand 10/17/2017     Priority: Low    Peripheral polyneuropathy 10/17/2017     Priority: Low    Patient Education        Back Care and Preventing Injuries: Care Instructions  Your Care Instructions    You can hurt your back doing many everyday activities: lifting a heavy box, bending down to garden, exercising at the gym, and even getting out of bed. But you can keep your back strong and healthy by doing some exercises. You also can follow a few tips for sitting, sleeping, and lifting to avoid hurting your back again. Talk to your doctor before you start an exercise program. Ask for help if you want to learn more about keeping your back healthy. Follow-up care is a key part of your treatment and safety. Be sure to make and go to all appointments, and call your doctor if you are having problems. It's also a good idea to know your test results and keep a list of the medicines you take. How can you care for yourself at home? · Stay at a healthy weight to avoid strain on your lower back. · Do not smoke. Smoking increases the risk of osteoporosis, which weakens the spine. If you need help quitting, talk to your doctor about stop-smoking programs and medicines. These can increase your chances of quitting for good. · Make sure you sleep in a position that maintains your back's normal curves and on a mattress that feels comfortable. Sleep on your side with a pillow between your knees, or sleep on your back with a pillow under your knees. These positions can reduce strain on your back. · When you get out of bed, lie on your side and bend both knees. Drop your feet over the edge of the bed as you push up with both arms. Scoot to the edge of the bed. Make sure your feet are in line with your rear end (buttocks), and then stand up. · If you must stand for a long time, put one foot on a stool, ledge, or box. Exercise to strengthen your back and other muscles  · Get at least 30 minutes of exercise on most days of the week. Walking is a good choice.  You also may want to do other activities, such as running, Palpitations 10/17/2017     Priority: Low    Chronic insomnia 10/17/2017     Priority: Low    Cerebrovascular accident (CVA) due to embolism of right middle cerebral artery ([address] 08/14/2017     Priority: Low    Dizziness 03/27/2017     Priority: Low    Imbalance 03/27/2017     Priority: Low    Thoracic outlet syndrome 02/21/2017     Priority: Low     Overview Note:     L sided on exam 2/21/17      Acute ischemic stroke ([facility] [address] 02/20/2017     Priority: Low    DDD (degenerative disc disease), lumbar 11/15/2016     Priority: Low    Spondylosis of lumbar region without myelopathy or radiculopathy 11/15/2016     Priority: Low    Chronic bilateral low back pain without sciatica 11/10/2016     Priority: Low    Mixed hyperlipidemia 07/23/2012     Priority: Low    MI (myocardial infarction) ([facility] [address]      Priority: Low    Type 2 diabetes mellitus with diabetic polyneuropathy, with long-term current use of insulin ([facility] [address] 07/12/2011     Priority: Low    Essential hypertension 07/12/2011     Priority: Low    Coronary artery disease involving native coronary artery of native heart with unstable angina pectoris ([facility] [address] 07/12/2011     Priority: Low       Admit Date:  6/8/2019    Admission Problem List: Present on Admission:   Chest pain   Benign prostatic hyperplasia without lower urinary tract symptoms   Chronic bilateral low back pain without sciatica   Essential hypertension   MOO on CPAP   Stage 3 chronic kidney disease (HCC)   Type 2 diabetes mellitus with diabetic polyneuropathy, with long-term current use of insulin ([address]   Coronary artery disease involving native coronary artery of native heart with unstable angina pectoris ([address]   Chronic constipation      Cardiac Specific Data:  Specialty Problems        Cardiology Problems    ACS (acute coronary syndrome) ([facility] [address]        Coronary artery disease involving native coronary artery of native heart with unstable angina pectoris ([address]        Essential hypertension swimming, cycling, or playing tennis or team sports. · Stretch your back muscles. Here are few exercises to try:  ? Lie on your back with your knees bent and your feet flat on the floor. Gently pull one bent knee to your chest. Put that foot back on the floor, and then pull the other knee to your chest. Hold for 15 to 30 seconds. Repeat 2 to 4 times. ? Do pelvic tilts. Lie on your back with your knees bent. Tighten your stomach muscles. Pull your belly button (navel) in and up toward your ribs. You should feel like your back is pressing to the floor and your hips and pelvis are slightly lifting off the floor. Hold for 6 seconds while breathing smoothly. · Keep your core muscles strong. The muscles of your back, belly (abdomen), and buttocks support your spine. ? Pull in your belly, and imagine pulling your navel toward your spine. Hold this for 6 seconds, then relax. Remember to keep breathing normally as you tense your muscles. ? Do curl-ups. Always do them with your knees bent. Keep your low back on the floor, and curl your shoulders toward your knees using a smooth, slow motion. Keep your arms folded across your chest. If this bothers your neck, try putting your hands behind your neck (not your head), with your elbows spread apart. ? Lie on your back with your knees bent and your feet flat on the floor. Tighten your belly muscles, and then push with your feet and raise your buttocks up a few inches. Hold this position 6 seconds as you continue to breathe normally, then lower yourself slowly to the floor. Repeat 8 to 12 times. ? If you like group exercise, try Pilates or yoga. These classes have poses that strengthen the core muscles. Protect your back when you sit  · Place a small pillow, a rolled-up towel, or a lumbar roll in the curve of your back if you need extra support. · Sit in a chair that is low enough to let you place both feet flat on the floor with both knees nearly level with your hips.  If MI (myocardial infarction) ([facility] [address]        Acute ischemic stroke ([facility] [address]        Cerebrovascular accident (CVA) due to embolism of right middle cerebral artery (HCC)        Acute MI inferior lateral first episode care [facility])              Subjective:  Mr. Ace Thompson     Objective:   /73   Pulse 71   Temp 96.9 °F (36.1 °C) (Temporal)   Resp 17   Ht 6' 4\" (1.93 m)   Wt 267 lb 11.2 oz (121.4 kg)   SpO2 95%   BMI 32.59 kg/m²       Intake/Output Summary (Last 24 hours) at 6/10/2019 1731  Last data filed at 6/10/2019 1600  Gross per 24 hour   Intake 738.24 ml   Output 5850 ml   Net -5111.76 ml       Prior to Admission medications    Medication Sig Start Date End Date Taking? Authorizing Provider   amiodarone (CORDARONE) 200 MG tablet Take 1 tablet by mouth 2 times daily 6/6/19  Yes Socorro Junior MD   carvedilol (COREG) 12.5 MG tablet Take 1 tablet by mouth 2 times daily (with meals) 6/6/19  Yes Socorro Junior MD   isosorbide mononitrate (IMDUR) 30 MG extended release tablet Take 1 tablet by mouth daily 4/19/19  Yes TEGAN Montes   azelastine (ASTELIN) 0.1 % nasal spray 1 spray by Nasal route as needed for Rhinitis Use in each nostril as directed   Yes Historical Provider, MD   butalbital-acetaminophen-caffeine (FIORICET, ESGIC) -40 MG per tablet Take 1 tablet by mouth as needed for Headaches   Yes Historical Provider, MD   furosemide (LASIX) 40 MG tablet Take 40 mg by mouth as needed   Yes Historical Provider, MD   lisinopril (PRINIVIL;ZESTRIL) 20 MG tablet Take 1/2 tablet twice daily   Yes Historical Provider, MD   sertraline (ZOLOFT) 100 MG tablet Take 1 tablet by mouth daily 3/12/19  Yes Josias Ignacio MD   nitroGLYCERIN (NITROSTAT) 0.4 MG SL tablet up to max of 3 total doses.  If no relief after 1 dose, call 911. 3/1/19  Yes Aletha Helm MD   tamsulosin (FLOMAX) 0.4 MG capsule TAKE 2 CAPSULES AT BEDTIME 1/22/19  Yes Josias Ignacio MD   insulin glargine (LANTUS SOLOSTAR) 100 UNIT/ML your chair or desk is too high, use a foot rest to raise your knees. · When driving, keep your knees nearly level with your hips. Sit straight, and drive with both hands on the steering wheel. Your arms should be in a slightly bent position. · Try a kneeling chair, which helps tilt your hips forward. This takes pressure off your lower back. · Try sitting on an exercise ball. It can rock from side to side, which helps keep your back loose. Lift properly  · Squat down, bending at the hips and knees only. If you need to, put one knee to the floor and extend your other knee in front of you, bent at a right angle (half kneeling). · Press your chest straight forward. This helps keep your upper back straight while keeping a slight arch in your low back. · Hold the load as close to your body as possible, at the level of your navel. · Use your feet to change direction, taking small steps. · Lead with your hips as you change direction. Keep your shoulders in line with your hips as you move. Do not twist your body. · Set down your load carefully, squatting with your knees and hips only. When should you call for help? Watch closely for changes in your health, and be sure to contact your doctor if you have any problems. Where can you learn more? Go to http://tierra-ally.info/  Enter S810 in the search box to learn more about \"Back Care and Preventing Injuries: Care Instructions. \"  Current as of: June 26, 2019Content Version: 12.4  © 6695-9283 [employer]. Care instructions adapted under license by [employer] (which disclaims liability or warranty for this information). If you have questions about a medical condition or this instruction, always ask your healthcare professional. [address] any warranty or liability for your use of this information. injection pen INJECT 80 UNITS INTO THE SKIN NIGHTLY 1/18/19  Yes Josias Ignacio MD   clopidogrel (PLAVIX) 75 MG tablet TAKE 1 TABLET DAILY 1/18/19  Yes Josias Ignacio MD   tiZANidine (ZANAFLEX) 2 MG tablet Take 1 tablet by mouth nightly as needed (muscle spasm) 3/12/19   Josias Ignacio MD   zoster recombinant adjuvanted vaccine [facility]) 50 MCG/0.5ML SUSR injection 1 vaccine now and repeat in 2-6 months. 11/6/18   Josias Ignacio MD        docusate sodium  100 mg Oral BID    isosorbide mononitrate  30 mg Oral BID    fluticasone  1 spray Each Nostril Daily    mupirocin   Topical Daily    sodium chloride flush  10 mL Intravenous 2 times per day    insulin lispro  0-12 Units Subcutaneous TID WC    insulin lispro  0-6 Units Subcutaneous Nightly    amiodarone  200 mg Oral BID    insulin glargine  80 Units Subcutaneous Nightly    sertraline  100 mg Oral Daily    tamsulosin  0.4 mg Oral Daily    furosemide  40 mg Intravenous 4x Daily    carvedilol  6.25 mg Oral BID WC       TELEMETRY: Sinus     Physical Exam:      Physical Exam    No JVD  No edema  No carotid bruits  S1 and S2 of normal intensity, no significant systolic or diastolic murmurs appreciated.  No gallop noted  Good air entry bilaterally with no crepitations or wheezes  Abdomen is soft and nontender with no palpable organomegaly, no abnormal pulsations are felt, no bruits are audible  Neurological status is intact  No deformities        Lab Data:  CBC:   Recent Labs     06/08/19  1424 06/09/19  0256 06/10/19  0435   WBC 7.9 8.2 9.2   HGB 9.3* 9.9* 10.5*   HCT 29.5* 32.2* 33.4*   MCV 86.0 85.4 84.8    309 330     BMP:   Recent Labs     06/09/19  1608 06/10/19  0435 06/10/19  1654    136 135*   K 4.5 4.5 4.3   CL 98 94* 93*   CO2 27 27 27   BUN 27* 25* 22   CREATININE 1.6* 1.5* 1.3*     LIVER PROFILE:   Recent Labs     06/08/19  1424   AST 14   ALT 23   LIPASE 34   BILITOT <0.2   ALKPHOS 68     PT/INR:   Recent Labs 06/08/19  1424   PROTIME 14.9*   INR 1.23*     APTT:   Recent Labs     06/09/19  2252 06/10/19  0435 06/10/19  1056   APTT 50.9* 53.3* 45.6*     BNP:  No results for input(s): BNP in the last 72 hours. CK, CKMB, Troponin: @LABRCNT (CKTOTAL:3, CKMB:3, TROPONINI:3)@    IMAGING:  Ct Abdomen Pelvis Wo Contrast Additional Contrast? None    Result Date: 6/8/2019  EXAMINATION: CT ABDOMEN PELVIS WO CONTRAST 6/8/2019 3:57 PM HISTORY: Abdominal distention Comparison: Abdominal KUB 6/8/2019 Technique: Sequential imaging was performed from the lung bases through the pubic symphysis without the use of IV contrast. Sagittal and coronal reformations were made from the original source data and reviewed. Automated exposure control was utilized for radiation dose reduction. Radiation dose: DLP 1701 mGy-cm Findings: Images are degraded by significant motion. There has been prior median sternotomy. Small pleural effusions are present bilaterally with associated compressive atelectasis. The heart is enlarged. Coronary artery calcifications are present. The blood pool is hypodense relative to the myocardium, suggesting anemia. There is trace pericardial fluid. Evaluation is limited by a lack of IV contrast. Allowing for these limitations, the liver, spleen, and adrenal glands are grossly normal in appearance. The gallbladder is surgically absent. There is a questionable lesion extending from the mid pancreatic body measuring 2.7 cm in size. The kidneys have a normal noncontrast appearance. The ureters are decompressed bilaterally. The abdominal aorta appears normal in caliber. There are scattered atherosclerotic calcifications within the aorta and its branch vessels. No pathologically enlarged central mesenteric or retroperitoneal lymph nodes are appreciated. A small hiatal hernia is present. The stomach and small bowel do not appear overly dilated. There are a few scattered colonic diverticula without evidence of acute diverticulitis. Large bowel is otherwise grossly normal in appearance. No free air or free fluid is seen in the abdomen. The urinary bladder is grossly normal in appearance. No free fluid is seen in the pelvis. No pathologically enlarged pelvic or inguinal lymph nodes are identified. Fluid is noted dependently in the subcutaneous soft tissues of the posterior pelvis and upper thighs. Review of the visualized osseous structures demonstrates no acute or aggressive lesions. Postsurgical changes are noted in the lumbar spine. Impression: 1. No acute findings in the abdomen or pelvis when allowing for limitations of significant motion as well as lack of IV contrast. 2. Cardiomegaly with bilateral pleural effusions. Subcutaneous soft tissue edema dependently in the posterior aspect of the pelvis and proximal thighs. 3. Questionable lesion extending from the pancreatic body for which nonemergent follow-up MRI abdomen with and without contrast is recommended to include MRCP. 4. Atherosclerotic disease. Signed by Dr Severa Oz on 6/8/2019 4:03 PM    Xr Abdomen (kub) (single Ap View)    Result Date: 6/8/2019  EXAMINATION: XR ABDOMEN (KUB) (SINGLE AP VIEW) 6/8/2019 2:55 PM HISTORY: Abdominal distention COMPARISON: None FINDINGS: The bowel gas pattern is nonspecific and nonobstructive. A moderate amount of stool is noted in the left colon. The entire abdomen is not included on this exam. Surgical clips are seen in the right upper quadrant of the abdomen. There has been previous fusion of the lumbar spine. No pathologic calcifications are seen. No evidence of bowel obstruction. Signed by Dr Severa Oz on 6/8/2019 2:56 PM    Xr Acute Abd Series Chest 1 Vw    Result Date: 5/24/2019  EXAMINATION:  XR ACUTE ABD SERIES CHEST 1 VW  5/24/2019 2:39 PM HISTORY: Abdominal distention. Diabetes. Coronary artery disease. COMPARISON: 2/28/2019. CHEST X-RAY: There is no infiltrate or effusion. There is mild bronchial wall thickening, stable.  Heart size is upper limits of normal. There may be at least one coronary stent. There is a loop recorder. ABDOMEN IMAGES: There is moderate stool in the colon. There is no small bowel distention. There has been prior cholecystectomy. There has been prior lumbar surgery. There are degenerative changes of the spine. 1. No acute appearing infiltrate or effusion. Stable bronchial wall thickening. 2. Heart size upper limits of normal. No CHF. Loop recorder. Probable coronary stent. 3. Moderate stool in the colon. The bowel gas pattern is normal. Signed by Dr Megha Go on 5/24/2019 2:41 PM    Xr Chest Portable    Result Date: 6/8/2019  EXAMINATION: XR CHEST PORTABLE 6/8/2019 2:54 PM HISTORY: Chest pain COMPARISON: 6/3/2019 FINDINGS: The heart and mediastinal contours are stable. There is consolidation in the left lung base. Small pleural effusions are noted bilaterally. Diffuse increased interstitial markings are noted. Pulmonary vasculature is indistinct. There is no appreciable pneumothorax. A loop recorder device is present. There has been prior median sternotomy. Findings are suggestive of pulmonary vascular congestion and pulmonary edema with bilateral pleural effusions. Signed by Dr Isabella Garcia on 6/8/2019 2:55 PM    Xr Chest Portable    Result Date: 6/3/2019  EXAMINATION: XR CHEST PORTABLE 6/3/2019 7:22 AM HISTORY: XR CHEST PORTABLE 6/3/2019 5:00 AM HISTORY: Postop COMPARISON: June 2, 2019. FINDINGS: The right lung is clear. Left diaphragm is indistinct most likely atelectasis left lower lobe. Linear atelectasis left upper lung is again noted. . Cardiac silhouettes moderately enlarged this is unchanged. Wires are present previous median sternotomy. . The osseous structures and surrounding soft tissues demonstrate no acute abnormality. 1. Postsurgical change and persistent atelectasis left lower lobe. This is unchanged from June 2.  Signed by Dr Destini Rossi on 6/3/2019 7:23 AM    Xr Chest Portable    Result Date: 6/2/2019  XR CHEST PORTABLE 6/2/2019 5:00 AM HISTORY: Postop CABG Comparison: 6/1/2019 Findings: Lines and tubes are stable in position. No new opacities or pneumothoraces are visualized in the chest. The cardiomediastinal silhouette and pulmonary vascularity are unchanged. No acute osseous or soft tissue abnormality is noted. Impression: 1. No significant interval change since previous exam. Signed by Dr Wander Portillo on 6/2/2019 7:14 AM    Xr Chest Portable    Result Date: 6/1/2019  EXAMINATION: Chest one view 6/1/2019 HISTORY: Postop CABG FINDINGS: Today's exam is compared to previous study of 5/31/2019. All support lines have been removed except for a right IJ introducer. Lungs are hypoventilated with bibasilar atelectasis. Small effusions are present. There is mild gastric distention improved from yesterday's exam. A loop recorder projects over the left chest.    1.. Right IJ introducer remains in place. The remaining support lines have been removed. 2. Expiratory chest. There is persistent pulmonary venous hypertension with widening of the vascular pedicle. This is accentuated by the expiratory nature of the film. 3. Small effusions. Signed by Dr Wander Portillo on 6/1/2019 8:07 AM    Xr Chest Portable    Result Date: 5/31/2019  EXAMINATION: Chest one view 5/31/2019 HISTORY: Postop CABG FINDINGS: Today's exam is compared to previous study of one day earlier. A [place]-Srikanth catheter and mediastinal drains remain in place. The patient has been extubated. There is gaseous distention of the stomach. There is persistent pulmonary venous hypertension with widening of the vascular pedicle. There is a poor inspiratory effort. 1.. Expiratory chest. There is persistent pulmonary venous hypertension. 2. Patient extubated. 3. Developing gaseous distention of the stomach.  Signed by Dr Wander Portillo on 5/31/2019 6:40 AM    Xr Chest Portable    Result Date: 5/30/2019  EXAMINATION: Chest one view 5/30/2019 HISTORY: Postop CABG FINDINGS: Today's exam is compared to previous study of 6 days earlier. The lungs are expiratory causing accentuation of bronchovascular markings and cardiac silhouette. There is mild cardiomegaly with suspected mild pulmonary venous hypertension with widening of the vascular pedicle. An endotracheal tube, [place]-Srikanth catheter and mediastinal drains are all well-positioned. There is no pneumothorax or significant effusion. 1.. Expiratory chest. Pulmonary venous hypertension is also suspected with widening of the vascular pedicle. 2. Multiple life support lines in place with no complications and well-positioned. Signed by Dr Juan Espinosa on 5/30/2019 12:33 PM    Vl Dup Carotid Bilateral    Result Date: 5/28/2019  Vascular Carotid Procedure  Demographics   Patient Name   Sally Hart  Age                 75                 D   Patient Number [id number]        Gender              Male   Visit Number   [id number]     Interpreting        Deepika Mata MD                               Physician   Date of Birth  1945    Referring Physician Fortino English   Accession      [id number]     Nisha RVT  Number  Procedure Type of Study:   Cerebral:Carotid, VL CAROTID BILATERAL. Indications for Study:Pre-op CABG. Risk Factors   - The patient's risk factor(s) include: diabetes mellitus, dyslipidemia     and arterial hypertension.   - The patient's last creatinine was 1.2 mg/dl. - The patient's risk factor(s) include: Prior CVA, , Allergies   - Statins. Impression   There is smooth plaque visualized in the bilateral internal carotid  artery(ies). There is no stenosis in the right internal carotid artery. There is no stenosis of the left internal carotid artery. There is normal antegrade flow in the bilateral vertebral artery(ies).    Signature   ---------------------------------------------------------------- Electronically signed by Deon Burton MD(Interpreting  physician) on 05/28/2019 03:37 PM  ----------------------------------------------------------------  Velocities are measured in cm/s ; Diameters are measured in mm Carotid Right Measurements +------------+-------+-------+--------+-------+------------+---------------+ ! Location    ! PSV    ! EDV    ! Angle   ! RI     !%Stenosis   ! Tortuosity     ! +------------+-------+-------+--------+-------+------------+---------------+ ! Prox CCA    !99     !14.9   !60      !0.85   !            !               ! +------------+-------+-------+--------+-------+------------+---------------+ ! Mid CCA     !70.4   !13.5   !60      !0.81   !            !               ! +------------+-------+-------+--------+-------+------------+---------------+ ! Prox ICA    ! 53.9   !11.7   !60      !0.78   !            !               ! +------------+-------+-------+--------+-------+------------+---------------+ ! Mid ICA     ! 89.1   !18.8   !60      !0.79   !            !               ! +------------+-------+-------+--------+-------+------------+---------------+ ! Dist ICA    ! 89.7   !19.9   !60      !0.78   !            !               ! +------------+-------+-------+--------+-------+------------+---------------+ ! Prox ECA    !117    !       !61      !       !            !               ! +------------+-------+-------+--------+-------+------------+---------------+ ! Vertebral   !66.8   !11.7   !60      !0.82   !            !               ! +------------+-------+-------+--------+-------+------------+---------------+   - There is antegrade vertebral flow noted on the right side. - Additional Measurements:ICAPSV/CCAPSV 0.91. ICAEDV/CCAEDV 1.34. Carotid Left Measurements +------------+-------+-------+--------+-------+------------+---------------+ ! Location    ! PSV    ! EDV    ! Angle   ! RI     !%Stenosis   ! Tortuosity     ! +------------+-------+-------+--------+-------+------------+---------------+ ! Prox CCA    ! 105    !13.4   !60      !0.87   !            !               ! +------------+-------+-------+--------+-------+------------+---------------+ ! Mid CCA     !90.4   !14.9   !60      !0.84   !            !               ! +------------+-------+-------+--------+-------+------------+---------------+ ! Prox ICA    ! 65.7   !15.8   !60      !0.76   !            !               ! +------------+-------+-------+--------+-------+------------+---------------+ ! Mid ICA     ! 88.5   !22.3   !60      !0.75   !            !               ! +------------+-------+-------+--------+-------+------------+---------------+ ! Dist ICA    !76.2   !17.6   ! 60      !0.77   !            !               ! +------------+-------+-------+--------+-------+------------+---------------+ ! Prox ECA    !141    !       !61      !       !            !               ! +------------+-------+-------+--------+-------+------------+---------------+ ! Vertebral   !78     !12.9   !60      !0.83   !            !               ! +------------+-------+-------+--------+-------+------------+---------------+   - There is antegrade vertebral flow noted on the left side. - Additional Measurements:ICAPSV/CCAPSV 0.84. ICAEDV/CCAEDV 1.66. Vl Vein Mapping Lower Bilateral    Result Date: 5/28/2019  Vascular Lower Extremity Vein Mapping Procedure  Demographics   Patient Name   Shamar Copeland  Age                 75                 D   Patient Number [id number]        Gender              Male   Visit Number   [id number]     Interpreting        Dutch Osorio MD                               Physician   Date of Birth  1945    Referring Physician Emerald Sandoval   Accession      [id number]     Nisha T  Number  Procedure Type of Study:   [address] [place], [address]. Indications for Study:Pre-op CABG.  Risk Factors   - The patient's risk factor(s) include: diabetes mellitus, dyslipidemia     and arterial hypertension.   - The patient's last creatinine was 1.2 mg/dl. Allergies   - Statins. Impression   Usable greater Saphenous vein conduit in both lower extremities. The veins have been marked on the skin. Sizes are noted above. Signature   ----------------------------------------------------------------  Electronically signed by Thu Sauceda MD(Interpreting  physician) on 05/28/2019 03:36 PM  ----------------------------------------------------------------  Velocities are measured in cm/s ; Diameters are measured in mm +--------------------------------------++--------+-----+----+--------+-----+ ! Superficial - Great Saphenous Vein    ! ! Right   ! ! Left!        !     ! +--------------------------------------++--------+-----+----+--------+-----+ ! Location                              ! !Diameter! Depth! !Diameter! Depth! +--------------------------------------++--------+-----+----+--------+-----+ ! GSV 2 cm Distal to Junction           ! !0.9     !     !    !5.6     !     ! +--------------------------------------++--------+-----+----+--------+-----+ ! GSV High Thigh                        ! !4.3     !     !    !4.8     !     ! +--------------------------------------++--------+-----+----+--------+-----+ ! GSV Mid Thigh                         !!4       !     !    !4.4     !     ! +--------------------------------------++--------+-----+----+--------+-----+ ! GSV Low Thigh                         ! !4.7     !     !    !3.8     !     ! +--------------------------------------++--------+-----+----+--------+-----+ ! GSV Knee                              !!4.4     !     !    !3.9     !     ! +--------------------------------------++--------+-----+----+--------+-----+ ! GSV High Calf                         !!3.8     !     !    !3.4     !     ! +--------------------------------------++--------+-----+----+--------+-----+ ! GSV Mid Calf                          !!3.3     !     !    !3.8

## 2020-05-19 ENCOUNTER — OFFICE VISIT (OUTPATIENT)
Dept: CARDIOLOGY | Age: 75
End: 2020-05-19
Payer: MEDICARE

## 2020-05-19 VITALS
DIASTOLIC BLOOD PRESSURE: 80 MMHG | BODY MASS INDEX: 32.88 KG/M2 | HEART RATE: 78 BPM | HEIGHT: 76 IN | WEIGHT: 270 LBS | SYSTOLIC BLOOD PRESSURE: 130 MMHG

## 2020-05-19 PROCEDURE — G8417 CALC BMI ABV UP PARAM F/U: HCPCS | Performed by: NURSE PRACTITIONER

## 2020-05-19 PROCEDURE — 1123F ACP DISCUSS/DSCN MKR DOCD: CPT | Performed by: NURSE PRACTITIONER

## 2020-05-19 PROCEDURE — 4040F PNEUMOC VAC/ADMIN/RCVD: CPT | Performed by: NURSE PRACTITIONER

## 2020-05-19 PROCEDURE — 1036F TOBACCO NON-USER: CPT | Performed by: NURSE PRACTITIONER

## 2020-05-19 PROCEDURE — 93283 PRGRMG EVAL IMPLANTABLE DFB: CPT | Performed by: NURSE PRACTITIONER

## 2020-05-19 PROCEDURE — G8427 DOCREV CUR MEDS BY ELIG CLIN: HCPCS | Performed by: NURSE PRACTITIONER

## 2020-05-19 PROCEDURE — 99214 OFFICE O/P EST MOD 30 MIN: CPT | Performed by: NURSE PRACTITIONER

## 2020-05-19 NOTE — PROGRESS NOTES
N/A 2019    CORONARY ARTERY BYPASS GRAFT X3 WITH LEFT INTERNAL MAMMARY ARTERY WITH ENDOSCOPIC VEIN HARVESTING WITH PERFUSION TRANSESOPHAGEAL ECHOCARDIOGRAM performed by Bre Treadwell MD at [facility] CATH LAB PROCEDURE  [id number]    primary stent placement to the first circumflex marginal branch, balloon angioplasty to the third left anterior descending diagnonal branch, intracoronary nitroglycerin adminstration    JOINT REPLACEMENT      left knee, ~2016    LUMBAR FUSION Left 11/15/2016    LUMBAR INTERBODY FUSION LATERAL L3-4 L4-5 WITH LATERAL PLATE  performed by Yulissa Cruz MD at [address]  2019     Family History   Problem Relation Age of Onset    Coronary Art Dis Mother     Cancer Sister         uterine    Kidney Disease Sister     Coronary Art Dis Brother          in his 46s - age 47    Cancer Father         colon    Coronary Art Dis Sister     Cancer Sister         olive cancer    No Known Problems Sister     No Known Problems Sister     No Known Problems Son     Alcohol Abuse Son         drank self to death    No Known Problems Son      Social History     Tobacco Use    Smoking status: Never Smoker    Smokeless tobacco: Never Used   Substance Use Topics    Alcohol use: Yes     Alcohol/week: 1.0 standard drinks     Types: 1 Shots of liquor per week      Current Outpatient Medications   Medication Sig Dispense Refill    Multiple Vitamins-Minerals (PRESERVISION AREDS PO) Take 2 capsules by mouth      diphenhydrAMINE (BENADRYL) 25 MG tablet Take 50 mg by mouth 2 times daily      oxyCODONE-acetaminophen (PERCOCET)  MG per tablet Take 1 tablet by mouth every 4 hours as needed for Pain.       furosemide (LASIX) 40 MG tablet Take 40 mg by mouth daily       isosorbide mononitrate (IMDUR) 30 MG extended release tablet TAKE 1 TABLET BY MOUTH EVERY DAY 90 tablet 1    insulin glargine (LANTUS SOLOSTAR) 100 UNIT/ML injection pen Inject 80

## 2020-05-19 NOTE — PATIENT INSTRUCTIONS
New instructions for today:  Weigh yourself daily without clothing at the same time each day. Record a daily weight log. Call the office if you gain 3 pounds or more in 2 to 3 days or 5 pounds in one week. A sudden weight gain may mean that your heart failure is getting worse. Sodium causes your body to hold on to extra water. This may cause your heart failure symptoms to get worse. Limit sodium to 2,000 milligrams (mg) a day or less. That is less than 1 teaspoon of salt a day, including all the salt you eat in cooking or in packaged foods. Fluid restriction of 1500ml per day (about 6 cups of fluid per day). Discuss left breast tenderness with Dr. Lina Gonzales. You may need to have an ultrasound. Patient Instructions:  Continue current medications as prescribed. Always keep a current medication list. Bring your medications to every office visit. Continue to follow up with primary care provider for non cardiac medical problems. Call the office with any problems, questions or concerns at [phone]. If you have been asked to keep a blood pressure log, do so for 2 weeks. Call the office to report readings to the triage nurse at [phone]. Follow up with cardiologist as scheduled. The following educational material has been included in this after visit summary for your review: Life simple 7. Avoid heart failure triggers. Life simple 7  1) Manage blood pressure - high blood pressure is a major risk factor for heart disease and stroke. Keeping blood pressure in health range reduces strain on your heart, arteries and kidneys. Blood pressure goal is less than 130/80. 2) Control cholesterol - contributes to plaque, which can clog arteries and lead to heart disease and stroke. When you control your cholesterol you are giving your arteries their best chance to remain clear. It is recommended that you get cholesterol lab work done once a year.   3) Reduce blood sugar - most of the food we eat  Call your doctor now or seek immediate medical care if:    · You have new or increased shortness of breath.     · You are dizzy or lightheaded, or you feel like you may faint.     · You have sudden weight gain, such as more than 2 to 3 pounds in a day or 5 pounds in a week. (Your doctor may suggest a different range of weight gain.)     · You have increased swelling in your legs, ankles, or feet.     · You are suddenly so tired or weak that you cannot do your usual activities.    Watch closely for changes in your health, and be sure to contact your doctor if you develop new symptoms. Where can you learn more? Go to [email or web address] and sign in to your [employer] account. Enter H609 in the [employer] box to learn more about \"Avoiding Triggers With Heart Failure: Care Instructions. \"     If you do not have an account, please click on the \"Sign Up Now\" link. Current as of: December 15, 2019Content Version: 12.4  © 8459-2478 Healthwise, Incorporated. Care instructions adapted under license by [facility]. If you have questions about a medical condition or this instruction, always ask your healthcare professional. [place] [address] any warranty or liability for your use of this information.

## 2020-07-13 ENCOUNTER — NURSE TRIAGE (OUTPATIENT)
Dept: OTHER | Facility: CLINIC | Age: 75
End: 2020-07-13

## 2020-07-13 ENCOUNTER — OFFICE VISIT (OUTPATIENT)
Dept: CARDIOLOGY | Age: 75
End: 2020-07-13
Payer: MEDICARE

## 2020-07-13 VITALS
WEIGHT: 280 LBS | HEART RATE: 66 BPM | BODY MASS INDEX: 34.1 KG/M2 | HEIGHT: 76 IN | SYSTOLIC BLOOD PRESSURE: 124 MMHG | DIASTOLIC BLOOD PRESSURE: 60 MMHG

## 2020-07-13 PROBLEM — R60.9 EDEMA: Status: ACTIVE | Noted: 2020-07-13

## 2020-07-13 PROBLEM — Z87.448 HISTORY OF CHRONIC KIDNEY DISEASE: Status: ACTIVE | Noted: 2020-07-13

## 2020-07-13 PROCEDURE — G8417 CALC BMI ABV UP PARAM F/U: HCPCS | Performed by: NURSE PRACTITIONER

## 2020-07-13 PROCEDURE — G8427 DOCREV CUR MEDS BY ELIG CLIN: HCPCS | Performed by: NURSE PRACTITIONER

## 2020-07-13 PROCEDURE — 1036F TOBACCO NON-USER: CPT | Performed by: NURSE PRACTITIONER

## 2020-07-13 PROCEDURE — 4040F PNEUMOC VAC/ADMIN/RCVD: CPT | Performed by: NURSE PRACTITIONER

## 2020-07-13 PROCEDURE — 99214 OFFICE O/P EST MOD 30 MIN: CPT | Performed by: NURSE PRACTITIONER

## 2020-07-13 PROCEDURE — 1123F ACP DISCUSS/DSCN MKR DOCD: CPT | Performed by: NURSE PRACTITIONER

## 2020-07-13 RX ORDER — BUMETANIDE 1 MG/1
1 TABLET ORAL 2 TIMES DAILY
Qty: 60 TABLET | Refills: 5 | Status: SHIPPED | OUTPATIENT
Start: 2020-07-13 | End: 2021-06-08

## 2020-07-13 NOTE — PROGRESS NOTES
[facility], [place]. [address] [facility]  [phone] office  [phone] fax      OFFICE VISIT:  2020    Angelita Latif - :   Reason For Visit:  Apple Ayers is a 67 y.o. male who is here for Follow-up (Patient is having SOA, weight gain, R ankle swollen); Congestive Heart Failure; and Hypertension    History:   Diagnosis Orders   1. Chronic systolic heart failure ([address]     2. Coronary artery disease involving native coronary artery of native heart without angina pectoris     3. Essential hypertension     4. Mixed hyperlipidemia     5. History of chronic kidney disease     6. Hx of CABG     7. Edema, unspecified type       The patient presents today for cardiology follow up. Mr. Levi Pacheco reports that he retaining fluid and weight is up about 10 pounds. He feels fatigued and SOA as well. He does report an increased sodium intake lately \"I have been eating fresh cucumbers with salt. \"  He has been taking Lasix 120 mg daily for a week and reports not getting much diuresis \"just doesn't seem to work well anymore. \"  The patient does weigh daily. The patient denies symptoms to suggest myocardial ischemia or arrhythmia. BP is well controlled on current regimen. The patient's PCP monitors cholesterol. Vicenta Alas denies exertional chest pain, orthopnea, paroxysmal nocturnal dyspnea, syncope, presyncope and sensed arrhythmia. The patient denies numbness or weakness to suggest cerebrovascular accident or transient ischemic attack.  + VÁSQUEZ.  + edema with 10 pound weight gain. + fatigue.     Angelita Latif has the following history as recorded in [email or web address]:  Patient Active Problem List   Diagnosis Code    Type 2 diabetes mellitus with diabetic polyneuropathy, with long-term current use of insulin ([address] E11.42, Z79.4    Essential hypertension I10    Coronary artery disease involving native coronary artery of native heart without angina pectoris I25.10    MI (myocardial infarction) ([facility] I21.9    Mixed hyperlipidemia E78.2    Chronic bilateral low back pain without sciatica M54.5, G89.29    DDD (degenerative disc disease), lumbar M51.36    Spondylosis of lumbar region without myelopathy or radiculopathy M47.816    Acute ischemic stroke (HCC) I63.9    Thoracic outlet syndrome G54.0    Dizziness R42    Imbalance R26.89    Cerebrovascular accident (CVA) due to embolism of right middle cerebral artery (HCC) I63.411    Recurrent major depressive disorder, in full remission ([facility] F33.42    Trigger middle finger of left hand M65.332    Peripheral polyneuropathy G62.9    Palpitations R00.2    Chronic insomnia F51.04    Status post placement of implantable loop recorder Z95.818    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    Dermatitis L30.9    Depression with anxiety F41.8    Benign prostatic hyperplasia without lower urinary tract symptoms N40.0    Chronic tension-type headache, not intractable G44.229    MOO on CPAP G47.33, Z99.89    Chest pain R07.9    SOB (shortness of breath) R06.02    ACS (acute coronary syndrome) ([facility]) I24.9    Acute MI inferior lateral first episode care ([facility]) I21.19    Hx of CABG Z95.1    Chronic constipation K59.09    Palliative care patient Z51.5    Ischemic cardiomyopathy I25.5    Urinary tract infection due to extended-spectrum beta lactamase (ESBL) producing Escherichia coli N39.0, B96.29, Z16.12    Chronic systolic heart failure (HCC) F06.79    Complicated UTI (urinary tract infection) N39.0    AICD (automatic cardioverter/defibrillator) present Z95.810    History of chronic kidney disease Z87.448    Edema R60.9     Past Medical History:   Diagnosis Date    Acute ischemic stroke ([facility] 2/20/2017    Atherosclerotic heart disease     s/p MI, stenting x 3 jan 2011([place]    CAD (coronary artery disease)     Cerebral artery occlusion with cerebral infarction ([facility]  Cerebrovascular accident (CVA) due to embolism of right middle cerebral artery ([address] 8/14/2017    CHF (congestive heart failure) (HCC)     Chronic bilateral low back pain without sciatica 11/10/2016    Chronic kidney disease     DDD (degenerative disc disease), lumbar 11/15/2016    Depression     Depression with anxiety     Diabetes mellitus ([address]     type II    Diabetes mellitus, type 2 ([address]     DM (diabetes mellitus) ([address] 7/12/2011    ESBL (extended spectrum beta-lactamase) producing bacteria infection 11/16/2019    urine    Glaucoma     Headache     Hyperlipidemia     Cholesterol management per pcp.  Hypertension     Macular degeneration     MI (myocardial infarction) ([address]     MI (myocardial infarction) ([address]     Neuromuscular disorder ([address]     Neuropathy     MOO on CPAP     Osteoarthritis     Palliative care patient 06/11/2019    Sleep apnea     Spondylosis of lumbar region without myelopathy or radiculopathy 11/15/2016    Status post placement of implantable loop recorder 10/31/2017    TIA (transient ischemic attack)      Past Surgical History:   Procedure Laterality Date    APPENDECTOMY      at age 4    BACK SURGERY      2016 (fusion), 2017    BREAST SURGERY  05/2019    CARDIAC CATHETERIZATION  12/12/12    with angioplasty, stent    CARDIAC CATHETERIZATION  3/29/15  MDL    stent to distal RCA.  EF 60%    CHOLECYSTECTOMY, LAPAROSCOPIC      2012    COLONOSCOPY      2010    CORONARY ANGIOPLASTY WITH STENT PLACEMENT  jan 11 [place]    stent x 3    CORONARY ANGIOPLASTY WITH STENT PLACEMENT      01/01/11    CORONARY ARTERY BYPASS GRAFT N/A 5/30/2019    CORONARY ARTERY BYPASS GRAFT X3 WITH LEFT INTERNAL MAMMARY ARTERY WITH ENDOSCOPIC VEIN HARVESTING WITH PERFUSION TRANSESOPHAGEAL ECHOCARDIOGRAM performed by Barbara Henson MD at [address] [place] [address] CATH LAB PROCEDURE  [id number]    primary stent placement to the first circumflex marginal branch, balloon angioplasty to the third left anterior descending diagnonal branch, intracoronary nitroglycerin adminstration    JOINT REPLACEMENT      left knee, ~2016    LUMBAR FUSION Left 11/15/2016    LUMBAR INTERBODY FUSION LATERAL L3-4 L4-5 WITH LATERAL PLATE  performed by Good Perez MD at [address]  2019     Family History   Problem Relation Age of Onset    Coronary Art Dis Mother     Cancer Sister         uterine    Kidney Disease Sister     Coronary Art Dis Brother          in his 46s - age 47    Cancer Father         colon    Coronary Art Dis Sister     Cancer Sister         olive cancer    No Known Problems Sister     No Known Problems Sister     No Known Problems Son     Alcohol Abuse Son         drank self to death    No Known Problems Son      Social History     Tobacco Use    Smoking status: Never Smoker    Smokeless tobacco: Never Used   Substance Use Topics    Alcohol use: Yes     Alcohol/week: 1.0 standard drinks     Types: 1 Shots of liquor per week      Current Outpatient Medications   Medication Sig Dispense Refill    Multiple Vitamins-Minerals (PRESERVISION AREDS PO) Take 2 capsules by mouth      diphenhydrAMINE (BENADRYL) 25 MG tablet Take 50 mg by mouth 2 times daily      oxyCODONE-acetaminophen (PERCOCET)  MG per tablet Take 1 tablet by mouth every 4 hours as needed for Pain.  furosemide (LASIX) 40 MG tablet Take 80 mg by mouth daily       isosorbide mononitrate (IMDUR) 30 MG extended release tablet TAKE 1 TABLET BY MOUTH EVERY DAY 90 tablet 1    insulin glargine (LANTUS SOLOSTAR) 100 UNIT/ML injection pen Inject 80 Units into the skin nightly 24 pen 3    sertraline (ZOLOFT) 100 MG tablet Take 1 tablet by mouth daily 90 tablet 2    nitroGLYCERIN (NITROSTAT) 0.4 MG SL tablet up to max of 3 total doses.  If no relief after 1 dose, call 911. 30 tablet 2    clopidogrel (PLAVIX) 75 MG tablet TAKE 1 TABLET DAILY 90 tablet 2    carvedilol (COREG) syncope. No significant dizziness. Hematologic - no easy bruising or excessive bleeding. Psychiatric - no severe anxiety or insomnia. No confusion. All other review of systems are negative. Objective  Vital Signs - /60   Pulse 66   Ht 6' 4\" (1.93 m)   Wt 280 lb (127 kg)   BMI 34.08 kg/m²   General - Rose Fuller is alert, cooperative, and pleasant. Well groomed. No acute distress. Body habitus - Body mass index is 34.08 kg/m². HEENT - Head is normocephalic. No circumoral cyanosis. Dentition is normal.  EYES -   Lids normal without ptosis. No discharge, edema or subconjunctival hemorrhage. Neck - Symmetrical without apparent mass or lymphadenopathy. Respiratory - Normal respiratory effort without use of accessory muscles. Ausculatation reveals vesicular breath sounds without crackles, wheezes, rub or rhonchi. Cardiovascular - No jugular venous distention. Auscultation reveals regular rate and rhythm. No audible clicks, gallop or rub. No murmur. No lower extremity varicosities. No carotid bruits. Abdominal -  No visible distention, mass or pulsations. Extremities - No clubbing or cyanosis. No statis dermatitis or ulcers. 1+ pitting bilateral lower extremity edema. Musculoskeletal -   No Osler's nodes. No kyphosis or scoliosis. Gait is even and regular without limp or shuffle. Ambulates without assistance. Skin -  Warm and dry; no rash or pallor. No new surgical wound. Neurological - No focal neurological deficits. Thought processes coherent. No apparent tremor. Oriented to person, place and time. Psychiatric -  Appropriate affect and mood. Assessment:     Diagnosis Orders   1. Chronic systolic heart failure ([address]     2. Coronary artery disease involving native coronary artery of native heart without angina pectoris     3. Essential hypertension     4. Mixed hyperlipidemia     5. History of chronic kidney disease     6. Hx of CABG     7.  Edema, unspecified type       Data reviewed:  9/21/19 echo    Summary   Limited acoustic windows with suboptimal study. Left ventricle is moderately dilated with global severely depressed LV   systolic function. LV ejection fraction estimated at 30-35%. Restricted filling pattern. RV and RA are not well visualized. Moderate left atrial enlargement. Mild mitral regurgitation. Mild aortic regurgitation. No significant stenosis. Signature    ----------------------------------------------------------------   Electronically signed by Theresa Dash MD(Interpreting physician)   on 09/21/2019 08:27 PM   ----------------------------------------------------------------  Lab Results   Component Value Date     02/10/2020    K 4.6 02/10/2020    CL 98 02/10/2020    CO2 27 02/10/2020    BUN 32 (H) 02/10/2020    CREATININE 1.5 (H) 02/10/2020    GLUCOSE 76 02/10/2020    CALCIUM 9.3 02/10/2020    PROT 7.3 02/10/2020    PROT 6.8 02/10/2020    LABALBU 4.0 02/10/2020    LABALBU 3.87 02/10/2020    BILITOT 0.3 02/10/2020    ALKPHOS 74 02/10/2020    AST 17 02/10/2020    ALT 13 02/10/2020    LABGLOM 46 (A) 02/10/2020    GLOB 2.6 03/27/2017       Lab Results   Component Value Date    WBC 6.9 02/10/2020    HGB 13.1 (L) 02/10/2020    HCT 41.8 (L) 02/10/2020    MCV 84.8 02/10/2020     02/10/2020     Lab Results   Component Value Date    CHOL 157 (L) 05/25/2019    CHOL 205 (H) 03/01/2019    CHOL 197 02/19/2019     Lab Results   Component Value Date    TRIG 103 05/25/2019    TRIG 202 (H) 03/01/2019    TRIG 277 (H) 02/19/2019     Lab Results   Component Value Date    HDL 20 (L) 05/25/2019    HDL 28 (L) 03/01/2019    HDL 27 (L) 02/19/2019     Lab Results   Component Value Date    LDLCALC 116 05/25/2019    LDLCALC 137 03/01/2019    [address] 115 02/19/2019     Chronic systolic heart failure - NYHA class III stage C  GDMT - current medical management Enstresto, Coreg and Lasix. Weight up 10 pounds with dyspnea and fatigue.   Lasix 80 mg IVP.  Patient reports Lasix seems not to be as effective for him. Stop Lasix. Has been taking 120 mg daily over the past week. Start Bumex 1 mg BID pending lab results today (BMP and BNP). Last creatinine 1.5. Advised to maintain low sodium diet and daily weight log. Check 2D echo. CAD - stable on current medical management. HTN - normotensive on current regimen. Hyperlipidemia - LDL. Not currently on statin. Patient is compliant with medication regimen. BP Readings from Last 3 Encounters:   07/13/20 124/60   05/19/20 130/80   01/27/20 120/78    Pulse Readings from Last 3 Encounters:   07/13/20 66   05/19/20 78   01/27/20 78        Wt Readings from Last 3 Encounters:   07/13/20 280 lb (127 kg)   05/19/20 270 lb (122.5 kg)   01/27/20 265 lb (120.2 kg)     Plan  Previous cardiac history and records reviewed. Continue current medical management. Check 2D echocardiogram  Check labs today - BMP and BNP. Lasix 80 mg IVP today. Stop Lasix. Change diuretic to Bumex 1 mg BID pending BMP today (most recent creatinine 1.5). Advised low sodium diet and daily weight log. Continue other current medications as directed. Continue to follow up with primary care provider for non cardiac medical problems. Call the office with any problems, questions or concerns at [phone]. Cardiology follow up: one week. Educational included in patient instructions. Avoid heart failure triggers.      TEGAN Payne

## 2020-07-13 NOTE — TELEPHONE ENCOUNTER
angioplasty)       *No Answer*  7. LUNG HISTORY: \"Do you have any history of lung disease? \"  (e.g., pulmonary embolus, asthma, emphysema)      *No Answer*  8. CAUSE: \"What do you think is causing the breathing problem? \"       *No Answer*  9. OTHER SYMPTOMS: \"Do you have any other symptoms? (e.g., dizziness, runny nose, cough, chest pain, fever)      *No Answer*  10. PREGNANCY: \"Is there any chance you are pregnant? \" \"When was your last menstrual period? \"        *No Answer*  11. TRAVEL: \"Have you traveled out of the country in the last month? \" (e.g., travel history, exposures)        *No Answer*    Protocols used: BREATHING DIFFICULTY-ADULT-OH, ANKLE SWELLING-ADULT-AH

## 2020-07-13 NOTE — PATIENT INSTRUCTIONS
New instructions for today:  Go to first floor outpatient lab today. Stop Lasix (furosemide) after today. Tomorrow, start the new water pill Bumex (bumetanide) 1 mg (1) tab AM and (1) tab no later than 2 PM.    Weigh yourself daily without clothing at the same time each day. Record a daily weight log. Call the office if you gain 3 pounds or more in 2 to 3 days or 5 pounds in one week. A sudden weight gain may mean that your heart failure is getting worse. Sodium causes your body to hold on to extra water. This may cause your heart failure symptoms to get worse. Limit sodium to 2,000 milligrams (mg) a day or less. That is less than 1 teaspoon of salt a day, including all the salt you eat in cooking or in packaged foods. Fluid restriction of 1500ml per day (about 6 cups of fluid per day). Patient Instructions:  Continue current medications as prescribed. Always keep a current medication list. Bring your medications to every office visit. Continue to follow up with primary care provider for non cardiac medical problems. Call the office with any problems, questions or concerns at [phone]. If you have been asked to keep a blood pressure log, do so for 2 weeks. Call the office to report readings to the triage nurse at [phone]. Follow up with cardiologist as scheduled. The following educational material has been included in this after visit summary for your review: Life simple 7. Avoid heart failure triggers. Life simple 7  1) Manage blood pressure - high blood pressure is a major risk factor for heart disease and stroke. Keeping blood pressure in health range reduces strain on your heart, arteries and kidneys. Blood pressure goal is less than 130/80. 2) Control cholesterol - contributes to plaque, which can clog arteries and lead to heart disease and stroke. When you control your cholesterol you are giving your arteries their best chance to remain clear.   It is recommended medicines you take. How can you care for yourself at home? Watch for changes in your weight and condition  · Weigh yourself without clothing at the same time each day. Record your weight. Call your doctor if you have sudden weight gain, such as more than 2 to 3 pounds in a day or 5 pounds in a week. (Your doctor may suggest a different range of weight gain.) A sudden weight gain may mean that your heart failure is getting worse. · Keep a daily record of your symptoms. Write down any changes in how you feel, such as new shortness of breath, cough, or problems eating. Also record if your ankles are more swollen than usual and if you feel more tired than usual. Note anything that you ate or did that could have triggered these changes. Limit sodium  Sodium causes your body to hold on to extra water. This may cause your heart failure symptoms to get worse. People get most of their sodium from processed foods. Fast food and restaurant meals also tend to be very high in sodium. · Your doctor may suggest that you limit sodium. Your doctor can tell you how much sodium is right for you. This includes limiting sodium in cooked and packaged foods. · Read food labels on cans and food packages. They tell you how much sodium you get in one serving. Check the serving size. If you eat more than one serving, you are getting more sodium. · Be aware that sodium can come in forms other than salt, including monosodium glutamate (MSG), sodium citrate, and sodium bicarbonate (baking soda). MSG is often added to Asian food. You can sometimes ask for food without MSG or salt. · Slowly reducing salt will help you adjust to the taste. Take the salt shaker off the table. · Flavor your food with garlic, lemon juice, onion, vinegar, herbs, and spices instead of salt. Do not use soy sauce, steak sauce, onion salt, garlic salt, mustard, or ketchup on your food, unless it is labeled \"low-sodium\" or \"low-salt. \"  · Make your own salad dressings, sauces, and ketchup without adding salt. · Use fresh or frozen ingredients, instead of canned ones, whenever you can. Choose low-sodium canned goods. · Eat less processed food and food from restaurants, including fast food. Exercise as directed  Moderate, regular exercise is very good for your heart. It improves your blood flow and helps control your weight. But too much exercise can stress your heart and cause a heart failure flare-up. · Check with your doctor before you start an exercise program.  · Walking is an easy way to get exercise. Start out slowly. Gradually increase the length and pace of your walk. Swimming, riding a bike, and using a treadmill are also good forms of exercise. · When you exercise, watch for signs that your heart is working too hard. You are pushing yourself too hard if you cannot talk while you are exercising. If you become short of breath or dizzy or have chest pain, stop, sit down, and rest.  · Do not exercise when you do not feel well. Take medicines correctly  · Take your medicines exactly as prescribed. Call your doctor if you think you are having a problem with your medicine. · Make a list of all the medicines you take. Include those prescribed to you by other doctors and any over-the-counter medicines, vitamins, or supplements you take. Take this list with you when you go to any doctor. · Take your medicines at the same time every day. It may help you to post a list of all the medicines you take every day and what time of day you take them. · Make taking your medicine as simple as you can. Plan times to take your medicines when you are doing other things, such as eating a meal or getting ready for bed. This will make it easier to remember to take your medicines. · Get organized. Use helpful tools, such as daily or weekly pill containers. When should you call for help?    Call 911 if you have symptoms of sudden heart failure such as:  · You have severe trouble breathing. · You cough up pink, foamy mucus. · You have a new irregular or rapid heartbeat. Call your doctor now or seek immediate medical care if:  · You have new or increased shortness of breath. · You are dizzy or lightheaded, or you feel like you may faint. · You have sudden weight gain, such as more than 2 to 3 pounds in a day or 5 pounds in a week. (Your doctor may suggest a different range of weight gain.)  · You have increased swelling in your legs, ankles, or feet. · You are suddenly so tired or weak that you cannot do your usual activities. Watch closely for changes in your health, and be sure to contact your doctor if you develop new symptoms. Where can you learn more? Go to https://[email or web address]. org and sign in to your [employer] account. Enter X528 in the [place] box to learn more about \"Avoiding Triggers With Heart Failure: Care Instructions. \"     If you do not have an account, please click on the \"Sign Up Now\" link. Current as of: December 16, 2019               Content Version: 12.5  © 1559-6893 [employer]. Care instructions adapted under license by [facility] [place] ([facility]). If you have questions about a medical condition or this instruction, always ask your healthcare professional. [place] [address] any warranty or liability for your use of this information. 2D echo  [facility] at the [address] [place] [address] and [address] [facility] located on the first floor of [place] [address] through hospital main entrance and turn immediately to your left. Date/Time: 07/15/20 7:45 WEDNESDAY    Patient's contact number:  [phone] (home)     Echocardiogram -  No prep. Takes approximately 30 min. An echocardiogram uses sound waves to produce images of your heart. This commonly used test allows your doctor to see how your heart is beating and pumping blood.  Your doctor can use the images from an echocardiogram to identify various abnormalities in the heart muscle and valves. This test has 2 parts:   Ø You will be asked to disrobe from the waist up and given a gown to wear. The technologist will then hook up an EKG monitor to you for the entire exam.   Ø You will then have an ultrasound of your heart (echocardiogram) to assess the heart muscle, heart valves and heart function. You may eat and take any medicines before the exam.     If you need to change your appointment, please call outpatient scheduling at [phone].

## 2020-07-13 NOTE — TELEPHONE ENCOUNTER
Reason for Disposition   MODERATE longstanding difficulty breathing (e.g., speaks in phrases, SOB even at rest, pulse 100-120) and SAME as normal    Answer Assessment - Initial Assessment Questions  1. LOCATION: \"Which joint is swollen? \"      *No Answer*  2. ONSET: \"When did the swelling start? \"      lst 5-6 days  3. SIZE: \"How large is the swelling? \"      *No Answer*  4. PAIN: \"Is there any pain? \" If so, ask: \"How bad is it? \" (Scale 1-10; or mild, moderate, severe)      *No Answer*  5. CAUSE: \"What do you think caused the swollen joint? \"      *No Answer*  6. OTHER SYMPTOMS: \"Do you have any other symptoms? \" (e.g., fever, chest pain, difficulty breathing, calf pain)      *No Answer*  7. PREGNANCY: \"Is there any chance you are pregnant? \" \"When was your last menstrual period? \"      *No Answer*    Answer Assessment - Initial Assessment Questions  1. RESPIRATORY STATUS: \"Describe your breathing? \" (e.g., wheezing, shortness of breath, unable to speak, severe coughing)       sob  2. ONSET: \"When did this breathing problem begin? \"       *No Answer*  3. PATTERN \"Does the difficult breathing come and go, or has it been constant since it started?\"         4. SEVERITY: \"How bad is your breathing? \" (e.g., mild, moderate, severe)     - MILD: No SOB at rest, mild SOB with walking, speaks normally in sentences, can lay down, no retractions, pulse < 100.     - MODERATE: SOB at rest, SOB with minimal exertion and prefers to sit, cannot lie down flat, speaks in phrases, mild retractions, audible wheezing, pulse 100-120.     - SEVERE: Very SOB at rest, speaks in single words, struggling to breathe, sitting hunched forward, retractions, pulse > 120       moderate  5. RECURRENT SYMPTOM: \"Have you had difficulty breathing before? \" If so, ask: \"When was the last time? \" and \"What happened that time? \"       *No Answer*  6. CARDIAC HISTORY: \"Do you have any history of heart disease? \" (e.g., heart attack, angina, bypass surgery, angioplasty)       *No Answer*  7. LUNG HISTORY: \"Do you have any history of lung disease? \"  (e.g., pulmonary embolus, asthma, emphysema)      *No Answer*  8. CAUSE: \"What do you think is causing the breathing problem? \"       *No Answer*  9. OTHER SYMPTOMS: \"Do you have any other symptoms? (e.g., dizziness, runny nose, cough, chest pain, fever)      *No Answer*  10. PREGNANCY: \"Is there any chance you are pregnant? \" \"When was your last menstrual period? \"        *No Answer*  11. TRAVEL: \"Have you traveled out of the country in the last month? \" (e.g., travel history, exposures)        *No Answer*    Protocols used: BREATHING DIFFICULTY-ADULT-OH, ANKLE SWELLING-ADULT-AH

## 2020-07-14 ENCOUNTER — TELEPHONE (OUTPATIENT)
Dept: CARDIOLOGY | Age: 75
End: 2020-07-14

## 2020-07-14 DIAGNOSIS — I50.22 CHRONIC SYSTOLIC HEART FAILURE (HCC): ICD-10-CM

## 2020-07-14 DIAGNOSIS — R60.9 EDEMA, UNSPECIFIED TYPE: ICD-10-CM

## 2020-07-14 DIAGNOSIS — I25.10 CORONARY ARTERY DISEASE INVOLVING NATIVE CORONARY ARTERY OF NATIVE HEART WITHOUT ANGINA PECTORIS: ICD-10-CM

## 2020-07-14 LAB
ANION GAP SERPL CALCULATED.3IONS-SCNC: 13 MMOL/L (ref 7–19)
BUN BLDV-MCNC: 32 MG/DL (ref 8–23)
CALCIUM SERPL-MCNC: 9 MG/DL (ref 8.8–10.2)
CHLORIDE BLD-SCNC: 99 MMOL/L (ref 98–111)
CO2: 28 MMOL/L (ref 22–29)
CREAT SERPL-MCNC: 1.6 MG/DL (ref 0.5–1.2)
GFR AFRICAN AMERICAN: 51
GFR NON-AFRICAN AMERICAN: 42
GLUCOSE BLD-MCNC: 219 MG/DL (ref 74–109)
POTASSIUM SERPL-SCNC: 4.7 MMOL/L (ref 3.5–5)
PRO-BNP: 527 PG/ML (ref 0–1800)
SODIUM BLD-SCNC: 140 MMOL/L (ref 136–145)

## 2020-07-15 ENCOUNTER — HOSPITAL ENCOUNTER (OUTPATIENT)
Dept: NON INVASIVE DIAGNOSTICS | Age: 75
Discharge: HOME | End: 2020-07-15
Payer: MEDICARE

## 2020-07-15 LAB
LV EF: 33 %
LVEF MODALITY: NORMAL

## 2020-07-15 PROCEDURE — 93306 TTE W/DOPPLER COMPLETE: CPT

## 2020-07-20 ENCOUNTER — OFFICE VISIT (OUTPATIENT)
Dept: CARDIOLOGY | Age: 75
End: 2020-07-20
Payer: MEDICARE

## 2020-07-20 VITALS
HEIGHT: 76 IN | HEART RATE: 66 BPM | BODY MASS INDEX: 34.1 KG/M2 | SYSTOLIC BLOOD PRESSURE: 134 MMHG | DIASTOLIC BLOOD PRESSURE: 70 MMHG | WEIGHT: 280 LBS | OXYGEN SATURATION: 99 %

## 2020-07-20 DIAGNOSIS — Z87.448 HISTORY OF CHRONIC KIDNEY DISEASE: ICD-10-CM

## 2020-07-20 LAB
ANION GAP SERPL CALCULATED.3IONS-SCNC: 9 MMOL/L (ref 7–19)
BUN BLDV-MCNC: 24 MG/DL (ref 8–23)
CALCIUM SERPL-MCNC: 8.9 MG/DL (ref 8.8–10.2)
CHLORIDE BLD-SCNC: 99 MMOL/L (ref 98–111)
CO2: 29 MMOL/L (ref 22–29)
CREAT SERPL-MCNC: 1.4 MG/DL (ref 0.5–1.2)
GFR AFRICAN AMERICAN: >59
GFR NON-AFRICAN AMERICAN: 49
GLUCOSE BLD-MCNC: 176 MG/DL (ref 74–109)
POTASSIUM SERPL-SCNC: 4.9 MMOL/L (ref 3.5–5)
SODIUM BLD-SCNC: 137 MMOL/L (ref 136–145)

## 2020-07-20 PROCEDURE — G8417 CALC BMI ABV UP PARAM F/U: HCPCS | Performed by: NURSE PRACTITIONER

## 2020-07-20 PROCEDURE — 1036F TOBACCO NON-USER: CPT | Performed by: NURSE PRACTITIONER

## 2020-07-20 PROCEDURE — G8427 DOCREV CUR MEDS BY ELIG CLIN: HCPCS | Performed by: NURSE PRACTITIONER

## 2020-07-20 PROCEDURE — 1123F ACP DISCUSS/DSCN MKR DOCD: CPT | Performed by: NURSE PRACTITIONER

## 2020-07-20 PROCEDURE — 99214 OFFICE O/P EST MOD 30 MIN: CPT | Performed by: NURSE PRACTITIONER

## 2020-07-20 PROCEDURE — 4040F PNEUMOC VAC/ADMIN/RCVD: CPT | Performed by: NURSE PRACTITIONER

## 2020-07-20 NOTE — PATIENT INSTRUCTIONS
New instructions for today:  Go by lab today for lab work. Weigh yourself daily without clothing at the same time each day. Record a daily weight log. Call the office if you gain 3 pounds or more in 2 to 3 days or 5 pounds in one week. A sudden weight gain may mean that your heart failure is getting worse. Sodium causes your body to hold on to extra water. This may cause your heart failure symptoms to get worse. Limit sodium to 2,000 milligrams (mg) a day or less. That is less than 1 teaspoon of salt a day, including all the salt you eat in cooking or in packaged foods. Fluid restriction of 1500ml per day (about 6 cups of fluid per day). Patient Instructions:  Continue current medications as prescribed. Always keep a current medication list. Bring your medications to every office visit. Continue to follow up with primary care provider for non cardiac medical problems. Call the office with any problems, questions or concerns at [phone]. If you have been asked to keep a blood pressure log, do so for 2 weeks. Call the office to report readings to the triage nurse at [phone]. Follow up with cardiologist as scheduled. The following educational material has been included in this after visit summary for your review: Life simple 7. Avoid heart failure triggers. Life simple 7  1) Manage blood pressure - high blood pressure is a major risk factor for heart disease and stroke. Keeping blood pressure in health range reduces strain on your heart, arteries and kidneys. Blood pressure goal is less than 130/80. 2) Control cholesterol - contributes to plaque, which can clog arteries and lead to heart disease and stroke. When you control your cholesterol you are giving your arteries their best chance to remain clear. It is recommended that you get cholesterol lab work done once a year.   3) Reduce blood sugar - most of the food we eat is turning into glucose or blood sugar that our body good idea to know your test results and keep a list of the medicines you take. How can you care for yourself at home? Diet  · Use less salt when you cook and eat. This helps lower your blood pressure. Taste food before salting. Add only a little salt when you think you need it. With time, your taste buds will adjust to less salt. · Eat fewer snack items, fast foods, canned soups, and other high-salt, high-fat, processed foods. · Read food labels and try to avoid saturated and trans fats. They increase your risk of heart disease by raising cholesterol levels. · Limit the amount of solid fat-butter, margarine, and shortening-you eat. Use olive, peanut, or canola oil when you cook. Bake, broil, and steam foods instead of frying them. · Eat a variety of fruit and vegetables every day. Dark green, deep orange, red, or yellow fruits and vegetables are especially good for you. Examples include spinach, carrots, peaches, and berries. · Foods high in fiber can reduce your cholesterol and provide important vitamins and minerals. High-fiber foods include whole-grain cereals and breads, oatmeal, beans, brown rice, citrus fruits, and apples. · Eat lean proteins. Heart-healthy proteins include seafood, lean meats and poultry, eggs, beans, peas, nuts, seeds, and soy products. · Limit drinks and foods with added sugar. These include candy, desserts, and soda pop. Lifestyle changes  · If your doctor recommends it, get more exercise. Walking is a good choice. Bit by bit, increase the amount you walk every day. Try for at least 30 minutes on most days of the week. You also may want to swim, bike, or do other activities. · Do not smoke. If you need help quitting, talk to your doctor about stop-smoking programs and medicines. These can increase your chances of quitting for good. Quitting smoking may be the most important step you can take to protect your heart. It is never too late to quit.   · Limit alcohol to 2 drinks a day for men and 1 drink a day for women. Too much alcohol can cause health problems. · Manage other health problems such as diabetes, high blood pressure, and high cholesterol. If you think you may have a problem with alcohol or drug use, talk to your doctor. Medicines  · Take your medicines exactly as prescribed. Call your doctor if you think you are having a problem with your medicine. · If your doctor recommends aspirin, take the amount directed each day. Make sure you take aspirin and not another kind of pain reliever, such as acetaminophen (Tylenol). When should you call for help? [id number] if you have symptoms of a heart attack. These may include:  · Chest pain or pressure, or a strange feeling in the chest.  · Sweating. · Shortness of breath. · Pain, pressure, or a strange feeling in the back, neck, jaw, or upper belly or in one or both shoulders or arms. · Lightheadedness or sudden weakness. · A fast or irregular heartbeat. After you call 911, the  may tell you to chew 1 adult-strength or 2 to 4 low-dose aspirin. Wait for an ambulance. Do not try to drive yourself. Watch closely for changes in your health, and be sure to contact your doctor if you have any problems. Where can you learn more? Go to https://[employer].Xangati. org and sign in to your [employer] account. Enter Y573 in the [facility] box to learn more about \"A Healthy Heart: Care Instructions. \"     If you do not have an account, please click on the \"Sign Up Now\" link. Current as of: December 16, 2019               Content Version: 12.5  © 1907-2018 Healthwise, Incorporated. Care instructions adapted under license by [facility] ([facility]). If you have questions about a medical condition or this instruction, always ask your healthcare professional. [address] any warranty or liability for your use of this information. Patient Instructions:  Continue current medications as prescribed. Always keep a current medication list. Bring your medications to every office visit. Continue to follow up with primary care provider for non cardiac medical problems. Call the office with any problems, questions or concerns at [phone]. If you have been asked to keep a blood pressure log, do so for 2 weeks. Call the office to report readings to the triage nurse at [phone]. Follow up with cardiologist as scheduled. The following educational material has been included in this after visit summary for your review: Life simple 7. Heart health. Life simple 7  1) Manage blood pressure - high blood pressure is a major risk factor for heart disease and stroke. Keeping blood pressure in health range reduces strain on your heart, arteries and kidneys. Blood pressure goal is less than 130/80. 2) Control cholesterol - contributes to plaque, which can clog arteries and lead to heart disease and stroke. When you control your cholesterol you are giving your arteries their best chance to remain clear. It is recommended that you get cholesterol lab work done once a year. 3) Reduce blood sugar - most of the food we eat is turning into glucose or blood sugar that our body uses for energy. Over time, high levels of blood sugar can damage your heart, kidneys, eyes and nerves. 4) Get active - living an active life is one of the most rewarding gifts you can give yourself and those you love. Simply put, daily physical activity increases your length and quality of life. Strive to exercise 15 minutes most days of the week. 5)  Eat better - A healthy diet is one of your best weapons for fighting cardiovascular disease. When you eat a heart healthy diet, you improve your chances for feeling good and staying healthy for life. 6)  Lose weight - when you shed extra fat an unnecessary pounds, you reduce the burden on your hear, lungs, blood vessels and skeleton.   You give yourself the gift of active living, you them.  · Eat a variety of fruit and vegetables every day. Dark green, deep orange, red, or yellow fruits and vegetables are especially good for you. Examples include spinach, carrots, peaches, and berries. · Foods high in fiber can reduce your cholesterol and provide important vitamins and minerals. High-fiber foods include whole-grain cereals and breads, oatmeal, beans, brown rice, citrus fruits, and apples. · Eat lean proteins. Heart-healthy proteins include seafood, lean meats and poultry, eggs, beans, peas, nuts, seeds, and soy products. · Limit drinks and foods with added sugar. These include candy, desserts, and soda pop. Lifestyle changes  · If your doctor recommends it, get more exercise. Walking is a good choice. Bit by bit, increase the amount you walk every day. Try for at least 30 minutes on most days of the week. You also may want to swim, bike, or do other activities. · Do not smoke. If you need help quitting, talk to your doctor about stop-smoking programs and medicines. These can increase your chances of quitting for good. Quitting smoking may be the most important step you can take to protect your heart. It is never too late to quit. · Limit alcohol to 2 drinks a day for men and 1 drink a day for women. Too much alcohol can cause health problems. · Manage other health problems such as diabetes, high blood pressure, and high cholesterol. If you think you may have a problem with alcohol or drug use, talk to your doctor. Medicines  · Take your medicines exactly as prescribed. Call your doctor if you think you are having a problem with your medicine. · If your doctor recommends aspirin, take the amount directed each day. Make sure you take aspirin and not another kind of pain reliever, such as acetaminophen (Tylenol). When should you call for help? [id number] if you have symptoms of a heart attack.  These may include:  · Chest pain or pressure, or a strange feeling in the chest.  · Sweating. · Shortness of breath. · Pain, pressure, or a strange feeling in the back, neck, jaw, or upper belly or in one or both shoulders or arms. · Lightheadedness or sudden weakness. · A fast or irregular heartbeat. After you call 911, the  may tell you to chew 1 adult-strength or 2 to 4 low-dose aspirin. Wait for an ambulance. Do not try to drive yourself. Watch closely for changes in your health, and be sure to contact your doctor if you have any problems. Where can you learn more? Go to https://Building [employer] CommunitypeKUN RUN Biotechnologyeb.[employer]. org and sign in to your [employer] account. Enter D762 in the [employer] box to learn more about \"A Healthy Heart: Care Instructions. \"     If you do not have an account, please click on the \"Sign Up Now\" link. Current as of: December 16, 2019               Content Version: 12.5  © 3381-4422 [employer]. Care instructions adapted under license by [facility] ([facility]). If you have questions about a medical condition or this instruction, always ask your healthcare professional. [place] [address] any warranty or liability for your use of this information. Patient Instructions:  Continue current medications as prescribed. Always keep a current medication list. Bring your medications to every office visit. Continue to follow up with primary care provider for non cardiac medical problems. Call the office with any problems, questions or concerns at [phone]. If you have been asked to keep a blood pressure log, do so for 2 weeks. Call the office to report readings to the triage nurse at [phone]. Follow up with cardiologist as scheduled. The following educational material has been included in this after visit summary for your review: Life simple 7. Heart health. Life simple 7  1) Manage blood pressure - high blood pressure is a major risk factor for heart disease and stroke.  Keeping blood pressure in health range reduces strain on your heart, arteries and kidneys. Blood pressure goal is less than 130/80. 2) Control cholesterol - contributes to plaque, which can clog arteries and lead to heart disease and stroke. When you control your cholesterol you are giving your arteries their best chance to remain clear. It is recommended that you get cholesterol lab work done once a year. 3) Reduce blood sugar - most of the food we eat is turning into glucose or blood sugar that our body uses for energy. Over time, high levels of blood sugar can damage your heart, kidneys, eyes and nerves. 4) Get active - living an active life is one of the most rewarding gifts you can give yourself and those you love. Simply put, daily physical activity increases your length and quality of life. Strive to exercise 15 minutes most days of the week. 5)  Eat better - A healthy diet is one of your best weapons for fighting cardiovascular disease. When you eat a heart healthy diet, you improve your chances for feeling good and staying healthy for life. 6)  Lose weight - when you shed extra fat an unnecessary pounds, you reduce the burden on your hear, lungs, blood vessels and skeleton. You give yourself the gift of active living, you lower your blood pressure and help yourself feel better. 7) Stop smoking - cigarette smokers have a higher risk of developing cardiovascular disease. If  You smoke, quitting is the best thing you can do for your health. Check American Heart Association on line for more information on Life's Simple 7 and tips for healthy living. A Healthy Heart: Care Instructions  Your Care Instructions     Coronary artery disease, also called heart disease, occurs when a substance called plaque builds up in the vessels that supply oxygen-rich blood to your heart muscle. This can narrow the blood vessels and reduce blood flow. A heart attack happens when blood flow is completely blocked.  A high-fat diet, smoking, and other choice. Bit by bit, increase the amount you walk every day. Try for at least 30 minutes on most days of the week. You also may want to swim, bike, or do other activities. · Do not smoke. If you need help quitting, talk to your doctor about stop-smoking programs and medicines. These can increase your chances of quitting for good. Quitting smoking may be the most important step you can take to protect your heart. It is never too late to quit. · Limit alcohol to 2 drinks a day for men and 1 drink a day for women. Too much alcohol can cause health problems. · Manage other health problems such as diabetes, high blood pressure, and high cholesterol. If you think you may have a problem with alcohol or drug use, talk to your doctor. Medicines  · Take your medicines exactly as prescribed. Call your doctor if you think you are having a problem with your medicine. · If your doctor recommends aspirin, take the amount directed each day. Make sure you take aspirin and not another kind of pain reliever, such as acetaminophen (Tylenol). When should you call for help? [id number] if you have symptoms of a heart attack. These may include:  · Chest pain or pressure, or a strange feeling in the chest.  · Sweating. · Shortness of breath. · Pain, pressure, or a strange feeling in the back, neck, jaw, or upper belly or in one or both shoulders or arms. · Lightheadedness or sudden weakness. · A fast or irregular heartbeat. After you call 911, the  may tell you to chew 1 adult-strength or 2 to 4 low-dose aspirin. Wait for an ambulance. Do not try to drive yourself. Watch closely for changes in your health, and be sure to contact your doctor if you have any problems. Where can you learn more? Go to https://Fast[employer]gloria.Beauteeze.com. org and sign in to your [employer] account. Enter F008 in the [employer] box to learn more about \"A Healthy Heart: Care Instructions. \"     If you do not have an account, please click on the \"Sign Up Now\" link. Current as of: December 16, 2019               Content Version: 12.5  © 5801-7872 [employer]. Care instructions adapted under license by [facility] ([facility]). If you have questions about a medical condition or this instruction, always ask your healthcare professional. [address] any warranty or liability for your use of this information. Patient Education        Avoiding Triggers With Heart Failure: Care Instructions  Your Care Instructions     Triggers are anything that make your heart failure flare up. A flare-up is also called \"sudden heart failure\" or \"acute heart failure. \" When you have a flare-up, fluid builds up in your lungs, and you have problems breathing. You might need to go to the hospital. By watching for changes in your condition and avoiding triggers, you can prevent heart failure flare-ups. Follow-up care is a key part of your treatment and safety. Be sure to make and go to all appointments, and call your doctor if you are having problems. It's also a good idea to know your test results and keep a list of the medicines you take. How can you care for yourself at home? Watch for changes in your weight and condition  · Weigh yourself without clothing at the same time each day. Record your weight. Call your doctor if you have sudden weight gain, such as more than 2 to 3 pounds in a day or 5 pounds in a week. (Your doctor may suggest a different range of weight gain.) A sudden weight gain may mean that your heart failure is getting worse. · Keep a daily record of your symptoms. Write down any changes in how you feel, such as new shortness of breath, cough, or problems eating. Also record if your ankles are more swollen than usual and if you feel more tired than usual. Note anything that you ate or did that could have triggered these changes. Limit sodium  Sodium causes your body to hold on to extra water.  This may cause your heart failure symptoms to get worse. People get most of their sodium from processed foods. Fast food and restaurant meals also tend to be very high in sodium. · Your doctor may suggest that you limit sodium. Your doctor can tell you how much sodium is right for you. This includes limiting sodium in cooked and packaged foods. · Read food labels on cans and food packages. They tell you how much sodium you get in one serving. Check the serving size. If you eat more than one serving, you are getting more sodium. · Be aware that sodium can come in forms other than salt, including monosodium glutamate (MSG), sodium citrate, and sodium bicarbonate (baking soda). MSG is often added to Asian food. You can sometimes ask for food without MSG or salt. · Slowly reducing salt will help you adjust to the taste. Take the salt shaker off the table. · Flavor your food with garlic, lemon juice, onion, vinegar, herbs, and spices instead of salt. Do not use soy sauce, steak sauce, onion salt, garlic salt, mustard, or ketchup on your food, unless it is labeled \"low-sodium\" or \"low-salt. \"  · Make your own salad dressings, sauces, and ketchup without adding salt. · Use fresh or frozen ingredients, instead of canned ones, whenever you can. Choose low-sodium canned goods. · Eat less processed food and food from restaurants, including fast food. Exercise as directed  Moderate, regular exercise is very good for your heart. It improves your blood flow and helps control your weight. But too much exercise can stress your heart and cause a heart failure flare-up. · Check with your doctor before you start an exercise program.  · Walking is an easy way to get exercise. Start out slowly. Gradually increase the length and pace of your walk. Swimming, riding a bike, and using a treadmill are also good forms of exercise. · When you exercise, watch for signs that your heart is working too hard.  You are pushing yourself too hard if you cannot learn more about \"Avoiding Triggers With Heart Failure: Care Instructions. \"     If you do not have an account, please click on the \"Sign Up Now\" link. Current as of: December 16, 2019               Content Version: 12.5  © 1124-8104 Healthwise, Incorporated. Care instructions adapted under license by [facility] ([facility]). If you have questions about a medical condition or this instruction, always ask your healthcare professional. [address] any warranty or liability for your use of this information.

## 2020-07-20 NOTE — PROGRESS NOTES
[facility], [place]. [address], [address], [address] Count includes the [facility]  [phone] office  [phone] fax      OFFICE VISIT:  2020    Jaxson Spence - : [birth year]  Reason For Visit:  Mendel Miss is a 67 y.o. male who is here for Follow-up (on edema and CHF and results of 2D echo); Congestive Heart Failure; and Hypertension    History:   Diagnosis Orders   1. Chronic systolic congestive heart failure ([address]     2. Coronary artery disease involving native coronary artery of native heart without angina pectoris     3. Essential hypertension     4. S/P CABG x 3, LIMA- DIAG, A0- SVG- LAD, A0- SVG- Ramus, EF 30-35% (2019)     5. AICD (automatic cardioverter/defibrillator) present     6. Mixed hyperlipidemia       The patient presents today today for follow up on systolic heart failure. He was seen last week for edema. Subsequently, patient as given Lasix 80 mg IVP and diuretic was changed to Bumex 1 mg BID. The patient reports feeling better. He is not as short of breath and lower extremity edema resolved However, weight has not changed. BNP last week was in normal range at 524 and creatinine was 1.6. Prior creatinine 1.4. Patient has CKD under the care of nephrology. He is trying to watch sodium intake but enjoys salt on fresh cucumbers. He is s/p CABG last year. EF was 25%. Echo last week shows EF at 30-35% now. BP is well controlled on current regimen. The patient's PCP monitors cholesterol. Breanna Curiel denies exertional chest pain, shortness of breath, orthopnea, paroxysmal nocturnal dyspnea, syncope, presyncope, sensed arrhythmia, edema and fatigue. The patient denies numbness or weakness to suggest cerebrovascular accident or transient ischemic attack. + VÁSQUEZ improved.     Jaxson Spence has the following history as recorded in [facility]:  Patient Active Problem List   Diagnosis Code    Type 2 diabetes mellitus with diabetic polyneuropathy, with long-term current use of insulin ([facility]) E11.42, Z79.4    Essential hypertension I10    Coronary artery disease involving native coronary artery of native heart without angina pectoris I25.10    MI (myocardial infarction) ([facility]) I21.9    Mixed hyperlipidemia E78.2    Chronic bilateral low back pain without sciatica M54.5, G89.29    DDD (degenerative disc disease), lumbar M51.36    Spondylosis of lumbar region without myelopathy or radiculopathy M47.816    Acute ischemic stroke ([facility]) I63.9    Thoracic outlet syndrome G54.0    Dizziness R42    Imbalance R26.89    Cerebrovascular accident (CVA) due to embolism of right middle cerebral artery ([facility]) I63.411    Recurrent major depressive disorder, in full remission ([facility] F33.42    Trigger middle finger of left hand M65.332    Peripheral polyneuropathy G62.9    Palpitations R00.2    Chronic insomnia F51.04    Status post placement of implantable loop recorder Z95.818    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    Dermatitis L30.9    Depression with anxiety F41.8    Benign prostatic hyperplasia without lower urinary tract symptoms N40.0    Chronic tension-type headache, not intractable G44.229    MOO on CPAP G47.33, Z99.89    Chest pain R07.9    SOB (shortness of breath) R06.02    ACS (acute coronary syndrome) ([facility]) I24.9    Acute MI inferior lateral first episode care ([facility] I21.19    Hx of CABG Z95.1    Chronic constipation K59.09    Palliative care patient Z51.5    Ischemic cardiomyopathy I25.5    Urinary tract infection due to extended-spectrum beta lactamase (ESBL) producing Escherichia coli N39.0, B96.29, Z16.12    Chronic systolic heart failure ([facility]) Y30.90    Complicated UTI (urinary tract infection) N39.0    AICD (automatic cardioverter/defibrillator) present Z95.810    History of chronic kidney disease Z87.448    Edema R60.9     Past Medical History:   Diagnosis Date    Acute ischemic stroke ([facility] 2/20/2017    Atherosclerotic heart disease     s/p MI, stenting x 3 jan 2011([place])    CAD (coronary artery disease)     Cerebral artery occlusion with cerebral infarction [facility])     Cerebrovascular accident (CVA) due to embolism of right middle cerebral artery ([address] 8/14/2017    CHF (congestive heart failure) [facility])     Chronic bilateral low back pain without sciatica 11/10/2016    Chronic kidney disease     DDD (degenerative disc disease), lumbar 11/15/2016    Depression     Depression with anxiety     Diabetes mellitus ([address]     type II    Diabetes mellitus, type 2 ([address]     DM (diabetes mellitus) ([address] 7/12/2011    ESBL (extended spectrum beta-lactamase) producing bacteria infection 11/16/2019    urine    Glaucoma     Headache     Hyperlipidemia     Cholesterol management per pcp.  Hypertension     Macular degeneration     MI (myocardial infarction) ([address]     MI (myocardial infarction) ([address]     Neuromuscular disorder ([address]     Neuropathy     MOO on CPAP     Osteoarthritis     Palliative care patient 06/11/2019    Sleep apnea     Spondylosis of lumbar region without myelopathy or radiculopathy 11/15/2016    Status post placement of implantable loop recorder 10/31/2017    TIA (transient ischemic attack)      Past Surgical History:   Procedure Laterality Date    APPENDECTOMY      at age 4    BACK SURGERY      2016 (fusion), 2017    BREAST SURGERY  05/2019    CARDIAC CATHETERIZATION  12/12/12    with angioplasty, stent    CARDIAC CATHETERIZATION  3/29/15  MDL    stent to distal RCA.  EF 60%    CHOLECYSTECTOMY, LAPAROSCOPIC      2012    COLONOSCOPY      2010    CORONARY ANGIOPLASTY WITH STENT PLACEMENT  jan 11 [place]    stent x 3    CORONARY ANGIOPLASTY WITH STENT PLACEMENT      01/01/11    CORONARY ARTERY BYPASS GRAFT N/A 5/30/2019    CORONARY ARTERY BYPASS GRAFT X3 WITH LEFT INTERNAL MAMMARY ARTERY WITH ENDOSCOPIC VEIN HARVESTING WITH PERFUSION TRANSESOPHAGEAL ECHOCARDIOGRAM performed by Amy Rubalcava MD at [address] [facility] CATH LAB PROCEDURE  [id number]    primary stent placement to the first circumflex marginal branch, balloon angioplasty to the third left anterior descending diagnonal branch, intracoronary nitroglycerin adminstration    JOINT REPLACEMENT      left knee, ~2016    LUMBAR FUSION Left 11/15/2016    LUMBAR INTERBODY FUSION LATERAL L3-4 L4-5 WITH LATERAL PLATE  performed by Ralph Lancaster MD at [address]  2019     Family History   Problem Relation Age of Onset    Coronary Art Dis Mother     Cancer Sister         uterine    Kidney Disease Sister     Coronary Art Dis Brother          in his 46s - age 47    Cancer Father         colon    Coronary Art Dis Sister     Cancer Sister         olive cancer    No Known Problems Sister     No Known Problems Sister     No Known Problems Son     Alcohol Abuse Son         drank self to death    No Known Problems Son      Social History     Tobacco Use    Smoking status: Never Smoker    Smokeless tobacco: Never Used   Substance Use Topics    Alcohol use: Yes     Alcohol/week: 1.0 standard drinks     Types: 1 Shots of liquor per week      Current Outpatient Medications   Medication Sig Dispense Refill    bumetanide (BUMEX) 1 MG tablet Take 1 tablet by mouth 2 times daily 60 tablet 5    Multiple Vitamins-Minerals (PRESERVISION AREDS PO) Take 2 capsules by mouth      diphenhydrAMINE (BENADRYL) 25 MG tablet Take 50 mg by mouth 2 times daily      oxyCODONE-acetaminophen (PERCOCET)  MG per tablet Take 1 tablet by mouth every 4 hours as needed for Pain.       isosorbide mononitrate (IMDUR) 30 MG extended release tablet TAKE 1 TABLET BY MOUTH EVERY DAY 90 tablet 1    insulin glargine (LANTUS SOLOSTAR) 100 UNIT/ML injection pen Inject 80 Units into the skin nightly 24 pen 3    sertraline (ZOLOFT) 100 MG tablet Take 1 tablet by mouth daily 90 tablet 2    nitroGLYCERIN (NITROSTAT) 0.4 MG SL tablet up to max of 3 total doses. If no relief after 1 dose, call 911. 30 tablet 2    clopidogrel (PLAVIX) 75 MG tablet TAKE 1 TABLET DAILY 90 tablet 2    carvedilol (COREG) 3.125 MG tablet Take 1 tablet by mouth 2 times daily (with meals) 180 tablet 2    sacubitril-valsartan (ENTRESTO) 49-51 MG per tablet Take 1 tablet by mouth 2 times daily 60 tablet 5    butalbital-acetaminophen-caffeine (FIORICET, ESGIC) -40 MG per tablet Take 1 tablet by mouth as needed for Headaches (Patient taking differently: Take 1 tablet by mouth every 6 hours as needed for Headaches ) 3 tablet 0    fluticasone (FLONASE) 50 MCG/ACT nasal spray 1 spray by Each Nostril route daily 1 Bottle 0    zoster recombinant adjuvanted vaccine (SHINGRIX) 50 MCG/0.5ML SUSR injection 1 vaccine now and repeat in 2-6 months. 0.5 mL 1     No current facility-administered medications for this visit. Allergies: Statins; Crestor [rosuvastatin calcium]; Adhesive tape; and Morphine    Review of Systems  Constitutional - no appetite change, or unexpected weight change. No fever, chills or diaphoresis. No significant change in activity level or new onset of fatigue. HEENT - no significant rhinorrhea or epistaxis. No tinnitus or significant hearing loss. Eyes - no sudden vision change or amaurosis. No corneal arcus, xantholasma, subconjunctival hemorrhage or discharge. Respiratory - no significant wheezing, stridor, apnea or cough.  + VÁSQUEZ improved. Cardiovascular - no exertional chest pain to suggest myocardial ischemia. No orthopnea or PND. No sensation of sustained arrythmia. No occurrence of slow heart rate. No palpitations. No claudication. Gastrointestinal - no abdominal swelling or pain. No blood in stool. No severe constipation, diarrhea, nausea, or vomiting. Genitourinary - no dysuria, frequency, or urgency. No flank pain or hematuria. Musculoskeletal - no back pain or myalgia.   No problems with gait. Extremities - no clubbing, cyanosis or extremity edema. Skin - no color change or rash. No pallor. No new surgical incision. Neurologic - no speech difficulty, facial asymmetry or lateralizing weakness. No seizures, presyncope or syncope. No significant dizziness. Hematologic - no easy bruising or excessive bleeding. Psychiatric - no severe anxiety or insomnia. No confusion. All other review of systems are negative. Objective  Vital Signs - /70   Pulse 66   Ht 6' 4\" (1.93 m)   Wt 280 lb (127 kg)   SpO2 99%   BMI 34.08 kg/m²   General - Apple Ayers is alert, cooperative, and pleasant. Well groomed. No acute distress. Body habitus - Body mass index is 34.08 kg/m². HEENT - Head is normocephalic. No circumoral cyanosis. Dentition is normal.  EYES -   Lids normal without ptosis. No discharge, edema or subconjunctival hemorrhage. Neck - Symmetrical without apparent mass or lymphadenopathy. Respiratory - Normal respiratory effort without use of accessory muscles. Ausculatation reveals vesicular breath sounds without crackles, wheezes, rub or rhonchi. Cardiovascular - No jugular venous distention. Auscultation reveals regular rate and rhythm. No audible clicks, gallop or rub. No murmur. No lower extremity varicosities. No carotid bruits. Abdominal -  No visible distention, mass or pulsations. Extremities - No clubbing or cyanosis. No statis dermatitis or ulcers. No edema. Musculoskeletal -   No Osler's nodes. No kyphosis or scoliosis. Gait is even and regular without limp or shuffle. Ambulates without assistance. Skin -  Warm and dry; no rash or pallor. No new surgical wound. Neurological - No focal neurological deficits. Thought processes coherent. No apparent tremor. Oriented to person, place and time. Psychiatric -  Appropriate affect and mood. Assessment:     Diagnosis Orders   1.  Chronic systolic congestive heart failure ([address] 2. Coronary artery disease involving native coronary artery of native heart without angina pectoris     3. Essential hypertension     4. S/P CABG x 3, LIMA- DIAG, A0- SVG- LAD, A0- SVG- Ramus, EF 30-35% (5/30/2019)     5. AICD (automatic cardioverter/defibrillator) present     6. Mixed hyperlipidemia       Data reviewed:  7/14/20 Pro-BNP - normal range at 527. Lab Results   Component Value Date     07/14/2020     05/25/2011    K 4.7 07/14/2020    K 3.9 11/18/2019    K 4.0 05/25/2011    CL 99 07/14/2020     05/25/2011    CO2 28 07/14/2020    BUN 32 07/14/2020    CREATININE 1.6 07/14/2020    CREATININE 1.0 05/25/2011    GLUCOSE 219 07/14/2020    CALCIUM 9.0 07/14/2020       7/15/20 echo   Summary   Structurally normal mitral valve. Mild-moderate mitral regurgitation is present. Mildly thickened aortic valve leaflets with preserved leaflet mobility. Mild aortic regurgitation is noted. Mild tricuspid regurgitation with estimated RVSP of 33 mm Hg. Moderately dilated left atrium. No evidence of thrombus within left atrium. Left ventricular size is moderately increased . Left ventricular ejection fraction is visually estimated at 30-35%. More pronounced inferior and apical hypokinesis. No evidence of left ventricular mass or thrombus noted. Pacemaker/AICD lead(s) was(were) visualized in RA cavity mildly dilated   right atrium    Signature    ----------------------------------------------------------------   Electronically signed by Osiel Perera MD(Interpreting   physician) on 07/15/2020 01:19 PM   ----------------------------------------------------------------  Chronic systolic heart failure - NYHA class III stage C  GDMT - current medical management Enstresto, Coreg and Bumex. No AICD discharges. Weight stable. Pro BNP last week - 524  Last creatinine 1.6. Advised to maintain low sodium diet and daily weight log.   Recheck BMP today.     CAD - stable on current medical management.     HTN - normotensive on current regimen.     Hyperlipidemia - LDL. Not currently on statin.     Patient is compliant with medication regimen. BP Readings from Last 3 Encounters:   07/20/20 134/70   07/13/20 124/60   05/19/20 130/80    Pulse Readings from Last 3 Encounters:   07/20/20 66   07/13/20 66   05/19/20 78        Wt Readings from Last 3 Encounters:   07/20/20 280 lb (127 kg)   07/13/20 280 lb (127 kg)   05/19/20 270 lb (122.5 kg)     Plan  Previous cardiac history and records reviewed. Continue current medical management. Continue Bumex 1 mg BID. Check BMP today. Encouraged daily weight log and low sodium diet. Continue other current medications as directed. Continue to follow up with primary care provider for non cardiac medical problems. Call the office with any problems, questions or concerns at [phone]. Cardiology follow up: as scheduled. Educational included in patient instructions. Avoid heart failure triggers.      India Monge, APRN

## 2020-08-21 PROCEDURE — 93295 DEV INTERROG REMOTE 1/2/MLT: CPT | Performed by: CLINICAL NURSE SPECIALIST

## 2020-08-21 PROCEDURE — 93296 REM INTERROG EVL PM/IDS: CPT | Performed by: CLINICAL NURSE SPECIALIST

## 2020-08-26 ENCOUNTER — TELEPHONE (OUTPATIENT)
Dept: CARDIOLOGY | Age: 75
End: 2020-08-26

## 2020-08-26 NOTE — TELEPHONE ENCOUNTER
Tim Jean Baptiste,  If you would do a letter I will approve. Mr. Adolfo Ruiz has CAD s/p CABG and heart failure with bi-ventricular AICD.   Thanks, [employer]

## 2020-09-15 ENCOUNTER — TELEPHONE (OUTPATIENT)
Dept: OTOLARYNGOLOGY | Facility: CLINIC | Age: 75
End: 2020-09-15

## 2020-09-16 ENCOUNTER — OFFICE VISIT (OUTPATIENT)
Dept: OTOLARYNGOLOGY | Facility: CLINIC | Age: 75
End: 2020-09-16

## 2020-09-16 VITALS
HEIGHT: 76 IN | WEIGHT: 279 LBS | RESPIRATION RATE: 20 BRPM | BODY MASS INDEX: 33.97 KG/M2 | TEMPERATURE: 98 F | DIASTOLIC BLOOD PRESSURE: 85 MMHG | HEART RATE: 79 BPM | SYSTOLIC BLOOD PRESSURE: 144 MMHG

## 2020-09-16 DIAGNOSIS — C44.219 BASAL CELL CARCINOMA, EAR, LEFT: Primary | ICD-10-CM

## 2020-09-16 DIAGNOSIS — Z79.02 ANTIPLATELET OR ANTITHROMBOTIC LONG-TERM USE: ICD-10-CM

## 2020-09-16 PROCEDURE — 99203 OFFICE O/P NEW LOW 30 MIN: CPT | Performed by: OTOLARYNGOLOGY

## 2020-09-16 RX ORDER — FINASTERIDE 5 MG/1
5 TABLET, FILM COATED ORAL DAILY
COMMUNITY
Start: 2020-09-03

## 2020-09-16 RX ORDER — MELOXICAM 15 MG/1
TABLET ORAL
COMMUNITY
End: 2022-12-16

## 2020-09-16 RX ORDER — BUMETANIDE 1 MG/1
1 TABLET ORAL
COMMUNITY
Start: 2020-07-13 | End: 2020-09-16

## 2020-09-16 RX ORDER — ALPRAZOLAM 0.5 MG/1
0.5 TABLET ORAL NIGHTLY PRN
COMMUNITY

## 2020-09-16 RX ORDER — TIZANIDINE 4 MG/1
TABLET ORAL
COMMUNITY
Start: 2020-08-28 | End: 2022-12-16

## 2020-09-16 RX ORDER — BUTALBITAL, ASPIRIN, AND CAFFEINE 325; 50; 40 MG/1; MG/1; MG/1
1 CAPSULE ORAL DAILY
COMMUNITY
End: 2022-12-16

## 2020-09-16 RX ORDER — PEN NEEDLE, DIABETIC 32GX 5/32"
NEEDLE, DISPOSABLE MISCELLANEOUS
COMMUNITY
Start: 2020-06-16

## 2020-09-16 RX ORDER — DULAGLUTIDE 1.5 MG/.5ML
0.5 INJECTION, SOLUTION SUBCUTANEOUS WEEKLY
COMMUNITY

## 2020-09-16 RX ORDER — LISINOPRIL 20 MG/1
TABLET ORAL
COMMUNITY
End: 2020-09-16

## 2020-09-16 RX ORDER — FAMOTIDINE 40 MG/1
40 TABLET, FILM COATED ORAL DAILY
COMMUNITY
Start: 2020-09-09

## 2020-09-16 NOTE — PATIENT INSTRUCTIONS
CONTACT INFORMATION:  The main office phone number is [phone]. For emergencies after hours and on weekends, this number will convert over to our answering service and the on call provider will answer. Please try to keep non emergent phone calls/ questions to office hours 9am-5pm Monday through Friday.     [employer]  As an alternative, you can sign up and use the Epic MyChart system for more direct and quicker access for non emergent questions/ problems.  [facility] [employer] allows you to send messages to your doctor, view your test results, renew your prescriptions, schedule appointments, and more. To sign up, go to [employer] and click on the Sign Up Now link in the New User? box. Enter your [employer] Activation Code exactly as it appears below along with the last four digits of your Social Security Number and your Date of Birth () to complete the sign-up process. If you do not sign up before the expiration date, you must request a new code.    [employer] Activation Code: [email or web address]  Expires: 10/16/2020  1:42 PM    If you have questions, you can email [email or web address] or call [phone] to talk to our [employer] staff. Remember, [employer] is NOT to be used for urgent needs. For medical emergencies, dial 911.

## 2020-09-16 NOTE — PROGRESS NOTES
PRIMARY CARE PROVIDER: Chucky Abrams MD  REFERRING PROVIDER: No ref. provider found    Chief Complaint   Patient presents with   • Skin Lesion     Bcc of left Ear       Subjective   History of Present Illness:  Morris Yousif is a  75 y.o. 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  Prior to the biopsy, the lesion had the following characteristics:    Location: left ear  Quality: Erosive lesion  Severity: Moderate  Duration: 3 to 4 months  Timing: constant  Modifying Factors: None  Associated Signs & Symptoms: No bleeding or lymph node swelling    On Plavix and ASA (in [place]) -Dr. Crocker has been on Plavix.  He comes up this frequently for history of multiple surgeries.    Review of Systems:  Review of Systems   Constitutional: Negative for chills, fatigue, fever and unexpected weight change.   HENT: Negative for facial swelling.    Respiratory: Negative for cough, chest tightness and shortness of breath.    Cardiovascular: Negative for chest pain.   Musculoskeletal: Negative for neck pain.   Skin: Negative for color change.   Neurological: Negative for facial asymmetry.   Hematological: Negative for adenopathy. Bruises/bleeds easily.       Past History:  Past Medical History:   Diagnosis Date   • Arthritis    • Depression    • Diabetes mellitus (CMS/HCC)    • Dizziness    • History of gastric ulcer    • Hyperlipidemia    • Hypertension    • Myocardial infarction (CMS/HCC)    • Orthostatic hypotension    • Skin cancer    • Sleep apnea     uses a cpap   • Stroke (CMS/HCC)      Past Surgical History:   Procedure Laterality Date   • ABDOMINAL HERNIA REPAIR     • APPENDECTOMY     • BACK SURGERY      x2   • CARDIAC CATHETERIZATION     • CHOLECYSTECTOMY     • COLONOSCOPY     • CORONARY ARTERY BYPASS GRAFT     • CORONARY STENT PLACEMENT      X8   • ENDOSCOPY     • EXCISION LESION      top of head   • PACEMAKER IMPLANTATION      AICD    • REPLACEMENT TOTAL KNEE Left    • SHOULDER SURGERY Left    • THORACIC LAMINECTOMY WITH PLACEMENT OF DORSAL COLUMN STIMULATOR N/A 2/3/2020    Procedure: THORACIC LAMINECTOMY, INSERTION SPINAL CORD STIMULATOR;  Surgeon: DEMETRIUS Acosta MD;  Location: [facility] OR;  Service: Orthopedic Spine;  Laterality: N/A;   • UMBILICAL HERNIA REPAIR       History reviewed. No pertinent family history.  Social History     Tobacco Use   • Smoking status: Never Smoker   • Smokeless tobacco: Never Used   Substance Use Topics   • Alcohol use: Yes     Comment: OCCASIONALLY   • Drug use: No     Allergies:  Adhesive tape, Morphine, Rosuvastatin calcium, and Statins    Current Outpatient Medications:   •  ALPRAZolam (XANAX) 0.5 MG tablet, Take 0.5 mg by mouth., Disp: , Rfl:   •  butalbital-acetaminophen-caffeine (FIORICET, ESGIC) -40 MG per tablet, Take 1 tablet by mouth Every 4 (Four) Hours As Needed for Headache., Disp: , Rfl:   •  butalbital-aspirin-caffeine (Fiorinal) -40 MG capsule, Fiorinal 50 mg-325 mg-40 mg capsule  Take 1 capsule every day by oral route., Disp: , Rfl:   •  carvedilol (COREG) 3.125 MG tablet, Take 3.125 mg by mouth 2 (Two) Times a Day With Meals., Disp: , Rfl:   •  clopidogrel (PLAVIX) 75 MG tablet, Take 75 mg by mouth Daily., Disp: , Rfl:   •  diphenhydrAMINE (BENADRYL) 25 MG tablet, Take 50 mg by mouth At Night As Needed for Allergies., Disp: , Rfl:   •  Droplet Pen Needles 32G X 4 MM misc, , Disp: , Rfl:   •  Dulaglutide (Trulicity) 1.5 MG/0.5ML solution pen-injector, 0.5 mL., Disp: , Rfl:   •  famotidine (PEPCID) 40 MG tablet, , Disp: , Rfl:   •  finasteride (PROSCAR) 5 MG tablet, Take 5 mg by mouth Daily., Disp: , Rfl:   •  furosemide (LASIX) 80 MG tablet, Take 80 mg by mouth Daily., Disp: , Rfl:   •  insulin glargine (LANTUS) 100 UNIT/ML injection, Inject 80 Units under the skin Every Night., Disp: , Rfl:   •  isosorbide mononitrate (IMDUR) 15 MG 24 hr half tablet, Take 30 mg by mouth Daily.  PT UNSURE OF DOSE, Disp: , Rfl:   •  meloxicam (MOBIC) 15 MG tablet, meloxicam 15 mg tablet  1 tablet daily, Disp: , Rfl:   •  metoprolol tartrate (LOPRESSOR) 25 MG tablet, Take 25 mg by mouth 2 (Two) Times a Day., Disp: , Rfl:   •  Multiple Vitamins-Minerals (PRESERVISION AREDS PO), Take 2 capsules by mouth Daily., Disp: , Rfl:   •  nitroglycerin (NITROSTAT) 0.4 MG SL tablet, Place 0.4 mg under the tongue Every 5 (Five) Minutes As Needed for Chest Pain. Take no more than 3 doses in 15 minutes., Disp: , Rfl:   •  oxyCODONE-acetaminophen (PERCOCET)  MG per tablet, Take 1 tablet by mouth Every 4 (Four) Hours As Needed for Moderate Pain  or Severe Pain . Take 1-2 by mouth, Disp: 120 tablet, Rfl: 0  •  oxyCODONE-acetaminophen (PERCOCET)  MG per tablet, Take 1 tablet by mouth every 4 hours as needed for pain, Disp: 42 tablet, Rfl: 0  •  sacubitril-valsartan (ENTRESTO) 49-51 MG tablet, Take 1 tablet by mouth 2 (Two) Times a Day., Disp: , Rfl:   •  sertraline (ZOLOFT) 100 MG tablet, Take 100 mg by mouth Daily., Disp: , Rfl:   •  tamsulosin (FLOMAX) 0.4 MG capsule 24 hr capsule, Take 1 capsule by mouth 2 (Two) Times a Day., Disp: , Rfl:   •  tiZANidine (ZANAFLEX) 4 MG tablet, TAKE 1 TABLET BY MOUTH 3 TIMES A DAY AS NEEDED DO NOT EXCEED 3 TABS PER 24 HOURS, Disp: , Rfl:   •  Triamcinolone Acetonide (NASACORT) 55 MCG/ACT nasal inhaler, 2 sprays into each nostril Daily., Disp: , Rfl:     Objective     Vital Signs:  Temp:  [98 °F (36.7 °C)] 98 °F (36.7 °C)  Heart Rate:  [79] 79  Resp:  [20] 20  BP: (144)/(85) 144/85    Physical Exam:  Physical Exam  Vitals signs and nursing note reviewed.   Constitutional:       General: He is not in acute distress.     Appearance: He is well-developed. He is not diaphoretic.   HENT:      Head: Normocephalic and atraumatic.      Right Ear: External ear normal.      Left Ear: External ear normal.      Ears:        Nose: Nose normal.   Eyes:      General: No scleral icterus.         Right eye: No discharge.         Left eye: No discharge.      Conjunctiva/sclera: Conjunctivae normal.      Pupils: Pupils are equal, round, and reactive to light.   Neck:      Musculoskeletal: Normal range of motion and neck supple.      Thyroid: No thyromegaly.      Vascular: No JVD.      Trachea: No tracheal deviation.   Pulmonary:      Effort: Pulmonary effort is normal.      Breath sounds: No stridor.   Musculoskeletal: Normal range of motion.         General: No deformity.   Lymphadenopathy:      Cervical: No cervical adenopathy.   Skin:     General: Skin is warm and dry.      Coloration: Skin is not pale.      Findings: No erythema or rash.   Neurological:      Mental Status: He is alert and oriented to person, place, and time.      Cranial Nerves: No cranial nerve deficit.      Coordination: Coordination normal.   Psychiatric:         Speech: Speech normal.         Behavior: Behavior normal. Behavior is cooperative.         Thought Content: Thought content normal.         Judgment: Judgment normal.         Data Review:  I have personally reviewed the pathology report demonstrating a nodular basal cell carcinoma of the left superior helical rim on a 4 mm punch biopsy:      Operative plan:      Excision of the basal cell carcinoma with A-T versus helical advancement flap closure    Assessment   Assessment:  1. Basal cell carcinoma, ear, left    2. Antiplatelet or antithrombotic long-term use        Plan   Plan:    I have offered excision of the basal cell carcinoma of the left ear in the office with frozen section analysis and likely A-T flap versus helical advancement flap closure under local anesthesia.    Stop Plavix 3 days prior.    Discussion of skin lesion. Discussed risks, benefits, alternatives, and possible complications of excision of the skin lesion with reconstruction utilizing local tissue rearrangement, full-thickness skin grafting, or local interpolated flaps. Risks include, but are not limited  too: bleeding, infection, hematoma, recurrence, need for additional procedures, flap failure, cosmetic deformity. Patient understands risks and would like to proceed with surgery.     My findings and recommendations were discussed and questions were answered.     Charbel Patton MD  09/16/20  13:43 CDT

## 2020-09-25 ENCOUNTER — NURSE TRIAGE (OUTPATIENT)
Dept: OTHER | Facility: CLINIC | Age: 75
End: 2020-09-25

## 2020-09-25 ENCOUNTER — APPOINTMENT (OUTPATIENT)
Dept: GENERAL RADIOLOGY | Age: 75
End: 2020-09-25
Payer: MEDICARE

## 2020-09-25 ENCOUNTER — HOSPITAL ENCOUNTER (EMERGENCY)
Age: 75
Discharge: HOME | End: 2020-09-25
Payer: MEDICARE

## 2020-09-25 VITALS
WEIGHT: 285 LBS | HEART RATE: 84 BPM | TEMPERATURE: 98.4 F | SYSTOLIC BLOOD PRESSURE: 134 MMHG | RESPIRATION RATE: 18 BRPM | DIASTOLIC BLOOD PRESSURE: 84 MMHG | BODY MASS INDEX: 34.7 KG/M2 | OXYGEN SATURATION: 98 % | HEIGHT: 76 IN

## 2020-09-25 LAB
ALBUMIN SERPL-MCNC: 4 G/DL (ref 3.5–5.2)
ALP BLD-CCNC: 67 U/L (ref 40–130)
ALT SERPL-CCNC: 26 U/L (ref 5–41)
ANION GAP SERPL CALCULATED.3IONS-SCNC: 10 MMOL/L (ref 7–19)
AST SERPL-CCNC: 21 U/L (ref 5–40)
BASOPHILS ABSOLUTE: 0 K/UL (ref 0–0.2)
BASOPHILS RELATIVE PERCENT: 0.7 % (ref 0–1)
BILIRUB SERPL-MCNC: 0.4 MG/DL (ref 0.2–1.2)
BUN BLDV-MCNC: 26 MG/DL (ref 8–23)
CALCIUM SERPL-MCNC: 9.1 MG/DL (ref 8.8–10.2)
CHLORIDE BLD-SCNC: 100 MMOL/L (ref 98–111)
CO2: 25 MMOL/L (ref 22–29)
CREAT SERPL-MCNC: 1.4 MG/DL (ref 0.5–1.2)
EOSINOPHILS ABSOLUTE: 0.2 K/UL (ref 0–0.6)
EOSINOPHILS RELATIVE PERCENT: 2.6 % (ref 0–5)
GFR AFRICAN AMERICAN: >59
GFR NON-AFRICAN AMERICAN: 49
GLUCOSE BLD-MCNC: 118 MG/DL (ref 74–109)
HCT VFR BLD CALC: 41.3 % (ref 42–52)
HEMOGLOBIN: 13.4 G/DL (ref 14–18)
IMMATURE GRANULOCYTES #: 0 K/UL
INR BLD: 1.02 (ref 0.88–1.18)
LYMPHOCYTES ABSOLUTE: 1.6 K/UL (ref 1.1–4.5)
LYMPHOCYTES RELATIVE PERCENT: 27.8 % (ref 20–40)
MCH RBC QN AUTO: 27.1 PG (ref 27–31)
MCHC RBC AUTO-ENTMCNC: 32.4 G/DL (ref 33–37)
MCV RBC AUTO: 83.6 FL (ref 80–94)
MONOCYTES ABSOLUTE: 0.5 K/UL (ref 0–0.9)
MONOCYTES RELATIVE PERCENT: 8.4 % (ref 0–10)
NEUTROPHILS ABSOLUTE: 3.5 K/UL (ref 1.5–7.5)
NEUTROPHILS RELATIVE PERCENT: 60.2 % (ref 50–65)
PDW BLD-RTO: 14.6 % (ref 11.5–14.5)
PLATELET # BLD: 126 K/UL (ref 130–400)
PMV BLD AUTO: 13.4 FL (ref 9.4–12.4)
POTASSIUM REFLEX MAGNESIUM: 4.2 MMOL/L (ref 3.5–5)
PRO-BNP: 746 PG/ML (ref 0–1800)
PROTHROMBIN TIME: 13.3 SEC (ref 12–14.6)
RBC # BLD: 4.94 M/UL (ref 4.7–6.1)
SODIUM BLD-SCNC: 135 MMOL/L (ref 136–145)
TOTAL PROTEIN: 6.9 G/DL (ref 6.6–8.7)
TROPONIN: <0.01 NG/ML (ref 0–0.03)
TROPONIN: <0.01 NG/ML (ref 0–0.03)
WBC # BLD: 5.8 K/UL (ref 4.8–10.8)

## 2020-09-25 PROCEDURE — 83880 ASSAY OF NATRIURETIC PEPTIDE: CPT

## 2020-09-25 PROCEDURE — 85610 PROTHROMBIN TIME: CPT

## 2020-09-25 PROCEDURE — 93005 ELECTROCARDIOGRAM TRACING: CPT | Performed by: NURSE PRACTITIONER

## 2020-09-25 PROCEDURE — 93971 EXTREMITY STUDY: CPT

## 2020-09-25 PROCEDURE — 99283 EMERGENCY DEPT VISIT LOW MDM: CPT

## 2020-09-25 PROCEDURE — 80053 COMPREHEN METABOLIC PANEL: CPT

## 2020-09-25 PROCEDURE — 99999 PR OFFICE/OUTPT VISIT,PROCEDURE ONLY: CPT | Performed by: NURSE PRACTITIONER

## 2020-09-25 PROCEDURE — 71045 X-RAY EXAM CHEST 1 VIEW: CPT

## 2020-09-25 PROCEDURE — 6360000002 HC RX W HCPCS: Performed by: NURSE PRACTITIONER

## 2020-09-25 PROCEDURE — 36415 COLL VENOUS BLD VENIPUNCTURE: CPT

## 2020-09-25 PROCEDURE — 85025 COMPLETE CBC W/AUTO DIFF WBC: CPT

## 2020-09-25 PROCEDURE — 96374 THER/PROPH/DIAG INJ IV PUSH: CPT

## 2020-09-25 PROCEDURE — 84484 ASSAY OF TROPONIN QUANT: CPT

## 2020-09-25 PROCEDURE — 99282 EMERGENCY DEPT VISIT SF MDM: CPT

## 2020-09-25 RX ORDER — FAMOTIDINE 40 MG/1
40 TABLET, FILM COATED ORAL DAILY
Status: ON HOLD | COMMUNITY
End: 2021-06-22

## 2020-09-25 RX ORDER — TAMSULOSIN HYDROCHLORIDE 0.4 MG/1
0.4 CAPSULE ORAL DAILY
COMMUNITY
End: 2021-06-08

## 2020-09-25 RX ORDER — DOCUSATE SODIUM 100 MG/1
200 CAPSULE, LIQUID FILLED ORAL DAILY
COMMUNITY

## 2020-09-25 RX ORDER — FUROSEMIDE 10 MG/ML
80 INJECTION INTRAMUSCULAR; INTRAVENOUS ONCE
Status: COMPLETED | OUTPATIENT
Start: 2020-09-25 | End: 2020-09-25

## 2020-09-25 RX ORDER — FUROSEMIDE 40 MG/1
40 TABLET ORAL 2 TIMES DAILY
COMMUNITY

## 2020-09-25 RX ADMIN — FUROSEMIDE 80 MG: 10 INJECTION, SOLUTION INTRAMUSCULAR; INTRAVENOUS at 15:14

## 2020-09-25 ASSESSMENT — ENCOUNTER SYMPTOMS
COUGH: 0
SPUTUM PRODUCTION: 0
ABDOMINAL PAIN: 0
SHORTNESS OF BREATH: 1

## 2020-09-25 NOTE — TELEPHONE ENCOUNTER
Received call from [facility]. Call soft transferred to [facility] Cardiology, states no providers in the office today. Advised pt of my original triage, please proceed to the ER. Pt agrees to go at this time. Attention Provider: Thank you for allowing me to participate in the care of your patient. The  patient was connected to triage in response to information provided to the [facility]. Please do not respond through this encounter as the response is not directed to a shared pool. Reason for Disposition   Difficulty breathing    Answer Assessment - Initial Assessment Questions  1. LOCATION: \"Which joint is swollen? \"      Right ankle area, has history of swelling  2. ONSET: \"When did the swelling start? \"      Last week or so, has gained 10 pounds in the last week  3. SIZE: \"How large is the swelling? \"      Cant see the bones anymore  4. PAIN: \"Is there any pain? \" If so, ask: \"How bad is it? \" (Scale 1-10; or mild, moderate, severe)      Denies  5. CAUSE: \"What do you think caused the swollen joint? \"      Heart history  6. OTHER SYMPTOMS: \"Do you have any other symptoms? \" (e.g., fever, chest pain, difficulty breathing, calf pain)      Chest pain, short of breath   7. PREGNANCY: \"Is there any chance you are pregnant? \" \"When was your last menstrual period? \"      N/A    Protocols used: ANKLE SWELLING-ADULT-AH

## 2020-09-25 NOTE — ED PROVIDER NOTES
[facility]  eMERGENCY dEPARTMENT eNCOUnter      Pt Name: Melva Lopez  MRN: [id number]  [place] [birth year]  Date of evaluation: 9/25/2020  Provider: Lanette Barthel, APRN    CHIEF COMPLAINT       Chief Complaint   Patient presents with    Shortness of Breath     X 4 days         HISTORY OF PRESENT ILLNESS   (Location/Symptom, Timing/Onset,Context/Setting, Quality, Duration, Modifying Factors, Severity)  Note limiting factors. Melva Lopez is a 67 y.o. male who presents to the emergency department with shortness of breath x4 days. Patient went to Dr. Gordon Smith office and was sent over here. Patient does have an extensive cardiac history. He had a CABG done this year. He also has a defibrillator. patient also has swelling to the right leg. He states this leg always swells after his cabg. denies any injury    The history is provided by the patient. Shortness of Breath   Severity:  Moderate  Onset quality:  Sudden  Duration:  4 days  Timing:  Constant  Progression:  Unchanged  Chronicity:  New  Context: activity    Associated symptoms: chest pain    Associated symptoms: no abdominal pain, no cough and no sputum production    Risk factors: no hx of PE/DVT        NursingNotes were reviewed. REVIEW OF SYSTEMS    (2-9 systems for level 4, 10 or more for level 5)     Review of Systems   Respiratory: Positive for shortness of breath. Negative for cough and sputum production. Cardiovascular: Positive for chest pain. Gastrointestinal: Negative for abdominal pain. Except as noted above the remainder of the review of systems was reviewed and negative.        PAST MEDICAL HISTORY     Past Medical History:   Diagnosis Date    Acute ischemic stroke ([facility] 2/20/2017    Atherosclerotic heart disease     s/p MI, stenting x 3 jan 2011([place]    CAD (coronary artery disease)     Cerebral artery occlusion with cerebral infarction [facility])     Cerebrovascular accident (CVA) due to embolism of right middle cerebral artery ([address] 8/14/2017    CHF (congestive heart failure) (HCC)     Chronic bilateral low back pain without sciatica 11/10/2016    Chronic kidney disease     DDD (degenerative disc disease), lumbar 11/15/2016    Depression     Depression with anxiety     Diabetes mellitus ([address]     type II    Diabetes mellitus, type 2 ([address]     DM (diabetes mellitus) ([address] 7/12/2011    ESBL (extended spectrum beta-lactamase) producing bacteria infection 11/16/2019    urine    Glaucoma     Headache     Hyperlipidemia     Cholesterol management per pcp.  Hypertension     Macular degeneration     MI (myocardial infarction) ([address]     MI (myocardial infarction) ([address]     Neuromuscular disorder ([address]     Neuropathy     MOO on CPAP     Osteoarthritis     Palliative care patient 06/11/2019    Sleep apnea     Spondylosis of lumbar region without myelopathy or radiculopathy 11/15/2016    Status post placement of implantable loop recorder 10/31/2017    TIA (transient ischemic attack)          SURGICALHISTORY       Past Surgical History:   Procedure Laterality Date    APPENDECTOMY      at age 4    BACK SURGERY      2016 (fusion), 2017    BREAST SURGERY  05/2019    CARDIAC CATHETERIZATION  12/12/12    with angioplasty, stent    CARDIAC CATHETERIZATION  3/29/15  MDL    stent to distal RCA.  EF 60%    CHOLECYSTECTOMY, LAPAROSCOPIC      2012    COLONOSCOPY      2010    CORONARY ANGIOPLASTY WITH STENT PLACEMENT  jan 11 [place]    stent x 3    CORONARY ANGIOPLASTY WITH STENT PLACEMENT      01/01/11    CORONARY ARTERY BYPASS GRAFT N/A 5/30/2019    CORONARY ARTERY BYPASS GRAFT X3 WITH LEFT INTERNAL MAMMARY ARTERY WITH ENDOSCOPIC VEIN HARVESTING WITH PERFUSION TRANSESOPHAGEAL ECHOCARDIOGRAM performed by Junior Castelan MD at [facility] CATH LAB PROCEDURE  [id number]    primary stent placement to the first circumflex marginal branch, balloon angioplasty to the third left anterior descending diagnonal branch, intracoronary nitroglycerin adminstration    JOINT REPLACEMENT      left knee, ~2016    LUMBAR FUSION Left 11/15/2016    LUMBAR INTERBODY FUSION LATERAL L3-4 L4-5 WITH LATERAL PLATE  performed by Bria Kwan MD at [address]  11/02/2019         CURRENT MEDICATIONS       Discharge Medication List as of 9/25/2020  3:12 PM      CONTINUE these medications which have NOT CHANGED    Details   docusate sodium (COLACE) 100 MG capsule Take 200 mg by mouth dailyHistorical Med      famotidine (PEPCID) 40 MG tablet Take 40 mg by mouth dailyHistorical Med      tamsulosin (FLOMAX) 0.4 MG capsule Take 0.4 mg by mouth dailyHistorical Med      furosemide (LASIX) 40 MG tablet Take 40 mg by mouth dailyHistorical Med      Multiple Vitamins-Minerals (PRESERVISION AREDS PO) Take 2 capsules by mouthHistorical Med      oxyCODONE-acetaminophen (PERCOCET)  MG per tablet Take 1 tablet by mouth every 4 hours as needed for Pain. Historical Med      isosorbide mononitrate (IMDUR) 30 MG extended release tablet TAKE 1 TABLET BY MOUTH EVERY DAY, Disp-90 tablet, R-1Normal      insulin glargine (LANTUS SOLOSTAR) 100 UNIT/ML injection pen Inject 80 Units into the skin nightly, Disp-24 pen, R-3Normal      sertraline (ZOLOFT) 100 MG tablet Take 1 tablet by mouth daily, Disp-90 tablet, R-2Normal      nitroGLYCERIN (NITROSTAT) 0.4 MG SL tablet up to max of 3 total doses.  If no relief after 1 dose, call 911., Disp-30 tablet, R-2Normal      clopidogrel (PLAVIX) 75 MG tablet TAKE 1 TABLET DAILY, Disp-90 tablet, R-2Normal      carvedilol (COREG) 3.125 MG tablet Take 1 tablet by mouth 2 times daily (with meals), Disp-180 tablet, R-2Normal      sacubitril-valsartan (ENTRESTO) 49-51 MG per tablet Take 1 tablet by mouth 2 times daily, Disp-60 tablet, R-5Normal      butalbital-acetaminophen-caffeine (FIORICET, ESGIC) -40 MG per tablet Take 1 tablet by mouth as needed for Headaches, Disp-3 tablet, R-0Normal fluticasone (FLONASE) 50 MCG/ACT nasal spray 1 spray by Each Nostril route daily, Disp-1 Bottle, R-0Normal      bumetanide (BUMEX) 1 MG tablet Take 1 tablet by mouth 2 times daily, Disp-60 tablet,R-5Discontinue LasixNormal      diphenhydrAMINE (BENADRYL) 25 MG tablet Take 50 mg by mouth 2 times dailyHistorical Med      zoster recombinant adjuvanted vaccine [facility]) 50 MCG/0.5ML SUSR injection 1 vaccine now and repeat in 2-6 months., Disp-0.5 mL, R-1Print             ALLERGIES     Statins; Crestor [rosuvastatin calcium]; Adhesive tape; and Morphine    FAMILY HISTORY       Family History   Problem Relation Age of Onset    Coronary Art Dis Mother     Cancer Sister         uterine    Kidney Disease Sister     Coronary Art Dis Brother          in his 46s - age 47    Cancer Father         colon    Coronary Art Dis Sister     Cancer Sister         olive cancer    No Known Problems Sister     No Known Problems Sister     No Known Problems Son     Alcohol Abuse Son         drank self to death    No Known Problems Son           SOCIAL HISTORY       Social History     Socioeconomic History    Marital status:      Spouse name: Ludmila Bautista Number of children: 3    Years of education: None    Highest education level: None   Occupational History    Occupation:    Social Needs    Financial resource strain: None    Food insecurity     Worry: None     Inability: None    Transportation needs     Medical: None     Non-medical: None   Tobacco Use    Smoking status: Never Smoker    Smokeless tobacco: Never Used   Substance and Sexual Activity    Alcohol use:  Yes     Alcohol/week: 1.0 standard drinks     Types: 1 Shots of liquor per week    Drug use: Not Currently     Types: Marijuana     Comment: edibles, uses for sleep    Sexual activity: None     Comment: 3 sons   Lifestyle    Physical activity     Days per week: None     Minutes per session: None    Stress: None   Relationships    Social connections     Talks on phone: None     Gets together: None     Attends [employer] service: None     Active member of club or organization: None     Attends meetings of clubs or organizations: None     Relationship status: None    Intimate partner violence     Fear of current or ex partner: None     Emotionally abused: None     Physically abused: None     Forced sexual activity: None   Other Topics Concern    None   Social History Narrative    CODE STATUS: Full Code    HEALTH CARE PROXY: his wife, Mrs. Isa Holguin, [phone]    AMBULATES: independently    DOMICILED: private home, stairs inside home, has a few steps to enter home, lives alone with wife, no pets       SCREENINGS      @FLOW([id number])@      PHYSICAL EXAM    (up to 7 for level 4, 8 or more for level 5)     ED Triage Vitals [20 1158]   BP Temp Temp src Pulse Resp SpO2 Height Weight   135/83 98.2 °F (36.8 °C) -- 76 18 93 % 6' 4\" (1.93 m) 285 lb (129.3 kg)       Physical Exam  Vitals signs and nursing note reviewed. Constitutional:       Appearance: He is well-developed. HENT:      Head: Normocephalic and atraumatic. Eyes:      General: No scleral icterus. Right eye: No discharge. Left eye: No discharge. Neck:      Musculoskeletal: Normal range of motion and neck supple. Cardiovascular:      Rate and Rhythm: Normal rate and regular rhythm. Heart sounds: Normal heart sounds, S1 normal and S2 normal.   Pulmonary:      Effort: No accessory muscle usage or respiratory distress. Breath sounds: Normal breath sounds. No stridor. Abdominal:      Palpations: Abdomen is soft. There is no mass. Tenderness: There is no guarding. Musculoskeletal:      Right lower le+ Edema present. Left lower leg: 3+ Edema present. Skin:     Capillary Refill: Capillary refill takes less than 2 seconds. Coloration: Skin is pale. Neurological:      General: No focal deficit present.       Mental Status: He is alert. Psychiatric:         Behavior: Behavior normal.         DIAGNOSTIC RESULTS     EKG: All EKG's are interpreted by the Emergency Department Physician who either signs or Co-signsthis chart in the absence of a cardiologist.  71 sinus rhythm nonspecific changes no T wave changes read at 1403 by Dr. Sherlyn Up  67 sinus rhythm n nonspecific T wave abnormalities read at 1459 by Dr. Mejia Soto:   Gaile Livingston such as CT, Ultrasound and MRI are read by the radiologist. Plain radiographic images are visualized and preliminarily interpreted by the emergency physician with the below findings:      Interpretation per the Radiologist below, if available at the time of this note:    XR CHEST PORTABLE   Final Result   1. Stable chronic change. Signed by Dr Kianna Mancini on 9/25/2020 1:26 PM      VL Extremity Venous Right   Final Result        neg    ED BEDSIDEULTRASOUND:   Performed by ED Physician -none    LABS:  Labs Reviewed   CBC WITH AUTO DIFFERENTIAL - Abnormal; Notable for the following components:       Result Value    Hemoglobin 13.4 (*)     Hematocrit 41.3 (*)     MCHC 32.4 (*)     RDW 14.6 (*)     Platelets 466 (*)     MPV 13.4 (*)     All other components within normal limits   COMPREHENSIVE METABOLIC PANEL W/ REFLEX TO MG FOR LOW K - Abnormal; Notable for the following components:    Sodium 135 (*)     Glucose 118 (*)     BUN 26 (*)     CREATININE 1.4 (*)     GFR Non- 49 (*)     All other components within normal limits   BRAIN NATRIURETIC PEPTIDE   TROPONIN   PROTIME-INR   TROPONIN       All other labs were within normal range or not returned as of this dictation.     EMERGENCY DEPARTMENT COURSE and DIFFERENTIALDIAGNOSIS/MDM:   Vitals:    Vitals:    09/25/20 1158 09/25/20 1234 09/25/20 1526   BP: 135/83 112/76 134/84   Pulse: 76 79 84   Resp: 18 16 18   Temp: 98.2 °F (36.8 °C)  98.4 °F (36.9 °C)   SpO2: 93% 91% 98%   Weight: 285 lb (129.3 kg)     Height: 6' 4\" (1.93 m)             MDM  No dvt  ekg and trop's are neg   LAbs stable. Will give a dose of lasix iv. Pt encouraged to weigh himself daily and to eat a low salt diet  To follow up with Dr Sal Jordan      CONSULTS:  None    PROCEDURES:  Unless otherwise noted below, none     Procedures    FINAL IMPRESSION      1. Shortness of breath    2.  Chronic congestive heart failure, unspecified heart failure type [facility])        DISPOSITION/PLAN   DISPOSITION        PATIENT REFERRED TO:  Guera Nieves MD  [address] [phone]            DISCHARGE MEDICATIONS:  Discharge Medication List as of 9/25/2020  3:12 PM             (Please note that portions of this note were completed with a voice recognitionprogram.  Efforts were made to edit the dictations but occasionally words are mis-transcribed.)    TEGAN Tomlinson (electronically signed)          TEGAN Tomlinson  09/25/20 [address] [facility] TEGAN  09/25/20 2545

## 2020-09-25 NOTE — PROGRESS NOTES
PRELIMINARY REPORT    NO DVT OR SVT NOTED IN RLE AT THIS TIME.   AREA OF FLUID NOTED IN RT MED THIGH AT ZONE 4 = 7 X 2 CM    PENDING FINAL REPORT

## 2020-09-25 NOTE — ED TRIAGE NOTES
Began having shortness of breath 4 days ago, has swelling in right leg was sent here from Dr. Leslee Pfeiffer office

## 2020-09-26 ENCOUNTER — CARE COORDINATION (OUTPATIENT)
Dept: CARE COORDINATION | Age: 75
End: 2020-09-26

## 2020-09-26 NOTE — CARE COORDINATION
Patient contacted regarding recent discharge and COVID-19 risk. Discussed COVID-19 related testing which was not done at this time. Test results were not done. Patient informed of results, if available? Not done     Care Transition Nurse/ Ambulatory Care Manager contacted the patient by telephone to perform post discharge assessment. Verified name and  with patient as identifiers. Patient has following risk factors of: heart failure and diabetes. CTN/ACM reviewed discharge instructions, medical action plan and red flags related to discharge diagnosis. Reviewed and educated them on any new and changed medications related to discharge diagnosis. Advised obtaining a 90-day supply of all daily and as-needed medications. Education provided regarding infection prevention, and signs and symptoms of COVID-19 and when to seek medical attention with patient who verbalized understanding. Discussed exposure protocols and quarantine from [address] you at higher risk for severe illness  and given an opportunity for questions and concerns. The patient agrees to contact the COVID-19 hotline [phone] or PCP office for questions related to their healthcare. CTN/ACM provided contact information for future reference. Patient said he is feeling better today. He weighed this morning = 270 lb. He said he weighed 280 lb yesterday. He has taken extra Lasix and noted increased urination and attributes the change to fluid loss. Pt said he is breathing better today as well. He does not have an appointment with his PCP and ACM instructed pt to call Monday for follow up to his ER visit. Patient said he will be out of his Lasix next Tuesday or Wednesday due to taking extra doses. ACM instructed that pt should call his cardiologist to report his dosing change and need for refill. He voiced understanding. ACM suggested pt wear compression knee socks and elevate his legs 3-4 times/day to help reduce edema.  Advised pt

## 2020-09-27 LAB
EKG P AXIS: 86 DEGREES
EKG P-R INTERVAL: 146 MS
EKG Q-T INTERVAL: 452 MS
EKG QRS DURATION: 132 MS
EKG QTC CALCULATION (BAZETT): 469 MS
EKG T AXIS: 145 DEGREES

## 2020-09-27 PROCEDURE — 93010 ELECTROCARDIOGRAM REPORT: CPT | Performed by: INTERNAL MEDICINE

## 2020-09-29 LAB
EKG P AXIS: 22 DEGREES
EKG P-R INTERVAL: 158 MS
EKG Q-T INTERVAL: 422 MS
EKG QRS DURATION: 112 MS
EKG QTC CALCULATION (BAZETT): 434 MS
EKG T AXIS: 117 DEGREES

## 2020-10-02 ENCOUNTER — CARE COORDINATION (OUTPATIENT)
Dept: CARE COORDINATION | Age: 75
End: 2020-10-02

## 2020-10-02 NOTE — CARE COORDINATION
COVID-19 Screening Follow-up Note    Patient contacted regarding COVID-19 risk and screening. Care Transition Nurse/ Ambulatory Care Manager contacted the patient by telephone to perform follow-up assessment. Verified name and  with patient as identifiers. Patient has following risk factors of: Diabetes and heart failure        Symptoms reviewed with patient who verbalized the following symptoms: no new/worsening symptoms       Due to no new or worsening symptoms encounter was not routed to provider for escalation. Education provided regarding infection prevention, and signs and symptoms of COVID-19 and when to seek medical attention with patient who verbalized understanding. Discussed exposure protocols and quarantine from [address] you at higher risk for severe illness  and given an opportunity for questions and concerns. The patient agrees to contact the COVID-19 hotline [phone] or PCP office for questions related to their healthcare. CTN/ACM provided contact information for future reference. From CDC: Are you at higher risk for severe illness?  Wash your hands often.  Avoid close contact (6 feet, which is about two arm lengths) with people who are sick.  Put distance between yourself and other people if COVID-19 is spreading in your community.  Clean and disinfect frequently touched surfaces.  Avoid all cruise travel and non-essential air travel.  Call your healthcare professional if you have concerns about COVID-19 and your underlying condition or if you are sick.     For more information on steps you can take to protect yourself, see CDC's How to [place] for follow-up call in 5-7 days based on severity of symptoms and risk factors

## 2020-10-08 ENCOUNTER — TRANSCRIBE ORDERS (OUTPATIENT)
Dept: ADMINISTRATIVE | Facility: HOSPITAL | Age: 75
End: 2020-10-08

## 2020-10-08 DIAGNOSIS — Z01.818 PREOP TESTING: Primary | ICD-10-CM

## 2020-10-09 ENCOUNTER — CARE COORDINATION (OUTPATIENT)
Dept: CARE COORDINATION | Age: 75
End: 2020-10-09

## 2020-10-09 NOTE — CARE COORDINATION
Your Patient resolved from the Care Transitions episode on 10/9/2020  Discussed COVID-19 related testing which was not done at this time. Test results were not done. Patient informed of results, if available? Not tested    Patient/family has been provided the following resources and education related to COVID-19:                         Signs, symptoms and red flags related to COVID-19            CDC exposure and quarantine guidelines            [employer] exposure contact - [phone]            Contact for their local Department of Health                 Patient currently reports that the following symptoms have improved:  no new/worsening symptoms     No further outreach scheduled with this CTN/ACM. Episode of Care resolved. Patient has this CTN/ACM contact information if future needs arise.

## 2020-10-12 ENCOUNTER — LAB (OUTPATIENT)
Dept: LAB | Facility: HOSPITAL | Age: 75
End: 2020-10-12

## 2020-10-12 PROCEDURE — C9803 HOPD COVID-19 SPEC COLLECT: HCPCS

## 2020-10-12 PROCEDURE — U0003 INFECTIOUS AGENT DETECTION BY NUCLEIC ACID (DNA OR RNA); SEVERE ACUTE RESPIRATORY SYNDROME CORONAVIRUS 2 (SARS-COV-2) (CORONAVIRUS DISEASE [COVID-19]), AMPLIFIED PROBE TECHNIQUE, MAKING USE OF HIGH THROUGHPUT TECHNOLOGIES AS DESCRIBED BY CMS-2020-01-R: HCPCS | Performed by: OTOLARYNGOLOGY

## 2020-10-12 PROCEDURE — C9803 HOPD COVID-19 SPEC COLLECT: HCPCS | Performed by: OTOLARYNGOLOGY

## 2020-10-13 LAB
COVID LABCORP PRIORITY: NORMAL
SARS-COV-2 RNA RESP QL NAA+PROBE: NOT DETECTED

## 2020-10-15 ENCOUNTER — PROCEDURE VISIT (OUTPATIENT)
Dept: OTOLARYNGOLOGY | Facility: CLINIC | Age: 75
End: 2020-10-15

## 2020-10-15 VITALS — DIASTOLIC BLOOD PRESSURE: 73 MMHG | HEART RATE: 99 BPM | TEMPERATURE: 96.8 F | SYSTOLIC BLOOD PRESSURE: 128 MMHG

## 2020-10-15 DIAGNOSIS — C44.219 BASAL CELL CARCINOMA, EAR, LEFT: Primary | ICD-10-CM

## 2020-10-15 PROCEDURE — 14061 TIS TRNFR E/N/E/L10.1-30SQCM: CPT | Performed by: OTOLARYNGOLOGY

## 2020-10-15 PROCEDURE — 88305 TISSUE EXAM BY PATHOLOGIST: CPT | Performed by: OTOLARYNGOLOGY

## 2020-10-15 PROCEDURE — 88331 PATH CONSLTJ SURG 1 BLK 1SPC: CPT | Performed by: OTOLARYNGOLOGY

## 2020-10-15 PROCEDURE — 88332 PATH CONSLTJ SURG EA ADD BLK: CPT | Performed by: OTOLARYNGOLOGY

## 2020-10-15 NOTE — PROGRESS NOTES
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 Yousif presented with a biopsy-proven basal cell carcinoma of the left auricular helix.  He opted for surgical excision.    OPERATIVE FINDINGS:     Lesion: 1.5 cm x 1.5 cm  Margins: 3 mm  Defect: 2.1 cm x 2.1 cm  Flap and Defect: 4.0 cm x 5.0 cm  Depth: Subcutaneous fat    OPERATIVE DETAILS:     After patient verification and consent material were obtained, the patient was taken to the procedure room and asked to lay on the preprocedure chair.  With the patient in the supine position, the lesion was measured, appropriate margins (as listed above) were drawn,.    The skin of the left ear and the left cheek were then infiltrated with 1% lidocaine with 1-100,000 epinephrine.    After approximately 15 minutes, the skin was tested for anesthesia and deemed appropriate.    The patient was sterilely prepped with Hibiclens, and sterilely draped.    The skin surrounding the lesion that was previously marked was incised utilizing a 15 blade.  This was undermined in the immediate subcutaneous plane.  I placed a stitch at 12:00, and sent this for frozen section pathology.  Hemostasis was obtained with bipolar cautery set at 15.    Frozen  section analysis demonstrated clear peripheral and deep margins.    Due to the size of the defect, I felt that a transposition flap from the left cheek would be appropriate for reconstruction.  The skin was incised utilizing a fresh 15 blade and the flap was elevated in the deep subcutaneous plane, the cheek skin was then undermined to allow for closure.  Hemostasis was obtained with bipolar cautery set at 20.  The cheek defect was closed utilizing a combination of 4-0 and 5-0 undyed Vicryl suture.  The flap was then rotated into place, and closed utilizing deep 5-0 undyed Vicryl suture followed by running, locking 5-0 fast-absorbing gut.  The preauricular skin was also closed with 5-0 fast-absorbing gut in a running, locking fashion.    Antibiotic ointment was placed to the incisions and the flaps.      DISPOSITION:  The procedures were completed without complication and tolerated well.  The patient was released in the company of himself to return home in satisfactory condition.  A follow-up appointment has been scheduled, routine post-op medications prescribed (if required), and post-op instructions were given to the responsible party.           Charbel Patton MD, FACS  Board Certified Facial Plastic and Reconstructive Surgery  Board Certified Otolaryngology -- Head and Neck Surgery  Electronically signed by Charbel Patton MD, 10/15/20, 12:14 PM CDT.

## 2020-10-15 NOTE — PATIENT INSTRUCTIONS
[facility], Post-Procedure Instructions:    Protect the incisions from sunlight. Sunlight to the incisions will cause permanent pigmentation to the incision line and make the incision more noticeable. After the incision has reepithelialized (typically 2-3 weeks after the procedure), you may begin to use sunscreen with an SPF of 15 or greater    For the first week after your procedure, apply a thin coat of Vaseline to the incision 3-4 times daily.  Starting the second week, use a silicone-based wound cream to the incisions to optimize the end result. Apply topically twice daily, or as directed, to help optimize wound healing and decrease redness.    Due to COVID-19, we have decreased the amount of postoperative checks to help prevent added exposure to the virus.  If you have any questions or concerns following her procedure, please give us a call.  We have the ability to schedule a video visit, phone visit, or follow-up visit to address any concerns, while decreasing your exposure to the hospital.  Many of your questions and concerns may be able to be answered over the phone.  Our direct line is [phone].    It is very important to continue routine skin checks with a dermatologist or your PCP every 6-12 months.       CONTACT INFORMATION:  The main office phone number is [phone]. For emergencies after hours and on weekends, this number will convert over to our answering service and the on call provider will answer. Please try to keep non emergent phone calls/ questions to office hours 9am-5pm Monday through Friday.     [employer]  As an alternative, you can sign up and use the Epic MyChart system for more direct and quicker access for non emergent questions/ problems.  [facility] [employer] allows you to send messages to your doctor, view your test results, renew your prescriptions, schedule appointments, and more. To sign up, go to [employer] and click on the Sign Up Now link in  the New User? box. Enter your [employer] Activation Code exactly as it appears below along with the last four digits of your Social Security Number and your Date of Birth () to complete the sign-up process. If you do not sign up before the expiration date, you must request a new code.    Postmaster Activation Code: [email or web address]  Expires: 2020  3:41 AM    If you have questions, you can email [email or web address] or call [phone] to talk to our Postmaster staff. Remember, [employer] is NOT to be used for urgent needs. For medical emergencies, dial 911.

## 2020-10-16 LAB
LAB AP CASE REPORT: NORMAL
LAB AP CLINICAL INFORMATION: NORMAL
Lab: NORMAL
PATH REPORT.FINAL DX SPEC: NORMAL
PATH REPORT.GROSS SPEC: NORMAL

## 2020-10-28 ENCOUNTER — OFFICE VISIT (OUTPATIENT)
Dept: OTOLARYNGOLOGY | Facility: CLINIC | Age: 75
End: 2020-10-28

## 2020-10-28 VITALS
TEMPERATURE: 97.8 F | BODY MASS INDEX: 33.97 KG/M2 | HEIGHT: 76 IN | WEIGHT: 279 LBS | DIASTOLIC BLOOD PRESSURE: 68 MMHG | HEART RATE: 74 BPM | RESPIRATION RATE: 20 BRPM | SYSTOLIC BLOOD PRESSURE: 124 MMHG

## 2020-10-28 DIAGNOSIS — C44.219 BASAL CELL CARCINOMA, EAR, LEFT: Primary | ICD-10-CM

## 2020-10-28 DIAGNOSIS — Z79.02 ANTIPLATELET OR ANTITHROMBOTIC LONG-TERM USE: ICD-10-CM

## 2020-10-28 PROCEDURE — 99024 POSTOP FOLLOW-UP VISIT: CPT | Performed by: OTOLARYNGOLOGY

## 2020-10-28 RX ORDER — SULFAMETHOXAZOLE AND TRIMETHOPRIM 800; 160 MG/1; MG/1
1 TABLET ORAL 2 TIMES DAILY
Qty: 20 TABLET | Refills: 0 | Status: SHIPPED | OUTPATIENT
Start: 2020-10-28 | End: 2020-11-07

## 2020-10-28 NOTE — PATIENT INSTRUCTIONS
CONTACT INFORMATION:  The main office phone number is [phone]. For emergencies after hours and on weekends, this number will convert over to our answering service and the on call provider will answer. Please try to keep non emergent phone calls/ questions to office hours 9am-5pm Monday through Friday.     [employer]  As an alternative, you can sign up and use the Epic MyChart system for more direct and quicker access for non emergent questions/ problems.  [facility] [employer] allows you to send messages to your doctor, view your test results, renew your prescriptions, schedule appointments, and more. To sign up, go to [employer] and click on the Sign Up Now link in the New User? box. Enter your [employer] Activation Code exactly as it appears below along with the last four digits of your Social Security Number and your Date of Birth () to complete the sign-up process. If you do not sign up before the expiration date, you must request a new code.    [employer] Activation Code: [email or web address]  Expires: 2020  3:41 AM    If you have questions, you can email [email or web address] or call [phone] to talk to our [employer] staff. Remember, [employer] is NOT to be used for urgent needs. For medical emergencies, dial 911.

## 2020-10-28 NOTE — PROGRESS NOTES
"CC/Reason for visit: Morris Yousif returns to the office following excision of a basal cell carcinoma of the left ear with transposition flap closure on October 15, 2020.    SUBJECTIVE:  Reports reported pain for the first week, this has resolved.  He denies any fevers, chills, drainage.    OBJECTIVE:  /68   Pulse 74   Temp 97.8 °F (36.6 °C)   Resp 20   Ht 193 cm (76\")   Wt 127 kg (279 lb)   BMI 33.96 kg/m²   The distal aspect of the flap appears necrotic.  There is no evidence of infection.    Pathology:      ASSESSMENT:  Diagnoses and all orders for this visit:    1. Basal cell carcinoma, ear, left (Primary)    2. Antiplatelet or antithrombotic long-term use        PLAN:   The distal aspect of the flap has necrosis.  I have recommended placing Bactroban ointment on this 3 times a day for the next few weeks.  I also gave him a prescription for Bactrim, to take should he feel that this is becoming infected.  He has our number to call should his symptoms worsen.  I plan to see him back in approximately 3 to 4 weeks for a wound check        Charbel Patton MD   10/28/2020  13:55 CDT    "

## 2020-10-30 NOTE — PROGRESS NOTES
POST OP CARDIOTHORACIC SURGERY PROGRESS NOTE    Post op day 5    SUBJECTIVE:  Still having some lightheaded episodes of unknown etiology-this is chronic and has been worked up in the past.     /83   Pulse 75   Temp 97.2 °F (36.2 °C) (Temporal)   Resp 20   Ht 6' 4\" (1.93 m)   Wt 271 lb 6.4 oz (123.1 kg)   SpO2 100%   BMI 33.04 kg/m²   Average, Min, and Max for last 24 hours Vitals:  TEMPERATURE:  Temp  Av.1 °F (36.2 °C)  Min: 96.7 °F (35.9 °C)  Max: 97.3 °F (36.3 °C)  RESPIRATIONS RANGE: Resp  Av.9  Min: 16  Max: 20  PULSE RANGE: Pulse  Av.4  Min: 63  Max: 76  BLOOD PRESSURE RANGE:  Systolic (04BEA), UDR:168 , Min:86 , BSM:559   ; Diastolic (28IUR), VNC:74, Min:56, Max:83    PULSE OXIMETRY RANGE: SpO2  Av.6 %  Min: 90 %  Max: 100 %    I/O last 3 completed shifts: In: 1170 [P.O.:1170]  Out: 800 [Urine:800]    CHEST: lungs clear    CARDIOVASCULAR: regular rhythm, no murmurs    INCISION: no drainage, skin edges intact    DRAINS:     LABS:  CMP:    Lab Results   Component Value Date     2019     2011    K 4.0 2019    K 5.0 2019    K 4.0 2011    CL 97 2019     2011    CO2 24 2019    BUN 38 2019    CREATININE 1.4 2019    CREATININE 1.0 2011    LABGLOM 49 2019    GLUCOSE 142 2019    PROT 6.2 2019    PROT 6.4 2012    LABALBU 2.9 2019    LABALBU 3.8 2011    CALCIUM 8.6 2019    BILITOT 0.3 2019    ALKPHOS 71 2019    ALKPHOS 55 2011    AST 26 2019    ALT 35 2019       CHEST XRAY: none    ASSESSMENT: normal postoperative course    PLAN: Was not seen by rehab as ordered yesterday!!!   Hold lasix    Electronically signed by Rayray Deras MD on 19 at 8:02 AM denies pain/discomfort

## 2020-11-16 ENCOUNTER — LAB (OUTPATIENT)
Dept: LAB | Facility: HOSPITAL | Age: 75
End: 2020-11-16

## 2020-11-16 ENCOUNTER — TRANSCRIBE ORDERS (OUTPATIENT)
Dept: LAB | Facility: HOSPITAL | Age: 75
End: 2020-11-16

## 2020-11-16 ENCOUNTER — HOSPITAL ENCOUNTER (OUTPATIENT)
Dept: GENERAL RADIOLOGY | Facility: HOSPITAL | Age: 75
Discharge: HOME | End: 2020-11-16

## 2020-11-16 ENCOUNTER — TRANSCRIBE ORDERS (OUTPATIENT)
Dept: OTHER | Facility: OTHER | Age: 75
End: 2020-11-16

## 2020-11-16 DIAGNOSIS — N18.1 HYPERTENSIVE KIDNEY DISEASE WITH CHRONIC KIDNEY DISEASE STAGE I: ICD-10-CM

## 2020-11-16 DIAGNOSIS — J69.8: ICD-10-CM

## 2020-11-16 DIAGNOSIS — N18.30 CHRONIC PROGRESSIVE RENAL FAILURE, STAGE 3 (MODERATE) (HCC): ICD-10-CM

## 2020-11-16 DIAGNOSIS — N39.0 URINARY TRACT INFECTION WITHOUT HEMATURIA, SITE UNSPECIFIED: ICD-10-CM

## 2020-11-16 DIAGNOSIS — R41.0 DISORIENTATED: ICD-10-CM

## 2020-11-16 DIAGNOSIS — E11.40 TYPE 2 DIABETES MELLITUS WITH DIABETIC NEUROPATHY, UNSPECIFIED WHETHER LONG TERM INSULIN USE (HCC): Primary | ICD-10-CM

## 2020-11-16 DIAGNOSIS — I12.9 HYPERTENSIVE KIDNEY DISEASE WITH CHRONIC KIDNEY DISEASE STAGE I: ICD-10-CM

## 2020-11-16 DIAGNOSIS — I50.22 CHRONIC SYSTOLIC HEART FAILURE (HCC): ICD-10-CM

## 2020-11-16 DIAGNOSIS — E11.40 TYPE 2 DIABETES MELLITUS WITH DIABETIC NEUROPATHY, UNSPECIFIED WHETHER LONG TERM INSULIN USE (HCC): ICD-10-CM

## 2020-11-16 DIAGNOSIS — J69.8: Primary | ICD-10-CM

## 2020-11-16 LAB
ALBUMIN SERPL-MCNC: 4.3 G/DL (ref 3.5–5.2)
ALBUMIN/GLOB SERPL: 1.4 G/DL
ALP SERPL-CCNC: 75 U/L (ref 39–117)
ALT SERPL W P-5'-P-CCNC: 16 U/L (ref 1–41)
ANION GAP SERPL CALCULATED.3IONS-SCNC: 11 MMOL/L (ref 5–15)
AST SERPL-CCNC: 16 U/L (ref 1–40)
BILIRUB SERPL-MCNC: 0.4 MG/DL (ref 0–1.2)
BUN SERPL-MCNC: 45 MG/DL (ref 8–23)
BUN/CREAT SERPL: 25.7 (ref 7–25)
CALCIUM SPEC-SCNC: 9.2 MG/DL (ref 8.6–10.5)
CHLORIDE SERPL-SCNC: 105 MMOL/L (ref 98–107)
CO2 SERPL-SCNC: 25 MMOL/L (ref 22–29)
CREAT SERPL-MCNC: 1.75 MG/DL (ref 0.76–1.27)
DEPRECATED RDW RBC AUTO: 46.5 FL (ref 37–54)
ERYTHROCYTE [DISTWIDTH] IN BLOOD BY AUTOMATED COUNT: 15.2 % (ref 12.3–15.4)
GFR SERPL CREATININE-BSD FRML MDRD: 38 ML/MIN/1.73
GLOBULIN UR ELPH-MCNC: 3.1 GM/DL
GLUCOSE SERPL-MCNC: 194 MG/DL (ref 65–99)
HCT VFR BLD AUTO: 37.3 % (ref 37.5–51)
HGB BLD-MCNC: 11.9 G/DL (ref 13–17.7)
MCH RBC QN AUTO: 26.6 PG (ref 26.6–33)
MCHC RBC AUTO-ENTMCNC: 31.9 G/DL (ref 31.5–35.7)
MCV RBC AUTO: 83.3 FL (ref 79–97)
NT-PROBNP SERPL-MCNC: 3040 PG/ML (ref 0–1800)
PLATELET # BLD AUTO: 131 10*3/MM3 (ref 140–450)
PMV BLD AUTO: 12.1 FL (ref 6–12)
POTASSIUM SERPL-SCNC: 4.3 MMOL/L (ref 3.5–5.2)
PROT SERPL-MCNC: 7.4 G/DL (ref 6–8.5)
RBC # BLD AUTO: 4.48 10*6/MM3 (ref 4.14–5.8)
SODIUM SERPL-SCNC: 141 MMOL/L (ref 136–145)
WBC # BLD AUTO: 5.3 10*3/MM3 (ref 3.4–10.8)

## 2020-11-16 PROCEDURE — 83880 ASSAY OF NATRIURETIC PEPTIDE: CPT

## 2020-11-16 PROCEDURE — 71046 X-RAY EXAM CHEST 2 VIEWS: CPT

## 2020-11-16 PROCEDURE — 36415 COLL VENOUS BLD VENIPUNCTURE: CPT

## 2020-11-16 PROCEDURE — 80053 COMPREHEN METABOLIC PANEL: CPT

## 2020-11-16 PROCEDURE — 85027 COMPLETE CBC AUTOMATED: CPT

## 2020-11-17 ENCOUNTER — LAB (OUTPATIENT)
Dept: LAB | Facility: HOSPITAL | Age: 75
End: 2020-11-17

## 2020-11-17 PROCEDURE — 81003 URINALYSIS AUTO W/O SCOPE: CPT

## 2020-11-18 LAB
BILIRUB UR QL STRIP: NEGATIVE
CLARITY UR: CLEAR
COLOR UR: YELLOW
GLUCOSE UR STRIP-MCNC: ABNORMAL MG/DL
HGB UR QL STRIP.AUTO: NEGATIVE
KETONES UR QL STRIP: NEGATIVE
LEUKOCYTE ESTERASE UR QL STRIP.AUTO: NEGATIVE
NITRITE UR QL STRIP: NEGATIVE
PH UR STRIP.AUTO: 5.5 [PH] (ref 5–8)
PROT UR QL STRIP: NEGATIVE
SP GR UR STRIP: 1.02 (ref 1–1.03)
UROBILINOGEN UR QL STRIP: ABNORMAL

## 2020-11-19 ENCOUNTER — TRANSCRIBE ORDERS (OUTPATIENT)
Dept: LAB | Facility: HOSPITAL | Age: 75
End: 2020-11-19

## 2020-11-19 ENCOUNTER — LAB (OUTPATIENT)
Dept: LAB | Facility: HOSPITAL | Age: 75
End: 2020-11-19

## 2020-11-19 DIAGNOSIS — N40.1 ENLARGED PROSTATE WITH URINARY OBSTRUCTION: ICD-10-CM

## 2020-11-19 DIAGNOSIS — I12.9 MALIGNANT HYPERTENSION WITH CHRONIC KIDNEY DISEASE STAGE III (HCC): ICD-10-CM

## 2020-11-19 DIAGNOSIS — N18.30 CHRONIC PROGRESSIVE RENAL FAILURE, STAGE 3 (MODERATE) (HCC): ICD-10-CM

## 2020-11-19 DIAGNOSIS — N18.30 MALIGNANT HYPERTENSION WITH CHRONIC KIDNEY DISEASE STAGE III (HCC): ICD-10-CM

## 2020-11-19 DIAGNOSIS — N13.8 ENLARGED PROSTATE WITH URINARY OBSTRUCTION: ICD-10-CM

## 2020-11-19 DIAGNOSIS — N18.30 CHRONIC PROGRESSIVE RENAL FAILURE, STAGE 3 (MODERATE) (HCC): Primary | ICD-10-CM

## 2020-11-19 LAB
ANION GAP SERPL CALCULATED.3IONS-SCNC: 10 MMOL/L (ref 5–15)
BUN SERPL-MCNC: 21 MG/DL (ref 8–23)
BUN/CREAT SERPL: 13.5 (ref 7–25)
CALCIUM SPEC-SCNC: 9.7 MG/DL (ref 8.6–10.5)
CHLORIDE SERPL-SCNC: 102 MMOL/L (ref 98–107)
CO2 SERPL-SCNC: 27 MMOL/L (ref 22–29)
CREAT SERPL-MCNC: 1.55 MG/DL (ref 0.76–1.27)
GFR SERPL CREATININE-BSD FRML MDRD: 44 ML/MIN/1.73
GLUCOSE SERPL-MCNC: 176 MG/DL (ref 65–99)
POTASSIUM SERPL-SCNC: 4.4 MMOL/L (ref 3.5–5.2)
SODIUM SERPL-SCNC: 139 MMOL/L (ref 136–145)

## 2020-11-19 PROCEDURE — 36415 COLL VENOUS BLD VENIPUNCTURE: CPT

## 2020-11-19 PROCEDURE — 80048 BASIC METABOLIC PNL TOTAL CA: CPT

## 2020-11-24 ENCOUNTER — TELEPHONE (OUTPATIENT)
Dept: CARDIOLOGY | Age: 75
End: 2020-11-24

## 2020-11-25 PROCEDURE — 93295 DEV INTERROG REMOTE 1/2/MLT: CPT | Performed by: CLINICAL NURSE SPECIALIST

## 2020-11-25 PROCEDURE — 93296 REM INTERROG EVL PM/IDS: CPT | Performed by: CLINICAL NURSE SPECIALIST

## 2020-12-01 ENCOUNTER — TRANSCRIBE ORDERS (OUTPATIENT)
Dept: ADMINISTRATIVE | Facility: HOSPITAL | Age: 75
End: 2020-12-01

## 2020-12-01 DIAGNOSIS — Q99.9 DIABETES MELLITUS ASSOCIATED WITH GENETIC SYNDROME (HCC): Primary | ICD-10-CM

## 2020-12-01 DIAGNOSIS — I50.22 CHRONIC SYSTOLIC HEART FAILURE (HCC): ICD-10-CM

## 2020-12-01 DIAGNOSIS — E11.69 DIABETES MELLITUS ASSOCIATED WITH GENETIC SYNDROME (HCC): Primary | ICD-10-CM

## 2020-12-01 DIAGNOSIS — N18.30 MALIGNANT HYPERTENSIVE HEART AND CHRONIC KIDNEY DISEASE STAGE III (HCC): ICD-10-CM

## 2020-12-01 DIAGNOSIS — Z00.00 ROUTINE GENERAL MEDICAL EXAMINATION AT A HEALTH CARE FACILITY: ICD-10-CM

## 2020-12-01 DIAGNOSIS — I13.10 MALIGNANT HYPERTENSIVE HEART AND CHRONIC KIDNEY DISEASE STAGE III (HCC): ICD-10-CM

## 2020-12-01 DIAGNOSIS — Z12.5 SPECIAL SCREENING FOR MALIGNANT NEOPLASM OF PROSTATE: ICD-10-CM

## 2020-12-02 ENCOUNTER — LAB (OUTPATIENT)
Dept: LAB | Facility: HOSPITAL | Age: 75
End: 2020-12-02

## 2020-12-02 DIAGNOSIS — I50.22 CHRONIC SYSTOLIC HEART FAILURE (HCC): ICD-10-CM

## 2020-12-02 DIAGNOSIS — Z00.00 ROUTINE GENERAL MEDICAL EXAMINATION AT A HEALTH CARE FACILITY: ICD-10-CM

## 2020-12-02 DIAGNOSIS — E11.69 DIABETES MELLITUS ASSOCIATED WITH GENETIC SYNDROME (HCC): ICD-10-CM

## 2020-12-02 DIAGNOSIS — N18.30 MALIGNANT HYPERTENSIVE HEART AND CHRONIC KIDNEY DISEASE STAGE III (HCC): ICD-10-CM

## 2020-12-02 DIAGNOSIS — I13.10 MALIGNANT HYPERTENSIVE HEART AND CHRONIC KIDNEY DISEASE STAGE III (HCC): ICD-10-CM

## 2020-12-02 DIAGNOSIS — Q99.9 DIABETES MELLITUS ASSOCIATED WITH GENETIC SYNDROME (HCC): ICD-10-CM

## 2020-12-02 LAB
ALBUMIN SERPL-MCNC: 4.3 G/DL (ref 3.5–5.2)
ALBUMIN/GLOB SERPL: 1.3 G/DL
ALP SERPL-CCNC: 81 U/L (ref 39–117)
ALT SERPL W P-5'-P-CCNC: 17 U/L (ref 1–41)
ANION GAP SERPL CALCULATED.3IONS-SCNC: 9 MMOL/L (ref 5–15)
AST SERPL-CCNC: 22 U/L (ref 1–40)
BILIRUB SERPL-MCNC: 0.4 MG/DL (ref 0–1.2)
BILIRUB UR QL STRIP: NEGATIVE
BUN SERPL-MCNC: 25 MG/DL (ref 8–23)
BUN/CREAT SERPL: 16.1 (ref 7–25)
CALCIUM SPEC-SCNC: 9.3 MG/DL (ref 8.6–10.5)
CHLORIDE SERPL-SCNC: 98 MMOL/L (ref 98–107)
CHOLEST SERPL-MCNC: 238 MG/DL (ref 0–200)
CLARITY UR: CLEAR
CO2 SERPL-SCNC: 29 MMOL/L (ref 22–29)
COLOR UR: YELLOW
CREAT SERPL-MCNC: 1.55 MG/DL (ref 0.76–1.27)
DEPRECATED RDW RBC AUTO: 42.7 FL (ref 37–54)
ERYTHROCYTE [DISTWIDTH] IN BLOOD BY AUTOMATED COUNT: 14.4 % (ref 12.3–15.4)
GFR SERPL CREATININE-BSD FRML MDRD: 44 ML/MIN/1.73
GLOBULIN UR ELPH-MCNC: 3.2 GM/DL
GLUCOSE SERPL-MCNC: 77 MG/DL (ref 65–99)
GLUCOSE UR STRIP-MCNC: NEGATIVE MG/DL
HBA1C MFR BLD: 7.43 % (ref 4.8–5.6)
HCT VFR BLD AUTO: 43.6 % (ref 37.5–51)
HDLC SERPL-MCNC: 29 MG/DL (ref 40–60)
HGB BLD-MCNC: 14.2 G/DL (ref 13–17.7)
HGB UR QL STRIP.AUTO: NEGATIVE
KETONES UR QL STRIP: NEGATIVE
LDLC SERPL CALC-MCNC: 157 MG/DL (ref 0–100)
LDLC/HDLC SERPL: 5.29 {RATIO}
LEUKOCYTE ESTERASE UR QL STRIP.AUTO: NEGATIVE
MCH RBC QN AUTO: 26.7 PG (ref 26.6–33)
MCHC RBC AUTO-ENTMCNC: 32.6 G/DL (ref 31.5–35.7)
MCV RBC AUTO: 82.1 FL (ref 79–97)
NITRITE UR QL STRIP: NEGATIVE
PH UR STRIP.AUTO: 5.5 [PH] (ref 5–8)
PLATELET # BLD AUTO: 159 10*3/MM3 (ref 140–450)
PMV BLD AUTO: 12.5 FL (ref 6–12)
POTASSIUM SERPL-SCNC: 3.9 MMOL/L (ref 3.5–5.2)
PROT SERPL-MCNC: 7.5 G/DL (ref 6–8.5)
PROT UR QL STRIP: ABNORMAL
PSA SERPL-MCNC: 1.97 NG/ML (ref 0–4)
RBC # BLD AUTO: 5.31 10*6/MM3 (ref 4.14–5.8)
SODIUM SERPL-SCNC: 136 MMOL/L (ref 136–145)
SP GR UR STRIP: 1.02 (ref 1–1.03)
TRIGL SERPL-MCNC: 278 MG/DL (ref 0–150)
UROBILINOGEN UR QL STRIP: ABNORMAL
VLDLC SERPL-MCNC: 52 MG/DL (ref 5–40)
WBC # BLD AUTO: 6.49 10*3/MM3 (ref 3.4–10.8)

## 2020-12-02 PROCEDURE — 83036 HEMOGLOBIN GLYCOSYLATED A1C: CPT

## 2020-12-02 PROCEDURE — 81003 URINALYSIS AUTO W/O SCOPE: CPT | Performed by: INTERNAL MEDICINE

## 2020-12-02 PROCEDURE — 36415 COLL VENOUS BLD VENIPUNCTURE: CPT | Performed by: INTERNAL MEDICINE

## 2020-12-02 PROCEDURE — 80053 COMPREHEN METABOLIC PANEL: CPT

## 2020-12-02 PROCEDURE — 80061 LIPID PANEL: CPT

## 2020-12-02 PROCEDURE — 85027 COMPLETE CBC AUTOMATED: CPT | Performed by: INTERNAL MEDICINE

## 2020-12-02 PROCEDURE — G0103 PSA SCREENING: HCPCS | Performed by: INTERNAL MEDICINE

## 2021-02-23 ENCOUNTER — IMMUNIZATION (OUTPATIENT)
Age: 76
End: 2021-02-23
Payer: MEDICARE

## 2021-02-23 ENCOUNTER — TELEPHONE (OUTPATIENT)
Dept: CARDIOLOGY CLINIC | Age: 76
End: 2021-02-23

## 2021-02-23 PROCEDURE — 91300 COVID-19, PFIZER VACCINE 30MCG/0.3ML DOSE: CPT | Performed by: FAMILY MEDICINE

## 2021-02-23 PROCEDURE — 0001A PR IMM ADMN SARSCOV2 30MCG/0.3ML DIL RECON 1ST DOSE: CPT | Performed by: FAMILY MEDICINE

## 2021-02-24 ENCOUNTER — OFFICE VISIT (OUTPATIENT)
Dept: CARDIOLOGY CLINIC | Age: 76
End: 2021-02-24
Payer: MEDICARE

## 2021-02-24 VITALS
SYSTOLIC BLOOD PRESSURE: 112 MMHG | WEIGHT: 280 LBS | HEIGHT: 76 IN | HEART RATE: 80 BPM | DIASTOLIC BLOOD PRESSURE: 60 MMHG | BODY MASS INDEX: 34.1 KG/M2

## 2021-02-24 DIAGNOSIS — R07.9 CHEST PAIN IN ADULT: Primary | ICD-10-CM

## 2021-02-24 DIAGNOSIS — I79.8 OTHER DISORDERS OF ARTERIES, ARTERIOLES AND CAPILLARIES IN DISEASES CLASSIFIED ELSEWHERE (HCC): ICD-10-CM

## 2021-02-24 DIAGNOSIS — I50.22 CHRONIC SYSTOLIC HEART FAILURE (HCC): ICD-10-CM

## 2021-02-24 PROCEDURE — 93000 ELECTROCARDIOGRAM COMPLETE: CPT | Performed by: INTERNAL MEDICINE

## 2021-02-24 PROCEDURE — 99214 OFFICE O/P EST MOD 30 MIN: CPT | Performed by: INTERNAL MEDICINE

## 2021-02-24 PROCEDURE — 93282 PRGRMG EVAL IMPLANTABLE DFB: CPT | Performed by: INTERNAL MEDICINE

## 2021-02-24 PROCEDURE — G8417 CALC BMI ABV UP PARAM F/U: HCPCS | Performed by: INTERNAL MEDICINE

## 2021-02-24 PROCEDURE — G8484 FLU IMMUNIZE NO ADMIN: HCPCS | Performed by: INTERNAL MEDICINE

## 2021-02-24 PROCEDURE — G8427 DOCREV CUR MEDS BY ELIG CLIN: HCPCS | Performed by: INTERNAL MEDICINE

## 2021-02-24 ASSESSMENT — ENCOUNTER SYMPTOMS
VOMITING: 0
SHORTNESS OF BREATH: 0
WHEEZING: 0
ABDOMINAL PAIN: 0
BLOOD IN STOOL: 0
DIARRHEA: 0
BACK PAIN: 1
COUGH: 0
ABDOMINAL DISTENTION: 0

## 2021-02-24 NOTE — PROGRESS NOTES
ICD interrogated  Presenting rhythm: AS/VS, AP 33.5%,  <0.1%  Battery status: 9.4 years  Lead status: lead impedance within range and stable  Sensing: P waves 2.8 mV,  R waves 11.0 mV  Thresholds:  Atrial 1.000 V @ 0.4ms, ventricular 0.750 @ 0.4ms  Observations:  None  Reprogramming for sensitivity and threshold testing  Next [facility] home check scheduled for 05/26/21

## 2021-02-24 NOTE — PATIENT INSTRUCTIONS
[facility]    [place] [facility] and [address] located on the first floor of [facility].       Patient's contact number:  [phone] (home)     Date and Time:04/01/21 @9:30    Bilateral non-invasive arterial study with ankle-brachial index    DESCRIPTION OF THE PROCEDURE   A lower extremity arterial duplex is a noninvasive examination that consists of imaging the major arteries that supply blood flow to the legs and feet. Harmless sound waves are bounced off your arteries and then sent back, converting the images on a screen and videotaped. These images are used to see the blood flow through your arteries. The technician will also be listening to the blood flow using Doppler. PREPARATION   No preparation is required. LENGTH OF EXAMINATION   Approximately 1 hour. If you need to change your appointment, please call outpatient scheduling at [phone].

## 2021-02-24 NOTE — PROGRESS NOTES
[address] [facility]  Cardiology Office Note  [address] [address]  Phone: [phone]  Fax: [phone]                            Date:  2/24/2021  Patient: Sanket Fung  Age:  67 y.o., 1945    Referral: No ref.  provider found      PROBLEM LIST:    Patient Active Problem List    Diagnosis Date Noted    ACS [facility] syndrome) ([facility]      Priority: High    History of chronic kidney disease 07/13/2020     Priority: Low    Edema 07/13/2020     Priority: Low    AICD (automatic cardioverter/defibrillator) present 11/25/2019     Priority: Low    Complicated UTI (urinary tract infection) 11/16/2019     Priority: Low    Chronic systolic heart failure ([facility] 08/09/2019     Priority: Low    Urinary tract infection due to extended-spectrum beta lactamase (ESBL) producing Escherichia coli 06/14/2019     Priority: Low    Ischemic cardiomyopathy 06/13/2019     Priority: Low    Palliative care patient 06/11/2019     Priority: Low    Chronic constipation 06/10/2019     Priority: Low    Hx of CABG 06/01/2019     Priority: Low    Acute MI inferior lateral first episode care ([facility] 05/31/2019     Priority: Low    SOB (shortness of breath) 04/15/2019     Priority: Low    Chest pain 02/28/2019     Priority: Low     Overview Note:     1/2011 stents x 3 [facility])  5/4/2011  Cath  Patent stents in the mid LAD, stent to OM1, PTCA to the D2, normal LVFX  12/12/12  Cath patent stents in the mid LAD, PTCA to the D3, stent to distal LAD, normal LVFX  3/29/2015  Cath  Stent to the PLOM, normal LVFX  11/1/2019  Loop placed  3/1/19 lexiscan negative for myocardial ischemia, EF 50  %   5/24/19 ACS PARKER RISK Score 5 (angina, + troponin, CAD>50, age>65, plavix ), AUC indication 3, AUC score 9   5/25/19  Cath 50% distal LM, 100% mid LAD, 80% diag, 100% mid RCA, inferior hypokinesis, EF 45%              MOO on CPAP 05/03/2018     Priority: Low    Dermatitis 03/05/2018 Priority: Low    Depression with anxiety 03/05/2018     Priority: Low    Benign prostatic hyperplasia without lower urinary tract symptoms 03/05/2018     Priority: Low    Chronic tension-type headache, not intractable 03/05/2018     Priority: Low    West Nile virus seropositivity 11/22/2017     Priority: Low    Status post placement of implantable loop recorder 10/31/2017     Priority: Low    Recurrent major depressive disorder, in full remission ([facility] 10/17/2017     Priority: Low    Trigger middle finger of left hand 10/17/2017     Priority: Low    Peripheral polyneuropathy 10/17/2017     Priority: Low    Palpitations 10/17/2017     Priority: Low    Chronic insomnia 10/17/2017     Priority: Low    Cerebrovascular accident (CVA) due to embolism of right middle cerebral artery ([facility] 08/14/2017     Priority: Low    Dizziness 03/27/2017     Priority: Low    Imbalance 03/27/2017     Priority: Low    Thoracic outlet syndrome 02/21/2017     Priority: Low     Overview Note:     L sided on exam 2/21/17      Acute ischemic stroke ([facility] 02/20/2017     Priority: Low    DDD (degenerative disc disease), lumbar 11/15/2016     Priority: Low    Spondylosis of lumbar region without myelopathy or radiculopathy 11/15/2016     Priority: Low    Chronic bilateral low back pain without sciatica 11/10/2016     Priority: Low    Mixed hyperlipidemia 07/23/2012     Priority: Low    MI (myocardial infarction) ([facility]      Priority: Low    Type 2 diabetes mellitus with diabetic polyneuropathy, with long-term current use of insulin ([facility] 07/12/2011     Priority: Low    Essential hypertension 07/12/2011     Priority: Low    Coronary artery disease involving native coronary artery of native heart without angina pectoris 07/12/2011     Priority: Low Genitourinary: Negative for difficulty urinating, flank pain and hematuria. Musculoskeletal: Positive for back pain and gait problem. Negative for arthralgias, joint swelling and myalgias. Skin: Negative for pallor and rash. Neurological: Negative for dizziness, seizures, syncope and headaches. Psychiatric/Behavioral: Negative for behavioral problems and dysphoric mood. The patient is not nervous/anxious. All other systems reviewed and are negative. Past Medical History:      Diagnosis Date    Acute ischemic stroke ([address] 2/20/2017    Atherosclerotic heart disease     s/p MI, stenting x 3 jan 2011([place]    CAD (coronary artery disease)     Cerebral artery occlusion with cerebral infarction [facility])     Cerebrovascular accident (CVA) due to embolism of right middle cerebral artery ([address] 8/14/2017    CHF (congestive heart failure) ([facility])     Chronic bilateral low back pain without sciatica 11/10/2016    Chronic kidney disease     DDD (degenerative disc disease), lumbar 11/15/2016    Depression     Depression with anxiety     Diabetes mellitus ([address]     type II    Diabetes mellitus, type 2 ([address]     DM (diabetes mellitus) ([address] 7/12/2011    ESBL (extended spectrum beta-lactamase) producing bacteria infection 11/16/2019    urine    Glaucoma     Headache     Hyperlipidemia     Cholesterol management per pcp.      Hypertension     Macular degeneration     MI (myocardial infarction) ([address]     MI (myocardial infarction) ([address]     Neuromuscular disorder ([address]     Neuropathy     MOO on CPAP     Osteoarthritis     Palliative care patient 06/11/2019    Sleep apnea     Spondylosis of lumbar region without myelopathy or radiculopathy 11/15/2016    Status post placement of implantable loop recorder 10/31/2017    TIA (transient ischemic attack)        Past Surgical History:      Procedure Laterality Date    APPENDECTOMY      at age 4   [address]      2016 (fusion), 2017  BREAST SURGERY  05/2019    CARDIAC CATHETERIZATION  12/12/12    with angioplasty, stent    CARDIAC CATHETERIZATION  3/29/15  MDL    stent to distal RCA. EF 60%    CHOLECYSTECTOMY, LAPAROSCOPIC      2012    COLONOSCOPY      2010    CORONARY ANGIOPLASTY WITH STENT PLACEMENT  jan 11 [place]    stent x 3    CORONARY ANGIOPLASTY WITH STENT PLACEMENT      01/01/11    CORONARY ARTERY BYPASS GRAFT N/A 5/30/2019    CORONARY ARTERY BYPASS GRAFT X3 WITH LEFT INTERNAL MAMMARY ARTERY WITH ENDOSCOPIC VEIN HARVESTING WITH PERFUSION TRANSESOPHAGEAL ECHOCARDIOGRAM performed by Harpreet Rolon MD at [facility] CATH LAB PROCEDURE  [id number]    primary stent placement to the first circumflex marginal branch, balloon angioplasty to the third left anterior descending diagnonal branch, intracoronary nitroglycerin adminstration    JOINT REPLACEMENT      left knee, ~2016    LUMBAR FUSION Left 11/15/2016    LUMBAR INTERBODY FUSION LATERAL L3-4 L4-5 WITH LATERAL PLATE  performed by Hai Woods MD at [address]  11/02/2019       Medications:  Current Outpatient Medications   Medication Sig Dispense Refill    docusate sodium (COLACE) 100 MG capsule Take 200 mg by mouth daily      famotidine (PEPCID) 40 MG tablet Take 40 mg by mouth daily      tamsulosin (FLOMAX) 0.4 MG capsule Take 0.4 mg by mouth daily      furosemide (LASIX) 40 MG tablet Take 40 mg by mouth daily Currently taking 2 daily      bumetanide (BUMEX) 1 MG tablet Take 1 tablet by mouth 2 times daily 60 tablet 5    Multiple Vitamins-Minerals (PRESERVISION AREDS PO) Take 2 capsules by mouth      diphenhydrAMINE (BENADRYL) 25 MG tablet Take 50 mg by mouth 2 times daily      oxyCODONE-acetaminophen (PERCOCET)  MG per tablet Take 1 tablet by mouth every 4 hours as needed for Pain.       isosorbide mononitrate (IMDUR) 30 MG extended release tablet TAKE 1 TABLET BY MOUTH EVERY DAY 90 tablet 1  insulin glargine (LANTUS SOLOSTAR) 100 UNIT/ML injection pen Inject 80 Units into the skin nightly 24 pen 3    sertraline (ZOLOFT) 100 MG tablet Take 1 tablet by mouth daily 90 tablet 2    nitroGLYCERIN (NITROSTAT) 0.4 MG SL tablet up to max of 3 total doses. If no relief after 1 dose, call 911. 30 tablet 2    clopidogrel (PLAVIX) 75 MG tablet TAKE 1 TABLET DAILY 90 tablet 2    carvedilol (COREG) 3.125 MG tablet Take 1 tablet by mouth 2 times daily (with meals) 180 tablet 2    sacubitril-valsartan (ENTRESTO) 49-51 MG per tablet Take 1 tablet by mouth 2 times daily 60 tablet 5    fluticasone (FLONASE) 50 MCG/ACT nasal spray 1 spray by Each Nostril route daily 1 Bottle 0    zoster recombinant adjuvanted vaccine (SHINGRIX) 50 MCG/0.5ML SUSR injection 1 vaccine now and repeat in 2-6 months. 0.5 mL 1    butalbital-acetaminophen-caffeine (FIORICET, ESGIC) -40 MG per tablet Take 1 tablet by mouth as needed for Headaches (Patient taking differently: Take 1 tablet by mouth every 6 hours as needed for Headaches ) 3 tablet 0     No current facility-administered medications for this visit. Allergies:  Statins, Crestor [rosuvastatin calcium], Adhesive tape, and Morphine    Past Social History:  Social History     Socioeconomic History    Marital status:      Spouse name: Nasir Rowan Number of children: 3    Years of education: Not on file    Highest education level: Not on file   Occupational History    Occupation:    Social Needs    Financial resource strain: Not on file    Food insecurity     Worry: Not on file     Inability: Not on file   [employer] needs     Medical: Not on file     Non-medical: Not on file   Tobacco Use    Smoking status: Never Smoker    Smokeless tobacco: Never Used   Substance and Sexual Activity    Alcohol use: Yes     Alcohol/week: 1.0 standard drinks     Types: 1 Shots of liquor per week     Comment: Occ.     Drug use: Not Currently Types: Marijuana     Comment: edibles, uses for sleep    Sexual activity: Not on file     Comment: 3 sons   Lifestyle    Physical activity     Days per week: Not on file     Minutes per session: Not on file    Stress: Not on file   Relationships    Social connections     Talks on phone: Not on file     Gets together: Not on file     Attends Zoroastrianism service: Not on file     Active member of club or organization: Not on file     Attends meetings of clubs or organizations: Not on file     Relationship status: Not on file    Intimate partner violence     Fear of current or ex partner: Not on file     Emotionally abused: Not on file     Physically abused: Not on file     Forced sexual activity: Not on file   Other Topics Concern    Not on file   Social History Narrative    CODE STATUS: Full Code    HEALTH CARE PROXY: his wife, Mrs. Sonu Harvey, [phone]    AMBULATES: independently    DOMICILED: private home, stairs inside home, has a few steps to enter home, lives alone with wife, no pets       Family History:       Problem Relation Age of Onset    Coronary Art Dis Mother     Cancer Sister         uterine    Kidney Disease Sister     Coronary Art Dis Brother          in his 46s - age 47    Cancer Father         colon    Coronary Art Dis Sister     Cancer Sister         olive cancer    No Known Problems Sister     No Known Problems Sister     No Known Problems Son     Alcohol Abuse Son         drank self to death    No Known Problems Son          Physical Examination:  /60 (Site: Left Upper Arm, Position: Sitting, Cuff Size: Large Adult)   Pulse 80   Ht 6' 4\" (1.93 m)   Wt 280 lb (127 kg)   BMI 34.08 kg/m²   Physical Exam  Constitutional:       Appearance: He is well-developed. Comments: Moderate to severe truncal obesity  Blood pressure right arm sitting 100/70 mmHg, pulse 70 bpm regular   HENT:      Mouth/Throat:      Pharynx: No oropharyngeal exudate.    Eyes: General: No scleral icterus. Right eye: No discharge. Left eye: No discharge. Neck:      Thyroid: No thyromegaly. Vascular: No JVD. Cardiovascular:      Rate and Rhythm: Normal rate and regular rhythm. Heart sounds: No murmur. No friction rub. No gallop. Comments: No JVD  Trace edema  Pedal pulses are diminished but just barely palpable in both posterior tibials not felt in dorsalis pedis. Femoral pulses are decreased but no bruits noted  Pulmonary:      Effort: No respiratory distress. Breath sounds: No stridor. No wheezing or rales. Comments: mildly diminished air entry left posterior. No crepitations no wheezing  No chest wall tenderness elicited  Abdominal:      General: Bowel sounds are normal. There is no distension. Palpations: Abdomen is soft. There is no mass. Tenderness: There is no abdominal tenderness. There is no guarding or rebound. Comments: Difficult exam but no palpable organomegaly     Musculoskeletal:         General: No deformity. Skin:     General: Skin is warm. Coloration: Skin is not pale. Findings: No erythema or rash. Neurological:      Mental Status: He is alert and oriented to person, place, and time. Motor: No abnormal muscle tone. Coordination: Coordination normal.      Deep Tendon Reflexes: Reflexes normal.           Labs:   CBC: No results for input(s): WBC, HGB, HCT, PLT in the last 72 hours. BMP:No results for input(s): NA, K, CO2, BUN, CREATININE, LABGLOM, GLUCOSE in the last 72 hours. BNP: No results for input(s): BNP in the last 72 hours. PT/INR: No results for input(s): PROTIME, INR in the last 72 hours. APTT:No results for input(s): APTT in the last 72 hours. CARDIAC ENZYMES:No results for input(s): CKTOTAL, CKMB, CKMBINDEX, TROPONINI in the last 72 hours.   FASTING LIPID PANEL:  Lab Results   Component Value Date    HDL 20 05/25/2019    LDLDIRECT 167 11/01/2018 LDLCALC 116 05/25/2019    TRIG 103 05/25/2019     LIVER PROFILE:No results for input(s): AST, ALT, LABALBU in the last 72 hours. Imaging:    ICD interrogated  Presenting rhythm: AS/VS, AP 33.5%,  <0.1%  Battery status: 9.4 years  Lead status: lead impedance within range and stable  Sensing: P waves 2.8 mV,  R waves 11.0 mV  Thresholds:  Atrial 1.000 V @ 0.4ms, ventricular 0.750 @ 0.4ms  Observations:  None  Reprogramming for sensitivity and threshold testing  Next [facility] home check scheduled for 05/26/21      ASSESSMENT and PLAN:    This is a 75y.o. year old male with past medical history of diabetes mellitus, CK D stage III, coronary artery disease with multiple PCI's to LAD, RCA and OM, recent inferior wall MI 5/25/2019 with an occluded large dominant RCA not intervened on, intermediate left main and diffuse severe mid to distal LAD restenosis, status post CABG 5/30/2019 with LIMA to 1st diagonal, SVG to LAD, SVG to ramus intermedius, EF 25%, postoperative atrial fibrillation, viability study with no significant viable myocardium in inferior and lateral wall with follow-up LV function by echo at 30-35%, status post ICD placement 11/1/2019, here for follow-up evaluation. 1.  His leg symptoms of aching are to some extent exertional in nature but also related to his prior back surgery. I would obtain bilateral arterial duplexes to rule out obstructive disease that might be contributing to his symptoms. 2.  He does have musculoskeletal pain from his prior CABG in the midsternal location. ICD appears to be functioning well with no significant issues. With battery life. Normal thresholds on the leads. EKG done today does show slightly increased QRS duration 816 ms. Prior septal infarct with PVCs/paced beats. 3.  He will follow-up with me in 7 months.   Orders:  Orders Placed This Encounter   Procedures    VL DUP LOWER EXTREMITY ARTERIES BILATERAL    EKG 12 lead No orders of the defined types were placed in this encounter. Return in about 7 months (around 9/24/2021). Electronically signed by Mel Goldstein MD on 2/24/2021 at [address] [facility]      Thisdictation was generated by voice recognition computer software. Although all attempts are made to edit the dictation for accuracy, there may be errors in the transcription that are not intended.

## 2021-03-16 ENCOUNTER — IMMUNIZATION (OUTPATIENT)
Age: 76
End: 2021-03-16
Payer: MEDICARE

## 2021-03-16 PROCEDURE — 0002A COVID-19, PFIZER VACCINE 30MCG/0.3ML DOSE: CPT | Performed by: FAMILY MEDICINE

## 2021-03-16 PROCEDURE — 91300 COVID-19, PFIZER VACCINE 30MCG/0.3ML DOSE: CPT | Performed by: FAMILY MEDICINE

## 2021-04-01 ENCOUNTER — HOSPITAL ENCOUNTER (OUTPATIENT)
Dept: VASCULAR LAB | Age: 76
Discharge: HOME | End: 2021-04-01
Payer: MEDICARE

## 2021-04-01 DIAGNOSIS — I73.9 CLAUDICATION (HCC): Primary | ICD-10-CM

## 2021-04-01 DIAGNOSIS — I50.22 CHRONIC SYSTOLIC HEART FAILURE (HCC): ICD-10-CM

## 2021-04-01 DIAGNOSIS — I79.8 OTHER DISORDERS OF ARTERIES, ARTERIOLES AND CAPILLARIES IN DISEASES CLASSIFIED ELSEWHERE (HCC): ICD-10-CM

## 2021-04-01 DIAGNOSIS — R07.9 CHEST PAIN IN ADULT: ICD-10-CM

## 2021-04-01 PROCEDURE — 93923 UPR/LXTR ART STDY 3+ LVLS: CPT

## 2021-05-27 ENCOUNTER — TELEPHONE (OUTPATIENT)
Dept: CARDIOLOGY CLINIC | Age: 76
End: 2021-05-27

## 2021-05-27 DIAGNOSIS — I50.22 CHRONIC SYSTOLIC CONGESTIVE HEART FAILURE (HCC): ICD-10-CM

## 2021-05-27 DIAGNOSIS — Z95.810 AICD (AUTOMATIC CARDIOVERTER/DEFIBRILLATOR) PRESENT: Primary | ICD-10-CM

## 2021-05-27 PROCEDURE — 93295 DEV INTERROG REMOTE 1/2/MLT: CPT | Performed by: CLINICAL NURSE SPECIALIST

## 2021-05-27 PROCEDURE — 93296 REM INTERROG EVL PM/IDS: CPT | Performed by: CLINICAL NURSE SPECIALIST

## 2021-05-27 PROCEDURE — 93297 REM INTERROG DEV EVAL ICPMS: CPT | Performed by: CLINICAL NURSE SPECIALIST

## 2021-06-08 ENCOUNTER — OFFICE VISIT (OUTPATIENT)
Dept: CARDIOLOGY CLINIC | Age: 76
End: 2021-06-08
Payer: MEDICARE

## 2021-06-08 VITALS
BODY MASS INDEX: 33.97 KG/M2 | WEIGHT: 279 LBS | SYSTOLIC BLOOD PRESSURE: 100 MMHG | HEART RATE: 73 BPM | DIASTOLIC BLOOD PRESSURE: 58 MMHG | HEIGHT: 76 IN

## 2021-06-08 DIAGNOSIS — I25.5 ISCHEMIC CARDIOMYOPATHY: ICD-10-CM

## 2021-06-08 DIAGNOSIS — E78.2 MIXED HYPERLIPIDEMIA: Chronic | ICD-10-CM

## 2021-06-08 DIAGNOSIS — R53.81 PHYSICAL DECONDITIONING: ICD-10-CM

## 2021-06-08 DIAGNOSIS — Z95.810 AICD (AUTOMATIC CARDIOVERTER/DEFIBRILLATOR) PRESENT: ICD-10-CM

## 2021-06-08 DIAGNOSIS — I25.10 CORONARY ARTERY DISEASE INVOLVING NATIVE CORONARY ARTERY OF NATIVE HEART WITHOUT ANGINA PECTORIS: Primary | ICD-10-CM

## 2021-06-08 DIAGNOSIS — I10 ESSENTIAL HYPERTENSION: Chronic | ICD-10-CM

## 2021-06-08 DIAGNOSIS — I50.22 CHRONIC SYSTOLIC HEART FAILURE (HCC): ICD-10-CM

## 2021-06-08 DIAGNOSIS — R42 DIZZINESS: ICD-10-CM

## 2021-06-08 PROCEDURE — 1123F ACP DISCUSS/DSCN MKR DOCD: CPT | Performed by: CLINICAL NURSE SPECIALIST

## 2021-06-08 PROCEDURE — 4040F PNEUMOC VAC/ADMIN/RCVD: CPT | Performed by: CLINICAL NURSE SPECIALIST

## 2021-06-08 PROCEDURE — G8427 DOCREV CUR MEDS BY ELIG CLIN: HCPCS | Performed by: CLINICAL NURSE SPECIALIST

## 2021-06-08 PROCEDURE — 1036F TOBACCO NON-USER: CPT | Performed by: CLINICAL NURSE SPECIALIST

## 2021-06-08 PROCEDURE — 99214 OFFICE O/P EST MOD 30 MIN: CPT | Performed by: CLINICAL NURSE SPECIALIST

## 2021-06-08 PROCEDURE — G8417 CALC BMI ABV UP PARAM F/U: HCPCS | Performed by: CLINICAL NURSE SPECIALIST

## 2021-06-08 PROCEDURE — 93283 PRGRMG EVAL IMPLANTABLE DFB: CPT | Performed by: CLINICAL NURSE SPECIALIST

## 2021-06-08 RX ORDER — TIZANIDINE 4 MG/1
4 TABLET ORAL EVERY 6 HOURS PRN
Status: ON HOLD | COMMUNITY
End: 2021-10-16

## 2021-06-08 RX ORDER — NAPROXEN 500 MG/1
500 TABLET ORAL PRN
Status: ON HOLD | COMMUNITY
Start: 2021-06-07 | End: 2021-06-24 | Stop reason: HOSPADM

## 2021-06-08 RX ORDER — METOPROLOL SUCCINATE 25 MG/1
25 TABLET, EXTENDED RELEASE ORAL DAILY
Qty: 90 TABLET | Refills: 3 | Status: ON HOLD | OUTPATIENT
Start: 2021-06-08 | End: 2021-06-22

## 2021-06-08 RX ORDER — FINASTERIDE 5 MG/1
5 TABLET, FILM COATED ORAL DAILY
COMMUNITY
Start: 2020-09-03

## 2021-06-08 ASSESSMENT — ENCOUNTER SYMPTOMS
BACK PAIN: 1
ABDOMINAL PAIN: 0
EYE REDNESS: 0
COUGH: 0
FACIAL SWELLING: 0
CHEST TIGHTNESS: 0
VOMITING: 0
SHORTNESS OF BREATH: 1
WHEEZING: 0
NAUSEA: 0

## 2021-06-08 NOTE — PATIENT INSTRUCTIONS
Return in about 1 month (around 7/8/2021) for APRN. Move Isosorbide to bedtime dosing  Use med set up tray  Stop Carvedilol due to dizziness and change to Metoprolol Succinate 25mg daily    OK to take an extra Lasix for weight gain over 3lbs in 24 hours or 5lbs over a week. If weight is not improving by the next day, call the office.    - Weigh daily and report weight gain of 3lbs or more in 24hrs or 5lbs in one week. - Call for increasing shortness of breath or increasing swelling in feet and legs.     (This could mean you are retaining too much fluid)  - 2000mg low sodium diet  - Fluid restriction of 1500ml per day (about 6 cups of fluid per day)    Call with any questionsor concerns

## 2021-06-08 NOTE — PROGRESS NOTES
[facility], [address], [address] [address]  Phone: [phone]  Fax: [phone]    OFFICE VISIT:  2021    Lindsay Shrestha - : [phone]    Reason For Visit:  Elvis Cristina is a 67 y.o. male who is here for Follow-up (Patient states he is having chest pains occasionally), Coronary Artery Disease, and Congestive Heart Failure       Diagnosis Orders   1. Coronary artery disease involving native coronary artery of native heart without angina pectoris     2. Ischemic cardiomyopathy     3. Chronic systolic heart failure ([facility] [address]     4. Type 2 diabetes mellitus with diabetic polyneuropathy, with long-term current use of insulin ([facility] [address]     5. Essential hypertension     6. Mixed hyperlipidemia     7. AICD (automatic cardioverter/defibrillator) present     8. Dizziness     9. Physical deconditioning            stents x 3 [facility])  2011  Cath  Patent stents in the mid LAD, stent to OM1, PTCA to the D2, normal LVFX  12  Cath patent stents in the mid LAD, PTCA to the D3, stent to distal LAD, normal LVFX  3/29/2015  Cath  Stent to the PLOM, normal LVFX  2019  Loop placed  3/1/19 lexiscan negative for myocardial ischemia, EF 50  %   19 ACS PARKER RISK Score 5 (angina, + troponin, CAD>50, age>65, plavix ), AUC indication 3, AUC score 9   19  Cath 50% distal LM, 100% mid LAD, 80% diag, 100% mid RCA, inferior hypokinesis, EF 45%     1.  Coronary artery disease, status post multiple PCI's to LAD 2011, RCA 2012, RPL 2015 presenting 2019 with inferior wall MI with late presentation involving a large dominant RCA with distal occlusion, not intervened on with severe mid to distal LAD restenosis and moderate left main disease, status post CABG 2019 with LIMA to 1st diagonal, SVG to LAD and SVG to ramus intermedius, ejection fraction 25% with severe inferior hypokinesis. Thallium viability study with large inferolateral, predominantly lateral infarct with no significant viability status post ICD placement 11/1/2019. 2. CKD stage III. 3. Diabetes mellitus. 4. Post CABG atrial fibrillation. 5.  Statin intolerance. Patient was asked to come into the office today after a recent remote device check that shows an up trending of his OptiVol. He is noticing increasing shortness of breath, generalized fatigue, weakness and dizziness with position change. He feels his dizziness is worse after taking isosorbide    He denies sudden weight gain, orthopnea, PND, edema. He has some difficulty managing and organizing his medications. He reports he is taking his fluid pill, Lasix twice a day when it is only prescribed once a day. He is quite sedentary at home due to issues with chronic back and leg pain     Jyoce Fitzpatrick MD is PCP.   Sarika Roland has the following history as recorded in [facility]:    Patient Active Problem List    Diagnosis Date Noted    ACS (acute coronary syndrome) ([facility] [address]     History of chronic kidney disease 07/13/2020    Edema 07/13/2020    AICD (automatic cardioverter/defibrillator) present 42/79/9031    Complicated UTI (urinary tract infection) 11/16/2019    Chronic systolic heart failure ([facility] [address] 08/09/2019    Urinary tract infection due to extended-spectrum beta lactamase (ESBL) producing Escherichia coli 06/14/2019    Ischemic cardiomyopathy 06/13/2019    Palliative care patient 06/11/2019    Chronic constipation 06/10/2019    Hx of CABG 06/01/2019    Acute MI inferior lateral first episode care ([facility] [address] 05/31/2019    SOB (shortness of breath) 04/15/2019    Chest pain 02/28/2019    MOO on CPAP 05/03/2018    Dermatitis 03/05/2018    Depression with anxiety 03/05/2018    Benign prostatic hyperplasia without lower urinary tract symptoms 03/05/2018    Chronic tension-type headache, not intractable 03/05/2018    West Nile virus seropositivity 11/22/2017    Status post placement of implantable loop recorder 10/31/2017    Recurrent major depressive disorder, in full remission ([address] 10/17/2017    Trigger middle finger of left hand 10/17/2017    Peripheral polyneuropathy 10/17/2017    Palpitations 10/17/2017    Chronic insomnia 10/17/2017    Cerebrovascular accident (CVA) due to embolism of right middle cerebral artery ([address] 08/14/2017    Dizziness 03/27/2017    Imbalance 03/27/2017    Thoracic outlet syndrome 02/21/2017    Acute ischemic stroke ([address] 02/20/2017    DDD (degenerative disc disease), lumbar 11/15/2016    Spondylosis of lumbar region without myelopathy or radiculopathy 11/15/2016    Chronic bilateral low back pain without sciatica 11/10/2016    Mixed hyperlipidemia 07/23/2012    MI (myocardial infarction) ([address]     Type 2 diabetes mellitus with diabetic polyneuropathy, with long-term current use of insulin ([address] 07/12/2011    Essential hypertension 07/12/2011    Coronary artery disease involving native coronary artery of native heart without angina pectoris 07/12/2011     Past Medical History:   Diagnosis Date    Acute ischemic stroke ([address] 2/20/2017    Atherosclerotic heart disease     s/p MI, stenting x 3 jan [place]    CAD (coronary artery disease)     Cerebral artery occlusion with cerebral infarction ([address]     Cerebrovascular accident (CVA) due to embolism of right middle cerebral artery ([address] 8/14/2017    CHF [facility]congestive heart failure) ([facility]     Chronic bilateral low back pain without sciatica 11/10/2016    Chronic kidney disease     DDD (degenerative disc disease), lumbar 11/15/2016    Depression     Depression with anxiety     Diabetes mellitus ([address]     type II    Diabetes mellitus, type 2 ([address]     DM (diabetes mellitus) ([address] 7/12/2011    ESBL (extended spectrum beta-lactamase) producing bacteria infection 11/16/2019    urine    Glaucoma     Headache     Hyperlipidemia     Cholesterol management per pcp.      Hypertension     Macular degeneration Sister     No Known Problems Son     Alcohol Abuse Son         drank self to death    No Known Problems Son      Social History     Tobacco Use    Smoking status: Never Smoker    Smokeless tobacco: Never Used   Substance Use Topics    Alcohol use: Yes     Alcohol/week: 1.0 standard drinks     Types: 1 Shots of liquor per week     Comment: Occ. Current Outpatient Medications   Medication Sig Dispense Refill    tiZANidine (ZANAFLEX) 4 MG tablet Take 4 mg by mouth every 6 hours as needed      finasteride (PROSCAR) 5 MG tablet Take 5 mg by mouth daily      naproxen (NAPROSYN) 500 MG tablet Take 500 mg by mouth as needed      metoprolol succinate (TOPROL XL) 25 MG extended release tablet Take 1 tablet by mouth daily 90 tablet 3    docusate sodium (COLACE) 100 MG capsule Take 200 mg by mouth daily      famotidine (PEPCID) 40 MG tablet Take 40 mg by mouth daily      furosemide (LASIX) 40 MG tablet Take 40 mg by mouth daily Currently taking 2 daily      Multiple Vitamins-Minerals (PRESERVISION AREDS PO) Take 2 capsules by mouth      oxyCODONE-acetaminophen (PERCOCET)  MG per tablet Take 1 tablet by mouth every 4 hours as needed for Pain.  isosorbide mononitrate (IMDUR) 30 MG extended release tablet TAKE 1 TABLET BY MOUTH EVERY DAY 90 tablet 1    insulin glargine (LANTUS SOLOSTAR) 100 UNIT/ML injection pen Inject 80 Units into the skin nightly 24 pen 3    sertraline (ZOLOFT) 100 MG tablet Take 1 tablet by mouth daily 90 tablet 2    nitroGLYCERIN (NITROSTAT) 0.4 MG SL tablet up to max of 3 total doses.  If no relief after 1 dose, call 911. 30 tablet 2    clopidogrel (PLAVIX) 75 MG tablet TAKE 1 TABLET DAILY 90 tablet 2    sacubitril-valsartan (ENTRESTO) 49-51 MG per tablet Take 1 tablet by mouth 2 times daily 60 tablet 5    butalbital-acetaminophen-caffeine (FIORICET, ESGIC) -40 MG per tablet Take 1 tablet by mouth as needed for Headaches (Patient taking differently: Take 1 tablet by mouth every 6 hours as needed for Headaches ) 3 tablet 0    fluticasone (FLONASE) 50 MCG/ACT nasal spray 1 spray by Each Nostril route daily 1 Bottle 0    zoster recombinant adjuvanted vaccine (SHINGRIX) 50 MCG/0.5ML SUSR injection 1 vaccine now and repeat in 2-6 months. 0.5 mL 1     No current facility-administered medications for this visit. Allergies: Statins, Crestor [rosuvastatin calcium], Adhesive tape, and Morphine    Review of Systems  Review of Systems   Constitutional: Positive for fatigue. Negative for activity change, diaphoresis, fever and unexpected weight change. HENT: Negative for facial swelling and nosebleeds. Eyes: Negative for redness and visual disturbance. Respiratory: Positive for shortness of breath. Negative for cough, chest tightness and wheezing. Cardiovascular: Negative for chest pain, palpitations and leg swelling. Gastrointestinal: Negative for abdominal pain, nausea and vomiting. Endocrine: Negative for cold intolerance and heat intolerance. Genitourinary: Negative for dysuria and hematuria. Musculoskeletal: Positive for back pain (chronic). Negative for arthralgias and myalgias. Skin: Negative for pallor and rash. Neurological: Positive for dizziness (with position change). Negative for seizures, syncope, weakness and light-headedness. Hematological: Does not bruise/bleed easily. Psychiatric/Behavioral: Negative for agitation. The patient is not nervous/anxious. Objective  Vital Signs - BP (!) 100/58   Pulse 73   Ht 6' 4\" (1.93 m)   Wt 279 lb (126.6 kg)   BMI 33.96 kg/m²    Wt Readings from Last 3 Encounters:   06/08/21 279 lb (126.6 kg)   02/24/21 280 lb (127 kg)   09/25/20 285 lb (129.3 kg)      Physical Exam  Vitals and nursing note reviewed. Constitutional:       General: He is not in acute distress. Appearance: He is well-developed. He is not diaphoretic. Comments: Deconditioned   HENT:      Head: Normocephalic and atraumatic. Right Ear: Hearing and external ear normal.      Left Ear: Hearing and external ear normal.      Nose: Nose normal.   Eyes:      General:         Right eye: No discharge. Left eye: No discharge. Pupils: Pupils are equal, round, and reactive to light. Neck:      Thyroid: No thyromegaly. Vascular: No carotid bruit or JVD. Trachea: No tracheal deviation. Cardiovascular:      Rate and Rhythm: Normal rate and regular rhythm. Heart sounds: Normal heart sounds. No murmur heard. No friction rub. No gallop. Pulmonary:      Effort: Pulmonary effort is normal. No respiratory distress. Breath sounds: Normal breath sounds. No wheezing or rales. Abdominal:      Palpations: Abdomen is soft. Tenderness: There is no abdominal tenderness. Musculoskeletal:         General: No swelling or deformity. Cervical back: Neck supple. No muscular tenderness. Right lower leg: Edema (trace) present. Left lower leg: No edema (trace). Skin:     General: Skin is warm and dry. Findings: No rash. Neurological:      General: No focal deficit present. Mental Status: He is alert and oriented to person, place, and time. Cranial Nerves: No cranial nerve deficit.    Psychiatric:         Mood and Affect: Mood normal.         Behavior: Behavior normal.         Judgment: Judgment normal.         Data:  Lab Results   Component Value Date    WBC 5.8 09/25/2020    RBC 4.94 09/25/2020    HGB 13.4 09/25/2020    HCT 41.3 09/25/2020    HCT 41.6 05/25/2011     09/25/2020     05/25/2011      Lab Results   Component Value Date    CHOL 157 05/25/2019    TRIG 103 05/25/2019    HDL 20 05/25/2019    LDLCALC 116 05/25/2019     Lab Results   Component Value Date     09/25/2020     05/25/2011    K 4.2 09/25/2020    K 4.0 05/25/2011     09/25/2020     05/25/2011    CO2 25 09/25/2020    GLUCOSE 118 09/25/2020    BUN 26 09/25/2020    CREATININE 1.4

## 2021-06-22 ENCOUNTER — NURSE TRIAGE (OUTPATIENT)
Dept: CALL CENTER | Facility: HOSPITAL | Age: 76
End: 2021-06-22

## 2021-06-22 ENCOUNTER — HOSPITAL ENCOUNTER (INPATIENT)
Age: 76
LOS: 2 days | Discharge: HOME HEALTH | DRG: 149 | End: 2021-06-24
Attending: EMERGENCY MEDICINE | Admitting: HOSPITALIST
Payer: MEDICARE

## 2021-06-22 ENCOUNTER — APPOINTMENT (OUTPATIENT)
Dept: CT IMAGING | Age: 76
DRG: 149 | End: 2021-06-22
Payer: MEDICARE

## 2021-06-22 ENCOUNTER — APPOINTMENT (OUTPATIENT)
Dept: GENERAL RADIOLOGY | Age: 76
DRG: 149 | End: 2021-06-22
Payer: MEDICARE

## 2021-06-22 DIAGNOSIS — F41.1 GENERALIZED ANXIETY DISORDER: ICD-10-CM

## 2021-06-22 DIAGNOSIS — R93.89 ABNORMAL CT SCAN: ICD-10-CM

## 2021-06-22 DIAGNOSIS — R42 DIZZINESS: Primary | ICD-10-CM

## 2021-06-22 DIAGNOSIS — I63.9 CEREBROVASCULAR ACCIDENT (CVA), UNSPECIFIED MECHANISM (HCC): ICD-10-CM

## 2021-06-22 DIAGNOSIS — H53.2 DOUBLE VISION: ICD-10-CM

## 2021-06-22 DIAGNOSIS — R11.10 INTRACTABLE VOMITING, PRESENCE OF NAUSEA NOT SPECIFIED, UNSPECIFIED VOMITING TYPE: ICD-10-CM

## 2021-06-22 PROBLEM — R29.90 STROKE-LIKE SYMPTOMS: Status: ACTIVE | Noted: 2021-06-22

## 2021-06-22 PROBLEM — H93.3X9: Status: ACTIVE | Noted: 2021-06-22

## 2021-06-22 PROBLEM — R11.2 NAUSEA & VOMITING: Status: ACTIVE | Noted: 2021-06-22

## 2021-06-22 LAB
ALBUMIN SERPL-MCNC: 3.9 G/DL (ref 3.5–5.2)
ALP BLD-CCNC: 81 U/L (ref 40–130)
ALT SERPL-CCNC: 13 U/L (ref 5–41)
ANION GAP SERPL CALCULATED.3IONS-SCNC: 8 MMOL/L (ref 7–19)
AST SERPL-CCNC: 15 U/L (ref 5–40)
BACTERIA: NEGATIVE /HPF
BILIRUB SERPL-MCNC: 0.5 MG/DL (ref 0.2–1.2)
BILIRUBIN URINE: NEGATIVE
BLOOD, URINE: NEGATIVE
BUN BLDV-MCNC: 15 MG/DL (ref 8–23)
CALCIUM SERPL-MCNC: 8.9 MG/DL (ref 8.8–10.2)
CHLORIDE BLD-SCNC: 104 MMOL/L (ref 98–111)
CLARITY: CLEAR
CO2: 26 MMOL/L (ref 22–29)
COLOR: YELLOW
CREAT SERPL-MCNC: 1.1 MG/DL (ref 0.5–1.2)
CRYSTALS, UA: ABNORMAL /HPF
EPITHELIAL CELLS, UA: 0 /HPF (ref 0–5)
GFR AFRICAN AMERICAN: >59
GFR NON-AFRICAN AMERICAN: >60
GLUCOSE BLD-MCNC: 142 MG/DL (ref 70–99)
GLUCOSE BLD-MCNC: 175 MG/DL (ref 74–109)
GLUCOSE URINE: NEGATIVE MG/DL
HCT VFR BLD CALC: 44.9 % (ref 42–52)
HEMOGLOBIN: 14.5 G/DL (ref 14–18)
HYALINE CASTS: 1 /HPF (ref 0–8)
INR BLD: 1.06 (ref 0.88–1.18)
KETONES, URINE: ABNORMAL MG/DL
LEUKOCYTE ESTERASE, URINE: NEGATIVE
LIPASE: 16 U/L (ref 13–60)
MCH RBC QN AUTO: 28.4 PG (ref 27–31)
MCHC RBC AUTO-ENTMCNC: 32.3 G/DL (ref 33–37)
MCV RBC AUTO: 88 FL (ref 80–94)
NITRITE, URINE: NEGATIVE
PDW BLD-RTO: 14.3 % (ref 11.5–14.5)
PERFORMED ON: ABNORMAL
PH UA: 7.5 (ref 5–8)
PLATELET # BLD: 131 K/UL (ref 130–400)
PMV BLD AUTO: 14.3 FL (ref 9.4–12.4)
POTASSIUM REFLEX MAGNESIUM: 4.1 MMOL/L (ref 3.5–5)
PROTEIN UA: 30 MG/DL
PROTHROMBIN TIME: 13.7 SEC (ref 12–14.6)
RBC # BLD: 5.1 M/UL (ref 4.7–6.1)
RBC UA: 1 /HPF (ref 0–4)
SARS-COV-2, NAAT: NOT DETECTED
SEDIMENTATION RATE, ERYTHROCYTE: 15 MM/HR (ref 0–15)
SODIUM BLD-SCNC: 138 MMOL/L (ref 136–145)
SPECIFIC GRAVITY UA: >=1.045 (ref 1–1.03)
TOTAL PROTEIN: 7 G/DL (ref 6.6–8.7)
TROPONIN: <0.01 NG/ML (ref 0–0.03)
TSH REFLEX FT4: 2.76 UIU/ML (ref 0.35–5.5)
UROBILINOGEN, URINE: 1 E.U./DL
VITAMIN B-12: 278 PG/ML (ref 211–946)
VITAMIN D 25-HYDROXY: 17.8 NG/ML
WBC # BLD: 6.4 K/UL (ref 4.8–10.8)
WBC UA: 0 /HPF (ref 0–5)

## 2021-06-22 PROCEDURE — 6360000002 HC RX W HCPCS: Performed by: EMERGENCY MEDICINE

## 2021-06-22 PROCEDURE — 71045 X-RAY EXAM CHEST 1 VIEW: CPT

## 2021-06-22 PROCEDURE — 83690 ASSAY OF LIPASE: CPT

## 2021-06-22 PROCEDURE — 93005 ELECTROCARDIOGRAM TRACING: CPT | Performed by: EMERGENCY MEDICINE

## 2021-06-22 PROCEDURE — 6360000004 HC RX CONTRAST MEDICATION: Performed by: EMERGENCY MEDICINE

## 2021-06-22 PROCEDURE — 85652 RBC SED RATE AUTOMATED: CPT

## 2021-06-22 PROCEDURE — 80053 COMPREHEN METABOLIC PANEL: CPT

## 2021-06-22 PROCEDURE — 99223 1ST HOSP IP/OBS HIGH 75: CPT | Performed by: PSYCHIATRY & NEUROLOGY

## 2021-06-22 PROCEDURE — 84443 ASSAY THYROID STIM HORMONE: CPT

## 2021-06-22 PROCEDURE — 84484 ASSAY OF TROPONIN QUANT: CPT

## 2021-06-22 PROCEDURE — 81001 URINALYSIS AUTO W/SCOPE: CPT

## 2021-06-22 PROCEDURE — 70496 CT ANGIOGRAPHY HEAD: CPT

## 2021-06-22 PROCEDURE — 82306 VITAMIN D 25 HYDROXY: CPT

## 2021-06-22 PROCEDURE — 74177 CT ABD & PELVIS W/CONTRAST: CPT

## 2021-06-22 PROCEDURE — 87635 SARS-COV-2 COVID-19 AMP PRB: CPT

## 2021-06-22 PROCEDURE — 6370000000 HC RX 637 (ALT 250 FOR IP): Performed by: HOSPITALIST

## 2021-06-22 PROCEDURE — 70498 CT ANGIOGRAPHY NECK: CPT

## 2021-06-22 PROCEDURE — 1210000000 HC MED SURG R&B

## 2021-06-22 PROCEDURE — 85027 COMPLETE CBC AUTOMATED: CPT

## 2021-06-22 PROCEDURE — 96374 THER/PROPH/DIAG INJ IV PUSH: CPT

## 2021-06-22 PROCEDURE — 82947 ASSAY GLUCOSE BLOOD QUANT: CPT

## 2021-06-22 PROCEDURE — 70450 CT HEAD/BRAIN W/O DYE: CPT

## 2021-06-22 PROCEDURE — 85610 PROTHROMBIN TIME: CPT

## 2021-06-22 PROCEDURE — 36415 COLL VENOUS BLD VENIPUNCTURE: CPT

## 2021-06-22 PROCEDURE — 99284 EMERGENCY DEPT VISIT MOD MDM: CPT

## 2021-06-22 PROCEDURE — 82607 VITAMIN B-12: CPT

## 2021-06-22 PROCEDURE — 2580000003 HC RX 258: Performed by: HOSPITALIST

## 2021-06-22 PROCEDURE — 6360000002 HC RX W HCPCS: Performed by: HOSPITALIST

## 2021-06-22 RX ORDER — MECLIZINE HCL 12.5 MG/1
25 TABLET ORAL 3 TIMES DAILY PRN
Status: DISCONTINUED | OUTPATIENT
Start: 2021-06-22 | End: 2021-06-24 | Stop reason: HOSPADM

## 2021-06-22 RX ORDER — ONDANSETRON 2 MG/ML
4 INJECTION INTRAMUSCULAR; INTRAVENOUS EVERY 6 HOURS PRN
Status: DISCONTINUED | OUTPATIENT
Start: 2021-06-22 | End: 2021-06-24 | Stop reason: HOSPADM

## 2021-06-22 RX ORDER — METHYLPREDNISOLONE 4 MG/1
4 TABLET ORAL
Status: DISCONTINUED | OUTPATIENT
Start: 2021-06-23 | End: 2021-06-24 | Stop reason: HOSPADM

## 2021-06-22 RX ORDER — CLOPIDOGREL BISULFATE 75 MG/1
75 TABLET ORAL DAILY
Status: DISCONTINUED | OUTPATIENT
Start: 2021-06-22 | End: 2021-06-24 | Stop reason: HOSPADM

## 2021-06-22 RX ORDER — DIAZEPAM 2 MG/1
2 TABLET ORAL EVERY 8 HOURS PRN
Status: DISCONTINUED | OUTPATIENT
Start: 2021-06-22 | End: 2021-06-23

## 2021-06-22 RX ORDER — ACETAMINOPHEN 650 MG/1
650 SUPPOSITORY RECTAL EVERY 4 HOURS PRN
Status: DISCONTINUED | OUTPATIENT
Start: 2021-06-22 | End: 2021-06-24 | Stop reason: HOSPADM

## 2021-06-22 RX ORDER — DOCUSATE SODIUM 100 MG/1
200 CAPSULE, LIQUID FILLED ORAL DAILY
Status: DISCONTINUED | OUTPATIENT
Start: 2021-06-22 | End: 2021-06-24 | Stop reason: HOSPADM

## 2021-06-22 RX ORDER — ASPIRIN 81 MG/1
81 TABLET ORAL DAILY
Status: DISCONTINUED | OUTPATIENT
Start: 2021-06-22 | End: 2021-06-24 | Stop reason: HOSPADM

## 2021-06-22 RX ORDER — METHYLPREDNISOLONE 4 MG/1
4 TABLET ORAL NIGHTLY
Status: DISCONTINUED | OUTPATIENT
Start: 2021-06-24 | End: 2021-06-24 | Stop reason: HOSPADM

## 2021-06-22 RX ORDER — ONDANSETRON 4 MG/1
4 TABLET, ORALLY DISINTEGRATING ORAL EVERY 8 HOURS PRN
Status: DISCONTINUED | OUTPATIENT
Start: 2021-06-22 | End: 2021-06-24 | Stop reason: HOSPADM

## 2021-06-22 RX ORDER — FLUTICASONE PROPIONATE 50 MCG
1 SPRAY, SUSPENSION (ML) NASAL DAILY
Status: DISCONTINUED | OUTPATIENT
Start: 2021-06-22 | End: 2021-06-24 | Stop reason: HOSPADM

## 2021-06-22 RX ORDER — SERTRALINE HYDROCHLORIDE 100 MG/1
100 TABLET, FILM COATED ORAL DAILY
Status: DISCONTINUED | OUTPATIENT
Start: 2021-06-22 | End: 2021-06-24 | Stop reason: HOSPADM

## 2021-06-22 RX ORDER — ATORVASTATIN CALCIUM 40 MG/1
20 TABLET, FILM COATED ORAL NIGHTLY
Status: DISCONTINUED | OUTPATIENT
Start: 2021-06-22 | End: 2021-06-24 | Stop reason: HOSPADM

## 2021-06-22 RX ORDER — ONDANSETRON 2 MG/ML
4 INJECTION INTRAMUSCULAR; INTRAVENOUS ONCE
Status: COMPLETED | OUTPATIENT
Start: 2021-06-22 | End: 2021-06-22

## 2021-06-22 RX ORDER — FAMOTIDINE 20 MG/1
40 TABLET, FILM COATED ORAL DAILY
Status: DISCONTINUED | OUTPATIENT
Start: 2021-06-22 | End: 2021-06-24 | Stop reason: HOSPADM

## 2021-06-22 RX ORDER — ISOSORBIDE MONONITRATE 30 MG/1
30 TABLET, EXTENDED RELEASE ORAL DAILY
Status: DISCONTINUED | OUTPATIENT
Start: 2021-06-22 | End: 2021-06-24 | Stop reason: HOSPADM

## 2021-06-22 RX ORDER — METOPROLOL SUCCINATE 25 MG/1
25 TABLET, EXTENDED RELEASE ORAL DAILY
Status: DISCONTINUED | OUTPATIENT
Start: 2021-06-22 | End: 2021-06-24 | Stop reason: HOSPADM

## 2021-06-22 RX ORDER — NITROGLYCERIN 0.4 MG/1
0.4 TABLET SUBLINGUAL EVERY 5 MIN PRN
Status: DISCONTINUED | OUTPATIENT
Start: 2021-06-22 | End: 2021-06-24 | Stop reason: HOSPADM

## 2021-06-22 RX ORDER — INSULIN GLARGINE 100 [IU]/ML
80 INJECTION, SOLUTION SUBCUTANEOUS NIGHTLY
Status: DISCONTINUED | OUTPATIENT
Start: 2021-06-22 | End: 2021-06-24 | Stop reason: HOSPADM

## 2021-06-22 RX ORDER — OXYCODONE AND ACETAMINOPHEN 10; 325 MG/1; MG/1
1 TABLET ORAL EVERY 6 HOURS PRN
Status: DISCONTINUED | OUTPATIENT
Start: 2021-06-22 | End: 2021-06-24 | Stop reason: HOSPADM

## 2021-06-22 RX ORDER — TIZANIDINE 4 MG/1
4 TABLET ORAL EVERY 6 HOURS PRN
Status: DISCONTINUED | OUTPATIENT
Start: 2021-06-22 | End: 2021-06-24 | Stop reason: HOSPADM

## 2021-06-22 RX ORDER — BUTALBITAL, ACETAMINOPHEN AND CAFFEINE 50; 325; 40 MG/1; MG/1; MG/1
1 TABLET ORAL EVERY 6 HOURS PRN
Status: DISCONTINUED | OUTPATIENT
Start: 2021-06-22 | End: 2021-06-24 | Stop reason: HOSPADM

## 2021-06-22 RX ORDER — SODIUM CHLORIDE 0.9 % (FLUSH) 0.9 %
10 SYRINGE (ML) INJECTION EVERY 12 HOURS SCHEDULED
Status: DISCONTINUED | OUTPATIENT
Start: 2021-06-22 | End: 2021-06-24 | Stop reason: HOSPADM

## 2021-06-22 RX ORDER — ACETAMINOPHEN 325 MG/1
650 TABLET ORAL EVERY 4 HOURS PRN
Status: DISCONTINUED | OUTPATIENT
Start: 2021-06-22 | End: 2021-06-24 | Stop reason: HOSPADM

## 2021-06-22 RX ORDER — SODIUM CHLORIDE 9 MG/ML
25 INJECTION, SOLUTION INTRAVENOUS PRN
Status: DISCONTINUED | OUTPATIENT
Start: 2021-06-22 | End: 2021-06-24 | Stop reason: HOSPADM

## 2021-06-22 RX ORDER — METHYLPREDNISOLONE 4 MG/1
24 TABLET ORAL ONCE
Status: COMPLETED | OUTPATIENT
Start: 2021-06-22 | End: 2021-06-22

## 2021-06-22 RX ORDER — SODIUM CHLORIDE 0.9 % (FLUSH) 0.9 %
10 SYRINGE (ML) INJECTION PRN
Status: DISCONTINUED | OUTPATIENT
Start: 2021-06-22 | End: 2021-06-24 | Stop reason: HOSPADM

## 2021-06-22 RX ORDER — ASPIRIN 81 MG/1
162 TABLET ORAL ONCE
Status: COMPLETED | OUTPATIENT
Start: 2021-06-22 | End: 2021-06-22

## 2021-06-22 RX ORDER — METHYLPREDNISOLONE 4 MG/1
8 TABLET ORAL NIGHTLY
Status: COMPLETED | OUTPATIENT
Start: 2021-06-23 | End: 2021-06-23

## 2021-06-22 RX ORDER — FINASTERIDE 5 MG/1
5 TABLET, FILM COATED ORAL DAILY
Status: DISCONTINUED | OUTPATIENT
Start: 2021-06-22 | End: 2021-06-24 | Stop reason: HOSPADM

## 2021-06-22 RX ORDER — POLYETHYLENE GLYCOL 3350 17 G/17G
17 POWDER, FOR SOLUTION ORAL DAILY PRN
Status: DISCONTINUED | OUTPATIENT
Start: 2021-06-22 | End: 2021-06-24 | Stop reason: HOSPADM

## 2021-06-22 RX ORDER — ASPIRIN 300 MG/1
300 SUPPOSITORY RECTAL DAILY
Status: DISCONTINUED | OUTPATIENT
Start: 2021-06-22 | End: 2021-06-24 | Stop reason: HOSPADM

## 2021-06-22 RX ADMIN — IOPAMIDOL 90 ML: 755 INJECTION, SOLUTION INTRAVENOUS at 15:26

## 2021-06-22 RX ADMIN — ASPIRIN 162 MG: 81 TABLET, COATED ORAL at 23:12

## 2021-06-22 RX ADMIN — CLOPIDOGREL BISULFATE 75 MG: 75 TABLET ORAL at 20:49

## 2021-06-22 RX ADMIN — SERTRALINE 100 MG: 100 TABLET, FILM COATED ORAL at 20:49

## 2021-06-22 RX ADMIN — FLUTICASONE PROPIONATE 1 SPRAY: 50 SPRAY, METERED NASAL at 23:12

## 2021-06-22 RX ADMIN — ISOSORBIDE MONONITRATE 30 MG: 30 TABLET, EXTENDED RELEASE ORAL at 20:48

## 2021-06-22 RX ADMIN — METOPROLOL SUCCINATE 25 MG: 25 TABLET, EXTENDED RELEASE ORAL at 20:48

## 2021-06-22 RX ADMIN — METHYLPREDNISOLONE 24 MG: 4 TABLET ORAL at 20:47

## 2021-06-22 RX ADMIN — SODIUM CHLORIDE, PRESERVATIVE FREE 10 ML: 5 INJECTION INTRAVENOUS at 21:05

## 2021-06-22 RX ADMIN — ONDANSETRON 4 MG: 2 INJECTION INTRAMUSCULAR; INTRAVENOUS at 14:46

## 2021-06-22 RX ADMIN — DOCUSATE SODIUM 200 MG: 100 CAPSULE, LIQUID FILLED ORAL at 20:49

## 2021-06-22 RX ADMIN — ONDANSETRON 4 MG: 2 INJECTION INTRAMUSCULAR; INTRAVENOUS at 21:04

## 2021-06-22 RX ADMIN — FAMOTIDINE 40 MG: 20 TABLET, FILM COATED ORAL at 20:48

## 2021-06-22 ASSESSMENT — PAIN SCALES - GENERAL: PAINLEVEL_OUTOF10: 7

## 2021-06-22 ASSESSMENT — VISUAL ACUITY
OD: 20/40
OS: 20/30
OU: 20/30

## 2021-06-22 ASSESSMENT — ENCOUNTER SYMPTOMS
BLOOD IN STOOL: 0
EYE PAIN: 0
BACK PAIN: 0
SINUS PAIN: 0
NAUSEA: 1
ABDOMINAL PAIN: 1
FACIAL SWELLING: 0
VOMITING: 1
DIARRHEA: 0
SHORTNESS OF BREATH: 0
SINUS PRESSURE: 0

## 2021-06-22 ASSESSMENT — PAIN DESCRIPTION - LOCATION
LOCATION: HEAD
LOCATION: GENERALIZED

## 2021-06-22 ASSESSMENT — PAIN DESCRIPTION - PAIN TYPE
TYPE: ACUTE PAIN
TYPE: ACUTE PAIN

## 2021-06-22 NOTE — TELEPHONE ENCOUNTER
Son states he has spoken with office but when he hung up was afraid his dad could not sit up in wheel chair intially but he is now able to sit up.  Explained office will be open 1pm and I will call ahead to let them know.  Spoke with Kamini at office, states they have no openings today and he will have to go to ER. I reached the family and directed them to the ER at [facility].       Reason for Disposition  • Earache    Additional Information  • Negative: [1] Weakness (i.e., paralysis, loss of muscle strength) of the face, arm or leg on one side of the body AND [2] sudden onset AND [3] present now  • Negative: [1] Numbness (i.e., loss of sensation) of the face, arm or leg on one side of the body AND [2] sudden onset AND [3] present now  • Negative: [1] Loss of speech or garbled speech AND [2] sudden onset AND [3] present now  • Negative: Difficult to awaken or acting confused (e.g., disoriented, slurred speech)  • Negative: Sounds like a life-threatening emergency to the triager  • Negative: Followed a head injury  • Negative: Followed an ear injury  • Negative: Localized weakness or numbness is main symptom  • Negative: Dizziness relates to riding in a car, going to an amusement park, etc.  • Negative: [1] Dizziness is main symptom AND [2] NO spinning sensation (i.e., vertigo)  • Negative: SEVERE dizziness (vertigo) (e.g., unable to walk without assistance)  • Negative: [1] Dizziness (vertigo) present now AND [2] one or more stroke risk factors (i.e., hypertension, diabetes, prior stroke/TIA, heart attack)  (Exception: prior physician evaluation for this AND no different/worse than usual)  • Negative: [1] Dizziness (vertigo) present now AND [2] age > 60  (Exception: prior physician evaluation for this AND no different/worse than usual)  • Negative: Severe headache (e.g., excruciating)  (Exception: similar to previous migraines)  • Negative: Patient sounds very sick or weak to the triager  • Negative: Taking a  "medicine that could cause dizziness (e.g., phenytoin [Dilantin], carbamazepine [Tegretol], primidone [Mysoline])  • Negative: [1] MODERATE dizziness (e.g., vertigo; feels very unsteady, interferes with normal activities) AND [2] has NOT been evaluated by physician for this    Answer Assessment - Initial Assessment Questions  1. DESCRIPTION: \"Describe your dizziness.\"     Room spinning  2. VERTIGO: \"Do you feel like either you or the room is spinning or tilting?\"       Spinning  3. LIGHTHEADED: \"Do you feel lightheaded?\" (e.g., somewhat faint, woozy, weak upon standing)      Was unable to sit up earlier but can now  4. SEVERITY: \"How bad is it?\"  \"Can you walk?\"    - MILD - Feels unsteady but walking normally.    - MODERATE - Feels very unsteady when walking, but not falling; interferes with normal activities (e.g., school, work) .    - SEVERE - Unable to walk without falling (requires assistance).      Severe  5. ONSET:  \"When did the dizziness begin?\"      Not this bad  6. AGGRAVATING FACTORS: \"Does anything make it worse?\" (e.g., standing, change in head position)      Sitting up  7. CAUSE: \"What do you think is causing the dizziness?\"      unknown  8. RECURRENT SYMPTOM: \"Have you had dizziness before?\" If so, ask: \"When was the last time?\" \"What happened that time?\"      no  9. OTHER SYMPTOMS: \"Do you have any other symptoms?\" (e.g., headache, weakness, numbness, vomiting, earache)      Vomiting. Ears hurt, right side worst.  No ringing in ears.   10. PREGNANCY: \"Is there any chance you are pregnant?\" \"When was your last menstrual period?\"        NA    Protocols used: DIZZINESS - VERTIGO-ADULT-AH      "

## 2021-06-22 NOTE — ED PROVIDER NOTES
urinating and dysuria. Musculoskeletal: Negative for back pain, neck pain and neck stiffness. Skin: Negative for rash. Neurological: Positive for dizziness and headaches. Negative for weakness and numbness. All other systems reviewed and are negative. A complete review of systems was performed and is negative except as noted above in the HPI. PAST MEDICAL HISTORY     Past Medical History:   Diagnosis Date    Acute ischemic stroke ([address] 2/20/2017    Atherosclerotic heart disease     s/p MI, stenting x 3 jan 2011([place])    CAD (coronary artery disease)     Cerebral artery occlusion with cerebral infarction [facility])     Cerebrovascular accident (CVA) due to embolism of right middle cerebral artery ([address] 8/14/2017    CH[facility]congestive heart failure) [facility])     Chronic bilateral low back pain without sciatica 11/10/2016    Chronic kidney disease     DDD (degenerative disc disease), lumbar 11/15/2016    Depression     Depression with anxiety     Diabetes mellitus ([address]     type II    Diabetes mellitus, type 2 ([address]     DM (diabetes mellitus) ([address] 7/12/2011    ESBL (extended spectrum beta-lactamase) producing bacteria infection 11/16/2019    urine    Glaucoma     Headache     Hyperlipidemia     Cholesterol management per pcp.      Hypertension     Macular degeneration     MI (myocardial infarction) ([address]     MI (myocardial infarction) ([address]     Neuromuscular disorder ([address]     Neuropathy     MOO on CPAP     Osteoarthritis     Palliative care patient 06/11/2019    Sleep apnea     Spondylosis of lumbar region without myelopathy or radiculopathy 11/15/2016    Status post placement of implantable loop recorder 10/31/2017    TIA (transient ischemic attack)          SURGICAL HISTORY       Past Surgical History:   Procedure Laterality Date    APPENDECTOMY      at age 4    BACK SURGERY      2016 (fusion), 2017    BREAST SURGERY  05/2019    CARDIAC CATHETERIZATION  12/12/12    with Comment: 3 sons   Other Topics Concern    None   Social History Narrative    CODE STATUS: Full Code    HEALTH CARE PROXY: his wife, Mrs. Marky Menendez, [phone]    AMBULATES: independently    DOMICILED: private home, stairs inside home, has a few steps to enter home, lives alone with wife, no pets     Social Determinants of Health     Financial Resource Strain:     Difficulty of Paying Living Expenses:    Food Insecurity:     Worried About [address] in the Last Year:     [address] [employer] in the Last Year:    Transportation Needs:     Lack of Transportation (Medical):  Lack of Transportation (Non-Medical):    Physical Activity:     Days of Exercise per Week:     Minutes of Exercise per Session:    Stress:     Feeling of Stress :    Social Connections:     Frequency of Communication with Friends and Family:     Frequency of Social Gatherings with Friends and Family:     Attends Baptism Services:     Active Member of Clubs or Organizations:     Attends Club or Organization Meetings:     Marital Status:    Intimate Partner Violence:     Fear of Current or Ex-Partner:     Emotionally Abused:     Physically Abused:     Sexually Abused:        SCREENINGS             PHYSICAL EXAM    (up to 7 for level 4, 8 or more for level 5)     ED Triage Vitals [06/22/21 1349]   BP Temp Temp src Pulse Resp SpO2 Height Weight   (!) 151/87 98.2 °F (36.8 °C) -- 72 18 94 % -- --       Physical Exam  Vitals reviewed. Constitutional:       General: He is not in acute distress. Appearance: He is well-developed. HENT:      Head: Normocephalic and atraumatic. Comments: No sinus tenderness. No temporal artery tenderness     Right Ear: Tympanic membrane, ear canal and external ear normal.      Left Ear: Tympanic membrane, ear canal and external ear normal.      Mouth/Throat:      Mouth: Mucous membranes are moist.      Pharynx: Oropharynx is clear.  No oropharyngeal exudate or posterior oropharyngeal erythema. Eyes:      General: No visual field deficit or scleral icterus. Extraocular Movements: Extraocular movements intact. Conjunctiva/sclera: Conjunctivae normal.      Pupils: Pupils are equal, round, and reactive to light. Visual Fields: Right eye visual fields normal and left eye visual fields normal.      Comments: Patient states he is double both eyes open but vision normal of either eye closed. Eyes tracking together and extraocular movements intact. Hard to assess nystagmus as patient not wanting to keep his eyes open. Said he feels less nauseated and dizzy if his eyes are closed. Neck:      Vascular: No JVD. Cardiovascular:      Rate and Rhythm: Normal rate and regular rhythm. Pulses: Normal pulses. Heart sounds: Normal heart sounds. No murmur heard. Pulmonary:      Effort: Pulmonary effort is normal. No respiratory distress. Breath sounds: Normal breath sounds. Abdominal:      General: There is no distension. Palpations: Abdomen is soft. There is no pulsatile mass. Tenderness: There is generalized abdominal tenderness. There is no guarding or rebound. Musculoskeletal:         General: No swelling or tenderness. Normal range of motion. Cervical back: Normal range of motion and neck supple. Skin:     General: Skin is warm and dry. Capillary Refill: Capillary refill takes less than 2 seconds. Neurological:      Mental Status: He is alert and oriented to person, place, and time. GCS: GCS eye subscore is 4. GCS verbal subscore is 5. GCS motor subscore is 6. Cranial Nerves: No dysarthria or facial asymmetry. Sensory: Sensation is intact. Motor: Motor function is intact. No weakness or pronator drift. Coordination: Coordination abnormal. Finger-Nose-Finger Test abnormal (If both eyes open. Normal finger-to-nose with either eye closed).       Comments: Double vision with both eyes open which resolved with her eyes closed. Psychiatric:         Mood and Affect: Mood normal.         Behavior: Behavior normal.         DIAGNOSTIC RESULTS     EKG: All EKG's are interpreted by the Emergency Department Physician who either signs or Co-signs this chart in the absence of a cardiologist.    Normal sinus rhythm. Normal QT. Nonspecific IVCD. RADIOLOGY:   Non-plain film images such as CT, Ultrasound and MRI are read by the radiologist. Greg Workman images are visualized and preliminarily interpreted by the emergency physician with the below findings:    Interpretation per the Radiologist below, if available at the time of this note:    XR CHEST PORTABLE   Final Result   1. Moderate cardiomegaly no evidence pulmonary vascular congestion. .   Signed by Dr Gurvinder Calderon Additional Contrast? None   Final Result   No acute abnormality in the abdomen or pelvis to explain   patient's symptoms. A lobulated low-density mass in the body of the pancreas may represent   a cyst, a pseudocyst or a cystic neoplasm. Pancreatic duct is   nondilated. Atrophic pancreas. Splenomegaly. Moderately enlarged prostate. Moderate thickening and enhancement of the wall and mucosa of the   ascending colon is partly due to under distention. Possibility of   acute colitis is not excluded. Appendix is not visualized. There are no finding to suggest   appendicitis. Hardware fusion of the lower lumbar spine. Signed by Dr Ivonne Caldwell   Final Result   NASCET criteria was used to evaluate the cervical carotid stenoses   2. 19% stenosis involving the right internal carotid artery. 3. There is no significant stenosis associated with the left internal   carotid artery. 4. The left vertebral is dominant. Flow is demonstrated in both   vertebrals.    5. Advanced degenerative spondylosis is noted throughout the cervical   spine   Signed by Dr Inez Bocanegra CONTRAST   Final Result   Impression:    1. Negative CT angiogram at the level of [place]. Signed by Dr Arlyn Menendez Contrast   Final Result   Moderate cerebral and cerebellar volume loss with chronic   microvascular disease but no evidence of acute intracranial process. Signed by Dr Maria Lower:  Labs Reviewed   CBC - Abnormal; Notable for the following components:       Result Value    MCHC 32.3 (*)     MPV 14.3 (*)     All other components within normal limits   COMPREHENSIVE METABOLIC PANEL W/ REFLEX TO MG FOR LOW K - Abnormal; Notable for the following components:    Glucose 175 (*)     All other components within normal limits   COVID-19, RAPID   LIPASE   TROPONIN   SEDIMENTATION RATE   PROTIME-INR   URINE RT REFLEX TO CULTURE   CBC WITH AUTO DIFFERENTIAL   BASIC METABOLIC PANEL   MAGNESIUM   PHOSPHORUS       All other labs were within normal range or not returned as of this dictation. EMERGENCY DEPARTMENT COURSE and DIFFERENTIALDIAGNOSIS/MDM:   Vitals:    Vitals:    06/22/21 1349 06/22/21 1519 06/22/21 1615 06/22/21 1700   BP: (!) 151/87 (!) 151/99 (!) 141/93 (!) 144/86   Pulse: 72  84 82   Resp: 18 16 16 18   Temp: 98.2 °F (36.8 °C)      SpO2: 94% 94% 94% 95%       MDM  Case discussed with Dr. Denise Bower is agreeable plan of care will see in consult. Call placed to hospitalist for admission. Patient resting comfortably. Patient and spouse updated about plan. Patient's case discussed with Dr. Elroy Batista, hospitalist, who is agreeable plan of care and admission. Urinalysis still pending. CONSULTS:  IP CONSULT TO NEUROLOGY  IP CONSULT TO NEUROLOGY    PROCEDURES:  Unless otherwise notedbelow, none     Procedures    FINAL IMPRESSION     1. Dizziness    2. Intractable vomiting, presence of nausea not specified, unspecified vomiting type    3. Possible Cerebrovascular accident (CVA), unspecified mechanism ([address]    4.  Double vision          DISPOSITION/PLAN DISPOSITION        PATIENT REFERRED TO:  @FUP@    DISCHARGE MEDICATIONS:  New Prescriptions    No medications on file          (Please note that portions of this note were completed with a voice recognition program.  Efforts were made to edit the dictations butoccasionally words are mis-transcribed.)    Nereida Ferreira MD (electronically signed)  AttendingEmergency Physician         Nereida Ferreira MD  06/22/21 5843

## 2021-06-22 NOTE — H&P
Number of children: 3    Years of education: None    Highest education level: None   Occupational History    Occupation:    Tobacco Use    Smoking status: Never Smoker    Smokeless tobacco: Never Used   Vaping Use    Vaping Use: Never used   Substance and Sexual Activity    Alcohol use: Yes     Alcohol/week: 1.0 standard drinks     Types: 1 Shots of liquor per week     Comment: Occ.  Drug use: Not Currently     Types: Marijuana     Comment: edibles, uses for sleep    Sexual activity: None     Comment: 3 sons   Other Topics Concern    None   Social History Narrative    CODE STATUS: Full Code    HEALTH CARE PROXY: his wife, Mrs. Windy Rodriguez, [phone]    AMBULATES: independently    DOMICILED: private home, stairs inside home, has a few steps to enter home, lives alone with wife, no pets     Social Determinants of Health     Financial Resource Strain:     Difficulty of Paying Living Expenses:    Food Insecurity:     Worried About [address] in the Last Year:     [address] [place] in the Last Year:    Transportation Needs:     Lack of Transportation (Medical):      Lack of Transportation (Non-Medical):    Physical Activity:     Days of Exercise per Week:     Minutes of Exercise per Session:    Stress:     Feeling of Stress :    Social Connections:     Frequency of Communication with Friends and Family:     Frequency of Social Gatherings with Friends and Family:     Attends Restorationism Services:     Active Member of Clubs or Organizations:     Attends Club or Organization Meetings:     Marital Status:    Intimate Partner Violence:     Fear of Current or Ex-Partner:     Emotionally Abused:     Physically Abused:     Sexually Abused:         FAMILY HISTORY:  Family History   Problem Relation Age of Onset    Coronary Art Dis Mother     Cancer Sister         uterine    Kidney Disease Sister     Coronary Art Dis Brother          in his 46s - age 47   [address] [facility] Father colon    Coronary Art Dis Sister     Cancer Sister         olive cancer    No Known Problems Sister     No Known Problems Sister     No Known Problems Son     Alcohol Abuse Son         drank self to death    No Known Problems Son          ALLERGIES:  Allergies   Allergen Reactions    Statins Other (See Comments)     Intolerable leg cramps    Crestor [Rosuvastatin Calcium]     Adhesive Tape Itching    Morphine Itching        PRIOR TO ADMISSION MEDS:  Not in a hospital admission. REVIEW OF SYSTEMS:  Constitutional:  No fevers, chills, +nausea, + vomiting, + tiredness & fatigue   Head:  No head injury, facial trauma   Eyes:  No acute visual changes, exudate, trauma, + double vision   Ears:  No acute hearing loss, earaches   Nose: No nasal discharge, epistaxis   Neck: No new hoarseness, voice change, or new masses   Lungs:   No hemoptysis, pleurisy   Heart:  No chest pressure with exertion, palpitations,    Abdomen:   No new masses, no bright red blood per rectum   Extremities:  + difficulty ambulation due to weakness, no new lesions   Skin: No new changes in skin color, no rashes or lesions   Neurologic: No new motor or sensory changes, + vertigo       PHYSICAL EXAM:  BP (!) 142/84   Pulse 84   Temp 98.2 °F (36.8 °C)   Resp 18   SpO2 96%   No intake/output data recorded.       PHYSICAL EXAMINATION:    Vital Signs: Please see the chart   ADRIANA:  Awake, alert, oriented x 3, patient appears tired and fatigued   Head/Eyes:  Normocephalic, atraumatic, EOMI and PERRLA b/l, + diplopia   ENT: Moist mucous membranes, nasal passages clear   Neck: Supple, full range of motion, no carotid bruit, trachea midline   Respiratory:   Bilateral decreased air entry in both lung fields, mild B/L crackles, symmetric expansion of chest   Cardiovascular:  Regular rate and rhythm, S1+S2+0, no murmurs/rubs   Urology: No bilateral CVA tenderness, no suprapubic tenderness   Abdomen:   Soft, non-tender, bowel sounds +ve, no organomegaly   Muscle/Joints: Moves all, decreased range of motion, no muscle spasms   Extremities: No clubbing, no cyanosis, no calf tenderness, no edema   Pulses: 2+ bilaterally, symmetrical   Skin: Warm, dry, no pallor/cyanosis/jaundice, no rashes/lesions   Neurologic: Awake, alert, oriented x 3, cranial nerves II-XII intact, no focal neurological deficits, sensory system intact, + diplopia, + vertigo with movement   Psychiatric: Normal mood, non-suicidal         LABORATORY DATA:    CBC:  Recent Labs     06/22/21  1434   WBC 6.4   HGB 14.5   HCT 44.9        BMP:  Recent Labs     06/22/21  1434      K 4.1      CO2 26   BUN 15   CREATININE 1.1   CALCIUM 8.9     Recent Labs     06/22/21  1434   AST 15   ALT 13   BILITOT 0.5   ALKPHOS 81     Coag Panel:   Recent Labs     06/22/21  1434   INR 1.06   PROTIME 13.7     Cardiac Enzymes:   Recent Labs     06/22/21  1434   TROPONINI <0.01     ABGs:  Lab Results   Component Value Date    PHART 7.500 11/16/2019    PO2ART 54.0 11/16/2019    OVE5JHV 31.0 11/16/2019     Urinalysis:  Lab Results   Component Value Date    NITRU Negative 02/10/2020    WBCUA 138 11/16/2019    BACTERIA 4+ 11/16/2019    RBCUA 3 11/16/2019    BLOODU Negative 02/10/2020    SPECGRAV 1.012 02/10/2020    GLUCOSEU Negative 02/10/2020     A1C: No results for input(s): LABA1C in the last 72 hours. ABG:No results for input(s): PHART, PLK6FBG, PO2ART, ZGS0UBA, BEART, HGBAE, B5TAGFRE, CARBOXHGBART in the last 72 hours. EKG:   Please see chart      IMAGING:  CT Head WO Contrast    Result Date: 6/22/2021  Moderate cerebral and cerebellar volume loss with chronic microvascular disease but no evidence of acute intracranial process. Signed by Dr Dylan Mcdermott Additional Contrast? None    Result Date: 6/22/2021  No acute abnormality in the abdomen or pelvis to explain patient's symptoms.  A lobulated low-density mass in the body of the pancreas may represent a cyst, a pseudocyst or a cystic neoplasm. Pancreatic duct is nondilated. Atrophic pancreas. Splenomegaly. Moderately enlarged prostate. Moderate thickening and enhancement of the wall and mucosa of the ascending colon is partly due to under distention. Possibility of acute colitis is not excluded. Appendix is not visualized. There are no finding to suggest appendicitis. Hardware fusion of the lower lumbar spine. Signed by Dr Kriss Thomas    Result Date: 6/22/2021  1. Moderate cardiomegaly no evidence pulmonary vascular congestion. . Signed by Dr Karen Uribe    Result Date: 6/22/2021  NASCET criteria was used to evaluate the cervical carotid stenoses 2. 19% stenosis involving the right internal carotid artery. 3. There is no significant stenosis associated with the left internal carotid artery. 4. The left vertebral is dominant. Flow is demonstrated in both vertebrals. 5. Advanced degenerative spondylosis is noted throughout the cervical spine Signed by Dr Giovany Collazo    CTA HEAD W CONTRAST    Result Date: 6/22/2021  Impression: 1. Negative CT angiogram at the level of [place] [facility] [place].  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 radiculopathy    Vertigo    Recurrent major depressive disorder, in full remission (HCC)    Peripheral polyneuropathy    Chronic insomnia    Status post placement of implantable loop recorder    Depression with anxiety    Benign prostatic hyperplasia without lower urinary tract symptoms    MOO on CPAP    Hx of CABG    Chronic constipation    AICD (automatic cardioverter/defibrillator) present    Nausea & vomiting    Vestibular neuropathy  Resolved Problems:    Stage 3 chronic kidney disease ([address]       Admit patient to medical unit under full inpatient status  Keep patient NPO except meds  ASA given after CT Scan Head ruled out intracranial hemorrhage  Continuous cardiac telemetry monitoring  Monitor cardiac enzymes ×3  2-D echo ordered to evaluate cardiac structure and function  Check TSH, vitamin D and vitamin B12 levels  Bilateral carotid artery duplex ordered  Patient cannot have MRI of the brain as he has an ICD in place  Neurovascular checks ordered every 4 hours as per TIA/stroke protocol  PT/ST/OT evaluation ordered as per TIA/stroke protocol  Neurology consult for evaluation and further treatment recommendations  Continue diabetic meds  Accu-Cheks qAC and qHS ordered with low/medium/high-dose insulin coverage as per protocol, based on patient's blood sugar levels    Chronic medical issues . .. Continue with home meds. Monitor patient closely while admitted. Advised very close f/u with patient's PCP as an outpatient to address chronic medical issues. Repeat labs in a.m. Electrolyte replacement as per protocol. Patient will be monitored very closely on the floor. Further recommendations as per the hospital course. Patient's management will be taken over by our [facility] Hospitalist Team in am.    Patient  is on DVT prophylaxis  Current medications reviewed  Lab work reviewed  Radiology/Chest x-ray films reviewed  Discussed with the nurse and addressed all questions/concerns  Discussed with Patient and/or Family at the bedside in detail . .. they verbalize understanding and agree with the management plan.       Attestation:  Inpatient status is used for patients with an expected LOS extending past two midnights due to medical therapy and/or critical care needs ... all other patients are placed under OBServation status. Lance Smith MD  6:37 PM 6/22/2021      DISCLAIMER: This note was created with electronic voice recognition which does have occasional errors. If you have any questions regarding the content within the note please do not hesitate to contact me. .. Thanks.

## 2021-06-22 NOTE — CONSULTS
[facility] Neurology Consult        Patient:   Barak Restrepo  MR#:    [id number]  Account Number:                   [id number]      Room:    02/02-A   YOB: 1945  Date of Progress Note: 6/22/2021  Time of Note                           5:49 PM  Attending Physician:  Austin Davidson MD  Consulting Physician:   Maricarmen Hearn M.D.        [address]:  Vertigo       HISTORY OF PRESENT ILLNESS:   This 67 y.o. male with a past medica history significant for a thalamic stroke in 2017, significant coronary disease with cardiomyopathy with EF 25% s/p ICD, CHF, DM, statin intolerance, s/p CABG and hyperlipidemia seen for vertigo. The patient indicates that 3 days prior to admission the patient went out to dinner when he began to have vertigo with nausea and vomiting. This has been relatively constant with since onset. He indicates that if he sits still and closes his eyes he is relatively comfortable. With movement he gets severely vertiginous. He did have some double vision He denied dysarthria, facial droop, focal weakness or numbness. He is unable to walk and came in for evaluation. He as some chronic hearing issue along with some chronic sinus issues. He had some ear pain about a day ago. Three of his children have vertigo. REVIEW OF SYSTEMS:  Constitutional  No fever or chills. No diaphoresis or significant fatigue. HENT   No tinnitus or significant hearing loss. Eyes  no sudden vision change or eye pain  Respiratory  no significant shortness of breath or cough  Cardiovascular  no chest pain No palpitations or significant leg swelling  Gastrointestinal  no abdominal swelling or pain. Genitourinary  No difficulty urinating, dysuria  Musculoskeletal  no back pain or myalgia. Skin  no color change or rash  Neurologic  No seizures. No lateralizing weakness. Hematologic  no easy bruising or excessive bleeding. Psychiatric  no severe anxiety or nervousness.    All other review of systems are negative. Past Medical History:      Diagnosis Date    Acute ischemic stroke ([address] 2/20/2017    Atherosclerotic heart disease     s/p MI, stenting x 3 jan 2011([place]    CAD (coronary artery disease)     Cerebral artery occlusion with cerebral infarction [facility])     Cerebrovascular accident (CVA) due to embolism of right middle cerebral artery ([address] 8/14/2017    CHF (congestive heart failure) ([facility])     Chronic bilateral low back pain without sciatica 11/10/2016    Chronic kidney disease     DDD (degenerative disc disease), lumbar 11/15/2016    Depression     Depression with anxiety     Diabetes mellitus ([address]     type II    Diabetes mellitus, type 2 ([address]     DM (diabetes mellitus) ([address] 7/12/2011    ESBL (extended spectrum beta-lactamase) producing bacteria infection 11/16/2019    urine    Glaucoma     Headache     Hyperlipidemia     Cholesterol management per pcp.  Hypertension     Macular degeneration     MI (myocardial infarction) ([address]     MI (myocardial infarction) ([address]     Neuromuscular disorder ([address]     Neuropathy     MOO on CPAP     Osteoarthritis     Palliative care patient 06/11/2019    Sleep apnea     Spondylosis of lumbar region without myelopathy or radiculopathy 11/15/2016    Status post placement of implantable loop recorder 10/31/2017    TIA (transient ischemic attack)        Past Surgical History:      Procedure Laterality Date    APPENDECTOMY      at age 4    BACK SURGERY      2016 (fusion), 2017    BREAST SURGERY  05/2019    CARDIAC CATHETERIZATION  12/12/12    with angioplasty, stent    CARDIAC CATHETERIZATION  3/29/15  MDL    stent to distal RCA.  EF 60%    CHOLECYSTECTOMY, LAPAROSCOPIC      2012    COLONOSCOPY      2010    CORONARY ANGIOPLASTY WITH STENT PLACEMENT  jan 11 [place]    stent x 3    CORONARY ANGIOPLASTY WITH STENT PLACEMENT      01/01/11    CORONARY ARTERY BYPASS GRAFT N/A 5/30/2019    CORONARY ARTERY BYPASS GRAFT X3 WITH LEFT INTERNAL MAMMARY ARTERY WITH ENDOSCOPIC VEIN HARVESTING WITH PERFUSION TRANSESOPHAGEAL ECHOCARDIOGRAM performed by Bishop Sierra MD at [address] CATH LAB PROCEDURE  [id number]    primary stent placement to the first circumflex marginal branch, balloon angioplasty to the third left anterior descending diagnonal branch, intracoronary nitroglycerin adminstration    JOINT REPLACEMENT      left knee, ~2016    LUMBAR FUSION Left 11/15/2016    LUMBAR INTERBODY FUSION LATERAL L3-4 L4-5 WITH LATERAL PLATE  performed by Shawnee Barreto MD at [address] [facility]  11/02/2019       Medications in Hospital:    No current facility-administered medications for this encounter.     Current Outpatient Medications:     tiZANidine (ZANAFLEX) 4 MG tablet, Take 4 mg by mouth every 6 hours as needed, Disp: , Rfl:     finasteride (PROSCAR) 5 MG tablet, Take 5 mg by mouth daily, Disp: , Rfl:     naproxen (NAPROSYN) 500 MG tablet, Take 500 mg by mouth as needed, Disp: , Rfl:     metoprolol succinate (TOPROL XL) 25 MG extended release tablet, Take 1 tablet by mouth daily, Disp: 90 tablet, Rfl: 3    docusate sodium (COLACE) 100 MG capsule, Take 200 mg by mouth daily, Disp: , Rfl:     famotidine (PEPCID) 40 MG tablet, Take 40 mg by mouth daily, Disp: , Rfl:     furosemide (LASIX) 40 MG tablet, Take 40 mg by mouth daily Currently taking 2 daily, Disp: , Rfl:     Multiple Vitamins-Minerals (PRESERVISION AREDS PO), Take 2 capsules by mouth, Disp: , Rfl:     oxyCODONE-acetaminophen (PERCOCET)  MG per tablet, Take 1 tablet by mouth every 4 hours as needed for Pain., Disp: , Rfl:     isosorbide mononitrate (IMDUR) 30 MG extended release tablet, TAKE 1 TABLET BY MOUTH EVERY DAY, Disp: 90 tablet, Rfl: 1    insulin glargine (LANTUS SOLOSTAR) 100 UNIT/ML injection pen, Inject 80 Units into the skin nightly, Disp: 24 pen, Rfl: 3    sertraline (ZOLOFT) 100 MG tablet, Take 1 tablet by mouth daily, Neck-symmetric, no masses noted, no jugular vein distension  Respiration- chest wall appears symmetric, good expansion,   normal effort without use of accessory muscles  Musculoskeletal  no significant wasting of muscles noted, no bony deformities  Extremities-no clubbing, cyanosis or edema  Skin  warm, dry, and intact. No rash, erythema, or pallor. Psychiatric  mood, affect, and behavior appears tired      Neurological exam  Awake, slightly slow, fluent oriented x 3 tired  Attention and concentration appear appropriate  Recent and remote memory appears unremarkable  Speech normal without dysarthria  No clear issues with language of fund of knowledge    Cranial Nerve Exam   CN II- Visual fields grossly unremarkable  CN III, IV,VI-EOMI, + nystagmus left, conjugate eye movements, no ptosis  CN V-sensation intact to LT over face  CN VII-no facial assymetry  CN VIII-Hearing reduced to finger rub  CN IX and X- Palate not tested  CN XI-not test shoulder shrug  CN XII-Tongue midline with no fasciculations or fibrillations    Motor Exam  V/V throughout upper and lower extremities bilaterally, no cogwheeling, normal tone    Sensory Exam  Sensation intact to light touch and temperature upper and lower extremities bilaterally    Reflexes   2+ biceps bilaterally  2+ brachioradialis  2+patella  2+ ankle jerks  No clonus ankles  No De La Cruz's sign bilateral hands    Tremors- no tremors in hands or head noted    Gait  Not tested    Coordination  Finger to nose-slightly slow        CBC:   Recent Labs     06/22/21  1434   WBC 6.4   HGB 14.5          BMP:    Recent Labs     06/22/21  1434      K 4.1      CO2 26   BUN 15   CREATININE 1.1   GLUCOSE 175*       Hepatic:   Recent Labs     06/22/21  1434   AST 15   ALT 13   BILITOT 0.5   ALKPHOS 81       Lipids: No results for input(s): CHOL, HDL in the last 72 hours.     Invalid input(s): LDLCALCU    INR:   Recent Labs     06/22/21  1434   INR 1.06 Assessment and Plan     Vertigo x 3 days-Suspect this may ne a peripheral vestibulopathy    Exam   Nystagmus looking left  No focal weakness  No clear dysmetria although a limited by visual focal  Did not perform [place] maneuver due to severe vertigo with movement    CT head/CTA head and neck  Unremarkable    Brainstem stroke is certainly a consideration but not other cranial nerve or focal symptoms  In addition vertigo is exacerbated by position which typically supports a peripheral process    At this time  Admit to hospital  Unable to get MRI due to ICD  Meclizine tid  Medrol dose pack-monitor blood sugar  Valium 2 mg TID PRN  PT to mobilize     Supportive care        Please feel free to call with any questions   [phone] (cell phone)    Dr Britta Sainz certified in Neurology  Board Certified in [employer]  Fellowship Trained in [employer]    EMR Dragon/transcription disclaimer:Significant part of this  encounter note is electronic transcription/translation of spoken language to printed text. The electronic translation of spoken language may be erroneous, or at times, nonsensical words or phrases may be inadvertently transcribed.  Although I have reviewed the note for such errors, some may still exist.

## 2021-06-23 LAB
ANION GAP SERPL CALCULATED.3IONS-SCNC: 8 MMOL/L (ref 7–19)
BASOPHILS ABSOLUTE: 0 K/UL (ref 0–0.2)
BASOPHILS RELATIVE PERCENT: 0.4 % (ref 0–1)
BUN BLDV-MCNC: 17 MG/DL (ref 8–23)
CALCIUM SERPL-MCNC: 9.3 MG/DL (ref 8.8–10.2)
CHLORIDE BLD-SCNC: 103 MMOL/L (ref 98–111)
CHOLESTEROL, TOTAL: 242 MG/DL (ref 160–199)
CO2: 27 MMOL/L (ref 22–29)
CREAT SERPL-MCNC: 1.1 MG/DL (ref 0.5–1.2)
EKG P AXIS: NORMAL DEGREES
EKG P-R INTERVAL: NORMAL MS
EKG Q-T INTERVAL: 400 MS
EKG QRS DURATION: 116 MS
EKG QTC CALCULATION (BAZETT): 428 MS
EKG T AXIS: 38 DEGREES
EOSINOPHILS ABSOLUTE: 0 K/UL (ref 0–0.6)
EOSINOPHILS RELATIVE PERCENT: 0.3 % (ref 0–5)
GFR AFRICAN AMERICAN: >59
GFR NON-AFRICAN AMERICAN: >60
GLUCOSE BLD-MCNC: 180 MG/DL (ref 70–99)
GLUCOSE BLD-MCNC: 183 MG/DL (ref 70–99)
GLUCOSE BLD-MCNC: 200 MG/DL (ref 70–99)
GLUCOSE BLD-MCNC: 205 MG/DL (ref 74–109)
GLUCOSE BLD-MCNC: 235 MG/DL (ref 70–99)
HBA1C MFR BLD: 6 % (ref 4–6)
HCT VFR BLD CALC: 39.9 % (ref 42–52)
HDLC SERPL-MCNC: 28 MG/DL (ref 55–121)
HEMOGLOBIN: 12.8 G/DL (ref 14–18)
IMMATURE GRANULOCYTES #: 0 K/UL
LDL CHOLESTEROL CALCULATED: 179 MG/DL
LYMPHOCYTES ABSOLUTE: 1.2 K/UL (ref 1.1–4.5)
LYMPHOCYTES RELATIVE PERCENT: 15.8 % (ref 20–40)
MAGNESIUM: 2 MG/DL (ref 1.6–2.4)
MCH RBC QN AUTO: 27.6 PG (ref 27–31)
MCHC RBC AUTO-ENTMCNC: 32.1 G/DL (ref 33–37)
MCV RBC AUTO: 86.2 FL (ref 80–94)
MONOCYTES ABSOLUTE: 0.4 K/UL (ref 0–0.9)
MONOCYTES RELATIVE PERCENT: 5.2 % (ref 0–10)
NEUTROPHILS ABSOLUTE: 5.8 K/UL (ref 1.5–7.5)
NEUTROPHILS RELATIVE PERCENT: 77.9 % (ref 50–65)
PDW BLD-RTO: 14.1 % (ref 11.5–14.5)
PERFORMED ON: ABNORMAL
PHOSPHORUS: 3.2 MG/DL (ref 2.5–4.5)
PLATELET # BLD: 148 K/UL (ref 130–400)
POTASSIUM SERPL-SCNC: 4.7 MMOL/L (ref 3.5–5)
RBC # BLD: 4.63 M/UL (ref 4.7–6.1)
SODIUM BLD-SCNC: 138 MMOL/L (ref 136–145)
TRIGL SERPL-MCNC: 173 MG/DL (ref 0–149)
TROPONIN: <0.01 NG/ML (ref 0–0.03)
TROPONIN: <0.01 NG/ML (ref 0–0.03)
WBC # BLD: 7.5 K/UL (ref 4.8–10.8)

## 2021-06-23 PROCEDURE — 6370000000 HC RX 637 (ALT 250 FOR IP): Performed by: PSYCHIATRY & NEUROLOGY

## 2021-06-23 PROCEDURE — 85025 COMPLETE CBC W/AUTO DIFF WBC: CPT

## 2021-06-23 PROCEDURE — 93010 ELECTROCARDIOGRAM REPORT: CPT | Performed by: INTERNAL MEDICINE

## 2021-06-23 PROCEDURE — 93880 EXTRACRANIAL BILAT STUDY: CPT

## 2021-06-23 PROCEDURE — 2580000003 HC RX 258: Performed by: HOSPITALIST

## 2021-06-23 PROCEDURE — 80061 LIPID PANEL: CPT

## 2021-06-23 PROCEDURE — 36415 COLL VENOUS BLD VENIPUNCTURE: CPT

## 2021-06-23 PROCEDURE — 83735 ASSAY OF MAGNESIUM: CPT

## 2021-06-23 PROCEDURE — 82947 ASSAY GLUCOSE BLOOD QUANT: CPT

## 2021-06-23 PROCEDURE — 92610 EVALUATE SWALLOWING FUNCTION: CPT

## 2021-06-23 PROCEDURE — 92523 SPEECH SOUND LANG COMPREHEN: CPT

## 2021-06-23 PROCEDURE — 6370000000 HC RX 637 (ALT 250 FOR IP): Performed by: HOSPITALIST

## 2021-06-23 PROCEDURE — 83036 HEMOGLOBIN GLYCOSYLATED A1C: CPT

## 2021-06-23 PROCEDURE — 84100 ASSAY OF PHOSPHORUS: CPT

## 2021-06-23 PROCEDURE — 1210000000 HC MED SURG R&B

## 2021-06-23 PROCEDURE — 80048 BASIC METABOLIC PNL TOTAL CA: CPT

## 2021-06-23 PROCEDURE — 6360000002 HC RX W HCPCS: Performed by: HOSPITALIST

## 2021-06-23 PROCEDURE — 84484 ASSAY OF TROPONIN QUANT: CPT

## 2021-06-23 PROCEDURE — 99233 SBSQ HOSP IP/OBS HIGH 50: CPT | Performed by: PSYCHIATRY & NEUROLOGY

## 2021-06-23 RX ORDER — SCOLOPAMINE TRANSDERMAL SYSTEM 1 MG/1
1 PATCH, EXTENDED RELEASE TRANSDERMAL
Status: DISCONTINUED | OUTPATIENT
Start: 2021-06-23 | End: 2021-06-24 | Stop reason: HOSPADM

## 2021-06-23 RX ORDER — ERGOCALCIFEROL 1.25 MG/1
50000 CAPSULE ORAL WEEKLY
Status: DISCONTINUED | OUTPATIENT
Start: 2021-06-24 | End: 2021-06-24 | Stop reason: HOSPADM

## 2021-06-23 RX ORDER — DIAZEPAM 5 MG/1
5 TABLET ORAL EVERY 8 HOURS PRN
Status: DISCONTINUED | OUTPATIENT
Start: 2021-06-23 | End: 2021-06-24 | Stop reason: HOSPADM

## 2021-06-23 RX ORDER — CYANOCOBALAMIN 1000 UG/ML
1000 INJECTION INTRAMUSCULAR; SUBCUTANEOUS ONCE
Status: COMPLETED | OUTPATIENT
Start: 2021-06-23 | End: 2021-06-24

## 2021-06-23 RX ADMIN — DOCUSATE SODIUM 200 MG: 100 CAPSULE, LIQUID FILLED ORAL at 08:26

## 2021-06-23 RX ADMIN — MECLIZINE 25 MG: 12.5 TABLET ORAL at 08:19

## 2021-06-23 RX ADMIN — BUTALBITAL, ACETAMINOPHEN, AND CAFFEINE 1 TABLET: 50; 325; 40 TABLET ORAL at 17:53

## 2021-06-23 RX ADMIN — OXYCODONE HYDROCHLORIDE AND ACETAMINOPHEN 1 TABLET: 10; 325 TABLET ORAL at 04:14

## 2021-06-23 RX ADMIN — OXYCODONE HYDROCHLORIDE AND ACETAMINOPHEN 1 TABLET: 10; 325 TABLET ORAL at 22:10

## 2021-06-23 RX ADMIN — ACETAMINOPHEN 650 MG: 325 TABLET ORAL at 09:19

## 2021-06-23 RX ADMIN — INSULIN GLARGINE 80 UNITS: 100 INJECTION, SOLUTION SUBCUTANEOUS at 20:47

## 2021-06-23 RX ADMIN — METHYLPREDNISOLONE 4 MG: 4 TABLET ORAL at 17:50

## 2021-06-23 RX ADMIN — BUTALBITAL, ACETAMINOPHEN, AND CAFFEINE 1 TABLET: 50; 325; 40 TABLET ORAL at 00:12

## 2021-06-23 RX ADMIN — FINASTERIDE 5 MG: 5 TABLET, FILM COATED ORAL at 08:26

## 2021-06-23 RX ADMIN — CLOPIDOGREL BISULFATE 75 MG: 75 TABLET ORAL at 08:26

## 2021-06-23 RX ADMIN — SACUBITRIL AND VALSARTAN 1 TABLET: 49; 51 TABLET, FILM COATED ORAL at 08:26

## 2021-06-23 RX ADMIN — METHYLPREDNISOLONE 4 MG: 4 TABLET ORAL at 12:34

## 2021-06-23 RX ADMIN — ATORVASTATIN CALCIUM 20 MG: 40 TABLET, FILM COATED ORAL at 20:46

## 2021-06-23 RX ADMIN — ISOSORBIDE MONONITRATE 30 MG: 30 TABLET, EXTENDED RELEASE ORAL at 08:26

## 2021-06-23 RX ADMIN — SACUBITRIL AND VALSARTAN 1 TABLET: 49; 51 TABLET, FILM COATED ORAL at 20:46

## 2021-06-23 RX ADMIN — ASPIRIN 81 MG: 81 TABLET, COATED ORAL at 08:26

## 2021-06-23 RX ADMIN — MECLIZINE 25 MG: 12.5 TABLET ORAL at 15:20

## 2021-06-23 RX ADMIN — INSULIN LISPRO 2 UNITS: 100 INJECTION, SOLUTION INTRAVENOUS; SUBCUTANEOUS at 13:03

## 2021-06-23 RX ADMIN — OXYCODONE HYDROCHLORIDE AND ACETAMINOPHEN 1 TABLET: 10; 325 TABLET ORAL at 12:38

## 2021-06-23 RX ADMIN — DIAZEPAM 5 MG: 5 TABLET ORAL at 22:13

## 2021-06-23 RX ADMIN — SERTRALINE 100 MG: 100 TABLET, FILM COATED ORAL at 08:26

## 2021-06-23 RX ADMIN — SODIUM CHLORIDE, PRESERVATIVE FREE 10 ML: 5 INJECTION INTRAVENOUS at 20:47

## 2021-06-23 RX ADMIN — METOPROLOL SUCCINATE 25 MG: 25 TABLET, EXTENDED RELEASE ORAL at 08:26

## 2021-06-23 RX ADMIN — METHYLPREDNISOLONE 8 MG: 4 TABLET ORAL at 20:46

## 2021-06-23 RX ADMIN — METHYLPREDNISOLONE 4 MG: 4 TABLET ORAL at 06:03

## 2021-06-23 RX ADMIN — INSULIN LISPRO 1 UNITS: 100 INJECTION, SOLUTION INTRAVENOUS; SUBCUTANEOUS at 18:22

## 2021-06-23 RX ADMIN — FAMOTIDINE 40 MG: 20 TABLET, FILM COATED ORAL at 08:26

## 2021-06-23 RX ADMIN — FLUTICASONE PROPIONATE 1 SPRAY: 50 SPRAY, METERED NASAL at 08:27

## 2021-06-23 RX ADMIN — ONDANSETRON 4 MG: 4 TABLET, ORALLY DISINTEGRATING ORAL at 08:20

## 2021-06-23 RX ADMIN — INSULIN LISPRO 1 UNITS: 100 INJECTION, SOLUTION INTRAVENOUS; SUBCUTANEOUS at 08:28

## 2021-06-23 ASSESSMENT — PAIN SCALES - GENERAL
PAINLEVEL_OUTOF10: 9
PAINLEVEL_OUTOF10: 6
PAINLEVEL_OUTOF10: 6
PAINLEVEL_OUTOF10: 9
PAINLEVEL_OUTOF10: 8
PAINLEVEL_OUTOF10: 0
PAINLEVEL_OUTOF10: 0
PAINLEVEL_OUTOF10: 9
PAINLEVEL_OUTOF10: 8

## 2021-06-23 ASSESSMENT — PAIN DESCRIPTION - PAIN TYPE: TYPE: ACUTE PAIN

## 2021-06-23 ASSESSMENT — PAIN DESCRIPTION - LOCATION: LOCATION: HEAD

## 2021-06-23 NOTE — PROGRESS NOTES
Speech Language Pathology  Facility/Department: [facility] SURG SERVICES  Initial Speech/Language/Cognitive/Swallow Assessment    NAME: Larey Bosworth  : [id number]   MRN: [id number]  ADMISSION DATE: 2021  ADMITTING DIAGNOSIS: has Type 2 diabetes mellitus with diabetic polyneuropathy, with long-term current use of insulin ([facility] Essential hypertension; Coronary artery disease involving native coronary artery of native heart without angina pectoris; MI (myocardial infarction) ([address] Mixed hyperlipidemia; Chronic bilateral low back pain without sciatica; DDD (degenerative disc disease), lumbar; Spondylosis of lumbar region without myelopathy or radiculopathy; Acute ischemic stroke ([address] Thoracic outlet syndrome; Vertigo; Imbalance; Cerebrovascular accident (CVA) due to embolism of right middle cerebral artery ([address] Recurrent major depressive disorder, in full remission ([address] Trigger middle finger of left hand; Peripheral polyneuropathy; Palpitations; Chronic insomnia; Status post placement of implantable loop recorder; West Nile virus seropositivity; Dermatitis; Depression with anxiety; Benign prostatic hyperplasia without lower urinary tract symptoms; Chronic tension-type headache, not intractable; MOO on CPAP; Chest pain; SOB (shortness of breath); ACS (acute coronary syndrome) ([facility] Acute MI inferior [facility]); Hx of CABG; Chronic constipation; Palliative care patient; Ischemic cardiomyopathy; Urinary tract infection due to extended-spectrum beta lactamase (ESBL) producing Escherichia coli; Chronic systolic heart failure ([address] Complicated UTI (urinary tract infection); AICD (automatic cardioverter/defibrillator) present; History of chronic kidney disease; Edema; Nausea & vomiting; Stroke-like symptoms; and Vestibular neuropathy on their problem list.    Date of Eval: 2021   Evaluating Therapist: PABLO Heredia    Assessment:  Completed assessment.  Patient exhibited mildly slowed lingual movements during verbalizations. SLP still ranked functional intelligibility of speech for unfamiliar listeners at 100% in utterances with background noise present. Patient did exhibit slow processing with delayed, but appropriate, auditory comprehension and responsive speech noted. Also evaluated patient's swallowing function. Patient exhibited sluggish, inconsistently mildly decreased laryngeal elevation for swallow airway protection. Even so, no outward S/S penetration/aspiration was observed with any regular solid consistency trial or thin H2O trial presented during assessment this date. At this time, recommend continuation regular solid consistency and thin liquids. Self-feed. Thank you for this consult. Subjective:  General  Chart Reviewed: Yes  Patient assessed for rehabilitation services?: Yes  Family / Caregiver Present: No    Objective:  Oral Motor:   Labial ROM: Adequate during labial retraction trials and labial protrusion trials. Labial Strength: Adequate during labial compression trials. Labial Coordination: Adequate movements were noted. Lingual ROM: Adequate during lingual extension trials with full point achieved; adequate during lingual elevation trials without use of accessory jaw movement; adequate movements noted bilaterally. Lingual Strength: Adequate  Lingual Coordination: Mildly slowed movements were noted. Auditory Comprehension  Comprehension:  (Patient demonstrated ability to answer simple yes/no questions regarding immediate environment and current state of being at independent level and with adequate processing speed. Patient demonstrated ability to follow simple 1 step commands independently and with adequate processing speed.  While mild delays were noted, patient demonstrated ability to answer yes/no questions of increased complexity and to follow complex 1 and simple 2 step commands at independent level.)    Expression  Primary Mode of Expression: (Confrontation naming of items in room was considered to be [facility] [place]. Structured responsive speech was considered to be mildly delayed. Responses in natural conversation were considered to be timely and appropriate.)    Motor Speech:  (Patient exhibited mildly slowed lingual movements during verbalizations. SLP still ranked functional intelligibility of speech for unfamiliar listeners at 100% in utterances with background noise present.)    Overall Orientation Status:  (Patient demonstrated ability to verbalize name, birthday, age, address, phone number, city, state, hospital, floor, month, FLAVIO, date, year, and situation at independent level.)    Memory:  (Patient demonstrated appropriate immediate memory with sequences of unrelated numbers/words set up to 5 items without repetitions provided. Patient demonstrated appropriate short-term memory with 10 minute delay+distractions present during assessment.)    Problem Solving:  (Patient verbalized appropriate solutions to situations that could occur during activities of daily living at independent level. Patient verbalized PCP and pharmacy independently. Patient verbalized conditions in which he takes medications at independent level. )    Additional Assessments:  Assessed patient's swallowing function. Patient exhibited functional oral prep and transit of regular solid consistency trials, presented independently, with timing of oral transit primarily measuring 1-2 seconds in length and with min oral cavity residue noted post swallows; residue cleared from the mouth with additional dry swallows. Oral transit of thin H2O trials, presented independently via straw, primarily measured 1 second in length. Laryngeal elevation during swallow initiation was considered to be sluggish and inconsistently mildly decreased for swallow airway protection.  Even so, no outward S/S penetration/aspiration was observed with any regular solid consistency trial or thin H2O trial presented during evaluation this date. At this time, recommend continuation regular solid consistency and thin liquids. Self-feed. Will continue to follow to monitor swallow and cognitive-linguistic functioning.     Electronically signed by PABLO Hoover on 6/23/2021 at 12:43 PM

## 2021-06-23 NOTE — PROGRESS NOTES
Physical Therapy    Eval attempted. Pt laying flat in bed with cold rag on head due to c/o severe headache. Unable to perform vestibular assessment at this time, pt keeps eyes closed and declines any movement in bed. BPPV seems unlikely due to the continuous nature of symptoms. Will attempt mobility as pt allows.     Electronically signed by Tay Walls PT on 6/23/2021 at 11:50 AM

## 2021-06-23 NOTE — CARE COORDINATION
HH referral received. Spoke with patient regarding HH. States that he does not feel like he needs HH, and does not wish to pursue.   No further HH needs identified Electronically signed by Aparna Ellis on 6/23/21 at 9:43 AM CDT

## 2021-06-23 NOTE — PROGRESS NOTES
Medications reviewed with Dr. Kyler Lawrence; this RN instructed not to give entresto if systolic blood pressure below 160 during 1st 24-72h of stroke rule out; will continue to monitor

## 2021-06-23 NOTE — CONSULTS
Palliative Care:    Initiate for support, goals of care and ACP. 67 y.o. male with  history significant for a thalamic stroke in 2017,  coronary disease with cardiomyopathy with EF 25% s/p ICD, CHF, DM, s/p CABG. He presents to ED with 3 day history of n/v, dizziness. Neurology following . Met with pt and his wife. He is in bed and continues to have nausea. His nurse is present with medication and will report continued symptoms. Pt was unable to participate with therapy due to weakness and nausea. Talked about life at home and pt has not been able to walk or get up without significant help recently. They state he became ill and began vomiting Saturday evening after [employer]. Pleasant and cooperative, keeps his eyes closed and is [place]. Wife says he has a cane and walker at home, but has not needed until now. Past Medical History:        Past Medical History:   Diagnosis Date    Acute ischemic stroke ([address] 2/20/2017    Atherosclerotic heart disease     s/p MI, stenting x 3 jan 2011([place])    CAD (coronary artery disease)     Cerebral artery occlusion with cerebral infarction [facility])     Cerebrovascular accident (CVA) due to embolism of right middle cerebral artery ([address] 8/14/2017    CHF (congestive heart failure) ([facility])     Chronic bilateral low back pain without sciatica 11/10/2016    Chronic kidney disease     DDD (degenerative disc disease), lumbar 11/15/2016    Depression     Depression with anxiety     Diabetes mellitus ([address]     type II    Diabetes mellitus, type 2 ([address]     DM (diabetes mellitus) ([address] 7/12/2011    ESBL (extended spectrum beta-lactamase) producing bacteria infection 11/16/2019    urine    Glaucoma     Headache     Hyperlipidemia     Cholesterol management per pcp.      Hypertension     Macular degeneration     MI (myocardial infarction) ([address]     MI (myocardial infarction) ([address]     Neuromuscular disorder ([address]     Neuropathy     MOO on CPAP     Osteoarthritis     Palliative care patient 06/11/2019    Sleep apnea     Spondylosis of lumbar region without myelopathy or radiculopathy 11/15/2016    Status post placement of implantable loop recorder 10/31/2017    TIA (transient ischemic attack)        Advance Directives:    Full code  ACP note completed     Pain/Other Symptoms:    Continues to have nausea, denies pain    Activity:      As tolerated, limited due to weakness       Psychological/Spiritual:  Restorationist jessika. Good spiritual support. Plan:   Symptom management, monitor and have therapy evaluate. Patient/family discussion r/t goals:  Pt wishes to\"get this figured out and feel better. \"  Will go home with wife. He was offered home health, but declined.     Electronically signed by Dante Porter RN on 6/23/2021 at 3:34 PM

## 2021-06-23 NOTE — PROGRESS NOTES
Pt reported allergy to all statins, including lipitor and crestor. When asked what reaction he gets, pt stated \"it kills my legs\".  This RN provided education about the importance of medications like lipitor during a stroke rule-out; pt still refused medication; will continue to monitor

## 2021-06-23 NOTE — PROGRESS NOTES
Patient:   Uziel Barksdale  MR#:    [id number]   Room:    [address] [place] [address]   YOB: 1945  Date of Progress Note: 6/23/2021  Time of Note                           7:56 AM  Consulting Physician:   Phoebe Bess M.D. Attending Physician:  Marni Kayser, MD     Chief complaint Vertigo    S:This 67 y.o. male with a past medica history significant for a thalamic stroke in 2017, significant coronary disease with cardiomyopathy with EF 25% s/p ICD, CHF, DM, statin intolerance, s/p CABG and hyperlipidemia seen for vertigo. The patient indicates that 3 days prior to admission the patient went out to dinner when he began to have vertigo with nausea and vomiting. This has been relatively constant with since onset. He indicates that if he sits still and closes his eyes he is relatively comfortable. With movement he gets severely vertiginous. He did have some double vision He denied dysarthria, facial droop, focal weakness or numbness. He is unable to walk and came in for evaluation. He as some chronic hearing issue along with some chronic sinus issues. He had some ear pain about a day ago. Three of his children have vertigo. . Feels better this am. Able to sit up without significant vertigo this am.    REVIEW OF SYSTEMS:  Constitutional: No fevers No chills  Neck:No stiffness  Respiratory: No shortness of breath  Cardiovascular: No chest pain No palpitations  Gastrointestinal: No abdominal pain    Genitourinary: No Dysuria  Neurological: No headache, no confusion    Past Medical History:      Diagnosis Date    Acute ischemic stroke ([facility] 2/20/2017    Atherosclerotic heart disease     s/p MI, stenting x 3 jan [place]    CAD (coronary artery disease)     Cerebral artery occlusion with cerebral infarction [facility])     Cerebrovascular accident (CVA) due to embolism of right middle cerebral artery ([facility] 8/14/2017    [facility]est[facility] failure[facility]     Chronic bilateral low back pain without sciatica 11/10/2016  Chronic kidney disease     DDD (degenerative disc disease), lumbar 11/15/2016    Depression     Depression with anxiety     Diabetes mellitus ([address]     type II    Diabetes mellitus, type 2 ([address]     DM (diabetes mellitus) ([address] 7/12/2011    ESBL (extended spectrum beta-lactamase) producing bacteria infection 11/16/2019    urine    Glaucoma     Headache     Hyperlipidemia     Cholesterol management per pcp.  Hypertension     Macular degeneration     MI (myocardial infarction) ([address]     MI (myocardial infarction) ([address]     Neuromuscular disorder ([address]     Neuropathy     MOO on CPAP     Osteoarthritis     Palliative care patient 06/11/2019    Sleep apnea     Spondylosis of lumbar region without myelopathy or radiculopathy 11/15/2016    Status post placement of implantable loop recorder 10/31/2017    TIA (transient ischemic attack)        Past Surgical History:      Procedure Laterality Date    APPENDECTOMY      at age 4    BACK SURGERY      2016 (fusion), 2017    BREAST SURGERY  05/2019    CARDIAC CATHETERIZATION  12/12/12    with angioplasty, stent    CARDIAC CATHETERIZATION  3/29/15  MDL    stent to distal RCA.  EF 60%    CHOLECYSTECTOMY, LAPAROSCOPIC      2012    COLONOSCOPY      2010    CORONARY ANGIOPLASTY WITH STENT PLACEMENT  jan 11 [place]    stent x 3    CORONARY ANGIOPLASTY WITH STENT PLACEMENT      01/01/11    CORONARY ARTERY BYPASS GRAFT N/A 5/30/2019    CORONARY ARTERY BYPASS GRAFT X3 WITH LEFT INTERNAL MAMMARY ARTERY WITH ENDOSCOPIC VEIN HARVESTING WITH PERFUSION TRANSESOPHAGEAL ECHOCARDIOGRAM performed by Augustina Gutierrez MD at [address] [place] [address] CATH LAB PROCEDURE  [id number]    primary stent placement to the first circumflex marginal branch, balloon angioplasty to the third left anterior descending diagnonal branch, intracoronary nitroglycerin adminstration    JOINT REPLACEMENT      left knee, ~2016    LUMBAR FUSION Left 11/15/2016    LUMBAR INTERBODY  Coronary Art Dis Mother     Cancer Sister         uterine    Kidney Disease Sister     Coronary Art Dis Brother          in his 46s - age 47    Cancer Father         colon    Coronary Art Dis Sister     Cancer Sister         olive cancer    No Known Problems Sister     No Known Problems Sister     No Known Problems Son     Alcohol Abuse Son         drank self to death    No Known Problems Son          PHYSICAL EXAM:  /81   Pulse 71   Temp 98.4 °F (36.9 °C) (Temporal)   Resp 14   Ht 6' 4\" (1.93 m)   Wt 285 lb 14.4 oz (129.7 kg)   SpO2 94%   BMI 34.80 kg/m²     Constitutional  well developed, well nourished. Eyes  conjunctiva normal.   Ear, nose, throat - No scars, masses, or lesions over external nose or ears, no atrophy of tongue  Neck-symmetric, no masses noted, no jugular vein distension  Respiration- chest wall appears symmetric, good expansion,   normal effort without use of accessory muscles  Musculoskeletal  no significant wasting of muscles noted, no bony deformities  Extremities-no clubbing, cyanosis or edema  Skin  warm, dry, and intact. No rash, erythema, or pallor. Psychiatric  mood, affect, and behavior appear normal.      Neurological exam  Awake, alert, fluent oriented appropriate affect  Attention and concentration appear appropriate  Recent and remote memory appears unremarkable  Speech normal without dysarthria  No clear issues with language of fund of knowledge     Cranial Nerve Exam     CN III, IV,VI-EOMI, No nystagmus, conjugate eye movements, no ptosis    CN VII-no facial assymetry       Motor Exam  antigravity throughout upper and lower extremities bilaterally      Tremors- no tremors in hands or head noted     Gait  Not tested      Nursing/pcp notes, imaging,labs and vitals reviewed.      PT,OT and/or speech notes reviewed    Lab Results   Component Value Date    WBC 7.5 2021    HGB 12.8 (L) 2021    HCT 39.9 (L) 2021    MCV 86.2 2021  06/23/2021     Lab Results   Component Value Date     06/23/2021    K 4.7 06/23/2021     06/23/2021    CO2 27 06/23/2021    BUN 17 06/23/2021    CREATININE 1.1 06/23/2021    GLUCOSE 205 (H) 06/23/2021    CALCIUM 9.3 06/23/2021    PROT 7.0 06/22/2021    LABALBU 3.9 06/22/2021    BILITOT 0.5 06/22/2021    ALKPHOS 81 06/22/2021    AST 15 06/22/2021    ALT 13 06/22/2021    LABGLOM >60 06/23/2021    GFRAA >59 06/23/2021    GLOB 2.6 03/27/2017     Lab Results   Component Value Date    INR 1.06 06/22/2021    INR 1.02 09/25/2020    INR 1.23 (H) 06/08/2019    PROTIME 13.7 06/22/2021    PROTIME 13.3 09/25/2020    PROTIME 14.9 (H) 06/08/2019     CTA HEAD W CONTRAST [[id number]]    Resulted: 06/22/21 1654    Updated: 06/22/21 1656    Narrative:     EXAMINATION: CTA HEAD W CONTRAST 6/22/2021 5:52 PM   HISTORY: CTA HEAD W CONTRAST 6/22/2021 3:25 PM   HISTORY: Dizzy   COMPARISON: None   DLP: 377 mGy cm   TECHNIQUE: Precontrast tomographic images of the brain were obtained. Following the uneventful administration of Isovue contrast, serial   helical tomographic images of the brain were obtained following   angiogram protocol. Multiplanar MIP and 3D reconstructions were also   obtained. FINDINGS:   Angiogram:   . Bilateral intracranial portions of the ICA's demonstrate no evidence   of aneurysm, dissection, vessel cutoff, or flow-limiting stenosis. Posteriorly, the visualized vertebral arteries and basilar arteries   demonstrate no aneurysm, dissection, vessel cutoff, or flow-limiting   stenosis. Other findings: The osseous calvarium is intact. The surrounding soft tissues are   unremarkable. The paranasal sinuses and bilateral mastoid air cells   are clear. The imaged portions of the bilateral globes and orbits are   unremarkable. Impression:     Impression:   1. Negative CT angiogram at the level of [place] [facility] [place].    Signed by Dr Janell Hernandez [[id number]]    Resulted: level bilateral facet   joint hypertrophy is present C4-C5 level bilateral facet joint   hypertrophy is present C5-C6 level. Impression:     NASCET criteria was used to evaluate the cervical carotid stenoses   2. 19% stenosis involving the right internal carotid artery. 3. There is no significant stenosis associated with the left internal   carotid artery. 4. The left vertebral is dominant. Flow is demonstrated in both   vertebrals. 5. Advanced degenerative spondylosis is noted throughout the cervical   spine   Signed by Dr Signe Sicard [[id number]]    Resulted: 06/22/21 1637    Updated: 06/22/21 1639    Narrative:     EXAMINATION: XR CHEST PORTABLE 6/22/2021 5:36 PM   HISTORY: XR CHEST PORTABLE 6/22/2021 4:16 PM   HISTORY: Question CVA   COMPARISON: September 25, 2020. FINDINGS:   The lungs are clear. Cardiac silhouettes moderately enlarged. This is   unchanged. Wires are present previous median sternotomy. Pacing device   is present in the soft tissues the left chest..   The osseous structures and surrounding soft tissues demonstrate no   acute abnormality. Impression:     1. Moderate cardiomegaly no evidence pulmonary vascular congestion. .   Signed by Dr Ranjit Holbrook: Previous medical records, medications were reviewed at today's visit    IMPRESSION:   Vertigo x 3 days-Suspect this may ne a peripheral vestibulopathy-much improved     Initial exam   Nystagmus looking left  No focal weakness  No clear dysmetria although a limited by visual focal  Did not perform Venus Hallpike maneuver due to severe vertigo with movement     CT head/CTA head and neck unremarkable     Brainstem stroke is certainly a consideration but no other cranial nerve or focal symptoms  In addition vertigo is exacerbated by position which typically supports a peripheral process     Unable to get MRI due to ICD  Meclizine tid  Medrol dose pack-monitor blood sugar  Valium 2 mg TID PRN  PT

## 2021-06-23 NOTE — PROGRESS NOTES
4 Eyes Skin Assessment    Vanna Perera is being assessed upon: Admission    I agree that I, Evelyn Hernandez RN, along with [place] (either 2 RN's or 1 LPN and 1 RN) have performed a thorough Head to Toe Skin Assessment on the patient. ALL assessment sites listed below have been assessed. Areas assessed by both nurses:     [x]   Head, Face, and Ears   [x]   Shoulders, Back, and Chest  [x]   Arms, Elbows, and Hands   [x]   Coccyx, Sacrum, and Ischium  [x]   Legs, Feet, and Heels    Does the Patient have Skin Breakdown?  No    Dylan Prevention initiated: No  Wound Care Orders initiated: No    WOC nurse consulted for Pressure Injury (Stage 3,4, Unstageable, DTI, NWPT, and Complex wounds) and New or Established Ostomies: No        Primary Nurse eSignature: Evelyn Hernandez RN on 6/23/2021 at 4:45 AM      Co-Signer eSignature: {Esignature:[id number]}

## 2021-06-23 NOTE — ACP (ADVANCE CARE PLANNING)
Advance Care Planning     Advance Care Planning Activator (Inpatient)  Conversation Note      Date of ACP Conversation: 6/23/2021     Conversation Conducted with: Pt: Cristian Caprice Phan and wife Qeqertarsuaq    ACP Activator: Olu Amaya RN    Health Care Decision Maker:     Current Designated Health Care Decision Maker:     Primary Decision Maker: Odell Roth - Spouse - [phone]    Supplemental (Other) Decision Maker: Bhavik Garcia - Child - [phone]    Supplemental (Other) Decision Maker: Kali Phan - Child - [phone]    Care Preferences    Ventilation: \"If you were in your present state of health and suddenly became very ill and were unable to breathe on your own, what would your preference be about the use of a ventilator (breathing machine) if it were available to you? \"      Would the patient desire the use of ventilator (breathing machine)?: YES        Resuscitation  \"CPR works best to restart the heart when there is a sudden event, like a heart attack, in someone who is otherwise healthy. Unfortunately, CPR does not typically restart the heart for people who have serious health conditions or who are very sick. \"    \"In the event your heart stopped as a result of an underlying serious health condition, would you want attempts to be made to restart your heart (answer \"yes\" for attempt to resuscitate) or would you prefer a natural death (answer \"no\" for do not attempt to resuscitate)? \"   YES        Conversation Outcomes:  [x] ACP discussion completed

## 2021-06-24 VITALS
HEART RATE: 62 BPM | WEIGHT: 285.9 LBS | OXYGEN SATURATION: 93 % | HEIGHT: 76 IN | SYSTOLIC BLOOD PRESSURE: 126 MMHG | RESPIRATION RATE: 16 BRPM | TEMPERATURE: 97.3 F | DIASTOLIC BLOOD PRESSURE: 70 MMHG | BODY MASS INDEX: 34.81 KG/M2

## 2021-06-24 PROBLEM — R29.90 STROKE-LIKE SYMPTOMS: Status: RESOLVED | Noted: 2021-06-22 | Resolved: 2021-06-24

## 2021-06-24 PROBLEM — R11.2 NAUSEA & VOMITING: Status: RESOLVED | Noted: 2021-06-22 | Resolved: 2021-06-24

## 2021-06-24 LAB
ANION GAP SERPL CALCULATED.3IONS-SCNC: 8 MMOL/L (ref 7–19)
BUN BLDV-MCNC: 19 MG/DL (ref 8–23)
CALCIUM SERPL-MCNC: 9.1 MG/DL (ref 8.8–10.2)
CHLORIDE BLD-SCNC: 100 MMOL/L (ref 98–111)
CO2: 28 MMOL/L (ref 22–29)
CREAT SERPL-MCNC: 1.1 MG/DL (ref 0.5–1.2)
GFR AFRICAN AMERICAN: >59
GFR NON-AFRICAN AMERICAN: >60
GLUCOSE BLD-MCNC: 103 MG/DL (ref 70–99)
GLUCOSE BLD-MCNC: 149 MG/DL (ref 70–99)
GLUCOSE BLD-MCNC: 170 MG/DL (ref 74–109)
HCT VFR BLD CALC: 38.9 % (ref 42–52)
HEMOGLOBIN: 12.5 G/DL (ref 14–18)
MCH RBC QN AUTO: 28.3 PG (ref 27–31)
MCHC RBC AUTO-ENTMCNC: 32.1 G/DL (ref 33–37)
MCV RBC AUTO: 88.2 FL (ref 80–94)
PDW BLD-RTO: 14.1 % (ref 11.5–14.5)
PERFORMED ON: ABNORMAL
PERFORMED ON: ABNORMAL
PLATELET # BLD: 120 K/UL (ref 130–400)
PMV BLD AUTO: 12.4 FL (ref 9.4–12.4)
POTASSIUM SERPL-SCNC: 4.3 MMOL/L (ref 3.5–5)
RBC # BLD: 4.41 M/UL (ref 4.7–6.1)
SODIUM BLD-SCNC: 136 MMOL/L (ref 136–145)
WBC # BLD: 7.3 K/UL (ref 4.8–10.8)

## 2021-06-24 PROCEDURE — 82947 ASSAY GLUCOSE BLOOD QUANT: CPT

## 2021-06-24 PROCEDURE — 6360000002 HC RX W HCPCS: Performed by: HOSPITALIST

## 2021-06-24 PROCEDURE — 97530 THERAPEUTIC ACTIVITIES: CPT

## 2021-06-24 PROCEDURE — 97165 OT EVAL LOW COMPLEX 30 MIN: CPT

## 2021-06-24 PROCEDURE — 99233 SBSQ HOSP IP/OBS HIGH 50: CPT | Performed by: PSYCHIATRY & NEUROLOGY

## 2021-06-24 PROCEDURE — 97161 PT EVAL LOW COMPLEX 20 MIN: CPT

## 2021-06-24 PROCEDURE — 36415 COLL VENOUS BLD VENIPUNCTURE: CPT

## 2021-06-24 PROCEDURE — 6370000000 HC RX 637 (ALT 250 FOR IP): Performed by: HOSPITALIST

## 2021-06-24 PROCEDURE — 97116 GAIT TRAINING THERAPY: CPT

## 2021-06-24 PROCEDURE — 80048 BASIC METABOLIC PNL TOTAL CA: CPT

## 2021-06-24 PROCEDURE — 85027 COMPLETE CBC AUTOMATED: CPT

## 2021-06-24 RX ORDER — METHYLPREDNISOLONE 4 MG/1
TABLET ORAL
Qty: 1 KIT | Refills: 0 | Status: SHIPPED | OUTPATIENT
Start: 2021-06-24 | End: 2021-06-30

## 2021-06-24 RX ORDER — DIAZEPAM 5 MG/1
5 TABLET ORAL EVERY 8 HOURS PRN
Qty: 9 TABLET | Refills: 0 | Status: SHIPPED | OUTPATIENT
Start: 2021-06-24 | End: 2021-06-27

## 2021-06-24 RX ORDER — ERGOCALCIFEROL 1.25 MG/1
50000 CAPSULE ORAL WEEKLY
Qty: 5 CAPSULE | Refills: 0 | Status: ON HOLD | OUTPATIENT
Start: 2021-07-01 | End: 2021-10-16

## 2021-06-24 RX ORDER — SCOLOPAMINE TRANSDERMAL SYSTEM 1 MG/1
1 PATCH, EXTENDED RELEASE TRANSDERMAL
Qty: 1 PATCH | Refills: 0 | Status: SHIPPED | OUTPATIENT
Start: 2021-06-24 | End: 2021-06-27

## 2021-06-24 RX ORDER — MECLIZINE HYDROCHLORIDE 25 MG/1
25 TABLET ORAL 3 TIMES DAILY PRN
Qty: 30 TABLET | Refills: 0 | Status: SHIPPED | OUTPATIENT
Start: 2021-06-24 | End: 2021-07-04

## 2021-06-24 RX ORDER — METOPROLOL SUCCINATE 25 MG/1
25 TABLET, EXTENDED RELEASE ORAL DAILY
Qty: 30 TABLET | Refills: 0 | Status: ON HOLD | OUTPATIENT
Start: 2021-06-25 | End: 2021-10-16

## 2021-06-24 RX ORDER — ASPIRIN 81 MG/1
81 TABLET ORAL DAILY
Qty: 100 TABLET | Refills: 0 | Status: ON HOLD | OUTPATIENT
Start: 2021-06-25 | End: 2021-10-16

## 2021-06-24 RX ORDER — ISOSORBIDE MONONITRATE 30 MG/1
30 TABLET, EXTENDED RELEASE ORAL DAILY
Qty: 30 TABLET | Refills: 0 | Status: ON HOLD | OUTPATIENT
Start: 2021-06-25 | End: 2021-10-16

## 2021-06-24 RX ADMIN — CLOPIDOGREL BISULFATE 75 MG: 75 TABLET ORAL at 08:10

## 2021-06-24 RX ADMIN — SERTRALINE 100 MG: 100 TABLET, FILM COATED ORAL at 08:09

## 2021-06-24 RX ADMIN — ISOSORBIDE MONONITRATE 30 MG: 30 TABLET, EXTENDED RELEASE ORAL at 08:10

## 2021-06-24 RX ADMIN — FINASTERIDE 5 MG: 5 TABLET, FILM COATED ORAL at 08:09

## 2021-06-24 RX ADMIN — FAMOTIDINE 40 MG: 20 TABLET, FILM COATED ORAL at 08:09

## 2021-06-24 RX ADMIN — ASPIRIN 81 MG: 81 TABLET, COATED ORAL at 08:09

## 2021-06-24 RX ADMIN — SACUBITRIL AND VALSARTAN 1 TABLET: 49; 51 TABLET, FILM COATED ORAL at 08:10

## 2021-06-24 RX ADMIN — INSULIN LISPRO 1 UNITS: 100 INJECTION, SOLUTION INTRAVENOUS; SUBCUTANEOUS at 12:28

## 2021-06-24 RX ADMIN — METHYLPREDNISOLONE 4 MG: 4 TABLET ORAL at 06:45

## 2021-06-24 RX ADMIN — DOCUSATE SODIUM 200 MG: 100 CAPSULE, LIQUID FILLED ORAL at 08:09

## 2021-06-24 RX ADMIN — CYANOCOBALAMIN 1000 MCG: 1000 INJECTION, SOLUTION INTRAMUSCULAR; SUBCUTANEOUS at 06:45

## 2021-06-24 RX ADMIN — MECLIZINE 25 MG: 12.5 TABLET ORAL at 08:09

## 2021-06-24 RX ADMIN — METOPROLOL SUCCINATE 25 MG: 25 TABLET, EXTENDED RELEASE ORAL at 08:10

## 2021-06-24 RX ADMIN — ONDANSETRON 4 MG: 4 TABLET, ORALLY DISINTEGRATING ORAL at 08:09

## 2021-06-24 RX ADMIN — METHYLPREDNISOLONE 4 MG: 4 TABLET ORAL at 12:28

## 2021-06-24 RX ADMIN — ERGOCALCIFEROL 50000 UNITS: 1.25 CAPSULE ORAL at 08:10

## 2021-06-24 RX ADMIN — FLUTICASONE PROPIONATE 1 SPRAY: 50 SPRAY, METERED NASAL at 08:14

## 2021-06-24 ASSESSMENT — PAIN SCALES - GENERAL
PAINLEVEL_OUTOF10: 0
PAINLEVEL_OUTOF10: 0

## 2021-06-24 NOTE — PROGRESS NOTES
[place], [place], [address]    DEPARTMENT OF HOSPITALIST MEDICINE      PROGRESS  NOTE:    NOTE: Portions of this note have been copied forward, however, changed to reflect the most current clinical status of this patient. Patient:  Chung Mao  YOB: 1945  Date of Service: 6/23/2021  MRN: [id number]   Acct: [de-identified]   Primary Care Physician: Argenis Pollack MD  Advance Directive: Full Code  Admit Date: 6/22/2021       Hospital Day: 1      CHIEF COMPLAINT:  Chief Complaint   Patient presents with    Dizziness     since saturday night     Nausea & Vomiting       SUBJECTIVE:  Patient is slowly improving. Nausea and vomiting and vertigo has slowly improved. [address]  COURSE:    6/23/2021  Patient was seen and evaluated by neurology in detail who believes patient has a peripheral vestibulopathy giving rise to his symptoms. Pain scan stroke is certainly a possibility but no other cranial nerve or focal symptoms have been detected as per neurology. Unable to get MRI of the head due to ICD.      6/22/2021  HISTORY OF PRESENT ILLNESS:  Chung Mao is an 67 y.o. male. He is a very pleasant gentleman with multiple chronic medical issues who went out to dinner with family and friends on Saturday and started to have vertigo with nausea and vomiting. He is having recurrence of those symptoms since then which is slowly getting worse. He also states that when he sits down and closes his eyes, he gets comfortable, but when he opens his eyes he sees double vision. He also gets a vertigo feeling with movement. He finds it hard to walk with a fear of falling down. He came to the ER for evaluation. Given his symptoms, he is at high risk for stroke/cranial etiology as these findings are persistent over 3 to 4 days. CAT scan of the head was done which failed to show any intracranial infarction or hemorrhage.   He is being admitted for medical management, PT/OT/ST evaluation, TIA/stroke work-up, neurology evaluation and further recommendations      REVIEW  OF  SYSTEMS:    Constitutional:  No fevers, chills, +nausea, no vomiting, + tiredness and fatigue   Lungs:   No hemoptysis, pleurisy, cough, SOB   Heart:  No chest pressure with exertion, palpitations,    Abdomen:   No new masses, no bright red blood per rectum   Extremities:  + difficulty ambulation due to weakness, no new lesions   Neurologic: No new motor or sensory changes       PAST MEDICAL HISTORY:  Past Medical History:   Diagnosis Date    Acute ischemic stroke ([address] 2/20/2017    Atherosclerotic heart disease     s/p MI, stenting x 3 jan 2011([place]    CAD (coronary artery disease)     Cerebral artery occlusion with cerebral infarction ([address]     Cerebrovascular accident (CVA) due to embolism of right middle cerebral artery ([address] 8/14/2017    [facility]     Chronic bilateral low back pain without sciatica 11/10/2016    Chronic kidney disease     DDD (degenerative disc disease), lumbar 11/15/2016    Depression     Depression with anxiety     Diabetes mellitus ([address]     type II    Diabetes mellitus, type 2 ([address]     DM (diabetes mellitus) ([address] 7/12/2011    ESBL (extended spectrum beta-lactamase) producing bacteria infection 11/16/2019    urine    Glaucoma     Headache     Hyperlipidemia     Cholesterol management per pcp.      Hypertension     Macular degeneration     MI (myocardial infarction) ([address]     MI (myocardial infarction) ([address]     Neuromuscular disorder ([address]     Neuropathy     MOO on CPAP     Osteoarthritis     Palliative care patient 06/11/2019    Sleep apnea     Spondylosis of lumbar region without myelopathy or radiculopathy 11/15/2016    Status post placement of implantable loop recorder 10/31/2017    TIA (transient ischemic attack)          PAST SURGICAL HISTORY:  Past Surgical History:   Procedure Laterality Date    APPENDECTOMY      at age 10    BACK SURGERY      2016 (fusion), 2017    BREAST SURGERY  2019    CARDIAC CATHETERIZATION  12    with angioplasty, stent    CARDIAC CATHETERIZATION  3/29/15  MDL    stent to distal RCA.  EF 60%    CHOLECYSTECTOMY, LAPAROSCOPIC          COLONOSCOPY          [place]ON[place] PLACEMENT   [place]    stent x 3    CORONARY ANGIOPLASTY WITH STENT PLACEMENT      11    CORONARY ARTERY BYPASS GRAFT N/A 2019    CORONARY ARTERY BYPASS GRAFT X3 WITH LEFT INTERNAL MAMMARY ARTERY WITH ENDOSCOPIC VEIN HARVESTING WITH PERFUSION TRANSESOPHAGEAL ECHOCARDIOGRAM performed by Sung Mendieta MD at [facility] CATH LAB PROCEDURE  [id number]    primary stent placement to the first circumflex marginal branch, balloon angioplasty to the third left anterior descending diagnonal branch, intracoronary nitroglycerin adminstration    JOINT REPLACEMENT      left knee, ~2016    LUMBAR FUSION Left 11/15/2016    LUMBAR INTERBODY FUSION LATERAL L3-4 L4-5 WITH LATERAL PLATE  performed by Ana Cunningham MD at [facility]  2019        FAMILY HISTORY:  Family History   Problem Relation Age of Onset    Coronary Art Dis Mother     Cancer Sister         uterine    Kidney Disease Sister     Coronary Art Dis Brother          in his 46s - age 47    Cancer Father         colon    Coronary Art Dis Sister     Cancer Sister         olive cancer    No Known Problems Sister     No Known Problems Sister     No Known Problems Son     Alcohol Abuse Son         drank self to death    No Known Problems Son            OBJECTIVE:  /70   Pulse 64   Temp 96.3 °F (35.7 °C) (Temporal)   Resp 16   Ht 6' 4\" (1.93 m)   Wt 285 lb 14.4 oz (129.7 kg)   SpO2 94%   BMI 34.80 kg/m²   I/O this shift:  In: 120 [P.O.:120]  Out: 300 [Urine:300]    PHYSICAL  EXAMINATION:    ADRIANA:  Awake, alert, oriented x 3, patient appears tired and fatigued   Head/Eyes:  Normocephalic, atraumatic, EOMI and PERRLA bilaterally   Respiratory:   Bilateral decreased air entry in both lung fields, mild B/L crackles, symmetric expansion of chest   Cardiovascular:  Regular rate and rhythm, S1+S2+0, no murmurs/rubs   Abdomen:   Soft, non-tender, bowel sounds +ve, no organomegaly   Extremities: Moves all, decreased range of motion, no edema   Neurologic: Awake, alert, oriented x 3, cranial nerves II-XII intact, no focal neurological deficits, sensory system intact   Psychiatric: Normal mood, non-suicidal       CURRENT MEDICATIONS:  Scheduled:   scopolamine  1 patch Transdermal Q72H    [START ON 6/24/2021] vitamin D  50,000 Units Oral Weekly    cyanocobalamin  1,000 mcg Intramuscular Once    fluticasone  1 spray Each Nostril Daily    sacubitril-valsartan  1 tablet Oral BID    sertraline  100 mg Oral Daily    clopidogrel  75 mg Oral Daily    insulin glargine  80 Units Subcutaneous Nightly    isosorbide mononitrate  30 mg Oral Daily    docusate sodium  200 mg Oral Daily    famotidine  40 mg Oral Daily    finasteride  5 mg Oral Daily    metoprolol succinate  25 mg Oral Daily    sodium chloride flush  10 mL Intravenous 2 times per day    aspirin  81 mg Oral Daily    Or    aspirin  300 mg Rectal Daily    atorvastatin  20 mg Oral Nightly    insulin lispro  0-6 Units Subcutaneous TID WC    insulin lispro  0-3 Units Subcutaneous Nightly    methylPREDNISolone  4 mg Oral QAM AC    methylPREDNISolone  4 mg Oral Lunch    methylPREDNISolone  4 mg Oral Dinner    [START ON 6/24/2021] methylPREDNISolone  4 mg Oral Nightly        PRN:  diazePAM, 5 mg, Q8H PRN  butalbital-acetaminophen-caffeine, 1 tablet, Q6H PRN  nitroGLYCERIN, 0.4 mg, Q5 Min PRN  oxyCODONE-acetaminophen, 1 tablet, Q6H PRN  tiZANidine, 4 mg, Q6H PRN  sodium chloride flush, 10 mL, PRN  sodium chloride, 25 mL, PRN  acetaminophen, 650 mg, Q4H PRN   Or  acetaminophen, 650 mg, Q4H PRN  ondansetron, 4 mg, Q8H PRN   Or  ondansetron, 4 mg, Q6H PRN  polyethylene glycol, 17 g, Daily PRN  perflutren lipid microspheres, 1.5 mL, ONCE PRN  meclizine, 25 mg, TID PRN        Infusions:   sodium chloride         Laboratory Data:  Recent Labs     06/22/21  1434 06/23/21  0559   WBC 6.4 7.5   HGB 14.5 12.8*    148     Recent Labs     06/22/21  1434 06/23/21  0559    138   K 4.1 4.7    103   CO2 26 27   BUN 15 17   CREATININE 1.1 1.1   GLUCOSE 175* 205*     Recent Labs     06/22/21  1434   AST 15   ALT 13   BILITOT 0.5   ALKPHOS 81     Troponin T:   Recent Labs     06/22/21  1434 06/22/21  2336 06/23/21  0559   TROPONINI <0.01 <0.01 <0.01     Pro-BNP: No results for input(s): BNP in the last 72 hours. INR:   Recent Labs     06/22/21  1434   INR 1.06     UA:  Recent Labs     06/22/21  2100   COLORU YELLOW   PHUR 7.5   WBCUA 0   RBCUA 1   BACTERIA NEGATIVE*   CLARITYU Clear   SPECGRAV >=1.045   LEUKOCYTESUR Negative   UROBILINOGEN 1.0   BILIRUBINUR Negative   BLOODU Negative   GLUCOSEU Negative     A1C:   Recent Labs     06/23/21  0559   LABA1C 6.0     ABG:No results for input(s): PHART, QIH0EGM, PO2ART, XMZ6ULH, BEART, HGBAE, B1GMSEOV, CARBOXHGBART in the last 72 hours. Imaging:    CT Head WO Contrast    Result Date: 6/22/2021  Moderate cerebral and cerebellar volume loss with chronic microvascular disease but no evidence of acute intracranial process. Signed by Dr Patience Stout Additional Contrast? None    Result Date: 6/22/2021  No acute abnormality in the abdomen or pelvis to explain patient's symptoms. A lobulated low-density mass in the body of the pancreas may represent a cyst, a pseudocyst or a cystic neoplasm. Pancreatic duct is nondilated. Atrophic pancreas. Splenomegaly. Moderately enlarged prostate. Moderate thickening and enhancement of the wall and mucosa of the ascending colon is partly due to under distention. Possibility of acute colitis is not excluded. Appendix is not visualized.  There are no finding to suggest appendicitis. Hardware fusion of the lower lumbar spine. Signed by Dr Enma Garcia    Result Date: 6/22/2021  1. Moderate cardiomegaly no evidence pulmonary vascular congestion. . Signed by Dr Gaby Cowan    Result Date: 6/22/2021  NASCET criteria was used to evaluate the cervical carotid stenoses 2. 19% stenosis involving the right internal carotid artery. 3. There is no significant stenosis associated with the left internal carotid artery. 4. The left vertebral is dominant. Flow is demonstrated in both vertebrals. 5. Advanced degenerative spondylosis is noted throughout the cervical spine Signed by Dr Alpesh Huitron    CTA HEAD W CONTRAST    Result Date: 6/22/2021  Impression: 1. Negative CT angiogram at the level of [place] [facility] [place].  Signed by Dr Monahan Legacy:    Principal Problem:    Stroke-like symptoms  Active Problems:    Type 2 diabetes mellitus with diabetic polyneuropathy, with long-term current use of insulin (HCC)    Essential hypertension    Coronary artery disease involving native coronary artery of native heart without angina pectoris    Mixed hyperlipidemia    Chronic bilateral low back pain without sciatica    DDD (degenerative disc disease), lumbar    Spondylosis of lumbar region without myelopathy or radiculopathy    Vertigo    Recurrent major depressive disorder, in full remission (HCC)    Peripheral polyneuropathy    Chronic insomnia    Status post placement of implantable loop recorder    Depression with anxiety    Benign prostatic hyperplasia without lower urinary tract symptoms    MOO on CPAP    Hx of CABG    Chronic constipation    AICD (automatic cardioverter/defibrillator) present    Nausea & vomiting    Vestibular neuropathy  Resolved Problems:    Stage 3 chronic kidney disease ([address]      Admit patient to medical unit under full inpatient status  Keep patient NPO except meds  ASA given after CT

## 2021-06-24 NOTE — PROGRESS NOTES
(myocardial infarction) ([facility] [address] Neuromuscular disorder ([facility] [address] Neuropathy, MOO on CPAP, Osteoarthritis, Palliative care patient, Sleep apnea, Spondylosis of lumbar region without myelopathy or radiculopathy, Status post placement of implantable loop recorder, and TIA (transient ischemic attack). has a past surgical history that includes Coronary angioplasty with stent (jan 6 [place]); Coronary angioplasty with stent; Diagnostic Cardiac Cath Lab Procedure (109232); Cardiac catheterization (12/12/12); Cardiac catheterization (3/29/15  MDL); lumbar fusion (Left, 11/15/2016); Colonoscopy; back surgery; joint replacement; Appendectomy; Coronary artery bypass graft (N/A, 5/30/2019); pacemaker placement (11/02/2019); Breast surgery (05/2019); and Cholecystectomy, laparoscopic.     Treatment Diagnosis: Stroke like symptoms, vertigo      Restrictions  Restrictions/Precautions  Restrictions/Precautions: Fall Risk    Subjective   General  Chart Reviewed: Yes  Patient assessed for rehabilitation services?: Yes  Family / Caregiver Present: No  Patient Currently in Pain: Denies  Vital Signs  Patient Currently in Pain: Denies  Social/Functional History  Social/Functional History  Lives With: Spouse  Bathroom Shower/Tub: Walk-in shower  Bathroom Equipment: Built-in shower seat  Home Equipment: 4 wheeled walker (DOES NOT USE)  ADL Assistance: Independent  Ambulation Assistance: Independent  Transfer Assistance: Independent       Objective        Orientation  Overall Orientation Status: Within Normal Limits     Balance  Sitting Balance: Independent  Standing Balance: Contact guard assistance (to MIN A)  Toilet Transfers  Toilet Transfer: Contact guard assistance;Minimal assistance  ADL  Feeding: Independent  Grooming: Independent  UE Bathing: Independent  LE Bathing: Minimal assistance;Contact guard assistance (for standing aspects)  UE Dressing: Independent  LE Dressing: Contact guard assistance;Minimal assistance (for standing aspects)  Toileting: Contact guard assistance;Minimal assistance (for standing aspects)  Coordination  Movements Are Fluid And Coordinated: Yes  Coordination and Movement description: Fine motor impairments;Gross motor impairments;Right UE;Left UE     Bed mobility  Supine to Sit: Independent  Sit to Supine: Independent  Transfers  Stand Step Transfers: Contact guard assistance;Minimal assistance  Vision - Basic Assessment  Visual Field Cut: No  Oculo Motor Control: WNL  Cognition  Overall Cognitive Status: WNL                 LUE PROM (degrees)  LUE PROM: WNL  LUE AROM (degrees)  LUE AROM : WNL  RUE PROM (degrees)  RUE PROM: WNL  RUE AROM (degrees)  RUE AROM : WNL  LUE Strength  Gross LUE Strength: WNL  RUE Strength  Gross RUE Strength: WNL                Tx initiated:   Therapeutic activity recommendation, safety (15 mins)     Plan   Plan  Times per week: 3-5    G-Code     OutComes Score                                                  AM-PAC Score             Goals  Short term goals  Short term goal 1: Modified independent with standing level ADL tasks for dressing, bathing, toileting  Short term goal 2: Modified indpendent with light ambulatory ADL       Therapy Time   Individual Concurrent Group Co-treatment   Time In           Time Out           Minutes                   Lyric Lopez OT Electronically signed by Lyric Lopez OT on 6/24/2021 at 12:59 PM

## 2021-06-24 NOTE — PROGRESS NOTES
Physical Therapy    Facility/Department: [facility] SURG SERVICES  Initial Assessment    NAME: Barak Restrepo  :   MRN: [id number]    Date of Service: 2021    Discharge Recommendations:  Continue to assess pending progress, Patient would benefit from continued therapy after discharge (ANTICIPATE D/C [address] OP PT IF SYMPTOMS CONTINUE)   PT Equipment Recommendations  Other: NO NEEDS IDENTIFIED    Assessment   Body structures, Functions, Activity limitations: Decreased functional mobility ; Decreased endurance;Decreased balance;Vestibular Impairment  Assessment: Pt WOULD BENEFIT FROM SKILLED PT TO ADDRESS HIS BALANCE/ENDURANCE DEFICITS. Pt WOULD BENEFIT FROM REMAINING IN HOSPTAL THIS DATE AND CONTINUED PT TO PROGRESS MOBILITY AND ENSURE SAFETY FOR D/C HOME. Prognosis: Good  Decision Making: Low Complexity  PT Education: General Safety;Gait Training;Plan of Care;Goals;PT Role;Functional Mobility Training;Transfer Training  REQUIRES PT FOLLOW UP: Yes  Activity Tolerance  Activity Tolerance: Patient Tolerated treatment well       Patient Diagnosis(es): The primary encounter diagnosis was Dizziness. Diagnoses of Intractable vomiting, presence of nausea not specified, unspecified vomiting type, Possible Cerebrovascular accident (CVA), unspecified mechanism ([address] Double vision, and Abnormal CT scan were also pertinent to this visit.      has a past medical history of Acute ischemic stroke ([address] Atherosclerotic heart disease, CAD (coronary artery disease), Cerebral artery occlusion with cerebral infarction [facility]), Cerebrovascular accident (CVA) due to embolism of right middle cerebral artery ([address] CHF (congestive heart failure) ([address] Chronic bilateral low back pain without sciatica, Chronic kidney disease, DDD (degenerative disc disease), lumbar, Depression, Depression with anxiety, Diabetes mellitus ([address] Diabetes mellitus, type 2 ([address] DM (diabetes mellitus) ([address] ESBL (extended

## 2021-06-24 NOTE — DISCHARGE SUMMARY
Yanet Jain, [address] [place] [address] MEDICINE      DISCHARGE SUMMARY:      PATIENT NAME:  Gloria Veras  :  [id number]  MRN:  [id number]    Admission Date:   2021  1:53 PM Attending: Divya Stone MD   Discharge Date:   2021              PCP: Miguel Mirza MD  Length of Stay: 2 days     Chief Complaint on Admission:   Chief Complaint   Patient presents with    Dizziness     since saturday night     Nausea & Vomiting       Consultants:     IP CONSULT TO NEUROLOGY  IP CONSULT TO NEUROLOGY  PALLIATIVE CARE EVAL  IP CONSULT TO HOME CARE NEEDS  IP CONSULT TO HOME CARE NEEDS       Discharge Problem List:   Principal Problem:    Vestibular neuropathy  Active Problems:    Type 2 diabetes mellitus with diabetic polyneuropathy, with long-term current use of insulin ([address]    Essential hypertension    Coronary artery disease involving native coronary artery of native heart without angina pectoris    Mixed hyperlipidemia    Chronic bilateral low back pain without sciatica    DDD (degenerative disc disease), lumbar    Spondylosis of lumbar region without myelopathy or radiculopathy    Vertigo    Recurrent major depressive disorder, in full remission ([address]    Peripheral polyneuropathy    Chronic insomnia    Status post placement of implantable loop recorder    Depression with anxiety    Benign prostatic hyperplasia without lower urinary tract symptoms    MOO on CPAP    Hx of CABG    Chronic constipation    AICD (automatic cardioverter/defibrillator) present  Resolved Problems:    Stage 3 chronic kidney disease (HCC)    Nausea & vomiting    Stroke-like symptoms    CUMULATIVE  HOSPITAL  COURSE  AND  TREATMENT:    2021  Patient is feeling better today. His nausea and vomiting and vertigo symptoms have almost completely resolved. He was seen earlier this morning and cleared by neurology for discharge. Please see  neurology recommendations from today as below.   He has been prescribed Labs     06/22/21  1434 06/23/21  0559 06/24/21  0451    138 136   K 4.1 4.7 4.3    103 100   CO2 26 27 28   BUN 15 17 19   CREATININE 1.1 1.1 1.1   GLUCOSE 175* 205* 170*     Recent Labs     06/22/21  1434   AST 15   ALT 13   BILITOT 0.5   ALKPHOS 81     Troponin T:   Recent Labs     06/22/21  1434 06/22/21  2336 06/23/21  0559   TROPONINI <0.01 <0.01 <0.01     Pro-BNP: No results for input(s): BNP in the last 72 hours. INR:   Recent Labs     06/22/21  1434   INR 1.06     UA:  Recent Labs     06/22/21  2100   COLORU YELLOW   PHUR 7.5   WBCUA 0   RBCUA 1   BACTERIA NEGATIVE*   CLARITYU Clear   SPECGRAV >=1.045   LEUKOCYTESUR Negative   UROBILINOGEN 1.0   BILIRUBINUR Negative   BLOODU Negative   GLUCOSEU Negative     A1C:   Recent Labs     06/23/21  0559   LABA1C 6.0     ABG:No results for input(s): PHART, BLO1OMR, PO2ART, AZB2VBC, BEART, HGBAE, H6LGAJLU, CARBOXHGBART in the last 72 hours. Impressions of imaging performed in 48 hours before discharge:    CT Head WO Contrast    Result Date: 6/22/2021  Moderate cerebral and cerebellar volume loss with chronic microvascular disease but no evidence of acute intracranial process. Signed by Dr Olivera Fails Additional Contrast? None    Result Date: 6/22/2021  No acute abnormality in the abdomen or pelvis to explain patient's symptoms. A lobulated low-density mass in the body of the pancreas may represent a cyst, a pseudocyst or a cystic neoplasm. Pancreatic duct is nondilated. Atrophic pancreas. Splenomegaly. Moderately enlarged prostate. Moderate thickening and enhancement of the wall and mucosa of the ascending colon is partly due to under distention. Possibility of acute colitis is not excluded. Appendix is not visualized. There are no finding to suggest appendicitis. Hardware fusion of the lower lumbar spine. Signed by Dr Andreia Lion    Result Date: 6/22/2021  1.  Moderate cardiomegaly no paperwork. Patient was seen at bedside today, and the examination shows improvement since yesterday. Detailed discharge directions delivered to the patient by myself and our nursing staff, who verbalizes understanding and is very happy and satisfied with the plan. Patient has been advised to continue all medications as prescribed and advised, and f/u with PCP within 1 week. Patient is stable from medical standpoint to be discharged. Total time spent during patient evaluation and assessment, discussion with the nurse/family, addressing discharge medications/scripts and coordination of care for safe discharge was in excess of 45 minutes.       Signed Electronically:    René Zhou MD  2:32 PM 6/24/2021

## 2021-06-24 NOTE — PROGRESS NOTES
[facility])     Chronic bilateral low back pain without sciatica 11/10/2016    Chronic kidney disease     DDD (degenerative disc disease), lumbar 11/15/2016    Depression     Depression with anxiety     Diabetes mellitus ([facility] [address]     type II    Diabetes mellitus, type 2 ([facility] [address]     DM (diabetes mellitus) ([facility] [address] 7/12/2011    ESBL (extended spectrum beta-lactamase) producing bacteria infection 11/16/2019    urine    Glaucoma     Headache     Hyperlipidemia     Cholesterol management per pcp.  Hypertension     Macular degeneration     MI (myocardial infarction) ([address]     MI (myocardial infarction) ([facility] [address]     Neuromuscular disorder ([facility] [address]     Neuropathy     MOO on CPAP     Osteoarthritis     Palliative care patient 06/11/2019    Sleep apnea     Spondylosis of lumbar region without myelopathy or radiculopathy 11/15/2016    Status post placement of implantable loop recorder 10/31/2017    TIA (transient ischemic attack)        Past Surgical History:      Procedure Laterality Date    APPENDECTOMY      at age 4    BACK SURGERY      2016 (fusion), 2017    BREAST SURGERY  05/2019    CARDIAC CATHETERIZATION  12/12/12    with angioplasty, stent    CARDIAC CATHETERIZATION  3/29/15  [facility]DL    stent to distal RCA.  EF 60%    CHOLECYSTECTOMY, LAPAROSCOPIC      2012    COLONOSCOPY      2010    CORONARY ANGIOPLASTY WITH STENT PLACEMENT  jan 11 [place]    stent x 3    CORONARY ANGIOPLASTY WITH STENT PLACEMENT      01/01/11    CORONARY ARTERY BYPASS GRAFT N/A 5/30/2019    CORONARY ARTERY BYPASS GRAFT X3 WITH LEFT INTERNAL MAMMARY ARTERY WITH ENDOSCOPIC VEIN HARVESTING WITH PERFUSION TRANSESOPHAGEAL ECHOCARDIOGRAM performed by Ceasar Benson MD at [address] [place] [address] CATH LAB PROCEDURE  163817    primary stent placement to the first circumflex marginal branch, balloon angioplasty to the third left anterior descending diagnonal branch, intracoronary nitroglycerin adminstration    JOINT REPLACEMENT (ZANAFLEX) tablet 4 mg, 4 mg, Oral, Q6H PRN, Kurtis Fountain MD    finasteride (PROSCAR) tablet 5 mg, 5 mg, Oral, Daily, Kurtis Fountain MD, 5 mg at 06/24/21 0809    metoprolol succinate (TOPROL XL) extended release tablet 25 mg, 25 mg, Oral, Daily, Kurtis Fountain MD, 25 mg at 06/24/21 0810    sodium chloride flush 0.9 % injection 10 mL, 10 mL, Intravenous, 2 times per day, Kurtis Fountain MD, 10 mL at 06/23/21 2047    sodium chloride flush 0.9 % injection 10 mL, 10 mL, Intravenous, PRN, Kurtis Fountain MD    0.9 % sodium chloride infusion, 25 mL, Intravenous, PRN, Kurtis Fountain MD    acetaminophen (TYLENOL) tablet 650 mg, 650 mg, Oral, Q4H PRN, 650 mg at 06/23/21 0919 **OR** acetaminophen (TYLENOL) suppository 650 mg, 650 mg, Rectal, Q4H PRN, Kurtis Fountain MD    ondansetron (ZOFRAN-ODT) disintegrating tablet 4 mg, 4 mg, Oral, Q8H PRN, 4 mg at 06/24/21 0809 **OR** ondansetron (ZOFRAN) injection 4 mg, 4 mg, Intravenous, Q6H PRN, Kurtis Fountain MD, 4 mg at 06/22/21 2104    polyethylene glycol (GLYCOLAX) packet 17 g, 17 g, Oral, Daily PRN, Kurtis Fountain MD    aspirin EC tablet 81 mg, 81 mg, Oral, Daily, 81 mg at 06/24/21 0809 **OR** aspirin suppository 300 mg, 300 mg, Rectal, Daily, Kurtis Fountain MD    perflutren lipid microspheres (DEFINITY) injection 1.65 mg, 1.5 mL, Intravenous, ONCE PRN, Kurtis Fountain MD    atorvastatin (LIPITOR) tablet 20 mg, 20 mg, Oral, Nightly, Kurtis Fountain MD, 20 mg at 06/23/21 2046    insulin lispro (HUMALOG) injection vial 0-6 Units, 0-6 Units, Subcutaneous, TID WC, Kurtis Fountain MD, 1 Units at 06/23/21 1822    insulin lispro (HUMALOG) injection vial 0-3 Units, 0-3 Units, Subcutaneous, Nightly, Kurtis Fountain MD, 1 Units at 06/23/21 2047    methylPREDNISolone (MEDROL) tablet 4 mg, 4 mg, Oral, QAM AC, Kurtis Fountain MD, 4 mg at 06/24/21 0645    methylPREDNISolone (MEDROL) tablet 4 mg, 4 mg, Oral, Lunch, Kurtis Fountain MD, 4 mg at 06/23/21 1234    methylPREDNISolone (MEDROL) tablet 4 mg, 4 mg, Oral, Luiza Weber MD, 4 mg at 21 1750    methylPREDNISolone (MEDROL) tablet 4 mg, 4 mg, Oral, Nightly, Kurtis Fountain MD    meclizine (ANTIVERT) tablet 25 mg, 25 mg, Oral, TID PRN, Kurtis Fountain MD, 25 mg at 21 0809    Allergies:  Statins, Crestor [rosuvastatin calcium], Adhesive tape, and Morphine    Social History:   TOBACCO:   reports that he has never smoked. He has never used smokeless tobacco.  ETOH:   reports current alcohol use of about 1.0 standard drinks of alcohol per week. Family History:       Problem Relation Age of Onset    Coronary Art Dis Mother     Cancer Sister         uterine    Kidney Disease Sister     Coronary Art Dis Brother          in his 46s - age 47    Cancer Father         colon    Coronary Art Dis Sister     Cancer Sister         olive cancer    No Known Problems Sister     No Known Problems Sister     No Known Problems Son     Alcohol Abuse Son         drank self to death    No Known Problems Son          PHYSICAL EXAM:  /71   Pulse 63   Temp 96.8 °F (36 °C) (Temporal)   Resp 16   Ht 6' 4\" (1.93 m)   Wt 285 lb 14.4 oz (129.7 kg)   SpO2 94%   BMI 34.80 kg/m²     Constitutional  well developed, well nourished. Eyes  conjunctiva normal.   Ear, nose, throat - No scars, masses, or lesions over external nose or ears, no atrophy of tongue  Neck-symmetric, no masses noted, no jugular vein distension  Respiration- chest wall appears symmetric, good expansion,   normal effort without use of accessory muscles  Musculoskeletal  no significant wasting of muscles noted, no bony deformities  Extremities-no clubbing, cyanosis or edema  Skin  warm, dry, and intact. No rash, erythema, or pallor.   Psychiatric  mood, affect, and behavior appear normal.      Neurological exam  Awake, alert, fluent oriented appropriate affect  Attention and concentration appear appropriate  Recent and remote memory appears unremarkable  Speech normal without dysarthria  No pseudoaneurysm. Where applicable, evaluation of ICA stenosis was performed using   NASCET-like criteria, where the site of greatest stenosis is compared   to the diameter of the ICA distal to the stenosis at a point where the   ICA walls become parallel. The visualized soft tissues of the neck appear normal.   Degenerative spondylosis is noted throughout the cervical spine. Facet   joint hypertrophy is present. Degenerative anterolisthesis of C4-C5   loss of height at C5-C6 disc is noted. Facet joint hypertrophy is   present on the right at the C2-3 level C3-4 level bilateral facet   joint hypertrophy is present C4-C5 level bilateral facet joint   hypertrophy is present C5-C6 level. Impression:     NASCET criteria was used to evaluate the cervical carotid stenoses   2. 19% stenosis involving the right internal carotid artery. 3. There is no significant stenosis associated with the left internal   carotid artery. 4. The left vertebral is dominant. Flow is demonstrated in both   vertebrals. 5. Advanced degenerative spondylosis is noted throughout the cervical   spine   Signed by Dr Bre Cheung [[id number]]    Resulted: 06/22/21 1637    Updated: 06/22/21 1639    Narrative:     EXAMINATION: XR CHEST PORTABLE 6/22/2021 5:36 PM   HISTORY: XR CHEST PORTABLE 6/22/2021 4:16 PM   HISTORY: Question CVA   COMPARISON: September 25, 2020. FINDINGS:   The lungs are clear. Cardiac silhouettes moderately enlarged. This is   unchanged. Wires are present previous median sternotomy. Pacing device   is present in the soft tissues the left chest..   The osseous structures and surrounding soft tissues demonstrate no   acute abnormality. Impression:     1. Moderate cardiomegaly no evidence pulmonary vascular congestion. .   Signed by Dr Shipley Sox: Previous medical records, medications were reviewed at today's visit    IMPRESSION:   Vertigo x 3 days-Suspect this may ne a peripheral vestibulopathy- improved this am     Initial exam   Nystagmus looking left  No focal weakness  No clear dysmetria although a limited by visual focal  Did not perform [place] Hallpike maneuver due to severe vertigo with movement     CT head/CTA head and neck unremarkable     Brainstem stroke is certainly a consideration but no other cranial nerve or focal symptoms  In addition vertigo is exacerbated by position which typically supports a peripheral process     Unable to get MRI due to ICD  Meclizine tid  Medrol dose pack-monitor blood sugar  Valium increased to 5 mg TID PRN  Scopolamine patch started  PT to mobilize      Continue supportive care    Once begin ambulating can dc from neuro standpoint    If DC follow up PRN          CALL WITH ANY QUESTIONS  [phone] CELL  Dr Joy Alexander

## 2021-06-24 NOTE — PROGRESS NOTES
IV and tele removed. DC instructions reviewed with the patient's wife per his request. She verbalized understanding. They declined home health. They feel ready to go home. Transport called to take patient out.

## 2021-06-24 NOTE — PROGRESS NOTES
Physical Therapy    Facility/Department: [facility] SURG SERVICES  Initial Assessment    NAME: Ginette Naylor  :   MRN: [id number]    Date of Service: 2021    Discharge Recommendations:  (P) Continue to assess pending progress, Patient would benefit from continued therapy after discharge   PT Equipment Recommendations  Other: (P) NO NEEDS IDENTIFIED    Assessment   Body structures, Functions, Activity limitations: (P) Decreased functional mobility ; Decreased endurance;Decreased balance;Vestibular Impairment  Assessment: (P) Pt declined further amb. in hallway. Pt. WOULD BENEFIT FROM SKILLED PT TO ADDRESS HIS BALANCE/ENDURANCE DEFICITS. Pt WOULD BENEFIT FROM REMAINING IN HOSPTAL THIS DATE AND CONTINUED PT TO PROGRESS MOBILITY AND ENSURE SAFETY FOR D/C HOME. Prognosis: (P) Good  Decision Making: (P) Low Complexity  REQUIRES PT FOLLOW UP: (P) Yes  Activity Tolerance  Activity Tolerance: (P) Patient Tolerated treatment well       Patient Diagnosis(es): The primary encounter diagnosis was Dizziness. Diagnoses of Intractable vomiting, presence of nausea not specified, unspecified vomiting type, Possible Cerebrovascular accident (CVA), unspecified mechanism ([address] Double vision, Abnormal CT scan, and Generalized anxiety disorder were also pertinent to this visit.      has a past medical history of Acute ischemic stroke ([address] Atherosclerotic heart disease, CAD (coronary artery disease), Cerebral artery occlusion with cerebral infarction [facility]), Cerebrovascular accident (CVA) due to embolism of right middle cerebral artery ([address] CHF (congestive heart failure) ([address] Chronic bilateral low back pain without sciatica, Chronic kidney disease, DDD (degenerative disc disease), lumbar, Depression, Depression with anxiety, Diabetes mellitus ([address] Diabetes mellitus, type 2 ([address] DM (diabetes mellitus) ([address] ESBL (extended spectrum beta-lactamase) producing bacteria infection, Glaucoma, Headache, Hyperlipidemia, Hypertension, Macular degeneration, MI (myocardial infarction) ([facility] [address] MI (myocardial infarction) ([facility] [address] Neuromuscular disorder ([facility] [address] Neuropathy, MOO on CPAP, Osteoarthritis, Palliative care patient, Sleep apnea, Spondylosis of lumbar region without myelopathy or radiculopathy, Status post placement of implantable loop recorder, and TIA (transient ischemic attack). has a past surgical history that includes Coronary angioplasty with stent (jan 6 [place]); Coronary angioplasty with stent; Diagnostic Cardiac Cath Lab Procedure (218447); Cardiac catheterization (12/12/12); Cardiac catheterization (3/29/15  [facility]); lumbar fusion (Left, 11/15/2016); Colonoscopy; back surgery; joint replacement; Appendectomy; Coronary artery bypass graft (N/A, 5/30/2019); pacemaker placement (11/02/2019); Breast surgery (05/2019); and Cholecystectomy, laparoscopic. Restrictions  Restrictions/Precautions  Restrictions/Precautions: (P) Fall Risk  Vision/Hearing        Subjective  General  Patient assessed for rehabilitation services?: Yes  Family / Caregiver Present: (P) Yes  Diagnosis: STROKE LIKE SYMPTOMS, DIZZINESS  Follows Commands: Within Functional Limits  General Comment  Comments: (P) RN, Quirino, aware pt getting PT. Subjective  Subjective: (P) pt. states he is feeling better. No dizziness noted. States he has to go to the bathroom.   Pain Screening  Patient Currently in Pain: (P) Denies  Pain Assessment  Pain Assessment: (P) 0-10  Pain Level: (P) 0  Vital Signs  Patient Currently in Pain: (P) Denies  Oxygen Therapy  O2 Device: (P) None (Room air)       Orientation     Social/Functional History  Social/Functional History  Lives With: Spouse  Bathroom Shower/Tub: Walk-in shower  Bathroom Equipment: Built-in shower seat  Home Equipment: 4 wheeled walker (DOES NOT USE)  ADL Assistance: Independent  Ambulation Assistance: Independent  Transfer Assistance: Independent  Cognition        Objective                         Transfers  Sit

## 2021-06-25 ENCOUNTER — CARE COORDINATION (OUTPATIENT)
Dept: CASE MANAGEMENT | Age: 76
End: 2021-06-25

## 2021-06-25 NOTE — CARE COORDINATION
[facility] Transitions Initial Follow Up Call    Call within 2 business days of discharge: Yes    Patient: Camron Sosa Patient :    MRN: [id number]    Discharge Date: 21 RARS: Readmission Risk Score: 17      Last Discharge [facility]       Complaint Diagnosis Description Type Department Provider    20 Dizziness; Nausea & Vomiting Dizziness . .. ED to Hosp-Admission (Discharged) (ADMITTED) [facility] Kurtis Fountain MD; Santos Espinoza. .. Spoke with: Mrs. Albarran    Facility: [facility]      Non-face-to-face services provided:  Reviewed encounter information for continuity of care prior to follow up Care Transitions phone call - chart notes, consults, progress notes, test results, med list, appointments, AVS, other information. Care Transitions 24 Hour Call    Schedule Follow Up Appointment with PCP: Completed  Do you have any ongoing symptoms?: No  Do you have a copy of your discharge instructions?: Yes  Do you have all of your prescriptions and are they filled?: Yes  Have you been contacted by a [facility] Pharmacist?: No  Have you scheduled your follow up appointment?: Yes  How are you going to get to your appointment?: Car - family or friend to transport  Were you discharged with any Home Care or Post Acute Services: No  Do you feel like you have everything you need to keep you well at home?: Yes  Care Transitions Interventions       Placed a call to the number listed and spoke with wife for an initial follow up call for Care Transitions Coordination following discharge yesterday. She reported that patient was in the shower, having just gotten up not too long ago. She reported that he is doing much better, his symptoms has improved greatly. She said he is able to walk about and is getting around much better. She did say he is still quite weak though. She is not sure about his appetite as he has not eaten much yet. He is drinking.   She said they have all of his medications, picked them up on the way home yesterday. She asked about the generic name of Valium. We discussed that and others, purposes, side effects, etc.  She said they are aware of his follow up appointment with PCP. No other issues reported. Discussed COVID 19 information, risks, signs, quarantine, infection control, vaccines, etc.  Patient aware and voices understanding. Informed of CTC process, purpose, future calls, etc and is agreeable with appropriate follow up. Ensured to have CTN contact information to be used as needed. Encouraged to call as needed for new issues, questions, etc.  Given the opportunity to ask questions and answered appropriately. CTN to follow up as indicated. Transitions of Care Initial Call    Was this an external facility discharge? No    Challenges to be reviewed by the provider   Additional needs identified to be addressed with provider: No       Method of communication with provider : none      Advance Care Planning:   Does patient have an Advance Directive:  not on file. Advance Care Planning   Healthcare Decision Maker:    Primary Decision Maker: Stevie Terra - Spouse - [phone]    Supplemental (Other) Decision Maker: Josephine Brunoves - Child - [phone]    Supplemental (Other) Decision Maker: Creta Lanes - [phone]    Was this a readmission? No  Patients top risk factors for readmission: functional physical ability, medical condition-DM, CKD, HTN, et al and medication management    Care Transition Nurse (CTN) contacted the caregiver by telephone to perform post hospital discharge assessment. Verified name and  with caregiver as identifiers. Provided introduction to self, and explanation of the CTN role. CTN reviewed discharge instructions, medical action plan and red flags with caregiver who verbalized understanding. Caregiver given an opportunity to ask questions and does not have any further questions or concerns at this time.  Were discharge instructions available to patient? Yes. Reviewed appropriate site of care based on symptoms and resources available to patient including: PCP, Specialist, Urgent care clinics, Home health, When to call 911 and [address]. The caregiver agrees to contact the PCP office for questions related to their healthcare. Medication reconciliation was performed with caregiver, who verbalizes understanding of administration of home medications. Advised obtaining a 90-day supply of all daily and as-needed medications. Covid Risk Education     Educated patient about risk for severe COVID-19 due to risk factors according to CDC guidelines. CTN reviewed discharge instructions, medical action plan and red flag symptoms with the patient who verbalized understanding. Discussed COVID vaccination status: Yes. Education provided on COVID-19 vaccination as appropriate. Discussed exposure protocols and quarantine with CDC Guidelines. Patient was given an opportunity to verbalize any questions and concerns and agrees to contact CTN or health care provider for questions related to their healthcare. CTN provided contact information. Plan for follow-up call in 5-7 days based on severity of symptoms and risk factors.     Follow Up  Future Appointments   Date Time Provider Johanna Moody   7/14/2021  2:30 PM TEGAN Hairston [facility]   10/8/2021  9:45 AM MD JESUSITA Hodgson [facility]       Gordo Zavala RN

## 2021-07-02 ENCOUNTER — CARE COORDINATION (OUTPATIENT)
Dept: CASE MANAGEMENT | Age: 76
End: 2021-07-02

## 2021-07-02 NOTE — CARE COORDINATION
[facility] Transitions Follow Up Call    2021    Patient: Edmundo Gay  Patient : [id number]   MRN: [id number]  Reason for Admission:   Discharge Date: 21 RARS: Readmission Risk Score: 17         Spoke with: Edmundo Gay and wife, listed on [address] Transitions Subsequent and Final Call    Subsequent and Final Calls  Do you have any ongoing symptoms?: Yes  Onset of Patient-reported symptoms: In the past 7 days  Patient-reported symptoms: Other  Interventions for patient-reported symptoms: Notified PCP/Physician  Have your medications changed?: No  Do you have any questions related to your medications?: No  Do you currently have any active services?: No  Are you currently active with any services?: Home Health  Do you have any needs or concerns that I can assist you with?: No  Identified Barriers: None  Care Transitions Interventions  Other Interventions: Follow Up : Spoke with patient and wife today for follow up phone call. Wife reports patient is eating and drinking, no problems with bowels or bladder. Not having any issues with medications, but he is not walking very well, and is weak. CTN inquired about HH, as it had been ordered, but she said no one has been out. CTN deep dived into notes and is seems he declined [facility] when it was offered. CTN did ask wife if she thought they would like to have them come out, [facility], PT to work with him, and she said yes. CTN placed call to Dr. Shelton Franco office, as he had HFU yesterday, and spoke with [facility] and told her patient would like [facility] with [facility] and PT, and to start after the holiday and that patient did not get up til around noon. They are fine with [facility]. [facility] said she would make Dr. Vibha Lopez aware and see about getting it ordered. Will follow up with patient later next week.    Future Appointments   Date Time Provider Johanna Moody   2021  2:30 PM TEGAN Curtis [facility]   10/8/2021  9:45 AM Kinjal Bob MD [facility]       Saúl Villalobos RN

## 2021-07-04 ENCOUNTER — NURSE TRIAGE (OUTPATIENT)
Dept: CALL CENTER | Facility: HOSPITAL | Age: 76
End: 2021-07-04

## 2021-07-04 NOTE — TELEPHONE ENCOUNTER
"Provider on Call stated Tuesday would be fine, informed  [email or web address]    Reason for Disposition  • Health Information question, no triage required and triager able to answer question    Additional Information  • Negative: [1] Caller is not with the adult (patient) AND [2] reporting urgent symptoms  • Negative: Lab result questions  • Negative: Medication questions  • Negative: Caller can't be reached by phone  • Negative: Caller has already spoken to PCP or another triager  • Negative: RN needs further essential information from caller in order to complete triage  • Negative: Requesting regular office appointment  • Negative: [1] Caller requesting NON-URGENT health information AND [2] PCP's office is the best resource    Answer Assessment - Initial Assessment Questions  1. REASON FOR CALL or QUESTION: \"What is your reason for calling today?\" or \"How can I best help you?\" or \"What question do you have that I can help answer?\"       nurse called about waiting until Tuesday for a visit, wife feels he is doing well    Protocols used: INFORMATION ONLY CALL - NO TRIAGE-ADULT-      "

## 2021-07-09 ENCOUNTER — CARE COORDINATION (OUTPATIENT)
Dept: CASE MANAGEMENT | Age: 76
End: 2021-07-09

## 2021-07-14 ENCOUNTER — OFFICE VISIT (OUTPATIENT)
Dept: CARDIOLOGY CLINIC | Age: 76
End: 2021-07-14
Payer: MEDICARE

## 2021-07-14 VITALS
BODY MASS INDEX: 33.97 KG/M2 | SYSTOLIC BLOOD PRESSURE: 118 MMHG | WEIGHT: 279 LBS | DIASTOLIC BLOOD PRESSURE: 64 MMHG | HEART RATE: 71 BPM | HEIGHT: 76 IN

## 2021-07-14 DIAGNOSIS — Z95.810 AICD (AUTOMATIC CARDIOVERTER/DEFIBRILLATOR) PRESENT: ICD-10-CM

## 2021-07-14 DIAGNOSIS — I50.22 CHRONIC SYSTOLIC HEART FAILURE (HCC): ICD-10-CM

## 2021-07-14 DIAGNOSIS — R42 DIZZINESS: ICD-10-CM

## 2021-07-14 DIAGNOSIS — H93.3X9 VESTIBULAR NERVE DISORDER, UNSPECIFIED LATERALITY: Primary | ICD-10-CM

## 2021-07-14 DIAGNOSIS — I25.5 ISCHEMIC CARDIOMYOPATHY: ICD-10-CM

## 2021-07-14 DIAGNOSIS — I25.10 CORONARY ARTERY DISEASE INVOLVING NATIVE CORONARY ARTERY OF NATIVE HEART WITHOUT ANGINA PECTORIS: ICD-10-CM

## 2021-07-14 DIAGNOSIS — R53.81 PHYSICAL DECONDITIONING: ICD-10-CM

## 2021-07-14 PROCEDURE — 1036F TOBACCO NON-USER: CPT | Performed by: CLINICAL NURSE SPECIALIST

## 2021-07-14 PROCEDURE — 99214 OFFICE O/P EST MOD 30 MIN: CPT | Performed by: CLINICAL NURSE SPECIALIST

## 2021-07-14 PROCEDURE — G8427 DOCREV CUR MEDS BY ELIG CLIN: HCPCS | Performed by: CLINICAL NURSE SPECIALIST

## 2021-07-14 PROCEDURE — 1123F ACP DISCUSS/DSCN MKR DOCD: CPT | Performed by: CLINICAL NURSE SPECIALIST

## 2021-07-14 PROCEDURE — 1111F DSCHRG MED/CURRENT MED MERGE: CPT | Performed by: CLINICAL NURSE SPECIALIST

## 2021-07-14 PROCEDURE — 4040F PNEUMOC VAC/ADMIN/RCVD: CPT | Performed by: CLINICAL NURSE SPECIALIST

## 2021-07-14 PROCEDURE — G8417 CALC BMI ABV UP PARAM F/U: HCPCS | Performed by: CLINICAL NURSE SPECIALIST

## 2021-07-14 ASSESSMENT — ENCOUNTER SYMPTOMS
EYE REDNESS: 0
ABDOMINAL PAIN: 0
WHEEZING: 0
COUGH: 0
BACK PAIN: 1
VOMITING: 0
SHORTNESS OF BREATH: 1
CHEST TIGHTNESS: 0
NAUSEA: 0
FACIAL SWELLING: 0

## 2021-07-14 NOTE — PROGRESS NOTES
[facility], [address], [address] [address]  Phone: [phone]  Fax: [phone]    OFFICE VISIT:  2021    Triny Navarro - :     Reason For Visit:  Dylan Galindo is a 67 y.o. male who is here for 1 Month Follow-Up (patient still c/o dizziness and edema), Coronary Artery Disease, and Congestive Heart Failure       Diagnosis Orders   1. Vestibular nerve disorder, unspecified laterality     2. Dizziness     3. Coronary artery disease involving native coronary artery of native heart without angina pectoris     4. Ischemic cardiomyopathy     5. Chronic systolic heart failure ([address]     6. Physical deconditioning     7. AICD (automatic cardioverter/defibrillator) present            stents x 3 [facility])  2011  Cath  Patent stents in the mid LAD, stent to OM1, PTCA to the D2, normal LVFX  12  Cath patent stents in the mid LAD, PTCA to the D3, stent to distal LAD, normal LVFX  3/29/2015  Cath  Stent to the PLOM, normal LVFX  2019  Loop placed  3/1/19 lexiscan negative for myocardial ischemia, EF 50  %   19 ACS PARKER RISK Score 5 (angina, + troponin, CAD>50, age>65, plavix ), AUC indication 3, AUC score 9   19  Cath 50% distal LM, 100% mid LAD, 80% diag, 100% mid RCA, inferior hypokinesis, EF 45%     1. Coronary artery disease, status post multiple PCI's to LAD 2011, RCA 2012, RPL 2015 presenting 2019 with inferior wall MI with late presentation involving a large dominant RCA with distal occlusion, not intervened on with severe mid to distal LAD restenosis and moderate left main disease, status post CABG 2019 with LIMA to 1st diagonal, SVG to LAD and SVG to ramus intermedius, ejection fraction 25% with severe inferior hypokinesis. Thallium viability study with large inferolateral, predominantly lateral infarct with no significant viability status post ICD placement 2019. 2. CKD stage III.   3. Diabetes mellitus. 4. Post CABG atrial fibrillation. 5.  Statin intolerance. Patient was asked to come into the office last month after a recent remote device check that shows an up trending of his OptiVol. He was noticing increasing shortness of breath, generalized fatigue, weakness and dizziness with position change. Some medication changes were made. Carvedilol was changed to metoprolol succinate due to complaints of positional dizziness. He was taking too much Lasix as well and was somewhat hypotensive. We discussed taking Lasix 40 mg once daily and adding an additional dose only for sudden weight gain of 3 pounds or more overnight or 5 pounds in a week. Isosorbide was moved to bedtime dosing    There were concerns for issues with medication compliance and he was encouraged to use a medication set up tray. He is still not doing this. He states he just does not have the energy to organize this. He feels he is doing a better job taking his medicine since last visit    Since his last visit on 6/22/2021 he was admitted to the hospital with complaints of significant dizziness and was diagnosed with vestibular neuropathy. He was treated with a round of steroids, scopolamine patch and Valium. He feels symptoms are improved, but still continues to have some level of dizziness    He has lost a few pounds since last visit. He denies orthopnea, PND, edema. He is quite sedentary at home due to issues with chronic back and leg pain. He is depressed and tired of feeling poorly.  Radha Byrne MD is PCP.   Claude Quiñones has the following history as recorded in [facility]:    Patient Active Problem List    Diagnosis Date Noted    ACS (acute coronary syndrome) [facility]     Vestibular neuropathy 06/22/2021    History of chronic kidney disease 07/13/2020    Edema 07/13/2020    AICD (automatic cardioverter/defibrillator) present 49/82/7819    Complicated UTI (urinary tract infection) 11/16/2019    Chronic systolic heart failure ([address] 08/09/2019    Urinary tract infection due to extended-spectrum beta lactamase (ESBL) producing Escherichia coli 06/14/2019    Ischemic cardiomyopathy 06/13/2019    Palliative care patient 06/11/2019    Chronic constipation 06/10/2019    Hx of CABG 06/01/2019    Acute MI inferior lateral first episode care ([address] 05/31/2019    SOB (shortness of breath) 04/15/2019    Chest pain 02/28/2019    MOO on CPAP 05/03/2018    Dermatitis 03/05/2018    Depression with anxiety 03/05/2018    Benign prostatic hyperplasia without lower urinary tract symptoms 03/05/2018    Chronic tension-type headache, not intractable 03/05/2018    West Nile virus seropositivity 11/22/2017    Status post placement of implantable loop recorder 10/31/2017    Recurrent major depressive disorder, in full remission ([address] 10/17/2017    Trigger middle finger of left hand 10/17/2017    Peripheral polyneuropathy 10/17/2017    Palpitations 10/17/2017    Chronic insomnia 10/17/2017    Cerebrovascular accident (CVA) due to embolism of right middle cerebral artery ([address] 08/14/2017    Vertigo 03/27/2017    Imbalance 03/27/2017    Thoracic outlet syndrome 02/21/2017    Acute ischemic stroke ([address] 02/20/2017    DDD (degenerative disc disease), lumbar 11/15/2016    Spondylosis of lumbar region without myelopathy or radiculopathy 11/15/2016    Chronic bilateral low back pain without sciatica 11/10/2016    Mixed hyperlipidemia 07/23/2012    MI (myocardial infarction) ([address]     Type 2 diabetes mellitus with diabetic polyneuropathy, with long-term current use of insulin ([address] 07/12/2011    Essential hypertension 07/12/2011    Coronary artery disease involving native coronary artery of native heart without angina pectoris 07/12/2011     Past Medical History:   Diagnosis Date    Acute ischemic stroke ([address] 2/20/2017    Atherosclerotic heart disease     s/p MI, stenting x 3 jan 2011([place]    CAD (coronary artery disease)     Cerebral artery occlusion with cerebral infarction [facility])     Cerebrovascular accident (CVA) due to embolism of right middle cerebral artery ([address] 8/14/2017    CHF (congestive heart failure) ([facility])     Chronic bilateral low back pain without sciatica 11/10/2016    Chronic kidney disease     DDD (degenerative disc disease), lumbar 11/15/2016    Depression     Depression with anxiety     Diabetes mellitus ([address]     type II    Diabetes mellitus, type 2 ([address]     DM (diabetes mellitus) ([address] 7/12/2011    ESBL (extended spectrum beta-lactamase) producing bacteria infection 11/16/2019    urine    Glaucoma     Headache     Hyperlipidemia     Cholesterol management per pcp.  Hypertension     Macular degeneration     MI (myocardial infarction) ([address]     MI (myocardial infarction) ([address]     Neuromuscular disorder ([address]     Neuropathy     MOO on CPAP     Osteoarthritis     Palliative care patient 06/11/2019    Sleep apnea     Spondylosis of lumbar region without myelopathy or radiculopathy 11/15/2016    Status post placement of implantable loop recorder 10/31/2017    TIA (transient ischemic attack)      Past Surgical History:   Procedure Laterality Date    APPENDECTOMY      at age 4    BACK SURGERY      2016 (fusion), 2017    BREAST SURGERY  05/2019    CARDIAC CATHETERIZATION  12/12/12    with angioplasty, stent    CARDIAC CATHETERIZATION  3/29/15  MDL    stent to distal RCA.  EF 60%    CHOLECYSTECTOMY, LAPAROSCOPIC      2012    COLONOSCOPY      2010    CORONARY ANGIOPLASTY WITH STENT PLACEMENT  jan 11 [place]    stent x 3    CORONARY ANGIOPLASTY WITH STENT PLACEMENT      01/01/11    CORONARY ARTERY BYPASS GRAFT N/A 5/30/2019    CORONARY ARTERY BYPASS GRAFT X3 WITH LEFT INTERNAL MAMMARY ARTERY WITH ENDOSCOPIC VEIN HARVESTING WITH PERFUSION TRANSESOPHAGEAL ECHOCARDIOGRAM performed by Av Gray MD at [address] [place] [address] CATH LAB PROCEDURE [id number]    primary stent placement to the first circumflex marginal branch, balloon angioplasty to the third left anterior descending diagnonal branch, intracoronary nitroglycerin adminstration    JOINT REPLACEMENT      left knee, ~2016    LUMBAR FUSION Left 11/15/2016    LUMBAR INTERBODY FUSION LATERAL L3-4 L4-5 WITH LATERAL PLATE  performed by Niurka Mena MD at [address]  2019     Family History   Problem Relation Age of Onset    Coronary Art Dis Mother     Cancer Sister         uterine    Kidney Disease Sister     Coronary Art Dis Brother          in his 46s - age 47    Cancer Father         colon    Coronary Art Dis Sister     Cancer Sister         olive cancer    No Known Problems Sister     No Known Problems Sister     No Known Problems Son     Alcohol Abuse Son         drank self to death    No Known Problems Son      Social History     Tobacco Use    Smoking status: Never Smoker    Smokeless tobacco: Never Used   Substance Use Topics    Alcohol use: Yes     Alcohol/week: 1.0 standard drinks     Types: 1 Shots of liquor per week     Comment: Occ.       Current Outpatient Medications   Medication Sig Dispense Refill    aspirin 81 MG EC tablet Take 1 tablet by mouth daily 100 tablet 0    isosorbide mononitrate (IMDUR) 30 MG extended release tablet Take 1 tablet by mouth daily 30 tablet 0    metoprolol succinate (TOPROL XL) 25 MG extended release tablet Take 1 tablet by mouth daily 30 tablet 0    vitamin D (ERGOCALCIFEROL) 1.25 MG (57162 UT) CAPS capsule Take 1 capsule by mouth once a week 5 capsule 0    tiZANidine (ZANAFLEX) 4 MG tablet Take 4 mg by mouth every 6 hours as needed      finasteride (PROSCAR) 5 MG tablet Take 5 mg by mouth daily      docusate sodium (COLACE) 100 MG capsule Take 200 mg by mouth daily      furosemide (LASIX) 40 MG tablet Take 40 mg by mouth daily Currently taking 2 daily      Multiple Vitamins-Minerals (PRESERVISION AREDS PO) Take 2 capsules by mouth      oxyCODONE-acetaminophen (PERCOCET)  MG per tablet Take 1 tablet by mouth every 4 hours as needed for Pain.  insulin glargine (LANTUS SOLOSTAR) 100 UNIT/ML injection pen Inject 80 Units into the skin nightly 24 pen 3    sertraline (ZOLOFT) 100 MG tablet Take 1 tablet by mouth daily 90 tablet 2    nitroGLYCERIN (NITROSTAT) 0.4 MG SL tablet up to max of 3 total doses. If no relief after 1 dose, call 911. 30 tablet 2    clopidogrel (PLAVIX) 75 MG tablet TAKE 1 TABLET DAILY 90 tablet 2    sacubitril-valsartan (ENTRESTO) 49-51 MG per tablet Take 1 tablet by mouth 2 times daily 60 tablet 5    butalbital-acetaminophen-caffeine (FIORICET, ESGIC) -40 MG per tablet Take 1 tablet by mouth as needed for Headaches (Patient taking differently: Take 1 tablet by mouth every 6 hours as needed for Headaches ) 3 tablet 0    fluticasone (FLONASE) 50 MCG/ACT nasal spray 1 spray by Each Nostril route daily 1 Bottle 0    zoster recombinant adjuvanted vaccine (SHINGRIX) 50 MCG/0.5ML SUSR injection 1 vaccine now and repeat in 2-6 months. 0.5 mL 1     No current facility-administered medications for this visit. Allergies: Statins, Crestor [rosuvastatin calcium], Adhesive tape, and Morphine    Review of Systems  Review of Systems   Constitutional: Positive for fatigue. Negative for activity change, diaphoresis, fever and unexpected weight change. HENT: Negative for facial swelling and nosebleeds. Eyes: Negative for redness and visual disturbance. Respiratory: Positive for shortness of breath. Negative for cough, chest tightness and wheezing. Cardiovascular: Negative for chest pain, palpitations and leg swelling. Gastrointestinal: Negative for abdominal pain, nausea and vomiting. Endocrine: Negative for cold intolerance and heat intolerance. Genitourinary: Negative for dysuria and hematuria. Musculoskeletal: Positive for back pain (chronic).  Negative for arthralgias and myalgias. Skin: Negative for pallor and rash. Neurological: Positive for dizziness (with position change, some improvemewnt). Negative for seizures, syncope, weakness and light-headedness. Hematological: Does not bruise/bleed easily. Psychiatric/Behavioral: Negative for agitation. The patient is not nervous/anxious. Complains of depression without suicidal ideation       Objective  Vital Signs - /64   Pulse 71   Ht 6' 4\" (1.93 m)   Wt 279 lb (126.6 kg)   BMI 33.96 kg/m²    Wt Readings from Last 3 Encounters:   07/14/21 279 lb (126.6 kg)   06/22/21 285 lb 14.4 oz (129.7 kg)   06/08/21 279 lb (126.6 kg)        BP Readings from Last 3 Encounters:   07/14/21 118/64   06/24/21 126/70   06/08/21 (!) 100/58    Pulse Readings from Last 3 Encounters:   07/14/21 71   06/24/21 62   06/08/21 73        Physical Exam  Vitals and nursing note reviewed. Constitutional:       General: He is not in acute distress. Appearance: He is well-developed. He is not diaphoretic. Comments: Deconditioned   HENT:      Head: Normocephalic and atraumatic. Right Ear: Hearing and external ear normal.      Left Ear: Hearing and external ear normal.      Nose: Nose normal.   Eyes:      General:         Right eye: No discharge. Left eye: No discharge. Pupils: Pupils are equal, round, and reactive to light. Neck:      Thyroid: No thyromegaly. Vascular: No carotid bruit or JVD. Trachea: No tracheal deviation. Cardiovascular:      Rate and Rhythm: Normal rate and regular rhythm. Heart sounds: Normal heart sounds. No murmur heard. No friction rub. No gallop. Pulmonary:      Effort: Pulmonary effort is normal. No respiratory distress. Breath sounds: Normal breath sounds. No wheezing or rales. Abdominal:      Palpations: Abdomen is soft. Tenderness: There is no abdominal tenderness. Musculoskeletal:         General: No swelling or deformity.       Cervical back: Neck supple. No muscular tenderness. Right lower leg: Edema (trace) present. Left lower leg: Edema (trace) present. Skin:     General: Skin is warm and dry. Findings: No rash. Neurological:      General: No focal deficit present. Mental Status: He is alert and oriented to person, place, and time. Cranial Nerves: No cranial nerve deficit. Psychiatric:         Behavior: Behavior normal.         Judgment: Judgment normal.      Comments: Depressed         Data:  Lab Results   Component Value Date    WBC 7.3 06/24/2021    RBC 4.41 06/24/2021    HGB 12.5 06/24/2021    HCT 38.9 06/24/2021    HCT 41.6 05/25/2011     06/24/2021     05/25/2011      Lab Results   Component Value Date    CHOL 242 06/23/2021    TRIG 173 06/23/2021    HDL 28 06/23/2021    LDLCALC 179 06/23/2021     Lab Results   Component Value Date     06/24/2021     05/25/2011    K 4.3 06/24/2021    K 4.1 06/22/2021    K 4.0 05/25/2011     06/24/2021     05/25/2011    CO2 28 06/24/2021    GLUCOSE 170 06/24/2021    BUN 19 06/24/2021    CREATININE 1.1 06/24/2021    CREATININE 1.0 05/25/2011    CALCIUM 9.1 06/24/2021    ALT 13 06/22/2021    AST 15 06/22/2021     Lab Results   Component Value Date    TSH 1.810 05/24/2019     Echo 7/15/20  Conclusions      Summary   Structurally normal mitral valve. Mild-moderate mitral regurgitation is present. Mildly thickened aortic valve leaflets with preserved leaflet mobility. Mild aortic regurgitation is noted. Mild tricuspid regurgitation with estimated RVSP of 33 mm Hg. Moderately dilated left atrium. No evidence of thrombus within left atrium. Left ventricular size is moderately increased . Left ventricular ejection fraction is visually estimated at 30-35%. More pronounced inferior and apical hypokinesis. No evidence of left ventricular mass or thrombus noted.    Pacemaker/AICD lead(s) was(were) visualized in RA cavity mildly dilated right atrium       Assessment:     Diagnosis Orders   1. Vestibular nerve disorder, unspecified laterality     2. Dizziness     3. Coronary artery disease involving native coronary artery of native heart without angina pectoris     4. Ischemic cardiomyopathy     5. Chronic systolic heart failure ([address]     6. Physical deconditioning     7. AICD (automatic cardioverter/defibrillator) present         Hospital records reviewed    Vestibular neuropathy/dizzinesswith recent hospitalization. Some improvement after round of steroids, scopolamine patch and Valium. He is following with PCP for this. CADstable without angina. Continue Plavix,, metoprolol    Ischemic cardiomyopathy/chronic systolic heart failure NYHA class II-III, stage Cpatient appears euvolemic on guideline directed medical therapy with metoprolol succinate and Entresto. He was changed from carvedilol to metoprolol succinate positional dizziness at last visit. He seems to be tolerating this well. He is taking his Lasix as directed 40 mg daily. He has been encouraged to use an extra Lasix for sudden weight gain of 3 pounds or more in 24 hours or 5 pounds in a week. Counseled on daily weights, reporting weight gain of 3lbs in 24hrs or 5lbs in a week, low sodium diet, and fluid restrictions 6 cupsor 48oz daily. Hypertensionwith a tendency toward hypotension. BP has improved today after changing to metoprolol succinate. Less dizziness as above    Hyperlipidemiaissues with myalgias related to statins. He has declined other medications in the past.    Physical deconditioningdue to chronic back and leg pain. Patient is having difficulty doing basic activities. Consider physical therapy    ICD interrogation showed adequate battery voltage and charge time. Mode: AAIRDDDR. Lead impedances stable. Normal diagnostics and safety margins noted. No ICD discharges.   Sustained arrythmia:  none  Optivol now at baseline  Please see the scanned interrogation report      Plan      Return for Dr. Anayeli Clifton, as scheduled. Use med set up tray to help with compliance  Follow-up with PCP regarding dizziness and vestibular neuropathy    OK to take an extra Lasix for weight gain over 3lbs in 24 hours or 5lbs over a week. If weight is not improving by the next day, call the office.    - Weigh daily and report weight gain of 3lbs or more in 24hrs or 5lbs in one week. - Call for increasing shortness of breath or increasing swelling in feet and legs. (This could mean you are retaining too much fluid)  - 2000mg low sodium diet  - Fluid restriction of 1500ml per day (about 6 cups of fluid per day)    Call with any questionsor concerns  Follow up with Jewels Davis MD for non cardiac problems  Report any new problems  Cardiovascular Fitness-Exercise as tolerated. Strive for 15 minutes of exercise most days of the week. Cardiac / HealthyDiet  Continue current medications as directed  Continue plan of treatment  It is always recommended that you bring your medicationsbottles with you to each visit - this is for your safety!        TEGAN Hathaway

## 2021-07-14 NOTE — PATIENT INSTRUCTIONS
Return for Dr. Chantal Tyson, as scheduled. Move Isosorbide to bedtime dosing  Use med set up tray to help with compliance    OK to take an extra Lasix for weight gain over 3lbs in 24 hours or 5lbs over a week. If weight is not improving by the next day, call the office.    - Weigh daily and report weight gain of 3lbs or more in 24hrs or 5lbs in one week. - Call for increasing shortness of breath or increasing swelling in feet and legs. (This could mean you are retaining too much fluid)  - 2000mg low sodium diet  - Fluid restriction of 1500ml per day (about 6 cups of fluid per day)  Patient Education        Heart Failure: Care Instructions  Your Care Instructions     Heart failure occurs when your heart does not pump as much blood as the body needs. Failure does not mean that the heart has stopped pumping but rather that it is not pumping as well as it should. Over time, this causes fluid buildup in your lungs and other parts of your body. Fluid buildup can cause shortness of breath, fatigue, swollen ankles, and other problems. By taking medicines regularly, reducing sodium (salt) in your diet, checking your weight every day, and making lifestyle changes, you can feel better and live longer. Follow-up care is a key part of your treatment and safety. Be sure to make and go to all appointments, and call your doctor if you are having problems. It's also a good idea to know your test results and keep a list of the medicines you take. How can you care for yourself at home? Medicines    · Be safe with medicines. Take your medicines exactly as prescribed.  Call your doctor if you think you are having a problem with your medicine.     · Do not take any vitamins, over-the-counter medicine, or herbal products without talking to your doctor first. Siri Him not take ibuprofen (Advil or Motrin) and naproxen (Aleve) without talking to your doctor first. They could make your heart failure worse.     · You may take some of the following medicine. ? Angiotensin-converting enzyme inhibitors (ACEIs) or angiotensin II receptor blockers (ARBs) reduce the heart's workload, lower blood pressure, and reduce swelling. Taking an ACEI or ARB may lower your chance of needing to be hospitalized. ? Beta-blockers can slow heart rate, decrease blood pressure, and improve your condition. Taking a beta-blocker may lower your chance of needing to be hospitalized. ? Diuretics, also called water pills, reduce swelling. You will get more details on the specific medicines your doctor prescribes. Diet    · Your doctor may suggest that you limit sodium. Your doctor can tell you how much sodium is right for you. An example is less than 3,000 mg a day. This includes all the salt you eat in cooking or in packaged foods. People get most of their sodium from processed foods. Fast food and restaurant meals also tend to be very high in sodium.     · Ask your doctor how much liquid you can drink each day. You may have to limit liquids. Weight    · Weigh yourself without clothing at the same time each day. Record your weight. Call your doctor if you have a sudden weight gain, such as more than 2 to 3 pounds in a day or 5 pounds in a week. (Your doctor may suggest a different range of weight gain.) A sudden weight gain may mean that your heart failure is getting worse. Activity level    · Start light exercise (if your doctor says it is okay). Even if you can only do a small amount, exercise will help you get stronger, have more energy, and manage your weight and your stress. Walking is an easy way to get exercise. Start out by walking a little more than you did before. Bit by bit, increase the amount you walk.     · When you exercise, watch for signs that your heart is working too hard. You are pushing yourself too hard if you cannot talk while you are exercising.  If you become short of breath or dizzy or have chest pain, stop, sit down, and rest.     · If you feel \"wiped out\" the day after you exercise, walk slower or for a shorter distance until you can work up to a better pace.     · Get enough rest at night. Sleeping with 1 or 2 pillows under your upper body and head may help you breathe easier. Lifestyle changes    · Do not smoke. Smoking can make a heart condition worse. If you need help quitting, talk to your doctor about stop-smoking programs and medicines. These can increase your chances of quitting for good. Quitting smoking may be the most important step you can take to protect your heart.     · Limit alcohol to 2 drinks a day for men and 1 drink a day for women. Too much alcohol can cause health problems.     · Avoid getting sick from colds and the flu. Get a pneumococcal vaccine shot. If you have had one before, ask your doctor whether you need another dose. Get a flu shot each year. If you must be around people with colds or the flu, wash your hands often. When should you call for help? Call 911  if you have symptoms of sudden heart failure such as:    · You have severe trouble breathing.     · You cough up pink, foamy mucus.     · You have a new irregular or rapid heartbeat. Call your doctor now or seek immediate medical care if:    · You have new or increased shortness of breath.     · You are dizzy or lightheaded, or you feel like you may faint.     · You have sudden weight gain, such as more than 2 to 3 pounds in a day or 5 pounds in a week. (Your doctor may suggest a different range of weight gain.)     · You have increased swelling in your legs, ankles, or feet.     · You are suddenly so tired or weak that you cannot do your usual activities. Watch closely for changes in your health, and be sure to contact your doctor if you develop new symptoms. Where can you learn more? Go to https://adal.[employer]partners. org and sign in to your [employer] account.  Enter I738 in the [employer] box to learn more about \"Heart Failure: Care Instructions. \"     If you do not have an account, please click on the \"Sign Up Now\" link. Current as of: August 31, 2020               Content Version: 12.9  © 2006-2021 Healthwise, Incorporated. Care instructions adapted under license by [facility] ([facility]). If you have questions about a medical condition or this instruction, always ask your healthcare professional. [address] any warranty or liability for your use of this information.

## 2021-07-16 ENCOUNTER — CARE COORDINATION (OUTPATIENT)
Dept: CASE MANAGEMENT | Age: 76
End: 2021-07-16

## 2021-07-16 NOTE — CARE COORDINATION
[facility] Transitions Follow Up Call    2021    Patient: Maryana Brown  Patient : [id number]   MRN: <[id number]>  Reason for Admission: vestibular neuropathy Dizziness; Nausea & Vomiting  Discharge Date: 21 RARS: Readmission Risk Score: 17    Spoke with: sarahy    [facility] attempted outreach for care transition follow up call. Left HIPPA compliant message and contact information for call back. Care Transitions Subsequent and Final Call    Subsequent and Final Calls  Care Transitions Interventions  Other Interventions:            Follow Up  Future Appointments   Date Time Provider Johanna Moody   10/8/2021  9:45 AM Emanuel Gonzalez MD [facility] P-[facility] LPN

## 2021-07-23 ENCOUNTER — CARE COORDINATION (OUTPATIENT)
Dept: CASE MANAGEMENT | Age: 76
End: 2021-07-23

## 2021-07-23 NOTE — CARE COORDINATION
[facility] Transitions Follow Up Call    2021    Patient: Almita Cardona  Patient : [id number]   MRN: [id number]  Reason for Admission:   Discharge Date: 21 RARS: Readmission Risk Score: 16         Spoke with: N/A    Care Transitions Subsequent and Final Call    Subsequent and Final Calls  Are you currently active with any services?: Home Health  Care Transitions Interventions  Other Interventions: Follow Up : Attempt #2 to make contact with Jannette Lizarraga for a follow up call post discharge from the hospital without success. Unable to leave a message regarding intent of call and call back information. Will discharge from [facility] services at this time due to inability to make contact.     Future Appointments   Date Time Provider Johanna Moody   10/8/2021  9:45 AM Yessenia Watts MD [facility] [place]       Barbie Johnson RN

## 2021-09-29 DIAGNOSIS — Z95.810 AICD (AUTOMATIC CARDIOVERTER/DEFIBRILLATOR) PRESENT: Primary | ICD-10-CM

## 2021-09-29 DIAGNOSIS — I48.0 PAF (PAROXYSMAL ATRIAL FIBRILLATION) (HCC): ICD-10-CM

## 2021-09-29 DIAGNOSIS — I25.5 ISCHEMIC CARDIOMYOPATHY: ICD-10-CM

## 2021-09-29 DIAGNOSIS — I50.22 CHRONIC SYSTOLIC HEART FAILURE (HCC): ICD-10-CM

## 2021-09-30 ENCOUNTER — OFFICE VISIT (OUTPATIENT)
Dept: CARDIOLOGY CLINIC | Age: 76
End: 2021-09-30
Payer: MEDICARE

## 2021-09-30 VITALS
SYSTOLIC BLOOD PRESSURE: 128 MMHG | HEIGHT: 76 IN | BODY MASS INDEX: 34.34 KG/M2 | DIASTOLIC BLOOD PRESSURE: 70 MMHG | HEART RATE: 79 BPM | WEIGHT: 282 LBS

## 2021-09-30 DIAGNOSIS — I50.22 CHRONIC SYSTOLIC HEART FAILURE (HCC): ICD-10-CM

## 2021-09-30 DIAGNOSIS — I48.0 PAF (PAROXYSMAL ATRIAL FIBRILLATION) (HCC): ICD-10-CM

## 2021-09-30 DIAGNOSIS — I10 ESSENTIAL HYPERTENSION: ICD-10-CM

## 2021-09-30 DIAGNOSIS — I25.5 ISCHEMIC CARDIOMYOPATHY: ICD-10-CM

## 2021-09-30 DIAGNOSIS — Z95.810 AICD (AUTOMATIC CARDIOVERTER/DEFIBRILLATOR) PRESENT: Primary | ICD-10-CM

## 2021-09-30 PROCEDURE — G8417 CALC BMI ABV UP PARAM F/U: HCPCS | Performed by: CLINICAL NURSE SPECIALIST

## 2021-09-30 PROCEDURE — 4040F PNEUMOC VAC/ADMIN/RCVD: CPT | Performed by: CLINICAL NURSE SPECIALIST

## 2021-09-30 PROCEDURE — 1036F TOBACCO NON-USER: CPT | Performed by: CLINICAL NURSE SPECIALIST

## 2021-09-30 PROCEDURE — 1123F ACP DISCUSS/DSCN MKR DOCD: CPT | Performed by: CLINICAL NURSE SPECIALIST

## 2021-09-30 PROCEDURE — G8427 DOCREV CUR MEDS BY ELIG CLIN: HCPCS | Performed by: CLINICAL NURSE SPECIALIST

## 2021-09-30 PROCEDURE — 99214 OFFICE O/P EST MOD 30 MIN: CPT | Performed by: CLINICAL NURSE SPECIALIST

## 2021-09-30 PROCEDURE — 93283 PRGRMG EVAL IMPLANTABLE DFB: CPT | Performed by: CLINICAL NURSE SPECIALIST

## 2021-09-30 RX ORDER — AMOXICILLIN 500 MG/1
1 TABLET, FILM COATED ORAL 3 TIMES DAILY
Status: ON HOLD | COMMUNITY
Start: 2021-07-08 | End: 2021-10-16

## 2021-09-30 NOTE — PATIENT INSTRUCTIONS
Will get labs from DR Riley Angela  Follow up in 1 mos With Dr. Beverly Santoro- will recheck afib burden  Call with any questions or concerns  Follow up with Jess Chiu MD for non cardiac problems  Report any new problems  Cardiovascular Fitness-Exercise as tolerated. Strive for 30 minutes of exercise most days of the week. Cardiac / Healthy Diet  Continue current medications as directed  Continue plan of treatment  It is always recommended that you bring your medications bottles with you to each visit - this is for your safety! Patient Education        Atrial Fibrillation: Care Instructions  Your Care Instructions     Atrial fibrillation is an irregular and often fast heartbeat. Treating this condition is important for several reasons. It can cause blood clots, which can travel from your heart to your brain and cause a stroke. If you have a fast heartbeat, you may feel lightheaded, dizzy, and weak. An irregular heartbeat can also increase your risk for heart failure. Atrial fibrillation is often the result of another heart condition, such as high blood pressure or coronary artery disease. Making changes to improve your heart condition will help you stay healthy and active. Follow-up care is a key part of your treatment and safety. Be sure to make and go to all appointments, and call your doctor if you are having problems. It's also a good idea to know your test results and keep a list of the medicines you take. How can you care for yourself at home? Medicines    · Take your medicines exactly as prescribed. Call your doctor if you think you are having a problem with your medicine. You will get more details on the specific medicines your doctor prescribes.     · If your doctor has given you a blood thinner to prevent a stroke, be sure you get instructions about how to take your medicine safely.  Blood thinners can cause serious bleeding problems.     · Do not take any vitamins, over-the-counter drugs, or herbal products without talking to your doctor first.   Lifestyle changes    · Do not smoke. Smoking can increase your chance of a stroke and heart attack. If you need help quitting, talk to your doctor about stop-smoking programs and medicines. These can increase your chances of quitting for good.     · Eat a heart-healthy diet.     · Stay at a healthy weight. Lose weight if you need to.     · Limit alcohol to 2 drinks a day for men and 1 drink a day for women. Too much alcohol can cause health problems.     · Avoid colds and flu. Get a pneumococcal vaccine shot. If you have had one before, ask your doctor whether you need another dose. Get a flu shot every year. If you must be around people with colds or flu, wash your hands often. Activity    · If your doctor recommends it, get more exercise. Walking is a good choice. Bit by bit, increase the amount you walk every day. Try for at least 30 minutes on most days of the week. You also may want to swim, bike, or do other activities. Your doctor may suggest that you join a cardiac rehabilitation program so that you can have help increasing your physical activity safely.     · Start light exercise if your doctor says it is okay. Even a small amount will help you get stronger, have more energy, and manage stress. Walking is an easy way to get exercise. Start out by walking a little more than you did in the hospital. Gradually increase the amount you walk.     · When you exercise, watch for signs that your heart is working too hard. You are pushing too hard if you cannot talk while you are exercising. If you become short of breath or dizzy or have chest pain, sit down and rest immediately.     · Check your pulse regularly. Place two fingers on the artery at the palm side of your wrist, in line with your thumb. If your heartbeat seems uneven or fast, talk to your doctor. When should you call for help? Call 911 anytime you think you may need emergency care.  For example, call if:    · You have symptoms of a heart attack. These may include:  ? Chest pain or pressure, or a strange feeling in the chest.  ? Sweating. ? Shortness of breath. ? Nausea or vomiting. ? Pain, pressure, or a strange feeling in the back, neck, jaw, or upper belly or in one or both shoulders or arms. ? Lightheadedness or sudden weakness. ? A fast or irregular heartbeat. After you call 911, the  may tell you to chew 1 adult-strength or 2 to 4 low-dose aspirin. Wait for an ambulance. Do not try to drive yourself.     · You have symptoms of a stroke. These may include:  ? Sudden numbness, tingling, weakness, or loss of movement in your face, arm, or leg, especially on only one side of your body. ? Sudden vision changes. ? Sudden trouble speaking. ? Sudden confusion or trouble understanding simple statements. ? Sudden problems with walking or balance. ? A sudden, severe headache that is different from past headaches.     · You passed out (lost consciousness). Call your doctor now or seek immediate medical care if:    · You have new or increased shortness of breath.     · You feel dizzy or lightheaded, or you feel like you may faint.     · Your heart rate becomes irregular.     · You can feel your heart flutter in your chest or skip heartbeats. Tell your doctor if these symptoms are new or worse. Watch closely for changes in your health, and be sure to contact your doctor if you have any problems. Where can you learn more? Go to https://[employer]. org and sign in to your [employer] account. Enter U020 in the [facility] box to learn more about \"Atrial Fibrillation: Care Instructions. \"     If you do not have an account, please click on the \"Sign Up Now\" link. Current as of: April 29, 2021               Content Version: 13.0  © 9704-7741 Healthwise, Incorporated. Care instructions adapted under license by [facility] ([facility]).  If you have questions about a medical condition or

## 2021-09-30 NOTE — PROGRESS NOTES
[facility]  [facility], [address] [address]  Phone: [phone]  Fax: [phone]    OFFICE VISIT:  2021    Pilar Sena - :     Reason For Visit:  Jessica Penny is a 67 y.o. male who is here for Coronary Artery Disease (No cardiac symptoms today.)  Coronary disease with previous stenting in   Last heart catheterization 2019 showed 50% LM stenosis. And a percent occlusion of the mid LAD, 80% diagonal and high percent mid RCA with left to right collaterals to the distal branch of the RCA. LV dysfunction with EF 40%  Underwent CABG x3 2019  AICD placement  Last 2D echo 7/15/2020 showed mild to moderate MR, mild AI, moderately dilated LA, LV dysfunction with EF 30 to 35%    [facility] reading received yesterday showing AT/AF episode for 3 days as well as fluid accumulation noted per OptiVol last month. He is here today for evaluation accompanied with a family member. He reports he continues to have some VÁSQUEZ and fatigue. Was having some worsening swelling a while back but it has gotten better. Reports at 9 AM every morning he hears a beeping from his device    Subjective  Jessica Penny denies exertional chest pain, resting shortness of breath, orthopnea, paroxysmal nocturnal dyspnea, syncope, presyncope, arrhythmia, edema and fatigue. The patient denies numbness or weakness to suggest cerebrovascular accident or transient ischemic attack. Estee King MD is PCP and follows labs.   Pilar Sena has the following history as recorded in [facility]:    Patient Active Problem List    Diagnosis Date Noted    ACS (acute coronary syndrome) ([address]     Vestibular neuropathy 2021    History of chronic kidney disease 2020    Edema 2020    AICD (automatic cardioverter/defibrillator) present     Complicated UTI (urinary tract infection) 2019    Chronic systolic heart failure ([address] 2019    Urinary tract infection due to extended-spectrum beta lactamase (ESBL) producing Escherichia coli 06/14/2019    Ischemic cardiomyopathy 06/13/2019    Palliative care patient 06/11/2019    Chronic constipation 06/10/2019    Hx of CABG 06/01/2019    Acute MI inferior lateral first episode care ([address] 05/31/2019    SOB (shortness of breath) 04/15/2019    Chest pain 02/28/2019    MOO on CPAP 05/03/2018    Dermatitis 03/05/2018    Depression with anxiety 03/05/2018    Benign prostatic hyperplasia without lower urinary tract symptoms 03/05/2018    Chronic tension-type headache, not intractable 03/05/2018    West Nile virus seropositivity 11/22/2017    Status post placement of implantable loop recorder 10/31/2017    Recurrent major depressive disorder, in full remission ([address] 10/17/2017    Trigger middle finger of left hand 10/17/2017    Peripheral polyneuropathy 10/17/2017    Palpitations 10/17/2017    Chronic insomnia 10/17/2017    Cerebrovascular accident (CVA) due to embolism of right middle cerebral artery ([address] 08/14/2017    Vertigo 03/27/2017    Imbalance 03/27/2017    Thoracic outlet syndrome 02/21/2017    Acute ischemic stroke ([address] 02/20/2017    DDD (degenerative disc disease), lumbar 11/15/2016    Spondylosis of lumbar region without myelopathy or radiculopathy 11/15/2016    Chronic bilateral low back pain without sciatica 11/10/2016    Mixed hyperlipidemia 07/23/2012    MI (myocardial infarction) ([address]     Type 2 diabetes mellitus with diabetic polyneuropathy, with long-term current use of insulin ([address] 07/12/2011    Essential hypertension 07/12/2011    Coronary artery disease involving native coronary artery of native heart without angina pectoris 07/12/2011     Past Medical History:   Diagnosis Date    Acute ischemic stroke ([address] 2/20/2017    Atherosclerotic heart disease     s/p MI, stenting x 3 jan 2011([place]    CAD (coronary artery disease)     Cerebral artery occlusion with cerebral infarction [facility])     Cerebrovascular accident (CVA) due to embolism of right middle cerebral artery ([address] 8/14/2017    CHF (congestive heart failure) (HCC)     Chronic bilateral low back pain without sciatica 11/10/2016    Chronic kidney disease     DDD (degenerative disc disease), lumbar 11/15/2016    Depression     Depression with anxiety     Diabetes mellitus ([address]     type II    Diabetes mellitus, type 2 ([address]     DM (diabetes mellitus) ([address] 7/12/2011    ESBL (extended spectrum beta-lactamase) producing bacteria infection 11/16/2019    urine    Glaucoma     Headache     Hyperlipidemia     Cholesterol management per pcp.  Hypertension     Macular degeneration     MI (myocardial infarction) ([address]     MI (myocardial infarction) ([address]     Neuromuscular disorder ([address]     Neuropathy     MOO on CPAP     Osteoarthritis     Palliative care patient 06/11/2019    Sleep apnea     Spondylosis of lumbar region without myelopathy or radiculopathy 11/15/2016    Status post placement of implantable loop recorder 10/31/2017    TIA (transient ischemic attack)      Past Surgical History:   Procedure Laterality Date    APPENDECTOMY      at age 4    BACK SURGERY      2016 (fusion), 2017    BREAST SURGERY  05/2019    CARDIAC CATHETERIZATION  12/12/12    with angioplasty, stent    CARDIAC CATHETERIZATION  3/29/15  MDL    stent to distal RCA.  EF 60%    CHOLECYSTECTOMY, LAPAROSCOPIC      2012    COLONOSCOPY      2010    CORONARY ANGIOPLASTY WITH STENT PLACEMENT  jan 11 [place]    stent x 3    CORONARY ANGIOPLASTY WITH STENT PLACEMENT      01/01/11    CORONARY ARTERY BYPASS GRAFT N/A 5/30/2019    CORONARY ARTERY BYPASS GRAFT X3 WITH LEFT INTERNAL MAMMARY ARTERY WITH ENDOSCOPIC VEIN HARVESTING WITH PERFUSION TRANSESOPHAGEAL ECHOCARDIOGRAM performed by Gemini Ortiz MD at [address] [place] [address] CATH LAB PROCEDURE  [id number]    primary stent placement to the first circumflex marginal branch, balloon angioplasty to the third left anterior descending diagnonal branch, intracoronary nitroglycerin adminstration    JOINT REPLACEMENT      left knee, ~2016    LUMBAR FUSION Left 11/15/2016    LUMBAR INTERBODY FUSION LATERAL L3-4 L4-5 WITH LATERAL PLATE  performed by Stalin Vincent MD at [address]  2019     Family History   Problem Relation Age of Onset    Coronary Art Dis Mother     Cancer Sister         uterine    Kidney Disease Sister     Coronary Art Dis Brother          in his 46s - age 47    Cancer Father         colon    Coronary Art Dis Sister     Cancer Sister         olive cancer    No Known Problems Sister     No Known Problems Sister     No Known Problems Son     Alcohol Abuse Son         drank self to death    No Known Problems Son      Social History     Tobacco Use    Smoking status: Never Smoker    Smokeless tobacco: Never Used   Substance Use Topics    Alcohol use: Yes     Alcohol/week: 1.0 standard drinks     Types: 1 Shots of liquor per week     Comment: Occ.       Current Outpatient Medications   Medication Sig Dispense Refill    Amoxicillin 500 MG TABS Take 1 capsule by mouth 3 times daily      aspirin 81 MG EC tablet Take 1 tablet by mouth daily 100 tablet 0    isosorbide mononitrate (IMDUR) 30 MG extended release tablet Take 1 tablet by mouth daily 30 tablet 0    metoprolol succinate (TOPROL XL) 25 MG extended release tablet Take 1 tablet by mouth daily 30 tablet 0    tiZANidine (ZANAFLEX) 4 MG tablet Take 4 mg by mouth every 6 hours as needed      finasteride (PROSCAR) 5 MG tablet Take 5 mg by mouth daily      docusate sodium (COLACE) 100 MG capsule Take 200 mg by mouth daily      furosemide (LASIX) 40 MG tablet Take 40 mg by mouth daily Currently taking 2 daily      Multiple Vitamins-Minerals (PRESERVISION AREDS PO) Take 2 capsules by mouth      oxyCODONE-acetaminophen (PERCOCET)  MG per tablet Take 1 tablet by mouth every 4 hours as needed for Pain.  insulin glargine (LANTUS SOLOSTAR) 100 UNIT/ML injection pen Inject 80 Units into the skin nightly 24 pen 3    sertraline (ZOLOFT) 100 MG tablet Take 1 tablet by mouth daily 90 tablet 2    nitroGLYCERIN (NITROSTAT) 0.4 MG SL tablet up to max of 3 total doses. If no relief after 1 dose, call 911. 30 tablet 2    clopidogrel (PLAVIX) 75 MG tablet TAKE 1 TABLET DAILY 90 tablet 2    sacubitril-valsartan (ENTRESTO) 49-51 MG per tablet Take 1 tablet by mouth 2 times daily 60 tablet 5    butalbital-acetaminophen-caffeine (FIORICET, ESGIC) -40 MG per tablet Take 1 tablet by mouth as needed for Headaches (Patient taking differently: Take 1 tablet by mouth every 6 hours as needed for Headaches ) 3 tablet 0    fluticasone (FLONASE) 50 MCG/ACT nasal spray 1 spray by Each Nostril route daily 1 Bottle 0    zoster recombinant adjuvanted vaccine (SHINGRIX) 50 MCG/0.5ML SUSR injection 1 vaccine now and repeat in 2-6 months. 0.5 mL 1    vitamin D (ERGOCALCIFEROL) 1.25 MG (61298 UT) CAPS capsule Take 1 capsule by mouth once a week 5 capsule 0     No current facility-administered medications for this visit. Allergies: Statins, Crestor [rosuvastatin calcium], Adhesive tape, and Morphine    Review of Systems  Constitutional  no significant activity change, appetite change, or unexpected weight change. No fever, chills or diaphoresis. No fatigue. HEENT  no significant rhinorrhea or epistaxis. No tinnitus or significant hearing loss. Eyes  no sudden vision change or amaurosis. Respiratory  no significant wheezing, stridor, apnea or cough. + dyspnea on exertion   No resting shortness of breath. Cardiovascular  no exertional chest pain, orthopnea or PND. No sensation of arrhythmia or slow heart rate. No claudication or leg edema. Gastrointestinal  no abdominal swelling or pain. No blood in stool. No severe constipation, diarrhea, nausea, or vomiting.    Genitourinary  no difficulty urinating, dysuria, frequency, or urgency. No flank pain or hematuria. Musculoskeletal  no back pain, gait disturbance, or myalgia. Skin  no color change or rash. No pallor. No new surgical incision. Neurologic  no speech difficulty, facial asymmetry or lateralizing weakness. No seizures, presyncope, syncope, or significant dizziness. Hematologic  no easy bruising or excessive bleeding. Psychiatric  no severe anxiety or insomnia. No confusion. All other review of systems are negative. Objective  Vital Signs - /70   Pulse 79   Ht 6' 4\" (1.93 m)   Wt 282 lb (127.9 kg)   BMI 34.33 kg/m²   General - Claudia Rm is alert, cooperative, and pleasant. Well groomed. No acute distress. Body habitus is overweight. HEENT  The head is normocephalic. No circumoral cyanosis. Dentition is normal.   EYES -  No Xanthelasma, no arcus senilis, no conjunctival hemorrhages or discharge. Neck - Supple, without increased jugular venous pressures. No carotid bruits. No mass. Respiratory - Lungs are clear bilaterally. No wheezes or rales. Normal effort without use of accessory muscles. Cardiovascular  Heart has regular rhythm and rate. No murmurs, rubs or gallops. + pedal pulses and no varicosities. Abdominal -  Soft, nontender, nondistended. Bowel sounds are intact. Extremities - No clubbing, cyanosis, or  edema. Musculoskeletal -  No clubbing . No Osler's nodes. Gait normal .  No kyphosis or scoliosis. Skin -  no statis ulcers or dermatitis. Neurological - No focal signs are identified. Oriented to person, place and time. Psychiatric -  Appropriate affect and mood. Assessment:     Diagnosis Orders   1. AICD (automatic cardioverter/defibrillator) present     2. Ischemic cardiomyopathy     3. Chronic systolic heart failure ([address]     4. PAF (paroxysmal atrial fibrillation) ([address]     5.  Essential hypertension       Data:  BP Readings from Last 3 Encounters: 09/30/21 128/70   07/14/21 118/64   06/24/21 126/70    Pulse Readings from Last 3 Encounters:   09/30/21 79   07/14/21 71   06/24/21 62        Wt Readings from Last 3 Encounters:   09/30/21 282 lb (127.9 kg)   07/14/21 279 lb (126.6 kg)   06/22/21 285 lb 14.4 oz (129.7 kg)   ICD interrogation showed adequate battery voltage   (8.7 years) and charge time with  normal diagnostics, no firings  New onset of sustained atrial fibrillation with the longest episode lasting 14 hours on September 16. Most episodes were clustered on September 10, the 12th, 13th and 16th. Patient was asymptomatic. Reset alarm so would not be-explained to function of device in detail to patient as he was unaware of shocking component  OptiVol was elevated in August but back to baseline  Mode AAIRDDDR at 60  ASVS 53%  AS 0.3%  APBS 46.7%  AP<0.1%  Has appointment with Dr. Satnam Fuentes next month. We will recheck A. fib burden at that time. Currently is not on any anticoagulation    Blood pressure and heart rate controlled. Medical management includes beta-blocker and Entresto. On long-acting nitrate  Remains on DAPT with aspirin and Plavix    Follows with Dr. Kaur Workman. We will get recent labs from his office    Had long conversation regarding low-sodium diet and watching for fluid retention. We discussed heart failure management and potential for needing to change medications should he have any more sustained atrial fibrillation   States taking medications as prescribed  Stable cardiovascular status. No evidence of overt heart failure or angina  30 minutes were spent preparing, reviewing and seeing patient. All questions answered    Plan  Will get labs from DR Kaur Workman  Follow up in 1 mos With Dr. Satnam Fuentes- will recheck afib burden  Call with any questions or concerns  Follow up with Alesha Echols MD for non cardiac problems  Report any new problems  Cardiovascular Fitness-Exercise as tolerated.   Strive for 30 minutes of exercise most days of the week. Cardiac / Healthy Diet  Continue current medications as directed  Continue plan of treatment  It is always recommended that you bring your medications bottles with you to each visit - this is for your safety! TEGAN Murray    EMR dragon/transcription disclaimer: Much of this encounter note is electronic transcription/translation of spoken language to printed tach. Electronic translation of spoken language may be erroneous, or at times, nonsensical words or phrases may be inadvertently transcribed.  Although, I have reviewed the note for such errors, some may still exist.

## 2021-10-16 ENCOUNTER — HOSPITAL ENCOUNTER (INPATIENT)
Age: 76
LOS: 1 days | Discharge: HOME | DRG: 638 | End: 2021-10-18
Attending: PEDIATRICS | Admitting: HOSPITALIST
Payer: MEDICARE

## 2021-10-16 ENCOUNTER — APPOINTMENT (OUTPATIENT)
Dept: GENERAL RADIOLOGY | Age: 76
DRG: 638 | End: 2021-10-16
Payer: MEDICARE

## 2021-10-16 ENCOUNTER — APPOINTMENT (OUTPATIENT)
Dept: CT IMAGING | Age: 76
DRG: 638 | End: 2021-10-16
Payer: MEDICARE

## 2021-10-16 DIAGNOSIS — R56.9 SEIZURE (HCC): ICD-10-CM

## 2021-10-16 DIAGNOSIS — E16.2 HYPOGLYCEMIA: Primary | ICD-10-CM

## 2021-10-16 PROBLEM — G40.919 BREAKTHROUGH SEIZURE (HCC): Status: ACTIVE | Noted: 2021-10-16

## 2021-10-16 LAB
ALBUMIN SERPL-MCNC: 4.4 G/DL (ref 3.5–5.2)
ALP BLD-CCNC: 61 U/L (ref 40–130)
ALT SERPL-CCNC: 16 U/L (ref 5–41)
ANION GAP SERPL CALCULATED.3IONS-SCNC: 11 MMOL/L (ref 7–19)
AST SERPL-CCNC: 16 U/L (ref 5–40)
BACTERIA: NEGATIVE /HPF
BASOPHILS ABSOLUTE: 0.1 K/UL (ref 0–0.2)
BASOPHILS RELATIVE PERCENT: 0.7 % (ref 0–1)
BILIRUB SERPL-MCNC: 0.3 MG/DL (ref 0.2–1.2)
BILIRUBIN URINE: NEGATIVE
BLOOD, URINE: NEGATIVE
BUN BLDV-MCNC: 24 MG/DL (ref 8–23)
C-REACTIVE PROTEIN: <0.3 MG/DL (ref 0–0.5)
CALCIUM SERPL-MCNC: 9.3 MG/DL (ref 8.8–10.2)
CHLORIDE BLD-SCNC: 105 MMOL/L (ref 98–111)
CLARITY: CLEAR
CO2: 25 MMOL/L (ref 22–29)
COLOR: YELLOW
CREAT SERPL-MCNC: 1.2 MG/DL (ref 0.5–1.2)
CRYSTALS, UA: ABNORMAL /HPF
EOSINOPHILS ABSOLUTE: 0.1 K/UL (ref 0–0.6)
EOSINOPHILS RELATIVE PERCENT: 1.6 % (ref 0–5)
EPITHELIAL CELLS, UA: 1 /HPF (ref 0–5)
GFR AFRICAN AMERICAN: >59
GFR NON-AFRICAN AMERICAN: 59
GLUCOSE BLD-MCNC: 127 MG/DL (ref 70–99)
GLUCOSE BLD-MCNC: 130 MG/DL (ref 70–99)
GLUCOSE BLD-MCNC: 37 MG/DL (ref 74–109)
GLUCOSE BLD-MCNC: 44 MG/DL (ref 70–99)
GLUCOSE BLD-MCNC: 76 MG/DL
GLUCOSE BLD-MCNC: 76 MG/DL (ref 70–99)
GLUCOSE URINE: NEGATIVE MG/DL
HBA1C MFR BLD: 5.9 % (ref 4–6)
HCT VFR BLD CALC: 47 % (ref 42–52)
HEMOGLOBIN: 14.5 G/DL (ref 14–18)
HYALINE CASTS: 2 /HPF (ref 0–8)
IMMATURE GRANULOCYTES #: 0 K/UL
KETONES, URINE: NEGATIVE MG/DL
LEUKOCYTE ESTERASE, URINE: NEGATIVE
LIPASE: 21 U/L (ref 13–60)
LYMPHOCYTES ABSOLUTE: 1.7 K/UL (ref 1.1–4.5)
LYMPHOCYTES RELATIVE PERCENT: 22.6 % (ref 20–40)
MCH RBC QN AUTO: 27.5 PG (ref 27–31)
MCHC RBC AUTO-ENTMCNC: 30.9 G/DL (ref 33–37)
MCV RBC AUTO: 89.2 FL (ref 80–94)
MONOCYTES ABSOLUTE: 0.7 K/UL (ref 0–0.9)
MONOCYTES RELATIVE PERCENT: 9.6 % (ref 0–10)
NEUTROPHILS ABSOLUTE: 4.9 K/UL (ref 1.5–7.5)
NEUTROPHILS RELATIVE PERCENT: 65.2 % (ref 50–65)
NITRITE, URINE: NEGATIVE
PDW BLD-RTO: 15.9 % (ref 11.5–14.5)
PERFORMED ON: ABNORMAL
PERFORMED ON: NORMAL
PH UA: 5 (ref 5–8)
PLATELET # BLD: 157 K/UL (ref 130–400)
PMV BLD AUTO: 11.6 FL (ref 9.4–12.4)
POTASSIUM REFLEX MAGNESIUM: 4 MMOL/L (ref 3.5–5)
PROLACTIN: 24.25 NG/ML (ref 4.04–15.2)
PROTEIN UA: 30 MG/DL
RBC # BLD: 5.27 M/UL (ref 4.7–6.1)
RBC UA: 2 /HPF (ref 0–4)
SARS-COV-2, NAAT: NOT DETECTED
SODIUM BLD-SCNC: 141 MMOL/L (ref 136–145)
SPECIFIC GRAVITY UA: 1.02 (ref 1–1.03)
TOTAL PROTEIN: 7.3 G/DL (ref 6.6–8.7)
TROPONIN: <0.01 NG/ML (ref 0–0.03)
UROBILINOGEN, URINE: 1 E.U./DL
WBC # BLD: 7.5 K/UL (ref 4.8–10.8)
WBC UA: 2 /HPF (ref 0–5)

## 2021-10-16 PROCEDURE — 70450 CT HEAD/BRAIN W/O DYE: CPT

## 2021-10-16 PROCEDURE — 93005 ELECTROCARDIOGRAM TRACING: CPT | Performed by: PEDIATRICS

## 2021-10-16 PROCEDURE — 96374 THER/PROPH/DIAG INJ IV PUSH: CPT

## 2021-10-16 PROCEDURE — 80053 COMPREHEN METABOLIC PANEL: CPT

## 2021-10-16 PROCEDURE — G0378 HOSPITAL OBSERVATION PER HR: HCPCS

## 2021-10-16 PROCEDURE — 81001 URINALYSIS AUTO W/SCOPE: CPT

## 2021-10-16 PROCEDURE — 71045 X-RAY EXAM CHEST 1 VIEW: CPT

## 2021-10-16 PROCEDURE — 86140 C-REACTIVE PROTEIN: CPT

## 2021-10-16 PROCEDURE — 6370000000 HC RX 637 (ALT 250 FOR IP): Performed by: NURSE PRACTITIONER

## 2021-10-16 PROCEDURE — 96376 TX/PRO/DX INJ SAME DRUG ADON: CPT

## 2021-10-16 PROCEDURE — 83036 HEMOGLOBIN GLYCOSYLATED A1C: CPT

## 2021-10-16 PROCEDURE — 84484 ASSAY OF TROPONIN QUANT: CPT

## 2021-10-16 PROCEDURE — 87635 SARS-COV-2 COVID-19 AMP PRB: CPT

## 2021-10-16 PROCEDURE — 2500000003 HC RX 250 WO HCPCS

## 2021-10-16 PROCEDURE — 83690 ASSAY OF LIPASE: CPT

## 2021-10-16 PROCEDURE — 82947 ASSAY GLUCOSE BLOOD QUANT: CPT

## 2021-10-16 PROCEDURE — 36415 COLL VENOUS BLD VENIPUNCTURE: CPT

## 2021-10-16 PROCEDURE — 84146 ASSAY OF PROLACTIN: CPT

## 2021-10-16 PROCEDURE — 99283 EMERGENCY DEPT VISIT LOW MDM: CPT

## 2021-10-16 PROCEDURE — 85025 COMPLETE CBC W/AUTO DIFF WBC: CPT

## 2021-10-16 RX ORDER — ACETAMINOPHEN 650 MG/1
650 SUPPOSITORY RECTAL EVERY 6 HOURS PRN
Status: DISCONTINUED | OUTPATIENT
Start: 2021-10-16 | End: 2021-10-18 | Stop reason: HOSPADM

## 2021-10-16 RX ORDER — DOCUSATE SODIUM 100 MG/1
200 CAPSULE, LIQUID FILLED ORAL DAILY
Status: DISCONTINUED | OUTPATIENT
Start: 2021-10-16 | End: 2021-10-18 | Stop reason: HOSPADM

## 2021-10-16 RX ORDER — ALPRAZOLAM 0.5 MG/1
0.5 TABLET ORAL NIGHTLY PRN
COMMUNITY

## 2021-10-16 RX ORDER — FUROSEMIDE 40 MG/1
40 TABLET ORAL DAILY
Status: DISCONTINUED | OUTPATIENT
Start: 2021-10-16 | End: 2021-10-18 | Stop reason: HOSPADM

## 2021-10-16 RX ORDER — POLYETHYLENE GLYCOL 3350 17 G/17G
17 POWDER, FOR SOLUTION ORAL DAILY PRN
Status: DISCONTINUED | OUTPATIENT
Start: 2021-10-16 | End: 2021-10-18 | Stop reason: HOSPADM

## 2021-10-16 RX ORDER — DEXTROSE MONOHYDRATE 25 G/50ML
25 INJECTION, SOLUTION INTRAVENOUS PRN
Status: DISCONTINUED | OUTPATIENT
Start: 2021-10-16 | End: 2021-10-18 | Stop reason: HOSPADM

## 2021-10-16 RX ORDER — METOPROLOL SUCCINATE 25 MG/1
25 TABLET, EXTENDED RELEASE ORAL DAILY
Status: DISCONTINUED | OUTPATIENT
Start: 2021-10-16 | End: 2021-10-18 | Stop reason: HOSPADM

## 2021-10-16 RX ORDER — ERGOCALCIFEROL 1.25 MG/1
50000 CAPSULE ORAL WEEKLY
Status: DISCONTINUED | OUTPATIENT
Start: 2021-10-21 | End: 2021-10-18 | Stop reason: HOSPADM

## 2021-10-16 RX ORDER — NITROGLYCERIN 0.4 MG/1
0.4 TABLET SUBLINGUAL EVERY 5 MIN PRN
Status: DISCONTINUED | OUTPATIENT
Start: 2021-10-16 | End: 2021-10-18 | Stop reason: HOSPADM

## 2021-10-16 RX ORDER — FINASTERIDE 5 MG/1
5 TABLET, FILM COATED ORAL DAILY
Status: DISCONTINUED | OUTPATIENT
Start: 2021-10-16 | End: 2021-10-18 | Stop reason: HOSPADM

## 2021-10-16 RX ORDER — DEXTROSE MONOHYDRATE 50 MG/ML
100 INJECTION, SOLUTION INTRAVENOUS PRN
Status: DISCONTINUED | OUTPATIENT
Start: 2021-10-16 | End: 2021-10-18 | Stop reason: HOSPADM

## 2021-10-16 RX ORDER — DEXTROSE MONOHYDRATE 25 G/50ML
INJECTION, SOLUTION INTRAVENOUS
Status: COMPLETED
Start: 2021-10-16 | End: 2021-10-16

## 2021-10-16 RX ORDER — TIZANIDINE 4 MG/1
4 TABLET ORAL EVERY 6 HOURS PRN
Status: DISCONTINUED | OUTPATIENT
Start: 2021-10-16 | End: 2021-10-18 | Stop reason: HOSPADM

## 2021-10-16 RX ORDER — ERGOCALCIFEROL 1.25 MG/1
50000 CAPSULE ORAL WEEKLY
Status: DISCONTINUED | OUTPATIENT
Start: 2021-10-16 | End: 2021-10-16

## 2021-10-16 RX ORDER — M-VIT,TX,IRON,MINS/CALC/FOLIC 27MG-0.4MG
1 TABLET ORAL DAILY
Status: DISCONTINUED | OUTPATIENT
Start: 2021-10-16 | End: 2021-10-18 | Stop reason: HOSPADM

## 2021-10-16 RX ORDER — ACETAMINOPHEN 325 MG/1
650 TABLET ORAL EVERY 6 HOURS PRN
Status: DISCONTINUED | OUTPATIENT
Start: 2021-10-16 | End: 2021-10-18 | Stop reason: HOSPADM

## 2021-10-16 RX ORDER — DEXTROSE MONOHYDRATE 25 G/50ML
12.5 INJECTION, SOLUTION INTRAVENOUS PRN
Status: DISCONTINUED | OUTPATIENT
Start: 2021-10-16 | End: 2021-10-18 | Stop reason: HOSPADM

## 2021-10-16 RX ORDER — SODIUM CHLORIDE 9 MG/ML
25 INJECTION, SOLUTION INTRAVENOUS PRN
Status: DISCONTINUED | OUTPATIENT
Start: 2021-10-16 | End: 2021-10-18 | Stop reason: HOSPADM

## 2021-10-16 RX ORDER — DEXTROSE MONOHYDRATE 100 MG/ML
INJECTION, SOLUTION INTRAVENOUS CONTINUOUS
Status: DISCONTINUED | OUTPATIENT
Start: 2021-10-16 | End: 2021-10-16

## 2021-10-16 RX ORDER — AZELASTINE 1 MG/ML
1 SPRAY, METERED NASAL 2 TIMES DAILY
COMMUNITY

## 2021-10-16 RX ORDER — POTASSIUM CHLORIDE 20 MEQ/1
40 TABLET, EXTENDED RELEASE ORAL PRN
Status: DISCONTINUED | OUTPATIENT
Start: 2021-10-16 | End: 2021-10-18 | Stop reason: HOSPADM

## 2021-10-16 RX ORDER — ONDANSETRON 4 MG/1
4 TABLET, ORALLY DISINTEGRATING ORAL EVERY 8 HOURS PRN
Status: DISCONTINUED | OUTPATIENT
Start: 2021-10-16 | End: 2021-10-18 | Stop reason: HOSPADM

## 2021-10-16 RX ORDER — ISOSORBIDE MONONITRATE 30 MG/1
30 TABLET, EXTENDED RELEASE ORAL DAILY
COMMUNITY

## 2021-10-16 RX ORDER — ASPIRIN 81 MG/1
81 TABLET ORAL DAILY
Status: DISCONTINUED | OUTPATIENT
Start: 2021-10-16 | End: 2021-10-18 | Stop reason: HOSPADM

## 2021-10-16 RX ORDER — ISOSORBIDE MONONITRATE 30 MG/1
30 TABLET, EXTENDED RELEASE ORAL DAILY
Status: DISCONTINUED | OUTPATIENT
Start: 2021-10-16 | End: 2021-10-18 | Stop reason: HOSPADM

## 2021-10-16 RX ORDER — NICOTINE POLACRILEX 4 MG
15 LOZENGE BUCCAL PRN
Status: DISCONTINUED | OUTPATIENT
Start: 2021-10-16 | End: 2021-10-18 | Stop reason: HOSPADM

## 2021-10-16 RX ORDER — SODIUM CHLORIDE 0.9 % (FLUSH) 0.9 %
5-40 SYRINGE (ML) INJECTION EVERY 12 HOURS SCHEDULED
Status: DISCONTINUED | OUTPATIENT
Start: 2021-10-16 | End: 2021-10-18 | Stop reason: HOSPADM

## 2021-10-16 RX ORDER — DEXTROSE MONOHYDRATE 100 MG/ML
INJECTION, SOLUTION INTRAVENOUS CONTINUOUS
Status: DISCONTINUED | OUTPATIENT
Start: 2021-10-16 | End: 2021-10-18

## 2021-10-16 RX ORDER — SODIUM CHLORIDE 0.9 % (FLUSH) 0.9 %
5-40 SYRINGE (ML) INJECTION PRN
Status: DISCONTINUED | OUTPATIENT
Start: 2021-10-16 | End: 2021-10-18 | Stop reason: HOSPADM

## 2021-10-16 RX ORDER — OXYCODONE AND ACETAMINOPHEN 10; 325 MG/1; MG/1
1 TABLET ORAL EVERY 4 HOURS PRN
Status: DISCONTINUED | OUTPATIENT
Start: 2021-10-16 | End: 2021-10-18 | Stop reason: HOSPADM

## 2021-10-16 RX ORDER — SERTRALINE HYDROCHLORIDE 100 MG/1
100 TABLET, FILM COATED ORAL DAILY
Status: DISCONTINUED | OUTPATIENT
Start: 2021-10-16 | End: 2021-10-18 | Stop reason: HOSPADM

## 2021-10-16 RX ORDER — ONDANSETRON 2 MG/ML
4 INJECTION INTRAMUSCULAR; INTRAVENOUS EVERY 6 HOURS PRN
Status: DISCONTINUED | OUTPATIENT
Start: 2021-10-16 | End: 2021-10-18 | Stop reason: HOSPADM

## 2021-10-16 RX ORDER — POTASSIUM CHLORIDE 7.45 MG/ML
10 INJECTION INTRAVENOUS PRN
Status: DISCONTINUED | OUTPATIENT
Start: 2021-10-16 | End: 2021-10-18 | Stop reason: HOSPADM

## 2021-10-16 RX ORDER — MAGNESIUM SULFATE IN WATER 40 MG/ML
2000 INJECTION, SOLUTION INTRAVENOUS PRN
Status: DISCONTINUED | OUTPATIENT
Start: 2021-10-16 | End: 2021-10-18 | Stop reason: HOSPADM

## 2021-10-16 RX ORDER — FAMOTIDINE 40 MG/1
40 TABLET, FILM COATED ORAL DAILY
COMMUNITY

## 2021-10-16 RX ORDER — CLOPIDOGREL BISULFATE 75 MG/1
75 TABLET ORAL DAILY
Status: DISCONTINUED | OUTPATIENT
Start: 2021-10-16 | End: 2021-10-18 | Stop reason: HOSPADM

## 2021-10-16 RX ADMIN — ASPIRIN 81 MG: 81 TABLET, COATED ORAL at 15:07

## 2021-10-16 RX ADMIN — DEXTROSE MONOHYDRATE 25 G: 25 INJECTION, SOLUTION INTRAVENOUS at 05:50

## 2021-10-16 RX ADMIN — SACUBITRIL AND VALSARTAN 1 TABLET: 49; 51 TABLET, FILM COATED ORAL at 15:07

## 2021-10-16 RX ADMIN — SACUBITRIL AND VALSARTAN 1 TABLET: 49; 51 TABLET, FILM COATED ORAL at 21:05

## 2021-10-16 RX ADMIN — MULTIPLE VITAMINS W/ MINERALS TAB 1 TABLET: TAB at 15:08

## 2021-10-16 RX ADMIN — ISOSORBIDE MONONITRATE 30 MG: 30 TABLET, EXTENDED RELEASE ORAL at 15:08

## 2021-10-16 RX ADMIN — FUROSEMIDE 40 MG: 40 TABLET ORAL at 15:08

## 2021-10-16 RX ADMIN — DOCUSATE SODIUM 200 MG: 100 CAPSULE ORAL at 15:08

## 2021-10-16 RX ADMIN — METOPROLOL SUCCINATE 25 MG: 25 TABLET, EXTENDED RELEASE ORAL at 15:07

## 2021-10-16 RX ADMIN — TIZANIDINE 4 MG: 4 TABLET ORAL at 21:05

## 2021-10-16 RX ADMIN — FINASTERIDE 5 MG: 5 TABLET, FILM COATED ORAL at 15:08

## 2021-10-16 RX ADMIN — OXYCODONE AND ACETAMINOPHEN 1 TABLET: 10; 325 TABLET ORAL at 21:05

## 2021-10-16 RX ADMIN — CLOPIDOGREL BISULFATE 75 MG: 75 TABLET ORAL at 15:08

## 2021-10-16 RX ADMIN — DEXTROSE MONOHYDRATE: 100 INJECTION, SOLUTION INTRAVENOUS at 07:34

## 2021-10-16 RX ADMIN — SERTRALINE HYDROCHLORIDE 100 MG: 100 TABLET ORAL at 15:07

## 2021-10-16 ASSESSMENT — ENCOUNTER SYMPTOMS
WHEEZING: 0
BLOOD IN STOOL: 0
ABDOMINAL DISTENTION: 0
COUGH: 0
NAUSEA: 0
VOMITING: 0
EYE DISCHARGE: 0
COLOR CHANGE: 1
ABDOMINAL PAIN: 0
CONSTIPATION: 0
BACK PAIN: 1
DIARRHEA: 0
RHINORRHEA: 0
SHORTNESS OF BREATH: 0
COLOR CHANGE: 0

## 2021-10-16 ASSESSMENT — PAIN SCALES - GENERAL
PAINLEVEL_OUTOF10: 5
PAINLEVEL_OUTOF10: 5
PAINLEVEL_OUTOF10: 3

## 2021-10-16 NOTE — PLAN OF CARE
Rousable but post ictal  sugar was 37 on BMP then low 40s in ED on POCT initially, now s/p D50 whole amp 76 on most recent FSBG  He got D10 en route but less than 200cc given  D10 just started   Suspected to be hypoglycemic s/p seizure  Lantus 80 units QHS  Also takes 10 units \"hot shot\"   Has been hypoglycemic the last week at night and was running to get OJ, was as per wife to decrease the \"hot shot\" tonight but she is unsure if she had  He was gasping when seizign and had acral cyanosis of 5 minutes  EMS was called at that time      Basic admit order and orders to prevent hypoglycemia ordered

## 2021-10-16 NOTE — H&P
[facility]      Hospitalist - History & Physical      PCP: Noris Lubin MD    Date of Admission: 10/16/2021    Date of Service: 10/16/2021    Chief Complaint: Seizure    History Of Present Illness: The patient is a 67 y.o. male with a hx of CAD, MOO, HTN, BPH, CHF, HLD, and DM who presented to [address] [place] [address] ED via EMS complaining of seizure. ?? ?cyanosis and gasping breaths while seizing, ~5min? EMS advised BG enroute was 54. Further ED workup reveals BG of 37 on CMP. CT of the head shows chronic changes, but no acute findings. Chest x-ray No acute findings. Prolactin pending. Hospitalists are being requested for admission and medical management of pt s/p seizure suspected to be secondary to hypoglycemia. The patient is now A&Ox3, but is somewhat slow to respond. He states that he took 70 units of Lantus at around 0000. He and his wife state that he took his \"hot shot\", which he only takes at times. She states that she woke up around 3AM with him having a seizure in the bed. They are unaware of the name of this medication, however, do know it is for diabetes. He states over the last week, he has been waking up in the AM with his BG in the 50's, which improves with breakfast and OJ. He denies any recent illness. He denies a previous hx of seizures. Denies pain. Denies any recent illnesses. Denies a change in appetite, cough, fever or fatigue.  He has been vaccinated for Covid-19      Past Medical History:        Diagnosis Date    Acute ischemic stroke ([facility] 2/20/2017    Atherosclerotic heart disease     s/p MI, stenting x 3 jan 2011([place])    Breakthrough seizure ([facility] 10/16/2021    CAD (coronary artery disease)     Cerebral artery occlusion with cerebral infarction [facility])     Cerebrovascular accident (CVA) due to embolism of right middle cerebral artery ([facility] 8/14/2017    CHF (congestive heart failure) (HCC)     Chronic bilateral low back pain without sciatica 11/10/2016    Chronic kidney disease  DDD (degenerative disc disease), lumbar 11/15/2016    Depression     Depression with anxiety     Diabetes mellitus ([address]     type II    Diabetes mellitus, type 2 ([address]     DM (diabetes mellitus) ([address] 7/12/2011    ESBL (extended spectrum beta-lactamase) producing bacteria infection 11/16/2019    urine    Glaucoma     Headache     Hyperlipidemia     Cholesterol management per pcp.  Hypertension     Macular degeneration     MI (myocardial infarction) ([address]     MI (myocardial infarction) ([address]     Neuromuscular disorder ([address]     Neuropathy     MOO on CPAP     Osteoarthritis     Palliative care patient 06/11/2019    Sleep apnea     Spondylosis of lumbar region without myelopathy or radiculopathy 11/15/2016    Status post placement of implantable loop recorder 10/31/2017    TIA (transient ischemic attack)        Past Surgical History:        Procedure Laterality Date    APPENDECTOMY      at age 4    BACK SURGERY      2016 (fusion), 2017    BREAST SURGERY  05/2019    CARDIAC CATHETERIZATION  12/12/12    with angioplasty, stent    CARDIAC CATHETERIZATION  3/29/15  MDL    stent to distal RCA.  EF 60%    CHOLECYSTECTOMY, LAPAROSCOPIC      2012    COLONOSCOPY      2010    CORONARY ANGIOPLASTY WITH STENT PLACEMENT  jan 11 [place]    stent x 3    CORONARY ANGIOPLASTY WITH STENT PLACEMENT      01/01/11    CORONARY ARTERY BYPASS GRAFT N/A 5/30/2019    CORONARY ARTERY BYPASS GRAFT X3 WITH LEFT INTERNAL MAMMARY ARTERY WITH ENDOSCOPIC VEIN HARVESTING WITH PERFUSION TRANSESOPHAGEAL ECHOCARDIOGRAM performed by Michael Huerta MD at [address] [place] [address] CATH LAB PROCEDURE  [id number]    primary stent placement to the first circumflex marginal branch, balloon angioplasty to the third left anterior descending diagnonal branch, intracoronary nitroglycerin adminstration    JOINT REPLACEMENT      left knee, ~2016    LUMBAR FUSION Left 11/15/2016    LUMBAR INTERBODY FUSION LATERAL L3-4 L4-5 WITH LATERAL PLATE  performed by Maddy Denson MD at [address]  11/02/2019       Home Medications:  Prior to Admission medications    Medication Sig Start Date End Date Taking? Authorizing Provider   Amoxicillin 500 MG TABS Take 1 capsule by mouth 3 times daily 7/8/21   Historical Provider, MD   aspirin 81 MG EC tablet Take 1 tablet by mouth daily 6/25/21 10/3/21  Kurtis Fountain MD   isosorbide mononitrate (IMDUR) 30 MG extended release tablet Take 1 tablet by mouth daily 6/25/21   Kurtis Fountain MD   metoprolol succinate (TOPROL XL) 25 MG extended release tablet Take 1 tablet by mouth daily 6/25/21 9/30/21  Kurtis Fountain MD   vitamin D (ERGOCALCIFEROL) 1.25 MG (65146 UT) CAPS capsule Take 1 capsule by mouth once a week 7/1/21 7/31/21  Kurtis Fountain MD   tiZANidine (ZANAFLEX) 4 MG tablet Take 4 mg by mouth every 6 hours as needed    Historical Provider, MD   finasteride (PROSCAR) 5 MG tablet Take 5 mg by mouth daily 9/3/20   Historical Provider, MD   docusate sodium (COLACE) 100 MG capsule Take 200 mg by mouth daily    Historical Provider, MD   furosemide (LASIX) 40 MG tablet Take 40 mg by mouth daily Currently taking 2 daily    Historical Provider, MD   Multiple Vitamins-Minerals (PRESERVISION AREDS PO) Take 2 capsules by mouth    Historical Provider, MD   oxyCODONE-acetaminophen (PERCOCET)  MG per tablet Take 1 tablet by mouth every 4 hours as needed for Pain. Historical Provider, MD   insulin glargine (LANTUS SOLOSTAR) 100 UNIT/ML injection pen Inject 80 Units into the skin nightly 9/26/19   Liliana Clark MD   sertraline (ZOLOFT) 100 MG tablet Take 1 tablet by mouth daily 9/10/19   TEGAN Hercules   nitroGLYCERIN (NITROSTAT) 0.4 MG SL tablet up to max of 3 total doses.  If no relief after 1 dose, call 911. 9/10/19   TEGAN Hercules   clopidogrel (PLAVIX) 75 MG tablet TAKE 1 TABLET DAILY 9/10/19   TEGAN Stevens   sacubitril-valsartan (ENTRESTO) 49-51 MG per tablet Take 1 tablet by mouth 2 times daily 19   Ambar ClarkTEGAN antonio   butalbital-acetaminophen-caffeine (FIORICET, ESGIC) -22 MG per tablet Take 1 tablet by mouth as needed for Headaches  Patient taking differently: Take 1 tablet by mouth every 6 hours as needed for Headaches  19  Papito Justin, DO   fluticasone [facility]) 50 MCG/ACT nasal spray 1 spray by Each Nostril route daily 19   Greg[facility], DO   zoster recombinant adjuvanted vaccine [facility]) 50 MCG/0.5ML SUSR injection 1 vaccine now and repeat in 2-6 months. 18   Ghada Singh MD       Allergies:    Statins, Crestor [rosuvastatin calcium], Adhesive tape, and Morphine    Social History:    The patient currently lives at home with wife  Tobacco:   reports that he has never smoked. He has never used smokeless tobacco.  Alcohol:   reports current alcohol use of about 1.0 standard drinks of alcohol per week. Illicit Drugs: Denies     Family History:      Problem Relation Age of Onset    Coronary Art Dis Mother     Cancer Sister         uterine    Kidney Disease Sister     Coronary Art Dis Brother          in his 46s - age 47    Cancer Father         colon    Coronary Art Dis Sister     Cancer Sister         olive cancer    No Known Problems Sister     No Known Problems Sister     No Known Problems Son     Alcohol Abuse Son         drank self to death    No Known Problems Son        Review of Systems:   Review of Systems   Constitutional: Negative for chills, diaphoresis, fatigue and fever. HENT: Negative for congestion and ear pain. Eyes: Negative for visual disturbance. Respiratory: Negative for cough, shortness of breath and wheezing. Cardiovascular: Negative for chest pain, palpitations and leg swelling. Gastrointestinal: Negative for abdominal distention, abdominal pain, blood in stool, constipation, diarrhea, nausea and vomiting. Endocrine: Negative for cold intolerance and heat intolerance. Genitourinary: Negative for difficulty urinating, flank pain, frequency and urgency. Musculoskeletal: Negative for arthralgias and myalgias. Skin: Negative for color change and wound. Neurological: Positive for seizures. Negative for dizziness, syncope, weakness, light-headedness, numbness and headaches. Hematological: Does not bruise/bleed easily. Psychiatric/Behavioral: Negative for agitation, confusion and dysphoric mood. 14 point review of systems is negative except as specifically addressed above. Physical Examination:  BP (!) 145/86   Pulse 85   Temp 97.7 °F (36.5 °C) (Axillary)   Resp 18   Ht 6' (1.829 m)   Wt 285 lb (129.3 kg)   SpO2 93%   BMI 38.65 kg/m²   Physical Exam  Constitutional:       General: He is not in acute distress. Appearance: Normal appearance. He is obese. He is not ill-appearing. HENT:      Head: Normocephalic and atraumatic. Right Ear: External ear normal.      Left Ear: External ear normal.      Nose: Nose normal.      Mouth/Throat:      Mouth: Mucous membranes are moist.   Eyes:      Extraocular Movements: Extraocular movements intact. Conjunctiva/sclera: Conjunctivae normal.      Pupils: Pupils are equal, round, and reactive to light. Cardiovascular:      Rate and Rhythm: Normal rate and regular rhythm. Pulses: Normal pulses. Heart sounds: Normal heart sounds. Pulmonary:      Effort: Pulmonary effort is normal. No respiratory distress. Breath sounds: Normal breath sounds. No wheezing, rhonchi or rales. Abdominal:      General: Bowel sounds are normal. There is no distension. Palpations: Abdomen is soft. Tenderness: There is no abdominal tenderness. Musculoskeletal:         General: No swelling, tenderness or deformity. Normal range of motion. Cervical back: Normal range of motion and neck supple. No muscular tenderness. Right lower leg: No edema. Left lower leg: No edema.    Skin:     General: Skin is warm and dry. Findings: No bruising or lesion. Neurological:      Mental Status: He is alert and oriented to person, place, and time. Comments: Respond to questioning slow. Does appear sleepy but arouses easily   Psychiatric:         Mood and Affect: Mood normal.         Behavior: Behavior normal.         Thought Content: Thought content normal.          Diagnostic Data:  CBC:  Recent Labs     10/16/21  0534   WBC 7.5   HGB 14.5   HCT 47.0        BMP:  Recent Labs     10/16/21  0534      K 4.0      CO2 25   BUN 24*   CREATININE 1.2   CALCIUM 9.3     Recent Labs     10/16/21  0534   AST 16   ALT 16   BILITOT 0.3   ALKPHOS 61     Cardiac Enzymes:   Recent Labs     10/16/21  0534   TROPONINI <0.01     ABGs:  Lab Results   Component Value Date    PHART 7.500 11/16/2019    PO2ART 54.0 11/16/2019    JGH3ERN 31.0 11/16/2019     Urinalysis:  Lab Results   Component Value Date    NITRU Negative 10/16/2021    WBCUA 0 06/22/2021    BACTERIA NEGATIVE 06/22/2021    RBCUA 1 06/22/2021    BLOODU Negative 10/16/2021    SPECGRAV 1.022 10/16/2021    GLUCOSEU Negative 10/16/2021     A1C:   Recent Labs     10/16/21  0534   LABA1C 5.9       CT Head WO Contrast    Result Date: 10/16/2021  EXAMINATION: CT HEAD WO CONTRAST 10/16/2021 7:52 AM HISTORY: Seizure, altered mental status. DOSE:    819 mGycm. Automatic exposure control was utilized in an effort to use as little radiation as possible, without compromising image quality. REPORT: Axial CT of the head was performed without contrast, reconstructed sagittal and coronal images are also reviewed. COMPARISON: CT head without contrast 6/22/2021. No intracranial hemorrhage, mass or mass effect is identified. The ventricles and basal cisterns are within normal limits. There is decreased attenuation in the periventricular and subcortical white matter tracts compatible chronic small vessel white matter ischemic disease.  There is a small chronic lacunar brings home meds   -A1C-5.9      Essential hypertension   -Noted   -Continue home meds      Coronary artery disease involving native coronary artery of native heart without angina pectoris   -Noted   -PRN NTG   -Continue Imdur      Mixed hyperlipidemia    -Noted   -Continue statin therapy      Benign prostatic hyperplasia without lower urinary tract symptoms   -Noted   -Continue home meds   -Assess for urine retention      MOO on CPAP   -Noted   -Wife advised to bring home CPAP      Palliative care patient   -Noted    -Palliative care consulted      Chronic systolic heart failure ([facility]   -Noted   -Continue home Lasix    -2g Na diet    VTE prophylaxis-Lovenox     Signed:  Barry Paez, APRN, 10/16/2021 8:22 AM

## 2021-10-16 NOTE — ED PROVIDER NOTES
[facility] EMERGENCY DEPT  eMERGENCY dEPARTMENT eNCOUnter      Pt Name: Mica Cm  MRN: [id number]  Marvelgfdaria [id number][birth year]86  Date of evaluation: 10/16/2021  Provider: Oanh Alexandra MD    CHIEF COMPLAINT       Chief Complaint   Patient presents with    Altered Mental Status     per EMS pt wife told them pt convulsed in bed for approx 1 min; no hx of seizures. Hx of CVA. HISTORY OF PRESENT ILLNESS   (Location/Symptom, Timing/Onset,Context/Setting, Quality, Duration, Modifying Factors, Severity)  Note limiting factors. Mica Cm is a 67 y.o. male who presents to the emergency department with altered mental status. Patient's wife convinced majority of history. Wife states that she awoke to patient \"shaking and seizing at approximately 4 AM.  He did not respond. It lasted approximately 5 minutes. His mouth looked blue and he was gasping. I called 911. \"  Patient has no history of prior seizure disorder. Wife states that they ate out at approximately 5 PM which is earlier than usual.  Patient took his Lantus 80 units and \"hot shot\" insulin 10 units before bed. Wife states that patient [place] been running low recently. Patient will get up and go for orange juice when he feels low. \"  Patient has been in his usual health and has had no significant complaints before this episode. HPI    NursingNotes were reviewed. REVIEW OF SYSTEMS    (2-9 systems for level 4, 10 or more for level 5)     Review of Systems   Constitutional: Negative for chills and fever. HENT: Negative for congestion and rhinorrhea. Eyes: Negative for discharge. Respiratory: Negative for cough and shortness of breath. Cardiovascular: Negative for chest pain and palpitations. Gastrointestinal: Negative for abdominal pain, diarrhea, nausea and vomiting. Genitourinary: Negative for difficulty urinating and dysuria. Musculoskeletal: Positive for back pain (Chronic). Negative for neck pain. Skin: Positive for color change. Negative for rash. Neurological: Positive for seizures and weakness. Negative for syncope and light-headedness. Psychiatric/Behavioral: Negative for agitation and suicidal ideas. All other systems reviewed and are negative. PAST MEDICALHISTORY     Past Medical History:   Diagnosis Date    Acute ischemic stroke ([address] 2/20/2017    Atherosclerotic heart disease     s/p MI, stenting x 3 jan 2011([place]    Breakthrough seizure ([address] 10/16/2021    CAD (coronary artery disease)     Cerebral artery occlusion with cerebral infarction [facility])     Cerebrovascular accident (CVA) due to embolism of right middle cerebral artery ([address] 8/14/2017    CHF (congestive heart failure) ([facility])     Chronic bilateral low back pain without sciatica 11/10/2016    Chronic kidney disease     DDD (degenerative disc disease), lumbar 11/15/2016    Depression     Depression with anxiety     Diabetes mellitus ([address]     type II    Diabetes mellitus, type 2 ([address]     DM (diabetes mellitus) ([address] 7/12/2011    ESBL (extended spectrum beta-lactamase) producing bacteria infection 11/16/2019    urine    Glaucoma     Headache     Hyperlipidemia     Cholesterol management per pcp.      Hypertension     Macular degeneration     MI (myocardial infarction) ([address]     MI (myocardial infarction) ([address]     Neuromuscular disorder ([address]     Neuropathy     MOO on CPAP     Osteoarthritis     Palliative care patient 06/11/2019    Sleep apnea     Spondylosis of lumbar region without myelopathy or radiculopathy 11/15/2016    Status post placement of implantable loop recorder 10/31/2017    TIA (transient ischemic attack)          SURGICAL HISTORY       Past Surgical History:   Procedure Laterality Date    APPENDECTOMY      at age 4   [address]      2016 (fusion), 2017    BREAST SURGERY  05/2019    CARDIAC CATHETERIZATION  12/12/12    with angioplasty, stent    CARDIAC CATHETERIZATION  3/29/15  MDL    stent to distal Take 2 capsules by mouth    NITROGLYCERIN (NITROSTAT) 0.4 MG SL TABLET    up to max of 3 total doses. If no relief after 1 dose, call 911. OXYCODONE-ACETAMINOPHEN (PERCOCET)  MG PER TABLET    Take 1 tablet by mouth every 4 hours as needed for Pain. SACUBITRIL-VALSARTAN (ENTRESTO) 49-51 MG PER TABLET    Take 1 tablet by mouth 2 times daily    SERTRALINE (ZOLOFT) 100 MG TABLET    Take 1 tablet by mouth daily    TIZANIDINE (ZANAFLEX) 4 MG TABLET    Take 4 mg by mouth every 6 hours as needed    VITAMIN D (ERGOCALCIFEROL) 1.25 MG (98529 UT) CAPS CAPSULE    Take 1 capsule by mouth once a week    ZOSTER RECOMBINANT ADJUVANTED VACCINE (SHINGRIX) 50 MCG/0.5ML SUSR INJECTION    1 vaccine now and repeat in 2-6 months. ALLERGIES     Statins, Crestor [rosuvastatin calcium], Adhesive tape, and Morphine    FAMILY HISTORY       Family History   Problem Relation Age of Onset    Coronary Art Dis Mother     Cancer Sister         uterine    Kidney Disease Sister     Coronary Art Dis Brother          in his 46s - age 47    Cancer Father         colon    Coronary Art Dis Sister     Cancer Sister         olive cancer    No Known Problems Sister     No Known Problems Sister     No Known Problems Son     Alcohol Abuse Son         drank self to death    No Known Problems Son           SOCIAL HISTORY       Social History     Socioeconomic History    Marital status:      Spouse name: Germán Borrego Number of children: 3    Years of education: Not on file    Highest education level: Not on file   Occupational History    Occupation:    Tobacco Use    Smoking status: Never Smoker    Smokeless tobacco: Never Used   Vaping Use    Vaping Use: Never used   Substance and Sexual Activity    Alcohol use: Yes     Alcohol/week: 1.0 standard drinks     Types: 1 Shots of liquor per week     Comment: Occ.     Drug use: Not Currently     Types: Marijuana     Comment: edibles, uses for sleep    Sexual activity: Not on file     Comment: 3 sons   Other Topics Concern    Not on file   Social History Narrative    CODE STATUS: Full Code    HEALTH CARE PROXY: his wife, Mrs. Carmen Wyatt, [phone]    AMBULATES: independently    DOMICILED: private home, stairs inside home, has a few steps to enter home, lives alone with wife, no pets     Social Determinants of Health     Financial Resource Strain:     Difficulty of Paying Living Expenses:    Food Insecurity:     Worried About [address] in the Last Year:     [address] [employer] in the Last Year:    Transportation Needs:     Lack of Transportation (Medical):  Lack of Transportation (Non-Medical):    Physical Activity:     Days of Exercise per Week:     Minutes of Exercise per Session:    Stress:     Feeling of Stress :    Social Connections:     Frequency of Communication with Friends and Family:     Frequency of Social Gatherings with Friends and Family:     Attends Shinto Services:     Active Member of Clubs or Organizations:     Attends Club or Organization Meetings:     Marital Status:    Intimate Partner Violence:     Fear of Current or Ex-Partner:     Emotionally Abused:     Physically Abused:     Sexually Abused:        SCREENINGS             PHYSICAL EXAM    (up to 7 for level 4, 8 or more for level 5)     ED Triage Vitals   BP Temp Temp Source Pulse Resp SpO2 Height Weight   10/16/21 0509 10/16/21 0511 10/16/21 0511 10/16/21 0509 10/16/21 0509 10/16/21 0509 10/16/21 0509 10/16/21 0509   (!) 145/86 97.7 °F (36.5 °C) Axillary 85 18 93 % 6' (1.829 m) 285 lb (129.3 kg)       Physical Exam  Vitals and nursing note reviewed. Constitutional:       General: He is not in acute distress. Appearance: He is obese. Comments: Patient is somnolent but arouses briefly. Patient appears to be post ictal at this time. HENT:      Head: Normocephalic and atraumatic.       Right Ear: External ear normal.      Left Ear: External ear normal.      Nose: Nose normal.      Mouth/Throat:      Mouth: Mucous membranes are moist.      Pharynx: Oropharynx is clear. No oropharyngeal exudate. Eyes:      General: No scleral icterus. Conjunctiva/sclera: Conjunctivae normal.      Pupils: Pupils are equal, round, and reactive to light. Cardiovascular:      Rate and Rhythm: Normal rate and regular rhythm. Pulses: Normal pulses. Heart sounds: Normal heart sounds. Pulmonary:      Effort: Pulmonary effort is normal.      Breath sounds: Normal breath sounds. Abdominal:      General: Bowel sounds are normal.      Palpations: Abdomen is soft. Tenderness: There is no abdominal tenderness. There is no guarding. Musculoskeletal:         General: No tenderness or deformity. Cervical back: Neck supple. No rigidity. Right lower leg: Edema present. Left lower leg: Edema present. Skin:     General: Skin is warm and dry. Capillary Refill: Capillary refill takes less than 2 seconds. Coloration: Skin is not jaundiced. Neurological:      General: No focal deficit present. Mental Status: He is alert and oriented to person, place, and time. Mental status is at baseline. Cranial Nerves: Facial asymmetry (Patient has difficulty making bilateral facial muscles have strong smile. Patient improves later in time.) present. No dysarthria. Sensory: No sensory deficit. Motor: Weakness (Generalized) present. No abnormal muscle tone. Comments: Patient's neurologic status improved with sequential exams. Patient initially appears postictal   Psychiatric:         Mood and Affect: Mood normal.         Speech: Speech normal.         Behavior: Behavior normal. Behavior is cooperative. Comments: On final examination patient laughs and jokes. Patient is oriented x4.          DIAGNOSTIC RESULTS     EKG: All EKG's areinterpreted by the Emergency Department Physician who either signs or Co-signs this chart in the absence of a cardiologist.    EKG dated 2021 at 0 5:30 AM: Normal sinus rhythm, rate 73. T wave flattening in multiple leads. Nonspecific ST-T wave changes. Artifact present. RADIOLOGY:  Non-plain film images such as CT, Ultrasound and MRI are read by the radiologist. Plain radiographic images are visualized and preliminarily interpreted bythe emergency physician with the below findings:          CT Head WO Contrast   Final Result   Impression: No acute intracranial abnormality. Stable chronic areas of   infarction identified as well as moderate chronic small vessel white   matter ischemic disease. There is mild diffuse cerebral atrophy. Signed by Dr Luis Roth. [place]      XR CHEST PORTABLE   Final Result   Pulmonary hypoventilation with mild cardiomegaly and   central vascular congestion but no evidence of overt CHF. No   consolidating pulmonary infiltrates. Signed by Dr Luis Roth.  [place]              LABS:  Labs Reviewed   CBC WITH AUTO DIFFERENTIAL - Abnormal; Notable for the following components:       Result Value    MCHC 30.9 (*)     RDW 15.9 (*)     Neutrophils % 65.2 (*)     All other components within normal limits   COMPREHENSIVE METABOLIC PANEL W/ REFLEX TO MG FOR LOW K - Abnormal; Notable for the following components:    Glucose 37 (*)     BUN 24 (*)     GFR Non- 59 (*)     All other components within normal limits    Narrative:     CALL  Tian  K[facility] tel. ,  Chemistry results called to and read back by Yolanda Zelaya in ED, 10/16/2021  06:18, by DURGA   URINE RT REFLEX TO CULTURE - Abnormal; Notable for the following components:    Protein, UA 30 (*)     All other components within normal limits   MICROSCOPIC URINALYSIS - Abnormal; Notable for the following components:    Bacteria, UA NEGATIVE (*)     Crystals, UA NEG (*)     All other components within normal limits   POCT GLUCOSE - Abnormal; Notable for the following components:    POC Glucose 44 (*)     All other components within normal limits   POCT GLUCOSE - Normal   COVID-19, RAPID   TROPONIN   LIPASE   C-REACTIVE PROTEIN   HEMOGLOBIN A1C   PROLACTIN   POCT GLUCOSE   POCT GLUCOSE   POCT GLUCOSE       All other labs were within normal range or not returned as of this dictation. EMERGENCY DEPARTMENT COURSE and DIFFERENTIAL DIAGNOSIS/MDM:   Vitals:    Vitals:    10/16/21 0509 10/16/21 0511 10/16/21 0801 10/16/21 0814   BP: (!) 145/86  133/79    Pulse: 85  68 67   Resp: 18  18 19   Temp:  97.7 °F (36.5 °C)     TempSrc:  Axillary     SpO2: 93%  97% 96%   Weight: 285 lb (129.3 kg)      Height: 6' (1.829 m)          MDM  65-year-old male presents with altered mental status after episode of hypoglycemia and seizure. Patient improves on serial examinations. Patient remains with generalized weakness and somnolence. Despite administration of D50 patient's blood sugar remains at 86. D10 IV drip started. Discussed with Dr. Kae Sandifer, hospitalist, who will admit patient for further evaluation and treatment. CONSULTS:  IP CONSULT TO PALLIATIVE CARE    PROCEDURES:  Unless otherwise noted below, none     Procedures    FINAL IMPRESSION      1. Hypoglycemia    2.  Seizure [facility])          DISPOSITION/PLAN   DISPOSITION Admitted 10/16/2021 07:06:34 AM           (Please note that portions of this note were completed with a voice recognition program.  Efforts were made to edit thedictations but occasionally words are mis-transcribed.)    Sherrill Villagomez MD (electronically signed)  Attending Emergency Physician          Sherrill Villagomez MD  10/16/21 5354

## 2021-10-17 PROBLEM — R56.9 SEIZURE (HCC): Status: ACTIVE | Noted: 2021-10-17

## 2021-10-17 LAB
ALBUMIN SERPL-MCNC: 4.2 G/DL (ref 3.5–5.2)
ALP BLD-CCNC: 61 U/L (ref 40–130)
ALT SERPL-CCNC: 14 U/L (ref 5–41)
ANION GAP SERPL CALCULATED.3IONS-SCNC: 12 MMOL/L (ref 7–19)
AST SERPL-CCNC: 15 U/L (ref 5–40)
BASOPHILS ABSOLUTE: 0.1 K/UL (ref 0–0.2)
BASOPHILS RELATIVE PERCENT: 0.7 % (ref 0–1)
BILIRUB SERPL-MCNC: 0.4 MG/DL (ref 0.2–1.2)
BUN BLDV-MCNC: 21 MG/DL (ref 8–23)
CALCIUM SERPL-MCNC: 9.1 MG/DL (ref 8.8–10.2)
CHLORIDE BLD-SCNC: 104 MMOL/L (ref 98–111)
CO2: 25 MMOL/L (ref 22–29)
CREAT SERPL-MCNC: 1.3 MG/DL (ref 0.5–1.2)
EOSINOPHILS ABSOLUTE: 0.2 K/UL (ref 0–0.6)
EOSINOPHILS RELATIVE PERCENT: 2.1 % (ref 0–5)
GFR AFRICAN AMERICAN: >59
GFR NON-AFRICAN AMERICAN: 53
GLUCOSE BLD-MCNC: 101 MG/DL (ref 70–99)
GLUCOSE BLD-MCNC: 186 MG/DL (ref 70–99)
GLUCOSE BLD-MCNC: 279 MG/DL (ref 70–99)
GLUCOSE BLD-MCNC: 42 MG/DL (ref 74–109)
GLUCOSE BLD-MCNC: 64 MG/DL (ref 70–99)
GLUCOSE BLD-MCNC: 97 MG/DL (ref 70–99)
HCT VFR BLD CALC: 47.7 % (ref 42–52)
HEMOGLOBIN: 14.8 G/DL (ref 14–18)
IMMATURE GRANULOCYTES #: 0 K/UL
LYMPHOCYTES ABSOLUTE: 3.2 K/UL (ref 1.1–4.5)
LYMPHOCYTES RELATIVE PERCENT: 35.5 % (ref 20–40)
MCH RBC QN AUTO: 27.1 PG (ref 27–31)
MCHC RBC AUTO-ENTMCNC: 31 G/DL (ref 33–37)
MCV RBC AUTO: 87.4 FL (ref 80–94)
MONOCYTES ABSOLUTE: 0.7 K/UL (ref 0–0.9)
MONOCYTES RELATIVE PERCENT: 8.2 % (ref 0–10)
NEUTROPHILS ABSOLUTE: 4.8 K/UL (ref 1.5–7.5)
NEUTROPHILS RELATIVE PERCENT: 53.3 % (ref 50–65)
PDW BLD-RTO: 15.9 % (ref 11.5–14.5)
PERFORMED ON: ABNORMAL
PERFORMED ON: NORMAL
PLATELET # BLD: 151 K/UL (ref 130–400)
PMV BLD AUTO: 11.9 FL (ref 9.4–12.4)
POTASSIUM REFLEX MAGNESIUM: 3.9 MMOL/L (ref 3.5–5)
RBC # BLD: 5.46 M/UL (ref 4.7–6.1)
SODIUM BLD-SCNC: 141 MMOL/L (ref 136–145)
TOTAL PROTEIN: 7 G/DL (ref 6.6–8.7)
WBC # BLD: 9.1 K/UL (ref 4.8–10.8)

## 2021-10-17 PROCEDURE — 6360000002 HC RX W HCPCS: Performed by: NURSE PRACTITIONER

## 2021-10-17 PROCEDURE — 80053 COMPREHEN METABOLIC PANEL: CPT

## 2021-10-17 PROCEDURE — 85025 COMPLETE CBC W/AUTO DIFF WBC: CPT

## 2021-10-17 PROCEDURE — G0378 HOSPITAL OBSERVATION PER HR: HCPCS

## 2021-10-17 PROCEDURE — 82947 ASSAY GLUCOSE BLOOD QUANT: CPT

## 2021-10-17 PROCEDURE — 6370000000 HC RX 637 (ALT 250 FOR IP): Performed by: NURSE PRACTITIONER

## 2021-10-17 PROCEDURE — 36415 COLL VENOUS BLD VENIPUNCTURE: CPT

## 2021-10-17 PROCEDURE — 1210000000 HC MED SURG R&B

## 2021-10-17 PROCEDURE — 96372 THER/PROPH/DIAG INJ SC/IM: CPT

## 2021-10-17 RX ADMIN — MULTIPLE VITAMINS W/ MINERALS TAB 1 TABLET: TAB at 09:05

## 2021-10-17 RX ADMIN — DOCUSATE SODIUM 200 MG: 100 CAPSULE ORAL at 09:05

## 2021-10-17 RX ADMIN — METOPROLOL SUCCINATE 25 MG: 25 TABLET, EXTENDED RELEASE ORAL at 09:05

## 2021-10-17 RX ADMIN — SACUBITRIL AND VALSARTAN 1 TABLET: 49; 51 TABLET, FILM COATED ORAL at 09:05

## 2021-10-17 RX ADMIN — ISOSORBIDE MONONITRATE 30 MG: 30 TABLET, EXTENDED RELEASE ORAL at 09:05

## 2021-10-17 RX ADMIN — SACUBITRIL AND VALSARTAN 1 TABLET: 49; 51 TABLET, FILM COATED ORAL at 20:16

## 2021-10-17 RX ADMIN — FINASTERIDE 5 MG: 5 TABLET, FILM COATED ORAL at 09:06

## 2021-10-17 RX ADMIN — FUROSEMIDE 40 MG: 40 TABLET ORAL at 09:06

## 2021-10-17 RX ADMIN — TIZANIDINE 4 MG: 4 TABLET ORAL at 20:16

## 2021-10-17 RX ADMIN — ASPIRIN 81 MG: 81 TABLET, COATED ORAL at 09:06

## 2021-10-17 RX ADMIN — CLOPIDOGREL BISULFATE 75 MG: 75 TABLET ORAL at 09:06

## 2021-10-17 RX ADMIN — ENOXAPARIN SODIUM 40 MG: 40 INJECTION SUBCUTANEOUS at 09:06

## 2021-10-17 RX ADMIN — SERTRALINE HYDROCHLORIDE 100 MG: 100 TABLET ORAL at 09:06

## 2021-10-17 ASSESSMENT — ENCOUNTER SYMPTOMS
VOMITING: 0
ABDOMINAL PAIN: 0
ABDOMINAL DISTENTION: 0
DIARRHEA: 0
CONSTIPATION: 0
BLOOD IN STOOL: 0
COUGH: 0
COLOR CHANGE: 0
SHORTNESS OF BREATH: 0
NAUSEA: 0
WHEEZING: 0

## 2021-10-17 NOTE — PROGRESS NOTES
[facility]      Progress Note    Patient:  Mateus Coronel  YOB: 1945  Date of Service: 10/17/2021  MRN: [id number]   Acct: [de-identified]   Primary Care Physician: Alyssa Trivedi MD  Advance Directive: Full Code  Admit Date: 10/16/2021       Hospital Day: 0    Portions of this note have been copied forward, however, updated to reflect the most current clinical status of this patient. CHIEF COMPLAINT Seizures     SUBJECTIVE:  Mr. Dale Watson was resting comfortably in bed this morning. Reports feeling good and wants to go home. Discussed low blood glucose and home insulin dose. States he does not remember the names of his insulins. States he takes 70 units of long-acting insulin at night along with mealtime insulin depending on blood sugar. CUMULATIVE HOSPITAL COURSE:   The patient is a 67 y.o. male with a hx of CAD, MOO, HTN, BPH, CHF, HLD, and DM who presented to [address] [place] [address] ED via EMS complaining of seizure. Per EMS his blood glucose was 54. He reported having low blood sugars in a.m. which improves with breakfast and 100s. Denies recent illnesses. Denied previous history of seizures. Denies change in appetite, cough, fever, or chills. Work-up in ED revealed glucose of 37. CT head showed chronic changes, nothing acute. Chest x-ray unremarkable. Patient was admitted to hospital medicine with seizure due to hypoglycemia. D5 was initially initiated. Later switched to D10. Patient remained hypoglycemic at times. Discussed low blood glucose and home insulin dose. States he does not remember the names of his insulins. States he takes 70 units of long-acting insulin at night along with mealtime insulin depending on blood sugar. Review of Systems   Constitutional: Negative for chills, diaphoresis, fatigue and fever. HENT: Negative for congestion and ear pain. Eyes: Negative for visual disturbance. Respiratory: Negative for cough, shortness of breath and wheezing.          Denies SOB   Cardiovascular: Negative for chest pain, palpitations and leg swelling. Denies chest pain    Gastrointestinal: Negative for abdominal distention, abdominal pain, blood in stool, constipation, diarrhea, nausea and vomiting. Endocrine: Negative for cold intolerance and heat intolerance. Genitourinary: Negative for difficulty urinating, flank pain, frequency and urgency. Musculoskeletal: Negative for arthralgias and myalgias. Skin: Negative for color change and wound. Neurological: Negative for dizziness, syncope, weakness, light-headedness, numbness and headaches. Hematological: Does not bruise/bleed easily. Psychiatric/Behavioral: Negative for agitation, confusion and dysphoric mood. Objective:   VITALS:  BP (!) 147/87   Pulse 66   Temp 97.3 °F (36.3 °C) (Temporal)   Resp 18   Ht 6' (1.829 m)   Wt 285 lb (129.3 kg)   SpO2 97%   BMI 38.65 kg/m²   24HR INTAKE/OUTPUT:    Intake/Output Summary (Last 24 hours) at 10/17/2021 1002  Last data filed at 10/17/2021 0434  Gross per 24 hour   Intake 400 ml   Output    Net 400 ml         Physical Exam  Constitutional:       General: He is not in acute distress. Appearance: Normal appearance. He is obese. He is not ill-appearing. HENT:      Head: Normocephalic and atraumatic. Right Ear: External ear normal.      Left Ear: External ear normal.      Nose: Nose normal.      Mouth/Throat:      Mouth: Mucous membranes are moist.   Eyes:      Extraocular Movements: Extraocular movements intact. Conjunctiva/sclera: Conjunctivae normal.      Pupils: Pupils are equal, round, and reactive to light. Cardiovascular:      Rate and Rhythm: Normal rate and regular rhythm. Pulses: Normal pulses. Heart sounds: Normal heart sounds. Pulmonary:      Effort: Pulmonary effort is normal. No respiratory distress. Breath sounds: Normal breath sounds. No wheezing, rhonchi or rales.    Abdominal:      General: Bowel sounds are normal. There is no distension. Palpations: Abdomen is soft. Tenderness: There is no abdominal tenderness. Musculoskeletal:         General: No swelling, tenderness or deformity. Normal range of motion. Cervical back: Normal range of motion and neck supple. No muscular tenderness. Right lower leg: No edema. Left lower leg: No edema. Skin:     General: Skin is warm and dry. Findings: No bruising or lesion. Neurological:      Mental Status: He is alert and oriented to person, place, and time. Psychiatric:         Mood and Affect: Mood normal.         Behavior: Behavior normal.         Thought Content: Thought content normal.            Medications:      dextrose      dextrose 50 mL/hr at 10/16/21 0734    sodium chloride        aspirin  81 mg Oral Daily    clopidogrel  75 mg Oral Daily    docusate sodium  200 mg Oral Daily    finasteride  5 mg Oral Daily    furosemide  40 mg Oral Daily    isosorbide mononitrate  30 mg Oral Daily    metoprolol succinate  25 mg Oral Daily    therapeutic multivitamin-minerals  1 tablet Oral Daily    sacubitril-valsartan  1 tablet Oral BID    sertraline  100 mg Oral Daily    sodium chloride flush  5-40 mL IntraVENous 2 times per day    enoxaparin  40 mg SubCUTAneous Daily    influenza virus vaccine  0.5 mL IntraMUSCular Prior to discharge    [START ON 10/21/2021] vitamin D  50,000 Units Oral Weekly     dextrose, glucose, dextrose, glucagon (rDNA), dextrose, nitroGLYCERIN, oxyCODONE-acetaminophen, tiZANidine, sodium chloride flush, sodium chloride, ondansetron **OR** ondansetron, polyethylene glycol, acetaminophen **OR** acetaminophen, potassium chloride **OR** potassium alternative oral replacement **OR** potassium chloride, magnesium sulfate  ADULT DIET; Regular; 4 carb choices (60 gm/meal);  Low Fat/Low Chol/High Fiber/2 gm Na     Lab and other Data:     Recent Labs     10/16/21  0534 10/17/21  0348   WBC 7.5 9.1   HGB 14.5 14.8   PLT 157 151     Recent Labs     10/16/21  0534 10/16/21  0635 10/17/21  0348     --  141   K 4.0  --  3.9     --  104   CO2 25  --  25   BUN 24*  --  21   CREATININE 1.2  --  1.3*   GLUCOSE 37* 76 42*     Recent Labs     10/16/21  0534 10/17/21  0348   AST 16 15   ALT 16 14   BILITOT 0.3 0.4   ALKPHOS 61 61     Troponin T:   Recent Labs     10/16/21  0534   TROPONINI <0.01     UA:  Recent Labs     10/16/21  0809   COLORU YELLOW   PHUR 5.0   WBCUA 2   RBCUA 2   BACTERIA NEGATIVE*   CLARITYU Clear   SPECGRAV 1.022   LEUKOCYTESUR Negative   UROBILINOGEN 1.0   BILIRUBINUR Negative   BLOODU Negative   GLUCOSEU Negative     A1C:   Recent Labs     10/16/21  0534   LABA1C 5.9       RAD:     CT Head WO Contrast  Result Date: 10/16/2021    Impression: No acute intracranial abnormality. Stable chronic areas of infarction identified as well as moderate chronic small vessel white matter ischemic disease. There is mild diffuse cerebral atrophy. Signed by Dr Sami Lewis. [place]      XR CHEST PORTABLE  Result Date: 10/16/2021    Pulmonary hypoventilation with mild cardiomegaly and central vascular congestion but no evidence of overt CHF. No consolidating pulmonary infiltrates. Signed by Dr Sami Lewis. [place]        Micro:    Rapid COVID- negative       Assessment/Plan   Principal Problem:    Seizure due to hypoglycemia [facility])  Active Problems:    Type 2 diabetes mellitus with diabetic polyneuropathy, with long-term current use of insulin (HCC)    Essential hypertension    Coronary artery disease involving native coronary artery of native heart without angina pectoris    Mixed hyperlipidemia    Benign prostatic hyperplasia without lower urinary tract symptoms    MOO on CPAP    Palliative care patient    Chronic systolic heart failure ([facility]  Resolved Problems:    * No resolved hospital problems.  *      Principal Problem:    Seizure due to hypoglycemia ([facility]-    - D10 @ 50 ml/hr   - Accuchecks Q4hrs    - Monitor I's and O's    - Seizure precaution       Active Problems:    Type 2 diabetes mellitus with diabetic polyneuropathy, with long-term current use of insulin ([facility])- HgbA1c 5.9, currently hypoglycemic, Accuchecks, hypoglycemia treatment protocol in place       Essential hypertension- elevated at times, continue current medications, monitor BP closely       Coronary artery disease involving native coronary artery of native heart without angina pectoris- noted, continue current medications       Mixed hyperlipidemia- noted, continue home medications       Benign prostatic hyperplasia without lower urinary tract symptoms- noted, continue Proscar       MOO on CPAP- noted       Palliative care patient- noted       Chronic systolic heart failure ([facility]- noted, continue current medications             DVT Prophylaxis: Lovenox       Further Orders per Clinical course/attending. Electronically signed by TEGAN Baldwin CNP on 10/17/2021 at 10:02 AM       EMR Dragon/Transcription disclaimer:   Much of this encounter note is an electronic transcription/translation of spoken language to printed text.  The electronic translation of spoken language may permit erroneous, or at times, nonsensical words or phrases to be inadvertently transcribed; although attempts have made to review the note for such errors, some may still exist.

## 2021-10-18 VITALS
WEIGHT: 269.5 LBS | TEMPERATURE: 97.3 F | RESPIRATION RATE: 18 BRPM | DIASTOLIC BLOOD PRESSURE: 95 MMHG | HEART RATE: 68 BPM | SYSTOLIC BLOOD PRESSURE: 133 MMHG | BODY MASS INDEX: 36.5 KG/M2 | OXYGEN SATURATION: 95 % | HEIGHT: 72 IN

## 2021-10-18 LAB
ALBUMIN SERPL-MCNC: 4.1 G/DL (ref 3.5–5.2)
ALP BLD-CCNC: 65 U/L (ref 40–130)
ALT SERPL-CCNC: 15 U/L (ref 5–41)
ANION GAP SERPL CALCULATED.3IONS-SCNC: 11 MMOL/L (ref 7–19)
AST SERPL-CCNC: 13 U/L (ref 5–40)
BASOPHILS ABSOLUTE: 0 K/UL (ref 0–0.2)
BASOPHILS RELATIVE PERCENT: 0.6 % (ref 0–1)
BILIRUB SERPL-MCNC: 0.4 MG/DL (ref 0.2–1.2)
BUN BLDV-MCNC: 19 MG/DL (ref 8–23)
CALCIUM SERPL-MCNC: 9.2 MG/DL (ref 8.8–10.2)
CHLORIDE BLD-SCNC: 104 MMOL/L (ref 98–111)
CO2: 26 MMOL/L (ref 22–29)
CREAT SERPL-MCNC: 1.2 MG/DL (ref 0.5–1.2)
EKG P AXIS: 43 DEGREES
EKG P-R INTERVAL: 150 MS
EKG Q-T INTERVAL: 416 MS
EKG QRS DURATION: 116 MS
EKG QTC CALCULATION (BAZETT): 438 MS
EKG T AXIS: 41 DEGREES
EOSINOPHILS ABSOLUTE: 0.1 K/UL (ref 0–0.6)
EOSINOPHILS RELATIVE PERCENT: 2.1 % (ref 0–5)
GFR AFRICAN AMERICAN: >59
GFR NON-AFRICAN AMERICAN: 59
GLUCOSE BLD-MCNC: 103 MG/DL (ref 70–99)
GLUCOSE BLD-MCNC: 128 MG/DL (ref 74–109)
GLUCOSE BLD-MCNC: 129 MG/DL (ref 70–99)
GLUCOSE BLD-MCNC: 140 MG/DL (ref 70–99)
GLUCOSE BLD-MCNC: 156 MG/DL (ref 70–99)
GLUCOSE BLD-MCNC: 173 MG/DL (ref 70–99)
GLUCOSE BLD-MCNC: 96 MG/DL (ref 70–99)
HCT VFR BLD CALC: 49.1 % (ref 42–52)
HEMOGLOBIN: 14.9 G/DL (ref 14–18)
IMMATURE GRANULOCYTES #: 0 K/UL
LYMPHOCYTES ABSOLUTE: 2 K/UL (ref 1.1–4.5)
LYMPHOCYTES RELATIVE PERCENT: 30.1 % (ref 20–40)
MCH RBC QN AUTO: 26.9 PG (ref 27–31)
MCHC RBC AUTO-ENTMCNC: 30.3 G/DL (ref 33–37)
MCV RBC AUTO: 88.6 FL (ref 80–94)
MONOCYTES ABSOLUTE: 0.6 K/UL (ref 0–0.9)
MONOCYTES RELATIVE PERCENT: 8.4 % (ref 0–10)
NEUTROPHILS ABSOLUTE: 3.9 K/UL (ref 1.5–7.5)
NEUTROPHILS RELATIVE PERCENT: 58.5 % (ref 50–65)
PDW BLD-RTO: 15.6 % (ref 11.5–14.5)
PERFORMED ON: ABNORMAL
PERFORMED ON: NORMAL
PLATELET # BLD: 146 K/UL (ref 130–400)
PMV BLD AUTO: 11.3 FL (ref 9.4–12.4)
POTASSIUM REFLEX MAGNESIUM: 4 MMOL/L (ref 3.5–5)
PROLACTIN: 12.26 NG/ML (ref 4.04–15.2)
RBC # BLD: 5.54 M/UL (ref 4.7–6.1)
SODIUM BLD-SCNC: 141 MMOL/L (ref 136–145)
TOTAL PROTEIN: 6.6 G/DL (ref 6.6–8.7)
WBC # BLD: 6.7 K/UL (ref 4.8–10.8)

## 2021-10-18 PROCEDURE — 36415 COLL VENOUS BLD VENIPUNCTURE: CPT

## 2021-10-18 PROCEDURE — 6370000000 HC RX 637 (ALT 250 FOR IP): Performed by: NURSE PRACTITIONER

## 2021-10-18 PROCEDURE — 6360000002 HC RX W HCPCS: Performed by: NURSE PRACTITIONER

## 2021-10-18 PROCEDURE — 80053 COMPREHEN METABOLIC PANEL: CPT

## 2021-10-18 PROCEDURE — 82947 ASSAY GLUCOSE BLOOD QUANT: CPT

## 2021-10-18 PROCEDURE — 90686 IIV4 VACC NO PRSV 0.5 ML IM: CPT | Performed by: NURSE PRACTITIONER

## 2021-10-18 PROCEDURE — 2580000003 HC RX 258: Performed by: NURSE PRACTITIONER

## 2021-10-18 PROCEDURE — 84146 ASSAY OF PROLACTIN: CPT

## 2021-10-18 PROCEDURE — 85025 COMPLETE CBC W/AUTO DIFF WBC: CPT

## 2021-10-18 PROCEDURE — G0008 ADMIN INFLUENZA VIRUS VAC: HCPCS | Performed by: NURSE PRACTITIONER

## 2021-10-18 RX ORDER — ASPIRIN 81 MG/1
81 TABLET ORAL DAILY
Qty: 30 TABLET | Refills: 0 | Status: SHIPPED | OUTPATIENT
Start: 2021-10-19 | End: 2021-10-29

## 2021-10-18 RX ADMIN — INFLUENZA A VIRUS A/VICTORIA/2570/2019 IVR-215 (H1N1) ANTIGEN (PROPIOLACTONE INACTIVATED), INFLUENZA A VIRUS A/CAMBODIA/E0826360/2020 IVR-224 (H3N2) ANTIGEN (PROPIOLACTONE INACTIVATED), INFLUENZA B VIRUS B/VICTORIA/705/2018 BVR-11 ANTIGEN (PROPIOLACTONE INACTIVATED), INFLUENZA B VIRUS B/PHUKET/3073/2013 BVR-1B ANTIGEN (PROPIOLACTONE INACTIVATED) 0.5 ML: 15; 15; 15; 15 INJECTION, SUSPENSION INTRAMUSCULAR at 13:40

## 2021-10-18 RX ADMIN — SACUBITRIL AND VALSARTAN 1 TABLET: 49; 51 TABLET, FILM COATED ORAL at 09:33

## 2021-10-18 RX ADMIN — CLOPIDOGREL BISULFATE 75 MG: 75 TABLET ORAL at 09:33

## 2021-10-18 RX ADMIN — SODIUM CHLORIDE, PRESERVATIVE FREE 10 ML: 5 INJECTION INTRAVENOUS at 09:34

## 2021-10-18 RX ADMIN — ASPIRIN 81 MG: 81 TABLET, COATED ORAL at 09:33

## 2021-10-18 RX ADMIN — METOPROLOL SUCCINATE 25 MG: 25 TABLET, EXTENDED RELEASE ORAL at 09:33

## 2021-10-18 RX ADMIN — FINASTERIDE 5 MG: 5 TABLET, FILM COATED ORAL at 09:33

## 2021-10-18 RX ADMIN — DOCUSATE SODIUM 200 MG: 100 CAPSULE ORAL at 09:32

## 2021-10-18 RX ADMIN — MULTIPLE VITAMINS W/ MINERALS TAB 1 TABLET: TAB at 09:33

## 2021-10-18 RX ADMIN — ISOSORBIDE MONONITRATE 30 MG: 30 TABLET, EXTENDED RELEASE ORAL at 09:33

## 2021-10-18 RX ADMIN — ENOXAPARIN SODIUM 40 MG: 40 INJECTION SUBCUTANEOUS at 09:33

## 2021-10-18 RX ADMIN — FUROSEMIDE 40 MG: 40 TABLET ORAL at 09:33

## 2021-10-18 RX ADMIN — SERTRALINE HYDROCHLORIDE 100 MG: 100 TABLET ORAL at 09:33

## 2021-10-18 NOTE — DISCHARGE SUMMARY
[facility] Hospitalists    Discharge Summary      Anitha Ratliff  :  [birth year]  MRN:  [id number]    Admit date:  10/16/2021  Discharge date:    10/18/2021    Discharging Physician:  Dr. Darcie Baeza     Advance Directive: Full Code    Consults: none    Primary Care Physician:  Polo Castrejon MD    Discharge Diagnoses:  Principal Problem:    Seizure due to hypoglycemia [facility])  Active Problems:    Type 2 diabetes mellitus with diabetic polyneuropathy, with long-term current use of insulin ([facility] [address]    Essential hypertension    Coronary artery disease involving native coronary artery of native heart without angina pectoris    Mixed hyperlipidemia    Benign prostatic hyperplasia without lower urinary tract symptoms    MOO on CPAP    Palliative care patient    Chronic systolic heart failure ([facility] [address]    Seizure ([facility] [address]  Resolved Problems:    * No resolved hospital problems. *      Portions of this note have been copied forward, however, changed to reflect the most current clinical status of this patient. Hospital Course: The patient is a 74 y. o. male with a hx of CAD, MOO, HTN, BPH, CHF, HLD, and DM who presented to [facility] ED via [facility]. Per EMS his blood glucose was 54. He reported having low blood sugars in a.m. which improves with breakfast and Orange Juice. Denies recent illnesses. Denied previous history of seizures. Denies change in appetite, cough, fever, or chills. Work-up in ED revealed glucose of 37. CT head showed chronic changes, nothing acute. Chest x-ray unremarkable. Patient was admitted to hospital medicine with seizure due to hypoglycemia. D5 was initially initiated. Later switched to D10. Patient remained hypoglycemic at times. Discussed low blood glucose and home insulin dose. States he does not remember the names of his insulins. States he takes 70 units of long-acting insulin at night along with mealtime insulin depending on blood sugar.   Patient maintained stable blood glucose after D10 had been discontinued this morning. He has been advised to follow-up with PCP in 2 days regarding diabetic management and insulin adjustment. Patient has been advised to stop taking his insulin until PCP follow-up. He is being discharged daily aspirin. Patient is currently in stable condition to be discharged home. Significant Diagnostic Studies:     CT Head WO Contrast  Result Date: 10/16/2021    Impression: No acute intracranial abnormality. Stable chronic areas of infarction identified as well as moderate chronic small vessel white matter ischemic disease. There is mild diffuse cerebral atrophy. Signed by Dr Luis Roth. [place]      XR CHEST PORTABLE  Result Date: 10/16/2021    Pulmonary hypoventilation with mild cardiomegaly and central vascular congestion but no evidence of overt CHF. No consolidating pulmonary infiltrates. Signed by Dr Luis Roth. Frantz      Pertinent Labs:   CBC:   Recent Labs     10/16/21  0534 10/17/21  0348 10/18/21  0337   WBC 7.5 9.1 6.7   HGB 14.5 14.8 14.9    151 146     BMP:    Recent Labs     10/16/21  0534 10/16/21  0534 10/16/21  0635 10/17/21  0348 10/18/21  0337     --   --  141 141   K 4.0  --   --  3.9 4.0     --   --  104 104   CO2 25  --   --  25 26   BUN 24*  --   --  21 19   CREATININE 1.2  --   --  1.3* 1.2   GLUCOSE 37*   < > 76 42* 128*    < > = values in this interval not displayed. Physical Exam:   Vital Signs: BP (!) 133/95   Pulse 68   Temp 97.3 °F (36.3 °C) (Temporal)   Resp 18   Ht 6' (1.829 m)   Wt 269 lb 8 oz (122.2 kg)   SpO2 95%   BMI 36.55 kg/m²   General appearance:. Alert and Cooperative   HEENT: Normocephalic. Chest: Lung sounds clear bilaterally without wheezes or rhonchi. Cardiac: RRR, S1, S2 normal. No murmurs, gallops, or rubs auscultated. Abdomen: soft, non-tender; non-distended normal bowel sounds no masses, no organomegaly. Extremities: No clubbing or cyanosis. No peripheral edema.  Peripheral pulses palpable. Neurologic: Grossly intact. Discharge Medications:          Medication List      CONTINUE taking these medications    ALPRAZolam 0.5 MG tablet  Commonly known as: XANAX     aspirin 81 MG EC tablet  Take 1 tablet by mouth daily  Start taking on: October 19, 2021     azelastine 0.1 % nasal spray  Commonly known as: ASTELIN     clopidogrel 75 MG tablet  Commonly known as: PLAVIX  TAKE 1 TABLET DAILY     Colace 100 MG capsule  Generic drug: docusate sodium     famotidine 40 MG tablet  Commonly known as: PEPCID     finasteride 5 MG tablet  Commonly known as: PROSCAR     furosemide 40 MG tablet  Commonly known as: LASIX     isosorbide mononitrate 30 MG extended release tablet  Commonly known as: IMDUR     nitroGLYCERIN 0.4 MG SL tablet  Commonly known as: NITROSTAT  up to max of 3 total doses. If no relief after 1 dose, call 911. PRESERVISION AREDS PO     sacubitril-valsartan 49-51 MG per tablet  Commonly known as: ENTRESTO  Take 1 tablet by mouth 2 times daily     sertraline 100 MG tablet  Commonly known as: ZOLOFT  Take 1 tablet by mouth daily     zoster recombinant adjuvanted vaccine 50 MCG/0.5ML Susr injection  Commonly known as: SHINGRIX  1 vaccine now and repeat in 2-6 months. STOP taking these medications    insulin glargine 100 UNIT/ML injection pen  Commonly known as: Nando Armstrong           Where to Get Your Medications      [facility] [address] [place] [address], [address] [place] [address],[address] [phone]  [address] [place] [address] [address], [address] [facility] [address]    Phone: [phone]   · aspirin 81 MG EC tablet            Discharge Instructions: Follow up with Homero Sanches MD within 2 business days of Discharge. Take medications as directed. Resume activity as tolerated. Diet: ADULT DIET; Regular; 4 carb choices (60 gm/meal); Low Fat/Low Chol/High Fiber/2 gm Na     Disposition: Patient is medically stable and will be discharged home.     Time spent on discharge 36 minutes spent in assessing patient, reviewing medications, discussion with nursing, confirming safe discharge plan and preparation of discharge summary. Signed:  Electronically signed by TEGAN Hale CNP on 10/18/21 at 12:30 PM CDT         EMR Dragon/Transcription disclaimer:   Much of this encounter note is an electronic transcription/translation of spoken language to printed text.  The electronic translation of spoken language may permit erroneous, or at times, nonsensical words or phrases to be inadvertently transcribed; although attempts have made to review the note for such errors, some may still exist.

## 2021-10-18 NOTE — PROGRESS NOTES
Consult received, patient known to Palliative. Palliative care RN/ will initiate care and discussions regarding goals/ACP. We will follow an assist as needed. Thank you for consulting Palliative Care.

## 2021-10-18 NOTE — PROGRESS NOTES
Palliative Care/Spiritual Care: Met with pt and pt's wife Silvio Nguyenlora to initiate palliative care. Pt wife helped with the visit and answered some of the questions. Pt had some trouble hearing. Pt's wife says he had a seizure because his blood sugar went into the 30's. Pt is known to palliative care. Advance Directives: Pt does not have an AD/LW. Pt's wife is next of kin and primary decision maker and his two sons would be secondary. Pt is a full code and wants CPR and Ventilator. Pt has an ACP note with no changes. SEE ACP NOTE. Pain/other symptoms: Pt says he is not having pain. Social/Spiritual: Pt says he attends [employer]. Pt/family discussion r/t goals: Pt has a wife and two adult sons. Pt lives at home with his wife and ambulates slowly and wobbly without assistance, according to wife. Pt has a cane and a walker but uses them on a occasion. Pt is able to care for his basic daily needs at home. Goal is for pt to return home with his wife with previous daily living skills, and previous quality of life. Provided spiritual care with sustaining presence, nurtured hope, and prayer. Pt expressed gratitude for spiritual care.     Electronically signed by Beuford Boeck on 10/18/2021 at 11:40 AM

## 2021-10-18 NOTE — CARE COORDINATION
Date / Time of Evaluation:   10/18/2021    12:30 PM  Assessment Completed by:   Donovan Varela RN, BSN      Patient Name:   Gladys Pablo  MRN:   [id number]  YOB: 1945    Patient Admission Status:   Inpatient [101]    Patient Contact Information:    [facility]  [phone] (home)   Telephone Information:   Mobile [phone]     Above information verified? [x]   Yes  []   No    (Best Practice:   Have patient/caregiver verify above address and phone number by stating out loud their current address and reachable phone number. Initial Assessment Completed at bedside with:      [x]   Patient  [x]   Family/Caregiver/Guardian   []   Other:      Current PCP:    Jess Chiu MD    PCP verified? [x]   Yes  []   No    Emergency Contacts:    Extended Emergency Contact Information  Primary Emergency Contact: Cherrie Phan  Address: [address] [place] [address] Phone: [phone]  Mobile Phone: [phone]  Relation: Spouse  Secondary Emergency Contact: Emmy Allen [address] Phone: [phone]  Relation: Child    Advance Directives:    Does Mr. Phan have an advance directive in his electronic medical record? []   Yes  [x]   No    Code Status:   Full Code      Have you been vaccinated for COVID-19 (SARS-CoV-2)?     [x]   Yes  []   No                   If so, when?   02/23/2021, 03/16/2021  Which :         [x]   [employer]  []   [facility]  []   [facility]  []   Other:       Do you have any of the following unmet social needs that would keep you from returning home safely:    []   Yes  [x]   No                    Unmet Social Needs:           []   Living Situation/Housing  []   Food  []   Stroke Education   []   Utilities  []   Personal Safety  []   Financial Strain  []   Employment  []   Mental Health  []   Substance Abuse  []   Transportation Barriers    Additional Unmet Social Needs Notes:   NA    Financial:    Payor: Joel Mas / Plan: [employer] CHOICE-PPO MEDICARE / Product Type: *No Product type* /     Pre-Cert required for SNF:     [x]   Yes  []   No    Have Long Term Care Insurance:      []   Yes  [x]   No      Ph[facility]    [id number], [address] [address] [place] [phone] Ashu Lynch [phone]  [address] [facility] [address]  Phone: [phone] Fax: [phone]    [facility] [address] [facility], [address] [facility] [phone] - F [phone]  [address] [address]  Phone: [phone] Fax: [phone]    Potential assistance purchasing medications? []   Yes  [x]   No      ADLS:       Support System:   Family/friends    Current Home Environment:       Steps:       []   Yes  [x]   No    If yes, how many? Plans to RETURN to current housing:     [x]   Yes  []   No    Barriers to RETURNING to current housing:   NA    Currently ACTIVE with Home Health CARE:      []   Yes  [x]   No    Home Health Care Agency:   NA    Transition Plan:  Home with spouse    Transportation PLAN for Discharge:  Family    Patient Deficits:    []   Yes   [x]   No    If yes:    []   Confusion/Memory  []   Visual  []   Motor/Sensory         []   Right arm         []   Right leg         []   Left arm         []   Left leg  []   Language/Speech         []   Aphasia         []   Dysarthria         []   Swallow    [facility] Coma Scale  Eye Opening: Spontaneous  Best Verbal Response: Oriented  Best Motor Response: Obeys commands  [facility] Coma Scale Score: 15    Patient Deficit Notes:     NA    Additional CM/SW Notes:   I spoke with  And Mrs. Phan. Mrs. Mario Garcia states that they live in a house. She states that he has a walker and cane but uses them occasionally. Does not utilize Home Healthcare at this time. No needs identified. Will continue to follow.     Davin Duarte and/or his family were provided with choice of provider:    [] Yes   [x]   No      Delma Hansen RN, BSN  [address]  Phone: [phone]   Fax:  [phone]    Electronically signed by Delma Hansen RN, BSN on 10/18/2021 at 12:34 PM

## 2021-10-18 NOTE — DISCHARGE INSTR - DIET

## 2021-10-20 ENCOUNTER — TRANSCRIBE ORDERS (OUTPATIENT)
Dept: ADMINISTRATIVE | Facility: HOSPITAL | Age: 76
End: 2021-10-20

## 2021-10-20 ENCOUNTER — LAB (OUTPATIENT)
Dept: LAB | Facility: HOSPITAL | Age: 76
End: 2021-10-20

## 2021-10-20 DIAGNOSIS — I12.9 HYPERTENSIVE CHRONIC KIDNEY DISEASE W STG 1-4/UNSP CHR KDNY: ICD-10-CM

## 2021-10-20 DIAGNOSIS — E16.2 HYPOGLYCEMIA, UNSPECIFIED: ICD-10-CM

## 2021-10-20 DIAGNOSIS — E11.69 TYPE 2 DIABETES MELLITUS WITH OTHER SPECIFIED COMPLICATION, UNSPECIFIED WHETHER LONG TERM INSULIN USE (HCC): Primary | ICD-10-CM

## 2021-10-20 LAB
ANION GAP SERPL CALCULATED.3IONS-SCNC: 9 MMOL/L (ref 5–15)
BUN SERPL-MCNC: 27 MG/DL (ref 8–23)
BUN/CREAT SERPL: 19.7 (ref 7–25)
CALCIUM SPEC-SCNC: 9.1 MG/DL (ref 8.6–10.5)
CHLORIDE SERPL-SCNC: 102 MMOL/L (ref 98–107)
CO2 SERPL-SCNC: 28 MMOL/L (ref 22–29)
CREAT SERPL-MCNC: 1.37 MG/DL (ref 0.76–1.27)
GFR SERPL CREATININE-BSD FRML MDRD: 50 ML/MIN/1.73
GLUCOSE SERPL-MCNC: 164 MG/DL (ref 65–99)
POTASSIUM SERPL-SCNC: 4.2 MMOL/L (ref 3.5–5.2)
SODIUM SERPL-SCNC: 139 MMOL/L (ref 136–145)

## 2021-10-20 PROCEDURE — 36415 COLL VENOUS BLD VENIPUNCTURE: CPT | Performed by: PHYSICIAN ASSISTANT

## 2021-10-20 PROCEDURE — 84681 ASSAY OF C-PEPTIDE: CPT | Performed by: PHYSICIAN ASSISTANT

## 2021-10-20 PROCEDURE — 80048 BASIC METABOLIC PNL TOTAL CA: CPT | Performed by: PHYSICIAN ASSISTANT

## 2021-10-21 LAB — C PEPTIDE SERPL-MCNC: 5.9 NG/ML (ref 1.1–4.4)

## 2021-10-29 ENCOUNTER — OFFICE VISIT (OUTPATIENT)
Dept: CARDIOLOGY CLINIC | Age: 76
End: 2021-10-29
Payer: MEDICARE

## 2021-10-29 VITALS
HEIGHT: 76 IN | DIASTOLIC BLOOD PRESSURE: 72 MMHG | WEIGHT: 272 LBS | SYSTOLIC BLOOD PRESSURE: 118 MMHG | HEART RATE: 86 BPM | BODY MASS INDEX: 33.12 KG/M2

## 2021-10-29 DIAGNOSIS — I50.22 CHRONIC SYSTOLIC HEART FAILURE (HCC): ICD-10-CM

## 2021-10-29 DIAGNOSIS — Z95.810 AICD (AUTOMATIC CARDIOVERTER/DEFIBRILLATOR) PRESENT: ICD-10-CM

## 2021-10-29 DIAGNOSIS — I25.5 ISCHEMIC CARDIOMYOPATHY: ICD-10-CM

## 2021-10-29 DIAGNOSIS — I25.10 CORONARY ARTERY DISEASE INVOLVING NATIVE CORONARY ARTERY OF NATIVE HEART WITHOUT ANGINA PECTORIS: ICD-10-CM

## 2021-10-29 PROCEDURE — 1036F TOBACCO NON-USER: CPT | Performed by: INTERNAL MEDICINE

## 2021-10-29 PROCEDURE — G8482 FLU IMMUNIZE ORDER/ADMIN: HCPCS | Performed by: INTERNAL MEDICINE

## 2021-10-29 PROCEDURE — 1111F DSCHRG MED/CURRENT MED MERGE: CPT | Performed by: INTERNAL MEDICINE

## 2021-10-29 PROCEDURE — G8417 CALC BMI ABV UP PARAM F/U: HCPCS | Performed by: INTERNAL MEDICINE

## 2021-10-29 PROCEDURE — 99214 OFFICE O/P EST MOD 30 MIN: CPT | Performed by: INTERNAL MEDICINE

## 2021-10-29 PROCEDURE — G8427 DOCREV CUR MEDS BY ELIG CLIN: HCPCS | Performed by: INTERNAL MEDICINE

## 2021-10-29 PROCEDURE — 93282 PRGRMG EVAL IMPLANTABLE DFB: CPT | Performed by: INTERNAL MEDICINE

## 2021-10-29 PROCEDURE — 1123F ACP DISCUSS/DSCN MKR DOCD: CPT | Performed by: INTERNAL MEDICINE

## 2021-10-29 PROCEDURE — 4040F PNEUMOC VAC/ADMIN/RCVD: CPT | Performed by: INTERNAL MEDICINE

## 2021-10-29 RX ORDER — METOPROLOL SUCCINATE 25 MG/1
25 TABLET, EXTENDED RELEASE ORAL DAILY
Qty: 90 TABLET | Refills: 3 | Status: SHIPPED | OUTPATIENT
Start: 2021-10-29 | End: 2022-03-24 | Stop reason: SDUPTHER

## 2021-10-29 RX ORDER — CLOPIDOGREL BISULFATE 75 MG/1
TABLET ORAL
Qty: 90 TABLET | Refills: 3 | Status: SHIPPED | OUTPATIENT
Start: 2021-10-29 | End: 2022-10-31

## 2021-10-29 ASSESSMENT — ENCOUNTER SYMPTOMS
ABDOMINAL DISTENTION: 0
BACK PAIN: 0
SHORTNESS OF BREATH: 0
BLOOD IN STOOL: 0
DIARRHEA: 0
ABDOMINAL PAIN: 0
COUGH: 0
VOMITING: 0
WHEEZING: 0

## 2021-10-29 NOTE — PATIENT INSTRUCTIONS
[place] at the [address] [facility] [address] [facility] located on the first floor of [place] [address] through hospital main entrance and turn immediately to your left. Date/Time: November 2 Tuesday 12:45    Patient's contact number:  [phone] (home)     Echocardiogram -  No prep. Takes approximately 30 min. An echocardiogram uses sound waves to produce images of your heart. This commonly used test allows your doctor to see how your heart is beating and pumping blood. Your doctor can use the images from an echocardiogram to identify various abnormalities in the heart muscle and valves. This test has 2 parts:   Ø You will be asked to disrobe from the waist up and given a gown to wear. The technologist will then hook up an EKG monitor to you for the entire exam.   Ø You will then have an ultrasound of your heart (echocardiogram) to assess the heart muscle, heart valves and heart function. You may eat and take any medicines before the exam.     If you need to change your appointment, please call outpatient scheduling at [phone].

## 2021-10-29 NOTE — PROGRESS NOTES
[facility]  Cardiology Office Note  [address] [address]  Phone: [phone]  Fax: [phone]                            Date:  10/29/2021  Patient: Edmundo Gibbs  Age:  67 y.o., 1945    Referral: No ref.  provider found      PROBLEM LIST:    Patient Active Problem List    Diagnosis Date Noted    ACS (acute coronary syndrome) ([facility]      Priority: High    Seizure ([facility] 10/17/2021     Priority: Low    Breakthrough seizure ([address] 10/16/2021     Priority: Low    Seizure due to hypoglycemia ([facility] 10/16/2021     Priority: Low    Vestibular neuropathy 06/22/2021     Priority: Low    History of chronic kidney disease 07/13/2020     Priority: Low    Edema 07/13/2020     Priority: Low    AICD (automatic cardioverter/defibrillator) present 11/25/2019     Priority: Low    Complicated UTI (urinary tract infection) 11/16/2019     Priority: Low    Chronic systolic heart failure ([facility] 08/09/2019     Priority: Low    Urinary tract infection due to extended-spectrum beta lactamase (ESBL) producing Escherichia coli 06/14/2019     Priority: Low    Ischemic cardiomyopathy 06/13/2019     Priority: Low    Palliative care patient 06/11/2019     Priority: Low    Chronic constipation 06/10/2019     Priority: Low    Hx of CABG 06/01/2019     Priority: Low    Acute MI inferior lateral first episode care ([facility] 05/31/2019     Priority: Low    SOB (shortness of breath) 04/15/2019     Priority: Low    Chest pain 02/28/2019     Priority: Low     Overview Note:     1/2011 stents x 3 ([address])  5/4/2011  Cath  Patent stents in the mid LAD, stent to OM1, PTCA to the D2, normal LVFX  12/12/12  Cath patent stents in the mid LAD, PTCA to the D3, stent to distal LAD, normal LVFX  3/29/2015  Cath  Stent to the PLOM, normal LVFX  11/1/2019  Loop placed  3/1/19 lexiscan negative for myocardial ischemia, EF 50  %   5/24/19 ACS PARKER RISK Score 5 (angina, + troponin, CAD>50, age>65, plavix ), AUC indication 3, AUC score 9   5/25/19  Cath 50% distal LM, 100% mid LAD, 80% diag, 100% mid RCA, inferior hypokinesis, EF 45%              MOO on CPAP 05/03/2018     Priority: Low    Dermatitis 03/05/2018     Priority: Low    Depression with anxiety 03/05/2018     Priority: Low    Benign prostatic hyperplasia without lower urinary tract symptoms 03/05/2018     Priority: Low    Chronic tension-type headache, not intractable 03/05/2018     Priority: Low    West Nile virus seropositivity 11/22/2017     Priority: Low    Status post placement of implantable loop recorder 10/31/2017     Priority: Low    Recurrent major depressive disorder, in full remission ([address] 10/17/2017     Priority: Low    Trigger middle finger of left hand 10/17/2017     Priority: Low    Peripheral polyneuropathy 10/17/2017     Priority: Low    Palpitations 10/17/2017     Priority: Low    Chronic insomnia 10/17/2017     Priority: Low    Cerebrovascular accident (CVA) due to embolism of right middle cerebral artery ([address] 08/14/2017     Priority: Low    Vertigo 03/27/2017     Priority: Low    Imbalance 03/27/2017     Priority: Low    Thoracic outlet syndrome 02/21/2017     Priority: Low     Overview Note:     L sided on exam 2/21/17      Acute ischemic stroke ([address] 02/20/2017     Priority: Low    DDD (degenerative disc disease), lumbar 11/15/2016     Priority: Low    Spondylosis of lumbar region without myelopathy or radiculopathy 11/15/2016     Priority: Low    Chronic bilateral low back pain without sciatica 11/10/2016     Priority: Low    Mixed hyperlipidemia 07/23/2012     Priority: Low    MI (myocardial infarction) ([address]      Priority: Low    Type 2 diabetes mellitus with diabetic polyneuropathy, with long-term current use of insulin ([address] 07/12/2011     Priority: Low    Essential hypertension 07/12/2011     Priority: Low    Coronary artery disease involving native coronary artery of native heart without angina pectoris 07/12/2011     Priority: Low     1. Coronary artery disease, status post multiple PCI's to LAD January 2011, RCA December 2012, RPL March 2015 presenting 5/24/2019 with inferior wall MI with late presentation involving a large dominant RCA with distal occlusion, not intervened on with severe mid to distal LAD restenosis and moderate left main disease, status post CABG 5/30/2019 with LIMA to 1st diagonal, SVG to LAD and SVG to ramus intermedius, ejection fraction 25% with severe inferior hypokinesis. Thallium viability study with large inferolateral, predominantly lateral infarct with no significant viability status post ICD placement 11/1/2019. 2. CK D stage III. 3. Diabetes mellitus. 4.  Paroxysmal atrial fibrillation, initiated on Eliquis. 5. Chronic severe constipation  6. Statin intolerance. PRESENTATION: Jose Francisco Stuart is a 67y.o. year old male presents for follow-up evaluation. In June he was admitted with nausea vertigo and diplopia which was possibly related to some peripheral vestibular involvement and resolved. This month 10/16/2020 when he was admitted with possible seizure with hypoglycemia from increased dose of his insulin. His main complaint has been his legs aching when he gets up or tries to walk. Both his thighs ache. This has been ongoing since his back surgery. No chest pain. Has some shortness of breath but this is stable. No leg swelling. Constipation better. Recent increase in A. fib noted on his ICD interrogation. REVIEW OF SYSTEMS:  Review of Systems   Constitutional: Negative for activity change, diaphoresis and fatigue. HENT: Negative for hearing loss, nosebleeds and tinnitus. Eyes: Negative for visual disturbance. Respiratory: Negative for cough, shortness of breath and wheezing. Cardiovascular: Negative for chest pain, palpitations and leg swelling.    Gastrointestinal: Negative for abdominal distention, abdominal pain, blood in stool, diarrhea and vomiting. Endocrine: Negative for cold intolerance, heat intolerance, polydipsia, polyphagia and polyuria. Genitourinary: Negative for difficulty urinating, flank pain and hematuria. Musculoskeletal: Positive for gait problem. Negative for arthralgias, back pain, joint swelling and myalgias. Skin: Negative for pallor and rash. Neurological: Negative for dizziness, seizures, syncope and headaches. Psychiatric/Behavioral: Negative for behavioral problems and dysphoric mood. The patient is not nervous/anxious. Past Medical History:      Diagnosis Date    Acute ischemic stroke ([address] 2/20/2017    Atherosclerotic heart disease     s/p [place], stenting x 3 jan [place]    Breakthrough seizure ([address] 10/16/2021    CAD (coronary artery disease)     Cerebral artery occlusion with cerebral infarction [facility])     Cerebrovascular accident (CVA) due to embolism of right middle cerebral artery ([address] 8/14/2017    CHF (congestive heart failure) ([facility])     Chronic bilateral low back pain without sciatica 11/10/2016    Chronic kidney disease     DDD (degenerative disc disease), lumbar 11/15/2016    Depression     Depression with anxiety     Diabetes mellitus ([address]     type II    Diabetes mellitus, type 2 ([address]     DM (diabetes mellitus) ([address] 7/12/2011    ESBL (extended spectrum beta-lactamase) producing bacteria infection 11/16/2019    urine    Glaucoma     Headache     Hyperlipidemia     Cholesterol management per pcp.      Hypertension     Macular degeneration     MI (myocardial infarction) ([address]     MI (myocardial infarction) ([address]     Neuromuscular disorder ([address]     Neuropathy     MOO on CPAP     Osteoarthritis     Palliative care patient 06/11/2019    Sleep apnea     Spondylosis of lumbar region without myelopathy or radiculopathy 11/15/2016    Status post placement of implantable loop recorder 10/31/2017    TIA (transient ischemic attack)        Past Surgical History:      Procedure Laterality Date    APPENDECTOMY      at age 4    BACK SURGERY      2016 (fusion), 2017    BREAST SURGERY  05/2019    CARDIAC CATHETERIZATION  12/12/12    with angioplasty, stent    CARDIAC CATHETERIZATION  3/29/15  MDL    stent to distal RCA. EF 60%    CHOLECYSTECTOMY, LAPAROSCOPIC      2012    COLONOSCOPY      2010    CORONARY ANGIOPLASTY WITH STENT PLACEMENT  jan 11 [place]    stent x 3    CORONARY ANGIOPLASTY WITH STENT PLACEMENT      01/01/11    CORONARY ARTERY BYPASS GRAFT N/A 5/30/2019    CORONARY ARTERY BYPASS GRAFT X3 WITH LEFT INTERNAL MAMMARY ARTERY WITH ENDOSCOPIC VEIN HARVESTING WITH PERFUSION TRANSESOPHAGEAL ECHOCARDIOGRAM performed by Adarsh Neri MD at [facility] CATH LAB PROCEDURE  [id number]    primary stent placement to the first circumflex marginal branch, balloon angioplasty to the third left anterior descending diagnonal branch, intracoronary nitroglycerin adminstration    JOINT REPLACEMENT      left knee, ~2016    LUMBAR FUSION Left 11/15/2016    LUMBAR INTERBODY FUSION LATERAL L3-4 L4-5 WITH LATERAL PLATE  performed by Kevin Escoto MD at [address]  11/02/2019       Medications:  Current Outpatient Medications   Medication Sig Dispense Refill    metoprolol succinate (TOPROL XL) 25 MG extended release tablet Take 1 tablet by mouth daily 90 tablet 3    apixaban (ELIQUIS) 5 MG TABS tablet Take 1 tablet by mouth 2 times daily 180 tablet 3    clopidogrel (PLAVIX) 75 MG tablet TAKE 1 TABLET DAILY 90 tablet 3    ALPRAZolam (XANAX) 0.5 MG tablet Take 0.5 mg by mouth nightly as needed for Sleep.       famotidine (PEPCID) 40 MG tablet Take 40 mg by mouth daily      isosorbide mononitrate (IMDUR) 30 MG extended release tablet Take 30 mg by mouth daily      azelastine (ASTELIN) 0.1 % nasal spray 1 spray by Nasal route 2 times daily Use in each nostril as directed      finasteride (PROSCAR) 5 MG tablet Take 5 mg by mouth daily      docusate sodium (COLACE) 100 MG capsule Take 200 mg by mouth daily      furosemide (LASIX) 40 MG tablet Take 40 mg by mouth daily Currently taking 2 daily      Multiple Vitamins-Minerals (PRESERVISION AREDS PO) Take 2 capsules by mouth      sertraline (ZOLOFT) 100 MG tablet Take 1 tablet by mouth daily 90 tablet 2    nitroGLYCERIN (NITROSTAT) 0.4 MG SL tablet up to max of 3 total doses. If no relief after 1 dose, call 911. 30 tablet 2    sacubitril-valsartan (ENTRESTO) 49-51 MG per tablet Take 1 tablet by mouth 2 times daily 60 tablet 5    zoster recombinant adjuvanted vaccine [facility]) 50 MCG/0.5ML SUSR injection 1 vaccine now and repeat in 2-6 months. 0.5 mL 1     No current facility-administered medications for this visit. Allergies:  Statins, Crestor [rosuvastatin calcium], Adhesive tape, and Morphine    Past Social History:  Social History     Socioeconomic History    Marital status:      Spouse name: Cameron Cheung Number of children: 3    Years of education: Not on file    Highest education level: Not on file   Occupational History    Occupation:    Tobacco Use    Smoking status: Never Smoker    Smokeless tobacco: Never Used   Vaping Use    Vaping Use: Never used   Substance and Sexual Activity    Alcohol use: Yes     Alcohol/week: 1.0 standard drinks     Types: 1 Shots of liquor per week     Comment: Occ.     Drug use: Not Currently     Types: Marijuana     Comment: edibles, uses for sleep    Sexual activity: Not on file     Comment: 3 sons   Other Topics Concern    Not on file   Social History Narrative    CODE STATUS: Full Code    HEALTH CARE PROXY: his wife, Mrs. Liss Chen, [phone]    AMBULATES: independently    DOMICILED: private home, stairs inside home, has a few steps to enter home, lives alone with wife, no pets     Social Determinants of Health     Financial Resource Strain:     Difficulty of Paying Living Expenses:    Food Insecurity:  Worried About [facility] in the Last Year:    Stanley Mcintosh in the Last Year:    Transportation Needs:     Lack of Transportation (Medical):  Lack of Transportation (Non-Medical):    Physical Activity:     Days of Exercise per Week:     Minutes of Exercise per Session:    Stress:     Feeling of Stress :    Social Connections:     Frequency of Communication with Friends and Family:     Frequency of Social Gatherings with Friends and Family:     Attends [employer] Services:     Active Member of Clubs or Organizations:     Attends Club or Organization Meetings:     Marital Status:    Intimate Partner Violence:     Fear of Current or Ex-Partner:     Emotionally Abused:     Physically Abused:     Sexually Abused:        Family History:       Problem Relation Age of Onset    Coronary Art Dis Mother     Cancer Sister         uterine    Kidney Disease Sister     Coronary Art Dis Brother          in his 46s - age 47    Cancer Father         colon    Coronary Art Dis Sister     Cancer Sister         olive cancer    No Known Problems Sister     No Known Problems Sister     No Known Problems Son     Alcohol Abuse Son         drank self to death    No Known Problems Son          Physical Examination:  /72   Pulse 86   Ht 6' 4\" (1.93 m)   Wt 272 lb (123.4 kg)   BMI 33.11 kg/m²   Physical Exam  Constitutional:       Comments: Moderate to severe obesity  Blood pressure right arm sitting 120/70 mmHg, pulse 68 bpm regular   HENT:      Mouth/Throat:      Pharynx: No oropharyngeal exudate. Eyes:      General: No scleral icterus. Right eye: No discharge. Left eye: No discharge. Neck:      Thyroid: No thyromegaly. Vascular: No JVD. Cardiovascular:      Rate and Rhythm: Normal rate and regular rhythm. Heart sounds: No murmur heard. No friction rub. No gallop.        Comments: No jugular venous distention  No edema  No significant systolic or diastolic murmurs noted  Pulmonary:      Effort: No respiratory distress. Breath sounds: No stridor. No wheezing or rales. Abdominal:      General: Bowel sounds are normal. There is no distension. Palpations: Abdomen is soft. There is no mass. Tenderness: There is no abdominal tenderness. There is no guarding or rebound. Comments: No palpable organomegaly   Musculoskeletal:         General: No deformity. Skin:     General: Skin is warm. Coloration: Skin is not pale. Findings: No erythema or rash. Neurological:      Mental Status: He is alert and oriented to person, place, and time. Motor: No abnormal muscle tone. Coordination: Coordination normal.      Deep Tendon Reflexes: Reflexes normal.           Labs:   CBC: No results for input(s): WBC, HGB, HCT, PLT in the last 72 hours. BMP:No results for input(s): NA, K, CO2, BUN, CREATININE, LABGLOM, GLUCOSE in the last 72 hours. BNP: No results for input(s): BNP in the last 72 hours. PT/INR: No results for input(s): PROTIME, INR in the last 72 hours. APTT:No results for input(s): APTT in the last 72 hours. CARDIAC ENZYMES:No results for input(s): CKTOTAL, CKMB, CKMBINDEX, TROPONINI in the last 72 hours. FASTING LIPID PANEL:  Lab Results   Component Value Date    HDL 28 06/23/2021    LDLDIRECT 167 11/01/2018    LDLCALC 179 06/23/2021    TRIG 173 06/23/2021     LIVER PROFILE:No results for input(s): AST, ALT, LABALBU in the last 72 hours. Imaging:        Impression        Based on ankle-brachial indices and doppler waveforms, the patient has    relatively normal flow to the bilateral lower extremity arterial system [facility] significant drop in the bilateral ankle-brachial indices after    exercise.  So if the patient has leg pain, then etiologies other than    PVOD/PAD should be considered.        Signature        ----------------------------------------------------------------    Electronically Medications    metoprolol succinate (TOPROL XL) 25 MG extended release tablet     Sig: Take 1 tablet by mouth daily     Dispense:  90 tablet     Refill:  3    apixaban (ELIQUIS) 5 MG TABS tablet     Sig: Take 1 tablet by mouth 2 times daily     Dispense:  180 tablet     Refill:  3    clopidogrel (PLAVIX) 75 MG tablet     Sig: TAKE 1 TABLET DAILY     Dispense:  90 tablet     Refill:  3             Return for NP 4 mths; me 8 mths. Electronically signed by Ezra Gallardo MD on 10/29/2021 at 11:33 AM    [facility]      Thisdictation was generated by voice recognition computer software. Although all attempts are made to edit the dictation for accuracy, there may be errors in the transcription that are not intended.

## 2021-10-29 NOTE — PROGRESS NOTES
ICD interrogated  Presenting rhythm: AS/VS, AP 41.7%,  <0.1%  Battery status: 8.6 years  Lead status: lead impedance within range and stable  Sensing: P waves 2.8 mV,  R waves 11.4 mV  Thresholds:  Atrial 1.000 V @ 0.4ms, ventricular 0.750 @ 0.4ms  Observations:  None  Reprogramming for sensitivity and threshold testing  Next [facility] home check scheduled for 02/01/22

## 2021-11-02 ENCOUNTER — HOSPITAL ENCOUNTER (OUTPATIENT)
Dept: NON INVASIVE DIAGNOSTICS | Age: 76
Discharge: HOME | End: 2021-11-02
Payer: MEDICARE

## 2021-11-02 DIAGNOSIS — I25.10 CORONARY ARTERY DISEASE INVOLVING NATIVE CORONARY ARTERY OF NATIVE HEART WITHOUT ANGINA PECTORIS: ICD-10-CM

## 2021-11-02 LAB
LV EF: 30 %
LVEF MODALITY: NORMAL

## 2021-11-02 PROCEDURE — C8929 TTE W OR WO FOL WCON,DOPPLER: HCPCS

## 2021-11-02 PROCEDURE — 6360000004 HC RX CONTRAST MEDICATION: Performed by: INTERNAL MEDICINE

## 2021-11-02 RX ADMIN — PERFLUTREN 2.2 MG: 6.52 INJECTION, SUSPENSION INTRAVENOUS at 14:59

## 2021-11-04 ENCOUNTER — TELEPHONE (OUTPATIENT)
Dept: CARDIOLOGY CLINIC | Age: 76
End: 2021-11-04

## 2021-11-05 NOTE — PROGRESS NOTES
Physician Progress Note      PATIENT:               Estefanía Christensen  [facility] #:                  [id number]  :                       1945  ADMIT DATE:       10/16/2021 4:58 AM  DISCH DATE:        10/18/2021 2:06 PM  RESPONDING  PROVIDER #:        Nori Barrow MD        QUERY TEXT:    Stage of Chronic Kidney Disease: Please provide further specificity, if known. Clinical indicators include:  Options provided:  -- Chronic kidney disease stage 1  -- Chronic kidney disease stage 2  -- Chronic kidney disease stage 3  -- Chronic kidney disease stage 3a  -- Chronic kidney disease stage 3b  -- Chronic kidney disease stage 4  -- Chronic kidney disease stage 5  -- Chronic kidney disease stage 5, requiring dialysis  -- End stage renal disease  -- Other - I will add my own diagnosis  -- Disagree - Not applicable / Not valid  -- Disagree - Clinically Unable to determine / Unknown        PROVIDER RESPONSE TEXT:    Provider dismissed this query because it was not applicable to the patient or   not a valid query.       Electronically signed by:  Nori Barrow MD 2021 3:15 PM

## 2022-02-02 ENCOUNTER — TELEPHONE (OUTPATIENT)
Dept: CARDIOLOGY CLINIC | Age: 77
End: 2022-02-02

## 2022-02-02 DIAGNOSIS — I50.22 CHRONIC SYSTOLIC HEART FAILURE (HCC): ICD-10-CM

## 2022-02-02 DIAGNOSIS — I25.5 ISCHEMIC CARDIOMYOPATHY: ICD-10-CM

## 2022-02-02 DIAGNOSIS — Z95.810 AICD (AUTOMATIC CARDIOVERTER/DEFIBRILLATOR) PRESENT: Primary | ICD-10-CM

## 2022-02-02 PROCEDURE — 93295 DEV INTERROG REMOTE 1/2/MLT: CPT | Performed by: NURSE PRACTITIONER

## 2022-02-02 PROCEDURE — 93296 REM INTERROG EVL PM/IDS: CPT | Performed by: NURSE PRACTITIONER

## 2022-02-02 NOTE — TELEPHONE ENCOUNTER
Called and reminded Pt to send remote ICD carelink interrogation. Pt voiced his understanding. Transferred the PT to Adolfo Pope RN to assist with sending in a carelink.

## 2022-02-21 ENCOUNTER — OFFICE VISIT (OUTPATIENT)
Dept: CARDIOLOGY CLINIC | Age: 77
End: 2022-02-21
Payer: MEDICARE

## 2022-02-21 ENCOUNTER — TELEPHONE (OUTPATIENT)
Dept: CARDIOLOGY CLINIC | Age: 77
End: 2022-02-21

## 2022-02-21 VITALS
BODY MASS INDEX: 35.68 KG/M2 | OXYGEN SATURATION: 96 % | HEIGHT: 76 IN | WEIGHT: 293 LBS | DIASTOLIC BLOOD PRESSURE: 64 MMHG | HEART RATE: 64 BPM | SYSTOLIC BLOOD PRESSURE: 104 MMHG

## 2022-02-21 DIAGNOSIS — I50.22 CHRONIC SYSTOLIC HEART FAILURE (HCC): Primary | ICD-10-CM

## 2022-02-21 DIAGNOSIS — Z95.1 HX OF CABG: ICD-10-CM

## 2022-02-21 DIAGNOSIS — Z87.448 HISTORY OF CHRONIC KIDNEY DISEASE: ICD-10-CM

## 2022-02-21 DIAGNOSIS — Z95.810 AICD (AUTOMATIC CARDIOVERTER/DEFIBRILLATOR) PRESENT: ICD-10-CM

## 2022-02-21 DIAGNOSIS — I10 ESSENTIAL HYPERTENSION: ICD-10-CM

## 2022-02-21 DIAGNOSIS — R60.9 EDEMA, UNSPECIFIED TYPE: ICD-10-CM

## 2022-02-21 DIAGNOSIS — E78.2 MIXED HYPERLIPIDEMIA: Chronic | ICD-10-CM

## 2022-02-21 PROCEDURE — 99214 OFFICE O/P EST MOD 30 MIN: CPT | Performed by: NURSE PRACTITIONER

## 2022-02-21 PROCEDURE — G8427 DOCREV CUR MEDS BY ELIG CLIN: HCPCS | Performed by: NURSE PRACTITIONER

## 2022-02-21 PROCEDURE — G8417 CALC BMI ABV UP PARAM F/U: HCPCS | Performed by: NURSE PRACTITIONER

## 2022-02-21 PROCEDURE — 1123F ACP DISCUSS/DSCN MKR DOCD: CPT | Performed by: NURSE PRACTITIONER

## 2022-02-21 PROCEDURE — G8482 FLU IMMUNIZE ORDER/ADMIN: HCPCS | Performed by: NURSE PRACTITIONER

## 2022-02-21 PROCEDURE — 1036F TOBACCO NON-USER: CPT | Performed by: NURSE PRACTITIONER

## 2022-02-21 PROCEDURE — 4040F PNEUMOC VAC/ADMIN/RCVD: CPT | Performed by: NURSE PRACTITIONER

## 2022-02-21 RX ORDER — AMOXICILLIN 500 MG/1
500 CAPSULE ORAL 2 TIMES DAILY
Qty: 20 CAPSULE | Refills: 0 | Status: SHIPPED | OUTPATIENT
Start: 2022-02-21 | End: 2022-03-03

## 2022-02-21 RX ORDER — FUROSEMIDE 10 MG/ML
80 INJECTION INTRAMUSCULAR; INTRAVENOUS ONCE
Status: DISCONTINUED | OUTPATIENT
Start: 2022-02-21 | End: 2022-02-21 | Stop reason: HOSPADM

## 2022-02-21 NOTE — TELEPHONE ENCOUNTER
Deane Bhardwaj called to schedule a an appointment to come in and be seen for shortness or breathe and headache. Decision tree wanting patient to do VV and patient is wanting to be seen in person ASAP. Herb Navarrete Please be advised that the best time to call him to accommodate their needs is Anytime. Thank you.

## 2022-02-21 NOTE — PATIENT INSTRUCTIONS
New instructions for today:    Amoxil 500 mg (1) tab twice daily for 10 days for infection - prescription faxed to your pharmacy. Double Lasix on Tuesday and Wednesday of this week only. Then resume previous dose of (1) tab daily. If your symptoms worsen, go to the emergency room. Weigh yourself daily without clothing at the same time each day. Record a daily weight log. Call the office if you gain 3 pounds or more in 2 to 3 days or 5 pounds in one week. A sudden weight gain may mean that your heart failure is getting worse. Sodium causes your body to hold on to extra water. This may cause your heart failure symptoms to get worse. Limit sodium to 2,000 milligrams (mg) a day or less. That is less than 1 teaspoon of salt a day, including all the salt you eat in cooking or in packaged foods. Fluid restriction of 1500ml per day (about 6 cups of fluid per day). Patient Instructions:  Continue current medications as prescribed. Always keep a current medication list. Bring your medications to every office visit. Continue to follow up with primary care provider for non cardiac medical problems. Call the office with any problems, questions or concerns at [phone]. If you have been asked to keep a blood pressure log, do so for 2 weeks. Call the office to report readings to the triage nurse at [phone]. Follow up with cardiologist as scheduled. The following educational material has been included in this after visit summary for your review: Life simple 7. Heart health. Life simple 7  1) Manage blood pressure - high blood pressure is a major risk factor for heart disease and stroke. Keeping blood pressure in health range reduces strain on your heart, arteries and kidneys. Blood pressure goal is less than 130/80. 2) Control cholesterol - contributes to plaque, which can clog arteries and lead to heart disease and stroke.  When you control your cholesterol you are giving your arteries their best chance to remain clear. It is recommended that you get cholesterol lab work done once a year. 3) Reduce blood sugar - most of the food we eat is turning into glucose or blood sugar that our body uses for energy. Over time, high levels of blood sugar can damage your heart, kidneys, eyes and nerves. 4) Get active - living an active life is one of the most rewarding gifts you can give yourself and those you love. Simply put, daily physical activity increases your length and quality of life. Strive to exercise 15 minutes most days of the week. 5)  Eat better - A healthy diet is one of your best weapons for fighting cardiovascular disease. When you eat a heart healthy diet, you improve your chances for feeling good and staying healthy for life. 6)  Lose weight - when you shed extra fat an unnecessary pounds, you reduce the burden on your hear, lungs, blood vessels and skeleton. You give yourself the gift of active living, you lower your blood pressure and help yourself feel better. 7) Stop smoking - cigarette smokers have a higher risk of developing cardiovascular disease. If  You smoke, quitting is the best thing you can do for your health. Check American Heart Association on line for more information on Life's Simple 7 and tips for healthy living. A Healthy Heart: Care Instructions  Your Care Instructions     Coronary artery disease, also called heart disease, occurs when a substance called plaque builds up in the vessels that supply oxygen-rich blood to your heart muscle. This can narrow the blood vessels and reduce blood flow. A heart attack happens when blood flow is completely blocked. A high-fat diet, smoking, and other factors increase the risk of heart disease. Your doctor has found that you have a chance of having heart disease. You can do lots of things to keep your heart healthy. It may not be easy, but you can change your diet, exercise more, and quit smoking.  These steps really work to lower your chance of heart disease. Follow-up care is a key part of your treatment and safety. Be sure to make and go to all appointments, and call your doctor if you are having problems. It's also a good idea to know your test results and keep a list of the medicines you take. How can you care for yourself at home? Diet  · Use less salt when you cook and eat. This helps lower your blood pressure. Taste food before salting. Add only a little salt when you think you need it. With time, your taste buds will adjust to less salt. · Eat fewer snack items, fast foods, canned soups, and other high-salt, high-fat, processed foods. · Read food labels and try to avoid saturated and trans fats. They increase your risk of heart disease by raising cholesterol levels. · Limit the amount of solid fat-butter, margarine, and shortening-you eat. Use olive, peanut, or canola oil when you cook. Bake, broil, and steam foods instead of frying them. · Eat a variety of fruit and vegetables every day. Dark green, deep orange, red, or yellow fruits and vegetables are especially good for you. Examples include spinach, carrots, peaches, and berries. · Foods high in fiber can reduce your cholesterol and provide important vitamins and minerals. High-fiber foods include whole-grain cereals and breads, oatmeal, beans, brown rice, citrus fruits, and apples. · Eat lean proteins. Heart-healthy proteins include seafood, lean meats and poultry, eggs, beans, peas, nuts, seeds, and soy products. · Limit drinks and foods with added sugar. These include candy, desserts, and soda pop. Lifestyle changes  · If your doctor recommends it, get more exercise. Walking is a good choice. Bit by bit, increase the amount you walk every day. Try for at least 30 minutes on most days of the week. You also may want to swim, bike, or do other activities. · Do not smoke.  If you need help quitting, talk to your doctor about stop-smoking programs and medicines. These can increase your chances of quitting for good. Quitting smoking may be the most important step you can take to protect your heart. It is never too late to quit. · Limit alcohol to 2 drinks a day for men and 1 drink a day for women. Too much alcohol can cause health problems. · Manage other health problems such as diabetes, high blood pressure, and high cholesterol. If you think you may have a problem with alcohol or drug use, talk to your doctor. Medicines  · Take your medicines exactly as prescribed. Call your doctor if you think you are having a problem with your medicine. · If your doctor recommends aspirin, take the amount directed each day. Make sure you take aspirin and not another kind of pain reliever, such as acetaminophen (Tylenol). When should you call for help? [id number] if you have symptoms of a heart attack. These may include:  · Chest pain or pressure, or a strange feeling in the chest.  · Sweating. · Shortness of breath. · Pain, pressure, or a strange feeling in the back, neck, jaw, or upper belly or in one or both shoulders or arms. · Lightheadedness or sudden weakness. · A fast or irregular heartbeat. After you call 911, the  may tell you to chew 1 adult-strength or 2 to 4 low-dose aspirin. Wait for an ambulance. Do not try to drive yourself. Watch closely for changes in your health, and be sure to contact your doctor if you have any problems. Where can you learn more? Go to https://[employer] LtdsashaPalkion.Polyera. org and sign in to your [employer] account. Enter X701 in the [facility] box to learn more about \"A Healthy Heart: Care Instructions. \"     If you do not have an account, please click on the \"Sign Up Now\" link. Current as of: December 16, 2019               Content Version: 12.5  © 6885-3832 Healthwise, Incorporated. Care instructions adapted under license by [facility] ([facility]).  If you have questions about a medical condition or this instruction, always ask your healthcare professional. [place] [address] any warranty or liability for your use of this information. Patient Education        Avoiding Triggers With Heart Failure: Care Instructions  Your Care Instructions     Triggers are anything that make your heart failure flare up. A flare-up is also called \"sudden heart failure\" or \"acute heart failure. \" When you have a flare-up, fluid builds up in your lungs, and you have problems breathing. You might need to go to the hospital. By watching for changes in your condition and avoiding triggers, you can prevent heart failure flare-ups. Follow-up care is a key part of your treatment and safety. Be sure to make and go to all appointments, and call your doctor if you are having problems. It's also a good idea to know your test results and keep a list of the medicines you take. How can you care for yourself at home? Watch for changes in your weight and condition  · Weigh yourself without clothing at the same time each day. Record your weight. Call your doctor if you have sudden weight gain, such as more than 2 to 3 pounds in a day or 5 pounds in a week. (Your doctor may suggest a different range of weight gain.) A sudden weight gain may mean that your heart failure is getting worse. · Keep a daily record of your symptoms. Write down any changes in how you feel, such as new shortness of breath, cough, or problems eating. Also record if your ankles are more swollen than usual and if you feel more tired than usual. Note anything that you ate or did that could have triggered these changes. Limit sodium  Sodium causes your body to hold on to extra water. This may cause your heart failure symptoms to get worse. People get most of their sodium from processed foods. Fast food and restaurant meals also tend to be very high in sodium. · Your doctor may suggest that you limit sodium.  Your doctor can tell you how much sodium is right for you. This includes limiting sodium in cooked and packaged foods. · Read food labels on cans and food packages. They tell you how much sodium you get in one serving. Check the serving size. If you eat more than one serving, you are getting more sodium. · Be aware that sodium can come in forms other than salt, including monosodium glutamate (MSG), sodium citrate, and sodium bicarbonate (baking soda). MSG is often added to Asian food. You can sometimes ask for food without MSG or salt. · Slowly reducing salt will help you adjust to the taste. Take the salt shaker off the table. · Flavor your food with garlic, lemon juice, onion, vinegar, herbs, and spices instead of salt. Do not use soy sauce, steak sauce, onion salt, garlic salt, mustard, or ketchup on your food, unless it is labeled \"low-sodium\" or \"low-salt. \"  · Make your own salad dressings, sauces, and ketchup without adding salt. · Use fresh or frozen ingredients, instead of canned ones, whenever you can. Choose low-sodium canned goods. · Eat less processed food and food from restaurants, including fast food. Exercise as directed  Moderate, regular exercise is very good for your heart. It improves your blood flow and helps control your weight. But too much exercise can stress your heart and cause a heart failure flare-up. · Check with your doctor before you start an exercise program.  · Walking is an easy way to get exercise. Start out slowly. Gradually increase the length and pace of your walk. Swimming, riding a bike, and using a treadmill are also good forms of exercise. · When you exercise, watch for signs that your heart is working too hard. You are pushing yourself too hard if you cannot talk while you are exercising. If you become short of breath or dizzy or have chest pain, stop, sit down, and rest.  · Do not exercise when you do not feel well. Take medicines correctly  · Take your medicines exactly as prescribed.  Call your doctor if you think you are having a problem with your medicine. · Make a list of all the medicines you take. Include those prescribed to you by other doctors and any over-the-counter medicines, vitamins, or supplements you take. Take this list with you when you go to any doctor. · Take your medicines at the same time every day. It may help you to post a list of all the medicines you take every day and what time of day you take them. · Make taking your medicine as simple as you can. Plan times to take your medicines when you are doing other things, such as eating a meal or getting ready for bed. This will make it easier to remember to take your medicines. · Get organized. Use helpful tools, such as daily or weekly pill containers. When should you call for help? Call 911  if you have symptoms of sudden heart failure such as:    · You have severe trouble breathing.     · You cough up pink, foamy mucus.     · You have a new irregular or rapid heartbeat. Call your doctor now or seek immediate medical care if:    · You have new or increased shortness of breath.     · You are dizzy or lightheaded, or you feel like you may faint.     · You have sudden weight gain, such as more than 2 to 3 pounds in a day or 5 pounds in a week. (Your doctor may suggest a different range of weight gain.)     · You have increased swelling in your legs, ankles, or feet.     · You are suddenly so tired or weak that you cannot do your usual activities. Watch closely for changes in your health, and be sure to contact your doctor if you develop new symptoms. Where can you learn more? Go to https://Sw[employer] [email or web address].com.Yuppics. org and sign in to your [facility] account. Enter T537 in the [employer] box to learn more about \"Avoiding Triggers With Heart Failure: Care Instructions. \"     If you do not have an account, please click on the \"Sign Up Now\" link.   Current as of: April 29, 2021               Content Version: 13.1  © 9706-6833 Healthwise, Incorporated. Care instructions adapted under license by [address] If you have questions about a medical condition or this instruction, always ask your healthcare professional. [place] [address] any warranty or liability for your use of this information.

## 2022-02-21 NOTE — PROGRESS NOTES
[facility], [place]. [address], [address] [facility]  [phone] office  [phone] fax      OFFICE VISIT:  2022    Tiffany Serra - : [id number]  Reason For Visit:  Noemi Holcomb is a 67 y.o. male who is here for Follow-up (Patient is having some SOA, arms and legs have edema, wheezing), Coronary Artery Disease, and Congestive Heart Failure    History:   Diagnosis Orders   1. Chronic systolic heart failure ([address]     2. AICD (automatic cardioverter/defibrillator) present     3. Mixed hyperlipidemia     4. Edema, unspecified type     5. History of chronic kidney disease     6. Hx of CABG     7. Essential hypertension       The patient presents today for fluid retention. Mr. Dimitrios Miranda reports \"I think I have gained about 20 pounds over the weekend. \"  Weight today is 293 - 10/21 weight 272 pounds. He denies excessive sodium intake. No report of orthopnea or chest pain. He reports \"I just kind of feel bad all over. My ears hurt and I have been blowing yellow stuff out of my nose for about a week. I am not coughing ar running a fever. \"  BP is well controlled on current regimen. The patient's PCP monitors cholesterol. Mayra Rollins denies exertional chest pain, orthopnea, paroxysmal nocturnal dyspnea, syncope, presyncope, sensed arrhythmia and fatigue. The patient denies numbness or weakness to suggest cerebrovascular accident or transient ischemic attack.  + VÁSQUEZ. + bilateral ankle edema. + fatigue.  + otalgia with yellow nasal drainage.     Tiffany Serra has the following history as recorded in [facility]:  Patient Active Problem List   Diagnosis Code    Type 2 diabetes mellitus with diabetic polyneuropathy, with long-term current use of insulin ([address] E11.42, Z79.4    Essential hypertension I10    Coronary artery disease involving native coronary artery of native heart without angina pectoris I25.10    MI (myocardial infarction) ([facility] I21.9    Mixed hyperlipidemia E78.2    Chronic bilateral low back pain without sciatica M54.50, G89.29    DDD (degenerative disc disease), lumbar M51.36    Spondylosis of lumbar region without myelopathy or radiculopathy M47.816    Acute ischemic stroke (HCC) I63.9    Thoracic outlet syndrome G54.0    Vertigo R42    Imbalance R26.89    Cerebrovascular accident (CVA) due to embolism of right middle cerebral artery ([facility]) I63.411    Recurrent major depressive disorder, in full remission ([facility] F33.42    Trigger middle finger of left hand M65.332    Peripheral polyneuropathy G62.9    Palpitations R00.2    Chronic insomnia F51.04    Status post placement of implantable loop recorder Z95.818    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    Dermatitis L30.9    Depression with anxiety F41.8    Benign prostatic hyperplasia without lower urinary tract symptoms N40.0    Chronic tension-type headache, not intractable G44.229    MOO on CPAP G47.33, Z99.89    Chest pain R07.9    SOB (shortness of breath) R06.02    ACS (acute coronary syndrome) ([facility]) I24.9    Acute MI inferior lateral first episode care ([facility] I21.19    Hx of CABG Z95.1    Chronic constipation K59.09    Palliative care patient Z51.5    Ischemic cardiomyopathy I25.5    Urinary tract infection due to extended-spectrum beta lactamase (ESBL) producing Escherichia coli N39.0, B96.29, Z16.12    Chronic systolic heart failure ([facility]) V37.88    Complicated UTI (urinary tract infection) N39.0    AICD (automatic cardioverter/defibrillator) present Z95.810    History of chronic kidney disease Z87.448    Edema R60.9    Vestibular neuropathy H93.3X9    Breakthrough seizure ([facility] G40.919    Seizure due to hypoglycemia ([facility]) R56.9, E16.2    Seizure ([facility] R56.9     Past Medical History:   Diagnosis Date    Acute ischemic stroke ([facility] 2/20/2017    Atherosclerotic heart disease     s/p MI, stenting x 3 jan 2011([place]    Breakthrough seizure ([address] 10/16/2021    CAD (coronary artery disease)     Cerebral artery occlusion with cerebral infarction [facility])     Cerebrovascular accident (CVA) due to embolism of right middle cerebral artery ([address] 8/14/2017    CHF (congestive heart failure) ([facility])     Chronic bilateral low back pain without sciatica 11/10/2016    Chronic kidney disease     DDD (degenerative disc disease), lumbar 11/15/2016    Depression     Depression with anxiety     Diabetes mellitus ([address]     type II    Diabetes mellitus, type 2 ([address]     DM (diabetes mellitus) ([address] 7/12/2011    ESBL (extended spectrum beta-lactamase) producing bacteria infection 11/16/2019    urine    Glaucoma     Headache     Hyperlipidemia     Cholesterol management per pcp.  Hypertension     Macular degeneration     MI (myocardial infarction) ([address]     MI (myocardial infarction) ([address]     Neuromuscular disorder ([address]     Neuropathy     MOO on CPAP     Osteoarthritis     Palliative care patient 06/11/2019    Sleep apnea     Spondylosis of lumbar region without myelopathy or radiculopathy 11/15/2016    Status post placement of implantable loop recorder 10/31/2017    TIA (transient ischemic attack)      Past Surgical History:   Procedure Laterality Date    APPENDECTOMY      at age 4    BACK SURGERY      2016 (fusion), 2017    BREAST SURGERY  05/2019    CARDIAC CATHETERIZATION  12/12/12    with angioplasty, stent    CARDIAC CATHETERIZATION  3/29/15  MDL    stent to distal RCA.  EF 60%    CHOLECYSTECTOMY, LAPAROSCOPIC      2012    COLONOSCOPY      2010    CORONARY ANGIOPLASTY WITH STENT PLACEMENT  jan 11 [place]    stent x 3    CORONARY ANGIOPLASTY WITH STENT PLACEMENT      01/01/11    CORONARY ARTERY BYPASS GRAFT N/A 5/30/2019    CORONARY ARTERY BYPASS GRAFT X3 WITH LEFT INTERNAL MAMMARY ARTERY WITH ENDOSCOPIC VEIN HARVESTING WITH PERFUSION TRANSESOPHAGEAL ECHOCARDIOGRAM performed by Corrinne Makua, MD at [facility]  DIAGNOSTIC CARDIAC CATH LAB PROCEDURE  [id number]    primary stent placement to the first circumflex marginal branch, balloon angioplasty to the third left anterior descending diagnonal branch, intracoronary nitroglycerin adminstration    JOINT REPLACEMENT      left knee, ~2016    LUMBAR FUSION Left 11/15/2016    LUMBAR INTERBODY FUSION LATERAL L3-4 L4-5 WITH LATERAL PLATE  performed by Meghan Olvera MD at [address]  2019     Family History   Problem Relation Age of Onset    Coronary Art Dis Mother     Cancer Sister         uterine    Kidney Disease Sister     Coronary Art Dis Brother          in his 46s - age 47    Cancer Father         colon    Coronary Art Dis Sister     Cancer Sister         olive cancer    No Known Problems Sister     No Known Problems Sister     No Known Problems Son     Alcohol Abuse Son         drank self to death    No Known Problems Son      Social History     Tobacco Use    Smoking status: Never Smoker    Smokeless tobacco: Never Used   Substance Use Topics    Alcohol use: Yes     Alcohol/week: 1.0 standard drink     Types: 1 Shots of liquor per week     Comment: Occ. Current Outpatient Medications   Medication Sig Dispense Refill    metoprolol succinate (TOPROL XL) 25 MG extended release tablet Take 1 tablet by mouth daily 90 tablet 3    apixaban (ELIQUIS) 5 MG TABS tablet Take 1 tablet by mouth 2 times daily 180 tablet 3    clopidogrel (PLAVIX) 75 MG tablet TAKE 1 TABLET DAILY 90 tablet 3    ALPRAZolam (XANAX) 0.5 MG tablet Take 0.5 mg by mouth nightly as needed for Sleep.       famotidine (PEPCID) 40 MG tablet Take 40 mg by mouth daily      isosorbide mononitrate (IMDUR) 30 MG extended release tablet Take 30 mg by mouth daily      azelastine (ASTELIN) 0.1 % nasal spray 1 spray by Nasal route 2 times daily Use in each nostril as directed      finasteride (PROSCAR) 5 MG tablet Take 5 mg by mouth daily      docusate sodium (COLACE) 100 MG capsule Take 200 mg by mouth daily      furosemide (LASIX) 40 MG tablet Take 40 mg by mouth daily       Multiple Vitamins-Minerals (PRESERVISION AREDS PO) Take 2 capsules by mouth      sertraline (ZOLOFT) 100 MG tablet Take 1 tablet by mouth daily 90 tablet 2    nitroGLYCERIN (NITROSTAT) 0.4 MG SL tablet up to max of 3 total doses. If no relief after 1 dose, call 911. 30 tablet 2    sacubitril-valsartan (ENTRESTO) 49-51 MG per tablet Take 1 tablet by mouth 2 times daily 60 tablet 5    zoster recombinant adjuvanted vaccine [facility]) 50 MCG/0.5ML SUSR injection 1 vaccine now and repeat in 2-6 months. 0.5 mL 1     No current facility-administered medications for this visit. Allergies: Statins, Crestor [rosuvastatin calcium], Adhesive tape, and Morphine    Review of Systems  Constitutional  no appetite change, or unexpected weight change. No fever, chills or diaphoresis. + fatigue. HEENT  no epistaxis. No tinnitus or significant hearing loss. + bilateral otalgia with yellow nasal drainage; frontal sinus pressure. Eyes  no sudden vision change or amaurosis. No corneal arcus, xantholasma, subconjunctival hemorrhage or discharge. Respiratory  no significant wheezing, stridor, apnea or cough.  +  VÁSQUEZ. Cardiovascular  no exertional chest pain to suggest myocardial ischemia. No orthopnea or PND. No sensation of sustained arrythmia. No occurrence of slow heart rate. No palpitations. No claudication. Gastrointestinal  no  pain. No blood in stool. No severe constipation, diarrhea, nausea, or vomiting. + firm abdominal distention. Genitourinary  no dysuria, frequency, or urgency. No flank pain or hematuria. Musculoskeletal  no back pain or myalgia. No problems with gait. Extremities - no clubbing or cyanosis. + bilateral ankle edema. Skin  no color change or rash. No pallor. No new surgical incision.    Neurologic  no speech difficulty, facial asymmetry or lateralizing weakness. No seizures, presyncope or syncope. No significant dizziness. Hematologic  no easy bruising or excessive bleeding. Psychiatric  no severe anxiety or insomnia. No confusion. All other review of systems are negative. Objective  Vital Signs - /64   Pulse 64   Ht 6' 4\" (1.93 m)   Wt 293 lb (132.9 kg)   SpO2 96%   BMI 35.67 kg/m²   General - Noemi Holcomb is alert, cooperative, and pleasant. Well groomed. No acute distress. Body habitus - Body mass index is 35.67 kg/m². HEENT  Head is normocephalic. No circumoral cyanosis. Dentition is normal.  Tympanic membraines gray and dull; sharp cone of light. No erythema or effusions. Posterior pillars injects end mildly erythematous. EYES -   Lids normal without ptosis. No discharge, edema or subconjunctival hemorrhage. Neck - Symmetrical without apparent mass or lymphadenopathy. Respiratory - Normal respiratory effort without use of accessory muscles. Ausculatation reveals vesicular breath sounds without crackles, wheezes, rub or rhonchi. Cardiovascular  No jugular venous distention. Auscultation reveals regular rate and rhythm. No audible clicks, gallop or rub. No murmur. No lower extremity varicosities. No carotid bruits. Abdominal -  No  mass or pulsations. Abdomen firm and distended. Extremities - No clubbing or cyanosis. No statis dermatitis or ulcers. 1+ pitting bilateral ankle edema. Musculoskeletal -   No Osler's nodes. No kyphosis or scoliosis. Gait is even and regular without limp or shuffle. Ambulates without assistance. Skin -  Warm and dry; no rash or pallor. No new surgical wound. Neurological - No focal neurological deficits. Thought processes coherent. No apparent tremor. Oriented to person, place and time. Psychiatric -  Appropriate affect and mood. Data reviewed:  11/2/21 echo   Mitral valve leaflets are mildly thickened with preserved leaflet  mobility.    Mild mitral regurgitation is present. Mildly thickened aortic valve leaflets with preserved leaflet mobility. Tricuspid valve is structurally normal.   Left ventricular ejection fraction is visually estimated at 30%. Normal left ventricular wall thickness. More pronounced hypokinesis apex, inferior wall, and septum   Left ventricular size is moderately increased . Signature   Electronically signed by Asad Vásquez MD(Interpreting   physician) on 11/03/2021 08:56 AM    Lab Results   Component Value Date    WBC 6.7 10/18/2021    HGB 14.9 10/18/2021    HCT 49.1 10/18/2021    MCV 88.6 10/18/2021     10/18/2021     Lab Results   Component Value Date     10/18/2021    K 4.0 10/18/2021     10/18/2021    CO2 26 10/18/2021    BUN 19 10/18/2021    CREATININE 1.2 10/18/2021    GLUCOSE 128 (H) 10/18/2021    CALCIUM 9.2 10/18/2021    PROT 6.6 10/18/2021    LABALBU 4.1 10/18/2021    BILITOT 0.4 10/18/2021    ALKPHOS 65 10/18/2021    AST 13 10/18/2021    ALT 15 10/18/2021    LABGLOM 59 (A) 10/18/2021    GFRAA >59 10/18/2021    GLOB 2.6 03/27/2017       Lab Results   Component Value Date    CHOL 242 (H) 06/23/2021    CHOL 157 (L) 05/25/2019    CHOL 205 (H) 03/01/2019     Lab Results   Component Value Date    TRIG 173 (H) 06/23/2021    TRIG 103 05/25/2019    TRIG 202 (H) 03/01/2019     Lab Results   Component Value Date    HDL 28 (L) 06/23/2021    HDL 20 (L) 05/25/2019    HDL 28 (L) 03/01/2019     Lab Results   Component Value Date    LDLCALC 179 06/23/2021    LDLCALC 116 05/25/2019    LDLCALC 137 03/01/2019       BP Readings from Last 3 Encounters:   02/21/22 104/64   10/29/21 118/72   10/18/21 (!) 133/95    Pulse Readings from Last 3 Encounters:   02/21/22 64   10/29/21 86   10/18/21 68        Wt Readings from Last 3 Encounters:   02/21/22 293 lb (132.9 kg)   10/29/21 272 lb (123.4 kg)   10/18/21 269 lb 8 oz (122.2 kg)     Assessment/Plan:   Diagnosis Orders   1. Chronic systolic heart failure ([facility]     2.  AICD (automatic cardioverter/defibrillator) present     3. Mixed hyperlipidemia     4. Edema, unspecified type     5. History of chronic kidney disease     6. Hx of CABG     7. Essential hypertension       Chronic systolic heart failure - exacerbation. NYHA class II-III stage C  GDMT includes Entresto, Toprol XL and Lasix. Discussed option of hospitalization. Patient declined. Lasix 80 mg IVP today. Lasix 40 mg (2) once daily on Tuesday and Wednesday. Then resume (1) daily. Advised low sodium diet and daily weight log. Follow up phone visit scheduled for Wednesday. HTN - normotensive on current regimen. BP goal less than 130/80. Continue same. Hyperlipidemia - not currently on statin. Crestor listed as allergy. Last LDL elevated. Plan to discuss at followup. Consider Repatha. Sinusitis - Amoxil 500 mg (1) tab BID for 10 days. Patient is compliant with medication regimen. Previous cardiac history and records reviewed. Continue current medical management for cardiac related condition. Continue other current medications as directed. Continue to follow up with primary care provider for non cardiac medical problems. If your primary care provider is outside of the [facility], please request that your labs be faxed to this office at [phone]. BP goal 130/80 or less. Call the office with any problems, questions or concerns at [phone]. Cardiology follow up as scheduled in [address] appointments. Educational included in patient instructions. Heart health.       Sadiq Martins, TEGAN

## 2022-02-21 NOTE — TELEPHONE ENCOUNTER
Returned patient's call, he advised his legs are swollen, he's having increased SOB and having some sinus issues as well. Patient advised to go to PCP regarding sinus pressure, asked about weight gain he advised he's gained 7-8 lbs over the last couple of days from fluid.   Have scheduled patient to come in to see [facility] today

## 2022-02-23 ENCOUNTER — TELEMEDICINE (OUTPATIENT)
Dept: CARDIOLOGY CLINIC | Age: 77
End: 2022-02-23
Payer: MEDICARE

## 2022-02-23 ENCOUNTER — TELEPHONE (OUTPATIENT)
Dept: CARDIOLOGY CLINIC | Age: 77
End: 2022-02-23

## 2022-02-23 DIAGNOSIS — I25.10 CORONARY ARTERY DISEASE INVOLVING NATIVE CORONARY ARTERY OF NATIVE HEART WITHOUT ANGINA PECTORIS: ICD-10-CM

## 2022-02-23 DIAGNOSIS — I50.22 CHRONIC SYSTOLIC HEART FAILURE (HCC): Primary | ICD-10-CM

## 2022-02-23 DIAGNOSIS — Z95.810 AICD (AUTOMATIC CARDIOVERTER/DEFIBRILLATOR) PRESENT: ICD-10-CM

## 2022-02-23 DIAGNOSIS — Z95.1 HX OF CABG: ICD-10-CM

## 2022-02-23 DIAGNOSIS — I10 ESSENTIAL HYPERTENSION: Chronic | ICD-10-CM

## 2022-02-23 DIAGNOSIS — E78.2 MIXED HYPERLIPIDEMIA: Chronic | ICD-10-CM

## 2022-02-23 PROCEDURE — 99442 PR PHYS/QHP TELEPHONE EVALUATION 11-20 MIN: CPT | Performed by: NURSE PRACTITIONER

## 2022-02-23 NOTE — PATIENT INSTRUCTIONS
New instructions for today:    Take Lasix 40 mg (2) tabs once daily the next 4 days for swelling. Weigh yourself daily without clothing at the same time each day. Record a daily weight log. Call the office if you gain 3 pounds or more in 2 to 3 days or 5 pounds in one week. A sudden weight gain may mean that your heart failure is getting worse. Sodium causes your body to hold on to extra water. This may cause your heart failure symptoms to get worse. Limit sodium to 2,000 milligrams (mg) a day or less. That is less than 1 teaspoon of salt a day, including all the salt you eat in cooking or in packaged foods. Fluid restriction of 1500ml per day (about 6 cups of fluid per day). Patient Instructions:  Continue current medications as prescribed. Always keep a current medication list. Bring your medications to every office visit. Continue to follow up with primary care provider for non cardiac medical problems. Call the office with any problems, questions or concerns at [phone]. If you have been asked to keep a blood pressure log, do so for 2 weeks. Call the office to report readings to the triage nurse at [phone]. Follow up with cardiologist as scheduled. The following educational material has been included in this after visit summary for your review: Life simple 7. Heart health. Life simple 7  1) Manage blood pressure - high blood pressure is a major risk factor for heart disease and stroke. Keeping blood pressure in health range reduces strain on your heart, arteries and kidneys. Blood pressure goal is less than 130/80. 2) Control cholesterol - contributes to plaque, which can clog arteries and lead to heart disease and stroke. When you control your cholesterol you are giving your arteries their best chance to remain clear. It is recommended that you get cholesterol lab work done once a year.   3) Reduce blood sugar - most of the food we eat is turning into glucose or blood sugar that our body uses for energy. Over time, high levels of blood sugar can damage your heart, kidneys, eyes and nerves. 4) Get active - living an active life is one of the most rewarding gifts you can give yourself and those you love. Simply put, daily physical activity increases your length and quality of life. Strive to exercise 15 minutes most days of the week. 5)  Eat better - A healthy diet is one of your best weapons for fighting cardiovascular disease. When you eat a heart healthy diet, you improve your chances for feeling good and staying healthy for life. 6)  Lose weight - when you shed extra fat an unnecessary pounds, you reduce the burden on your hear, lungs, blood vessels and skeleton. You give yourself the gift of active living, you lower your blood pressure and help yourself feel better. 7) Stop smoking - cigarette smokers have a higher risk of developing cardiovascular disease. If  You smoke, quitting is the best thing you can do for your health. Check American Heart Association on line for more information on Life's Simple 7 and tips for healthy living. A Healthy Heart: Care Instructions  Your Care Instructions     Coronary artery disease, also called heart disease, occurs when a substance called plaque builds up in the vessels that supply oxygen-rich blood to your heart muscle. This can narrow the blood vessels and reduce blood flow. A heart attack happens when blood flow is completely blocked. A high-fat diet, smoking, and other factors increase the risk of heart disease. Your doctor has found that you have a chance of having heart disease. You can do lots of things to keep your heart healthy. It may not be easy, but you can change your diet, exercise more, and quit smoking. These steps really work to lower your chance of heart disease. Follow-up care is a key part of your treatment and safety.  Be sure to make and go to all appointments, and call your doctor if you are having problems. It's also a good idea to know your test results and keep a list of the medicines you take. How can you care for yourself at home? Diet  · Use less salt when you cook and eat. This helps lower your blood pressure. Taste food before salting. Add only a little salt when you think you need it. With time, your taste buds will adjust to less salt. · Eat fewer snack items, fast foods, canned soups, and other high-salt, high-fat, processed foods. · Read food labels and try to avoid saturated and trans fats. They increase your risk of heart disease by raising cholesterol levels. · Limit the amount of solid fat-butter, margarine, and shortening-you eat. Use olive, peanut, or canola oil when you cook. Bake, broil, and steam foods instead of frying them. · Eat a variety of fruit and vegetables every day. Dark green, deep orange, red, or yellow fruits and vegetables are especially good for you. Examples include spinach, carrots, peaches, and berries. · Foods high in fiber can reduce your cholesterol and provide important vitamins and minerals. High-fiber foods include whole-grain cereals and breads, oatmeal, beans, brown rice, citrus fruits, and apples. · Eat lean proteins. Heart-healthy proteins include seafood, lean meats and poultry, eggs, beans, peas, nuts, seeds, and soy products. · Limit drinks and foods with added sugar. These include candy, desserts, and soda pop. Lifestyle changes  · If your doctor recommends it, get more exercise. Walking is a good choice. Bit by bit, increase the amount you walk every day. Try for at least 30 minutes on most days of the week. You also may want to swim, bike, or do other activities. · Do not smoke. If you need help quitting, talk to your doctor about stop-smoking programs and medicines. These can increase your chances of quitting for good. Quitting smoking may be the most important step you can take to protect your heart.  It is never too late to

## 2022-02-23 NOTE — PROGRESS NOTES
Velvet Wang is a 67 y.o. male evaluated via telephone on 2/23/2022. Consent:  He and/or health care decision maker is aware that that he may receive a bill for this telephone service, depending on his insurance coverage, and has provided verbal consent to proceed: Yes    Documentation:  I communicated with the patient and/or health care decision maker about systolic heart failure follow up. Details of this discussion including any medical advice provided: Lasix dose adjustment. I affirm this is a Patient Initiated Episode with an Established Patient who has not had a related appointment within my department in the past 7 days or scheduled within the next 24 hours. Total Time: minutes: 11-20 minutes    Note: not billable if this call serves to triage the patient into an appointment for the relevant concern    TEGAN Pulido      2/23/2022    Audio Patient Encouter(During KYMIX-69 public health emergency)  The telephone encourter was conducted with patient in their residence from [address] with TEGAN Mello; assistance by Pta Bullard MA.    HPI:  Lorenzo Phan   Diagnosis Orders   1. Chronic systolic heart failure ([facility] [address]     2. Coronary artery disease involving native coronary artery of native heart without angina pectoris     3. Essential hypertension     4. Hx of CABG     5. AICD (automatic cardioverter/defibrillator) present     6. Mixed hyperlipidemia       The patient presents for phone visit today regarding systolic heart failure. He was seen in the office Monday and received Lasix 80 mg IVP. Also, Amoxil was prescribed for sinusitis. Mr. Elif Spaulding reports feeling some better. His weight is down 9 pounds. He report current weight 284. The patient reports weighing 175 about 2 months ago. The patient is weighing daily and maintaining a low sodium diet. The patient denies symptoms to suggest myocardial ischemia or arrhythmia. BP is well controlled on current regimen. The patient's PCP monitors cholesterol. SUBJECTIVE:  Tony Emmanuel denies exertional chest pain, orthopnea, paroxysmal nocturnal dyspnea, syncope, presyncope, sensed arrhythmia, and fatigue. The patient denies numbness or weakness to suggest cerebrovascular accident or transient ischemic attack. + improved abdominal distention and ankle edema with 9 pound weight loss after diuresis with Lasix 80 mg IVP in office this past Monday. + stable VÁSQUEZ. Review of Systems    Prior to Visit Medications    Medication Sig Taking? Authorizing Provider   amoxicillin (AMOXIL) 500 MG capsule Take 1 capsule by mouth 2 times daily for 10 days Yes TEGAN Pulido   metoprolol succinate (TOPROL XL) 25 MG extended release tablet Take 1 tablet by mouth daily Yes Gordo Kuhn MD   apixaban (ELIQUIS) 5 MG TABS tablet Take 1 tablet by mouth 2 times daily Yes Gordo Kuhn MD   clopidogrel (PLAVIX) 75 MG tablet TAKE 1 TABLET DAILY Yes Gordo Kuhn MD   ALPRAZolam [place]r) 0.5 MG tablet Take 0.5 mg by mouth nightly as needed for Sleep. Yes Historical Provider, MD   famotidine (PEPCID) 40 MG tablet Take 40 mg by mouth daily Yes Historical Provider, MD   isosorbide mononitrate (IMDUR) 30 MG extended release tablet Take 30 mg by mouth daily Yes Historical Provider, MD   azelastine (ASTELIN) 0.1 % nasal spray 1 spray by Nasal route 2 times daily Use in each nostril as directed Yes Historical Provider, MD   finasteride (PROSCAR) 5 MG tablet Take 5 mg by mouth daily Yes Historical Provider, MD   docusate sodium (COLACE) 100 MG capsule Take 200 mg by mouth daily Yes Historical Provider, MD   furosemide (LASIX) 40 MG tablet Take 40 mg by mouth daily  Yes Historical Provider, MD   Multiple Vitamins-Minerals (PRESERVISION AREDS PO) Take 2 capsules by mouth Yes Historical Provider, MD   sertraline (ZOLOFT) 100 MG tablet Take 1 tablet by mouth daily Yes TEGAN Villagomez   nitroGLYCERIN (NITROSTAT) 0.4 MG SL tablet up to max of 3 total doses.  If no relief after 1 dose, call 911. Yes TEGAN Thomas   sacubitril-valsartan (ENTRESTO) 49-51 MG per tablet Take 1 tablet by mouth 2 times daily Yes TEGAN Rich   zoster recombinant adjuvanted vaccine [facility]) 50 MCG/0.5ML SUSR injection 1 vaccine now and repeat in 2-6 months. Juanito Hernandez MD       Social History     Tobacco Use    Smoking status: Never Smoker    Smokeless tobacco: Never Used   Vaping Use    Vaping Use: Never used   Substance Use Topics    Alcohol use: Yes     Alcohol/week: 1.0 standard drink     Types: 1 Shots of liquor per week     Comment: Occ.  Drug use: Not Currently     Types: Marijuana Bianca Peals)     Comment: edibles, uses for sleep        REVIEW OF SYSTEMS:  Constitutional  no appetite change, or unexpected weight change. No fever, chills or diaphoresis. No significant change in activity level or new onset of fatigue. HEENT  no epistaxis. No tinnitus or significant hearing loss. + improved sinus congestion and pressure after starting antibiotic therapy. Eyes  no sudden vision change or amaurosis. No corneal arcus, xantholasma, subconjunctival hemorrhage or discharge. Respiratory  no significant wheezing, stridor, apnea or cough. + stable VÁSQUEZ. Cardiovascular  no exertional chest pain to suggest myocardial ischemia. No orthopnea or PND. No sensation of sustained arrythmia. No occurrence of slow heart rate. No palpitations. No claudication. + improved abdominal distention and ankle edema with 9 pound weight loss after diuresis with Lasix 80 mg IVP in office this past Monday. Gastrointestinal  no  pain. No blood in stool. No severe constipation, diarrhea, nausea, or vomiting. Abdominal distention some better after 9 pound weight loss following diuresis. Genitourinary  no dysuria, frequency, or urgency. No flank pain or hematuria. Musculoskeletal  no back pain or myalgia. No problems with gait. Extremities - no clubbing and cyanosis.   + bilateral lower extremity edema - some improvement with 9 pound weight loss. Skin  no color change or rash. No pallor. No new surgical incision. Neurologic  no speech difficulty, facial asymmetry or lateralizing weakness. No seizures, presyncope or syncope. No significant dizziness. Hematologic  no easy bruising or excessive bleeding. Psychiatric  no severe anxiety or insomnia. No confusion. All other review of systems are negative.     DATA REVIEWED:  11/2/21 echo   Mitral valve leaflets are mildly thickened with preserved leaflet  mobility.   Mild mitral regurgitation is present.  Cheryln Favor thickened aortic valve leaflets with preserved leaflet mobility.   Tricuspid valve is structurally normal.   Left ventricular ejection fraction is visually estimated at 30%.   Normal left ventricular wall thickness.   More pronounced hypokinesis apex, inferior wall, and septum   Left ventricular size is moderately increased .   Signature   Electronically signed by Maria M Beyer MD(Interpreting   physician) on 11/03/2021 08:56 AM           Lab Results   Component Value Date     WBC 6.7 10/18/2021     HGB 14.9 10/18/2021     HCT 49.1 10/18/2021     MCV 88.6 10/18/2021      10/18/2021            Lab Results   Component Value Date      10/18/2021     K 4.0 10/18/2021      10/18/2021     CO2 26 10/18/2021     BUN 19 10/18/2021     CREATININE 1.2 10/18/2021     GLUCOSE 128 (H) 10/18/2021     CALCIUM 9.2 10/18/2021     PROT 6.6 10/18/2021     LABALBU 4.1 10/18/2021     BILITOT 0.4 10/18/2021     ALKPHOS 65 10/18/2021     AST 13 10/18/2021     ALT 15 10/18/2021     LABGLOM 59 (A) 10/18/2021     GFRAA >59 10/18/2021     GLOB 2.6 03/27/2017               Lab Results   Component Value Date     CHOL 242 (H) 06/23/2021     CHOL 157 (L) 05/25/2019     CHOL 205 (H) 03/01/2019            Lab Results   Component Value Date     TRIG 173 (H) 06/23/2021     TRIG 103 05/25/2019     TRIG 202 (H) 03/01/2019     Lab Results   Component Value Date     HDL 28 (L) 06/23/2021     HDL 20 (L) 05/25/2019     HDL 28 (L) 03/01/2019            Lab Results   Component Value Date     LDLCALC 179 06/23/2021     LDLCALC 116 05/25/2019     LDLCALC 137 03/01/2019      ASSESSMENT/PlAN:   Diagnosis Orders   1. Chronic systolic heart failure ([address]     2. Coronary artery disease involving native coronary artery of native heart without angina pectoris     3. Essential hypertension     4. Hx of CABG     5. AICD (automatic cardioverter/defibrillator) present     6. Mixed hyperlipidemia       Chronic systolic heart failure - exacerbation. NYHA class II-III stage C  GDMT includes Entresto, Toprol XL and Lasix. 9 pound weight loss since Lasix 80 mg IVP Monday in the office. Advised to double Lasix to 40 mg (2) once daily the next 4 days, then back to (1) 40 mg tab daily. Advised low sodium diet and daily weight log. Follow up phone visit one week. Check BMP in one week.     HTN - normotensive on current regimen. BP goal less than 130/80. Continue same.      Hyperlipidemia - not currently on statin. Crestor listed as allergy. Last LDL elevated. Plan to discuss at followup. Consider Repatha.     Sinusitis - continues on Amoxil 500 mg (1) tab BID for 10 days. Patient reports sinus symptoms are getting better and he is feeling better as well.     Patient is compliant with medication regimen. Continue current medical management for cardiac condition. Continue current medications as prescribed. BP goal is 130/80 or less. If your primary care provider is outside of the [facility], please request your labs be faxed to this office at [phone]. Continue to follow up with primary care provider for non cardiac medical problems. Call the office with any problems, questions or concerns at [phone]. Follow up with cardiologist as scheduled in [address]   One week   The following educational material has been included in this after visit summary for patient review:  Heart healthMora Frederick is a 67 y.o. male being evaluated by a telephone encounter to address concerns as mentioned above. A caregiver was present when appropriate. Due to this being a TeleHealth encounter (During EXUPT-25 public health emergency). Pursuant to the emergency declaration under the [address] [place] [address], [address] [place] [address] and the [employer] [place] [employer] and [employer] Act, this Virtual Visit was conducted with patient's (and/or legal guardian's) consent, to reduce the patient's risk of exposure to COVID-19 and provide necessary medical care. The patient (and/or legal guardian) has also been advised to contact this office for worsening conditions or problems, and seek emergency medical treatment and/or call 911 if deemed necessary. Services were provided through a telephone discussion virtually to substitute for in-person clinic visit. Patient and provider were located at their individual homes. --TEGAN Redd on 2/23/2022 at 1:44 PM    An electronic signature was used to authenticate this note.

## 2022-03-09 DIAGNOSIS — I25.10 CORONARY ARTERY DISEASE INVOLVING NATIVE CORONARY ARTERY OF NATIVE HEART WITHOUT ANGINA PECTORIS: ICD-10-CM

## 2022-03-09 DIAGNOSIS — I50.22 CHRONIC SYSTOLIC HEART FAILURE (HCC): ICD-10-CM

## 2022-03-09 LAB
ANION GAP SERPL CALCULATED.3IONS-SCNC: 11 MMOL/L (ref 7–19)
BUN BLDV-MCNC: 25 MG/DL (ref 8–23)
CALCIUM SERPL-MCNC: 8.9 MG/DL (ref 8.8–10.2)
CHLORIDE BLD-SCNC: 100 MMOL/L (ref 98–111)
CO2: 28 MMOL/L (ref 22–29)
CREAT SERPL-MCNC: 1.3 MG/DL (ref 0.5–1.2)
GFR AFRICAN AMERICAN: >59
GFR NON-AFRICAN AMERICAN: 53
GLUCOSE BLD-MCNC: 85 MG/DL (ref 74–109)
POTASSIUM SERPL-SCNC: 4.2 MMOL/L (ref 3.5–5)
SODIUM BLD-SCNC: 139 MMOL/L (ref 136–145)

## 2022-03-10 ENCOUNTER — TELEPHONE (OUTPATIENT)
Dept: CARDIOLOGY CLINIC | Age: 77
End: 2022-03-10

## 2022-03-10 NOTE — TELEPHONE ENCOUNTER
----- Message from TEGAN Collado sent at 3/10/2022  2:50 PM CST -----  Jagdish Conklin,  Please let Bill know that I reviewed the BMP lab. Potassium and sodium are normal range. Renal filtering is stable.   Thanks [employer]

## 2022-03-24 ENCOUNTER — OFFICE VISIT (OUTPATIENT)
Dept: CARDIOLOGY CLINIC | Age: 77
End: 2022-03-24
Payer: MEDICARE

## 2022-03-24 ENCOUNTER — HOSPITAL ENCOUNTER (OUTPATIENT)
Dept: INFUSION THERAPY | Age: 77
Setting detail: INFUSION SERIES
Discharge: HOME | End: 2022-03-24
Payer: MEDICARE

## 2022-03-24 VITALS
HEART RATE: 114 BPM | BODY MASS INDEX: 35.07 KG/M2 | HEIGHT: 76 IN | SYSTOLIC BLOOD PRESSURE: 126 MMHG | WEIGHT: 288 LBS | DIASTOLIC BLOOD PRESSURE: 78 MMHG | OXYGEN SATURATION: 95 %

## 2022-03-24 DIAGNOSIS — E78.2 MIXED HYPERLIPIDEMIA: Chronic | ICD-10-CM

## 2022-03-24 DIAGNOSIS — I50.22 CHRONIC SYSTOLIC HEART FAILURE (HCC): ICD-10-CM

## 2022-03-24 DIAGNOSIS — I25.10 CORONARY ARTERY DISEASE INVOLVING NATIVE CORONARY ARTERY OF NATIVE HEART WITHOUT ANGINA PECTORIS: ICD-10-CM

## 2022-03-24 DIAGNOSIS — I50.22 CHRONIC SYSTOLIC HEART FAILURE (HCC): Primary | ICD-10-CM

## 2022-03-24 DIAGNOSIS — I10 ESSENTIAL HYPERTENSION: ICD-10-CM

## 2022-03-24 DIAGNOSIS — I48.92 ATRIAL FLUTTER, UNSPECIFIED TYPE (HCC): ICD-10-CM

## 2022-03-24 DIAGNOSIS — Z95.1 HX OF CABG: ICD-10-CM

## 2022-03-24 DIAGNOSIS — I20.8 STABLE ANGINA PECTORIS (HCC): ICD-10-CM

## 2022-03-24 DIAGNOSIS — Z95.810 AICD (AUTOMATIC CARDIOVERTER/DEFIBRILLATOR) PRESENT: ICD-10-CM

## 2022-03-24 LAB
ANION GAP SERPL CALCULATED.3IONS-SCNC: 10 MMOL/L (ref 7–19)
BASOPHILS ABSOLUTE: 0 K/UL (ref 0–0.2)
BASOPHILS RELATIVE PERCENT: 0.5 % (ref 0–1)
BUN BLDV-MCNC: 21 MG/DL (ref 8–23)
CALCIUM SERPL-MCNC: 9.6 MG/DL (ref 8.8–10.2)
CHLORIDE BLD-SCNC: 104 MMOL/L (ref 98–111)
CO2: 26 MMOL/L (ref 22–29)
CREAT SERPL-MCNC: 1.3 MG/DL (ref 0.5–1.2)
EOSINOPHILS ABSOLUTE: 0.1 K/UL (ref 0–0.6)
EOSINOPHILS RELATIVE PERCENT: 2 % (ref 0–5)
GFR AFRICAN AMERICAN: >59
GFR NON-AFRICAN AMERICAN: 53
GLUCOSE BLD-MCNC: 120 MG/DL (ref 74–109)
HCT VFR BLD CALC: 44.9 % (ref 42–52)
HEMOGLOBIN: 13.9 G/DL (ref 14–18)
IMMATURE GRANULOCYTES #: 0 K/UL
LYMPHOCYTES ABSOLUTE: 1.6 K/UL (ref 1.1–4.5)
LYMPHOCYTES RELATIVE PERCENT: 25.3 % (ref 20–40)
MCH RBC QN AUTO: 27.8 PG (ref 27–31)
MCHC RBC AUTO-ENTMCNC: 31 G/DL (ref 33–37)
MCV RBC AUTO: 89.8 FL (ref 80–94)
MONOCYTES ABSOLUTE: 0.6 K/UL (ref 0–0.9)
MONOCYTES RELATIVE PERCENT: 10 % (ref 0–10)
NEUTROPHILS ABSOLUTE: 4 K/UL (ref 1.5–7.5)
NEUTROPHILS RELATIVE PERCENT: 61.9 % (ref 50–65)
PDW BLD-RTO: 15 % (ref 11.5–14.5)
PLATELET # BLD: 158 K/UL (ref 130–400)
PMV BLD AUTO: 11.8 FL (ref 9.4–12.4)
POTASSIUM SERPL-SCNC: 5.2 MMOL/L (ref 3.5–5)
PRO-BNP: 3081 PG/ML (ref 0–1800)
RBC # BLD: 5 M/UL (ref 4.7–6.1)
SODIUM BLD-SCNC: 140 MMOL/L (ref 136–145)
WBC # BLD: 6.4 K/UL (ref 4.8–10.8)

## 2022-03-24 PROCEDURE — 1036F TOBACCO NON-USER: CPT | Performed by: NURSE PRACTITIONER

## 2022-03-24 PROCEDURE — G8427 DOCREV CUR MEDS BY ELIG CLIN: HCPCS | Performed by: NURSE PRACTITIONER

## 2022-03-24 PROCEDURE — 99214 OFFICE O/P EST MOD 30 MIN: CPT | Performed by: NURSE PRACTITIONER

## 2022-03-24 PROCEDURE — G8417 CALC BMI ABV UP PARAM F/U: HCPCS | Performed by: NURSE PRACTITIONER

## 2022-03-24 PROCEDURE — 1123F ACP DISCUSS/DSCN MKR DOCD: CPT | Performed by: NURSE PRACTITIONER

## 2022-03-24 PROCEDURE — 6360000002 HC RX W HCPCS: Performed by: NURSE PRACTITIONER

## 2022-03-24 PROCEDURE — 93289 INTERROG DEVICE EVAL HEART: CPT | Performed by: NURSE PRACTITIONER

## 2022-03-24 PROCEDURE — G8482 FLU IMMUNIZE ORDER/ADMIN: HCPCS | Performed by: NURSE PRACTITIONER

## 2022-03-24 PROCEDURE — 96374 THER/PROPH/DIAG INJ IV PUSH: CPT

## 2022-03-24 PROCEDURE — 2580000003 HC RX 258: Performed by: NURSE PRACTITIONER

## 2022-03-24 PROCEDURE — 4040F PNEUMOC VAC/ADMIN/RCVD: CPT | Performed by: NURSE PRACTITIONER

## 2022-03-24 RX ORDER — METOPROLOL SUCCINATE 25 MG/1
25 TABLET, EXTENDED RELEASE ORAL 2 TIMES DAILY
Qty: 180 TABLET | Refills: 3 | Status: SHIPPED | OUTPATIENT
Start: 2022-03-24 | End: 2022-09-06

## 2022-03-24 RX ORDER — SODIUM CHLORIDE 0.9 % (FLUSH) 0.9 %
10 SYRINGE (ML) INJECTION PRN
Status: CANCELLED | OUTPATIENT
Start: 2022-03-24

## 2022-03-24 RX ORDER — FUROSEMIDE 10 MG/ML
80 INJECTION INTRAMUSCULAR; INTRAVENOUS ONCE
Status: CANCELLED
Start: 2022-03-24 | End: 2022-03-24

## 2022-03-24 RX ORDER — FUROSEMIDE 10 MG/ML
80 INJECTION INTRAMUSCULAR; INTRAVENOUS ONCE
Status: COMPLETED | OUTPATIENT
Start: 2022-03-24 | End: 2022-03-24

## 2022-03-24 RX ORDER — SODIUM CHLORIDE 0.9 % (FLUSH) 0.9 %
10 SYRINGE (ML) INJECTION PRN
Status: DISCONTINUED | OUTPATIENT
Start: 2022-03-24 | End: 2022-03-26 | Stop reason: HOSPADM

## 2022-03-24 RX ADMIN — SODIUM CHLORIDE, PRESERVATIVE FREE 10 ML: 5 INJECTION INTRAVENOUS at 15:43

## 2022-03-24 RX ADMIN — FUROSEMIDE 80 MG: 10 INJECTION, SOLUTION INTRAMUSCULAR; INTRAVENOUS at 15:42

## 2022-03-24 NOTE — PATIENT INSTRUCTIONS
Please call [employer] Get connected at [phone] to discuss home monitor. New instructions for today:  If your symptoms worsen go to the emergency room. Stop by the lab on your way out on first floor today. Increase Toprol XL (metoprolol) to 25 mg (1) tab twice daily to slow your heart rate. MAKE SURE YOU DO NOT HAVE ELIQUIS AT HOME. THIS IS A BLOOD THINNER. YOUR WIFE SAID SHE CHECKED YOUR MEDICATIONS AND IT IS NOT INCLUDED. Start on new blood thinner due to your heart going in and out of rhythm. Samples provided of Xarelto 15 mg (1) tab daily with supper. Xarelto can increase your risk of bleeding. If you notice blood in urine or stool, bleeding gums, excessive bruising or cough productive of bloody sputum, notify the office. Information on this blood thinner has been included in your after visit summary. Weigh yourself daily without clothing at the same time each day. Record a daily weight log. Call the office if you gain 3 pounds or more in 2 to 3 days or 5 pounds in one week. A sudden weight gain may mean that your heart failure is getting worse. Sodium causes your body to hold on to extra water. This may cause your heart failure symptoms to get worse. Limit sodium to 2,000 milligrams (mg) a day or less. That is less than 1 teaspoon of salt a day, including all the salt you eat in cooking or in packaged foods. Fluid restriction of 1500ml per day (about 6 cups of fluid per day). Patient Instructions:  Continue current medications as prescribed. Always keep a current medication list. Bring your medications to every office visit. Continue to follow up with primary care provider for non cardiac medical problems. Call the office with any problems, questions or concerns at [phone]. If you have been asked to keep a blood pressure log, do so for 2 weeks. Call the office to report readings to the triage nurse at [phone].   Follow up with cardiologist as scheduled. The following educational material has been included in this after visit summary for your review: Life simple 7. Heart health. Life simple 7  1) Manage blood pressure - high blood pressure is a major risk factor for heart disease and stroke. Keeping blood pressure in health range reduces strain on your heart, arteries and kidneys. Blood pressure goal is less than 130/80. 2) Control cholesterol - contributes to plaque, which can clog arteries and lead to heart disease and stroke. When you control your cholesterol you are giving your arteries their best chance to remain clear. It is recommended that you get cholesterol lab work done once a year. 3) Reduce blood sugar - most of the food we eat is turning into glucose or blood sugar that our body uses for energy. Over time, high levels of blood sugar can damage your heart, kidneys, eyes and nerves. 4) Get active - living an active life is one of the most rewarding gifts you can give yourself and those you love. Simply put, daily physical activity increases your length and quality of life. Strive to exercise 15 minutes most days of the week. 5)  Eat better - A healthy diet is one of your best weapons for fighting cardiovascular disease. When you eat a heart healthy diet, you improve your chances for feeling good and staying healthy for life. 6)  Lose weight - when you shed extra fat an unnecessary pounds, you reduce the burden on your hear, lungs, blood vessels and skeleton. You give yourself the gift of active living, you lower your blood pressure and help yourself feel better. 7) Stop smoking - cigarette smokers have a higher risk of developing cardiovascular disease. If  You smoke, quitting is the best thing you can do for your health. Check American Heart Association on line for more information on Life's Simple 7 and tips for healthy living.      A Healthy Heart: Care Instructions  Your Care Instructions     Coronary artery disease, also called heart disease, occurs when a substance called plaque builds up in the vessels that supply oxygen-rich blood to your heart muscle. This can narrow the blood vessels and reduce blood flow. A heart attack happens when blood flow is completely blocked. A high-fat diet, smoking, and other factors increase the risk of heart disease. Your doctor has found that you have a chance of having heart disease. You can do lots of things to keep your heart healthy. It may not be easy, but you can change your diet, exercise more, and quit smoking. These steps really work to lower your chance of heart disease. Follow-up care is a key part of your treatment and safety. Be sure to make and go to all appointments, and call your doctor if you are having problems. It's also a good idea to know your test results and keep a list of the medicines you take. How can you care for yourself at home? Diet  · Use less salt when you cook and eat. This helps lower your blood pressure. Taste food before salting. Add only a little salt when you think you need it. With time, your taste buds will adjust to less salt. · Eat fewer snack items, fast foods, canned soups, and other high-salt, high-fat, processed foods. · Read food labels and try to avoid saturated and trans fats. They increase your risk of heart disease by raising cholesterol levels. · Limit the amount of solid fat-butter, margarine, and shortening-you eat. Use olive, peanut, or canola oil when you cook. Bake, broil, and steam foods instead of frying them. · Eat a variety of fruit and vegetables every day. Dark green, deep orange, red, or yellow fruits and vegetables are especially good for you. Examples include spinach, carrots, peaches, and berries. · Foods high in fiber can reduce your cholesterol and provide important vitamins and minerals. High-fiber foods include whole-grain cereals and breads, oatmeal, beans, brown rice, citrus fruits, and apples.   · Eat lean proteins. Heart-healthy proteins include seafood, lean meats and poultry, eggs, beans, peas, nuts, seeds, and soy products. · Limit drinks and foods with added sugar. These include candy, desserts, and soda pop. Lifestyle changes  · If your doctor recommends it, get more exercise. Walking is a good choice. Bit by bit, increase the amount you walk every day. Try for at least 30 minutes on most days of the week. You also may want to swim, bike, or do other activities. · Do not smoke. If you need help quitting, talk to your doctor about stop-smoking programs and medicines. These can increase your chances of quitting for good. Quitting smoking may be the most important step you can take to protect your heart. It is never too late to quit. · Limit alcohol to 2 drinks a day for men and 1 drink a day for women. Too much alcohol can cause health problems. · Manage other health problems such as diabetes, high blood pressure, and high cholesterol. If you think you may have a problem with alcohol or drug use, talk to your doctor. Medicines  · Take your medicines exactly as prescribed. Call your doctor if you think you are having a problem with your medicine. · If your doctor recommends aspirin, take the amount directed each day. Make sure you take aspirin and not another kind of pain reliever, such as acetaminophen (Tylenol). When should you call for help? [id number] if you have symptoms of a heart attack. These may include:  · Chest pain or pressure, or a strange feeling in the chest.  · Sweating. · Shortness of breath. · Pain, pressure, or a strange feeling in the back, neck, jaw, or upper belly or in one or both shoulders or arms. · Lightheadedness or sudden weakness. · A fast or irregular heartbeat. After you call 911, the  may tell you to chew 1 adult-strength or 2 to 4 low-dose aspirin. Wait for an ambulance. Do not try to drive yourself.   Watch closely for changes in your health, and be sure to

## 2022-03-24 NOTE — PROGRESS NOTES
[facility], [place]. [address], [address], [address] [facility]  [phone] office  [phone] fax      OFFICE VISIT:  3/24/2022    Alexa Ibarrayuan - : [birth year]  Reason For Visit:  Devyn Hernández is a 67 y.o. male who is here for 1 Month Follow-Up, Congestive Heart Failure, and Coronary Artery Disease    History:   Diagnosis Orders   1. Chronic systolic heart failure (HCC)  Basic Metabolic Panel    CBC with Auto Differential    proBNP, N-TERMINAL   2. Essential hypertension  EKG 12 lead   3. Stable angina pectoris (HCC)  EKG 12 lead   4. Coronary artery disease involving native coronary artery of native heart without angina pectoris     5. Hx of CABG     6. Mixed hyperlipidemia     7. AICD (automatic cardioverter/defibrillator) present     8. Atrial flutter, unspecified type [facility])       The patient presents today for cardiology follow up. Mr. Chelle Argueta reports \"I just feel really bad. My swelling is better but I still have some. I am still short of breath too. \"  Weight today is 288 pounds as compared to 293 pounds on 22. The patient reports no chest pain. The patient's med list includes Eliquis. However, the patient reports 'I really don't think I am taking it because it costs so much. I know I am taking Plavix though. \"  I called his wife who reviewed all medication bottles and there was no Eliquis. I explained to Mr. Phan that with AF, he needed [address] to prevent embolic event. BP is well controlled on current regimen. The patient's PCP monitors cholesterol. Blayne Craven denies exertional chest pain,  orthopnea, paroxysmal nocturnal dyspnea, syncope, presyncope and sensed arrhythmia. The patient denies numbness or weakness to suggest cerebrovascular accident or transient ischemic attack. + fatigue.  + VÁSQUEZ.  + abdominal fullness. + ankle edema.     Alexa Piña has the following history as recorded in [facility]:  Patient Active Problem List   Diagnosis Code    Type 2 diabetes mellitus with diabetic polyneuropathy, with long-term current use of insulin ([facility]) E11.42, Z79.4    Essential hypertension I10    Coronary artery disease involving native coronary artery of native heart without angina pectoris I25.10    MI (myocardial infarction) ([facility] I21.9    Mixed hyperlipidemia E78.2    Chronic bilateral low back pain without sciatica M54.50, G89.29    DDD (degenerative disc disease), lumbar M51.36    Spondylosis of lumbar region without myelopathy or radiculopathy M47.816    Acute ischemic stroke ([facility]) I63.9    Thoracic outlet syndrome G54.0    Vertigo R42    Imbalance R26.89    Cerebrovascular accident (CVA) due to embolism of right middle cerebral artery ([facility]) I63.411    Recurrent major depressive disorder, in full remission ([facility] F33.42    Trigger middle finger of left hand M65.332    Peripheral polyneuropathy G62.9    Palpitations R00.2    Chronic insomnia F51.04    Status post placement of implantable loop recorder Z95.818    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    Dermatitis L30.9    Depression with anxiety F41.8    Benign prostatic hyperplasia without lower urinary tract symptoms N40.0    Chronic tension-type headache, not intractable G44.229    MOO on CPAP G47.33, Z99.89    Chest pain R07.9    SOB (shortness of breath) R06.02    ACS (acute coronary syndrome) ([facility]) I24.9    Acute MI inferior lateral first episode care ([facility]) I21.19    Hx of CABG Z95.1    Chronic constipation K59.09    Palliative care patient Z51.5    Ischemic cardiomyopathy I25.5    Urinary tract infection due to extended-spectrum beta lactamase (ESBL) producing Escherichia coli N39.0, B96.29, Z16.12    Chronic systolic heart failure ([facility]) P97.67    Complicated UTI (urinary tract infection) N39.0    AICD (automatic cardioverter/defibrillator) present Z95.810    History of chronic kidney disease Z87.448    Edema R60.9    Vestibular neuropathy H93.3X9    Breakthrough seizure ([address] G40.919    Seizure due to hypoglycemia ([address] R56.9, E16.2    Seizure ([address] R56.9     Past Medical History:   Diagnosis Date    Acute ischemic stroke ([address] 2/20/2017    Atherosclerotic heart disease     s/p MI, stenting x 3 jan 2011([place])    Breakthrough seizure ([address] 10/16/2021    CAD (coronary artery disease)     Cerebral artery occlusion with cerebral infarction [facility])     Cerebrovascular accident (CVA) due to embolism of right middle cerebral artery ([address] 8/14/2017    CHF (congestive heart failure) ([facility])     Chronic bilateral low back pain without sciatica 11/10/2016    Chronic kidney disease     DDD (degenerative disc disease), lumbar 11/15/2016    Depression     Depression with anxiety     Diabetes mellitus ([address]     type II    Diabetes mellitus, type 2 ([address]     DM (diabetes mellitus) ([address] 7/12/2011    ESBL (extended spectrum beta-lactamase) producing bacteria infection 11/16/2019    urine    Glaucoma     Headache     Hyperlipidemia     Cholesterol management per pcp.  Hypertension     Macular degeneration     MI (myocardial infarction) ([address]     MI (myocardial infarction) ([address]     Neuromuscular disorder ([address]     Neuropathy     MOO on CPAP     Osteoarthritis     Palliative care patient 06/11/2019    Sleep apnea     Spondylosis of lumbar region without myelopathy or radiculopathy 11/15/2016    Status post placement of implantable loop recorder 10/31/2017    TIA (transient ischemic attack)      Past Surgical History:   Procedure Laterality Date    APPENDECTOMY      at age 4    BACK SURGERY      2016 (fusion), 2017    BREAST SURGERY  05/2019    CARDIAC CATHETERIZATION  12/12/12    with angioplasty, stent    CARDIAC CATHETERIZATION  3/29/15  MDL    stent to distal RCA.  EF 60%    CHOLECYSTECTOMY, LAPAROSCOPIC      2012    COLONOSCOPY      2010    CORONARY ANGIOPLASTY WITH STENT PLACEMENT  jan 11 [place]    stent x 3    CORONARY ANGIOPLASTY WITH STENT PLACEMENT      11    CORONARY ARTERY BYPASS GRAFT N/A 2019    CORONARY ARTERY BYPASS GRAFT X3 WITH LEFT INTERNAL MAMMARY ARTERY WITH ENDOSCOPIC VEIN HARVESTING WITH PERFUSION TRANSESOPHAGEAL ECHOCARDIOGRAM performed by Anaya Tracy MD at [address] [facility] CATH LAB PROCEDURE  [id number]    primary stent placement to the first circumflex marginal branch, balloon angioplasty to the third left anterior descending diagnonal branch, intracoronary nitroglycerin adminstration    JOINT REPLACEMENT      left knee, ~2016    LUMBAR FUSION Left 11/15/2016    LUMBAR INTERBODY FUSION LATERAL L3-4 L4-5 WITH LATERAL PLATE  performed by Janet Morales MD at [address]  2019     Family History   Problem Relation Age of Onset    Coronary Art Dis Mother     Cancer Sister         uterine    Kidney Disease Sister     Coronary Art Dis Brother          in his 46s - age 47    Cancer Father         colon    Coronary Art Dis Sister     Cancer Sister         olive cancer    No Known Problems Sister     No Known Problems Sister     No Known Problems Son     Alcohol Abuse Son         drank self to death    No Known Problems Son      Social History     Tobacco Use    Smoking status: Never Smoker    Smokeless tobacco: Never Used   Substance Use Topics    Alcohol use: Yes     Alcohol/week: 1.0 standard drink     Types: 1 Shots of liquor per week     Comment: Occ. Current Outpatient Medications   Medication Sig Dispense Refill    metoprolol succinate (TOPROL XL) 25 MG extended release tablet Take 1 tablet by mouth 2 times daily 180 tablet 3    rivaroxaban (XARELTO) 15 MG TABS tablet Take 1 tablet by mouth daily (with breakfast) 1 tablet 0    clopidogrel (PLAVIX) 75 MG tablet TAKE 1 TABLET DAILY 90 tablet 3    ALPRAZolam (XANAX) 0.5 MG tablet Take 0.5 mg by mouth nightly as needed for Sleep.       famotidine (PEPCID) 40 MG tablet Take 40 mg by mouth daily      isosorbide mononitrate (IMDUR) 30 MG extended release tablet Take 30 mg by mouth daily      azelastine (ASTELIN) 0.1 % nasal spray 1 spray by Nasal route 2 times daily Use in each nostril as directed      finasteride (PROSCAR) 5 MG tablet Take 5 mg by mouth daily      docusate sodium (COLACE) 100 MG capsule Take 200 mg by mouth daily      furosemide (LASIX) 40 MG tablet Take 40 mg by mouth daily       Multiple Vitamins-Minerals (PRESERVISION AREDS PO) Take 2 capsules by mouth      sertraline (ZOLOFT) 100 MG tablet Take 1 tablet by mouth daily 90 tablet 2    nitroGLYCERIN (NITROSTAT) 0.4 MG SL tablet up to max of 3 total doses. If no relief after 1 dose, call 911. 30 tablet 2    sacubitril-valsartan (ENTRESTO) 49-51 MG per tablet Take 1 tablet by mouth 2 times daily 60 tablet 5    zoster recombinant adjuvanted vaccine [facility]) 50 MCG/0.5ML SUSR injection 1 vaccine now and repeat in 2-6 months. 0.5 mL 1     No current facility-administered medications for this visit. Facility-Administered Medications Ordered in Other Visits   Medication Dose Route Frequency Provider Last Rate Last Admin    sodium chloride flush 0.9 % injection 10 mL  10 mL IntraVENous PRN TEGAN [place]   10 mL at 03/24/22 1543       Allergies: Statins, Crestor [rosuvastatin calcium], Adhesive tape, and Morphine    Review of Systems  Constitutional - no appetite change, or unexpected weight change. No fever, chills or diaphoresis. + fatigue. HEENT - no significant rhinorrhea or epistaxis. No tinnitus or significant hearing loss. Eyes - no sudden vision change or amaurosis. No corneal arcus, xantholasma, subconjunctival hemorrhage or discharge. Respiratory - no significant wheezing, stridor, apnea or cough.  + VÁSQUEZ. Cardiovascular - no exertional chest pain to suggest myocardial ischemia. No orthopnea or PND. No sensation of sustained arrythmia. No occurrence of slow heart rate. No palpitations. No claudication. Gastrointestinal - no abdominal pain. No blood in stool. No severe constipation, diarrhea, nausea, or vomiting. + abdominal distention. Genitourinary - no dysuria, frequency, or urgency. No flank pain or hematuria. Musculoskeletal - no back pain or myalgia. No problems with gait. Extremities - no clubbing or cyanosis. + bilateral mild ankle edema. Skin - no color change or rash. No pallor. No new surgical incision. Neurologic - no speech difficulty, facial asymmetry or lateralizing weakness. No seizures, presyncope or syncope. No significant dizziness. Hematologic - no easy bruising or excessive bleeding. Psychiatric - no severe anxiety or insomnia. No confusion. All other review of systems are negative. Objective  Vital Signs - /78   Pulse 114   Ht 6' 4\" (1.93 m)   Wt 288 lb (130.6 kg)   SpO2 95%   BMI 35.06 kg/m²   General - Art Cordero is alert, cooperative, and pleasant. Well groomed. No acute distress. Body habitus - Body mass index is 35.06 kg/m². HEENT - Head is normocephalic. No circumoral cyanosis. Dentition is normal.  EYES -   Lids normal without ptosis. No discharge, edema or subconjunctival hemorrhage. Neck - Symmetrical without apparent mass or lymphadenopathy. Respiratory - Normal respiratory effort without use of accessory muscles. Ausculatation reveals vesicular breath sounds without crackles, wheezes, rub or rhonchi. Cardiovascular - No jugular venous distention. Auscultation reveals regular rate and rhythm. No audible clicks, gallop or rub. No murmur. No lower extremity varicosities. No carotid bruits. Abdominal -  No mass or pulsations. Abdomen distended. Extremities - No clubbing or cyanosis. No statis dermatitis or ulcers. 1+ pitting ankle edema - bilateral.  Musculoskeletal -   No Osler's nodes. No kyphosis or scoliosis. Gait is even and regular without limp or shuffle. Ambulates without assistance.   Skin -  Warm and dry; no rash or pallor. No new surgical wound. Neurological - No focal neurological deficits. Thought processes coherent. No apparent tremor. Oriented to person, place and time. Psychiatric -  Appropriate affect and mood. Data reviewed:  11/2/21 echo   Mitral valve leaflets are mildly thickened with preserved leaflet  mobility. Mild mitral regurgitation is present. Mildly thickened aortic valve leaflets with preserved leaflet mobility. Tricuspid valve is structurally normal.   Left ventricular ejection fraction is visually estimated at 30%. Normal left ventricular wall thickness. More pronounced hypokinesis apex, inferior wall, and septum   Left ventricular size is moderately increased .    Electronically signed by Vickie Laird MD(Interpreting   physician) on 11/03/2021 08:56 AM    3/4/24 pro BNP 3,081    Lab Results   Component Value Date    WBC 6.4 03/24/2022    HGB 13.9 (L) 03/24/2022    HCT 44.9 03/24/2022    MCV 89.8 03/24/2022     03/24/2022     Lab Results   Component Value Date     03/24/2022    K 5.2 (H) 03/24/2022     03/24/2022    CO2 26 03/24/2022    BUN 21 03/24/2022    CREATININE 1.3 (H) 03/24/2022    GLUCOSE 120 (H) 03/24/2022    CALCIUM 9.6 03/24/2022    PROT 6.6 10/18/2021    LABALBU 4.1 10/18/2021    BILITOT 0.4 10/18/2021    ALKPHOS 65 10/18/2021    AST 13 10/18/2021    ALT 15 10/18/2021    LABGLOM 53 (A) 03/24/2022    GFRAA >59 03/24/2022    GLOB 2.6 03/27/2017     Lab Results   Component Value Date    CHOL 242 (H) 06/23/2021    CHOL 157 (L) 05/25/2019    CHOL 205 (H) 03/01/2019     Lab Results   Component Value Date    TRIG 173 (H) 06/23/2021    TRIG 103 05/25/2019    TRIG 202 (H) 03/01/2019     Lab Results   Component Value Date    HDL 28 (L) 06/23/2021    HDL 20 (L) 05/25/2019    HDL 28 (L) 03/01/2019     Lab Results   Component Value Date    LDLCALC 179 06/23/2021    LDLCALC 116 05/25/2019    LDLCALC 137 03/01/2019     EKG today reviewed: Atrial flutter 114 bpm; occasional PVCs; non specific T wave abnormality. BP Readings from Last 3 Encounters:   03/24/22 126/78   02/21/22 104/64   10/29/21 118/72    Pulse Readings from Last 3 Encounters:   03/24/22 114   02/21/22 64   10/29/21 86        Wt Readings from Last 3 Encounters:   03/24/22 288 lb (130.6 kg)   02/21/22 293 lb (132.9 kg)   10/29/21 272 lb (123.4 kg)     Assessment/Plan:   Diagnosis Orders   1. Chronic systolic heart failure (HCC)  Basic Metabolic Panel    CBC with Auto Differential    proBNP, N-TERMINAL   2. Essential hypertension  EKG 12 lead   3. Stable angina pectoris (HCC)  EKG 12 lead   4. Coronary artery disease involving native coronary artery of native heart without angina pectoris     5. Hx of CABG     6. Mixed hyperlipidemia     7. AICD (automatic cardioverter/defibrillator) present     8. Atrial flutter, unspecified type [facility])       AICD check  ICD interrogation showed adequate battery voltage and charge time. 8.1 years longevity. Mode:  AAIR-DDDR. Lead and defib impedances stable. Pacing: AP-VS 45%. Reprogramming for sensitivity and threshold testing. Normal diagnostics and safety margins noted. A output 1.00 V at 0.40 ms; P wave 2.1 mV. RV output 0.625 V at 0.40 ms; R wave 9.9 mV. No ICD discharges. Monitored arrhythmia: atrial flutter in progress. See arrhythmia log. Optivol trending up. Next Carelink: 2 weeks. [id number] Score: 8  Disclaimer: Risk Score calculation is dependent on accuracy of patient problem list and past encounter diagnosis. Chronic systolic heart failure-  NYHA class III stage C  GDMT includes Entresto, Toprol Xl and Lasix. Optivol trending up with symptoms of volume overload. Directed to infusion center for Lasix 80 mg IVP. Labs checked to include CBC, BMP and pro BNP. Continue low sodium diet and daily weight log. Continue Lasix 40 mg (2) daily. Follow up in office one week. Advised to go to ED with worsened symptoms.     CAD - stable on current medical management. Continue same. Atrial flutter - current in flutter. Rate 111 bpm  AF burden on AICD interrogation 0.9%. >6 hours for 2 days. The patient is not taking Eliquis due to cost.  Educated patient on rationale for [facility] with AF. Calculated creatinine clearance is 40 ml/min. Sample of Xarelto 15 mg (1) tab daily with food provided. Educated on bleeding risk. Toprol XL increased to 25 mg BID for rate control. HTN - normotensive on current regimen. BP goal less than 130/80. Continue brant.e    Hyperlipidemia - . Hx of statin intolerance. Plan to discuss option of Repatha at next visit. Patient is compliant with medication regimen. Previous cardiac history and records reviewed. Continue current medical management for cardiac related condition. Continue other current medications as directed. Continue to follow up with primary care provider for non cardiac medical problems. If your primary care provider is outside of the [facility], please request that your labs be faxed to this office at [phone]. BP goal 130/80 or less. Call the office with any problems, questions or concerns at [phone]. Cardiology follow up as scheduled in [address] appointments. Educational included in patient instructions. Heart health.       TEGAN Melendrez

## 2022-03-31 ENCOUNTER — OFFICE VISIT (OUTPATIENT)
Dept: CARDIOLOGY CLINIC | Age: 77
End: 2022-03-31
Payer: MEDICARE

## 2022-03-31 VITALS
HEIGHT: 76 IN | HEART RATE: 76 BPM | WEIGHT: 281.4 LBS | BODY MASS INDEX: 34.27 KG/M2 | SYSTOLIC BLOOD PRESSURE: 126 MMHG | DIASTOLIC BLOOD PRESSURE: 78 MMHG | OXYGEN SATURATION: 93 %

## 2022-03-31 DIAGNOSIS — Z95.810 AICD (AUTOMATIC CARDIOVERTER/DEFIBRILLATOR) PRESENT: ICD-10-CM

## 2022-03-31 DIAGNOSIS — I48.92 ATRIAL FLUTTER, UNSPECIFIED TYPE (HCC): ICD-10-CM

## 2022-03-31 DIAGNOSIS — Z79.01 CHRONIC ANTICOAGULATION: ICD-10-CM

## 2022-03-31 DIAGNOSIS — I25.10 CORONARY ARTERY DISEASE INVOLVING NATIVE CORONARY ARTERY OF NATIVE HEART WITHOUT ANGINA PECTORIS: Primary | ICD-10-CM

## 2022-03-31 DIAGNOSIS — I10 ESSENTIAL HYPERTENSION: Chronic | ICD-10-CM

## 2022-03-31 DIAGNOSIS — Z95.1 HX OF CABG: ICD-10-CM

## 2022-03-31 DIAGNOSIS — I50.22 CHRONIC SYSTOLIC HEART FAILURE (HCC): ICD-10-CM

## 2022-03-31 PROCEDURE — G8427 DOCREV CUR MEDS BY ELIG CLIN: HCPCS | Performed by: NURSE PRACTITIONER

## 2022-03-31 PROCEDURE — G8417 CALC BMI ABV UP PARAM F/U: HCPCS | Performed by: NURSE PRACTITIONER

## 2022-03-31 PROCEDURE — G8482 FLU IMMUNIZE ORDER/ADMIN: HCPCS | Performed by: NURSE PRACTITIONER

## 2022-03-31 PROCEDURE — 1036F TOBACCO NON-USER: CPT | Performed by: NURSE PRACTITIONER

## 2022-03-31 PROCEDURE — 93289 INTERROG DEVICE EVAL HEART: CPT | Performed by: NURSE PRACTITIONER

## 2022-03-31 PROCEDURE — 4040F PNEUMOC VAC/ADMIN/RCVD: CPT | Performed by: NURSE PRACTITIONER

## 2022-03-31 PROCEDURE — 1123F ACP DISCUSS/DSCN MKR DOCD: CPT | Performed by: NURSE PRACTITIONER

## 2022-03-31 PROCEDURE — 99214 OFFICE O/P EST MOD 30 MIN: CPT | Performed by: NURSE PRACTITIONER

## 2022-03-31 NOTE — PATIENT INSTRUCTIONS
New instructions for today:  Weigh yourself daily without clothing at the same time each day. Record a daily weight log. Call the office if you gain 3 pounds or more in 2 to 3 days or 5 pounds in one week. A sudden weight gain may mean that your heart failure is getting worse. Sodium causes your body to hold on to extra water. This may cause your heart failure symptoms to get worse. Limit sodium to 2,000 milligrams (mg) a day or less. That is less than 1 teaspoon of salt a day, including all the salt you eat in cooking or in packaged foods. Fluid restriction of 1500ml per day (about 6 cups of fluid per day). Xarelto can increase your risk of bleeding. If you notice blood in urine or stool, bleeding gums, excessive bruising or cough productive of bloody sputum, notify the office. Information on this blood thinner has been included in your after visit summary. Patient Instructions:  Continue current medications as prescribed. Always keep a current medication list. Bring your medications to every office visit. Continue to follow up with primary care provider for non cardiac medical problems. Call the office with any problems, questions or concerns at [phone]. If you have been asked to keep a blood pressure log, do so for 2 weeks. Call the office to report readings to the triage nurse at [phone]. Follow up with cardiologist as scheduled. The following educational material has been included in this after visit summary for your review: Life simple 7. Heart health. Life simple 7  1) Manage blood pressure - high blood pressure is a major risk factor for heart disease and stroke. Keeping blood pressure in health range reduces strain on your heart, arteries and kidneys. Blood pressure goal is less than 130/80. 2) Control cholesterol - contributes to plaque, which can clog arteries and lead to heart disease and stroke.  When you control your cholesterol you are giving your arteries their best chance to remain clear. It is recommended that you get cholesterol lab work done once a year. 3) Reduce blood sugar - most of the food we eat is turning into glucose or blood sugar that our body uses for energy. Over time, high levels of blood sugar can damage your heart, kidneys, eyes and nerves. 4) Get active - living an active life is one of the most rewarding gifts you can give yourself and those you love. Simply put, daily physical activity increases your length and quality of life. Strive to exercise 15 minutes most days of the week. 5)  Eat better - A healthy diet is one of your best weapons for fighting cardiovascular disease. When you eat a heart healthy diet, you improve your chances for feeling good and staying healthy for life. 6)  Lose weight - when you shed extra fat an unnecessary pounds, you reduce the burden on your hear, lungs, blood vessels and skeleton. You give yourself the gift of active living, you lower your blood pressure and help yourself feel better. 7) Stop smoking - cigarette smokers have a higher risk of developing cardiovascular disease. If  You smoke, quitting is the best thing you can do for your health. Check American Heart Association on line for more information on Life's Simple 7 and tips for healthy living. A Healthy Heart: Care Instructions  Your Care Instructions     Coronary artery disease, also called heart disease, occurs when a substance called plaque builds up in the vessels that supply oxygen-rich blood to your heart muscle. This can narrow the blood vessels and reduce blood flow. A heart attack happens when blood flow is completely blocked. A high-fat diet, smoking, and other factors increase the risk of heart disease. Your doctor has found that you have a chance of having heart disease. You can do lots of things to keep your heart healthy. It may not be easy, but you can change your diet, exercise more, and quit smoking.  These steps really work to lower your chance of heart disease. Follow-up care is a key part of your treatment and safety. Be sure to make and go to all appointments, and call your doctor if you are having problems. It's also a good idea to know your test results and keep a list of the medicines you take. How can you care for yourself at home? Diet  · Use less salt when you cook and eat. This helps lower your blood pressure. Taste food before salting. Add only a little salt when you think you need it. With time, your taste buds will adjust to less salt. · Eat fewer snack items, fast foods, canned soups, and other high-salt, high-fat, processed foods. · Read food labels and try to avoid saturated and trans fats. They increase your risk of heart disease by raising cholesterol levels. · Limit the amount of solid fat-butter, margarine, and shortening-you eat. Use olive, peanut, or canola oil when you cook. Bake, broil, and steam foods instead of frying them. · Eat a variety of fruit and vegetables every day. Dark green, deep orange, red, or yellow fruits and vegetables are especially good for you. Examples include spinach, carrots, peaches, and berries. · Foods high in fiber can reduce your cholesterol and provide important vitamins and minerals. High-fiber foods include whole-grain cereals and breads, oatmeal, beans, brown rice, citrus fruits, and apples. · Eat lean proteins. Heart-healthy proteins include seafood, lean meats and poultry, eggs, beans, peas, nuts, seeds, and soy products. · Limit drinks and foods with added sugar. These include candy, desserts, and soda pop. Lifestyle changes  · If your doctor recommends it, get more exercise. Walking is a good choice. Bit by bit, increase the amount you walk every day. Try for at least 30 minutes on most days of the week. You also may want to swim, bike, or do other activities. · Do not smoke.  If you need help quitting, talk to your doctor about stop-smoking programs and medicines. These can increase your chances of quitting for good. Quitting smoking may be the most important step you can take to protect your heart. It is never too late to quit. · Limit alcohol to 2 drinks a day for men and 1 drink a day for women. Too much alcohol can cause health problems. · Manage other health problems such as diabetes, high blood pressure, and high cholesterol. If you think you may have a problem with alcohol or drug use, talk to your doctor. Medicines  · Take your medicines exactly as prescribed. Call your doctor if you think you are having a problem with your medicine. · If your doctor recommends aspirin, take the amount directed each day. Make sure you take aspirin and not another kind of pain reliever, such as acetaminophen (Tylenol). When should you call for help? [id number] if you have symptoms of a heart attack. These may include:  · Chest pain or pressure, or a strange feeling in the chest.  · Sweating. · Shortness of breath. · Pain, pressure, or a strange feeling in the back, neck, jaw, or upper belly or in one or both shoulders or arms. · Lightheadedness or sudden weakness. · A fast or irregular heartbeat. After you call 911, the  may tell you to chew 1 adult-strength or 2 to 4 low-dose aspirin. Wait for an ambulance. Do not try to drive yourself. Watch closely for changes in your health, and be sure to contact your doctor if you have any problems. Where can you learn more? Go to https://MobspirepeCharlie App.Landscape Mobile. org and sign in to your Toolwi account. Enter E667 in the [facility] box to learn more about \"A Healthy Heart: Care Instructions. \"     If you do not have an account, please click on the \"Sign Up Now\" link. Current as of: December 16, 2019               Content Version: 12.5  © 7608-0614 Healthwise, Incorporated. Care instructions adapted under license by [facility] ([facility]).  If you have questions about a medical condition or this instruction, always ask your healthcare professional. [place] [address] any warranty or liability for your use of this information. Patient Education        Avoiding Triggers With Heart Failure: Care Instructions  Your Care Instructions     Triggers are anything that make your heart failure flare up. A flare-up is also called \"sudden heart failure\" or \"acute heart failure. \" When you have a flare-up, fluid builds up in your lungs, and you have problems breathing. You might need to go to the hospital. By watching for changes in your condition andavoiding triggers, you can prevent heart failure flare-ups. Follow-up care is a key part of your treatment and safety. Be sure to make and go to all appointments, and call your doctor if you are having problems. It's also a good idea to know your test results and keep alist of the medicines you take. How can you care for yourself at home? Watch for changes in your weight and condition   Weigh yourself without clothing at the same time each day. Record your weight. Call your doctor if you have sudden weight gain, such as more than 2 to 3 pounds in a day or 5 pounds in a week. (Your doctor may suggest a different range of weight gain.) A sudden weight gain may mean that your heart failure is getting worse.  Keep a daily record of your symptoms. Write down any changes in how you feel, such as new shortness of breath, cough, or problems eating. Also record if your ankles are more swollen than usual and if you feel more tired than usual. Note anything that you ate or did that could have triggered these changes. Limit sodium  Sodium causes your body to hold on to extra water. This may cause your heart failure symptoms to get worse. People get most of their sodium from processedfoods. Fast food and restaurant meals also tend to be very high in sodium.  Your doctor may suggest that you limit sodium.  Your doctor can tell you how much sodium is right for you. This includes limiting sodium in cooked and packaged foods.  Read food labels on cans and food packages. They tell you how much sodium you get in one serving. Check the serving size. If you eat more than one serving, you are getting more sodium.  Be aware that sodium can come in forms other than salt, including monosodium glutamate (MSG), sodium citrate, and sodium bicarbonate (baking soda). MSG is often added to Asian food. You can sometimes ask for food without MSG or salt.  Slowly reducing salt will help you adjust to the taste. Take the salt shaker off the table.  Flavor your food with garlic, lemon juice, onion, vinegar, herbs, and spices instead of salt. Do not use soy sauce, steak sauce, onion salt, garlic salt, mustard, or ketchup on your food, unless it is labeled \"low-sodium\" or \"low-salt. \"   Make your own salad dressings, sauces, and ketchup without adding salt.  Use fresh or frozen ingredients, instead of canned ones, whenever you can. Choose low-sodium canned goods.  Eat less processed food and food from restaurants, including fast food. Exercise as directed  Moderate, regular exercise is very good for your heart. It improves your blood flow and helps control your weight. But too much exercise can stress your heartand cause a heart failure flare-up.  Check with your doctor before you start an exercise program.   Walking is an easy way to get exercise. Start out slowly. Gradually increase the length and pace of your walk. Swimming, riding a bike, and using a treadmill are also good forms of exercise.  When you exercise, watch for signs that your heart is working too hard. You are pushing yourself too hard if you cannot talk while you are exercising. If you become short of breath or dizzy or have chest pain, stop, sit down, and rest.   Do not exercise when you do not feel well. Take medicines correctly   Take your medicines exactly as prescribed.  Call your doctor if you think you are having a problem with your medicine.  Make a list of all the medicines you take. Include those prescribed to you by other doctors and any over-the-counter medicines, vitamins, or supplements you take. Take this list with you when you go to any doctor.  Take your medicines at the same time every day. It may help you to post a list of all the medicines you take every day and what time of day you take them.  Make taking your medicine as simple as you can. Plan times to take your medicines when you are doing other things, such as eating a meal or getting ready for bed. This will make it easier to remember to take your medicines.  Get organized. Use helpful tools, such as daily or weekly pill containers. When should you call for help? Call 911  if you have symptoms of sudden heart failure such as:     You have severe trouble breathing.      You cough up pink, foamy mucus.      You have a new irregular or rapid heartbeat. Call your doctor now or seek immediate medical care if:     You have new or increased shortness of breath.      You are dizzy or lightheaded, or you feel like you may faint.      You have sudden weight gain, such as more than 2 to 3 pounds in a day or 5 pounds in a week. (Your doctor may suggest a different range of weight gain.)      You have increased swelling in your legs, ankles, or feet.      You are suddenly so tired or weak that you cannot do your usual activities. Watch closely for changes in your health, and be sure to contact your doctor ifyou develop new symptoms. Where can you learn more? Go to https://SouchesashaUlmart.Casabi. org and sign in to your Palamida account. Enter Y284 in the [facility] box to learn more about \"Avoiding Triggers With Heart Failure: Care Instructions. \"     If you do not have an account, please click on the \"Sign Up Now\" link.   Current as of: January 10, 2022               Content Version: 13.2  © 3410-7872 Healthwise, Incorporated. Care instructions adapted under license by [facility] ([facility]). If you have questions about a medical condition or this instruction, always ask your healthcare professional. [address] any warranty or liability for your use of this information.

## 2022-03-31 NOTE — PROGRESS NOTES
[facility] [place], [place]. [address] [facility]  [phone] office  [phone] fax      OFFICE VISIT:  3/31/2022    Talib Peterson - : [id number]  Reason For Visit:  Yoli Plasencia is a 67 y.o. male who is here for Follow-up    History:   Diagnosis Orders   1. Coronary artery disease involving native coronary artery of native heart without angina pectoris     2. Chronic systolic heart failure ([address]     3. AICD (automatic cardioverter/defibrillator) present     4. Hx of CABG     5. Essential hypertension     6. Atrial flutter, unspecified type ([address]     7. Chronic anticoagulation      Xarelto     The patient presents today for chronic systolic heart failure follow up. He was diuresed with IV Lasix on 3/24/22. The patient reports feeling much better with weight down 11 pounds. He continue to take Lasix 40 mg BID. The patient is maintain low sodium diet and weighing most days. At the 3/24/22 office visit, the patient was found to be in atrial flutter on AICD interrogation. It was identified at that time, that the patient had opted not to take Eliquis due to cost concerns. Xarelto was added at 15 mg daily with a meal.  Mr. Kori Espinoza reports no bleeding issue on the anticoagulant. BP is well controlled on current regimen. The patient's PCP monitors cholesterol. Brooklyn Ng denies exertional chest pain,  orthopnea, paroxysmal nocturnal dyspnea, syncope, presyncope and sensed arrhythmia. The patient denies numbness or weakness to suggest cerebrovascular accident or transient ischemic attack. + improved dyspnea.  + fatigue. + improved ankle edema.      Talib Peterson has the following history as recorded in [facility]:  Patient Active Problem List   Diagnosis Code    Type 2 diabetes mellitus with diabetic polyneuropathy, with long-term current use of insulin ([address] E11.42, Z79.4    Essential hypertension I10    Coronary artery disease involving native coronary artery of native heart without angina pectoris I25.10    MI (myocardial infarction) ([facility]) I21.9    Mixed hyperlipidemia E78.2    Chronic bilateral low back pain without sciatica M54.50, G89.29    DDD (degenerative disc disease), lumbar M51.36    Spondylosis of lumbar region without myelopathy or radiculopathy M47.816    Acute ischemic stroke ([facility]) I63.9    Thoracic outlet syndrome G54.0    Vertigo R42    Imbalance R26.89    Cerebrovascular accident (CVA) due to embolism of right middle cerebral artery ([facility]) I63.411    Recurrent major depressive disorder, in full remission ([address] F33.42    Trigger middle finger of left hand M65.332    Peripheral polyneuropathy G62.9    Palpitations R00.2    Chronic insomnia F51.04    Status post placement of implantable loop recorder Z95.818    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    Dermatitis L30.9    Depression with anxiety F41.8    Benign prostatic hyperplasia without lower urinary tract symptoms N40.0    Chronic tension-type headache, not intractable G44.229    MOO on CPAP G47.33, Z99.89    Chest pain R07.9    SOB (shortness of breath) R06.02    ACS (acute coronary syndrome) ([facility]) I24.9    Acute MI inferior lateral first episode care ([facility] I21.19    Hx of CABG Z95.1    Chronic constipation K59.09    Palliative care patient Z51.5    Ischemic cardiomyopathy I25.5    Urinary tract infection due to extended-spectrum beta lactamase (ESBL) producing Escherichia coli N39.0, B96.29, Z16.12    Chronic systolic heart failure ([facility]) C35.87    Complicated UTI (urinary tract infection) N39.0    AICD (automatic cardioverter/defibrillator) present Z95.810    History of chronic kidney disease Z87.448    Edema R60.9    Vestibular neuropathy H93.3X9    Breakthrough seizure ([address] G40.919    Seizure due to hypoglycemia ([facility]) R56.9, E16.2    Seizure ([facility] R56.9     Past Medical History:   Diagnosis Date    Acute ischemic stroke ([address] 2/20/2017    Atherosclerotic heart disease     s/p MI, stenting x 3 jan 2011([place]    Breakthrough seizure ([address] 10/16/2021    CAD (coronary artery disease)     Cerebral artery occlusion with cerebral infarction [facility])     Cerebrovascular accident (CVA) due to embolism of right middle cerebral artery ([address] 8/14/2017    CHF (congestive heart failure) ([facility])     Chronic bilateral low back pain without sciatica 11/10/2016    Chronic kidney disease     DDD (degenerative disc disease), lumbar 11/15/2016    Depression     Depression with anxiety     Diabetes mellitus ([address]     type II    Diabetes mellitus, type 2 ([address]     DM (diabetes mellitus) ([address] 7/12/2011    ESBL (extended spectrum beta-lactamase) producing bacteria infection 11/16/2019    urine    Glaucoma     Headache     Hyperlipidemia     Cholesterol management per pcp.  Hypertension     Macular degeneration     MI (myocardial infarction) ([address]     MI (myocardial infarction) ([address]     Neuromuscular disorder ([address]     Neuropathy     MOO on CPAP     Osteoarthritis     Palliative care patient 06/11/2019    Sleep apnea     Spondylosis of lumbar region without myelopathy or radiculopathy 11/15/2016    Status post placement of implantable loop recorder 10/31/2017    TIA (transient ischemic attack)      Past Surgical History:   Procedure Laterality Date    APPENDECTOMY      at age 4    BACK SURGERY      2016 (fusion), 2017    BREAST SURGERY  05/2019    CARDIAC CATHETERIZATION  12/12/12    with angioplasty, stent    CARDIAC CATHETERIZATION  3/29/15  MDL    stent to distal RCA.  EF 60%    CHOLECYSTECTOMY, LAPAROSCOPIC      2012    COLONOSCOPY      2010    CORONARY ANGIOPLASTY WITH STENT PLACEMENT  jan 11 [place]    stent x 3    CORONARY ANGIOPLASTY WITH STENT PLACEMENT      01/01/11    CORONARY ARTERY BYPASS GRAFT N/A 5/30/2019    CORONARY ARTERY BYPASS GRAFT X3 WITH LEFT INTERNAL MAMMARY ARTERY WITH ENDOSCOPIC VEIN HARVESTING WITH PERFUSION TRANSESOPHAGEAL ECHOCARDIOGRAM performed by Toribio Romero MD at [address] [facility] CATH LAB PROCEDURE  [id number]    primary stent placement to the first circumflex marginal branch, balloon angioplasty to the third left anterior descending diagnonal branch, intracoronary nitroglycerin adminstration    JOINT REPLACEMENT      left knee, ~2016    LUMBAR FUSION Left 11/15/2016    LUMBAR INTERBODY FUSION LATERAL L3-4 L4-5 WITH LATERAL PLATE  performed by Jessica Garcia MD at [address]  2019     Family History   Problem Relation Age of Onset    Coronary Art Dis Mother     Cancer Sister         uterine    Kidney Disease Sister     Coronary Art Dis Brother          in his 46s - age 47    Cancer Father         colon    Coronary Art Dis Sister     Cancer Sister         olive cancer    No Known Problems Sister     No Known Problems Sister     No Known Problems Son     Alcohol Abuse Son         drank self to death    No Known Problems Son      Social History     Tobacco Use    Smoking status: Never Smoker    Smokeless tobacco: Never Used   Substance Use Topics    Alcohol use: Yes     Alcohol/week: 1.0 standard drink     Types: 1 Shots of liquor per week     Comment: Occ. Current Outpatient Medications   Medication Sig Dispense Refill    metoprolol succinate (TOPROL XL) 25 MG extended release tablet Take 1 tablet by mouth 2 times daily 180 tablet 3    rivaroxaban (XARELTO) 15 MG TABS tablet Take 1 tablet by mouth daily (with breakfast) 1 tablet 0    clopidogrel (PLAVIX) 75 MG tablet TAKE 1 TABLET DAILY 90 tablet 3    ALPRAZolam (XANAX) 0.5 MG tablet Take 0.5 mg by mouth nightly as needed for Sleep.       famotidine (PEPCID) 40 MG tablet Take 40 mg by mouth daily      isosorbide mononitrate (IMDUR) 30 MG extended release tablet Take 30 mg by mouth daily      azelastine (ASTELIN) 0.1 % nasal spray 1 spray by Nasal route 2 times daily Use in each nostril as directed      finasteride (PROSCAR) 5 MG tablet Take 5 mg by mouth daily      docusate sodium (COLACE) 100 MG capsule Take 200 mg by mouth daily      furosemide (LASIX) 40 MG tablet Take 40 mg by mouth daily       Multiple Vitamins-Minerals (PRESERVISION AREDS PO) Take 2 capsules by mouth      sertraline (ZOLOFT) 100 MG tablet Take 1 tablet by mouth daily 90 tablet 2    nitroGLYCERIN (NITROSTAT) 0.4 MG SL tablet up to max of 3 total doses. If no relief after 1 dose, call 911. 30 tablet 2    sacubitril-valsartan (ENTRESTO) 49-51 MG per tablet Take 1 tablet by mouth 2 times daily 60 tablet 5    zoster recombinant adjuvanted vaccine [facility]) 50 MCG/0.5ML SUSR injection 1 vaccine now and repeat in 2-6 months. 0.5 mL 1     No current facility-administered medications for this visit. Allergies: Statins, Crestor [rosuvastatin calcium], Adhesive tape, and Morphine    Review of Systems  Constitutional  no appetite change, or unexpected weight change. No fever, chills or diaphoresis. + fatigue. HEENT  no significant rhinorrhea or epistaxis. No tinnitus or significant hearing loss. Eyes  no sudden vision change or amaurosis. No corneal arcus, xantholasma, subconjunctival hemorrhage or discharge. Respiratory  no significant wheezing, stridor, apnea or cough. + reduced dyspnea after diuresis. Cardiovascular  no exertional chest pain to suggest myocardial ischemia. No orthopnea or PND. No sensation of sustained arrythmia. No occurrence of slow heart rate. No palpitations. No claudication. Gastrointestinal  no abdominal swelling or pain. No blood in stool. No severe constipation, diarrhea, nausea, or vomiting. Genitourinary  no dysuria, frequency, or urgency. No flank pain or hematuria. Musculoskeletal  no back pain or myalgia. No problems with gait. Extremities - no clubbing or cyanosis. + improved now mild bilateral ankle edema.    Skin  no color Cath patent stents in the mid LAD, PTCA to the D3, stent to distal LAD, normal LVFX  3/29/2015  Cath  Stent to the [facility], normal LVFX  11/1/2019  Loop placed  3/1/19 lexiscan negative for myocardial ischemia, EF 50  %   5/24/19 ACS PARKER RISK Score 5 (angina, + troponin, CAD>50, age>65, plavix ), AUC indication 3, AUC score 9   5/25/19  Cath 50% distal LM, 100% mid LAD, 80% diag, 100% mid RCA, inferior hypokinesis, EF 45%    11/2/21 echo   Mitral valve leaflets are mildly thickened with preserved leaflet  mobility. Mild mitral regurgitation is present. Mildly thickened aortic valve leaflets with preserved leaflet mobility. Tricuspid valve is structurally normal.   Left ventricular ejection fraction is visually estimated at 30%. Normal left ventricular wall thickness. More pronounced hypokinesis apex, inferior wall, and septum   Left ventricular size is moderately increased . Electronically signed by Timothy Handley MD(Interpreting   physician) on 11/03/2021 08:56 AM    6/12/19 Lexiscan  Impression   Impression:   There is a large predominantly lateral and inferior infarct with no   significant redistribution suggesting no viability in this territory.    Suggest: Medical management   Signed by Dr Yessenia Watts on 6/12/2019 8:15 PM     3/24/22 pro-BNP 3,081    Lab Results   Component Value Date     03/24/2022     05/25/2011    K 5.2 03/24/2022    K 4.0 10/18/2021    K 4.0 05/25/2011     03/24/2022     05/25/2011    CO2 26 03/24/2022    BUN 21 03/24/2022    CREATININE 1.3 03/24/2022    CREATININE 1.0 05/25/2011    GLUCOSE 120 03/24/2022    CALCIUM 9.6 03/24/2022      Lab Results   Component Value Date    WBC 6.4 03/24/2022    HGB 13.9 (L) 03/24/2022    HCT 44.9 03/24/2022    MCV 89.8 03/24/2022     03/24/2022     Lab Results   Component Value Date    CHOL 242 (H) 06/23/2021    CHOL 157 (L) 05/25/2019    CHOL 205 (H) 03/01/2019     Lab Results   Component Value Date    TRIG 173 (H) 06/23/2021    TRIG 103 05/25/2019    TRIG 202 (H) 03/01/2019     Lab Results   Component Value Date    HDL 28 (L) 06/23/2021    HDL 20 (L) 05/25/2019    HDL 28 (L) 03/01/2019     Lab Results   Component Value Date    LDLCALC 179 06/23/2021    LDLCALC 116 05/25/2019    LDLCALC 137 03/01/2019       BP Readings from Last 3 Encounters:   03/31/22 126/78   03/24/22 126/78   02/21/22 104/64    Pulse Readings from Last 3 Encounters:   03/31/22 76   03/24/22 114   02/21/22 64        Wt Readings from Last 3 Encounters:   03/31/22 281 lb 6.4 oz (127.6 kg)   03/24/22 288 lb (130.6 kg)   02/21/22 293 lb (132.9 kg)     Assessment/Plan:   Diagnosis Orders   1. Coronary artery disease involving native coronary artery of native heart without angina pectoris     2. Chronic systolic heart failure ([address]     3. AICD (automatic cardioverter/defibrillator) present     4. Hx of CABG     5. Essential hypertension     6. Atrial flutter, unspecified type ([address]     7. Chronic anticoagulation      Xarelto     [id number] Score: 8  Disclaimer: Risk Score calculation is dependent on accuracy of patient problem list and past encounter diagnosis. AICD check:  ICD interrogation showed adequate battery voltage and charge time. Mode:  AAIR-DDDR. Lead and defib impedances stable. Pacing: AP-VS 50.7%. Reprogramming for sensitivity and threshold testing. Normal diagnostics and safety margins noted. A output 1.000 V at 0.40 ms; P wave 3.0 mV. RV output 0.750 V at 0.40 ms; R wave 12.1 mV. No ICD discharges. Monitored arrhythmia: 80 minutes atrial fibrillation. AF burden 0.6%. Optivol trending up. Next Carelink: one month. Chronic systolic heart failure-  NYHA class III stage C  GDMT includes Entresto, Toprol Xl and Lasix. Weight is down 11 pounds since IVP Lasix on 3/24/22. Patient is feeling better at this point - euvolemic. Continue low sodium diet and daily weight log.     CAD - stable on current medical management.  Continue same.     Atrial flutter - noted on AICD interrogation 3/24/22. Interrogation today shows AS-VS with AF burden of 0.6% - longest episode 80 minutes. Patient is compliant with taking Xarelto 15 mg daily added at 3/24/22 office visit. Continue Toprol XL. No bleeding issue reported. Check Carelink one month.     HTN - normotensive on current regimen. BP goal less than 130/80. Continue same/     Hyperlipidemia - . Hx of statin intolerance. Plan to discuss option of Repatha at next visit.      Patient is compliant with medication regimen. Previous cardiac history and records reviewed. Continue current medical management for cardiac related condition. Continue other current medications as directed. Continue to follow up with primary care provider for non cardiac medical problems. [place] provider [place] [facility] please request that your labs be faxed to this office at [phone]. BP goal 130/80 or less. Call the office with any problems, questions or concerns at [phone]. Cardiology follow up as scheduled in [address] appointments. Educational included in patient instructions. Heart health.       Romana Ahuja, APRN

## 2022-04-04 ENCOUNTER — TELEPHONE (OUTPATIENT)
Dept: CARDIOLOGY CLINIC | Age: 77
End: 2022-04-04

## 2022-04-04 NOTE — TELEPHONE ENCOUNTER
Pt left a message that he is getting an error code on his Carelink monitor. Called him back and left him a message with [employer] Get Connected support number.

## 2022-05-04 ENCOUNTER — OFFICE VISIT (OUTPATIENT)
Dept: CARDIOLOGY CLINIC | Age: 77
End: 2022-05-04
Payer: MEDICARE

## 2022-05-04 ENCOUNTER — HOSPITAL ENCOUNTER (OUTPATIENT)
Dept: INFUSION THERAPY | Age: 77
Setting detail: INFUSION SERIES
Discharge: HOME | End: 2022-05-04
Payer: MEDICARE

## 2022-05-04 VITALS
HEART RATE: 70 BPM | BODY MASS INDEX: 35.06 KG/M2 | WEIGHT: 288 LBS | DIASTOLIC BLOOD PRESSURE: 70 MMHG | SYSTOLIC BLOOD PRESSURE: 130 MMHG

## 2022-05-04 VITALS
OXYGEN SATURATION: 98 % | SYSTOLIC BLOOD PRESSURE: 112 MMHG | DIASTOLIC BLOOD PRESSURE: 60 MMHG | TEMPERATURE: 97 F | HEART RATE: 66 BPM

## 2022-05-04 DIAGNOSIS — Z79.01 CHRONIC ANTICOAGULATION: ICD-10-CM

## 2022-05-04 DIAGNOSIS — I50.22 CHRONIC SYSTOLIC HEART FAILURE (HCC): Primary | ICD-10-CM

## 2022-05-04 DIAGNOSIS — I10 ESSENTIAL HYPERTENSION: Chronic | ICD-10-CM

## 2022-05-04 DIAGNOSIS — Z95.810 AICD (AUTOMATIC CARDIOVERTER/DEFIBRILLATOR) PRESENT: ICD-10-CM

## 2022-05-04 DIAGNOSIS — E78.2 MIXED HYPERLIPIDEMIA: Chronic | ICD-10-CM

## 2022-05-04 DIAGNOSIS — Z95.1 HX OF CABG: ICD-10-CM

## 2022-05-04 PROCEDURE — 93283 PRGRMG EVAL IMPLANTABLE DFB: CPT | Performed by: NURSE PRACTITIONER

## 2022-05-04 PROCEDURE — 1036F TOBACCO NON-USER: CPT | Performed by: NURSE PRACTITIONER

## 2022-05-04 PROCEDURE — G8427 DOCREV CUR MEDS BY ELIG CLIN: HCPCS | Performed by: NURSE PRACTITIONER

## 2022-05-04 PROCEDURE — 4040F PNEUMOC VAC/ADMIN/RCVD: CPT | Performed by: NURSE PRACTITIONER

## 2022-05-04 PROCEDURE — 99214 OFFICE O/P EST MOD 30 MIN: CPT | Performed by: NURSE PRACTITIONER

## 2022-05-04 PROCEDURE — 96374 THER/PROPH/DIAG INJ IV PUSH: CPT

## 2022-05-04 PROCEDURE — G8417 CALC BMI ABV UP PARAM F/U: HCPCS | Performed by: NURSE PRACTITIONER

## 2022-05-04 PROCEDURE — 6360000002 HC RX W HCPCS: Performed by: NURSE PRACTITIONER

## 2022-05-04 PROCEDURE — 1123F ACP DISCUSS/DSCN MKR DOCD: CPT | Performed by: NURSE PRACTITIONER

## 2022-05-04 PROCEDURE — 2580000003 HC RX 258: Performed by: NURSE PRACTITIONER

## 2022-05-04 RX ORDER — ONDANSETRON 2 MG/ML
8 INJECTION INTRAMUSCULAR; INTRAVENOUS
Status: CANCELLED | OUTPATIENT
Start: 2022-05-04

## 2022-05-04 RX ORDER — DIPHENHYDRAMINE HYDROCHLORIDE 50 MG/ML
50 INJECTION INTRAMUSCULAR; INTRAVENOUS
Status: CANCELLED | OUTPATIENT
Start: 2022-05-04

## 2022-05-04 RX ORDER — FUROSEMIDE 10 MG/ML
80 INJECTION INTRAMUSCULAR; INTRAVENOUS ONCE
Status: CANCELLED
Start: 2022-05-04 | End: 2022-05-04

## 2022-05-04 RX ORDER — ACETAMINOPHEN 325 MG/1
650 TABLET ORAL
Status: CANCELLED | OUTPATIENT
Start: 2022-05-04

## 2022-05-04 RX ORDER — EPINEPHRINE 1 MG/ML
0.3 INJECTION, SOLUTION, CONCENTRATE INTRAVENOUS PRN
Status: CANCELLED | OUTPATIENT
Start: 2022-05-04

## 2022-05-04 RX ORDER — ALBUTEROL SULFATE 90 UG/1
4 AEROSOL, METERED RESPIRATORY (INHALATION) PRN
Status: CANCELLED | OUTPATIENT
Start: 2022-05-04

## 2022-05-04 RX ORDER — SODIUM CHLORIDE 9 MG/ML
INJECTION, SOLUTION INTRAVENOUS CONTINUOUS
Status: CANCELLED | OUTPATIENT
Start: 2022-05-04

## 2022-05-04 RX ORDER — SODIUM CHLORIDE 0.9 % (FLUSH) 0.9 %
5-40 SYRINGE (ML) INJECTION PRN
Status: CANCELLED | OUTPATIENT
Start: 2022-05-04

## 2022-05-04 RX ORDER — SODIUM CHLORIDE 0.9 % (FLUSH) 0.9 %
5-40 SYRINGE (ML) INJECTION PRN
Status: DISCONTINUED | OUTPATIENT
Start: 2022-05-04 | End: 2022-05-05 | Stop reason: HOSPADM

## 2022-05-04 RX ORDER — FUROSEMIDE 10 MG/ML
80 INJECTION INTRAMUSCULAR; INTRAVENOUS ONCE
Status: COMPLETED | OUTPATIENT
Start: 2022-05-04 | End: 2022-05-04

## 2022-05-04 RX ADMIN — SODIUM CHLORIDE, PRESERVATIVE FREE 20 ML: 5 INJECTION INTRAVENOUS at 16:25

## 2022-05-04 RX ADMIN — FUROSEMIDE 80 MG: 10 INJECTION, SOLUTION INTRAMUSCULAR; INTRAVENOUS at 16:12

## 2022-05-04 NOTE — PATIENT INSTRUCTIONS
New instructions for today:    Weigh yourself daily without clothing at the same time each day. Record a daily weight log. Call the office if you gain 3 pounds or more in 2 to 3 days or 5 pounds in one week. A sudden weight gain may mean that your heart failure is getting worse. Sodium causes your body to hold on to extra water. This may cause your heart failure symptoms to get worse. Limit sodium to 2,000 milligrams (mg) a day or less. That is less than 1 teaspoon of salt a day, including all the salt you eat in cooking or in packaged foods. Fluid restriction of 1500ml per day (about 6 cups of fluid per day). Patient Instructions:  Continue current medications as prescribed. Always keep a current medication list. Bring your medications to every office visit. Continue to follow up with primary care provider for non cardiac medical problems. Call the office with any problems, questions or concerns at [phone]. If you have been asked to keep a blood pressure log, do so for 2 weeks. Call the office to report readings to the triage nurse at [phone]. Follow up with cardiologist as scheduled. The following educational material has been included in this after visit summary for your review: Life simple 7. Heart health. Life simple 7  1) Manage blood pressure - high blood pressure is a major risk factor for heart disease and stroke. Keeping blood pressure in health range reduces strain on your heart, arteries and kidneys. Blood pressure goal is less than 130/80. 2) Control cholesterol - contributes to plaque, which can clog arteries and lead to heart disease and stroke. When you control your cholesterol you are giving your arteries their best chance to remain clear. It is recommended that you get cholesterol lab work done once a year. 3) Reduce blood sugar - most of the food we eat is turning into glucose or blood sugar that our body uses for energy.   Over time, high levels of blood sugar can damage your heart, kidneys, eyes and nerves. 4) Get active - living an active life is one of the most rewarding gifts you can give yourself and those you love. Simply put, daily physical activity increases your length and quality of life. Strive to exercise 15 minutes most days of the week. 5)  Eat better - A healthy diet is one of your best weapons for fighting cardiovascular disease. When you eat a heart healthy diet, you improve your chances for feeling good and staying healthy for life. 6)  Lose weight - when you shed extra fat an unnecessary pounds, you reduce the burden on your hear, lungs, blood vessels and skeleton. You give yourself the gift of active living, you lower your blood pressure and help yourself feel better. 7) Stop smoking - cigarette smokers have a higher risk of developing cardiovascular disease. If  You smoke, quitting is the best thing you can do for your health. Check American Heart Association on line for more information on Life's Simple 7 and tips for healthy living. A Healthy Heart: Care Instructions  Your Care Instructions     Coronary artery disease, also called heart disease, occurs when a substance called plaque builds up in the vessels that supply oxygen-rich blood to your heart muscle. This can narrow the blood vessels and reduce blood flow. A heart attack happens when blood flow is completely blocked. A high-fat diet, smoking, and other factors increase the risk of heart disease. Your doctor has found that you have a chance of having heart disease. You can do lots of things to keep your heart healthy. It may not be easy, but you can change your diet, exercise more, and quit smoking. These steps really work to lower your chance of heart disease. Follow-up care is a key part of your treatment and safety. Be sure to make and go to all appointments, and call your doctor if you are having problems.  It's also a good idea to know your test results and keep a list of the medicines you take. How can you care for yourself at home? Diet  · Use less salt when you cook and eat. This helps lower your blood pressure. Taste food before salting. Add only a little salt when you think you need it. With time, your taste buds will adjust to less salt. · Eat fewer snack items, fast foods, canned soups, and other high-salt, high-fat, processed foods. · Read food labels and try to avoid saturated and trans fats. They increase your risk of heart disease by raising cholesterol levels. · Limit the amount of solid fat-butter, margarine, and shortening-you eat. Use olive, peanut, or canola oil when you cook. Bake, broil, and steam foods instead of frying them. · Eat a variety of fruit and vegetables every day. Dark green, deep orange, red, or yellow fruits and vegetables are especially good for you. Examples include spinach, carrots, peaches, and berries. · Foods high in fiber can reduce your cholesterol and provide important vitamins and minerals. High-fiber foods include whole-grain cereals and breads, oatmeal, beans, brown rice, citrus fruits, and apples. · Eat lean proteins. Heart-healthy proteins include seafood, lean meats and poultry, eggs, beans, peas, nuts, seeds, and soy products. · Limit drinks and foods with added sugar. These include candy, desserts, and soda pop. Lifestyle changes  · If your doctor recommends it, get more exercise. Walking is a good choice. Bit by bit, increase the amount you walk every day. Try for at least 30 minutes on most days of the week. You also may want to swim, bike, or do other activities. · Do not smoke. If you need help quitting, talk to your doctor about stop-smoking programs and medicines. These can increase your chances of quitting for good. Quitting smoking may be the most important step you can take to protect your heart. It is never too late to quit. · Limit alcohol to 2 drinks a day for men and 1 drink a day for women.  Too much alcohol can cause health problems. · Manage other health problems such as diabetes, high blood pressure, and high cholesterol. If you think you may have a problem with alcohol or drug use, talk to your doctor. Medicines  · Take your medicines exactly as prescribed. Call your doctor if you think you are having a problem with your medicine. · If your doctor recommends aspirin, take the amount directed each day. Make sure you take aspirin and not another kind of pain reliever, such as acetaminophen (Tylenol). When should you call for help? [id number] if you have symptoms of a heart attack. These may include:  · Chest pain or pressure, or a strange feeling in the chest.  · Sweating. · Shortness of breath. · Pain, pressure, or a strange feeling in the back, neck, jaw, or upper belly or in one or both shoulders or arms. · Lightheadedness or sudden weakness. · A fast or irregular heartbeat. After you call 911, the  may tell you to chew 1 adult-strength or 2 to 4 low-dose aspirin. Wait for an ambulance. Do not try to drive yourself. Watch closely for changes in your health, and be sure to contact your doctor if you have any problems. Where can you learn more? Go to https://Brainsway.[employer] ([employer] org and sign in to your [employer] account. Enter P973 in the [facility] box to learn more about \"A Healthy Heart: Care Instructions. \"     If you do not have an account, please click on the \"Sign Up Now\" link. Current as of: December 16, 2019               Content Version: 12.5  © 1455-7214 Healthwise, [employer]. Care instructions adapted under license by [facility] ([facility]). If you have questions about a medical condition or this instruction, always ask your healthcare professional. [address] any warranty or liability for your use of this information.

## 2022-05-04 NOTE — PROGRESS NOTES
[facility], [place]. [address], [place] [address] [facility]  [phone] office  [phone] fax      OFFICE VISIT:  Eleni Acevedo - : [birth year]  Reason For Visit:  Rakel Chang is a 65 y.o. male who is here for 1 Month Follow-Up (1 month follow up on CHF) and Coronary Artery Disease    History:   Diagnosis Orders   1. Chronic systolic heart failure ([facility]     2. AICD (automatic cardioverter/defibrillator) present     3. Essential hypertension     4. Hx of CABG     5. Mixed hyperlipidemia     6. Chronic anticoagulation      Xarelto     The patient presents today for chronic systolic heart failure follow up. The patient is going to [employer] for his grandson's graduation. He is concerned as is weight is up and ankles are swollen some. Weight is up 7 pound in Epic from 3/31/22. The patient is maintain low sodium diet and weighing most days. Mr. Owen Stiles reports no bleeding issue on Xarelto. BP is well controlled on current regimen. The patient's PCP monitors cholesterol. Ke Phillips denies exertional chest pain,  orthopnea, paroxysmal nocturnal dyspnea, syncope, presyncope and sensed arrhythmia. The patient denies numbness or weakness to suggest cerebrovascular accident or transient ischemic attack.  + SOA. + fatigue. + bilateral lower extremity edema.      Miriam Santos has the following history as recorded in [facility]:  Patient Active Problem List   Diagnosis Code    Type 2 diabetes mellitus with diabetic polyneuropathy, with long-term current use of insulin ([facility] E11.42, Z79.4    Essential hypertension I10    Coronary artery disease involving native coronary artery of native heart without angina pectoris I25.10    MI (myocardial infarction) ([address] I21.9    Mixed hyperlipidemia E78.2    Chronic bilateral low back pain without sciatica M54.50, G89.29    DDD (degenerative disc disease), lumbar M51.36    Spondylosis of lumbar region without myelopathy or radiculopathy M47.816    Acute ischemic stroke ([facility]) I63.9    Thoracic outlet syndrome G54.0    Vertigo R42    Imbalance R26.89    Cerebrovascular accident (CVA) due to embolism of right middle cerebral artery ([facility]) I63.411    Recurrent major depressive disorder, in full remission ([address] F33.42    Trigger middle finger of left hand M65.332    Peripheral polyneuropathy G62.9    Palpitations R00.2    Chronic insomnia F51.04    Status post placement of implantable loop recorder Z95.818    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    Dermatitis L30.9    Depression with anxiety F41.8    Benign prostatic hyperplasia without lower urinary tract symptoms N40.0    Chronic tension-type headache, not intractable G44.229    MOO on CPAP G47.33, Z99.89    Chest pain R07.9    SOB (shortness of breath) R06.02    ACS (acute coronary syndrome) ([facility]) I24.9    Acute MI inferior lateral first episode care ([facility] I21.19    Hx of CABG Z95.1    Chronic constipation K59.09    Palliative care patient Z51.5    Ischemic cardiomyopathy I25.5    Urinary tract infection due to extended-spectrum beta lactamase (ESBL) producing Escherichia coli N39.0, B96.29, Z16.12    Chronic systolic heart failure ([facility]) B99.95    Complicated UTI (urinary tract infection) N39.0    AICD (automatic cardioverter/defibrillator) present Z95.810    History of chronic kidney disease Z87.448    Edema R60.9    Vestibular neuropathy H93.3X9    Breakthrough seizure ([address] G40.919    Seizure due to hypoglycemia ([facility]) R56.9, E16.2    Seizure ([address] R56.9     Past Medical History:   Diagnosis Date    Acute ischemic stroke ([address] 2/20/2017    Atherosclerotic heart disease     s/p MI, stenting x 3 jan 2011([place]    Breakthrough seizure ([address] 10/16/2021    CAD (coronary artery disease)     Cerebral artery occlusion with cerebral infarction ([address]     Cerebrovascular accident (CVA) due to embolism of right middle cerebral artery ([address] 8/14/2017    CHF (congestive heart failure) (HCC)     Chronic bilateral low back pain without sciatica 11/10/2016    Chronic kidney disease     DDD (degenerative disc disease), lumbar 11/15/2016    Depression     Depression with anxiety     Diabetes mellitus ([address]     type II    Diabetes mellitus, type 2 ([address]     DM (diabetes mellitus) ([address] 7/12/2011    ESBL (extended spectrum beta-lactamase) producing bacteria infection 11/16/2019    urine    Glaucoma     Headache     Hyperlipidemia     Cholesterol management per pcp.  Hypertension     Macular degeneration     MI (myocardial infarction) ([address]     MI (myocardial infarction) ([address]     Neuromuscular disorder ([address]     Neuropathy     MOO on CPAP     Osteoarthritis     Palliative care patient 06/11/2019    Sleep apnea     Spondylosis of lumbar region without myelopathy or radiculopathy 11/15/2016    Status post placement of implantable loop recorder 10/31/2017    TIA (transient ischemic attack)      Past Surgical History:   Procedure Laterality Date    APPENDECTOMY      at age 4    BACK SURGERY      2016 (fusion), 2017    BREAST SURGERY  05/2019    CARDIAC CATHETERIZATION  12/12/12    with angioplasty, stent    CARDIAC CATHETERIZATION  3/29/15  MDL    stent to distal RCA.  EF 60%    CHOLECYSTECTOMY, LAPAROSCOPIC      2012    COLONOSCOPY      2010    CORONARY ANGIOPLASTY WITH STENT PLACEMENT  jan 11 [place]    stent x 3    CORONARY ANGIOPLASTY WITH STENT PLACEMENT      01/01/11    CORONARY ARTERY BYPASS GRAFT N/A 5/30/2019    CORONARY ARTERY BYPASS GRAFT X3 WITH LEFT INTERNAL MAMMARY ARTERY WITH ENDOSCOPIC VEIN HARVESTING WITH PERFUSION TRANSESOPHAGEAL ECHOCARDIOGRAM performed by Isa Carter MD at [address] [facility] CATH LAB PROCEDURE  [id number]    primary stent placement to the first circumflex marginal branch, balloon angioplasty to the third left anterior descending diagnonal branch, intracoronary nitroglycerin adminstration    JOINT REPLACEMENT      left knee, ~2016    LUMBAR FUSION Left 11/15/2016    LUMBAR INTERBODY FUSION LATERAL L3-4 L4-5 WITH LATERAL PLATE  performed by Dalton Paul MD at [address]  2019     Family History   Problem Relation Age of Onset    Coronary Art Dis Mother     Cancer Sister         uterine    Kidney Disease Sister     Coronary Art Dis Brother          in his 46s - age 47    Cancer Father         colon    Coronary Art Dis Sister     Cancer Sister         olive cancer    No Known Problems Sister     No Known Problems Sister     No Known Problems Son     Alcohol Abuse Son         drank self to death    No Known Problems Son      Social History     Tobacco Use    Smoking status: Never Smoker    Smokeless tobacco: Never Used   Substance Use Topics    Alcohol use: Yes     Alcohol/week: 1.0 standard drink     Types: 1 Shots of liquor per week     Comment: Occ. Current Outpatient Medications   Medication Sig Dispense Refill    metoprolol succinate (TOPROL XL) 25 MG extended release tablet Take 1 tablet by mouth 2 times daily 180 tablet 3    rivaroxaban (XARELTO) 15 MG TABS tablet Take 1 tablet by mouth daily (with breakfast) 1 tablet 0    clopidogrel (PLAVIX) 75 MG tablet TAKE 1 TABLET DAILY 90 tablet 3    ALPRAZolam (XANAX) 0.5 MG tablet Take 0.5 mg by mouth nightly as needed for Sleep.       famotidine (PEPCID) 40 MG tablet Take 40 mg by mouth daily      isosorbide mononitrate (IMDUR) 30 MG extended release tablet Take 30 mg by mouth daily      azelastine (ASTELIN) 0.1 % nasal spray 1 spray by Nasal route 2 times daily Use in each nostril as directed      finasteride (PROSCAR) 5 MG tablet Take 5 mg by mouth daily      docusate sodium (COLACE) 100 MG capsule Take 200 mg by mouth daily      furosemide (LASIX) 40 MG tablet Take 40 mg by mouth 2 times daily       Multiple Vitamins-Minerals (PRESERVISION AREDS PO) Take 2 capsules by mouth      sertraline (ZOLOFT) 100 MG tablet Take 1 tablet by mouth daily 90 tablet 2    nitroGLYCERIN (NITROSTAT) 0.4 MG SL tablet up to max of 3 total doses. If no relief after 1 dose, call 911. 30 tablet 2    sacubitril-valsartan (ENTRESTO) 49-51 MG per tablet Take 1 tablet by mouth 2 times daily 60 tablet 5    zoster recombinant adjuvanted vaccine [facility]) 50 MCG/0.5ML SUSR injection 1 vaccine now and repeat in 2-6 months. 0.5 mL 1     No current facility-administered medications for this visit. Allergies: Statins, Crestor [rosuvastatin calcium], Adhesive tape, and Morphine    Review of Systems  Constitutional - no appetite change, or unexpected weight change. No fever, chills or diaphoresis. + fatigue. HEENT - no significant rhinorrhea or epistaxis. No tinnitus or significant hearing loss. Eyes - no sudden vision change or amaurosis. No corneal arcus, xantholasma, subconjunctival hemorrhage or discharge. Respiratory - no significant wheezing, stridor, apnea or cough.  + SOA. Cardiovascular - no exertional chest pain to suggest myocardial ischemia. No orthopnea or PND. No sensation of sustained arrythmia. No occurrence of slow heart rate. No palpitations. No claudication. Gastrointestinal - no abdominal swelling or pain. No blood in stool. No severe constipation, diarrhea, nausea, or vomiting. Genitourinary - no dysuria, frequency, or urgency. No flank pain or hematuria. Musculoskeletal - no back pain or myalgia. No problems with gait. Extremities - no clubbing or cyanosis. + bilateral ankle edema. Skin - no color change or rash. No pallor. No new surgical incision. Neurologic - no speech difficulty, facial asymmetry or lateralizing weakness. No seizures, presyncope or syncope. No significant dizziness. Hematologic - no easy bruising or excessive bleeding. Psychiatric - no severe anxiety or insomnia. No confusion.    All other review of systems are negative. Objective  Vital Signs - /70   Pulse 70   Wt 288 lb (130.6 kg)   BMI 35.06 kg/m²   General - Artice Chang is alert, cooperative, and pleasant. Well groomed. No acute distress. Body habitus - Body mass index is 35.06 kg/m². HEENT - Head is normocephalic. No circumoral cyanosis. Dentition is normal.  EYES -   Lids normal without ptosis. No discharge, edema or subconjunctival hemorrhage. Neck - Symmetrical without apparent mass or lymphadenopathy. Respiratory - Normal respiratory effort without use of accessory muscles. Ausculatation reveals vesicular breath sounds without crackles, wheezes, rub or rhonchi. Cardiovascular - No jugular venous distention. Auscultation reveals regular rate and rhythm. No audible clicks, gallop or rub. No murmur. No lower extremity varicosities. No carotid bruits. Abdominal -  No visible distention, mass or pulsations. Extremities - No clubbing or cyanosis. No statis dermatitis or ulcers. 1+ pitting bilateral lower extremity edema. Musculoskeletal -   No Osler's nodes. No kyphosis or scoliosis. Gait is even and regular without limp or shuffle. Ambulates without assistance. Skin -  Warm and dry; no rash or pallor. No new surgical wound. Neurological - No focal neurological deficits. Thought processes coherent. No apparent tremor. Oriented to person, place and time. Psychiatric -  Appropriate affect and mood.      Data reviewed:  Prior Cardiac History -   1/2011 stents x 3 [facility])  5/4/2011  Cath  Patent stents in the mid LAD, stent to OM1, PTCA to the D2, normal LVFX  12/12/12  Cath patent stents in the mid LAD, PTCA to the D3, stent to distal LAD, normal LVFX  3/29/2015  Cath  Stent to the PLOM, normal LVFX  11/1/2019  Loop placed  3/1/19 lexiscan negative for myocardial ischemia, EF 50  %   5/24/19 ACS PARKER RISK Score 5 (angina, + troponin, CAD>50, age>65, plavix ), AUC indication 3, AUC score 9   5/25/19  Cath 50% distal LM, 100% mid LAD, 80% diag, 100% mid RCA, inferior hypokinesis, EF 45%    11/2/21 echo   Mitral valve leaflets are mildly thickened with preserved leaflet  mobility. Mild mitral regurgitation is present. Mildly thickened aortic valve leaflets with preserved leaflet mobility. Tricuspid valve is structurally normal.   Left ventricular ejection fraction is visually estimated at 30%. Normal left ventricular wall thickness. More pronounced hypokinesis apex, inferior wall, and septum   Left ventricular size is moderately increased . Electronically signed by Ary Carvajal MD(Interpreting   physician) on 11/03/2021 08:56 AM    6/12/19 Lexiscan  Impression   Impression:   There is a large predominantly lateral and inferior infarct with no   significant redistribution suggesting no viability in this territory.    Suggest: Medical management   Signed by Dr Paige Vasquez on 6/12/2019 8:15 PM     3/24/22 pro-BNP 3,081    Lab Results   Component Value Date     03/24/2022     05/25/2011    K 5.2 03/24/2022    K 4.0 10/18/2021    K 4.0 05/25/2011     03/24/2022     05/25/2011    CO2 26 03/24/2022    BUN 21 03/24/2022    CREATININE 1.3 03/24/2022    CREATININE 1.0 05/25/2011    GLUCOSE 120 03/24/2022    CALCIUM 9.6 03/24/2022      Lab Results   Component Value Date    WBC 6.4 03/24/2022    HGB 13.9 (L) 03/24/2022    HCT 44.9 03/24/2022    MCV 89.8 03/24/2022     03/24/2022     Lab Results   Component Value Date    CHOL 242 (H) 06/23/2021    CHOL 157 (L) 05/25/2019    CHOL 205 (H) 03/01/2019     Lab Results   Component Value Date    TRIG 173 (H) 06/23/2021    TRIG 103 05/25/2019    TRIG 202 (H) 03/01/2019     Lab Results   Component Value Date    HDL 28 (L) 06/23/2021    HDL 20 (L) 05/25/2019    HDL 28 (L) 03/01/2019     Lab Results   Component Value Date    LDLCALC 179 06/23/2021    LDLCALC 116 05/25/2019    LDLCALC 137 03/01/2019       BP Readings from Last 3 Encounters:   05/04/22 112/60   05/04/22 130/70   03/31/22 126/78    Pulse Readings from Last 3 Encounters:   05/04/22 66   05/04/22 70   03/31/22 76        Wt Readings from Last 3 Encounters:   05/04/22 288 lb (130.6 kg)   03/31/22 281 lb 6.4 oz (127.6 kg)   03/24/22 288 lb (130.6 kg)     Assessment/Plan:   Diagnosis Orders   1. Chronic systolic heart failure ([address]     2. AICD (automatic cardioverter/defibrillator) present     3. Essential hypertension     4. Hx of CABG     5. Mixed hyperlipidemia     6. Chronic anticoagulation      Xarelto     [id number] Score: 8  Disclaimer: Risk Score calculation is dependent on accuracy of patient problem list and past encounter diagnosis. AICD check:  ICD interrogation showed adequate battery voltage and charge time. Mode:  [id number]. Lead and defib impedances stable. Pacing: AP-VS 47/7%  Reprogramming for sensitivity and threshold testing. Normal diagnostics and safety margins noted. A output 1.000 V at 0.40 ms; P wave 2.3 mV. RV output 0.625 V at 0.40 ms; R wave 10.5 mV  No ICD discharges. Monitored arrhythmia: none  AF burden 0%  Optivol stable. Next Carelink: 3 months. Chronic systolic heart failure-  NYHA class III stage C  GDMT includes Entresto, Toprol Xl and Lasix. Weight is up 7 pounds. Continue low sodium diet and daily weight log. Lasix 80 mg IVP at infusion center today.       CAD - stable on current medical management. Continue same.     Atrial flutter - burden 0% on AICD check. Continues on Xarelto with no bleeding issues. Continues on Toprol XL.       HTN - normotensive on current regimen. BP goal less than 130/80. Continue same/     Hyperlipidemia - . Hx of statin intolerance.     Patient is compliant with medication regimen. Previous cardiac history and records reviewed. Continue current medical management for cardiac related condition. Continue other current medications as directed.   Continue to follow up with primary care provider for non cardiac medical problems. If your primary care provider is outside of the [facility], please request that your labs be faxed to this office at [phone]. BP goal 130/80 or less. Call the office with any problems, questions or concerns at [phone]. Cardiology follow up as scheduled in [address] appointments. Educational included in patient instructions. Heart health.       Ramin Anderson APRN

## 2022-07-12 ENCOUNTER — HOSPITAL ENCOUNTER (OUTPATIENT)
Dept: INFUSION THERAPY | Age: 77
Setting detail: INFUSION SERIES
Discharge: HOME | End: 2022-07-12
Payer: MEDICARE

## 2022-07-12 ENCOUNTER — OFFICE VISIT (OUTPATIENT)
Dept: CARDIOLOGY CLINIC | Age: 77
End: 2022-07-12
Payer: MEDICARE

## 2022-07-12 VITALS
BODY MASS INDEX: 35.56 KG/M2 | SYSTOLIC BLOOD PRESSURE: 126 MMHG | WEIGHT: 292 LBS | OXYGEN SATURATION: 95 % | DIASTOLIC BLOOD PRESSURE: 70 MMHG | HEART RATE: 88 BPM | HEIGHT: 76 IN

## 2022-07-12 VITALS
SYSTOLIC BLOOD PRESSURE: 111 MMHG | RESPIRATION RATE: 18 BRPM | OXYGEN SATURATION: 96 % | TEMPERATURE: 97.9 F | HEART RATE: 62 BPM | DIASTOLIC BLOOD PRESSURE: 63 MMHG

## 2022-07-12 DIAGNOSIS — I50.22 CHRONIC SYSTOLIC HEART FAILURE (HCC): Primary | ICD-10-CM

## 2022-07-12 DIAGNOSIS — Z95.1 HX OF CABG: ICD-10-CM

## 2022-07-12 DIAGNOSIS — Z95.810 AICD (AUTOMATIC CARDIOVERTER/DEFIBRILLATOR) PRESENT: ICD-10-CM

## 2022-07-12 DIAGNOSIS — I25.10 CORONARY ARTERY DISEASE INVOLVING NATIVE CORONARY ARTERY OF NATIVE HEART WITHOUT ANGINA PECTORIS: ICD-10-CM

## 2022-07-12 DIAGNOSIS — E78.2 MIXED HYPERLIPIDEMIA: Chronic | ICD-10-CM

## 2022-07-12 DIAGNOSIS — I48.92 ATRIAL FLUTTER, UNSPECIFIED TYPE (HCC): ICD-10-CM

## 2022-07-12 DIAGNOSIS — Z79.01 CHRONIC ANTICOAGULATION: ICD-10-CM

## 2022-07-12 LAB
ANION GAP SERPL CALCULATED.3IONS-SCNC: 10 MMOL/L (ref 7–19)
BUN BLDV-MCNC: 25 MG/DL (ref 8–23)
CALCIUM SERPL-MCNC: 9 MG/DL (ref 8.8–10.2)
CHLORIDE BLD-SCNC: 101 MMOL/L (ref 98–111)
CO2: 26 MMOL/L (ref 22–29)
CREAT SERPL-MCNC: 1.5 MG/DL (ref 0.5–1.2)
GFR AFRICAN AMERICAN: 55
GFR NON-AFRICAN AMERICAN: 45
GLUCOSE BLD-MCNC: 110 MG/DL (ref 74–109)
POTASSIUM SERPL-SCNC: 4.4 MMOL/L (ref 3.5–5)
PRO-BNP: 808 PG/ML (ref 0–1800)
SODIUM BLD-SCNC: 137 MMOL/L (ref 136–145)

## 2022-07-12 PROCEDURE — 96374 THER/PROPH/DIAG INJ IV PUSH: CPT

## 2022-07-12 PROCEDURE — 80048 BASIC METABOLIC PNL TOTAL CA: CPT

## 2022-07-12 PROCEDURE — 6360000002 HC RX W HCPCS: Performed by: NURSE PRACTITIONER

## 2022-07-12 PROCEDURE — G8427 DOCREV CUR MEDS BY ELIG CLIN: HCPCS | Performed by: NURSE PRACTITIONER

## 2022-07-12 PROCEDURE — 83880 ASSAY OF NATRIURETIC PEPTIDE: CPT

## 2022-07-12 PROCEDURE — 1036F TOBACCO NON-USER: CPT | Performed by: NURSE PRACTITIONER

## 2022-07-12 PROCEDURE — 99214 OFFICE O/P EST MOD 30 MIN: CPT | Performed by: NURSE PRACTITIONER

## 2022-07-12 PROCEDURE — 1123F ACP DISCUSS/DSCN MKR DOCD: CPT | Performed by: NURSE PRACTITIONER

## 2022-07-12 PROCEDURE — G8417 CALC BMI ABV UP PARAM F/U: HCPCS | Performed by: NURSE PRACTITIONER

## 2022-07-12 PROCEDURE — 93283 PRGRMG EVAL IMPLANTABLE DFB: CPT | Performed by: NURSE PRACTITIONER

## 2022-07-12 RX ORDER — FUROSEMIDE 10 MG/ML
80 INJECTION INTRAMUSCULAR; INTRAVENOUS ONCE
Status: CANCELLED
Start: 2022-07-12 | End: 2022-07-12

## 2022-07-12 RX ORDER — FUROSEMIDE 10 MG/ML
120 INJECTION INTRAMUSCULAR; INTRAVENOUS ONCE
Status: COMPLETED | OUTPATIENT
Start: 2022-07-12 | End: 2022-07-12

## 2022-07-12 RX ORDER — SODIUM CHLORIDE 0.9 % (FLUSH) 0.9 %
5-40 SYRINGE (ML) INJECTION PRN
Status: DISCONTINUED | OUTPATIENT
Start: 2022-07-12 | End: 2022-07-13 | Stop reason: HOSPADM

## 2022-07-12 RX ORDER — SODIUM CHLORIDE 0.9 % (FLUSH) 0.9 %
5-40 SYRINGE (ML) INJECTION PRN
Status: CANCELLED | OUTPATIENT
Start: 2022-07-12

## 2022-07-12 RX ORDER — FUROSEMIDE 10 MG/ML
120 INJECTION INTRAMUSCULAR; INTRAVENOUS ONCE
Status: CANCELLED
Start: 2022-07-12 | End: 2022-07-12

## 2022-07-12 RX ORDER — CEPHALEXIN 500 MG/1
500 CAPSULE ORAL 2 TIMES DAILY
Qty: 20 CAPSULE | Refills: 0 | Status: SHIPPED | OUTPATIENT
Start: 2022-07-12 | End: 2022-07-22

## 2022-07-12 RX ADMIN — FUROSEMIDE 120 MG: 10 INJECTION, SOLUTION INTRAMUSCULAR; INTRAVENOUS at 15:35

## 2022-07-12 NOTE — PROGRESS NOTES
[facility], [place]. [address], [facility] [address] [facility]  [phone] office  [phone] fax      OFFICE VISIT:  2022    Vandana Landry - : [id number]  Reason For Visit:  Nyasia Haskins is a 65 y.o. male who is here for Coronary Artery Disease (Patient has edema and SOA, requesting IVP lasix) and Congestive Heart Failure    History:   Diagnosis Orders   1. Chronic systolic heart failure ([address]     2. Coronary artery disease involving native coronary artery of native heart without angina pectoris     3. Hx of CABG     4. Mixed hyperlipidemia     5. Chronic anticoagulation     6. Atrial flutter, unspecified type ([address]     7. AICD (automatic cardioverter/defibrillator) present       The patient presents today with concerns over fluid retention. He reports weight is up and legs are swollen. He feels more short of breath. No bleeding issue on Eliquis. BP is well controlled on current regimen. The patient's PCP monitors cholesterol. Jerrald Hence denies exertional chest pain, orthopnea, paroxysmal nocturnal dyspnea, syncope, presyncope, sensed arrhythmia, and fatigue. The patient denies numbness or weakness to suggest cerebrovascular accident or transient ischemic attack. + shortness of breath.  + bilateral leg edema.     Vandana Landry has the following history as recorded in [facility]:  Patient Active Problem List   Diagnosis Code    Type 2 diabetes mellitus with diabetic polyneuropathy, with long-term current use of insulin ([address] E11.42, Z79.4    Essential hypertension I10    Coronary artery disease involving native coronary artery of native heart without angina pectoris I25.10    MI (myocardial infarction) ([address] I21.9    Mixed hyperlipidemia E78.2    Chronic bilateral low back pain without sciatica M54.50, G89.29    DDD (degenerative disc disease), lumbar M51.36    Spondylosis of lumbar region without myelopathy or radiculopathy M47.816    Acute ischemic stroke ([facility] I63.9    Thoracic outlet syndrome G54.0    Vertigo R42    Imbalance R26.89    Cerebrovascular accident (CVA) due to embolism of right middle cerebral artery ([facility]) I63.411    Recurrent major depressive disorder, in full remission ([address] F33.42    Trigger middle finger of left hand M65.332    Peripheral polyneuropathy G62.9    Palpitations R00.2    Chronic insomnia F51.04    Status post placement of implantable loop recorder Z95.818    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    Dermatitis L30.9    Depression with anxiety F41.8    Benign prostatic hyperplasia without lower urinary tract symptoms N40.0    Chronic tension-type headache, not intractable G44.229    MOO on CPAP G47.33, Z99.89    Chest pain R07.9    SOB (shortness of breath) R06.02    ACS (acute coronary syndrome) ([facility]) I24.9    Acute MI inferior lateral first episode care ([facility] I21.19    Hx of CABG Z95.1    Chronic constipation K59.09    Palliative care patient Z51.5    Ischemic cardiomyopathy I25.5    Urinary tract infection due to extended-spectrum beta lactamase (ESBL) producing Escherichia coli N39.0, B96.29, Z16.12    Chronic systolic heart failure ([facility]) T05.97    Complicated UTI (urinary tract infection) N39.0    AICD (automatic cardioverter/defibrillator) present Z95.810    History of chronic kidney disease Z87.448    Edema R60.9    Vestibular neuropathy H93.3X9    Breakthrough seizure ([address] G40.919    Seizure due to hypoglycemia ([facility]) R56.9, E16.2    Seizure ([address] R56.9     Past Medical History:   Diagnosis Date    Acute ischemic stroke ([address] 2/20/2017    Atherosclerotic heart disease     s/p MI, stenting x 3 jan 2011([place])    Breakthrough seizure ([address] 10/16/2021    CAD (coronary artery disease)     Cerebral artery occlusion with cerebral infarction [facility])     Cerebrovascular accident (CVA) due to embolism of right middle cerebral artery ([address] 8/14/2017  CHF (congestive heart failure) (HCC)     Chronic bilateral low back pain without sciatica 11/10/2016    Chronic kidney disease     DDD (degenerative disc disease), lumbar 11/15/2016    Depression     Depression with anxiety     Diabetes mellitus ([address]     type II    Diabetes mellitus, type 2 ([address]     DM (diabetes mellitus) ([address] 7/12/2011    ESBL (extended spectrum beta-lactamase) producing bacteria infection 11/16/2019    urine    Glaucoma     Headache     Hyperlipidemia     Cholesterol management per pcp.  Hypertension     Macular degeneration     MI (myocardial infarction) ([address]     MI (myocardial infarction) ([address]     Neuromuscular disorder ([address]     Neuropathy     MOO on CPAP     Osteoarthritis     Palliative care patient 06/11/2019    Sleep apnea     Spondylosis of lumbar region without myelopathy or radiculopathy 11/15/2016    Status post placement of implantable loop recorder 10/31/2017    TIA (transient ischemic attack)      Past Surgical History:   Procedure Laterality Date    APPENDECTOMY      at age 4    BACK SURGERY      2016 (fusion), 2017    BREAST SURGERY  05/2019    CARDIAC CATHETERIZATION  12/12/12    with angioplasty, stent    CARDIAC CATHETERIZATION  3/29/15  MDL    stent to distal RCA.  EF 60%    CHOLECYSTECTOMY, LAPAROSCOPIC      2012    COLONOSCOPY      2010    CORONARY ANGIOPLASTY WITH STENT PLACEMENT  jan 11 [place]    stent x 3    CORONARY ANGIOPLASTY WITH STENT PLACEMENT      01/01/11    CORONARY ARTERY BYPASS GRAFT N/A 5/30/2019    CORONARY ARTERY BYPASS GRAFT X3 WITH LEFT INTERNAL MAMMARY ARTERY WITH ENDOSCOPIC VEIN HARVESTING WITH PERFUSION TRANSESOPHAGEAL ECHOCARDIOGRAM performed by Denise Phillips MD at [address] [place] [address] CATH LAB PROCEDURE  [id number]    primary stent placement to the first circumflex marginal branch, balloon angioplasty to the third left anterior descending diagnonal branch, intracoronary nitroglycerin adminstration    JOINT REPLACEMENT      left knee, ~2016    LUMBAR FUSION Left 11/15/2016    LUMBAR INTERBODY FUSION LATERAL L3-4 L4-5 WITH LATERAL PLATE  performed by Bartolo Avitia MD at [address]  2019     Family History   Problem Relation Age of Onset    Coronary Art Dis Mother     Cancer Sister         uterine    Kidney Disease Sister     Coronary Art Dis Brother          in his 46s - age 47    Cancer Father         colon    Coronary Art Dis Sister     Cancer Sister         olive cancer    No Known Problems Sister     No Known Problems Sister     No Known Problems Son     Alcohol Abuse Son         drank self to death    No Known Problems Son      Social History     Tobacco Use    Smoking status: Never Smoker    Smokeless tobacco: Never Used   Substance Use Topics    Alcohol use: Yes     Alcohol/week: 1.0 standard drink     Types: 1 Shots of liquor per week     Comment: Occ. Current Outpatient Medications   Medication Sig Dispense Refill    metoprolol succinate (TOPROL XL) 25 MG extended release tablet Take 1 tablet by mouth 2 times daily 180 tablet 3    rivaroxaban (XARELTO) 15 MG TABS tablet Take 1 tablet by mouth daily (with breakfast) 1 tablet 0    clopidogrel (PLAVIX) 75 MG tablet TAKE 1 TABLET DAILY 90 tablet 3    ALPRAZolam (XANAX) 0.5 MG tablet Take 0.5 mg by mouth nightly as needed for Sleep.       famotidine (PEPCID) 40 MG tablet Take 40 mg by mouth daily      isosorbide mononitrate (IMDUR) 30 MG extended release tablet Take 30 mg by mouth daily      azelastine (ASTELIN) 0.1 % nasal spray 1 spray by Nasal route 2 times daily Use in each nostril as directed      finasteride (PROSCAR) 5 MG tablet Take 5 mg by mouth daily      docusate sodium (COLACE) 100 MG capsule Take 200 mg by mouth daily      furosemide (LASIX) 40 MG tablet Take 40 mg by mouth 2 times daily       Multiple Vitamins-Minerals (PRESERVISION AREDS PO) Take 2 capsules by mouth  sertraline (ZOLOFT) 100 MG tablet Take 1 tablet by mouth daily 90 tablet 2    nitroGLYCERIN (NITROSTAT) 0.4 MG SL tablet up to max of 3 total doses. If no relief after 1 dose, call 911. 30 tablet 2    sacubitril-valsartan (ENTRESTO) 49-51 MG per tablet Take 1 tablet by mouth 2 times daily 60 tablet 5    zoster recombinant adjuvanted vaccine [facility]) 50 MCG/0.5ML SUSR injection 1 vaccine now and repeat in 2-6 months. 0.5 mL 1     No current facility-administered medications for this visit. Allergies: Statins, Crestor [rosuvastatin calcium], Adhesive tape, and Morphine    Review of Systems  Constitutional - no appetite change, or unexpected weight change. No fever, chills or diaphoresis. No significant change in activity level or new onset of fatigue. HEENT - no significant rhinorrhea or epistaxis. No tinnitus or significant hearing loss. Eyes - no sudden vision change or amaurosis. No corneal arcus, xantholasma, subconjunctival hemorrhage or discharge. Respiratory - no significant wheezing, stridor, apnea or cough. + shortness of breath. Cardiovascular - no exertional chest pain to suggest myocardial ischemia. No orthopnea or PND. No sensation of sustained arrythmia. No occurrence of slow heart rate. No palpitations. No claudication. Gastrointestinal - no abdominal swelling or pain. No blood in stool. No severe constipation, diarrhea, nausea, or vomiting. Genitourinary - no dysuria, frequency, or urgency. No flank pain or hematuria. Musculoskeletal - no back pain or myalgia. No problems with gait. Extremities - no clubbing or cyanosis. + bilateral lower extremity edema. Skin - no color change or rash. No pallor. No new surgical incision. Neurologic - no speech difficulty, facial asymmetry or lateralizing weakness. No seizures, presyncope or syncope. No significant dizziness. Hematologic - no easy bruising or excessive bleeding.    Psychiatric - no severe anxiety or insomnia. No confusion. All other review of systems are negative. Objective  Vital Signs - /70   Pulse 88   Ht 6' 4\" (1.93 m)   Wt 292 lb (132.5 kg)   SpO2 95%   BMI 35.54 kg/m²   General - Claudia Rm is alert, cooperative, and pleasant. Well groomed. No acute distress. Body habitus - Body mass index is 35.54 kg/m². HEENT - Head is normocephalic. No circumoral cyanosis. Dentition is normal.  EYES -   Lids normal without ptosis. No discharge, edema or subconjunctival hemorrhage. Neck - Symmetrical without apparent mass or lymphadenopathy. Respiratory - Normal respiratory effort without use of accessory muscles. Ausculatation reveals vesicular breath sounds without crackles, wheezes, rub or rhonchi. Cardiovascular - No jugular venous distention. Auscultation reveals regular rate and rhythm. No audible clicks, gallop or rub. No murmur. No lower extremity varicosities. No carotid bruits. Abdominal -  No visible distention, mass or pulsations. Extremities - No clubbing or cyanosis. No statis dermatitis or ulcers. 2+ pitting lower extremity edema. Musculoskeletal -   No Osler's nodes. No kyphosis or scoliosis. Gait is even and regular without limp or shuffle. Ambulates without assistance. Skin -  Warm and dry; no rash or pallor. No new surgical wound. Neurological - No focal neurological deficits. Thought processes coherent. No apparent tremor. Oriented to person, place and time. Psychiatric -  Appropriate affect and mood. Data reviewed:  11/2/21 echo   Mitral valve leaflets are mildly thickened with preserved leaflet  mobility. Mild mitral regurgitation is present. Mildly thickened aortic valve leaflets with preserved leaflet mobility. Tricuspid valve is structurally normal.   Left ventricular ejection fraction is visually estimated at 30%. Normal left ventricular wall thickness.    More pronounced hypokinesis apex, inferior wall, and septum   Left ventricular size is moderately increased . Electronically signed by Bennie Jean-Baptiste MD(Interpreting   physician) on 11/03/2021 08:56 AM    Lab Results   Component Value Date     07/12/2022    K 4.4 07/12/2022     07/12/2022    CO2 26 07/12/2022    BUN 25 (H) 07/12/2022    CREATININE 1.5 (H) 07/12/2022    GLUCOSE 110 (H) 07/12/2022    CALCIUM 9.0 07/12/2022    PROT 6.6 10/18/2021    LABALBU 4.1 10/18/2021    BILITOT 0.4 10/18/2021    ALKPHOS 65 10/18/2021    AST 13 10/18/2021    ALT 15 10/18/2021    LABGLOM 45 (A) 07/12/2022    GFRAA 55 (L) 07/12/2022    GLOB 2.6 03/27/2017       Lab Results   Component Value Date    CHOL 242 (H) 06/23/2021    CHOL 157 (L) 05/25/2019    CHOL 205 (H) 03/01/2019     Lab Results   Component Value Date    TRIG 173 (H) 06/23/2021    TRIG 103 05/25/2019    TRIG 202 (H) 03/01/2019     Lab Results   Component Value Date    HDL 28 (L) 06/23/2021    HDL 20 (L) 05/25/2019    HDL 28 (L) 03/01/2019     Lab Results   Component Value Date    LDLCALC 179 06/23/2021    LDLCALC 116 05/25/2019    LDLCALC 137 03/01/2019     Lab Results   Component Value Date    WBC 6.4 03/24/2022    HGB 13.9 (L) 03/24/2022    HCT 44.9 03/24/2022    MCV 89.8 03/24/2022     03/24/2022       BP Readings from Last 3 Encounters:   07/12/22 126/70   05/04/22 112/60   05/04/22 130/70    Pulse Readings from Last 3 Encounters:   07/12/22 88   05/04/22 66   05/04/22 70        Wt Readings from Last 3 Encounters:   07/12/22 292 lb (132.5 kg)   05/04/22 288 lb (130.6 kg)   03/31/22 281 lb 6.4 oz (127.6 kg)     Assessment/Plan:   Diagnosis Orders   1. Chronic systolic heart failure ([facility]     2. Coronary artery disease involving native coronary artery of native heart without angina pectoris     3. Hx of CABG     4. Mixed hyperlipidemia     5. Chronic anticoagulation     6. Atrial flutter, unspecified type ([address]     7.  AICD (automatic cardioverter/defibrillator) present       [id number] Score: 8  Disclaimer: Risk Score calculation is dependent on accuracy of patient problem list and past encounter diagnosis. AICD interrogation:  ICD interrogation showed adequate battery voltage and charge time. Mode:  AAIR-DDDR. Lead and defib impedances stable. Pacing: AP-VS 55.8%. Reprogramming for sensitivity and threshold testing. Normal diagnostics and safety margins noted. A output 0.625 V at 0.40 ms; P wave 2.5 mV. RV output 1.00 V at 0.40 ms; R wave 2.3 mV. No ICD discharges. Sustained arrhythmia:  none  Optivol trending up. Next Carelink: as scheduled. Chronic systolic heart failure-  NYHA class III stage C  GDMT includes Entresto, Toprol Xl and Lasix. Weight weight reported up about 18 pounds. Continue low sodium diet and daily weight log. Lasix 120 mg IVP at infusion center today. Check BMP and proBNP.     CAD - stable on current medical management. Continue same.     Atrial flutter - burden 0% on AICD check. Continues on Xarelto with no bleeding issues. Continues on Toprol XL.       HTN - normotensive on current regimen.  BP goal less than 130/80. Continue samE/      Hyperlipidemia - . Hx of statin intolerance.     Patient is compliant with medication regimen. Previous cardiac history and records reviewed. Continue current medical management for cardiac related condition. Continue other current medications as directed. Continue to follow up with primary care provider for non cardiac medical problems. If your primary care provider is outside of the [facility], please request that your labs be faxed to this office at [phone]. BP goal 130/80 or less. Call the office with any problems, questions or concerns at [phone]. Cardiology follow up as scheduled in [address] appointments. Educational included in patient instructions. Heart health.       TEGAN Marti

## 2022-07-12 NOTE — PROGRESS NOTES
Lasix 120mg was pushed over 5-6 minutes.  Patient tolerated the push well, went to the restroom and voided with in 3 minutes of finishing the push then discharged home with a urinal.

## 2022-07-12 NOTE — PATIENT INSTRUCTIONS
New instructions for today:  Keflex 500 mg (1) tab twice daily for 10 days. Weigh yourself daily without clothing at the same time each day. Record a daily weight log. Call the office if you gain 3 pounds or more in 2 to 3 days or 5 pounds in one week. A sudden weight gain may mean that your heart failure is getting worse. Sodium causes your body to hold on to extra water. This may cause your heart failure symptoms to get worse. Limit sodium to 2,000 milligrams (mg) a day or less. That is less than 1 teaspoon of salt a day, including all the salt you eat in cooking or in packaged foods. Fluid restriction of 1500ml per day (about 6 cups of fluid per day). Patient Instructions:  Continue current medications as prescribed. Always keep a current medication list. Bring your medications to every office visit. Continue to follow up with primary care provider for non cardiac medical problems. Call the office with any problems, questions or concerns at [phone]. If you have been asked to keep a blood pressure log, do so for 2 weeks. Call the office to report readings to the triage nurse at [phone]. Follow up with cardiologist as scheduled. The following educational material has been included in this after visit summary for your review: Life simple 7. Heart health. Life simple 7  1) Manage blood pressure - high blood pressure is a major risk factor for heart disease and stroke. Keeping blood pressure in health range reduces strain on your heart, arteries and kidneys. Blood pressure goal is less than 130/80. 2) Control cholesterol - contributes to plaque, which can clog arteries and lead to heart disease and stroke. When you control your cholesterol you are giving your arteries their best chance to remain clear. It is recommended that you get cholesterol lab work done once a year.   3) Reduce blood sugar - most of the food we eat is turning into glucose or blood sugar that our body uses for energy. Over time, high levels of blood sugar can damage your heart, kidneys, eyes and nerves. 4) Get active - living an active life is one of the most rewarding gifts you can give yourself and those you love. Simply put, daily physical activity increases your length and quality of life. Strive to exercise 15 minutes most days of the week. 5)  Eat better - A healthy diet is one of your best weapons for fighting cardiovascular disease. When you eat a heart healthy diet, you improve your chances for feeling good and staying healthy for life. 6)  Lose weight - when you shed extra fat an unnecessary pounds, you reduce the burden on your hear, lungs, blood vessels and skeleton. You give yourself the gift of active living, you lower your blood pressure and help yourself feel better. 7) Stop smoking - cigarette smokers have a higher risk of developing cardiovascular disease. If  You smoke, quitting is the best thing you can do for your health. Check American Heart Association on line for more information on Life's Simple 7 and tips for healthy living. A Healthy Heart: Care Instructions  Your Care Instructions     Coronary artery disease, also called heart disease, occurs when a substance called plaque builds up in the vessels that supply oxygen-rich blood to your heart muscle. This can narrow the blood vessels and reduce blood flow. A heart attack happens when blood flow is completely blocked. A high-fat diet, smoking, and other factors increase the risk of heart disease. Your doctor has found that you have a chance of having heart disease. You can do lots of things to keep your heart healthy. It may not be easy, but you can change your diet, exercise more, and quit smoking. These steps really work to lower your chance of heart disease. Follow-up care is a key part of your treatment and safety. Be sure to make and go to all appointments, and call your doctor if you are having problems.  It's a day for men and 1 drink a day for women. Too much alcohol can cause health problems. · Manage other health problems such as diabetes, high blood pressure, and high cholesterol. If you think you may have a problem with alcohol or drug use, talk to your doctor. Medicines  · Take your medicines exactly as prescribed. Call your doctor if you think you are having a problem with your medicine. · If your doctor recommends aspirin, take the amount directed each day. Make sure you take aspirin and not another kind of pain reliever, such as acetaminophen (Tylenol). When should you call for help? [id number] if you have symptoms of a heart attack. These may include:  · Chest pain or pressure, or a strange feeling in the chest.  · Sweating. · Shortness of breath. · Pain, pressure, or a strange feeling in the back, neck, jaw, or upper belly or in one or both shoulders or arms. · Lightheadedness or sudden weakness. · A fast or irregular heartbeat. After you call 911, the  may tell you to chew 1 adult-strength or 2 to 4 low-dose aspirin. Wait for an ambulance. Do not try to drive yourself. Watch closely for changes in your health, and be sure to contact your doctor if you have any problems. Where can you learn more? Go to https://[employer].mphoria. org and sign in to your [employer] account. Enter V757 in the [facility] box to learn more about \"A Healthy Heart: Care Instructions. \"     If you do not have an account, please click on the \"Sign Up Now\" link. Current as of: December 16, 2019               Content Version: 12.5  © 9478-2719 [employer], [employer]. Care instructions adapted under license by [facility] ([facility] If you have questions about a medical condition or this instruction, always ask your healthcare professional. [address] any warranty or liability for your use of this information.

## 2022-07-19 ENCOUNTER — TELEPHONE (OUTPATIENT)
Dept: CARDIOLOGY CLINIC | Age: 77
End: 2022-07-19

## 2022-07-20 ENCOUNTER — OFFICE VISIT (OUTPATIENT)
Dept: CARDIOLOGY CLINIC | Age: 77
End: 2022-07-20
Payer: MEDICARE

## 2022-07-20 VITALS
SYSTOLIC BLOOD PRESSURE: 98 MMHG | HEIGHT: 76 IN | DIASTOLIC BLOOD PRESSURE: 60 MMHG | WEIGHT: 286 LBS | BODY MASS INDEX: 34.83 KG/M2 | HEART RATE: 73 BPM

## 2022-07-20 DIAGNOSIS — I25.5 ISCHEMIC CARDIOMYOPATHY: Primary | ICD-10-CM

## 2022-07-20 PROCEDURE — 1123F ACP DISCUSS/DSCN MKR DOCD: CPT | Performed by: INTERNAL MEDICINE

## 2022-07-20 PROCEDURE — G8417 CALC BMI ABV UP PARAM F/U: HCPCS | Performed by: INTERNAL MEDICINE

## 2022-07-20 PROCEDURE — 93283 PRGRMG EVAL IMPLANTABLE DFB: CPT | Performed by: INTERNAL MEDICINE

## 2022-07-20 PROCEDURE — G8427 DOCREV CUR MEDS BY ELIG CLIN: HCPCS | Performed by: INTERNAL MEDICINE

## 2022-07-20 PROCEDURE — 1036F TOBACCO NON-USER: CPT | Performed by: INTERNAL MEDICINE

## 2022-07-20 PROCEDURE — 99214 OFFICE O/P EST MOD 30 MIN: CPT | Performed by: INTERNAL MEDICINE

## 2022-07-20 ASSESSMENT — ENCOUNTER SYMPTOMS
BACK PAIN: 0
BLOOD IN STOOL: 0
SHORTNESS OF BREATH: 0
ABDOMINAL DISTENTION: 0
ABDOMINAL PAIN: 0
VOMITING: 0
COUGH: 0
WHEEZING: 0
DIARRHEA: 0

## 2022-07-20 NOTE — PROGRESS NOTES
[id number] [facility]  Cardiology Office Note  [address] [id number]  Phone: [phone]  Fax: [phone]                            Date:  7/20/2022  Patient: Marry Santana  Age:  65 y.o., 1945    Referral: No ref.  provider found      PROBLEM LIST:    Patient Active Problem List    Diagnosis Date Noted    ACS (acute coronary syndrome) ([facility] [address]      Priority: High    Seizure ([address] 10/17/2021     Priority: Low    Breakthrough seizure ([address] 10/16/2021     Priority: Low    Seizure due to hypoglycemia ([facility] [address] 10/16/2021     Priority: Low    Vestibular neuropathy 06/22/2021     Priority: Low    History of chronic kidney disease 07/13/2020     Priority: Low    Edema 07/13/2020     Priority: Low    AICD (automatic cardioverter/defibrillator) present 11/25/2019     Priority: Low    Complicated UTI (urinary tract infection) 11/16/2019     Priority: Low    Chronic systolic heart failure ([address] 08/09/2019     Priority: Low    Urinary tract infection due to extended-spectrum beta lactamase (ESBL) producing Escherichia coli 06/14/2019     Priority: Low    Ischemic cardiomyopathy 06/13/2019     Priority: Low    Palliative care patient 06/11/2019     Priority: Low    Chronic constipation 06/10/2019     Priority: Low    Hx of CABG 06/01/2019     Priority: Low    Acute MI inferior lateral first episode care ([facility] [address] 05/31/2019     Priority: Low    SOB (shortness of breath) 04/15/2019     Priority: Low    Chest pain 02/28/2019     Priority: Low     Overview Note:     1/2011 stents x 3 [facility])  5/4/2011  Cath  Patent stents in the mid LAD, stent to OM1, PTCA to the D2, normal LVFX  12/12/12  Cath patent stents in the mid LAD, PTCA to the D3, stent to distal LAD, normal LVFX  3/29/2015  Cath  Stent to the PLOM, normal LVFX  11/1/2019  Loop placed  3/1/19 lexiscan negative for myocardial ischemia, EF 50  %   5/24/19 ACS PARKER RISK Score 5 (angina, + troponin, CAD>50, age>65, plavix ), AUC indication 3, AUC score 9   5/25/19  Cath 50% distal LM, 100% mid LAD, 80% diag, 100% mid RCA, inferior hypokinesis, EF 45%              MOO on CPAP 05/03/2018     Priority: Low    Dermatitis 03/05/2018     Priority: Low    Depression with anxiety 03/05/2018     Priority: Low    Benign prostatic hyperplasia without lower urinary tract symptoms 03/05/2018     Priority: Low    Chronic tension-type headache, not intractable 03/05/2018     Priority: Low    West Nile virus seropositivity 11/22/2017     Priority: Low    Status post placement of implantable loop recorder 10/31/2017     Priority: Low    Recurrent major depressive disorder, in full remission ([facility] [address] 10/17/2017     Priority: Low    Trigger middle finger of left hand 10/17/2017     Priority: Low    Peripheral polyneuropathy 10/17/2017     Priority: Low    Palpitations 10/17/2017     Priority: Low    Chronic insomnia 10/17/2017     Priority: Low    Cerebrovascular accident (CVA) due to embolism of right middle cerebral artery ([facility] [address] 08/14/2017     Priority: Low    Vertigo 03/27/2017     Priority: Low    Imbalance 03/27/2017     Priority: Low    Thoracic outlet syndrome 02/21/2017     Priority: Low     Overview Note:     L sided on exam 2/21/17      Acute ischemic stroke (HCC) 02/20/2017     Priority: Low    DDD (degenerative disc disease), lumbar 11/15/2016     Priority: Low    Spondylosis of lumbar region without myelopathy or radiculopathy 11/15/2016     Priority: Low    Chronic bilateral low back pain without sciatica 11/10/2016     Priority: Low    Mixed hyperlipidemia 07/23/2012     Priority: Low    MI (myocardial infarction) [facility])      Priority: Low    Type 2 diabetes mellitus with diabetic polyneuropathy, with long-term current use of insulin ([facility] [address] 07/12/2011     Priority: Low    Essential hypertension 07/12/2011     Priority: Low    Coronary artery disease involving native coronary artery of native heart without angina pectoris 07/12/2011     Priority: Low 1. Coronary artery disease, status post multiple PCI's to LAD January 2011, RCA December 2012, RPL March 2015 presenting 5/24/2019 with inferior wall MI with late presentation involving a large dominant RCA with distal occlusion, not intervened on with severe mid to distal LAD restenosis and moderate left main disease, status post CABG 5/30/2019 with LIMA to 1st diagonal, SVG to LAD and SVG to ramus intermedius, ejection fraction 25% with severe inferior hypokinesis. Thallium viability study with large inferolateral, predominantly lateral infarct with no significant viability status post ICD placement 11/1/2019. 2. CK D stage III. 3. Diabetes mellitus. 4.  Paroxysmal atrial fibrillation, initiated on Xarelto. 5. Chronic severe constipation  6. Statin intolerance. 7.  Chronic low back pain with bilateral lower extremity weakness and multiple falls. PRESENTATION: Alessandro James is a 65y.o. year old male presents for follow-up evaluation. He has fallen multiple times since his last visit predominantly due to weakness of distal lower extremities. Does not have strength and goes down on his knees. Multiple abrasions noted. Does get occasional lightheadedness in the morning when he is gets up but does not say this is the cause of his falls. Reluctant to use a walker but uses a tripod cane. Was unable to afford Eliquis and did not take it. He has not had any recurrent atrial fibrillation on device interrogation since initiating beta-blocker. No significant leg swelling. REVIEW OF SYSTEMS:  Review of Systems   Constitutional:  Negative for activity change, diaphoresis and fatigue. HENT:  Negative for hearing loss, nosebleeds and tinnitus. Eyes:  Negative for visual disturbance. Respiratory:  Negative for cough, shortness of breath and wheezing. Cardiovascular:  Negative for chest pain, palpitations and leg swelling.    Gastrointestinal:  Negative for abdominal distention, abdominal pain, blood in stool, diarrhea and vomiting. Endocrine: Negative for cold intolerance, heat intolerance, polydipsia, polyphagia and polyuria. Genitourinary:  Negative for difficulty urinating, flank pain and hematuria. Musculoskeletal:  Positive for gait problem. Negative for arthralgias, back pain, joint swelling and myalgias. Skin:  Negative for pallor and rash. Neurological:  Negative for dizziness, seizures, syncope and headaches. Psychiatric/Behavioral:  Negative for behavioral problems and dysphoric mood. The patient is not nervous/anxious. Past Medical History:      Diagnosis Date    Acute ischemic stroke ([address] 2/20/2017    Atherosclerotic heart disease     s/p MI, stenting x 3 jan 2011([place])    Breakthrough seizure ([address] 10/16/2021    CAD (coronary artery disease)     Cerebral artery occlusion with cerebral infarction [facility])     Cerebrovascular accident (CVA) due to embolism of right middle cerebral artery ([address] 8/14/2017    CHF (congestive heart failure) ([facility])     Chronic bilateral low back pain without sciatica 11/10/2016    Chronic kidney disease     DDD (degenerative disc disease), lumbar 11/15/2016    Depression     Depression with anxiety     Diabetes mellitus ([address]     type II    Diabetes mellitus, type 2 ([address]     DM (diabetes mellitus) ([address] 7/12/2011    ESBL (extended spectrum beta-lactamase) producing bacteria infection 11/16/2019    urine    Glaucoma     Headache     Hyperlipidemia     Cholesterol management per pcp.      Hypertension     Macular degeneration     MI (myocardial infarction) ([address]     MI (myocardial infarction) ([address]     Neuromuscular disorder ([address]     Neuropathy     MOO on CPAP     Osteoarthritis     Palliative care patient 06/11/2019    Sleep apnea     Spondylosis of lumbar region without myelopathy or radiculopathy 11/15/2016    Status post placement of implantable loop recorder 10/31/2017    TIA (transient ischemic attack)        Past Surgical History: Procedure Laterality Date    APPENDECTOMY      at age 9    BACK SURGERY      2016 (fusion), 2017    BREAST SURGERY  05/2019    CARDIAC CATHETERIZATION  12/12/12    with angioplasty, stent    CARDIAC CATHETERIZATION  3/29/15  MDL    stent to distal RCA.  EF 60%    CHOLECYSTECTOMY, LAPAROSCOPIC      2012    COLONOSCOPY      2010    CORONARY ANGIOPLASTY WITH STENT PLACEMENT  jan 11 [place]    stent x 3    CORONARY ANGIOPLASTY WITH STENT PLACEMENT      01/01/11    CORONARY ARTERY BYPASS GRAFT N/A 5/30/2019    CORONARY ARTERY BYPASS GRAFT X3 WITH LEFT INTERNAL MAMMARY ARTERY WITH ENDOSCOPIC VEIN HARVESTING WITH PERFUSION TRANSESOPHAGEAL ECHOCARDIOGRAM performed by Rony Olivas MD at [facility] CATH LAB PROCEDURE  [id number]    primary stent placement to the first circumflex marginal branch, balloon angioplasty to the third left anterior descending diagnonal branch, intracoronary nitroglycerin adminstration    JOINT REPLACEMENT      left knee, ~2016    LUMBAR FUSION Left 11/15/2016    LUMBAR INTERBODY FUSION LATERAL L3-4 L4-5 WITH LATERAL PLATE  performed by Erika Garcia MD at [address]  11/02/2019       Medications:  Current Outpatient Medications   Medication Sig Dispense Refill    rivaroxaban (XARELTO) 15 MG TABS tablet Take 1 tablet by mouth daily (with breakfast) 90 tablet 3    cephALEXin (KEFLEX) 500 MG capsule Take 1 capsule by mouth 2 times daily for 10 days 20 capsule 0    metoprolol succinate (TOPROL XL) 25 MG extended release tablet Take 1 tablet by mouth 2 times daily 180 tablet 3    clopidogrel (PLAVIX) 75 MG tablet TAKE 1 TABLET DAILY 90 tablet 3    ALPRAZolam (XANAX) 0.5 MG tablet Take 0.5 mg by mouth nightly as needed for Sleep.      famotidine (PEPCID) 40 MG tablet Take 40 mg by mouth daily      isosorbide mononitrate (IMDUR) 30 MG extended release tablet Take 30 mg by mouth daily      azelastine (ASTELIN) 0.1 % nasal spray 1 spray by Nasal route 2 times daily Use Insecurity: Not on file   Transportation Needs: Not on file   Physical Activity: Not on file   Stress: Not on file   Social Connections: Not on file   Intimate Partner Violence: Not on file   Housing Stability: Not on file       Family History:       Problem Relation Age of Onset    Coronary Art Dis Mother     Cancer Sister         uterine    Kidney Disease Sister     Coronary Art Dis Brother          in his 46s - age 47    Cancer Father         colon    Coronary Art Dis Sister     Cancer Sister         olive cancer    No Known Problems Sister     No Known Problems Sister     No Known Problems Son     Alcohol Abuse Son         drank self to death    No Known Problems Son          Physical Examination:  BP 98/60   Pulse 73   Ht 6' 4\" (1.93 m)   Wt 286 lb (129.7 kg)   BMI 34.81 kg/m²   Physical Exam  Constitutional:       Comments: Moderate truncal obesity  Blood pressure right arm sitting 90/60 mmHg, pulse 68 bpm regular   HENT:      Mouth/Throat:      Pharynx: No oropharyngeal exudate. Eyes:      General: No scleral icterus. Right eye: No discharge. Left eye: No discharge. Neck:      Thyroid: No thyromegaly. Vascular: No JVD. Cardiovascular:      Rate and Rhythm: Normal rate and regular rhythm. Heart sounds: No murmur heard. No friction rub. No gallop. Comments: No JVD  No edema  Multiple abrasions on legs and knees  No systolic or diastolic murmurs noted    Pulmonary:      Effort: No respiratory distress. Breath sounds: No stridor. No wheezing or rales. Abdominal:      General: Bowel sounds are normal. There is no distension. Palpations: Abdomen is soft. There is no mass. Tenderness: There is no abdominal tenderness. There is no guarding or rebound. Comments: Soft, nontender  No palpable organomegaly   Musculoskeletal:         General: No deformity. Skin:     General: Skin is warm. Coloration: Skin is not pale.       Findings: No erythema or rash.   Neurological:      Mental Status: He is alert and oriented to person, place, and time. Motor: No abnormal muscle tone. Coordination: Coordination normal.      Deep Tendon Reflexes: Reflexes normal.         Labs:   CBC: No results for input(s): WBC, HGB, HCT, PLT in the last 72 hours. BMP:No results for input(s): NA, K, CO2, BUN, CREATININE, LABGLOM, GLUCOSE in the last 72 hours. BNP: No results for input(s): BNP in the last 72 hours. PT/INR: No results for input(s): PROTIME, INR in the last 72 hours. APTT:No results for input(s): APTT in the last 72 hours. CARDIAC ENZYMES:No results for input(s): CKTOTAL, CKMB, CKMBINDEX, TROPONINI in the last 72 hours. FASTING LIPID PANEL:  Lab Results   Component Value Date/Time    HDL 28 06/23/2021 05:59 AM    LDLDIRECT 167 11/01/2018 02:16 PM    LDLCALC 179 06/23/2021 05:59 AM    TRIG 173 06/23/2021 05:59 AM     LIVER PROFILE:No results for input(s): AST, ALT, LABALBU in the last 72 hours. Imaging:     Conclusions      Summary   Mitral valve leaflets are mildly thickened with preserved leaflet   mobility. Mild mitral regurgitation is present. Mildly thickened aortic valve leaflets with preserved leaflet mobility. Tricuspid valve is structurally normal.   Left ventricular ejection fraction is visually estimated at 30%. Normal left ventricular wall thickness. More pronounced hypokinesis apex, inferior wall, and septum   Left ventricular size is moderately increased .       Signature      ----------------------------------------------------------------   Electronically signed by Melissa Scott MD(Interpreting   physician) on 11/03/2021 08:56 AM   ----------------------------------------------------------------         ASSESSMENT and PLAN:    This is a 65y.o. year old male with past medical history of diabetes mellitus, CK D stage III, coronary artery disease with multiple PCI's to LAD, RCA and OM, recent inferior wall MI 5/25/2019 with an occluded large dominant RCA not intervened on, intermediate left main and diffuse severe mid to distal LAD restenosis, status post CABG 5/30/2019 with LIMA to 1st diagonal, SVG to LAD, SVG to ramus intermedius, EF 25%, postoperative atrial fibrillation, viability study with no significant viable myocardium in inferior and lateral wall with follow-up LV function by echo at 30-35%, status post ICD placement 11/1/2019, here for follow-up evaluation. 1.  Repeat echo with EF at 30% and appears stable. NYHA class II-III symptomatology with no evidence of volume retention. Continue current medications unchanged. Unable to optimize medications further given blood pressure. Falls appear related to weakness in his lower extremities. Reluctant to use a walker. 2.  Device interrogation shows no recurrent atrial fibrillation since being on beta-blocker. He does benefit from anticoagulation. Unable to afford Eliquis. We will trial Xarelto 15 mg again and assess cost.  If he is unable to afford this, have advised him to come in for Coumadin clinic to initiate Coumadin. 3.  Follow-up with nurse practitioner in 4 months and with me in 8 months. Orders:  No orders of the defined types were placed in this encounter. Orders Placed This Encounter   Medications    rivaroxaban (XARELTO) 15 MG TABS tablet     Sig: Take 1 tablet by mouth daily (with breakfast)     Dispense:  90 tablet     Refill:  3     8 sample bottles provided             Return for NP 4 mths; me 8 mhs. Electronically signed by aJck Thomas MD on 7/20/2022 at 2:20 PM    [id number] [facility]      Thisdictation was generated by voice recognition computer software. Although all attempts are made to edit the dictation for accuracy, there may be errors in the transcription that are not intended.

## 2022-07-20 NOTE — PROGRESS NOTES
ICD interrogated  Presenting rhythm: AP/VS, AP 66.8%,  <0.1%  Battery status: 7.8 years  Lead status: lead impedance within range and stable  Sensing: P waves 2.3 mV,  R waves 16.5 mV  Thresholds:  Atrial 0.750 V @ 0.4ms, ventricular 0.750 V @ 0.4ms  Observations:  None  Reprogramming for sensitivity and threshold testing  Next [facility] home check scheduled for 10/19/22

## 2022-07-21 ENCOUNTER — TELEPHONE (OUTPATIENT)
Dept: CARDIOLOGY CLINIC | Age: 77
End: 2022-07-21

## 2022-07-21 NOTE — TELEPHONE ENCOUNTER
Called patient to confirm that he was going to be able to get his Eliquis filled through his insurance. He confirmed he can afford the medication and the pharmacy has ordered it for him. He will  the Rx in the next couple of days.

## 2022-07-21 NOTE — TELEPHONE ENCOUNTER
Tried to reach patient, no answer, left v/m to return call. Called to ask patient if he had checked with his pharmacy on the price of Eliquis Rx.

## 2022-08-11 ENCOUNTER — TELEPHONE (OUTPATIENT)
Dept: CARDIOLOGY CLINIC | Age: 77
End: 2022-08-11

## 2022-08-11 NOTE — TELEPHONE ENCOUNTER
Pt showed up in office wanting an order for IV lasix due to having SOA and edema in his feet. Spoke with Dr. Timbo Langford and he states pt needs to go to ER. Explained to pt that he needs to go to ER. Pt states he will make an apt for Monday and if he gets worse he will go to ER.

## 2022-08-15 ENCOUNTER — OFFICE VISIT (OUTPATIENT)
Dept: CARDIOLOGY CLINIC | Age: 77
End: 2022-08-15
Payer: MEDICARE

## 2022-08-15 ENCOUNTER — HOSPITAL ENCOUNTER (OUTPATIENT)
Dept: INFUSION THERAPY | Age: 77
Setting detail: INFUSION SERIES
Discharge: HOME | End: 2022-08-15
Payer: MEDICARE

## 2022-08-15 VITALS
SYSTOLIC BLOOD PRESSURE: 138 MMHG | TEMPERATURE: 97.7 F | RESPIRATION RATE: 20 BRPM | OXYGEN SATURATION: 95 % | HEART RATE: 70 BPM | DIASTOLIC BLOOD PRESSURE: 80 MMHG

## 2022-08-15 VITALS
WEIGHT: 290 LBS | BODY MASS INDEX: 35.31 KG/M2 | OXYGEN SATURATION: 96 % | DIASTOLIC BLOOD PRESSURE: 68 MMHG | SYSTOLIC BLOOD PRESSURE: 122 MMHG | HEART RATE: 67 BPM | HEIGHT: 76 IN

## 2022-08-15 DIAGNOSIS — I25.5 ISCHEMIC CARDIOMYOPATHY: ICD-10-CM

## 2022-08-15 DIAGNOSIS — Z95.1 HX OF CABG: ICD-10-CM

## 2022-08-15 DIAGNOSIS — I50.23 ACUTE ON CHRONIC SYSTOLIC CONGESTIVE HEART FAILURE (HCC): ICD-10-CM

## 2022-08-15 DIAGNOSIS — E78.2 MIXED HYPERLIPIDEMIA: ICD-10-CM

## 2022-08-15 DIAGNOSIS — I10 ESSENTIAL HYPERTENSION: Chronic | ICD-10-CM

## 2022-08-15 DIAGNOSIS — Z95.810 AICD (AUTOMATIC CARDIOVERTER/DEFIBRILLATOR) PRESENT: ICD-10-CM

## 2022-08-15 DIAGNOSIS — I50.23 ACUTE ON CHRONIC SYSTOLIC HEART FAILURE (HCC): Primary | ICD-10-CM

## 2022-08-15 DIAGNOSIS — I50.22 CHRONIC SYSTOLIC HEART FAILURE (HCC): Primary | ICD-10-CM

## 2022-08-15 DIAGNOSIS — I50.23 ACUTE ON CHRONIC SYSTOLIC CONGESTIVE HEART FAILURE (HCC): Primary | ICD-10-CM

## 2022-08-15 LAB
ANION GAP SERPL CALCULATED.3IONS-SCNC: 8 MMOL/L (ref 7–19)
BUN BLDV-MCNC: 23 MG/DL (ref 8–23)
CALCIUM SERPL-MCNC: 8.5 MG/DL (ref 8.8–10.2)
CHLORIDE BLD-SCNC: 106 MMOL/L (ref 98–111)
CO2: 25 MMOL/L (ref 22–29)
CREAT SERPL-MCNC: 1.4 MG/DL (ref 0.5–1.2)
GFR AFRICAN AMERICAN: >59
GFR NON-AFRICAN AMERICAN: 49
GLUCOSE BLD-MCNC: 131 MG/DL (ref 74–109)
POTASSIUM SERPL-SCNC: 4.8 MMOL/L (ref 3.5–5)
PRO-BNP: 1024 PG/ML (ref 0–1800)
SODIUM BLD-SCNC: 139 MMOL/L (ref 136–145)

## 2022-08-15 PROCEDURE — G8427 DOCREV CUR MEDS BY ELIG CLIN: HCPCS | Performed by: NURSE PRACTITIONER

## 2022-08-15 PROCEDURE — 93290 INTERROG DEV EVAL ICPMS IP: CPT | Performed by: NURSE PRACTITIONER

## 2022-08-15 PROCEDURE — 6360000002 HC RX W HCPCS: Performed by: NURSE PRACTITIONER

## 2022-08-15 PROCEDURE — 1036F TOBACCO NON-USER: CPT | Performed by: NURSE PRACTITIONER

## 2022-08-15 PROCEDURE — 93283 PRGRMG EVAL IMPLANTABLE DFB: CPT | Performed by: NURSE PRACTITIONER

## 2022-08-15 PROCEDURE — 99214 OFFICE O/P EST MOD 30 MIN: CPT | Performed by: NURSE PRACTITIONER

## 2022-08-15 PROCEDURE — 1123F ACP DISCUSS/DSCN MKR DOCD: CPT | Performed by: NURSE PRACTITIONER

## 2022-08-15 PROCEDURE — 80048 BASIC METABOLIC PNL TOTAL CA: CPT

## 2022-08-15 PROCEDURE — 96374 THER/PROPH/DIAG INJ IV PUSH: CPT

## 2022-08-15 PROCEDURE — G8417 CALC BMI ABV UP PARAM F/U: HCPCS | Performed by: NURSE PRACTITIONER

## 2022-08-15 PROCEDURE — 83880 ASSAY OF NATRIURETIC PEPTIDE: CPT

## 2022-08-15 RX ORDER — SODIUM CHLORIDE 0.9 % (FLUSH) 0.9 %
5-40 SYRINGE (ML) INJECTION PRN
Status: CANCELLED | OUTPATIENT
Start: 2022-08-15

## 2022-08-15 RX ORDER — FUROSEMIDE 10 MG/ML
120 INJECTION INTRAMUSCULAR; INTRAVENOUS ONCE
Status: CANCELLED
Start: 2022-08-15 | End: 2022-08-15

## 2022-08-15 RX ORDER — FUROSEMIDE 10 MG/ML
120 INJECTION INTRAMUSCULAR; INTRAVENOUS ONCE
Status: COMPLETED | OUTPATIENT
Start: 2022-08-15 | End: 2022-08-15

## 2022-08-15 RX ORDER — FUROSEMIDE 10 MG/ML
120 INJECTION INTRAMUSCULAR; INTRAVENOUS ONCE
Status: DISCONTINUED | OUTPATIENT
Start: 2022-08-15 | End: 2022-08-15

## 2022-08-15 RX ORDER — SODIUM CHLORIDE 0.9 % (FLUSH) 0.9 %
5-40 SYRINGE (ML) INJECTION PRN
Status: DISCONTINUED | OUTPATIENT
Start: 2022-08-15 | End: 2022-08-16 | Stop reason: HOSPADM

## 2022-08-15 RX ADMIN — FUROSEMIDE 120 MG: 10 INJECTION, SOLUTION INTRAMUSCULAR; INTRAVENOUS at 15:36

## 2022-08-15 NOTE — PROGRESS NOTES
[facility], [place]. [address], [place] [address] [facility]  [phone] office  [phone] fax      OFFICE VISIT:  8/15/2022    Joyce Quiñones - :   Reason For Visit:  Ed Barajas is a 65 y.o. male who is here for Edema    History:   Diagnosis Orders   1. Acute on chronic systolic heart failure (HCC)  proBNP, N-TERMINAL      2. Essential hypertension        3. Hx of CABG        4. Ischemic cardiomyopathy        5. AICD (automatic cardioverter/defibrillator) present        6. Mixed hyperlipidemia          The patient presents today reporting SOA and a probable 10 pounds weight gain at home. He reports \"the last time I saw Dr. Paul Shields in the office he told me to stop my the infusion center, but they would not do anything without an order. The patient has a hx of coronary artery disease, status post multiple PCI's to LAD 2011, RCA 2012, RPL 2015 presenting 2019 with inferior wall MI with late presentation involving a large dominant RCA with distal occlusion, not intervened on with severe mid to distal LAD restenosis and moderate left main disease, status post CABG 2019 with LIMA to 1st diagonal, SVG to LAD and SVG to ramus intermedius, ejection fraction 25% with severe inferior hypokinesis. Thallium viability study with large inferolateral, predominantly lateral infarct with no significant viability status post ICD placement 2019. The patient denies symptoms to suggest myocardial ischemia or arrhythmia. He reports weighing most days of the week and trying to maintain a low sodium diet. BP is well controlled on current regimen. The patient's PCP monitors cholesterol. De Los Santos Proper denies exertional chest pain, orthopnea, paroxysmal and fatigue. The patient denies numbness or weakness to suggest cerebrovascular accident or transient ischemic attack.  + VÁSQUEZ.  + abdominal distention.   + 10 pound weight gain.  + mild bilateral ankle edema. Michelle Hahn has the following history as recorded in [facility]:  Patient Active Problem List   Diagnosis Code    Type 2 diabetes mellitus with diabetic polyneuropathy, with long-term current use of insulin ([facility] E11.42, Z79.4    Essential hypertension I10    Coronary artery disease involving native coronary artery of native heart without angina pectoris I25.10    MI (myocardial infarction) ([facility] I21.9    Mixed hyperlipidemia E78.2    Chronic bilateral low back pain without sciatica M54.50, G89.29    DDD (degenerative disc disease), lumbar M51.36    Spondylosis of lumbar region without myelopathy or radiculopathy M47.816    Acute ischemic stroke (HCC) I63.9    Thoracic outlet syndrome G54.0    Vertigo R42    Imbalance R26.89    Cerebrovascular accident (CVA) due to embolism of right middle cerebral artery ([facility]) I63.411    Recurrent major depressive disorder, in full remission ([facility] F33.42    Trigger middle finger of left hand M65.332    Peripheral polyneuropathy G62.9    Palpitations R00.2    Chronic insomnia F51.04    Status post placement of implantable loop recorder Z95.818    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 8    Benign prostatic hyperplasia without lower urinary tract symptoms N40.0    Chronic tension-type headache, not intractable G44.229    MOO on CPAP G47.33, Z99.89    Chest pain R07.9    SOB (shortness of breath) R06.02    ACS (acute coronary syndrome) (HCC) I24.9    Acute MI inferior lateral first episode care ([facility] I21.19    Hx of CABG Z95.1    Chronic constipation K59.09    Palliative care patient Z51.5    Ischemic cardiomyopathy I25.5    Urinary tract infection due to extended-spectrum beta lactamase (ESBL) producing Escherichia coli N39.0, B96.29, Z16.12    Chronic systolic heart failure (HCC) U97.82    Complicated UTI (urinary tract infection) N39.0    AICD (automatic     CORONARY ANGIOPLASTY WITH STENT PLACEMENT   [place]    stent x 3    CORONARY ANGIOPLASTY WITH STENT PLACEMENT      11    CORONARY ARTERY BYPASS GRAFT N/A 2019    CORONARY ARTERY BYPASS GRAFT X3 WITH LEFT INTERNAL MAMMARY ARTERY WITH ENDOSCOPIC VEIN HARVESTING WITH PERFUSION TRANSESOPHAGEAL ECHOCARDIOGRAM performed by Masood Lamb MD at [facility] CATH LAB PROCEDURE  [id number]    primary stent placement to the first circumflex marginal branch, balloon angioplasty to the third left anterior descending diagnonal branch, intracoronary nitroglycerin adminstration    JOINT REPLACEMENT      left knee, ~2016    LUMBAR FUSION Left 11/15/2016    LUMBAR INTERBODY FUSION LATERAL L3-4 L4-5 WITH LATERAL PLATE  performed by Stalin Vincent MD at [address]  2019     Family History   Problem Relation Age of Onset    Coronary Art Dis Mother     Cancer Sister         uterine    Kidney Disease Sister     Coronary Art Dis Brother          in his 46s - age 47    Cancer Father         colon    Coronary Art Dis Sister     Cancer Sister         olive cancer    No Known Problems Sister     No Known Problems Sister     No Known Problems Son     Alcohol Abuse Son         drank self to death    No Known Problems Son      Social History     Tobacco Use    Smoking status: Never    Smokeless tobacco: Never   Substance Use Topics    Alcohol use: Yes     Alcohol/week: 1.0 standard drink     Types: 1 Shots of liquor per week     Comment: Occ. Current Outpatient Medications   Medication Sig Dispense Refill    rivaroxaban (XARELTO) 15 MG TABS tablet Take 1 tablet by mouth daily (with breakfast) 90 tablet 3    metoprolol succinate (TOPROL XL) 25 MG extended release tablet Take 1 tablet by mouth 2 times daily 180 tablet 3    clopidogrel (PLAVIX) 75 MG tablet TAKE 1 TABLET DAILY 90 tablet 3    ALPRAZolam (XANAX) 0.5 MG tablet Take 0.5 mg by mouth nightly as needed for Sleep. famotidine (PEPCID) 40 MG tablet Take 40 mg by mouth daily      isosorbide mononitrate (IMDUR) 30 MG extended release tablet Take 30 mg by mouth daily      azelastine (ASTELIN) 0.1 % nasal spray 1 spray by Nasal route 2 times daily Use in each nostril as directed      finasteride (PROSCAR) 5 MG tablet Take 5 mg by mouth daily      docusate sodium (COLACE) 100 MG capsule Take 200 mg by mouth daily      furosemide (LASIX) 40 MG tablet Take 40 mg by mouth 2 times daily       Multiple Vitamins-Minerals (PRESERVISION AREDS PO) Take 2 capsules by mouth      sertraline (ZOLOFT) 100 MG tablet Take 1 tablet by mouth daily 90 tablet 2    nitroGLYCERIN (NITROSTAT) 0.4 MG SL tablet up to max of 3 total doses. If no relief after 1 dose, call 911. 30 tablet 2    sacubitril-valsartan (ENTRESTO) 49-51 MG per tablet Take 1 tablet by mouth 2 times daily 60 tablet 5    zoster recombinant adjuvanted vaccine [facility]) 50 MCG/0.5ML SUSR injection 1 vaccine now and repeat in 2-6 months. 0.5 mL 1     No current facility-administered medications for this visit. Allergies: Statins, Crestor [rosuvastatin calcium], Adhesive tape, and Morphine    Review of Systems  Constitutional - no appetite change, or unexpected weight change. No fever, chills or diaphoresis. No significant change in activity level or new onset of fatigue. HEENT - no significant rhinorrhea or epistaxis. No tinnitus or significant hearing loss. Eyes - no sudden vision change or amaurosis. No corneal arcus, xantholasma, subconjunctival hemorrhage or discharge. Respiratory - no significant wheezing, stridor, apnea or cough.  + VÁSQUEZ. Cardiovascular - no exertional chest pain to suggest myocardial ischemia. No orthopnea or PND. No sensation of sustained arrythmia. No occurrence of slow heart rate. No palpitations. No claudication. Gastrointestinal - no abdominal pain. No blood in stool.  No severe constipation, diarrhea, nausea, or vomiting. + abdominal distention. Genitourinary - no dysuria, frequency, or urgency. No flank pain or hematuria. Musculoskeletal - no back pain or myalgia. No problems with gait. Extremities - no clubbing or cyanosis. Mild bilateral non pitting ankle edema. Skin - no color change or rash. No pallor. No new surgical incision. Neurologic - no speech difficulty, facial asymmetry or lateralizing weakness. No seizures, presyncope or syncope. No significant dizziness. Hematologic - no easy bruising or excessive bleeding. Psychiatric - no severe anxiety or insomnia. No confusion. All other review of systems are negative. Objective  Vital Signs - /68   Pulse 67   Ht 6' 4\" (1.93 m)   Wt 290 lb (131.5 kg)   SpO2 96%   BMI 35.30 kg/m²   General - Yogesh Russell is alert, cooperative, and pleasant. Well groomed. No acute distress. Body habitus - Body mass index is 35.3 kg/m². HEENT - Head is normocephalic. No circumoral cyanosis. Dentition is normal.  EYES -   Lids normal without ptosis. No discharge, edema or subconjunctival hemorrhage. Neck - Symmetrical without apparent mass or lymphadenopathy. Respiratory - Normal respiratory effort without use of accessory muscles. Ausculatation reveals vesicular breath sounds without crackles, wheezes, rub or rhonchi. Cardiovascular - No jugular venous distention. Auscultation reveals regular rate and rhythm. No audible clicks, gallop or rub. No murmur. No lower extremity varicosities. No carotid bruits. Abdominal -  No visible mass or pulsations. + non painful abdominal distention. Extremities - No clubbing or cyanosis. No statis dermatitis or ulcers. Mild non pitting bilateral ankle edema. Musculoskeletal -   No Osler's nodes. No kyphosis or scoliosis. Gait is even and regular without limp or shuffle. Ambulates without assistance. Skin -  Warm and dry; no rash or pallor. No new surgical wound. Neurological - No focal neurological deficits. Thought processes coherent. No apparent tremor. Oriented to person, place and time. Psychiatric -  Appropriate affect and mood. Data reviewed:  Prior Cardiac History -   1/2011 stents x 3 [facility])  5/4/2011  Cath  Patent stents in the mid LAD, stent to OM1, PTCA to the D2, normal LVFX  12/12/12  Cath patent stents in the mid LAD, PTCA to the D3, stent to distal LAD, normal LVFX  3/29/2015  Cath  Stent to the PLOM, normal LVFX  11/1/2019  Loop placed  3/1/19 lexiscan negative for myocardial ischemia, EF 50  %  5/24/19 ACS PARKER RISK Score 5 (angina, + troponin, CAD>50, age>65, plavix ), AUC indication 3, AUC score 9  5/25/19  Cath 50% distal LM, 100% mid LAD, 80% diag, 100% mid RCA, inferior hypokinesis, EF 45%  11/2/21 echo   Mitral valve leaflets are mildly thickened with preserved leaflet   mobility. Mild mitral regurgitation is present. Mildly thickened aortic valve leaflets with preserved leaflet mobility. Tricuspid valve is structurally normal.   Left ventricular ejection fraction is visually estimated at 30%. Normal left ventricular wall thickness. More pronounced hypokinesis apex, inferior wall, and septum   Left ventricular size is moderately increased .    Electronically signed by Queen Neville OWENS(Interpreting   physician) on 11/03/2021 08:56 AM    Lab Results   Component Value Date    WBC 6.4 03/24/2022    HGB 13.9 (L) 03/24/2022    HCT 44.9 03/24/2022    MCV 89.8 03/24/2022     03/24/2022     Lab Results   Component Value Date     07/12/2022    K 4.4 07/12/2022     07/12/2022    CO2 26 07/12/2022    BUN 25 (H) 07/12/2022    CREATININE 1.5 (H) 07/12/2022    GLUCOSE 110 (H) 07/12/2022    CALCIUM 9.0 07/12/2022    PROT 6.6 10/18/2021    LABALBU 4.1 10/18/2021    BILITOT 0.4 10/18/2021    ALKPHOS 65 10/18/2021    AST 13 10/18/2021    ALT 15 10/18/2021    LABGLOM 45 (A) 07/12/2022    GFRAA 55 (L) 07/12/2022    GLOB 2.6 03/27/2017       Lab Results   Component Value Date CHOL 242 (H) 06/23/2021    CHOL 157 (L) 05/25/2019    CHOL 205 (H) 03/01/2019     Lab Results   Component Value Date    TRIG 173 (H) 06/23/2021    TRIG 103 05/25/2019    TRIG 202 (H) 03/01/2019     Lab Results   Component Value Date    HDL 28 (L) 06/23/2021    HDL 20 (L) 05/25/2019    HDL 28 (L) 03/01/2019     Lab Results   Component Value Date    LDLCALC 179 06/23/2021    LDLCALC 116 05/25/2019    LDLCALC 137 03/01/2019     BP Readings from Last 3 Encounters:   08/15/22 122/68   07/20/22 98/60   07/12/22 111/63    Pulse Readings from Last 3 Encounters:   08/15/22 67   07/20/22 73   07/12/22 62        Wt Readings from Last 3 Encounters:   08/15/22 290 lb (131.5 kg)   07/20/22 286 lb (129.7 kg)   07/12/22 292 lb (132.5 kg)     Assessment/Plan:   Diagnosis Orders   1. Acute on chronic systolic heart failure (HCC)  proBNP, N-TERMINAL      2. Essential hypertension        3. Hx of CABG        4. Ischemic cardiomyopathy        5. AICD (automatic cardioverter/defibrillator) present        6. Mixed hyperlipidemia          AICD check:  ICD interrogation showed adequate battery voltage and charge time. Mode: AAIR-DDDR  Lead and defib impedances stable. Pacing: AP-VS 64.4%. .  Reprogramming for sensitivity and threshold testing. Normal diagnostics and safety margins noted. A output 0.875 V at 0.40 ms; P wave 2.1 mV. RV output 0.625 V at 0.40 ms; R wave 9.9 mV. No ICD discharges. Monitored arrhythmia: none  Sustained arrhythmia:  none  Optivol trending up. Next Carelink: one month. Chronic systolic heart failure-  NYHA class III stage C  GDMT includes Entresto, Toprol Xl and Lasix. Weight weight reported up about 10 pounds. Continues low sodium diet and daily weight log. Lasix 120 mg IVP at infusion center today. Check BMP and proBNP. Optivol trending up on AICD interrogation today. CAD - stable on current medical management. Continue same. Atrial flutter - burden 0% on AICD check.     Continues on Xarelto with no bleeding issues. Continues on Toprol XL. HTN - normotensive on current regimen. BP goal less than 122/68. Continue same. Hyperlipidemia - . Hx of statin intolerance. Patient is compliant with medication regimen. Previous cardiac history and records reviewed. Continue current medical management for cardiac related condition. Continue other current medications as directed. Continue to follow up with primary care provider for non cardiac medical problems. If your primary care provider is outside of the [facility], please request that your labs be faxed to this office at [phone]. BP goal 130/80 or less. Call the office with any problems, questions or concerns at [phone]. Cardiology follow up as scheduled in [address] appointments. Educational included in patient instructions. Heart health.      Jacob Elder APRN

## 2022-08-15 NOTE — DISCHARGE INSTRUCTIONS
furosemide (oral/injection)  Pronunciation: larry casas  Brand: Lasix  What is the most important information I should know about furosemide? You should not use this medicine if you are unable to urinate. Do not take more than your recommended dose. High doses of furosemide may cause irreversible hearing loss. What is furosemide? Furosemide is a loop diuretic (water pill) that prevents your body from absorbing too much salt. This allows the salt to instead be passed in yoururine. Furosemide is used to treat fluid retention (edema) in people with congestiveheart failure, liver disease, or a kidney disorder such as nephrotic syndrome. Furosemide is also used to treat high blood pressure (hypertension). Furosemide may also be used for purposes not listed in this medication guide. What should I discuss with my healthcare provider before taking furosemide? You should not use furosemide if you are allergic to it, or if you are unableto urinate. Tell your doctor if you have ever had:  kidney disease;  enlarged prostate, bladder obstruction, urination problems;  cirrhosis or other liver disease;  an electrolyte imbalance (such as low levels of potassium or magnesium in your blood);  gout;  lupus;  diabetes; or  a sulfa drug allergy. Tell your doctor if you have an MRI (magnetic resonance imaging) or any type of scan using a radioactive dye that is injected into your veins. Both contrastdyes and furosemide can harm your kidneys. It is not known whether this medicine will harm an unborn baby. Tell yourdoctor if you are pregnant or plan to become pregnant. It may not be safe to breastfeed while using this medicine. Ask your doctorabout any risk. Furosemide may slow breast milk production. How should I take furosemide? Follow all directions on your prescription label and read all medication guides or instruction sheets. Your doctor may occasionally change your dose.  Use themedicine exactly as directed. Furosemide oral is taken by mouth. Furosemide injection is injected into a muscle or given as an infusion into a vein. A healthcare provider will give you this injection if you are unable to take the medicine bymouth. You may receive your first dose in a hospital or clinic setting if you havesevere liver disease. Do not take more than your recommended dose. High doses of furosemide may cause irreversible hearing loss. Measure liquid medicine carefully. Use the dosing syringe provided, or use a medicine dose-measuringdevice (not a kitchen spoon). Furosemide doses are based on weight in children. Your child's dose needs maychange if the child gains or loses weight. Furosemide will make you urinate more often and you may get dehydrated easily. Follow your doctor's instructions about using potassium supplements or gettingenough salt and potassium in your diet. Your blood pressure will need to be checked often and you may need othermedical tests. If you have high blood pressure, keep using this medicine even if you feel well. High blood pressure often has no symptoms. You may need to use blood pressuremedicine for the rest of your life. If you need surgery, tell the surgeon ahead of time that you are usingfurosemide. Store at room temperature away from moisture, heat, and light. Throw away any unused oral liquid after 90 days. What happens if I miss a dose? Furosemide is sometimes used only once, so you may not be on a dosing schedule. If you are using the medication regularly, take the medicine as soon as you can, but skip the missed dose if it is almost time for your next dose. Do not take two doses at one time. What happens if I overdose? Seek emergency medical attention or call the Poison Help line at 1-251.768.3863. Overdose symptoms may include feeling very thirsty or hot, heavy sweating, hotand dry skin, extreme weakness, or fainting. What should I avoid while taking furosemide?   Avoid getting up too fast from a sitting or lying position, or you may feeldizzy. Avoid becoming dehydrated. Follow your doctor's instructions about the type andamount of liquids you should drink while you are taking furosemide. Drinking alcohol with this medicine can cause side effects. If you have high blood pressure, ask a doctor or pharmacist before taking any medicines that can raise your blood pressure, such as diet pills orcough-and-cold medicine. What are the possible side effects of furosemide? Get emergency medical help if you have signs of an allergic reaction (hives, difficult breathing, swelling in your face or throat) or a severe skin reaction (fever, sore throat, burning in your eyes, skin pain, red or purple skin rashthat spreads and causes blistering and peeling). Call your doctor at once if you have:  a light-headed feeling, like you might pass out;  ringing in your ears, hearing loss;  muscle spasms or contractions;  pale skin, easy bruising, unusual bleeding;  high blood sugar --increased thirst, increased urination, dry mouth, fruity breath odor;  kidney problems --little or no urination, swelling in your feet or ankles, feeling tired or short of breath;  signs of liver or pancreas problems --loss of appetite, upper stomach pain (that may spread to your back), nausea or vomiting, dark urine, jaundice (yellowing of the skin or eyes); or  signs of an electrolyte imbalance --dry mouth, thirst, weakness, drowsiness, feeling jittery or unsteady, vomiting, irregular heartbeats, fluttering in your chest, numbness or tingling, muscle cramps, muscle weakness or limp feeling. Common side effects may include:  diarrhea, constipation, loss of appetite;  numbness or tingling;  headache, dizziness; or  blurred vision. This is not a complete list of side effects and others may occur. Call your doctor for medical advice about side effects. You may report side effects toFDA at 1-736-ILH-0656.   What other drugs viewing this service as a supplement to, and not a substitute for, the expertise, skill, knowledge and judgment of healthcare practitioners. The absence of a warning for a given drug or drug combination in no way should be construed to indicate that the drug or drug combination is safe, effective or appropriate for any given patient. [facility] does not assume any responsibility for any aspect of healthcare administered with the aid of information [facility] provides. The information contained herein is not intended to cover all possible uses, directions, precautions, warnings, drug interactions, allergic reactions, or adverse effects. If you have questions about the drugs you are taking, check with yourdoctor, nurse or pharmacist.  Copyright 1194-3784 [address]. Version: 16.01. Revision date:5/22/2019. Care instructions adapted under license by [facility] ([facility]). If you have questions about a medical condition or this instruction, always ask your healthcare professional. [place] [address] any warranty or liability for your use of this information.

## 2022-08-15 NOTE — PATIENT INSTRUCTIONS
New instructions for today:  Weigh yourself daily without clothing at the same time each day. Record a daily weight log. Call the office if you gain 3 pounds or more in 2 to 3 days or 5 pounds in one week. A sudden weight gain may mean that your heart failure is getting worse. Sodium causes your body to hold on to extra water. This may cause your heart failure symptoms to get worse. Limit sodium to 2,000 milligrams (mg) a day or less. That is less than 1 teaspoon of salt a day, including all the salt you eat in cooking or in packaged foods. Fluid restriction of 1500ml per day (about 6 cups of fluid per day). Patient Instructions:  Continue current medications as prescribed. Always keep a current medication list. Bring your medications to every office visit. Continue to follow up with primary care provider for non cardiac medical problems. Call the office with any problems, questions or concerns at [phone]. If you have been asked to keep a blood pressure log, do so for 2 weeks. Call the office to report readings to the triage nurse at [phone]. Follow up with cardiologist as scheduled. The following educational material has been included in this after visit summary for your review: Life simple 7. Heart health. Life simple 7  1) Manage blood pressure - high blood pressure is a major risk factor for heart disease and stroke. Keeping blood pressure in health range reduces strain on your heart, arteries and kidneys. Blood pressure goal is less than 130/80. 2) Control cholesterol - contributes to plaque, which can clog arteries and lead to heart disease and stroke. When you control your cholesterol you are giving your arteries their best chance to remain clear. It is recommended that you get cholesterol lab work done once a year. 3) Reduce blood sugar - most of the food we eat is turning into glucose or blood sugar that our body uses for energy.   Over time, high levels of blood sugar can damage your heart, kidneys, eyes and nerves. 4) Get active - living an active life is one of the most rewarding gifts you can give yourself and those you love. Simply put, daily physical activity increases your length and quality of life. Strive to exercise 15 minutes most days of the week. 5)  Eat better - A healthy diet is one of your best weapons for fighting cardiovascular disease. When you eat a heart healthy diet, you improve your chances for feeling good and staying healthy for life. 6)  Lose weight - when you shed extra fat an unnecessary pounds, you reduce the burden on your hear, lungs, blood vessels and skeleton. You give yourself the gift of active living, you lower your blood pressure and help yourself feel better. 7) Stop smoking - cigarette smokers have a higher risk of developing cardiovascular disease. If  You smoke, quitting is the best thing you can do for your health. Check American Heart Association on line for more information on Life's Simple 7 and tips for healthy living. A Healthy Heart: Care Instructions  Your Care Instructions     Coronary artery disease, also called heart disease, occurs when a substance called plaque builds up in the vessels that supply oxygen-rich blood to your heart muscle. This can narrow the blood vessels and reduce blood flow. A heart attack happens when blood flow is completely blocked. A high-fat diet, smoking, and other factors increase the risk of heart disease. Your doctor has found that you have a chance of having heart disease. You can do lots of things to keep your heart healthy. It may not be easy, but you can change your diet, exercise more, and quit smoking. These steps really work to lower your chance of heart disease. Follow-up care is a key part of your treatment and safety. Be sure to make and go to all appointments, and call your doctor if you are having problems.  It's also a good idea to know your test results and keep a list of the medicines you take. How can you care for yourself at home? Diet  Use less salt when you cook and eat. This helps lower your blood pressure. Taste food before salting. Add only a little salt when you think you need it. With time, your taste buds will adjust to less salt. Eat fewer snack items, fast foods, canned soups, and other high-salt, high-fat, processed foods. Read food labels and try to avoid saturated and trans fats. They increase your risk of heart disease by raising cholesterol levels. Limit the amount of solid fat-butter, margarine, and shortening-you eat. Use olive, peanut, or canola oil when you cook. Bake, broil, and steam foods instead of frying them. Eat a variety of fruit and vegetables every day. Dark green, deep orange, red, or yellow fruits and vegetables are especially good for you. Examples include spinach, carrots, peaches, and berries. Foods high in fiber can reduce your cholesterol and provide important vitamins and minerals. High-fiber foods include whole-grain cereals and breads, oatmeal, beans, brown rice, citrus fruits, and apples. Eat lean proteins. Heart-healthy proteins include seafood, lean meats and poultry, eggs, beans, peas, nuts, seeds, and soy products. Limit drinks and foods with added sugar. These include candy, desserts, and soda pop. Lifestyle changes  If your doctor recommends it, get more exercise. Walking is a good choice. Bit by bit, increase the amount you walk every day. Try for at least 30 minutes on most days of the week. You also may want to swim, bike, or do other activities. Do not smoke. If you need help quitting, talk to your doctor about stop-smoking programs and medicines. These can increase your chances of quitting for good. Quitting smoking may be the most important step you can take to protect your heart. It is never too late to quit. Limit alcohol to 2 drinks a day for men and 1 drink a day for women.  Too much alcohol can cause health problems. Manage other health problems such as diabetes, high blood pressure, and high cholesterol. If you think you may have a problem with alcohol or drug use, talk to your doctor. Medicines  Take your medicines exactly as prescribed. Call your doctor if you think you are having a problem with your medicine. If your doctor recommends aspirin, take the amount directed each day. Make sure you take aspirin and not another kind of pain reliever, such as acetaminophen (Tylenol). When should you call for help? [id number] if you have symptoms of a heart attack. These may include:  Chest pain or pressure, or a strange feeling in the chest.  Sweating. Shortness of breath. Pain, pressure, or a strange feeling in the back, neck, jaw, or upper belly or in one or both shoulders or arms. Lightheadedness or sudden weakness. A fast or irregular heartbeat. After you call 911, the  may tell you to chew 1 adult-strength or 2 to 4 low-dose aspirin. Wait for an ambulance. Do not try to drive yourself. Watch closely for changes in your health, and be sure to contact your doctor if you have any problems. Where can you learn more? Go to https://StorybrickspeDympolewHooja.[employer]. org and sign in to your Geno account. Enter Z498 in the Cameo box to learn more about \"A Healthy Heart: Care Instructions. \"     If you do not have an account, please click on the \"Sign Up Now\" link. Current as of: December 16, 2019               Content Version: 12.5  © 8431-8937 Healthwise, Incorporated. Care instructions adapted under license by [facility] ([facility]). If you have questions about a medical condition or this instruction, always ask your healthcare professional. [place] [address] any warranty or liability for your use of this information.

## 2022-08-30 ENCOUNTER — OFFICE VISIT (OUTPATIENT)
Dept: CARDIOLOGY CLINIC | Age: 77
End: 2022-08-30
Payer: MEDICARE

## 2022-08-30 VITALS
HEART RATE: 64 BPM | SYSTOLIC BLOOD PRESSURE: 110 MMHG | DIASTOLIC BLOOD PRESSURE: 60 MMHG | OXYGEN SATURATION: 96 % | BODY MASS INDEX: 34.93 KG/M2 | WEIGHT: 287 LBS

## 2022-08-30 DIAGNOSIS — I25.10 CORONARY ARTERY DISEASE INVOLVING NATIVE CORONARY ARTERY OF NATIVE HEART WITHOUT ANGINA PECTORIS: ICD-10-CM

## 2022-08-30 DIAGNOSIS — I48.92 ATRIAL FLUTTER, UNSPECIFIED TYPE (HCC): ICD-10-CM

## 2022-08-30 DIAGNOSIS — I50.22 CHRONIC SYSTOLIC HEART FAILURE (HCC): Primary | ICD-10-CM

## 2022-08-30 DIAGNOSIS — I10 ESSENTIAL HYPERTENSION: ICD-10-CM

## 2022-08-30 DIAGNOSIS — E78.2 MIXED HYPERLIPIDEMIA: ICD-10-CM

## 2022-08-30 DIAGNOSIS — Z95.1 HX OF CABG: ICD-10-CM

## 2022-08-30 PROCEDURE — 1036F TOBACCO NON-USER: CPT | Performed by: NURSE PRACTITIONER

## 2022-08-30 PROCEDURE — G8417 CALC BMI ABV UP PARAM F/U: HCPCS | Performed by: NURSE PRACTITIONER

## 2022-08-30 PROCEDURE — 1123F ACP DISCUSS/DSCN MKR DOCD: CPT | Performed by: NURSE PRACTITIONER

## 2022-08-30 PROCEDURE — 99214 OFFICE O/P EST MOD 30 MIN: CPT | Performed by: NURSE PRACTITIONER

## 2022-08-30 PROCEDURE — G8427 DOCREV CUR MEDS BY ELIG CLIN: HCPCS | Performed by: NURSE PRACTITIONER

## 2022-08-30 RX ORDER — FLUTICASONE PROPIONATE 50 MCG
SPRAY, SUSPENSION (ML) NASAL
COMMUNITY
Start: 2022-08-26

## 2022-08-30 RX ORDER — DAPAGLIFLOZIN 10 MG/1
TABLET, FILM COATED ORAL
COMMUNITY
Start: 2022-08-05

## 2022-08-30 RX ORDER — OXYCODONE AND ACETAMINOPHEN 10; 325 MG/1; MG/1
TABLET ORAL
COMMUNITY
Start: 2022-07-20

## 2022-08-30 NOTE — PROGRESS NOTES
[facility], [place]. [address] [facility] [address] [facility]  [phone] office  [phone] fax      OFFICE VISIT:  2022    Jessica Fong - : [id number]  Reason For Visit:  Carrillo Ratliff is a 65 y.o. male who is here for Follow-up (2 week follow up for edema. ), Cardiomyopathy, and Coronary Artery Disease    History:   Diagnosis Orders   1. Chronic systolic heart failure ([facility]        2. Coronary artery disease involving native coronary artery of native heart without angina pectoris        3. Essential hypertension        4. Mixed hyperlipidemia        5. Hx of CABG        6. Atrial flutter, unspecified type [facility])          The patient presents today for cardiology two week follow up after Lasix IV infusion for CHF on 8/15/22. The patients's weight is down 3 pounds from last visit. He reports some persistent abdominal distention. The patient denies symptoms to suggest myocardial ischemia or arrhythmia. BP is well controlled on current regimen. The patient's PCP monitors cholesterol. Hands fall asleep all the time. No AICD discharges reported. No bleeding issues on [facility]. Joaquina Morgan denies exertional chest pain, shortness of breath, orthopnea, paroxysmal nocturnal dyspnea, syncope, presyncope, sensed arrhythmia and fatigue. The patient denies numbness or weakness to suggest cerebrovascular accident or transient ischemic attack.  + VÁSQUEZ. + abdominal distention.      Jessica Fong has the following history as recorded in [facility]:  Patient Active Problem List   Diagnosis Code    Type 2 diabetes mellitus with diabetic polyneuropathy, with long-term current use of insulin ([facility]) E11.42, Z79.4    Essential hypertension I10    Coronary artery disease involving native coronary artery of native heart without angina pectoris I25.10    MI (myocardial infarction) ([facility] I21.9    Mixed hyperlipidemia E78.2    Chronic bilateral low back pain without sciatica M54.50, G89.29    DDD (degenerative disc disease), lumbar M51.36    Spondylosis of lumbar region without myelopathy or radiculopathy M47.816    Acute ischemic stroke ([facility]) I63.9    Thoracic outlet syndrome G54.0    Vertigo R42    Imbalance R26.89    Cerebrovascular accident (CVA) due to embolism of right middle cerebral artery (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 8    Benign prostatic hyperplasia without lower urinary tract symptoms N40.0    Chronic tension-type headache, not intractable G44.229    MOO on CPAP G47.33, Z99.89    Chest pain R07.9    SOB (shortness of breath) R06.02    ACS (acute coronary syndrome) ([facility]) I24.9    Acute MI inferior lateral first episode care ([facility] I21.19    Hx of CABG Z95.1    Chronic constipation K59.09    Palliative care patient Z51.5    Ischemic cardiomyopathy I25.5    Urinary tract infection due to extended-spectrum beta lactamase (ESBL) producing Escherichia coli N39.0, B96.29, Z16.12    Chronic systolic heart failure ([facility]) O55.59    Complicated UTI (urinary tract infection) N39.0    AICD (automatic cardioverter/defibrillator) present Z95.810    History of chronic kidney disease Z87.448    Edema R60.9    Vestibular neuropathy H93.3X9    Breakthrough seizure ([facility] G40.919    Seizure due to hypoglycemia ([facility]) R56.9, E16.2    Seizure ([facility]) R56.9    Acute on chronic systolic congestive heart failure ([facility]) I50.23     Past Medical History:   Diagnosis Date    Acute ischemic stroke ([facility] 2/20/2017    Acute on chronic systolic congestive heart failure [facility] 8/15/2022    Atherosclerotic heart disease     s/p MI, stenting x 3 jan 2011([place]) Breakthrough seizure ([address] 10/16/2021    CAD (coronary artery disease)     Cerebral artery occlusion with cerebral infarction [facility])     Cerebrovascular accident (CVA) due to embolism of right middle cerebral artery ([address] 8/14/2017    CHF (congestive heart failure) ([facility])     Chronic bilateral low back pain without sciatica 11/10/2016    Chronic kidney disease     DDD (degenerative disc disease), lumbar 11/15/2016    Depression     Depression with anxiety     Diabetes mellitus ([address]     type II    Diabetes mellitus, type 2 ([address]     DM (diabetes mellitus) ([address] 7/12/2011    ESBL (extended spectrum beta-lactamase) producing bacteria infection 11/16/2019    urine    Glaucoma     Headache     Hyperlipidemia     Cholesterol management per pcp. Hypertension     Macular degeneration     MI (myocardial infarction) ([address]     MI (myocardial infarction) ([address]     Neuromuscular disorder ([address]     Neuropathy     MOO on CPAP     Osteoarthritis     Palliative care patient 06/11/2019    Sleep apnea     Spondylosis of lumbar region without myelopathy or radiculopathy 11/15/2016    Status post placement of implantable loop recorder 10/31/2017    TIA (transient ischemic attack)      Past Surgical History:   Procedure Laterality Date    APPENDECTOMY      at age 4    [address]      2016 (fusion), 2017    BREAST SURGERY  05/2019    CARDIAC CATHETERIZATION  12/12/12    with angioplasty, stent    CARDIAC CATHETERIZATION  3/29/15  MDL    stent to distal RCA.  EF 60%    CHOLECYSTECTOMY, LAPAROSCOPIC      2012    COLONOSCOPY      2010    CORONARY ANGIOPLASTY WITH STENT PLACEMENT  jan 11 [place]    stent x 3    CORONARY ANGIOPLASTY WITH STENT PLACEMENT      01/01/11    CORONARY ARTERY BYPASS GRAFT N/A 5/30/2019    CORONARY ARTERY BYPASS GRAFT X3 WITH LEFT INTERNAL MAMMARY ARTERY WITH ENDOSCOPIC VEIN HARVESTING WITH PERFUSION TRANSESOPHAGEAL ECHOCARDIOGRAM performed by Vazquez Dhillon MD at [facility] CATH LAB PROCEDURE  [id number]    primary stent placement to the first circumflex marginal branch, balloon angioplasty to the third left anterior descending diagnonal branch, intracoronary nitroglycerin adminstration    JOINT REPLACEMENT      left knee, ~2016    LUMBAR FUSION Left 11/15/2016    LUMBAR INTERBODY FUSION LATERAL L3-4 L4-5 WITH LATERAL PLATE  performed by Meryl Centeno MD at [address]  2019     Family History   Problem Relation Age of Onset    Coronary Art Dis Mother     Cancer Sister         uterine    Kidney Disease Sister     Coronary Art Dis Brother          in his 46s - age 47    Cancer Father         colon    Coronary Art Dis Sister     Cancer Sister         olive cancer    No Known Problems Sister     No Known Problems Sister     No Known Problems Son     Alcohol Abuse Son         drank self to death    No Known Problems Son      Social History     Tobacco Use    Smoking status: Never    Smokeless tobacco: Never   Substance Use Topics    Alcohol use: Yes     Alcohol/week: 1.0 standard drink     Types: 1 Shots of liquor per week     Comment: Occ.       Current Outpatient Medications   Medication Sig Dispense Refill    FARXIGA 10 MG tablet TAKE 1 TABLET BY MOUTH EVERY DAY      oxyCODONE-acetaminophen (PERCOCET)  MG per tablet TAKE 1 TABLET BY MOUTH EVERY 6 HOURS AS NEEDED DNF       fluticasone (FLONASE) 50 MCG/ACT nasal spray       rivaroxaban (XARELTO) 15 MG TABS tablet Take 1 tablet by mouth daily (with breakfast) 90 tablet 3    metoprolol succinate (TOPROL XL) 25 MG extended release tablet Take 1 tablet by mouth 2 times daily 180 tablet 3    clopidogrel (PLAVIX) 75 MG tablet TAKE 1 TABLET DAILY 90 tablet 3    ALPRAZolam (XANAX) 0.5 MG tablet Take 0.5 mg by mouth nightly as needed for Sleep.      famotidine (PEPCID) 40 MG tablet Take 40 mg by mouth daily      isosorbide mononitrate (IMDUR) 30 MG extended release tablet Take 30 mg by mouth daily      azelastine (ASTELIN) 0.1 clubbing and cyanosis. Skin - no color change or rash. No pallor. No new surgical incision. Neurologic - no speech difficulty, facial asymmetry or lateralizing weakness. No seizures, presyncope or syncope. No significant dizziness. Hematologic - no easy bruising or excessive bleeding. Psychiatric - no severe anxiety or insomnia. No confusion. All other review of systems are negative. Objective  Vital Signs - /60   Pulse 64   Wt 287 lb (130.2 kg)   SpO2 96%   BMI 34.93 kg/m²   General - Toni Doan is alert, cooperative, and pleasant. Well groomed. No acute distress. Body habitus - Body mass index is 34.93 kg/m². HEENT - Head is normocephalic. No circumoral cyanosis. Dentition is normal.  EYES -   Lids normal without ptosis. No discharge, edema or subconjunctival hemorrhage. Neck - Symmetrical without apparent mass or lymphadenopathy. Respiratory - Normal respiratory effort without use of accessory muscles. Ausculatation reveals vesicular breath sounds without crackles, wheezes, rub or rhonchi. Cardiovascular - No jugular venous distention. Auscultation reveals regular rate and rhythm. No audible clicks, gallop or rub. No murmur. No lower extremity varicosities. No carotid bruits. Abdominal -  No visible mass or pulsations. + abdominal distention. Extremities - No clubbing or cyanosis. No statis dermatitis or ulcers. No lower extremity edema. Musculoskeletal -   No Osler's nodes. No kyphosis or scoliosis. Gait is even and regular without limp or shuffle. Ambulates without assistance. Skin -  Warm and dry; no rash or pallor. No new surgical wound. Neurological - No focal neurological deficits. Thought processes coherent. No apparent tremor. Oriented to person, place and time. Psychiatric -  Appropriate affect and mood. Data reviewed:  11/2/21 echo  Mitral valve leaflets are mildly thickened with preserved leaflet   mobility.    Mild mitral regurgitation is present. Mildly thickened aortic valve leaflets with preserved leaflet mobility. Tricuspid valve is structurally normal.   Left ventricular ejection fraction is visually estimated at 30%. Normal left ventricular wall thickness. More pronounced hypokinesis apex, inferior wall, and septum   Left ventricular size is moderately increased .    Electronically signed by Renard Jordan MD(Interpreting   physician) on 11/03/2021 08:56 AM    Lab Results   Component Value Date    WBC 6.4 03/24/2022    HGB 13.9 (L) 03/24/2022    HCT 44.9 03/24/2022    MCV 89.8 03/24/2022     03/24/2022     Lab Results   Component Value Date    CHOL 242 (H) 06/23/2021    CHOL 157 (L) 05/25/2019    CHOL 205 (H) 03/01/2019     Lab Results   Component Value Date    TRIG 173 (H) 06/23/2021    TRIG 103 05/25/2019    TRIG 202 (H) 03/01/2019     Lab Results   Component Value Date    HDL 28 (L) 06/23/2021    HDL 20 (L) 05/25/2019    HDL 28 (L) 03/01/2019     Lab Results   Component Value Date    LDLCALC 179 06/23/2021    LDLCALC 116 05/25/2019    LDLCALC 137 03/01/2019     BP Readings from Last 3 Encounters:   08/30/22 110/60   08/15/22 138/80   08/15/22 122/68    Pulse Readings from Last 3 Encounters:   08/30/22 64   08/15/22 70   08/15/22 67        Wt Readings from Last 3 Encounters:   08/30/22 287 lb (130.2 kg)   08/15/22 290 lb (131.5 kg)   07/20/22 286 lb (129.7 kg)       Recent Results (from the past 1008 hour(s))   Brain Natriuretic Peptide    Collection Time: 08/15/22  3:15 PM   Result Value Ref Range    Pro-BNP 1,024 0 - 1,800 pg/mL   Basic Metabolic Panel    Collection Time: 08/15/22  3:15 PM   Result Value Ref Range    Sodium 139 136 - 145 mmol/L    Potassium 4.8 3.5 - 5.0 mmol/L    Chloride 106 98 - 111 mmol/L    CO2 25 22 - 29 mmol/L    Anion Gap 8 7 - 19 mmol/L    Glucose 131 (H) 74 - 109 mg/dL    BUN 23 8 - 23 mg/dL    Creatinine 1.4 (H) 0.5 - 1.2 mg/dL    GFR Non- 49 (A) >60    GFR  American >59 >59    Calcium 8.5 (L) 8.8 - 10.2 mg/dL     Assessment/Plan:   Diagnosis Orders   1. Chronic systolic heart failure ([address]        2. Coronary artery disease involving native coronary artery of native heart without angina pectoris        3. Essential hypertension        4. Mixed hyperlipidemia        5. Hx of CABG        6. Atrial flutter, unspecified type (HCC)        [id number] Score: 8  Disclaimer: Risk Score calculation is dependent on accuracy of patient problem list and past encounter diagnosis. Chronic systolic heart failure-  NYHA class III stage C  GDMT includes Entresto, Toprol Xl and Lasix. Weight down 3 pounds since Lasix infusion on 8/15/22. Persistent VÁSQUEZ with abdominal distention - increase Lasix to 40 mg (2) once daily. Continue low sodium diet and daily weight log. CAD - stable on current medical management. Continue same. Atrial flutter - continues on  4 [facility] and Toprol XL. No bleeding issues. HTN - normotensive on current regimen. BP goal less than 110/60. Continue same. Hyperlipidemia - . Hx of statin intolerance. Patient is compliant with medication regimen. Previous cardiac history and records reviewed. Continue current medical management for cardiac related condition. Continue other current medications as directed. Continue to follow up with primary care provider for non cardiac medical problems. If your primary care provider is outside of the [facility] [place] [facility], please request that your labs be faxed to this office at [phone]. BP goal 130/80 or less. Call the office with any problems, questions or concerns at [phone]. Cardiology follow up as scheduled in [address] appointments. Educational included in patient instructions. Heart health.       TEGAN Merino

## 2022-08-30 NOTE — PATIENT INSTRUCTIONS
New instructions for today:  Start taking Lasix 40 mg (2) once daily for swelling. Xarelto can increase your risk of bleeding. If you notice blood in urine or stool, bleeding gums, excessive bruising or cough productive of bloody sputum, notify the office. Information on this blood thinner has been included in your after visit summary. Weigh yourself daily without clothing at the same time each day. Record a daily weight log. Call the office if you gain 3 pounds or more in 2 to 3 days or 5 pounds in one week. A sudden weight gain may mean that your heart failure is getting worse. Sodium causes your body to hold on to extra water. This may cause your heart failure symptoms to get worse. Limit sodium to 2,000 milligrams (mg) a day or less. That is less than 1 teaspoon of salt a day, including all the salt you eat in cooking or in packaged foods. Fluid restriction of 1500ml per day (about 6 cups of fluid per day). Patient Instructions:  Continue current medications as prescribed. Always keep a current medication list. Bring your medications to every office visit. Continue to follow up with primary care provider for non cardiac medical problems. Call the office with any problems, questions or concerns at [phone]. If you have been asked to keep a blood pressure log, do so for 2 weeks. Call the office to report readings to the triage nurse at [phone]. Follow up with cardiologist as scheduled. The following educational material has been included in this after visit summary for your review: Life simple 7. Heart health. Life simple 7  1) Manage blood pressure - high blood pressure is a major risk factor for heart disease and stroke. Keeping blood pressure in health range reduces strain on your heart, arteries and kidneys. Blood pressure goal is less than 130/80. 2) Control cholesterol - contributes to plaque, which can clog arteries and lead to heart disease and stroke. your diet, exercise more, and quit smoking. These steps really work to lower your chance of heart disease. Follow-up care is a key part of your treatment and safety. Be sure to make and go to all appointments, and call your doctor if you are having problems. It's also a good idea to know your test results and keep a list of the medicines you take. How can you care for yourself at home? Diet  Use less salt when you cook and eat. This helps lower your blood pressure. Taste food before salting. Add only a little salt when you think you need it. With time, your taste buds will adjust to less salt. Eat fewer snack items, fast foods, canned soups, and other high-salt, high-fat, processed foods. Read food labels and try to avoid saturated and trans fats. They increase your risk of heart disease by raising cholesterol levels. Limit the amount of solid fat-butter, margarine, and shortening-you eat. Use olive, peanut, or canola oil when you cook. Bake, broil, and steam foods instead of frying them. Eat a variety of fruit and vegetables every day. Dark green, deep orange, red, or yellow fruits and vegetables are especially good for you. Examples include spinach, carrots, peaches, and berries. Foods high in fiber can reduce your cholesterol and provide important vitamins and minerals. High-fiber foods include whole-grain cereals and breads, oatmeal, beans, brown rice, citrus fruits, and apples. Eat lean proteins. Heart-healthy proteins include seafood, lean meats and poultry, eggs, beans, peas, nuts, seeds, and soy products. Limit drinks and foods with added sugar. These include candy, desserts, and soda pop. Lifestyle changes  If your doctor recommends it, get more exercise. Walking is a good choice. Bit by bit, increase the amount you walk every day. Try for at least 30 minutes on most days of the week. You also may want to swim, bike, or do other activities. Do not smoke.  If you need help quitting, talk to your doctor about stop-smoking programs and medicines. These can increase your chances of quitting for good. Quitting smoking may be the most important step you can take to protect your heart. It is never too late to quit. Limit alcohol to 2 drinks a day for men and 1 drink a day for women. Too much alcohol can cause health problems. Manage other health problems such as diabetes, high blood pressure, and high cholesterol. If you think you may have a problem with alcohol or drug use, talk to your doctor. Medicines  Take your medicines exactly as prescribed. Call your doctor if you think you are having a problem with your medicine. If your doctor recommends aspirin, take the amount directed each day. Make sure you take aspirin and not another kind of pain reliever, such as acetaminophen (Tylenol). When should you call for help? [id number] if you have symptoms of a heart attack. These may include:  Chest pain or pressure, or a strange feeling in the chest.  Sweating. Shortness of breath. Pain, pressure, or a strange feeling in the back, neck, jaw, or upper belly or in one or both shoulders or arms. Lightheadedness or sudden weakness. A fast or irregular heartbeat. After you call 911, the  may tell you to chew 1 adult-strength or 2 to 4 low-dose aspirin. Wait for an ambulance. Do not try to drive yourself. Watch closely for changes in your health, and be sure to contact your doctor if you have any problems. Where can you learn more? Go to [email or web address] [email or web address] and sign in to your [employer] account. Enter P495 in the [facility] box to learn more about \"A Healthy Heart: Care Instructions. \"     If you do not have an account, please click on the \"Sign Up Now\" link. Current as of: December 16, 2019               Content Version: 12.5  © 6341-1895 Healthwise, Incorporated. Care instructions adapted under license by [facility]  If you have questions about a medical condition or this instruction, always ask your healthcare professional. [place] [address] any warranty or liability for your use of this information.

## 2022-09-06 RX ORDER — METOPROLOL SUCCINATE 25 MG/1
TABLET, EXTENDED RELEASE ORAL
Qty: 90 TABLET | Refills: 1 | Status: SHIPPED | OUTPATIENT
Start: 2022-09-06

## 2022-10-07 NOTE — ANESTHESIA PREPROCEDURE EVALUATION
Anesthesia Evaluation     Patient summary reviewed   no history of anesthetic complications:  NPO Solid Status: > 8 hours  NPO Liquid Status: > 4 hours           Airway   Mallampati: II  TM distance: >3 FB  Neck ROM: full  Dental - normal exam     Comment: Multiple Veneers    Pulmonary - normal exam    breath sounds clear to auscultation  (+) sleep apnea on CPAP,   (-) asthma, recent URI, not a smoker  Cardiovascular   Exercise tolerance: good (4-7 METS)    ECG reviewed  Patient on routine beta blocker and Beta blocker given within 24 hours of surgery  Rhythm: regular  Rate: normal    (+) pacemaker ([employer], Implanted 11/1/19) ICD, hypertension, past MI , CABG (6/1/19), cardiac stents (x8, last one 2 yrs ago) CHF (EF 30-35%) Systolic <55%, hyperlipidemia,   (-) angina      Neuro/Psych  (+) CVA (4 yrs ago, no residual symptoms), psychiatric history Depression,     (-) seizures, TIA  GI/Hepatic/Renal/Endo    (+)   diabetes mellitus using insulin,   (-) GERD, liver disease, no renal disease, no thyroid disorder    Musculoskeletal     Abdominal    Substance History      OB/GYN          Other                        Anesthesia Plan    ASA 3     general   (Will have magnet in place)  intravenous induction     Anesthetic plan, all risks, benefits, and alternatives have been provided, discussed and informed consent has been obtained with: patient.       n/a

## 2022-10-30 DIAGNOSIS — I25.10 CORONARY ARTERY DISEASE INVOLVING NATIVE CORONARY ARTERY OF NATIVE HEART WITHOUT ANGINA PECTORIS: ICD-10-CM

## 2022-10-31 RX ORDER — CLOPIDOGREL BISULFATE 75 MG/1
TABLET ORAL
Qty: 90 TABLET | Refills: 3 | Status: SHIPPED | OUTPATIENT
Start: 2022-10-31

## 2022-11-22 ENCOUNTER — TELEPHONE (OUTPATIENT)
Dept: CARDIOLOGY CLINIC | Age: 77
End: 2022-11-22

## 2022-11-22 ENCOUNTER — OFFICE VISIT (OUTPATIENT)
Dept: CARDIOLOGY CLINIC | Age: 77
End: 2022-11-22
Payer: MEDICARE

## 2022-11-22 VITALS
SYSTOLIC BLOOD PRESSURE: 104 MMHG | DIASTOLIC BLOOD PRESSURE: 60 MMHG | WEIGHT: 276 LBS | BODY MASS INDEX: 33.61 KG/M2 | HEIGHT: 76 IN | HEART RATE: 64 BPM

## 2022-11-22 DIAGNOSIS — I50.22 CHRONIC SYSTOLIC HEART FAILURE (HCC): ICD-10-CM

## 2022-11-22 DIAGNOSIS — E78.2 MIXED HYPERLIPIDEMIA: ICD-10-CM

## 2022-11-22 DIAGNOSIS — I10 ESSENTIAL HYPERTENSION: ICD-10-CM

## 2022-11-22 DIAGNOSIS — Z79.01 CHRONIC ANTICOAGULATION: ICD-10-CM

## 2022-11-22 DIAGNOSIS — I25.10 CORONARY ARTERY DISEASE INVOLVING NATIVE CORONARY ARTERY OF NATIVE HEART WITHOUT ANGINA PECTORIS: Primary | ICD-10-CM

## 2022-11-22 DIAGNOSIS — I48.92 ATRIAL FLUTTER, UNSPECIFIED TYPE (HCC): ICD-10-CM

## 2022-11-22 PROCEDURE — G8484 FLU IMMUNIZE NO ADMIN: HCPCS | Performed by: NURSE PRACTITIONER

## 2022-11-22 PROCEDURE — 3078F DIAST BP <80 MM HG: CPT | Performed by: NURSE PRACTITIONER

## 2022-11-22 PROCEDURE — 1036F TOBACCO NON-USER: CPT | Performed by: NURSE PRACTITIONER

## 2022-11-22 PROCEDURE — 1123F ACP DISCUSS/DSCN MKR DOCD: CPT | Performed by: NURSE PRACTITIONER

## 2022-11-22 PROCEDURE — 93283 PRGRMG EVAL IMPLANTABLE DFB: CPT | Performed by: NURSE PRACTITIONER

## 2022-11-22 PROCEDURE — 99214 OFFICE O/P EST MOD 30 MIN: CPT | Performed by: NURSE PRACTITIONER

## 2022-11-22 PROCEDURE — G8427 DOCREV CUR MEDS BY ELIG CLIN: HCPCS | Performed by: NURSE PRACTITIONER

## 2022-11-22 PROCEDURE — G8417 CALC BMI ABV UP PARAM F/U: HCPCS | Performed by: NURSE PRACTITIONER

## 2022-11-22 PROCEDURE — 3074F SYST BP LT 130 MM HG: CPT | Performed by: NURSE PRACTITIONER

## 2022-11-22 RX ORDER — AMOXICILLIN AND CLAVULANATE POTASSIUM 875; 125 MG/1; MG/1
1 TABLET, FILM COATED ORAL 2 TIMES DAILY
Qty: 28 TABLET | Refills: 0 | Status: SHIPPED | OUTPATIENT
Start: 2022-11-22 | End: 2022-12-06

## 2022-11-22 NOTE — PROGRESS NOTES
[facility], [place]. [address], [place] [address] [facility]  [phone] office  [phone] fax      OFFICE VISIT:  2022    Rose Mary Jacobson - :   Reason For Visit:  Alejo Cruz is a 65 y.o. male who is here for Follow-up (And ICD check. Patient feels great), Coronary Artery Disease, and Congestive Heart Failure    History:   Diagnosis Orders   1. Coronary artery disease involving native coronary artery of native heart without angina pectoris        2. Chronic systolic heart failure ([address]        3. Essential hypertension        4. Atrial flutter, unspecified type ([address]        5. Mixed hyperlipidemia        6. Chronic anticoagulation      Xarelto        The patient presents today for cardiology follow up and AICD check. Junior Rogers reports he is feeling really well. He has not had exacerbation of chronic systolic heart failure since . The patient reports \"I have done so much better since starting on Farxiga. \"  He reports no fluid retention or AICD discharges. The patient denies symptoms to suggest myocardial ischemia, overt heart failure or recurrent arrhythmia. BP rechecked at 104/60 today. The patient reports no symptomatic hypotension. The patient's PCP monitors cholesterol. The patient reports sinus pressure for 6 weeks now with bloody nasal drainage, headache, maxillary sinus pressure and upper teeth pain. He reports hx of sinusitis. Recardo Cutter denies exertional chest pain, shortness of breath, orthopnea, paroxysmal nocturnal dyspnea, syncope, presyncope, sensed arrhythmia, edema and fatigue. The patient denies numbness or weakness to suggest cerebrovascular accident or transient ischemic attack. + sinus pressure with headache and bloody nasal drainage x 6 weeks.      Rose Mary Jacobson has the following history as recorded in [employer]:  Patient Active Problem List   Diagnosis Code    Type 2 diabetes mellitus with diabetic polyneuropathy, with long-term current use of insulin ([facility]) E11.42, Z79.4    Essential hypertension I10    Coronary artery disease involving native coronary artery of native heart without angina pectoris I25.10    MI (myocardial infarction) ([facility]) I21.9    Mixed hyperlipidemia E78.2    Chronic bilateral low back pain without sciatica M54.50, G89.29    DDD (degenerative disc disease), lumbar M51.36    Spondylosis of lumbar region without myelopathy or radiculopathy M47.816    Acute ischemic stroke ([facility]) I63.9    Thoracic outlet syndrome G54.0    Vertigo R42    Imbalance R26.89    Cerebrovascular accident (CVA) due to embolism of right middle cerebral artery ([facility]) I63.411    Recurrent major depressive disorder, in full remission ([facility] F33.42    Trigger middle finger of left hand M65.332    Peripheral polyneuropathy G62.9    Palpitations R00.2    Chronic insomnia F51.04    Status post placement of implantable loop recorder Z95.818    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 8    Benign prostatic hyperplasia without lower urinary tract symptoms N40.0    Chronic tension-type headache, not intractable G44.229    MOO on CPAP G47.33, Z99.89    Chest pain R07.9    SOB (shortness of breath) R06.02    ACS (acute coronary syndrome) ([facility]) I24.9    Acute MI inferior lateral first episode care ([facility] I21.19    Hx of CABG Z95.1    Chronic constipation K59.09    Palliative care patient Z51.5    Ischemic cardiomyopathy I25.5    Urinary tract infection due to extended-spectrum beta lactamase (ESBL) producing Escherichia coli N39.0, B96.29, Z16.12    Chronic systolic heart failure ([facility]) I84.10    Complicated UTI (urinary tract infection) N39.0    AICD (automatic cardioverter/defibrillator) present Z95.810    History of chronic kidney disease Z87.448    Edema R60.9    Vestibular neuropathy H93.3X9    Breakthrough seizure ([facility] G40.919    Seizure due to hypoglycemia ([facility]) R56.9, E16.2    Seizure ([address] R56.9    Acute on chronic systolic congestive heart failure (HCC) I50.23     Past Medical History:   Diagnosis Date    Acute ischemic stroke ([address] 2/20/2017    Acute on chronic systolic congestive heart failure ([address] 8/15/2022    Atherosclerotic heart disease     s/p MI, stenting x 3 jan 2011([place])    Breakthrough seizure ([address] 10/16/2021    CAD (coronary artery disease)     Cerebral artery occlusion with cerebral infarction [facility])     Cerebrovascular accident (CVA) due to embolism of right middle cerebral artery ([address] 8/14/2017    CHF (congestive heart failure) ([facility])     Chronic bilateral low back pain without sciatica 11/10/2016    Chronic kidney disease     DDD (degenerative disc disease), lumbar 11/15/2016    Depression     Depression with anxiety     Diabetes mellitus ([address]     type II    Diabetes mellitus, type 2 ([address]     DM (diabetes mellitus) ([address] 7/12/2011    ESBL (extended spectrum beta-lactamase) producing bacteria infection 11/16/2019    urine    Glaucoma     Headache     Hyperlipidemia     Cholesterol management per pcp. Hypertension     Macular degeneration     MI (myocardial infarction) ([address]     MI (myocardial infarction) ([address]     Neuromuscular disorder ([address]     Neuropathy     MOO on CPAP     Osteoarthritis     Palliative care patient 06/11/2019    Sleep apnea     Spondylosis of lumbar region without myelopathy or radiculopathy 11/15/2016    Status post placement of implantable loop recorder 10/31/2017    TIA (transient ischemic attack)      Past Surgical History:   Procedure Laterality Date    APPENDECTOMY      at age 4    [address]      2016 (fusion), 2017    BREAST SURGERY  05/2019    CARDIAC CATHETERIZATION  12/12/12    with angioplasty, stent    CARDIAC CATHETERIZATION  3/29/15  [facility]    stent to distal RCA.  EF 60%    CHOLECYSTECTOMY, LAPAROSCOPIC      2012    COLONOSCOPY      2010    CORONARY ANGIOPLASTY WITH STENT PLACEMENT  jan 11 [place]    stent x 3 CORONARY ANGIOPLASTY WITH STENT PLACEMENT      11    CORONARY ARTERY BYPASS GRAFT N/A 2019    CORONARY ARTERY BYPASS GRAFT X3 WITH LEFT INTERNAL MAMMARY ARTERY WITH ENDOSCOPIC VEIN HARVESTING WITH PERFUSION TRANSESOPHAGEAL ECHOCARDIOGRAM performed by Marisela Rosales MD at [facility] CATH LAB PROCEDURE  [id number]    primary stent placement to the first circumflex marginal branch, balloon angioplasty to the third left anterior descending diagnonal branch, intracoronary nitroglycerin adminstration    JOINT REPLACEMENT      left knee, ~2016    LUMBAR FUSION Left 11/15/2016    LUMBAR INTERBODY FUSION LATERAL L3-4 L4-5 WITH LATERAL PLATE  performed by Inga Lemons MD at [address]  2019     Family History   Problem Relation Age of Onset    Coronary Art Dis Mother     Cancer Sister         uterine    Kidney Disease Sister     Coronary Art Dis Brother          in his 46s - age 47    Cancer Father         colon    Coronary Art Dis Sister     Cancer Sister         olive cancer    No Known Problems Sister     No Known Problems Sister     No Known Problems Son     Alcohol Abuse Son         drank self to death    No Known Problems Son      Social History     Tobacco Use    Smoking status: Never    Smokeless tobacco: Never   Substance Use Topics    Alcohol use: Yes     Alcohol/week: 1.0 standard drink     Types: 1 Shots of liquor per week     Comment: Occ.       Current Outpatient Medications   Medication Sig Dispense Refill    clopidogrel (PLAVIX) 75 MG tablet TAKE 1 TABLET EVERY DAY 90 tablet 3    metoprolol succinate (TOPROL XL) 25 MG extended release tablet TAKE 1 TABLET BY MOUTH EVERY DAY 90 tablet 1    FARXIGA 10 MG tablet TAKE 1 TABLET BY MOUTH EVERY DAY      oxyCODONE-acetaminophen (PERCOCET)  MG per tablet TAKE 1 TABLET BY MOUTH EVERY 6 HOURS AS NEEDED DNF       fluticasone (FLONASE) 50 MCG/ACT nasal spray       rivaroxaban (XARELTO) 15 MG TABS tablet Take 1 tablet by mouth daily (with breakfast) 90 tablet 3    ALPRAZolam (XANAX) 0.5 MG tablet Take 0.5 mg by mouth nightly as needed for Sleep.      famotidine (PEPCID) 40 MG tablet Take 40 mg by mouth daily      isosorbide mononitrate (IMDUR) 30 MG extended release tablet Take 30 mg by mouth daily      azelastine (ASTELIN) 0.1 % nasal spray 1 spray by Nasal route 2 times daily Use in each nostril as directed      finasteride (PROSCAR) 5 MG tablet Take 5 mg by mouth daily      docusate sodium (COLACE) 100 MG capsule Take 200 mg by mouth daily      furosemide (LASIX) 40 MG tablet Take 40 mg by mouth 2 times daily       Multiple Vitamins-Minerals (PRESERVISION AREDS PO) Take 2 capsules by mouth      sertraline (ZOLOFT) 100 MG tablet Take 1 tablet by mouth daily 90 tablet 2    nitroGLYCERIN (NITROSTAT) 0.4 MG SL tablet up to max of 3 total doses. If no relief after 1 dose, call 911. 30 tablet 2    sacubitril-valsartan (ENTRESTO) 49-51 MG per tablet Take 1 tablet by mouth 2 times daily 60 tablet 5    zoster recombinant adjuvanted vaccine [facility]) 50 MCG/0.5ML SUSR injection 1 vaccine now and repeat in 2-6 months. 0.5 mL 1     No current facility-administered medications for this visit. Allergies: Statins, Crestor [rosuvastatin calcium], Adhesive tape, and Morphine    Review of Systems  Constitutional - no appetite change, or unexpected weight change. No fever, chills or diaphoresis. No significant change in activity level or new onset of fatigue. HEENT - no tinnitus or significant hearing loss. + sinus pressure with headache and bloody nasal drainage x 6 weeks. Eyes - no sudden vision change or amaurosis. No corneal arcus, xantholasma, subconjunctival hemorrhage or discharge. Respiratory - no significant wheezing, stridor, apnea or cough. No dyspnea on exertion or shortness of air. Cardiovascular - no exertional chest pain to suggest myocardial ischemia. No orthopnea or PND.   No sensation of sustained arrythmia. No occurrence of slow heart rate. No palpitations. No claudication. Gastrointestinal - no abdominal swelling or pain. No blood in stool. No severe constipation, diarrhea, nausea, or vomiting. Genitourinary - no dysuria, frequency, or urgency. No flank pain or hematuria. Musculoskeletal - no back pain or myalgia. Ambulates with cane. Extremities - no clubbing, cyanosis or extremity edema. Skin - no color change or rash. No pallor. No new surgical incision. Neurologic - no speech difficulty, facial asymmetry or lateralizing weakness. No seizures, presyncope or syncope. No significant dizziness. Hematologic - no easy bruising or excessive bleeding. Psychiatric - no severe anxiety or insomnia. No confusion. All other review of systems are negative. Objective  Vital Signs - BP (!) 76/40   Pulse 64   Ht 6' 4\" (1.93 m)   Wt 276 lb (125.2 kg)   BMI 33.60 kg/m²   General - Rigoberto Rides is alert, cooperative, and pleasant. Well groomed. No acute distress. Body habitus - Body mass index is 33.6 kg/m². HEENT - Head is normocephalic. No circumoral cyanosis. Dentition is normal.  EYES -   Lids normal without ptosis. No discharge, edema or subconjunctival hemorrhage. Neck - Symmetrical without apparent mass or lymphadenopathy. Respiratory - Normal respiratory effort without use of accessory muscles. Ausculatation reveals vesicular breath sounds without crackles, wheezes, rub or rhonchi. Cardiovascular - No jugular venous distention. Auscultation reveals regular rate and rhythm. No audible clicks, gallop or rub. Heidi murmur. No lower extremity varicosities. No carotid bruits. Abdominal -  No visible distention, mass or pulsations. Extremities - No clubbing or cyanosis. No statis dermatitis or ulcers. No edema. Musculoskeletal -   No Osler's nodes. No kyphosis or scoliosis. Gait is even and regular without limp or shuffle. Ambulates without assistance.   Skin - Warm and dry; no rash or pallor. No new surgical wound. Neurological - No focal neurological deficits. Thought processes coherent. No apparent tremor. Oriented to person, place and time. Psychiatric -  Appropriate affect and mood. Data reviewed:  11/2/21 echo  Mitral valve leaflets are mildly thickened with preserved leaflet   mobility. Mild mitral regurgitation is present. Mildly thickened aortic valve leaflets with preserved leaflet mobility. Tricuspid valve is structurally normal.   Left ventricular ejection fraction is visually estimated at 30%. Normal left ventricular wall thickness. More pronounced hypokinesis apex, inferior wall, and septum   Left ventricular size is moderately increased .    Electronically signed by Arabella Sal MD(Interpreting   physician) on 11/03/2021 08:56 AM    Lab Results   Component Value Date    WBC 6.4 03/24/2022    HGB 13.9 (L) 03/24/2022    HCT 44.9 03/24/2022    MCV 89.8 03/24/2022     03/24/2022     Lab Results   Component Value Date     08/15/2022    K 4.8 08/15/2022     08/15/2022    CO2 25 08/15/2022    BUN 23 08/15/2022    CREATININE 1.4 (H) 08/15/2022    GLUCOSE 131 (H) 08/15/2022    CALCIUM 8.5 (L) 08/15/2022    PROT 6.6 10/18/2021    LABALBU 4.1 10/18/2021    BILITOT 0.4 10/18/2021    ALKPHOS 65 10/18/2021    AST 13 10/18/2021    ALT 15 10/18/2021    LABGLOM 49 (A) 08/15/2022    GFRAA >59 08/15/2022    GLOB 2.6 03/27/2017       Lab Results   Component Value Date    CHOL 242 (H) 06/23/2021    CHOL 157 (L) 05/25/2019    CHOL 205 (H) 03/01/2019     Lab Results   Component Value Date    TRIG 173 (H) 06/23/2021    TRIG 103 05/25/2019    TRIG 202 (H) 03/01/2019     Lab Results   Component Value Date    HDL 28 (L) 06/23/2021    HDL 20 (L) 05/25/2019    HDL 28 (L) 03/01/2019     Lab Results   Component Value Date    LDLCALC 179 06/23/2021    LDLCALC 116 05/25/2019    LDLCALC 137 03/01/2019     BP Readings from Last 3 Encounters: 11/22/22 (!) 76/40   08/30/22 110/60   08/15/22 138/80    Pulse Readings from Last 3 Encounters:   11/22/22 64   08/30/22 64   08/15/22 70        Wt Readings from Last 3 Encounters:   11/22/22 276 lb (125.2 kg)   08/30/22 287 lb (130.2 kg)   08/15/22 290 lb (131.5 kg)     Assessment/Plan:   Diagnosis Orders   1. Coronary artery disease involving native coronary artery of native heart without angina pectoris        2. Chronic systolic heart failure ([address]        3. Essential hypertension        4. Atrial flutter, unspecified type ([address]        5. Mixed hyperlipidemia        6. Chronic anticoagulation      Xarelto        [id number] Score: 8  Disclaimer: Risk Score calculation is dependent on accuracy of patient problem list and past encounter diagnosis. AICD check:  ICD interrogation showed adequate battery voltage and charge time. 7.6 years longevity. Mode:  AAIR-DDDR. Lead and defib impedances stable. Pacing: AP-VS 61.4%. Reprogramming for sensitivity and threshold testing. Normal diagnostics and safety margins noted. A output 0.750 V at 0.40 ms; P wave 2.5 mV. RV output 0.625 V at 0.40 ms; R wave 12.4 mV. No ICD discharges. Monitored arrhythmia: none. Sustained arrhythmia:  none  Optivol stable. Next Carelink: 3 months. CAD - stable on current medical management. Continue same. Chronic systolic heart failure -  NYHA class II-III stage C  GDMT includes Entresto, Toprol XL, Farxiga and Lasix. Currently doing very well from heart failure perspective. Euvolemic - weight is down 14 pounds from 8/22. HTN - asymptomatic low blood pressure on GDMT. BP today 104/60. Patient tolerating well. Continue same. Hx atrial flutter - no recurrent AF noted on pacer interrogation todaqy. Hyperlipidemia - monitored and managed by PCP. Intolerant to Crestor in the past.   . Plan to discuss other options for management of LDL goal to less than 70.     Previous cardiac history and records reviewed. Continue current medical management for cardiac related condition. Continue other current medications as directed. Continue to follow up with primary care provider for non cardiac medical problems. [place] care provider [place] [facility] please request that your labs be faxed to this office at [phone]. BP goal 130/80 or less. Call the office with any problems, questions or concerns at [phone]. Cardiology follow up as scheduled in [address] appointments. Dr. Martita Caro as scheduled. Educational included in patient instructions. Heart health.       Abdulkadir Mckeon, APRN

## 2022-11-22 NOTE — PATIENT INSTRUCTIONS
New instructions for today:  Weigh yourself daily without clothing at the same time each day. Record a daily weight log. Call the office if you gain 3 pounds or more in 2 to 3 days or 5 pounds in one week. A sudden weight gain may mean that your heart failure is getting worse. Sodium causes your body to hold on to extra water. This may cause your heart failure symptoms to get worse. Limit sodium to 2,000 milligrams (mg) a day or less. That is less than 1 teaspoon of salt a day, including all the salt you eat in cooking or in packaged foods. Fluid restriction of 1500ml per day (about 6 cups of fluid per day). Patient Instructions:  Continue current medications as prescribed. Always keep a current medication list. Bring your medications to every office visit. Continue to follow up with primary care provider for non cardiac medical problems. Call the office with any problems, questions or concerns at [phone]. If you have been asked to keep a blood pressure log, do so for 2 weeks. Call the office to report readings to the triage nurse at [phone]. Follow up with cardiologist as scheduled. The following educational material has been included in this after visit summary for your review: Life simple 7. Heart health. Life simple 7  1) Manage blood pressure - high blood pressure is a major risk factor for heart disease and stroke. Keeping blood pressure in health range reduces strain on your heart, arteries and kidneys. Blood pressure goal is less than 130/80. 2) Control cholesterol - contributes to plaque, which can clog arteries and lead to heart disease and stroke. When you control your cholesterol you are giving your arteries their best chance to remain clear. It is recommended that you get cholesterol lab work done once a year. 3) Reduce blood sugar - most of the food we eat is turning into glucose or blood sugar that our body uses for energy.   Over time, high levels of blood sugar can damage your heart, kidneys, eyes and nerves. 4) Get active - living an active life is one of the most rewarding gifts you can give yourself and those you love. Simply put, daily physical activity increases your length and quality of life. Strive to exercise 15 minutes most days of the week. 5)  Eat better - A healthy diet is one of your best weapons for fighting cardiovascular disease. When you eat a heart healthy diet, you improve your chances for feeling good and staying healthy for life. 6)  Lose weight - when you shed extra fat an unnecessary pounds, you reduce the burden on your hear, lungs, blood vessels and skeleton. You give yourself the gift of active living, you lower your blood pressure and help yourself feel better. 7) Stop smoking - cigarette smokers have a higher risk of developing cardiovascular disease. If  You smoke, quitting is the best thing you can do for your health. Check American Heart Association on line for more information on Life's Simple 7 and tips for healthy living. A Healthy Heart: Care Instructions  Your Care Instructions     Coronary artery disease, also called heart disease, occurs when a substance called plaque builds up in the vessels that supply oxygen-rich blood to your heart muscle. This can narrow the blood vessels and reduce blood flow. A heart attack happens when blood flow is completely blocked. A high-fat diet, smoking, and other factors increase the risk of heart disease. Your doctor has found that you have a chance of having heart disease. You can do lots of things to keep your heart healthy. It may not be easy, but you can change your diet, exercise more, and quit smoking. These steps really work to lower your chance of heart disease. Follow-up care is a key part of your treatment and safety. Be sure to make and go to all appointments, and call your doctor if you are having problems.  It's also a good idea to know your test results and keep a list of the medicines you take. How can you care for yourself at home? Diet  Use less salt when you cook and eat. This helps lower your blood pressure. Taste food before salting. Add only a little salt when you think you need it. With time, your taste buds will adjust to less salt. Eat fewer snack items, fast foods, canned soups, and other high-salt, high-fat, processed foods. Read food labels and try to avoid saturated and trans fats. They increase your risk of heart disease by raising cholesterol levels. Limit the amount of solid fat-butter, margarine, and shortening-you eat. Use olive, peanut, or canola oil when you cook. Bake, broil, and steam foods instead of frying them. Eat a variety of fruit and vegetables every day. Dark green, deep orange, red, or yellow fruits and vegetables are especially good for you. Examples include spinach, carrots, peaches, and berries. Foods high in fiber can reduce your cholesterol and provide important vitamins and minerals. High-fiber foods include whole-grain cereals and breads, oatmeal, beans, brown rice, citrus fruits, and apples. Eat lean proteins. Heart-healthy proteins include seafood, lean meats and poultry, eggs, beans, peas, nuts, seeds, and soy products. Limit drinks and foods with added sugar. These include candy, desserts, and soda pop. Lifestyle changes  If your doctor recommends it, get more exercise. Walking is a good choice. Bit by bit, increase the amount you walk every day. Try for at least 30 minutes on most days of the week. You also may want to swim, bike, or do other activities. Do not smoke. If you need help quitting, talk to your doctor about stop-smoking programs and medicines. These can increase your chances of quitting for good. Quitting smoking may be the most important step you can take to protect your heart. It is never too late to quit. Limit alcohol to 2 drinks a day for men and 1 drink a day for women.  Too much alcohol can cause health problems. Manage other health problems such as diabetes, high blood pressure, and high cholesterol. If you think you may have a problem with alcohol or drug use, talk to your doctor. Medicines  Take your medicines exactly as prescribed. Call your doctor if you think you are having a problem with your medicine. If your doctor recommends aspirin, take the amount directed each day. Make sure you take aspirin and not another kind of pain reliever, such as acetaminophen (Tylenol). When should you call for help? [id number] if you have symptoms of a heart attack. These may include:  Chest pain or pressure, or a strange feeling in the chest.  Sweating. Shortness of breath. Pain, pressure, or a strange feeling in the back, neck, jaw, or upper belly or in one or both shoulders or arms. Lightheadedness or sudden weakness. A fast or irregular heartbeat. After you call 911, the  may tell you to chew 1 adult-strength or 2 to 4 low-dose aspirin. Wait for an ambulance. Do not try to drive yourself. Watch closely for changes in your health, and be sure to contact your doctor if you have any problems. Where can you learn more? Go to https://QvolvepeVerdiem.SmarTots. org and sign in to your [employer] account. Enter F059 in the [employer] box to learn more about \"A Healthy Heart: Care Instructions. \"     If you do not have an account, please click on the \"Sign Up Now\" link. Current as of: December 16, 2019               Content Version: 12.5  © 2006-2020 [employer]. Care instructions adapted under license by [facility] ([facility]). If you have questions about a medical condition or this instruction, always ask your healthcare professional. [place] [address] any warranty or liability for your use of this information.

## 2022-11-22 NOTE — TELEPHONE ENCOUNTER
Called the patient and per Jennifer Matos will try on pravastatin 40 mg 1 tab daily and recheck labs in 8 weeks.

## 2022-11-28 RX ORDER — PRAVASTATIN SODIUM 40 MG
40 TABLET ORAL DAILY
Qty: 30 TABLET | Refills: 5 | Status: SHIPPED | OUTPATIENT
Start: 2022-11-28

## 2022-12-16 RX ORDER — INSULIN LISPRO 100 [IU]/ML
10 INJECTION, SOLUTION SUBCUTANEOUS EVERY MORNING
COMMUNITY

## 2022-12-16 RX ORDER — DOCUSATE SODIUM 100 MG/1
100 CAPSULE, LIQUID FILLED ORAL 2 TIMES DAILY
COMMUNITY

## 2022-12-16 RX ORDER — AZELASTINE 1 MG/ML
1 SPRAY, METERED NASAL 2 TIMES DAILY
COMMUNITY

## 2022-12-16 RX ORDER — FINASTERIDE 5 MG/1
5 TABLET, FILM COATED ORAL DAILY
COMMUNITY
End: 2022-12-16 | Stop reason: SDUPTHER

## 2022-12-16 RX ORDER — ISOSORBIDE MONONITRATE 30 MG/1
30 TABLET, EXTENDED RELEASE ORAL DAILY
COMMUNITY

## 2022-12-16 RX ORDER — FUROSEMIDE 40 MG/1
40 TABLET ORAL 2 TIMES DAILY
COMMUNITY

## 2022-12-19 NOTE — PAT
12/19/2022 @ 1131 - Placed PAT call to pt's Cardiologist-Dr. Kevin Crocker @ [phone]; got voice mail.  I left request for office to fax last [employer] AICD intterrogation to [facility] fax. KP

## 2022-12-21 ENCOUNTER — HOSPITAL ENCOUNTER (OUTPATIENT)
Facility: HOSPITAL | Age: 77
Setting detail: HOSPITAL OUTPATIENT SURGERY
Discharge: HOME | End: 2022-12-21
Attending: ORTHOPAEDIC SURGERY | Admitting: ORTHOPAEDIC SURGERY

## 2022-12-21 VITALS
SYSTOLIC BLOOD PRESSURE: 124 MMHG | BODY MASS INDEX: 34.89 KG/M2 | HEART RATE: 64 BPM | DIASTOLIC BLOOD PRESSURE: 73 MMHG | OXYGEN SATURATION: 97 % | HEIGHT: 75 IN | WEIGHT: 280.65 LBS | TEMPERATURE: 96.8 F | RESPIRATION RATE: 18 BRPM

## 2022-12-21 PROBLEM — G56.01 RIGHT CARPAL TUNNEL SYNDROME: Status: ACTIVE | Noted: 2022-12-21

## 2022-12-21 PROBLEM — G56.02 LEFT CARPAL TUNNEL SYNDROME: Status: ACTIVE | Noted: 2022-12-21

## 2022-12-21 LAB
GLUCOSE BLDC GLUCOMTR-MCNC: 137 MG/DL (ref 70–130)
GLUCOSE BLDC GLUCOMTR-MCNC: 88 MG/DL (ref 70–130)

## 2022-12-21 PROCEDURE — 82962 GLUCOSE BLOOD TEST: CPT

## 2022-12-21 RX ORDER — MAGNESIUM HYDROXIDE 1200 MG/15ML
LIQUID ORAL AS NEEDED
Status: DISCONTINUED | OUTPATIENT
Start: 2022-12-21 | End: 2022-12-21 | Stop reason: HOSPADM

## 2022-12-21 RX ORDER — LIDOCAINE HYDROCHLORIDE 10 MG/ML
0.5 INJECTION, SOLUTION EPIDURAL; INFILTRATION; INTRACAUDAL; PERINEURAL ONCE AS NEEDED
Status: DISCONTINUED | OUTPATIENT
Start: 2022-12-21 | End: 2022-12-21 | Stop reason: HOSPADM

## 2022-12-21 RX ORDER — LIDOCAINE HYDROCHLORIDE AND EPINEPHRINE 10; 10 MG/ML; UG/ML
INJECTION, SOLUTION INFILTRATION; PERINEURAL AS NEEDED
Status: DISCONTINUED | OUTPATIENT
Start: 2022-12-21 | End: 2022-12-21 | Stop reason: HOSPADM

## 2022-12-21 RX ORDER — SODIUM CHLORIDE 0.9 % (FLUSH) 0.9 %
3 SYRINGE (ML) INJECTION AS NEEDED
Status: DISCONTINUED | OUTPATIENT
Start: 2022-12-21 | End: 2022-12-21 | Stop reason: HOSPADM

## 2022-12-21 RX ORDER — SODIUM CHLORIDE, SODIUM LACTATE, POTASSIUM CHLORIDE, CALCIUM CHLORIDE 600; 310; 30; 20 MG/100ML; MG/100ML; MG/100ML; MG/100ML
1000 INJECTION, SOLUTION INTRAVENOUS CONTINUOUS
Status: DISCONTINUED | OUTPATIENT
Start: 2022-12-21 | End: 2022-12-21 | Stop reason: HOSPADM

## 2022-12-21 RX ORDER — OXYCODONE AND ACETAMINOPHEN 10; 325 MG/1; MG/1
1 TABLET ORAL EVERY 6 HOURS PRN
COMMUNITY

## 2022-12-21 NOTE — NURSING NOTE
at the bedside and he explains the risk of leaving his ring on his finger and suggest having th ring removed to prevent swelling . The patient does not want the ring to be cut off but rather has been instructed to go to the nearest ER should his finger swell to prevent loss of blood flow

## 2022-12-21 NOTE — OP NOTE
Patient Name: Yakov  : 1945  MRN: [id number]      DATE of SURGERY: 2022    SURGEON: Jair Beal MD    ASSISTANT: NONE    PREOPERATIVE DIAGNOSIS    1.  Left carpal tunnel syndrome.    2.  Type 2 diabetes mellitus  3.  Coronary artery disease  4.  Peripheral vascular disease  5.  Hypertension  6.  History of sleep apnea  7.  History of depression  8.  Obesity     POSTOPERATIVE DIAGNOSIS    1.  Left carpal tunnel syndrome.    2.  Type 2 diabetes mellitus  3.  Coronary artery disease  4.  Peripheral vascular disease  5.  Hypertension  6.  History of sleep apnea  7.  History of depression  8.  Obesity      PROCEDURE PERFORMED    1.  Left open carpal tunnel release      IMPLANTS  None.       ANESTHESIA    Local anesthesia only     OPERATIVE INDICATIONS    The patient is a 77 y.o. male who presented to my clinic with complaints of numbness and tingling in the hand with clinical exam and neurodiagnostic evidence of carpal tunnel syndrome.  The patient wished to proceed with surgery, understanding the risks, benefits, and alternatives.  Conservative treatment failed to improve symptoms.  The risks include, but are not limited to, that of anesthesia, bleeding, infection, pain, damage to the motor and sensory branch of the median nerve, chronic pillar pain, incomplete resolution of symptoms.       ESTIMATED BLOOD LOSS    Less than 5 mL.       SPECIMENS    None.       DRAINS  None.       COMPLICATIONS    None.       PROCEDURE IN DETAIL    The patient was met in the preoperative holding room where the correct patient, procedure and side were confirmed.  The operative site was marked by myself with the patient's agreement.  The consent was signed by myself.  Prior to transportation to the operating room, a local block was administered.  Skin overlying the proposed incision and wrist flexion crease was cleansed with an alcohol swab.  A 23-gauge needle was used to inject 3 cc of 1% lidocaine with epinephrine  at the wrist flexion crease creating a subcutaneous wheal and 7 cc were injected in line with the proposed incision for a total of 10 cc of local anesthetic.  Patient was transferred to the operating room, and a formal timeout was performed identifying the correct patient and the operative site. The tourniquet was then placed on the brachium of the operative extremity. A sterile prep and drape was performed.       A skin marker was used to re-delineate an approximately 1.5 cm incision in line with the radial aspect of the fourth ray beginning proximal to Juarez's cardinal line and ending distal to the wrist flexion crease.  The operative extremity was then exsanguinated with an Esmarch and the tourniquet was inflated to 250 mmHg for less than 10 minutes.  A 15 blade scalpel was used to make a longitudinal incision only through the skin. Soft tissue was dissected in line with the incision through the palmar fascia. The transverse carpal ligament was identified and incised until the carpal tunnel contents were identified.  A distal release was performed first utilizing deep knife and blunt tipped scissor dissection until the palmar fat pad was identified. The proximal portion of the transverse carpal ligament was released in a similar fashion. Finally, a pair of blunt-tipped scissors was inserted with the points directed toward the superficial and ulnar aspect of the wrist to protect the palmar cutaneous branch of the median nerve and the distal forearm fascia was incised.  The release was then inspected both visually and on palpation with no additional constriction points appreciated.  The median nerve was inspected and found to be intact with no significant hypertrophic tenosynovium or soft tissue masses noted.  The tourniquet was then deflated hemostasis was obtained with bipolar electrocautery.  The incision was irrigated and the skin closed with 4-0 nylon sutures. A soft dressing was placed, the patient was  awakened from anesthesia and transported to the recovery room in stable condition.  All counts the end the procedure were correct.  He tolerated the procedure well and was without intraoperative complication.      POSTOPERATIVE PLAN  Discharge home, return to clinic in 2 weeks for suture removal and activity as tolerated. No lifting heavier than 5 pounds for 2 weeks.

## 2023-01-05 RX ORDER — METOPROLOL SUCCINATE 25 MG/1
TABLET, EXTENDED RELEASE ORAL
Qty: 90 TABLET | Refills: 1 | Status: SHIPPED | OUTPATIENT
Start: 2023-01-05

## 2023-01-18 ENCOUNTER — ANESTHESIA EVENT (OUTPATIENT)
Dept: PERIOP | Facility: HOSPITAL | Age: 78
End: 2023-01-18
Payer: MEDICARE

## 2023-01-18 ENCOUNTER — HOSPITAL ENCOUNTER (OUTPATIENT)
Facility: HOSPITAL | Age: 78
Setting detail: HOSPITAL OUTPATIENT SURGERY
Discharge: HOME | End: 2023-01-18
Attending: ORTHOPAEDIC SURGERY | Admitting: ORTHOPAEDIC SURGERY
Payer: MEDICARE

## 2023-01-18 ENCOUNTER — ANESTHESIA (OUTPATIENT)
Dept: PERIOP | Facility: HOSPITAL | Age: 78
End: 2023-01-18
Payer: MEDICARE

## 2023-01-18 VITALS
BODY MASS INDEX: 35.42 KG/M2 | SYSTOLIC BLOOD PRESSURE: 138 MMHG | WEIGHT: 284.83 LBS | HEIGHT: 75 IN | DIASTOLIC BLOOD PRESSURE: 71 MMHG | HEART RATE: 64 BPM | TEMPERATURE: 96.7 F | OXYGEN SATURATION: 96 % | RESPIRATION RATE: 18 BRPM

## 2023-01-18 LAB — GLUCOSE BLDC GLUCOMTR-MCNC: 198 MG/DL (ref 70–130)

## 2023-01-18 PROCEDURE — 25010000002 CEFAZOLIN PER 500 MG: Performed by: ORTHOPAEDIC SURGERY

## 2023-01-18 PROCEDURE — 82962 GLUCOSE BLOOD TEST: CPT

## 2023-01-18 RX ORDER — MAGNESIUM HYDROXIDE 1200 MG/15ML
LIQUID ORAL AS NEEDED
Status: DISCONTINUED | OUTPATIENT
Start: 2023-01-18 | End: 2023-01-18 | Stop reason: HOSPADM

## 2023-01-18 RX ORDER — CEFAZOLIN SODIUM IN 0.9 % NACL 3 G/100 ML
3 INTRAVENOUS SOLUTION, PIGGYBACK (ML) INTRAVENOUS ONCE
Status: COMPLETED | OUTPATIENT
Start: 2023-01-18 | End: 2023-01-18

## 2023-01-18 RX ORDER — SODIUM CHLORIDE 9 MG/ML
40 INJECTION, SOLUTION INTRAVENOUS AS NEEDED
Status: DISCONTINUED | OUTPATIENT
Start: 2023-01-18 | End: 2023-01-18 | Stop reason: HOSPADM

## 2023-01-18 RX ORDER — LIDOCAINE HYDROCHLORIDE 10 MG/ML
0.5 INJECTION, SOLUTION EPIDURAL; INFILTRATION; INTRACAUDAL; PERINEURAL ONCE AS NEEDED
Status: DISCONTINUED | OUTPATIENT
Start: 2023-01-18 | End: 2023-01-18 | Stop reason: HOSPADM

## 2023-01-18 RX ORDER — SODIUM CHLORIDE 0.9 % (FLUSH) 0.9 %
10 SYRINGE (ML) INJECTION EVERY 12 HOURS SCHEDULED
Status: DISCONTINUED | OUTPATIENT
Start: 2023-01-18 | End: 2023-01-18 | Stop reason: HOSPADM

## 2023-01-18 RX ORDER — SODIUM CHLORIDE, SODIUM LACTATE, POTASSIUM CHLORIDE, CALCIUM CHLORIDE 600; 310; 30; 20 MG/100ML; MG/100ML; MG/100ML; MG/100ML
100 INJECTION, SOLUTION INTRAVENOUS CONTINUOUS PRN
Status: DISCONTINUED | OUTPATIENT
Start: 2023-01-18 | End: 2023-01-18 | Stop reason: HOSPADM

## 2023-01-18 RX ORDER — SODIUM CHLORIDE 0.9 % (FLUSH) 0.9 %
10 SYRINGE (ML) INJECTION AS NEEDED
Status: DISCONTINUED | OUTPATIENT
Start: 2023-01-18 | End: 2023-01-18 | Stop reason: HOSPADM

## 2023-01-18 RX ADMIN — SODIUM CHLORIDE, POTASSIUM CHLORIDE, SODIUM LACTATE AND CALCIUM CHLORIDE 100 ML/HR: 600; 310; 30; 20 INJECTION, SOLUTION INTRAVENOUS at 09:13

## 2023-01-18 RX ADMIN — Medication 3 G: at 11:25

## 2023-01-18 NOTE — OP NOTE
Patient Name: Yakov  : 1945  MRN: [id number]      DATE of SURGERY: 2023    SURGEON: Jair Beal MD    ASSISTANT: NONE    PREOPERATIVE DIAGNOSIS    1.  Right carpal tunnel syndrome   2.  Type 2 diabetes mellitus  3.  Coronary artery disease  4.  Peripheral vascular disease  5.  Hypertension  6.  History of sleep apnea  7.  History of depression  8.  Obesity     POSTOPERATIVE DIAGNOSIS    1.  Right carpal tunnel syndrome   2.  Type 2 diabetes mellitus  3.  Coronary artery disease  4.  Peripheral vascular disease  5.  Hypertension  6.  History of sleep apnea  7.  History of depression  8.  Obesity     PROCEDURE PERFORMED    1.  Right open carpal tunnel release      IMPLANTS  None.       ANESTHESIA    Local anesthesia only     OPERATIVE INDICATIONS    The patient is a 77 y.o. male who presented to my clinic with complaints of numbness and tingling in the hand with clinical exam and neurodiagnostic evidence of carpal tunnel syndrome.  The patient wished to proceed with surgery, understanding the risks, benefits, and alternatives.  Conservative treatment failed to improve symptoms.  The risks include, but are not limited to, that of anesthesia, bleeding, infection, pain, damage to the motor and sensory branch of the median nerve, chronic pillar pain, incomplete resolution of symptoms.       ESTIMATED BLOOD LOSS    Less than 5 mL.       SPECIMENS    None.       DRAINS  None.       COMPLICATIONS    None.       PROCEDURE IN DETAIL    The patient was met in the preoperative holding room where the correct patient, procedure and side were confirmed.  The operative site was marked by myself with the patient's agreement.  The consent was signed by myself.  Prior to transportation to the operating room, a local block was administered utilizing 10 cc of 1% lidocaine with epinephrine.  Approximately 2 cc were injected at the wrist flexion crease creating a subcutaneous wheal and the remaining 8 cc were injected  in line with the proposed incision.  After adequate anesthesia was allowed to set up, patient was transferred to the operating room, and a formal timeout was performed identifying the correct patient and the operative site. The tourniquet was then placed on the brachium of the operative extremity. A sterile prep and drape was performed.       A skin marker was used to delineate an approximately 1.5 cm incision in line with the radial aspect of the fourth ray beginning proximal to Juarez's cardinal line and ending distal to the wrist flexion crease.  The operative extremity was then exsanguinated with an Esmarch and the tourniquet was inflated to 250 mmHg for less than 10 minutes.  A 15 blade scalpel was used to make a longitudinal incision only through the skin. Soft tissue was dissected in line with the incision through the palmar fascia. The transverse carpal ligament was identified and incised until the carpal tunnel contents were identified.  A distal release was performed first utilizing deep knife and blunt tipped scissor dissection until the palmar fat pad was identified. The proximal portion of the transverse carpal ligament was released in a similar fashion. Finally, a pair of blunt-tipped scissors was inserted with the points directed toward the superficial and ulnar aspect of the wrist to protect the palmar cutaneous branch of the median nerve and the distal forearm fascia was incised.  The release was then inspected both visually and on palpation with no additional constriction points appreciated.  The median nerve was inspected and found to be intact with no significant hypertrophic tenosynovium or soft tissue masses noted.  The tourniquet was then deflated hemostasis was obtained with bipolar electrocautery.  The incision was irrigated and the skin closed with 4-0 nylon sutures.  A soft dressing was placed, the patient was awakened from anesthesia and transported to the recovery room in stable  condition.    All counts the end procedure were correct.  He tolerated the procedure well and was without intraoperative complication.     POSTOPERATIVE PLAN  Discharge home, return to clinic in 2 weeks for suture removal and activity as tolerated. No lifting heavier than 5 pounds for 2 weeks.

## 2023-02-10 DIAGNOSIS — I50.22 CHRONIC SYSTOLIC HEART FAILURE (HCC): ICD-10-CM

## 2023-02-10 DIAGNOSIS — I25.5 ISCHEMIC CARDIOMYOPATHY: ICD-10-CM

## 2023-02-10 DIAGNOSIS — Z95.810 AICD (AUTOMATIC CARDIOVERTER/DEFIBRILLATOR) PRESENT: Primary | ICD-10-CM

## 2023-02-13 ENCOUNTER — TELEPHONE (OUTPATIENT)
Dept: CARDIOLOGY CLINIC | Age: 78
End: 2023-02-13

## 2023-02-13 NOTE — TELEPHONE ENCOUNTER
Patient called and  left a message stating he received a call about his ICD.  He would like a return call at [phone]

## 2023-02-14 ENCOUNTER — OFFICE VISIT (OUTPATIENT)
Dept: CARDIOLOGY CLINIC | Age: 78
End: 2023-02-14
Payer: MEDICARE

## 2023-02-14 ENCOUNTER — TELEPHONE (OUTPATIENT)
Dept: CARDIOLOGY CLINIC | Age: 78
End: 2023-02-14

## 2023-02-14 VITALS
BODY MASS INDEX: 33.61 KG/M2 | OXYGEN SATURATION: 96 % | WEIGHT: 276 LBS | HEART RATE: 61 BPM | DIASTOLIC BLOOD PRESSURE: 62 MMHG | SYSTOLIC BLOOD PRESSURE: 112 MMHG | HEIGHT: 76 IN

## 2023-02-14 DIAGNOSIS — R06.09 DOE (DYSPNEA ON EXERTION): ICD-10-CM

## 2023-02-14 DIAGNOSIS — R53.83 OTHER FATIGUE: ICD-10-CM

## 2023-02-14 DIAGNOSIS — I25.10 CORONARY ARTERY DISEASE INVOLVING NATIVE CORONARY ARTERY OF NATIVE HEART WITHOUT ANGINA PECTORIS: Primary | ICD-10-CM

## 2023-02-14 DIAGNOSIS — R06.02 SHORTNESS OF BREATH: ICD-10-CM

## 2023-02-14 DIAGNOSIS — Z95.810 AICD (AUTOMATIC CARDIOVERTER/DEFIBRILLATOR) PRESENT: ICD-10-CM

## 2023-02-14 DIAGNOSIS — I25.5 ISCHEMIC CARDIOMYOPATHY: ICD-10-CM

## 2023-02-14 DIAGNOSIS — E78.2 MIXED HYPERLIPIDEMIA: ICD-10-CM

## 2023-02-14 DIAGNOSIS — I48.0 PAF (PAROXYSMAL ATRIAL FIBRILLATION) (HCC): ICD-10-CM

## 2023-02-14 DIAGNOSIS — I50.22 CHRONIC SYSTOLIC HEART FAILURE (HCC): ICD-10-CM

## 2023-02-14 DIAGNOSIS — I10 ESSENTIAL HYPERTENSION: ICD-10-CM

## 2023-02-14 PROCEDURE — G8484 FLU IMMUNIZE NO ADMIN: HCPCS | Performed by: NURSE PRACTITIONER

## 2023-02-14 PROCEDURE — G8417 CALC BMI ABV UP PARAM F/U: HCPCS | Performed by: NURSE PRACTITIONER

## 2023-02-14 PROCEDURE — 3074F SYST BP LT 130 MM HG: CPT | Performed by: NURSE PRACTITIONER

## 2023-02-14 PROCEDURE — 1123F ACP DISCUSS/DSCN MKR DOCD: CPT | Performed by: NURSE PRACTITIONER

## 2023-02-14 PROCEDURE — 93289 INTERROG DEVICE EVAL HEART: CPT | Performed by: NURSE PRACTITIONER

## 2023-02-14 PROCEDURE — 1036F TOBACCO NON-USER: CPT | Performed by: NURSE PRACTITIONER

## 2023-02-14 PROCEDURE — G8427 DOCREV CUR MEDS BY ELIG CLIN: HCPCS | Performed by: NURSE PRACTITIONER

## 2023-02-14 PROCEDURE — 3078F DIAST BP <80 MM HG: CPT | Performed by: NURSE PRACTITIONER

## 2023-02-14 PROCEDURE — 99214 OFFICE O/P EST MOD 30 MIN: CPT | Performed by: NURSE PRACTITIONER

## 2023-02-14 NOTE — PROGRESS NOTES
Dear Dr. Samantha Velez MD,    Thank you for allowing me to participate in the care of Mr. Iza Mora. He presents today at the [address] [place] [address]. As you know, Mr. Cindy Mathis is a 65 y.o. male with history of hypertension, hyperlipidemia, chronic systolic heart failure, ischemic cardiomyopathy, and CAD (CABG, 5/30/2019) who presents with the chief complaint of follow-up for not feeling well. He is a patient of Dr. Amita Calloway. He has an appointment already scheduled with Dr. Amita Calloway in 2 weeks. He states that he has had ongoing fatigue which has been worse in the past 2. He states he hurts all over his back and his legs and his arms. He does go to pain management regularly. He cannot pinpoint anything else. He denies any chest pain. He does have dyspnea on exertion. He denies any fast or slow heart rhythms. He is having issues with fall at home and has on average falls 2 times per week. He states he fell yesterday however did not lose consciousness. He states his he loses his balance. He has had no headaches since his fall.he is sleeping on 1 pillow at night. he is not waking gasping for air. he denies any swelling. he has been compliant with his medications. his BP has been controlled. PCP follows labs and lipids. He otherwise denies chest pain, PND, orthopnea, syncope or near syncope. He has no other complaints. Review of Systems   Constitutional: Positive for fatigue. Negative for fever, chills, diaphoresis, activity change, appetite change,  and unexpected weight change. Eyes: Negative for photophobia, pain, redness and visual disturbance. Respiratory: Positive for shortness of breath. Negative for apnea, cough, chest tightness,  wheezing and stridor. Cardiovascular: Positive for leg swelling. Negative for chest pain and palpitations. Gastrointestinal: Negative for abdominal distention. Genitourinary: Negative for dysuria, urgency and frequency.    Musculoskeletal: Positive for back pain and falls. Negative for myalgias, arthralgias and gait problem. Skin: Negative for color change, pallor, rash and wound. Neurological: Negative for dizziness, tremors, speech difficulty, weakness and numbness. Hematological: Does not bruise/bleed easily. Psychiatric/Behavioral: Negative. Past Medical History:   Diagnosis Date    Acute ischemic stroke ([address] 2/20/2017    Acute on chronic systolic congestive heart failure ([address] 8/15/2022    Atherosclerotic heart disease     s/p MI, stenting x 3 jan 2011([place])    Breakthrough seizure ([address] 10/16/2021    CAD (coronary artery disease)     Cerebral artery occlusion with cerebral infarction [facility])     Cerebrovascular accident (CVA) due to embolism of right middle cerebral artery ([address] 8/14/2017    CHF (congestive heart failure) ([facility])     Chronic bilateral low back pain without sciatica 11/10/2016    Chronic kidney disease     DDD (degenerative disc disease), lumbar 11/15/2016    Depression     Depression with anxiety     Diabetes mellitus ([address]     type II    Diabetes mellitus, type 2 ([address]     DM (diabetes mellitus) ([address] 7/12/2011    ESBL (extended spectrum beta-lactamase) producing bacteria infection 11/16/2019    urine    Glaucoma     Headache     Hyperlipidemia     Cholesterol management per pcp.      Hypertension     Macular degeneration     MI (myocardial infarction) ([address]     MI (myocardial infarction) ([address]     Neuromuscular disorder ([address]     Neuropathy     MOO on CPAP     Osteoarthritis     Palliative care patient 06/11/2019    Sleep apnea     Spondylosis of lumbar region without myelopathy or radiculopathy 11/15/2016    Status post placement of implantable loop recorder 10/31/2017    TIA (transient ischemic attack)        Past Surgical History:   Procedure Laterality Date    APPENDECTOMY      at age 4    [address]      2016 (fusion), 2017    BREAST SURGERY  05/2019    CARDIAC CATHETERIZATION  12/12/12    with angioplasty, stent CARDIAC CATHETERIZATION  3/29/15  [facility]    stent to distal RCA. EF 60%    CHOLECYSTECTOMY, LAPAROSCOPIC          COLONOSCOPY          CORONARY ANGIOPLASTY WITH STENT PLACEMENT   [place]    stent x 3    CORONARY ANGIOPLASTY WITH STENT PLACEMENT      11    CORONARY ARTERY BYPASS GRAFT N/A 2019    CORONARY ARTERY BYPASS GRAFT X3 WITH LEFT INTERNAL MAMMARY ARTERY WITH ENDOSCOPIC VEIN HARVESTING WITH PERFUSION TRANSESOPHAGEAL ECHOCARDIOGRAM performed by Merlinda Jolly, MD at [facility] CATH LAB PROCEDURE  [id number]    primary stent placement to the first circumflex marginal branch, balloon angioplasty to the third left anterior descending diagnonal branch, intracoronary nitroglycerin adminstration    JOINT REPLACEMENT      left knee, ~2016    LUMBAR FUSION Left 11/15/2016    LUMBAR INTERBODY FUSION LATERAL L3-4 L4-5 WITH LATERAL PLATE  performed by Chasity Cortez MD at [address] [facility]  2019       Family History   Problem Relation Age of Onset    Coronary Art Dis Mother     Cancer Sister         uterine    Kidney Disease Sister     Coronary Art Dis Brother          in his 46s - age 47    Cancer Father         colon    Coronary Art Dis Sister     Cancer Sister         olive cancer    No Known Problems Sister     No Known Problems Sister     No Known Problems Son     Alcohol Abuse Son         drank self to death    No Known Problems Son        Social History     Socioeconomic History    Marital status:      Spouse name: Bubba Kerr    Number of children: 3    Years of education: Not on file    Highest education level: Not on file   Occupational History    Occupation:    Tobacco Use    Smoking status: Never    Smokeless tobacco: Never   Vaping Use    Vaping Use: Never used   Substance and Sexual Activity    Alcohol use: Yes     Alcohol/week: 1.0 standard drink     Types: 1 Shots of liquor per week     Comment: Occ.     Drug use: Not Currently Types: Marijuana (Weed)     Comment: edibles, uses for sleep    Sexual activity: Not on file     Comment: 3 sons   Other Topics Concern    Not on file   Social History Narrative    CODE STATUS: Full Code    HEALTH CARE PROXY: his wife, Mrs. Cherrie Phan, [phone]    AMBULATES: independently    DOMICILED: private home, stairs inside home, has a few steps to enter home, lives alone with wife, no pets     Social Determinants of Health     Financial Resource Strain: Not on file   Food Insecurity: Not on file   Transportation Needs: Not on file   Physical Activity: Not on file   Stress: Not on file   Social Connections: Not on file   Intimate Partner Violence: Not on file   Housing Stability: Not on file       Allergies   Allergen Reactions    Statins Other (See Comments)     Intolerable leg cramps    Crestor [Rosuvastatin Calcium]     Adhesive Tape Itching    Morphine Itching         Current Outpatient Medications:     metoprolol succinate (TOPROL XL) 25 MG extended release tablet, TAKE 1 TABLET EVERY DAY, Disp: 90 tablet, Rfl: 1    pravastatin (PRAVACHOL) 40 MG tablet, Take 1 tablet by mouth daily, Disp: 30 tablet, Rfl: 5    clopidogrel (PLAVIX) 75 MG tablet, TAKE 1 TABLET EVERY DAY, Disp: 90 tablet, Rfl: 3    FARXIGA 10 MG tablet, TAKE 1 TABLET BY MOUTH EVERY DAY, Disp: , Rfl:     oxyCODONE-acetaminophen (PERCOCET)  MG per tablet, TAKE 1 TABLET BY MOUTH EVERY 6 HOURS AS NEEDED DNF 7/9, Disp: , Rfl:     fluticasone (FLONASE) 50 MCG/ACT nasal spray, , Disp: , Rfl:     ALPRAZolam (XANAX) 0.5 MG tablet, Take 0.5 mg by mouth nightly as needed for Sleep., Disp: , Rfl:     famotidine (PEPCID) 40 MG tablet, Take 40 mg by mouth daily, Disp: , Rfl:     isosorbide mononitrate (IMDUR) 30 MG extended release tablet, Take 30 mg by mouth daily, Disp: , Rfl:     azelastine (ASTELIN) 0.1 % nasal spray, 1 spray by Nasal route 2 times daily Use in each nostril as directed, Disp: , Rfl:     finasteride (PROSCAR) 5 MG tablet,  Take 5 mg by mouth daily, Disp: , Rfl:     docusate sodium (COLACE) 100 MG capsule, Take 200 mg by mouth daily, Disp: , Rfl:     furosemide (LASIX) 40 MG tablet, Take 40 mg by mouth 2 times daily , Disp: , Rfl:     Multiple Vitamins-Minerals (PRESERVISION AREDS PO), Take 2 capsules by mouth, Disp: , Rfl:     sertraline (ZOLOFT) 100 MG tablet, Take 1 tablet by mouth daily, Disp: 90 tablet, Rfl: 2    nitroGLYCERIN (NITROSTAT) 0.4 MG SL tablet, up to max of 3 total doses. If no relief after 1 dose, call 911., Disp: 30 tablet, Rfl: 2    sacubitril-valsartan (ENTRESTO) 49-51 MG per tablet, Take 1 tablet by mouth 2 times daily, Disp: 60 tablet, Rfl: 5    zoster recombinant adjuvanted vaccine (SHINGRIX) 50 MCG/0.5ML SUSR injection, 1 vaccine now and repeat in 2-6 months., Disp: 0.5 mL, Rfl: 1    rivaroxaban (XARELTO) 15 MG TABS tablet, Take 1 tablet by mouth daily (with breakfast) (Patient not taking: Reported on 2/14/2023), Disp: 90 tablet, Rfl: 3    PE:  Vitals:    02/14/23 0833   BP: 112/62   Pulse: 61   SpO2: 96%       Estimated body mass index is 33.6 kg/m² as calculated from the following:    Height as of this encounter: 6' 4\" (1.93 m). Weight as of this encounter: 276 lb (125.2 kg). Constitutional: He is oriented to person, place, and time. He appears well-developed and well-nourished in no acute distress. Neck:  Neck supple without JVD present. No trachea deviation present. No thyromegaly present. Eyes:Conjunctivae and EOM are normal. Pupils equal and reactive to light. ENT:Hearing appears normal.conjunctiva and lids are normal, ears and nose appear normal.  Cardiovascular: Normal rate, Normal rhythm, normal heart sounds. No murmur ascultated. No gallop and no friction rub. No carotid bruits. Trace peripheral edema. Pulmonary/Chest:  Lungs clear to auscultation bilaterally without evidence of respiratory distress. He without wheezes. He without rales or ronchi.    Musculoskeletal: Normal range of motion. Gait is normal.  Head is normocephalic and atraumatic. Skin: Skin is warm and dry without rash or pallor. He has a cut on right side of head above eye and below right lower lip. Psychiatric:He is alert and oriented to person, place, and time. He has a normal mood and affect. His behavior is normal. Thought content normal.   Neurological: Alert and oriented x3. He has ankle  strengths. He is legs are equal in strength. Lab Results   Component Value Date/Time    CREATININE 1.4 08/15/2022 03:15 PM    CREATININE 1.5 07/12/2022 03:06 PM    CREATININE 1.3 03/24/2022 03:21 PM    CREATININE 1.0 05/25/2011 07:14 AM    CREATININE 1.0 05/24/2011 06:53 PM    CREATININE 1.1 05/23/2011 11:03 AM    HGB 13.9 03/24/2022 03:21 PM    HGB 14.9 10/18/2021 03:37 AM    HGB 14.8 10/17/2021 03:48 AM    PROBNP 1,024 08/15/2022 03:15 PM    PROBNP 808 07/12/2022 03:06 PM    PROBNP 3,081 03/24/2022 03:21 PM         ECG 02/14/23  Sinus rhythm, HR 61 bpm    ICD interrogated  Presenting rhythm: AP VS, AP 51.8 %,  0.2 %  Battery status: 7 years  Lead status: lead impedance within range and stable  Sensing: P waves 2.5 mV,  R waves 10.8 mV  Thresholds:  Atrial 0.75 V @ 0.4ms, ventricular 0.625 V @ 0.4ms  Observations:  1 episodes NSVT  Next office visit scheduled for 2/22/23 with Dr. Rony Wolf, Recommendations, & Plan:  65 y.o. male with CAD, HTN, HLD, ICM,  chronic systolic heart failure, AICD, VÁSQUEZ, Fatigue, & PAF    CAD-Controlled on Plavix, Imdur, Toprol, Entresto & Pravachol. Continue current reigmen    HTN-controlled on Imdur, Toprol, & Entresto. Continue current regimen    HLD-by PCP, on Pravachol, no changes    ICM/chronic systolic heart failure/VÁSQUEZ/Fatigue-no signs of fluid overload on Entresto, Toprol, & Lasix. AICD is functioning normally. He is not feeling well and complaining of worsening shortness of breath. Will order 2D echo.   Also get a CBC, CMP, & BNP.    PAF/chronic anticoagulation-he did have another episode of A-fib which was paroxysmal on 2/4. He has not been taking his Xarelto due to price. He also is having a history of falls with falling twice per week. Disposition - RTC as scheduled with Dr. Eloise Soto or sooner if needed      Please do not hesitate to contact me for any questions or concerns.     Sincerely yours,    TEGAN Montana

## 2023-02-14 NOTE — PATIENT INSTRUCTIONS
Go to first floor to get blood work    [facility] [employer] located on the first floor of [place] [address] through hospital main entrance and turn immediately to your left. Date/Time:     Patient's contact number:  [phone] (home)     Echocardiogram -  No prep. Takes approximately 30 min. An echocardiogram uses sound waves to produce images of your heart. This commonly used test allows your doctor to see how your heart is beating and pumping blood. Your doctor can use the images from an echocardiogram to identify various abnormalities in the heart muscle and valves. This test has 2 parts: You will be asked to disrobe from the waist up and given a gown to wear. The technologist will then hook up an EKG monitor to you for the entire exam.   You will then have an ultrasound of your heart (echocardiogram) to assess the heart muscle, heart valves and heart function. You may eat and take any medicines before the exam.     If you need to change your appointment, please call outpatient scheduling at [phone].

## 2023-02-21 ENCOUNTER — TELEPHONE (OUTPATIENT)
Dept: CARDIOLOGY CLINIC | Age: 78
End: 2023-02-21

## 2023-02-22 ENCOUNTER — OFFICE VISIT (OUTPATIENT)
Dept: CARDIOLOGY CLINIC | Age: 78
End: 2023-02-22
Payer: MEDICARE

## 2023-02-22 VITALS
BODY MASS INDEX: 33.61 KG/M2 | WEIGHT: 276 LBS | HEART RATE: 83 BPM | DIASTOLIC BLOOD PRESSURE: 70 MMHG | HEIGHT: 76 IN | SYSTOLIC BLOOD PRESSURE: 118 MMHG

## 2023-02-22 DIAGNOSIS — I50.22 CHRONIC SYSTOLIC HEART FAILURE (HCC): Primary | ICD-10-CM

## 2023-02-22 DIAGNOSIS — I25.10 CORONARY ARTERY DISEASE INVOLVING NATIVE CORONARY ARTERY OF NATIVE HEART WITHOUT ANGINA PECTORIS: ICD-10-CM

## 2023-02-22 PROCEDURE — 3078F DIAST BP <80 MM HG: CPT | Performed by: INTERNAL MEDICINE

## 2023-02-22 PROCEDURE — 1036F TOBACCO NON-USER: CPT | Performed by: INTERNAL MEDICINE

## 2023-02-22 PROCEDURE — G8427 DOCREV CUR MEDS BY ELIG CLIN: HCPCS | Performed by: INTERNAL MEDICINE

## 2023-02-22 PROCEDURE — 99214 OFFICE O/P EST MOD 30 MIN: CPT | Performed by: INTERNAL MEDICINE

## 2023-02-22 PROCEDURE — 1123F ACP DISCUSS/DSCN MKR DOCD: CPT | Performed by: INTERNAL MEDICINE

## 2023-02-22 PROCEDURE — 3074F SYST BP LT 130 MM HG: CPT | Performed by: INTERNAL MEDICINE

## 2023-02-22 PROCEDURE — G8484 FLU IMMUNIZE NO ADMIN: HCPCS | Performed by: INTERNAL MEDICINE

## 2023-02-22 PROCEDURE — G8417 CALC BMI ABV UP PARAM F/U: HCPCS | Performed by: INTERNAL MEDICINE

## 2023-02-22 ASSESSMENT — ENCOUNTER SYMPTOMS
COUGH: 0
WHEEZING: 0
BACK PAIN: 0
VOMITING: 0
ABDOMINAL PAIN: 0
SHORTNESS OF BREATH: 1
BLOOD IN STOOL: 0
DIARRHEA: 0
ABDOMINAL DISTENTION: 0

## 2023-02-22 NOTE — PROGRESS NOTES
[facility]  Cardiology Office Note  [address] [address]  Phone: [phone]  Fax: [phone]                            Date:  2/22/2023  Patient: Abelino Douglas  Age:  65 y.o., 1945    Referral: No ref.  provider found      PROBLEM LIST:    Patient Active Problem List    Diagnosis Date Noted    ACS (acute coronary syndrome) ([address]      Priority: High    Acute on chronic systolic congestive heart failure ([address] 08/15/2022     Priority: Medium    Seizure ([address] 10/17/2021     Priority: Low    Breakthrough seizure ([address] 10/16/2021     Priority: Low    Seizure due to hypoglycemia ([address] 10/16/2021     Priority: Low    Vestibular neuropathy 06/22/2021     Priority: Low    History of chronic kidney disease 07/13/2020     Priority: Low    Edema 07/13/2020     Priority: Low    AICD (automatic cardioverter/defibrillator) present 11/25/2019     Priority: Low    Complicated UTI (urinary tract infection) 11/16/2019     Priority: Low    Chronic systolic heart failure ([address] 08/09/2019     Priority: Low    Urinary tract infection due to extended-spectrum beta lactamase (ESBL) producing Escherichia coli 06/14/2019     Priority: Low    Ischemic cardiomyopathy 06/13/2019     Priority: Low    Palliative care patient 06/11/2019     Priority: Low    Chronic constipation 06/10/2019     Priority: Low    Hx of CABG 06/01/2019     Priority: Low    Acute MI inferior lateral first episode care ([address] 05/31/2019     Priority: Low    SOB (shortness of breath) 04/15/2019     Priority: Low    Chest pain 02/28/2019     Priority: Low     Overview Note:     1/2011 stents x 3 [facility])  5/4/2011  Cath  Patent stents in the mid LAD, stent to OM1, PTCA to the D2, normal LVFX  12/12/12  Cath patent stents in the mid LAD, PTCA to the D3, stent to distal LAD, normal LVFX  3/29/2015  Cath  Stent to the PLOM, normal LVFX  11/1/2019  Loop placed  3/1/19 lexiscan negative for myocardial ischemia, EF 50  %   5/24/19 ACS PARKER RISK Score 5 (angina, + troponin, CAD>50, age>65, plavix ), AUC indication 3, AUC score 9   5/25/19  Cath 50% distal LM, 100% mid LAD, 80% diag, 100% mid RCA, inferior hypokinesis, EF 45%              MOO on CPAP 05/03/2018     Priority: Low    Dermatitis 03/05/2018     Priority: Low    Depression with anxiety 03/05/2018     Priority: Low    Benign prostatic hyperplasia without lower urinary tract symptoms 03/05/2018     Priority: Low    Chronic tension-type headache, not intractable 03/05/2018     Priority: Low    West Nile virus seropositivity 11/22/2017     Priority: Low    Status post placement of implantable loop recorder 10/31/2017     Priority: Low    Recurrent major depressive disorder, in full remission ([facility] 10/17/2017     Priority: Low    Trigger middle finger of left hand 10/17/2017     Priority: Low    Peripheral polyneuropathy 10/17/2017     Priority: Low    Palpitations 10/17/2017     Priority: Low    Chronic insomnia 10/17/2017     Priority: Low    Cerebrovascular accident (CVA) due to embolism of right middle cerebral artery ([facility] 08/14/2017     Priority: Low    Vertigo 03/27/2017     Priority: Low    Imbalance 03/27/2017     Priority: Low    Thoracic outlet syndrome 02/21/2017     Priority: Low     Overview Note:     L sided on exam 2/21/17      Acute ischemic stroke (HCC) 02/20/2017     Priority: Low    DDD (degenerative disc disease), lumbar 11/15/2016     Priority: Low    Spondylosis of lumbar region without myelopathy or radiculopathy 11/15/2016     Priority: Low    Chronic bilateral low back pain without sciatica 11/10/2016     Priority: Low    Mixed hyperlipidemia 07/23/2012     Priority: Low    MI (myocardial infarction) [facility])      Priority: Low    Type 2 diabetes mellitus with diabetic polyneuropathy, with long-term current use of insulin ([facility] 07/12/2011     Priority: Low    Essential hypertension 07/12/2011     Priority: Low    Coronary artery disease involving native coronary artery of native heart without angina pectoris 07/12/2011     Priority: Low     1. Coronary artery disease, status post multiple PCI's to LAD January 2011, RCA December 2012, RPL March 2015 presenting 5/24/2019 with inferior wall MI with late presentation involving a large dominant RCA with distal occlusion, not intervened on with severe mid to distal LAD restenosis and moderate left main disease, status post CABG 5/30/2019 with LIMA to 1st diagonal, SVG to LAD and SVG to ramus intermedius, ejection fraction 25% with severe inferior hypokinesis. Thallium viability study with large inferolateral, predominantly lateral infarct with no significant viability status post ICD placement 11/1/2019.  2. CK D stage III.  3. Diabetes mellitus.  4.  Paroxysmal atrial fibrillation, initiated on Xarelto.  5. Chronic severe constipation  6.  Statin intolerance.  7.  Chronic low back pain with bilateral lower extremity weakness and multiple falls.    PRESENTATION: Clifton Phan is a 77 y.o. year old male for follow-up evaluation.  Recently seen by nurse practitioner.  Reported shortness of breath.  No leg swelling.  Labs as well as an echo was ordered.  On this visit he does not report any change in his effort tolerance.  Shortness of breath is stable.  No leg swelling or chest pain.  proBNP was normal at 421 from previous value 8/2022 of 1024.  He has lost about 10 pounds since July 2022.  Not carrying a cane though he falls frequently.  Noted atrial fibrillation on device with 1.7% burden.  Unable to afford Xarelto or Eliquis.    REVIEW OF SYSTEMS:  Review of Systems   Constitutional:  Negative for activity change, diaphoresis and fatigue.   HENT:  Negative for hearing loss, nosebleeds and tinnitus.    Eyes:  Negative for visual disturbance.   Respiratory:  Positive for shortness of breath. Negative for cough and wheezing.    Cardiovascular:  Negative for chest pain, palpitations and leg swelling.  Gastrointestinal:  Negative for abdominal distention, abdominal pain, blood in stool, diarrhea and vomiting. Endocrine: Negative for cold intolerance, heat intolerance, polydipsia, polyphagia and polyuria. Genitourinary:  Negative for difficulty urinating, flank pain and hematuria. Musculoskeletal:  Negative for arthralgias, back pain, joint swelling and myalgias. Skin:  Negative for pallor and rash. Neurological:  Negative for dizziness, seizures, syncope and headaches. Psychiatric/Behavioral:  Negative for behavioral problems and dysphoric mood. The patient is not nervous/anxious. Past Medical History:      Diagnosis Date    Acute ischemic stroke ([address] 2/20/2017    Acute on chronic systolic congestive heart failure ([address] 8/15/2022    Atherosclerotic heart disease     s/p MI, stenting x 3 jan 2011([place])    Breakthrough seizure ([address] 10/16/2021    CAD (coronary artery disease)     Cerebral artery occlusion with cerebral infarction [facility])     Cerebrovascular accident (CVA) due to embolism of right middle cerebral artery ([address] 8/14/2017    CHF (congestive heart failure) ([facility])     Chronic bilateral low back pain without sciatica 11/10/2016    Chronic kidney disease     DDD (degenerative disc disease), lumbar 11/15/2016    Depression     Depression with anxiety     Diabetes mellitus ([address]     type II    Diabetes mellitus, type 2 ([address]     DM (diabetes mellitus) ([address] 7/12/2011    ESBL (extended spectrum beta-lactamase) producing bacteria infection 11/16/2019    urine    Glaucoma     Headache     Hyperlipidemia     Cholesterol management per pcp.      Hypertension     Macular degeneration     MI (myocardial infarction) ([address]     MI (myocardial infarction) ([address]     Neuromuscular disorder ([address]     Neuropathy     MOO on CPAP     Osteoarthritis     Palliative care patient 06/11/2019    Sleep apnea     Spondylosis of lumbar region without myelopathy or radiculopathy 11/15/2016    Status post placement of implantable loop recorder 10/31/2017    TIA (transient ischemic attack)        Past Surgical History:      Procedure Laterality Date    APPENDECTOMY      at age 4    BACK SURGERY      2016 (fusion), 2017    BREAST SURGERY  05/2019    CARDIAC CATHETERIZATION  12/12/12    with angioplasty, stent    CARDIAC CATHETERIZATION  3/29/15  MDL    stent to distal RCA.  EF 60%    CHOLECYSTECTOMY, LAPAROSCOPIC      2012    COLONOSCOPY      2010    CORONARY ANGIOPLASTY WITH STENT PLACEMENT  jan 11 [place]    stent x 3    CORONARY ANGIOPLASTY WITH STENT PLACEMENT      01/01/11    CORONARY ARTERY BYPASS GRAFT N/A 5/30/2019    CORONARY ARTERY BYPASS GRAFT X3 WITH LEFT INTERNAL MAMMARY ARTERY WITH ENDOSCOPIC VEIN HARVESTING WITH PERFUSION TRANSESOPHAGEAL ECHOCARDIOGRAM performed by Clarice Weiss MD at [facility] CATH LAB PROCEDURE  [id number]    primary stent placement to the first circumflex marginal branch, balloon angioplasty to the third left anterior descending diagnonal branch, intracoronary nitroglycerin adminstration    JOINT REPLACEMENT      left knee, ~2016    LUMBAR FUSION Left 11/15/2016    LUMBAR INTERBODY FUSION LATERAL L3-4 L4-5 WITH LATERAL PLATE  performed by Tiffany Washburn MD at [address]  11/02/2019       Medications:  Current Outpatient Medications   Medication Sig Dispense Refill    rivaroxaban (XARELTO) 15 MG TABS tablet Take 1 tablet by mouth daily (with breakfast) 90 tablet 3    metoprolol succinate (TOPROL XL) 25 MG extended release tablet TAKE 1 TABLET EVERY DAY 90 tablet 1    pravastatin (PRAVACHOL) 40 MG tablet Take 1 tablet by mouth daily 30 tablet 5    clopidogrel (PLAVIX) 75 MG tablet TAKE 1 TABLET EVERY DAY 90 tablet 3    FARXIGA 10 MG tablet TAKE 1 TABLET BY MOUTH EVERY DAY      oxyCODONE-acetaminophen (PERCOCET)  MG per tablet TAKE 1 TABLET BY MOUTH EVERY 6 HOURS AS NEEDED DNF 7/9      fluticasone (FLONASE) 50 MCG/ACT nasal spray       ALPRAZolam Nancy Valladares) 0.5 MG tablet Take 0.5 mg by mouth nightly as needed for Sleep.      famotidine (PEPCID) 40 MG tablet Take 40 mg by mouth daily      isosorbide mononitrate (IMDUR) 30 MG extended release tablet Take 30 mg by mouth daily      azelastine (ASTELIN) 0.1 % nasal spray 1 spray by Nasal route 2 times daily Use in each nostril as directed      finasteride (PROSCAR) 5 MG tablet Take 5 mg by mouth daily      docusate sodium (COLACE) 100 MG capsule Take 200 mg by mouth daily      furosemide (LASIX) 40 MG tablet Take 40 mg by mouth 2 times daily       Multiple Vitamins-Minerals (PRESERVISION AREDS PO) Take 2 capsules by mouth      sertraline (ZOLOFT) 100 MG tablet Take 1 tablet by mouth daily 90 tablet 2    nitroGLYCERIN (NITROSTAT) 0.4 MG SL tablet up to max of 3 total doses. If no relief after 1 dose, call 911. 30 tablet 2    sacubitril-valsartan (ENTRESTO) 49-51 MG per tablet Take 1 tablet by mouth 2 times daily 60 tablet 5    zoster recombinant adjuvanted vaccine [facility]) 50 MCG/0.5ML SUSR injection 1 vaccine now and repeat in 2-6 months. 0.5 mL 1     No current facility-administered medications for this visit. Allergies:  Statins, Crestor [rosuvastatin calcium], Adhesive tape, and Morphine    Past Social History:  Social History     Socioeconomic History    Marital status:      Spouse name: Irene Crowley    Number of children: 3    Years of education: Not on file    Highest education level: Not on file   Occupational History    Occupation:    Tobacco Use    Smoking status: Never    Smokeless tobacco: Never   Vaping Use    Vaping Use: Never used   Substance and Sexual Activity    Alcohol use: Yes     Alcohol/week: 1.0 standard drink     Types: 1 Shots of liquor per week     Comment: Occ.     Drug use: Not Currently     Types: Marijuana Luzma Pleas)     Comment: edibles, uses for sleep    Sexual activity: Not on file     Comment: 3 sons   Other Topics Concern    Not on file   Social History Narrative    CODE STATUS: Full Code    HEALTH CARE PROXY: his wife, Mrs. Enrique Goss, [phone]    AMBULATES: independently    DOMICILED: private home, stairs inside home, has a few steps to enter home, lives alone with wife, no pets     Social Determinants of Health     Financial Resource Strain: Not on file   Food Insecurity: Not on file   Transportation Needs: Not on file   Physical Activity: Not on file   Stress: Not on file   Social Connections: Not on file   Intimate Partner Violence: Not on file   Housing Stability: Not on file       Family History:       Problem Relation Age of Onset    Coronary Art Dis Mother     Cancer Sister         uterine    Kidney Disease Sister     Coronary Art Dis Brother          in his 46s - age 47    Cancer Father         colon    Coronary Art Dis Sister     Cancer Sister         olive cancer    No Known Problems Sister     No Known Problems Sister     No Known Problems Son     Alcohol Abuse Son         drank self to death    No Known Problems Son          Physical Examination:  /70   Pulse 83   Ht 6' 4\" (1.93 m)   Wt 276 lb (125.2 kg)   BMI 33.60 kg/m²   Physical Exam      Labs:   CBC: No results for input(s): WBC, HGB, HCT, PLT in the last 72 hours. BMP:No results for input(s): NA, K, CO2, BUN, CREATININE, LABGLOM, GLUCOSE in the last 72 hours. BNP: No results for input(s): BNP in the last 72 hours. PT/INR: No results for input(s): PROTIME, INR in the last 72 hours. APTT:No results for input(s): APTT in the last 72 hours. CARDIAC ENZYMES:No results for input(s): CKTOTAL, CKMB, CKMBINDEX, TROPONINI in the last 72 hours. FASTING LIPID PANEL:  Lab Results   Component Value Date/Time    HDL 28 2021 05:59 AM    LDLDIRECT 167 2018 02:16 PM    LDLCALC 179 2021 05:59 AM    TRIG 173 2021 05:59 AM     LIVER PROFILE:No results for input(s): AST, ALT, LABALBU in the last 72 hours.         Imaging:          ASSESSMENT and PLAN:    This is a 65y.o. year old male with past medical history of diabetes mellitus, CK D stage III, coronary artery disease with multiple PCI's to LAD, RCA and OM, recent inferior wall MI 5/25/2019 with an occluded large dominant RCA not intervened on, intermediate left main and diffuse severe mid to distal LAD restenosis, status post CABG 5/30/2019 with LIMA to 1st diagonal, SVG to LAD, SVG to ramus intermedius, EF 25%, postoperative atrial fibrillation, viability study with no significant viable myocardium in inferior and lateral wall with follow-up LV function by echo at 30-35%, status post ICD placement 11/1/2019, here for follow-up evaluation. 1.  BNP is normal.  No evidence of volume retention. Appears well compensated from cardiac viewpoint for his LV function. At this time we will continue current medications unchanged. 2.  Noted 1.6% atrial fibrillation burden with recent episodes. He will try and see if he can get Xarelto from his mail order and have requested Xarelto 15 mg prescription sent in. If this is cost prohibitive, have asked him to call us and we can switch him to Coumadin. 3.  I have advised him to use a cane with his frequent falls. 4.  He has lost 10 pounds since his last visit through dietary intervention with decreased intake. Hopefully he can continue to do this. 5.  Can follow-up with nurse practitioner in 4 months and with me in 10 months. Orders:  No orders of the defined types were placed in this encounter. Orders Placed This Encounter   Medications    rivaroxaban (XARELTO) 15 MG TABS tablet     Sig: Take 1 tablet by mouth daily (with breakfast)     Dispense:  90 tablet     Refill:  3     8 sample bottles provided             Return for NP 4 mths; me 10 mths. Electronically signed by Tato Phillips MD on 2/22/2023 at 2:43 PM    [facility]      Thisdictation was generated by voice recognition computer software.   Although all attempts are made to edit the dictation for accuracy, there may be errors in the transcription that are not intended.

## 2023-03-06 ENCOUNTER — HOSPITAL ENCOUNTER (OUTPATIENT)
Dept: NON INVASIVE DIAGNOSTICS | Age: 78
Discharge: HOME | End: 2023-03-06
Payer: MEDICARE

## 2023-03-06 DIAGNOSIS — I50.22 CHRONIC SYSTOLIC HEART FAILURE (HCC): ICD-10-CM

## 2023-03-06 DIAGNOSIS — R06.09 DOE (DYSPNEA ON EXERTION): ICD-10-CM

## 2023-03-06 DIAGNOSIS — R06.02 SHORTNESS OF BREATH: ICD-10-CM

## 2023-03-06 DIAGNOSIS — R53.83 OTHER FATIGUE: ICD-10-CM

## 2023-03-06 LAB
LV EF: 33 %
LVEF MODALITY: NORMAL

## 2023-03-06 PROCEDURE — 6360000004 HC RX CONTRAST MEDICATION: Performed by: NURSE PRACTITIONER

## 2023-03-06 PROCEDURE — C8929 TTE W OR WO FOL WCON,DOPPLER: HCPCS

## 2023-03-06 RX ADMIN — PERFLUTREN 1.5 ML: 6.52 INJECTION, SUSPENSION INTRAVENOUS at 15:41

## 2023-03-07 ENCOUNTER — TELEPHONE (OUTPATIENT)
Dept: CARDIOLOGY CLINIC | Age: 78
End: 2023-03-07

## 2023-03-07 NOTE — TELEPHONE ENCOUNTER
----- Message from TEGAN Monte sent at 3/7/2023 11:15 AM CST -----  Please call with echo results  EF is 30-35%. He has some diastolic dysfunction which is where the heart does not relax properly  Mitral valve mildly thickened with mild to moderate leakage  Aortic valve with mild leakage  Tricuspid valve actually normal with trivial leakage  Everything is unchanged when compared to last echo except mitral valve leaking slightly more. We will continue to monitor this.

## 2023-04-06 NOTE — ED NOTES
"PT FAMILY STATES THAT PT WAS WITH THEM ON VACATION IN [place]. ON THE DRIVE HOME, OUTSIDE OF [facility] PT STARTED HAVING LEFT SIDED FACIAL DROOP, LEFT SIDED WEAKNESS AND CONFUSION. PT FAMILY STOPPED AT A HOSPITAL IN [place], [place] WHERE PT WAS EVALUATED AND GIVEN TPA. PT WAS THEN AIR LIFTED TO [facility] IN [facility] WHERE HE SPENT 48 HOURS AND WAS DISCHARGED HOME ON 8/13/17. PT AND FAMILY DROVE HOME ARRIVING AT ABOUT 1700. PT SON STATES THAT SHORTLY AFTER ARRIVING HOME PT BEGAN EXPERIENCING THE SAME SYMPTOMS HE HAD IN [facility]. PT FAMILY STATES THAT PT WAS UNABLE TO WALK AND HAD INCREASED FACIAL DROOP ON THE LEFT SO THEY BROUGHT HIM TO THE ER. UPON FURTHER DISCUSSION, FAMILY STATES THAT PT HAS BEEN EVALUATED AT [facility] FOR WHAT THEY CALL \"THESE SAME EPISODES\" BUT HAS NEVER FOLLOWED UP WITH A NEUROLOGIST.      Emma Anne RN  08/14/17 0011    "
CODE STROKE CALLED [id number]     Emma Anne RN  08/13/17 6298    
DR. KERN AT BEDSIDE [id number]     Emma Anne, [place]  08/13/17 [id number]    
PT TO CT AT 2321     Emma Anne, RN  08/13/17 9551    
Opt out

## 2023-06-21 ENCOUNTER — HOSPITAL ENCOUNTER (OUTPATIENT)
Dept: INFUSION THERAPY | Age: 78
Setting detail: INFUSION SERIES
Discharge: HOME | End: 2023-06-21
Payer: MEDICARE

## 2023-06-21 ENCOUNTER — OFFICE VISIT (OUTPATIENT)
Dept: CARDIOLOGY CLINIC | Age: 78
End: 2023-06-21
Payer: MEDICARE

## 2023-06-21 VITALS
WEIGHT: 275 LBS | HEART RATE: 68 BPM | HEIGHT: 75 IN | OXYGEN SATURATION: 96 % | DIASTOLIC BLOOD PRESSURE: 70 MMHG | SYSTOLIC BLOOD PRESSURE: 124 MMHG | BODY MASS INDEX: 34.19 KG/M2

## 2023-06-21 DIAGNOSIS — I48.0 PAF (PAROXYSMAL ATRIAL FIBRILLATION) (HCC): ICD-10-CM

## 2023-06-21 DIAGNOSIS — I50.23 ACUTE ON CHRONIC SYSTOLIC CONGESTIVE HEART FAILURE (HCC): Primary | ICD-10-CM

## 2023-06-21 DIAGNOSIS — I50.22 CHRONIC SYSTOLIC HEART FAILURE (HCC): ICD-10-CM

## 2023-06-21 DIAGNOSIS — I50.22 CHRONIC SYSTOLIC HEART FAILURE (HCC): Primary | ICD-10-CM

## 2023-06-21 DIAGNOSIS — Z95.810 AICD (AUTOMATIC CARDIOVERTER/DEFIBRILLATOR) PRESENT: ICD-10-CM

## 2023-06-21 DIAGNOSIS — I10 ESSENTIAL HYPERTENSION: Chronic | ICD-10-CM

## 2023-06-21 DIAGNOSIS — I25.10 CORONARY ARTERY DISEASE INVOLVING NATIVE CORONARY ARTERY OF NATIVE HEART WITHOUT ANGINA PECTORIS: ICD-10-CM

## 2023-06-21 DIAGNOSIS — E78.2 MIXED HYPERLIPIDEMIA: ICD-10-CM

## 2023-06-21 LAB
ANION GAP SERPL CALCULATED.3IONS-SCNC: 9 MMOL/L (ref 7–19)
BNP BLD-MCNC: 680 PG/ML (ref 0–449)
BUN SERPL-MCNC: 29 MG/DL (ref 8–23)
CALCIUM SERPL-MCNC: 9.1 MG/DL (ref 8.8–10.2)
CHLORIDE SERPL-SCNC: 100 MMOL/L (ref 98–111)
CO2 SERPL-SCNC: 31 MMOL/L (ref 22–29)
CREAT SERPL-MCNC: 1.6 MG/DL (ref 0.5–1.2)
GLUCOSE SERPL-MCNC: 85 MG/DL (ref 74–109)
POTASSIUM SERPL-SCNC: 4.6 MMOL/L (ref 3.5–5)
SODIUM SERPL-SCNC: 140 MMOL/L (ref 136–145)

## 2023-06-21 PROCEDURE — 6360000002 HC RX W HCPCS: Performed by: NURSE PRACTITIONER

## 2023-06-21 PROCEDURE — 93283 PRGRMG EVAL IMPLANTABLE DFB: CPT | Performed by: NURSE PRACTITIONER

## 2023-06-21 PROCEDURE — 1123F ACP DISCUSS/DSCN MKR DOCD: CPT | Performed by: NURSE PRACTITIONER

## 2023-06-21 PROCEDURE — 96374 THER/PROPH/DIAG INJ IV PUSH: CPT

## 2023-06-21 PROCEDURE — G8427 DOCREV CUR MEDS BY ELIG CLIN: HCPCS | Performed by: NURSE PRACTITIONER

## 2023-06-21 PROCEDURE — 3078F DIAST BP <80 MM HG: CPT | Performed by: NURSE PRACTITIONER

## 2023-06-21 PROCEDURE — G8417 CALC BMI ABV UP PARAM F/U: HCPCS | Performed by: NURSE PRACTITIONER

## 2023-06-21 PROCEDURE — 3074F SYST BP LT 130 MM HG: CPT | Performed by: NURSE PRACTITIONER

## 2023-06-21 PROCEDURE — 1036F TOBACCO NON-USER: CPT | Performed by: NURSE PRACTITIONER

## 2023-06-21 PROCEDURE — 99214 OFFICE O/P EST MOD 30 MIN: CPT | Performed by: NURSE PRACTITIONER

## 2023-06-21 RX ORDER — SODIUM CHLORIDE 9 MG/ML
INJECTION, SOLUTION INTRAVENOUS CONTINUOUS
OUTPATIENT
Start: 2023-06-21

## 2023-06-21 RX ORDER — ALBUTEROL SULFATE 90 UG/1
4 AEROSOL, METERED RESPIRATORY (INHALATION) PRN
OUTPATIENT
Start: 2023-06-21

## 2023-06-21 RX ORDER — SODIUM CHLORIDE 0.9 % (FLUSH) 0.9 %
5-40 SYRINGE (ML) INJECTION PRN
OUTPATIENT
Start: 2023-06-21

## 2023-06-21 RX ORDER — DIPHENHYDRAMINE HYDROCHLORIDE 50 MG/ML
50 INJECTION INTRAMUSCULAR; INTRAVENOUS
OUTPATIENT
Start: 2023-06-21

## 2023-06-21 RX ORDER — ONDANSETRON 2 MG/ML
8 INJECTION INTRAMUSCULAR; INTRAVENOUS
OUTPATIENT
Start: 2023-06-21

## 2023-06-21 RX ORDER — EPINEPHRINE 1 MG/ML
0.3 INJECTION, SOLUTION, CONCENTRATE INTRAVENOUS PRN
OUTPATIENT
Start: 2023-06-21

## 2023-06-21 RX ORDER — ACETAMINOPHEN 325 MG/1
650 TABLET ORAL
OUTPATIENT
Start: 2023-06-21

## 2023-06-21 RX ORDER — FUROSEMIDE 10 MG/ML
80 INJECTION INTRAMUSCULAR; INTRAVENOUS ONCE
Status: COMPLETED | OUTPATIENT
Start: 2023-06-21 | End: 2023-06-21

## 2023-06-21 RX ORDER — FUROSEMIDE 10 MG/ML
80 INJECTION INTRAMUSCULAR; INTRAVENOUS ONCE
Status: CANCELLED
Start: 2023-06-21 | End: 2023-06-21

## 2023-06-21 RX ADMIN — FUROSEMIDE 80 MG: 10 INJECTION, SOLUTION INTRAMUSCULAR; INTRAVENOUS at 14:22

## 2023-06-21 NOTE — DISCHARGE INSTRUCTIONS
patients and/or to serve consumers viewing this service as a supplement to, and not a substitute for, the expertise, skill, knowledge and judgment of healthcare practitioners. The absence of a warning for a given drug or drug combination in no way should be construed to indicate that the drug or drug combination is safe, effective or appropriate for any given patient. [facility] does not assume any responsibility for any aspect of healthcare administered with the aid of information [facility] provides. The information contained herein is not intended to cover all possible uses, directions, precautions, warnings, drug interactions, allergic reactions, or adverse effects. If you have questions about the drugs you are taking, check with your doctor, nurse or pharmacist.  Copyright 4608-4063 [address]. Version: 16.01. Revision date: 5/22/2019. Care instructions adapted under license by [facility] ([facility]). If you have questions about a medical condition or this instruction, always ask your healthcare professional. [place] [address] any warranty or liability for your use of this information.

## 2023-06-21 NOTE — PATIENT INSTRUCTIONS
New instructions for today:  Weigh yourself daily without clothing at the same time each day. Record a daily weight log. Call the office if you gain 3 pounds or more in 2 to 3 days or 5 pounds in one week. A sudden weight gain may mean that your heart failure is getting worse. Sodium causes your body to hold on to extra water. This may cause your heart failure symptoms to get worse. Limit sodium to 2,000 milligrams (mg) a day or less. That is less than 1 teaspoon of salt a day, including all the salt you eat in cooking or in packaged foods. Fluid restriction of 1500ml per day (about 6 cups of fluid per day). Xarelto can increase your risk of bleeding. If you notice blood in urine or stool, bleeding gums, excessive bruising or cough productive of bloody sputum, notify the office. Information on this blood thinner has been included in your after visit summary. Patient Instructions:  Continue current medications as prescribed. Always keep a current medication list. Bring your medications to every office visit. Continue to follow up with primary care provider for non cardiac medical problems. Call the office with any problems, questions or concerns at [phone]. If you have been asked to keep a blood pressure log, do so for 2 weeks. Call the office to report readings to the triage nurse at [phone]. Follow up with cardiologist as scheduled. The following educational material has been included in this after visit summary for your review: Life simple 7. Heart health. Life simple 7  1) Manage blood pressure - high blood pressure is a major risk factor for heart disease and stroke. Keeping blood pressure in health range reduces strain on your heart, arteries and kidneys. Blood pressure goal is less than 130/80. 2) Control cholesterol - contributes to plaque, which can clog arteries and lead to heart disease and stroke.  When you control your cholesterol you are giving your

## 2023-06-21 NOTE — PROGRESS NOTES
[facility] [place], [place]. [address] [facility]  [phone] office  [phone] fax      OFFICE VISIT:  2023    Rita Young - :   Reason For Visit:  Naseem Yi is a 77 y.o. male who is here for Coronary Artery Disease, Cardiomyopathy, and Follow-up (4 month follow up. Patient is having some edema in ankles)    History:   Diagnosis Orders   1. Chronic systolic heart failure ([address]        2. Coronary artery disease involving native coronary artery of native heart without angina pectoris        3. PAF (paroxysmal atrial fibrillation) ([address]        4. AICD (automatic cardioverter/defibrillator) present        5. Essential hypertension        6. Mixed hyperlipidemia          The patient presents today  with concerns over fluid retention. He reports \"my 2 sisters visited for a week and we ate and drank too much. I sort of swelled up some and feel more short of breath. I just don't feel good today. \"  The patient reports no angina or sensed or arrhythmia. BP is well controlled on current regimen. The patient's PCP monitors cholesterol. Drea Guy denies exertional chest pain, orthopnea, paroxysmal nocturnal dyspnea, syncope, presyncope and sensed arrhythmia. The patient denies numbness or weakness to suggest cerebrovascular accident or transient ischemic attack. + leg and abdomen edema past week with fatigue and shortness of breath.     Rita Young has the following history as recorded in [facility]:  Patient Active Problem List   Diagnosis Code    Type 2 diabetes mellitus with diabetic polyneuropathy, with long-term current use of insulin ([facility]) E11.42, Z79.4    Essential hypertension I10    Coronary artery disease involving native coronary artery of native heart without angina pectoris I25.10    MI (myocardial infarction) ([address] I21.9    Mixed hyperlipidemia E78.2    Chronic bilateral low back pain without

## 2023-07-05 ENCOUNTER — TELEPHONE (OUTPATIENT)
Dept: CARDIOLOGY CLINIC | Age: 78
End: 2023-07-05

## 2023-07-06 ENCOUNTER — OFFICE VISIT (OUTPATIENT)
Dept: CARDIOLOGY CLINIC | Age: 78
End: 2023-07-06
Payer: MEDICARE

## 2023-07-06 VITALS
WEIGHT: 277 LBS | SYSTOLIC BLOOD PRESSURE: 118 MMHG | BODY MASS INDEX: 33.73 KG/M2 | HEART RATE: 60 BPM | DIASTOLIC BLOOD PRESSURE: 60 MMHG | HEIGHT: 76 IN

## 2023-07-06 DIAGNOSIS — I48.0 PAF (PAROXYSMAL ATRIAL FIBRILLATION) (HCC): ICD-10-CM

## 2023-07-06 DIAGNOSIS — E78.2 MIXED HYPERLIPIDEMIA: ICD-10-CM

## 2023-07-06 DIAGNOSIS — I50.22 CHRONIC SYSTOLIC HEART FAILURE (HCC): Primary | ICD-10-CM

## 2023-07-06 DIAGNOSIS — Z95.810 AICD (AUTOMATIC CARDIOVERTER/DEFIBRILLATOR) PRESENT: ICD-10-CM

## 2023-07-06 DIAGNOSIS — I25.10 CORONARY ARTERY DISEASE INVOLVING NATIVE CORONARY ARTERY OF NATIVE HEART WITHOUT ANGINA PECTORIS: ICD-10-CM

## 2023-07-06 PROCEDURE — G8417 CALC BMI ABV UP PARAM F/U: HCPCS | Performed by: NURSE PRACTITIONER

## 2023-07-06 PROCEDURE — G8427 DOCREV CUR MEDS BY ELIG CLIN: HCPCS | Performed by: NURSE PRACTITIONER

## 2023-07-06 PROCEDURE — 1036F TOBACCO NON-USER: CPT | Performed by: NURSE PRACTITIONER

## 2023-07-06 PROCEDURE — 3078F DIAST BP <80 MM HG: CPT | Performed by: NURSE PRACTITIONER

## 2023-07-06 PROCEDURE — 99214 OFFICE O/P EST MOD 30 MIN: CPT | Performed by: NURSE PRACTITIONER

## 2023-07-06 PROCEDURE — 1123F ACP DISCUSS/DSCN MKR DOCD: CPT | Performed by: NURSE PRACTITIONER

## 2023-07-06 PROCEDURE — 3074F SYST BP LT 130 MM HG: CPT | Performed by: NURSE PRACTITIONER

## 2023-07-06 RX ORDER — NITROGLYCERIN 0.4 MG/1
TABLET SUBLINGUAL
Qty: 30 TABLET | Refills: 2 | Status: SHIPPED | OUTPATIENT
Start: 2023-07-06

## 2023-07-06 RX ORDER — BUMETANIDE 1 MG/1
1 TABLET ORAL DAILY
Qty: 90 TABLET | Refills: 1 | Status: SHIPPED | OUTPATIENT
Start: 2023-07-06

## 2023-07-20 ENCOUNTER — OFFICE VISIT (OUTPATIENT)
Dept: CARDIOLOGY CLINIC | Age: 78
End: 2023-07-20
Payer: MEDICARE

## 2023-07-20 VITALS
SYSTOLIC BLOOD PRESSURE: 132 MMHG | DIASTOLIC BLOOD PRESSURE: 70 MMHG | OXYGEN SATURATION: 98 % | BODY MASS INDEX: 33.6 KG/M2 | HEART RATE: 66 BPM | WEIGHT: 276 LBS

## 2023-07-20 DIAGNOSIS — I50.22 CHRONIC SYSTOLIC HEART FAILURE (HCC): Primary | ICD-10-CM

## 2023-07-20 DIAGNOSIS — I10 ESSENTIAL HYPERTENSION: ICD-10-CM

## 2023-07-20 DIAGNOSIS — I50.22 CHRONIC SYSTOLIC HEART FAILURE (HCC): ICD-10-CM

## 2023-07-20 DIAGNOSIS — I48.0 PAF (PAROXYSMAL ATRIAL FIBRILLATION) (HCC): ICD-10-CM

## 2023-07-20 DIAGNOSIS — Z95.810 AICD (AUTOMATIC CARDIOVERTER/DEFIBRILLATOR) PRESENT: ICD-10-CM

## 2023-07-20 DIAGNOSIS — E78.2 MIXED HYPERLIPIDEMIA: ICD-10-CM

## 2023-07-20 LAB
ANION GAP SERPL CALCULATED.3IONS-SCNC: 13 MMOL/L (ref 7–19)
BNP BLD-MCNC: 1158 PG/ML (ref 0–449)
BUN SERPL-MCNC: 22 MG/DL (ref 8–23)
CALCIUM SERPL-MCNC: 9.4 MG/DL (ref 8.8–10.2)
CHLORIDE SERPL-SCNC: 103 MMOL/L (ref 98–111)
CO2 SERPL-SCNC: 24 MMOL/L (ref 22–29)
CREAT SERPL-MCNC: 1.3 MG/DL (ref 0.5–1.2)
GLUCOSE SERPL-MCNC: 141 MG/DL (ref 74–109)
POTASSIUM SERPL-SCNC: 5.4 MMOL/L (ref 3.5–5)
SODIUM SERPL-SCNC: 140 MMOL/L (ref 136–145)

## 2023-07-20 PROCEDURE — 99214 OFFICE O/P EST MOD 30 MIN: CPT | Performed by: NURSE PRACTITIONER

## 2023-07-20 PROCEDURE — G8417 CALC BMI ABV UP PARAM F/U: HCPCS | Performed by: NURSE PRACTITIONER

## 2023-07-20 PROCEDURE — 1036F TOBACCO NON-USER: CPT | Performed by: NURSE PRACTITIONER

## 2023-07-20 PROCEDURE — G8427 DOCREV CUR MEDS BY ELIG CLIN: HCPCS | Performed by: NURSE PRACTITIONER

## 2023-07-20 PROCEDURE — 3075F SYST BP GE 130 - 139MM HG: CPT | Performed by: NURSE PRACTITIONER

## 2023-07-20 PROCEDURE — 1123F ACP DISCUSS/DSCN MKR DOCD: CPT | Performed by: NURSE PRACTITIONER

## 2023-07-20 PROCEDURE — 3078F DIAST BP <80 MM HG: CPT | Performed by: NURSE PRACTITIONER

## 2023-07-20 NOTE — PROGRESS NOTES
[facility]. [facility]  [address] [place] [address], [facility], [address] [place] [address]  [phone] office  [phone] fax      OFFICE VISIT:  2023    Verner Panda - :   Reason For Visit:  Gail Patterson is a 77 y.o. male who is here for Follow-up (2 week follow up and ICD check. Still has some SOA. )    History:   Diagnosis Orders   1. Chronic systolic heart failure ([address])        2. AICD (automatic cardioverter/defibrillator) present        3. PAF (paroxysmal atrial fibrillation) ([address])        4. Essential hypertension        5. Mixed hyperlipidemia          The patient presents today for cardiology follow up regarding systolic HF. The patient reports losing 7 pounds after recent Lasix IVP at [facility]. He reports \"I was doing better than I went to eat 1500 591wed and drank a salted gareth, that was a mistake. \"  He reports weight back up a few pounds now.  23 pro ,  Na 140, K 4.6 and creatinine 1.6. The patient denies symptoms to suggest myocardial ischemia or arrhythmia. BP is well controlled on current regimen. The patient's PCP monitors cholesterol. Marti Gordon denies exertional chest pain,orthopnea, paroxysmal nocturnal dyspnea, syncope, presyncope, sensed arrhythmia and leg edema. The patient denies numbness or weakness to suggest cerebrovascular accident or transient ischemic attack. +  fluid retention abdomen. + fatigue. + VÁSQUEZ - stable.     Verner Panda has the following history as recorded in [facility]:  Patient Active Problem List   Diagnosis Code    Type 2 diabetes mellitus with diabetic polyneuropathy, with long-term current use of insulin ([facility]) E11.42, Z79.4    Essential hypertension I10    Coronary artery disease involving native coronary artery of native heart without angina pectoris I25.10    MI (myocardial infarction) ([address]) I21.9    Mixed hyperlipidemia E78.2    Chronic bilateral low back pain

## 2023-07-20 NOTE — PATIENT INSTRUCTIONS
New instructions for today:    Stop by lab on first floor today. Double bumetanide for 3 days, then back to one tab daily. Weigh yourself daily without clothing at the same time each day. Record a daily weight log. Call the office if you gain 3 pounds or more in 2 to 3 days or 5 pounds in one week. A sudden weight gain may mean that your heart failure is getting worse. Sodium causes your body to hold on to extra water. This may cause your heart failure symptoms to get worse. Limit sodium to 2,000 milligrams (mg) a day or less. That is less than 1 teaspoon of salt a day, including all the salt you eat in cooking or in packaged foods. Fluid restriction of 1500ml per day (about 6 cups of fluid per day). Patient Instructions:  Continue current medications as prescribed. Always keep a current medication list. Bring your medications to every office visit. Continue to follow up with primary care provider for non cardiac medical problems. Call the office with any problems, questions or concerns at [phone]. If you have been asked to keep a blood pressure log, do so for 2 weeks. Call the office to report readings to the triage nurse at [phone]. Follow up with cardiologist as scheduled. The following educational material has been included in this after visit summary for your review: Life simple 7. Heart health. Life simple 7  1) Manage blood pressure - high blood pressure is a major risk factor for heart disease and stroke. Keeping blood pressure in health range reduces strain on your heart, arteries and kidneys. Blood pressure goal is less than 130/80. 2) Control cholesterol - contributes to plaque, which can clog arteries and lead to heart disease and stroke. When you control your cholesterol you are giving your arteries their best chance to remain clear. It is recommended that you get cholesterol lab work done once a year.   3) Reduce blood sugar - most of the food we eat is

## 2023-07-25 DIAGNOSIS — I50.22 CHRONIC SYSTOLIC HEART FAILURE (HCC): Primary | ICD-10-CM

## 2023-08-04 ENCOUNTER — OFFICE VISIT (OUTPATIENT)
Dept: CARDIOLOGY CLINIC | Age: 78
End: 2023-08-04
Payer: MEDICARE

## 2023-08-04 VITALS
DIASTOLIC BLOOD PRESSURE: 62 MMHG | WEIGHT: 279 LBS | BODY MASS INDEX: 33.97 KG/M2 | SYSTOLIC BLOOD PRESSURE: 126 MMHG | HEIGHT: 76 IN | OXYGEN SATURATION: 98 % | HEART RATE: 74 BPM

## 2023-08-04 DIAGNOSIS — I25.10 CORONARY ARTERY DISEASE INVOLVING NATIVE CORONARY ARTERY OF NATIVE HEART WITHOUT ANGINA PECTORIS: Primary | ICD-10-CM

## 2023-08-04 DIAGNOSIS — I10 ESSENTIAL HYPERTENSION: ICD-10-CM

## 2023-08-04 DIAGNOSIS — E78.5 DYSLIPIDEMIA: ICD-10-CM

## 2023-08-04 DIAGNOSIS — I50.22 CHRONIC SYSTOLIC HEART FAILURE (HCC): ICD-10-CM

## 2023-08-04 PROCEDURE — 3074F SYST BP LT 130 MM HG: CPT | Performed by: NURSE PRACTITIONER

## 2023-08-04 PROCEDURE — 1036F TOBACCO NON-USER: CPT | Performed by: NURSE PRACTITIONER

## 2023-08-04 PROCEDURE — G8427 DOCREV CUR MEDS BY ELIG CLIN: HCPCS | Performed by: NURSE PRACTITIONER

## 2023-08-04 PROCEDURE — 1123F ACP DISCUSS/DSCN MKR DOCD: CPT | Performed by: NURSE PRACTITIONER

## 2023-08-04 PROCEDURE — G8417 CALC BMI ABV UP PARAM F/U: HCPCS | Performed by: NURSE PRACTITIONER

## 2023-08-04 PROCEDURE — 93289 INTERROG DEVICE EVAL HEART: CPT | Performed by: NURSE PRACTITIONER

## 2023-08-04 PROCEDURE — 3078F DIAST BP <80 MM HG: CPT | Performed by: NURSE PRACTITIONER

## 2023-08-04 PROCEDURE — 99214 OFFICE O/P EST MOD 30 MIN: CPT | Performed by: NURSE PRACTITIONER

## 2023-08-04 RX ORDER — BUMETANIDE 2 MG/1
2 TABLET ORAL 2 TIMES DAILY
Qty: 180 TABLET | Refills: 3 | Status: SHIPPED | OUTPATIENT
Start: 2023-08-04

## 2023-08-04 ASSESSMENT — ENCOUNTER SYMPTOMS
SHORTNESS OF BREATH: 1
CHEST TIGHTNESS: 0
SORE THROAT: 0
WHEEZING: 0
COUGH: 0

## 2023-08-08 DIAGNOSIS — I50.22 CHRONIC SYSTOLIC HEART FAILURE (HCC): ICD-10-CM

## 2023-08-08 LAB
ANION GAP SERPL CALCULATED.3IONS-SCNC: 11 MMOL/L (ref 7–19)
BUN SERPL-MCNC: 31 MG/DL (ref 8–23)
CALCIUM SERPL-MCNC: 8.9 MG/DL (ref 8.8–10.2)
CHLORIDE SERPL-SCNC: 98 MMOL/L (ref 98–111)
CO2 SERPL-SCNC: 30 MMOL/L (ref 22–29)
CREAT SERPL-MCNC: 1.6 MG/DL (ref 0.5–1.2)
GLUCOSE SERPL-MCNC: 86 MG/DL (ref 74–109)
POTASSIUM SERPL-SCNC: 4.4 MMOL/L (ref 3.5–5)
SODIUM SERPL-SCNC: 139 MMOL/L (ref 136–145)

## 2023-08-09 ENCOUNTER — OFFICE VISIT (OUTPATIENT)
Dept: CARDIOLOGY CLINIC | Age: 78
End: 2023-08-09
Payer: MEDICARE

## 2023-08-09 VITALS
SYSTOLIC BLOOD PRESSURE: 118 MMHG | WEIGHT: 274 LBS | HEART RATE: 70 BPM | DIASTOLIC BLOOD PRESSURE: 64 MMHG | OXYGEN SATURATION: 97 % | BODY MASS INDEX: 33.36 KG/M2 | HEIGHT: 76 IN

## 2023-08-09 DIAGNOSIS — I10 ESSENTIAL HYPERTENSION: ICD-10-CM

## 2023-08-09 DIAGNOSIS — I50.22 CHRONIC SYSTOLIC HEART FAILURE (HCC): Primary | ICD-10-CM

## 2023-08-09 DIAGNOSIS — E78.2 MIXED HYPERLIPIDEMIA: ICD-10-CM

## 2023-08-09 DIAGNOSIS — I48.0 PAF (PAROXYSMAL ATRIAL FIBRILLATION) (HCC): ICD-10-CM

## 2023-08-09 DIAGNOSIS — I25.10 CORONARY ARTERY DISEASE INVOLVING NATIVE CORONARY ARTERY OF NATIVE HEART WITHOUT ANGINA PECTORIS: ICD-10-CM

## 2023-08-09 PROCEDURE — 3078F DIAST BP <80 MM HG: CPT | Performed by: NURSE PRACTITIONER

## 2023-08-09 PROCEDURE — 99214 OFFICE O/P EST MOD 30 MIN: CPT | Performed by: NURSE PRACTITIONER

## 2023-08-09 PROCEDURE — G8427 DOCREV CUR MEDS BY ELIG CLIN: HCPCS | Performed by: NURSE PRACTITIONER

## 2023-08-09 PROCEDURE — 3074F SYST BP LT 130 MM HG: CPT | Performed by: NURSE PRACTITIONER

## 2023-08-09 PROCEDURE — 1123F ACP DISCUSS/DSCN MKR DOCD: CPT | Performed by: NURSE PRACTITIONER

## 2023-08-09 PROCEDURE — G8417 CALC BMI ABV UP PARAM F/U: HCPCS | Performed by: NURSE PRACTITIONER

## 2023-08-09 PROCEDURE — 1036F TOBACCO NON-USER: CPT | Performed by: NURSE PRACTITIONER

## 2023-08-09 RX ORDER — BUMETANIDE 1 MG/1
2 TABLET ORAL 2 TIMES DAILY
Qty: 1 TABLET | Refills: 0 | Status: SHIPPED | COMMUNITY
Start: 2023-08-09

## 2023-08-09 RX ORDER — BUMETANIDE 1 MG/1
1 TABLET ORAL 2 TIMES DAILY
COMMUNITY
End: 2023-08-09 | Stop reason: DRUGHIGH

## 2023-08-09 NOTE — PROGRESS NOTES
08/08/2023    GLUCOSE 86 08/08/2023    CALCIUM 8.9 08/08/2023    PROT 7.0 02/14/2023    LABALBU 4.0 02/14/2023    BILITOT 0.3 02/14/2023    ALKPHOS 85 02/14/2023    AST 14 02/14/2023    ALT 13 02/14/2023    LABGLOM 43 (A) 08/08/2023    GFRAA >59 08/15/2022    GLOB 2.6 03/27/2017     Lab Results   Component Value Date    CHOL 242 (H) 06/23/2021    TRIG 173 (H) 06/23/2021    HDL 28 (L) 06/23/2021    LDLCALC 179 06/23/2021 6/21/23 pro     BP Readings from Last 3 Encounters:   08/09/23 118/64   08/04/23 126/62   07/20/23 132/70    Pulse Readings from Last 3 Encounters:   08/09/23 70   08/04/23 74   07/20/23 66        Wt Readings from Last 3 Encounters:   08/09/23 274 lb (124.3 kg)   08/04/23 279 lb (126.6 kg)   07/20/23 276 lb (125.2 kg)     Assessment/Plan:   Diagnosis Orders   1. Chronic systolic heart failure ([address])        2. Coronary artery disease involving native coronary artery of native heart without angina pectoris        3. Essential hypertension        4. PAF (paroxysmal atrial fibrillation) ([address])        5. Mixed hyperlipidemia          [id number] Score: 8  Disclaimer: Risk Score calculation is dependent on accuracy of patient problem list and past encounter diagnosis. CAD - stable on current medical management. Continue same. Chronic systolic heart failure -  NYHA class II-III stage C  GDMT includes Entresto, Toprol XL, Farxiga and Lasix. 10 pound weight loss reported on Bumex 1 mg (2) BID.   8/8/23 Na 139, K 4.4 and creatinine 1.6. Hypertension - BP today 118/60. Continue same. Will continue current dose of Bumex and recheck in office and BMP in one week. Hx atrial flutter - no recurrent AF noted on last pacer interrogation. Hyperlipidemia - monitored and managed by PCP. Hx statin intolerance. Patient is compliant with medication regimen. Previous cardiac history and records reviewed. Continue current medical management for cardiac related condition.   Continue

## 2023-08-11 ENCOUNTER — TELEPHONE (OUTPATIENT)
Dept: FAMILY MEDICINE CLINIC | Age: 78
End: 2023-08-11

## 2023-08-11 NOTE — TELEPHONE ENCOUNTER
----- Message from TEGAN Pandey sent at 8/10/2023  5:38 PM CDT -----  Let Bushra Maya know that potassium and sodium normal.  We have been diuresing him so creatinine has come up some to 1.6. Recheck BMP in one week.   Thanks, [employer]

## 2023-08-11 NOTE — TELEPHONE ENCOUNTER
Called to give results  the patient voiced understanding and will get his labs done prior to his next appt

## 2023-08-22 ENCOUNTER — OFFICE VISIT (OUTPATIENT)
Dept: CARDIOLOGY CLINIC | Age: 78
End: 2023-08-22
Payer: MEDICARE

## 2023-08-22 VITALS
HEART RATE: 62 BPM | BODY MASS INDEX: 33 KG/M2 | OXYGEN SATURATION: 96 % | DIASTOLIC BLOOD PRESSURE: 60 MMHG | HEIGHT: 76 IN | WEIGHT: 271 LBS | SYSTOLIC BLOOD PRESSURE: 110 MMHG

## 2023-08-22 DIAGNOSIS — I50.22 CHRONIC SYSTOLIC HEART FAILURE (HCC): ICD-10-CM

## 2023-08-22 DIAGNOSIS — I50.22 CHRONIC SYSTOLIC HEART FAILURE (HCC): Primary | ICD-10-CM

## 2023-08-22 DIAGNOSIS — I10 ESSENTIAL HYPERTENSION: ICD-10-CM

## 2023-08-22 DIAGNOSIS — I25.10 CORONARY ARTERY DISEASE INVOLVING NATIVE CORONARY ARTERY OF NATIVE HEART WITHOUT ANGINA PECTORIS: ICD-10-CM

## 2023-08-22 DIAGNOSIS — I48.0 PAF (PAROXYSMAL ATRIAL FIBRILLATION) (HCC): ICD-10-CM

## 2023-08-22 DIAGNOSIS — Z95.810 AICD (AUTOMATIC CARDIOVERTER/DEFIBRILLATOR) PRESENT: ICD-10-CM

## 2023-08-22 DIAGNOSIS — E78.2 MIXED HYPERLIPIDEMIA: ICD-10-CM

## 2023-08-22 LAB
ANION GAP SERPL CALCULATED.3IONS-SCNC: 12 MMOL/L (ref 7–19)
BNP BLD-MCNC: 465 PG/ML (ref 0–449)
BUN SERPL-MCNC: 41 MG/DL (ref 8–23)
CALCIUM SERPL-MCNC: 9.1 MG/DL (ref 8.8–10.2)
CHLORIDE SERPL-SCNC: 97 MMOL/L (ref 98–111)
CO2 SERPL-SCNC: 29 MMOL/L (ref 22–29)
CREAT SERPL-MCNC: 1.7 MG/DL (ref 0.5–1.2)
GLUCOSE SERPL-MCNC: 142 MG/DL (ref 74–109)
POTASSIUM SERPL-SCNC: 4.7 MMOL/L (ref 3.5–5)
SODIUM SERPL-SCNC: 138 MMOL/L (ref 136–145)

## 2023-08-22 PROCEDURE — 99214 OFFICE O/P EST MOD 30 MIN: CPT | Performed by: NURSE PRACTITIONER

## 2023-08-22 PROCEDURE — 1036F TOBACCO NON-USER: CPT | Performed by: NURSE PRACTITIONER

## 2023-08-22 PROCEDURE — 3078F DIAST BP <80 MM HG: CPT | Performed by: NURSE PRACTITIONER

## 2023-08-22 PROCEDURE — 3074F SYST BP LT 130 MM HG: CPT | Performed by: NURSE PRACTITIONER

## 2023-08-22 PROCEDURE — G8427 DOCREV CUR MEDS BY ELIG CLIN: HCPCS | Performed by: NURSE PRACTITIONER

## 2023-08-22 PROCEDURE — 1123F ACP DISCUSS/DSCN MKR DOCD: CPT | Performed by: NURSE PRACTITIONER

## 2023-08-22 PROCEDURE — G8417 CALC BMI ABV UP PARAM F/U: HCPCS | Performed by: NURSE PRACTITIONER

## 2023-08-22 NOTE — PATIENT INSTRUCTIONS
New instructions for today:  Weigh yourself daily without clothing at the same time each day. Record a daily weight log. Call the office if you gain 3 pounds or more in 2 to 3 days or 5 pounds in one week. A sudden weight gain may mean that your heart failure is getting worse. Sodium causes your body to hold on to extra water. This may cause your heart failure symptoms to get worse. Limit sodium to 2,000 milligrams (mg) a day or less. That is less than 1 teaspoon of salt a day, including all the salt you eat in cooking or in packaged foods. Fluid restriction of 1500ml per day (about 6 cups of fluid per day). Patient Instructions:  Continue current medications as prescribed. Always keep a current medication list. Bring your medications to every office visit. Continue to follow up with primary care provider for non cardiac medical problems. Call the office with any problems, questions or concerns at [phone]. If you have been asked to keep a blood pressure log, do so for 2 weeks. Call the office to report readings to the triage nurse at [phone]. Follow up with cardiologist as scheduled. The following educational material has been included in this after visit summary for your review: Life simple 7. Heart health. Life simple 7  1) Manage blood pressure - high blood pressure is a major risk factor for heart disease and stroke. Keeping blood pressure in health range reduces strain on your heart, arteries and kidneys. Blood pressure goal is less than 130/80. 2) Control cholesterol - contributes to plaque, which can clog arteries and lead to heart disease and stroke. When you control your cholesterol you are giving your arteries their best chance to remain clear. It is recommended that you get cholesterol lab work done once a year. 3) Reduce blood sugar - most of the food we eat is turning into glucose or blood sugar that our body uses for energy.   Over time, high levels of blood

## 2023-08-22 NOTE — PROGRESS NOTES
[facility]. [facility]  [address] [place] [address], [address] [place] [address]  [phone] office  [phone] fax      OFFICE VISIT:  2023    Odalys Wilcox - :   Reason For Visit:  Louis King is a 77 y.o. male who is here for Follow-up (Patient feels much better. ) and Coronary Artery Disease    History:   Diagnosis Orders   1. Chronic systolic heart failure ([address])        2. Essential hypertension        3. PAF (paroxysmal atrial fibrillation) ([address])        4. AICD (automatic cardioverter/defibrillator) present        5. Mixed hyperlipidemia        6. Coronary artery disease involving native coronary artery of native heart without angina pectoris          The patient presents today for cardiology follow up regarding HF. The patient is a 77year old with the following history:  1. Coronary artery disease, status post multiple PCI's to LAD 2011, RCA 2012, RPL 2015 presenting 2019 with inferior wall MI with late presentation involving a large dominant RCA with distal occlusion, not intervened on with severe mid to distal LAD restenosis and moderate left main disease, status post CABG 2019 with LIMA to 1st diagonal, SVG to LAD and SVG to ramus intermedius, ejection fraction 25% with severe inferior hypokinesis. Thallium viability study with large inferolateral, predominantly lateral infarct with no significant viability status post ICD placement 2019. 2. CK D stage III. 3. Diabetes mellitus. 4.  Paroxysmal atrial fibrillation, initiated on Xarelto. 5. Chronic severe constipation  6. Statin intolerance. 7.  Chronic low back pain with bilateral lower extremity weakness and multiple falls. The patient reports feeling a lot better the past few days. He states \"my swelling is gone now on two of those pills twice a day. \" (Bumex 1 mg (2) BID).   The patient denies symptoms to suggest myocardial ischemia, overt

## 2023-08-23 RX ORDER — METOPROLOL SUCCINATE 25 MG/1
TABLET, EXTENDED RELEASE ORAL
Qty: 90 TABLET | Refills: 1 | Status: SHIPPED | OUTPATIENT
Start: 2023-08-23

## 2023-09-15 ENCOUNTER — HOSPITAL ENCOUNTER (INPATIENT)
Age: 78
LOS: 4 days | Discharge: HOME HEALTH | DRG: 580 | End: 2023-09-19
Attending: EMERGENCY MEDICINE | Admitting: INTERNAL MEDICINE
Payer: MEDICARE

## 2023-09-15 DIAGNOSIS — L02.415 ABSCESS OF RIGHT LEG: Primary | ICD-10-CM

## 2023-09-15 PROBLEM — L03.115 CELLULITIS AND ABSCESS OF RIGHT LOWER EXTREMITY: Status: ACTIVE | Noted: 2023-09-15

## 2023-09-15 LAB
ALBUMIN SERPL-MCNC: 3.6 G/DL (ref 3.5–5.2)
ALP SERPL-CCNC: 101 U/L (ref 40–130)
ALT SERPL-CCNC: 12 U/L (ref 5–41)
ANION GAP SERPL CALCULATED.3IONS-SCNC: 11 MMOL/L (ref 7–19)
AST SERPL-CCNC: 11 U/L (ref 5–40)
BASOPHILS # BLD: 0 K/UL (ref 0–0.2)
BASOPHILS NFR BLD: 0.3 % (ref 0–1)
BILIRUB SERPL-MCNC: <0.2 MG/DL (ref 0.2–1.2)
BUN SERPL-MCNC: 46 MG/DL (ref 8–23)
CALCIUM SERPL-MCNC: 8.7 MG/DL (ref 8.8–10.2)
CHLORIDE SERPL-SCNC: 99 MMOL/L (ref 98–111)
CO2 SERPL-SCNC: 28 MMOL/L (ref 22–29)
CREAT SERPL-MCNC: 1.9 MG/DL (ref 0.5–1.2)
EOSINOPHIL # BLD: 0.1 K/UL (ref 0–0.6)
EOSINOPHIL NFR BLD: 1.8 % (ref 0–5)
ERYTHROCYTE [DISTWIDTH] IN BLOOD BY AUTOMATED COUNT: 15.7 % (ref 11.5–14.5)
GLUCOSE SERPL-MCNC: 110 MG/DL (ref 74–109)
HCT VFR BLD AUTO: 41.6 % (ref 42–52)
HGB BLD-MCNC: 13.3 G/DL (ref 14–18)
IMM GRANULOCYTES # BLD: 0 K/UL
LACTATE BLDV-SCNC: 1 MMOL/L (ref 0.5–1.9)
LYMPHOCYTES # BLD: 1.8 K/UL (ref 1.1–4.5)
LYMPHOCYTES NFR BLD: 25.5 % (ref 20–40)
MCH RBC QN AUTO: 27.5 PG (ref 27–31)
MCHC RBC AUTO-ENTMCNC: 32 G/DL (ref 33–37)
MCV RBC AUTO: 86 FL (ref 80–94)
MONOCYTES # BLD: 0.7 K/UL (ref 0–0.9)
MONOCYTES NFR BLD: 9.3 % (ref 0–10)
NEUTROPHILS # BLD: 4.5 K/UL (ref 1.5–7.5)
NEUTS SEG NFR BLD: 62.8 % (ref 50–65)
PLATELET # BLD AUTO: 146 K/UL (ref 130–400)
PMV BLD AUTO: 11.1 FL (ref 9.4–12.4)
POTASSIUM SERPL-SCNC: 4.2 MMOL/L (ref 3.5–5)
PROT SERPL-MCNC: 6.8 G/DL (ref 6.6–8.7)
RBC # BLD AUTO: 4.84 M/UL (ref 4.7–6.1)
SODIUM SERPL-SCNC: 138 MMOL/L (ref 136–145)
WBC # BLD AUTO: 7.2 K/UL (ref 4.8–10.8)

## 2023-09-15 PROCEDURE — 2580000003 HC RX 258

## 2023-09-15 PROCEDURE — 6370000000 HC RX 637 (ALT 250 FOR IP)

## 2023-09-15 PROCEDURE — 99285 EMERGENCY DEPT VISIT HI MDM: CPT

## 2023-09-15 PROCEDURE — 85025 COMPLETE CBC W/AUTO DIFF WBC: CPT

## 2023-09-15 PROCEDURE — 87075 CULTR BACTERIA EXCEPT BLOOD: CPT

## 2023-09-15 PROCEDURE — 10060 I&D ABSCESS SIMPLE/SINGLE: CPT

## 2023-09-15 PROCEDURE — 36415 COLL VENOUS BLD VENIPUNCTURE: CPT

## 2023-09-15 PROCEDURE — 94760 N-INVAS EAR/PLS OXIMETRY 1: CPT

## 2023-09-15 PROCEDURE — 83605 ASSAY OF LACTIC ACID: CPT

## 2023-09-15 PROCEDURE — 6360000002 HC RX W HCPCS

## 2023-09-15 PROCEDURE — 80053 COMPREHEN METABOLIC PANEL: CPT

## 2023-09-15 PROCEDURE — 6360000002 HC RX W HCPCS: Performed by: EMERGENCY MEDICINE

## 2023-09-15 PROCEDURE — 87070 CULTURE OTHR SPECIMN AEROBIC: CPT

## 2023-09-15 PROCEDURE — 87205 SMEAR GRAM STAIN: CPT

## 2023-09-15 PROCEDURE — 96365 THER/PROPH/DIAG IV INF INIT: CPT

## 2023-09-15 PROCEDURE — 1210000000 HC MED SURG R&B

## 2023-09-15 PROCEDURE — 6370000000 HC RX 637 (ALT 250 FOR IP): Performed by: EMERGENCY MEDICINE

## 2023-09-15 PROCEDURE — 93971 EXTREMITY STUDY: CPT

## 2023-09-15 RX ORDER — METOPROLOL SUCCINATE 25 MG/1
25 TABLET, EXTENDED RELEASE ORAL DAILY
Status: DISCONTINUED | OUTPATIENT
Start: 2023-09-15 | End: 2023-09-19 | Stop reason: HOSPADM

## 2023-09-15 RX ORDER — BUMETANIDE 1 MG/1
1 TABLET ORAL 2 TIMES DAILY
Status: DISCONTINUED | OUTPATIENT
Start: 2023-09-15 | End: 2023-09-19 | Stop reason: HOSPADM

## 2023-09-15 RX ORDER — HYDROCODONE BITARTRATE AND ACETAMINOPHEN 5; 325 MG/1; MG/1
1 TABLET ORAL ONCE
Status: COMPLETED | OUTPATIENT
Start: 2023-09-15 | End: 2023-09-15

## 2023-09-15 RX ORDER — FAMOTIDINE 20 MG/1
20 TABLET, FILM COATED ORAL 2 TIMES DAILY
Status: DISCONTINUED | OUTPATIENT
Start: 2023-09-15 | End: 2023-09-19 | Stop reason: HOSPADM

## 2023-09-15 RX ORDER — SODIUM CHLORIDE 0.9 % (FLUSH) 0.9 %
5-40 SYRINGE (ML) INJECTION EVERY 12 HOURS SCHEDULED
Status: DISCONTINUED | OUTPATIENT
Start: 2023-09-15 | End: 2023-09-18 | Stop reason: SDUPTHER

## 2023-09-15 RX ORDER — ACETAMINOPHEN 650 MG/1
650 SUPPOSITORY RECTAL EVERY 6 HOURS PRN
Status: DISCONTINUED | OUTPATIENT
Start: 2023-09-15 | End: 2023-09-19 | Stop reason: HOSPADM

## 2023-09-15 RX ORDER — POTASSIUM CHLORIDE 7.45 MG/ML
10 INJECTION INTRAVENOUS PRN
Status: DISCONTINUED | OUTPATIENT
Start: 2023-09-15 | End: 2023-09-19 | Stop reason: HOSPADM

## 2023-09-15 RX ORDER — CLOPIDOGREL BISULFATE 75 MG/1
75 TABLET ORAL DAILY
Status: DISCONTINUED | OUTPATIENT
Start: 2023-09-15 | End: 2023-09-19 | Stop reason: HOSPADM

## 2023-09-15 RX ORDER — SODIUM CHLORIDE 9 MG/ML
INJECTION, SOLUTION INTRAVENOUS PRN
Status: DISCONTINUED | OUTPATIENT
Start: 2023-09-15 | End: 2023-09-18 | Stop reason: SDUPTHER

## 2023-09-15 RX ORDER — MAGNESIUM SULFATE IN WATER 40 MG/ML
2000 INJECTION, SOLUTION INTRAVENOUS PRN
Status: DISCONTINUED | OUTPATIENT
Start: 2023-09-15 | End: 2023-09-19 | Stop reason: HOSPADM

## 2023-09-15 RX ORDER — ACETAMINOPHEN 325 MG/1
650 TABLET ORAL EVERY 6 HOURS PRN
Status: DISCONTINUED | OUTPATIENT
Start: 2023-09-15 | End: 2023-09-19 | Stop reason: HOSPADM

## 2023-09-15 RX ORDER — POLYETHYLENE GLYCOL 3350 17 G/17G
17 POWDER, FOR SOLUTION ORAL DAILY PRN
Status: DISCONTINUED | OUTPATIENT
Start: 2023-09-15 | End: 2023-09-19 | Stop reason: HOSPADM

## 2023-09-15 RX ORDER — ONDANSETRON 2 MG/ML
4 INJECTION INTRAMUSCULAR; INTRAVENOUS EVERY 6 HOURS PRN
Status: DISCONTINUED | OUTPATIENT
Start: 2023-09-15 | End: 2023-09-19 | Stop reason: HOSPADM

## 2023-09-15 RX ORDER — PROMETHAZINE HYDROCHLORIDE 12.5 MG/1
12.5 TABLET ORAL EVERY 6 HOURS PRN
Status: DISCONTINUED | OUTPATIENT
Start: 2023-09-15 | End: 2023-09-19 | Stop reason: HOSPADM

## 2023-09-15 RX ORDER — SODIUM CHLORIDE 0.9 % (FLUSH) 0.9 %
5-40 SYRINGE (ML) INJECTION PRN
Status: DISCONTINUED | OUTPATIENT
Start: 2023-09-15 | End: 2023-09-18 | Stop reason: SDUPTHER

## 2023-09-15 RX ADMIN — CLOPIDOGREL BISULFATE 75 MG: 75 TABLET ORAL at 18:42

## 2023-09-15 RX ADMIN — BUMETANIDE 1 MG: 1 TABLET ORAL at 19:58

## 2023-09-15 RX ADMIN — Medication 1500 MG: at 21:45

## 2023-09-15 RX ADMIN — METOPROLOL SUCCINATE 25 MG: 25 TABLET, FILM COATED, EXTENDED RELEASE ORAL at 18:26

## 2023-09-15 RX ADMIN — MEROPENEM 1000 MG: 1 INJECTION, POWDER, FOR SOLUTION INTRAVENOUS at 18:23

## 2023-09-15 RX ADMIN — ACETAMINOPHEN 650 MG: 325 TABLET ORAL at 19:58

## 2023-09-15 RX ADMIN — SODIUM CHLORIDE, PRESERVATIVE FREE 10 ML: 5 INJECTION INTRAVENOUS at 19:59

## 2023-09-15 RX ADMIN — Medication 1000 MG: at 16:46

## 2023-09-15 RX ADMIN — FAMOTIDINE 20 MG: 20 TABLET ORAL at 19:58

## 2023-09-15 RX ADMIN — HYDROCODONE BITARTRATE AND ACETAMINOPHEN 1 TABLET: 5; 325 TABLET ORAL at 17:17

## 2023-09-15 RX ADMIN — POLYETHYLENE GLYCOL 3350 17 G: 17 POWDER, FOR SOLUTION ORAL at 21:39

## 2023-09-15 ASSESSMENT — ENCOUNTER SYMPTOMS
CONSTIPATION: 0
SORE THROAT: 0
DIARRHEA: 0
ABDOMINAL DISTENTION: 0
VOMITING: 0
SHORTNESS OF BREATH: 1
COLOR CHANGE: 1
BACK PAIN: 0
COUGH: 0
NAUSEA: 0
SHORTNESS OF BREATH: 0
ABDOMINAL PAIN: 0
RHINORRHEA: 0

## 2023-09-15 ASSESSMENT — PAIN DESCRIPTION - DESCRIPTORS
DESCRIPTORS: ACHING
DESCRIPTORS: ACHING

## 2023-09-15 ASSESSMENT — PAIN SCALES - GENERAL
PAINLEVEL_OUTOF10: 5
PAINLEVEL_OUTOF10: 3

## 2023-09-15 ASSESSMENT — PAIN DESCRIPTION - ORIENTATION: ORIENTATION: RIGHT

## 2023-09-15 ASSESSMENT — PAIN DESCRIPTION - LOCATION
LOCATION: LEG
LOCATION: HEAD

## 2023-09-15 NOTE — H&P
[facility] Hospitalists      Hospitalist - History & Physical      PCP: Chris Duffy MD    Date of Admission: 9/15/2023    Date of Service: 9/15/2023    Chief Complaint:  right calf/popliteal pain     History Of Present Illness:   Verner Panda is a 77 y.o. male who presents to the emergency department for right medial thigh pain for the past 1 week. Has been on oral antibiotics for approximately that long unsure of the name with no improvement. Admits to redness pain and swelling. Does not recall any injury    Patient to be admitted to the Hospital service for medical management. Continue IV Vancomycin, Merrem added. General surgery consult. Creatinine 1.9, appears baseline. WBC 7.2. Lactic acid 1.0. Culture sent from sondra to  right medial proximal knee ( area of concern) measuring approximately 8.2 x 3.6 x 2.8cm. Past Medical History:        Diagnosis Date    Acute ischemic stroke ([address]) 2/20/2017    Acute on chronic systolic congestive heart failure ([address]) 8/15/2022    Atherosclerotic heart disease     s/p MI, stenting x 3 jan 2011([place]    Breakthrough seizure ([address]) 10/16/2021    CAD (coronary artery disease)     Cerebral artery occlusion with cerebral infarction [facility])     Cerebrovascular accident (CVA) due to embolism of right middle cerebral artery ([address]) 8/14/2017    CHF (congestive heart failure) ([facility])     Chronic bilateral low back pain without sciatica 11/10/2016    Chronic kidney disease     DDD (degenerative disc disease), lumbar 11/15/2016    Depression     Depression with anxiety     Diabetes mellitus ([address])     type II    Diabetes mellitus, type 2 ([address])     DM (diabetes mellitus) ([address]) 7/12/2011    ESBL (extended spectrum beta-lactamase) producing bacteria infection 11/16/2019    urine    Glaucoma     Headache     Hyperlipidemia     Cholesterol management per pcp.      Hypertension     Macular degeneration     MI (myocardial infarction) ([address])     MI (myocardial infarction) ([address])

## 2023-09-15 NOTE — ED PROVIDER NOTES
[facility] EMERGENCY DEPT  eMERGENCY dEPARTMENT eNCOUnter      Pt Name: Rena Gallardo  MRN: [id number]  [address] [facility] [place], [facility]/[birth year]/1669  Date of evaluation: 9/15/2023  Provider: Amari Dasilva MD    [address]       Chief Complaint   Patient presents with    Cellulitis     R thigh         HISTORY OF PRESENT ILLNESS   (Location/Symptom, Timing/Onset,Context/Setting, Quality, Duration, Modifying Factors, Severity)  Note limiting factors. Rena Gallardo is a 77 y.o. male who presents to the emergency department for right medial thigh pain for the past 1 week. Has been on oral antibiotics for approximately that long unsure of the name with no improvement. Admits to redness pain and swelling. Does not recall any injury. HPI    NursingNotes were reviewed. REVIEW OF SYSTEMS    (2-9 systems for level 4, 10 or more for level 5)     Review of Systems   Constitutional:  Negative for chills and fever. HENT:  Negative for rhinorrhea and sore throat. Respiratory:  Negative for cough and shortness of breath. Cardiovascular:  Positive for leg swelling. Negative for chest pain. Gastrointestinal:  Negative for abdominal pain, diarrhea, nausea and vomiting. Genitourinary:  Negative for dysuria and frequency. Musculoskeletal:  Negative for back pain and neck pain. Skin:  Positive for color change. Neurological:  Negative for dizziness and headaches. All other systems reviewed and are negative.          PAST MEDICALHISTORY     Past Medical History:   Diagnosis Date    Acute ischemic stroke ([address]) 2/20/2017    Acute on chronic systolic congestive heart failure ([address]) 8/15/2022    Atherosclerotic heart disease     s/p MI, stenting x 3 jan 2011([place]    Breakthrough seizure ([address]) 10/16/2021    CAD (coronary artery disease)     Cerebral artery occlusion with cerebral infarction [facility])     Cerebrovascular accident (CVA) due to embolism of right middle cerebral artery ([address]) 8/14/2017    CHF (congestive heart failure) ([address])

## 2023-09-15 NOTE — PLAN OF CARE
Problem: Discharge Planning  Goal: Discharge to home or other facility with appropriate resources  Outcome: Progressing  Flowsheets (Taken 9/15/2023 4072)  Discharge to home or other facility with appropriate resources: Identify barriers to discharge with patient and caregiver     Problem: Safety - Adult  Goal: Free from fall injury  Outcome: Progressing

## 2023-09-16 LAB
ALBUMIN SERPL-MCNC: 3.4 G/DL (ref 3.5–5.2)
ALP SERPL-CCNC: 96 U/L (ref 40–130)
ALT SERPL-CCNC: 12 U/L (ref 5–41)
ANION GAP SERPL CALCULATED.3IONS-SCNC: 12 MMOL/L (ref 7–19)
AST SERPL-CCNC: 10 U/L (ref 5–40)
BASOPHILS # BLD: 0 K/UL (ref 0–0.2)
BASOPHILS NFR BLD: 0.5 % (ref 0–1)
BILIRUB SERPL-MCNC: 0.3 MG/DL (ref 0.2–1.2)
BUN SERPL-MCNC: 39 MG/DL (ref 8–23)
CALCIUM SERPL-MCNC: 8.5 MG/DL (ref 8.8–10.2)
CHLORIDE SERPL-SCNC: 103 MMOL/L (ref 98–111)
CO2 SERPL-SCNC: 26 MMOL/L (ref 22–29)
CREAT SERPL-MCNC: 1.5 MG/DL (ref 0.5–1.2)
EOSINOPHIL # BLD: 0.1 K/UL (ref 0–0.6)
EOSINOPHIL NFR BLD: 1.8 % (ref 0–5)
ERYTHROCYTE [DISTWIDTH] IN BLOOD BY AUTOMATED COUNT: 15.4 % (ref 11.5–14.5)
GLUCOSE BLD-MCNC: 194 MG/DL (ref 70–99)
GLUCOSE BLD-MCNC: 215 MG/DL (ref 70–99)
GLUCOSE BLD-MCNC: 234 MG/DL (ref 70–99)
GLUCOSE SERPL-MCNC: 107 MG/DL (ref 74–109)
HCT VFR BLD AUTO: 42 % (ref 42–52)
HGB BLD-MCNC: 13.5 G/DL (ref 14–18)
IMM GRANULOCYTES # BLD: 0 K/UL
LYMPHOCYTES # BLD: 2 K/UL (ref 1.1–4.5)
LYMPHOCYTES NFR BLD: 32.4 % (ref 20–40)
MCH RBC QN AUTO: 27.7 PG (ref 27–31)
MCHC RBC AUTO-ENTMCNC: 32.1 G/DL (ref 33–37)
MCV RBC AUTO: 86.1 FL (ref 80–94)
MONOCYTES # BLD: 0.5 K/UL (ref 0–0.9)
MONOCYTES NFR BLD: 8.8 % (ref 0–10)
NEUTROPHILS # BLD: 3.4 K/UL (ref 1.5–7.5)
NEUTS SEG NFR BLD: 56.2 % (ref 50–65)
PERFORMED ON: ABNORMAL
PLATELET # BLD AUTO: 143 K/UL (ref 130–400)
PMV BLD AUTO: 11.6 FL (ref 9.4–12.4)
POTASSIUM SERPL-SCNC: 4.3 MMOL/L (ref 3.5–5)
PROT SERPL-MCNC: 6 G/DL (ref 6.6–8.7)
RBC # BLD AUTO: 4.88 M/UL (ref 4.7–6.1)
SODIUM SERPL-SCNC: 141 MMOL/L (ref 136–145)
WBC # BLD AUTO: 6.1 K/UL (ref 4.8–10.8)

## 2023-09-16 PROCEDURE — 99221 1ST HOSP IP/OBS SF/LOW 40: CPT | Performed by: SURGERY

## 2023-09-16 PROCEDURE — 2580000003 HC RX 258

## 2023-09-16 PROCEDURE — 1210000000 HC MED SURG R&B

## 2023-09-16 PROCEDURE — 36415 COLL VENOUS BLD VENIPUNCTURE: CPT

## 2023-09-16 PROCEDURE — 6370000000 HC RX 637 (ALT 250 FOR IP)

## 2023-09-16 PROCEDURE — 85025 COMPLETE CBC W/AUTO DIFF WBC: CPT

## 2023-09-16 PROCEDURE — 82962 GLUCOSE BLOOD TEST: CPT

## 2023-09-16 PROCEDURE — 6360000002 HC RX W HCPCS

## 2023-09-16 PROCEDURE — 94760 N-INVAS EAR/PLS OXIMETRY 1: CPT

## 2023-09-16 PROCEDURE — 80053 COMPREHEN METABOLIC PANEL: CPT

## 2023-09-16 RX ORDER — MULTIVITAMIN WITH IRON
1 TABLET ORAL DAILY
Status: DISCONTINUED | OUTPATIENT
Start: 2023-09-16 | End: 2023-09-19 | Stop reason: HOSPADM

## 2023-09-16 RX ORDER — INSULIN LISPRO 100 [IU]/ML
0-4 INJECTION, SOLUTION INTRAVENOUS; SUBCUTANEOUS NIGHTLY
Status: DISCONTINUED | OUTPATIENT
Start: 2023-09-16 | End: 2023-09-19 | Stop reason: HOSPADM

## 2023-09-16 RX ORDER — FLUTICASONE PROPIONATE 50 MCG
1 SPRAY, SUSPENSION (ML) NASAL DAILY
Status: DISCONTINUED | OUTPATIENT
Start: 2023-09-16 | End: 2023-09-19 | Stop reason: HOSPADM

## 2023-09-16 RX ORDER — OXYCODONE AND ACETAMINOPHEN 10; 325 MG/1; MG/1
1 TABLET ORAL EVERY 6 HOURS PRN
Status: DISCONTINUED | OUTPATIENT
Start: 2023-09-16 | End: 2023-09-19 | Stop reason: HOSPADM

## 2023-09-16 RX ORDER — DEXTROSE MONOHYDRATE 100 MG/ML
INJECTION, SOLUTION INTRAVENOUS CONTINUOUS PRN
Status: DISCONTINUED | OUTPATIENT
Start: 2023-09-16 | End: 2023-09-19 | Stop reason: HOSPADM

## 2023-09-16 RX ORDER — MECOBALAMIN 5000 MCG
10 TABLET,DISINTEGRATING ORAL NIGHTLY
Status: DISCONTINUED | OUTPATIENT
Start: 2023-09-16 | End: 2023-09-19 | Stop reason: HOSPADM

## 2023-09-16 RX ORDER — INSULIN LISPRO 100 [IU]/ML
0-8 INJECTION, SOLUTION INTRAVENOUS; SUBCUTANEOUS
Status: DISCONTINUED | OUTPATIENT
Start: 2023-09-16 | End: 2023-09-19 | Stop reason: HOSPADM

## 2023-09-16 RX ADMIN — METOPROLOL SUCCINATE 25 MG: 25 TABLET, FILM COATED, EXTENDED RELEASE ORAL at 08:42

## 2023-09-16 RX ADMIN — BUMETANIDE 1 MG: 1 TABLET ORAL at 08:42

## 2023-09-16 RX ADMIN — VANCOMYCIN HYDROCHLORIDE 1000 MG: 10 INJECTION, POWDER, LYOPHILIZED, FOR SOLUTION INTRAVENOUS at 20:43

## 2023-09-16 RX ADMIN — ACETAMINOPHEN 650 MG: 325 TABLET ORAL at 11:55

## 2023-09-16 RX ADMIN — FAMOTIDINE 20 MG: 20 TABLET ORAL at 20:38

## 2023-09-16 RX ADMIN — Medication 10 MG: at 20:38

## 2023-09-16 RX ADMIN — BUMETANIDE 1 MG: 1 TABLET ORAL at 20:38

## 2023-09-16 RX ADMIN — MEROPENEM 1000 MG: 1 INJECTION, POWDER, FOR SOLUTION INTRAVENOUS at 05:22

## 2023-09-16 RX ADMIN — SODIUM CHLORIDE, PRESERVATIVE FREE 10 ML: 5 INJECTION INTRAVENOUS at 20:39

## 2023-09-16 RX ADMIN — THERA TABS 1 TABLET: TAB at 08:42

## 2023-09-16 RX ADMIN — FLUTICASONE PROPIONATE 1 SPRAY: 50 SPRAY, METERED NASAL at 15:59

## 2023-09-16 RX ADMIN — FAMOTIDINE 20 MG: 20 TABLET ORAL at 08:42

## 2023-09-16 RX ADMIN — MEROPENEM 1000 MG: 1 INJECTION, POWDER, FOR SOLUTION INTRAVENOUS at 17:04

## 2023-09-16 RX ADMIN — INSULIN LISPRO 2 UNITS: 100 INJECTION, SOLUTION INTRAVENOUS; SUBCUTANEOUS at 17:04

## 2023-09-16 RX ADMIN — CLOPIDOGREL BISULFATE 75 MG: 75 TABLET ORAL at 14:17

## 2023-09-16 RX ADMIN — OXYCODONE AND ACETAMINOPHEN 1 TABLET: 10; 325 TABLET ORAL at 20:38

## 2023-09-16 RX ADMIN — POLYETHYLENE GLYCOL 3350 17 G: 17 POWDER, FOR SOLUTION ORAL at 20:38

## 2023-09-16 ASSESSMENT — ENCOUNTER SYMPTOMS
NAUSEA: 0
CHEST TIGHTNESS: 0
EYE PAIN: 0
ABDOMINAL PAIN: 0
CONSTIPATION: 0
VOMITING: 0
DIARRHEA: 0
SHORTNESS OF BREATH: 0
SORE THROAT: 0
EYE REDNESS: 0
COUGH: 0
ABDOMINAL DISTENTION: 0
BACK PAIN: 0

## 2023-09-16 ASSESSMENT — PAIN DESCRIPTION - DESCRIPTORS
DESCRIPTORS: SORE
DESCRIPTORS: ACHING

## 2023-09-16 ASSESSMENT — PAIN DESCRIPTION - PAIN TYPE: TYPE: ACUTE PAIN

## 2023-09-16 ASSESSMENT — PAIN SCALES - GENERAL
PAINLEVEL_OUTOF10: 4
PAINLEVEL_OUTOF10: 5

## 2023-09-16 ASSESSMENT — PAIN DESCRIPTION - LOCATION
LOCATION: LEG
LOCATION: HEAD

## 2023-09-16 ASSESSMENT — PAIN DESCRIPTION - ORIENTATION: ORIENTATION: RIGHT

## 2023-09-16 NOTE — PLAN OF CARE
Problem: Discharge Planning  Goal: Discharge to home or other facility with appropriate resources  Outcome: Progressing  Flowsheets (Taken 9/16/2023 1050)  Discharge to home or other facility with appropriate resources: Identify barriers to discharge with patient and caregiver     Problem: Safety - Adult  Goal: Free from fall injury  Outcome: Progressing

## 2023-09-16 NOTE — CONSULTS
Mr. Alba Stallings is a 77year old male who presented with a complaint of a painful lump on his leg. He states that it had been there for at least a week, and had gotten gradually larger. He was given what sounds like a course of oral antibiotics by his primary care. The area did not improve,so he came in to the ER. Imaging showed a fluid collection. I&D was attempted in the ER. Of note, the patient is on plavix and xarelto. Past Medical History:   Diagnosis Date    Acute ischemic stroke ([address]) 2/20/2017    Acute on chronic systolic congestive heart failure ([address]) 8/15/2022    Atherosclerotic heart disease     s/p MI, stenting x 3 jan 2011([place])    Breakthrough seizure ([address]) 10/16/2021    CAD (coronary artery disease)     Cerebral artery occlusion with cerebral infarction [facility])     Cerebrovascular accident (CVA) due to embolism of right middle cerebral artery ([address]) 8/14/2017    CHF (congestive heart failure) ([facility])     Chronic bilateral low back pain without sciatica 11/10/2016    Chronic kidney disease     DDD (degenerative disc disease), lumbar 11/15/2016    Depression     Depression with anxiety     Diabetes mellitus ([address])     type II    Diabetes mellitus, type 2 ([address])     DM (diabetes mellitus) ([address]) 7/12/2011    ESBL (extended spectrum beta-lactamase) producing bacteria infection 11/16/2019    urine    Glaucoma     Headache     Hyperlipidemia     Cholesterol management per pcp.      Hypertension     Macular degeneration     MI (myocardial infarction) ([address])     MI (myocardial infarction) ([address])     Neuromuscular disorder ([address])     Neuropathy     MOO on CPAP     Osteoarthritis     Palliative care patient 06/11/2019    Sleep apnea     Spondylosis of lumbar region without myelopathy or radiculopathy 11/15/2016    Status post placement of implantable loop recorder 10/31/2017    TIA (transient ischemic attack)      Past Surgical History:   Procedure Laterality Date    APPENDECTOMY      at age 4    [facility]      2016

## 2023-09-17 LAB
ALBUMIN SERPL-MCNC: 3.6 G/DL (ref 3.5–5.2)
ALP SERPL-CCNC: 100 U/L (ref 40–130)
ALT SERPL-CCNC: 11 U/L (ref 5–41)
ANION GAP SERPL CALCULATED.3IONS-SCNC: 12 MMOL/L (ref 7–19)
AST SERPL-CCNC: 11 U/L (ref 5–40)
BASOPHILS # BLD: 0 K/UL (ref 0–0.2)
BASOPHILS NFR BLD: 0.5 % (ref 0–1)
BILIRUB SERPL-MCNC: 0.3 MG/DL (ref 0.2–1.2)
BUN SERPL-MCNC: 34 MG/DL (ref 8–23)
CALCIUM SERPL-MCNC: 8.8 MG/DL (ref 8.8–10.2)
CHLORIDE SERPL-SCNC: 104 MMOL/L (ref 98–111)
CK SERPL-CCNC: 46 U/L (ref 39–308)
CO2 SERPL-SCNC: 25 MMOL/L (ref 22–29)
CREAT SERPL-MCNC: 1.5 MG/DL (ref 0.5–1.2)
CRP SERPL HS-MCNC: 2.77 MG/DL (ref 0–0.5)
EOSINOPHIL # BLD: 0.2 K/UL (ref 0–0.6)
EOSINOPHIL NFR BLD: 2 % (ref 0–5)
ERYTHROCYTE [DISTWIDTH] IN BLOOD BY AUTOMATED COUNT: 15.2 % (ref 11.5–14.5)
ERYTHROCYTE [SEDIMENTATION RATE] IN BLOOD BY WESTERGREN METHOD: 23 MM/HR (ref 0–15)
GLUCOSE BLD-MCNC: 142 MG/DL (ref 70–99)
GLUCOSE BLD-MCNC: 166 MG/DL (ref 70–99)
GLUCOSE BLD-MCNC: 227 MG/DL (ref 70–99)
GLUCOSE BLD-MCNC: 305 MG/DL (ref 70–99)
GLUCOSE SERPL-MCNC: 175 MG/DL (ref 74–109)
HBA1C MFR BLD: 6.2 % (ref 4–6)
HCT VFR BLD AUTO: 44.1 % (ref 42–52)
HGB BLD-MCNC: 13.9 G/DL (ref 14–18)
IMM GRANULOCYTES # BLD: 0 K/UL
LYMPHOCYTES # BLD: 2.5 K/UL (ref 1.1–4.5)
LYMPHOCYTES NFR BLD: 32.6 % (ref 20–40)
MCH RBC QN AUTO: 27.4 PG (ref 27–31)
MCHC RBC AUTO-ENTMCNC: 31.5 G/DL (ref 33–37)
MCV RBC AUTO: 86.8 FL (ref 80–94)
MONOCYTES # BLD: 0.7 K/UL (ref 0–0.9)
MONOCYTES NFR BLD: 9 % (ref 0–10)
NEUTROPHILS # BLD: 4.3 K/UL (ref 1.5–7.5)
NEUTS SEG NFR BLD: 55.6 % (ref 50–65)
PERFORMED ON: ABNORMAL
PLATELET # BLD AUTO: 142 K/UL (ref 130–400)
PMV BLD AUTO: 11.4 FL (ref 9.4–12.4)
POTASSIUM SERPL-SCNC: 4.5 MMOL/L (ref 3.5–5)
PROT SERPL-MCNC: 6.3 G/DL (ref 6.6–8.7)
RBC # BLD AUTO: 5.08 M/UL (ref 4.7–6.1)
SODIUM SERPL-SCNC: 141 MMOL/L (ref 136–145)
VANCOMYCIN TROUGH SERPL-MCNC: 8.5 UG/ML (ref 10–20)
WBC # BLD AUTO: 7.6 K/UL (ref 4.8–10.8)

## 2023-09-17 PROCEDURE — 82962 GLUCOSE BLOOD TEST: CPT

## 2023-09-17 PROCEDURE — 83036 HEMOGLOBIN GLYCOSYLATED A1C: CPT

## 2023-09-17 PROCEDURE — 86140 C-REACTIVE PROTEIN: CPT

## 2023-09-17 PROCEDURE — 85652 RBC SED RATE AUTOMATED: CPT

## 2023-09-17 PROCEDURE — 80053 COMPREHEN METABOLIC PANEL: CPT

## 2023-09-17 PROCEDURE — 36415 COLL VENOUS BLD VENIPUNCTURE: CPT

## 2023-09-17 PROCEDURE — 2580000003 HC RX 258

## 2023-09-17 PROCEDURE — 94760 N-INVAS EAR/PLS OXIMETRY 1: CPT

## 2023-09-17 PROCEDURE — 6370000000 HC RX 637 (ALT 250 FOR IP)

## 2023-09-17 PROCEDURE — 6360000002 HC RX W HCPCS

## 2023-09-17 PROCEDURE — 82550 ASSAY OF CK (CPK): CPT

## 2023-09-17 PROCEDURE — 85025 COMPLETE CBC W/AUTO DIFF WBC: CPT

## 2023-09-17 PROCEDURE — 80202 ASSAY OF VANCOMYCIN: CPT

## 2023-09-17 PROCEDURE — 99232 SBSQ HOSP IP/OBS MODERATE 35: CPT | Performed by: SURGERY

## 2023-09-17 PROCEDURE — 1210000000 HC MED SURG R&B

## 2023-09-17 RX ORDER — SODIUM CHLORIDE 0.9 % (FLUSH) 0.9 %
5-40 SYRINGE (ML) INJECTION PRN
Status: DISCONTINUED | OUTPATIENT
Start: 2023-09-17 | End: 2023-09-18 | Stop reason: HOSPADM

## 2023-09-17 RX ORDER — SODIUM CHLORIDE 0.9 % (FLUSH) 0.9 %
5-40 SYRINGE (ML) INJECTION EVERY 12 HOURS SCHEDULED
Status: DISCONTINUED | OUTPATIENT
Start: 2023-09-17 | End: 2023-09-18 | Stop reason: HOSPADM

## 2023-09-17 RX ORDER — SODIUM CHLORIDE 9 MG/ML
INJECTION, SOLUTION INTRAVENOUS PRN
Status: DISCONTINUED | OUTPATIENT
Start: 2023-09-17 | End: 2023-09-18 | Stop reason: HOSPADM

## 2023-09-17 RX ORDER — MECOBALAMIN 5000 MCG
10 TABLET,DISINTEGRATING ORAL NIGHTLY
Qty: 60 TABLET | Refills: 0 | Status: SHIPPED | OUTPATIENT
Start: 2023-09-17 | End: 2023-10-17

## 2023-09-17 RX ORDER — DOXYCYCLINE HYCLATE 100 MG/1
100 CAPSULE ORAL 2 TIMES DAILY
Qty: 14 CAPSULE | Refills: 0 | Status: SHIPPED | OUTPATIENT
Start: 2023-09-17 | End: 2023-09-24

## 2023-09-17 RX ORDER — CEPHALEXIN 500 MG/1
500 CAPSULE ORAL 4 TIMES DAILY
Qty: 28 CAPSULE | Refills: 0 | Status: SHIPPED | OUTPATIENT
Start: 2023-09-17 | End: 2023-09-24

## 2023-09-17 RX ADMIN — RIVAROXABAN 15 MG: 15 TABLET, FILM COATED ORAL at 08:26

## 2023-09-17 RX ADMIN — SODIUM CHLORIDE, PRESERVATIVE FREE 10 ML: 5 INJECTION INTRAVENOUS at 20:50

## 2023-09-17 RX ADMIN — INSULIN LISPRO 2 UNITS: 100 INJECTION, SOLUTION INTRAVENOUS; SUBCUTANEOUS at 17:12

## 2023-09-17 RX ADMIN — THERA TABS 1 TABLET: TAB at 08:26

## 2023-09-17 RX ADMIN — OXYCODONE AND ACETAMINOPHEN 1 TABLET: 10; 325 TABLET ORAL at 23:18

## 2023-09-17 RX ADMIN — MEROPENEM 1000 MG: 1 INJECTION, POWDER, FOR SOLUTION INTRAVENOUS at 23:12

## 2023-09-17 RX ADMIN — OXYCODONE AND ACETAMINOPHEN 1 TABLET: 10; 325 TABLET ORAL at 17:11

## 2023-09-17 RX ADMIN — BUMETANIDE 1 MG: 1 TABLET ORAL at 08:26

## 2023-09-17 RX ADMIN — METOPROLOL SUCCINATE 25 MG: 25 TABLET, FILM COATED, EXTENDED RELEASE ORAL at 08:25

## 2023-09-17 RX ADMIN — FLUTICASONE PROPIONATE 1 SPRAY: 50 SPRAY, METERED NASAL at 08:27

## 2023-09-17 RX ADMIN — CLOPIDOGREL BISULFATE 75 MG: 75 TABLET ORAL at 08:26

## 2023-09-17 RX ADMIN — Medication 1500 MG: at 21:04

## 2023-09-17 RX ADMIN — INSULIN LISPRO 4 UNITS: 100 INJECTION, SOLUTION INTRAVENOUS; SUBCUTANEOUS at 20:59

## 2023-09-17 RX ADMIN — MEROPENEM 1000 MG: 1 INJECTION, POWDER, FOR SOLUTION INTRAVENOUS at 05:26

## 2023-09-17 RX ADMIN — MEROPENEM 1000 MG: 1 INJECTION, POWDER, FOR SOLUTION INTRAVENOUS at 15:01

## 2023-09-17 RX ADMIN — FAMOTIDINE 20 MG: 20 TABLET ORAL at 20:47

## 2023-09-17 RX ADMIN — FAMOTIDINE 20 MG: 20 TABLET ORAL at 08:26

## 2023-09-17 RX ADMIN — BUMETANIDE 1 MG: 1 TABLET ORAL at 20:47

## 2023-09-17 ASSESSMENT — ENCOUNTER SYMPTOMS
NAUSEA: 0
VOMITING: 0
SHORTNESS OF BREATH: 0
ABDOMINAL DISTENTION: 0
ABDOMINAL PAIN: 0
CONSTIPATION: 0
DIARRHEA: 0

## 2023-09-17 ASSESSMENT — PAIN DESCRIPTION - DESCRIPTORS
DESCRIPTORS: ACHING
DESCRIPTORS: ACHING

## 2023-09-17 ASSESSMENT — PAIN DESCRIPTION - PAIN TYPE: TYPE: ACUTE PAIN

## 2023-09-17 ASSESSMENT — PAIN DESCRIPTION - ORIENTATION
ORIENTATION: MID
ORIENTATION: RIGHT

## 2023-09-17 ASSESSMENT — PAIN SCALES - GENERAL
PAINLEVEL_OUTOF10: 5
PAINLEVEL_OUTOF10: 7

## 2023-09-17 ASSESSMENT — PAIN DESCRIPTION - LOCATION
LOCATION: LEG
LOCATION: BACK

## 2023-09-17 NOTE — PLAN OF CARE
Problem: Discharge Planning  Goal: Discharge to home or other facility with appropriate resources  9/17/2023 1008 by Sweta Calhoun RN  Outcome: Adequate for Discharge  Flowsheets (Taken 9/17/2023 1000)  Discharge to home or other facility with appropriate resources: Identify barriers to discharge with patient and caregiver  9/17/2023 0325 by Natividad Lowery RN  Outcome: Progressing     Problem: Safety - Adult  Goal: Free from fall injury  9/17/2023 1008 by Sweta Calhoun RN  Outcome: Adequate for Discharge  9/17/2023 0325 by Natividad Lowery RN  Outcome: Progressing     Problem: Pain  Goal: Verbalizes/displays adequate comfort level or baseline comfort level  9/17/2023 1008 by Sweta Calhoun RN  Outcome: Adequate for Discharge  9/17/2023 0325 by Natividad Lowery RN  Outcome: Progressing

## 2023-09-18 ENCOUNTER — ANESTHESIA EVENT (OUTPATIENT)
Dept: OPERATING ROOM | Age: 78
DRG: 580 | End: 2023-09-18
Payer: MEDICARE

## 2023-09-18 ENCOUNTER — ANESTHESIA (OUTPATIENT)
Dept: OPERATING ROOM | Age: 78
DRG: 580 | End: 2023-09-18
Payer: MEDICARE

## 2023-09-18 PROBLEM — L02.415 ABSCESS OF RIGHT LEG: Status: ACTIVE | Noted: 2023-09-15

## 2023-09-18 LAB
ALBUMIN SERPL-MCNC: 3.7 G/DL (ref 3.5–5.2)
ALP SERPL-CCNC: 105 U/L (ref 40–130)
ALT SERPL-CCNC: 13 U/L (ref 5–41)
ANION GAP SERPL CALCULATED.3IONS-SCNC: 14 MMOL/L (ref 7–19)
AST SERPL-CCNC: 16 U/L (ref 5–40)
BASOPHILS # BLD: 0 K/UL (ref 0–0.2)
BASOPHILS NFR BLD: 0.6 % (ref 0–1)
BILIRUB SERPL-MCNC: 0.3 MG/DL (ref 0.2–1.2)
BUN SERPL-MCNC: 28 MG/DL (ref 8–23)
CALCIUM SERPL-MCNC: 8.8 MG/DL (ref 8.8–10.2)
CHLORIDE SERPL-SCNC: 101 MMOL/L (ref 98–111)
CO2 SERPL-SCNC: 23 MMOL/L (ref 22–29)
CREAT SERPL-MCNC: 1.3 MG/DL (ref 0.5–1.2)
EKG P AXIS: 68 DEGREES
EKG P-R INTERVAL: 176 MS
EKG Q-T INTERVAL: 436 MS
EKG QRS DURATION: 120 MS
EKG QTC CALCULATION (BAZETT): 451 MS
EKG T AXIS: -5 DEGREES
EOSINOPHIL # BLD: 0.1 K/UL (ref 0–0.6)
EOSINOPHIL NFR BLD: 1.7 % (ref 0–5)
ERYTHROCYTE [DISTWIDTH] IN BLOOD BY AUTOMATED COUNT: 15.3 % (ref 11.5–14.5)
GLUCOSE BLD-MCNC: 136 MG/DL (ref 70–99)
GLUCOSE BLD-MCNC: 143 MG/DL (ref 70–99)
GLUCOSE BLD-MCNC: 162 MG/DL (ref 70–99)
GLUCOSE BLD-MCNC: 261 MG/DL (ref 70–99)
GLUCOSE SERPL-MCNC: 144 MG/DL (ref 74–109)
HCT VFR BLD AUTO: 43.3 % (ref 42–52)
HGB BLD-MCNC: 13.5 G/DL (ref 14–18)
IMM GRANULOCYTES # BLD: 0 K/UL
KOH PREP SPEC: NORMAL
LYMPHOCYTES # BLD: 2.2 K/UL (ref 1.1–4.5)
LYMPHOCYTES NFR BLD: 30.9 % (ref 20–40)
MCH RBC QN AUTO: 27.3 PG (ref 27–31)
MCHC RBC AUTO-ENTMCNC: 31.2 G/DL (ref 33–37)
MCV RBC AUTO: 87.5 FL (ref 80–94)
MONOCYTES # BLD: 0.7 K/UL (ref 0–0.9)
MONOCYTES NFR BLD: 9.4 % (ref 0–10)
NEUTROPHILS # BLD: 4 K/UL (ref 1.5–7.5)
NEUTS SEG NFR BLD: 57 % (ref 50–65)
PERFORMED ON: ABNORMAL
PLATELET # BLD AUTO: 150 K/UL (ref 130–400)
PMV BLD AUTO: 11.5 FL (ref 9.4–12.4)
POTASSIUM SERPL-SCNC: 4.5 MMOL/L (ref 3.5–5)
PROT SERPL-MCNC: 6.5 G/DL (ref 6.6–8.7)
RBC # BLD AUTO: 4.95 M/UL (ref 4.7–6.1)
SODIUM SERPL-SCNC: 138 MMOL/L (ref 136–145)
WBC # BLD AUTO: 7 K/UL (ref 4.8–10.8)

## 2023-09-18 PROCEDURE — 7100000001 HC PACU RECOVERY - ADDTL 15 MIN: Performed by: SURGERY

## 2023-09-18 PROCEDURE — 93010 ELECTROCARDIOGRAM REPORT: CPT | Performed by: INTERNAL MEDICINE

## 2023-09-18 PROCEDURE — 85025 COMPLETE CBC W/AUTO DIFF WBC: CPT

## 2023-09-18 PROCEDURE — 2580000003 HC RX 258: Performed by: ANESTHESIOLOGY

## 2023-09-18 PROCEDURE — 3600000003 HC SURGERY LEVEL 3 BASE: Performed by: SURGERY

## 2023-09-18 PROCEDURE — 2720000010 HC SURG SUPPLY STERILE: Performed by: SURGERY

## 2023-09-18 PROCEDURE — 2580000003 HC RX 258

## 2023-09-18 PROCEDURE — 6360000002 HC RX W HCPCS: Performed by: SURGERY

## 2023-09-18 PROCEDURE — 87070 CULTURE OTHR SPECIMN AEROBIC: CPT

## 2023-09-18 PROCEDURE — 87075 CULTR BACTERIA EXCEPT BLOOD: CPT

## 2023-09-18 PROCEDURE — 1210000000 HC MED SURG R&B

## 2023-09-18 PROCEDURE — 3700000000 HC ANESTHESIA ATTENDED CARE: Performed by: SURGERY

## 2023-09-18 PROCEDURE — 2709999900 HC NON-CHARGEABLE SUPPLY: Performed by: SURGERY

## 2023-09-18 PROCEDURE — 80053 COMPREHEN METABOLIC PANEL: CPT

## 2023-09-18 PROCEDURE — 7100000000 HC PACU RECOVERY - FIRST 15 MIN: Performed by: SURGERY

## 2023-09-18 PROCEDURE — 88304 TISSUE EXAM BY PATHOLOGIST: CPT

## 2023-09-18 PROCEDURE — 2580000003 HC RX 258: Performed by: SURGERY

## 2023-09-18 PROCEDURE — 0KBS0ZZ EXCISION OF RIGHT LOWER LEG MUSCLE, OPEN APPROACH: ICD-10-PCS | Performed by: SURGERY

## 2023-09-18 PROCEDURE — 87205 SMEAR GRAM STAIN: CPT

## 2023-09-18 PROCEDURE — 6360000002 HC RX W HCPCS

## 2023-09-18 PROCEDURE — 6370000000 HC RX 637 (ALT 250 FOR IP)

## 2023-09-18 PROCEDURE — 6370000000 HC RX 637 (ALT 250 FOR IP): Performed by: SURGERY

## 2023-09-18 PROCEDURE — 3600000013 HC SURGERY LEVEL 3 ADDTL 15MIN: Performed by: SURGERY

## 2023-09-18 PROCEDURE — 87102 FUNGUS ISOLATION CULTURE: CPT

## 2023-09-18 PROCEDURE — 2500000003 HC RX 250 WO HCPCS: Performed by: SURGERY

## 2023-09-18 PROCEDURE — 93005 ELECTROCARDIOGRAM TRACING: CPT | Performed by: ANESTHESIOLOGY

## 2023-09-18 PROCEDURE — 2500000003 HC RX 250 WO HCPCS

## 2023-09-18 PROCEDURE — 36415 COLL VENOUS BLD VENIPUNCTURE: CPT

## 2023-09-18 PROCEDURE — 3700000001 HC ADD 15 MINUTES (ANESTHESIA): Performed by: SURGERY

## 2023-09-18 PROCEDURE — 82962 GLUCOSE BLOOD TEST: CPT

## 2023-09-18 RX ORDER — SODIUM CHLORIDE 9 MG/ML
INJECTION, SOLUTION INTRAVENOUS PRN
Status: DISCONTINUED | OUTPATIENT
Start: 2023-09-18 | End: 2023-09-18 | Stop reason: HOSPADM

## 2023-09-18 RX ORDER — DIPHENHYDRAMINE HYDROCHLORIDE 50 MG/ML
12.5 INJECTION INTRAMUSCULAR; INTRAVENOUS
Status: DISCONTINUED | OUTPATIENT
Start: 2023-09-18 | End: 2023-09-18 | Stop reason: HOSPADM

## 2023-09-18 RX ORDER — SODIUM CHLORIDE 0.9 % (FLUSH) 0.9 %
5-40 SYRINGE (ML) INJECTION PRN
Status: DISCONTINUED | OUTPATIENT
Start: 2023-09-18 | End: 2023-09-19 | Stop reason: HOSPADM

## 2023-09-18 RX ORDER — SODIUM CHLORIDE 0.9 % (FLUSH) 0.9 %
5-40 SYRINGE (ML) INJECTION EVERY 12 HOURS SCHEDULED
Status: DISCONTINUED | OUTPATIENT
Start: 2023-09-18 | End: 2023-09-18 | Stop reason: HOSPADM

## 2023-09-18 RX ORDER — SODIUM CHLORIDE, SODIUM LACTATE, POTASSIUM CHLORIDE, CALCIUM CHLORIDE 600; 310; 30; 20 MG/100ML; MG/100ML; MG/100ML; MG/100ML
INJECTION, SOLUTION INTRAVENOUS CONTINUOUS
Status: DISCONTINUED | OUTPATIENT
Start: 2023-09-18 | End: 2023-09-18 | Stop reason: HOSPADM

## 2023-09-18 RX ORDER — HYDROMORPHONE HYDROCHLORIDE 1 MG/ML
0.5 INJECTION, SOLUTION INTRAMUSCULAR; INTRAVENOUS; SUBCUTANEOUS EVERY 5 MIN PRN
Status: DISCONTINUED | OUTPATIENT
Start: 2023-09-18 | End: 2023-09-18 | Stop reason: HOSPADM

## 2023-09-18 RX ORDER — PROPOFOL 10 MG/ML
INJECTION, EMULSION INTRAVENOUS PRN
Status: DISCONTINUED | OUTPATIENT
Start: 2023-09-18 | End: 2023-09-18 | Stop reason: SDUPTHER

## 2023-09-18 RX ORDER — PROCHLORPERAZINE EDISYLATE 5 MG/ML
5 INJECTION INTRAMUSCULAR; INTRAVENOUS
Status: DISCONTINUED | OUTPATIENT
Start: 2023-09-18 | End: 2023-09-18 | Stop reason: HOSPADM

## 2023-09-18 RX ORDER — OXYCODONE HYDROCHLORIDE 5 MG/1
5 TABLET ORAL EVERY 4 HOURS PRN
Status: DISCONTINUED | OUTPATIENT
Start: 2023-09-18 | End: 2023-09-19 | Stop reason: HOSPADM

## 2023-09-18 RX ORDER — SODIUM CHLORIDE 0.9 % (FLUSH) 0.9 %
5-40 SYRINGE (ML) INJECTION EVERY 12 HOURS SCHEDULED
Status: DISCONTINUED | OUTPATIENT
Start: 2023-09-18 | End: 2023-09-19 | Stop reason: HOSPADM

## 2023-09-18 RX ORDER — OXYCODONE HYDROCHLORIDE 5 MG/1
10 TABLET ORAL EVERY 4 HOURS PRN
Status: DISCONTINUED | OUTPATIENT
Start: 2023-09-18 | End: 2023-09-19 | Stop reason: HOSPADM

## 2023-09-18 RX ORDER — HYDROMORPHONE HYDROCHLORIDE 1 MG/ML
0.5 INJECTION, SOLUTION INTRAMUSCULAR; INTRAVENOUS; SUBCUTANEOUS
Status: DISCONTINUED | OUTPATIENT
Start: 2023-09-18 | End: 2023-09-19 | Stop reason: HOSPADM

## 2023-09-18 RX ORDER — SODIUM CHLORIDE 0.9 % (FLUSH) 0.9 %
5-40 SYRINGE (ML) INJECTION PRN
Status: DISCONTINUED | OUTPATIENT
Start: 2023-09-18 | End: 2023-09-18 | Stop reason: HOSPADM

## 2023-09-18 RX ORDER — ONDANSETRON 2 MG/ML
INJECTION INTRAMUSCULAR; INTRAVENOUS PRN
Status: DISCONTINUED | OUTPATIENT
Start: 2023-09-18 | End: 2023-09-18 | Stop reason: SDUPTHER

## 2023-09-18 RX ORDER — BUPIVACAINE HYDROCHLORIDE AND EPINEPHRINE 2.5; 5 MG/ML; UG/ML
INJECTION, SOLUTION INFILTRATION; PERINEURAL PRN
Status: DISCONTINUED | OUTPATIENT
Start: 2023-09-18 | End: 2023-09-18 | Stop reason: ALTCHOICE

## 2023-09-18 RX ORDER — HYDROMORPHONE HYDROCHLORIDE 1 MG/ML
0.25 INJECTION, SOLUTION INTRAMUSCULAR; INTRAVENOUS; SUBCUTANEOUS EVERY 5 MIN PRN
Status: DISCONTINUED | OUTPATIENT
Start: 2023-09-18 | End: 2023-09-18 | Stop reason: HOSPADM

## 2023-09-18 RX ORDER — FENTANYL CITRATE 50 UG/ML
INJECTION, SOLUTION INTRAMUSCULAR; INTRAVENOUS PRN
Status: DISCONTINUED | OUTPATIENT
Start: 2023-09-18 | End: 2023-09-18 | Stop reason: SDUPTHER

## 2023-09-18 RX ORDER — HYDROMORPHONE HYDROCHLORIDE 1 MG/ML
0.25 INJECTION, SOLUTION INTRAMUSCULAR; INTRAVENOUS; SUBCUTANEOUS
Status: DISCONTINUED | OUTPATIENT
Start: 2023-09-18 | End: 2023-09-19 | Stop reason: HOSPADM

## 2023-09-18 RX ORDER — LIDOCAINE HYDROCHLORIDE 10 MG/ML
INJECTION, SOLUTION EPIDURAL; INFILTRATION; INTRACAUDAL; PERINEURAL PRN
Status: DISCONTINUED | OUTPATIENT
Start: 2023-09-18 | End: 2023-09-18 | Stop reason: SDUPTHER

## 2023-09-18 RX ADMIN — PROPOFOL 100 MG: 10 INJECTION, EMULSION INTRAVENOUS at 14:38

## 2023-09-18 RX ADMIN — MEROPENEM 1000 MG: 1 INJECTION, POWDER, FOR SOLUTION INTRAVENOUS at 11:57

## 2023-09-18 RX ADMIN — LIDOCAINE HYDROCHLORIDE 50 MG: 10 INJECTION, SOLUTION EPIDURAL; INFILTRATION; INTRACAUDAL; PERINEURAL at 14:38

## 2023-09-18 RX ADMIN — BUMETANIDE 1 MG: 1 TABLET ORAL at 20:46

## 2023-09-18 RX ADMIN — OXYCODONE AND ACETAMINOPHEN 1 TABLET: 10; 325 TABLET ORAL at 16:19

## 2023-09-18 RX ADMIN — SODIUM CHLORIDE, PRESERVATIVE FREE 10 ML: 5 INJECTION INTRAVENOUS at 22:55

## 2023-09-18 RX ADMIN — FENTANYL CITRATE 50 MCG: 0.05 INJECTION, SOLUTION INTRAMUSCULAR; INTRAVENOUS at 14:46

## 2023-09-18 RX ADMIN — FENTANYL CITRATE 50 MCG: 0.05 INJECTION, SOLUTION INTRAMUSCULAR; INTRAVENOUS at 14:54

## 2023-09-18 RX ADMIN — SODIUM CHLORIDE, PRESERVATIVE FREE 10 ML: 5 INJECTION INTRAVENOUS at 20:47

## 2023-09-18 RX ADMIN — MEROPENEM 1000 MG: 1 INJECTION, POWDER, FOR SOLUTION INTRAVENOUS at 04:45

## 2023-09-18 RX ADMIN — METOPROLOL SUCCINATE 25 MG: 25 TABLET, FILM COATED, EXTENDED RELEASE ORAL at 07:51

## 2023-09-18 RX ADMIN — FAMOTIDINE 20 MG: 20 TABLET ORAL at 07:51

## 2023-09-18 RX ADMIN — FAMOTIDINE 20 MG: 20 TABLET ORAL at 20:46

## 2023-09-18 RX ADMIN — ONDANSETRON 4 MG: 2 INJECTION INTRAMUSCULAR; INTRAVENOUS at 15:17

## 2023-09-18 RX ADMIN — FLUTICASONE PROPIONATE 1 SPRAY: 50 SPRAY, METERED NASAL at 07:51

## 2023-09-18 RX ADMIN — THERA TABS 1 TABLET: TAB at 07:51

## 2023-09-18 RX ADMIN — FENTANYL CITRATE 50 MCG: 0.05 INJECTION, SOLUTION INTRAMUSCULAR; INTRAVENOUS at 14:35

## 2023-09-18 RX ADMIN — OXYCODONE AND ACETAMINOPHEN 1 TABLET: 10; 325 TABLET ORAL at 22:53

## 2023-09-18 RX ADMIN — SODIUM CHLORIDE, POTASSIUM CHLORIDE, SODIUM LACTATE AND CALCIUM CHLORIDE: 600; 310; 30; 20 INJECTION, SOLUTION INTRAVENOUS at 13:19

## 2023-09-18 RX ADMIN — BUMETANIDE 1 MG: 1 TABLET ORAL at 07:51

## 2023-09-18 RX ADMIN — MEROPENEM 1000 MG: 1 INJECTION, POWDER, FOR SOLUTION INTRAVENOUS at 20:53

## 2023-09-18 ASSESSMENT — PAIN DESCRIPTION - LOCATION
LOCATION: LEG

## 2023-09-18 ASSESSMENT — ENCOUNTER SYMPTOMS
NAUSEA: 0
VOMITING: 0
BLOOD IN STOOL: 0
SHORTNESS OF BREATH: 0
CONSTIPATION: 0
COUGH: 0
ABDOMINAL PAIN: 0
DIARRHEA: 0
COLOR CHANGE: 0
ABDOMINAL DISTENTION: 0
WHEEZING: 0

## 2023-09-18 ASSESSMENT — PAIN DESCRIPTION - DESCRIPTORS
DESCRIPTORS: ACHING
DESCRIPTORS: SHARP
DESCRIPTORS: ACHING

## 2023-09-18 ASSESSMENT — PAIN SCALES - GENERAL
PAINLEVEL_OUTOF10: 6
PAINLEVEL_OUTOF10: 3
PAINLEVEL_OUTOF10: 1
PAINLEVEL_OUTOF10: 10
PAINLEVEL_OUTOF10: 2

## 2023-09-18 ASSESSMENT — PAIN DESCRIPTION - PAIN TYPE: TYPE: ACUTE PAIN

## 2023-09-18 ASSESSMENT — PAIN DESCRIPTION - ORIENTATION
ORIENTATION: RIGHT
ORIENTATION: MID;RIGHT
ORIENTATION: RIGHT

## 2023-09-18 ASSESSMENT — LIFESTYLE VARIABLES: SMOKING_STATUS: 0

## 2023-09-18 NOTE — ANESTHESIA POSTPROCEDURE EVALUATION
Department of Anesthesiology  Postprocedure Note    Patient: Mckenzie Elizondo  MRN: [id number]  Birthdate: [birth year]  Date of evaluation: 9/18/2023      Procedure Summary     Date: 09/18/23 Room / Location: [address]    Anesthesia Start: 4107 Anesthesia Stop: 9677    Procedure: LEG INCISION AND DRAINAGE (Right: Thigh) Diagnosis:       Abscess of right leg      (Abscess of right leg [L02.415])    Surgeons: Lupillo Vidal MD Responsible Provider: TEGAN Solano CRNA    Anesthesia Type: MAC ASA Status: 4          Anesthesia Type: No value filed.     Araceli Phase I:      Araceli Phase II:        Anesthesia Post Evaluation    Patient location during evaluation: PACU  Patient participation: waiting for patient participation  Pain score: 0  Airway patency: patent  Nausea & Vomiting: no vomiting and no nausea  Complications: no  Cardiovascular status: hemodynamically stable  Respiratory status: oral airway, acceptable and room air  Hydration status: stable  Pain management: adequate

## 2023-09-18 NOTE — BRIEF OP NOTE
Brief Postoperative Note      Patient: Vikki Cain  YOB: 1945  MRN: [id number]    Date of Procedure: 9/18/2023    Pre-Op Diagnosis Codes:     * Abscess of right leg [L02.415]    Post-Op Diagnosis:  fluid collection left medial thigh       Procedure(s):  LEG INCISION AND DRAINAGE with wound debridement, 7 cm long, 2 cm wide, through skin, soft tissue, and muscle    Surgeon(s):  Viky Soto MD    Assistant:  * No surgical staff found *    Anesthesia: Monitor Anesthesia Care    Estimated Blood Loss (mL): Minimal    Complications: None    Specimens:   ID Type Source Tests Collected by Time Destination   A : RIGHT THIGH TISSUE---ABSCESS WALL Tissue Thigh SURGICAL PATHOLOGY Viky Soto MD 9/18/2023 1504    B : RIGHT THIGH WOUND Swab Thigh SURGICAL PATHOLOGY, CULTURE, SURGICAL Viky Soto MD 9/18/2023 1506    C : RIGHT THIGH WOUND Swab Thigh SURGICAL PATHOLOGY, CULTURE, FUNGUS Viky Soto MD 9/18/2023 1508        Implants:  * No implants in log *      Drains: * No LDAs found *    Findings: chronic fluid collection with thick wall. Fluid was clear, no purulence or foul odor, with some debris. Fluid cultures and wall sent for path. Packed with kerlex. See above for measurement.       Electronically signed by Viky Soto MD on 9/18/2023 at 3:25 PM

## 2023-09-18 NOTE — PLAN OF CARE
Problem: Discharge Planning  Goal: Discharge to home or other facility with appropriate resources  9/18/2023 0903 by Terry Kelley RN  Outcome: Progressing  Flowsheets (Taken 9/18/2023 1442)  Discharge to home or other facility with appropriate resources: Identify barriers to discharge with patient and caregiver  9/18/2023 0200 by Rena Severance, RN  Outcome: Progressing     Problem: Safety - Adult  Goal: Free from fall injury  9/18/2023 0903 by Terry Kelley RN  Outcome: Progressing  9/18/2023 0200 by Rena Severance, RN  Outcome: Progressing     Problem: Pain  Goal: Verbalizes/displays adequate comfort level or baseline comfort level  9/18/2023 0903 by Terry Kelley RN  Outcome: Progressing  9/18/2023 0200 by Rena Severance, RN  Outcome: Progressing

## 2023-09-19 VITALS
OXYGEN SATURATION: 96 % | HEART RATE: 70 BPM | RESPIRATION RATE: 18 BRPM | TEMPERATURE: 97.9 F | DIASTOLIC BLOOD PRESSURE: 78 MMHG | SYSTOLIC BLOOD PRESSURE: 139 MMHG | BODY MASS INDEX: 32.59 KG/M2 | WEIGHT: 267.6 LBS | HEIGHT: 76 IN

## 2023-09-19 LAB
BACTERIA SPEC ANAEROBE CULT: ABNORMAL
BACTERIA SPEC ANAEROBE+AEROBE CULT: ABNORMAL
BASOPHILS # BLD: 0 K/UL (ref 0–0.2)
BASOPHILS NFR BLD: 0.7 % (ref 0–1)
EOSINOPHIL # BLD: 0.1 K/UL (ref 0–0.6)
EOSINOPHIL NFR BLD: 1.5 % (ref 0–5)
ERYTHROCYTE [DISTWIDTH] IN BLOOD BY AUTOMATED COUNT: 15 % (ref 11.5–14.5)
GLUCOSE BLD-MCNC: 165 MG/DL (ref 70–99)
GLUCOSE BLD-MCNC: 241 MG/DL (ref 70–99)
GRAM STN SPEC: ABNORMAL
HCT VFR BLD AUTO: 40.3 % (ref 42–52)
HGB BLD-MCNC: 12.8 G/DL (ref 14–18)
IMM GRANULOCYTES # BLD: 0 K/UL
LYMPHOCYTES # BLD: 1.9 K/UL (ref 1.1–4.5)
LYMPHOCYTES NFR BLD: 30.3 % (ref 20–40)
MCH RBC QN AUTO: 27.5 PG (ref 27–31)
MCHC RBC AUTO-ENTMCNC: 31.8 G/DL (ref 33–37)
MCV RBC AUTO: 86.7 FL (ref 80–94)
MONOCYTES # BLD: 0.6 K/UL (ref 0–0.9)
MONOCYTES NFR BLD: 10.1 % (ref 0–10)
NEUTROPHILS # BLD: 3.5 K/UL (ref 1.5–7.5)
NEUTS SEG NFR BLD: 56.9 % (ref 50–65)
ORGANISM: ABNORMAL
PERFORMED ON: ABNORMAL
PERFORMED ON: ABNORMAL
PLATELET # BLD AUTO: 121 K/UL (ref 130–400)
PMV BLD AUTO: 12.1 FL (ref 9.4–12.4)
RBC # BLD AUTO: 4.65 M/UL (ref 4.7–6.1)
WBC # BLD AUTO: 6.1 K/UL (ref 4.8–10.8)

## 2023-09-19 PROCEDURE — 82962 GLUCOSE BLOOD TEST: CPT

## 2023-09-19 PROCEDURE — 2580000003 HC RX 258: Performed by: SURGERY

## 2023-09-19 PROCEDURE — 6360000002 HC RX W HCPCS: Performed by: SURGERY

## 2023-09-19 PROCEDURE — 36415 COLL VENOUS BLD VENIPUNCTURE: CPT

## 2023-09-19 PROCEDURE — 85025 COMPLETE CBC W/AUTO DIFF WBC: CPT

## 2023-09-19 PROCEDURE — 6370000000 HC RX 637 (ALT 250 FOR IP): Performed by: SURGERY

## 2023-09-19 PROCEDURE — 94760 N-INVAS EAR/PLS OXIMETRY 1: CPT

## 2023-09-19 RX ADMIN — OXYCODONE AND ACETAMINOPHEN 1 TABLET: 10; 325 TABLET ORAL at 05:23

## 2023-09-19 RX ADMIN — THERA TABS 1 TABLET: TAB at 09:00

## 2023-09-19 RX ADMIN — MEROPENEM 1000 MG: 1 INJECTION, POWDER, FOR SOLUTION INTRAVENOUS at 05:23

## 2023-09-19 RX ADMIN — FAMOTIDINE 20 MG: 20 TABLET ORAL at 09:00

## 2023-09-19 RX ADMIN — Medication 1500 MG: at 00:14

## 2023-09-19 RX ADMIN — SODIUM CHLORIDE, PRESERVATIVE FREE 10 ML: 5 INJECTION INTRAVENOUS at 09:01

## 2023-09-19 RX ADMIN — MEROPENEM 1000 MG: 1 INJECTION, POWDER, FOR SOLUTION INTRAVENOUS at 14:13

## 2023-09-19 RX ADMIN — METOPROLOL SUCCINATE 25 MG: 25 TABLET, FILM COATED, EXTENDED RELEASE ORAL at 09:00

## 2023-09-19 RX ADMIN — OXYCODONE HYDROCHLORIDE 10 MG: 5 TABLET ORAL at 14:24

## 2023-09-19 RX ADMIN — INSULIN LISPRO 2 UNITS: 100 INJECTION, SOLUTION INTRAVENOUS; SUBCUTANEOUS at 14:30

## 2023-09-19 RX ADMIN — FLUTICASONE PROPIONATE 1 SPRAY: 50 SPRAY, METERED NASAL at 09:01

## 2023-09-19 RX ADMIN — BUMETANIDE 1 MG: 1 TABLET ORAL at 09:00

## 2023-09-19 RX ADMIN — OXYCODONE HYDROCHLORIDE 5 MG: 5 TABLET ORAL at 10:23

## 2023-09-19 ASSESSMENT — PAIN DESCRIPTION - LOCATION
LOCATION: GENERALIZED
LOCATION: LEG
LOCATION: KNEE

## 2023-09-19 ASSESSMENT — PAIN SCALES - GENERAL
PAINLEVEL_OUTOF10: 0
PAINLEVEL_OUTOF10: 6
PAINLEVEL_OUTOF10: 10
PAINLEVEL_OUTOF10: 5
PAINLEVEL_OUTOF10: 0

## 2023-09-19 ASSESSMENT — PAIN DESCRIPTION - DESCRIPTORS
DESCRIPTORS: ACHING
DESCRIPTORS: DISCOMFORT
DESCRIPTORS: DISCOMFORT

## 2023-09-19 ASSESSMENT — PAIN DESCRIPTION - ORIENTATION
ORIENTATION: RIGHT
ORIENTATION: RIGHT

## 2023-09-19 ASSESSMENT — PAIN DESCRIPTION - ONSET: ONSET: ON-GOING

## 2023-09-19 ASSESSMENT — PAIN - FUNCTIONAL ASSESSMENT
PAIN_FUNCTIONAL_ASSESSMENT: PREVENTS OR INTERFERES SOME ACTIVE ACTIVITIES AND ADLS
PAIN_FUNCTIONAL_ASSESSMENT: PREVENTS OR INTERFERES SOME ACTIVE ACTIVITIES AND ADLS

## 2023-09-19 ASSESSMENT — PAIN DESCRIPTION - FREQUENCY: FREQUENCY: INTERMITTENT

## 2023-09-19 ASSESSMENT — PAIN DESCRIPTION - PAIN TYPE: TYPE: SURGICAL PAIN

## 2023-09-19 NOTE — CARE COORDINATION
09/19/23 1405   IMM Letter   IMM Letter given to Patient/Family/Significant other/Guardian/POA/by: Sw gave letter to pt and explained, pt didn't wish to stay the 4hr wait period, JESSI Larsen   IMM Letter date given: 09/19/23   IMM Letter time given: 1405     Placed in soft chart  Electronically signed by JESSI Godfrey on 9/19/2023 at 2:22 PM

## 2023-09-19 NOTE — CARE COORDINATION
Spoke with patient regarding MD orders for [facility] services. Patient agreeable and has chosen [facility]. Referral Faxed. [phone] [facility] [phone]. [phone]. Please notify [phone] [facility] when patient discharges and fax DC Summary,  DC med list and any new [facility] orders. The Patient and/or patient representative was provided with a choice of provider and agrees   with the discharge plan. [x] Yes [] No    Freedom of choice list was provided with basic dialogue that supports the patient's individualized plan of care/goals, treatment preferences and shares the quality data associated with the providers.  [x] Yes [] No  Electronically signed by Lesli Chávez on 9/19/2023 at 1:37 PM

## 2023-09-19 NOTE — ACP (ADVANCE CARE PLANNING)
Advance Care Planning     Advance Care Planning Activator (Inpatient)  Conversation Note      Date of ACP Conversation: 9/19/2023     [employer] with: Pt with capacity. ACP Activator: Reesa Hodgkins, RN    }    Health Care Decision Maker:     Current Designated Health Care Decision Maker:     Primary Decision Maker (Active): PARVIZ - Spouse - [phone]    Supplemental (Other) Decision Maker: Santhosh Mccoy - Child - [phone]    Supplemental (Other) Decision Maker: Kali Phan - Child - [phone]      Care Preferences    Ventilation: \"If you were in your present state of health and suddenly became very ill and were unable to breathe on your own, what would your preference be about the use of a ventilator (breathing machine) if it were available to you? \"      Would the patient desire the use of ventilator (breathing machine)?: Yes        Resuscitation  \"CPR works best to restart the heart when there is a sudden event, like a heart attack, in someone who is otherwise healthy. Unfortunately, CPR does not typically restart the heart for people who have serious health conditions or who are very sick. \"    \"In the event your heart stopped as a result of an underlying serious health condition, would you want attempts to be made to restart your heart (answer \"yes\" for attempt to resuscitate) or would you prefer a natural death (answer \"no\" for do not attempt to resuscitate)? \" Yes      Conversation Outcomes:  ACP discussion completed       Electronically signed by Reesa Hodgkins, RN on 9/19/2023 at 11:10 AM

## 2023-09-19 NOTE — DISCHARGE SUMMARY
!Visualized! Compressibility! Thrombosis! +------------------------------------+----------+---------------+----------+ ! Sapheno Femoral Junction            ! Yes       ! Yes            ! None      ! +------------------------------------+----------+---------------+----------+ ! Common Femoral                      !Yes       ! Yes            ! None      ! +------------------------------------+----------+---------------+----------+ ! Prox Femoral                        !Yes       ! Yes            ! None      ! +------------------------------------+----------+---------------+----------+ ! Mid Femoral                         !Yes       ! Yes            ! None      ! +------------------------------------+----------+---------------+----------+ ! Dist Femoral                        !Partial   !Yes            ! None      ! +------------------------------------+----------+---------------+----------+ ! Deep Femoral                        !Yes       ! Yes            ! None      ! +------------------------------------+----------+---------------+----------+ ! Popliteal                           !Yes       ! Yes            ! None      ! +------------------------------------+----------+---------------+----------+ ! SSV                                 ! Yes       ! Yes            ! None      ! +------------------------------------+----------+---------------+----------+ ! Gastroc                             ! Yes       ! Yes            ! None      ! +------------------------------------+----------+---------------+----------+ ! PTV                                 ! Yes       ! Yes            ! None      ! +------------------------------------+----------+---------------+----------+ ! GSV                                 ! Yes       ! Yes            ! None      ! +------------------------------------+----------+---------------+----------+ ! ATV                                 ! Yes       ! Yes            ! None      !
as tolerated. MD Efe Betts NICO [address] [facility] [phone]    Schedule an appointment as soon as possible for a visit in 1 week(s)      MD Efe Betts [address]  [facility] [phone]          [facility] Service Nico Mendez [address] [address]  [phone]  Go on 9/26/2023  @10:00am For wound re-check right thigh         Diet: ADULT DIET; Regular     Disposition: Patient is medically stable and will be discharged home with home health. Time spent on discharge 38 minutes spent in assessing patient, reviewing medications, discussion with nursing, confirming safe discharge plan and preparation of discharge summary. Signed:  Electronically signed by TEGAN Whitney CNP on 9/19/23 at 1:31 PM CDT         EMR Dragon/Transcription disclaimer:   Much of this encounter note is an electronic transcription/translation of spoken language to printed text.  The electronic translation of spoken language may permit erroneous, or at times, nonsensical words or phrases to be inadvertently transcribed; although attempts have made to review the note for such errors, some may still exist.

## 2023-09-19 NOTE — DISCHARGE INSTRUCTIONS
Negative Pressure Wound Therapy:  Wound Location: Right Thigh  Last Changed: 9/19/23  Clean: Wash wound bed and at least 4 inches of surrounding skin with soap and water. Flush wound with NS. Pat dry. Periwound: Apply Skin Prep to periwound skin and under all drape. Window-pane surrounding area with vac drape. May use hydrocolloid instead of drape. If skin becomes red due to tape irritation, apply light dusting of antifungal powder to periwound skin, then cover with skin prep to form crusting. May repeat steps until proper crusting is made to protect skin. Dressing Application: DO NOT PUT FOAM ON GOOD SKIN. Apply black foam to wound bed. NPWT Settings: Set wound vac to 125 mmHg. Continuous. Change Frequency: Change wound vac dressing 3 times per week (MWF or TTS). Reapply vac dressing if NPWT system stops working or dressing begins to leak or cannot be reinforced. Alternative dressing: If unable to maintain NPWT, remove vac dressing. Wash with soap and water. Apply sterile saline moistened gauze to wound bed. Cover with dry dressing. Secure with medipore tape. Change twice daily. Take a dressing kit with you to your appointments at the [facility].

## 2023-09-19 NOTE — CARE COORDINATION
SW meet with pt at bedside to discuss dc planning, pt stated he lives with spouse and has a cane at home, no other needs. HH has been contacted and pt will have home wound vac.    Electronically signed by JESSI Mccartney on 9/19/2023 at 2:26 PM

## 2023-09-19 NOTE — CONSULTS
Palliative Care:    Mr. Nolan Dacosta is a pleasant 77 yr old who presents to ed with right thigh pain. Admitted d/t cellulitis and need for surgical consult. Dr. Charlene Overton took pt last evening for I&D. This morning he is sitting up in chair stating \"the bed has really hurt my back\". Pt does have chronic pain and medical issues. Palliative car initiated for support, goc, ACP discussion. Past Medical History:        Past Medical History:   Diagnosis Date    Acute ischemic stroke ([address]) 2/20/2017    Acute on chronic systolic congestive heart failure ([address]) 8/15/2022    Atherosclerotic heart disease     s/p MI, stenting x 3 jan 2011([place])    Breakthrough seizure ([address]) 10/16/2021    CAD (coronary artery disease)     Cerebral artery occlusion with cerebral infarction [facility])     Cerebrovascular accident (CVA) due to embolism of right middle cerebral artery ([address]) 8/14/2017    CHF (congestive heart failure) ([facility])     Chronic bilateral low back pain without sciatica 11/10/2016    Chronic kidney disease     DDD (degenerative disc disease), lumbar 11/15/2016    Depression     Depression with anxiety     Diabetes mellitus ([address])     type II    Diabetes mellitus, type 2 ([address])     DM (diabetes mellitus) ([address]) 7/12/2011    ESBL (extended spectrum beta-lactamase) producing bacteria infection 11/16/2019    urine    Glaucoma     Headache     Hyperlipidemia     Cholesterol management per pcp. Hypertension     Macular degeneration     MI (myocardial infarction) ([address])     MI (myocardial infarction) ([address])     Neuromuscular disorder ([address])     Neuropathy     MOO on CPAP     Osteoarthritis     Palliative care patient 06/11/2019    Sleep apnea     Spondylosis of lumbar region without myelopathy or radiculopathy 11/15/2016    Status post placement of implantable loop recorder 10/31/2017    TIA (transient ischemic attack)        Advance Directives:   No AD on file. He states he is a full code.  He wants his wife as primary and son, as

## 2023-09-20 NOTE — OP NOTE
Operative Report    PATIENT NAME: Tremaine Espinoza  MEDICAL RECORD NO. [id number]  SURGEON: Lani Maravilla MD MD   Primary Care Physician: Fran Chou MD  Date: 9/18/2023     PROCEDURE PERFORMED: Incision and drainage with sharp excisional debridement of right thigh wound 7 by 2 cm, through skin, soft tissue, and muscle  PREOPERATIVE DIAGNOSIS: right thigh abscess  POSTOPERATIVE DIAGNOSIS:   SURGEON:  Lani Maravilla MD   Assistant: right thigh chronic fluid collection with wound  ANESTHESIA:  MAC  ESTIMATED BLOOD LOSS: minimal  SPECIMEN:  culture, wall for path  COMPLICATIONS:  None; patient tolerated the procedure well. FINDINGS: Large cavity that appeared consistent with chronic fluid collection with wall that was debrided  DISPOSITION: PACU - hemodynamically stable. CONDITION: stable      Indications: The patient is a 77year old male who presented with a complaint of a painful lump in the distal medial right thigh. Imaging was done consistent with a fluid collection. This was attempted to be aspirated and I&D'd in the ER without much output. The patient does report that he has had a lump near this area since the harvest for the vein for his CABG. But now it has become painful. He is taken at this time for drainage of possible abscess. Procedure Details     After the risks and benefits of the procedure were explained, informed consent was obtained, and the patient was taken to the operating room. The patient was placed in supine position and anesthesia was begun. The right thigh was prepped and draped in normal sterile fashion. A time out was performed. Local anesthetic was injected over the center of the palpable fluid collection. A longitudinal incision was made with the scalpel. Soft tissue was dissected with cautery through skin and soft tissue. A fluid collection was encountered. The fluid was actually clear with maybe some clumps of fibrin, but no purulence, no foul odor.  The incision was lengthened

## 2023-09-22 LAB
BACTERIA SPEC AEROBE CULT: NORMAL
BACTERIA SPEC ANAEROBE CULT: NORMAL
GRAM STN SPEC: NORMAL

## 2023-09-26 ENCOUNTER — HOSPITAL ENCOUNTER (OUTPATIENT)
Dept: WOUND CARE | Age: 78
Discharge: HOME | End: 2023-09-26
Payer: MEDICARE

## 2023-09-26 VITALS — WEIGHT: 267 LBS | HEIGHT: 76 IN | BODY MASS INDEX: 32.51 KG/M2

## 2023-09-26 DIAGNOSIS — L97.112 SKIN ULCER OF RIGHT THIGH WITH FAT LAYER EXPOSED (HCC): Primary | ICD-10-CM

## 2023-09-26 DIAGNOSIS — Z79.4 TYPE 2 DIABETES MELLITUS WITH DIABETIC POLYNEUROPATHY, WITH LONG-TERM CURRENT USE OF INSULIN (HCC): ICD-10-CM

## 2023-09-26 DIAGNOSIS — E11.42 TYPE 2 DIABETES MELLITUS WITH DIABETIC POLYNEUROPATHY, WITH LONG-TERM CURRENT USE OF INSULIN (HCC): ICD-10-CM

## 2023-09-26 LAB
FUNGUS SPEC CULT: NORMAL
KOH PREP SPEC: NORMAL

## 2023-09-26 PROCEDURE — 11042 DBRDMT SUBQ TIS 1ST 20SQCM/<: CPT | Performed by: NURSE PRACTITIONER

## 2023-09-26 PROCEDURE — 97605 NEG PRS WND THER DME<=50SQCM: CPT

## 2023-09-26 PROCEDURE — 99214 OFFICE O/P EST MOD 30 MIN: CPT

## 2023-09-26 PROCEDURE — 11042 DBRDMT SUBQ TIS 1ST 20SQCM/<: CPT

## 2023-09-26 PROCEDURE — 99215 OFFICE O/P EST HI 40 MIN: CPT | Performed by: NURSE PRACTITIONER

## 2023-09-26 RX ORDER — BACITRACIN ZINC AND POLYMYXIN B SULFATE 500; 1000 [USP'U]/G; [USP'U]/G
OINTMENT TOPICAL ONCE
OUTPATIENT
Start: 2023-09-26 | End: 2023-09-26

## 2023-09-26 RX ORDER — LIDOCAINE HYDROCHLORIDE 20 MG/ML
JELLY TOPICAL PRN
Status: DISCONTINUED | OUTPATIENT
Start: 2023-09-26 | End: 2023-09-28 | Stop reason: HOSPADM

## 2023-09-26 RX ORDER — BETAMETHASONE DIPROPIONATE 0.05 %
OINTMENT (GRAM) TOPICAL ONCE
OUTPATIENT
Start: 2023-09-26 | End: 2023-09-26

## 2023-09-26 RX ORDER — GINSENG 100 MG
CAPSULE ORAL ONCE
OUTPATIENT
Start: 2023-09-26 | End: 2023-09-26

## 2023-09-26 RX ORDER — LIDOCAINE HYDROCHLORIDE 20 MG/ML
JELLY TOPICAL ONCE
OUTPATIENT
Start: 2023-09-26 | End: 2023-09-26

## 2023-09-26 RX ORDER — LIDOCAINE HYDROCHLORIDE 40 MG/ML
SOLUTION TOPICAL ONCE
OUTPATIENT
Start: 2023-09-26 | End: 2023-09-26

## 2023-09-26 RX ORDER — LIDOCAINE 50 MG/G
OINTMENT TOPICAL ONCE
OUTPATIENT
Start: 2023-09-26 | End: 2023-09-26

## 2023-09-26 RX ORDER — CLOBETASOL PROPIONATE 0.5 MG/G
OINTMENT TOPICAL ONCE
OUTPATIENT
Start: 2023-09-26 | End: 2023-09-26

## 2023-09-26 RX ORDER — GENTAMICIN SULFATE 1 MG/G
OINTMENT TOPICAL ONCE
OUTPATIENT
Start: 2023-09-26 | End: 2023-09-26

## 2023-09-26 RX ORDER — TRIAMCINOLONE ACETONIDE 1 MG/G
OINTMENT TOPICAL ONCE
OUTPATIENT
Start: 2023-09-26 | End: 2023-09-26

## 2023-09-26 RX ORDER — IBUPROFEN 200 MG
TABLET ORAL ONCE
OUTPATIENT
Start: 2023-09-26 | End: 2023-09-26

## 2023-09-26 RX ORDER — LIDOCAINE 40 MG/G
CREAM TOPICAL ONCE
OUTPATIENT
Start: 2023-09-26 | End: 2023-09-26

## 2023-09-26 RX ORDER — SODIUM CHLOR/HYPOCHLOROUS ACID 0.033 %
SOLUTION, IRRIGATION IRRIGATION ONCE
OUTPATIENT
Start: 2023-09-26 | End: 2023-09-26

## 2023-09-26 ASSESSMENT — VISUAL ACUITY: OU: 1

## 2023-09-26 NOTE — PLAN OF CARE
Negative Pressure Wound Therapy    NAME:  Suzette Cordero  DATE OF BIRTH:  [birth year]  MEDICAL RECORD NUMBER:  [id number]  DATE:  9/26/2023    Applied Negative Pressure to right thigh wound(s)/ulcer(s). [x] Applied skin barrier prep to adrianna-wound. [x] Cut strips of plastic drape to picture frame wound so that adrianna-wound is     covered with the drape. [x] If bridging dressing to less prominent site, cover any intact skin that will come in contact with the Negative Pressure Therapy sponge, gauze or channel drain with plastic drape. The sponge should never touch intact skin. [x] Cut sponge, gauze or channel drain to size which will fit into the wound/ulcer bed without being forced. [x] Be sure the sponge is large enough to hold the entire round plastic flange which is attached to the tubing. Never allow flange to be larger than the sponge or it will produce suction damaging intact skin. Total number of individual pieces of foam used within the wound bed: 1    [x] If bridging the dressing away from the primary site, be sure the bridge leads to a piece of sponge large enough to hold the entire flange without allowing any of the flange to overlap onto intact skin. [x] Covered sponge, gauze or channel drain with plastic drape. [x] Cut a hole in this plastic drape directly over the sponge the same size as the plastic drain tubing. [x] Removed plastic liner from flange and apply it directly over the hole you cut. [x] Removed the plastic cover from the flange. [x] Attached the tubing to the wound/ulcer Negative Pressure Therapy and turn it on to be sure a vacuum is created and that there are no leaks. [x] If air leaks occur, use plastic drape to patch them. [] Secured Negative Pressure Therapy dressing with ace wrap loosely if located on an extremity. Maintain tubing outside of ace wrap. Tubing must not exert pressure on intact skin.     Applied per  Guidelines      Electronically signed by Lewis Ladd

## 2023-09-26 NOTE — PROGRESS NOTES
Patient Care Team:  Milagros Oppenheim, MD as PCP - General (Internal Medicine)  Milagros Oppenheim, MD as PCP - Empaneled Provider  Norm Barnett DO (Gastroenterology)  Tigre Echols MD as Consulting Physician (Interventional Cardiology)    TODAY'S DATE:  9/26/2023 9/18/23-Dr. Nickie Cowart  Leg incision and drainage     HISTORY of PRESENTILLNESS HPI   Selene Alberto is a 77 y.o. male who presents today for wound evaluation. He reports he developed a wound on right thigh. This started 1 week(s) ago. He believes this is  healing. He has been applying a wound vac. He has not had  fever or chills. He has a history of diabetes mellitus (HGBA1c 6.2). Wound Type: surgical  Wound Location: right thigh  Modifying factors:none    Patient Active Problem List   Diagnosis Code    Type 2 diabetes mellitus with diabetic polyneuropathy, with long-term current use of insulin ([facility]) E11.42, Z79.4    Essential hypertension I10    Coronary artery disease involving native coronary artery of native heart without angina pectoris I25.10    MI (myocardial infarction) ([address]) I21.9    Mixed hyperlipidemia E78.2    Chronic bilateral low back pain without sciatica M54.50, G89.29    DDD (degenerative disc disease), lumbar M51.36    Spondylosis of lumbar region without myelopathy or radiculopathy M47.816    Acute ischemic stroke ([facility]) I63.9    Thoracic outlet syndrome G54.0    Vertigo R42    Imbalance R26.89    Cerebrovascular accident (CVA) due to embolism of right middle cerebral artery (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 8    Benign prostatic hyperplasia without lower urinary tract symptoms N40.0    Chronic tension-type headache,

## 2023-09-26 NOTE — DISCHARGE INSTRUCTIONS
[address] and Hyperbaric Oxygen Therapy   Physician Orders and Discharge Instructions  [address] [facility],[address], [address]  Telephone: [phone]    NAME:  Darrick Urias OF BIRTH:  [birth year]  MEDICAL RECORD NUMBER:  [id number]  DATE:  9/26/2023    Discharge condition: Stable    Discharge to: Home    Left via:Private automobile    Accompanied by:  spouse    ECF/H[facility]: [facility]    Dressing Orders:  Right thigh Wound:  Wash with soap and water  Apply wound vac as follows: Apply Skin Prep to periwound. Apply drape - window pane to surrounding area (periwound). May use duoderm instead of drape to prevent skin breakdown. If skin becomes red due to tape irritation please apply skin prep. Then apply light dusting of ostomy powder or antifungal powder to periwound. Then blot with skin prep to form crusting to protect skin. May repeat skin prep, powdering steps until proper crusting is made. Then apply drape to periwound. DO NOT PUT FOAM ON GOOD SKIN. Apply black foam to wound bed. BRIDGE to thigh, Set wound vac to 125 mmHG, continuous. Change wound vac dressing 3 times per week (MWF or TTS)  Reapply vac dressing if vac stops working or dressing begins to leak or cannot be reinforced. Alternative dressing: Wash with soap and water. Apply 1/4 STR Dakins moistened gauze to wound bed. Cover with gauze. Secure with medipore tape. Change twice daily . Treatment Orders:  Protein rich diet  Multivitamin    [facility] follow up visit ____2 weeks with Dr. Liriano_________________________  (Please note your next appointment above and if you are unable to keep, kindly give a 24 hour notice.  Thank you.)          If you experience any of the following, please call the [address] [place] [address] during business hours:    * Increase in Pain  * Temperature over 101  * Increase in drainage from your wound  * Drainage with a foul odor  * Bleeding  * Increase in

## 2023-10-03 LAB
FUNGUS SPEC CULT: NORMAL
KOH PREP SPEC: NORMAL

## 2023-10-10 LAB
FUNGUS SPEC CULT: NORMAL
KOH PREP SPEC: NORMAL

## 2023-10-11 ENCOUNTER — HOSPITAL ENCOUNTER (OUTPATIENT)
Dept: WOUND CARE | Age: 78
Discharge: HOME | End: 2023-10-11
Payer: MEDICARE

## 2023-10-11 VITALS
TEMPERATURE: 97 F | HEIGHT: 76 IN | BODY MASS INDEX: 32.51 KG/M2 | DIASTOLIC BLOOD PRESSURE: 62 MMHG | SYSTOLIC BLOOD PRESSURE: 114 MMHG | WEIGHT: 267 LBS | HEART RATE: 85 BPM | RESPIRATION RATE: 18 BRPM

## 2023-10-11 DIAGNOSIS — L97.112 SKIN ULCER OF RIGHT THIGH WITH FAT LAYER EXPOSED (HCC): Primary | Chronic | ICD-10-CM

## 2023-10-11 PROCEDURE — 97605 NEG PRS WND THER DME<=50SQCM: CPT

## 2023-10-11 PROCEDURE — 11042 DBRDMT SUBQ TIS 1ST 20SQCM/<: CPT

## 2023-10-11 PROCEDURE — 6370000000 HC RX 637 (ALT 250 FOR IP): Performed by: NURSE PRACTITIONER

## 2023-10-11 PROCEDURE — 11042 DBRDMT SUBQ TIS 1ST 20SQCM/<: CPT | Performed by: SURGERY

## 2023-10-11 RX ORDER — CLOBETASOL PROPIONATE 0.5 MG/G
OINTMENT TOPICAL ONCE
OUTPATIENT
Start: 2023-10-11 | End: 2023-10-11

## 2023-10-11 RX ORDER — LIDOCAINE 40 MG/G
CREAM TOPICAL ONCE
OUTPATIENT
Start: 2023-10-11 | End: 2023-10-11

## 2023-10-11 RX ORDER — BETAMETHASONE DIPROPIONATE 0.05 %
OINTMENT (GRAM) TOPICAL ONCE
OUTPATIENT
Start: 2023-10-11 | End: 2023-10-11

## 2023-10-11 RX ORDER — LIDOCAINE HYDROCHLORIDE 20 MG/ML
JELLY TOPICAL ONCE
Status: COMPLETED | OUTPATIENT
Start: 2023-10-11 | End: 2023-10-11

## 2023-10-11 RX ORDER — LIDOCAINE HYDROCHLORIDE 20 MG/ML
JELLY TOPICAL ONCE
OUTPATIENT
Start: 2023-10-11 | End: 2023-10-11

## 2023-10-11 RX ORDER — BACITRACIN ZINC AND POLYMYXIN B SULFATE 500; 1000 [USP'U]/G; [USP'U]/G
OINTMENT TOPICAL ONCE
OUTPATIENT
Start: 2023-10-11 | End: 2023-10-11

## 2023-10-11 RX ORDER — LIDOCAINE HYDROCHLORIDE 40 MG/ML
SOLUTION TOPICAL ONCE
OUTPATIENT
Start: 2023-10-11 | End: 2023-10-11

## 2023-10-11 RX ORDER — SODIUM CHLOR/HYPOCHLOROUS ACID 0.033 %
SOLUTION, IRRIGATION IRRIGATION ONCE
OUTPATIENT
Start: 2023-10-11 | End: 2023-10-11

## 2023-10-11 RX ORDER — IBUPROFEN 200 MG
TABLET ORAL ONCE
OUTPATIENT
Start: 2023-10-11 | End: 2023-10-11

## 2023-10-11 RX ORDER — GENTAMICIN SULFATE 1 MG/G
OINTMENT TOPICAL ONCE
OUTPATIENT
Start: 2023-10-11 | End: 2023-10-11

## 2023-10-11 RX ORDER — GINSENG 100 MG
CAPSULE ORAL ONCE
OUTPATIENT
Start: 2023-10-11 | End: 2023-10-11

## 2023-10-11 RX ORDER — LIDOCAINE 50 MG/G
OINTMENT TOPICAL ONCE
OUTPATIENT
Start: 2023-10-11 | End: 2023-10-11

## 2023-10-11 RX ORDER — TRIAMCINOLONE ACETONIDE 1 MG/G
OINTMENT TOPICAL ONCE
OUTPATIENT
Start: 2023-10-11 | End: 2023-10-11

## 2023-10-11 RX ADMIN — LIDOCAINE HYDROCHLORIDE: 20 JELLY TOPICAL at 11:29

## 2023-10-11 ASSESSMENT — PAIN DESCRIPTION - LOCATION: LOCATION: GENERALIZED

## 2023-10-11 ASSESSMENT — PAIN SCALES - GENERAL: PAINLEVEL_OUTOF10: 6

## 2023-10-11 ASSESSMENT — PAIN DESCRIPTION - DESCRIPTORS: DESCRIPTORS: DISCOMFORT;ACHING

## 2023-10-11 ASSESSMENT — PAIN DESCRIPTION - PAIN TYPE: TYPE: CHRONIC PAIN

## 2023-10-11 ASSESSMENT — PAIN DESCRIPTION - ONSET: ONSET: ON-GOING

## 2023-10-11 ASSESSMENT — PAIN DESCRIPTION - FREQUENCY: FREQUENCY: INTERMITTENT

## 2023-10-11 ASSESSMENT — PAIN - FUNCTIONAL ASSESSMENT: PAIN_FUNCTIONAL_ASSESSMENT: PREVENTS OR INTERFERES SOME ACTIVE ACTIVITIES AND ADLS

## 2023-10-11 NOTE — PLAN OF CARE
Negative Pressure Wound Therapy    NAME:  Jean Claude Mcgee  DATE OF BIRTH:  [birth year]  MEDICAL RECORD NUMBER:  [id number]  DATE:  10/11/2023    Applied Negative Pressure to right thigh wound(s)/ulcer(s). [x] Applied skin barrier prep to adrianna-wound. [x] Cut strips of plastic drape to picture frame wound so that adrianna-wound is     covered with the drape. [x] If bridging dressing to less prominent site, cover any intact skin that will come in contact with the Negative Pressure Therapy sponge, gauze or channel drain with plastic drape. The sponge should never touch intact skin. [x] Cut sponge, gauze or channel drain to size which will fit into the wound/ulcer bed without being forced. [x] Be sure the sponge is large enough to hold the entire round plastic flange which is attached to the tubing. Never allow flange to be larger than the sponge or it will produce suction damaging intact skin. Total number of individual pieces of foam used within the wound bed: 1    [x] If bridging the dressing away from the primary site, be sure the bridge leads to a piece of sponge large enough to hold the entire flange without allowing any of the flange to overlap onto intact skin. [x] Covered sponge, gauze or channel drain with plastic drape. [x] Cut a hole in this plastic drape directly over the sponge the same size as the plastic drain tubing. [x] Removed plastic liner from flange and apply it directly over the hole you cut. [x] Removed the plastic cover from the flange. [x] Attached the tubing to the wound/ulcer Negative Pressure Therapy and turn it on to be sure a vacuum is created and that there are no leaks. [x] If air leaks occur, use plastic drape to patch them. [] Secured Negative Pressure Therapy dressing with ace wrap loosely if located on an extremity. Maintain tubing outside of ace wrap. Tubing must not exert pressure on intact skin.     Applied per  Guidelines      Electronically signed by Magdalene Ramirez

## 2023-10-11 NOTE — PLAN OF CARE
Problem: Chronic Conditions and Co-morbidities  Goal: Patient's chronic conditions and co-morbidity symptoms are monitored and maintained or improved  Outcome: Progressing     Problem: Pain  Goal: Verbalizes/displays adequate comfort level or baseline comfort level  Outcome: Progressing     Problem: Wound:  Goal: Will show signs of wound healing; wound closure and no evidence of infection  Description: Will show signs of wound healing; wound closure and no evidence of infection  Outcome: Progressing     Problem: Weight control:  Goal: Ability to maintain an optimal weight for height and age will be supported  Description: Ability to maintain an optimal weight for height and age will be supported  Outcome: Progressing     Problem: Falls - Risk of:  Goal: Will remain free from falls  Description: Will remain free from falls  Outcome: Progressing

## 2023-10-11 NOTE — PATIENT INSTRUCTIONS
[address] and Hyperbaric Oxygen Therapy   Physician Orders and Discharge Instructions  [address] [facility],[address], [address]  Telephone: [phone]    NAME:  Oskar Suarez OF BIRTH:  [birth year]  MEDICAL RECORD NUMBER:  [id number]  DATE:  10/11/2023    Discharge condition: Stable    Discharge to: Home    Left via:Private automobile    Accompanied by: Family    ECF/[facility]: [facility]     Dressing Orders: Right thigh Wound:  Wash with soap and water  Apply wound vac as follows: Apply Skin Prep to periwound. Window pane surrounding area (periwound) with duoderm instead of drape to prevent skin breakdown. Then apply drape to periwound. DO NOT PUT FOAM ON GOOD SKIN. Apply black foam to wound bed. BRIDGE to thigh, Set wound vac to 125 mmHG, continuous. Change wound vac dressing 3 times per week (MWF or TTS)  Reapply vac dressing if vac stops working or dressing begins to leak or cannot be reinforced. Alternative dressing: Wash with soap and water. Apply 1/4 STR Dakins moistened gauze to wound bed. Cover with gauze. Secure with medipore tape. Change twice daily . Treatment Orders:  Protein rich diet  Multivitamin    [facility] follow up visit _____________1 week________________  (Please note your next appointment above and if you are unable to keep, kindly give a 24 hour notice. Thank you.)    If you experience any of the following, please call the [address] [place], [place] [address]i [place] during business hours:    * Increase in Pain  * Temperature over 101  * Increase in drainage from your wound  * Drainage with a foul odor  * Bleeding  * Increase in swelling  * Need for compression bandage changes due to slippage, breakthrough drainage. If you need medical attention outside of the business hours of the [address] [place] [address] please contact your PCP or go to the nearest emergency room.

## 2023-10-11 NOTE — PROGRESS NOTES
1129   Wound Surface Area (cm^2) 3.5 cm^2 10/11/23 1129   Change in Wound Size % (l*w) 76.67 10/11/23 1129   Wound Volume (cm^3) 1.75 cm^3 10/11/23 1129   Wound Healing % 97 10/11/23 1129   Post-Procedure Length (cm) 5 cm 10/11/23 1134   Post-Procedure Width (cm) 0.7 cm 10/11/23 1134   Post-Procedure Depth (cm) 0.5 cm 10/11/23 1134   Post-Procedure Surface Area (cm^2) 3.5 cm^2 10/11/23 1134   Post-Procedure Volume (cm^3) 1.75 cm^3 10/11/23 1134   Wound Assessment Granulation tissue;Slough 10/11/23 1129   Drainage Amount Moderate (25-50%) 10/11/23 1129   Drainage Description Serosanguinous 10/11/23 1129   Odor None 10/11/23 1129   Rachel-wound Assessment Intact 10/11/23 1129   Margins Defined edges 10/11/23 1129   Wound Thickness Description not for Pressure Injury Full thickness 10/11/23 1129   Number of days: 21             Estimated Blood Loss:  Minimal    Hemostasis Achieved:  by pressure    Procedural Pain:  0  / 10     Post Procedural Pain:  0 / 10     Response to treatment:  Well tolerated by patient. Plan:     Problem List Items Addressed This Visit       * (Principal) Skin ulcer of right thigh with fat layer exposed ([address]) - Primary (Chronic)    Relevant Orders    Initiate Outpatient Wound Care Protocol       Wound looks good Cont vac trak off wound  RTO 1 week    Treatment Note please see attached Discharge Instructions    In my professional opinion this patient would benefit from HBO Therapy: No    Written patient dismissal instructions given to patient and signed by patient or POA.              Electronically signed by Osbaldo Mata MD on 10/11/2023 at 11:35 AM

## 2023-10-17 LAB
FUNGUS SPEC CULT: NORMAL
KOH PREP SPEC: NORMAL

## 2023-10-18 ENCOUNTER — HOSPITAL ENCOUNTER (OUTPATIENT)
Dept: WOUND CARE | Age: 78
Discharge: HOME | End: 2023-10-18
Payer: MEDICARE

## 2023-10-18 VITALS
TEMPERATURE: 97.2 F | RESPIRATION RATE: 18 BRPM | BODY MASS INDEX: 32.51 KG/M2 | HEART RATE: 74 BPM | DIASTOLIC BLOOD PRESSURE: 62 MMHG | WEIGHT: 267 LBS | HEIGHT: 76 IN | SYSTOLIC BLOOD PRESSURE: 116 MMHG

## 2023-10-18 DIAGNOSIS — L97.112 SKIN ULCER OF RIGHT THIGH WITH FAT LAYER EXPOSED (HCC): Primary | Chronic | ICD-10-CM

## 2023-10-18 PROCEDURE — 97597 DBRDMT OPN WND 1ST 20 CM/<: CPT | Performed by: SURGERY

## 2023-10-18 PROCEDURE — 6370000000 HC RX 637 (ALT 250 FOR IP): Performed by: NURSE PRACTITIONER

## 2023-10-18 PROCEDURE — 97597 DBRDMT OPN WND 1ST 20 CM/<: CPT

## 2023-10-18 RX ORDER — BACITRACIN ZINC AND POLYMYXIN B SULFATE 500; 1000 [USP'U]/G; [USP'U]/G
OINTMENT TOPICAL ONCE
OUTPATIENT
Start: 2023-10-18 | End: 2023-10-18

## 2023-10-18 RX ORDER — LIDOCAINE HYDROCHLORIDE 20 MG/ML
JELLY TOPICAL ONCE
OUTPATIENT
Start: 2023-10-18 | End: 2023-10-18

## 2023-10-18 RX ORDER — CLOBETASOL PROPIONATE 0.5 MG/G
OINTMENT TOPICAL ONCE
OUTPATIENT
Start: 2023-10-18 | End: 2023-10-18

## 2023-10-18 RX ORDER — BETAMETHASONE DIPROPIONATE 0.05 %
OINTMENT (GRAM) TOPICAL ONCE
OUTPATIENT
Start: 2023-10-18 | End: 2023-10-18

## 2023-10-18 RX ORDER — LIDOCAINE HYDROCHLORIDE 20 MG/ML
JELLY TOPICAL ONCE
Status: COMPLETED | OUTPATIENT
Start: 2023-10-18 | End: 2023-10-18

## 2023-10-18 RX ORDER — LIDOCAINE HYDROCHLORIDE 40 MG/ML
SOLUTION TOPICAL ONCE
OUTPATIENT
Start: 2023-10-18 | End: 2023-10-18

## 2023-10-18 RX ORDER — LIDOCAINE 50 MG/G
OINTMENT TOPICAL ONCE
OUTPATIENT
Start: 2023-10-18 | End: 2023-10-18

## 2023-10-18 RX ORDER — TRIAMCINOLONE ACETONIDE 1 MG/G
OINTMENT TOPICAL ONCE
OUTPATIENT
Start: 2023-10-18 | End: 2023-10-18

## 2023-10-18 RX ORDER — GINSENG 100 MG
CAPSULE ORAL ONCE
OUTPATIENT
Start: 2023-10-18 | End: 2023-10-18

## 2023-10-18 RX ORDER — IBUPROFEN 200 MG
TABLET ORAL ONCE
OUTPATIENT
Start: 2023-10-18 | End: 2023-10-18

## 2023-10-18 RX ORDER — GENTAMICIN SULFATE 1 MG/G
OINTMENT TOPICAL ONCE
OUTPATIENT
Start: 2023-10-18 | End: 2023-10-18

## 2023-10-18 RX ORDER — SODIUM CHLOR/HYPOCHLOROUS ACID 0.033 %
SOLUTION, IRRIGATION IRRIGATION ONCE
OUTPATIENT
Start: 2023-10-18 | End: 2023-10-18

## 2023-10-18 RX ORDER — LIDOCAINE 40 MG/G
CREAM TOPICAL ONCE
OUTPATIENT
Start: 2023-10-18 | End: 2023-10-18

## 2023-10-18 RX ADMIN — LIDOCAINE HYDROCHLORIDE: 20 JELLY TOPICAL at 10:53

## 2023-10-18 ASSESSMENT — PAIN DESCRIPTION - FREQUENCY: FREQUENCY: INTERMITTENT

## 2023-10-18 ASSESSMENT — PAIN - FUNCTIONAL ASSESSMENT: PAIN_FUNCTIONAL_ASSESSMENT: PREVENTS OR INTERFERES SOME ACTIVE ACTIVITIES AND ADLS

## 2023-10-18 ASSESSMENT — PAIN SCALES - GENERAL: PAINLEVEL_OUTOF10: 8

## 2023-10-18 ASSESSMENT — PAIN DESCRIPTION - ONSET: ONSET: ON-GOING

## 2023-10-18 ASSESSMENT — PAIN DESCRIPTION - DESCRIPTORS: DESCRIPTORS: ACHING;DISCOMFORT

## 2023-10-18 ASSESSMENT — PAIN DESCRIPTION - LOCATION: LOCATION: GENERALIZED

## 2023-10-18 ASSESSMENT — PAIN DESCRIPTION - PAIN TYPE: TYPE: CHRONIC PAIN

## 2023-10-18 NOTE — PATIENT INSTRUCTIONS
[address] and Hyperbaric Oxygen Therapy   Physician Orders and Discharge Instructions  [address] [address] [facility] [address], [address]  Telephone: [phone]    NAME:  Puja Milligan OF BIRTH:  [birth year]  MEDICAL RECORD NUMBER:  [id number]  DATE:  10/18/2023    Discharge condition: Stable    Discharge to: Home    Left via:Private automobile    Accompanied by: Family    ECF/HHA: [facility]- D/C Wound Vac    Dressing Orders: Right thigh Wound:  Wash with soap and water. Apply Saline moistened gauze to wound bed. Cover with dry gauze. Secure with medipore tape. Change twice daily . Protein rich diet  Multivitamin    [facility] follow up visit _____________2 weeks________________  (Please note your next appointment above and if you are unable to keep, kindly give a 24 hour notice. Thank you.)    If you experience any of the following, please call the [employer] [facility] during business hours:    * Increase in Pain  * Temperature over 101  * Increase in drainage from your wound  * Drainage with a foul odor  * Bleeding  * Increase in swelling  * Need for compression bandage changes due to slippage, breakthrough drainage. If you need medical attention outside of the business hours of the [employer] [facility] please contact your PCP or go to the nearest emergency room.

## 2023-10-25 ENCOUNTER — HOSPITAL ENCOUNTER (OUTPATIENT)
Dept: WOUND CARE | Age: 78
Discharge: HOME | End: 2023-10-25
Payer: MEDICARE

## 2023-10-25 VITALS
TEMPERATURE: 97.3 F | DIASTOLIC BLOOD PRESSURE: 59 MMHG | HEIGHT: 76 IN | WEIGHT: 267 LBS | HEART RATE: 76 BPM | RESPIRATION RATE: 18 BRPM | SYSTOLIC BLOOD PRESSURE: 106 MMHG | BODY MASS INDEX: 32.51 KG/M2

## 2023-10-25 DIAGNOSIS — L97.112 SKIN ULCER OF RIGHT THIGH WITH FAT LAYER EXPOSED (HCC): Primary | Chronic | ICD-10-CM

## 2023-10-25 PROCEDURE — 6370000000 HC RX 637 (ALT 250 FOR IP): Performed by: NURSE PRACTITIONER

## 2023-10-25 PROCEDURE — 97597 DBRDMT OPN WND 1ST 20 CM/<: CPT | Performed by: SURGERY

## 2023-10-25 PROCEDURE — 97597 DBRDMT OPN WND 1ST 20 CM/<: CPT

## 2023-10-25 RX ORDER — IBUPROFEN 200 MG
TABLET ORAL ONCE
OUTPATIENT
Start: 2023-10-25 | End: 2023-10-25

## 2023-10-25 RX ORDER — LIDOCAINE HYDROCHLORIDE 20 MG/ML
JELLY TOPICAL ONCE
OUTPATIENT
Start: 2023-10-25 | End: 2023-10-25

## 2023-10-25 RX ORDER — LIDOCAINE 40 MG/G
CREAM TOPICAL ONCE
OUTPATIENT
Start: 2023-10-25 | End: 2023-10-25

## 2023-10-25 RX ORDER — LIDOCAINE HYDROCHLORIDE 20 MG/ML
JELLY TOPICAL ONCE
Status: COMPLETED | OUTPATIENT
Start: 2023-10-25 | End: 2023-10-25

## 2023-10-25 RX ORDER — CLOBETASOL PROPIONATE 0.5 MG/G
OINTMENT TOPICAL ONCE
OUTPATIENT
Start: 2023-10-25 | End: 2023-10-25

## 2023-10-25 RX ORDER — GINSENG 100 MG
CAPSULE ORAL ONCE
OUTPATIENT
Start: 2023-10-25 | End: 2023-10-25

## 2023-10-25 RX ORDER — LIDOCAINE HYDROCHLORIDE 40 MG/ML
SOLUTION TOPICAL ONCE
OUTPATIENT
Start: 2023-10-25 | End: 2023-10-25

## 2023-10-25 RX ORDER — BACITRACIN ZINC AND POLYMYXIN B SULFATE 500; 1000 [USP'U]/G; [USP'U]/G
OINTMENT TOPICAL ONCE
OUTPATIENT
Start: 2023-10-25 | End: 2023-10-25

## 2023-10-25 RX ORDER — TRIAMCINOLONE ACETONIDE 1 MG/G
OINTMENT TOPICAL ONCE
OUTPATIENT
Start: 2023-10-25 | End: 2023-10-25

## 2023-10-25 RX ORDER — SODIUM CHLOR/HYPOCHLOROUS ACID 0.033 %
SOLUTION, IRRIGATION IRRIGATION ONCE
OUTPATIENT
Start: 2023-10-25 | End: 2023-10-25

## 2023-10-25 RX ORDER — GENTAMICIN SULFATE 1 MG/G
OINTMENT TOPICAL ONCE
OUTPATIENT
Start: 2023-10-25 | End: 2023-10-25

## 2023-10-25 RX ORDER — LIDOCAINE 50 MG/G
OINTMENT TOPICAL ONCE
OUTPATIENT
Start: 2023-10-25 | End: 2023-10-25

## 2023-10-25 RX ORDER — BETAMETHASONE DIPROPIONATE 0.05 %
OINTMENT (GRAM) TOPICAL ONCE
OUTPATIENT
Start: 2023-10-25 | End: 2023-10-25

## 2023-10-25 RX ADMIN — LIDOCAINE HYDROCHLORIDE: 20 JELLY TOPICAL at 13:16

## 2023-10-25 NOTE — DISCHARGE INSTRUCTIONS
[address] and Hyperbaric Oxygen Therapy   Physician Orders and Discharge Instructions  [address] [facility],[address] [facility] [address], [address]  Telephone: [phone]    NAME:  Yunior Santiago OF BIRTH:  [birth year]  MEDICAL RECORD NUMBER:  [id number]  DATE:  10/25/2023    Discharge condition: Stable    Discharge to: Home    Left via:Private automobile    Accompanied by:  spouse    ECF[facility]: [facility]    Dressing Orders: Right thigh Wound:  Wash with soap and water. Apply Saline moistened gauze to wound bed. Cover with dry gauze. Secure with medipore tape. Change twice daily . Protein rich diet  Multivitamin    [facility] follow up visit _____________2 weeks________________  (Please note your next appointment above and if you are unable to keep, kindly give a 24 hour notice. Thank you.)          If you experience any of the following, please call the [employer] during business hours:    * Increase in Pain  * Temperature over 101  * Increase in drainage from your wound  * Drainage with a foul odor  * Bleeding  * Increase in swelling  * Need for compression bandage changes due to slippage, breakthrough drainage. If you need medical attention outside of the business hours of the [employer] please contact your PCP or go to the nearest emergency room.

## 2023-10-25 NOTE — PROGRESS NOTES
----- Message from Nellie Almonte MD sent at 2/2/2019  8:28 AM CST -----  Give appointment to patient for wellness visit and to discuss results of most recent blood test showing under active thyroid, prediabetes.  Give a 30-minute appointment for wellness visit and discuss blood test   [address]   Progress Note and Procedure Note      Grabiel Luna  MEDICAL RECORD NUMBER:  [id number]  AGE: 77 y.o. GENDER: male  : 1945  EPISODE DATE:  10/25/2023    Subjective:     Chief Complaint   Patient presents with    Wound Check     Patient presents today for recheck right thigh wound. HISTORY of PRESENT ILLNESS HPI     Grabiel Luna is a 77 y.o. male who presents today for wound/ulcer evaluation. Wound Context: Pt with wound R thigh here for eval/treqat  Wound/Ulcer Pain Timing/Severity: none  Quality of pain: N/A  Severity:  0 / 10   Modifying Factors: None  Associated Signs/Symptoms: none    Ulcer Identification:  Ulcer Type: non-healing surgical  Contributing Factors: edema    Wound: Surgical incision        PAST MEDICAL HISTORY        Diagnosis Date    Acute ischemic stroke ([address]) 2017    Acute on chronic systolic congestive heart failure ([address]) 8/15/2022    Atherosclerotic heart disease     s/p MI, stenting x 3 2011([place])    Breakthrough seizure ([address]) 10/16/2021    CAD (coronary artery disease)     Cerebral artery occlusion with cerebral infarction [facility])     Cerebrovascular accident (CVA) due to embolism of right middle cerebral artery ([address]) 2017    CHF (congestive heart failure) ([facility])     Chronic bilateral low back pain without sciatica 11/10/2016    Chronic kidney disease     DDD (degenerative disc disease), lumbar 11/15/2016    Depression     Depression with anxiety     Diabetes mellitus ([address])     type II    Diabetes mellitus, type 2 ([address])     DM (diabetes mellitus) ([address]) 2011    ESBL (extended spectrum beta-lactamase) producing bacteria infection 2019    urine    Glaucoma     Headache     Hyperlipidemia     Cholesterol management per pcp.      Hypertension     Macular degeneration     MI (myocardial infarction) ([address])     MI (myocardial infarction) ([address])     Neuromuscular disorder (HCC)     Neuropathy     MOO on CPAP

## 2023-10-25 NOTE — PATIENT INSTRUCTIONS
[address] and Hyperbaric Oxygen Therapy   Physician Orders and Discharge Instructions  [address] [facility],[address] [facility], [address]  Telephone: [phone]    NAME:  Juan Lee OF BIRTH:  [birth year]  MEDICAL RECORD NUMBER:  [id number]  DATE:  10/25/2023    Discharge condition: Stable    Discharge to: Home    Left via:Private automobile    Accompanied by:  spouse    ECF/H[facility]: [facility]    Dressing Orders: Right thigh Wound:  Wash with soap and water. Apply Saline moistened gauze to wound bed. Cover with dry gauze. Secure with medipore tape. Change twice daily . Protein rich diet  Multivitamin    [facility] follow up visit _____________2 weeks________________  (Please note your next appointment above and if you are unable to keep, kindly give a 24 hour notice. Thank you.)          If you experience any of the following, please call the [address] during business hours:    * Increase in Pain  * Temperature over 101  * Increase in drainage from your wound  * Drainage with a foul odor  * Bleeding  * Increase in swelling  * Need for compression bandage changes due to slippage, breakthrough drainage. If you need medical attention outside of the business hours of the [facility] please contact your PCP or go to the nearest emergency room.

## 2023-10-31 ENCOUNTER — HOSPITAL ENCOUNTER (OUTPATIENT)
Dept: PAIN MANAGEMENT | Age: 78
Discharge: HOME | End: 2023-10-31
Payer: MEDICARE

## 2023-10-31 VITALS
HEIGHT: 76 IN | OXYGEN SATURATION: 95 % | WEIGHT: 275 LBS | SYSTOLIC BLOOD PRESSURE: 99 MMHG | DIASTOLIC BLOOD PRESSURE: 58 MMHG | BODY MASS INDEX: 33.49 KG/M2

## 2023-10-31 DIAGNOSIS — M51.36 DDD (DEGENERATIVE DISC DISEASE), LUMBAR: ICD-10-CM

## 2023-10-31 DIAGNOSIS — M51.36 DDD (DEGENERATIVE DISC DISEASE), LUMBAR: Primary | ICD-10-CM

## 2023-10-31 DIAGNOSIS — G89.29 CHRONIC BILATERAL LOW BACK PAIN WITHOUT SCIATICA: Primary | ICD-10-CM

## 2023-10-31 DIAGNOSIS — M54.50 CHRONIC BILATERAL LOW BACK PAIN WITHOUT SCIATICA: ICD-10-CM

## 2023-10-31 DIAGNOSIS — M54.50 CHRONIC BILATERAL LOW BACK PAIN WITHOUT SCIATICA: Primary | ICD-10-CM

## 2023-10-31 DIAGNOSIS — M79.18 BILATERAL MYOFASCIAL PAIN: ICD-10-CM

## 2023-10-31 DIAGNOSIS — G89.29 CHRONIC BILATERAL LOW BACK PAIN WITHOUT SCIATICA: ICD-10-CM

## 2023-10-31 PROCEDURE — 99215 OFFICE O/P EST HI 40 MIN: CPT

## 2023-10-31 RX ORDER — TIZANIDINE 4 MG/1
TABLET ORAL
Qty: 60 TABLET | Refills: 2 | Status: SHIPPED | OUTPATIENT
Start: 2023-10-31

## 2023-10-31 RX ORDER — CELECOXIB 100 MG/1
100 CAPSULE ORAL DAILY
Qty: 30 CAPSULE | Refills: 2 | Status: SHIPPED | OUTPATIENT
Start: 2023-10-31

## 2023-10-31 RX ORDER — OXYCODONE AND ACETAMINOPHEN 10; 325 MG/1; MG/1
1 TABLET ORAL EVERY 6 HOURS PRN
COMMUNITY

## 2023-10-31 RX ORDER — DOXYCYCLINE HYCLATE 100 MG
100 TABLET ORAL 2 TIMES DAILY
COMMUNITY

## 2023-10-31 RX ORDER — NAPROXEN 500 MG/1
500 TABLET ORAL 2 TIMES DAILY PRN
COMMUNITY
End: 2023-10-31 | Stop reason: ALTCHOICE

## 2023-10-31 RX ORDER — CETIRIZINE HYDROCHLORIDE 10 MG/1
10 TABLET ORAL DAILY
COMMUNITY

## 2023-10-31 RX ORDER — BUTALBITAL, ACETAMINOPHEN AND CAFFEINE 50; 325; 40 MG/1; MG/1; MG/1
1 TABLET ORAL EVERY 8 HOURS PRN
COMMUNITY

## 2023-10-31 RX ORDER — SACUBITRIL AND VALSARTAN 49; 51 MG/1; MG/1
1 TABLET, FILM COATED ORAL 2 TIMES DAILY
COMMUNITY

## 2023-10-31 RX ORDER — ISOSORBIDE MONONITRATE 30 MG/1
30 TABLET, EXTENDED RELEASE ORAL DAILY
COMMUNITY

## 2023-10-31 RX ORDER — OXYCODONE AND ACETAMINOPHEN 10; 325 MG/1; MG/1
1 TABLET ORAL EVERY 6 HOURS PRN
Qty: 120 TABLET | Refills: 0 | Status: CANCELLED | OUTPATIENT
Start: 2023-10-31 | End: 2023-11-30

## 2023-10-31 RX ORDER — INSULIN GLARGINE 100 [IU]/ML
80 INJECTION, SOLUTION SUBCUTANEOUS NIGHTLY
COMMUNITY

## 2023-10-31 RX ORDER — FUROSEMIDE 40 MG/1
40 TABLET ORAL DAILY
COMMUNITY

## 2023-10-31 RX ORDER — SERTRALINE HYDROCHLORIDE 100 MG/1
100 TABLET, FILM COATED ORAL DAILY
COMMUNITY

## 2023-10-31 ASSESSMENT — ENCOUNTER SYMPTOMS
BACK PAIN: 1
CONSTIPATION: 0

## 2023-10-31 NOTE — H&P
2/22/2018         RE: Marcos Downing  [address] [facility] [address]        Dear Colleague,    Thank you for referring your patient, Marcos Downing, to the [facility]. Please see a copy of my visit note below.     Current Eye Medications:  None.      Subjective:  Dr. Phoenix recommended he return to see Dr. Castillo for an OCT and visual field, regarding glaucoma suspicion.   No vision changes or concerns - distance vision is good without correction.    Last eye exam:  1-23-18 with Dr. Phoenix.       Objective:  See Ophthalmology Exam.       Assessment:  Stable intraocular pressure, glaucoma OCT, and Blank[place]hip Visual Field both eyes in patient who is a glaucoma suspect.      Plan:  Continue observation with regard to glaucoma suspect status.  Call in September 2018 for an appointment in January 2019 for Complete Exam.    Dr. Castillo [phone]         Again, thank you for allowing me to participate in the care of your patient.        Sincerely,        Regis Castillo MD    
lumbar    Bilateral myofascial pain  Resolved Problems:    * No resolved hospital problems. *      PLAN:     1. DDD (degenerative disc disease), lumbar  - He is to stop his Naproxen  - celecoxib (CELEBREX) 100 MG capsule; Take 1 capsule by mouth daily  Dispense: 30 capsule; Refill: 2     2. Chronic bilateral low back pain without sciatica  Requested Prescriptions             Pending Prescriptions Disp Refills    oxyCODONE-acetaminophen (PERCOCET)  MG per tablet 120 tablet 0       Sig: Take 1 tablet by mouth every 6 hours as needed for Pain for up to 30 days. Max Daily Amount: 4 tablets   Continue medication with refill sent at appointment yes; refill sent to medical director at appointment yes, see refill encounter dated 10/31/2023     Electronically signed by Tasha Mukherjee PA-C on 10/31/2023 at 4:27 PM    3. Bilateral myofascial pain  Scheduled for bilateral TP injections thoracic and Lumbar  - tiZANidine (ZANAFLEX) 4 MG tablet; 1/4 tablet with meals 1/2 to whole tablet at bedtime  Dispense: 60 tablet; Refill: 2     Pain Medication Agreement Signed  Agreement reviewed and signed 10/31/2023  Patient or Patient's Advocate verbalized understanding of agreement     We will evaluate his pain after medication changes and TP injections. If no improved we will consider/discuss imaging and possible facet injections    [] Follow up    [] 4 weeks   [x] 6 weeks   [] 8 weeks   [] 10 weeks   [] 3 months  [x] Post procedure to evaluate effectiveness of treatment  [x] To evaluate medications changes made at office visit. [] To review diagnostics ordered at last visit. [x] To evaluate response to therapy    [x] For management of controlled substance  [x] Random UDS as indicated by ORT score or if indicated by abberent behaviors     Discussion: Discussed exam findings and treatment options yes, and reviewed plan of care with patient. Strongly reinforced that exercise is exteremly important part of pain management.  Exercises

## 2023-10-31 NOTE — PROGRESS NOTES
Clinic Documentation      Education Provided:  [x] Review of [employer]  [x] Agreement Review  [x] PEG Score Calculated [x] PHQ Score Calculated [x] ORT Score Calculated    [x] Compliance Issues Discussed [] Cognitive Behavior Needs [x] Exercise [] Review of Test [] Financial Issues  [x] Tobacco/Alcohol Use Reviewed [x] Teaching [x] New Patient [x] Picture Obtained    Physician Plan:  [] Outgoing Referral  [] Pharmacy Consult  [] Test Ordered [] Prescription Ordered/Changed   [] Obtained Test Results / Consult Notes        Complete if patient is withholding blood thinner for procedure     Blood Thinner Patient is currently taking:      [] Plavix (Hold for 7 days)  [] Aspirin (Hold for 5 days)     [] Pletal (Hold for 2 days)  [] Pradaxa (Hold for 3 days)    [] Effient (Hold for 7 days)  [] Xarelto (Hold for 2 days)    [] Eliquis (Hold for 2 days)  [] Brilinta (Hold for 7 days)    [] Coumadin (Hold for 5 days) - (INR needs to be drawn the day prior to procedure- INR < 2.0)    [] Aggrenox (Hold for 7 days)        [] Patient will stop medication on their own.    [] Blood Thinner Form Faxed for approval to hold.    Provider form faxed to:     Assessment Completed by:  Electronically signed by Cynthia Chavez MA on 10/31/2023 at 2:28 PM

## 2023-10-31 NOTE — TELEPHONE ENCOUNTER
1. DDD (degenerative disc disease), lumbar  - celecoxib (CELEBREX) 100 MG capsule; Take 1 capsule by mouth daily  Dispense: 30 capsule; Refill: 2    2. Chronic bilateral low back pain without sciatica    3. Bilateral myofascial pain  - tiZANidine (ZANAFLEX) 4 MG tablet; 1/4 tablet with meals 1/2 to whole tablet at bedtime  Dispense: 60 tablet;  Refill: 2      Requested Prescriptions     Signed Prescriptions Disp Refills    celecoxib (CELEBREX) 100 MG capsule 30 capsule 2     Sig: Take 1 capsule by mouth daily     Authorizing Provider: TRISH RANDOLPH    tiZANidine (ZANAFLEX) 4 MG tablet 60 tablet 2     Si/4 tablet with meals 1/2 to whole tablet at bedtime     Authorizing Provider: Calli Faulkner medication with refill sent at appointment yes; refill sent to medical director at appointment no, see refill encounter dated 10/31/2023    Electronically signed by Osvaldo Saenz PA-C on 10/31/2023 at 4:22 PM

## 2023-10-31 NOTE — TELEPHONE ENCOUNTER
1. Chronic bilateral low back pain without sciatica    2. DDD (degenerative disc disease), lumbar      Requested Prescriptions     Pending Prescriptions Disp Refills    oxyCODONE-acetaminophen (PERCOCET)  MG per tablet 120 tablet 0     Sig: Take 1 tablet by mouth every 6 hours as needed for Pain for up to 30 days.  Max Daily Amount: 4 tablets       Continue medication with refill sent at appointment yes; refill sent to medical director at appointment yes, see refill encounter dated 10/31/2023    Electronically signed by Issa Veras PA-C on 10/31/2023 at 4:27 PM

## 2023-11-06 RX ORDER — BUMETANIDE 1 MG/1
2 TABLET ORAL 2 TIMES DAILY
Qty: 120 TABLET | Refills: 3 | Status: SHIPPED | OUTPATIENT
Start: 2023-11-06

## 2023-11-08 ENCOUNTER — TELEPHONE (OUTPATIENT)
Dept: CARDIOLOGY CLINIC | Age: 78
End: 2023-11-08

## 2023-11-08 ENCOUNTER — HOSPITAL ENCOUNTER (OUTPATIENT)
Dept: WOUND CARE | Age: 78
Discharge: HOME | End: 2023-11-08
Payer: MEDICARE

## 2023-11-08 VITALS
RESPIRATION RATE: 18 BRPM | SYSTOLIC BLOOD PRESSURE: 99 MMHG | BODY MASS INDEX: 33.49 KG/M2 | WEIGHT: 275 LBS | TEMPERATURE: 97.2 F | DIASTOLIC BLOOD PRESSURE: 57 MMHG | HEIGHT: 76 IN | HEART RATE: 62 BPM

## 2023-11-08 DIAGNOSIS — L97.112 SKIN ULCER OF RIGHT THIGH WITH FAT LAYER EXPOSED (HCC): Primary | ICD-10-CM

## 2023-11-08 PROCEDURE — 99213 OFFICE O/P EST LOW 20 MIN: CPT

## 2023-11-08 PROCEDURE — 99212 OFFICE O/P EST SF 10 MIN: CPT | Performed by: SURGERY

## 2023-11-08 RX ORDER — LIDOCAINE 50 MG/G
OINTMENT TOPICAL ONCE
Status: CANCELLED | OUTPATIENT
Start: 2023-11-08 | End: 2023-11-08

## 2023-11-08 RX ORDER — LIDOCAINE HYDROCHLORIDE 40 MG/ML
SOLUTION TOPICAL ONCE
Status: CANCELLED | OUTPATIENT
Start: 2023-11-08 | End: 2023-11-08

## 2023-11-08 RX ORDER — LIDOCAINE 40 MG/G
CREAM TOPICAL ONCE
Status: CANCELLED | OUTPATIENT
Start: 2023-11-08 | End: 2023-11-08

## 2023-11-08 RX ORDER — IBUPROFEN 200 MG
TABLET ORAL ONCE
Status: CANCELLED | OUTPATIENT
Start: 2023-11-08 | End: 2023-11-08

## 2023-11-08 RX ORDER — CLOBETASOL PROPIONATE 0.5 MG/G
OINTMENT TOPICAL ONCE
Status: CANCELLED | OUTPATIENT
Start: 2023-11-08 | End: 2023-11-08

## 2023-11-08 RX ORDER — SODIUM CHLOR/HYPOCHLOROUS ACID 0.033 %
SOLUTION, IRRIGATION IRRIGATION ONCE
Status: CANCELLED | OUTPATIENT
Start: 2023-11-08 | End: 2023-11-08

## 2023-11-08 RX ORDER — GINSENG 100 MG
CAPSULE ORAL ONCE
Status: CANCELLED | OUTPATIENT
Start: 2023-11-08 | End: 2023-11-08

## 2023-11-08 RX ORDER — TRIAMCINOLONE ACETONIDE 1 MG/G
OINTMENT TOPICAL ONCE
Status: CANCELLED | OUTPATIENT
Start: 2023-11-08 | End: 2023-11-08

## 2023-11-08 RX ORDER — BACITRACIN ZINC AND POLYMYXIN B SULFATE 500; 1000 [USP'U]/G; [USP'U]/G
OINTMENT TOPICAL ONCE
Status: CANCELLED | OUTPATIENT
Start: 2023-11-08 | End: 2023-11-08

## 2023-11-08 RX ORDER — BETAMETHASONE DIPROPIONATE 0.05 %
OINTMENT (GRAM) TOPICAL ONCE
Status: CANCELLED | OUTPATIENT
Start: 2023-11-08 | End: 2023-11-08

## 2023-11-08 RX ORDER — LIDOCAINE HYDROCHLORIDE 20 MG/ML
JELLY TOPICAL ONCE
Status: CANCELLED | OUTPATIENT
Start: 2023-11-08 | End: 2023-11-08

## 2023-11-08 RX ORDER — GENTAMICIN SULFATE 1 MG/G
OINTMENT TOPICAL ONCE
Status: CANCELLED | OUTPATIENT
Start: 2023-11-08 | End: 2023-11-08

## 2023-11-08 NOTE — PATIENT INSTRUCTIONS
[address] and Hyperbaric Oxygen Therapy   Physician Orders and Discharge Instructions  [address] [facility],[address] [facility] [address], [address]  Telephone: [phone]    NAME:  Milla Stokes OF BIRTH:  [birth year]  MEDICAL RECORD NUMBER:  [id number]  DATE:  11/8/2023    Discharge condition: Stable    Discharge to: Home    Left via:Private automobile    Accompanied by:  Spouse     [facility]: [facility]     Dressing Orders: Right thigh Wound:  Healed, Moisturize and Protect  Follow up with DR Edwar Ponce as instructed       [address] [facility] follow up visit _____________PRN________________  (Please note your next appointment above and if you are unable to keep, kindly give a 24 hour notice. Thank you.)    If you experience any of the following, please call the [address] [place] [address] during business hours:    * Increase in Pain  * Temperature over 101  * Increase in drainage from your wound  * Drainage with a foul odor  * Bleeding  * Increase in swelling  * Need for compression bandage changes due to slippage, breakthrough drainage. If you need medical attention outside of the business hours of the [address] [place] [address] please contact your PCP or go to the nearest emergency room.

## 2023-11-09 DIAGNOSIS — M51.36 DDD (DEGENERATIVE DISC DISEASE), LUMBAR: ICD-10-CM

## 2023-11-09 DIAGNOSIS — G89.29 CHRONIC BILATERAL LOW BACK PAIN WITHOUT SCIATICA: Primary | ICD-10-CM

## 2023-11-09 DIAGNOSIS — M79.18 BILATERAL MYOFASCIAL PAIN: ICD-10-CM

## 2023-11-09 DIAGNOSIS — M54.50 CHRONIC BILATERAL LOW BACK PAIN WITHOUT SCIATICA: Primary | ICD-10-CM

## 2023-11-13 ENCOUNTER — OFFICE VISIT (OUTPATIENT)
Age: 78
End: 2023-11-13
Payer: MEDICARE

## 2023-11-13 VITALS
RESPIRATION RATE: 22 BRPM | TEMPERATURE: 98.3 F | DIASTOLIC BLOOD PRESSURE: 60 MMHG | HEART RATE: 78 BPM | BODY MASS INDEX: 33.23 KG/M2 | OXYGEN SATURATION: 97 % | WEIGHT: 273 LBS | SYSTOLIC BLOOD PRESSURE: 122 MMHG

## 2023-11-13 DIAGNOSIS — H66.002 ACUTE SUPPURATIVE OTITIS MEDIA OF LEFT EAR WITHOUT SPONTANEOUS RUPTURE OF TYMPANIC MEMBRANE, RECURRENCE NOT SPECIFIED: ICD-10-CM

## 2023-11-13 DIAGNOSIS — J01.90 ACUTE SINUSITIS, RECURRENCE NOT SPECIFIED, UNSPECIFIED LOCATION: ICD-10-CM

## 2023-11-13 DIAGNOSIS — J02.9 SORE THROAT: Primary | ICD-10-CM

## 2023-11-13 LAB
INFLUENZA A ANTIBODY: NEGATIVE
INFLUENZA B ANTIBODY: NEGATIVE
S PYO AG THROAT QL: NORMAL

## 2023-11-13 PROCEDURE — 3074F SYST BP LT 130 MM HG: CPT | Performed by: NURSE PRACTITIONER

## 2023-11-13 PROCEDURE — 3078F DIAST BP <80 MM HG: CPT | Performed by: NURSE PRACTITIONER

## 2023-11-13 PROCEDURE — G8417 CALC BMI ABV UP PARAM F/U: HCPCS | Performed by: NURSE PRACTITIONER

## 2023-11-13 PROCEDURE — G8484 FLU IMMUNIZE NO ADMIN: HCPCS | Performed by: NURSE PRACTITIONER

## 2023-11-13 PROCEDURE — 1124F ACP DISCUSS-NO DSCNMKR DOCD: CPT | Performed by: NURSE PRACTITIONER

## 2023-11-13 PROCEDURE — 1036F TOBACCO NON-USER: CPT | Performed by: NURSE PRACTITIONER

## 2023-11-13 PROCEDURE — 99203 OFFICE O/P NEW LOW 30 MIN: CPT | Performed by: NURSE PRACTITIONER

## 2023-11-13 PROCEDURE — G8427 DOCREV CUR MEDS BY ELIG CLIN: HCPCS | Performed by: NURSE PRACTITIONER

## 2023-11-13 RX ORDER — CEFDINIR 300 MG/1
300 CAPSULE ORAL 2 TIMES DAILY
Qty: 20 CAPSULE | Refills: 0 | Status: SHIPPED | OUTPATIENT
Start: 2023-11-13 | End: 2023-11-23

## 2023-11-13 ASSESSMENT — ENCOUNTER SYMPTOMS
EYES NEGATIVE: 1
ALLERGIC/IMMUNOLOGIC NEGATIVE: 1
SINUS PRESSURE: 1
ABDOMINAL PAIN: 0
COUGH: 1
DIARRHEA: 0
SINUS PAIN: 1
VOMITING: 0
SHORTNESS OF BREATH: 0
SORE THROAT: 1
NAUSEA: 0

## 2023-11-13 ASSESSMENT — VISUAL ACUITY: OU: 1

## 2023-11-13 NOTE — PATIENT INSTRUCTIONS
Plenty of fluids  Rest  OTC Tylenol or Motrin as needed   Omnicef as directed  Follow up with PCP or return to Urgent Care for worsening or unresolved symptoms.

## 2023-11-13 NOTE — PROGRESS NOTES
Influenza B Ab negative        No follow-ups on file. Orders Placed This Encounter   Medications    cefdinir (OMNICEF) 300 MG capsule     Sig: Take 1 capsule by mouth 2 times daily for 10 days     Dispense:  20 capsule     Refill:  0        Patient Instructions   Plenty of fluids  Rest  OTC Tylenol or Motrin as needed   Omnicef as directed  Follow up with PCP or return to Urgent Care for worsening or unresolved symptoms. Patient given educational materials- see patient instructions. Discussed use, benefit, and side effects of prescribedmedications. All patient questions answered. Pt voiced understanding.        Electronically signed by TEGAN Gutierrez CNP on 11/13/2023 at 4:42 PM

## 2023-11-14 RX ORDER — OXYCODONE AND ACETAMINOPHEN 10; 325 MG/1; MG/1
1 TABLET ORAL EVERY 6 HOURS PRN
Qty: 120 TABLET | Refills: 0 | Status: SHIPPED | OUTPATIENT
Start: 2023-11-14 | End: 2023-12-14

## 2023-11-29 ENCOUNTER — HOSPITAL ENCOUNTER (OUTPATIENT)
Dept: PAIN MANAGEMENT | Age: 78
Discharge: HOME | End: 2023-11-29
Payer: MEDICARE

## 2023-11-29 VITALS
DIASTOLIC BLOOD PRESSURE: 53 MMHG | SYSTOLIC BLOOD PRESSURE: 94 MMHG | RESPIRATION RATE: 20 BRPM | TEMPERATURE: 97.7 F | HEART RATE: 67 BPM

## 2023-11-29 DIAGNOSIS — M79.18 BILATERAL MYOFASCIAL PAIN: Primary | ICD-10-CM

## 2023-11-29 PROCEDURE — 2500000003 HC RX 250 WO HCPCS

## 2023-11-29 PROCEDURE — 20553 NJX 1/MLT TRIGGER POINTS 3/>: CPT

## 2023-11-29 PROCEDURE — 20553 NJX 1/MLT TRIGGER POINTS 3/>: CPT | Performed by: NURSE PRACTITIONER

## 2023-11-29 PROCEDURE — 6360000002 HC RX W HCPCS

## 2023-11-29 RX ORDER — LIDOCAINE HYDROCHLORIDE 10 MG/ML
15 INJECTION, SOLUTION EPIDURAL; INFILTRATION; INTRACAUDAL; PERINEURAL ONCE
Status: DISCONTINUED | OUTPATIENT
Start: 2023-11-29 | End: 2023-12-01 | Stop reason: HOSPADM

## 2023-11-29 RX ORDER — BUPIVACAINE HYDROCHLORIDE 5 MG/ML
15 INJECTION, SOLUTION EPIDURAL; INTRACAUDAL ONCE
Status: DISCONTINUED | OUTPATIENT
Start: 2023-11-29 | End: 2023-12-01 | Stop reason: HOSPADM

## 2023-11-29 NOTE — PROGRESS NOTES
Procedure:  Level of Consciousness: [x]Alert [x]Oriented []Disoriented []Lethargic  Anxiety Level: [x]Calm []Anxious []Depressed []Other  Skin: []Warm [x]Dry []Cool []Moist []Intact []Other  Cardiovascular: [x]Palpitations: [x]Never []Occasionally []Frequently  Chest Pain: [x]No []Yes  Respiratory:  [x]Unlabored []Labored []Cough ([] Productive []Unproductive)  HCG Required: [x]No []Yes   Results: []Negative []Positive  Knowledge Level:        [x]Patient/Other verbalized understanding of pre-procedure instructions. [x]Assessment of post-op care needs (transportation, responsible caregiver)        [x]Able to discuss health care problems and how to deal with it.   Factors that Affect Teaching:        Language Barrier: [x]No []Yes - why:        Hearing Loss:        []No [x]Yes            Corrective Device:  [x]Yes []No        Vision Loss:           [x]No []Yes            Corrective Device:  []Yes []No        Memory Loss:       [x]No []Yes            []Short Term []Long Term  Motivational Level:  [x]Asks Questions                  []Extremely Anxious       [x]Seems Interested               []Seems Uninterested                  [x]Denies need for Education  Risk for Injury:  [x]Patient oriented to person, place and time  []History of frequent falls/loss of balance  Nutritional:  []Change in appetite   []Weight Gain   []Weight Loss  Functional:  []Requires assistance with ADL's

## 2023-11-29 NOTE — DISCHARGE INSTRUCTIONS
[address] [facility] Physical And [facility] Medicine  Post Procedure Discharge Instructions        YOU HAVE HAD THE FOLLOWING PROCEDURE:                                  [] Occipital Nerve Blocks  [] CTS wrist injection(s)  [] Knee Injection(s)         [] Shoulder Injection(s)   [] Elbow Injection(s)     [] Botox Injection  [] Cervical Trigger Point Injections    [x] Thoracic Trigger Point Injections    [x] Lumbar Trigger Point Injections  [] Piriformis Trigger Point Injections  [] SI Joint Injection(s)     [] Trochanteric Bursa Injection(s)       [] Ankle Injection(s)   [] Plantar Fasciitis   []  ______________  Injection(s) [] Botox []  Migraines [] Spasticity    YOU HAVE RECEIVED THE FOLLOWING MEDICATIONS IN YOUR INJECTION(s)  [x] Lidocaine [x] Bupivacaine   [] DepoMedrol (steroid) [] Decadron (steroid)  []  Kenalog (steroid)   [] Toradol  [] Supartz [] Da Saturnino    [] Botox        PATIENT INFORMATION:   You may experience the following symptoms after your procedure. These symptoms are normal and should not cause concern: You may have an increase in your pain. This may last 24 - 48 hours after your procedure. You may have no change in the pain that you had prior to your injection(s). You may have weakness or numbness in your affected extremity. If this occurs, this may last until numbing the medication wears off. REPORT THE FOLLOWING SYMPTOMS TO YOUR DOCTOR:  Redness, swelling or drainage at the injection site(s)  Unusual pain that interferes with your normal activities of daily living. OTHER INSTRUCTIONS:    [x] I will apply ice to the injection site(s) for at least 24 hours after the procedure. I will rotate the ice on for 20 minutes and off for 20 minutes for at least 24 hours. [x] I will not apply heat for at least 48 hours and I will not take a hot bath or shower for at least 24 hours.      [x] I understand that if Lidocaine or Bupivacaine was used in my injection(s) that the injection site(s)

## 2023-12-15 ENCOUNTER — TELEPHONE (OUTPATIENT)
Dept: CARDIOLOGY CLINIC | Age: 78
End: 2023-12-15

## 2023-12-28 ENCOUNTER — OFFICE VISIT (OUTPATIENT)
Dept: CARDIOLOGY CLINIC | Age: 78
End: 2023-12-28
Payer: MEDICARE

## 2023-12-28 VITALS
HEIGHT: 76 IN | BODY MASS INDEX: 33.12 KG/M2 | DIASTOLIC BLOOD PRESSURE: 70 MMHG | HEART RATE: 67 BPM | SYSTOLIC BLOOD PRESSURE: 134 MMHG | WEIGHT: 272 LBS

## 2023-12-28 DIAGNOSIS — I48.0 PAF (PAROXYSMAL ATRIAL FIBRILLATION) (HCC): ICD-10-CM

## 2023-12-28 DIAGNOSIS — I25.10 CORONARY ARTERY DISEASE INVOLVING NATIVE CORONARY ARTERY OF NATIVE HEART WITHOUT ANGINA PECTORIS: ICD-10-CM

## 2023-12-28 DIAGNOSIS — E78.2 MIXED HYPERLIPIDEMIA: ICD-10-CM

## 2023-12-28 DIAGNOSIS — Z95.810 AICD (AUTOMATIC CARDIOVERTER/DEFIBRILLATOR) PRESENT: ICD-10-CM

## 2023-12-28 DIAGNOSIS — I50.22 CHRONIC SYSTOLIC HEART FAILURE (HCC): Primary | ICD-10-CM

## 2023-12-28 PROCEDURE — 3075F SYST BP GE 130 - 139MM HG: CPT | Performed by: NURSE PRACTITIONER

## 2023-12-28 PROCEDURE — 3078F DIAST BP <80 MM HG: CPT | Performed by: NURSE PRACTITIONER

## 2023-12-28 PROCEDURE — G8484 FLU IMMUNIZE NO ADMIN: HCPCS | Performed by: NURSE PRACTITIONER

## 2023-12-28 PROCEDURE — G8427 DOCREV CUR MEDS BY ELIG CLIN: HCPCS | Performed by: NURSE PRACTITIONER

## 2023-12-28 PROCEDURE — G8417 CALC BMI ABV UP PARAM F/U: HCPCS | Performed by: NURSE PRACTITIONER

## 2023-12-28 PROCEDURE — 99214 OFFICE O/P EST MOD 30 MIN: CPT | Performed by: NURSE PRACTITIONER

## 2023-12-28 PROCEDURE — 93283 PRGRMG EVAL IMPLANTABLE DFB: CPT | Performed by: NURSE PRACTITIONER

## 2023-12-28 PROCEDURE — 1036F TOBACCO NON-USER: CPT | Performed by: NURSE PRACTITIONER

## 2023-12-28 PROCEDURE — 1124F ACP DISCUSS-NO DSCNMKR DOCD: CPT | Performed by: NURSE PRACTITIONER

## 2023-12-28 RX ORDER — SACUBITRIL AND VALSARTAN 49; 51 MG/1; MG/1
1 TABLET, FILM COATED ORAL 2 TIMES DAILY
Qty: 180 TABLET | Refills: 3 | Status: SHIPPED | OUTPATIENT
Start: 2023-12-28

## 2023-12-28 RX ORDER — MELOXICAM 15 MG/1
15 TABLET ORAL DAILY
COMMUNITY

## 2023-12-28 NOTE — PATIENT INSTRUCTIONS
Please call [facility] Get connected at [phone] to discuss home monitor options.    New instructions for today:  Weigh yourself daily without clothing at the same time each day.    Record a daily weight log.  Call the office if you gain 3 pounds or more in 2 to 3 days or 5 pounds in one week.   A sudden weight gain may mean that your heart failure is getting worse.  Sodium causes your body to hold on to extra water.   This may cause your heart failure symptoms to get worse.   Limit sodium to 2,000 milligrams (mg) a day or less.   That is less than 1 teaspoon of salt a day, including all the salt you eat in cooking or in packaged foods.  Fluid restriction of 1500ml per day (about 6 cups of fluid per day).      Patient Instructions:  Continue current medications as prescribed.   Always keep a current medication list. Bring your medications to every office visit.   Continue to follow up with primary care provider for non cardiac medical problems.  Call the office with any problems, questions or concerns at [phone].  If you have been asked to keep a blood pressure log, do so for 2 weeks. Call the office to report readings to the triage nurse at [phone].  Follow up with cardiologist as scheduled.  The following educational material has been included in this after visit summary for your review: Life simple 7.  Heart health.     Life simple 7  1) Manage blood pressure - high blood pressure is a major risk factor for heart disease and stroke. Keeping blood pressure in health range reduces strain on your heart, arteries and kidneys.  Blood pressure goal is less than 130/80.   2) Control cholesterol - contributes to plaque, which can clog arteries and lead to heart disease and stroke. When you control your cholesterol you are giving your arteries their best chance to remain clear.  It is recommended that you get cholesterol lab work done once a year.  3) Reduce blood sugar - most of the food we eat is turning

## 2023-12-28 NOTE — PROGRESS NOTES
General - Clifton is alert, cooperative, and pleasant.  Well groomed.  No acute distress.    Body habitus - Body mass index is 33.11 kg/m².  HEENT - Head is normocephalic. No circumoral cyanosis.  Dentition is normal.  EYES -   Lids normal without ptosis.  No discharge, edema or subconjunctival hemorrhage.   Neck - Symmetrical without apparent mass or lymphadenopathy.   Respiratory - Normal respiratory effort without use of accessory muscles.  Ausculatation reveals vesicular breath sounds without crackles, wheezes, rub or rhonchi.    Cardiovascular - No jugular venous distention.  Auscultation reveals regular rate and rhythm.  No audible clicks, gallop or rub.  No murmur.  No lower extremity varicosities.  No carotid bruits.    Abdominal -  No visible distention, mass or pulsations.  Extremities - No clubbing or cyanosis.  No statis dermatitis or ulcers. No edema.    Musculoskeletal -   No Osler's nodes.  No kyphosis or scoliosis.  Gait is even and regular without limp or shuffle. Ambulates without assistance.  Skin -  Warm and dry; no rash or pallor.   No new surgical wound.  Neurological - No focal neurological deficits.  Thought processes coherent.  No apparent tremor.   Oriented to person, place and time.    Psychiatric -  Appropriate affect and mood.     Data reviewed:  Overview Note:         1/2011 stents x 3 ([place])  5/4/2011  Cath  Patent stents in the mid LAD, stent to OM1, PTCA to the D2, normal LVFX  12/12/12  Cath patent stents in the mid LAD, PTCA to the D3, stent to distal LAD, normal LVFX  3/29/2015  Cath  Stent to the PLOM, normal LVFX  11/1/2019  Loop placed  3/1/19 lexiscan negative for myocardial ischemia, EF 50  %   5/24/19 ACS PARKER RISK Score 5 (angina, + troponin, CAD>50, age>65, plavix ), AUC indication 3, AUC score 9   5/25/19  Cath 50% distal LM, 100% mid LAD, 80% diag, 100% mid RCA, inferior hypokinesis, EF 45%     3/6/23 echo  LV is severely dilated with moderate to severely reduced

## 2024-01-08 DIAGNOSIS — I25.10 CORONARY ARTERY DISEASE INVOLVING NATIVE CORONARY ARTERY OF NATIVE HEART WITHOUT ANGINA PECTORIS: ICD-10-CM

## 2024-01-08 RX ORDER — CLOPIDOGREL BISULFATE 75 MG/1
TABLET ORAL
Qty: 90 TABLET | Refills: 3 | Status: SHIPPED | OUTPATIENT
Start: 2024-01-08

## 2024-01-11 ENCOUNTER — HOSPITAL ENCOUNTER (OUTPATIENT)
Dept: PAIN MANAGEMENT | Age: 79
Discharge: HOME | End: 2024-01-11
Payer: MEDICARE

## 2024-01-11 VITALS
TEMPERATURE: 96.5 F | RESPIRATION RATE: 16 BRPM | HEIGHT: 76 IN | HEART RATE: 73 BPM | DIASTOLIC BLOOD PRESSURE: 56 MMHG | OXYGEN SATURATION: 96 % | WEIGHT: 274 LBS | BODY MASS INDEX: 33.36 KG/M2 | SYSTOLIC BLOOD PRESSURE: 100 MMHG

## 2024-01-11 DIAGNOSIS — M51.36 DDD (DEGENERATIVE DISC DISEASE), LUMBAR: Primary | ICD-10-CM

## 2024-01-11 DIAGNOSIS — M47.816 SPONDYLOSIS OF LUMBAR REGION WITHOUT MYELOPATHY OR RADICULOPATHY: ICD-10-CM

## 2024-01-11 DIAGNOSIS — M54.50 CHRONIC BILATERAL LOW BACK PAIN WITHOUT SCIATICA: ICD-10-CM

## 2024-01-11 DIAGNOSIS — G89.29 CHRONIC BILATERAL LOW BACK PAIN WITHOUT SCIATICA: ICD-10-CM

## 2024-01-11 PROCEDURE — 99213 OFFICE O/P EST LOW 20 MIN: CPT

## 2024-01-11 RX ORDER — OXYCODONE AND ACETAMINOPHEN 10; 325 MG/1; MG/1
1 TABLET ORAL EVERY 6 HOURS PRN
Qty: 120 TABLET | Refills: 0 | Status: SHIPPED | OUTPATIENT
Start: 2024-01-11 | End: 2024-02-10

## 2024-01-11 ASSESSMENT — ENCOUNTER SYMPTOMS
CONSTIPATION: 0
BACK PAIN: 1

## 2024-01-11 ASSESSMENT — PAIN SCALES - GENERAL: PAINLEVEL_OUTOF10: 9

## 2024-01-11 ASSESSMENT — PAIN DESCRIPTION - LOCATION: LOCATION: BACK

## 2024-01-11 ASSESSMENT — PAIN DESCRIPTION - ORIENTATION: ORIENTATION: LOWER

## 2024-01-11 ASSESSMENT — PAIN DESCRIPTION - PAIN TYPE: TYPE: CHRONIC PAIN

## 2024-01-11 NOTE — TELEPHONE ENCOUNTER
1. Chronic bilateral low back pain without sciatica    2. DDD (degenerative disc disease), lumbar    3. Spondylosis of lumbar region without myelopathy or radiculopathy      Requested Prescriptions     Pending Prescriptions Disp Refills    oxyCODONE-acetaminophen (PERCOCET)  MG per tablet 120 tablet 0     Sig: Take 1 tablet by mouth every 6 hours as needed for Pain for up to 30 days. Max Daily Amount: 4 tablets       Continue medication with refill sent at appointment yes; refill sent to medical director at appointment yes, see refill encounter dated 1/11/2024    Electronically signed by Pastora Osorio PA-C on 1/11/2024 at 1:51 PM

## 2024-01-11 NOTE — PROGRESS NOTES
Clinic Documentation      Education Provided:  [x] Review of Cheng  [] Agreement Review  [x] PEG Score Calculated [] PHQ Score Calculated [] ORT Score Calculated    [] Compliance Issues Discussed [] Cognitive Behavior Needs [x] Exercise [] Review of Test [] Financial Issues  [x] Tobacco/Alcohol Use Reviewed [x] Teaching [] New Patient [] Picture Obtained    Physician Plan:  [] Outgoing Referral  [] Pharmacy Consult  [] Test Ordered [x] Prescription Ordered/Changed   [] Obtained Test Results / Consult Notes        Complete if patient is withholding blood thinner for procedure     Blood Thinner Patient is currently taking:      [] Plavix (Hold for 7 days)  [] Aspirin (Hold for 5 days)     [] Pletal (Hold for 2 days)  [] Pradaxa (Hold for 3 days)    [] Effient (Hold for 7 days)  [] Xarelto (Hold for 2 days)    [] Eliquis (Hold for 2 days)  [] Brilinta (Hold for 7 days)    [] Coumadin (Hold for 5 days) - (INR needs to be drawn the day prior to procedure- INR < 2.0)    [] Aggrenox (Hold for 7 days)        [] Patient will stop medication on their own.    [] Blood Thinner Form Faxed for approval to hold.   Provider form faxed to:     Assessment Completed by:  Electronically signed by Lindsay Hogan MA on 1/11/2024 at 1:38 PM        Clinic Documentation      Education Provided:  [x] Review of Cheng  [] Agreement Review  [x] PEG Score Calculated [] PHQ Score Calculated [] ORT Score Calculated    [] Compliance Issues Discussed [] Cognitive Behavior Needs [x] Exercise [] Review of Test [] Financial Issues  [x] Tobacco/Alcohol Use Reviewed [x] Teaching [] New Patient [] Picture Obtained    Physician Plan:  [] Outgoing Referral  [] Pharmacy Consult  [] Test Ordered [] Prescription Ordered/Changed   [] Obtained Test Results / Consult Notes        Complete if patient is withholding blood thinner for procedure     Blood Thinner Patient is currently taking:      [] Plavix (Hold for 7 days)  [] Aspirin (Hold for 5 days) 
normal.   Eyes:      General: No scleral icterus.     Conjunctiva/sclera: Conjunctivae normal.      Pupils: Pupils are equal, round, and reactive to light.   Neck:      Thyroid: No thyromegaly.      Vascular: No JVD.   Cardiovascular:      Rate and Rhythm: Normal rate and regular rhythm.      Pulses: Normal pulses.           Carotid pulses are 2+ on the right side and 2+ on the left side.       Radial pulses are 2+ on the right side and 2+ on the left side.        Femoral pulses are 2+ on the right side and 2+ on the left side.  Pulmonary:      Effort: Pulmonary effort is normal.   Abdominal:      General: Bowel sounds are normal.      Palpations: Abdomen is soft.   Musculoskeletal:         General: Normal range of motion.      Cervical back: Normal range of motion and neck supple.      Comments: Thoracic and lumbar area is taut - rope like - with palpable tender knots identified - Trigger points    He has pain with flexion and extension  He has pain with twisting     Negative Mildred's finger test      Skin:     General: Skin is warm and dry.   Neurological:      Mental Status: He is alert and oriented to person, place, and time.   Psychiatric:         Behavior: Behavior normal.         Thought Content: Thought content normal.         Judgment: Judgment normal.     Labs:     Lab Results   Component Value Date/Time     09/18/2023 02:59 AM     05/25/2011 07:14 AM    K 4.5 09/18/2023 02:59 AM    K 4.0 05/25/2011 07:14 AM     09/18/2023 02:59 AM     05/25/2011 07:14 AM    CO2 23 09/18/2023 02:59 AM    BUN 28 09/18/2023 02:59 AM    CREATININE 1.3 09/18/2023 02:59 AM    CREATININE 1.0 05/25/2011 07:14 AM    GLUCOSE 144 09/18/2023 02:59 AM    CALCIUM 8.8 09/18/2023 02:59 AM        Lab Results   Component Value Date    WBC 6.1 09/19/2023    HGB 12.8 (L) 09/19/2023    HCT 40.3 (L) 09/19/2023    MCV 86.7 09/19/2023     (L) 09/19/2023       Assessment:

## 2024-02-05 ASSESSMENT — ENCOUNTER SYMPTOMS
BACK PAIN: 1
CONSTIPATION: 0

## 2024-02-08 RX ORDER — BUMETANIDE 1 MG/1
2 TABLET ORAL 2 TIMES DAILY
Qty: 360 TABLET | Refills: 1 | Status: SHIPPED | OUTPATIENT
Start: 2024-02-08

## 2024-02-22 PROBLEM — I21.19 ACUTE MI INFERIOR LATERAL FIRST EPISODE CARE (HCC): Status: RESOLVED | Noted: 2019-05-31 | Resolved: 2024-02-22

## 2024-03-07 DIAGNOSIS — M47.816 SPONDYLOSIS OF LUMBAR REGION WITHOUT MYELOPATHY OR RADICULOPATHY: ICD-10-CM

## 2024-03-07 DIAGNOSIS — M51.36 DDD (DEGENERATIVE DISC DISEASE), LUMBAR: ICD-10-CM

## 2024-03-07 DIAGNOSIS — M54.50 CHRONIC BILATERAL LOW BACK PAIN WITHOUT SCIATICA: ICD-10-CM

## 2024-03-07 DIAGNOSIS — G89.29 CHRONIC BILATERAL LOW BACK PAIN WITHOUT SCIATICA: ICD-10-CM

## 2024-03-12 RX ORDER — OXYCODONE AND ACETAMINOPHEN 10; 325 MG/1; MG/1
1 TABLET ORAL EVERY 6 HOURS PRN
Qty: 120 TABLET | Refills: 0 | Status: SHIPPED | OUTPATIENT
Start: 2024-03-12 | End: 2024-04-11

## 2024-03-30 ENCOUNTER — APPOINTMENT (OUTPATIENT)
Dept: CT IMAGING | Age: 79
End: 2024-03-30
Payer: MEDICARE

## 2024-03-30 ENCOUNTER — APPOINTMENT (OUTPATIENT)
Dept: GENERAL RADIOLOGY | Age: 79
End: 2024-03-30
Payer: MEDICARE

## 2024-03-30 ENCOUNTER — HOSPITAL ENCOUNTER (EMERGENCY)
Age: 79
Discharge: HOME | End: 2024-03-30
Payer: MEDICARE

## 2024-03-30 VITALS
RESPIRATION RATE: 17 BRPM | DIASTOLIC BLOOD PRESSURE: 58 MMHG | HEART RATE: 74 BPM | BODY MASS INDEX: 33.35 KG/M2 | SYSTOLIC BLOOD PRESSURE: 131 MMHG | WEIGHT: 274 LBS | TEMPERATURE: 97.9 F | OXYGEN SATURATION: 94 %

## 2024-03-30 DIAGNOSIS — M79.642 LEFT HAND PAIN: ICD-10-CM
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
## 2024-03-30 DIAGNOSIS — S62.634A CLOSED DISPLACED FRACTURE OF DISTAL PHALANX OF RIGHT RING FINGER, INITIAL ENCOUNTER: ICD-10-CM

## 2024-03-30 DIAGNOSIS — M79.641 HAND PAIN, RIGHT: ICD-10-CM

## 2024-03-30 DIAGNOSIS — M54.2 NECK PAIN: ICD-10-CM

## 2024-03-30 DIAGNOSIS — M79.671 RIGHT FOOT PAIN: ICD-10-CM

## 2024-03-30 PROCEDURE — 6360000002 HC RX W HCPCS: Performed by: NURSE PRACTITIONER

## 2024-03-30 PROCEDURE — 73130 X-RAY EXAM OF HAND: CPT

## 2024-03-30 PROCEDURE — 73630 X-RAY EXAM OF FOOT: CPT

## 2024-03-30 PROCEDURE — 70490 CT SOFT TISSUE NECK W/O DYE: CPT

## 2024-03-30 PROCEDURE — 29125 APPL SHORT ARM SPLINT STATIC: CPT

## 2024-03-30 PROCEDURE — 70450 CT HEAD/BRAIN W/O DYE: CPT

## 2024-03-30 PROCEDURE — 99284 EMERGENCY DEPT VISIT MOD MDM: CPT

## 2024-03-30 PROCEDURE — 71045 X-RAY EXAM CHEST 1 VIEW: CPT

## 2024-03-30 PROCEDURE — 90715 TDAP VACCINE 7 YRS/> IM: CPT | Performed by: NURSE PRACTITIONER

## 2024-03-30 PROCEDURE — 90471 IMMUNIZATION ADMIN: CPT | Performed by: NURSE PRACTITIONER

## 2024-03-30 PROCEDURE — 73110 X-RAY EXAM OF WRIST: CPT

## 2024-03-30 RX ADMIN — TETANUS TOXOID, REDUCED DIPHTHERIA TOXOID AND ACELLULAR PERTUSSIS VACCINE, ADSORBED 0.5 ML: 5; 2.5; 8; 8; 2.5 SUSPENSION INTRAMUSCULAR at 16:37
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
## 2024-04-09 DIAGNOSIS — Z95.810 AICD (AUTOMATIC CARDIOVERTER/DEFIBRILLATOR) PRESENT: Primary | ICD-10-CM

## 2024-04-09 DIAGNOSIS — I25.5 ISCHEMIC CARDIOMYOPATHY: ICD-10-CM

## 2024-04-09 DIAGNOSIS — I50.22 CHRONIC SYSTOLIC HEART FAILURE (HCC): ICD-10-CM

## 2024-04-10 ENCOUNTER — TRANSCRIBE ORDERS (OUTPATIENT)
Dept: ADMINISTRATIVE | Facility: HOSPITAL | Age: 79
End: 2024-04-10
Payer: MEDICARE

## 2024-04-10 DIAGNOSIS — M54.9 DORSALGIA: ICD-10-CM

## 2024-04-10 DIAGNOSIS — G89.29 CHRONIC BILATERAL THORACIC BACK PAIN: Primary | ICD-10-CM

## 2024-04-10 DIAGNOSIS — M54.6 CHRONIC BILATERAL THORACIC BACK PAIN: Primary | ICD-10-CM

## 2024-04-11 ENCOUNTER — HOSPITAL ENCOUNTER (OUTPATIENT)
Dept: PAIN MANAGEMENT | Age: 79
Discharge: HOME | End: 2024-04-11
Payer: MEDICARE

## 2024-04-11 VITALS
RESPIRATION RATE: 16 BRPM | HEART RATE: 66 BPM | OXYGEN SATURATION: 93 % | TEMPERATURE: 96.3 F | DIASTOLIC BLOOD PRESSURE: 63 MMHG | WEIGHT: 270 LBS | BODY MASS INDEX: 32.88 KG/M2 | HEIGHT: 76 IN | SYSTOLIC BLOOD PRESSURE: 116 MMHG

## 2024-04-11 PROCEDURE — 99213 OFFICE O/P EST LOW 20 MIN: CPT

## 2024-04-11 ASSESSMENT — PAIN DESCRIPTION - LOCATION: LOCATION: BACK

## 2024-04-11 ASSESSMENT — PAIN DESCRIPTION - PAIN TYPE: TYPE: CHRONIC PAIN

## 2024-04-11 ASSESSMENT — PAIN DESCRIPTION - ORIENTATION: ORIENTATION: LOWER

## 2024-04-11 ASSESSMENT — PAIN SCALES - GENERAL: PAINLEVEL_OUTOF10: 9

## 2024-04-11 NOTE — PROGRESS NOTES
Clinic Documentation      Education Provided:  [x] Review of Cheng  [] Agreement Review  [x] PEG Score Calculated [] PHQ Score Calculated [] ORT Score Calculated    [] Compliance Issues Discussed [] Cognitive Behavior Needs [x] Exercise [] Review of Test [] Financial Issues  [x] Tobacco/Alcohol Use Reviewed [x] Teaching [] New Patient [] Picture Obtained    Physician Plan:  [] Outgoing Referral  [] Pharmacy Consult  [] Test Ordered [x] Prescription Ordered/Changed   [] Obtained Test Results / Consult Notes        Complete if patient is withholding blood thinner for procedure     Blood Thinner Patient is currently taking:      [] Plavix (Hold for 7 days)  [] Aspirin (Hold for 5 days)     [] Pletal (Hold for 2 days)  [] Pradaxa (Hold for 3 days)    [] Effient (Hold for 7 days)  [] Xarelto (Hold for 2 days)    [] Eliquis (Hold for 2 days)  [] Brilinta (Hold for 7 days)    [] Coumadin (Hold for 5 days) - (INR needs to be drawn the day prior to procedure- INR < 2.0)    [] Aggrenox (Hold for 7 days)        [] Patient will stop medication on their own.    [] Blood Thinner Form Faxed for approval to hold.   Provider form faxed to:     Assessment Completed by:  Electronically signed by Lindsay Hogan MA on 4/11/2024 at 2:12 PM

## 2024-04-18 DIAGNOSIS — G89.29 CHRONIC BILATERAL LOW BACK PAIN WITHOUT SCIATICA: ICD-10-CM

## 2024-04-18 DIAGNOSIS — G89.29 CHRONIC BILATERAL LOW BACK PAIN WITHOUT SCIATICA: Primary | ICD-10-CM

## 2024-04-18 DIAGNOSIS — M47.816 SPONDYLOSIS OF LUMBAR REGION WITHOUT MYELOPATHY OR RADICULOPATHY: ICD-10-CM

## 2024-04-18 DIAGNOSIS — M51.36 DDD (DEGENERATIVE DISC DISEASE), LUMBAR: ICD-10-CM

## 2024-04-18 DIAGNOSIS — M54.50 CHRONIC BILATERAL LOW BACK PAIN WITHOUT SCIATICA: ICD-10-CM

## 2024-04-18 DIAGNOSIS — M54.50 CHRONIC BILATERAL LOW BACK PAIN WITHOUT SCIATICA: Primary | ICD-10-CM

## 2024-04-18 RX ORDER — OXYCODONE AND ACETAMINOPHEN 10; 325 MG/1; MG/1
1 TABLET ORAL EVERY 6 HOURS PRN
Qty: 12 TABLET | Refills: 0 | Status: SHIPPED | OUTPATIENT
Start: 2024-04-18 | End: 2024-04-21

## 2024-04-18 NOTE — TELEPHONE ENCOUNTER
Script was not sent for patient at his appointment on 4/11/24.  Sending a 3 day script for patient to fill today.  Will send a 30 day script to MD for patient to fill after completing 3 day script.

## 2024-04-19 RX ORDER — OXYCODONE AND ACETAMINOPHEN 10; 325 MG/1; MG/1
1 TABLET ORAL EVERY 6 HOURS PRN
Qty: 120 TABLET | Refills: 0 | Status: SHIPPED | OUTPATIENT
Start: 2024-04-21 | End: 2024-05-21

## 2024-04-25 RX ORDER — METOPROLOL SUCCINATE 25 MG/1
TABLET, EXTENDED RELEASE ORAL
Qty: 90 TABLET | Refills: 3 | Status: SHIPPED | OUTPATIENT
Start: 2024-04-25

## 2024-05-13 DIAGNOSIS — G89.29 CHRONIC BILATERAL LOW BACK PAIN WITHOUT SCIATICA: ICD-10-CM

## 2024-05-13 DIAGNOSIS — M47.816 SPONDYLOSIS OF LUMBAR REGION WITHOUT MYELOPATHY OR RADICULOPATHY: ICD-10-CM

## 2024-05-13 DIAGNOSIS — M54.50 CHRONIC BILATERAL LOW BACK PAIN WITHOUT SCIATICA: ICD-10-CM

## 2024-05-13 DIAGNOSIS — M51.36 DDD (DEGENERATIVE DISC DISEASE), LUMBAR: ICD-10-CM

## 2024-05-14 RX ORDER — OXYCODONE AND ACETAMINOPHEN 10; 325 MG/1; MG/1
1 TABLET ORAL EVERY 6 HOURS PRN
Qty: 120 TABLET | Refills: 0 | Status: SHIPPED | OUTPATIENT
Start: 2024-05-22 | End: 2024-06-21

## 2024-05-21 ASSESSMENT — ENCOUNTER SYMPTOMS
CONSTIPATION: 0
BACK PAIN: 1

## 2024-05-28 ENCOUNTER — TRANSCRIBE ORDERS (OUTPATIENT)
Dept: ADMINISTRATIVE | Facility: HOSPITAL | Age: 79
End: 2024-05-28
Payer: MEDICARE

## 2024-05-28 ENCOUNTER — HOSPITAL ENCOUNTER (OUTPATIENT)
Dept: GENERAL RADIOLOGY | Facility: HOSPITAL | Age: 79
Discharge: HOME | End: 2024-05-28
Admitting: PHYSICIAN ASSISTANT
Payer: MEDICARE

## 2024-05-28 DIAGNOSIS — M54.6 CHRONIC BILATERAL THORACIC BACK PAIN: Primary | ICD-10-CM

## 2024-05-28 DIAGNOSIS — G89.29 CHRONIC BILATERAL THORACIC BACK PAIN: ICD-10-CM

## 2024-05-28 DIAGNOSIS — M54.9 DORSALGIA: ICD-10-CM

## 2024-05-28 DIAGNOSIS — G89.29 CHRONIC BILATERAL THORACIC BACK PAIN: Primary | ICD-10-CM

## 2024-05-28 DIAGNOSIS — M54.6 CHRONIC BILATERAL THORACIC BACK PAIN: ICD-10-CM

## 2024-05-28 PROCEDURE — 72070 X-RAY EXAM THORAC SPINE 2VWS: CPT

## 2024-05-28 PROCEDURE — 72100 X-RAY EXAM L-S SPINE 2/3 VWS: CPT

## 2024-06-17 ENCOUNTER — OFFICE VISIT (OUTPATIENT)
Dept: CARDIOLOGY CLINIC | Age: 79
End: 2024-06-17
Payer: MEDICARE

## 2024-06-17 VITALS
OXYGEN SATURATION: 92 % | DIASTOLIC BLOOD PRESSURE: 64 MMHG | WEIGHT: 244 LBS | HEIGHT: 76 IN | SYSTOLIC BLOOD PRESSURE: 120 MMHG | BODY MASS INDEX: 29.71 KG/M2 | HEART RATE: 65 BPM

## 2024-06-17 DIAGNOSIS — Z95.810 AICD (AUTOMATIC CARDIOVERTER/DEFIBRILLATOR) PRESENT: ICD-10-CM

## 2024-06-17 DIAGNOSIS — I50.22 CHRONIC SYSTOLIC HEART FAILURE (HCC): ICD-10-CM

## 2024-06-17 DIAGNOSIS — I48.0 PAF (PAROXYSMAL ATRIAL FIBRILLATION) (HCC): ICD-10-CM

## 2024-06-17 DIAGNOSIS — I10 ESSENTIAL HYPERTENSION: ICD-10-CM

## 2024-06-17 DIAGNOSIS — E78.2 MIXED HYPERLIPIDEMIA: ICD-10-CM

## 2024-06-17 DIAGNOSIS — I25.10 CORONARY ARTERY DISEASE INVOLVING NATIVE CORONARY ARTERY OF NATIVE HEART WITHOUT ANGINA PECTORIS: Primary | ICD-10-CM

## 2024-06-17 PROCEDURE — 1036F TOBACCO NON-USER: CPT | Performed by: NURSE PRACTITIONER

## 2024-06-17 PROCEDURE — 3074F SYST BP LT 130 MM HG: CPT | Performed by: NURSE PRACTITIONER

## 2024-06-17 PROCEDURE — G8427 DOCREV CUR MEDS BY ELIG CLIN: HCPCS | Performed by: NURSE PRACTITIONER

## 2024-06-17 PROCEDURE — G8417 CALC BMI ABV UP PARAM F/U: HCPCS | Performed by: NURSE PRACTITIONER

## 2024-06-17 PROCEDURE — 3078F DIAST BP <80 MM HG: CPT | Performed by: NURSE PRACTITIONER

## 2024-06-17 PROCEDURE — 1124F ACP DISCUSS-NO DSCNMKR DOCD: CPT | Performed by: NURSE PRACTITIONER

## 2024-06-17 PROCEDURE — 93283 PRGRMG EVAL IMPLANTABLE DFB: CPT | Performed by: NURSE PRACTITIONER

## 2024-06-17 PROCEDURE — 99214 OFFICE O/P EST MOD 30 MIN: CPT | Performed by: NURSE PRACTITIONER

## 2024-06-17 NOTE — PROGRESS NOTES
[facility]  [address], [address], [place]  [address]  [phone] office  [phone] fax      OFFICE VISIT:  2024    Clifton Phan - : 1945  Reason For Visit:  Clifton is a 79 y.o. male who is here for 6 Month Follow-Up (ICD  no symptoms), Congestive Heart Failure, and Coronary Artery Disease    History:   Diagnosis Orders   1. Coronary artery disease involving native coronary artery of native heart without angina pectoris        2. Chronic systolic heart failure (HCC)        3. AICD (automatic cardioverter/defibrillator) present        4. PAF (paroxysmal atrial fibrillation) (HCC)        5. Essential hypertension        6. Mixed hyperlipidemia          The patient presents today for cardiology follow up and AICD check.  He is an 79 year old with the following history:  1. Coronary artery disease, status post multiple PCI's to LAD 2011, RCA 2012, RPL 2015 presenting 2019 with inferior wall MI with late presentation involving a large dominant RCA with distal occlusion, not intervened on with severe mid to distal LAD restenosis and moderate left main disease, status post CABG 2019 with LIMA to 1st diagonal, SVG to LAD and SVG to ramus intermedius, ejection fraction 25% with severe inferior hypokinesis. Thallium viability study with large inferolateral, predominantly lateral infarct with no significant viability status post ICD placement 2019.  2. CK D stage III.  3. Diabetes mellitus.  4.  Paroxysmal atrial fibrillation, initiated on Xarelto.  5. Chronic severe constipation  6.  Statin intolerance.  7.  Chronic low back pain with bilateral lower extremity weakness and multiple falls    The patient denies symptoms to suggest myocardial ischemia, heart failure or arrhythmia.  His weight is down today at 244 pounds.  The patient reports some chronic fatigue but otherwise doing well.   BP is

## 2024-06-17 NOTE — PATIENT INSTRUCTIONS
New instructions for today:  Weigh yourself daily without clothing at the same time each day.    Record a daily weight log.  Call the office if you gain 3 pounds or more in 2 to 3 days or 5 pounds in one week.   A sudden weight gain may mean that your heart failure is getting worse.  Sodium causes your body to hold on to extra water.   This may cause your heart failure symptoms to get worse.   Limit sodium to 2,000 milligrams (mg) a day or less.   That is less than 1 teaspoon of salt a day, including all the salt you eat in cooking or in packaged foods.  Fluid restriction of 1500ml per day (about 6 cups of fluid per day).      Patient Instructions:  Continue current medications as prescribed.   Always keep a current medication list. Bring your medications to every office visit.   Continue to follow up with primary care provider for non cardiac medical problems.  Call the office with any problems, questions or concerns at [phone].  If you have been asked to keep a blood pressure log, do so for 2 weeks. Call the office to report readings to the triage nurse at [phone].  Follow up with cardiologist as scheduled.  The following educational material has been included in this after visit summary for your review: Life simple 7.  Heart health.     Life simple 7  1) Manage blood pressure - high blood pressure is a major risk factor for heart disease and stroke. Keeping blood pressure in health range reduces strain on your heart, arteries and kidneys.  Blood pressure goal is less than 130/80.   2) Control cholesterol - contributes to plaque, which can clog arteries and lead to heart disease and stroke. When you control your cholesterol you are giving your arteries their best chance to remain clear.  It is recommended that you get cholesterol lab work done once a year.  3) Reduce blood sugar - most of the food we eat is turning into glucose or blood sugar that our body uses for energy.  Over time, high levels of

## 2024-06-25 DIAGNOSIS — M51.36 DDD (DEGENERATIVE DISC DISEASE), LUMBAR: ICD-10-CM

## 2024-06-25 DIAGNOSIS — M47.816 SPONDYLOSIS OF LUMBAR REGION WITHOUT MYELOPATHY OR RADICULOPATHY: ICD-10-CM

## 2024-06-25 DIAGNOSIS — M54.50 CHRONIC BILATERAL LOW BACK PAIN WITHOUT SCIATICA: ICD-10-CM

## 2024-06-25 DIAGNOSIS — G89.29 CHRONIC BILATERAL LOW BACK PAIN WITHOUT SCIATICA: ICD-10-CM

## 2024-06-25 RX ORDER — OXYCODONE AND ACETAMINOPHEN 10; 325 MG/1; MG/1
1 TABLET ORAL EVERY 6 HOURS PRN
Qty: 120 TABLET | Refills: 0 | Status: SHIPPED | OUTPATIENT
Start: 2024-06-25 | End: 2024-07-25

## 2024-06-25 RX ORDER — OXYCODONE AND ACETAMINOPHEN 10; 325 MG/1; MG/1
1 TABLET ORAL EVERY 6 HOURS PRN
Qty: 120 TABLET | Refills: 0 | Status: SHIPPED | OUTPATIENT
Start: 2024-07-25 | End: 2024-08-24

## 2024-07-15 ENCOUNTER — HOSPITAL ENCOUNTER (OUTPATIENT)
Dept: PAIN MANAGEMENT | Age: 79
Discharge: HOME | End: 2024-07-15
Payer: MEDICARE

## 2024-07-15 VITALS
BODY MASS INDEX: 32.88 KG/M2 | HEIGHT: 76 IN | OXYGEN SATURATION: 93 % | TEMPERATURE: 97.1 F | SYSTOLIC BLOOD PRESSURE: 110 MMHG | RESPIRATION RATE: 20 BRPM | WEIGHT: 270 LBS | DIASTOLIC BLOOD PRESSURE: 66 MMHG | HEART RATE: 72 BPM

## 2024-07-15 DIAGNOSIS — M51.36 DDD (DEGENERATIVE DISC DISEASE), LUMBAR: ICD-10-CM

## 2024-07-15 DIAGNOSIS — M54.50 CHRONIC BILATERAL LOW BACK PAIN WITHOUT SCIATICA: ICD-10-CM

## 2024-07-15 DIAGNOSIS — G89.29 CHRONIC BILATERAL LOW BACK PAIN WITHOUT SCIATICA: Primary | ICD-10-CM

## 2024-07-15 DIAGNOSIS — Z79.891 ENCOUNTER FOR MONITORING OPIOID MAINTENANCE THERAPY: ICD-10-CM

## 2024-07-15 DIAGNOSIS — G89.29 CHRONIC BILATERAL LOW BACK PAIN WITHOUT SCIATICA: ICD-10-CM

## 2024-07-15 DIAGNOSIS — M54.50 CHRONIC BILATERAL LOW BACK PAIN WITHOUT SCIATICA: Primary | ICD-10-CM

## 2024-07-15 DIAGNOSIS — Z51.81 ENCOUNTER FOR MONITORING OPIOID MAINTENANCE THERAPY: ICD-10-CM

## 2024-07-15 DIAGNOSIS — M79.18 BILATERAL MYOFASCIAL PAIN: ICD-10-CM

## 2024-07-15 DIAGNOSIS — M47.816 SPONDYLOSIS OF LUMBAR REGION WITHOUT MYELOPATHY OR RADICULOPATHY: ICD-10-CM

## 2024-07-15 PROCEDURE — 99214 OFFICE O/P EST MOD 30 MIN: CPT

## 2024-07-15 RX ORDER — PEN NEEDLE, DIABETIC 32GX 5/32"
NEEDLE, DISPOSABLE MISCELLANEOUS
COMMUNITY
Start: 2024-06-13

## 2024-07-15 RX ORDER — LIDOCAINE 560 MG/1
PATCH PERCUTANEOUS; TOPICAL; TRANSDERMAL
COMMUNITY
Start: 2024-05-28

## 2024-07-15 RX ORDER — CALCIUM CITRATE/VITAMIN D3 200MG-6.25
TABLET ORAL
COMMUNITY
Start: 2024-04-12

## 2024-07-15 RX ORDER — COLCHICINE 0.6 MG/1
TABLET ORAL
COMMUNITY
Start: 2024-06-17

## 2024-07-15 RX ORDER — EVOLOCUMAB 140 MG/ML
INJECTION, SOLUTION SUBCUTANEOUS
COMMUNITY

## 2024-07-15 RX ORDER — TIZANIDINE 4 MG/1
4 TABLET ORAL NIGHTLY
Qty: 30 TABLET | Refills: 2 | Status: SHIPPED | OUTPATIENT
Start: 2024-07-15

## 2024-07-15 ASSESSMENT — PAIN DESCRIPTION - LOCATION: LOCATION: BACK

## 2024-07-15 ASSESSMENT — ENCOUNTER SYMPTOMS
CONSTIPATION: 0
BACK PAIN: 1

## 2024-07-15 ASSESSMENT — PAIN DESCRIPTION - PAIN TYPE: TYPE: CHRONIC PAIN

## 2024-07-15 ASSESSMENT — PAIN DESCRIPTION - ORIENTATION: ORIENTATION: LOWER

## 2024-07-15 ASSESSMENT — PAIN SCALES - GENERAL: PAINLEVEL_OUTOF10: 9

## 2024-07-15 NOTE — PROGRESS NOTES
Clinic Documentation      Education Provided:  [x] Review of Cheng  [] Agreement Review  [x] PEG Score Calculated [] PHQ Score Calculated [] ORT Score Calculated    [] Compliance Issues Discussed [] Cognitive Behavior Needs [x] Exercise [] Review of Test [] Financial Issues  [x] Tobacco/Alcohol Use Reviewed [x] Teaching [] New Patient [] Picture Obtained    Physician Plan:  [] Outgoing Referral  [] Pharmacy Consult  [x] Test Ordered [x] Prescription Ordered/Changed   [] Obtained Test Results / Consult Notes        Complete if patient is withholding blood thinner for procedure     Blood Thinner Patient is currently taking:      [] Plavix (Hold for 7 days)  [] Aspirin (Hold for 5 days)     [] Pletal (Hold for 2 days)  [] Pradaxa (Hold for 3 days)    [] Effient (Hold for 7 days)  [] Xarelto (Hold for 2 days)    [] Eliquis (Hold for 2 days)  [] Brilinta (Hold for 7 days)    [] Coumadin (Hold for 5 days) - (INR needs to be drawn the day prior to procedure- INR < 2.0)    [] Aggrenox (Hold for 7 days)        [] Patient will stop medication on their own.    [] Blood Thinner Form Faxed for approval to hold.   Provider form faxed to:     Assessment Completed by:  Electronically signed by Sangeeta Cooley RN on 7/15/2024 at 2:20 PM  
regular rhythm.      Pulses: Normal pulses.   Pulmonary:      Effort: Pulmonary effort is normal.   Abdominal:      General: Bowel sounds are normal.      Palpations: Abdomen is soft.   Musculoskeletal:         General: Tenderness present.      Cervical back: Normal range of motion and neck supple.      Lumbar back: Spasms, tenderness and bony tenderness present. Decreased range of motion. Negative right straight leg raise test and negative left straight leg raise test.      Comments: Thoracic and lumbar area is taut - rope like - with palpable tender knots identified - Trigger points    He has pain with flexion and extension  He has pain with twisting     Negative Mildred's finger test      Skin:     General: Skin is warm and dry.   Neurological:      Mental Status: He is alert and oriented to person, place, and time.      Gait: Gait abnormal.   Psychiatric:         Behavior: Behavior normal.         Thought Content: Thought content normal.         Judgment: Judgment normal.       Labs:     Lab Results   Component Value Date/Time     09/18/2023 02:59 AM     05/25/2011 07:14 AM    K 4.5 09/18/2023 02:59 AM    K 4.0 05/25/2011 07:14 AM     09/18/2023 02:59 AM     05/25/2011 07:14 AM    CO2 23 09/18/2023 02:59 AM    BUN 28 09/18/2023 02:59 AM    CREATININE 1.3 09/18/2023 02:59 AM    CREATININE 1.0 05/25/2011 07:14 AM    GLUCOSE 144 09/18/2023 02:59 AM    CALCIUM 8.8 09/18/2023 02:59 AM        Lab Results   Component Value Date    WBC 6.1 09/19/2023    HGB 12.8 (L) 09/19/2023    HCT 40.3 (L) 09/19/2023    MCV 86.7 09/19/2023     (L) 09/19/2023                                                                                                                                   Assessment/ Plan:  1. Chronic bilateral low back pain without sciatica  - Refills routed to Dr. Aburto during visit   oxyCODONE-acetaminophen (PERCOCET)  MG per tablet 120 tablet 0       Sig: Take 1 tablet by 
no difficulties

## 2024-07-15 NOTE — TELEPHONE ENCOUNTER
1. Chronic bilateral low back pain without sciatica    2. DDD (degenerative disc disease), lumbar    3. Spondylosis of lumbar region without myelopathy or radiculopathy      Requested Prescriptions     Pending Prescriptions Disp Refills    oxyCODONE-acetaminophen (PERCOCET)  MG per tablet 120 tablet 0     Sig: Take 1 tablet by mouth every 6 hours as needed for Pain for up to 30 days. May fill 8/24/2024       Continue medication with refill sent at appointment yes; refill sent to medical director at appointment yes, see refill encounter dated 7/15/2024    Electronically signed by TEGAN Flaherty CNP on 7/15/2024 at 2:16 PM

## 2024-07-16 RX ORDER — OXYCODONE AND ACETAMINOPHEN 10; 325 MG/1; MG/1
1 TABLET ORAL EVERY 6 HOURS PRN
Qty: 120 TABLET | Refills: 0 | Status: SHIPPED | OUTPATIENT
Start: 2024-08-24 | End: 2024-09-23

## 2024-08-15 RX ORDER — BUMETANIDE 1 MG/1
2 TABLET ORAL 2 TIMES DAILY
Qty: 360 TABLET | Refills: 1 | Status: SHIPPED | OUTPATIENT
Start: 2024-08-15

## 2024-08-19 ENCOUNTER — TELEPHONE (OUTPATIENT)
Dept: CARDIOLOGY CLINIC | Age: 79
End: 2024-08-19

## 2024-08-19 NOTE — TELEPHONE ENCOUNTER
Patient left a voicemail requesting to speak with you at [phone]. He stated on the message that he was wanting to see about getting a shot downstairs to help pull fluid off. He is having swelling and SOB. I can add on for phone visit if you would like?

## 2024-08-20 ENCOUNTER — TELEPHONE (OUTPATIENT)
Dept: CARDIOLOGY CLINIC | Age: 79
End: 2024-08-20

## 2024-08-20 ENCOUNTER — OFFICE VISIT (OUTPATIENT)
Dept: CARDIOLOGY CLINIC | Age: 79
End: 2024-08-20

## 2024-08-20 VITALS
SYSTOLIC BLOOD PRESSURE: 136 MMHG | WEIGHT: 275 LBS | HEIGHT: 76 IN | BODY MASS INDEX: 33.49 KG/M2 | DIASTOLIC BLOOD PRESSURE: 70 MMHG | HEART RATE: 67 BPM

## 2024-08-20 DIAGNOSIS — N18.9 CHRONIC KIDNEY DISEASE, UNSPECIFIED CKD STAGE: ICD-10-CM

## 2024-08-20 DIAGNOSIS — I10 ESSENTIAL HYPERTENSION: ICD-10-CM

## 2024-08-20 DIAGNOSIS — R06.02 SHORTNESS OF BREATH: ICD-10-CM

## 2024-08-20 DIAGNOSIS — I50.23 ACUTE ON CHRONIC SYSTOLIC CONGESTIVE HEART FAILURE (HCC): ICD-10-CM

## 2024-08-20 DIAGNOSIS — R06.02 SHORTNESS OF BREATH: Primary | ICD-10-CM

## 2024-08-20 DIAGNOSIS — Z95.810 AICD (AUTOMATIC CARDIOVERTER/DEFIBRILLATOR) PRESENT: ICD-10-CM

## 2024-08-20 LAB
ANION GAP SERPL CALCULATED.3IONS-SCNC: 10 MMOL/L (ref 7–19)
BNP BLD-MCNC: 647 PG/ML (ref 0–449)
BUN SERPL-MCNC: 32 MG/DL (ref 8–23)
CALCIUM SERPL-MCNC: 8.9 MG/DL (ref 8.8–10.2)
CHLORIDE SERPL-SCNC: 100 MMOL/L (ref 98–111)
CO2 SERPL-SCNC: 29 MMOL/L (ref 22–29)
CREAT SERPL-MCNC: 1.8 MG/DL (ref 0.5–1.2)
GLUCOSE SERPL-MCNC: 188 MG/DL (ref 74–109)
POTASSIUM SERPL-SCNC: 4.6 MMOL/L (ref 3.5–5)
SODIUM SERPL-SCNC: 139 MMOL/L (ref 136–145)

## 2024-08-20 RX ORDER — METOLAZONE 5 MG/1
5 TABLET ORAL DAILY
Qty: 5 TABLET | Refills: 0 | Status: SHIPPED | OUTPATIENT
Start: 2024-08-20

## 2024-08-20 NOTE — PATIENT INSTRUCTIONS
Metolazone 5mg daily x 5 days    Lab- BNP, BMP    - Weigh daily every morning after urinating (this is your dry weight).   Report weight gain of 3lbs or more in 24hrs or 5lbs in one week.  - Call for increasing shortness of breath or increasing swelling in feet and legs.    (This could mean you are retaining too much fluid)  - 2000mg low sodium diet  - Fluid restriction of 1500ml per day (about 6-8 cups (48-64oz) of fluid per day)

## 2024-08-20 NOTE — PROGRESS NOTES
[facility]  [address] [address]  Phone: [phone]  Fax: [phone]    OFFICE VISIT:  2024    Clifton Phan - : 1945    Reason For Visit:  Clifton is a 79 y.o. male who is here for Follow-up (Patient states he is having swelling and SOB.) and Coronary artery disease involving native coronary artery of       Diagnosis Orders   1. Shortness of breath  Basic Metabolic Panel    Brain Natriuretic Peptide      2. Acute on chronic systolic congestive heart failure (HCC)        3. Essential hypertension        4. AICD (automatic cardioverter/defibrillator) present        5. Chronic kidney disease, unspecified CKD stage              HPI  1. Coronary artery disease, status post multiple PCI's to LAD 2011, RCA 2012, RPL 2015 presenting 2019 with inferior wall MI with late presentation involving a large dominant RCA with distal occlusion, not intervened on with severe mid to distal LAD restenosis and moderate left main disease, status post CABG 2019 with LIMA to 1st diagonal, SVG to LAD and SVG to ramus intermedius, ejection fraction 25% with severe inferior hypokinesis. Thallium viability study with large inferolateral, predominantly lateral infarct with no significant viability status post ICD placement 2019.  2. CKD stage III.  3. Diabetes mellitus.  4.  Paroxysmal atrial fibrillation, initiated on Xarelto.  5. Chronic severe constipation  6.  Statin intolerance.  7.  Chronic low back pain with bilateral lower extremity weakness and multiple falls  8.  Systolic CHF    Patient returns today for early appointment.  He is complaining of worsening shortness of breath, 15 pound weight gain and lower extremity edema.  He denies orthopnea or PND.  He reports he has been using a lot of salt on fresh vegetables this summer.  He wants to know if he can get a Lasix injection which has helped his symptoms when he has felt like this previously

## 2024-08-20 NOTE — TELEPHONE ENCOUNTER
Result Care Coordination      Result Notes     Joaquina PRASAD Anjel, LPN  8/20/2024  1:48 PM CDT Back to Top      Left message for pt to return call for results.    Rizwana ALEXANDRA Carty, APRN  8/20/2024 12:53 PM CDT       Please let patient know I reviewed his labs.  His heart failure fluid marker is only slightly elevated.  He has some worsening in his kidney function.  I had given him a 5-day course of metolazone due to complaints of weight gain, swelling and shortness of breath today.  I would like him instead to only take a 3-day course of the metolazone due to his renal function.  Please let him know that the kidney issues also cause fluid retention.  Please put him down on either your calendar or send he is counter to call for CHF follow-up on Monday if you would

## 2024-08-21 RX ORDER — BUMETANIDE 2 MG/1
2 TABLET ORAL 2 TIMES DAILY
Qty: 180 TABLET | Refills: 3 | OUTPATIENT
Start: 2024-08-21

## 2024-08-21 ASSESSMENT — ENCOUNTER SYMPTOMS
VOMITING: 0
SHORTNESS OF BREATH: 1
BACK PAIN: 1
COUGH: 0
FACIAL SWELLING: 0
WHEEZING: 0
EYE REDNESS: 0
CHEST TIGHTNESS: 0
ABDOMINAL PAIN: 0
NAUSEA: 0

## 2024-08-21 NOTE — TELEPHONE ENCOUNTER
Joaquina Hobbs LPN  8/21/2024  1:15 PM CDT Back to Top      Patient left voicemail that he was returning my call. I returned call and advised of lab results and to only take 3 days worth instead of 5. He hasn't picked this up yet because he went to [facility] to  and they stated they didn't have it, advise the Rx was sent to [facility]. He will  today and start. He will call me back on Monday to let me know how he is doing.

## 2024-09-04 DIAGNOSIS — G89.29 CHRONIC BILATERAL LOW BACK PAIN WITHOUT SCIATICA: ICD-10-CM

## 2024-09-04 DIAGNOSIS — M51.36 DDD (DEGENERATIVE DISC DISEASE), LUMBAR: ICD-10-CM

## 2024-09-04 DIAGNOSIS — M47.816 SPONDYLOSIS OF LUMBAR REGION WITHOUT MYELOPATHY OR RADICULOPATHY: ICD-10-CM

## 2024-09-04 DIAGNOSIS — M54.50 CHRONIC BILATERAL LOW BACK PAIN WITHOUT SCIATICA: ICD-10-CM

## 2024-09-04 RX ORDER — OXYCODONE AND ACETAMINOPHEN 10; 325 MG/1; MG/1
1 TABLET ORAL EVERY 6 HOURS PRN
Qty: 120 TABLET | Refills: 0 | Status: SHIPPED | OUTPATIENT
Start: 2024-09-23 | End: 2024-10-23

## 2024-09-10 ENCOUNTER — TELEPHONE (OUTPATIENT)
Dept: CARDIOLOGY CLINIC | Age: 79
End: 2024-09-10

## 2024-09-17 ENCOUNTER — PRE-ADMISSION TESTING (OUTPATIENT)
Dept: PREADMISSION TESTING | Facility: HOSPITAL | Age: 79
End: 2024-09-17
Payer: MEDICARE

## 2024-09-17 VITALS
HEART RATE: 65 BPM | DIASTOLIC BLOOD PRESSURE: 73 MMHG | BODY MASS INDEX: 35.45 KG/M2 | RESPIRATION RATE: 18 BRPM | HEIGHT: 74 IN | SYSTOLIC BLOOD PRESSURE: 124 MMHG | WEIGHT: 276.24 LBS | OXYGEN SATURATION: 96 %

## 2024-09-17 LAB
ALBUMIN SERPL-MCNC: 4 G/DL (ref 3.5–5.2)
ALBUMIN/GLOB SERPL: 1.4 G/DL
ALP SERPL-CCNC: 95 U/L (ref 39–117)
ALT SERPL W P-5'-P-CCNC: 11 U/L (ref 1–41)
ANION GAP SERPL CALCULATED.3IONS-SCNC: 10 MMOL/L (ref 5–15)
APTT PPP: 43.9 SECONDS (ref 24.5–36)
AST SERPL-CCNC: 15 U/L (ref 1–40)
BACTERIA UR QL AUTO: ABNORMAL /HPF
BILIRUB SERPL-MCNC: 0.3 MG/DL (ref 0–1.2)
BILIRUB UR QL STRIP: NEGATIVE
BUN SERPL-MCNC: 28 MG/DL (ref 8–23)
BUN/CREAT SERPL: 20.9 (ref 7–25)
CALCIUM SPEC-SCNC: 9.4 MG/DL (ref 8.6–10.5)
CHLORIDE SERPL-SCNC: 103 MMOL/L (ref 98–107)
CLARITY UR: CLEAR
CO2 SERPL-SCNC: 27 MMOL/L (ref 22–29)
COLOR UR: YELLOW
CREAT SERPL-MCNC: 1.34 MG/DL (ref 0.76–1.27)
DEPRECATED RDW RBC AUTO: 48.7 FL (ref 37–54)
EGFRCR SERPLBLD CKD-EPI 2021: 53.6 ML/MIN/1.73
ERYTHROCYTE [DISTWIDTH] IN BLOOD BY AUTOMATED COUNT: 16.1 % (ref 12.3–15.4)
GLOBULIN UR ELPH-MCNC: 2.9 GM/DL
GLUCOSE SERPL-MCNC: 111 MG/DL (ref 65–99)
GLUCOSE UR STRIP-MCNC: ABNORMAL MG/DL
HCT VFR BLD AUTO: 42.4 % (ref 37.5–51)
HGB BLD-MCNC: 13.5 G/DL (ref 13–17.7)
HGB UR QL STRIP.AUTO: NEGATIVE
HYALINE CASTS UR QL AUTO: ABNORMAL /LPF
INR PPP: 1.11 (ref 0.91–1.09)
KETONES UR QL STRIP: NEGATIVE
LEUKOCYTE ESTERASE UR QL STRIP.AUTO: NEGATIVE
MCH RBC QN AUTO: 26.4 PG (ref 26.6–33)
MCHC RBC AUTO-ENTMCNC: 31.8 G/DL (ref 31.5–35.7)
MCV RBC AUTO: 83 FL (ref 79–97)
NITRITE UR QL STRIP: NEGATIVE
PH UR STRIP.AUTO: 7 [PH] (ref 5–8)
PLATELET # BLD AUTO: 131 10*3/MM3 (ref 140–450)
PMV BLD AUTO: ABNORMAL FL
POTASSIUM SERPL-SCNC: 4.1 MMOL/L (ref 3.5–5.2)
PROT SERPL-MCNC: 6.9 G/DL (ref 6–8.5)
PROT UR QL STRIP: ABNORMAL
PROTHROMBIN TIME: 14.8 SECONDS (ref 11.8–14.8)
QT INTERVAL: 454 MS
QTC INTERVAL: 454 MS
RBC # BLD AUTO: 5.11 10*6/MM3 (ref 4.14–5.8)
RBC # UR STRIP: ABNORMAL /HPF
REF LAB TEST METHOD: ABNORMAL
SODIUM SERPL-SCNC: 140 MMOL/L (ref 136–145)
SP GR UR STRIP: 1.02 (ref 1–1.03)
SQUAMOUS #/AREA URNS HPF: ABNORMAL /HPF
UROBILINOGEN UR QL STRIP: ABNORMAL
WBC # UR STRIP: ABNORMAL /HPF
WBC NRBC COR # BLD AUTO: 5.45 10*3/MM3 (ref 3.4–10.8)

## 2024-09-17 PROCEDURE — 85730 THROMBOPLASTIN TIME PARTIAL: CPT

## 2024-09-17 PROCEDURE — 81001 URINALYSIS AUTO W/SCOPE: CPT

## 2024-09-17 PROCEDURE — 85027 COMPLETE CBC AUTOMATED: CPT

## 2024-09-17 PROCEDURE — 93005 ELECTROCARDIOGRAM TRACING: CPT

## 2024-09-17 PROCEDURE — 85610 PROTHROMBIN TIME: CPT

## 2024-09-17 PROCEDURE — 80053 COMPREHEN METABOLIC PANEL: CPT

## 2024-09-17 PROCEDURE — 36415 COLL VENOUS BLD VENIPUNCTURE: CPT

## 2024-09-17 PROCEDURE — 87086 URINE CULTURE/COLONY COUNT: CPT

## 2024-09-18 LAB — BACTERIA SPEC AEROBE CULT: NO GROWTH

## 2024-09-19 ENCOUNTER — TELEPHONE (OUTPATIENT)
Dept: CARDIOLOGY CLINIC | Age: 79
End: 2024-09-19

## 2024-09-20 ENCOUNTER — OFFICE VISIT (OUTPATIENT)
Dept: CARDIOLOGY CLINIC | Age: 79
End: 2024-09-20

## 2024-09-20 VITALS
HEART RATE: 63 BPM | HEIGHT: 76 IN | WEIGHT: 270 LBS | DIASTOLIC BLOOD PRESSURE: 82 MMHG | BODY MASS INDEX: 32.88 KG/M2 | SYSTOLIC BLOOD PRESSURE: 128 MMHG

## 2024-09-20 DIAGNOSIS — Z95.810 AICD (AUTOMATIC CARDIOVERTER/DEFIBRILLATOR) PRESENT: ICD-10-CM

## 2024-09-20 DIAGNOSIS — I50.23 ACUTE ON CHRONIC SYSTOLIC CONGESTIVE HEART FAILURE (HCC): Primary | ICD-10-CM

## 2024-09-20 DIAGNOSIS — I25.10 CORONARY ARTERY DISEASE INVOLVING NATIVE CORONARY ARTERY OF NATIVE HEART WITHOUT ANGINA PECTORIS: ICD-10-CM

## 2024-09-20 DIAGNOSIS — I10 ESSENTIAL HYPERTENSION: ICD-10-CM

## 2024-09-24 ENCOUNTER — OFFICE VISIT (OUTPATIENT)
Dept: OTOLARYNGOLOGY | Facility: CLINIC | Age: 79
End: 2024-09-24
Payer: MEDICARE

## 2024-09-24 VITALS
DIASTOLIC BLOOD PRESSURE: 70 MMHG | TEMPERATURE: 97.8 F | BODY MASS INDEX: 35.45 KG/M2 | HEART RATE: 85 BPM | SYSTOLIC BLOOD PRESSURE: 148 MMHG | WEIGHT: 276.24 LBS | HEIGHT: 74 IN

## 2024-09-24 DIAGNOSIS — C44.42 SQUAMOUS CELL CARCINOMA OF NECK: Primary | ICD-10-CM

## 2024-09-24 DIAGNOSIS — Z79.01 ANTICOAGULATED: ICD-10-CM

## 2024-09-24 LAB
QT INTERVAL: 454 MS
QTC INTERVAL: 454 MS

## 2024-09-24 PROCEDURE — 99204 OFFICE O/P NEW MOD 45 MIN: CPT | Performed by: NURSE PRACTITIONER

## 2024-09-24 PROCEDURE — 1159F MED LIST DOCD IN RCRD: CPT | Performed by: NURSE PRACTITIONER

## 2024-09-24 PROCEDURE — 1160F RVW MEDS BY RX/DR IN RCRD: CPT | Performed by: NURSE PRACTITIONER

## 2024-09-24 NOTE — H&P (VIEW-ONLY)
YOB: 1945  Location: [facility]  Location Address: [address] [place] [address], [facility], [address] [place] [address]  Location Phone: [phone]    Chief Complaint   Patient presents with    SCC Neck     Left        History of Present Illness  Morris Yousif is a 79 y.o. male.  Morris Yousif is here for evaluation of ENT complaints. The patient has had problems with skin lesion   Patient states lesion has been present for the past 3 - 4 months   He feels as if it has gotten larger since he initially noticed   He is on plavix daily and is currently off of this due to spinal stimulation surgery tomorrow with Dr. Acosta                    Past Medical History:   Diagnosis Date    Allergic rhinitis     Arthritis     Carpal tunnel syndrome 2022    Coronary artery disease     Depression     Diabetes mellitus     Dizziness     H/O: CVA (cerebrovascular accident)     History of gastric ulcer     [place] (hard of hearing)     Hyperlipidemia     Hypertension     Myocardial infarction     Obstructive sleep apnea treated with continuous positive airway pressure (CPAP)     Orthostatic hypotension     Pacemaker 2019    [employer] - AICD    Skin cancer     Stroke        Past Surgical History:   Procedure Laterality Date    ABDOMINAL HERNIA REPAIR      APPENDECTOMY      BACK SURGERY      x2    CARDIAC CATHETERIZATION      CARPAL TUNNEL RELEASE Left 2022    Procedure: LEFT OPEN CARPAL TUNNEL RELEASE;  Surgeon: Jair Beal MD;  Location:  [facility];  Service: Orthopedics;  Laterality: Left;    CARPAL TUNNEL RELEASE Right 2023    Procedure: RIGHT OPEN CARPAL TUNNEL RELEASE;  Surgeon: Jair Beal MD;  Location:  [facility];  Service: Orthopedics;  Laterality: Right;    CHOLECYSTECTOMY      COLONOSCOPY      CORONARY ARTERY BYPASS GRAFT      CORONARY STENT PLACEMENT      X8    ENDOSCOPY      EXCISION LESION      top of head    PACEMAKER IMPLANTATION  2019    [employer] = AICD    REPLACEMENT TOTAL KNEE Left      SHOULDER SURGERY Left     THORACIC LAMINECTOMY WITH PLACEMENT OF DORSAL COLUMN STIMULATOR N/A 02/03/2020    Procedure: THORACIC LAMINECTOMY, INSERTION SPINAL CORD STIMULATOR;  Surgeon: DEMETRIUS Acosta MD;  Location: [facility];  Service: Orthopedic Spine;  Laterality: N/A;    UMBILICAL HERNIA REPAIR         No outpatient medications have been marked as taking for the 9/24/24 encounter (Office Visit) with Norm Romeo MD.       Statins, Adhesive tape, Morphine, and Rosuvastatin calcium    History reviewed. No pertinent family history.    Social History     Socioeconomic History    Marital status:    Tobacco Use    Smoking status: Never    Smokeless tobacco: Never   Vaping Use    Vaping status: Never Used   Substance and Sexual Activity    Alcohol use: Yes     Comment: OCCASIONALLY    Drug use: No    Sexual activity: Defer       Review of Systems   Constitutional: Negative.    HENT: Negative.     Skin:         Admits skin lesion          Vitals:    09/24/24 1417   BP: 148/70   Pulse: 85   Temp: 97.8 °F (36.6 °C)       Body mass index is 35.79 kg/m².    Objective     Physical Exam  Vitals reviewed.   Constitutional:       Appearance: He is obese.   HENT:      Head: Normocephalic.     Neurological:      Mental Status: He is alert.       Dr. Romeo has examined and assessed the patient and agrees with current treatment plan      Assessment & Plan   Diagnoses and all orders for this visit:    1. Squamous cell carcinoma of neck (Primary)  -     Case Request; Standing  -     Follow Anesthesia Guidelines / Protocol; Standing  -     Verify NPO Status; Standing  -     Obtain Informed Consent; Standing  -     SCD (Sequential Compression Device) - To Be Placed on Patient in Pre-Op; Standing  -     Patient to Void Prior to Transfer to OR; Standing  -     Instructions for Nursing; Standing  -     Case Request    2. Anticoagulated  -     Case Request; Standing  -     Follow Anesthesia Guidelines / Protocol;  Standing  -     Verify NPO Status; Standing  -     Obtain Informed Consent; Standing  -     SCD (Sequential Compression Device) - To Be Placed on Patient in Pre-Op; Standing  -     Patient to Void Prior to Transfer to OR; Standing  -     Instructions for Nursing; Standing  -     Case Request      EXCISION LESION OF LEFT NECK WITH FROZEN SECTION WITH POSSIBLE FLAP OR GRAFT (Left)  No orders of the defined types were placed in this encounter.    No follow-ups on file.     Risks vs benefits of skin lesion excision discussed patient wishes to proceed     Patient Instructions   Discussion of skin lesion. Discussed risks, benefits, alternatives, and possible complications of excision of the skin lesion with reconstruction utilizing local tissue rearrangement, full-thickness skin grafting, or local interpolated flaps. Risks include, but are not limited too: bleeding, infection, hematoma, recurrence, need for additional procedures, flap failure, cosmetic deformity. Patient understands risks and would like to proceed with surgery.  Alternatives include doing nothing.

## 2024-09-25 ENCOUNTER — ANESTHESIA EVENT (OUTPATIENT)
Dept: PERIOP | Facility: HOSPITAL | Age: 79
End: 2024-09-25
Payer: MEDICARE

## 2024-09-25 ENCOUNTER — ANESTHESIA (OUTPATIENT)
Dept: PERIOP | Facility: HOSPITAL | Age: 79
End: 2024-09-25
Payer: MEDICARE

## 2024-09-25 ENCOUNTER — HOSPITAL ENCOUNTER (OUTPATIENT)
Facility: HOSPITAL | Age: 79
Setting detail: HOSPITAL OUTPATIENT SURGERY
Discharge: HOME | End: 2024-09-25
Attending: ORTHOPAEDIC SURGERY | Admitting: ORTHOPAEDIC SURGERY
Payer: MEDICARE

## 2024-09-25 VITALS
SYSTOLIC BLOOD PRESSURE: 133 MMHG | OXYGEN SATURATION: 95 % | TEMPERATURE: 97.4 F | HEART RATE: 76 BPM | RESPIRATION RATE: 16 BRPM | DIASTOLIC BLOOD PRESSURE: 89 MMHG

## 2024-09-25 DIAGNOSIS — C44.42 SQUAMOUS CELL CARCINOMA OF NECK: ICD-10-CM

## 2024-09-25 DIAGNOSIS — Z79.01 ANTICOAGULATED: ICD-10-CM

## 2024-09-25 LAB
ABO GROUP BLD: NORMAL
BLD GP AB SCN SERPL QL: NEGATIVE
GLUCOSE BLDC GLUCOMTR-MCNC: 100 MG/DL (ref 70–130)
GLUCOSE BLDC GLUCOMTR-MCNC: 109 MG/DL (ref 70–130)
RH BLD: POSITIVE
T&S EXPIRATION DATE: NORMAL

## 2024-09-25 PROCEDURE — C1787 PATIENT PROGR, NEUROSTIM: HCPCS | Performed by: ORTHOPAEDIC SURGERY

## 2024-09-25 PROCEDURE — 25010000002 FENTANYL CITRATE (PF) 50 MCG/ML SOLUTION

## 2024-09-25 PROCEDURE — 25010000002 ONDANSETRON PER 1 MG

## 2024-09-25 PROCEDURE — 0 BUPIVACAINE LIPOSOME 1.3 % SUSPENSION 10 ML VIAL: Performed by: ORTHOPAEDIC SURGERY

## 2024-09-25 PROCEDURE — 13132 CMPLX RPR F/C/C/M/N/AX/G/H/F: CPT | Performed by: OTOLARYNGOLOGY

## 2024-09-25 PROCEDURE — 82948 REAGENT STRIP/BLOOD GLUCOSE: CPT

## 2024-09-25 PROCEDURE — 25810000003 LACTATED RINGERS PER 1000 ML: Performed by: ORTHOPAEDIC SURGERY

## 2024-09-25 PROCEDURE — 86900 BLOOD TYPING SEROLOGIC ABO: CPT | Performed by: ORTHOPAEDIC SURGERY

## 2024-09-25 PROCEDURE — 11622 EXC S/N/H/F/G MAL+MRG 1.1-2: CPT | Performed by: OTOLARYNGOLOGY

## 2024-09-25 PROCEDURE — 88305 TISSUE EXAM BY PATHOLOGIST: CPT | Performed by: ORTHOPAEDIC SURGERY

## 2024-09-25 PROCEDURE — 25010000002 GLYCOPYRROLATE 0.4 MG/2ML SOLUTION

## 2024-09-25 PROCEDURE — 86901 BLOOD TYPING SEROLOGIC RH(D): CPT | Performed by: ORTHOPAEDIC SURGERY

## 2024-09-25 PROCEDURE — 25010000002 DEXAMETHASONE PER 1 MG

## 2024-09-25 PROCEDURE — C1820 GENERATOR NEURO RECHG BAT SY: HCPCS | Performed by: ORTHOPAEDIC SURGERY

## 2024-09-25 PROCEDURE — 86850 RBC ANTIBODY SCREEN: CPT | Performed by: ORTHOPAEDIC SURGERY

## 2024-09-25 PROCEDURE — 25010000002 CEFAZOLIN 3 G RECONSTITUTED SOLUTION 1 EACH VIAL: Performed by: ORTHOPAEDIC SURGERY

## 2024-09-25 PROCEDURE — 25010000002 PROPOFOL 10 MG/ML EMULSION

## 2024-09-25 PROCEDURE — C9290 INJ, BUPIVACAINE LIPOSOME: HCPCS | Performed by: ORTHOPAEDIC SURGERY

## 2024-09-25 DEVICE — IMPLANTABLE DEVICE: Type: IMPLANTABLE DEVICE | Site: SPINE THORACIC | Status: FUNCTIONAL

## 2024-09-25 RX ORDER — DROPERIDOL 2.5 MG/ML
0.62 INJECTION, SOLUTION INTRAMUSCULAR; INTRAVENOUS ONCE AS NEEDED
Status: DISCONTINUED | OUTPATIENT
Start: 2024-09-25 | End: 2024-09-25 | Stop reason: HOSPADM

## 2024-09-25 RX ORDER — SODIUM CHLORIDE 0.9 % (FLUSH) 0.9 %
10 SYRINGE (ML) INJECTION EVERY 12 HOURS SCHEDULED
Status: DISCONTINUED | OUTPATIENT
Start: 2024-09-25 | End: 2024-09-25 | Stop reason: HOSPADM

## 2024-09-25 RX ORDER — HYDROCODONE BITARTRATE AND ACETAMINOPHEN 10; 325 MG/1; MG/1
1 TABLET ORAL EVERY 4 HOURS PRN
Status: DISCONTINUED | OUTPATIENT
Start: 2024-09-25 | End: 2024-09-25 | Stop reason: HOSPADM

## 2024-09-25 RX ORDER — MUPIROCIN 20 MG/G
OINTMENT TOPICAL EVERY 8 HOURS SCHEDULED
Status: DISCONTINUED | OUTPATIENT
Start: 2024-09-25 | End: 2024-09-25 | Stop reason: HOSPADM

## 2024-09-25 RX ORDER — OXYCODONE HCL 20 MG/1
20 TABLET, FILM COATED, EXTENDED RELEASE ORAL ONCE
Status: COMPLETED | OUTPATIENT
Start: 2024-09-25 | End: 2024-09-25

## 2024-09-25 RX ORDER — FENTANYL CITRATE 50 UG/ML
50 INJECTION, SOLUTION INTRAMUSCULAR; INTRAVENOUS
Status: DISCONTINUED | OUTPATIENT
Start: 2024-09-25 | End: 2024-09-25 | Stop reason: HOSPADM

## 2024-09-25 RX ORDER — MUPIROCIN 20 MG/G
OINTMENT TOPICAL EVERY 8 HOURS SCHEDULED
Status: DISCONTINUED | OUTPATIENT
Start: 2024-09-25 | End: 2024-09-25 | Stop reason: SDUPTHER

## 2024-09-25 RX ORDER — FLUMAZENIL 0.1 MG/ML
0.2 INJECTION INTRAVENOUS AS NEEDED
Status: DISCONTINUED | OUTPATIENT
Start: 2024-09-25 | End: 2024-09-25 | Stop reason: HOSPADM

## 2024-09-25 RX ORDER — HYDROCODONE BITARTRATE AND ACETAMINOPHEN 5; 325 MG/1; MG/1
1 TABLET ORAL EVERY 4 HOURS PRN
Status: DISCONTINUED | OUTPATIENT
Start: 2024-09-25 | End: 2024-09-25 | Stop reason: HOSPADM

## 2024-09-25 RX ORDER — MUPIROCIN 20 MG/G
OINTMENT TOPICAL AS NEEDED
Status: DISCONTINUED | OUTPATIENT
Start: 2024-09-25 | End: 2024-09-25 | Stop reason: HOSPADM

## 2024-09-25 RX ORDER — ONDANSETRON 2 MG/ML
INJECTION INTRAMUSCULAR; INTRAVENOUS AS NEEDED
Status: DISCONTINUED | OUTPATIENT
Start: 2024-09-25 | End: 2024-09-25 | Stop reason: SURG

## 2024-09-25 RX ORDER — ROCURONIUM BROMIDE 10 MG/ML
INJECTION, SOLUTION INTRAVENOUS AS NEEDED
Status: DISCONTINUED | OUTPATIENT
Start: 2024-09-25 | End: 2024-09-25 | Stop reason: SURG

## 2024-09-25 RX ORDER — ONDANSETRON 2 MG/ML
4 INJECTION INTRAMUSCULAR; INTRAVENOUS ONCE AS NEEDED
Status: DISCONTINUED | OUTPATIENT
Start: 2024-09-25 | End: 2024-09-25 | Stop reason: HOSPADM

## 2024-09-25 RX ORDER — LIDOCAINE HYDROCHLORIDE 20 MG/ML
INJECTION, SOLUTION EPIDURAL; INFILTRATION; INTRACAUDAL; PERINEURAL AS NEEDED
Status: DISCONTINUED | OUTPATIENT
Start: 2024-09-25 | End: 2024-09-25 | Stop reason: SURG

## 2024-09-25 RX ORDER — EPHEDRINE SULFATE 50 MG/ML
INJECTION INTRAVENOUS AS NEEDED
Status: DISCONTINUED | OUTPATIENT
Start: 2024-09-25 | End: 2024-09-25 | Stop reason: SURG

## 2024-09-25 RX ORDER — SODIUM CHLORIDE 9 MG/ML
40 INJECTION, SOLUTION INTRAVENOUS AS NEEDED
Status: DISCONTINUED | OUTPATIENT
Start: 2024-09-25 | End: 2024-09-25 | Stop reason: HOSPADM

## 2024-09-25 RX ORDER — MAGNESIUM HYDROXIDE 1200 MG/15ML
LIQUID ORAL AS NEEDED
Status: DISCONTINUED | OUTPATIENT
Start: 2024-09-25 | End: 2024-09-25 | Stop reason: HOSPADM

## 2024-09-25 RX ORDER — SODIUM CHLORIDE 0.9 % (FLUSH) 0.9 %
3 SYRINGE (ML) INJECTION EVERY 12 HOURS SCHEDULED
Status: DISCONTINUED | OUTPATIENT
Start: 2024-09-25 | End: 2024-09-25 | Stop reason: HOSPADM

## 2024-09-25 RX ORDER — PROPOFOL 10 MG/ML
VIAL (ML) INTRAVENOUS AS NEEDED
Status: DISCONTINUED | OUTPATIENT
Start: 2024-09-25 | End: 2024-09-25 | Stop reason: SURG

## 2024-09-25 RX ORDER — FENTANYL CITRATE 50 UG/ML
INJECTION, SOLUTION INTRAMUSCULAR; INTRAVENOUS AS NEEDED
Status: DISCONTINUED | OUTPATIENT
Start: 2024-09-25 | End: 2024-09-25 | Stop reason: SURG

## 2024-09-25 RX ORDER — SODIUM CHLORIDE, SODIUM LACTATE, POTASSIUM CHLORIDE, CALCIUM CHLORIDE 600; 310; 30; 20 MG/100ML; MG/100ML; MG/100ML; MG/100ML
100 INJECTION, SOLUTION INTRAVENOUS CONTINUOUS
Status: DISCONTINUED | OUTPATIENT
Start: 2024-09-25 | End: 2024-09-25 | Stop reason: HOSPADM

## 2024-09-25 RX ORDER — DEXAMETHASONE SODIUM PHOSPHATE 4 MG/ML
INJECTION, SOLUTION INTRA-ARTICULAR; INTRALESIONAL; INTRAMUSCULAR; INTRAVENOUS; SOFT TISSUE AS NEEDED
Status: DISCONTINUED | OUTPATIENT
Start: 2024-09-25 | End: 2024-09-25 | Stop reason: SURG

## 2024-09-25 RX ORDER — SODIUM CHLORIDE 0.9 % (FLUSH) 0.9 %
10 SYRINGE (ML) INJECTION AS NEEDED
Status: DISCONTINUED | OUTPATIENT
Start: 2024-09-25 | End: 2024-09-25 | Stop reason: HOSPADM

## 2024-09-25 RX ORDER — GLYCOPYRROLATE 0.2 MG/ML
INJECTION INTRAMUSCULAR; INTRAVENOUS AS NEEDED
Status: DISCONTINUED | OUTPATIENT
Start: 2024-09-25 | End: 2024-09-25 | Stop reason: SURG

## 2024-09-25 RX ORDER — NALOXONE HCL 0.4 MG/ML
0.4 VIAL (ML) INJECTION AS NEEDED
Status: DISCONTINUED | OUTPATIENT
Start: 2024-09-25 | End: 2024-09-25 | Stop reason: HOSPADM

## 2024-09-25 RX ORDER — SODIUM CHLORIDE 0.9 % (FLUSH) 0.9 %
3-10 SYRINGE (ML) INJECTION AS NEEDED
Status: DISCONTINUED | OUTPATIENT
Start: 2024-09-25 | End: 2024-09-25 | Stop reason: HOSPADM

## 2024-09-25 RX ORDER — LABETALOL HYDROCHLORIDE 5 MG/ML
5 INJECTION, SOLUTION INTRAVENOUS
Status: DISCONTINUED | OUTPATIENT
Start: 2024-09-25 | End: 2024-09-25 | Stop reason: HOSPADM

## 2024-09-25 RX ORDER — SODIUM CHLORIDE, SODIUM LACTATE, POTASSIUM CHLORIDE, CALCIUM CHLORIDE 600; 310; 30; 20 MG/100ML; MG/100ML; MG/100ML; MG/100ML
100 INJECTION, SOLUTION INTRAVENOUS CONTINUOUS PRN
Status: DISCONTINUED | OUTPATIENT
Start: 2024-09-25 | End: 2024-09-25 | Stop reason: HOSPADM

## 2024-09-25 RX ORDER — NEOSTIGMINE METHYLSULFATE 5 MG/5 ML
SYRINGE (ML) INTRAVENOUS AS NEEDED
Status: DISCONTINUED | OUTPATIENT
Start: 2024-09-25 | End: 2024-09-25 | Stop reason: SURG

## 2024-09-25 RX ORDER — LIDOCAINE HYDROCHLORIDE AND EPINEPHRINE 10; 10 MG/ML; UG/ML
INJECTION, SOLUTION INFILTRATION; PERINEURAL AS NEEDED
Status: DISCONTINUED | OUTPATIENT
Start: 2024-09-25 | End: 2024-09-25 | Stop reason: HOSPADM

## 2024-09-25 RX ORDER — ONDANSETRON 4 MG/1
4 TABLET, ORALLY DISINTEGRATING ORAL ONCE AS NEEDED
Status: DISCONTINUED | OUTPATIENT
Start: 2024-09-25 | End: 2024-09-25 | Stop reason: HOSPADM

## 2024-09-25 RX ADMIN — DEXAMETHASONE SODIUM PHOSPHATE 4 MG: 4 INJECTION, SOLUTION INTRA-ARTICULAR; INTRALESIONAL; INTRAMUSCULAR; INTRAVENOUS; SOFT TISSUE at 07:32

## 2024-09-25 RX ADMIN — SODIUM CHLORIDE 3000 MG: 900 INJECTION INTRAVENOUS at 07:15

## 2024-09-25 RX ADMIN — ONDANSETRON 4 MG: 2 INJECTION INTRAMUSCULAR; INTRAVENOUS at 07:32

## 2024-09-25 RX ADMIN — SODIUM CHLORIDE, POTASSIUM CHLORIDE, SODIUM LACTATE AND CALCIUM CHLORIDE 100 ML/HR: 600; 310; 30; 20 INJECTION, SOLUTION INTRAVENOUS at 06:13

## 2024-09-25 RX ADMIN — OXYCODONE HYDROCHLORIDE 20 MG: 20 TABLET, FILM COATED, EXTENDED RELEASE ORAL at 06:09

## 2024-09-25 RX ADMIN — EPHEDRINE SULFATE 15 MG: 50 INJECTION INTRAVENOUS at 07:30

## 2024-09-25 RX ADMIN — FENTANYL CITRATE 100 MCG: 50 INJECTION, SOLUTION INTRAMUSCULAR; INTRAVENOUS at 07:10

## 2024-09-25 RX ADMIN — PROPOFOL 100 MG: 10 INJECTION, EMULSION INTRAVENOUS at 07:10

## 2024-09-25 RX ADMIN — LIDOCAINE HYDROCHLORIDE 100 MG: 20 INJECTION, SOLUTION EPIDURAL; INFILTRATION; INTRACAUDAL; PERINEURAL at 07:10

## 2024-09-25 RX ADMIN — FENTANYL CITRATE 50 MCG: 50 INJECTION, SOLUTION INTRAMUSCULAR; INTRAVENOUS at 07:37

## 2024-09-25 RX ADMIN — ROCURONIUM 50 MG: 50 INJECTION, SOLUTION INTRAVENOUS at 07:10

## 2024-09-25 RX ADMIN — FENTANYL CITRATE 50 MCG: 50 INJECTION, SOLUTION INTRAMUSCULAR; INTRAVENOUS at 07:19

## 2024-09-25 RX ADMIN — FENTANYL CITRATE 50 MCG: 50 INJECTION, SOLUTION INTRAMUSCULAR; INTRAVENOUS at 07:44

## 2024-09-25 RX ADMIN — PROPOFOL 50 MG: 10 INJECTION, EMULSION INTRAVENOUS at 07:11

## 2024-09-25 RX ADMIN — Medication 4 MG: at 07:35

## 2024-09-25 RX ADMIN — GLYCOPYRROLATE 0.4 MG: 0.2 INJECTION INTRAMUSCULAR; INTRAVENOUS at 07:35

## 2024-09-25 NOTE — OP NOTE
SPINAL CORD STIMULATOR BATTERY CHANGE  Procedure Note    Morris Yousif  9/25/2024    Pre-op Diagnosis:     1. Status post bilateral hemilaminotomy decompression, PSF with interspinous device L3-5, Dr. Anna Pack, 05/13/2014   2. Status post left LLIF with instrumentation L3-5, Dr. Anna Pack, 11/15/2016   3. Status post thoracic laminectomy, insertion spinal cord stimulator, 02/03/2020   4. Chronic neck pain   5. Bilateral upper extremity numbness, tingling   6. Bilateral upper extremity weakness, especially  strength   7. Balance difficulty   8. Chronic low back pain   9. Residual bilateral buttock and thigh radiculopathy   10. Residual neurogenic claudication   11. Possible cervical spondylotic myelopathy   12. Adjacent level degenerative disc disease L2-4, L5-S1  13. Adjacent level facet arthropathy L2-4, L5-S1   14. Retrolisthesis L3-4, L5-S1   15. Suspected lumbar spinal stenosis L3-4, L5-S1   16. Nonfunctioning spinal cord stimulator battery pack pulse generator    Post-op Diagnosis: same    Procedure/CPT® Codes:    1.  Revision spinal cord stimulator battery pack pulse generator  2.  Initial programming of new spinal cord stimulator system    Anesthesia: General    Surgeon: QUINTEN Acosta MD    Assistant: Wilbert Albarado PA-C    Estimated Blood Loss: Minimal    Complications: None    Condition: Stable to PACU.    Indications:    The patient is an 79-year-old who sees Dr. Chucky Abrams for medical issues.  He has undergone 2 prior lumbar surgeries and is fused from L3-5.  I placed a spinal cord stimulator on 2/3/2022 to help with residual back pain along with residual symptoms down his legs.  The patient presented to the office stating that the stimulator was helpful until recently when the programming stopped working and that the battery stopped working altogether.  The patient requested that the spinal cord stimulator battery be exchanged for a new one as recommended by the spinal cord stimulator  representation.    Risks, benefits, and complications of surgery were discussed with the patient.  I specifically discussed the risk of infection, blood loss, system malfunction, and incomplete resolution of symptoms.  The patient appeared well informed and wished to proceed.      Operative Procedure:    After obtaining informed consent and verifying the correct operative site, the patient was brought to the operating room. A general anesthetic was provided by the anesthesia service with the assistance of an endotracheal tube. Once this was appropriately positioned and secured, the patient was carefully rotated prone onto a Vamsi frame. All bony processes were well-padded. The thoracolumbar region was prepped and draped in the usual sterile fashion. A surgical timeout was taken to confirm this was the correct patient, we were working at the correct locations, and that preoperative antibiotics were given in a timely fashion.    The previous right posterior flank incision overlying the spinal cord stimulator pulse generator battery pack was reopened using a 10 blade scalpel.  Dissection was carried through subcutaneous tissues using Bovie cautery exposing the pulse generator itself.  With direct manual pressure, the pulse generator was pushed out of the incision and explanted.  The coiled access wiring was removed from some scar tissue posteriorly using Bovie cautery.  The pulse generator was then disconnected from the lead wires by using the appropriate screw .    A new pulse generator battery was then opened and delivered to the field in a sterile fashion.  This was connected to stimulator lead wires and partially tucked into the existing pocket over the posterior flank.  The company representation in the room then tested to ensure that the connection was appropriate.  We then used the appropriate torque limiting screwdriver to tighten the leads in position.  Excess wire was coiled behind the battery pack  pulse generator and this was all gently placed into the created pocket.  Once again, we ensured that the impedance was appropriately functioning.    The incision was then copiously irrigated with saline solution.  Immediate subcutaneous tissues were closed with a 2-0 Vicryl.  Skin closure was then augmented using Mastisol and Steri-Strips.    The wound was then sterilely dressed.  The patient was then carefully rotated supine onto a hospital gurney, extubated, and sent to the recovery room in good stable condition.  The patient tolerated the procedure well.  There were no complications.  We estimated blood loss to be minimal.    Wilbert Albarado PA-C provided critical assistance during the procedure.  His assistance was medically necessary in order to allow the procedure to occur in the most safe and efficient manner.    Once the patient was in the recovery room, we performed the initial programming of the device to ensure appropriate pain coverage was going to be accomplished with the implant. With the patient awake and responsive, combinations of parameters were tested and assessed to determine optimal settings for stimulation.  We specifically assessed alternating electrode polarities, pulse amplitude and duration, as well as train spacing and cycling across more than 8 electrode contacts.  After testing the system, it appeared the device was functioning appropriately.    QUINTEN Acosta MD     Date: 9/25/2024  Time: 09:43 CDT

## 2024-09-25 NOTE — OP NOTE
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 male with G89.29    PROCEDURE:  The patient was brought to the operating room, given General Anesthesia via Endotracheal Tube, and prepped and draped in the usual manner.  Patient had a separate operative procedure dictated under a separate operative report by Dr. Guy Acosta.  Following that procedure the patient was subsequently repositioned and prepped and draped in the usual fashion for excision of a neck lesion.     Approximately 5mL 1% Xylocaine with epinephrine was injected in the planned excision site in the left upper neck.  Excision was accomplished with a #15 blade in horizontal elliptical fashion without difficulty.  The excision was approximately 3.1 cm  x 1.0 cm.  The lesion was approximately 1.2 cm x 0.7 cm.  The margin was 1.5 mm. The excision extended to deep subcutaneous tissue.    Upon excision the specimen was marked and submitted for permanent pathologic examination.    Extensive and wide undermining was performed with curved iris scissors and double prong skin hooks.  This was performed in order to facilitate closure and to preserve normal anatomic relationships.  Minimal bleeding was encountered which was controlled with electrocautery on low settings.  The incision was reapproximated utilizing interrupted 4-0 Monocryl subcutaneously and interrupted 5-0 nylon to reapproximate the epidermis.  Bactroban ointment was applied and the procedure terminated.      The patient was transported upon extubation to the postanesthesia  care unit in stable condition.      SPECIMENS:  Specimens       ID Source Type Tests Collected By Collected At Frozen?    A Neck Tissue TISSUE PATHOLOGY EXAM   DEMETRIUS Acosta MD 9/25/24 0737     Description: Left Neck, superior short, posterior lateral long              COMPLICATIONS: NONE    ESTIMATED BLOOD LOSS:  Minimal           Norm Romeo MD  9/25/2024

## 2024-09-26 LAB
CYTO UR: NORMAL
LAB AP CASE REPORT: NORMAL
Lab: NORMAL
PATH REPORT.FINAL DX SPEC: NORMAL
PATH REPORT.GROSS SPEC: NORMAL

## 2024-09-30 DIAGNOSIS — G89.29 CHRONIC BILATERAL LOW BACK PAIN WITHOUT SCIATICA: ICD-10-CM

## 2024-09-30 DIAGNOSIS — M51.369 DDD (DEGENERATIVE DISC DISEASE), LUMBAR: ICD-10-CM

## 2024-09-30 DIAGNOSIS — M47.816 SPONDYLOSIS OF LUMBAR REGION WITHOUT MYELOPATHY OR RADICULOPATHY: ICD-10-CM

## 2024-09-30 DIAGNOSIS — M54.50 CHRONIC BILATERAL LOW BACK PAIN WITHOUT SCIATICA: ICD-10-CM

## 2024-09-30 RX ORDER — OXYCODONE AND ACETAMINOPHEN 10; 325 MG/1; MG/1
1 TABLET ORAL EVERY 6 HOURS PRN
Qty: 120 TABLET | Refills: 0 | Status: SHIPPED | OUTPATIENT
Start: 2024-09-30 | End: 2024-10-30

## 2024-10-04 ENCOUNTER — OFFICE VISIT (OUTPATIENT)
Dept: OTOLARYNGOLOGY | Facility: CLINIC | Age: 79
End: 2024-10-04
Payer: MEDICARE

## 2024-10-04 DIAGNOSIS — C44.42 SQUAMOUS CELL CARCINOMA OF NECK: Primary | ICD-10-CM

## 2024-10-04 NOTE — PROGRESS NOTES
Patient presents for suture removal. The incision is well-approximated with no redness or edema noted. Patient notified of path

## 2024-10-23 ENCOUNTER — HOSPITAL ENCOUNTER (OUTPATIENT)
Dept: PAIN MANAGEMENT | Age: 79
Discharge: HOME | End: 2024-10-23
Payer: MEDICARE

## 2024-10-23 VITALS
TEMPERATURE: 97.8 F | HEIGHT: 76 IN | SYSTOLIC BLOOD PRESSURE: 127 MMHG | WEIGHT: 261.4 LBS | HEART RATE: 64 BPM | BODY MASS INDEX: 31.83 KG/M2 | RESPIRATION RATE: 18 BRPM | OXYGEN SATURATION: 96 % | DIASTOLIC BLOOD PRESSURE: 66 MMHG

## 2024-10-23 DIAGNOSIS — Z02.89 PAIN MEDICATION AGREEMENT SIGNED: ICD-10-CM

## 2024-10-23 DIAGNOSIS — M47.816 SPONDYLOSIS OF LUMBAR REGION WITHOUT MYELOPATHY OR RADICULOPATHY: ICD-10-CM

## 2024-10-23 DIAGNOSIS — M54.50 CHRONIC BILATERAL LOW BACK PAIN WITHOUT SCIATICA: ICD-10-CM

## 2024-10-23 DIAGNOSIS — M54.50 CHRONIC BILATERAL LOW BACK PAIN WITHOUT SCIATICA: Primary | ICD-10-CM

## 2024-10-23 DIAGNOSIS — G89.29 CHRONIC BILATERAL LOW BACK PAIN WITHOUT SCIATICA: ICD-10-CM

## 2024-10-23 DIAGNOSIS — M51.369 DDD (DEGENERATIVE DISC DISEASE), LUMBAR: ICD-10-CM

## 2024-10-23 DIAGNOSIS — G89.29 CHRONIC BILATERAL LOW BACK PAIN WITHOUT SCIATICA: Primary | ICD-10-CM

## 2024-10-23 PROCEDURE — 99214 OFFICE O/P EST MOD 30 MIN: CPT

## 2024-10-23 PROCEDURE — 99215 OFFICE O/P EST HI 40 MIN: CPT

## 2024-10-23 ASSESSMENT — PAIN DESCRIPTION - LOCATION: LOCATION: BACK;GENERALIZED

## 2024-10-23 ASSESSMENT — PAIN DESCRIPTION - PAIN TYPE: TYPE: CHRONIC PAIN

## 2024-10-23 ASSESSMENT — ENCOUNTER SYMPTOMS
BACK PAIN: 1
CONSTIPATION: 0

## 2024-10-23 ASSESSMENT — PAIN DESCRIPTION - DIRECTION: RADIATING_TOWARDS: ALL OVER PAIN

## 2024-10-23 ASSESSMENT — PAIN DESCRIPTION - DESCRIPTORS: DESCRIPTORS: ACHING;THROBBING;PRESSURE;SHARP

## 2024-10-23 ASSESSMENT — PAIN DESCRIPTION - ONSET: ONSET: ON-GOING

## 2024-10-23 ASSESSMENT — PAIN DESCRIPTION - ORIENTATION: ORIENTATION: LOWER

## 2024-10-23 ASSESSMENT — PAIN SCALES - GENERAL: PAINLEVEL_OUTOF10: 9

## 2024-10-23 ASSESSMENT — PAIN DESCRIPTION - FREQUENCY: FREQUENCY: CONTINUOUS

## 2024-10-23 NOTE — H&P
alert and oriented to person, place, and time.      Motor: Weakness present.      Gait: Gait abnormal.   Psychiatric:         Behavior: Behavior normal.         Thought Content: Thought content normal.         Judgment: Judgment normal.       Labs:     Lab Results   Component Value Date/Time     08/20/2024 10:28 AM     05/25/2011 07:14 AM    K 4.6 08/20/2024 10:28 AM    K 4.5 09/18/2023 02:59 AM    K 4.0 05/25/2011 07:14 AM     08/20/2024 10:28 AM     05/25/2011 07:14 AM    CO2 29 08/20/2024 10:28 AM    BUN 32 08/20/2024 10:28 AM    CREATININE 1.8 08/20/2024 10:28 AM    CREATININE 1.0 05/25/2011 07:14 AM    GLUCOSE 188 08/20/2024 10:28 AM    CALCIUM 8.9 08/20/2024 10:28 AM        Lab Results   Component Value Date    WBC 6.1 09/19/2023    HGB 12.8 (L) 09/19/2023    HCT 40.3 (L) 09/19/2023    MCV 86.7 09/19/2023     (L) 09/19/2023                                                                                                                                   Assessment/ Plan:  1. Chronic bilateral low back pain without sciatica  - Refills routed to pharmacy during visit, may consider medication change. Patient just had stimulator battery replaced, will plan to follow up in 6- 8 weeks to determine if medication adjustment is needed.   oxyCODONE-acetaminophen (PERCOCET)  MG per tablet 120 tablet 0       Sig: Take 1 tablet by mouth every 6 hours as needed for Pain for up to 30 days. May fill 10/30/24     2. Pain medication agreement signed  - Agreement reviewed and signed 10/23/2024  Patient or Patient's Advocate verbalized understanding of agreement     [x] Follow up    [] 4 weeks   [x] 6-8 weeks   [] 8 weeks   [] 10 weeks   [] 3 months  [] Post procedure to evaluate effectiveness of treatment  [] To evaluate medications changes made at office visit.   [] To review diagnostics ordered at last visit.   [] To evaluate response to therapy    [x] For management of controlled

## 2024-10-23 NOTE — TELEPHONE ENCOUNTER
1. Chronic bilateral low back pain without sciatica    2. DDD (degenerative disc disease), lumbar    3. Spondylosis of lumbar region without myelopathy or radiculopathy      Requested Prescriptions     Pending Prescriptions Disp Refills    oxyCODONE-acetaminophen (PERCOCET)  MG per tablet 120 tablet 0     Sig: Take 1 tablet by mouth every 6 hours as needed for Pain for up to 30 days. May fill 10/30/24       Continue medication with refill sent at appointment yes; refill sent to medical director at appointment yes, see refill encounter dated 10/23/2024    Electronically signed by TEGAN Flaherty CNP on 10/23/2024 at 2:09 PM

## 2024-10-23 NOTE — PROGRESS NOTES
Clinic Documentation      Education Provided:  [x] Review of Cheng  [x] Agreement Review  [x] PEG Score Calculated [x] PHQ Score Calculated [x] ORT Score Calculated    [] Compliance Issues Discussed [] Cognitive Behavior Needs [x] Exercise [] Review of Test [] Financial Issues  [x] Tobacco/Alcohol Use Reviewed [x] Teaching [] New Patient [] Picture Obtained    Physician Plan:  [] Outgoing Referral  [] Pharmacy Consult  [] Test Ordered [x] Prescription Ordered/Changed   [] Obtained Test Results / Consult Notes        Complete if patient is withholding blood thinner for procedure     Blood Thinner Patient is currently taking:      [] Plavix (Hold for 7 days)  [] Aspirin (Hold for 5 days)     [] Pletal (Hold for 2 days)  [] Pradaxa (Hold for 3 days)    [] Effient (Hold for 7 days)  [] Xarelto (Hold for 2 days)    [] Eliquis (Hold for 2 days)  [] Brilinta (Hold for 7 days)    [] Coumadin (Hold for 5 days) - (INR needs to be drawn the day prior to procedure- INR < 2.0)    [] Aggrenox (Hold for 7 days)        [] Patient will stop medication on their own.    [] Blood Thinner Form Faxed for approval to hold.   Provider form faxed to:     Assessment Completed by:  Electronically signed by Anastasiia Mullins RN on 10/23/2024 at 2:21 PM

## 2024-10-23 NOTE — PROGRESS NOTES
Procedure:  Level of Consciousness: [x]Alert [x]Oriented []Disoriented []Lethargic  Anxiety Level: [x]Calm []Anxious []Depressed []Other  Skin: [x]Warm [x]Dry []Cool []Moist []Intact []Other  Cardiovascular: []Palpitations: [x]Never []Occasionally []Frequently  Chest Pain: [x]No []Yes  Respiratory:  [x]Unlabored []Labored []Cough ([] Productive []Unproductive)  HCG Required: [x]No []Yes   Results: []Negative []Positive  Knowledge Level:        [x]Patient/Other verbalized understanding of pre-procedure instructions.        [x]Assessment of post-op care needs (transportation, responsible caregiver)        [x]Able to discuss health care problems and how to deal with it.  Factors that Affect Teaching:        Language Barrier: [x]No []Yes - why:        Hearing Loss:        [x]No []Yes            Corrective Device:  []Yes []No        Vision Loss:           [x]No []Yes            Corrective Device:  []Yes []No        Memory Loss:       [x]No []Yes            []Short Term []Long Term  Motivational Level:  [x]Asks Questions                  []Extremely Anxious       [x]Seems Interested               []Seems Uninterested                  [x]Denies need for Education  Risk for Injury:  [x]Patient oriented to person, place and time  [x]History of frequent falls/loss of balance  Nutritional:  []Change in appetite   []Weight Gain   []Weight Loss  Functional:  []Requires assistance with ADL's

## 2024-10-28 RX ORDER — OXYCODONE AND ACETAMINOPHEN 10; 325 MG/1; MG/1
1 TABLET ORAL EVERY 6 HOURS PRN
Qty: 120 TABLET | Refills: 0 | Status: SHIPPED | OUTPATIENT
Start: 2024-10-30 | End: 2024-11-29

## 2024-11-26 DIAGNOSIS — M54.50 CHRONIC BILATERAL LOW BACK PAIN WITHOUT SCIATICA: ICD-10-CM

## 2024-11-26 DIAGNOSIS — G89.29 CHRONIC BILATERAL LOW BACK PAIN WITHOUT SCIATICA: ICD-10-CM

## 2024-11-26 DIAGNOSIS — M47.816 SPONDYLOSIS OF LUMBAR REGION WITHOUT MYELOPATHY OR RADICULOPATHY: ICD-10-CM

## 2024-11-26 DIAGNOSIS — M51.369 DDD (DEGENERATIVE DISC DISEASE), LUMBAR: ICD-10-CM

## 2024-11-26 RX ORDER — OXYCODONE AND ACETAMINOPHEN 10; 325 MG/1; MG/1
1 TABLET ORAL EVERY 6 HOURS PRN
Qty: 120 TABLET | Refills: 0 | Status: SHIPPED | OUTPATIENT
Start: 2024-11-26 | End: 2024-12-26

## 2024-11-26 RX ORDER — OXYCODONE AND ACETAMINOPHEN 10; 325 MG/1; MG/1
1 TABLET ORAL EVERY 6 HOURS PRN
Qty: 120 TABLET | Refills: 0 | Status: SHIPPED | OUTPATIENT
Start: 2024-12-26 | End: 2025-01-25

## 2024-11-27 ENCOUNTER — TELEPHONE (OUTPATIENT)
Dept: CARDIOLOGY CLINIC | Age: 79
End: 2024-11-27

## 2024-11-27 DIAGNOSIS — Z95.810 AICD (AUTOMATIC CARDIOVERTER/DEFIBRILLATOR) PRESENT: Primary | ICD-10-CM

## 2024-11-27 DIAGNOSIS — I25.5 ISCHEMIC CARDIOMYOPATHY: ICD-10-CM

## 2024-11-27 DIAGNOSIS — I50.22 CHRONIC SYSTOLIC HEART FAILURE (HCC): ICD-10-CM

## 2024-11-27 NOTE — TELEPHONE ENCOUNTER
I spoke with the patient's wife regarding their [facility] report that is due to be sent in. They verbalized an understanding and will be sending a report in as soon as they are able.

## 2024-12-17 DIAGNOSIS — I25.10 CORONARY ARTERY DISEASE INVOLVING NATIVE CORONARY ARTERY OF NATIVE HEART WITHOUT ANGINA PECTORIS: ICD-10-CM

## 2024-12-17 RX ORDER — CLOPIDOGREL BISULFATE 75 MG/1
TABLET ORAL
Qty: 90 TABLET | Refills: 1 | Status: SHIPPED | OUTPATIENT
Start: 2024-12-17

## 2024-12-23 ENCOUNTER — HOSPITAL ENCOUNTER (OUTPATIENT)
Dept: PAIN MANAGEMENT | Age: 79
Discharge: HOME | End: 2024-12-23
Payer: MEDICARE

## 2024-12-23 VITALS
TEMPERATURE: 97.5 F | BODY MASS INDEX: 30.93 KG/M2 | HEART RATE: 72 BPM | OXYGEN SATURATION: 95 % | HEIGHT: 76 IN | WEIGHT: 254 LBS | DIASTOLIC BLOOD PRESSURE: 56 MMHG | RESPIRATION RATE: 18 BRPM | SYSTOLIC BLOOD PRESSURE: 97 MMHG

## 2024-12-23 DIAGNOSIS — M79.18 BILATERAL MYOFASCIAL PAIN: ICD-10-CM

## 2024-12-23 DIAGNOSIS — M54.50 CHRONIC BILATERAL LOW BACK PAIN WITHOUT SCIATICA: ICD-10-CM

## 2024-12-23 DIAGNOSIS — M54.50 CHRONIC BILATERAL LOW BACK PAIN WITHOUT SCIATICA: Primary | ICD-10-CM

## 2024-12-23 DIAGNOSIS — M51.360 DEGENERATION OF INTERVERTEBRAL DISC OF LUMBAR REGION WITH DISCOGENIC BACK PAIN: ICD-10-CM

## 2024-12-23 DIAGNOSIS — M51.369 DDD (DEGENERATIVE DISC DISEASE), LUMBAR: ICD-10-CM

## 2024-12-23 DIAGNOSIS — G89.29 CHRONIC BILATERAL LOW BACK PAIN WITHOUT SCIATICA: ICD-10-CM

## 2024-12-23 DIAGNOSIS — G89.29 CHRONIC BILATERAL LOW BACK PAIN WITHOUT SCIATICA: Primary | ICD-10-CM

## 2024-12-23 DIAGNOSIS — M47.816 SPONDYLOSIS OF LUMBAR REGION WITHOUT MYELOPATHY OR RADICULOPATHY: ICD-10-CM

## 2024-12-23 PROCEDURE — 99213 OFFICE O/P EST LOW 20 MIN: CPT

## 2024-12-23 RX ORDER — FINASTERIDE 5 MG/1
TABLET, FILM COATED ORAL
COMMUNITY
Start: 2024-09-17

## 2024-12-23 RX ORDER — NAPROXEN 500 MG/1
TABLET ORAL
COMMUNITY
Start: 2024-09-17

## 2024-12-23 RX ORDER — SEMAGLUTIDE 0.68 MG/ML
INJECTION, SOLUTION SUBCUTANEOUS
COMMUNITY
Start: 2024-12-13

## 2024-12-23 RX ORDER — OXYCODONE AND ACETAMINOPHEN 10; 325 MG/1; MG/1
1 TABLET ORAL EVERY 6 HOURS PRN
Qty: 120 TABLET | Refills: 0 | Status: SHIPPED | OUTPATIENT
Start: 2025-01-24 | End: 2025-02-23

## 2024-12-23 RX ORDER — INDOMETHACIN 25 MG/1
CAPSULE ORAL
COMMUNITY
Start: 2024-11-25

## 2024-12-23 ASSESSMENT — ENCOUNTER SYMPTOMS
BACK PAIN: 1
BOWEL INCONTINENCE: 0

## 2024-12-23 ASSESSMENT — PAIN DESCRIPTION - ORIENTATION: ORIENTATION: LOWER;MID;UPPER

## 2024-12-23 ASSESSMENT — PAIN DESCRIPTION - LOCATION: LOCATION: BACK

## 2024-12-23 ASSESSMENT — PAIN DESCRIPTION - PAIN TYPE: TYPE: CHRONIC PAIN

## 2024-12-23 ASSESSMENT — PAIN SCALES - GENERAL: PAINLEVEL_OUTOF10: 9

## 2024-12-23 NOTE — PROGRESS NOTES
Clinic Documentation      Education Provided:  [x] Review of Cheng  [] Agreement Review  [x] PEG Score Calculated [] PHQ Score Calculated [] ORT Score Calculated    [] Compliance Issues Discussed [] Cognitive Behavior Needs [x] Exercise [] Review of Test [] Financial Issues  [x] Tobacco/Alcohol Use Reviewed [x] Teaching [] New Patient [] Picture Obtained    Physician Plan:  [] Outgoing Referral  [] Pharmacy Consult  [] Test Ordered [x] Prescription Ordered/Changed   [] Obtained Test Results / Consult Notes        Complete if patient is withholding blood thinner for procedure     Blood Thinner Patient is currently taking:      [] Plavix (Hold for 7 days)  [] Aspirin (Hold for 5 days)     [] Pletal (Hold for 2 days)  [] Pradaxa (Hold for 3 days)    [] Effient (Hold for 7 days)  [] Xarelto (Hold for 2 days)    [] Eliquis (Hold for 2 days)  [] Brilinta (Hold for 7 days)    [] Coumadin (Hold for 5 days) - (INR needs to be drawn the day prior to procedure- INR < 2.0)    [] Aggrenox (Hold for 7 days)        [] Patient will stop medication on their own.    [] Blood Thinner Form Faxed for approval to hold.   Provider form faxed to:     Assessment Completed by:  Electronically signed by Judith Oreilly MA on 12/23/2024 at 9:29 AM

## 2024-12-23 NOTE — TELEPHONE ENCOUNTER
1. Bilateral myofascial pain  - tiZANidine (ZANAFLEX) 4 MG tablet; Take 1 tablet by mouth at bedtime  Dispense: 30 tablet; Refill: 2    2. Chronic bilateral low back pain without sciatica    3. DDD (degenerative disc disease), lumbar    4. Spondylosis of lumbar region without myelopathy or radiculopathy      Requested Prescriptions     Pending Prescriptions Disp Refills    oxyCODONE-acetaminophen (PERCOCET)  MG per tablet 120 tablet 0     Sig: Take 1 tablet by mouth every 6 hours as needed for Pain for up to 30 days. May fill 01/24/25     Signed Prescriptions Disp Refills    tiZANidine (ZANAFLEX) 4 MG tablet 30 tablet 2     Sig: Take 1 tablet by mouth at bedtime     Authorizing Provider: ALICIA STEWART       Continue medication with refill sent at appointment yes; refill sent to medical director at appointment yes, see refill encounter dated 12/23/2024    Electronically signed by TEGAN Persaud CNP on 12/23/2024 at 9:37 AM

## 2024-12-23 NOTE — PROGRESS NOTES
[facility]  [address] [address]  Phone: [phone]  Fax: [phone]        Follow Up Office Visit    Patient Name: Clifton Phan    MR #: [id number]    Account #:[id number]    : 1945    Age: 79 y.o.    Sex: male    Date:     PCP: Jabari Figueroa MD    Chief Complaint:   Chief Complaint   Patient presents with    Back Pain     9/10       History of Present Illness:   The patient is a 79 y.o. male who presents for follow up on primary complaints of chronic low back pain. He has been an establish patient of this office since 10/31/2023. He has a history of back surgeries in the past by Dr. Hernandez, and [employer] spinal cord stimulator. On 2024 Dr. Castro, replaced the battery in the stimulator, patient has since then had a follow-up appointment with Dr. Castro. He reports that he was supposed to see a [employer] representative to adjust settings related to his increased back pain, but that has yet been done. He reports that he can feel the stimulator working occasionally, but some minor adjustments are needed.   Patient is currently managed on Percocet and Zanaflex with some pain relief.  Patient reports this therapy is effective at this time. He states that with the medication regimen his pain improves 50-75% for approximately 4-6 hours.    Back Pain  This is a chronic problem. The current episode started more than 1 year ago. The problem occurs constantly. The problem has been gradually worsening since onset. The pain is present in the lumbar spine. The quality of the pain is described as aching, shooting and stabbing. The pain radiates to the right thigh and left thigh. The pain is at a severity of 9/10. The pain is severe. The pain is Worse during the day. The symptoms are aggravated by bending, position, sitting, standing and twisting. Stiffness is present All day. Associated symptoms include leg pain and weakness.

## 2024-12-30 DIAGNOSIS — Z95.810 AICD (AUTOMATIC CARDIOVERTER/DEFIBRILLATOR) PRESENT: Primary | ICD-10-CM

## 2024-12-30 DIAGNOSIS — I50.22 CHRONIC SYSTOLIC HEART FAILURE (HCC): ICD-10-CM

## 2024-12-30 DIAGNOSIS — I25.5 ISCHEMIC CARDIOMYOPATHY: ICD-10-CM

## 2024-12-30 PROBLEM — Z91.89 AT RISK FOR DEPRESSION: Status: ACTIVE | Noted: 2024-12-30

## 2024-12-30 PROCEDURE — 93297 REM INTERROG DEV EVAL ICPMS: CPT | Performed by: CLINICAL NURSE SPECIALIST

## 2024-12-30 ASSESSMENT — ENCOUNTER SYMPTOMS
CHEST TIGHTNESS: 0
ABDOMINAL PAIN: 0
SHORTNESS OF BREATH: 0
CONSTIPATION: 0

## 2025-01-06 NOTE — PROGRESS NOTES
Name:  Morris Yousif  YOB: 1945  Location: [facility]  Location Address: [address] [place] [address], [facility], [address] [place] [address]  Location Phone: [phone]    Chief Complaint  Chief Complaint   Patient presents with    SCC of neck    Cough    Ear Drainage    Sinusitis       History of Present Illness  The patient  is a 79 y.o. male is s/p who is s/p excision of scc of left neck   Patient has had a relatively normal postoperative course   Patient today complains of congestion, cough, shortness of breath and ongoing fatigue  He states he was seen by pcp several weeks ago and was given antibiotics but did not have significant improvement   He also complains of ongoing weakness and some dizziness today       Past Medical History:   Diagnosis Date    Allergic rhinitis     Arthritis     Carpal tunnel syndrome 2022    Coronary artery disease     Depression     Diabetes mellitus     Dizziness     H/O: CVA (cerebrovascular accident)     History of gastric ulcer     [place] (hard of hearing)     Hyperlipidemia     Hypertension     Myocardial infarction     Obstructive sleep apnea treated with continuous positive airway pressure (CPAP)     Orthostatic hypotension     Pacemaker 2019    Medtronic - AICD    Skin cancer     Stroke        Past Surgical History:   Procedure Laterality Date    ABDOMINAL HERNIA REPAIR      APPENDECTOMY      BACK SURGERY      x2    CARDIAC CATHETERIZATION      CARPAL TUNNEL RELEASE Left 2022    Procedure: LEFT OPEN CARPAL TUNNEL RELEASE;  Surgeon: Jair Beal MD;  Location: [facility] OR;  Service: Orthopedics;  Laterality: Left;    CARPAL TUNNEL RELEASE Right 2023    Procedure: RIGHT OPEN CARPAL TUNNEL RELEASE;  Surgeon: Jair Beal MD;  Location: [facility] OR;  Service: Orthopedics;  Laterality: Right;    CHOLECYSTECTOMY      COLONOSCOPY      CORONARY ARTERY BYPASS GRAFT      CORONARY STENT PLACEMENT      X8    ENDOSCOPY      EXCISION LESION      top of head     EXCISION MASS HEAD/NECK Left 9/25/2024    Procedure: EXCISION OF SQUAMOUS CELL LEFT NECK;  Surgeon: Norm Romeo MD;  Location:  [facility] OR;  Service: ENT;  Laterality: Left;    PACEMAKER IMPLANTATION  11/01/2019    Medtronic = AICD    REPLACEMENT TOTAL KNEE Left     SHOULDER SURGERY Left     SPINAL CORD STIMULATOR IMPLANT N/A 9/25/2024    Procedure: SPINAL CORD STIMULATOR BATTERY EXCHANGE;  Surgeon: DEMETRIUS Acosta MD;  Location:  [facility] OR;  Service: Orthopedic Spine;  Laterality: N/A;    THORACIC LAMINECTOMY WITH PLACEMENT OF DORSAL COLUMN STIMULATOR N/A 02/03/2020    Procedure: THORACIC LAMINECTOMY, INSERTION SPINAL CORD STIMULATOR;  Surgeon: DEMETRIUS Acosta MD;  Location:  [facility] OR;  Service: Orthopedic Spine;  Laterality: N/A;    UMBILICAL HERNIA REPAIR         No current facility-administered medications for this visit.    Current Outpatient Medications:     ALPRAZolam (XANAX) 0.5 MG tablet, Take 1 tablet by mouth At Night As Needed for Anxiety or Sleep., Disp: , Rfl:     azelastine (ASTELIN) 0.1 % nasal spray, Administer 1 spray into the nostril(s) as directed by provider 2 (Two) Times a Day. Use in each nostril as directed, Disp: , Rfl:     clopidogrel (PLAVIX) 75 MG tablet, Take 1 tablet by mouth Daily., Disp: , Rfl:     docusate sodium (COLACE) 100 MG capsule, Take 1 capsule by mouth 2 (Two) Times a Day., Disp: , Rfl:     Droplet Pen Needles 32G X 4 MM misc, , Disp: , Rfl:     Dulaglutide (Trulicity) 1.5 MG/0.5ML solution pen-injector, Inject 1.5 mg under the skin into the appropriate area as directed 1 (One) Time Per Week., Disp: , Rfl:     famotidine (PEPCID) 40 MG tablet, Take 1 tablet by mouth Daily., Disp: , Rfl:     finasteride (PROSCAR) 5 MG tablet, Take 1 tablet by mouth Daily., Disp: , Rfl:     furosemide (LASIX) 40 MG tablet, Take 1 tablet by mouth 2 (Two) Times a Day., Disp: , Rfl:     insulin glargine (LANTUS) 100 UNIT/ML injection, Inject 70 Units under the skin into the  appropriate area as directed Every Night., Disp: , Rfl:     Insulin Lispro, 0.5 Unit Dial, (HumaLOG Cosme KwikPen) 100 UNIT/ML solution pen-injector, Inject 10 Units under the skin into the appropriate area as directed Every Morning., Disp: , Rfl:     isosorbide mononitrate (IMDUR) 30 MG 24 hr tablet, Take 1 tablet by mouth Daily., Disp: , Rfl:     metoprolol tartrate (LOPRESSOR) 25 MG tablet, Take 1 tablet by mouth 2 (Two) Times a Day., Disp: , Rfl:     Multiple Vitamins-Minerals (PRESERVISION AREDS PO), Take 2 tablets by mouth Daily., Disp: , Rfl:     nitroglycerin (NITROSTAT) 0.4 MG SL tablet, Place 1 tablet under the tongue Every 5 (Five) Minutes As Needed for Chest Pain. Take no more than 3 doses in 15 minutes., Disp: , Rfl:     oxyCODONE-acetaminophen (PERCOCET)  MG per tablet, Take 1 tablet by mouth Every 6 (Six) Hours As Needed for Moderate Pain., Disp: , Rfl:     rivaroxaban (XARELTO) 15 MG tablet, Take 1 tablet by mouth Daily With Breakfast., Disp: , Rfl:     sacubitril-valsartan (ENTRESTO) 49-51 MG tablet, Take 1 tablet by mouth 2 (Two) Times a Day., Disp: , Rfl:     sertraline (ZOLOFT) 100 MG tablet, Take 1 tablet by mouth Daily., Disp: , Rfl:     Facility-Administered Medications Ordered in Other Visits:     sodium chloride 0.9 % flush 10 mL, 10 mL, Intravenous, PRN, Alex Alfaro MD    [COMPLETED] Insert Peripheral IV, , , Once **AND** sodium chloride 0.9 % flush 10 mL, 10 mL, Intravenous, PRN, Maricel Mota APRN    Statins, Adhesive tape, Morphine, and Rosuvastatin calcium    No family history on file.    Social History     Socioeconomic History    Marital status:    Tobacco Use    Smoking status: Never    Smokeless tobacco: Never   Vaping Use    Vaping status: Never Used   Substance and Sexual Activity    Alcohol use: Yes     Comment: OCCASIONALLY    Drug use: No    Sexual activity: Defer       Review of Systems   Constitutional:  Positive for fatigue.   Respiratory:  Positive  for cough and shortness of breath.        Vitals:    01/07/25 1108   BP: 94/58   Pulse: 77   Temp: 98 °F (36.7 °C)       Objective     Physical Exam  Vitals reviewed.   Constitutional:       Appearance: He is ill-appearing.   HENT:      Head: Normocephalic.      Right Ear: External ear normal. There is impacted cerumen.      Left Ear: External ear normal. There is impacted cerumen.      Nose: Nose normal.      Mouth/Throat:      Lips: Pink.      Mouth: Mucous membranes are moist.   Neck:     Pulmonary:      Comments: Mildly left sided diminished breath sounds     Neurological:      Mental Status: He is alert.         Assessment & Plan   Diagnoses and all orders for this visit:    1. Squamous cell carcinoma of neck (Primary)    2. Subacute cough    3. Shortness of breath    4. Weakness      * Surgery not found *  No orders of the defined types were placed in this encounter.    No follow-ups on file.     Due to patient ongoing shortness of breath and weakness sent patient to er for evaluation   Er called with report     There are no Patient Instructions on file for this visit.

## 2025-01-07 ENCOUNTER — APPOINTMENT (OUTPATIENT)
Dept: CT IMAGING | Facility: HOSPITAL | Age: 80
End: 2025-01-07
Payer: MEDICARE

## 2025-01-07 ENCOUNTER — APPOINTMENT (OUTPATIENT)
Dept: GENERAL RADIOLOGY | Facility: HOSPITAL | Age: 80
End: 2025-01-07
Payer: MEDICARE

## 2025-01-07 ENCOUNTER — HOSPITAL ENCOUNTER (EMERGENCY)
Facility: HOSPITAL | Age: 80
Discharge: HOME | End: 2025-01-07
Admitting: FAMILY MEDICINE
Payer: MEDICARE

## 2025-01-07 ENCOUNTER — OFFICE VISIT (OUTPATIENT)
Dept: OTOLARYNGOLOGY | Facility: CLINIC | Age: 80
End: 2025-01-07
Payer: MEDICARE

## 2025-01-07 VITALS
TEMPERATURE: 97.9 F | DIASTOLIC BLOOD PRESSURE: 68 MMHG | OXYGEN SATURATION: 99 % | HEIGHT: 76 IN | WEIGHT: 254 LBS | SYSTOLIC BLOOD PRESSURE: 118 MMHG | HEART RATE: 62 BPM | RESPIRATION RATE: 18 BRPM | BODY MASS INDEX: 30.93 KG/M2

## 2025-01-07 VITALS
TEMPERATURE: 98 F | HEART RATE: 77 BPM | SYSTOLIC BLOOD PRESSURE: 94 MMHG | DIASTOLIC BLOOD PRESSURE: 58 MMHG | BODY MASS INDEX: 32.6 KG/M2 | WEIGHT: 254 LBS | HEIGHT: 74 IN

## 2025-01-07 DIAGNOSIS — R05.2 SUBACUTE COUGH: ICD-10-CM

## 2025-01-07 DIAGNOSIS — I48.0 PAF (PAROXYSMAL ATRIAL FIBRILLATION) (HCC): ICD-10-CM

## 2025-01-07 DIAGNOSIS — R06.02 SHORTNESS OF BREATH: ICD-10-CM

## 2025-01-07 DIAGNOSIS — I25.5 ISCHEMIC CARDIOMYOPATHY: ICD-10-CM

## 2025-01-07 DIAGNOSIS — R53.1 WEAKNESS: ICD-10-CM

## 2025-01-07 DIAGNOSIS — Z95.810 AICD (AUTOMATIC CARDIOVERTER/DEFIBRILLATOR) PRESENT: Primary | ICD-10-CM

## 2025-01-07 DIAGNOSIS — J98.8 RESPIRATORY INFECTION: Primary | ICD-10-CM

## 2025-01-07 DIAGNOSIS — C44.42 SQUAMOUS CELL CARCINOMA OF NECK: Primary | ICD-10-CM

## 2025-01-07 LAB
ALBUMIN SERPL-MCNC: 4 G/DL (ref 3.5–5.2)
ALBUMIN/GLOB SERPL: 1.3 G/DL
ALP SERPL-CCNC: 101 U/L (ref 39–117)
ALT SERPL W P-5'-P-CCNC: 11 U/L (ref 1–41)
ANION GAP SERPL CALCULATED.3IONS-SCNC: 13 MMOL/L (ref 5–15)
ARTERIAL PATENCY WRIST A: ABNORMAL
AST SERPL-CCNC: 13 U/L (ref 1–40)
ATMOSPHERIC PRESS: 765 MMHG
B PARAPERT DNA SPEC QL NAA+PROBE: NOT DETECTED
B PERT DNA SPEC QL NAA+PROBE: NOT DETECTED
BASE EXCESS BLDA CALC-SCNC: 3.3 MMOL/L (ref 0–2)
BASOPHILS # BLD AUTO: 0.03 10*3/MM3 (ref 0–0.2)
BASOPHILS NFR BLD AUTO: 0.4 % (ref 0–1.5)
BDY SITE: ABNORMAL
BILIRUB SERPL-MCNC: 0.3 MG/DL (ref 0–1.2)
BODY TEMPERATURE: 37
BUN SERPL-MCNC: 30 MG/DL (ref 8–23)
BUN/CREAT SERPL: 20 (ref 7–25)
C PNEUM DNA NPH QL NAA+NON-PROBE: NOT DETECTED
CALCIUM SPEC-SCNC: 8.9 MG/DL (ref 8.6–10.5)
CHLORIDE SERPL-SCNC: 98 MMOL/L (ref 98–107)
CO2 SERPL-SCNC: 25 MMOL/L (ref 22–29)
COHGB MFR BLD: 0 % (ref 0–5)
CREAT SERPL-MCNC: 1.5 MG/DL (ref 0.76–1.27)
D DIMER PPP FEU-MCNC: 0.85 MCGFEU/ML (ref 0–0.8)
D-LACTATE SERPL-SCNC: 1.3 MMOL/L (ref 0.5–2)
DEPRECATED RDW RBC AUTO: 47.7 FL (ref 37–54)
EGFRCR SERPLBLD CKD-EPI 2021: 46.8 ML/MIN/1.73
EOSINOPHIL # BLD AUTO: 0.08 10*3/MM3 (ref 0–0.4)
EOSINOPHIL NFR BLD AUTO: 1.1 % (ref 0.3–6.2)
ERYTHROCYTE [DISTWIDTH] IN BLOOD BY AUTOMATED COUNT: 15.9 % (ref 12.3–15.4)
FLUAV SUBTYP SPEC NAA+PROBE: NOT DETECTED
FLUBV RNA ISLT QL NAA+PROBE: NOT DETECTED
GEN 5 1HR TROPONIN T REFLEX: 40 NG/L
GLOBULIN UR ELPH-MCNC: 3 GM/DL
GLUCOSE SERPL-MCNC: 148 MG/DL (ref 65–99)
HADV DNA SPEC NAA+PROBE: NOT DETECTED
HCO3 BLDA-SCNC: 27.7 MMOL/L (ref 20–26)
HCOV 229E RNA SPEC QL NAA+PROBE: NOT DETECTED
HCOV HKU1 RNA SPEC QL NAA+PROBE: NOT DETECTED
HCOV NL63 RNA SPEC QL NAA+PROBE: NOT DETECTED
HCOV OC43 RNA SPEC QL NAA+PROBE: NOT DETECTED
HCT VFR BLD AUTO: 43.7 % (ref 37.5–51)
HCT VFR BLD CALC: 44.3 % (ref 38–51)
HGB BLD-MCNC: 14.4 G/DL (ref 13–17.7)
HGB BLDA-MCNC: 14.5 G/DL (ref 14–18)
HMPV RNA NPH QL NAA+NON-PROBE: NOT DETECTED
HOLD SPECIMEN: NORMAL
HPIV1 RNA ISLT QL NAA+PROBE: NOT DETECTED
HPIV2 RNA SPEC QL NAA+PROBE: NOT DETECTED
HPIV3 RNA NPH QL NAA+PROBE: NOT DETECTED
HPIV4 P GENE NPH QL NAA+PROBE: NOT DETECTED
IMM GRANULOCYTES # BLD AUTO: 0.02 10*3/MM3 (ref 0–0.05)
IMM GRANULOCYTES NFR BLD AUTO: 0.3 % (ref 0–0.5)
LYMPHOCYTES # BLD AUTO: 2.92 10*3/MM3 (ref 0.7–3.1)
LYMPHOCYTES NFR BLD AUTO: 39.7 % (ref 19.6–45.3)
Lab: ABNORMAL
M PNEUMO IGG SER IA-ACNC: NOT DETECTED
MAGNESIUM SERPL-MCNC: 2.3 MG/DL (ref 1.6–2.4)
MCH RBC QN AUTO: 27.1 PG (ref 26.6–33)
MCHC RBC AUTO-ENTMCNC: 33 G/DL (ref 31.5–35.7)
MCV RBC AUTO: 82.3 FL (ref 79–97)
METHGB BLD QL: 0.3 % (ref 0–3)
MODALITY: ABNORMAL
MONOCYTES # BLD AUTO: 0.53 10*3/MM3 (ref 0.1–0.9)
MONOCYTES NFR BLD AUTO: 7.2 % (ref 5–12)
NEUTROPHILS NFR BLD AUTO: 3.77 10*3/MM3 (ref 1.7–7)
NEUTROPHILS NFR BLD AUTO: 51.3 % (ref 42.7–76)
NRBC BLD AUTO-RTO: 0 /100 WBC (ref 0–0.2)
NT-PROBNP SERPL-MCNC: 478 PG/ML (ref 0–1800)
OXYHGB MFR BLDV: 95.8 % (ref 94–99)
PCO2 BLDA: 40.3 MM HG (ref 35–45)
PCO2 TEMP ADJ BLD: 40.3 MM HG (ref 35–45)
PH BLDA: 7.45 PH UNITS (ref 7.35–7.45)
PH, TEMP CORRECTED: 7.45 PH UNITS (ref 7.35–7.45)
PLATELET # BLD AUTO: 149 10*3/MM3 (ref 140–450)
PMV BLD AUTO: 11.8 FL (ref 6–12)
PO2 BLDA: 87.2 MM HG (ref 83–108)
PO2 TEMP ADJ BLD: 87.2 MM HG (ref 83–108)
POTASSIUM BLDA-SCNC: 3.7 MMOL/L (ref 3.5–5.2)
POTASSIUM SERPL-SCNC: 3.8 MMOL/L (ref 3.5–5.2)
PROT SERPL-MCNC: 7 G/DL (ref 6–8.5)
RBC # BLD AUTO: 5.31 10*6/MM3 (ref 4.14–5.8)
RHINOVIRUS RNA SPEC NAA+PROBE: NOT DETECTED
RSV RNA NPH QL NAA+NON-PROBE: NOT DETECTED
SAO2 % BLDCOA: 95.9 % (ref 94–99)
SARS-COV-2 RNA RESP QL NAA+PROBE: NOT DETECTED
SODIUM BLDA-SCNC: 139 MMOL/L (ref 136–145)
SODIUM SERPL-SCNC: 136 MMOL/L (ref 136–145)
T3FREE SERPL-MCNC: 1.91 PG/ML (ref 2–4.4)
T4 FREE SERPL-MCNC: 1.08 NG/DL (ref 0.93–1.7)
TROPONIN T % DELTA: -11 %
TROPONIN T NUMERIC DELTA: -5 NG/L
TROPONIN T SERPL HS-MCNC: 45 NG/L
TSH SERPL DL<=0.05 MIU/L-ACNC: 2.41 UIU/ML (ref 0.27–4.2)
VENTILATOR MODE: ABNORMAL
WBC NRBC COR # BLD AUTO: 7.35 10*3/MM3 (ref 3.4–10.8)
WHOLE BLOOD HOLD COAG: NORMAL
WHOLE BLOOD HOLD SPECIMEN: NORMAL

## 2025-01-07 PROCEDURE — 82375 ASSAY CARBOXYHB QUANT: CPT

## 2025-01-07 PROCEDURE — 84481 FREE ASSAY (FT-3): CPT | Performed by: NURSE PRACTITIONER

## 2025-01-07 PROCEDURE — 36600 WITHDRAWAL OF ARTERIAL BLOOD: CPT

## 2025-01-07 PROCEDURE — 82805 BLOOD GASES W/O2 SATURATION: CPT

## 2025-01-07 PROCEDURE — 83050 HGB METHEMOGLOBIN QUAN: CPT

## 2025-01-07 PROCEDURE — 99213 OFFICE O/P EST LOW 20 MIN: CPT | Performed by: NURSE PRACTITIONER

## 2025-01-07 PROCEDURE — 0202U NFCT DS 22 TRGT SARS-COV-2: CPT | Performed by: NURSE PRACTITIONER

## 2025-01-07 PROCEDURE — 83735 ASSAY OF MAGNESIUM: CPT | Performed by: NURSE PRACTITIONER

## 2025-01-07 PROCEDURE — 36415 COLL VENOUS BLD VENIPUNCTURE: CPT

## 2025-01-07 PROCEDURE — 85025 COMPLETE CBC W/AUTO DIFF WBC: CPT | Performed by: NURSE PRACTITIONER

## 2025-01-07 PROCEDURE — 85379 FIBRIN DEGRADATION QUANT: CPT | Performed by: NURSE PRACTITIONER

## 2025-01-07 PROCEDURE — 84439 ASSAY OF FREE THYROXINE: CPT | Performed by: NURSE PRACTITIONER

## 2025-01-07 PROCEDURE — 87040 BLOOD CULTURE FOR BACTERIA: CPT | Performed by: NURSE PRACTITIONER

## 2025-01-07 PROCEDURE — 80053 COMPREHEN METABOLIC PANEL: CPT | Performed by: NURSE PRACTITIONER

## 2025-01-07 PROCEDURE — 70450 CT HEAD/BRAIN W/O DYE: CPT

## 2025-01-07 PROCEDURE — 71045 X-RAY EXAM CHEST 1 VIEW: CPT

## 2025-01-07 PROCEDURE — 84484 ASSAY OF TROPONIN QUANT: CPT | Performed by: NURSE PRACTITIONER

## 2025-01-07 PROCEDURE — 71275 CT ANGIOGRAPHY CHEST: CPT

## 2025-01-07 PROCEDURE — 99285 EMERGENCY DEPT VISIT HI MDM: CPT

## 2025-01-07 PROCEDURE — 83880 ASSAY OF NATRIURETIC PEPTIDE: CPT | Performed by: NURSE PRACTITIONER

## 2025-01-07 PROCEDURE — 84443 ASSAY THYROID STIM HORMONE: CPT | Performed by: NURSE PRACTITIONER

## 2025-01-07 PROCEDURE — 25510000001 IOPAMIDOL PER 1 ML: Performed by: NURSE PRACTITIONER

## 2025-01-07 PROCEDURE — 93005 ELECTROCARDIOGRAM TRACING: CPT | Performed by: EMERGENCY MEDICINE

## 2025-01-07 PROCEDURE — 83605 ASSAY OF LACTIC ACID: CPT | Performed by: NURSE PRACTITIONER

## 2025-01-07 RX ORDER — SODIUM CHLORIDE 0.9 % (FLUSH) 0.9 %
10 SYRINGE (ML) INJECTION AS NEEDED
Status: DISCONTINUED | OUTPATIENT
Start: 2025-01-07 | End: 2025-01-07 | Stop reason: HOSPADM

## 2025-01-07 RX ORDER — IOPAMIDOL 755 MG/ML
100 INJECTION, SOLUTION INTRAVASCULAR
Status: COMPLETED | OUTPATIENT
Start: 2025-01-07 | End: 2025-01-07

## 2025-01-07 RX ORDER — ACETAMINOPHEN 500 MG
1000 TABLET ORAL ONCE
Status: DISCONTINUED | OUTPATIENT
Start: 2025-01-07 | End: 2025-01-07 | Stop reason: HOSPADM

## 2025-01-07 RX ADMIN — IOPAMIDOL 100 ML: 755 INJECTION, SOLUTION INTRAVENOUS at 15:08

## 2025-01-07 NOTE — ED PROVIDER NOTES
Subjective   History of Present Illness  Patient is an 79-year-old male who presents to the ER with possible pneumonia.  Patient was evaluated at ENT just prior to arrival to the emergency department.  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  While in the office patient was complaining of weakness and fatigue along with a cough.  He was hypoxic in their office with a pulse ox of 88%.  ENT nurse practitioner called the ER concerned patient may have pneumonia and he was sent down for further evaluation.  Patient states he has had a productive cough that has lasted for the past 2 months.  He states he has taken antibiotics without relief.  He complains of a continued cough, headache, and sinus symptoms.  He is uncertain if he has had any fever.  He has had shortness of breath however uncertain if it is mainly related to his sinuses.  He denies any chest pain.  He complains of generalized weakness.  Past medical history significant for arthritis, carpal tunnel syndrome, coronary artery disease, depression, diabetes, dizziness, CVA, gastric ulcer, hard of hearing, hyperlipidemia, hypertension, MI, sleep apnea, orthostatic hypotension, pacemaker, skin cancer, stroke        Review of Systems   Constitutional:  Positive for activity change, appetite change and fatigue. Negative for fever.   HENT:  Positive for congestion.    Respiratory:  Positive for cough and shortness of breath.    Cardiovascular: Negative.  Negative for chest pain.   Gastrointestinal: Negative.  Negative for abdominal pain, constipation, diarrhea, nausea and vomiting.   Genitourinary: Negative.  Negative for dysuria.   Musculoskeletal:  Positive for myalgias.   Skin: Negative.    Neurological:  Positive for weakness and headaches.   All other systems reviewed and are negative.      Past Medical History:   Diagnosis Date    Allergic rhinitis     Arthritis     Carpal tunnel syndrome 12/2022     "Coronary artery disease     Depression     Diabetes mellitus     Dizziness     H/O: CVA (cerebrovascular accident)     History of gastric ulcer     [place] (hard of hearing)     Hyperlipidemia     Hypertension     Myocardial infarction     Obstructive sleep apnea treated with continuous positive airway pressure (CPAP)     Orthostatic hypotension     Pacemaker 11/01/2019    [employer] - AICD    Skin cancer     Stroke        Allergies   Allergen Reactions    Statins Myalgia     Intolerable leg cramps    Adhesive Tape Itching     PT STATES TEGADERM WITH IV DRSG IS \"OKAY\"    Morphine Hives and Itching     PT STATES INEFFECTIVE IN PAIN CONTROL    Rosuvastatin Calcium Rash      - CRESTOR -        Past Surgical History:   Procedure Laterality Date    ABDOMINAL HERNIA REPAIR      APPENDECTOMY      BACK SURGERY      x2    CARDIAC CATHETERIZATION      CARPAL TUNNEL RELEASE Left 12/21/2022    Procedure: LEFT OPEN CARPAL TUNNEL RELEASE;  Surgeon: Jair Beal MD;  Location:  [facility] OR;  Service: Orthopedics;  Laterality: Left;    CARPAL TUNNEL RELEASE Right 1/18/2023    Procedure: RIGHT OPEN CARPAL TUNNEL RELEASE;  Surgeon: Jair Beal MD;  Location:  [facility] OR;  Service: Orthopedics;  Laterality: Right;    CHOLECYSTECTOMY      COLONOSCOPY      CORONARY ARTERY BYPASS GRAFT      CORONARY STENT PLACEMENT      X8    ENDOSCOPY      EXCISION LESION      top of head    EXCISION MASS HEAD/NECK Left 9/25/2024    Procedure: EXCISION OF SQUAMOUS CELL LEFT NECK;  Surgeon: Norm Romeo MD;  Location:  [facility] OR;  Service: ENT;  Laterality: Left;    PACEMAKER IMPLANTATION  11/01/2019    [employer] = AICD    REPLACEMENT TOTAL KNEE Left     SHOULDER SURGERY Left     SPINAL CORD STIMULATOR IMPLANT N/A 9/25/2024    Procedure: SPINAL CORD STIMULATOR BATTERY EXCHANGE;  Surgeon: DEMETRIUS Acosta MD;  Location:  [facility] OR;  Service: Orthopedic Spine;  Laterality: N/A;    THORACIC LAMINECTOMY WITH PLACEMENT OF DORSAL COLUMN STIMULATOR N/A " 02/03/2020    Procedure: THORACIC LAMINECTOMY, INSERTION SPINAL CORD STIMULATOR;  Surgeon: DEMETRIUS Acosta MD;  Location: [facility] OR;  Service: Orthopedic Spine;  Laterality: N/A;    UMBILICAL HERNIA REPAIR         History reviewed. No pertinent family history.    Social History     Socioeconomic History    Marital status:    Tobacco Use    Smoking status: Never    Smokeless tobacco: Never   Vaping Use    Vaping status: Never Used   Substance and Sexual Activity    Alcohol use: Yes     Comment: OCCASIONALLY    Drug use: No    Sexual activity: Defer           Objective   Physical Exam  Vitals and nursing note reviewed.   Constitutional:       General: He is not in acute distress.     Appearance: He is well-developed. He is not diaphoretic.   HENT:      Head: Atraumatic.      Nose: Nose normal.   Eyes:      General: No scleral icterus.     Conjunctiva/sclera: Conjunctivae normal.      Pupils: Pupils are equal, round, and reactive to light.   Neck:      Thyroid: No thyromegaly.      Vascular: No JVD.   Cardiovascular:      Rate and Rhythm: Normal rate and regular rhythm.      Heart sounds: Normal heart sounds. No murmur heard.  Pulmonary:      Effort: Pulmonary effort is normal. No respiratory distress.      Breath sounds: Rales present. No wheezing.   Chest:      Chest wall: No tenderness.   Abdominal:      General: Bowel sounds are normal. There is no distension.      Palpations: Abdomen is soft. There is no mass.      Tenderness: There is no abdominal tenderness. There is no guarding or rebound.   Musculoskeletal:         General: Normal range of motion.      Cervical back: Normal range of motion and neck supple.   Lymphadenopathy:      Cervical: No cervical adenopathy.   Skin:     General: Skin is warm and dry.      Coloration: Skin is not pale.      Findings: No erythema or rash.   Neurological:      Mental Status: He is alert and oriented to person, place, and time.      Cranial Nerves: No cranial nerve  deficit.      Coordination: Coordination normal.      Deep Tendon Reflexes: Reflexes are normal and symmetric.   Psychiatric:         Behavior: Behavior normal.         Thought Content: Thought content normal.         Judgment: Judgment normal.         Procedures           ED Course                                                       Medical Decision Making  Patient is an 79-year-old male who presents to the ER with possible pneumonia.  Patient was evaluated at ENT just prior to arrival to the emergency department.  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  While in the office patient was complaining of weakness and fatigue along with a cough.  He was hypoxic in their office with a pulse ox of 88%.  ENT nurse practitioner called the ER concerned patient may have pneumonia and he was sent down for further evaluation.  Patient states he has had a productive cough that has lasted for the past 2 months.  He states he has taken antibiotics without relief.  He complains of a continued cough, headache, and sinus symptoms.  He is uncertain if he has had any fever.  He has had shortness of breath however uncertain if it is mainly related to his sinuses.  He denies any chest pain.  He complains of generalized weakness.  Past medical history significant for arthritis, carpal tunnel syndrome, coronary artery disease, depression, diabetes, dizziness, CVA, gastric ulcer, hard of hearing, hyperlipidemia, hypertension, MI, sleep apnea, orthostatic hypotension, pacemaker, skin cancer, stroke    Differential diagnosis includes but not limited to pneumonia, PE, sinusitis, electrolyte abnormality, pacemaker disturbance/arrhythmia, and other etiologies    Problems Addressed:  Respiratory infection: complicated acute illness or injury  Shortness of breath: complicated acute illness or injury    Amount and/or Complexity of Data Reviewed  Labs: ordered.  Radiology:  ordered.    Risk  Prescription drug management.      Labs Reviewed   COMPREHENSIVE METABOLIC PANEL - Abnormal; Notable for the following components:       Result Value    Glucose 148 (*)     BUN 30 (*)     Creatinine 1.50 (*)     eGFR 46.8 (*)     All other components within normal limits    Narrative:     GFR Categories in Chronic Kidney Disease (CKD)      GFR Category          GFR (mL/min/1.73)    Interpretation  G1                     90 or greater         Normal or high (1)  G2                      60-89                Mild decrease (1)  G3a                   45-59                Mild to moderate decrease  G3b                   30-44                Moderate to severe decrease  G4                    15-29                Severe decrease  G5                    14 or less           Kidney failure          (1)In the absence of evidence of kidney disease, neither GFR category G1 or G2 fulfill the criteria for CKD.    eGFR calculation 2021 CKD-EPI creatinine equation, which does not include race as a factor   TROPONIN - Abnormal; Notable for the following components:    HS Troponin T 45 (*)     All other components within normal limits    Narrative:     High Sensitive Troponin T Reference Range:  <14.0 ng/L- Negative Female for AMI  <22.0 ng/L- Negative Male for AMI  >=14 - Abnormal Female indicating possible myocardial injury.  >=22 - Abnormal Male indicating possible myocardial injury.   Clinicians would have to utilize clinical acumen, EKG, Troponin, and serial changes to determine if it is an Acute Myocardial Infarction or myocardial injury due to an underlying chronic condition.        D-DIMER, QUANTITATIVE - Abnormal; Notable for the following components:    D-Dimer, Quantitative 0.85 (*)     All other components within normal limits    Narrative:     According to the assay 's published package insert, a normal (<0.50 MCGFEU/mL) D-dimer result in conjunction with a non-high clinical probability  "assessment, excludes deep vein thrombosis (DVT) and pulmonary embolism (PE) with high sensitivity.    D-dimer values increase with age and this can make VTE exclusion of an older population difficult. To address this, the American [employer], based on best available evidence and recent guidelines, recommends that clinicians use age-adjusted D-dimer thresholds in patients greater than 50 years of age with: a) a low probability of PE who do not meet all Pulmonary Embolism Rule Out Criteria, or b) in those with intermediate probability of PE.   The formula for an age-adjusted D-dimer cut-off is \"age/100\".  For example, a 60 year old patient would have an age-adjusted cut-off of 0.60 MCGFEU/mL and an 80 year old 0.80 MCGFEU/mL.   CBC WITH AUTO DIFFERENTIAL - Abnormal; Notable for the following components:    RDW 15.9 (*)     All other components within normal limits   T3, FREE - Abnormal; Notable for the following components:    T3, Free 1.91 (*)     All other components within normal limits   HIGH SENSITIVITIY TROPONIN T 1HR - Abnormal; Notable for the following components:    HS Troponin T 40 (*)     All other components within normal limits    Narrative:     High Sensitive Troponin T Reference Range:  <14.0 ng/L- Negative Female for AMI  <22.0 ng/L- Negative Male for AMI  >=14 - Abnormal Female indicating possible myocardial injury.  >=22 - Abnormal Male indicating possible myocardial injury.   Clinicians would have to utilize clinical acumen, EKG, Troponin, and serial changes to determine if it is an Acute Myocardial Infarction or myocardial injury due to an underlying chronic condition.        BLOOD GAS, ARTERIAL W/CO-OXIMETRY - Abnormal; Notable for the following components:    HCO3, Arterial 27.7 (*)     Base Excess, Arterial 3.3 (*)     All other components within normal limits   RESPIRATORY PANEL PCR W/ COVID-19 (SARS-COV-2), NP SWAB IN UTM/VTP, 2 HR TAT - Normal    Narrative:     In the setting of a " positive respiratory panel with a viral infection PLUS a negative procalcitonin without other underlying concern for bacterial infection, consider observing off antibiotics or discontinuation of antibiotics and continue supportive care. If the respiratory panel is positive for atypical bacterial infection (Bordetella pertussis, Chlamydophila pneumoniae, or Mycoplasma pneumoniae), consider antibiotic de-escalation to target atypical bacterial infection.   BLOOD CULTURE - Normal   BLOOD CULTURE - Normal   BNP (IN-HOUSE) - Normal    Narrative:     This assay is used as an aid in the diagnosis of individuals suspected of having heart failure. It can be used as an aid in the diagnosis of acute decompensated heart failure (ADHF) in patients presenting with signs and symptoms of ADHF to the emergency department (ED). In addition, NT-proBNP of <300 pg/mL indicates ADHF is not likely.    Age Range Result Interpretation  NT-proBNP Concentration (pg/mL:      <50             Positive            >450                   Gray                 300-450                    Negative             <300    50-75           Positive            >900                  Gray                300-900                  Negative            <300      >75             Positive            >1800                  Gray                300-1800                  Negative            <300   TSH - Normal   T4, FREE - Normal   MAGNESIUM - Normal   LACTIC ACID, PLASMA - Normal   RAINBOW DRAW    Narrative:     The following orders were created for panel order [facility] Draw.  Procedure                               Abnormality         Status                     ---------                               -----------         ------                     Green Top (Gel)[[id number]]                                  Final result               Lavender Top[[id number]]                                     Final result               Red Top[[id number]]                                           Final result               Cochran Top[[id number]]                                         Final result               Light Blue Top[[id number]]                                   Final result                 Please view results for these tests on the individual orders.   BLOOD GAS, ARTERIAL W/CO-OXIMETRY   GREEN TOP   LAVENDER TOP   RED TOP   GRAY TOP   LIGHT BLUE TOP   CBC AND DIFFERENTIAL    Narrative:     The following orders were created for panel order CBC & Differential.  Procedure                               Abnormality         Status                     ---------                               -----------         ------                     CBC Auto Differential[[id number]]        Abnormal            Final result                 Please view results for these tests on the individual orders.      CT Head Without Contrast   Final Result       1. Moderate cerebral and cerebellar atrophy with chronic microvascular   disease but no evidence of acute intracranial process.   2. Remote infarct involving the left inferior cerebellar hemisphere and   left parietal corona radiata and centrum semiovale.               This report was signed and finalized on 1/7/2025 3:15 PM by Dr. Bobby Holt MD.          CT Angiogram Chest   Final Result   1. No evidence of pulmonary thromboembolic disease.   2. Atheromatous calcification of the thoracic aorta and great vessels   without dissection, aneurysm or rate limiting luminal stenosis.   3. The heart is enlarged with extensive coronary calcifications.   Elevated right heart pressure with reflux of contrast into the   intrahepatic IVC.   4. Poor inspiratory effort with bibasilar atelectasis. No consolidative   pneumonia or suspicious nodularity.       This report was signed and finalized on 1/7/2025 3:29 PM by Dr. Bobby Holt MD.          XR Chest 1 View   Final Result   1.. Mild left basilar atelectasis. Lungs are otherwise clear.   2. Transvenous pacer/defibrillating  device remains well-positioned.       This report was signed and finalized on 1/7/2025 12:32 PM by Dr. Bobby Holt MD.          Patient's labs appear to be around patient's baseline.  White blood count is within normal limits at 7.35, H&H is 14.4 and 43.7.  BUN is 30 and creatinine is 1.50.  BNP is within normal limits.  ABGs are within normal limits.  Patient is not requiring oxygen, he has not hypoxic.  Troponins are elevated however patient's delta is within normal limits.  Respiratory panel was negative.  CTA of the chest is negative for PE.  Patient is inquiring about an antibiotic for respiratory infection that he feels is just not improving.  We will prescribe medication and recommend close follow-up with PCP for reevaluation.  He has nasal spray at home as well as breathing treatments he may take.  Patient be discharged home in stable condition. Reviewed with Dr. Alfaro who  agrees with treatment plan.   Final diagnoses:   Respiratory infection   Shortness of breath       ED Disposition  ED Disposition       ED Disposition   Discharge    Condition   Good    Comment   --               No follow-up provider specified.       Medication List        New Prescriptions      amoxicillin-clavulanate 875-125 MG per tablet  Commonly known as: AUGMENTIN  Take 1 tablet by mouth 2 (Two) Times a Day for 10 days.               Where to Get Your Medications        These medications were sent to [facility]/pharmacy #[id number] - [place], [place] - [address] - [phone]  - [phone]   [address] [address]      Phone: [phone]   amoxicillin-clavulanate 875-125 MG per tablet            Maricel Mota, APRN  01/08/25 1508

## 2025-01-08 LAB
QT INTERVAL: 430 MS
QTC INTERVAL: 447 MS

## 2025-01-12 LAB
BACTERIA SPEC AEROBE CULT: NORMAL
BACTERIA SPEC AEROBE CULT: NORMAL

## 2025-01-30 ENCOUNTER — TELEPHONE (OUTPATIENT)
Dept: CARDIOLOGY CLINIC | Age: 80
End: 2025-01-30

## 2025-01-30 NOTE — TELEPHONE ENCOUNTER
Called 1x and LVM to r/s. Dr. Dash will be out of the office. Pt needs to be r/s w/Katya Colno or Pham Carty; or in 2026 w/Dr. Dash (NOT ON 2/24/25). 1/30/25 kdw

## 2025-02-18 ENCOUNTER — HOSPITAL ENCOUNTER (EMERGENCY)
Age: 80
Discharge: HOME | End: 2025-02-18
Payer: MEDICARE

## 2025-02-18 ENCOUNTER — APPOINTMENT (OUTPATIENT)
Dept: GENERAL RADIOLOGY | Age: 80
End: 2025-02-18
Attending: STUDENT IN AN ORGANIZED HEALTH CARE EDUCATION/TRAINING PROGRAM
Payer: MEDICARE

## 2025-02-18 VITALS
DIASTOLIC BLOOD PRESSURE: 65 MMHG | HEIGHT: 76 IN | BODY MASS INDEX: 30.44 KG/M2 | WEIGHT: 250 LBS | TEMPERATURE: 97.7 F | OXYGEN SATURATION: 96 % | RESPIRATION RATE: 13 BRPM | HEART RATE: 62 BPM | SYSTOLIC BLOOD PRESSURE: 115 MMHG

## 2025-02-18 DIAGNOSIS — E86.0 MILD DEHYDRATION: Primary | ICD-10-CM

## 2025-02-18 LAB
ALBUMIN SERPL-MCNC: 3.7 G/DL (ref 3.5–5.2)
ALP SERPL-CCNC: 95 U/L (ref 40–129)
ALT SERPL-CCNC: 11 U/L (ref 5–41)
ANION GAP SERPL CALCULATED.3IONS-SCNC: 14 MMOL/L (ref 8–16)
AST SERPL-CCNC: 13 U/L (ref 5–40)
B PARAP IS1001 DNA NPH QL NAA+NON-PROBE: NOT DETECTED
B PERT.PT PRMT NPH QL NAA+NON-PROBE: NOT DETECTED
BACTERIA URNS QL MICRO: NEGATIVE /HPF
BASOPHILS # BLD: 0 K/UL (ref 0–0.2)
BASOPHILS NFR BLD: 0.5 % (ref 0–1)
BILIRUB SERPL-MCNC: 0.3 MG/DL (ref 0.2–1.2)
BILIRUB UR QL STRIP: NEGATIVE
BUN SERPL-MCNC: 27 MG/DL (ref 8–23)
C PNEUM DNA NPH QL NAA+NON-PROBE: NOT DETECTED
CALCIUM SERPL-MCNC: 8.9 MG/DL (ref 8.8–10.2)
CHLORIDE SERPL-SCNC: 101 MMOL/L (ref 98–107)
CLARITY UR: CLEAR
CO2 SERPL-SCNC: 27 MMOL/L (ref 22–29)
COLOR UR: YELLOW
CREAT SERPL-MCNC: 1.6 MG/DL (ref 0.7–1.2)
CRYSTALS URNS MICRO: NORMAL /HPF
EKG P AXIS: 29 DEGREES
EKG P-R INTERVAL: 158 MS
EKG Q-T INTERVAL: 470 MS
EKG QRS DURATION: 132 MS
EKG QTC CALCULATION (BAZETT): 470 MS
EKG T AXIS: 133 DEGREES
EOSINOPHIL # BLD: 0.2 K/UL (ref 0–0.6)
EOSINOPHIL NFR BLD: 2.4 % (ref 0–5)
EPI CELLS #/AREA URNS AUTO: 0 /HPF (ref 0–5)
ERYTHROCYTE [DISTWIDTH] IN BLOOD BY AUTOMATED COUNT: 15.6 % (ref 11.5–14.5)
FLUAV RNA NPH QL NAA+NON-PROBE: NOT DETECTED
FLUBV RNA NPH QL NAA+NON-PROBE: NOT DETECTED
GLUCOSE SERPL-MCNC: 161 MG/DL (ref 70–99)
GLUCOSE UR STRIP.AUTO-MCNC: NEGATIVE MG/DL
HADV DNA NPH QL NAA+NON-PROBE: NOT DETECTED
HCOV 229E RNA NPH QL NAA+NON-PROBE: NOT DETECTED
HCOV HKU1 RNA NPH QL NAA+NON-PROBE: NOT DETECTED
HCOV NL63 RNA NPH QL NAA+NON-PROBE: NOT DETECTED
HCOV OC43 RNA NPH QL NAA+NON-PROBE: NOT DETECTED
HCT VFR BLD AUTO: 42.7 % (ref 42–52)
HGB BLD-MCNC: 13.8 G/DL (ref 14–18)
HGB UR STRIP.AUTO-MCNC: NEGATIVE MG/L
HMPV RNA NPH QL NAA+NON-PROBE: NOT DETECTED
HPIV1 RNA NPH QL NAA+NON-PROBE: NOT DETECTED
HPIV2 RNA NPH QL NAA+NON-PROBE: NOT DETECTED
HPIV3 RNA NPH QL NAA+NON-PROBE: NOT DETECTED
HPIV4 RNA NPH QL NAA+NON-PROBE: NOT DETECTED
HYALINE CASTS #/AREA URNS AUTO: 3 /HPF (ref 0–8)
IMM GRANULOCYTES # BLD: 0 K/UL
KETONES UR STRIP.AUTO-MCNC: NEGATIVE MG/DL
LEUKOCYTE ESTERASE UR QL STRIP.AUTO: ABNORMAL
LYMPHOCYTES # BLD: 3.4 K/UL (ref 1.1–4.5)
LYMPHOCYTES NFR BLD: 42.8 % (ref 20–40)
M PNEUMO DNA NPH QL NAA+NON-PROBE: NOT DETECTED
MCH RBC QN AUTO: 27.7 PG (ref 27–31)
MCHC RBC AUTO-ENTMCNC: 32.3 G/DL (ref 33–37)
MCV RBC AUTO: 85.6 FL (ref 80–94)
MONOCYTES # BLD: 0.5 K/UL (ref 0–0.9)
MONOCYTES NFR BLD: 6.5 % (ref 0–10)
NEUTROPHILS # BLD: 3.7 K/UL (ref 1.5–7.5)
NEUTS SEG NFR BLD: 47.4 % (ref 50–65)
NITRITE UR QL STRIP.AUTO: NEGATIVE
PH UR STRIP.AUTO: 6 [PH] (ref 5–8)
PLATELET # BLD AUTO: 135 K/UL (ref 130–400)
PMV BLD AUTO: 12.1 FL (ref 9.4–12.4)
POTASSIUM SERPL-SCNC: 3.7 MMOL/L (ref 3.5–5.1)
PROT SERPL-MCNC: 6.7 G/DL (ref 6.4–8.3)
PROT UR STRIP.AUTO-MCNC: NEGATIVE MG/DL
RBC # BLD AUTO: 4.99 M/UL (ref 4.7–6.1)
RBC #/AREA URNS AUTO: 0 /HPF (ref 0–4)
RSV RNA NPH QL NAA+NON-PROBE: NOT DETECTED
RV+EV RNA NPH QL NAA+NON-PROBE: NOT DETECTED
SARS-COV-2 RNA NPH QL NAA+NON-PROBE: NOT DETECTED
SODIUM SERPL-SCNC: 142 MMOL/L (ref 136–145)
SP GR UR STRIP.AUTO: 1.01 (ref 1–1.03)
TROPONIN, HIGH SENSITIVITY: 41 NG/L (ref 0–22)
UROBILINOGEN UR STRIP.AUTO-MCNC: 0.2 E.U./DL
WBC # BLD AUTO: 7.8 K/UL (ref 4.8–10.8)
WBC #/AREA URNS AUTO: 1 /HPF (ref 0–5)

## 2025-02-18 PROCEDURE — 80053 COMPREHEN METABOLIC PANEL: CPT

## 2025-02-18 PROCEDURE — 93005 ELECTROCARDIOGRAM TRACING: CPT | Performed by: STUDENT IN AN ORGANIZED HEALTH CARE EDUCATION/TRAINING PROGRAM

## 2025-02-18 PROCEDURE — 85025 COMPLETE CBC W/AUTO DIFF WBC: CPT

## 2025-02-18 PROCEDURE — 81001 URINALYSIS AUTO W/SCOPE: CPT

## 2025-02-18 PROCEDURE — 0202U NFCT DS 22 TRGT SARS-COV-2: CPT

## 2025-02-18 PROCEDURE — 99285 EMERGENCY DEPT VISIT HI MDM: CPT

## 2025-02-18 PROCEDURE — 2580000003 HC RX 258: Performed by: NURSE PRACTITIONER

## 2025-02-18 PROCEDURE — 96361 HYDRATE IV INFUSION ADD-ON: CPT

## 2025-02-18 PROCEDURE — 71045 X-RAY EXAM CHEST 1 VIEW: CPT

## 2025-02-18 PROCEDURE — 96360 HYDRATION IV INFUSION INIT: CPT

## 2025-02-18 PROCEDURE — 93010 ELECTROCARDIOGRAM REPORT: CPT | Performed by: INTERNAL MEDICINE

## 2025-02-18 PROCEDURE — 84484 ASSAY OF TROPONIN QUANT: CPT

## 2025-02-18 PROCEDURE — 36415 COLL VENOUS BLD VENIPUNCTURE: CPT

## 2025-02-18 RX ORDER — MECLIZINE HYDROCHLORIDE 25 MG/1
25 TABLET ORAL 3 TIMES DAILY PRN
COMMUNITY

## 2025-02-18 RX ORDER — 0.9 % SODIUM CHLORIDE 0.9 %
500 INTRAVENOUS SOLUTION INTRAVENOUS ONCE
Status: COMPLETED | OUTPATIENT
Start: 2025-02-18 | End: 2025-02-18

## 2025-02-18 RX ORDER — BUMETANIDE 1 MG/1
2 TABLET ORAL 2 TIMES DAILY
Qty: 270 TABLET | Refills: 1 | Status: SHIPPED | OUTPATIENT
Start: 2025-02-18

## 2025-02-18 RX ADMIN — SODIUM CHLORIDE 500 ML: 9 INJECTION, SOLUTION INTRAVENOUS at 08:45

## 2025-02-18 ASSESSMENT — ENCOUNTER SYMPTOMS
RESPIRATORY NEGATIVE: 1
GASTROINTESTINAL NEGATIVE: 1

## 2025-02-18 ASSESSMENT — PAIN - FUNCTIONAL ASSESSMENT: PAIN_FUNCTIONAL_ASSESSMENT: NONE - DENIES PAIN

## 2025-02-18 NOTE — ED PROVIDER NOTES
this note:    XR CHEST PORTABLE   Final Result   1.  No acute cardiopulmonary disease.               ______________________________________    Electronically signed by: RANDY CHAPPELL M.D.   Date:     02/18/2025   Time:    09:44             ED BEDSIDE ULTRASOUND:   Performed by ED Physician - none    LABS:  Labs Reviewed   CBC WITH AUTO DIFFERENTIAL - Abnormal; Notable for the following components:       Result Value    Hemoglobin 13.8 (*)     MCHC 32.3 (*)     RDW 15.6 (*)     Neutrophils % 47.4 (*)     Lymphocytes % 42.8 (*)     All other components within normal limits   COMPREHENSIVE METABOLIC PANEL - Abnormal; Notable for the following components:    Glucose 161 (*)     BUN 27 (*)     Creatinine 1.6 (*)     Est, Glom Filt Rate 43 (*)     All other components within normal limits   TROPONIN - Abnormal; Notable for the following components:    Troponin, High Sensitivity 41 (*)     All other components within normal limits   RESPIRATORY PANEL, MOLECULAR, WITH COVID-19   URINALYSIS WITH REFLEX TO CULTURE       All other labs were within normal range or not returned as of this dictation.    Medications   sodium chloride 0.9 % bolus 500 mL (0 mLs IntraVENous Stopped 2/18/25 1024)       EMERGENCY DEPARTMENT COURSE and DIFFERENTIALDIAGNOSIS/MDM:   Vitals:    Vitals:    02/18/25 0931 02/18/25 0941 02/18/25 0951 02/18/25 1001   BP: (!) 101/57 (!) 99/59 97/60 110/66   Pulse: 60 60 61 60   Resp: 14 19 18 14   Temp:       TempSrc:       SpO2: 95% 94% 95% 96%   Weight:       Height:           MDM  Number of Diagnoses or Management Options  Mild dehydration  Diagnosis management comments: 79-year-old nontoxic-appearing male presented to the emergency department with complaint of dizziness and fatigue and low blood pressure.  Patient was scheduled to have cataract surgery this morning and was told to go [facility].  Initial blood pressure 97/60.  Patient with dry oral mucosa as well.  CBC and

## 2025-02-18 NOTE — DISCHARGE INSTRUCTIONS
Increase your water intake.  Follow-up with your doctor as needed.  Return to ER for any new, worsening, or change in condition.

## 2025-02-20 ENCOUNTER — HOSPITAL ENCOUNTER (OUTPATIENT)
Dept: PAIN MANAGEMENT | Age: 80
Discharge: HOME | End: 2025-02-20
Payer: MEDICARE

## 2025-02-20 VITALS
OXYGEN SATURATION: 96 % | TEMPERATURE: 96.6 F | RESPIRATION RATE: 16 BRPM | HEART RATE: 71 BPM | BODY MASS INDEX: 31.08 KG/M2 | DIASTOLIC BLOOD PRESSURE: 68 MMHG | HEIGHT: 76 IN | SYSTOLIC BLOOD PRESSURE: 106 MMHG | WEIGHT: 255.2 LBS

## 2025-02-20 DIAGNOSIS — M51.369 DDD (DEGENERATIVE DISC DISEASE), LUMBAR: ICD-10-CM

## 2025-02-20 DIAGNOSIS — M54.9 MID BACK PAIN: ICD-10-CM

## 2025-02-20 DIAGNOSIS — M54.50 CHRONIC BILATERAL LOW BACK PAIN WITHOUT SCIATICA: ICD-10-CM

## 2025-02-20 DIAGNOSIS — G89.4 CHRONIC PAIN SYNDROME: Primary | ICD-10-CM

## 2025-02-20 DIAGNOSIS — G89.29 CHRONIC BILATERAL LOW BACK PAIN WITHOUT SCIATICA: ICD-10-CM

## 2025-02-20 DIAGNOSIS — M47.816 SPONDYLOSIS OF LUMBAR REGION WITHOUT MYELOPATHY OR RADICULOPATHY: ICD-10-CM

## 2025-02-20 DIAGNOSIS — G89.29 CHRONIC NECK PAIN: ICD-10-CM

## 2025-02-20 DIAGNOSIS — M47.816 SPONDYLOSIS OF LUMBAR REGION WITHOUT MYELOPATHY OR RADICULOPATHY: Primary | ICD-10-CM

## 2025-02-20 DIAGNOSIS — M54.2 CHRONIC NECK PAIN: ICD-10-CM

## 2025-02-20 PROCEDURE — 99214 OFFICE O/P EST MOD 30 MIN: CPT | Performed by: STUDENT IN AN ORGANIZED HEALTH CARE EDUCATION/TRAINING PROGRAM

## 2025-02-20 PROCEDURE — 99213 OFFICE O/P EST LOW 20 MIN: CPT

## 2025-02-20 RX ORDER — OXYCODONE AND ACETAMINOPHEN 10; 325 MG/1; MG/1
1 TABLET ORAL EVERY 6 HOURS PRN
Qty: 120 TABLET | Refills: 0 | Status: SHIPPED | OUTPATIENT
Start: 2025-02-23 | End: 2025-03-25

## 2025-02-20 RX ORDER — BUPRENORPHINE 5 UG/H
1 PATCH TRANSDERMAL WEEKLY
Qty: 4 PATCH | Refills: 0 | Status: SHIPPED | OUTPATIENT
Start: 2025-02-20 | End: 2025-03-22

## 2025-02-20 ASSESSMENT — PAIN DESCRIPTION - ORIENTATION: ORIENTATION: LOWER;MID

## 2025-02-20 ASSESSMENT — PAIN DESCRIPTION - LOCATION: LOCATION: BACK

## 2025-02-20 ASSESSMENT — PAIN SCALES - GENERAL: PAINLEVEL_OUTOF10: 10

## 2025-02-20 ASSESSMENT — PAIN DESCRIPTION - PAIN TYPE: TYPE: CHRONIC PAIN

## 2025-02-20 NOTE — PROGRESS NOTES
Clinic Documentation      Education Provided:  [x] Review of Cheng  [] Agreement Review  [x] PEG Score Calculated [] PHQ Score Calculated [] ORT Score Calculated    [] Compliance Issues Discussed [] Cognitive Behavior Needs [x] Exercise [] Review of Test [] Financial Issues  [x] Tobacco/Alcohol Use Reviewed [x] Teaching [] New Patient [] Picture Obtained    Physician Plan:  [] Outgoing Referral  [] Pharmacy Consult  [] Test Ordered [x] Prescription Ordered/Changed   [] Obtained Test Results / Consult Notes        Complete if patient is withholding blood thinner for procedure     Blood Thinner Patient is currently taking:      [] Plavix (Hold for 7 days)  [] Aspirin (Hold for 5 days)     [] Pletal (Hold for 2 days)  [] Pradaxa (Hold for 3 days)    [] Effient (Hold for 7 days)  [] Xarelto (Hold for 2 days)    [] Eliquis (Hold for 2 days)  [] Brilinta (Hold for 7 days)    [] Coumadin (Hold for 5 days) - (INR needs to be drawn the day prior to procedure- INR < 2.0)    [] Aggrenox (Hold for 7 days)        [] Patient will stop medication on their own.    [] Blood Thinner Form Faxed for approval to hold.   Provider form faxed to:     Assessment Completed by:  Electronically signed by BLACK MCCAULEY on 2/20/2025 at 1:51 PM

## 2025-02-20 NOTE — PROGRESS NOTES
Primary Physician: Jabari Figueroa MD    CHIEF COMPLAINT:low back pain    HISTORY OF PRESENT ILLNESS:  Mr. Clifton Phan is 79 y.o. male with hx of elevated BMI (30), depression and anxiety, MOO on CPAP, DM, CAD, ischemic cardiomyopathy,  CHFrEF (25%) AICD in place, afib on chronic anticoagulation plavix and xarelto,  HTN/HLD, CKD (Cr. 1.6)  We have been treating his chronic low back pain. He has had a prior interbody fusion at L3-4 and L4-5. He also has a [employer] spinal cord stimulator with lead tip at T6-7.    We have been managing his pain on chronic opioid medications. His last office visit was 12/23/24 at which time we asked the Dipity Representative to evaluate his SCS settings. We also continued his medications.    He presents today with pain that is 10/10 and mainly in the low back and bilateral extremities. He describes aching pain that is in his legs bilaterally. It is not in a specific dermatomal pattern and is located in the thighs, calves and feet. He is with his wife who helps in the history taking.    He is also having neck pain that radiates into the posterior head. He does not have radicular symptoms in the neck. The pain is worsened by rotating his head. The neck pain is not as severe as the low back pain.    He is taking percocet 10's q 6 hours. He is normally taking 4 tablets per day. This is helping with his pain and function. His functional status is somewhat improved on this although his pain control is suboptimal. He struggles with constipation from his opioid medications. He has 2-3 bowel movements per week with the aid of stool softeners. Despite his current regimen, his pain is poorly controlled. He requests further medications to help his poorly controlled pain.    Pain History:      Location: low back with radiation to the bilateral thighs      Quality: aching, shooting, stabbing      Severity: 10/10      Onset: > 1 year ago      Duration: constant      Relieving

## 2025-02-24 ENCOUNTER — TELEPHONE (OUTPATIENT)
Dept: PAIN MANAGEMENT | Age: 80
End: 2025-02-24

## 2025-02-24 ENCOUNTER — TELEPHONE (OUTPATIENT)
Dept: CARDIOLOGY CLINIC | Age: 80
End: 2025-02-24

## 2025-02-25 NOTE — TELEPHONE ENCOUNTER
Letter to Katya to sign.  
Okay to hold Xarelto for 2 days and Plavix for 5 to 7 days prior to procedure resume thereafter  
[facility] 2019     Additional Notes: requesting to hold plavix and xarelto

## 2025-03-04 DIAGNOSIS — I50.22 CHRONIC SYSTOLIC HEART FAILURE (HCC): ICD-10-CM

## 2025-03-04 DIAGNOSIS — Z95.810 AICD (AUTOMATIC CARDIOVERTER/DEFIBRILLATOR) PRESENT: Primary | ICD-10-CM

## 2025-03-13 RX ORDER — SACUBITRIL AND VALSARTAN 49; 51 MG/1; MG/1
1 TABLET, FILM COATED ORAL 2 TIMES DAILY
Qty: 180 TABLET | Refills: 1 | Status: SHIPPED | OUTPATIENT
Start: 2025-03-13

## 2025-03-13 RX ORDER — METOPROLOL SUCCINATE 25 MG/1
25 TABLET, EXTENDED RELEASE ORAL DAILY
Qty: 90 TABLET | Refills: 1 | Status: SHIPPED | OUTPATIENT
Start: 2025-03-13

## 2025-03-31 DIAGNOSIS — G89.29 CHRONIC BILATERAL LOW BACK PAIN WITHOUT SCIATICA: ICD-10-CM

## 2025-03-31 DIAGNOSIS — M54.50 CHRONIC BILATERAL LOW BACK PAIN WITHOUT SCIATICA: ICD-10-CM

## 2025-03-31 DIAGNOSIS — M47.816 SPONDYLOSIS OF LUMBAR REGION WITHOUT MYELOPATHY OR RADICULOPATHY: ICD-10-CM

## 2025-03-31 DIAGNOSIS — M51.369 DDD (DEGENERATIVE DISC DISEASE), LUMBAR: ICD-10-CM

## 2025-03-31 RX ORDER — OXYCODONE AND ACETAMINOPHEN 10; 325 MG/1; MG/1
1 TABLET ORAL EVERY 6 HOURS PRN
Qty: 120 TABLET | Refills: 0 | Status: SHIPPED | OUTPATIENT
Start: 2025-03-31 | End: 2025-04-30

## 2025-04-04 ENCOUNTER — OFFICE VISIT (OUTPATIENT)
Dept: CARDIOLOGY CLINIC | Age: 80
End: 2025-04-04

## 2025-04-04 VITALS
DIASTOLIC BLOOD PRESSURE: 76 MMHG | WEIGHT: 260 LBS | HEIGHT: 76 IN | SYSTOLIC BLOOD PRESSURE: 130 MMHG | BODY MASS INDEX: 31.66 KG/M2

## 2025-04-04 DIAGNOSIS — I10 ESSENTIAL HYPERTENSION: ICD-10-CM

## 2025-04-04 DIAGNOSIS — I50.22 CHRONIC SYSTOLIC HEART FAILURE (HCC): ICD-10-CM

## 2025-04-04 DIAGNOSIS — I48.0 PAF (PAROXYSMAL ATRIAL FIBRILLATION) (HCC): ICD-10-CM

## 2025-04-04 DIAGNOSIS — Z95.810 AICD (AUTOMATIC CARDIOVERTER/DEFIBRILLATOR) PRESENT: ICD-10-CM

## 2025-04-04 DIAGNOSIS — I25.10 CORONARY ARTERY DISEASE INVOLVING NATIVE CORONARY ARTERY OF NATIVE HEART WITHOUT ANGINA PECTORIS: Primary | ICD-10-CM

## 2025-04-04 DIAGNOSIS — N18.9 CHRONIC KIDNEY DISEASE, UNSPECIFIED CKD STAGE: ICD-10-CM

## 2025-04-04 NOTE — PROGRESS NOTES
[facility]  [address] [address]  Phone: [phone]  Fax: [phone]    OFFICE VISIT:  2025    Clifton Phan - : 1945    Reason For Visit:  Clifton is a 79 y.o. male who is here for Follow-up (No cardiac symptoms) and Acute on chronic systolic congestive heart failure  1. Coronary artery disease, status post multiple PCI's to LAD 2011, RCA 2012, RPL 2015 presenting 2019 with inferior wall MI with late presentation involving a large dominant RCA with distal occlusion, not intervened on with severe mid to distal LAD restenosis and moderate left main disease, status post CABG 2019 with LIMA to 1st diagonal, SVG to LAD and SVG to ramus intermedius, ejection fraction 25% with severe inferior hypokinesis. Thallium viability study with large inferolateral, predominantly lateral infarct with no significant viability status post ICD placement 2019.  2. CKD stage III.  3. Diabetes mellitus.  4.  Paroxysmal atrial fibrillation, initiated on Xarelto.  5. Chronic severe constipation  6.  Statin intolerance.  7.  Chronic low back pain with bilateral lower extremity weakness and multiple falls  8.  Systolic CHF      Patient was seen last fall with acute on chronic heart failure.  Metolazone improved swelling.  He returns today for routine follow-up.  He states overall he has no cardiac complaints.  He states he has chronic pain all over.  He had his cataract done yesterday and still has some blurry vision          He has been weighing at home and it has been staying fairly consistent at 154.  Did jump up a little bit last week but is back to baseline        Subjective  Clifton denies exertional chest pain but has chronic pain all over.  Ongoing VÁSQUEZ.  Usually no resting shortness of breath, orthopnea, paroxysmal nocturnal dyspnea, syncope, presyncope, arrhythmia,   The patient denies numbness or weakness to suggest cerebrovascular accident

## 2025-04-04 NOTE — PATIENT INSTRUCTIONS
ICD will send in 3 mos   Maintain good blood pressure control-goal<130/80 at rest  Maintain good cholesterol control LDL goal<70 with arterial disease  If you are diabetic work to keep/obtain hemoglobin A1c< 7    Follow up in 6 mos  with ICD     Call with any questions or concerns  Follow up with Jabari Figueroa MD for non cardiac problems  Report any new problems  Cardiovascular Fitness-Exercise as tolerated.    Cardiac / Healthy Diet- Avoid processed high fat foods, maintain low sodium/salt   Continue current medications as directed  Continue plan of treatment  It is always recommended that you bring your medications bottles with you to each visit - this is for your safety!

## 2025-04-17 ENCOUNTER — OFFICE VISIT (OUTPATIENT)
Dept: PAIN MANAGEMENT | Age: 80
End: 2025-04-17

## 2025-04-17 VITALS
HEIGHT: 76 IN | BODY MASS INDEX: 30.86 KG/M2 | DIASTOLIC BLOOD PRESSURE: 57 MMHG | WEIGHT: 253.4 LBS | TEMPERATURE: 97.1 F | HEART RATE: 64 BPM | SYSTOLIC BLOOD PRESSURE: 97 MMHG | OXYGEN SATURATION: 98 % | RESPIRATION RATE: 16 BRPM

## 2025-04-17 DIAGNOSIS — F11.90 CHRONIC, CONTINUOUS USE OF OPIOIDS: ICD-10-CM

## 2025-04-17 DIAGNOSIS — M53.3 SACROILIAC JOINT DYSFUNCTION OF BOTH SIDES: Primary | ICD-10-CM

## 2025-04-17 DIAGNOSIS — M47.816 SPONDYLOSIS OF LUMBAR REGION WITHOUT MYELOPATHY OR RADICULOPATHY: ICD-10-CM

## 2025-04-17 DIAGNOSIS — G89.4 CHRONIC PAIN SYNDROME: ICD-10-CM

## 2025-04-17 DIAGNOSIS — M54.50 CHRONIC BILATERAL LOW BACK PAIN, UNSPECIFIED WHETHER SCIATICA PRESENT: ICD-10-CM

## 2025-04-17 DIAGNOSIS — G89.29 CHRONIC BILATERAL LOW BACK PAIN, UNSPECIFIED WHETHER SCIATICA PRESENT: ICD-10-CM

## 2025-04-17 DIAGNOSIS — G44.86 CERVICOGENIC HEADACHE: ICD-10-CM

## 2025-04-17 RX ORDER — OXYCODONE AND ACETAMINOPHEN 10; 325 MG/1; MG/1
1 TABLET ORAL EVERY 6 HOURS PRN
Qty: 120 TABLET | Refills: 0 | Status: SHIPPED | OUTPATIENT
Start: 2025-05-01 | End: 2025-05-31

## 2025-04-17 NOTE — PROGRESS NOTES
Primary Physician: Jabari Figueroa MD    CHIEF COMPLAINT:low back pain    HISTORY OF PRESENT ILLNESS:  Mr. Clifton Phan is 80 y.o. male with hx of elevated BMI (30), depression and anxiety, MOO on CPAP, DM, CAD, ischemic cardiomyopathy,  CHFrEF (30-35% in 2023) AICD in place, afib on chronic anticoagulation plavix and xarelto,  HTN/HLD, CKD (Cr. 1.6)  We have been treating his chronic low back pain. He has had a prior interbody fusion at L3-4 and L4-5. He also has a [employer] spinal cord stimulator with lead tip at T6-7.    We have been managing his pain on chronic opioid medications. His last office visit was 2/20/25 at which time we started him on a butrans patch. Patient acts like he does not remember talking about this medication. I call the pharmacy who reports it is available for him to .    He presents today with pain that is a little better than last time and is 8/10 and mainly in the low back and bilateral extremities. He describes aching pain that is in his legs bilaterally. It is not in a specific dermatomal pattern and is located in the thighs, calves and feet.     He is taking percocet 10's q 6 hours. He is normally taking 3-4 tablets per day. This is helping with his pain \"a lot\". He says this lasts only a couple hours. He says it helps his function and he says he would not be able to do anything without the medication. He has had some constipation from medications although he is still having BM once every 4 days or so.    Pain History:      Location: low back with radiation to the bilateral thighs. Neck pain, non radicular that radiates to the posterior head      Quality: aching, shooting, stabbing      Severity: 8/10      Onset: > 1 year ago      Duration: constant      Relieving Factors: heat, ice, muscle relaants, analgesics, NSAIDS, home exercises with moderate releif      Exacerbating Factors: bending, position, sitting, standing and twisting    Red Flags:  none    Current

## 2025-04-19 PROBLEM — G44.86 CERVICOGENIC HEADACHE: Status: ACTIVE | Noted: 2025-04-19

## 2025-04-19 PROBLEM — G89.4 CHRONIC PAIN SYNDROME: Status: ACTIVE | Noted: 2025-04-19

## 2025-04-19 PROBLEM — M53.3 SACROILIAC JOINT DYSFUNCTION OF BOTH SIDES: Status: ACTIVE | Noted: 2025-04-19

## 2025-04-19 PROBLEM — F11.90 CHRONIC, CONTINUOUS USE OF OPIOIDS: Status: ACTIVE | Noted: 2025-04-19

## 2025-04-21 ENCOUNTER — TELEPHONE (OUTPATIENT)
Dept: PAIN MANAGEMENT | Age: 80
End: 2025-04-21

## 2025-04-21 NOTE — TELEPHONE ENCOUNTER
Dr. Ellison wanted patient called to cancel his appointment for bilateral sacroiliac joint injection on 5/7/25. Due to his fairly significant congestive heart failure and that steroids can worsen congestive heart failure. Also to let him know that they will discuss at next office visit on 7/17/25 For now continue pain management with medication which he is currently taking. I had to leave a voicemail message with Nurse Line phone number.

## 2025-04-23 ENCOUNTER — OFFICE VISIT (OUTPATIENT)
Age: 80
End: 2025-04-23
Payer: MEDICARE

## 2025-04-23 VITALS — BODY MASS INDEX: 30.81 KG/M2 | HEIGHT: 76 IN | WEIGHT: 253 LBS

## 2025-04-23 DIAGNOSIS — M25.561 CHRONIC PAIN OF RIGHT KNEE: Primary | ICD-10-CM

## 2025-04-23 DIAGNOSIS — M17.11 PRIMARY OSTEOARTHRITIS OF ONE KNEE, RIGHT: ICD-10-CM

## 2025-04-23 DIAGNOSIS — G89.29 CHRONIC PAIN OF RIGHT KNEE: Primary | ICD-10-CM

## 2025-04-23 PROCEDURE — 20610 DRAIN/INJ JOINT/BURSA W/O US: CPT | Performed by: PHYSICIAN ASSISTANT

## 2025-04-23 RX ORDER — BUPIVACAINE HYDROCHLORIDE 5 MG/ML
5 INJECTION, SOLUTION PERINEURAL ONCE
Status: COMPLETED | OUTPATIENT
Start: 2025-04-23 | End: 2025-04-23

## 2025-04-23 RX ORDER — LIDOCAINE HYDROCHLORIDE 10 MG/ML
2.5 INJECTION, SOLUTION INFILTRATION; PERINEURAL ONCE
Status: COMPLETED | OUTPATIENT
Start: 2025-04-23 | End: 2025-04-23

## 2025-04-23 RX ORDER — BETAMETHASONE SODIUM PHOSPHATE AND BETAMETHASONE ACETATE 3; 3 MG/ML; MG/ML
12 INJECTION, SUSPENSION INTRA-ARTICULAR; INTRALESIONAL; INTRAMUSCULAR; SOFT TISSUE ONCE
Status: COMPLETED | OUTPATIENT
Start: 2025-04-23 | End: 2025-04-23

## 2025-04-23 RX ADMIN — LIDOCAINE HYDROCHLORIDE 2.5 ML: 10 INJECTION, SOLUTION INFILTRATION; PERINEURAL at 14:42

## 2025-04-23 RX ADMIN — BUPIVACAINE HYDROCHLORIDE 25 MG: 5 INJECTION, SOLUTION PERINEURAL at 14:42

## 2025-04-23 RX ADMIN — BETAMETHASONE SODIUM PHOSPHATE AND BETAMETHASONE ACETATE 12 MG: 3; 3 INJECTION, SUSPENSION INTRA-ARTICULAR; INTRALESIONAL; INTRAMUSCULAR; SOFT TISSUE at 14:41

## 2025-04-23 NOTE — PROGRESS NOTES
testing. Range of motion 5- 110. Strength 4/5. Joint appears stable. Neurovascular intact. Skin intact without signs of infection.      Radiology:     XR KNEE RIGHT (MIN 4 VIEWS)  Result Date: 4/23/2025  AP and PA standing views, a right lateral view, and bilateral sunrise of the right knee was obtained in the office on today's visit, reviewed and interpreted by me.  Imaging quality is adequate for review.  There are no fractures or dislocations present, he has severe degenerative changes to the medial compartment of the right knee.  He has had full collapse of the joint space, which is bone to bone.  He has a well fixed left total knee replacement without signs of loosening.       KNEE INJECTION:    KNEE INJECTION: Risks/benefits were explained and verbal consent was given. Under sterile conditions the skin was cleansed with Chloraprep and alcohol. Using sterile conditions 2 cc of 1% Lidocaine without epinephrine, 5 cc of Marcaine, and 12 mg of Celestone was injected into the superolateral entry portal of the right knee using a 25-gauge 1/1-2 inch needle. Needles were withdrawn. Hemostasis was achieved. Sterile dressings were applied in the form of an adhesive bandage. The patient tolerated the procedure well. There were no post procedural complications.  --------------------------------------------------------------------------------------------------------------------    Assessment:   Encounter Diagnoses   Name Primary?    Chronic pain of right knee Yes    Primary osteoarthritis of one knee, right         Plan:  At the patient's request, we will proceed with a repeat corticosteroid injection of right knee today. Patient will return in 3 months for repeat injection as needed. Patient may follow up with knee joint clinic for future injections. All questions were answered.      Electronically signed by Clemente Mueller PA-C on 4/23/2025 at 4:30 PM

## 2025-05-29 DIAGNOSIS — I25.10 CORONARY ARTERY DISEASE INVOLVING NATIVE CORONARY ARTERY OF NATIVE HEART WITHOUT ANGINA PECTORIS: ICD-10-CM

## 2025-05-29 RX ORDER — CLOPIDOGREL BISULFATE 75 MG/1
75 TABLET ORAL DAILY
Qty: 90 TABLET | Refills: 3 | Status: SHIPPED | OUTPATIENT
Start: 2025-05-29

## 2025-06-03 DIAGNOSIS — M54.50 CHRONIC BILATERAL LOW BACK PAIN, UNSPECIFIED WHETHER SCIATICA PRESENT: ICD-10-CM

## 2025-06-03 DIAGNOSIS — G89.29 CHRONIC BILATERAL LOW BACK PAIN, UNSPECIFIED WHETHER SCIATICA PRESENT: ICD-10-CM

## 2025-06-03 DIAGNOSIS — M47.816 SPONDYLOSIS OF LUMBAR REGION WITHOUT MYELOPATHY OR RADICULOPATHY: ICD-10-CM

## 2025-06-03 RX ORDER — OXYCODONE AND ACETAMINOPHEN 10; 325 MG/1; MG/1
1 TABLET ORAL EVERY 6 HOURS PRN
Qty: 120 TABLET | Refills: 0 | Status: SHIPPED | OUTPATIENT
Start: 2025-06-03 | End: 2025-07-03

## 2025-06-03 NOTE — TELEPHONE ENCOUNTER
Last seen in office visit: 4/17/25  Next scheduled office visit: 7/17/25 with Dr Ellison  Last UDS results: compliant  Any discrepancies on Cheng (such as refills from another provider): none, pulled and reviewed 6/3/25    Per ov note :  He is taking percocet 10's q 6 hours. He is normally taking 3-4 tablets per day. This is helping with his pain \"a lot\". He says this lasts only a couple hours. He says it helps his function and he says he would not be able to do anything without the medication. He has had some constipation from medications although he is still having BM once every 4 days or so.

## 2025-06-24 DIAGNOSIS — M54.50 CHRONIC BILATERAL LOW BACK PAIN, UNSPECIFIED WHETHER SCIATICA PRESENT: ICD-10-CM

## 2025-06-24 DIAGNOSIS — M47.816 SPONDYLOSIS OF LUMBAR REGION WITHOUT MYELOPATHY OR RADICULOPATHY: ICD-10-CM

## 2025-06-24 DIAGNOSIS — G89.29 CHRONIC BILATERAL LOW BACK PAIN, UNSPECIFIED WHETHER SCIATICA PRESENT: ICD-10-CM

## 2025-06-24 DIAGNOSIS — G89.4 CHRONIC PAIN SYNDROME: ICD-10-CM

## 2025-06-24 NOTE — TELEPHONE ENCOUNTER
Last OV - 04/17/25  Next OV - 07/17/25  Last UDS - 07/17/24    Pt called - 06/23/25 @1340    Per jones the last fill of Butrans patch was 04/17/25

## 2025-06-28 RX ORDER — OXYCODONE AND ACETAMINOPHEN 10; 325 MG/1; MG/1
1 TABLET ORAL EVERY 6 HOURS PRN
Qty: 120 TABLET | Refills: 0 | Status: SHIPPED | OUTPATIENT
Start: 2025-07-03 | End: 2025-08-02

## 2025-06-28 RX ORDER — BUPRENORPHINE 5 UG/H
1 PATCH TRANSDERMAL WEEKLY
Qty: 4 PATCH | Refills: 0 | Status: SHIPPED | OUTPATIENT
Start: 2025-06-28 | End: 2025-07-28

## 2025-07-07 DIAGNOSIS — I50.22 CHRONIC SYSTOLIC HEART FAILURE (HCC): ICD-10-CM

## 2025-07-07 DIAGNOSIS — Z95.810 AICD (AUTOMATIC CARDIOVERTER/DEFIBRILLATOR) PRESENT: Primary | ICD-10-CM

## 2025-07-08 ENCOUNTER — RESULTS FOLLOW-UP (OUTPATIENT)
Dept: CARDIOLOGY CLINIC | Age: 80
End: 2025-07-08

## 2025-07-17 ENCOUNTER — HOSPITAL ENCOUNTER (EMERGENCY)
Age: 80
Discharge: HOME | End: 2025-07-17
Attending: STUDENT IN AN ORGANIZED HEALTH CARE EDUCATION/TRAINING PROGRAM
Payer: MEDICARE

## 2025-07-17 ENCOUNTER — OFFICE VISIT (OUTPATIENT)
Dept: PAIN MANAGEMENT | Age: 80
End: 2025-07-17

## 2025-07-17 ENCOUNTER — APPOINTMENT (OUTPATIENT)
Dept: GENERAL RADIOLOGY | Age: 80
End: 2025-07-17
Payer: MEDICARE

## 2025-07-17 VITALS
RESPIRATION RATE: 13 BRPM | BODY MASS INDEX: 31.05 KG/M2 | SYSTOLIC BLOOD PRESSURE: 121 MMHG | TEMPERATURE: 98.1 F | WEIGHT: 255 LBS | HEIGHT: 76 IN | HEART RATE: 64 BPM | OXYGEN SATURATION: 97 % | DIASTOLIC BLOOD PRESSURE: 65 MMHG

## 2025-07-17 VITALS
HEART RATE: 71 BPM | RESPIRATION RATE: 16 BRPM | WEIGHT: 256.4 LBS | DIASTOLIC BLOOD PRESSURE: 41 MMHG | BODY MASS INDEX: 31.22 KG/M2 | TEMPERATURE: 96.7 F | HEIGHT: 76 IN | OXYGEN SATURATION: 97 % | SYSTOLIC BLOOD PRESSURE: 72 MMHG

## 2025-07-17 DIAGNOSIS — Z51.5 PALLIATIVE CARE PATIENT: Primary | ICD-10-CM

## 2025-07-17 DIAGNOSIS — R42 LIGHTHEADEDNESS: Primary | ICD-10-CM

## 2025-07-17 DIAGNOSIS — G89.4 CHRONIC PAIN SYNDROME: ICD-10-CM

## 2025-07-17 DIAGNOSIS — F11.90 CHRONIC, CONTINUOUS USE OF OPIOIDS: ICD-10-CM

## 2025-07-17 LAB
ALBUMIN SERPL-MCNC: 3.7 G/DL (ref 3.5–5.2)
ALP SERPL-CCNC: 91 U/L (ref 40–129)
ALT SERPL-CCNC: 6 U/L (ref 10–50)
ANION GAP SERPL CALCULATED.3IONS-SCNC: 11 MMOL/L (ref 8–16)
AST SERPL-CCNC: 28 U/L (ref 10–50)
BASOPHILS # BLD: 0.1 K/UL (ref 0–0.2)
BASOPHILS NFR BLD: 0.5 % (ref 0–1)
BILIRUB SERPL-MCNC: <0.2 MG/DL (ref 0.2–1.2)
BUN SERPL-MCNC: 28 MG/DL (ref 8–23)
CALCIUM SERPL-MCNC: 9 MG/DL (ref 8.8–10.2)
CHLORIDE SERPL-SCNC: 101 MMOL/L (ref 98–107)
CO2 SERPL-SCNC: 24 MMOL/L (ref 22–29)
CREAT SERPL-MCNC: 1.5 MG/DL (ref 0.7–1.2)
EOSINOPHIL # BLD: 0.2 K/UL (ref 0–0.6)
EOSINOPHIL NFR BLD: 1.8 % (ref 0–5)
ERYTHROCYTE [DISTWIDTH] IN BLOOD BY AUTOMATED COUNT: 15 % (ref 11.5–14.5)
GLUCOSE SERPL-MCNC: 163 MG/DL (ref 70–99)
HCT VFR BLD AUTO: 35.3 % (ref 42–52)
HGB BLD-MCNC: 11.2 G/DL (ref 14–18)
IMM GRANULOCYTES # BLD: 0 K/UL
LYMPHOCYTES # BLD: 3.7 K/UL (ref 1.1–4.5)
LYMPHOCYTES NFR BLD: 36.7 % (ref 20–40)
MAGNESIUM SERPL-MCNC: 2 MG/DL (ref 1.6–2.4)
MCH RBC QN AUTO: 27.3 PG (ref 27–31)
MCHC RBC AUTO-ENTMCNC: 31.7 G/DL (ref 33–37)
MCV RBC AUTO: 85.9 FL (ref 80–94)
MONOCYTES # BLD: 0.7 K/UL (ref 0–0.9)
MONOCYTES NFR BLD: 6.8 % (ref 0–10)
NEUTROPHILS # BLD: 5.4 K/UL (ref 1.5–7.5)
NEUTS SEG NFR BLD: 53.8 % (ref 50–65)
PLATELET # BLD AUTO: 184 K/UL (ref 130–400)
PMV BLD AUTO: 11.6 FL (ref 9.4–12.4)
POTASSIUM SERPL-SCNC: 4.4 MMOL/L (ref 3.5–5.1)
PROT SERPL-MCNC: 6.5 G/DL (ref 6.4–8.3)
RBC # BLD AUTO: 4.11 M/UL (ref 4.7–6.1)
SODIUM SERPL-SCNC: 136 MMOL/L (ref 136–145)
WBC # BLD AUTO: 10 K/UL (ref 4.8–10.8)

## 2025-07-17 PROCEDURE — 96360 HYDRATION IV INFUSION INIT: CPT

## 2025-07-17 PROCEDURE — 71045 X-RAY EXAM CHEST 1 VIEW: CPT

## 2025-07-17 PROCEDURE — 80053 COMPREHEN METABOLIC PANEL: CPT

## 2025-07-17 PROCEDURE — 2580000003 HC RX 258: Performed by: STUDENT IN AN ORGANIZED HEALTH CARE EDUCATION/TRAINING PROGRAM

## 2025-07-17 PROCEDURE — 99285 EMERGENCY DEPT VISIT HI MDM: CPT

## 2025-07-17 PROCEDURE — 85025 COMPLETE CBC W/AUTO DIFF WBC: CPT

## 2025-07-17 PROCEDURE — 93005 ELECTROCARDIOGRAM TRACING: CPT | Performed by: STUDENT IN AN ORGANIZED HEALTH CARE EDUCATION/TRAINING PROGRAM

## 2025-07-17 PROCEDURE — 36415 COLL VENOUS BLD VENIPUNCTURE: CPT

## 2025-07-17 PROCEDURE — 83735 ASSAY OF MAGNESIUM: CPT

## 2025-07-17 RX ORDER — 0.9 % SODIUM CHLORIDE 0.9 %
500 INTRAVENOUS SOLUTION INTRAVENOUS ONCE
Status: COMPLETED | OUTPATIENT
Start: 2025-07-17 | End: 2025-07-17

## 2025-07-17 RX ADMIN — SODIUM CHLORIDE 500 ML: 0.9 INJECTION, SOLUTION INTRAVENOUS at 15:35

## 2025-07-17 ASSESSMENT — ENCOUNTER SYMPTOMS
ABDOMINAL PAIN: 0
VOMITING: 0
SHORTNESS OF BREATH: 0
NAUSEA: 1

## 2025-07-17 NOTE — DISCHARGE INSTRUCTIONS
As discussed check your blood pressure at home tonight in the morning.  If you notice that your blood pressure is running low please come back to the ER for further treatment and observation as discussed.  Otherwise follow-up with your doctor

## 2025-07-17 NOTE — ED PROVIDER NOTES
.[facility] EMERGENCY DEPARTMENT  eMERGENCY dEPARTMENT eNCOUnter      Pt Name: Clifton Phan  MRN: [id number]  Birthdate 1945  Date of evaluation: 7/17/2025  Provider: Maxim Sunshine MD    Chief Complaint:  Chief Complaint   Patient presents with    Dizziness     During pain management      HPI    Clifton Phan is a 80 y.o. male who presents to the emergency department with multiple complaints. He feels dizzy, has head and neck pain, and his back is hurting. He has been feeling dizzy for 4-5 years. It worsened 2-3 weeks ago. The dizziness is both room spinning/vertigo and lightheadedness.  No recent syncope; passed out 4-5 weeks ago.     Head and neck pain present for a long time. Head hurts all the time. That stays the same has not gotten worse. Endorses h/o thirteen surgeries \"on everything\" but not his neck.     Blood pressure was low at pain management.       Review of Systems   Constitutional:  Negative for fever.   Respiratory:  Negative for shortness of breath.    Cardiovascular:  Negative for chest pain.   Gastrointestinal:  Positive for nausea. Negative for abdominal pain and vomiting.   Genitourinary:  Negative for difficulty urinating and dysuria.   Neurological:  Positive for weakness and light-headedness. Negative for syncope.       Past Medical History:   Diagnosis Date    Acute ischemic stroke ([facility]) 2/20/2017    Acute MI inferior lateral first episode care ([facility]ly [facility]) 5/31/2019    Acute on chronic systolic congestive heart failure ([facility]ly [facility]) 8/15/2022    Atherosclerotic heart disease     s/p MI, stenting x 3 jan 2011([place]    Breakthrough seizure ([facility]) 10/16/2021    CAD (coronary artery disease)     Cerebral artery occlusion with cerebral infarction ([facility])     Cerebrovascular accident (CVA) due to embolism of right middle cerebral artery ([facility]) 8/14/2017    CHF (congestive heart failure) ([facility]ly [facility])     Chronic bilateral low back pain without sciatica 11/10/2016    Chronic kidney disease     DDD  clinically stable and has had resolution of his symptoms, discharge is permissible.  He agrees to check his blood pressure at home and agrees to return precautions        ED Course as of 07/20/25 0229   Thu Jul 17, 2025   1535 ECG reveals sinus rhythm, normal rate, no focal ST segment elevation. [AS]   1700 On repeat examination the patient is sitting up, sunglasses are now off, he says he feels a lot better nearly totally resolved of his symptoms.  [AS]      ED Course User Index  [AS] Maxim Sunshine MD       Final Impression: See below.    Procedures    1. Lightheadedness      DISPOSITION Decision To Discharge 07/17/2025 05:00:06 PM   DISPOSITION CONDITION Stable         No follow-up provider specified.    DISCHARGE MEDICATIONS:  Discharge Medication List as of 7/17/2025  5:18 PM             (Please note that portions of this note were completed with a voice recognition program.  Efforts were made to edit thedictations but occasionally words are mis-transcribed.)    Maxim Sunshine MD (electronically signed)Emergency Physician          Maxim Sunshine MD  07/20/25 0229

## 2025-07-17 NOTE — PROGRESS NOTES
Primary Physician: Jabari Figueroa MD    CHIEF COMPLAINT:low back pain    Progress 7/17/25  Patient presents today with pain that is 10/10. He has low BP with SBP 70's. Patient has had a severe headache and dizziness for a \"couple weeks\". He has had a couple of falls. He fell last 2-3 days ago on the grass. He denies hitting his head. He says he feels \"really rotten\". He's felt nauseated. He has had diarrhea for 4-5 days. He is not sure that he has changes in his vision. He denies blood in his urine or dysuria. He denies shortness of breath or palpitations. His main symptoms are headaches, and dizziness. diarrhea, nausea. He's also had a RLE cellulitis infection that has not completely cleared. Systolic BP today is in the 70's. Patient is open to going to the ER.    Patient has been seeing his PCP, Dr. Figueroa for cellulitis of the RLE. He last saw him 6/10/25.    Patient reports his pain is diffuse and severe at 10/10. Says the butrans patch did help somewhat; although he feels like the percocet 10 4x/day is not helping at all. He denies side effects from his medications.    Patient is tearful during the visit saying that he hopes \"the good Lord will take him soon\". We talk about seeing palliative care for coordination of his chronic medical conditions and goals of care discussion. Patient reports he is open to this if they are able to come to his house.    HISTORY OF PRESENT ILLNESS:  Mr. Clifton Phan is 80 y.o. male with hx of elevated BMI (30), depression and anxiety, MOO on CPAP, DM, CAD, ischemic cardiomyopathy,  CHFrEF (30-35% in 2023) AICD in place, afib on chronic anticoagulation plavix and xarelto,  HTN/HLD, CKD (Cr. 1.6)  We have been treating his chronic low back pain. He has had a prior interbody fusion at L3-4 and L4-5. He also has a [employer] spinal cord stimulator with lead tip at T6-7.    He is taking percocet 10's q 6 hours. He is normally taking 3-4 tablets per day. This is helping with

## 2025-07-18 LAB
EKG P AXIS: -4 DEGREES
EKG P-R INTERVAL: 94 MS
EKG Q-T INTERVAL: 442 MS
EKG QRS DURATION: 126 MS
EKG QTC CALCULATION (BAZETT): 454 MS
EKG T AXIS: 125 DEGREES

## 2025-07-18 RX ORDER — HYDROMORPHONE HYDROCHLORIDE 4 MG/1
2-4 TABLET ORAL EVERY 8 HOURS PRN
Qty: 90 TABLET | Refills: 0 | Status: SHIPPED | OUTPATIENT
Start: 2025-08-01 | End: 2025-08-31

## 2025-07-18 RX ORDER — BUPRENORPHINE 7.5 UG/H
1 PATCH TRANSDERMAL WEEKLY
Qty: 4 PATCH | Refills: 0 | Status: SHIPPED | OUTPATIENT
Start: 2025-07-18 | End: 2025-08-17

## 2025-07-21 ENCOUNTER — OFFICE VISIT (OUTPATIENT)
Dept: PALLATIVE CARE | Age: 80
End: 2025-07-21
Payer: MEDICARE

## 2025-07-21 DIAGNOSIS — R42 DIZZINESS: ICD-10-CM

## 2025-07-21 DIAGNOSIS — I50.22 CHRONIC SYSTOLIC HEART FAILURE (HCC): ICD-10-CM

## 2025-07-21 DIAGNOSIS — G89.29 CHRONIC NONINTRACTABLE HEADACHE, UNSPECIFIED HEADACHE TYPE: Primary | ICD-10-CM

## 2025-07-21 DIAGNOSIS — R51.9 CHRONIC NONINTRACTABLE HEADACHE, UNSPECIFIED HEADACHE TYPE: Primary | ICD-10-CM

## 2025-07-21 DIAGNOSIS — Z51.5 ENCOUNTER FOR PALLIATIVE CARE: ICD-10-CM

## 2025-07-21 PROCEDURE — 99350 HOME/RES VST EST HIGH MDM 60: CPT

## 2025-07-21 PROCEDURE — G8417 CALC BMI ABV UP PARAM F/U: HCPCS

## 2025-07-21 PROCEDURE — 1124F ACP DISCUSS-NO DSCNMKR DOCD: CPT

## 2025-07-21 PROCEDURE — 1036F TOBACCO NON-USER: CPT

## 2025-07-21 NOTE — PROGRESS NOTES
(fusion),     BREAST SURGERY  2019    CARDIAC CATHETERIZATION  2012    with angioplasty, stent    CARDIAC CATHETERIZATION  3/29/15  [facility]    stent to distal RCA. EF 60%    CATARACT EXTRACTION  2025    CHOLECYSTECTOMY, LAPAROSCOPIC          COLONOSCOPY          CORONARY ANGIOPLASTY WITH STENT PLACEMENT   [place]    stent x 3    CORONARY ANGIOPLASTY WITH STENT PLACEMENT      11    CORONARY ARTERY BYPASS GRAFT N/A 2019    CORONARY ARTERY BYPASS GRAFT X3 WITH LEFT INTERNAL MAMMARY ARTERY WITH ENDOSCOPIC VEIN HARVESTING WITH PERFUSION TRANSESOPHAGEAL ECHOCARDIOGRAM performed by Jose Roche MD at [facility] OR    DIAGNOSTIC CARDIAC CATH LAB PROCEDURE  2011    primary stent placement to the first circumflex marginal branch, balloon angioplasty to the third left anterior descending diagnonal branch, intracoronary nitroglycerin adminstration    JOINT REPLACEMENT      left knee, ~2016    LEG SURGERY Right 2023    LEG INCISION AND DRAINAGE performed by Evelyn Cabello MD at [facility] OR    LUMBAR FUSION Left 11/15/2016    LUMBAR INTERBODY FUSION LATERAL L3-4 L4-5 WITH LATERAL PLATE  performed by Robby Hernandez MD at [facility] OR    PACEMAKER PLACEMENT  2019       Family History:       Problem Relation Age of Onset    Coronary Art Dis Mother     Cancer Sister         uterine    Kidney Disease Sister     Coronary Art Dis Brother          in his 50s - age 54    Cancer Father         colon    Coronary Art Dis Sister     Cancer Sister         olive cancer    No Known Problems Sister     No Known Problems Sister     No Known Problems Son     Alcohol Abuse Son         drank self to death    No Known Problems Son        Social History:    Social History     Tobacco Use    Smoking status: Never    Smokeless tobacco: Never   Vaping Use    Vaping status: Never Used   Substance Use Topics    Alcohol use: Yes     Alcohol/week: 1.0 standard drink of alcohol     Types: 1 Shots of liquor per

## 2025-07-29 ENCOUNTER — OFFICE VISIT (OUTPATIENT)
Dept: PALLATIVE CARE | Age: 80
End: 2025-07-29
Payer: MEDICARE

## 2025-07-29 DIAGNOSIS — Z51.5 ENCOUNTER FOR PALLIATIVE CARE: ICD-10-CM

## 2025-07-29 DIAGNOSIS — R53.81 MALAISE AND FATIGUE: Primary | ICD-10-CM

## 2025-07-29 DIAGNOSIS — R42 DIZZINESS: ICD-10-CM

## 2025-07-29 DIAGNOSIS — I50.22 CHRONIC SYSTOLIC HEART FAILURE (HCC): ICD-10-CM

## 2025-07-29 DIAGNOSIS — R53.83 MALAISE AND FATIGUE: Primary | ICD-10-CM

## 2025-07-29 PROCEDURE — G8417 CALC BMI ABV UP PARAM F/U: HCPCS

## 2025-07-29 PROCEDURE — 1036F TOBACCO NON-USER: CPT

## 2025-07-29 PROCEDURE — 1124F ACP DISCUSS-NO DSCNMKR DOCD: CPT

## 2025-07-29 PROCEDURE — 1159F MED LIST DOCD IN RCRD: CPT

## 2025-07-29 PROCEDURE — 99349 HOME/RES VST EST MOD MDM 40: CPT

## 2025-07-31 NOTE — PROGRESS NOTES
Supportive Care/Community Based Palliative Care  Follow Up Note        Patient Name:  Clifton Phan  Medical Record Number:  [id number]  YOB: 1945    Date of Visit: 7/29/2025  Location of Visit:  Home    Patient Care Team:  Jabari Figueroa MD as PCP - General (Internal Medicine)  [facility]  Ishmael Morelos DO (Gastroenterology)  Sharad Dash MD as Consulting Physician (Interventional Cardiology)  Gianfranco Mayo APRN - CNP as Advanced Practice Nurse (Hospice and Palliative Medicine)    History obtained from:  patient, spouse, electronic medical record    PALLIATIVE DIAGNOSES AND ORDERS/RECOMMENDATIONS/PLAN:   1. Malaise and fatigue  Recommend decreasing metoprolol by half, for total of 12.5 mg daily    2. Dizziness  Recommend slow position changes  Decrease metoprolol by half    3. Chronic systolic heart failure (HCC)  Continue metoprolol 12.5 mg daily  Continue Bumex, Farxiga, and Entresto  Follow-up with cardiology as directed    4. Encounter for palliative care  Current goals of care include: Maintain or improve functional status, Maintain or improve quality of life, Remain at home, Would want hospitalization if needed     Code status confirmed: Full Code  Will continue ACP discussions at future visit  Will continue goals of care discussions based on clinical course  Follow up home visit in 3-4 weeks and as needed    CHIEF COMPLAINT:     Chief Complaint   Patient presents with    Fatigue     Reports he just does not feel well       CLINICAL SUMMARY:          Clifton Phan is a 80 y.o. male with PMH of chronic systolic congestive heart failure, coronary artery disease, acute ischemic stroke, ischemic cardiomyopathy, history of AICD, MOO with CPAP, type 2 diabetes, peripheral neuropathy, hyperlipidemia, BPH, and several other comorbidities.     HPI AND VISIT SUMMARY:   I saw Clifton Phan in His home. Upon my arrival patient was noted to be awake,

## 2025-08-06 DIAGNOSIS — F11.90 CHRONIC, CONTINUOUS USE OF OPIOIDS: ICD-10-CM

## 2025-08-13 RX ORDER — BUMETANIDE 1 MG/1
2 TABLET ORAL 2 TIMES DAILY
Qty: 270 TABLET | Refills: 1 | Status: SHIPPED | OUTPATIENT
Start: 2025-08-13

## 2025-08-19 RX ORDER — METOPROLOL SUCCINATE 25 MG/1
25 TABLET, EXTENDED RELEASE ORAL DAILY
Qty: 90 TABLET | Refills: 1 | Status: SHIPPED | OUTPATIENT
Start: 2025-08-19

## 2025-08-19 RX ORDER — SACUBITRIL AND VALSARTAN 49; 51 MG/1; MG/1
1 TABLET ORAL 2 TIMES DAILY
Qty: 180 TABLET | Refills: 1 | Status: SHIPPED | OUTPATIENT
Start: 2025-08-19

## 2025-08-25 ENCOUNTER — OFFICE VISIT (OUTPATIENT)
Dept: PALLATIVE CARE | Age: 80
End: 2025-08-25
Payer: MEDICARE

## 2025-08-25 DIAGNOSIS — Z51.5 ENCOUNTER FOR PALLIATIVE CARE: ICD-10-CM

## 2025-08-25 DIAGNOSIS — R42 DIZZINESS: ICD-10-CM

## 2025-08-25 DIAGNOSIS — F32.A DEPRESSION, UNSPECIFIED DEPRESSION TYPE: ICD-10-CM

## 2025-08-25 DIAGNOSIS — G89.4 PAIN SYNDROME, CHRONIC: Primary | ICD-10-CM

## 2025-08-25 PROCEDURE — 1036F TOBACCO NON-USER: CPT

## 2025-08-25 PROCEDURE — G8417 CALC BMI ABV UP PARAM F/U: HCPCS

## 2025-08-25 PROCEDURE — 99350 HOME/RES VST EST HIGH MDM 60: CPT

## 2025-08-25 PROCEDURE — 1124F ACP DISCUSS-NO DSCNMKR DOCD: CPT

## 2025-08-27 ENCOUNTER — TELEPHONE (OUTPATIENT)
Dept: PALLATIVE CARE | Age: 80
End: 2025-08-27

## (undated) DEVICE — CVR BRD ARM 13X30

## (undated) DEVICE — GLV SURG BIOGEL LTX PF 7 1/2

## (undated) DEVICE — 4-PORT MANIFOLD: Brand: NEPTUNE 2

## (undated) DEVICE — SUTURE NONABSORBABLE MONOFILAMENT 7-0 BV-1 1X24 IN PROLENE 8702H

## (undated) DEVICE — DRSNG GZ CURAD XEROFORM NONADHS 5X9IN STRL

## (undated) DEVICE — GAUZE,SPONGE,FLUFF,6"X6.75",STRL,10/TRAY: Brand: MEDLINE

## (undated) DEVICE — GLV SURG DERMASSURE GRN LF PF 8.0

## (undated) DEVICE — PK EXTRM 30

## (undated) DEVICE — 28CM STRAW TUNNELING TOOL

## (undated) DEVICE — DRAPE,UTILITY,TAPE,15X26,STERILE: Brand: MEDLINE

## (undated) DEVICE — CONNECTOR DRNGE W3/8-0.5XH3/16XL3/16IN 2:1 SIL CARD STR

## (undated) DEVICE — ROYAL SILK SURGICAL GOWN, XXL: Brand: CONVERTORS

## (undated) DEVICE — ELECTRD BLD EDGE/INSUL1P 2.4X5.1MM STRL

## (undated) DEVICE — YANKAUER,TAPERED BULBOUS TIP,W/O VENT: Brand: MEDLINE

## (undated) DEVICE — CABLE THRESHOLD DISP FOR PACE ANALZR MERLIN

## (undated) DEVICE — Device: Brand: CLEANCUT ROTATING AORTIC PUNCH

## (undated) DEVICE — GLV SURG SENSICARE W/ALOE PF LF 7.5 STRL

## (undated) DEVICE — DRAPE,UTILITY,XL,4/PK,STERILE: Brand: MEDLINE

## (undated) DEVICE — PK SPINE POST 30

## (undated) DEVICE — REMOTE CONTROL KIT: Brand: FREELINK™

## (undated) DEVICE — E-Z CLEAN, NON-STICK, PTFE COATED, ELECTROSURGICAL BLADE ELECTRODE, MODIFIED EXTENDED INSULATION, 6.5 INCH (16.5 CM): Brand: MEGADYNE

## (undated) DEVICE — GLV SURG BIOGEL M LTX PF 7 1/2

## (undated) DEVICE — SPNG GZ WOVN 4X4IN 12PLY 10/BX STRL

## (undated) DEVICE — ACCESSORY TOWEL PACK: Brand: MEDLINE INDUSTRIES, INC.

## (undated) DEVICE — GLV SURG SENSICARE PI LF PF 8 GRN STRL

## (undated) DEVICE — CATHETER THRMDIL 7.5FR L110CM PROXIMAL/DISTAL PRT L30CM STD

## (undated) DEVICE — BNDG ESMARK 4IN 9FT LF STRL BLU

## (undated) DEVICE — PASSING ELEVATOR: Brand: PRECISION ™

## (undated) DEVICE — SUT ETHLN 5/0 P3 18IN 698H

## (undated) DEVICE — Z INACTIVE USE 2662641 SOLUTION IV 1000ML 140MEQ/L SOD 5MEQ/L K 3MEQ/L MG 27MEQ/L

## (undated) DEVICE — SYSTEM SKIN CLSR 22CM DERMBND PRINEO

## (undated) DEVICE — APPL CHLORAPREP W/TINT 26ML ORNG

## (undated) DEVICE — Z INACTIVE USE 2540311 LEAD PACE L475MM CHN A OR V MYOCARDIAL STEROID ELUT SIL

## (undated) DEVICE — ANTIBACTERIAL UNDYED BRAIDED (POLYGLACTIN 910), SYNTHETIC ABSORBABLE SUTURE: Brand: COATED VICRYL

## (undated) DEVICE — SURGICAL PROCEDURE PACK OPN HRT LOURDES HOSP

## (undated) DEVICE — SUT MNCRYL 4/0 P3 18IN UD MCP494G

## (undated) DEVICE — BLADE SAW W6.35XL32MM STRNM CUT STRNOTMY

## (undated) DEVICE — CVR UNIV C/ARM

## (undated) DEVICE — SOLUTION IV 500ML 0.9% SOD CHL PH 5 INJ USP VIAFLX PLAS

## (undated) DEVICE — PENCIL CAUT PUSH BTTN W HOLSTER AND CRD 15FT

## (undated) DEVICE — VERESS PNEUMOPERITONEUM NEEDLE: Brand: N.A.

## (undated) DEVICE — DISPOSABLE TOURNIQUET CUFF SINGLE BLADDER, SINGLE PORT AND QUICK CONNECT CONNECTOR: Brand: COLOR CUFF

## (undated) DEVICE — MEDI-TRACE CADENCE ADULT DEFIBRILLATION ELECTRODE (10 PR/PK) - PHYSIO-CONTROL: Brand: MEDI-TRACE CADENCE

## (undated) DEVICE — 3.0MM PRECISION NEURO (MATCH HEAD)

## (undated) DEVICE — SUTURE PROL SZ 4-0 L36IN NONABSORBABLE BLU L17MM RB-1 1/2 M8557

## (undated) DEVICE — E-Z CLEAN, NON-STICK, PTFE COATED, ELECTROSURGICAL BLADE ELECTRODE, MODIFIED EXTENDED INSULATION, 2.5 INCH (6.35 CM): Brand: MEGADYNE

## (undated) DEVICE — LOOP VES VASCO 18 G SIL W/ BLNT NDL

## (undated) DEVICE — DRESSING FOAM W4XL12IN SIL RECT ADH WTRPRF FLM BK W/ BORD

## (undated) DEVICE — PLEDGET SURG W3.5XL7MM THK1.5MM WHT PTFE RECT SFT TFE

## (undated) DEVICE — ELECTRD BLD EZ CLN MOD XLNG 2.75IN

## (undated) DEVICE — BAPTIST TURNOVER KIT: Brand: MEDLINE INDUSTRIES, INC.

## (undated) DEVICE — SPK10277 JACKSON/PRO-AXIS KIT: Brand: SPK10277 JACKSON/PRO-AXIS KIT

## (undated) DEVICE — SYR LL TP 10ML STRL

## (undated) DEVICE — PACK,UNIVERSAL,NO GOWNS: Brand: MEDLINE

## (undated) DEVICE — DRESSING FOAM W4XL5CM THN ABSRB SELF ADH W/ SAFETAC MEPILEX

## (undated) DEVICE — GLV SURG SENSICARE PI PF LF 7 GRN STRL

## (undated) DEVICE — CATH IV ANGIO FEP 12G 3IN LTBLU 10PK

## (undated) DEVICE — DRAIN SURG 19FR 0.25IN SIL RND W/ TRCR INDIC DOT RADPQ FULL

## (undated) DEVICE — GLV SURG GRN DERMASSURE LF PF 7.5

## (undated) DEVICE — SUT ETHLN 4/0 FS2 18IN 662H

## (undated) DEVICE — KIT INTRO 8.5FR L4IN PERC POLYUR SHTH RADPQ W/ INTEGR

## (undated) DEVICE — POSITIONER,HEAD,MULTIRING,36CS: Brand: MEDLINE

## (undated) DEVICE — SOLUTION IV IRRIG WATER 1000ML POUR BRL 2F7114

## (undated) DEVICE — TRAP FLD MINIVAC MEGADYNE 100ML

## (undated) DEVICE — SPNG GZ STRL 2S 4X4 12PLY

## (undated) DEVICE — DISPOSABLE TOURNIQUET CUFF 24"X4", 1-LINE, YELLOW, STERILE, 1EA/PK, 10PK/CS: Brand: ASP MEDICAL

## (undated) DEVICE — SUTURE SURG STL NO 5 K 60 18IN N ABSRB MFIL 297271M6

## (undated) DEVICE — Z DUP USE 2266075 STABILIZER SURG VAC STD BLDE ACCESSRAIL PLATFRM SUCT POD

## (undated) DEVICE — CHARGING SYSTEM KIT: Brand: PRECISION™

## (undated) DEVICE — Z DISCONTINUED PER MEDLINE USE 2718072 DRESSING FOAM W5XL12.5CM SIL ADH THN BORDED CNFRM LO PROF

## (undated) DEVICE — DRSNG SURESITE WNDW 4X4.5

## (undated) DEVICE — SS SUTURE, 3 PER SLEEVE: Brand: MYO/WIRE II

## (undated) DEVICE — Device: Brand: RETRACT-O-TAPE 18G X 30.5CM SEMI-POINTED NEEDLE

## (undated) DEVICE — GLV SURG TRIUMPH PF LTX 7.5 STRL

## (undated) DEVICE — PK TURNOVER RM ADV

## (undated) DEVICE — APPL CHLORAPREP HI/LITE 26ML ORNG

## (undated) DEVICE — CABL BIPOL MEGADYNE 12FT DISP

## (undated) DEVICE — BANDAGE,GAUZE,4.5"X4.1YD,STERILE,LF: Brand: MEDLINE

## (undated) DEVICE — JP CHANNEL DRAIN, 24FR HUBLESS: Brand: CARDINAL HEALTH

## (undated) DEVICE — GLOVE SURG SZ 8 L11.6IN THK9.8MIL STRW LTX POLYMER BEAD CUF

## (undated) DEVICE — YANKAUER SUCTION INSTRUMENT WITHOUT CONTROL VENT, OPEN TIP, CLEAR: Brand: YANKAUER

## (undated) DEVICE — SUT ETHIB 2/0 CV V7 D/A 30IN X987H

## (undated) DEVICE — GLV SURG SENSICARE W/ALOE PF LF 8 STRL

## (undated) DEVICE — VESSEL SHUNT 1.50MM TAPERED TIP, 12MM SHAFT: Brand: SOF-FLO ATRAUMATIC CORONARY ARTERY SHUNT

## (undated) DEVICE — KIT BLWR MISTER 5P 15L W/ TBNG SET IRRIG MIST TO IMPROVE

## (undated) DEVICE — SOLUTION IV 250ML 0.9% SOD CHL PH 5 INJ USP VIAFLX PLAS

## (undated) DEVICE — GLV SURG BIOGEL LTX PF 6 1/2

## (undated) DEVICE — SUTURE PROL SZ 7-0 L24IN NONABSORBABLE BLU L9.3MM BV-1 3/8 M8702

## (undated) DEVICE — BLANKET THER AD W24XL60IN FAB COVERING SUP SFT ULT THN LTWT

## (undated) DEVICE — SOLUTION CARDPLG H2O DIL 1000 ML CARD PERF VIAFLX

## (undated) DEVICE — DEVICE RESUS AD TB L40IN SELF INFL MASK TEXT BG DBL SWVL EL

## (undated) DEVICE — BNDG ELAS ECON W/CLIP 3IN 5YD LF STRL

## (undated) DEVICE — 3M™ STERI-STRIP™ REINFORCED ADHESIVE SKIN CLOSURES, R1546, 1/4 IN X 4 IN (6 MM X 100 MM), 10 STRIPS/ENVELOPE: Brand: 3M™ STERI-STRIP™

## (undated) DEVICE — WAX SURG 2.5GM HEMSTAT BNE BEESWAX PARAFFIN ISO PALMITATE

## (undated) DEVICE — DRAIN SURG SGL COLL PT TB FOR ATS BG OASIS

## (undated) DEVICE — BLADE CLIPPER GEN PURP NS

## (undated) DEVICE — INTENDED FOR TISSUE SEPARATION, AND OTHER PROCEDURES THAT REQUIRE A SHARP SURGICAL BLADE TO PUNCTURE OR CUT.: Brand: BARD-PARKER ® STAINLESS STEEL BLADES

## (undated) DEVICE — HEX WRENCH, 7.6CM: Brand: CLIK™ ANCHOR

## (undated) DEVICE — SOLUTION IV IRRIG POUR BRL 0.9% SODIUM CHL 2F7124

## (undated) DEVICE — Z DUP USE 2537558 SYSTEM ENDOSCP VES HARV VASO VW HEMOPRO

## (undated) DEVICE — BIPOLAR SEALER 23-113-1 AQM 2.3: Brand: AQUAMANTYS™

## (undated) DEVICE — SUTURE PROL SZ 5-0 L36IN NONABSORBABLE BLU L17MM RB-1 1/2 8556H

## (undated) DEVICE — 3M™ TEGADERM™ TRANSPARENT FILM DRESSING FRAME STYLE, 1628, 6 IN X 8 IN (15 CM X 20 CM), 10/CT 8CT/CASE: Brand: 3M™ TEGADERM™

## (undated) DEVICE — DRAIN SURG L3/8-1/2IN DIA3/16IN SIL CARD CONN 1:1 BLAK

## (undated) DEVICE — GLOVE SURG SZ 7 CRM LTX FREE POLYISOPRENE POLYMER BEAD ANTI

## (undated) DEVICE — MINOR CDS: Brand: MEDLINE INDUSTRIES, INC.

## (undated) DEVICE — GLV SURG SENSICARE PI LF PF 7.5 GRN STRL

## (undated) DEVICE — GLV SURG TRIUMPH PF LTX 6.5 STRL

## (undated) DEVICE — SOLUTION IV 1000ML 0.9% SOD CHL PH 5 INJ USP VIAFLX PLAS

## (undated) DEVICE — PK ENT HD AND NK 30

## (undated) DEVICE — GAUZE,SPONGE,4"X4",12PLY,STERILE,LF,2'S: Brand: MEDLINE